# Patient Record
Sex: FEMALE | Race: WHITE
[De-identification: names, ages, dates, MRNs, and addresses within clinical notes are randomized per-mention and may not be internally consistent; named-entity substitution may affect disease eponyms.]

---

## 2017-09-11 ENCOUNTER — HOSPITAL ENCOUNTER (OUTPATIENT)
Dept: HOSPITAL 8 - STAR | Age: 67
Discharge: HOME | End: 2017-09-11
Attending: NEUROLOGICAL SURGERY
Payer: MEDICARE

## 2017-09-11 DIAGNOSIS — M48.06: ICD-10-CM

## 2017-09-11 DIAGNOSIS — R79.1: ICD-10-CM

## 2017-09-11 DIAGNOSIS — Z01.818: Primary | ICD-10-CM

## 2017-09-11 DIAGNOSIS — M51.36: ICD-10-CM

## 2017-09-11 LAB
AST SERPL-CCNC: 9 U/L (ref 15–37)
BUN SERPL-MCNC: 18 MG/DL (ref 7–18)
HCT VFR BLD CALC: 41.7 % (ref 34.6–47.8)
HGB BLD-MCNC: 14.1 G/DL (ref 11.7–16.4)
WBC # BLD AUTO: 10.3 X10^3/UL (ref 3.4–10)

## 2017-09-11 PROCEDURE — 36415 COLL VENOUS BLD VENIPUNCTURE: CPT

## 2017-09-11 PROCEDURE — 85025 COMPLETE CBC W/AUTO DIFF WBC: CPT

## 2017-09-11 PROCEDURE — 85610 PROTHROMBIN TIME: CPT

## 2017-09-11 PROCEDURE — 85730 THROMBOPLASTIN TIME PARTIAL: CPT

## 2017-09-11 PROCEDURE — 93005 ELECTROCARDIOGRAM TRACING: CPT

## 2017-09-11 PROCEDURE — 80053 COMPREHEN METABOLIC PANEL: CPT

## 2017-09-11 PROCEDURE — 81003 URINALYSIS AUTO W/O SCOPE: CPT

## 2017-09-20 ENCOUNTER — HOSPITAL ENCOUNTER (OUTPATIENT)
Dept: HOSPITAL 8 - OUT | Age: 67
Setting detail: OBSERVATION
LOS: 2 days | Discharge: HOME | End: 2017-09-22
Attending: NEUROLOGICAL SURGERY | Admitting: NEUROLOGICAL SURGERY
Payer: MEDICARE

## 2017-09-20 VITALS — SYSTOLIC BLOOD PRESSURE: 136 MMHG | DIASTOLIC BLOOD PRESSURE: 82 MMHG

## 2017-09-20 VITALS — SYSTOLIC BLOOD PRESSURE: 94 MMHG | DIASTOLIC BLOOD PRESSURE: 62 MMHG

## 2017-09-20 VITALS — BODY MASS INDEX: 35.04 KG/M2 | HEIGHT: 64 IN | WEIGHT: 205.25 LBS

## 2017-09-20 VITALS — DIASTOLIC BLOOD PRESSURE: 64 MMHG | SYSTOLIC BLOOD PRESSURE: 99 MMHG

## 2017-09-20 DIAGNOSIS — G89.29: ICD-10-CM
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
## 2017-09-20 DIAGNOSIS — G97.41: ICD-10-CM

## 2017-09-20 DIAGNOSIS — I10: ICD-10-CM

## 2017-09-20 DIAGNOSIS — M48.06: Primary | ICD-10-CM

## 2017-09-20 DIAGNOSIS — M51.16: ICD-10-CM

## 2017-09-20 PROCEDURE — 96376 TX/PRO/DX INJ SAME DRUG ADON: CPT

## 2017-09-20 PROCEDURE — 63035 LAMOT DCMPRN NRV RT EA ADDL: CPT

## 2017-09-20 PROCEDURE — 97166 OT EVAL MOD COMPLEX 45 MIN: CPT

## 2017-09-20 PROCEDURE — 96365 THER/PROPH/DIAG IV INF INIT: CPT

## 2017-09-20 PROCEDURE — 63057 DECOMPRESS SPINE CORD ADD-ON: CPT

## 2017-09-20 PROCEDURE — 85025 COMPLETE CBC W/AUTO DIFF WBC: CPT

## 2017-09-20 PROCEDURE — 97162 PT EVAL MOD COMPLEX 30 MIN: CPT

## 2017-09-20 PROCEDURE — C1781 MESH (IMPLANTABLE): HCPCS

## 2017-09-20 PROCEDURE — 96375 TX/PRO/DX INJ NEW DRUG ADDON: CPT

## 2017-09-20 PROCEDURE — 72100 X-RAY EXAM L-S SPINE 2/3 VWS: CPT

## 2017-09-20 PROCEDURE — 36415 COLL VENOUS BLD VENIPUNCTURE: CPT

## 2017-09-20 PROCEDURE — C1729 CATH, DRAINAGE: HCPCS

## 2017-09-20 PROCEDURE — G0378 HOSPITAL OBSERVATION PER HR: HCPCS

## 2017-09-20 PROCEDURE — 63030 LAMOT DCMPRN NRV RT 1 LMBR: CPT

## 2017-09-20 PROCEDURE — 82040 ASSAY OF SERUM ALBUMIN: CPT

## 2017-09-20 PROCEDURE — 63056 DECOMPRESS SPINAL CORD LMBR: CPT

## 2017-09-20 PROCEDURE — 96372 THER/PROPH/DIAG INJ SC/IM: CPT

## 2017-09-20 PROCEDURE — 80048 BASIC METABOLIC PNL TOTAL CA: CPT

## 2017-09-20 RX ADMIN — ONDANSETRON PRN MG: 2 INJECTION, SOLUTION INTRAMUSCULAR; INTRAVENOUS at 20:20

## 2017-09-20 RX ADMIN — NEBIVOLOL HYDROCHLORIDE SCH MG: 5 TABLET ORAL at 21:13

## 2017-09-20 RX ADMIN — HYDROMORPHONE HYDROCHLORIDE PRN MG: 1 INJECTION, SOLUTION INTRAMUSCULAR; INTRAVENOUS; SUBCUTANEOUS at 15:22

## 2017-09-20 RX ADMIN — HYDROMORPHONE HYDROCHLORIDE PRN MG: 1 INJECTION, SOLUTION INTRAMUSCULAR; INTRAVENOUS; SUBCUTANEOUS at 15:34

## 2017-09-20 RX ADMIN — HYDROMORPHONE HYDROCHLORIDE PRN MG: 1 INJECTION, SOLUTION INTRAMUSCULAR; INTRAVENOUS; SUBCUTANEOUS at 15:45

## 2017-09-20 RX ADMIN — ENOXAPARIN SODIUM SCH MG: 40 INJECTION SUBCUTANEOUS at 15:30

## 2017-09-20 RX ADMIN — HYDROCODONE BITARTRATE AND ACETAMINOPHEN PRN TAB: 10; 325 TABLET ORAL at 21:12

## 2017-09-20 RX ADMIN — KETOROLAC TROMETHAMINE PRN MG: 30 INJECTION, SOLUTION INTRAMUSCULAR at 20:20

## 2017-09-20 RX ADMIN — BUDESONIDE AND FORMOTEROL FUMARATE DIHYDRATE SCH PUFFS: 160; 4.5 AEROSOL RESPIRATORY (INHALATION) at 21:00

## 2017-09-20 RX ADMIN — CEFAZOLIN SODIUM SCH MLS/HR: 1 SOLUTION INTRAVENOUS at 21:13

## 2017-09-20 RX ADMIN — DEXTROSE, SODIUM CHLORIDE, AND POTASSIUM CHLORIDE SCH MLS/HR: 5; .9; .15 INJECTION INTRAVENOUS at 20:20

## 2017-09-20 RX ADMIN — ATORVASTATIN CALCIUM SCH MG: 40 TABLET, FILM COATED ORAL at 21:00

## 2017-09-21 VITALS — DIASTOLIC BLOOD PRESSURE: 64 MMHG | SYSTOLIC BLOOD PRESSURE: 102 MMHG

## 2017-09-21 VITALS — SYSTOLIC BLOOD PRESSURE: 110 MMHG | DIASTOLIC BLOOD PRESSURE: 62 MMHG

## 2017-09-21 VITALS — DIASTOLIC BLOOD PRESSURE: 61 MMHG | SYSTOLIC BLOOD PRESSURE: 90 MMHG

## 2017-09-21 VITALS — DIASTOLIC BLOOD PRESSURE: 52 MMHG | SYSTOLIC BLOOD PRESSURE: 91 MMHG

## 2017-09-21 VITALS — SYSTOLIC BLOOD PRESSURE: 101 MMHG | DIASTOLIC BLOOD PRESSURE: 64 MMHG

## 2017-09-21 LAB
BUN SERPL-MCNC: 11 MG/DL (ref 7–18)
HCT VFR BLD CALC: 33.1 % (ref 34.6–47.8)
HGB BLD-MCNC: 11 G/DL (ref 11.7–16.4)
WBC # BLD AUTO: 11.4 X10^3/UL (ref 3.4–10)

## 2017-09-21 RX ADMIN — ONDANSETRON PRN MG: 2 INJECTION, SOLUTION INTRAMUSCULAR; INTRAVENOUS at 16:18

## 2017-09-21 RX ADMIN — ATORVASTATIN CALCIUM SCH MG: 40 TABLET, FILM COATED ORAL at 20:43

## 2017-09-21 RX ADMIN — DEXTROSE, SODIUM CHLORIDE, AND POTASSIUM CHLORIDE SCH MLS/HR: 5; .9; .15 INJECTION INTRAVENOUS at 18:46

## 2017-09-21 RX ADMIN — CEFAZOLIN SODIUM SCH MLS/HR: 1 SOLUTION INTRAVENOUS at 04:43

## 2017-09-21 RX ADMIN — SODIUM CHLORIDE, PRESERVATIVE FREE SCH ML: 5 INJECTION INTRAVENOUS at 20:43

## 2017-09-21 RX ADMIN — HYDROCODONE BITARTRATE AND ACETAMINOPHEN PRN TAB: 10; 325 TABLET ORAL at 02:27

## 2017-09-21 RX ADMIN — ONDANSETRON PRN MG: 2 INJECTION, SOLUTION INTRAMUSCULAR; INTRAVENOUS at 02:27

## 2017-09-21 RX ADMIN — DEXTROSE, SODIUM CHLORIDE, AND POTASSIUM CHLORIDE SCH MLS/HR: 5; .9; .15 INJECTION INTRAVENOUS at 06:19

## 2017-09-21 RX ADMIN — HYDROCODONE BITARTRATE AND ACETAMINOPHEN PRN TAB: 10; 325 TABLET ORAL at 20:43

## 2017-09-21 RX ADMIN — SODIUM CHLORIDE, PRESERVATIVE FREE SCH ML: 5 INJECTION INTRAVENOUS at 09:00

## 2017-09-21 RX ADMIN — BUDESONIDE AND FORMOTEROL FUMARATE DIHYDRATE SCH PUFFS: 160; 4.5 AEROSOL RESPIRATORY (INHALATION) at 09:00

## 2017-09-21 RX ADMIN — ENOXAPARIN SODIUM SCH MG: 40 INJECTION SUBCUTANEOUS at 16:18

## 2017-09-21 RX ADMIN — NEBIVOLOL HYDROCHLORIDE SCH MG: 5 TABLET ORAL at 20:46

## 2017-09-21 RX ADMIN — HYDROCODONE BITARTRATE AND ACETAMINOPHEN PRN TAB: 10; 325 TABLET ORAL at 16:18

## 2017-09-21 RX ADMIN — KETOROLAC TROMETHAMINE PRN MG: 30 INJECTION, SOLUTION INTRAMUSCULAR at 12:10

## 2017-09-21 RX ADMIN — BUDESONIDE AND FORMOTEROL FUMARATE DIHYDRATE SCH MG: 160; 4.5 AEROSOL RESPIRATORY (INHALATION) at 09:00

## 2017-09-21 RX ADMIN — DEXTROSE, SODIUM CHLORIDE, AND POTASSIUM CHLORIDE SCH MLS/HR: 5; .9; .15 INJECTION INTRAVENOUS at 08:46

## 2017-09-21 RX ADMIN — DEXTROSE, SODIUM CHLORIDE, AND POTASSIUM CHLORIDE SCH MLS/HR: 5; .9; .15 INJECTION INTRAVENOUS at 20:43

## 2017-09-21 RX ADMIN — LOSARTAN POTASSIUM SCH MG: 25 TABLET, FILM COATED ORAL at 09:00

## 2017-09-21 RX ADMIN — BUDESONIDE AND FORMOTEROL FUMARATE DIHYDRATE SCH PUFFS: 160; 4.5 AEROSOL RESPIRATORY (INHALATION) at 20:44

## 2017-09-22 VITALS — DIASTOLIC BLOOD PRESSURE: 61 MMHG | SYSTOLIC BLOOD PRESSURE: 102 MMHG

## 2017-09-22 VITALS — DIASTOLIC BLOOD PRESSURE: 74 MMHG | SYSTOLIC BLOOD PRESSURE: 121 MMHG

## 2017-09-22 LAB
HCT VFR BLD CALC: 32.7 % (ref 34.6–47.8)
HGB BLD-MCNC: 10.9 G/DL (ref 11.7–16.4)
WBC # BLD AUTO: 13.7 X10^3/UL (ref 3.4–10)

## 2017-09-22 RX ADMIN — SODIUM CHLORIDE, PRESERVATIVE FREE SCH ML: 5 INJECTION INTRAVENOUS at 09:00

## 2017-09-22 RX ADMIN — HYDROCODONE BITARTRATE AND ACETAMINOPHEN PRN TAB: 10; 325 TABLET ORAL at 06:22

## 2017-09-22 RX ADMIN — BUDESONIDE AND FORMOTEROL FUMARATE DIHYDRATE SCH MG: 160; 4.5 AEROSOL RESPIRATORY (INHALATION) at 09:00

## 2017-09-22 RX ADMIN — LOSARTAN POTASSIUM SCH MG: 25 TABLET, FILM COATED ORAL at 09:02

## 2017-09-22 RX ADMIN — BUDESONIDE AND FORMOTEROL FUMARATE DIHYDRATE SCH PUFFS: 160; 4.5 AEROSOL RESPIRATORY (INHALATION) at 09:04

## 2017-09-22 RX ADMIN — KETOROLAC TROMETHAMINE PRN MG: 30 INJECTION, SOLUTION INTRAMUSCULAR at 09:03

## 2017-09-22 RX ADMIN — ONDANSETRON PRN MG: 2 INJECTION, SOLUTION INTRAMUSCULAR; INTRAVENOUS at 06:22

## 2017-12-17 ENCOUNTER — OFFICE VISIT (OUTPATIENT)
Dept: URGENT CARE | Facility: CLINIC | Age: 67
End: 2017-12-17
Payer: MEDICARE

## 2017-12-17 ENCOUNTER — APPOINTMENT (OUTPATIENT)
Dept: RADIOLOGY | Facility: IMAGING CENTER | Age: 67
End: 2017-12-17
Attending: NURSE PRACTITIONER
Payer: MEDICARE

## 2017-12-17 VITALS
RESPIRATION RATE: 16 BRPM | OXYGEN SATURATION: 94 % | DIASTOLIC BLOOD PRESSURE: 60 MMHG | TEMPERATURE: 96.4 F | HEIGHT: 64 IN | WEIGHT: 183.6 LBS | BODY MASS INDEX: 31.34 KG/M2 | SYSTOLIC BLOOD PRESSURE: 130 MMHG | HEART RATE: 91 BPM

## 2017-12-17 DIAGNOSIS — S52.602A CLOSED FRACTURE OF DISTAL END OF LEFT ULNA, UNSPECIFIED FRACTURE MORPHOLOGY, INITIAL ENCOUNTER: ICD-10-CM

## 2017-12-17 DIAGNOSIS — S69.92XA INJURY OF LEFT WRIST, INITIAL ENCOUNTER: ICD-10-CM

## 2017-12-17 PROCEDURE — 73110 X-RAY EXAM OF WRIST: CPT | Mod: TC,LT | Performed by: NURSE PRACTITIONER

## 2017-12-17 PROCEDURE — 99203 OFFICE O/P NEW LOW 30 MIN: CPT | Performed by: NURSE PRACTITIONER

## 2017-12-17 RX ORDER — HYDROCODONE BITARTRATE AND ACETAMINOPHEN 10; 325 MG/1; MG/1
TABLET ORAL EVERY 4 HOURS
Refills: 0 | COMMUNITY
Start: 2017-09-22 | End: 2017-12-17

## 2017-12-17 RX ORDER — ONDANSETRON 8 MG/1
TABLET, ORALLY DISINTEGRATING ORAL
Refills: 3 | COMMUNITY
Start: 2017-11-04 | End: 2022-02-06

## 2017-12-17 RX ORDER — ZOLPIDEM TARTRATE 10 MG/1
10 TABLET ORAL
Refills: 0 | COMMUNITY
Start: 2017-12-05 | End: 2022-02-06

## 2017-12-17 RX ORDER — ALPRAZOLAM 1 MG/1
TABLET ORAL
Refills: 0 | COMMUNITY
Start: 2017-12-05 | End: 2022-02-06

## 2017-12-17 RX ORDER — LOSARTAN POTASSIUM 25 MG/1
25 TABLET ORAL
Refills: 0 | COMMUNITY
Start: 2017-11-09 | End: 2022-02-06

## 2017-12-17 RX ORDER — CYCLOBENZAPRINE HCL 5 MG
TABLET ORAL
Refills: 0 | COMMUNITY
Start: 2017-11-09 | End: 2022-02-06

## 2017-12-17 RX ORDER — ATORVASTATIN CALCIUM 40 MG/1
40 TABLET, FILM COATED ORAL
Refills: 1 | COMMUNITY
Start: 2017-11-01 | End: 2022-02-06

## 2017-12-17 RX ORDER — SUMATRIPTAN 100 MG/1
TABLET, FILM COATED ORAL
Refills: 3 | COMMUNITY
Start: 2017-11-04 | End: 2022-02-06

## 2017-12-17 RX ORDER — FLUCONAZOLE 200 MG/1
TABLET ORAL
Refills: 0 | COMMUNITY
Start: 2017-11-21 | End: 2017-12-17

## 2017-12-17 RX ORDER — OXYCODONE AND ACETAMINOPHEN 10; 325 MG/1; MG/1
TABLET ORAL
Refills: 0 | COMMUNITY
Start: 2017-10-10 | End: 2022-02-06

## 2017-12-17 NOTE — PROGRESS NOTES
Chief Complaint   Patient presents with   • Wrist Injury     x1day, left wrist pain after fall       HISTORY OF PRESENT ILLNESS: Patient is a 67 y.o. female who presents to urgent care today with complaints of left wrist pain. States she had a mechanical ground level fall in her home yesterday. During the fall, she believes she hit her wrist on the edge of a chair. She has had pain to her wrist since. She has tried ice and OTC motrin for pain relief. She denies other areas of pain or trauma. She is right hand dominate. Denies previous injuries or pain to this wrist.     Patient Active Problem List    Diagnosis Date Noted   • HTN (hypertension)    • Hyperlipidemia    • Obesity    • Depression    • Other chest pain 01/30/2012   • Dyslipidemia 01/30/2012   • Essential hypertension, benign 01/30/2012   • CAD (coronary artery disease) 01/30/2012       Allergies:Fentanyl; Morphine; Pcn [penicillins]; and Rifampin    Current Outpatient Prescriptions Ordered in [facility]   Medication Sig Dispense Refill   • alprazolam (XANAX) 1 MG Tab TAKE 1/2-1 TABLET BY MOUTH EVERY DAY AS NEEDED  0   • cyclobenzaprine (FLEXERIL) 5 MG tablet TAKE 1/2-2 TABLET(S) BY MOUTH DAILY AT BEDTIME AS NEEDED  0   • losartan (COZAAR) 25 MG Tab Take 25 mg by mouth every day.  0   • oxycodone-acetaminophen (PERCOCET-10)  MG Tab TAKE 1/2-1 TABLETS BY MOUTH 3 TIMES DAILY AS NEEDED  0   • LYRICA 25 MG Cap Take 25 mg by mouth every day.  2   • NON SPECIFIED      • HYDROCHLOROTHIAZIDE PO Take  by mouth.     • Fluticasone-Salmeterol (ADVAIR HFA INH) Inhale  by mouth.     • ALBUTEROL SULFATE PO Take  by mouth.     • atorvastatin (LIPITOR) 40 MG Tab Take 40 mg by mouth.  1   • ondansetron (ZOFRAN ODT) 8 MG TABLET DISPERSIBLE DISSOLVE 1 TAB IN MOUTH 3 TIMES A DAY AS NEEDED  3   • sumatriptan (IMITREX) 100 MG tablet TAKE 1 TABLET BY MOUTH AT ONSET OF MIGRAINE  3   • zolpidem (AMBIEN) 10 MG Tab Take 10 mg by mouth.  0   • ESTROGENS CONJUGATED PO Take 2 mg by  mouth every day.     • levothyroxine (SYNTHROID) 50 MCG TABS Take 50 mcg by mouth every day.     • lisinopril (PRINIVIL) 2.5 MG TABS Take 2.5 mg by mouth every day.     • zolpidem (AMBIEN CR) 12.5 MG CR tablet Take 12.5 mg by mouth at bedtime as needed.     • duloxetine (CYMBALTA) 30 MG CPEP Take 30 mg by mouth every day.     • buPROPion SR (WELLBUTRIN SR) 150 MG TB12 Take 150 mg by mouth every day.     • Budesonide-Formoterol Fumarate (SYMBICORT INH) Inhale  by mouth 2 Times a Day.     • Levalbuterol HCl (XOPENEX INH) Inhale  by mouth as needed.       No current Epic-ordered facility-administered medications on file.        Past Medical History:   Diagnosis Date   • Asthma    • Depression    • HTN (hypertension)    • Hyperlipidemia    • Migraines    • Obesity    • Sleep apnea     diagnosed 2009 CPAP -PMA       Social History   Substance Use Topics   • Smoking status: Never Smoker   • Smokeless tobacco: Never Used   • Alcohol use No       Family Status   Relation Status   • Mother    • Father    • Sister Alive   • Brother Alive   • Maternal Aunt Alive   • Brother Alive   • Brother Alive   • Brother Alive   • Sister Alive   • Brother    • Brother      Family History   Problem Relation Age of Onset   • Cancer Mother      lung   • Heart Disease Mother    • Stroke Father    • Heart Disease Father    • Diabetes Father    • Heart Disease Brother      cath and bypass   • Heart Disease Brother      cath and byp[ass[   • Heart Disease Brother      cath and bypass   • Heart Disease Brother    • Other Brother      MVA       ROS:  Review of Systems   Constitutional: Negative for fever, chills, weight loss, malaise, and fatigue.   HENT: Negative for ear pain, nosebleeds, congestion, sore throat and neck pain.    Eyes: Negative for vision changes.   Neuro: Negative for headache, sensory changes, weakness, seizure, LOC.   Cardiovascular: Negative for chest pain, palpitations, orthopnea and leg  "swelling.   Respiratory: Negative for cough, sputum production, shortness of breath and wheezing.   Gastrointestinal: Negative for abdominal pain, nausea, vomiting or diarrhea.   Genitourinary: Negative for dysuria, urgency and frequency.  Musculoskeletal: Positive for fall, left wrist pain. Positive for chronic back pain, denies new or worsening pain. Negative for neck pain, myalgias.   Skin: Positive for abrasion to right arm. Negative for rash, diaphoresis.     Exam:  Blood pressure 130/60, pulse 91, temperature (!) 35.8 °C (96.4 °F), resp. rate 16, height 1.626 m (5' 4\"), weight 83.3 kg (183 lb 9.6 oz), SpO2 94 %.  General: well-nourished, well-developed female in NAD  Head: normocephalic, atraumatic  Eyes: PERRLA, no conjunctival injection, acuity grossly intact, lids normal.  Ears: normal shape and symmetry, gross auditory acuity is intact.  Nose: symmetrical without tenderness, no discharge.  Mouth/Throat: reasonable hygiene, no erythema, exudates or tonsillar enlargement.  Neck: no masses, range of motion within normal limits, no tracheal deviation. No obvious thyroid enlargement.   Lymph: no cervical adenopathy. No supraclavicular adenopathy.   Neuro: alert and oriented. Cranial nerves 1-12 grossly intact. No sensory deficit.   Cardiovascular: regular rate and rhythm. No edema.  Pulmonary: no distress. Chest is symmetrical with respiration, no wheezes, crackles, or rhonchi.   Musculoskeletal: no clubbing, appropriate muscle tone, gait is stable. There is tenderness to medial aspect of left wrist, guarding. There is no obvious deformity. Slightly limited ROM of wrists in all directions. N/V intact. Cap refill brisk.  There is a superficial abrasion to left forearm and elbow without correlating bony tenderness.   Skin: warm, dry, intact, no clubbing, no cyanosis, no rashes.   Psych: appropriate mood, affect, judgement.         Assessment/Plan:  1. Closed fracture of distal end of left ulna, unspecified " "fracture morphology, initial encounter  DX-WRIST-COMPLETE 3+ LEFT    REFERRAL TO ORTHOPEDICS         DX wrist reviewed by myself, radiology reading \"Oblique mildly displaced fracture of the distal ulnar diaphysis.\"        X-ray shows mildly displaced distal ulnar fracture. Patient is placed in sugar tong splint with a sling. N/V intact post placement. RICE. Home pain medications as directed. Follow up with ortho as discussed, referral placed.   Supportive care, differential diagnoses, and indications for immediate follow-up discussed with patient.   Pathogenesis of diagnosis discussed including typical length and natural progression.   Instructed to return to clinic or nearest emergency department for any change in condition, further concerns, or worsening of symptoms.  Patient states understanding of the plan of care and discharge instructions.  Instructed to make an appointment, for follow up, with their primary care provider.        Please note that this dictation was created using voice recognition software. I have made every reasonable attempt to correct obvious errors, but I expect that there are errors of grammar and possibly content that I did not discover before finalizing the note.      CLAUDIA Arriola.     "

## 2018-03-21 ENCOUNTER — APPOINTMENT (RX ONLY)
Dept: URBAN - METROPOLITAN AREA CLINIC 4 | Facility: CLINIC | Age: 68
Setting detail: DERMATOLOGY
End: 2018-03-21

## 2018-03-21 DIAGNOSIS — L72.0 EPIDERMAL CYST: ICD-10-CM

## 2018-03-21 DIAGNOSIS — D22 MELANOCYTIC NEVI: ICD-10-CM

## 2018-03-21 DIAGNOSIS — D18.0 HEMANGIOMA: ICD-10-CM

## 2018-03-21 DIAGNOSIS — L82.0 INFLAMED SEBORRHEIC KERATOSIS: ICD-10-CM

## 2018-03-21 DIAGNOSIS — L57.8 OTHER SKIN CHANGES DUE TO CHRONIC EXPOSURE TO NONIONIZING RADIATION: ICD-10-CM

## 2018-03-21 DIAGNOSIS — L82.1 OTHER SEBORRHEIC KERATOSIS: ICD-10-CM

## 2018-03-21 DIAGNOSIS — D485 NEOPLASM OF UNCERTAIN BEHAVIOR OF SKIN: ICD-10-CM

## 2018-03-21 DIAGNOSIS — L81.4 OTHER MELANIN HYPERPIGMENTATION: ICD-10-CM

## 2018-03-21 PROBLEM — D22.5 MELANOCYTIC NEVI OF TRUNK: Status: ACTIVE | Noted: 2018-03-21

## 2018-03-21 PROBLEM — D48.5 NEOPLASM OF UNCERTAIN BEHAVIOR OF SKIN: Status: ACTIVE | Noted: 2018-03-21

## 2018-03-21 PROBLEM — D18.01 HEMANGIOMA OF SKIN AND SUBCUTANEOUS TISSUE: Status: ACTIVE | Noted: 2018-03-21

## 2018-03-21 PROCEDURE — ? OBSERVATION

## 2018-03-21 PROCEDURE — 17110 DESTRUCTION B9 LES UP TO 14: CPT

## 2018-03-21 PROCEDURE — 11100: CPT | Mod: 59

## 2018-03-21 PROCEDURE — ? LIQUID NITROGEN

## 2018-03-21 PROCEDURE — ? BIOPSY BY SHAVE METHOD

## 2018-03-21 PROCEDURE — 99203 OFFICE O/P NEW LOW 30 MIN: CPT | Mod: 25

## 2018-03-21 PROCEDURE — ? COUNSELING

## 2018-03-21 ASSESSMENT — LOCATION DETAILED DESCRIPTION DERM
LOCATION DETAILED: RIGHT ANTERIOR DISTAL THIGH
LOCATION DETAILED: RIGHT INFERIOR UPPER BACK
LOCATION DETAILED: RIGHT LATERAL ABDOMEN
LOCATION DETAILED: LEFT INFERIOR CENTRAL MALAR CHEEK
LOCATION DETAILED: RIGHT CENTRAL MALAR CHEEK
LOCATION DETAILED: LEFT ANTERIOR DISTAL THIGH
LOCATION DETAILED: RIGHT LATERAL EYEBROW
LOCATION DETAILED: LEFT ANTERIOR PROXIMAL UPPER ARM
LOCATION DETAILED: RIGHT PROXIMAL DORSAL FOREARM
LOCATION DETAILED: RIGHT MID-UPPER BACK
LOCATION DETAILED: LEFT MEDIAL SUPERIOR CHEST
LOCATION DETAILED: RIGHT SUPERIOR ANTERIOR NECK
LOCATION DETAILED: RIGHT SUPERIOR MEDIAL UPPER BACK
LOCATION DETAILED: RIGHT ANTERIOR PROXIMAL UPPER ARM
LOCATION DETAILED: LEFT PROXIMAL DORSAL FOREARM

## 2018-03-21 ASSESSMENT — LOCATION SIMPLE DESCRIPTION DERM
LOCATION SIMPLE: RIGHT CHEEK
LOCATION SIMPLE: RIGHT THIGH
LOCATION SIMPLE: CHEST
LOCATION SIMPLE: RIGHT EYEBROW
LOCATION SIMPLE: RIGHT FOREARM
LOCATION SIMPLE: LEFT CHEEK
LOCATION SIMPLE: ABDOMEN
LOCATION SIMPLE: RIGHT ANTERIOR NECK
LOCATION SIMPLE: RIGHT UPPER BACK
LOCATION SIMPLE: LEFT UPPER ARM
LOCATION SIMPLE: LEFT FOREARM
LOCATION SIMPLE: LEFT THIGH
LOCATION SIMPLE: RIGHT UPPER ARM

## 2018-03-21 ASSESSMENT — LOCATION ZONE DERM
LOCATION ZONE: TRUNK
LOCATION ZONE: FACE
LOCATION ZONE: LEG
LOCATION ZONE: NECK
LOCATION ZONE: ARM

## 2018-03-21 NOTE — PROCEDURE: LIQUID NITROGEN
Aperture Size (Optional): C
Post-Care Instructions: I reviewed with the patient in detail post-care instructions. Patient is to wear sunprotection, and avoid picking at any of the treated lesions. Pt may apply Vaseline to crusted or scabbing areas.
Medical Necessity Clause: This procedure was medically necessary because the lesions that were treated were:
Detail Level: Detailed
Render Post-Care Instructions In Note?: no
Consent: The patient's consent was obtained including but not limited to risks of crusting, scabbing, blistering, scarring, darker or lighter pigmentary change, recurrence, incomplete removal and infection.
Number Of Freeze-Thaw Cycles: 1 freeze-thaw cycle
Medical Necessity Information: It is in your best interest to select a reason for this procedure from the list below. All of these items fulfill various CMS LCD requirements except the new and changing color options.
Duration Of Freeze Thaw-Cycle (Seconds): 3

## 2018-03-21 NOTE — PROCEDURE: BIOPSY BY SHAVE METHOD
Destruction After The Procedure: Yes
Detail Level: Detailed
Biopsy Type: H and E
Post-Care Instructions: I reviewed with the patient in detail post-care instructions. Patient is to keep the biopsy site dry overnight, and then apply bacitracin twice daily until healed. Patient may apply hydrogen peroxide soaks to remove any crusting.
Anesthesia Type: 1% lidocaine with epinephrine
Hemostasis: Aluminum Chloride and Electrocautery
Type Of Destruction Used: Electrodesiccation
Notification Instructions: Patient will be notified of biopsy results. However, patient instructed to call the office if not contacted within 2 weeks.
Anesthesia Volume In Cc: 0.5
Additional Anesthesia Volume In Cc (Will Not Render If 0): 0
Curettage Text: The wound bed was treated with curettage after the biopsy was performed.
Wound Care: Bacitracin
Cryotherapy Text: The wound bed was treated with cryotherapy after the biopsy was performed.
Consent: Written consent was obtained and risks were reviewed including but not limited to scarring, infection, bleeding, scabbing, incomplete removal, nerve damage and allergy to anesthesia.
Biopsy Method: Dermablade
Electrodesiccation Text: The wound bed was treated with electrodesiccation after the biopsy was performed.
Dressing: bandage
Lab: [id number]
Billing Type: Third-Party Bill
Bill For Surgical Tray: no
Silver Nitrate Text: The wound bed was treated with silver nitrate after the biopsy was performed.
Lab Facility: 
Electrodesiccation And Curettage Text: The wound bed was treated with electrodesiccation and curettage after the biopsy was performed.

## 2018-09-21 ENCOUNTER — HOSPITAL ENCOUNTER (OUTPATIENT)
Dept: RADIOLOGY | Facility: MEDICAL CENTER | Age: 68
End: 2018-09-21
Attending: PHYSICIAN ASSISTANT
Payer: MEDICARE

## 2018-09-21 DIAGNOSIS — M51.37 OTHER INTERVERTEBRAL DISC DEGENERATION, LUMBOSACRAL REGION: ICD-10-CM

## 2018-09-21 PROCEDURE — 72148 MRI LUMBAR SPINE W/O DYE: CPT

## 2019-02-05 ENCOUNTER — HOSPITAL ENCOUNTER (OUTPATIENT)
Dept: HOSPITAL 8 - CFH | Age: 69
Discharge: HOME | End: 2019-02-05
Attending: INTERNAL MEDICINE
Payer: MEDICARE

## 2019-02-05 DIAGNOSIS — M19.072: ICD-10-CM

## 2019-02-05 DIAGNOSIS — M19.071: Primary | ICD-10-CM

## 2020-03-23 ENCOUNTER — APPOINTMENT (RX ONLY)
Dept: URBAN - METROPOLITAN AREA CLINIC 4 | Facility: CLINIC | Age: 70
Setting detail: DERMATOLOGY
End: 2020-03-23

## 2020-03-23 DIAGNOSIS — D18.0 HEMANGIOMA: ICD-10-CM

## 2020-03-23 PROBLEM — D18.01 HEMANGIOMA OF SKIN AND SUBCUTANEOUS TISSUE: Status: ACTIVE | Noted: 2020-03-23

## 2020-03-23 PROCEDURE — ? OBSERVATION AND MEASURE

## 2020-03-23 PROCEDURE — ? ADDITIONAL NOTES

## 2020-03-23 PROCEDURE — ? COUNSELING

## 2020-03-23 PROCEDURE — 99212 OFFICE O/P EST SF 10 MIN: CPT

## 2020-03-23 ASSESSMENT — LOCATION SIMPLE DESCRIPTION DERM: LOCATION SIMPLE: LEFT LIP

## 2020-03-23 ASSESSMENT — LOCATION ZONE DERM: LOCATION ZONE: LIP

## 2020-03-23 ASSESSMENT — LOCATION DETAILED DESCRIPTION DERM: LOCATION DETAILED: LEFT INFERIOR VERMILION LIP

## 2020-03-23 NOTE — PROCEDURE: ADDITIONAL NOTES
Detail Level: Simple
Additional Notes: Advised patient to watch lesion to see if it grows to call us and we can do a biopsy on the lip. Advised patient to take a picture of it during the 3 months to see if it changes.

## 2020-06-11 ENCOUNTER — OFFICE VISIT (OUTPATIENT)
Dept: URGENT CARE | Facility: CLINIC | Age: 70
End: 2020-06-11
Payer: MEDICARE

## 2020-06-11 VITALS
TEMPERATURE: 97.6 F | DIASTOLIC BLOOD PRESSURE: 76 MMHG | WEIGHT: 193 LBS | OXYGEN SATURATION: 96 % | HEIGHT: 64 IN | HEART RATE: 88 BPM | SYSTOLIC BLOOD PRESSURE: 140 MMHG | BODY MASS INDEX: 32.95 KG/M2 | RESPIRATION RATE: 16 BRPM

## 2020-06-11 DIAGNOSIS — H69.93 DYSFUNCTION OF BOTH EUSTACHIAN TUBES: ICD-10-CM

## 2020-06-11 DIAGNOSIS — H66.90 ACUTE OTITIS MEDIA, UNSPECIFIED OTITIS MEDIA TYPE: ICD-10-CM

## 2020-06-11 PROCEDURE — 99214 OFFICE O/P EST MOD 30 MIN: CPT | Performed by: PHYSICIAN ASSISTANT

## 2020-06-11 RX ORDER — CEFDINIR 300 MG/1
300 CAPSULE ORAL 2 TIMES DAILY
Qty: 20 CAP | Refills: 0 | Status: SHIPPED | OUTPATIENT
Start: 2020-06-11 | End: 2020-06-21

## 2020-06-11 ASSESSMENT — ENCOUNTER SYMPTOMS
SENSORY CHANGE: 0
DIAPHORESIS: 0
DOUBLE VISION: 0
DIZZINESS: 0
ABDOMINAL PAIN: 0
WEAKNESS: 0
COUGH: 0
NAUSEA: 0
HEADACHES: 0
NECK PAIN: 1
FEVER: 0
RHINORRHEA: 0
CHILLS: 0
SORE THROAT: 0
SINUS PAIN: 0
BLURRED VISION: 0
VOMITING: 0

## 2020-06-11 NOTE — PROGRESS NOTES
Subjective:   Yamile Go is a 70 y.o. female who presents for Otalgia (bilateral x4 days )      Otalgia    There is pain in both (ache in both ears. Referred pain to the right sided neck.) ears. This is a new (5 days) problem. The problem occurs constantly. The problem has been unchanged. There has been no fever. The pain is mild. Associated symptoms include neck pain (right sided referred pain). Pertinent negatives include no abdominal pain, coughing, ear discharge, headaches, hearing loss, rash, rhinorrhea, sore throat or vomiting. She has tried nothing for the symptoms.       Review of Systems   Constitutional: Negative for chills, diaphoresis, fever and malaise/fatigue.   HENT: Positive for ear pain. Negative for congestion, ear discharge, hearing loss, rhinorrhea, sinus pain and sore throat.    Eyes: Negative for blurred vision and double vision.   Respiratory: Negative for cough.    Cardiovascular: Negative for chest pain.   Gastrointestinal: Negative for abdominal pain, nausea and vomiting.   Musculoskeletal: Positive for neck pain (right sided referred pain).   Skin: Negative for rash.   Neurological: Negative for dizziness, sensory change, weakness and headaches.   Endo/Heme/Allergies: Positive for environmental allergies (No treatments this year).       Medications:    • ADVAIR HFA INH  • ALBUTEROL SULFATE PO  • ALPRAZolam Tabs  • atorvastatin Tabs  • buPROPion SR Tb12  • cefdinir Caps  • cyclobenzaprine  • DULoxetine Cpep  • ESTROGENS CONJUGATED PO  • HYDROCHLOROTHIAZIDE PO  • levothyroxine Tabs  • lisinopril Tabs  • losartan Tabs  • Lyrica Caps  • metoprolol Tabs  • NON SPECIFIED  • ondansetron Tbdp  • oxyCODONE-acetaminophen Tabs  • sumatriptan  • SYMBICORT INH  • XOPENEX INH  • zolpidem  • zolpidem Tabs    Allergies: Fentanyl; Morphine; Pcn [penicillins]; and Rifampin    Problem List: Yamile Go has Other chest pain; Dyslipidemia; Essential hypertension, benign; CAD (coronary artery  "disease); HTN (hypertension); Hyperlipidemia; Obesity; and Depression on their problem list.    Surgical History:  Past Surgical History:   Procedure Laterality Date   • ABDOMINAL HYSTERECTOMY TOTAL     • APPENDECTOMY     • BREAST BIOPSY      no cancer   • COLECTOMY      partial for divverticulitis 2007   • LUMBAR DISC REPLACEMENT         Past Social Hx: Yamile Go  reports that she has never smoked. She has never used smokeless tobacco. She reports that she does not drink alcohol or use drugs.     Past Family Hx:  Yamile Go family history includes Cancer in her mother; Diabetes in her father; Heart Disease in her brother, brother, brother, brother, father, and mother; Other in her brother; Stroke in her father.     Problem list, medications, and allergies reviewed by myself today in Epic.     Objective:     /76   Pulse 88   Temp 36.4 °C (97.6 °F) (Temporal)   Resp 16   Ht 1.613 m (5' 3.5\")   Wt 87.5 kg (193 lb)   SpO2 96%   BMI 33.65 kg/m²     Physical Exam  Constitutional:       General: She is not in acute distress.     Appearance: Normal appearance. She is not ill-appearing, toxic-appearing or diaphoretic.   HENT:      Head: Normocephalic and atraumatic.      Ears:      Comments: Bilateral ear canals nonerythematous and nonedematous no signs of acute otitis externa.    Right TM shows fluid level with some purulent material.  No signs of TM perforation.  TM mildly erythematous.    Left TM shows fluid level with clear fluid.  No signs of TM perforation bulging or erythema present.     Nose: Nose normal. No congestion or rhinorrhea.      Mouth/Throat:      Mouth: Mucous membranes are moist.      Comments: Posterior oropharynx is erythematous.  No edema.  No tonsils present.  Postnasal drip appreciated with cobblestone appearance to the posterior oropharynx.  Uvula midline and nondeviated.  Eyes:      Extraocular Movements: Extraocular movements intact.      Conjunctiva/sclera: " Conjunctivae normal.      Pupils: Pupils are equal, round, and reactive to light.   Neck:      Musculoskeletal: Normal range of motion. No muscular tenderness.   Cardiovascular:      Rate and Rhythm: Normal rate and regular rhythm.      Pulses: Normal pulses.      Heart sounds: Normal heart sounds.   Pulmonary:      Effort: Pulmonary effort is normal.      Breath sounds: Normal breath sounds. No wheezing.   Abdominal:      Palpations: Abdomen is soft.      Tenderness: There is no abdominal tenderness.   Lymphadenopathy:      Cervical: No cervical adenopathy.   Skin:     General: Skin is warm and dry.      Capillary Refill: Capillary refill takes less than 2 seconds.   Neurological:      Mental Status: She is alert.   Psychiatric:         Mood and Affect: Mood normal.         Thought Content: Thought content normal.           Assessment/Plan:     Diagnosis and associated orders:     1. Acute otitis media, unspecified otitis media type  cefdinir (OMNICEF) 300 MG Cap   2. Dysfunction of both eustachian tubes        Comments/MDM:     • I recommended beginning OTC Flonase 2 sprays per nostril once a day.  • Recommended OTC Claritin/Allegra/Zyrtec  • Increase fluid intake.  • Complete antibiotics as directed.  • Red flag symptoms discussed with the patient and need for immediate follow-up.  The patient agreed to this plan of care.           Differential diagnosis, natural history, supportive care, and indications for immediate follow-up discussed.    Advised the patient to follow-up with the primary care physician for recheck, reevaluation, and consideration of further management.    Please note that this dictation was created using voice recognition software. I have made reasonable attempt to correct obvious errors, but I expect that there are errors of grammar and possibly content that I did not discover before finalizing the note.    This note was electronically signed by PAVITHRA Lucas PA-C

## 2020-09-04 ENCOUNTER — HOME HEALTH ADMISSION (OUTPATIENT)
Dept: HOME HEALTH SERVICES | Facility: HOME HEALTHCARE | Age: 70
End: 2020-09-04
Payer: MEDICARE

## 2021-01-16 DIAGNOSIS — Z23 NEED FOR VACCINATION: ICD-10-CM

## 2021-06-12 ENCOUNTER — HOSPITAL ENCOUNTER (EMERGENCY)
Facility: MEDICAL CENTER | Age: 71
End: 2021-06-12
Attending: EMERGENCY MEDICINE
Payer: MEDICARE

## 2021-06-12 VITALS
SYSTOLIC BLOOD PRESSURE: 169 MMHG | OXYGEN SATURATION: 97 % | HEIGHT: 63 IN | DIASTOLIC BLOOD PRESSURE: 94 MMHG | BODY MASS INDEX: 33.59 KG/M2 | WEIGHT: 189.6 LBS | TEMPERATURE: 96.9 F | HEART RATE: 100 BPM | RESPIRATION RATE: 20 BRPM

## 2021-06-12 DIAGNOSIS — S01.01XA LACERATION OF SCALP, INITIAL ENCOUNTER: ICD-10-CM

## 2021-06-12 DIAGNOSIS — S09.90XA CLOSED HEAD INJURY, INITIAL ENCOUNTER: ICD-10-CM

## 2021-06-12 PROCEDURE — 99284 EMERGENCY DEPT VISIT MOD MDM: CPT

## 2021-06-12 NOTE — ED TRIAGE NOTES
"Earlier today, while at home, pt fell backwards hitting her head.  C/O lef occipital laceration, denying LOC.    Chief Complaint   Patient presents with   • T-5000 FALL   • Head Injury     BP (!) 169/94   Pulse 100   Temp 36.1 °C (96.9 °F)   Resp 20   Ht 1.6 m (5' 3\")   Wt 86 kg (189 lb 9.5 oz)   SpO2 97%   BMI 33.59 kg/m²      "

## 2021-06-12 NOTE — DISCHARGE INSTRUCTIONS
You were seen in the Emergency Department for head injury.    Please use 1,000mg of tylenol or 600mg of ibuprofen every 6 hours as needed for pain.    Please follow up with your primary care physician.    Return to the Emergency Department with confusion, severe headaches, vomiting, or other concerns.

## 2021-06-12 NOTE — ED PROVIDER NOTES
ED Provider Note    CHIEF COMPLAINT  Chief Complaint   Patient presents with   • T-5000 FALL   • Head Injury       HPI  Yamile Go is a 71 y.o. female with history of hypertension, peripheral neuropathy, and frequent falls who presents with head injury after a fall.  The patient states she was introducing a new cat to her current cat when they got in a fight.  She attempted to separate them and tripped falling backwards hitting her head on a dresser.  This occurred just prior to arrival.  She did not lose consciousness she did not injure herself anywhere else.  She denies any associated headache, confusion, vomiting.  No neck or back pain.  Patient states she feels fine other than a laceration sustained on her scalp.  She is not on blood thinners or aspirin.    REVIEW OF SYSTEMS  See HPI for further details.   Positive for scalp laceration, head injury  Negative for headache, neck pain, back pain, vomiting, confusion    PAST MEDICAL HISTORY   has a past medical history of Asthma, Depression, HTN (hypertension), Hyperlipidemia, Migraines, Obesity, and Sleep apnea.    SOCIAL HISTORY  Social History     Tobacco Use   • Smoking status: Never Smoker   • Smokeless tobacco: Never Used   Vaping Use   • Vaping Use: Never used   Substance and Sexual Activity   • Alcohol use: No   • Drug use: No   • Sexual activity: Yes     Partners: Male     Birth control/protection: Post-Menopausal       SURGICAL HISTORY   has a past surgical history that includes appendectomy; abdominal hysterectomy total; breast biopsy; lumbar disc replacement; and colectomy.    CURRENT MEDICATIONS  Home Medications    **Home medications have not yet been reviewed for this encounter**         ALLERGIES  Allergies   Allergen Reactions   • Fentanyl Vomiting   • Morphine Vomiting   • Pcn [Penicillins] Rash   • Rifampin      Pt turns yellow       PHYSICAL EXAM  VITAL SIGNS: BP (!) 169/94   Pulse 100   Temp 36.1 °C (96.9 °F)   Resp 20   Ht 1.6 m  "(5' 3\")   Wt 86 kg (189 lb 9.5 oz)   SpO2 97%   BMI 33.59 kg/m²    Constitutional: Well-appearing older female.  Alert in no apparent distress.  HENT: Normocephalic. Bilateral external ears normal. Nose normal. Moist mucous membranes.  Very small less than 1 cm laceration noted to the posterior occiput.  Bleeding controlled.  No surrounding hematoma.  Neck: Supple, full range of motion.  No midline cervical spine tenderness.  Eyes: Pupils are equal and reactive. Conjunctiva normal.   Heart: Regular rate and rhythm. No murmurs.    Lungs: No respiratory distress.  Normal work of breathing.  Clear to auscultation bilaterally.  Abdomen:  Soft, no distention. No tenderness to palpation  Skin: Warm, Dry. No rash.   Musculoskeletal: Atraumatic, no deformities noted.   Neurologic: Alert and oriented. Moving all extremities spontaneously.  Normal gait.  Psychiatric: Affect normal, Mood normal. Appears appropriate and not intoxicated.       DIAGNOSTIC STUDIES        ED COURSE  Vitals:    06/12/21 1240   BP: (!) 169/94   Pulse: 100   Resp: 20   Temp: 36.1 °C (96.9 °F)   SpO2: 97%   Weight: 86 kg (189 lb 9.5 oz)   Height: 1.6 m (5' 3\")         Medications administered:  Medications - No data to display      MEDICAL DECISION MAKING  Older female presents after minor head injury which occurred just prior to arrival.  She is a very small scalp laceration that does not appear to need repair.  Her tetanus is up-to-date.  My suspicion for intracranial hemorrhage, skull fracture, cervical spine fracture is very low therefore I do not think imaging is indicated.  I feel that she can be discharged home with head injury precautions. Patient understands plan of care and strict return precautions for changing or worsening symptoms.        IMPRESSION  (S01.01XA) Laceration of scalp, initial encounter  (S09.90XA) Closed head injury, initial encounter    Disposition: Discharge home, stable condition  Results, diagnoses, and treatment " options were discussed with the patient and/or family. Patient verbalized understanding of plan of care and strict return precautions prior to discharge.    Patient referred to primary care provider for monitoring and treatment of blood pressure.      Discharge Medication List as of 6/12/2021  1:06 PM            Electronically signed by: Tammie Peres M.D., 6/12/2021 1:00 PM

## 2021-09-21 ENCOUNTER — HOSPITAL ENCOUNTER (EMERGENCY)
Facility: MEDICAL CENTER | Age: 71
End: 2021-09-21
Attending: EMERGENCY MEDICINE
Payer: MEDICARE

## 2021-09-21 ENCOUNTER — APPOINTMENT (OUTPATIENT)
Dept: RADIOLOGY | Facility: MEDICAL CENTER | Age: 71
End: 2021-09-21
Attending: EMERGENCY MEDICINE
Payer: MEDICARE

## 2021-09-21 VITALS
HEART RATE: 90 BPM | HEIGHT: 63 IN | OXYGEN SATURATION: 93 % | BODY MASS INDEX: 33.75 KG/M2 | RESPIRATION RATE: 18 BRPM | TEMPERATURE: 97.8 F | WEIGHT: 190.48 LBS | SYSTOLIC BLOOD PRESSURE: 125 MMHG | DIASTOLIC BLOOD PRESSURE: 70 MMHG

## 2021-09-21 DIAGNOSIS — S81.011A LACERATION OF RIGHT KNEE, INITIAL ENCOUNTER: ICD-10-CM

## 2021-09-21 PROCEDURE — 304999 HCHG REPAIR-SIMPLE/INTERMED LEVEL 1

## 2021-09-21 PROCEDURE — 73564 X-RAY EXAM KNEE 4 OR MORE: CPT | Mod: RT

## 2021-09-21 PROCEDURE — 99283 EMERGENCY DEPT VISIT LOW MDM: CPT

## 2021-09-21 PROCEDURE — 304217 HCHG IRRIGATION SYSTEM

## 2021-09-21 RX ORDER — SULFAMETHOXAZOLE AND TRIMETHOPRIM 800; 160 MG/1; MG/1
1 TABLET ORAL 2 TIMES DAILY
Qty: 14 TABLET | Refills: 0 | Status: SHIPPED | OUTPATIENT
Start: 2021-09-21 | End: 2021-09-28

## 2021-09-21 NOTE — ED TRIAGE NOTES
"Chief Complaint   Patient presents with   • GLF   • Laceration     right knee   • Low Back Pain      \" I just tripped\". Not on blood thinners, denies LOC. Pt states she has neuropathy and she has frequent falls. Laceration  to right knee and complains of lower back pain.     Has this patient been vaccinated for COVID yes        "
Detail Level: Detailed
Quality 110: Preventive Care And Screening: Influenza Immunization: Influenza Immunization previously received during influenza season

## 2021-09-22 NOTE — ED PROVIDER NOTES
ED Provider Note    CHIEF COMPLAINT  Chief Complaint   Patient presents with   • GLF   • Laceration     right knee   • Low Back Pain       HPI  Yamile Go is a 71 y.o. female who presents for evaluation of acute right knee injury.  The patient reports that she has a history of neuropathy.  She frequently stumbles and she fell today and struck the right lower aspect of her knee against the concrete.  She specifically denies any injury to the head or neck chest abdomen or pelvis.  She did report some mild back pain that resolved.  She denies any numbness weakness or tingling.  No bowel or bladder incontinence.  She is vaccinated against Covid and tetanus is up-to-date.  No pain or injury to the wrists or clavicles    REVIEW OF SYSTEMS  See HPI for further details.  No loss of consciousness head injury numbness weakness tingling all other systems are negative.     PAST MEDICAL HISTORY  Past Medical History:   Diagnosis Date   • Asthma    • Depression    • HTN (hypertension)    • Hyperlipidemia    • Migraines    • Obesity    • Sleep apnea     diagnosed 9/2009 CPAP -PMA       FAMILY HISTORY  Noncontributory    SOCIAL HISTORY  Social History     Socioeconomic History   • Marital status: Single     Spouse name: Not on file   • Number of children: Not on file   • Years of education: Not on file   • Highest education level: Not on file   Occupational History   • Not on file   Tobacco Use   • Smoking status: Never Smoker   • Smokeless tobacco: Never Used   Vaping Use   • Vaping Use: Never used   Substance and Sexual Activity   • Alcohol use: No   • Drug use: No   • Sexual activity: Yes     Partners: Male     Birth control/protection: Post-Menopausal   Other Topics Concern   • Not on file   Social History Narrative   • Not on file     Social Determinants of Health     Financial Resource Strain:    • Difficulty of Paying Living Expenses:    Food Insecurity:    • Worried About Running Out of Food in the Last Year:    •  Ran Out of Food in the Last Year:    Transportation Needs:    • Lack of Transportation (Medical):    • Lack of Transportation (Non-Medical):    Physical Activity:    • Days of Exercise per Week:    • Minutes of Exercise per Session:    Stress:    • Feeling of Stress :    Social Connections:    • Frequency of Communication with Friends and Family:    • Frequency of Social Gatherings with Friends and Family:    • Attends S[employer] Services:    • Active Member of Clubs or Organizations:    • Attends Club or Organization Meetings:    • Marital Status:    Intimate Partner Violence:    • Fear of Current or Ex-Partner:    • Emotionally Abused:    • Physically Abused:    • Sexually Abused:      No drug or alcohol abuse  SURGICAL HISTORY  Past Surgical History:   Procedure Laterality Date   • ABDOMINAL HYSTERECTOMY TOTAL     • APPENDECTOMY     • BREAST BIOPSY      no cancer   • COLECTOMY      partial for divverticulitis 2007   • LUMBAR DISC REPLACEMENT         CURRENT MEDICATIONS  Home Medications    **Home medications have not yet been reviewed for this encounter**     No current facility-administered medications for this encounter.    Current Outpatient Medications:   •  sulfamethoxazole-trimethoprim (BACTRIM DS) 800-160 MG tablet, Take 1 Tablet by mouth 2 times a day for 7 days., Disp: 14 Tablet, Rfl: 0  •  metoprolol (LOPRESSOR) 25 MG Tab, Take 25 mg by mouth 2 times a day., Disp: , Rfl:   •  alprazolam (XANAX) 1 MG Tab, TAKE 1/2-1 TABLET BY MOUTH EVERY DAY AS NEEDED, Disp: , Rfl: 0  •  atorvastatin (LIPITOR) 40 MG Tab, Take 40 mg by mouth., Disp: , Rfl: 1  •  cyclobenzaprine (FLEXERIL) 5 MG tablet, TAKE 1/2-2 TABLET(S) BY MOUTH DAILY AT BEDTIME AS NEEDED, Disp: , Rfl: 0  •  losartan (COZAAR) 25 MG Tab, Take 25 mg by mouth every day., Disp: , Rfl: 0  •  ondansetron (ZOFRAN ODT) 8 MG TABLET DISPERSIBLE, DISSOLVE 1 TAB IN MOUTH 3 TIMES A DAY AS NEEDED, Disp: , Rfl: 3  •  oxycodone-acetaminophen (PERCOCET-10)  MG Tab,  "TAKE 1/2-1 TABLETS BY MOUTH 3 TIMES DAILY AS NEEDED, Disp: , Rfl: 0  •  LYRICA 25 MG Cap, Take 25 mg by mouth every day., Disp: , Rfl: 2  •  sumatriptan (IMITREX) 100 MG tablet, TAKE 1 TABLET BY MOUTH AT ONSET OF MIGRAINE, Disp: , Rfl: 3  •  zolpidem (AMBIEN) 10 MG Tab, Take 10 mg by mouth., Disp: , Rfl: 0  •  NON SPECIFIED, , Disp: , Rfl:   •  HYDROCHLOROTHIAZIDE PO, Take  by mouth., Disp: , Rfl:   •  Fluticasone-Salmeterol (ADVAIR HFA INH), Inhale  by mouth., Disp: , Rfl:   •  ALBUTEROL SULFATE PO, Take  by mouth., Disp: , Rfl:   •  ESTROGENS CONJUGATED PO, Take 2 mg by mouth every day., Disp: , Rfl:   •  levothyroxine (SYNTHROID) 50 MCG TABS, Take 80 mcg by mouth every day., Disp: , Rfl:   •  lisinopril (PRINIVIL) 2.5 MG TABS, Take 2.5 mg by mouth every day., Disp: , Rfl:   •  zolpidem (AMBIEN CR) 12.5 MG CR tablet, Take 12.5 mg by mouth at bedtime as needed., Disp: , Rfl:   •  duloxetine (CYMBALTA) 30 MG CPEP, Take 60 mg by mouth every day., Disp: , Rfl:   •  buPROPion SR (WELLBUTRIN SR) 150 MG TB12, Take 150 mg by mouth every day., Disp: , Rfl:   •  Budesonide-Formoterol Fumarate (SYMBICORT INH), Inhale  by mouth 2 Times a Day., Disp: , Rfl:   •  Levalbuterol HCl (XOPENEX INH), Inhale  by mouth as needed., Disp: , Rfl:       ALLERGIES  Allergies   Allergen Reactions   • Fentanyl Vomiting   • Morphine Vomiting   • Pcn [Penicillins] Rash   • Rifampin      Pt turns yellow       PHYSICAL EXAM  VITAL SIGNS: /82   Pulse (!) 107   Temp 36.6 °C (97.8 °F) (Temporal)   Resp 17   Ht 1.6 m (5' 3\")   Wt 86.4 kg (190 lb 7.6 oz)   SpO2 92%   BMI 33.74 kg/m²       Constitutional: Well developed, Well nourished, No acute distress, Non-toxic appearance.   HENT: Normocephalic, Atraumatic, Bilateral external ears normal, Oropharynx moist, No oral exudates, Nose normal.   Eyes: PERRLA, EOMI, Conjunctiva normal, No discharge.   Neck: Normal range of motion, No tenderness, Supple, No stridor.   Cardiovascular: Mild " tachycardia, Normal rhythm, No murmurs, No rubs, No gallops.   Thorax & Lungs: Normal breath sounds, No respiratory distress, No wheezing, No chest tenderness.   Abdomen: Bowel sounds normal, Soft, No tenderness, No masses, No pulsatile masses.   Skin: Warm, Dry, No erythema, No rash.   Back: No line bony tenderness, No CVA tenderness.   Extremities: Right lower extremity exam is notable for 4 cm laceration just inferior to the patella midline.  Bony tenderness noted over the tibial plateau.  Compartments are soft distal neurovascular exam is normal no tenderness noted over the ankle or the foot  Neurologic: Alert & oriented x 3, Normal motor function, Normal sensory function, No focal deficits noted.   Psychiatric: Anxious l.     DX-KNEE COMPLETE 4+ RIGHT   Final Result      No evidence of fracture or dislocation. Soft tissue injury is present.          COURSE & MEDICAL DECISION MAKING  Pertinent Labs & Imaging studies reviewed. (See chart for details)  Physician procedure 4 cm right knee laceration.  Wound was anesthetized with a total of 7 cc of 0.5% bupivacaine with epinephrine.  Was copious irrigated with 500 cc of sterile saline with a pressure .  Sterile prep and drape.  The wound was gently explored.  No underlying foreign body no tendinous injury.  No arterial bleeding.  A total of 6 interrupted four-point 0 nylon sutures were placed.  Antibiotic limited sterile dressing applied no complication    Patient presented here with acute injury to the right knee.  She did strain her back but had no midline bony tenderness or neurological deficit.  Radiograph of the right knee is negative for any fracture dislocation or foreign body.  Patient will be discharged home on Bactrim for infection prophylaxis.  Suture removal in 10 to 14 days    FINAL IMPRESSION  1.   1. Laceration of right knee, initial encounter              Electronically signed by: Duong Medina M.D., 9/21/2021 5:19 PM

## 2021-10-02 ENCOUNTER — HOSPITAL ENCOUNTER (EMERGENCY)
Facility: MEDICAL CENTER | Age: 71
End: 2021-10-02
Payer: MEDICARE

## 2021-10-02 VITALS
HEART RATE: 92 BPM | HEIGHT: 63 IN | DIASTOLIC BLOOD PRESSURE: 74 MMHG | OXYGEN SATURATION: 96 % | BODY MASS INDEX: 34.18 KG/M2 | SYSTOLIC BLOOD PRESSURE: 137 MMHG | WEIGHT: 192.9 LBS | RESPIRATION RATE: 20 BRPM | TEMPERATURE: 97.1 F

## 2021-10-02 PROCEDURE — 99281 EMR DPT VST MAYX REQ PHY/QHP: CPT

## 2021-12-31 ENCOUNTER — APPOINTMENT (OUTPATIENT)
Dept: RADIOLOGY | Facility: MEDICAL CENTER | Age: 71
End: 2021-12-31
Attending: EMERGENCY MEDICINE
Payer: MEDICARE

## 2021-12-31 ENCOUNTER — HOSPITAL ENCOUNTER (EMERGENCY)
Facility: MEDICAL CENTER | Age: 71
End: 2021-12-31
Attending: EMERGENCY MEDICINE
Payer: MEDICARE

## 2021-12-31 VITALS
RESPIRATION RATE: 18 BRPM | HEART RATE: 87 BPM | WEIGHT: 190.26 LBS | OXYGEN SATURATION: 97 % | DIASTOLIC BLOOD PRESSURE: 69 MMHG | HEIGHT: 64 IN | BODY MASS INDEX: 32.48 KG/M2 | TEMPERATURE: 96.3 F | SYSTOLIC BLOOD PRESSURE: 142 MMHG

## 2021-12-31 DIAGNOSIS — S89.91XA INJURY OF RIGHT KNEE, INITIAL ENCOUNTER: ICD-10-CM

## 2021-12-31 DIAGNOSIS — S81.011A LACERATION OF RIGHT KNEE, INITIAL ENCOUNTER: ICD-10-CM

## 2021-12-31 PROCEDURE — 304999 HCHG REPAIR-SIMPLE/INTERMED LEVEL 1

## 2021-12-31 PROCEDURE — 303747 HCHG EXTRA SUTURE

## 2021-12-31 PROCEDURE — 99284 EMERGENCY DEPT VISIT MOD MDM: CPT

## 2021-12-31 PROCEDURE — 304217 HCHG IRRIGATION SYSTEM

## 2021-12-31 PROCEDURE — 73564 X-RAY EXAM KNEE 4 OR MORE: CPT | Mod: RT

## 2021-12-31 PROCEDURE — 700101 HCHG RX REV CODE 250: Performed by: EMERGENCY MEDICINE

## 2021-12-31 RX ADMIN — LIDOCAINE HYDROCHLORIDE 20 ML: 10 INJECTION, SOLUTION INFILTRATION; PERINEURAL at 09:45

## 2021-12-31 NOTE — DISCHARGE INSTRUCTIONS
Please follow-up with your primary care physician in 2-3 days for wound recheck.  You may take Tylenol or ibuprofen as needed for pain.  After 24 hours, you may wash the wound normally.  Do not soak the area until sutures are removed.  Please follow-up with your primary care physician, urgent care, or this emergency department in 7-10 days for suture removal and wound recheck.  Return to the emergency department immediately if you develop redness or swelling around the wound, pain in the area of the wound, streaking from the wound, drainage from the wound, fevers, or if you develop any other new or worsening symptoms.    If you continue to have knee pain, you may contact the orthopedic surgeon at the opening of business hours for complete recheck.  Please let them know you are seen in the emergency department today requiring emergency department follow-up visit.

## 2021-12-31 NOTE — ED PROVIDER NOTES
ED Provider Note    Chief Complaint:   Right knee laceration    HPI:  Yamile Go is a very pleasant 71-year-old woman who presents to the emergency department for evaluation of right knee laceration.  She slipped on the icy pavement today and fell striking her right knee.  She sustained a stellate laceration of the right knee, is able to bear full weight, does not have any significant associated knee pain.  She did sustain a mild abrasion to the left knee as well, however this is not as severe, she does not have any significant left knee pain.  She is not currently on any anticoagulation or antiplatelet agents.  She reports a history of similar injury when she had a similar fall previously, that also required laceration repair to the knee.  She has no other concerns at this time.  No other injuries.  She did not strike her head, no headache, no nausea, no vomiting.    Review of Systems:  See HPI for pertinent positives and negatives.    Past Medical History:   has a past medical history of Asthma, Depression, HTN (hypertension), Hyperlipidemia, Migraines, Obesity, and Sleep apnea.    Social History:  Social History     Tobacco Use   • Smoking status: Never Smoker   • Smokeless tobacco: Never Used   Vaping Use   • Vaping Use: Never used   Substance and Sexual Activity   • Alcohol use: No   • Drug use: No   • Sexual activity: Yes     Partners: Male     Birth control/protection: Post-Menopausal       Surgical History:   has a past surgical history that includes appendectomy; abdominal hysterectomy total; breast biopsy; lumbar disc replacement; and colectomy.    Current Medications:  Home Medications    **Home medications have not yet been reviewed for this encounter**         Allergies:  Allergies   Allergen Reactions   • Fentanyl Vomiting   • Morphine Vomiting   • Pcn [Penicillins] Rash   • Rifampin      Pt turns yellow       Physical Exam:  Vital Signs: /69   Pulse 87   Temp (!) 35.7 °C (96.3 °F)  "(Temporal)   Resp 18   Ht 1.626 m (5' 4\")   Wt 86.3 kg (190 lb 4.1 oz)   SpO2 97%   BMI 32.66 kg/m²   Constitutional: Alert, no acute distress  HENT: Atraumatic  Neck: Normal range of motion  Cardiovascular: Right lower extremity warm, well perfused  Pulmonary: Normal work of breathing  Skin: Stellate laceration overlying the patella involving epidermal and dermal layers, as well as subcutaneous tissue.  Patella is not exposed, no evidence of joint involvement.  Musculoskeletal: Right knee with full range of motion, no visible nor palpable joint effusion, overlying laceration as documented above, soft compartments, no point bony tenderness to palpation.  Neurologic: Right upper extremity with normal sensory and motor function    Medical records reviewed for continuity of care. Ms. Go was seen in this emergency department for evaluation of a right knee injury.  She reports a history of neuropathy, states she frequently stumbles, and struck her knee against the concrete immediately prior to this visit.  No bony injury noted on plain films.  Laceration was repaired with sutures.  She was discharged in stable condition.    Radiology:  DX-KNEE COMPLETE 4+ RIGHT   Final Result      No acute osseous abnormality.           ED Medications Administered:  Medications   lidocaine (XYLOCAINE) 1 % injection 20 mL (20 mL Other Given by Provider 12/31/21 9296)     MDM:  Ms. Go presents to the emergency department for evaluation of a knee injury as documented above.  She did sustain a stellate laceration was closed according to below procedure note.  No evidence of joint involvement, soft compartments of the lower extremity, wound is clean without any visible contamination.    Plain film of the right knee demonstrates no acute abnormality.    At this time, I do believe she requires any further diagnostics nor treatment in the emergency department.  She is counseled to follow-up with her primary care physician for suture " removal and wound recheck in 7 to 10 days.  Additionally I did provide her follow-up information [facility] in the event that she has any ongoing knee pain, though she does not have any significant pain at this time. Return precautions were discussed with the patient, and provided in written form with the patient's discharge instructions.       LACERATION REPAIR PROCEDURE NOTE  The patient's identification was confirmed and consent was obtained.  This procedure was performed by Dr. Rios  Site: Right knee  Sterile procedures observed  Anesthetic used (type and amt): 6 mL 1% lidocaine without epinephrine  Suture type/size: Skin sutures 5-0 Ethilon, subcuticular sutures 4-0 Vicryl  Length: 6 cm  # of Sutures: 1 subcuticular, 11 skin sutures  Technique: Simple interrupted  Complexity: Double layer closure with 1 subcuticular suture  Tetanus UTD  Site anesthetized, irrigated with NS, explored without evidence of foreign body, wound well approximated, site covered with dry, sterile dressing. Patient tolerated procedure well without complications. Instructions for care discussed verbally and patient provided with additional written instructions for homecare and f/u.    Personal protective equipment including N95 surgical respirator, goggles, and gloves were used during this encounter.       Disposition:  Discharge home in stable condition    Final Impression:  1. Injury of right knee, initial encounter    2. Laceration of right knee, initial encounter        Electronically signed by: Natacha Rios MD, 12/31/2021 2:45 PM

## 2021-12-31 NOTE — ED TRIAGE NOTES
Chief Complaint   Patient presents with   • Knee Injury     Fell onto ice today injured and lacerated her right knee, abrasions to left knee, denies LOC.   Ambulatory.

## 2022-01-29 ENCOUNTER — APPOINTMENT (OUTPATIENT)
Dept: RADIOLOGY | Facility: MEDICAL CENTER | Age: 72
End: 2022-01-29
Attending: EMERGENCY MEDICINE
Payer: MEDICARE

## 2022-01-29 ENCOUNTER — HOSPITAL ENCOUNTER (EMERGENCY)
Facility: MEDICAL CENTER | Age: 72
End: 2022-01-29
Attending: EMERGENCY MEDICINE
Payer: MEDICARE

## 2022-01-29 VITALS
OXYGEN SATURATION: 97 % | HEART RATE: 107 BPM | SYSTOLIC BLOOD PRESSURE: 111 MMHG | TEMPERATURE: 97.5 F | DIASTOLIC BLOOD PRESSURE: 86 MMHG | RESPIRATION RATE: 15 BRPM

## 2022-01-29 DIAGNOSIS — S93.401A SPRAIN OF RIGHT ANKLE, UNSPECIFIED LIGAMENT, INITIAL ENCOUNTER: ICD-10-CM

## 2022-01-29 DIAGNOSIS — U07.1 COVID-19: ICD-10-CM

## 2022-01-29 LAB
ALBUMIN SERPL BCP-MCNC: 4.2 G/DL (ref 3.2–4.9)
ALBUMIN/GLOB SERPL: 1.4 G/DL
ALP SERPL-CCNC: 97 U/L (ref 30–99)
ALT SERPL-CCNC: 18 U/L (ref 2–50)
AMORPH CRY #/AREA URNS HPF: PRESENT /HPF
ANION GAP SERPL CALC-SCNC: 16 MMOL/L (ref 7–16)
APPEARANCE UR: ABNORMAL
AST SERPL-CCNC: 28 U/L (ref 12–45)
BACTERIA #/AREA URNS HPF: ABNORMAL /HPF
BASOPHILS # BLD AUTO: 0.7 % (ref 0–1.8)
BASOPHILS # BLD: 0.05 K/UL (ref 0–0.12)
BILIRUB SERPL-MCNC: 0.4 MG/DL (ref 0.1–1.5)
BILIRUB UR QL STRIP.AUTO: NEGATIVE
BUN SERPL-MCNC: 12 MG/DL (ref 8–22)
CALCIUM SERPL-MCNC: 9.5 MG/DL (ref 8.4–10.2)
CHLORIDE SERPL-SCNC: 98 MMOL/L (ref 96–112)
CO2 SERPL-SCNC: 22 MMOL/L (ref 20–33)
COLOR UR: YELLOW
CREAT SERPL-MCNC: 0.99 MG/DL (ref 0.5–1.4)
EKG IMPRESSION: NORMAL
EOSINOPHIL # BLD AUTO: 0.32 K/UL (ref 0–0.51)
EOSINOPHIL NFR BLD: 4.5 % (ref 0–6.9)
EPI CELLS #/AREA URNS HPF: ABNORMAL /HPF
ERYTHROCYTE [DISTWIDTH] IN BLOOD BY AUTOMATED COUNT: 46.2 FL (ref 35.9–50)
FLUAV RNA SPEC QL NAA+PROBE: NEGATIVE
FLUBV RNA SPEC QL NAA+PROBE: NEGATIVE
GLOBULIN SER CALC-MCNC: 3.1 G/DL (ref 1.9–3.5)
GLUCOSE SERPL-MCNC: 131 MG/DL (ref 65–99)
GLUCOSE UR STRIP.AUTO-MCNC: NEGATIVE MG/DL
HCT VFR BLD AUTO: 42.1 % (ref 37–47)
HGB BLD-MCNC: 13.6 G/DL (ref 12–16)
IMM GRANULOCYTES # BLD AUTO: 0.04 K/UL (ref 0–0.11)
IMM GRANULOCYTES NFR BLD AUTO: 0.6 % (ref 0–0.9)
KETONES UR STRIP.AUTO-MCNC: NEGATIVE MG/DL
LACTATE BLD-SCNC: 1.7 MMOL/L (ref 0.5–2)
LEUKOCYTE ESTERASE UR QL STRIP.AUTO: ABNORMAL
LYMPHOCYTES # BLD AUTO: 1.36 K/UL (ref 1–4.8)
LYMPHOCYTES NFR BLD: 19 % (ref 22–41)
MCH RBC QN AUTO: 28.5 PG (ref 27–33)
MCHC RBC AUTO-ENTMCNC: 32.3 G/DL (ref 33.6–35)
MCV RBC AUTO: 88.1 FL (ref 81.4–97.8)
MICRO URNS: ABNORMAL
MONOCYTES # BLD AUTO: 0.68 K/UL (ref 0–0.85)
MONOCYTES NFR BLD AUTO: 9.5 % (ref 0–13.4)
NEUTROPHILS # BLD AUTO: 4.69 K/UL (ref 2–7.15)
NEUTROPHILS NFR BLD: 65.7 % (ref 44–72)
NITRITE UR QL STRIP.AUTO: NEGATIVE
NRBC # BLD AUTO: 0 K/UL
NRBC BLD-RTO: 0 /100 WBC
PH UR STRIP.AUTO: 6 [PH] (ref 5–8)
PLATELET # BLD AUTO: 276 K/UL (ref 164–446)
PMV BLD AUTO: 9.4 FL (ref 9–12.9)
POTASSIUM SERPL-SCNC: 4.1 MMOL/L (ref 3.6–5.5)
PROT SERPL-MCNC: 7.3 G/DL (ref 6–8.2)
PROT UR QL STRIP: NEGATIVE MG/DL
RBC # BLD AUTO: 4.78 M/UL (ref 4.2–5.4)
RBC # URNS HPF: ABNORMAL /HPF
RBC UR QL AUTO: NEGATIVE
RSV RNA SPEC QL NAA+PROBE: NEGATIVE
SARS-COV-2 RNA RESP QL NAA+PROBE: DETECTED
SODIUM SERPL-SCNC: 136 MMOL/L (ref 135–145)
SP GR UR STRIP.AUTO: 1.01
SPECIMEN SOURCE: ABNORMAL
WBC # BLD AUTO: 7.1 K/UL (ref 4.8–10.8)
WBC #/AREA URNS HPF: ABNORMAL /HPF

## 2022-01-29 PROCEDURE — C9803 HOPD COVID-19 SPEC COLLECT: HCPCS | Performed by: EMERGENCY MEDICINE

## 2022-01-29 PROCEDURE — 87040 BLOOD CULTURE FOR BACTERIA: CPT

## 2022-01-29 PROCEDURE — 36415 COLL VENOUS BLD VENIPUNCTURE: CPT

## 2022-01-29 PROCEDURE — 71045 X-RAY EXAM CHEST 1 VIEW: CPT

## 2022-01-29 PROCEDURE — 85025 COMPLETE CBC W/AUTO DIFF WBC: CPT

## 2022-01-29 PROCEDURE — 73610 X-RAY EXAM OF ANKLE: CPT | Mod: RT

## 2022-01-29 PROCEDURE — 83605 ASSAY OF LACTIC ACID: CPT

## 2022-01-29 PROCEDURE — 81001 URINALYSIS AUTO W/SCOPE: CPT

## 2022-01-29 PROCEDURE — 99285 EMERGENCY DEPT VISIT HI MDM: CPT

## 2022-01-29 PROCEDURE — 87086 URINE CULTURE/COLONY COUNT: CPT

## 2022-01-29 PROCEDURE — 0241U HCHG SARS-COV-2 COVID-19 NFCT DS RESP RNA 4 TRGT MIC: CPT

## 2022-01-29 PROCEDURE — 700105 HCHG RX REV CODE 258: Performed by: EMERGENCY MEDICINE

## 2022-01-29 PROCEDURE — 80053 COMPREHEN METABOLIC PANEL: CPT

## 2022-01-29 PROCEDURE — 93005 ELECTROCARDIOGRAM TRACING: CPT | Performed by: EMERGENCY MEDICINE

## 2022-01-29 RX ORDER — SODIUM CHLORIDE, SODIUM LACTATE, POTASSIUM CHLORIDE, CALCIUM CHLORIDE 600; 310; 30; 20 MG/100ML; MG/100ML; MG/100ML; MG/100ML
1000 INJECTION, SOLUTION INTRAVENOUS ONCE
Status: COMPLETED | OUTPATIENT
Start: 2022-01-29 | End: 2022-01-29

## 2022-01-29 RX ADMIN — SODIUM CHLORIDE, POTASSIUM CHLORIDE, SODIUM LACTATE AND CALCIUM CHLORIDE 1000 ML: 600; 310; 30; 20 INJECTION, SOLUTION INTRAVENOUS at 20:02

## 2022-01-29 ASSESSMENT — PAIN DESCRIPTION - PAIN TYPE: TYPE: ACUTE PAIN

## 2022-01-30 NOTE — ED PROVIDER NOTES
ED Provider Note    CHIEF COMPLAINT  Chief Complaint   Patient presents with   • Ankle Injury   • Nausea/Vomiting/Diarrhea   • Cough   • Coronavirus Screening        HPI  Yamile Go is a 72 y.o. female who presents the ED for nausea diarrhea one bout of vomiting weakness and right ankle pain.  For the past 2 to 3 days the patient has been feeling weak muscle aches pains some mild coughing.  She then started having 1 bout of vomiting today but diarrhea for the past 2 days.  She has been feeling weak and a little bit more dizzy when she stands up and is fallen 3 times today and then twisted her right ankle is having some pain to the right ankle.  The patient did have an ankle replacement about a year to year and a half ago of that ankle.  Patient is here for evaluation denies any other symptoms.    REVIEW OF SYSTEMS  See HPI for further details. All other systems are negative.     PAST MEDICAL HISTORY  Past Medical History:   Diagnosis Date   • Asthma    • Depression    • HTN (hypertension)    • Hyperlipidemia    • Migraines    • Obesity    • Sleep apnea     diagnosed 9/2009 CPAP -PMA       FAMILY HISTORY  Family History   Problem Relation Age of Onset   • Cancer Mother         lung   • Heart Disease Mother    • Stroke Father    • Heart Disease Father    • Diabetes Father    • Heart Disease Brother         cath and bypass   • Heart Disease Brother         cath and byp[ass[   • Heart Disease Brother         cath and bypass   • Heart Disease Brother    • Other Brother         MVA       SOCIAL HISTORY  Social History     Socioeconomic History   • Marital status: Single     Spouse name: Not on file   • Number of children: Not on file   • Years of education: Not on file   • Highest education level: Not on file   Occupational History   • Not on file   Tobacco Use   • Smoking status: Never Smoker   • Smokeless tobacco: Never Used   Vaping Use   • Vaping Use: Never used   Substance and Sexual Activity   • Alcohol  use: No   • Drug use: No   • Sexual activity: Yes     Partners: Male     Birth control/protection: Post-Menopausal   Other Topics Concern   • Not on file   Social History Narrative   • Not on file     Social Determinants of Health     Financial Resource Strain:    • Difficulty of Paying Living Expenses: Not on file   Food Insecurity:    • Worried About Running Out of Food in the Last Year: Not on file   • Ran Out of Food in the Last Year: Not on file   Transportation Needs:    • Lack of Transportation (Medical): Not on file   • Lack of Transportation (Non-Medical): Not on file   Physical Activity:    • Days of Exercise per Week: Not on file   • Minutes of Exercise per Session: Not on file   Stress:    • Feeling of Stress : Not on file   Social Connections:    • Frequency of Communication with Friends and Family: Not on file   • Frequency of Social Gatherings with Friends and Family: Not on file   • Attends [employer] Services: Not on file   • Active Member of Clubs or Organizations: Not on file   • Attends Club or Organization Meetings: Not on file   • Marital Status: Not on file   Intimate Partner Violence:    • Fear of Current or Ex-Partner: Not on file   • Emotionally Abused: Not on file   • Physically Abused: Not on file   • Sexually Abused: Not on file   Housing Stability:    • Unable to Pay for Housing in the Last Year: Not on file   • Number of Places Lived in the Last Year: Not on file   • Unstable Housing in the Last Year: Not on file      Cole Yuen M.D.      SURGICAL HISTORY  Past Surgical History:   Procedure Laterality Date   • ABDOMINAL HYSTERECTOMY TOTAL     • APPENDECTOMY     • BREAST BIOPSY      no cancer   • COLECTOMY      partial for divverticulitis 2007   • LUMBAR DISC REPLACEMENT         CURRENT MEDICATIONS  Home Medications    **Home medications have not yet been reviewed for this encounter**       No current facility-administered medications on file prior to encounter.     Current  Outpatient Medications on File Prior to Encounter   Medication Sig Dispense Refill   • metoprolol (LOPRESSOR) 25 MG Tab Take 25 mg by mouth 2 times a day.     • alprazolam (XANAX) 1 MG Tab TAKE 1/2-1 TABLET BY MOUTH EVERY DAY AS NEEDED  0   • atorvastatin (LIPITOR) 40 MG Tab Take 40 mg by mouth.  1   • cyclobenzaprine (FLEXERIL) 5 MG tablet TAKE 1/2-2 TABLET(S) BY MOUTH DAILY AT BEDTIME AS NEEDED  0   • losartan (COZAAR) 25 MG Tab Take 25 mg by mouth every day.  0   • ondansetron (ZOFRAN ODT) 8 MG TABLET DISPERSIBLE DISSOLVE 1 TAB IN MOUTH 3 TIMES A DAY AS NEEDED  3   • oxycodone-acetaminophen (PERCOCET-10)  MG Tab TAKE 1/2-1 TABLETS BY MOUTH 3 TIMES DAILY AS NEEDED  0   • LYRICA 25 MG Cap Take 25 mg by mouth every day.  2   • sumatriptan (IMITREX) 100 MG tablet TAKE 1 TABLET BY MOUTH AT ONSET OF MIGRAINE  3   • zolpidem (AMBIEN) 10 MG Tab Take 10 mg by mouth.  0   • NON SPECIFIED      • HYDROCHLOROTHIAZIDE PO Take  by mouth.     • Fluticasone-Salmeterol (ADVAIR HFA INH) Inhale  by mouth.     • ALBUTEROL SULFATE PO Take  by mouth.     • ESTROGENS CONJUGATED PO Take 2 mg by mouth every day.     • levothyroxine (SYNTHROID) 50 MCG TABS Take 80 mcg by mouth every day.     • lisinopril (PRINIVIL) 2.5 MG TABS Take 2.5 mg by mouth every day.     • zolpidem (AMBIEN CR) 12.5 MG CR tablet Take 12.5 mg by mouth at bedtime as needed.     • duloxetine (CYMBALTA) 30 MG CPEP Take 60 mg by mouth every day.     • buPROPion SR (WELLBUTRIN SR) 150 MG TB12 Take 150 mg by mouth every day.     • Budesonide-Formoterol Fumarate (SYMBICORT INH) Inhale  by mouth 2 Times a Day.     • Levalbuterol HCl (XOPENEX INH) Inhale  by mouth as needed.           ALLERGIES  Allergies   Allergen Reactions   • Fentanyl Vomiting   • Morphine Vomiting   • Pcn [Penicillins] Rash   • Rifampin      Pt turns yellow       PHYSICAL EXAM  VITAL SIGNS: /86   Pulse (!) 107   Temp 36.4 °C (97.5 °F) (Temporal)   Resp 15   SpO2 97%    Pulse Oximetry was  obtained. It showed a reading of Pulse Oximetry: 98 %.  I interpreted this as nonhypoxic.     Constitutional: Well developed, Well nourished, No acute distress, Non-toxic appearance.   HENT: Normocephalic, Atraumatic, Bilateral external ears normal, bilateral tympanic membranes normal, Oropharynx mucous membranes, No oral exudates, Nose normal.   Eyes:  conjunctiva is normal, there are no signs of exudate.   Neck: Supple, no cervical lymphadenopathy, no meningeal signs..   Lymphatic: No lymphadenopathy noted.   Cardiovascular: Regular rate and rhythm without murmurs gallops or rubs.   Thorax & Lungs: Lungs are clear to auscultation bilaterally, there are no wheezes no rales. Chest wall is nontender.  Abdomen: Soft, nontender nondistended. Bowel sounds are present.   Skin: Warm, Dry, No erythema,   Back: No tenderness, No CVA tenderness.  Musculoskeletal: Good range of motion in all major joints. No tenderness to palpation or major deformities noted. Intact distal pulses, no clubbing, no cyanosis, tender to the right ankle there is some edema to the lateral aspect.  Foot is otherwise nontender knee is nontender    Neurologic: Alert & oriented x 3, Normal motor function, Normal sensory function, No focal deficits noted.   Psychiatric: Affect normal, Judgment normal, Mood normal.     EKG  Interpreted below by myself    RADIOLOGY/PROCEDURES  DX-ANKLE 3+ VIEWS RIGHT   Final Result      1.  Ankle arthroplasty is well seated. No fracture or dislocation.   2.  Ankle swelling.      DX-CHEST-PORTABLE (1 VIEW)   Final Result      No acute cardiopulmonary abnormality.             Results for orders placed or performed during the hospital encounter of 01/29/22   CoV-2, FLU A/B, and RSV by PCR (2-4 Hours CEPHEID) : Collect NP swab in VTM    Specimen: Respirate   Result Value Ref Range    Influenza virus A RNA Negative Negative    Influenza virus B, PCR Negative Negative    RSV, PCR Negative Negative    SARS-CoV-2 by PCR DETECTED  (AA)     SARS-CoV-2 Source NP Swab    CBC WITH DIFFERENTIAL   Result Value Ref Range    WBC 7.1 4.8 - 10.8 K/uL    RBC 4.78 4.20 - 5.40 M/uL    Hemoglobin 13.6 12.0 - 16.0 g/dL    Hematocrit 42.1 37.0 - 47.0 %    MCV 88.1 81.4 - 97.8 fL    MCH 28.5 27.0 - 33.0 pg    MCHC 32.3 (L) 33.6 - 35.0 g/dL    RDW 46.2 35.9 - 50.0 fL    Platelet Count 276 164 - 446 K/uL    MPV 9.4 9.0 - 12.9 fL    Neutrophils-Polys 65.70 44.00 - 72.00 %    Lymphocytes 19.00 (L) 22.00 - 41.00 %    Monocytes 9.50 0.00 - 13.40 %    Eosinophils 4.50 0.00 - 6.90 %    Basophils 0.70 0.00 - 1.80 %    Immature Granulocytes 0.60 0.00 - 0.90 %    Nucleated RBC 0.00 /100 WBC    Neutrophils (Absolute) 4.69 2.00 - 7.15 K/uL    Lymphs (Absolute) 1.36 1.00 - 4.80 K/uL    Monos (Absolute) 0.68 0.00 - 0.85 K/uL    Eos (Absolute) 0.32 0.00 - 0.51 K/uL    Baso (Absolute) 0.05 0.00 - 0.12 K/uL    Immature Granulocytes (abs) 0.04 0.00 - 0.11 K/uL    NRBC (Absolute) 0.00 K/uL   COMP METABOLIC PANEL   Result Value Ref Range    Sodium 136 135 - 145 mmol/L    Potassium 4.1 3.6 - 5.5 mmol/L    Chloride 98 96 - 112 mmol/L    Co2 22 20 - 33 mmol/L    Anion Gap 16.0 7.0 - 16.0    Glucose 131 (H) 65 - 99 mg/dL    Bun 12 8 - 22 mg/dL    Creatinine 0.99 0.50 - 1.40 mg/dL    Calcium 9.5 8.4 - 10.2 mg/dL    AST(SGOT) 28 12 - 45 U/L    ALT(SGPT) 18 2 - 50 U/L    Alkaline Phosphatase 97 30 - 99 U/L    Total Bilirubin 0.4 0.1 - 1.5 mg/dL    Albumin 4.2 3.2 - 4.9 g/dL    Total Protein 7.3 6.0 - 8.2 g/dL    Globulin 3.1 1.9 - 3.5 g/dL    A-G Ratio 1.4 g/dL   URINALYSIS    Specimen: Urine   Result Value Ref Range    Color Yellow     Character Hazy (A)     Specific Gravity 1.010 <1.035    Ph 6.0 5.0 - 8.0    Glucose Negative Negative mg/dL    Ketones Negative Negative mg/dL    Protein Negative Negative mg/dL    Bilirubin Negative Negative    Nitrite Negative Negative    Leukocyte Esterase Trace (A) Negative    Occult Blood Negative Negative    Micro Urine Req Microscopic    LACTIC  ACID   Result Value Ref Range    Lactic Acid 1.7 0.5 - 2.0 mmol/L   ESTIMATED GFR   Result Value Ref Range    GFR If African American >60 >60 mL/min/1.73 m 2    GFR If Non African American 55 (A) >60 mL/min/1.73 m 2   URINE MICROSCOPIC (W/UA)   Result Value Ref Range    WBC 2-5 /hpf    RBC 0-2 /hpf    Bacteria Few (A) None /hpf    Epithelial Cells Many (A) Few /hpf    Amorphous Crystal Present /hpf   EKG   Result Value Ref Range    Report       [facility]    Test Date:  2022  Pt Name:    SIMI NELSON         Department: EDS  MRN:        [id number]                      [facility]  Gender:     Female                       Technician: EO  :        [id number]                   Requested By:ALCON KAISER  Order #:    [id number]                    Reading MD: ALCON KAISER MD    Measurements  Intervals                                Axis  Rate:       114                          P:          59  IA:         170                          QRS:        27  QRSD:       83                           T:          2  QT:         308  QTc:        425    Interpretive Statements  Sinus tachycardia  Low voltage, precordial leads  Borderline T wave abnormalities  Baseline wander in lead(s) V3  No previous ECG available for comparison  Electronically Signed On 2022 22:12:10 PST by ALCON KASIER MD           COURSE & MEDICAL DECISION MAKING  Pertinent Labs & Imaging studies reviewed. (See chart for details)  Patient presents for evaluation.  Clinically the patient otherwise appears well.  The ankle is swollen on the lateral aspect x-ray was obtained no signs of fractures.  This point I do feel is more of a sprain to her ankle.  The patient was complaining of dizziness when she stood up she is clinically dehydrated was given a liter bolus of lactated Ringer's.  After this she is feeling much improved but on a septic work-up she is positive for Covid.  At this  point she is not hypoxemic there is no signs of pneumonia on chest x-ray.  Patient does meet criteria for Paxlovid however she does have multiple medications that can interact with it and after discussion with her she is not interested in taking Paxlovid.  At this point the patient feels much improved after the fluid bolus she is to follow-up with Dr. Espinosa if she has continued symptoms of her ankle in 1 week return if any symptoms worsen.    FINAL IMPRESSION  1. COVID-19     2. Sprain of right ankle, unspecified ligament, initial encounter          The patient will return for new or worsening symptoms and is stable at the time of discharge.    The patient is referred to a primary physician for blood pressure management, diabetic screening, and for all other preventative health concerns.        DISPOSITION:  Patient will be discharged home in stable condition.    FOLLOW UP:  Cole Yuen M.D.  [address] [facility]  [address]  [place] [facility] [address]  [phone]    Schedule an appointment as soon as possible for a visit in 1 week  For re-check, As needed    Kole Espinosa M.D.  [address] [facility] [address]  [phone]    Schedule an appointment as soon as possible for a visit in 1 week  For re-check of your ankle      OUTPATIENT MEDICATIONS:  New Prescriptions    No medications on file               Electronically signed by: Eleazar Porras M.D., 1/29/2022 7:33 PM

## 2022-01-30 NOTE — ED TRIAGE NOTES
"Pt reports experiencing a GLF earlier today.  She C/O right ankle pain. She also was seen at \"[facility]\" yesterday and evaluated/treated for recurrence of productive cough, and episodic N/V/D for the previous few  days. Coronavirus test results are pending.   Chief Complaint   Patient presents with   • Ankle Injury   • Nausea/Vomiting/Diarrhea   • Cough   • Coronavirus Screening     /83   Pulse (!) 110   Temp 36.4 °C (97.5 °F) (Temporal)   Resp 20   SpO2 98%     Has this patient been vaccinated for COVID YES  If not, would they like to be vaccinated while in the ER if eligible?  N/A  Would the patient like to speak with the ERP about the possibility of receiving the COVID vaccine today before making a decision? N/A     "

## 2022-02-01 LAB
BACTERIA UR CULT: NORMAL
SIGNIFICANT IND 70042: NORMAL
SITE SITE: NORMAL
SOURCE SOURCE: NORMAL

## 2022-02-03 LAB
BACTERIA BLD CULT: NORMAL
BACTERIA BLD CULT: NORMAL
SIGNIFICANT IND 70042: NORMAL
SIGNIFICANT IND 70042: NORMAL
SITE SITE: NORMAL
SITE SITE: NORMAL
SOURCE SOURCE: NORMAL
SOURCE SOURCE: NORMAL

## 2022-02-06 ENCOUNTER — APPOINTMENT (OUTPATIENT)
Dept: RADIOLOGY | Facility: MEDICAL CENTER | Age: 72
DRG: 871 | End: 2022-02-06
Attending: EMERGENCY MEDICINE
Payer: MEDICARE

## 2022-02-06 ENCOUNTER — HOSPITAL ENCOUNTER (INPATIENT)
Facility: MEDICAL CENTER | Age: 72
LOS: 2 days | DRG: 871 | End: 2022-02-08
Attending: EMERGENCY MEDICINE | Admitting: INTERNAL MEDICINE
Payer: MEDICARE

## 2022-02-06 ENCOUNTER — APPOINTMENT (OUTPATIENT)
Dept: RADIOLOGY | Facility: MEDICAL CENTER | Age: 72
DRG: 871 | End: 2022-02-06
Attending: INTERNAL MEDICINE
Payer: MEDICARE

## 2022-02-06 DIAGNOSIS — R09.02 HYPOXIA: ICD-10-CM

## 2022-02-06 DIAGNOSIS — J96.01 SEPSIS WITH ACUTE HYPOXIC RESPIRATORY FAILURE WITHOUT SEPTIC SHOCK, DUE TO UNSPECIFIED ORGANISM (HCC): ICD-10-CM

## 2022-02-06 DIAGNOSIS — A41.9 SEPSIS WITH ACUTE HYPOXIC RESPIRATORY FAILURE WITHOUT SEPTIC SHOCK, DUE TO UNSPECIFIED ORGANISM (HCC): ICD-10-CM

## 2022-02-06 DIAGNOSIS — J96.01 ACUTE HYPOXEMIC RESPIRATORY FAILURE DUE TO COVID-19 (HCC): ICD-10-CM

## 2022-02-06 DIAGNOSIS — R79.89 ELEVATED LACTIC ACID LEVEL: ICD-10-CM

## 2022-02-06 DIAGNOSIS — R65.20 SEPSIS WITH ACUTE HYPOXIC RESPIRATORY FAILURE WITHOUT SEPTIC SHOCK, DUE TO UNSPECIFIED ORGANISM (HCC): ICD-10-CM

## 2022-02-06 DIAGNOSIS — U07.1 ACUTE HYPOXEMIC RESPIRATORY FAILURE DUE TO COVID-19 (HCC): ICD-10-CM

## 2022-02-06 DIAGNOSIS — R53.1 WEAKNESS: ICD-10-CM

## 2022-02-06 DIAGNOSIS — U07.1 COVID-19 VIRUS INFECTION: ICD-10-CM

## 2022-02-06 PROBLEM — E03.9 HYPOTHYROIDISM: Chronic | Status: ACTIVE | Noted: 2022-02-06

## 2022-02-06 PROBLEM — M00.9 ARTHRITIS, SEPTIC (HCC): Status: ACTIVE | Noted: 2022-02-06

## 2022-02-06 PROBLEM — G89.4 CHRONIC PAIN SYNDROME: Chronic | Status: ACTIVE | Noted: 2022-02-06

## 2022-02-06 PROBLEM — J18.9 COMMUNITY ACQUIRED PNEUMONIA: Status: ACTIVE | Noted: 2022-02-06

## 2022-02-06 LAB
ALBUMIN SERPL BCP-MCNC: 3.7 G/DL (ref 3.2–4.9)
ALBUMIN/GLOB SERPL: 1.3 G/DL
ALP SERPL-CCNC: 78 U/L (ref 30–99)
ALT SERPL-CCNC: 10 U/L (ref 2–50)
ANION GAP SERPL CALC-SCNC: 15 MMOL/L (ref 7–16)
AST SERPL-CCNC: 14 U/L (ref 12–45)
BASOPHILS # BLD AUTO: 0.3 % (ref 0–1.8)
BASOPHILS # BLD: 0.04 K/UL (ref 0–0.12)
BILIRUB SERPL-MCNC: 0.3 MG/DL (ref 0.1–1.5)
BUN SERPL-MCNC: 10 MG/DL (ref 8–22)
CALCIUM SERPL-MCNC: 8.9 MG/DL (ref 8.4–10.2)
CHLORIDE SERPL-SCNC: 103 MMOL/L (ref 96–112)
CO2 SERPL-SCNC: 22 MMOL/L (ref 20–33)
CREAT SERPL-MCNC: 0.77 MG/DL (ref 0.5–1.4)
CRP SERPL HS-MCNC: 0.51 MG/DL (ref 0–0.75)
D DIMER PPP IA.FEU-MCNC: 2.91 UG/ML (FEU) (ref 0–0.5)
EOSINOPHIL # BLD AUTO: 0.06 K/UL (ref 0–0.51)
EOSINOPHIL NFR BLD: 0.4 % (ref 0–6.9)
ERYTHROCYTE [DISTWIDTH] IN BLOOD BY AUTOMATED COUNT: 46.6 FL (ref 35.9–50)
GLOBULIN SER CALC-MCNC: 2.8 G/DL (ref 1.9–3.5)
GLUCOSE SERPL-MCNC: 134 MG/DL (ref 65–99)
HCT VFR BLD AUTO: 38.1 % (ref 37–47)
HGB BLD-MCNC: 12 G/DL (ref 12–16)
IMM GRANULOCYTES # BLD AUTO: 0.1 K/UL (ref 0–0.11)
IMM GRANULOCYTES NFR BLD AUTO: 0.7 % (ref 0–0.9)
INR PPP: 1.05 (ref 0.87–1.13)
LACTATE BLD-SCNC: 1.5 MMOL/L (ref 0.5–2)
LACTATE BLD-SCNC: 3.4 MMOL/L (ref 0.5–2)
LYMPHOCYTES # BLD AUTO: 0.75 K/UL (ref 1–4.8)
LYMPHOCYTES NFR BLD: 5.5 % (ref 22–41)
MCH RBC QN AUTO: 28.2 PG (ref 27–33)
MCHC RBC AUTO-ENTMCNC: 31.5 G/DL (ref 33.6–35)
MCV RBC AUTO: 89.6 FL (ref 81.4–97.8)
MONOCYTES # BLD AUTO: 0.72 K/UL (ref 0–0.85)
MONOCYTES NFR BLD AUTO: 5.2 % (ref 0–13.4)
NEUTROPHILS # BLD AUTO: 12.07 K/UL (ref 2–7.15)
NEUTROPHILS NFR BLD: 87.9 % (ref 44–72)
NRBC # BLD AUTO: 0 K/UL
NRBC BLD-RTO: 0 /100 WBC
NT-PROBNP SERPL IA-MCNC: 288 PG/ML (ref 0–125)
PLATELET # BLD AUTO: 270 K/UL (ref 164–446)
PMV BLD AUTO: 8.6 FL (ref 9–12.9)
POTASSIUM SERPL-SCNC: 3.6 MMOL/L (ref 3.6–5.5)
PROCALCITONIN SERPL-MCNC: 0.4 NG/ML
PROCALCITONIN SERPL-MCNC: 1.12 NG/ML
PROT SERPL-MCNC: 6.5 G/DL (ref 6–8.2)
PROTHROMBIN TIME: 12.9 SEC (ref 12–14.6)
RBC # BLD AUTO: 4.25 M/UL (ref 4.2–5.4)
SODIUM SERPL-SCNC: 140 MMOL/L (ref 135–145)
WBC # BLD AUTO: 13.7 K/UL (ref 4.8–10.8)

## 2022-02-06 PROCEDURE — 83880 ASSAY OF NATRIURETIC PEPTIDE: CPT

## 2022-02-06 PROCEDURE — 83605 ASSAY OF LACTIC ACID: CPT | Mod: 91

## 2022-02-06 PROCEDURE — A9270 NON-COVERED ITEM OR SERVICE: HCPCS | Performed by: INTERNAL MEDICINE

## 2022-02-06 PROCEDURE — 84145 PROCALCITONIN (PCT): CPT

## 2022-02-06 PROCEDURE — 71275 CT ANGIOGRAPHY CHEST: CPT | Mod: ME

## 2022-02-06 PROCEDURE — 85379 FIBRIN DEGRADATION QUANT: CPT

## 2022-02-06 PROCEDURE — 770020 HCHG ROOM/CARE - TELE (206)

## 2022-02-06 PROCEDURE — 700111 HCHG RX REV CODE 636 W/ 250 OVERRIDE (IP): Performed by: INTERNAL MEDICINE

## 2022-02-06 PROCEDURE — 700117 HCHG RX CONTRAST REV CODE 255: Performed by: INTERNAL MEDICINE

## 2022-02-06 PROCEDURE — 71045 X-RAY EXAM CHEST 1 VIEW: CPT

## 2022-02-06 PROCEDURE — 87040 BLOOD CULTURE FOR BACTERIA: CPT

## 2022-02-06 PROCEDURE — 700102 HCHG RX REV CODE 250 W/ 637 OVERRIDE(OP): Performed by: EMERGENCY MEDICINE

## 2022-02-06 PROCEDURE — 700105 HCHG RX REV CODE 258: Performed by: INTERNAL MEDICINE

## 2022-02-06 PROCEDURE — 700102 HCHG RX REV CODE 250 W/ 637 OVERRIDE(OP): Performed by: INTERNAL MEDICINE

## 2022-02-06 PROCEDURE — 80053 COMPREHEN METABOLIC PANEL: CPT

## 2022-02-06 PROCEDURE — 85610 PROTHROMBIN TIME: CPT

## 2022-02-06 PROCEDURE — 96365 THER/PROPH/DIAG IV INF INIT: CPT

## 2022-02-06 PROCEDURE — A9270 NON-COVERED ITEM OR SERVICE: HCPCS | Performed by: EMERGENCY MEDICINE

## 2022-02-06 PROCEDURE — 86140 C-REACTIVE PROTEIN: CPT

## 2022-02-06 PROCEDURE — 700105 HCHG RX REV CODE 258: Performed by: EMERGENCY MEDICINE

## 2022-02-06 PROCEDURE — 99223 1ST HOSP IP/OBS HIGH 75: CPT | Mod: AI | Performed by: INTERNAL MEDICINE

## 2022-02-06 PROCEDURE — 700111 HCHG RX REV CODE 636 W/ 250 OVERRIDE (IP): Performed by: EMERGENCY MEDICINE

## 2022-02-06 PROCEDURE — 99285 EMERGENCY DEPT VISIT HI MDM: CPT

## 2022-02-06 PROCEDURE — 36415 COLL VENOUS BLD VENIPUNCTURE: CPT

## 2022-02-06 PROCEDURE — 85025 COMPLETE CBC W/AUTO DIFF WBC: CPT

## 2022-02-06 PROCEDURE — 96375 TX/PRO/DX INJ NEW DRUG ADDON: CPT

## 2022-02-06 RX ORDER — AZITHROMYCIN 250 MG/1
500 TABLET, FILM COATED ORAL EVERY EVENING
Status: DISCONTINUED | OUTPATIENT
Start: 2022-02-06 | End: 2022-02-08 | Stop reason: HOSPADM

## 2022-02-06 RX ORDER — HYDROCODONE BITARTRATE AND ACETAMINOPHEN 10; 325 MG/1; MG/1
1 TABLET ORAL 4 TIMES DAILY
Status: DISCONTINUED | OUTPATIENT
Start: 2022-02-06 | End: 2022-02-08 | Stop reason: HOSPADM

## 2022-02-06 RX ORDER — ONDANSETRON 4 MG/1
4 TABLET, ORALLY DISINTEGRATING ORAL EVERY 4 HOURS PRN
Status: DISCONTINUED | OUTPATIENT
Start: 2022-02-06 | End: 2022-02-08 | Stop reason: HOSPADM

## 2022-02-06 RX ORDER — AMOXICILLIN 250 MG
2 CAPSULE ORAL 2 TIMES DAILY
Status: DISCONTINUED | OUTPATIENT
Start: 2022-02-07 | End: 2022-02-08 | Stop reason: HOSPADM

## 2022-02-06 RX ORDER — LEVOTHYROXINE SODIUM 88 UG/1
88 TABLET ORAL DAILY
Status: DISCONTINUED | OUTPATIENT
Start: 2022-02-07 | End: 2022-02-08 | Stop reason: HOSPADM

## 2022-02-06 RX ORDER — AMITRIPTYLINE HYDROCHLORIDE 25 MG/1
75 TABLET, FILM COATED ORAL DAILY
Status: ON HOLD | COMMUNITY
End: 2022-03-31 | Stop reason: SDUPTHER

## 2022-02-06 RX ORDER — ONDANSETRON 2 MG/ML
4 INJECTION INTRAMUSCULAR; INTRAVENOUS EVERY 4 HOURS PRN
Status: DISCONTINUED | OUTPATIENT
Start: 2022-02-06 | End: 2022-02-08 | Stop reason: HOSPADM

## 2022-02-06 RX ORDER — AZITHROMYCIN 250 MG/1
500 TABLET, FILM COATED ORAL DAILY
Status: CANCELLED | OUTPATIENT
Start: 2022-02-06 | End: 2022-02-09

## 2022-02-06 RX ORDER — ACETAMINOPHEN 325 MG/1
650 TABLET ORAL ONCE
Status: COMPLETED | OUTPATIENT
Start: 2022-02-06 | End: 2022-02-06

## 2022-02-06 RX ORDER — BISACODYL 10 MG
10 SUPPOSITORY, RECTAL RECTAL
Status: DISCONTINUED | OUTPATIENT
Start: 2022-02-06 | End: 2022-02-08 | Stop reason: HOSPADM

## 2022-02-06 RX ORDER — ALBUTEROL SULFATE 90 UG/1
2 AEROSOL, METERED RESPIRATORY (INHALATION) EVERY 6 HOURS PRN
COMMUNITY
End: 2022-03-21

## 2022-02-06 RX ORDER — ACETAMINOPHEN 325 MG/1
650 TABLET ORAL EVERY 6 HOURS PRN
Status: DISCONTINUED | OUTPATIENT
Start: 2022-02-06 | End: 2022-02-08 | Stop reason: HOSPADM

## 2022-02-06 RX ORDER — HYDROCODONE BITARTRATE AND ACETAMINOPHEN 10; 325 MG/1; MG/1
1 TABLET ORAL EVERY 4 HOURS PRN
Status: ON HOLD | COMMUNITY
End: 2022-03-31 | Stop reason: SDUPTHER

## 2022-02-06 RX ORDER — LABETALOL HYDROCHLORIDE 5 MG/ML
10 INJECTION, SOLUTION INTRAVENOUS EVERY 4 HOURS PRN
Status: DISCONTINUED | OUTPATIENT
Start: 2022-02-06 | End: 2022-02-08 | Stop reason: HOSPADM

## 2022-02-06 RX ORDER — SODIUM CHLORIDE 9 MG/ML
1000 INJECTION, SOLUTION INTRAVENOUS ONCE
Status: COMPLETED | OUTPATIENT
Start: 2022-02-06 | End: 2022-02-06

## 2022-02-06 RX ORDER — DEXAMETHASONE SODIUM PHOSPHATE 4 MG/ML
6 INJECTION, SOLUTION INTRA-ARTICULAR; INTRALESIONAL; INTRAMUSCULAR; INTRAVENOUS; SOFT TISSUE ONCE
Status: COMPLETED | OUTPATIENT
Start: 2022-02-06 | End: 2022-02-06

## 2022-02-06 RX ORDER — DEXAMETHASONE 4 MG/1
6 TABLET ORAL DAILY
Status: DISCONTINUED | OUTPATIENT
Start: 2022-02-07 | End: 2022-02-08 | Stop reason: HOSPADM

## 2022-02-06 RX ORDER — POLYETHYLENE GLYCOL 3350 17 G/17G
1 POWDER, FOR SOLUTION ORAL
Status: DISCONTINUED | OUTPATIENT
Start: 2022-02-06 | End: 2022-02-08 | Stop reason: HOSPADM

## 2022-02-06 RX ORDER — ALBUTEROL SULFATE 90 UG/1
2 AEROSOL, METERED RESPIRATORY (INHALATION)
Status: DISCONTINUED | OUTPATIENT
Start: 2022-02-06 | End: 2022-02-08 | Stop reason: HOSPADM

## 2022-02-06 RX ORDER — AMITRIPTYLINE HYDROCHLORIDE 10 MG/1
10 TABLET, FILM COATED ORAL NIGHTLY
Status: DISCONTINUED | OUTPATIENT
Start: 2022-02-06 | End: 2022-02-08 | Stop reason: HOSPADM

## 2022-02-06 RX ADMIN — HYDROCODONE BITARTRATE AND ACETAMINOPHEN 1 TABLET: 10; 325 TABLET ORAL at 17:56

## 2022-02-06 RX ADMIN — DEXAMETHASONE SODIUM PHOSPHATE 6 MG: 4 INJECTION, SOLUTION INTRA-ARTICULAR; INTRALESIONAL; INTRAMUSCULAR; INTRAVENOUS; SOFT TISSUE at 14:44

## 2022-02-06 RX ADMIN — SODIUM CHLORIDE 1000 ML: 9 INJECTION, SOLUTION INTRAVENOUS at 12:44

## 2022-02-06 RX ADMIN — AZITHROMYCIN MONOHYDRATE 500 MG: 250 TABLET ORAL at 17:54

## 2022-02-06 RX ADMIN — AMITRIPTYLINE HYDROCHLORIDE 10 MG: 10 TABLET, FILM COATED ORAL at 20:45

## 2022-02-06 RX ADMIN — SODIUM CHLORIDE 3 G: 900 INJECTION INTRAVENOUS at 17:55

## 2022-02-06 RX ADMIN — IOHEXOL 60 ML: 350 INJECTION, SOLUTION INTRAVENOUS at 15:22

## 2022-02-06 RX ADMIN — ACETAMINOPHEN 650 MG: 325 TABLET, FILM COATED ORAL at 14:18

## 2022-02-06 ASSESSMENT — ENCOUNTER SYMPTOMS
SHORTNESS OF BREATH: 1
LOSS OF CONSCIOUSNESS: 0
EYE PAIN: 0
BACK PAIN: 0
WEAKNESS: 1
COUGH: 1
SPUTUM PRODUCTION: 1
NAUSEA: 0
HEADACHES: 0
BLURRED VISION: 0
CHILLS: 0
VOMITING: 0
DIZZINESS: 0
ABDOMINAL PAIN: 0
FEVER: 1
FALLS: 1
NERVOUS/ANXIOUS: 0
PALPITATIONS: 0
INSOMNIA: 0

## 2022-02-06 ASSESSMENT — FIBROSIS 4 INDEX: FIB4 SCORE: 1.7

## 2022-02-06 NOTE — ASSESSMENT & PLAN NOTE
Patient states she takes Norco 10 mg 3 times daily to 4 times daily.  Patient takes chronic opiates due to bilateral knee pain osteoarthritis.  Continue home opiate to prevent withdrawal

## 2022-02-06 NOTE — H&P
Hospital Medicine History & Physical Note    Date of Service  2/6/2022    Primary Care Physician  Cole Yuen M.D.    Consultants  none    Specialist Names: N/A    Code Status  Full Code    Chief Complaint  Chief Complaint   Patient presents with   • Dizziness     BIB [facility] from home report she has been feeling dizzy for the past week and increase shortness of breath recently diagnosed with COVID.  Patient has been using her inhaler which is not helping.  Oxygen saturation at 95% RA    • Shortness of Breath       History of Presenting Illness  Yamile Go is a 72 y.o. female PMH hypertension, hypothyroidism, chronic pain on Norco 10 at home, depression who presented 2/6/2022 with SOB.  Patient states she was diagnosed with COVID-19 positive results on 1/29/2022 with PCR.  Patient had symptoms prior to that.  She states in the last 3 to 4 days she has not been feeling well, reports of subjective fevers and increasing cough with sputum.  Patient has felt additional weakness and has fallen onto her knees due to weakness.  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  Patient states she has a history of DVT in the past currently not on anticoagulation.    Spoke to EDP about patient's case, patient presented with significant tachycardia and tachypnea.  Walking evaluation patient dropped down to 75%, was requiring 2 L nasal cannula to maintain over 90%.  Patient on my evaluation was at 3 L nasal cannula.  WBC 13.7, procalcitonin 0.4, lactic acid 3.4.  Patient being given 1 L NS in the ED and IV dexamethasone 6 mg.    I discussed the plan of care with patient, bedside RN, charge RN,  and pharmacy.    Review of Systems  Review of Systems   Constitutional: Positive for fever and malaise/fatigue. Negative for chills.   HENT: Negative for congestion and hearing loss.    Eyes: Negative for blurred vision and pain.   Respiratory: Positive for cough, sputum production and shortness of breath.    Cardiovascular:  Negative for chest pain and palpitations.   Gastrointestinal: Negative for abdominal pain, nausea and vomiting.   Genitourinary: Negative for dysuria and hematuria.   Musculoskeletal: Positive for falls. Negative for back pain and joint pain.   Skin: Negative for itching and rash.   Neurological: Positive for weakness. Negative for dizziness, loss of consciousness and headaches.   Psychiatric/Behavioral: The patient is not nervous/anxious and does not have insomnia.    All other systems reviewed and are negative.      Past Medical History   has a past medical history of Asthma, Depression, HTN (hypertension), Hyperlipidemia, Migraines, Obesity, and Sleep apnea.    Surgical History   has a past surgical history that includes appendectomy; abdominal hysterectomy total; breast biopsy; lumbar disc replacement; and colectomy.     Family History  family history includes Cancer in her mother; Diabetes in her father; Heart Disease in her brother, brother, brother, brother, father, and mother; Other in her brother; Stroke in her father.   Family history reviewed with patient. There is no family history that is pertinent to the chief complaint.     Social History   reports that she has never smoked. She has never used smokeless tobacco. She reports that she does not drink alcohol and does not use drugs.    Allergies  Allergies   Allergen Reactions   • Fentanyl Vomiting   • Morphine Vomiting   • Rifampin      Pt turns yellow       Medications  Prior to Admission Medications   Prescriptions Last Dose Informant Patient Reported? Taking?   HYDROcodone/acetaminophen (NORCO)  MG Tab 2/5/2022 at 2100 Patient Yes Yes   Sig: Take 1 Tablet by mouth 4 times a day.   albuterol 108 (90 Base) MCG/ACT Aero Soln inhalation aerosol 2/6/2022 at 1030 Patient Yes Yes   Sig: Inhale 2 Puffs every 6 hours as needed for Shortness of Breath.   levothyroxine (SYNTHROID) 88 MCG Tab 2/5/2022 at 1000 Patient Yes No   Sig: Take 88 mcg by mouth every  day. BRAND NAME ONLY   metoprolol (LOPRESSOR) 25 MG Tab > 1 week at Unknown Patient Yes No   Sig: Take 12.5 mg by mouth 2 times a day.      Facility-Administered Medications: None       Physical Exam  Temp:  [37.4 °C (99.3 °F)] 37.4 °C (99.3 °F)  Pulse:  [114-127] 114  Resp:  [16-88] 16  BP: ()/(41-91) 100/41  SpO2:  [92 %-98 %] 98 %  Blood Pressure : 129/58   Temperature: 37.4 °C (99.3 °F)   Pulse: (!) 118   Respiration: (!) 25   Pulse Oximetry: 94 %       Physical Exam  Vitals and nursing note reviewed.   Constitutional:       General: She is in acute distress.      Appearance: She is obese. She is ill-appearing.   HENT:      Head: Normocephalic and atraumatic.      Mouth/Throat:      Mouth: Mucous membranes are dry.      Pharynx: No oropharyngeal exudate.   Eyes:      General: No scleral icterus.     Extraocular Movements: Extraocular movements intact.   Cardiovascular:      Rate and Rhythm: Regular rhythm. Tachycardia present.      Pulses: Normal pulses.      Heart sounds: Normal heart sounds. No murmur heard.      Pulmonary:      Effort: Respiratory distress present.      Breath sounds: No wheezing.      Comments: Tachypnea  Using oxygen supplementation  Left-sided congestion noted  Abdominal:      General: Bowel sounds are normal. There is no distension.      Palpations: Abdomen is soft.      Tenderness: There is no abdominal tenderness.   Musculoskeletal:         General: No tenderness. Normal range of motion.      Cervical back: Normal range of motion and neck supple.      Right lower leg: No edema.      Left lower leg: No edema.   Skin:     General: Skin is warm.      Coloration: Skin is not jaundiced.      Comments: Bilateral knee wounds, dry, no erythema   Neurological:      General: No focal deficit present.      Mental Status: She is alert and oriented to person, place, and time. Mental status is at baseline.      Motor: Weakness present.   Psychiatric:         Mood and Affect: Mood normal.          Behavior: Behavior normal.         Thought Content: Thought content normal.         Judgment: Judgment normal.         Laboratory:  Recent Labs     02/06/22  1144   WBC 13.7*   RBC 4.25   HEMOGLOBIN 12.0   HEMATOCRIT 38.1   MCV 89.6   MCH 28.2   MCHC 31.5*   RDW 46.6   PLATELETCT 270   MPV 8.6*     Recent Labs     02/06/22  1144   SODIUM 140   POTASSIUM 3.6   CHLORIDE 103   CO2 22   GLUCOSE 134*   BUN 10   CREATININE 0.77   CALCIUM 8.9     Recent Labs     02/06/22  1144   ALTSGPT 10   ASTSGOT 14   ALKPHOSPHAT 78   TBILIRUBIN 0.3   GLUCOSE 134*         No results for input(s): NTPROBNP in the last 72 hours.      No results for input(s): TROPONINT in the last 72 hours.    Imaging:  DX-CHEST-PORTABLE (1 VIEW)   Final Result      No acute cardiac or pulmonary abnormalities are identified.      CT-CTA CHEST PULMONARY ARTERY W/ RECONS    (Results Pending)       X-Ray:  I have personally reviewed the images and compared with prior images.  ECG pending    Assessment/Plan:  I anticipate this patient will require at least two midnights for appropriate medical management, necessitating inpatient admission.    * Acute hypoxemic respiratory failure due to COVID-19 (HCC)- (present on admission)  Assessment & Plan  Acute hypoxemic respiratory failure due to COVID-19 with complication of community-acquired pneumonia  Patient presented with acute hypoxemic respiratory failure due to COVID-19 pneumonia.    Procalcitonin 0.40  LFT normal  D-dimer 2.91.  Patient has remote history of DVT.  Spoke with patient and she agreed on the risk and benefits of having a CTA chest to rule out PE.  She accepted the risks and CTA chest has been ordered, pending.    -Continue supportive care with oxygen supplementation  -Starting 10-day course of dexamethasone 6 mg daily  -Self proning, limit use of IV fluids and maintain fluid restrictions to maintain euvolemic status  -ordered for prophylactic dose of Lovenox  -Ordered for enhanced, droplet,  contact precautions for COVID-19 isolation  -Patient is outside the 10-day window for remdesivir, Tocilizumab or persistent.    -Start Empiric Unasyn and azithromycin  -Trend procalcitonin  --Follow blood cultures, repeat in 48 hours if positive    --Code status addressed with patient. Patient wishes to be FULL CODE    Chronic pain syndrome on chronic opioids- (present on admission)  Assessment & Plan  Patient states she takes Norco 10 mg 3 times daily to 4 times daily.  Patient takes chronic opiates due to bilateral knee pain osteoarthritis.  Continue home opiate to prevent withdrawal    Hypothyroidism- (present on admission)  Assessment & Plan  Continue home levothyroxine 88 mcg    Sepsis (HCC)- (present on admission)  Assessment & Plan  This is Sepsis Present on admission  SIRS criteria identified on my evaluation include: Tachycardia, with heart rate greater than 90 BPM, Tachypnea, with respirations greater than 20 per minute and Leukocytosis, with WBC greater than 12,000  Source is COVID PNA and CAP.  Sepsis protocol initiated  Fluid resuscitation ordered per protocol  IV antibiotics as appropriate for source of sepsis -ceftriaxone and azithromycin  While organ dysfunction may be noted elsewhere in this problem list or in the chart, degree of organ dysfunction does not meet CMS criteria for severe sepsis    Sepsis is complicated by and due to acute hypoxemic respiratory failure from acute COVID-19 pneumonia and community-acquired bacterial pneumonia.    Community acquired pneumonia- (present on admission)  Assessment & Plan  Patient has worsening shortness of breath, desaturation on room air, leukocytosis with elevated procalcitonin.  -Ceftriaxone and azithromycin    Depression- (present on admission)  Assessment & Plan  Continue bedtime amitriptyline 10 mg    Class 1 obesity due to excess calories with serious comorbidity and body mass index (BMI) of 32.0 to 32.9 in adult- (present on admission)  Assessment &  Plan  Obesity complicating patient's respiratory status and COVID-19 pneumonia    HTN (hypertension)- (present on admission)  Assessment & Plan  Patient is only on metoprolol 12.5 mg p.o. twice daily.  Continue given patient's tachycardia    VTE prophylaxis: enoxaparin ppx

## 2022-02-06 NOTE — ED NOTES
hospitalist to bedside, iv restarted due to infiltrate at previous site. Med per order for h/a, body aches. First set of blood cultures drawn per admit order

## 2022-02-06 NOTE — ED TRIAGE NOTES
72 yr old female to bed 6 by ANIBAL  Chief Complaint   Patient presents with   • Dizziness     BIB [facility] from home report she has been feeling dizzy for the past week and increase shortness of breath recently diagnosed with COVID.  Patient has been using her inhaler which is not helping.  Oxygen saturation at 95% RA    • Shortness of Breath     /91   Pulse (!) 127   Temp 37.4 °C (99.3 °F) (Temporal)   Resp 20   Wt 86.3 kg (190 lb 4.1 oz)   SpO2 96%   BMI 32.66 kg/m²     Has this patient been vaccinated for COVID Yes   If not, would they like to be vaccinated while in the ER if eligible?  No   Would the patient like to speak with the ERP about the possibility of receiving the COVID vaccine today before making a decision? No

## 2022-02-06 NOTE — ASSESSMENT & PLAN NOTE
Patient in no acute distress today  Saturating well room air but requiring oxygen during ambulation  Continue Decadron regimen  Frequent pronation and incentive spirometer  Pending home monitoring and home oxygen set up  Medically clear

## 2022-02-06 NOTE — ED PROVIDER NOTES
ED Provider Note    CHIEF COMPLAINT  Chief Complaint   Patient presents with   • Dizziness     BIB [facility] from home report she has been feeling dizzy for the past week and increase shortness of breath recently diagnosed with COVID.  Patient has been using her inhaler which is not helping.  Oxygen saturation at 95% RA    • Shortness of Breath       HPI  Yamile Go is a 72 y.o. female who presents with symptoms of shortness of breath, cough, weakness and dizziness that started about 11 days ago.  She was in the emergency department on 29 January and was orthostatic at that time.  She was found to be positive for Covid.  She says that her symptoms have worsened considerably since that day and now she fears that she is getting weaker and weaker and more short of breath dyspnea on exertion    REVIEW OF SYSTEMS  See HPI for further details. All other systems are negative.     PAST MEDICAL HISTORY  Past Medical History:   Diagnosis Date   • Asthma    • Depression    • HTN (hypertension)    • Hyperlipidemia    • Migraines    • Obesity    • Sleep apnea     diagnosed 9/2009 CPAP -PMA       FAMILY HISTORY  Family History   Problem Relation Age of Onset   • Cancer Mother         lung   • Heart Disease Mother    • Stroke Father    • Heart Disease Father    • Diabetes Father    • Heart Disease Brother         cath and bypass   • Heart Disease Brother         cath and byp[ass[   • Heart Disease Brother         cath and bypass   • Heart Disease Brother    • Other Brother         MVA       SOCIAL HISTORY   reports that she has never smoked. She has never used smokeless tobacco. She reports that she does not drink alcohol and does not use drugs.    SURGICAL HISTORY  Past Surgical History:   Procedure Laterality Date   • ABDOMINAL HYSTERECTOMY TOTAL     • APPENDECTOMY     • BREAST BIOPSY      no cancer   • COLECTOMY      partial for divverticulitis 2007   • LUMBAR DISC REPLACEMENT         CURRENT MEDICATIONS  Home Medications      Reviewed by Sherrie Andrade PhT (Pharmacy Tech) on 02/06/22 at 1203  Med List Status: Complete   Medication Last Dose Status   albuterol 108 (90 Base) MCG/ACT Aero Soln inhalation aerosol 2/6/2022 Active   HYDROcodone/acetaminophen (NORCO)  MG Tab 2/5/2022 Active   levothyroxine (SYNTHROID) 88 MCG Tab 2/5/2022 Active   metoprolol (LOPRESSOR) 25 MG Tab > 1 week Active                ALLERGIES  Allergies   Allergen Reactions   • Fentanyl Vomiting   • Morphine Vomiting   • Pcn [Penicillins] Rash     Pt reports that she does not have an allergie to this medication    • Rifampin      Pt turns yellow       PHYSICAL EXAM  VITAL SIGNS: /58   Pulse (!) 118   Temp 37.4 °C (99.3 °F) (Temporal)   Resp (!) 25   Wt 86.3 kg (190 lb 4.1 oz)   SpO2 94%   BMI 32.66 kg/m²    Constitutional: Well developed, Well nourished, No acute distress, Non-toxic appearance.   HENT: Normocephalic, Atraumatic, Bilateral external ears normal, Oropharynx is clear mucous membranes are dry. No oral exudates or nasal discharge.   Eyes: Pupils are equal round and reactive, EOMI, Conjunctiva normal, No discharge.   Neck: Normal range of motion, No tenderness, Supple, No stridor. No meningismus.  Lymphatic: No lymphadenopathy noted.   Cardiovascular: Tachycardic rate and rhythm without murmur rub or gallop.  Thorax & Lungs: Rhonchorous breath sounds without wheezes or rales bilaterally. There is no chest wall tenderness.   Abdomen: Soft non-tender non-distended. There is no rebound or guarding. No organomegaly is appreciated. Bowel sounds are normal.  Skin: Normal without rash.   Back: No CVA or spinal tenderness.   Extremities: Intact distal pulses, No edema, No tenderness, No cyanosis, No clubbing. Capillary refill is less than 2 seconds.  Musculoskeletal: Good range of motion in all major joints. No tenderness to palpation or major deformities noted.   Neurologic: Alert & oriented x 3, Normal motor function, Normal sensory  function, No focal deficits noted. Reflexes are normal.  Psychiatric: Affect normal, Judgment normal, Mood normal. There is no suicidal ideation or patient reported hallucinations.     EKG  Results for orders placed or performed during the hospital encounter of 22   EKG   Result Value Ref Range    Report       [facility] Emergency Dept.    Test Date:  2022  Pt Name:    SIMI NELSON         Department: [facility]  MRN:        [id number]                      [facility]  Gender:     Female                       Technician: EO  :        1950                   Requested By:ALCON KAISER  Order #:    [id number]                    Reading MD: ALCON KAISER MD    Measurements  Intervals                                Axis  Rate:       114                          P:          59  DE:         170                          QRS:        27  QRSD:       83                           T:          2  QT:         308  QTc:        425    Interpretive Statements  Sinus tachycardia  Low voltage, precordial leads  Borderline T wave abnormalities  Baseline wander in lead(s) V3  No previous ECG available for comparison  Electronically Signed On 2022 22:12:10 PST by ALCON KAISER MD           RADIOLOGY/PROCEDURES  DX-CHEST-PORTABLE (1 VIEW)   Final Result      No acute cardiac or pulmonary abnormalities are identified.      CT-CTA CHEST PULMONARY ARTERY W/ RECONS    (Results Pending)         COURSE & MEDICAL DECISION MAKING  Pertinent Labs & Imaging studies reviewed. (See chart for details)  I did not repeat an EKG today but reviewed her previous EKG that shows sinus tachycardia with no ischemic changes.  She continues to be tachycardic today from her coronavirus infection and dehydration clinically    Laboratory evaluation reveals white blood cell count almost 14,000 with 88% PMN shift.  Lactic acid is 3.4 with a D-dimer of 2.91.  Does not have any leg findings to suggest  DVT and PE is possible but less likely given dimer under 3.  Urinalysis is negative for infection    Chest x-ray shows no acute infiltrate but given that she has slightly elevated procalcitonin she may be having a secondary bacterial infection develop.    Given her hypoxia that developed during her emergency department course with a O2 sat of 89 to 90% with good waveform on room air, I will admit her to the hospital and start Decadron therapy.  I placed her on 2 L nasal cannula and call the hospitalist who will admit her to the Covid unit.  Patient understands and appreciates her plan of care    FINAL IMPRESSION  1. COVID-19 virus infection    2. Hypoxia    3. Weakness    4. Elevated lactic acid level             Electronically signed by: Darius Araujo M.D., 2/6/2022 2:50 PM

## 2022-02-07 LAB
ALBUMIN SERPL BCP-MCNC: 3.5 G/DL (ref 3.2–4.9)
BASOPHILS # BLD AUTO: 0.2 % (ref 0–1.8)
BASOPHILS # BLD: 0.03 K/UL (ref 0–0.12)
BUN SERPL-MCNC: 10 MG/DL (ref 8–22)
CALCIUM SERPL-MCNC: 8.6 MG/DL (ref 8.4–10.2)
CHLORIDE SERPL-SCNC: 107 MMOL/L (ref 96–112)
CO2 SERPL-SCNC: 25 MMOL/L (ref 20–33)
CREAT SERPL-MCNC: 0.64 MG/DL (ref 0.5–1.4)
EOSINOPHIL # BLD AUTO: 0 K/UL (ref 0–0.51)
EOSINOPHIL NFR BLD: 0 % (ref 0–6.9)
ERYTHROCYTE [DISTWIDTH] IN BLOOD BY AUTOMATED COUNT: 46.6 FL (ref 35.9–50)
GLUCOSE SERPL-MCNC: 123 MG/DL (ref 65–99)
HCT VFR BLD AUTO: 38 % (ref 37–47)
HGB BLD-MCNC: 11.7 G/DL (ref 12–16)
IMM GRANULOCYTES # BLD AUTO: 0.14 K/UL (ref 0–0.11)
IMM GRANULOCYTES NFR BLD AUTO: 0.9 % (ref 0–0.9)
LYMPHOCYTES # BLD AUTO: 0.75 K/UL (ref 1–4.8)
LYMPHOCYTES NFR BLD: 4.6 % (ref 22–41)
MAGNESIUM SERPL-MCNC: 2.2 MG/DL (ref 1.5–2.5)
MCH RBC QN AUTO: 28.3 PG (ref 27–33)
MCHC RBC AUTO-ENTMCNC: 30.8 G/DL (ref 33.6–35)
MCV RBC AUTO: 91.8 FL (ref 81.4–97.8)
MONOCYTES # BLD AUTO: 0.55 K/UL (ref 0–0.85)
MONOCYTES NFR BLD AUTO: 3.3 % (ref 0–13.4)
NEUTROPHILS # BLD AUTO: 14.95 K/UL (ref 2–7.15)
NEUTROPHILS NFR BLD: 91 % (ref 44–72)
NRBC # BLD AUTO: 0 K/UL
NRBC BLD-RTO: 0 /100 WBC
PHOSPHATE SERPL-MCNC: 1.9 MG/DL (ref 2.5–4.5)
PLATELET # BLD AUTO: 269 K/UL (ref 164–446)
PMV BLD AUTO: 9.2 FL (ref 9–12.9)
POTASSIUM SERPL-SCNC: 4 MMOL/L (ref 3.6–5.5)
RBC # BLD AUTO: 4.14 M/UL (ref 4.2–5.4)
SODIUM SERPL-SCNC: 142 MMOL/L (ref 135–145)
WBC # BLD AUTO: 16.4 K/UL (ref 4.8–10.8)

## 2022-02-07 PROCEDURE — 700102 HCHG RX REV CODE 250 W/ 637 OVERRIDE(OP): Performed by: HOSPITALIST

## 2022-02-07 PROCEDURE — 99233 SBSQ HOSP IP/OBS HIGH 50: CPT | Performed by: INTERNAL MEDICINE

## 2022-02-07 PROCEDURE — 83735 ASSAY OF MAGNESIUM: CPT

## 2022-02-07 PROCEDURE — 700105 HCHG RX REV CODE 258: Performed by: INTERNAL MEDICINE

## 2022-02-07 PROCEDURE — 700111 HCHG RX REV CODE 636 W/ 250 OVERRIDE (IP): Performed by: INTERNAL MEDICINE

## 2022-02-07 PROCEDURE — 85025 COMPLETE CBC W/AUTO DIFF WBC: CPT

## 2022-02-07 PROCEDURE — 700102 HCHG RX REV CODE 250 W/ 637 OVERRIDE(OP): Performed by: INTERNAL MEDICINE

## 2022-02-07 PROCEDURE — A9270 NON-COVERED ITEM OR SERVICE: HCPCS | Performed by: HOSPITALIST

## 2022-02-07 PROCEDURE — 96372 THER/PROPH/DIAG INJ SC/IM: CPT

## 2022-02-07 PROCEDURE — 770006 HCHG ROOM/CARE - MED/SURG/GYN SEMI*

## 2022-02-07 PROCEDURE — A9270 NON-COVERED ITEM OR SERVICE: HCPCS | Performed by: INTERNAL MEDICINE

## 2022-02-07 PROCEDURE — 80069 RENAL FUNCTION PANEL: CPT

## 2022-02-07 RX ORDER — GUAIFENESIN/DEXTROMETHORPHAN 100-10MG/5
5 SYRUP ORAL EVERY 6 HOURS PRN
Status: DISCONTINUED | OUTPATIENT
Start: 2022-02-07 | End: 2022-02-08 | Stop reason: HOSPADM

## 2022-02-07 RX ADMIN — SODIUM CHLORIDE 3 G: 900 INJECTION INTRAVENOUS at 12:13

## 2022-02-07 RX ADMIN — HYDROCODONE BITARTRATE AND ACETAMINOPHEN 1 TABLET: 10; 325 TABLET ORAL at 18:59

## 2022-02-07 RX ADMIN — DEXAMETHASONE 6 MG: 4 TABLET ORAL at 08:19

## 2022-02-07 RX ADMIN — HYDROCODONE BITARTRATE AND ACETAMINOPHEN 1 TABLET: 10; 325 TABLET ORAL at 22:02

## 2022-02-07 RX ADMIN — METOPROLOL TARTRATE 12.5 MG: 25 TABLET, FILM COATED ORAL at 08:27

## 2022-02-07 RX ADMIN — SODIUM CHLORIDE 3 G: 900 INJECTION INTRAVENOUS at 05:46

## 2022-02-07 RX ADMIN — AMITRIPTYLINE HYDROCHLORIDE 10 MG: 10 TABLET, FILM COATED ORAL at 22:03

## 2022-02-07 RX ADMIN — ENOXAPARIN SODIUM 40 MG: 40 INJECTION SUBCUTANEOUS at 08:20

## 2022-02-07 RX ADMIN — SODIUM CHLORIDE 3 G: 900 INJECTION INTRAVENOUS at 00:07

## 2022-02-07 RX ADMIN — GUAIFENESIN SYRUP AND DEXTROMETHORPHAN 5 ML: 100; 10 SYRUP ORAL at 22:03

## 2022-02-07 RX ADMIN — METOPROLOL TARTRATE 12.5 MG: 25 TABLET, FILM COATED ORAL at 08:29

## 2022-02-07 RX ADMIN — HYDROCODONE BITARTRATE AND ACETAMINOPHEN 1 TABLET: 10; 325 TABLET ORAL at 00:07

## 2022-02-07 RX ADMIN — METOPROLOL TARTRATE 12.5 MG: 25 TABLET, FILM COATED ORAL at 18:59

## 2022-02-07 RX ADMIN — LEVOTHYROXINE SODIUM 88 MCG: 0.09 TABLET ORAL at 08:20

## 2022-02-07 RX ADMIN — AZITHROMYCIN MONOHYDRATE 500 MG: 250 TABLET ORAL at 18:59

## 2022-02-07 RX ADMIN — SODIUM CHLORIDE 3 G: 900 INJECTION INTRAVENOUS at 19:00

## 2022-02-07 ASSESSMENT — COGNITIVE AND FUNCTIONAL STATUS - GENERAL
DAILY ACTIVITIY SCORE: 24
SUGGESTED CMS G CODE MODIFIER DAILY ACTIVITY: CH
MOBILITY SCORE: 24
SUGGESTED CMS G CODE MODIFIER MOBILITY: CH

## 2022-02-07 ASSESSMENT — LIFESTYLE VARIABLES
HAVE YOU EVER FELT YOU SHOULD CUT DOWN ON YOUR DRINKING: NO
EVER FELT BAD OR GUILTY ABOUT YOUR DRINKING: NO
TOTAL SCORE: 0
HOW MANY TIMES IN THE PAST YEAR HAVE YOU HAD 5 OR MORE DRINKS IN A DAY: 0
EVER HAD A DRINK FIRST THING IN THE MORNING TO STEADY YOUR NERVES TO GET RID OF A HANGOVER: NO
HAVE PEOPLE ANNOYED YOU BY CRITICIZING YOUR DRINKING: NO
ALCOHOL_USE: NO
AVERAGE NUMBER OF DAYS PER WEEK YOU HAVE A DRINK CONTAINING ALCOHOL: 0
TOTAL SCORE: 0
ON A TYPICAL DAY WHEN YOU DRINK ALCOHOL HOW MANY DRINKS DO YOU HAVE: 0
CONSUMPTION TOTAL: NEGATIVE
TOTAL SCORE: 0

## 2022-02-07 ASSESSMENT — PATIENT HEALTH QUESTIONNAIRE - PHQ9
2. FEELING DOWN, DEPRESSED, IRRITABLE, OR HOPELESS: NOT AT ALL
1. LITTLE INTEREST OR PLEASURE IN DOING THINGS: NOT AT ALL
SUM OF ALL RESPONSES TO PHQ9 QUESTIONS 1 AND 2: 0

## 2022-02-07 ASSESSMENT — ENCOUNTER SYMPTOMS
COUGH: 1
SHORTNESS OF BREATH: 1

## 2022-02-07 ASSESSMENT — PAIN DESCRIPTION - PAIN TYPE: TYPE: CHRONIC PAIN

## 2022-02-07 NOTE — DISCHARGE PLANNING
ER CM spoke with pt. She lives in a first floor apartment. She has a dtr and friend for support. Ambulates with cane or walker. No o2 at home Dr Cole lópez is PCP [facility] on [address]  Care Transition Team Assessment    Information Source  Orientation Level: Oriented X4  Information Given By: Patient  Informant's Name: Gillian  Who is responsible for making decisions for patient? : Patient         Elopement Risk  Legal Hold: No  Ambulatory or Self Mobile in Wheelchair: No-Not an Elopement Risk    Interdisciplinary Discharge Planning  Primary Care Physician: Dr Galvan  Lives with - Patient's Self Care Capacity: Adult Children  Support Systems: Children,Family Member(s)  Housing / Facility: 1 Story Apartment / Condo  Durable Medical Equipment: Walker,Other - Specify    Discharge Preparedness  What is your plan after discharge?: Uncertain - pending medical team collaboration,Home with help  What are your discharge supports?: Child  Prior Functional Level: Ambulatory,Independent with Activities of Daily Living,Independent with Medication Management,Uses Cane,Uses Walker    Functional Assesment  Prior Functional Level: Ambulatory,Independent with Activities of Daily Living,Independent with Medication Management,Uses Cane,Uses Walker    Finances  Prescription Coverage: Yes ([facility])                   Domestic Abuse  Have you ever been the victim of abuse or violence?: No    Psychological Assessment  History of Substance Abuse: None         Anticipated Discharge Information  Discharge Disposition: Discharged to home/self care (01)

## 2022-02-07 NOTE — PROGRESS NOTES
"Hospital Medicine Daily Progress Note    Date of Service  2/7/2022    Chief Complaint  Yamile Go is a 72 y.o. female admitted 2/6/2022 with shortness of breath    Hospital Course  No notes on file  Per notes, \"72 y.o. female PMH hypertension, hypothyroidism, chronic pain on Norco 10 at home, depression who presented 2/6/2022 with SOB.  Patient states she was diagnosed with COVID-19 positive results on 1/29/2022 with PCR.  Patient had symptoms prior to that.  She states in the last 3 to 4 days she has not been feeling well, reports of subjective fevers and increasing cough with sputum.  Patient has felt additional weakness and has fallen onto her knees due to weakness.  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  Patient states she has a history of DVT in the past currently not on anticoagulation.     Spoke to EDP about patient's case, patient presented with significant tachycardia and tachypnea.  Walking evaluation patient dropped down to 75%, was requiring 2 L nasal cannula to maintain over 90%.  Patient on my evaluation was at 3 L nasal cannula.  WBC 13.7, procalcitonin 0.4, lactic acid 3.4.  Patient being given 1 L NS in the ED and IV dexamethasone 6 mg.\"    Patient was admitted to monitored overnight for acute hypoxemic respiratory failure COVID-19 pneumonia.  A walk test was completed on 2/7 and patient was requiring 6 to 8 L and still desaturating.     Interval Problem Update  Patient still requiring supplemental oxygen when ambulating this morning, will monitor overnight and repeat blood test in the morning.  If appropriate patient can likely discharge home with oxygen tomorrow.    I have personally seen and examined the patient at bedside. I discussed the plan of care with patient.    Consultants/Specialty  None    Code Status  Full Code    Disposition  Patient is medically cleared for discharge.   Anticipate discharge to to home with close outpatient follow-up.  I have placed the appropriate orders " for post-discharge needs.    Review of Systems  Review of Systems   Constitutional: Positive for malaise/fatigue.   Respiratory: Positive for cough and shortness of breath.    All other systems reviewed and are negative.       Physical Exam  Pulse:  [] 85  Resp:  [5-88] 12  BP: ()/(41-70) 115/57  SpO2:  [92 %-99 %] 95 %    Physical Exam  Vitals and nursing note reviewed.   Constitutional:       Appearance: Normal appearance. She is obese.   Cardiovascular:      Rate and Rhythm: Normal rate and regular rhythm.      Pulses: Normal pulses.      Heart sounds: Normal heart sounds.   Pulmonary:      Effort: Respiratory distress present.      Breath sounds: Normal breath sounds.   Abdominal:      General: Abdomen is flat. Bowel sounds are normal.      Palpations: Abdomen is soft.   Musculoskeletal:      Right lower leg: No edema.      Left lower leg: No edema.   Neurological:      General: No focal deficit present.      Mental Status: She is alert and oriented to person, place, and time. Mental status is at baseline.         Fluids    Intake/Output Summary (Last 24 hours) at 2/7/2022 1229  Last data filed at 2/7/2022 0624  Gross per 24 hour   Intake 1200 ml   Output --   Net 1200 ml       Laboratory  Recent Labs     02/06/22  1144   WBC 13.7*   RBC 4.25   HEMOGLOBIN 12.0   HEMATOCRIT 38.1   MCV 89.6   MCH 28.2   MCHC 31.5*   RDW 46.6   PLATELETCT 270   MPV 8.6*     Recent Labs     02/06/22  1144   SODIUM 140   POTASSIUM 3.6   CHLORIDE 103   CO2 22   GLUCOSE 134*   BUN 10   CREATININE 0.77   CALCIUM 8.9     Recent Labs     02/06/22  1144   INR 1.05               Imaging  CT-CTA CHEST PULMONARY ARTERY W/ RECONS   Final Result      1.  Exam limited by poor opacification of the pulmonary arteries as well as motion artifact. Only large central and lobar branch emboli cannot be excluded. Segmental branch and likely not be excluded.      2.  Bilateral lower lobe consolidation could be due to atelectasis or pneumonia.             DX-CHEST-PORTABLE (1 VIEW)   Final Result      No acute cardiac or pulmonary abnormalities are identified.           Assessment/Plan  * Acute hypoxemic respiratory failure due to COVID-19 (HCC)- (present on admission)  Assessment & Plan  Acute hypoxemic respiratory failure due to COVID-19 with complication of community-acquired pneumonia  Patient presented with acute hypoxemic respiratory failure due to COVID-19 pneumonia.    Procalcitonin 0.40  LFT normal  D-dimer 2.91.  Patient has remote history of DVT.  Spoke with patient and she agreed on the risk and benefits of having a CTA chest negative for PE.    -Continue supportive care with oxygen supplementation  -Continue 10-day course of dexamethasone 6 mg daily  -Encourage self proning  -Ordered for enhanced, droplet, contact precautions for COVID-19 isolation  -Patient is outside the 10-day window for remdesivir  -Repeat walk test in a.m.    -Continue empiric Unasyn and azithromycin for superimposed community-acquired bacterial pneumonia  --Follow blood cultures, repeat in 48 hours if positive    --Code status addressed with patient. Patient wishes to be FULL CODE    Chronic pain syndrome on chronic opioids- (present on admission)  Assessment & Plan  Patient states she takes Norco 10 mg 3 times daily to 4 times daily.  Patient takes chronic opiates due to bilateral knee pain osteoarthritis.  Continue home opiate to prevent withdrawal    Hypothyroidism- (present on admission)  Assessment & Plan  Continue home levothyroxine 88 mcg    Sepsis (HCC)- (present on admission)  Assessment & Plan  This is Sepsis Present on admission  SIRS criteria identified on my evaluation include: Tachycardia, with heart rate greater than 90 BPM, Tachypnea, with respirations greater than 20 per minute and Leukocytosis, with WBC greater than 12,000  Source is COVID PNA and CAP.  Sepsis protocol initiated  Fluid resuscitation ordered per protocol  IV antibiotics as appropriate  for source of sepsis -ceftriaxone and azithromycin  While organ dysfunction may be noted elsewhere in this problem list or in the chart, degree of organ dysfunction does not meet CMS criteria for severe sepsis    Sepsis is complicated by and due to acute hypoxemic respiratory failure from acute COVID-19 pneumonia and community-acquired bacterial pneumonia.  Reevaluation:  Resolved    Community acquired pneumonia- (present on admission)  Assessment & Plan  Patient has worsening shortness of breath, desaturation on room air, leukocytosis with elevated procalcitonin.  -Continue empiric Unasyn and azithromycin for superimposed community-acquired bacterial pneumonia    Depression- (present on admission)  Assessment & Plan  Continue bedtime amitriptyline 10 mg    Class 1 obesity due to excess calories with serious comorbidity and body mass index (BMI) of 32.0 to 32.9 in adult- (present on admission)  Assessment & Plan  Obesity complicating patient's respiratory status and COVID-19 pneumonia  - Discussed diet and lifestyle modifications with patient  - Patient will need to follow up with PCP for outpatient management       HTN (hypertension)- (present on admission)  Assessment & Plan  Patient is only on metoprolol 12.5 mg p.o. twice daily.  Continue given patient's tachycardia       VTE prophylaxis: SCDs/TEDs    I have performed a physical exam and reviewed and updated ROS and Plan today (2/7/2022). In review of yesterday's note (2/6/2022), there are no changes except as documented above.

## 2022-02-07 NOTE — ED NOTES
Remains voiding via pure wick. Loose non prod cough audible intermit . Meds per admit orders and home regime

## 2022-02-07 NOTE — ED NOTES
Pt placed on hospital bed with pure wick in place. Side rails up, low locked position call light within reach. NAD. VSS

## 2022-02-08 ENCOUNTER — PATIENT OUTREACH (OUTPATIENT)
Dept: HEALTH INFORMATION MANAGEMENT | Facility: OTHER | Age: 72
End: 2022-02-08
Payer: MEDICARE

## 2022-02-08 VITALS
BODY MASS INDEX: 32.58 KG/M2 | HEART RATE: 82 BPM | DIASTOLIC BLOOD PRESSURE: 65 MMHG | SYSTOLIC BLOOD PRESSURE: 137 MMHG | RESPIRATION RATE: 18 BRPM | WEIGHT: 189.82 LBS | TEMPERATURE: 97.7 F | OXYGEN SATURATION: 96 %

## 2022-02-08 LAB
ANION GAP SERPL CALC-SCNC: 10 MMOL/L (ref 7–16)
BUN SERPL-MCNC: 13 MG/DL (ref 8–22)
CALCIUM SERPL-MCNC: 8.4 MG/DL (ref 8.4–10.2)
CHLORIDE SERPL-SCNC: 107 MMOL/L (ref 96–112)
CO2 SERPL-SCNC: 24 MMOL/L (ref 20–33)
CREAT SERPL-MCNC: 0.69 MG/DL (ref 0.5–1.4)
ERYTHROCYTE [DISTWIDTH] IN BLOOD BY AUTOMATED COUNT: 45.7 FL (ref 35.9–50)
GLUCOSE SERPL-MCNC: 137 MG/DL (ref 65–99)
HCT VFR BLD AUTO: 35 % (ref 37–47)
HGB BLD-MCNC: 11 G/DL (ref 12–16)
MAGNESIUM SERPL-MCNC: 2.3 MG/DL (ref 1.5–2.5)
MCH RBC QN AUTO: 28.4 PG (ref 27–33)
MCHC RBC AUTO-ENTMCNC: 31.4 G/DL (ref 33.6–35)
MCV RBC AUTO: 90.4 FL (ref 81.4–97.8)
PLATELET # BLD AUTO: 296 K/UL (ref 164–446)
PMV BLD AUTO: 9.6 FL (ref 9–12.9)
POTASSIUM SERPL-SCNC: 4 MMOL/L (ref 3.6–5.5)
PROCALCITONIN SERPL-MCNC: 0.25 NG/ML
RBC # BLD AUTO: 3.87 M/UL (ref 4.2–5.4)
SODIUM SERPL-SCNC: 141 MMOL/L (ref 135–145)
WBC # BLD AUTO: 12.1 K/UL (ref 4.8–10.8)

## 2022-02-08 PROCEDURE — 700102 HCHG RX REV CODE 250 W/ 637 OVERRIDE(OP): Performed by: HOSPITALIST

## 2022-02-08 PROCEDURE — 84145 PROCALCITONIN (PCT): CPT

## 2022-02-08 PROCEDURE — A9270 NON-COVERED ITEM OR SERVICE: HCPCS | Performed by: HOSPITALIST

## 2022-02-08 PROCEDURE — 97535 SELF CARE MNGMENT TRAINING: CPT

## 2022-02-08 PROCEDURE — 700105 HCHG RX REV CODE 258: Performed by: INTERNAL MEDICINE

## 2022-02-08 PROCEDURE — 97162 PT EVAL MOD COMPLEX 30 MIN: CPT

## 2022-02-08 PROCEDURE — 99239 HOSP IP/OBS DSCHRG MGMT >30: CPT | Performed by: STUDENT IN AN ORGANIZED HEALTH CARE EDUCATION/TRAINING PROGRAM

## 2022-02-08 PROCEDURE — 700102 HCHG RX REV CODE 250 W/ 637 OVERRIDE(OP): Performed by: INTERNAL MEDICINE

## 2022-02-08 PROCEDURE — A9270 NON-COVERED ITEM OR SERVICE: HCPCS | Performed by: INTERNAL MEDICINE

## 2022-02-08 PROCEDURE — 85027 COMPLETE CBC AUTOMATED: CPT

## 2022-02-08 PROCEDURE — 700102 HCHG RX REV CODE 250 W/ 637 OVERRIDE(OP): Performed by: STUDENT IN AN ORGANIZED HEALTH CARE EDUCATION/TRAINING PROGRAM

## 2022-02-08 PROCEDURE — 80048 BASIC METABOLIC PNL TOTAL CA: CPT

## 2022-02-08 PROCEDURE — 700111 HCHG RX REV CODE 636 W/ 250 OVERRIDE (IP): Performed by: INTERNAL MEDICINE

## 2022-02-08 PROCEDURE — 83735 ASSAY OF MAGNESIUM: CPT

## 2022-02-08 PROCEDURE — A9270 NON-COVERED ITEM OR SERVICE: HCPCS | Performed by: STUDENT IN AN ORGANIZED HEALTH CARE EDUCATION/TRAINING PROGRAM

## 2022-02-08 PROCEDURE — 97166 OT EVAL MOD COMPLEX 45 MIN: CPT

## 2022-02-08 RX ORDER — GUAIFENESIN/DEXTROMETHORPHAN 100-10MG/5
5 SYRUP ORAL EVERY 6 HOURS PRN
Qty: 840 ML | Refills: 0 | Status: SHIPPED | OUTPATIENT
Start: 2022-02-08 | End: 2022-03-21

## 2022-02-08 RX ORDER — AMOXICILLIN AND CLAVULANATE POTASSIUM 875; 125 MG/1; MG/1
1 TABLET, FILM COATED ORAL EVERY 12 HOURS
Qty: 8 TABLET | Refills: 0 | Status: SHIPPED | OUTPATIENT
Start: 2022-02-08 | End: 2022-02-12

## 2022-02-08 RX ORDER — DEXAMETHASONE 6 MG/1
6 TABLET ORAL DAILY
Qty: 10 TABLET | Refills: 0 | Status: SHIPPED | OUTPATIENT
Start: 2022-02-09 | End: 2022-03-21

## 2022-02-08 RX ORDER — AMOXICILLIN AND CLAVULANATE POTASSIUM 875; 125 MG/1; MG/1
1 TABLET, FILM COATED ORAL EVERY 12 HOURS
Status: DISCONTINUED | OUTPATIENT
Start: 2022-02-08 | End: 2022-02-08 | Stop reason: HOSPADM

## 2022-02-08 RX ADMIN — AMOXICILLIN AND CLAVULANATE POTASSIUM 1 TABLET: 875; 125 TABLET, FILM COATED ORAL at 17:23

## 2022-02-08 RX ADMIN — LEVOTHYROXINE SODIUM 88 MCG: 0.09 TABLET ORAL at 05:11

## 2022-02-08 RX ADMIN — SODIUM CHLORIDE 3 G: 900 INJECTION INTRAVENOUS at 05:10

## 2022-02-08 RX ADMIN — METOPROLOL TARTRATE 12.5 MG: 25 TABLET, FILM COATED ORAL at 05:12

## 2022-02-08 RX ADMIN — METOPROLOL TARTRATE 12.5 MG: 25 TABLET, FILM COATED ORAL at 17:23

## 2022-02-08 RX ADMIN — ENOXAPARIN SODIUM 40 MG: 40 INJECTION SUBCUTANEOUS at 05:11

## 2022-02-08 RX ADMIN — HYDROCODONE BITARTRATE AND ACETAMINOPHEN 1 TABLET: 10; 325 TABLET ORAL at 17:25

## 2022-02-08 RX ADMIN — DEXAMETHASONE 6 MG: 4 TABLET ORAL at 05:11

## 2022-02-08 RX ADMIN — HYDROCODONE BITARTRATE AND ACETAMINOPHEN 1 TABLET: 10; 325 TABLET ORAL at 05:11

## 2022-02-08 RX ADMIN — GUAIFENESIN SYRUP AND DEXTROMETHORPHAN 5 ML: 100; 10 SYRUP ORAL at 17:29

## 2022-02-08 RX ADMIN — SODIUM CHLORIDE 3 G: 900 INJECTION INTRAVENOUS at 01:29

## 2022-02-08 RX ADMIN — HYDROCODONE BITARTRATE AND ACETAMINOPHEN 1 TABLET: 10; 325 TABLET ORAL at 11:48

## 2022-02-08 ASSESSMENT — COGNITIVE AND FUNCTIONAL STATUS - GENERAL
TOILETING: A LITTLE
CLIMB 3 TO 5 STEPS WITH RAILING: A LITTLE
SUGGESTED CMS G CODE MODIFIER MOBILITY: CJ
DAILY ACTIVITIY SCORE: 21
HELP NEEDED FOR BATHING: A LITTLE
MOBILITY SCORE: 22
WALKING IN HOSPITAL ROOM: A LITTLE
SUGGESTED CMS G CODE MODIFIER DAILY ACTIVITY: CJ
DRESSING REGULAR LOWER BODY CLOTHING: A LITTLE

## 2022-02-08 ASSESSMENT — GAIT ASSESSMENTS
DISTANCE (FEET): 50
DEVIATION: DECREASED HEEL STRIKE;DECREASED TOE OFF
ASSISTIVE DEVICE: HAND HELD ASSIST
DISTANCE (FEET): 6
GAIT LEVEL OF ASSIST: CONTACT GUARD ASSIST

## 2022-02-08 ASSESSMENT — FIBROSIS 4 INDEX: FIB4 SCORE: 1.06

## 2022-02-08 ASSESSMENT — ACTIVITIES OF DAILY LIVING (ADL): TOILETING: INDEPENDENT

## 2022-02-08 ASSESSMENT — ENCOUNTER SYMPTOMS
SHORTNESS OF BREATH: 1
COUGH: 1

## 2022-02-08 NOTE — DISCHARGE PLANNING
Received Choice form at 1259  Agency/Facility Name: [facility]  Referral sent per Choice form @ 1259 RNCM Sent referral

## 2022-02-08 NOTE — DISCHARGE SUMMARY
Discharge Summary    CHIEF COMPLAINT ON ADMISSION  Chief Complaint   Patient presents with   • Dizziness     BIB [facility] from home report she has been feeling dizzy for the past week and increase shortness of breath recently diagnosed with COVID.  Patient has been using her inhaler which is not helping.  Oxygen saturation at 95% RA    • Shortness of Breath       Reason for Admission  EMS     Admission Date  2/6/2022    CODE STATUS  Full Code    HPI & HOSPITAL COURSE  72-year-old female with past medical history of hypertension, chronic pain syndrome on Norco, depression, and hypothyroidism was admitted on 2/6/2022 for acute hypoxic respiratory failure secondary to Covid pneumonia noted on chest x-ray.  Patient requiring oxygen supplementation and thus was started on a Decadron regimen.  As per home oxygen evaluation, patient requiring 2 L nasal cannula during exertion.  Home oxygen and home monitoring were set up prior to discharge.  Patient is to be discharged home on stable regimen and is to follow-up with her primary care provider within 2 weeks.  All results and plan of action were discussed with the patient which she voiced understanding agree with the primary care team.  Patient was instructed to return to emergency department symptoms were to worsen.    Therefore, she is discharged in good and stable condition to home with close outpatient follow-up.    The patient met 2-midnight criteria for an inpatient stay at the time of discharge.    Discharge Date  2/8/2022    FOLLOW UP ITEMS POST DISCHARGE  Primary care provider to follow post hospital discharge care    DISCHARGE DIAGNOSES  Principal Problem:    Acute hypoxemic respiratory failure due to COVID-19 (HCC) POA: Yes  Active Problems:    HTN (hypertension) POA: Yes    Class 1 obesity due to excess calories with serious comorbidity and body mass index (BMI) of 32.0 to 32.9 in adult (Chronic) POA: Yes    Depression POA: Yes    Community acquired pneumonia POA:  Yes    Sepsis (HCC) POA: Yes    Hypothyroidism (Chronic) POA: Yes    Chronic pain syndrome on chronic opioids (Chronic) POA: Yes  Resolved Problems:    * No resolved hospital problems. *      FOLLOW UP  No future appointments.  No follow-up provider specified.    MEDICATIONS ON DISCHARGE     Medication List      START taking these medications      Instructions   amoxicillin-clavulanate 875-125 MG Tabs  Commonly known as: AUGMENTIN   Take 1 Tablet by mouth every 12 hours for 4 days.  Dose: 1 Tablet     dexamethasone 6 MG Tabs  Start taking on: February 9, 2022  Commonly known as: DECADRON   Take 1 Tablet by mouth every day.  Dose: 6 mg     guaiFENesin dextromethorphan 100-10 MG/5ML Syrp syrup  Commonly known as: ROBITUSSIN DM   Take 5 mL by mouth every 6 hours as needed for Cough.  Dose: 5 mL        CONTINUE taking these medications      Instructions   albuterol 108 (90 Base) MCG/ACT Aers inhalation aerosol   Inhale 2 Puffs every 6 hours as needed for Shortness of Breath.  Dose: 2 Puff     amitriptyline 10 MG Tabs  Commonly known as: ELAVIL   Take 10 mg by mouth every evening.  Dose: 10 mg     HYDROcodone/acetaminophen  MG Tabs  Commonly known as: NORCO   Take 1 Tablet by mouth 4 times a day.  Dose: 1 Tablet     levothyroxine 88 MCG Tabs  Commonly known as: SYNTHROID   Take 88 mcg by mouth every day. BRAND NAME ONLY  Dose: 88 mcg     metoprolol tartrate 25 MG Tabs  Commonly known as: LOPRESSOR   Take 12.5 mg by mouth 2 times a day.  Dose: 12.5 mg            Allergies  Allergies   Allergen Reactions   • Fentanyl Vomiting   • Morphine Vomiting   • Rifampin      Pt turns yellow       DIET  Orders Placed This Encounter   Procedures   • Diet Order Diet: Level 7 - Easy to Chew; Liquid level: Level 0 - Thin; Fluid modifications: (optional): 1800 ml Fluid Restriction     Standing Status:   Standing     Number of Occurrences:   1     Order Specific Question:   Diet:     Answer:   Level 7 - Easy to Chew [22]     Order  Specific Question:   Liquid level     Answer:   Level 0 - Thin     Order Specific Question:   Fluid modifications: (optional)     Answer:   1800 ml Fluid Restriction [10]       ACTIVITY  As tolerated.  Weight bearing as tolerated    CONSULTATIONS  None    PROCEDURES  None    LABORATORY  Lab Results   Component Value Date    SODIUM 141 02/08/2022    POTASSIUM 4.0 02/08/2022    CHLORIDE 107 02/08/2022    CO2 24 02/08/2022    GLUCOSE 137 (H) 02/08/2022    BUN 13 02/08/2022    CREATININE 0.69 02/08/2022    CREATININE 0.87 02/20/2012        Lab Results   Component Value Date    WBC 12.1 (H) 02/08/2022    WBC 6.9 02/20/2012    HEMOGLOBIN 11.0 (L) 02/08/2022    HEMATOCRIT 35.0 (L) 02/08/2022    PLATELETCT 296 02/08/2022        Total time of the discharge process exceeds 33 minutes.

## 2022-02-08 NOTE — PROGRESS NOTES
"Hospital Medicine Daily Progress Note    Date of Service  2/8/2022    Chief Complaint  Yamile Go is a 72 y.o. female admitted 2/6/2022 with shortness of breath    Hospital Course  Per notes, \"72 y.o. female PMH hypertension, hypothyroidism, chronic pain on Norco 10 at home, depression who presented 2/6/2022 with SOB.  Patient states she was diagnosed with COVID-19 positive results on 1/29/2022 with PCR.  Patient had symptoms prior to that.  She states in the last 3 to 4 days she has not been feeling well, reports of subjective fevers and increasing cough with sputum.  Patient has felt additional weakness and has fallen onto her knees due to weakness.  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  Patient states she has a history of DVT in the past currently not on anticoagulation.     Spoke to EDP about patient's case, patient presented with significant tachycardia and tachypnea.  Walking evaluation patient dropped down to 75%, was requiring 2 L nasal cannula to maintain over 90%.  Patient on my evaluation was at 3 L nasal cannula.  WBC 13.7, procalcitonin 0.4, lactic acid 3.4.  Patient being given 1 L NS in the ED and IV dexamethasone 6 mg.\"    Patient was admitted to monitored overnight for acute hypoxemic respiratory failure COVID-19 pneumonia.  A walk test was completed on 2/7 and patient was requiring 6 to 8 L and still desaturating.     Interval Problem Update  Patient in no acute distress today  Saturating well room air but requiring oxygen during ambulation  Procalcitonin trending down  On Augmentin and azithromycin for superimposed CAP  Continue Decadron regimen  Frequent pronation and incentive spirometer  Pending home monitoring and home oxygen set up  Medically clear    I have personally seen and examined the patient at bedside. I discussed the plan of care with patient.    Consultants/Specialty  None    Code Status  Full Code    Disposition  Patient is medically cleared for discharge.   Anticipate " discharge to to home with close outpatient follow-up.  I have placed the appropriate orders for post-discharge needs.    Review of Systems  Review of Systems   Constitutional: Positive for malaise/fatigue.   Respiratory: Positive for cough and shortness of breath.    All other systems reviewed and are negative.       Physical Exam  Temp:  [36.3 °C (97.3 °F)-36.6 °C (97.9 °F)] 36.5 °C (97.7 °F)  Pulse:  [72-97] 82  Resp:  [18-22] 18  BP: (110-137)/(44-83) 137/65  SpO2:  [95 %-98 %] 96 %    Physical Exam  Vitals and nursing note reviewed.   Constitutional:       Appearance: Normal appearance. She is obese.   Cardiovascular:      Rate and Rhythm: Normal rate and regular rhythm.      Pulses: Normal pulses.      Heart sounds: Normal heart sounds.   Pulmonary:      Effort: Pulmonary effort is normal. No respiratory distress.      Breath sounds: Normal breath sounds.   Abdominal:      General: Abdomen is flat. Bowel sounds are normal.      Palpations: Abdomen is soft.   Musculoskeletal:      Right lower leg: No edema.      Left lower leg: No edema.   Neurological:      General: No focal deficit present.      Mental Status: She is alert and oriented to person, place, and time. Mental status is at baseline.         Fluids    Intake/Output Summary (Last 24 hours) at 2/8/2022 1429  Last data filed at 2/8/2022 0429  Gross per 24 hour   Intake 300 ml   Output 200 ml   Net 100 ml       Laboratory  Recent Labs     02/06/22  1144 02/07/22  1240 02/08/22  0441   WBC 13.7* 16.4* 12.1*   RBC 4.25 4.14* 3.87*   HEMOGLOBIN 12.0 11.7* 11.0*   HEMATOCRIT 38.1 38.0 35.0*   MCV 89.6 91.8 90.4   MCH 28.2 28.3 28.4   MCHC 31.5* 30.8* 31.4*   RDW 46.6 46.6 45.7   PLATELETCT 270 269 296   MPV 8.6* 9.2 9.6     Recent Labs     02/06/22  1144 02/07/22  1240 02/08/22  0441   SODIUM 140 142 141   POTASSIUM 3.6 4.0 4.0   CHLORIDE 103 107 107   CO2 22 25 24   GLUCOSE 134* 123* 137*   BUN 10 10 13   CREATININE 0.77 0.64 0.69   CALCIUM 8.9 8.6 8.4      Recent Labs     02/06/22  1144   INR 1.05               Imaging  CT-CTA CHEST PULMONARY ARTERY W/ RECONS   Final Result      1.  Exam limited by poor opacification of the pulmonary arteries as well as motion artifact. Only large central and lobar branch emboli cannot be excluded. Segmental branch and likely not be excluded.      2.  Bilateral lower lobe consolidation could be due to atelectasis or pneumonia.            DX-CHEST-PORTABLE (1 VIEW)   Final Result      No acute cardiac or pulmonary abnormalities are identified.           Assessment/Plan  * Acute hypoxemic respiratory failure due to COVID-19 (HCC)- (present on admission)  Assessment & Plan  Patient in no acute distress today  Saturating well room air but requiring oxygen during ambulation  Continue Decadron regimen  Frequent pronation and incentive spirometer  Pending home monitoring and home oxygen set up  Medically clear    Community acquired pneumonia- (present on admission)  Assessment & Plan  Procalcitonin trending down  Switch to Augmentin for superimposed CAP    Chronic pain syndrome on chronic opioids- (present on admission)  Assessment & Plan  Patient states she takes Norco 10 mg 3 times daily to 4 times daily.  Patient takes chronic opiates due to bilateral knee pain osteoarthritis.  Continue home opiate to prevent withdrawal    Hypothyroidism- (present on admission)  Assessment & Plan  Continue home levothyroxine 88 mcg    Sepsis (HCC)- (present on admission)  Assessment & Plan  Resolved    Depression- (present on admission)  Assessment & Plan  Continue bedtime amitriptyline 10 mg    Class 1 obesity due to excess calories with serious comorbidity and body mass index (BMI) of 32.0 to 32.9 in adult- (present on admission)  Assessment & Plan  Obesity complicating patient's respiratory status and COVID-19 pneumonia  - Discussed diet and lifestyle modifications with patient  - Patient will need to follow up with PCP for outpatient management        HTN (hypertension)- (present on admission)  Assessment & Plan  Patient is only on metoprolol 12.5 mg p.o. twice daily.  Continue given patient's tachycardia       VTE prophylaxis: SCDs/TEDs    I have performed a physical exam and reviewed and updated ROS and Plan today (2/8/2022). In review of yesterday's note (2/7/2022), there are no changes except as documented above.

## 2022-02-08 NOTE — THERAPY
"Occupational Therapy   Initial Evaluation     Patient Name: Yamile Go  Age:  72 y.o., Sex:  female  Medical Record #: [id number]  Today's Date: 2/8/2022     Precautions  Precautions: (P) Fall Risk  Comments: (P) COVID +    Assessment  Patient is 72 y.o. female with a diagnosis of weakness, falls, resp failure from COVID.  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  She has had several falls recently, some requiring stitches for large skin tears on B knees.  Overall pt tolerating simple ADl's and mobilizing bedside with O2 at 3L.  Did not de-sat with activity with Oxygen on.  Balance is impaired, and she was instructed to use a FWW full time at home which she has.  She will need to minimize her IADL's and simplify her ADl's until she had more endurance and edema in BUE and BLE has improved. She needs a tub transfer bench for showering. She was educated at length on energy conservation and fall prevention strategies.  She appears safe for home but would benefit from HH therapy to continue working on balance and improved tolerance/safety with ADL's. No further OT needs in this setting.      Plan    Recommend Occupational Therapy for Evaluation only    DC Equipment Recommendations: (P) Tub Transfer Bench,Other (Comments) (Home O2 and monitoring system)  Discharge Recommendations: (P) Recommend home health for continued occupational therapy services     Subjective    \"I can't believe I got this virus.  I was so careful to do everything we were instructed to do.\"     Objective       02/08/22 1126   Prior Living Situation   Prior Services Home-Independent   Housing / Facility 1 Story Apartment / Condo   Steps Into Home 0   Steps In Home 0   Bathroom Set up Bathtub / Shower Combination;Shower Curtain   Equipment Owned Front-Wheel Walker;Single Point Cane  (had tub bench but it broke)   Lives with - Patient's " Self Care Capacity Alone and Able to Care For Self   Comments Pt states she has daughters that live locally that can come by and get her settled at home.  She said her daughter said she could shower at her house if she wanted to   Prior Level of ADL Function   Self Feeding Independent   Grooming / Hygiene Independent   Bathing Independent   Dressing Independent   Toileting Independent   Prior Level of IADL Function   Medication Management Independent   Laundry Independent   Kitchen Mobility Independent   Finances Independent   Home Management Independent   Shopping Independent   Prior Level Of Mobility Independent Without Device in Community   Driving / Transportation Driving Independent   Occupation (Pre-Hospital Vocational) Employed Full Time  (works as a nurse for [employer])   Leisure Interests Unable To Determine At This Time   Comments Pt has been limited in IADLS recently due to illness and falls   History of Falls   History of Falls Yes   Date of Last Fall   (several falls recently, knees are all scraped up)   Precautions   Precautions Fall Risk   Comments COVID +   Vitals   Pulse Oximetry 97 %   O2 (LPM) 3   O2 Delivery Device Nasal Cannula   Vitals Comments maintained O2 sats in seated and standing activities   Pain 0 - 10 Group   Location Generalized   Description Aching   Therapist Pain Assessment During Activity;Nurse Notified  (pt has pain in back, knees, ankles)   Cognition    Cognition / Consciousness WDL   Comments Alert and oriented, slightly emotionally labile.  Demo's some decreased safety awareness.  Knows she has been falling but admits to not using a walker or taking other precautions to help prevent falls   Active ROM Upper Body   Active ROM Upper Body  WDL   Dominant Hand Right   Strength Upper Body   Upper Body Strength  WDL   Balance Assessment   Sitting Balance (Static) Good   Sitting Balance (Dynamic) Good   Standing Balance (Static) Fair   Standing Balance (Dynamic) Fair -   Weight Shift  "Sitting Fair   Weight Shift Standing Fair   Comments Pt steady on feet static, with B handheld dynamic needs FWW    Bed Mobility    Supine to Sit Supervised   Sit to Supine Supervised   Scooting Modified Independent   ADL Assessment   Eating Modified Independent   Grooming Supervision;Seated   Upper Body Dressing Supervision   Lower Body Dressing Minimal Assist  (not able to do socks 2/2 edema in legs)   Toileting   (NT- could use commode SBA- limited O2 length)   Functional Mobility   Sit to Stand Standby Assist   Bed, Chair, Wheelchair Transfer Standby Assist   Mobility standing and marching, sidesteps bedside.  Limited O2 length- unable to mobilize to bathroom this visit   Distance (Feet) 6   # of Times Distance was Traveled 1   Visual Perception   Visual Perception  WDL   Edema / Skin Assessment   Comments BLE and BUE edema noted   Activity Tolerance   Sitting Edge of Bed 15 min   Standing 9-10 min    Patient / Family Goals   Patient / Family Goal #1 home with family intermittent assist   Education Group   Education Provided Home Safety;Role of Occupational Therapist;Activities of Daily Living;Adaptive Equipment;Energy Conservation   Additional Comments Educated  pt on pacing, seated ADL's instead of standing for energy conservation and decreasing fall risk.  Educated on where to obtain a tub bench (care Chest) if she cannot afford a new one.  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  Pt provided with psychosocial support as she is quite emotional about having COVID, frequent falls and feeling like \"I did all the things I was supposed to and I'm afraid of dying from this\"  Pt was given encouragement that she is improving medically and provided with suggestions for relaxation and leisure activities for when she is recouperating at home.  Educated her to research her A[employer] watch to see if it has fall detection and try to set that up                 "

## 2022-02-08 NOTE — PROGRESS NOTES
Report received from ED RN. Pt awake and alert. Pt transferred in hospital bed. Purwick in place. Pt states no further needs at this time.

## 2022-02-08 NOTE — DISCHARGE PLANNING
Anticipated Discharge Disposition: Home with oxygen      Action: Discussed pt during IDT rounds. Pt will need to be set up with home oxygen per charge RN. HERMANN RN called pt to discuss home oxygen choices. Pt stating has worked with [facility] in the past and would like to choose them as her choice. Awaiting walking o2 and oxygen order prior to sending to [facility]. Pt to DC with Home monitoring, per pt staff in room now to setup.     1300: Oxygen order placed by MD, choice sent to [facility]    1343: Called [facility] at [phone], stating referral was received and accepted. Told oxygen would be delivered within 2 hours.    Barriers to Discharge: Oxygen home monitoring, oxygen setup/order     Plan: HERMANN RN to follow up with sending home oxygen order.

## 2022-02-08 NOTE — FACE TO FACE
"Face to Face Note  -  Durable Medical Equipment    Og Palacio M.D. - NPI: [id number]  I certify that this patient is under my care and that they had a durable medical equipment(DME)face to face encounter by myself that meets the physician DME face-to-face encounter requirements with this patient on:    Date of encounter:   Patient:                    MRN:                       YOB: 2021  Yamile Go  [id number]  1950     The encounter with the patient was in whole, or in part, for the following medical condition, which is the primary reason for durable medical equipment:  Covid-19 Infection    I certify that, based on my findings, the following durable medical equipment is medically necessary:  Oxygen.    HOME O2 Saturation Measurements:(Values must be present for Home Oxygen orders)  Room air sat at rest: 94  Room air sat with amb: 87  With liters of O2: 2, O2 sat at rest with O2: 97  With Liters of O2: 2, O2 sat with amb with O2 : 92  Is the patient mobile?: Yes    My Clinical findings support the need for the above equipment due to:  Hypoxia    Supporting Symptoms: The patient requires supplemental oxygen, as the following interventions have been tried with limited or no improvement: \"Oral and/or IV steroids    If patient feels more short of breath, they can go up to 6 liters per minute and contact healthcare provider.  "

## 2022-02-08 NOTE — PROGRESS NOTES
Telemetry Shift Summary    Rhythm SR  HR Range 80s  Ectopy -  Measurements 0.20/0.10/0.36        Normal Values  Rhythm SR  HR Range    Measurements 0.12-0.20 / 0.06-0.10  / 0.30-0.52

## 2022-02-08 NOTE — THERAPY
Physical Therapy   Initial Evaluation     Patient Name: Yamile Go  Age:  72 y.o., Sex:  female  Medical Record #: [id number]  Today's Date: 2/8/2022     Precautions  Precautions: Fall Risk  Comments: COVID +    Assessment  Patient is 72 y.o. female admit with SOB with a diagnosis of acute hypoxemic resp failure due to COVID-19 . Hx of falls at home due to weakness. Dtr live near by. Discussed energy conservation , there ex and fall prevention with pt with good understanding. Pt 02 sats WNL % with activity. Pt fearful of falling. Recommend use of FWW at home  And Home with HHPT to continue with conditioning there ex. Pt to activate a fall alert system with her apple watch.  Pt safe to amb with nsg as able until D/C will be available for D/C planning if needed.     Plan    Recommend Physical Therapy for Evaluation only for the following treatments:  Gait Training, Neuro Re-Education / Balance, Self Care/Home Evaluation, Therapeutic Activities and Therapeutic Exercises    DC Equipment Recommendations: (P) None  Discharge Recommendations: (P) Recommend home health for continued physical therapy services              02/08/22 1002   Total Time Spent   Total Time Spent (Mins) 35   Charge Group   PT Evaluation PT Evaluation Mod  (20)   PT Self Care / Home Evaluation  1  (15 mins)   Initial Contact Note    Initial Contact Note Order Received and Verified, Evaluation Only - Patient Does Not Require Further Acute Physical Therapy at this Time.  However, May Benefit from Post Acute Therapy for Higher Level Functional Deficits.   Precautions   Precautions Fall Risk   Vitals   Pulse Oximetry 98 %   O2 (LPM) 3.5   O2 Delivery Device Nasal Cannula   Pain 0 - 10 Group   Location Back   Therapist Pain Assessment 3;Nurse Notified;Post Activity Pain Same as Prior to Activity   Prior Living Situation   Prior Services Home With Outpatient Therapy   Housing / Facility [facility]   Steps Into Home 0   Steps In Home 0  "  Equipment Owned Front-Wheel Walker;Single Point Cane   Lives with - Patient's Self Care Capacity Alone and Able to Care For Self   Comments Dtrs lives close by and are supportive. pt was seeing PT for back and shoulder pain.    Prior Level of Functional Mobility   Bed Mobility Independent   Transfer Status Independent   Ambulation Independent   Distance Ambulation (Feet)   (household )   History of Falls   History of Falls Yes   Date of Last Fall 02/04/22  (pt has had several falls at home due to weakness)   Cognition    Cognition / Consciousness WDL   Passive ROM Lower Body   Passive ROM Lower Body WDL   Comments L > R ankle stiffness. Hx L ankle fusion    Active ROM Lower Body    Active ROM Lower Body  WDL   Strength Lower Body   Lower Body Strength  X   Comments fatiques easily due to LE weakness, grossly 4-/5 throughout BLE.    Sensation Lower Body   Lower Extremity Sensation   X   Comments pt states \" I don't always know where my feet are\" causes falls.    Lower Body Muscle Tone   Lower Body Muscle Tone  WDL   Coordination Lower Body    Coordination Lower Body  WDL   Balance Assessment   Sitting Balance (Static) Good   Sitting Balance (Dynamic) Fair +   Standing Balance (Static) Fair   Standing Balance (Dynamic) Fair -   Weight Shift Sitting Good   Weight Shift Standing Fair   Comments HHA, pt unsteady with lateral weight shift.   Gait Analysis   Gait Level Of Assist Contact Guard Assist   Assistive Device Hand Held Assist   Distance (Feet) 50  (in room, marching steps)   # of Times Distance was Traveled 1   Deviation Decreased Heel Strike;Decreased Toe Off   # of Stairs Climbed 0   Weight Bearing Status no restictions   Vision Deficits Impacting Mobility none   Comments pt fearful of falling, decreased ankle stratigies. recommend using FWW at home and activiating a life alert system at home as she lives alone.    Bed Mobility    Supine to Sit Independent   Sit to Supine Independent   Scooting Independent "   Rolling Independent   Functional Mobility   Sit to Stand Supervised   Bed, Chair, Wheelchair Transfer Supervised   How much difficulty does the patient currently have...   Turning over in bed (including adjusting bedclothes, sheets and blankets)? 4   Sitting down on and standing up from a chair with arms (e.g., wheelchair, bedside commode, etc.) 4   Moving from lying on back to sitting on the side of the bed? 4   How much help from another person does the patient currently need...   Moving to and from a bed to a chair (including a wheelchair)? 4   Need to walk in a hospital room? 3   Climbing 3-5 steps with a railing? 3   6 clicks Mobility Score 22   Activity Tolerance   Sitting Edge of Bed 10 mins   Standing 10 mins    Edema / Skin Assessment   Comments old abrasions on B knees, risk for arm skin tears.    Education Group   Education Provided Role of Physical Therapist;Exercises - Seated   Exercises - Seated Patient Response Patient;Acceptance;Explanation;Verbal Demonstration   Additional Comments seated ankle pumps, hip flexion, knee ext. energy conservation at home with good understanding.     Problem List    Problems Decreased Activity Tolerance;Impaired Balance;Impaired Ambulation   Anticipated Discharge Equipment and Recommendations   DC Equipment Recommendations None   Discharge Recommendations Recommend home health for continued physical therapy services   Interdisciplinary Plan of Care Collaboration   IDT Collaboration with  Nursing   Patient Position at End of Therapy In Bed;Phone within Reach;Tray Table within Reach;Call Light within Reach   Collaboration Comments re: raúl    Session Information   Date / Session Number  2/8/22 one time only raúl and tx

## 2022-02-08 NOTE — PROGRESS NOTES
4 Eyes Skin Assessment Completed by DEEJAY Orozco and DEEJAY Mcclellan.    Head WDL  Ears WDL  Nose WDL  Mouth WDL  Neck WDL  Breast/Chest WDL  Shoulder Blades WDL  Spine WDL  (R) Arm/Elbow/Hand Abrasion  (L) Arm/Elbow/Hand Abrasion  Abdomen WDL  Groin WDL  Scrotum/Coccyx/Buttocks WDL  (R) Leg Scab  (L) Leg Scab  (R) Heel/Foot/Toe Bruising  (L) Heel/Foot/Toe WDL          Devices In Places Blood Pressure Cuff      Interventions In Place N/A    Possible Skin Injury No    Pictures Uploaded Into Epic N/A  Wound Consult Placed N/A  RN Wound Prevention Protocol Ordered No

## 2022-02-09 SDOH — ECONOMIC STABILITY: INCOME INSECURITY: HOW HARD IS IT FOR YOU TO PAY FOR THE VERY BASICS LIKE FOOD, HOUSING, MEDICAL CARE, AND HEATING?: NOT HARD AT ALL

## 2022-02-09 SDOH — ECONOMIC STABILITY: FOOD INSECURITY: WITHIN THE PAST 12 MONTHS, YOU WORRIED THAT YOUR FOOD WOULD RUN OUT BEFORE YOU GOT MONEY TO BUY MORE.: NEVER TRUE

## 2022-02-09 SDOH — ECONOMIC STABILITY: TRANSPORTATION INSECURITY
IN THE PAST 12 MONTHS, HAS THE LACK OF TRANSPORTATION KEPT YOU FROM MEDICAL APPOINTMENTS OR FROM GETTING MEDICATIONS?: NO

## 2022-02-09 SDOH — ECONOMIC STABILITY: FOOD INSECURITY: WITHIN THE PAST 12 MONTHS, THE FOOD YOU BOUGHT JUST DIDN'T LAST AND YOU DIDN'T HAVE MONEY TO GET MORE.: NEVER TRUE

## 2022-02-09 SDOH — ECONOMIC STABILITY: TRANSPORTATION INSECURITY
IN THE PAST 12 MONTHS, HAS LACK OF TRANSPORTATION KEPT YOU FROM MEETINGS, WORK, OR FROM GETTING THINGS NEEDED FOR DAILY LIVING?: NO

## 2022-02-09 NOTE — PROGRESS NOTES
2/9/2022-CHDANY Hilliard contacted pt via TC post d/c to introduce CCM services. CHW could not meet with pt bedside due to Covid restrictions. Completed SDOH screening and outpatient assessment. Pt has follow up visit with PCP on 2/14. Pt mentions good support from family and friends. She uses shower chair and oxygen at home. Pt is confident in ability to manage care post d/c. No issues keeping appointments or financial barriers to care. Completed AVS review/ medication/ questions. Pt will have daughter  medications ASAP. Pt denies need for resources such as food, transportation or housing. [facility] contact info left with pt. Pt has requested follow up call from [facility] after her visit with primary care provider.     Community Health Worker Intake  • Social determinates of health intake completed.   • Identified barriers to none.   • Contact information provided to Yamile Go. Yes  • Has PCP appointment scheduled for 2/14/22  • Scheduled Food Delivery/Home Visit/Outpatient Visit: No  • Accepted/Declined Meds-To-Beds. No  • Inpatient/Outpatient assessment completed. Outpatient due to Covid  • Did the patient receive medications post discharge: Yes    Plan: Additional follow up call per pt request after visit with her PCP.     2/17/22- Additional attempts made for follow up call to pt as requested. No answer and multiple messages left with pt. Will d/c from caseload as all needs met.

## 2022-02-09 NOTE — PROGRESS NOTES
Pt taken downstairs for discharge in wheelchair by staff at 1822 with belongings including O2 tank and concentrator.

## 2022-02-09 NOTE — DISCHARGE INSTRUCTIONS
Discharge Instructions    Discharged to home by car with relative. Discharged via wheelchair, hospital escort: Yes.  Special equipment needed: Oxygen    Home Oxygen Use, Adult  When a medical condition keeps you from getting enough oxygen, your health care provider may instruct you to take extra oxygen at home. Your health care provider will let you know:  · When to take oxygen.  · For how long to take oxygen.  · How quickly oxygen should be delivered (flow rate), in liters per minute (LPM or L/M).  Home oxygen can be given through:  · A mask.  · A nasal cannula. This is a device or tube that goes in the nostrils.  · A transtracheal catheter. This is a small, flexible tube placed in the trachea.  · A tracheostomy. This is a surgically made opening in the trachea.  These devices are connected with tubing to an oxygen source, such as:  · A tank. Tanks hold oxygen in gas form. They must be replaced when the oxygen is used up.  · A liquid oxygen device. This holds oxygen in liquid form. It must be replaced when the oxygen is used up.  · An oxygen concentrator machine. This filters oxygen in the room. It uses electricity, so you must have a backup cylinder of oxygen in case the power goes out.  Supplies needed:  To use oxygen, you will need:  · A mask, nasal cannula, transtracheal catheter, or tracheostomy.  · An oxygen tank, a liquid oxygen device, or an oxygen concentrator.  · The tape that your health care provider recommends (optional).  If you use a transtracheal catheter and your prescribed flow rate is 1 LPM or greater, you will also need a humidifier.  Risks and complications  · Fire. This can happen if the oxygen is exposed to a heat source, flame, or spark.  · Injury to skin. This can happen if liquid oxygen touches your skin.  · Organ damage. This can happen if you get too little oxygen.  How to use oxygen  Your health care provider or a representative from your medical device company will show you how to use  your oxygen device. Follow her or his instructions. The instructions may look something like this:  1. Wash your hands.  2. If you use an oxygen concentrator, make sure it is plugged in.  3. Place one end of the tube into the port on the tank, device, or machine.  4. Place the mask over your nose and mouth. Or, place the nasal cannula and secure it with tape if instructed. If you use a tracheostomy or transtracheal catheter, connect it to the oxygen source as directed.  5. Make sure the liter-flow setting on the machine is at the level prescribed by your health care provider.  6. Turn on the machine or adjust the knob on the tank or device to the correct liter-flow setting.  7. When you are done, turn off and unplug the machine, or turn the knob to OFF.  How to clean and care for the oxygen supplies  Nasal cannula  · Clean it with a warm, wet cloth daily or as needed.  · Wash it with a liquid soap once a week.  · Rinse it thoroughly once or twice a week.  · Replace it every 2-4 weeks.  · If you have an infection, such as a cold or pneumonia, change the cannula when you get better.  Mask  · Replace it every 2-4 weeks.  · If you have an infection, such as a cold or pneumonia, change the mask when you get better.  Humidifier bottle  · Wash the bottle between each refill:  ? Wash it with soap and warm water.  ? Rinse it thoroughly.  ? Disinfect it and its top.  ? Air-dry it.  · Make sure it is dry before you refill it.  Oxygen concentrator  · Clean the air filter at least twice a week according to directions from your [employer] medical equipment and service company.  · Wipe down the cabinet every day. To do this:  ? Unplug the unit.  ? Wipe down the cabinet with a damp cloth.  ? Dry the cabinet.  Other equipment  · Change any extra tubing every 1-3 months.  · Follow instructions from your health care provider about taking care of any other equipment.  Safety tips  Fire safety tips    · Keep your oxygen and oxygen supplies at  "least 5 ft away from sources of heat, flames, and haq at all times.  · Do not allow smoking near your oxygen. Put up \"no smoking\" signs in your home. Avoid smoking areas when in public.  · Do not use materials that can burn (are flammable) while you use oxygen.  · When you go to a restaurant with portable oxygen, ask to be seated in the nonsmoking section.  · Keep a fire extinguisher close by. Let your fire department know that you have oxygen in your home.  · Test your home smoke detectors regularly.  Traveling  · Secure your oxygen tank in the vehicle so that it does not move around. Follow instructions from your medical device company about how to safely secure your tank.  · Make sure you have enough oxygen for the amount of time you will be away from home.  · If you are planning air travel, contact the airline to find out if they allow the use of an approved portable oxygen concentrator. You may also need documents from your health care provider and medical device company before you travel.  General safety tips  · If you use an oxygen cylinder, make sure it is in a stand or secured to an object that will not move (fixed object).  · If you use liquid oxygen, make sure its container is kept upright.  · If you use an oxygen concentrator:  ? Tell your electric company. Make sure you are given priority service in the event that your power goes out.  ? Avoid using extension cords, if possible.  Follow these instructions at home:  · Use oxygen only as told by your health care provider.  · Do not use alcohol or other drugs that make you relax (sedating drugs) unless instructed. They can slow down your breathing rate and make it hard to get in enough oxygen.  · Know how and when to order a refill of oxygen.  · Always keep a spare tank of oxygen. Plan ahead for holidays when you may not be able to get a prescription filled.  · Use water-based lubricants on your lips or nostrils. Do not use oil-based products like " petroleum jelly.  · To prevent skin irritation on your cheeks or behind your ears, tuck some gauze under the tubing.  Contact a health care provider if:  · You get headaches often.  · You have shortness of breath.  · You have a lasting cough.  · You have anxiety.  · You are sleepy all the time.  · You develop an illness that affects your breathing.  · You cannot exercise at your regular level.  · You are restless.  · You have difficult or irregular breathing, and it is getting worse.  · You have a fever.  · You have persistent redness under your nose.  Get help right away if:  · You are confused.  · You have blue lips or fingernails.  · You are struggling to breathe.  Summary  · Your health care provider or a representative from your medical device company will show you how to use your oxygen device. Follow her or his instructions.  · If you use an oxygen concentrator, make sure it is plugged in.  · Make sure the liter-flow setting on the machine is at the level prescribed by your health care provider.  · Keep your oxygen and oxygen supplies at least 5 ft away from sources of heat, flames, and haq at all times.  This information is not intended to replace advice given to you by your health care provider. Make sure you discuss any questions you have with your health care provider.  Document Released: 03/09/2005 Document Revised: 06/06/2019 Document Reviewed: 07/11/2017  [employer] Patient Education © 2020 [employer]      Be sure to schedule a follow-up appointment with your primary care doctor or any specialists as instructed.     Discharge Plan:   Influenza Vaccine Indication: Not indicated: Previously immunized this influenza season and > 8 years of age    I understand that a diet low in cholesterol, fat, and sodium is recommended for good health. Unless I have been given specific instructions below for another diet, I accept this instruction as my diet prescription.   Other diet: na    Special Instructions:    INSTRUCTIONS FOR COVID-19 OR ANY OTHER INFECTIOUS RESPIRATORY ILLNESSES    The Centers for Disease Control and Prevention (CDC) states that early indications for COVID-19 include cough, shortness of breath, difficulty breathing, or at least two of the following symptoms: chills, shaking with chills, muscle pain, headache, sore throat, and loss of taste or smell. Symptoms can range from mild to severe and may appear up to two weeks after exposure to the virus.    The practice of self-isolation and quarantine helps protect the public and your family by  preventing exposure to people who have or may have a contagious disease. Please follow the prevention steps below as based on CDC guidelines:    WHEN TO STOP ISOLATION: Persons with COVID-19 or any other infectious respiratory illness who have symptoms and were advised to care for themselves at home may discontinue home isolation under the following conditions:  · At least 24 hours have passed since recovery defined as resolution of fever without the use of fever-reducing medications; AND,  · Improvement in respiratory symptoms (e.g., cough, shortness of breath); AND,  · At least 10 days have passed since symptoms first appeared and have had no subsequent illness.    MONITOR YOUR SYMPTOMS: If your illness is worsening, seek prompt medical attention. If you have a medical emergency and need to call 911, notify the dispatch personnel that you have, or are being evaluated for confirmed or suspected COVID-19 or another infectious respiratory illness. Wear a facemask if possible.    ACTIVITY RESTRICTION: restrict activities outside your home, except for getting medical care. Do not go to work, school, or public areas. Avoid using public transportation, ride-sharing, or taxis.    SCHEDULED MEDICAL APPOINTMENTS: Notify your provider that you have, or are being evaluated for, confirmed or suspected COVID-19 or another infectious respiratory. This will help the healthcare  provider’s office safely take care of you and keep other people from getting exposed or infected.    FACEMASKS, when to wear: Anytime you are away from your home or around other people or pets. If you are unable to wear one, maintain a minimum of 6 feet distancing from others.    LIVING ENVIRONMENT: Stay in a separate room from other people and pets. If possible, use a separate bathroom, have someone else care for your pets and avoid sharing household items. Any items used should be washed thoroughly with soap and water. Clean all “high-touch” surfaces every day. Use a household cleaning spray or wipe, according to the label instructions. High touch surfaces include (but are not limited to) counters, tabletops, doorknobs, bathroom fixtures, toilets, phones, keyboards, tablets, and bedside tables.     HAND WASHING: Frequently wash hands with soap and water for at least 20 seconds,  especially after blowing your nose, coughing, or sneezing; going to the bathroom; before and after interacting with pets; and before and after eating or preparing food. If hands are visibly dirty use soap and water. If soap and water are not available, use an alcohol-based hand  with at least 60% alcohol. Avoid touching your eyes, nose, and mouth with unwashed hands. Cover your coughs and sneezes with a tissue. Throw used tissues in a lined trash can. Immediately wash your hands.    ACTIVE/FACILITATED SELF-MONITORING: Follow instructions provided by your local health department or health professionals, as appropriate. When working with your local health department check their available hours.    [facility]   Phone Number   [facility] [phone]   [facility] [place] [phone]   [place] [address]   [place] [phone]     IF YOU HAVE CONFIRMED POSITIVE COVID-19:    Those who have completely recovered from COVID-19 may have immune-boosting antibodies in their plasma--called “convalescent plasma”--that could be  used to treat critically ill COVID19 patients.    [facility] is excited to begin working with [facility] on collecting convalescent plasma from  people who have recovered from COVID-19 as part of a program to treat patients infected with the virus. This FDA-approved “emergency investigational new drug” is a special blood product containing antibodies that may give patients an extra boost to fight the virus.    To be eligible to donate convalescent plasma, you must have a prior COVID-19 diagnosis documented by a laboratory test (or a positive test result for SARS-CoV-2 antibodies) and meet additional eligibility requirements.    If you are interested in donating convalescent plasma or have any additional questions, please contact the [facility] Convalescent Plasma  at [phone] or via e-mail at [email or web address].    · Is patient discharged on Warfarin / Coumadin?   No     Depression / Suicide Risk    As you are discharged from this [facility], it is important to learn how to keep safe from harming yourself.    Recognize the warning signs:  · Abrupt changes in personality, positive or negative- including increase in energy   · Giving away possessions  · Change in eating patterns- significant weight changes-  positive or negative  · Change in sleeping patterns- unable to sleep or sleeping all the time   · Unwillingness or inability to communicate  · Depression  · Unusual sadness, discouragement and loneliness  · Talk of wanting to die  · Neglect of personal appearance   · Rebelliousness- reckless behavior  · Withdrawal from people/activities they love  · Confusion- inability to concentrate     If you or a loved one observes any of these behaviors or has concerns about self-harm, here's what you can do:  · Talk about it- your feelings and reasons for harming yourself  · Remove any means that you might use to hurt yourself (examples: pills, rope, extension cords, firearm)  · Get  professional help from the community (Mental Health, Substance Abuse, psychological counseling)  · Do not be alone:Call your Safe Contact- someone whom you trust who will be there for you.  · Call your local CRISIS HOTLINE 219-4983 or 610-972-0479  · Call your local Children's Mobile Crisis Response Team [place] (746) 254-4278 or www.[employer]  · Call the toll free National Suicide Prevention Hotlines   · National Suicide Prevention Lifeline 691-230-VYJL (6202)  · National Hope Line Network 800-SUICIDE (383-9140)

## 2022-02-09 NOTE — RESPIRATORY CARE
REMOTE MONITORING PROGRAM by RESPIRATORY THERAPY  2/8/2022 at 4:30 PM by Zulma Archibald, RRT     Patient's name:  Yamile Go       MRN: [id number]       Was unable to successfully set up pt on remote monitor, kept getting messages as to no internet connection try again and before you login you much click on the link, we did all that and only got to verification code.  Turned pt phone on and off, deleted appt eddi.  Called Tech Support.....          .

## 2022-02-24 ENCOUNTER — APPOINTMENT (OUTPATIENT)
Dept: RADIOLOGY | Facility: MEDICAL CENTER | Age: 72
End: 2022-02-24
Attending: EMERGENCY MEDICINE
Payer: MEDICARE

## 2022-02-24 ENCOUNTER — HOSPITAL ENCOUNTER (EMERGENCY)
Facility: MEDICAL CENTER | Age: 72
End: 2022-02-24
Attending: EMERGENCY MEDICINE
Payer: MEDICARE

## 2022-02-24 VITALS
HEIGHT: 64 IN | OXYGEN SATURATION: 94 % | BODY MASS INDEX: 31.99 KG/M2 | DIASTOLIC BLOOD PRESSURE: 74 MMHG | RESPIRATION RATE: 16 BRPM | WEIGHT: 187.39 LBS | SYSTOLIC BLOOD PRESSURE: 113 MMHG | HEART RATE: 101 BPM | TEMPERATURE: 96.4 F

## 2022-02-24 DIAGNOSIS — R11.2 NON-INTRACTABLE VOMITING WITH NAUSEA, UNSPECIFIED VOMITING TYPE: ICD-10-CM

## 2022-02-24 DIAGNOSIS — R10.13 EPIGASTRIC PAIN: ICD-10-CM

## 2022-02-24 LAB
ALBUMIN SERPL BCP-MCNC: 3.7 G/DL (ref 3.2–4.9)
ALBUMIN/GLOB SERPL: 1.3 G/DL
ALP SERPL-CCNC: 76 U/L (ref 30–99)
ALT SERPL-CCNC: 23 U/L (ref 2–50)
ANION GAP SERPL CALC-SCNC: 12 MMOL/L (ref 7–16)
APPEARANCE UR: CLEAR
APTT PPP: 29.4 SEC (ref 24.7–36)
AST SERPL-CCNC: 15 U/L (ref 12–45)
BASOPHILS # BLD AUTO: 0.4 % (ref 0–1.8)
BASOPHILS # BLD: 0.04 K/UL (ref 0–0.12)
BILIRUB SERPL-MCNC: 0.3 MG/DL (ref 0.1–1.5)
BILIRUB UR QL STRIP.AUTO: NEGATIVE
BUN SERPL-MCNC: 12 MG/DL (ref 8–22)
CALCIUM SERPL-MCNC: 8.9 MG/DL (ref 8.4–10.2)
CHLORIDE SERPL-SCNC: 105 MMOL/L (ref 96–112)
CO2 SERPL-SCNC: 21 MMOL/L (ref 20–33)
COLOR UR: YELLOW
CREAT SERPL-MCNC: 0.63 MG/DL (ref 0.5–1.4)
EOSINOPHIL # BLD AUTO: 0.17 K/UL (ref 0–0.51)
EOSINOPHIL NFR BLD: 1.6 % (ref 0–6.9)
ERYTHROCYTE [DISTWIDTH] IN BLOOD BY AUTOMATED COUNT: 49.9 FL (ref 35.9–50)
GLOBULIN SER CALC-MCNC: 2.8 G/DL (ref 1.9–3.5)
GLUCOSE SERPL-MCNC: 124 MG/DL (ref 65–99)
GLUCOSE UR STRIP.AUTO-MCNC: NEGATIVE MG/DL
HCT VFR BLD AUTO: 38.5 % (ref 37–47)
HGB BLD-MCNC: 12 G/DL (ref 12–16)
IMM GRANULOCYTES # BLD AUTO: 0.06 K/UL (ref 0–0.11)
IMM GRANULOCYTES NFR BLD AUTO: 0.6 % (ref 0–0.9)
INR PPP: 1 (ref 0.87–1.13)
KETONES UR STRIP.AUTO-MCNC: NEGATIVE MG/DL
LEUKOCYTE ESTERASE UR QL STRIP.AUTO: NEGATIVE
LIPASE SERPL-CCNC: 9 U/L (ref 7–58)
LYMPHOCYTES # BLD AUTO: 1.13 K/UL (ref 1–4.8)
LYMPHOCYTES NFR BLD: 10.6 % (ref 22–41)
MCH RBC QN AUTO: 28.3 PG (ref 27–33)
MCHC RBC AUTO-ENTMCNC: 31.2 G/DL (ref 33.6–35)
MCV RBC AUTO: 90.8 FL (ref 81.4–97.8)
MICRO URNS: ABNORMAL
MONOCYTES # BLD AUTO: 0.72 K/UL (ref 0–0.85)
MONOCYTES NFR BLD AUTO: 6.8 % (ref 0–13.4)
NEUTROPHILS # BLD AUTO: 8.54 K/UL (ref 2–7.15)
NEUTROPHILS NFR BLD: 80 % (ref 44–72)
NITRITE UR QL STRIP.AUTO: NEGATIVE
NRBC # BLD AUTO: 0 K/UL
NRBC BLD-RTO: 0 /100 WBC
PH UR STRIP.AUTO: 8.5 [PH] (ref 5–8)
PLATELET # BLD AUTO: 198 K/UL (ref 164–446)
PMV BLD AUTO: 9.1 FL (ref 9–12.9)
POTASSIUM SERPL-SCNC: 3.8 MMOL/L (ref 3.6–5.5)
PROT SERPL-MCNC: 6.5 G/DL (ref 6–8.2)
PROT UR QL STRIP: NEGATIVE MG/DL
PROTHROMBIN TIME: 12.4 SEC (ref 12–14.6)
RBC # BLD AUTO: 4.24 M/UL (ref 4.2–5.4)
RBC UR QL AUTO: NEGATIVE
SODIUM SERPL-SCNC: 138 MMOL/L (ref 135–145)
SP GR UR STRIP.AUTO: 1.01
WBC # BLD AUTO: 10.7 K/UL (ref 4.8–10.8)

## 2022-02-24 PROCEDURE — 74177 CT ABD & PELVIS W/CONTRAST: CPT | Mod: MG

## 2022-02-24 PROCEDURE — 85730 THROMBOPLASTIN TIME PARTIAL: CPT

## 2022-02-24 PROCEDURE — 83690 ASSAY OF LIPASE: CPT

## 2022-02-24 PROCEDURE — 81003 URINALYSIS AUTO W/O SCOPE: CPT

## 2022-02-24 PROCEDURE — 85025 COMPLETE CBC W/AUTO DIFF WBC: CPT

## 2022-02-24 PROCEDURE — 36415 COLL VENOUS BLD VENIPUNCTURE: CPT

## 2022-02-24 PROCEDURE — 96375 TX/PRO/DX INJ NEW DRUG ADDON: CPT

## 2022-02-24 PROCEDURE — C9113 INJ PANTOPRAZOLE SODIUM, VIA: HCPCS | Performed by: EMERGENCY MEDICINE

## 2022-02-24 PROCEDURE — 85610 PROTHROMBIN TIME: CPT

## 2022-02-24 PROCEDURE — 80053 COMPREHEN METABOLIC PANEL: CPT

## 2022-02-24 PROCEDURE — 700105 HCHG RX REV CODE 258: Performed by: EMERGENCY MEDICINE

## 2022-02-24 PROCEDURE — 71045 X-RAY EXAM CHEST 1 VIEW: CPT

## 2022-02-24 PROCEDURE — 700111 HCHG RX REV CODE 636 W/ 250 OVERRIDE (IP): Performed by: EMERGENCY MEDICINE

## 2022-02-24 PROCEDURE — 96374 THER/PROPH/DIAG INJ IV PUSH: CPT | Mod: XU

## 2022-02-24 PROCEDURE — 700117 HCHG RX CONTRAST REV CODE 255: Performed by: EMERGENCY MEDICINE

## 2022-02-24 PROCEDURE — 87086 URINE CULTURE/COLONY COUNT: CPT

## 2022-02-24 PROCEDURE — 99285 EMERGENCY DEPT VISIT HI MDM: CPT

## 2022-02-24 PROCEDURE — 87040 BLOOD CULTURE FOR BACTERIA: CPT

## 2022-02-24 RX ORDER — ONDANSETRON 2 MG/ML
4 INJECTION INTRAMUSCULAR; INTRAVENOUS ONCE
Status: COMPLETED | OUTPATIENT
Start: 2022-02-24 | End: 2022-02-24

## 2022-02-24 RX ORDER — SUCRALFATE 1 G/1
1 TABLET ORAL
Qty: 56 TABLET | Refills: 0 | Status: SHIPPED | OUTPATIENT
Start: 2022-02-24 | End: 2022-03-10

## 2022-02-24 RX ORDER — ONDANSETRON 4 MG/1
4 TABLET, ORALLY DISINTEGRATING ORAL EVERY 8 HOURS PRN
Qty: 20 TABLET | Refills: 0 | Status: ON HOLD | OUTPATIENT
Start: 2022-02-24 | End: 2022-03-31

## 2022-02-24 RX ORDER — HYDROMORPHONE HYDROCHLORIDE 1 MG/ML
0.5 INJECTION, SOLUTION INTRAMUSCULAR; INTRAVENOUS; SUBCUTANEOUS
Status: DISCONTINUED | OUTPATIENT
Start: 2022-02-24 | End: 2022-02-24 | Stop reason: HOSPADM

## 2022-02-24 RX ORDER — SODIUM CHLORIDE 9 MG/ML
1000 INJECTION, SOLUTION INTRAVENOUS ONCE
Status: COMPLETED | OUTPATIENT
Start: 2022-02-24 | End: 2022-02-24

## 2022-02-24 RX ORDER — PANTOPRAZOLE SODIUM 40 MG/10ML
40 INJECTION, POWDER, LYOPHILIZED, FOR SOLUTION INTRAVENOUS ONCE
Status: COMPLETED | OUTPATIENT
Start: 2022-02-24 | End: 2022-02-24

## 2022-02-24 RX ORDER — OMEPRAZOLE 20 MG/1
20 CAPSULE, DELAYED RELEASE ORAL DAILY
Qty: 14 CAPSULE | Refills: 0 | Status: SHIPPED | OUTPATIENT
Start: 2022-02-24 | End: 2022-03-10

## 2022-02-24 RX ADMIN — HYDROMORPHONE HYDROCHLORIDE 0.5 MG: 1 INJECTION, SOLUTION INTRAMUSCULAR; INTRAVENOUS; SUBCUTANEOUS at 14:19

## 2022-02-24 RX ADMIN — IOHEXOL 80 ML: 350 INJECTION, SOLUTION INTRAVENOUS at 14:59

## 2022-02-24 RX ADMIN — FAMOTIDINE 20 MG: 10 INJECTION INTRAVENOUS at 14:23

## 2022-02-24 RX ADMIN — ONDANSETRON 4 MG: 2 INJECTION INTRAMUSCULAR; INTRAVENOUS at 14:20

## 2022-02-24 RX ADMIN — PANTOPRAZOLE SODIUM 40 MG: 40 INJECTION, POWDER, FOR SOLUTION INTRAVENOUS at 14:22

## 2022-02-24 RX ADMIN — SODIUM CHLORIDE 1000 ML: 9 INJECTION, SOLUTION INTRAVENOUS at 14:18

## 2022-02-24 ASSESSMENT — FIBROSIS 4 INDEX: FIB4 SCORE: 1.06

## 2022-02-24 NOTE — ED TRIAGE NOTES
"Chief Complaint   Patient presents with   • N/V     Since 9pm last night, \"I am vomiting stool.\"   • Abdominal Pain     Epigastric abd pain. Onset last night.     ED Triage Vitals [02/24/22 1219]   Enc Vitals Group      Blood Pressure  160/85      Pulse (!) 105      Respiration (!) 24      Temperature 36.2 °C (97.1 °F)      Temp src Temporal      Pulse Oximetry 97 %      Weight 85 kg (187 lb 6.3 oz)      Height 1.626 m (5' 4\")       "

## 2022-02-24 NOTE — ED PROVIDER NOTES
"ED Provider Note    Scribed for Ashish Amezquita M.D. by Juma Rosado. 2/24/2022  1:29 PM    Primary care provider: Cole Yuen M.D.  Means of arrival: Walk in  History obtained from: Patient  History limited by: None    CHIEF COMPLAINT  Chief Complaint   Patient presents with    N/V     Since 9pm last night, \"I am vomiting stool.\"    Abdominal Pain     Epigastric abd pain. Onset last night.       HPI  Yamile Go is a 72 y.o. female who presents to the Emergency Department for evaluation of vomiting onset last night. Patient states she has abdominal pain that radiates up to her chest and throat. She took a Tums yesterday once pain started and then began throwing up what appears to be dark coffee grounds. Patient had multiple episodes of vomiting and lost count. She admits to associated symptoms of abdominal pain and nausea, but denies black or bloody stool. Patient's last bowel movement was this morning and was normal. No alleviating factors were reported. She has no history of gastritis. She had indigestion two years ago where she got an endoscopy and inflammation was identified. She was seen by [facility]. Patient is not on blood thinners but she does bruise easily.       REVIEW OF SYSTEMS  Pertinent positives include abdominal pain, nausea, coffee-ground emesis. Pertinent negatives include no black or bloody stool.  All other systems reviewed and negative.    PAST MEDICAL HISTORY   has a past medical history of Asthma, Depression, HTN (hypertension), Hyperlipidemia, Migraines, Obesity, and Sleep apnea.    SURGICAL HISTORY   has a past surgical history that includes appendectomy; abdominal hysterectomy total; breast biopsy; lumbar disc replacement; and colectomy.    SOCIAL HISTORY  Social History     Tobacco Use    Smoking status: Never Smoker    Smokeless tobacco: Never Used   Vaping Use    Vaping Use: Never used   Substance Use Topics    Alcohol use: No    Drug use: No      Social " "History     Substance and Sexual Activity   Drug Use No       FAMILY HISTORY  Family History   Problem Relation Age of Onset    Cancer Mother         lung    Heart Disease Mother     Stroke Father     Heart Disease Father     Diabetes Father     Heart Disease Brother         cath and bypass    Heart Disease Brother         cath and byp[ass[    Heart Disease Brother         cath and bypass    Heart Disease Brother     Other Brother         MVA       CURRENT MEDICATIONS  Home Medications    **Home medications have not yet been reviewed for this encounter**         ALLERGIES  Allergies   Allergen Reactions    Fentanyl Vomiting    Morphine Vomiting    Rifampin      Pt turns yellow       PHYSICAL EXAM  VITAL SIGNS: /85   Pulse (!) 105   Temp 36.2 °C (97.1 °F) (Temporal)   Resp (!) 24   Ht 1.626 m (5' 4\")   Wt 85 kg (187 lb 6.3 oz)   SpO2 97%   BMI 32.17 kg/m²     Constitutional: Pale, mildly ill-appearing, Well developed, Well nourished, mild distress, Non-toxic appearance.   HENT: Normocephalic, Atraumatic, Bilateral external ears normal, Oropharynx moist, No oral exudates.   Eyes: PERRLA, EOMI, Conjunctiva normal, No discharge.   Neck: No tenderness, Supple, No stridor.   Lymphatic: No lymphadenopathy noted.   Cardiovascular: Normal heart rate, Normal rhythm.   Thorax & Lungs: Clear to auscultation bilaterally, No respiratory distress, No wheezing, No crackles.   Abdomen: Tenderness to palpation of epigastric area, Soft, No masses, No pulsatile masses.   Skin: Multiple old ecchymosis on arms and face, Warm, Dry, No erythema, No rash.   Extremities:, No edema No cyanosis.   Musculoskeletal: No tenderness to palpation or major deformities noted.  Intact distal pulses  Neurologic: Awake, alert. Moves all extremities spontaneously.  Psychiatric: Affect normal, Judgment normal, Mood normal.     LABS  Results for orders placed or performed during the hospital encounter of 02/24/22   CBC WITH DIFFERENTIAL "   Result Value Ref Range    WBC 10.7 4.8 - 10.8 K/uL    RBC 4.24 4.20 - 5.40 M/uL    Hemoglobin 12.0 12.0 - 16.0 g/dL    Hematocrit 38.5 37.0 - 47.0 %    MCV 90.8 81.4 - 97.8 fL    MCH 28.3 27.0 - 33.0 pg    MCHC 31.2 (L) 33.6 - 35.0 g/dL    RDW 49.9 35.9 - 50.0 fL    Platelet Count 198 164 - 446 K/uL    MPV 9.1 9.0 - 12.9 fL    Neutrophils-Polys 80.00 (H) 44.00 - 72.00 %    Lymphocytes 10.60 (L) 22.00 - 41.00 %    Monocytes 6.80 0.00 - 13.40 %    Eosinophils 1.60 0.00 - 6.90 %    Basophils 0.40 0.00 - 1.80 %    Immature Granulocytes 0.60 0.00 - 0.90 %    Nucleated RBC 0.00 /100 WBC    Neutrophils (Absolute) 8.54 (H) 2.00 - 7.15 K/uL    Lymphs (Absolute) 1.13 1.00 - 4.80 K/uL    Monos (Absolute) 0.72 0.00 - 0.85 K/uL    Eos (Absolute) 0.17 0.00 - 0.51 K/uL    Baso (Absolute) 0.04 0.00 - 0.12 K/uL    Immature Granulocytes (abs) 0.06 0.00 - 0.11 K/uL    NRBC (Absolute) 0.00 K/uL   COMP METABOLIC PANEL   Result Value Ref Range    Sodium 138 135 - 145 mmol/L    Potassium 3.8 3.6 - 5.5 mmol/L    Chloride 105 96 - 112 mmol/L    Co2 21 20 - 33 mmol/L    Anion Gap 12.0 7.0 - 16.0    Glucose 124 (H) 65 - 99 mg/dL    Bun 12 8 - 22 mg/dL    Creatinine 0.63 0.50 - 1.40 mg/dL    Calcium 8.9 8.4 - 10.2 mg/dL    AST(SGOT) 15 12 - 45 U/L    ALT(SGPT) 23 2 - 50 U/L    Alkaline Phosphatase 76 30 - 99 U/L    Total Bilirubin 0.3 0.1 - 1.5 mg/dL    Albumin 3.7 3.2 - 4.9 g/dL    Total Protein 6.5 6.0 - 8.2 g/dL    Globulin 2.8 1.9 - 3.5 g/dL    A-G Ratio 1.3 g/dL   LIPASE   Result Value Ref Range    Lipase 9 7 - 58 U/L   URINALYSIS    Specimen: Urine   Result Value Ref Range    Color Yellow     Character Clear     Specific Gravity 1.010 <1.035    Ph 8.5 (A) 5.0 - 8.0    Glucose Negative Negative mg/dL    Ketones Negative Negative mg/dL    Protein Negative Negative mg/dL    Bilirubin Negative Negative    Nitrite Negative Negative    Leukocyte Esterase Negative Negative    Occult Blood Negative Negative    Micro Urine Req see below    APTT    Result Value Ref Range    APTT 29.4 24.7 - 36.0 sec   PROTHROMBIN TIME (INR)   Result Value Ref Range    PT 12.4 12.0 - 14.6 sec    INR 1.00 0.87 - 1.13   ESTIMATED GFR   Result Value Ref Range    GFR If African American >60 >60 mL/min/1.73 m 2    GFR If Non African American >60 >60 mL/min/1.73 m 2        RADIOLOGY  CT-ABDOMEN-PELVIS WITH   Final Result      1.  Moderate amount of colonic stool.   2.  Trace nonspecific free fluid in the pelvis.   3.  The appendix is not identified, limiting evaluation. No pericecal inflammatory changes are identified, however.         DX-CHEST-PORTABLE (1 VIEW)   Final Result      No evidence of acute cardiopulmonary process.        The radiologist's interpretation of all radiological studies have been reviewed by me.      COURSE & MEDICAL DECISION MAKING  Pertinent Labs & Imaging studies reviewed. (See chart for details)    1:29 PM - Patient seen and examined at bedside. Patient will be treated with NS infusion 1,000 mL, Zofran 4 mg injection, Pepcid 20 mg injection, pantoprazole 40 mg injection, and dilaudid 0.5 mg injection. Ordered CT-abdomen-pelvis with, DX-Chest, APTT, prothrombin time, lactic acid, urine culture, blood culture, CBC w diff, CMP, lipase, UA to evaluate her symptoms. The differential diagnoses include but are not limited to: GI bleed, gastritis, intraabdominal infection      Decision Making:  Patient recently hospitalized secondary to Covid, is now coming in with epigastric abdominal pain, nausea vomiting that was dark in nature.  The patient is not having any diarrhea.  Original concern was for a GI bleed versus sepsis, patient does not have any leukocytosis or anemia.  CT scan of the abdomen pelvis was unremarkable, the patient is feeling improved, heart rate is decreased.  Repeat abdominal examination is benign.  I believe the patient can be managed as an outpatient, uncertain the etiology of the patient's vomiting but I do the believe the patient does have  a gastritis component to it, will put the patient on Carafate, omeprazole, get the patient a prescription for Zofran, have the patient return in the next 1 to 2 days for recheck or return sooner with worsening symptoms.    HYDRATION: Based on the patient's presentation of Acute Vomiting the patient was given IV fluids. IV Hydration was used because oral hydration was not adequate alone. Upon recheck following hydration, the patient was improved.     The patient will return for new or worsening symptoms and is stable at the time of discharge.    The patient is referred to a primary physician for blood pressure management, diabetic screening, and for all other preventative health concerns.        DISPOSITION:  Patient will be discharged home in stable condition.    FOLLOW UP:  [facility], Emergency Dept  [address]  [place] [id number] [phone]    In 12-24 hours for recheck if not improved.      OUTPATIENT MEDICATIONS:  New Prescriptions    OMEPRAZOLE (PRILOSEC) 20 MG DELAYED-RELEASE CAPSULE    Take 1 Capsule by mouth every day for 14 days.    ONDANSETRON (ZOFRAN ODT) 4 MG TABLET DISPERSIBLE    Take 1 Tablet by mouth every 8 hours as needed.    SUCRALFATE (CARAFATE) 1 GM TAB    Take 1 Tablet by mouth 4 Times a Day,Before Meals and at Bedtime for 14 days.         FINAL IMPRESSION  1. Non-intractable vomiting with nausea, unspecified vomiting type    2. Epigastric pain          Juma TIDWELL (Lula), am scribing for, and in the presence of, Ashish Amezquita M.D..    Electronically signed by: Juma Vega), 2/24/2022    Ashish TIDWELL M.D. personally performed the services described in this documentation, as scribed by Juma Rosado in my presence, and it is both accurate and complete.    The note accurately reflects work and decisions made by me.  Ashish Amezquita M.D.  2/24/2022  4:53 PM

## 2022-02-24 NOTE — ED NOTES
Saline lock with blood draw-erp to bedside, pt states dizziness when sitting up for x-ray. erp aware, placed on cardiac moniter for same

## 2022-02-24 NOTE — ED NOTES
Med per order for 5/10 abd pain. placed on n/c after same for sats 90-91% on rm air. Remains npo, continues w/o distress

## 2022-02-26 LAB
BACTERIA UR CULT: NORMAL
SIGNIFICANT IND 70042: NORMAL
SITE SITE: NORMAL
SOURCE SOURCE: NORMAL

## 2022-03-01 LAB
BACTERIA BLD CULT: NORMAL
SIGNIFICANT IND 70042: NORMAL
SITE SITE: NORMAL
SOURCE SOURCE: NORMAL

## 2022-03-21 ENCOUNTER — APPOINTMENT (OUTPATIENT)
Dept: RADIOLOGY | Facility: MEDICAL CENTER | Age: 72
DRG: 552 | End: 2022-03-21
Attending: EMERGENCY MEDICINE
Payer: MEDICARE

## 2022-03-21 ENCOUNTER — HOSPITAL ENCOUNTER (INPATIENT)
Facility: MEDICAL CENTER | Age: 72
LOS: 2 days | DRG: 552 | End: 2022-03-23
Attending: EMERGENCY MEDICINE | Admitting: INTERNAL MEDICINE
Payer: MEDICARE

## 2022-03-21 DIAGNOSIS — J96.01 ACUTE HYPOXEMIC RESPIRATORY FAILURE DUE TO COVID-19 (HCC): ICD-10-CM

## 2022-03-21 DIAGNOSIS — J96.01 SEPSIS WITH ACUTE HYPOXIC RESPIRATORY FAILURE WITHOUT SEPTIC SHOCK, DUE TO UNSPECIFIED ORGANISM (HCC): ICD-10-CM

## 2022-03-21 DIAGNOSIS — S32.009A CLOSED FRACTURE OF TRANSVERSE PROCESS OF LUMBAR VERTEBRA, INITIAL ENCOUNTER (HCC): ICD-10-CM

## 2022-03-21 DIAGNOSIS — E03.9 HYPOTHYROIDISM, UNSPECIFIED TYPE: Chronic | ICD-10-CM

## 2022-03-21 DIAGNOSIS — S32.009A CLOSED FRACTURE OF LUMBAR VERTEBRA, UNSPECIFIED FRACTURE MORPHOLOGY, INITIAL ENCOUNTER (HCC): ICD-10-CM

## 2022-03-21 DIAGNOSIS — R53.1 WEAKNESS: ICD-10-CM

## 2022-03-21 DIAGNOSIS — R53.81 PHYSICAL DEBILITY: ICD-10-CM

## 2022-03-21 DIAGNOSIS — D72.829 LEUKOCYTOSIS, UNSPECIFIED TYPE: ICD-10-CM

## 2022-03-21 DIAGNOSIS — A41.9 SEPSIS WITH ACUTE HYPOXIC RESPIRATORY FAILURE WITHOUT SEPTIC SHOCK, DUE TO UNSPECIFIED ORGANISM (HCC): ICD-10-CM

## 2022-03-21 DIAGNOSIS — M54.50 ACUTE MIDLINE LOW BACK PAIN WITHOUT SCIATICA: ICD-10-CM

## 2022-03-21 DIAGNOSIS — I25.10 CORONARY ARTERY DISEASE INVOLVING NATIVE HEART WITHOUT ANGINA PECTORIS, UNSPECIFIED VESSEL OR LESION TYPE: ICD-10-CM

## 2022-03-21 DIAGNOSIS — I10 ESSENTIAL HYPERTENSION, BENIGN: ICD-10-CM

## 2022-03-21 DIAGNOSIS — R07.89 OTHER CHEST PAIN: ICD-10-CM

## 2022-03-21 DIAGNOSIS — I10 PRIMARY HYPERTENSION: ICD-10-CM

## 2022-03-21 DIAGNOSIS — J18.9 COMMUNITY ACQUIRED PNEUMONIA, UNSPECIFIED LATERALITY: ICD-10-CM

## 2022-03-21 DIAGNOSIS — G89.4 CHRONIC PAIN SYNDROME: Chronic | ICD-10-CM

## 2022-03-21 DIAGNOSIS — E78.5 DYSLIPIDEMIA: ICD-10-CM

## 2022-03-21 DIAGNOSIS — F32.A DEPRESSION, UNSPECIFIED DEPRESSION TYPE: ICD-10-CM

## 2022-03-21 DIAGNOSIS — Z86.16 PERSONAL HISTORY OF COVID-19: ICD-10-CM

## 2022-03-21 DIAGNOSIS — R65.20 SEPSIS WITH ACUTE HYPOXIC RESPIRATORY FAILURE WITHOUT SEPTIC SHOCK, DUE TO UNSPECIFIED ORGANISM (HCC): ICD-10-CM

## 2022-03-21 DIAGNOSIS — E66.09 CLASS 1 OBESITY DUE TO EXCESS CALORIES WITH SERIOUS COMORBIDITY AND BODY MASS INDEX (BMI) OF 32.0 TO 32.9 IN ADULT: Chronic | ICD-10-CM

## 2022-03-21 DIAGNOSIS — U07.1 ACUTE HYPOXEMIC RESPIRATORY FAILURE DUE TO COVID-19 (HCC): ICD-10-CM

## 2022-03-21 PROBLEM — S32.029A CLOSED FRACTURE OF SECOND LUMBAR VERTEBRA (HCC): Status: ACTIVE | Noted: 2022-03-21

## 2022-03-21 LAB
ALBUMIN SERPL BCP-MCNC: 3.9 G/DL (ref 3.2–4.9)
ALBUMIN/GLOB SERPL: 1.6 G/DL
ALP SERPL-CCNC: 61 U/L (ref 30–99)
ALT SERPL-CCNC: 71 U/L (ref 2–50)
ANION GAP SERPL CALC-SCNC: 12 MMOL/L (ref 7–16)
APPEARANCE UR: CLEAR
AST SERPL-CCNC: 26 U/L (ref 12–45)
BACTERIA #/AREA URNS HPF: ABNORMAL /HPF
BASOPHILS # BLD AUTO: 0 % (ref 0–1.8)
BASOPHILS # BLD: 0 K/UL (ref 0–0.12)
BILIRUB SERPL-MCNC: 0.7 MG/DL (ref 0.1–1.5)
BILIRUB UR QL STRIP.AUTO: NEGATIVE
BUN SERPL-MCNC: 39 MG/DL (ref 8–22)
CALCIUM SERPL-MCNC: 8.8 MG/DL (ref 8.4–10.2)
CHLORIDE SERPL-SCNC: 100 MMOL/L (ref 96–112)
CK SERPL-CCNC: 91 U/L (ref 0–154)
CO2 SERPL-SCNC: 21 MMOL/L (ref 20–33)
COLOR UR: YELLOW
CREAT SERPL-MCNC: 0.81 MG/DL (ref 0.5–1.4)
EKG IMPRESSION: NORMAL
EOSINOPHIL # BLD AUTO: 0 K/UL (ref 0–0.51)
EOSINOPHIL NFR BLD: 0 % (ref 0–6.9)
EPI CELLS #/AREA URNS HPF: ABNORMAL /HPF
ERYTHROCYTE [DISTWIDTH] IN BLOOD BY AUTOMATED COUNT: 47 FL (ref 35.9–50)
GFR SERPLBLD CREATININE-BSD FMLA CKD-EPI: 77 ML/MIN/1.73 M 2
GLOBULIN SER CALC-MCNC: 2.5 G/DL (ref 1.9–3.5)
GLUCOSE SERPL-MCNC: 114 MG/DL (ref 65–99)
GLUCOSE UR STRIP.AUTO-MCNC: NEGATIVE MG/DL
HCT VFR BLD AUTO: 39.9 % (ref 37–47)
HGB BLD-MCNC: 13 G/DL (ref 12–16)
HYALINE CASTS #/AREA URNS LPF: ABNORMAL /LPF
KETONES UR STRIP.AUTO-MCNC: NEGATIVE MG/DL
LEUKOCYTE ESTERASE UR QL STRIP.AUTO: NEGATIVE
LYMPHOCYTES # BLD AUTO: 0.5 K/UL (ref 1–4.8)
LYMPHOCYTES NFR BLD: 2 % (ref 22–41)
MANUAL DIFF BLD: NORMAL
MCH RBC QN AUTO: 28.8 PG (ref 27–33)
MCHC RBC AUTO-ENTMCNC: 32.6 G/DL (ref 33.6–35)
MCV RBC AUTO: 88.3 FL (ref 81.4–97.8)
METAMYELOCYTES NFR BLD MANUAL: 1 %
MICRO URNS: ABNORMAL
MONOCYTES # BLD AUTO: 0.5 K/UL (ref 0–0.85)
MONOCYTES NFR BLD AUTO: 2 % (ref 0–13.4)
NEUTROPHILS # BLD AUTO: 23.75 K/UL (ref 2–7.15)
NEUTROPHILS NFR BLD: 95 % (ref 44–72)
NITRITE UR QL STRIP.AUTO: NEGATIVE
NRBC # BLD AUTO: 0 K/UL
NRBC BLD-RTO: 0 /100 WBC
PH UR STRIP.AUTO: 5.5 [PH] (ref 5–8)
PLATELET # BLD AUTO: 208 K/UL (ref 164–446)
PLATELET BLD QL SMEAR: NORMAL
PMV BLD AUTO: 9.8 FL (ref 9–12.9)
POTASSIUM SERPL-SCNC: 4.7 MMOL/L (ref 3.6–5.5)
PROCALCITONIN SERPL-MCNC: 0.11 NG/ML
PROT SERPL-MCNC: 6.4 G/DL (ref 6–8.2)
PROT UR QL STRIP: NEGATIVE MG/DL
RBC # BLD AUTO: 4.52 M/UL (ref 4.2–5.4)
RBC # URNS HPF: ABNORMAL /HPF
RBC BLD AUTO: NORMAL
RBC UR QL AUTO: ABNORMAL
SODIUM SERPL-SCNC: 133 MMOL/L (ref 135–145)
SP GR UR STRIP.AUTO: 1.02
TROPONIN T SERPL-MCNC: 16 NG/L (ref 6–19)
TROPONIN T SERPL-MCNC: 21 NG/L (ref 6–19)
TSH SERPL DL<=0.005 MIU/L-ACNC: 0.38 UIU/ML (ref 0.38–5.33)
WBC # BLD AUTO: 25 K/UL (ref 4.8–10.8)
WBC #/AREA URNS HPF: ABNORMAL /HPF

## 2022-03-21 PROCEDURE — 84484 ASSAY OF TROPONIN QUANT: CPT

## 2022-03-21 PROCEDURE — 99223 1ST HOSP IP/OBS HIGH 75: CPT | Mod: AI | Performed by: INTERNAL MEDICINE

## 2022-03-21 PROCEDURE — 85027 COMPLETE CBC AUTOMATED: CPT

## 2022-03-21 PROCEDURE — 81001 URINALYSIS AUTO W/SCOPE: CPT

## 2022-03-21 PROCEDURE — 99285 EMERGENCY DEPT VISIT HI MDM: CPT

## 2022-03-21 PROCEDURE — 700111 HCHG RX REV CODE 636 W/ 250 OVERRIDE (IP): Performed by: EMERGENCY MEDICINE

## 2022-03-21 PROCEDURE — 96375 TX/PRO/DX INJ NEW DRUG ADDON: CPT

## 2022-03-21 PROCEDURE — 96374 THER/PROPH/DIAG INJ IV PUSH: CPT

## 2022-03-21 PROCEDURE — 80053 COMPREHEN METABOLIC PANEL: CPT

## 2022-03-21 PROCEDURE — 71045 X-RAY EXAM CHEST 1 VIEW: CPT

## 2022-03-21 PROCEDURE — 36415 COLL VENOUS BLD VENIPUNCTURE: CPT

## 2022-03-21 PROCEDURE — 70450 CT HEAD/BRAIN W/O DYE: CPT | Mod: ME

## 2022-03-21 PROCEDURE — 72128 CT CHEST SPINE W/O DYE: CPT | Mod: ME

## 2022-03-21 PROCEDURE — 93005 ELECTROCARDIOGRAM TRACING: CPT | Performed by: EMERGENCY MEDICINE

## 2022-03-21 PROCEDURE — 84443 ASSAY THYROID STIM HORMONE: CPT

## 2022-03-21 PROCEDURE — 770020 HCHG ROOM/CARE - TELE (206)

## 2022-03-21 PROCEDURE — 85007 BL SMEAR W/DIFF WBC COUNT: CPT

## 2022-03-21 PROCEDURE — A9270 NON-COVERED ITEM OR SERVICE: HCPCS | Performed by: INTERNAL MEDICINE

## 2022-03-21 PROCEDURE — 700102 HCHG RX REV CODE 250 W/ 637 OVERRIDE(OP): Performed by: INTERNAL MEDICINE

## 2022-03-21 PROCEDURE — 72131 CT LUMBAR SPINE W/O DYE: CPT | Mod: ME

## 2022-03-21 PROCEDURE — 96376 TX/PRO/DX INJ SAME DRUG ADON: CPT

## 2022-03-21 PROCEDURE — 82550 ASSAY OF CK (CPK): CPT

## 2022-03-21 PROCEDURE — 84145 PROCALCITONIN (PCT): CPT

## 2022-03-21 RX ORDER — ROSUVASTATIN CALCIUM 10 MG/1
20 TABLET, COATED ORAL EVERY EVENING
Status: DISCONTINUED | OUTPATIENT
Start: 2022-03-21 | End: 2022-03-23 | Stop reason: HOSPADM

## 2022-03-21 RX ORDER — HYDROCODONE BITARTRATE AND ACETAMINOPHEN 10; 325 MG/1; MG/1
1 TABLET ORAL EVERY 4 HOURS PRN
Status: DISCONTINUED | OUTPATIENT
Start: 2022-03-21 | End: 2022-03-23 | Stop reason: HOSPADM

## 2022-03-21 RX ORDER — OXYBUTYNIN CHLORIDE 10 MG/1
10 TABLET, EXTENDED RELEASE ORAL DAILY
Status: ON HOLD | COMMUNITY
End: 2022-03-31

## 2022-03-21 RX ORDER — ONDANSETRON 4 MG/1
4 TABLET, ORALLY DISINTEGRATING ORAL EVERY 4 HOURS PRN
Status: DISCONTINUED | OUTPATIENT
Start: 2022-03-21 | End: 2022-03-23 | Stop reason: HOSPADM

## 2022-03-21 RX ORDER — POLYETHYLENE GLYCOL 3350 17 G/17G
1 POWDER, FOR SOLUTION ORAL
Status: DISCONTINUED | OUTPATIENT
Start: 2022-03-21 | End: 2022-03-23 | Stop reason: HOSPADM

## 2022-03-21 RX ORDER — LEVOTHYROXINE SODIUM 88 UG/1
88 TABLET ORAL DAILY
Status: DISCONTINUED | OUTPATIENT
Start: 2022-03-22 | End: 2022-03-23 | Stop reason: HOSPADM

## 2022-03-21 RX ORDER — HYDROMORPHONE HYDROCHLORIDE 1 MG/ML
0.25 INJECTION, SOLUTION INTRAMUSCULAR; INTRAVENOUS; SUBCUTANEOUS
Status: DISCONTINUED | OUTPATIENT
Start: 2022-03-21 | End: 2022-03-21

## 2022-03-21 RX ORDER — OXYCODONE HYDROCHLORIDE 5 MG/1
5 TABLET ORAL
Status: DISCONTINUED | OUTPATIENT
Start: 2022-03-21 | End: 2022-03-21

## 2022-03-21 RX ORDER — MONTELUKAST SODIUM 10 MG/1
10 TABLET ORAL
Status: DISCONTINUED | OUTPATIENT
Start: 2022-03-21 | End: 2022-03-23 | Stop reason: HOSPADM

## 2022-03-21 RX ORDER — AMOXICILLIN 250 MG
2 CAPSULE ORAL 2 TIMES DAILY
Status: DISCONTINUED | OUTPATIENT
Start: 2022-03-22 | End: 2022-03-23 | Stop reason: HOSPADM

## 2022-03-21 RX ORDER — LABETALOL HYDROCHLORIDE 5 MG/ML
10 INJECTION, SOLUTION INTRAVENOUS EVERY 4 HOURS PRN
Status: DISCONTINUED | OUTPATIENT
Start: 2022-03-21 | End: 2022-03-23 | Stop reason: HOSPADM

## 2022-03-21 RX ORDER — BISACODYL 10 MG
10 SUPPOSITORY, RECTAL RECTAL
Status: DISCONTINUED | OUTPATIENT
Start: 2022-03-21 | End: 2022-03-23 | Stop reason: HOSPADM

## 2022-03-21 RX ORDER — SUCRALFATE 1 G/1
1 TABLET ORAL
Status: DISCONTINUED | OUTPATIENT
Start: 2022-03-21 | End: 2022-03-23 | Stop reason: HOSPADM

## 2022-03-21 RX ORDER — SUMATRIPTAN 100 MG/1
100 TABLET, FILM COATED ORAL
COMMUNITY

## 2022-03-21 RX ORDER — HYDROCODONE BITARTRATE AND ACETAMINOPHEN 5; 325 MG/1; MG/1
1 TABLET ORAL EVERY 6 HOURS PRN
Status: DISCONTINUED | OUTPATIENT
Start: 2022-03-21 | End: 2022-03-23 | Stop reason: HOSPADM

## 2022-03-21 RX ORDER — HYDROMORPHONE HYDROCHLORIDE 1 MG/ML
0.25 INJECTION, SOLUTION INTRAMUSCULAR; INTRAVENOUS; SUBCUTANEOUS
Status: DISCONTINUED | OUTPATIENT
Start: 2022-03-21 | End: 2022-03-23 | Stop reason: HOSPADM

## 2022-03-21 RX ORDER — ONDANSETRON 2 MG/ML
4 INJECTION INTRAMUSCULAR; INTRAVENOUS ONCE
Status: COMPLETED | OUTPATIENT
Start: 2022-03-21 | End: 2022-03-21

## 2022-03-21 RX ORDER — MONTELUKAST SODIUM 10 MG/1
10 TABLET ORAL DAILY
Status: ON HOLD | COMMUNITY
End: 2022-03-31 | Stop reason: SDUPTHER

## 2022-03-21 RX ORDER — ESCITALOPRAM OXALATE 10 MG/1
20 TABLET ORAL DAILY
Status: DISCONTINUED | OUTPATIENT
Start: 2022-03-22 | End: 2022-03-23 | Stop reason: HOSPADM

## 2022-03-21 RX ORDER — ACETAMINOPHEN 325 MG/1
650 TABLET ORAL EVERY 6 HOURS PRN
Status: DISCONTINUED | OUTPATIENT
Start: 2022-03-21 | End: 2022-03-23 | Stop reason: HOSPADM

## 2022-03-21 RX ORDER — FUROSEMIDE 40 MG/1
40 TABLET ORAL 2 TIMES DAILY
Status: ON HOLD | COMMUNITY
End: 2022-03-31

## 2022-03-21 RX ORDER — MORPHINE SULFATE 4 MG/ML
4 INJECTION INTRAVENOUS ONCE
Status: COMPLETED | OUTPATIENT
Start: 2022-03-21 | End: 2022-03-21

## 2022-03-21 RX ORDER — ESCITALOPRAM OXALATE 20 MG/1
20 TABLET ORAL DAILY
Status: ON HOLD | COMMUNITY
End: 2022-03-31 | Stop reason: SDUPTHER

## 2022-03-21 RX ORDER — HYDROCODONE BITARTRATE AND ACETAMINOPHEN 10; 325 MG/1; MG/1
1 TABLET ORAL EVERY 4 HOURS PRN
Status: DISCONTINUED | OUTPATIENT
Start: 2022-03-21 | End: 2022-03-21

## 2022-03-21 RX ORDER — SUCRALFATE 1 G/1
1 TABLET ORAL
Status: ON HOLD | COMMUNITY
End: 2022-03-31 | Stop reason: SDUPTHER

## 2022-03-21 RX ORDER — OXYCODONE HYDROCHLORIDE 5 MG/1
2.5 TABLET ORAL
Status: DISCONTINUED | OUTPATIENT
Start: 2022-03-21 | End: 2022-03-21

## 2022-03-21 RX ORDER — ONDANSETRON 2 MG/ML
4 INJECTION INTRAMUSCULAR; INTRAVENOUS EVERY 4 HOURS PRN
Status: DISCONTINUED | OUTPATIENT
Start: 2022-03-21 | End: 2022-03-23 | Stop reason: HOSPADM

## 2022-03-21 RX ORDER — ROSUVASTATIN CALCIUM 20 MG/1
20 TABLET, COATED ORAL EVERY EVENING
Status: ON HOLD | COMMUNITY
End: 2022-03-31 | Stop reason: SDUPTHER

## 2022-03-21 RX ADMIN — SUCRALFATE 1 G: 1 TABLET ORAL at 20:26

## 2022-03-21 RX ADMIN — MONTELUKAST 10 MG: 10 TABLET, FILM COATED ORAL at 20:26

## 2022-03-21 RX ADMIN — AMITRIPTYLINE HYDROCHLORIDE 75 MG: 50 TABLET, FILM COATED ORAL at 20:26

## 2022-03-21 RX ADMIN — FENTANYL CITRATE 50 MCG: 50 INJECTION, SOLUTION INTRAMUSCULAR; INTRAVENOUS at 14:13

## 2022-03-21 RX ADMIN — MORPHINE SULFATE 4 MG: 4 INJECTION INTRAVENOUS at 17:25

## 2022-03-21 RX ADMIN — ROSUVASTATIN 20 MG: 10 TABLET, FILM COATED ORAL at 19:45

## 2022-03-21 RX ADMIN — METOPROLOL TARTRATE 12.5 MG: 25 TABLET, FILM COATED ORAL at 19:48

## 2022-03-21 RX ADMIN — HYDROCODONE BITARTRATE AND ACETAMINOPHEN 1 TABLET: 10; 325 TABLET ORAL at 19:46

## 2022-03-21 RX ADMIN — MORPHINE SULFATE 4 MG: 4 INJECTION INTRAVENOUS at 14:49

## 2022-03-21 RX ADMIN — ONDANSETRON 4 MG: 2 INJECTION INTRAMUSCULAR; INTRAVENOUS at 14:12

## 2022-03-21 ASSESSMENT — ENCOUNTER SYMPTOMS
WEAKNESS: 1
HEADACHES: 0
VOMITING: 0
ABDOMINAL PAIN: 0
INSOMNIA: 0
NAUSEA: 0
PALPITATIONS: 0
FEVER: 0
DIZZINESS: 0
BACK PAIN: 1
EYE PAIN: 0
DEPRESSION: 1
BLURRED VISION: 0
SHORTNESS OF BREATH: 0
CHILLS: 0
COUGH: 0
NERVOUS/ANXIOUS: 0

## 2022-03-21 ASSESSMENT — COGNITIVE AND FUNCTIONAL STATUS - GENERAL
WALKING IN HOSPITAL ROOM: A LITTLE
DRESSING REGULAR LOWER BODY CLOTHING: A LOT
SUGGESTED CMS G CODE MODIFIER MOBILITY: CK
MOBILITY SCORE: 18
SUGGESTED CMS G CODE MODIFIER DAILY ACTIVITY: CK
STANDING UP FROM CHAIR USING ARMS: A LITTLE
HELP NEEDED FOR BATHING: A LITTLE
TOILETING: A LITTLE
MOVING TO AND FROM BED TO CHAIR: A LITTLE
CLIMB 3 TO 5 STEPS WITH RAILING: A LOT
DRESSING REGULAR UPPER BODY CLOTHING: A LITTLE
DAILY ACTIVITIY SCORE: 19
MOVING FROM LYING ON BACK TO SITTING ON SIDE OF FLAT BED: A LITTLE

## 2022-03-21 ASSESSMENT — LIFESTYLE VARIABLES
TOTAL SCORE: 0
AVERAGE NUMBER OF DAYS PER WEEK YOU HAVE A DRINK CONTAINING ALCOHOL: 0
ALCOHOL_USE: NO
CONSUMPTION TOTAL: NEGATIVE
EVER FELT BAD OR GUILTY ABOUT YOUR DRINKING: NO
TOTAL SCORE: 0
HAVE PEOPLE ANNOYED YOU BY CRITICIZING YOUR DRINKING: NO
HOW MANY TIMES IN THE PAST YEAR HAVE YOU HAD 5 OR MORE DRINKS IN A DAY: 0
HAVE YOU EVER FELT YOU SHOULD CUT DOWN ON YOUR DRINKING: NO
TOTAL SCORE: 0
EVER HAD A DRINK FIRST THING IN THE MORNING TO STEADY YOUR NERVES TO GET RID OF A HANGOVER: NO
ON A TYPICAL DAY WHEN YOU DRINK ALCOHOL HOW MANY DRINKS DO YOU HAVE: 0

## 2022-03-21 ASSESSMENT — PATIENT HEALTH QUESTIONNAIRE - PHQ9
SUM OF ALL RESPONSES TO PHQ QUESTIONS 1-9: 13
7. TROUBLE CONCENTRATING ON THINGS, SUCH AS READING THE NEWSPAPER OR WATCHING TELEVISION: MORE THAN HALF THE DAYS
5. POOR APPETITE OR OVEREATING: NOT AT ALL
2. FEELING DOWN, DEPRESSED, IRRITABLE, OR HOPELESS: MORE THAN HALF THE DAYS
4. FEELING TIRED OR HAVING LITTLE ENERGY: NEARLY EVERY DAY
1. LITTLE INTEREST OR PLEASURE IN DOING THINGS: SEVERAL DAYS
8. MOVING OR SPEAKING SO SLOWLY THAT OTHER PEOPLE COULD HAVE NOTICED. OR THE OPPOSITE, BEING SO FIGETY OR RESTLESS THAT YOU HAVE BEEN MOVING AROUND A LOT MORE THAN USUAL: NOT AT ALL
9. THOUGHTS THAT YOU WOULD BE BETTER OFF DEAD, OR OF HURTING YOURSELF: SEVERAL DAYS
3. TROUBLE FALLING OR STAYING ASLEEP OR SLEEPING TOO MUCH: MORE THAN HALF THE DAYS
SUM OF ALL RESPONSES TO PHQ9 QUESTIONS 1 AND 2: 3
6. FEELING BAD ABOUT YOURSELF - OR THAT YOU ARE A FAILURE OR HAVE LET YOURSELF OR YOUR FAMILY DOWN: MORE THAN HALF THE DAYS

## 2022-03-21 ASSESSMENT — PAIN DESCRIPTION - PAIN TYPE
TYPE: ACUTE PAIN
TYPE: CHRONIC PAIN
TYPE: ACUTE PAIN

## 2022-03-21 ASSESSMENT — FIBROSIS 4 INDEX
FIB4 SCORE: 1.12
FIB4 SCORE: 1.05

## 2022-03-21 NOTE — ED NOTES
EPR requesting assistance for pt into restroom. Martha candido used to get pt to restroom. Underwear changed per pt request. Pt attempting to urinate at this time. Pt advised to pull cord once done.

## 2022-03-21 NOTE — ED TRIAGE NOTES
Chief Complaint   Patient presents with   • Back Pain     Mid back pain after a fall a few days ago, hx of a back surgery in 2017, she reports neuropathy to legs and no feeling in her thighs.    Yeloo socks applied on arrival.

## 2022-03-21 NOTE — ED NOTES
Assumed patient care and report received from Dianelys VILLALBA. Patient was assisted to the bathroom using the Martha Steady and back to the [place] in the hallway.  Urine sample collected and sent to lab.    Patient given warm blankets.  Family member at bedside with patient

## 2022-03-21 NOTE — ED PROVIDER NOTES
ED Provider Note    Primary care provider: Cole Yuen M.D.  Means of arrival: EMS  History obtained from: patient  History limited by: none    CHIEF COMPLAINT  Chief Complaint   Patient presents with   • Back Pain     Mid back pain after a fall a few days ago, hx of a back surgery in 2017, she reports neuropathy to legs and no feeling in her thighs.        HPI  Yamile Go is a 72 y.o. female who presents to the Emergency Department for evaluation of falling and back pain.  Patient reports her last couple of weeks she has had frequent falls and feeling weak in her legs.  She reports that she feels as if her legs just give out under her and she falls.  This has been occurring over the last couple of weeks, prior to this patient  lives independently and was supposed to start a new job today.  She has had no issues with living independently in the past and has been able to get around without difficulty until last couple weeks.  Today when getting out of the car to go to her new job her legs gave out and she fell landing on her back and hitting her head.  She does not believe she lost consciousness.  Patient reports that she is having severe throbbing pain to the mid/lower back from the fall.  She reports associated generalized weakness.  Denies fevers, chills, chest pain, shortness of breath, nausea, vomiting.  She reports chronic neuropathy in her bilateral lower extremities and feels that this may be worsening causing her falls.    REVIEW OF SYSTEMS  Review of Systems   Constitutional: Negative for chills and fever.   Respiratory: Negative for shortness of breath.    Cardiovascular: Negative for chest pain.   Neurological: Positive for weakness. Negative for dizziness and headaches.   All other systems reviewed and are negative.        PAST MEDICAL HISTORY   has a past medical history of Asthma, Depression, HTN (hypertension), Hyperlipidemia, Migraines, Obesity, and Sleep apnea.    SURGICAL HISTORY   has  "a past surgical history that includes appendectomy; abdominal hysterectomy total; breast biopsy; lumbar disc replacement; and colectomy.    SOCIAL HISTORY  Social History     Tobacco Use   • Smoking status: Never Smoker   • Smokeless tobacco: Never Used   Vaping Use   • Vaping Use: Never used   Substance Use Topics   • Alcohol use: No   • Drug use: No      Social History     Substance and Sexual Activity   Drug Use No       FAMILY HISTORY  Family History   Problem Relation Age of Onset   • Cancer Mother         lung   • Heart Disease Mother    • Stroke Father    • Heart Disease Father    • Diabetes Father    • Heart Disease Brother         cath and bypass   • Heart Disease Brother         cath and byp[ass[   • Heart Disease Brother         cath and bypass   • Heart Disease Brother    • Other Brother         MVA       CURRENT MEDICATIONS  Home Medications     Reviewed by Yanet Ortega (Pharmacy Tech) on 03/21/22 at 1524  Med List Status: Complete   Medication Last Dose Status   amitriptyline (ELAVIL) 25 MG Tab UNK Active   escitalopram (LEXAPRO) 20 MG tablet UNK Active   furosemide (LASIX) 40 MG Tab UNK Active   HYDROcodone/acetaminophen (NORCO)  MG Tab UNK Active   levothyroxine (SYNTHROID) 88 MCG Tab UNK Active   metoprolol (LOPRESSOR) 25 MG Tab UNK Active   montelukast (SINGULAIR) 10 MG Tab UNK Active   ondansetron (ZOFRAN ODT) 4 MG TABLET DISPERSIBLE UNK Active   oxybutynin SR (DITROPAN-XL) 10 MG CR tablet UNK Active   rosuvastatin (CRESTOR) 20 MG Tab UNK Active   sucralfate (CARAFATE) 1 GM Tab UNK Active   sumatriptan (IMITREX) 100 MG tablet UNK Active                ALLERGIES  Allergies   Allergen Reactions   • Fentanyl Vomiting   • Morphine Vomiting   • Rifampin      Pt turns yellow       PHYSICAL EXAM  VITAL SIGNS: /72   Pulse 88   Temp 36.7 °C (98 °F) (Temporal)   Resp 18   Ht 1.626 m (5' 4\")   Wt 83 kg (183 lb)   SpO2 95%   BMI 31.41 kg/m²   Vitals reviewed by myself.  Physical " Exam  Nursing note and vitals reviewed.  Constitutional: Well-developed and well-nourished.  Patient appears uncomfortable  HENT: Head is normocephalic and atraumatic.  Eyes: Pupils are equal, round, and reactive to light. No horizontal or vertical nystagmus. Conjunctiva are normal.   Cardiovascular: Normal rate and regular rhythm. No murmur heard. Normal radial pulses.  Pulmonary/Chest: Breath sounds normal. No wheezes or rales.   Abdominal: Soft and non-tender. No distention.    Musculoskeletal: Patient has tenderness to palpation along her lower thoracic and upper lumbar spine.  She also has bruising to this area.  No midline cervical spinal tenderness.  5 out of 5  strength in bilateral upper extremities.  5 out of 5 dorsiflexion and plantar flexion bilateral lower extremities.  5-5 hip flexion and extension in bilateral lower extremities.  Neurological: Awake, alert and oriented to person, place, and time. No focal deficits noted. Cranial nerves II - XII intact. No pronator drift.  No dysmetria on cerebellar testing. Normal speech and language. Normal strength and sensation in bilateral upper and lower extremities.  Skin: Skin is warm and dry. No rash.   Psychiatric: Normal mood and affect. Appropriate for clinical situation.      DIAGNOSTIC STUDIES /  LABS  Labs Reviewed   CBC WITH DIFFERENTIAL - Abnormal; Notable for the following components:       Result Value    WBC 25.0 (*)     MCHC 32.6 (*)     Neutrophils-Polys 95.00 (*)     Lymphocytes 2.00 (*)     Neutrophils (Absolute) 23.75 (*)     Lymphs (Absolute) 0.50 (*)     All other components within normal limits   COMP METABOLIC PANEL - Abnormal; Notable for the following components:    Sodium 133 (*)     Glucose 114 (*)     Bun 39 (*)     ALT(SGPT) 71 (*)     All other components within normal limits   TROPONIN - Abnormal; Notable for the following components:    Troponin T 21 (*)     All other components within normal limits   URINALYSIS,CULTURE IF  INDICATED - Abnormal; Notable for the following components:    Occult Blood Trace (*)     All other components within normal limits    Narrative:     Indication for culture:->Patient WITHOUT an indwelling Polanco  catheter in place with new onset of Dysuria, Frequency,  Urgency, and/or Suprapubic pain   URINE MICROSCOPIC (W/UA) - Abnormal; Notable for the following components:    Bacteria Few (*)     All other components within normal limits    Narrative:     Indication for culture:->Patient WITHOUT an indwelling Polanco  catheter in place with new onset of Dysuria, Frequency,  Urgency, and/or Suprapubic pain   ESTIMATED GFR   DIFFERENTIAL MANUAL   PLATELET ESTIMATE   MORPHOLOGY   CREATINE KINASE   PROCALCITONIN   TSH   TROPONIN       All labs reviewed by me.    EKG Interpretation:  Interpreted by myself    12 Lead EKG interpreted by me to show:  EKG at 1406: Normal sinus rhythm, heart rate 90, normal axis, normal intervals, , QRS 76, QTc 416, no acute ST-T segment changes, nonspecific T wave inversions in inferior leads, no evidence of acute arrhythmia or ischemia  My impression of this EKG: Does not indicate ischemia or arrhythmia at this time.    RADIOLOGY  CT-LSPINE W/O PLUS RECONS   Final Result      1.  Fracture of the transverse process of L2 on the left.   2.  No acute compression fracture is identified. Mild loss of height of L4 is unchanged.   3.  Multilevel degenerative changes as above described.   4.  Leftward curvature of the lumbar spine.   5.  Demineralization which limits evaluation.      CT-TSPINE W/O PLUS RECONS   Final Result      1.  No thoracic spine fracture or subluxation.   2.  Mildly accentuated kyphosis with S-shaped curvature of thoracic spine.   3.  Moderate multilevel degenerative change.      CT-HEAD W/O   Final Result      1.  No acute intracranial abnormality is identified.   2.  Atrophy         DX-CHEST-PORTABLE (1 VIEW)   Final Result         1. No acute cardiopulmonary  abnormalities are identified.        The radiologist's interpretation of all radiological studies have been reviewed by me.        COURSE & MEDICAL DECISION MAKING  Nursing notes, VS, PMSFHx reviewed in chart.    Patient is a 72-year-old female who comes in for evaluation of weakness and frequent falls.  Differential diagnosis includes arrhythmia, electrolyte disturbance, syncope, intracranial injury, closed head injury, spinal injury, dehydration, urinary tract infection.  Diagnostic work-up includes labs, EKG, CT head and CT of the spine.    Patient's initial vitals are within normal limits, she is neurologically intact on exam.  EKG returns and demonstrates no evidence of acute arrhythmia or ischemia.  Labs returned are notable for leukocytosis of 25, unclear etiology.  Patient is not having any infectious symptoms.  Chest x-ray demonstrates no evidence of pneumonia, urinalysis demonstrates no signs of infection.  Patient requires multiple doses of pain medication in order to manage her back pain in the emergency department, she is treated with fentanyl and multiple doses of morphine.  CT returns and is notable for an L2 transverse process fracture, no other acute findings.  Patient's troponin is also mildly elevated at 21, EKG is nonischemic.  At this time patient will be hospitalized for ongoing pain control of her back pain, further work-up of her weakness and falls, and ongoing management.  Discussed the case Dr. Samano who is excepted patient for hospitalization.  Patient is in guarded condition.      FINAL IMPRESSION  1. Weakness    2. Acute midline low back pain without sciatica    3. Closed fracture of transverse process of lumbar vertebra, initial encounter ([facility])    4. Leukocytosis, unspecified type

## 2022-03-22 ENCOUNTER — APPOINTMENT (OUTPATIENT)
Dept: CARDIOLOGY | Facility: MEDICAL CENTER | Age: 72
DRG: 552 | End: 2022-03-22
Attending: INTERNAL MEDICINE
Payer: MEDICARE

## 2022-03-22 PROBLEM — R53.81 PHYSICAL DEBILITY: Status: ACTIVE | Noted: 2022-03-22

## 2022-03-22 LAB
25(OH)D3 SERPL-MCNC: 22 NG/ML (ref 30–100)
ALBUMIN SERPL BCP-MCNC: 3.3 G/DL (ref 3.2–4.9)
BUN SERPL-MCNC: 29 MG/DL (ref 8–22)
CALCIUM SERPL-MCNC: 8.2 MG/DL (ref 8.4–10.2)
CHLORIDE SERPL-SCNC: 103 MMOL/L (ref 96–112)
CO2 SERPL-SCNC: 21 MMOL/L (ref 20–33)
CREAT SERPL-MCNC: 0.62 MG/DL (ref 0.5–1.4)
ERYTHROCYTE [DISTWIDTH] IN BLOOD BY AUTOMATED COUNT: 48 FL (ref 35.9–50)
GFR SERPLBLD CREATININE-BSD FMLA CKD-EPI: 95 ML/MIN/1.73 M 2
GLUCOSE SERPL-MCNC: 159 MG/DL (ref 65–99)
HCT VFR BLD AUTO: 35.4 % (ref 37–47)
HGB BLD-MCNC: 11 G/DL (ref 12–16)
LV EJECT FRACT  99904: 60
LV EJECT FRACT MOD 2C 99903: 65.19
LV EJECT FRACT MOD 4C 99902: 56.07
LV EJECT FRACT MOD BP 99901: 61.78
MAGNESIUM SERPL-MCNC: 2.4 MG/DL (ref 1.5–2.5)
MCH RBC QN AUTO: 27.8 PG (ref 27–33)
MCHC RBC AUTO-ENTMCNC: 31.1 G/DL (ref 33.6–35)
MCV RBC AUTO: 89.6 FL (ref 81.4–97.8)
PHOSPHATE SERPL-MCNC: 2.9 MG/DL (ref 2.5–4.5)
PLATELET # BLD AUTO: 156 K/UL (ref 164–446)
PMV BLD AUTO: 9.4 FL (ref 9–12.9)
POTASSIUM SERPL-SCNC: 4.2 MMOL/L (ref 3.6–5.5)
RBC # BLD AUTO: 3.95 M/UL (ref 4.2–5.4)
SODIUM SERPL-SCNC: 135 MMOL/L (ref 135–145)
WBC # BLD AUTO: 14.9 K/UL (ref 4.8–10.8)

## 2022-03-22 PROCEDURE — 80069 RENAL FUNCTION PANEL: CPT

## 2022-03-22 PROCEDURE — 82306 VITAMIN D 25 HYDROXY: CPT

## 2022-03-22 PROCEDURE — A9270 NON-COVERED ITEM OR SERVICE: HCPCS | Performed by: INTERNAL MEDICINE

## 2022-03-22 PROCEDURE — 99232 SBSQ HOSP IP/OBS MODERATE 35: CPT | Performed by: HOSPITALIST

## 2022-03-22 PROCEDURE — 83735 ASSAY OF MAGNESIUM: CPT

## 2022-03-22 PROCEDURE — 700111 HCHG RX REV CODE 636 W/ 250 OVERRIDE (IP): Performed by: INTERNAL MEDICINE

## 2022-03-22 PROCEDURE — 770020 HCHG ROOM/CARE - TELE (206)

## 2022-03-22 PROCEDURE — 85027 COMPLETE CBC AUTOMATED: CPT

## 2022-03-22 PROCEDURE — 700102 HCHG RX REV CODE 250 W/ 637 OVERRIDE(OP): Performed by: INTERNAL MEDICINE

## 2022-03-22 PROCEDURE — 93306 TTE W/DOPPLER COMPLETE: CPT | Mod: 26 | Performed by: INTERNAL MEDICINE

## 2022-03-22 PROCEDURE — 97162 PT EVAL MOD COMPLEX 30 MIN: CPT

## 2022-03-22 PROCEDURE — 93306 TTE W/DOPPLER COMPLETE: CPT

## 2022-03-22 PROCEDURE — 97166 OT EVAL MOD COMPLEX 45 MIN: CPT

## 2022-03-22 PROCEDURE — 99358 PROLONG SERVICE W/O CONTACT: CPT | Performed by: PHYSICAL MEDICINE & REHABILITATION

## 2022-03-22 RX ADMIN — AMITRIPTYLINE HYDROCHLORIDE 75 MG: 50 TABLET, FILM COATED ORAL at 20:25

## 2022-03-22 RX ADMIN — SUCRALFATE 1 G: 1 TABLET ORAL at 20:25

## 2022-03-22 RX ADMIN — HYDROCODONE BITARTRATE AND ACETAMINOPHEN 1 TABLET: 10; 325 TABLET ORAL at 09:53

## 2022-03-22 RX ADMIN — ENOXAPARIN SODIUM 40 MG: 40 INJECTION SUBCUTANEOUS at 06:04

## 2022-03-22 RX ADMIN — ROSUVASTATIN 20 MG: 10 TABLET, FILM COATED ORAL at 17:22

## 2022-03-22 RX ADMIN — SUCRALFATE 1 G: 1 TABLET ORAL at 06:05

## 2022-03-22 RX ADMIN — HYDROCODONE BITARTRATE AND ACETAMINOPHEN 1 TABLET: 10; 325 TABLET ORAL at 19:45

## 2022-03-22 RX ADMIN — LEVOTHYROXINE SODIUM 88 MCG: 0.09 TABLET ORAL at 06:05

## 2022-03-22 RX ADMIN — METOPROLOL TARTRATE 12.5 MG: 25 TABLET, FILM COATED ORAL at 17:22

## 2022-03-22 RX ADMIN — SUCRALFATE 1 G: 1 TABLET ORAL at 17:22

## 2022-03-22 RX ADMIN — HYDROCODONE BITARTRATE AND ACETAMINOPHEN 1 TABLET: 10; 325 TABLET ORAL at 15:16

## 2022-03-22 RX ADMIN — ESCITALOPRAM OXALATE 20 MG: 10 TABLET ORAL at 06:05

## 2022-03-22 RX ADMIN — MONTELUKAST 10 MG: 10 TABLET, FILM COATED ORAL at 20:26

## 2022-03-22 RX ADMIN — SUCRALFATE 1 G: 1 TABLET ORAL at 11:27

## 2022-03-22 ASSESSMENT — ENCOUNTER SYMPTOMS
NEUROLOGICAL NEGATIVE: 1
CARDIOVASCULAR NEGATIVE: 1
HEMOPTYSIS: 0
RESPIRATORY NEGATIVE: 1
BRUISES/BLEEDS EASILY: 0
CHILLS: 0
CONSTIPATION: 0
CONSTITUTIONAL NEGATIVE: 1
COUGH: 0
NERVOUS/ANXIOUS: 0
DIZZINESS: 0
PALPITATIONS: 0
EYES NEGATIVE: 1
MYALGIAS: 1
ABDOMINAL PAIN: 0
HEARTBURN: 0
NAUSEA: 0
FOCAL WEAKNESS: 0
FEVER: 0
LOSS OF CONSCIOUSNESS: 0
BACK PAIN: 1
SEIZURES: 0
BLOOD IN STOOL: 0
DIARRHEA: 0
GASTROINTESTINAL NEGATIVE: 1
DOUBLE VISION: 0
DEPRESSION: 1
DIAPHORESIS: 0
HEADACHES: 0
VOMITING: 0
WHEEZING: 0

## 2022-03-22 ASSESSMENT — COGNITIVE AND FUNCTIONAL STATUS - GENERAL
TOILETING: A LITTLE
STANDING UP FROM CHAIR USING ARMS: A LITTLE
DRESSING REGULAR UPPER BODY CLOTHING: A LOT
EATING MEALS: A LITTLE
SUGGESTED CMS G CODE MODIFIER MOBILITY: CK
SUGGESTED CMS G CODE MODIFIER DAILY ACTIVITY: CK
HELP NEEDED FOR BATHING: A LOT
DAILY ACTIVITIY SCORE: 15
MOVING TO AND FROM BED TO CHAIR: A LITTLE
MOBILITY SCORE: 17
CLIMB 3 TO 5 STEPS WITH RAILING: A LOT
DRESSING REGULAR LOWER BODY CLOTHING: A LOT
PERSONAL GROOMING: A LITTLE
TURNING FROM BACK TO SIDE WHILE IN FLAT BAD: A LITTLE
WALKING IN HOSPITAL ROOM: A LITTLE
MOVING FROM LYING ON BACK TO SITTING ON SIDE OF FLAT BED: A LITTLE

## 2022-03-22 ASSESSMENT — GAIT ASSESSMENTS
ASSISTIVE DEVICE: FRONT WHEEL WALKER
GAIT LEVEL OF ASSIST: MINIMAL ASSIST
DEVIATION: ANTALGIC;SHUFFLED GAIT
DISTANCE (FEET): 20

## 2022-03-22 ASSESSMENT — PAIN DESCRIPTION - PAIN TYPE
TYPE: ACUTE PAIN
TYPE: CHRONIC PAIN

## 2022-03-22 ASSESSMENT — VISUAL ACUITY: OU: 1

## 2022-03-22 ASSESSMENT — LIFESTYLE VARIABLES: SUBSTANCE_ABUSE: 0
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
## 2022-03-22 NOTE — DISCHARGE PLANNING
[facility] Transitional Care Coordination    Referral from:  Dr. Golden    Insurance Provider on Facesheet: JOSE/[employer]    Potential Rehab Diagnosis: TBD    Chart review indicates patient may have on going medical management and may have therapy needs to possibly meet inpatient rehab facility criteria with the goal of returning to community.    D/C support: TBD     Physiatry consultation pended per protocol.     Would appreciate an OT eval once appropriate.      Thank you for the referral.

## 2022-03-22 NOTE — PROGRESS NOTES
Tele Summary     Rhythm: NSR  Rate: 80's-90's  Ectopy: none per monitor tech  Measurements: /0.16/0.08/0.38/    C/o pain to mid/lower back. Medicated with NORCO with satisfactory effect. No c/o pain at this time. Resting comfortably.

## 2022-03-22 NOTE — FACE TO FACE
Face to Face Supporting Documentation - Home Health    The encounter with this patient was in whole or in part the primary reason for home health admission.    Date of encounter:   Patient:                    MRN:                       YOB: 2021  Yamile Go  [id number]  1950     Home health to see patient for:  Physical Therapy evaluation and treatment and Occupational therapy evaluation and treatment    Skilled need for:  New Onset Medical Diagnosis Lumbar Fracture    Skilled nursing interventions to include:  Comment: none    Homebound status evidenced by:  Need the aid of supportive devices such as crutches, canes, wheelchairs or walkers. Leaving home requires a considerable and taxing effort. There is a normal inability to leave the home.    Community Physician to provide follow up care: Cole Yuen M.D.     Optional Interventions? No      I certify the face to face encounter for this home health care referral meets the CMS requirements and the encounter/clinical assessment with the patient was, in whole, or in part, for the medical condition(s) listed above, which is the primary reason for home health care. Based on my clinical findings: the service(s) are medically necessary, support the need for home health care, and the homebound criteria are met.  I certify that this patient has had a face to face encounter by myself.  Ben Golden M.D. - NPI: [id number]

## 2022-03-22 NOTE — DISCHARGE PLANNING
Received Choice form at 9081  Agency/Facility Name: [facility]  Referral sent per Choice form @ 1886

## 2022-03-22 NOTE — THERAPY
"Physical Therapy   Initial Evaluation     Patient Name: Yamile Go  Age:  72 y.o., Sex:  female  Medical Record #: [id number]  Today's Date: 3/22/2022     Precautions  Precautions: Spinal / Back Precautions ;TLSO (Thoracolumbosacral orthosis)  Comments: L2 L transverse process fracture    Assessment  Patient is 72 y.o. female s/p fall resulting in L L2 transverse process fracture. Pt is presenting with generalized weakness, back pain as well as pain anterior thighs.  Pt lives alone and currently would be unable to care for herself. Recommend further therapy at SNF to improve mobility and strength prior to DC home.     Plan    Recommend Physical Therapy 5 times per week until therapy goals are met for the following treatments:  Bed Mobility, Gait Training, Orthotics Training, Therapeutic Activities, and Therapeutic Exercises    DC Equipment Recommendations: Unable to determine at this time  Discharge Recommendations: Recommend post-acute placement for additional physical therapy services prior to discharge home        03/22/22 1129   Prior Living Situation   Housing / Facility 1 Story Apartment / Condo   Steps Into Home 0   Steps In Home 0   Bathroom Set up Bathtub / Shower Combination   Equipment Owned Front-Wheel Walker;Single Point Cane   Lives with - Patient's Self Care Capacity Alone and Able to Care For Self   Comments Pt reports has been falling since having Covid 2/2022   Prior Level of Functional Mobility   Bed Mobility Independent   Transfer Status Independent   Ambulation Independent   Comments Pt reports doesn't use the walker too much \"unless I need it\".   History of Falls   History of Falls Yes   Date of Last Fall 03/21/22   Cognition    Level of Consciousness Alert   Comments Oriented x4, flat affect   Passive ROM Lower Body   Comments history of R ankle fusion   Active ROM Lower Body    Active ROM Lower Body  WDL   Strength Lower Body   Lower Body Strength  X   Comments B LE grossly 4-/5 "   Sensation Lower Body   Lower Extremity Sensation   X   Comments impaired sensation B LE   Balance Assessment   Sitting Balance (Static) Fair   Sitting Balance (Dynamic) Fair -   Standing Balance (Static) Fair -   Standing Balance (Dynamic) Fair -   Weight Shift Sitting Poor   Weight Shift Standing Poor   Comments stdg with FWW   Gait Analysis   Gait Level Of Assist Minimal Assist   Assistive Device Front Wheel Walker   Distance (Feet) 20   # of Times Distance was Traveled 1   Deviation Antalgic;Shuffled Gait   Bed Mobility    Supine to Sit Minimal Assist   Sit to Supine Minimal Assist   Rolling Minimum Assist to Lt.;Minimal Assist to Rt.   Functional Mobility   Sit to Stand Minimal Assist   Activity Tolerance   Standing 3-4 min   Comments limited by fatigue and back pain   Short Term Goals    Short Term Goal # 1 Pt will be able to perform bed mobility and sup <> sit via log roll Jani in 6 visits.   Short Term Goal # 2 Pt will be able to perform sit <> stand and transfer with FWW Jani in 6 visits.   Short Term Goal # 3 Pt will be able to ambulate 200 ft with FWW Jani in 6 visits.   Short Term Goal # 4 Pt will be able to don/doff TLSO Jani in 6 visits.

## 2022-03-22 NOTE — CARE PLAN
The patient is Stable - Low risk of patient condition declining or worsening    Shift Goals  Clinical Goals: decrease O2 needs  Patient Goals: feel better go home    Progress made toward(s) clinical / shift goals:    Problem: Pain - Standard  Goal: Alleviation of pain or a reduction in pain to the patient’s comfort goal  Outcome: Progressing  Note: Pt will have adequate pain control during hospitalization.      Problem: Knowledge Deficit - Standard  Goal: Patient and family/care givers will demonstrate understanding of plan of care, disease process/condition, diagnostic tests and medications  Outcome: Progressing  Note: Pt will know plan of care during hospitalization.       Patient is not progressing towards the following goals:

## 2022-03-22 NOTE — THERAPY
Occupational Therapy   Initial Evaluation     Patient Name: Yamile Go  Age:  72 y.o., Sex:  female  Medical Record #: [id number]  Today's Date: 3/22/2022     Precautions  Precautions: Spinal / Back Precautions ,TLSO (Thoracolumbosacral orthosis)  Comments: L2 transverse process fracture    Assessment  Patient seen for OT eval necessitating assist with ADLs and basic mobility. Patient would benefit from post acute placement to [facility] prior to DC home with family assist and services.     Plan    Recommend Occupational Therapy 3 times per week until therapy goals are met for the following treatments:  Adaptive Equipment, Self Care/Activities of Daily Living, Therapeutic Activities, and Therapeutic Exercises.    DC Equipment Recommendations: Unable to determine at this time  Discharge Recommendations: Recommend post-acute placement for additional occupational therapy services prior to discharge home      Objective       03/22/22 1450   Prior Living Situation   Prior Services Home-Independent   Housing / Facility 1 Story Apartment / Condo   Steps Into Home 0   Steps In Home 0   Bathroom Set up Bathtub / Shower Combination   Equipment Owned Front-Wheel Walker;Single Point Cane   Lives with - Patient's Self Care Capacity Alone and Able to Care For Self   Comments frequent falls s/p COVID 19 infection, was I ADLs, IADLs, and mobility. Chart confirms   Cognition    Comments wfl, likely benefit from safety education and review of fall prevention strategies   ADL Assessment   Eating Modified Independent   Grooming Minimal Assist;Seated  (quick fatigue)   Upper Body Dressing Moderate Assist   Lower Body Dressing Maximal Assist  (attempted figure four)   Functional Mobility   Mobility FWW, declined further beyond EOB standing and side stepping   Patient / Family Goals   Patient / Family Goal #1 go home   Short Term Goals   Short Term Goal # 1 S toileting   Short Term Goal # 2 S toilet transfer   Short Term Goal # 3 S  standing grooming

## 2022-03-22 NOTE — ED NOTES
Patient medicated for pain, however, patient report of increase pain to the IV site to the LAC.  Pending new IV placement via ultrasound guided.

## 2022-03-22 NOTE — PROGRESS NOTES
Tele strip at 1354 shows sinus rhythm     Measurements from am strip were as follows:  KS=.12  QRS=.08  QT=.30     Tele Shift Summary:     Rhythm : Sinus Rhythm  Rate : 91  Ectopy : n/a     Telemetry monitoring strips placed in pt's chart.

## 2022-03-22 NOTE — ED NOTES
New IV started via ultrasound guided.     Attempted to call report for Bed assigned but no nurse assigned

## 2022-03-22 NOTE — PROGRESS NOTES
Hospital Medicine Daily Progress Note    Date of Service  3/22/2022    Chief Complaint  Yamile Go is a 72 y.o. female admitted 3/21/2022 with ground-level fall and back pain    Hospital Course  Ms. Gillian Go is a 72-year-old female who comes in with a ground-level fall due to generalized weakness.  The generalized weakness seems to be secondary to prolonged COVID-19 infection.  Patient at this point will need placement to skilled facility as she is unable to take care of herself at home and her generalized weakness is extreme.  Her L2 transverse process fracture at this point will need a TLSO brace otherwise is nonsurgical.  Continue with pain management.    Discussion held with the daughter regarding her mom's condition.  I gave her updates on the treatment plan as well as the course of action needed.  I did obtain permission from the patient and she gave me permission to give information to the daughter.    Interval Problem Update  Optimize at this point pain management  PT OT evaluation  Placement to skilled facility  Medically cleared to go to the skilled facility.    I have personally seen and examined the patient at bedside. I discussed the plan of care with patient, bedside RN, charge RN,  and pharmacy.    Consultants/Specialty  None    Code Status  Full Code    Disposition  Patient is medically cleared for discharge.   Anticipate discharge to to skilled nursing facility.  I have placed the appropriate orders for post-discharge needs.    Review of Systems  Review of Systems   Constitutional: Negative.  Negative for chills, diaphoresis and fever.   HENT: Negative.    Eyes: Negative.  Negative for double vision.   Respiratory: Negative.  Negative for cough, hemoptysis and wheezing.    Cardiovascular: Negative.  Negative for chest pain, palpitations and leg swelling.   Gastrointestinal: Negative.  Negative for abdominal pain, blood in stool, constipation, diarrhea, heartburn, nausea  and vomiting.   Genitourinary: Negative.  Negative for frequency, hematuria and urgency.   Musculoskeletal: Positive for back pain and myalgias. Negative for joint pain.   Skin: Negative.  Negative for itching and rash.   Neurological: Negative.  Negative for dizziness, focal weakness, seizures, loss of consciousness and headaches.   Endo/Heme/Allergies: Negative.  Does not bruise/bleed easily.   Psychiatric/Behavioral: Positive for depression. Negative for substance abuse and suicidal ideas. The patient is not nervous/anxious.    All other systems reviewed and are negative.       Physical Exam  Temp:  [36.6 °C (97.9 °F)-37.1 °C (98.8 °F)] 36.8 °C (98.2 °F)  Pulse:  [] 87  Resp:  [16-19] 18  BP: (102-171)/(48-89) 135/64  SpO2:  [94 %-99 %] 95 %    Physical Exam  Vitals and nursing note reviewed. Exam conducted with a chaperone present.   Constitutional:       General: She is awake.      Appearance: Normal appearance. She is well-developed, well-groomed and normal weight.   HENT:      Head: Normocephalic and atraumatic.      Jaw: There is normal jaw occlusion. No trismus.      Salivary Glands: Right salivary gland is not tender. Left salivary gland is not tender.      Right Ear: External ear normal.      Left Ear: External ear normal.      Nose: Nose normal.      Mouth/Throat:      Mouth: Mucous membranes are moist.      Pharynx: Oropharynx is clear.   Eyes:      General: Lids are normal. Vision grossly intact.      Extraocular Movements: Extraocular movements intact.      Conjunctiva/sclera: Conjunctivae normal.      Right eye: Right conjunctiva is not injected. No exudate.     Left eye: Left conjunctiva is not injected. No exudate.     Pupils: Pupils are equal, round, and reactive to light.   Neck:      Thyroid: No thyroid mass.      Vascular: No hepatojugular reflux or JVD.      Trachea: No abnormal tracheal secretions or tracheal deviation.   Cardiovascular:      Rate and Rhythm: Normal rate and regular  rhythm. Occasional extrasystoles are present.     Pulses: Normal pulses.      Heart sounds: Normal heart sounds. No murmur heard.    No friction rub.   Pulmonary:      Effort: Pulmonary effort is normal.      Breath sounds: Normal breath sounds. No wheezing or rhonchi.   Chest:   Breasts:      Right: No supraclavicular adenopathy.      Left: No supraclavicular adenopathy.       Abdominal:      General: Abdomen is flat.      Palpations: Abdomen is soft.      Tenderness: There is no abdominal tenderness. There is no right CVA tenderness or left CVA tenderness.      Hernia: No hernia is present.   Musculoskeletal:         General: Normal range of motion.      Cervical back: Full passive range of motion without pain, normal range of motion and neck supple. No rigidity. No muscular tenderness.      Right lower leg: No edema.      Left lower leg: No edema.   Lymphadenopathy:      Head:      Right side of head: No submental adenopathy.      Left side of head: No submental adenopathy.      Cervical:      Right cervical: No superficial cervical adenopathy.     Left cervical: No superficial cervical adenopathy.      Upper Body:      Right upper body: No supraclavicular adenopathy.      Left upper body: No supraclavicular adenopathy.   Skin:     General: Skin is warm and dry.      Capillary Refill: Capillary refill takes less than 2 seconds.      Coloration: Skin is not cyanotic or pale.      Findings: No abrasion or bruising.   Neurological:      General: No focal deficit present.      Mental Status: She is alert and oriented to person, place, and time. Mental status is at baseline.      GCS: GCS eye subscore is 4. GCS verbal subscore is 5. GCS motor subscore is 6.      Cranial Nerves: No cranial nerve deficit.      Sensory: No sensory deficit.      Motor: Motor function is intact.      Deep Tendon Reflexes:      Reflex Scores:       Tricep reflexes are 2+ on the right side and 2+ on the left side.       Bicep reflexes are 2+  on the right side and 2+ on the left side.       Brachioradialis reflexes are 2+ on the right side and 2+ on the left side.       Patellar reflexes are 2+ on the right side and 2+ on the left side.       Achilles reflexes are 2+ on the right side and 2+ on the left side.  Psychiatric:         Attention and Perception: Attention and perception normal.         Mood and Affect: Mood normal.         Speech: Speech normal.         Behavior: Behavior is cooperative.         Thought Content: Thought content normal.         Cognition and Memory: Cognition and memory normal.         Judgment: Judgment normal.         Fluids    Intake/Output Summary (Last 24 hours) at 3/22/2022 1218  Last data filed at 3/22/2022 0900  Gross per 24 hour   Intake 240 ml   Output --   Net 240 ml       Laboratory  Recent Labs     03/21/22  1350 03/22/22  0237   WBC 25.0* 14.9*   RBC 4.52 3.95*   HEMOGLOBIN 13.0 11.0*   HEMATOCRIT 39.9 35.4*   MCV 88.3 89.6   MCH 28.8 27.8   MCHC 32.6* 31.1*   RDW 47.0 48.0   PLATELETCT 208 156*   MPV 9.8 9.4     Recent Labs     03/21/22  1350 03/22/22  0237   SODIUM 133* 135   POTASSIUM 4.7 4.2   CHLORIDE 100 103   CO2 21 21   GLUCOSE 114* 159*   BUN 39* 29*   CREATININE 0.81 0.62   CALCIUM 8.8 8.2*                   Imaging  CT-LSPINE W/O PLUS RECONS   Final Result      1.  Fracture of the transverse process of L2 on the left.   2.  No acute compression fracture is identified. Mild loss of height of L4 is unchanged.   3.  Multilevel degenerative changes as above described.   4.  Leftward curvature of the lumbar spine.   5.  Demineralization which limits evaluation.      CT-TSPINE W/O PLUS RECONS   Final Result      1.  No thoracic spine fracture or subluxation.   2.  Mildly accentuated kyphosis with S-shaped curvature of thoracic spine.   3.  Moderate multilevel degenerative change.      CT-HEAD W/O   Final Result      1.  No acute intracranial abnormality is identified.   2.  Atrophy         DX-CHEST-PORTABLE  (1 VIEW)   Final Result         1. No acute cardiopulmonary abnormalities are identified.      EC-ECHOCARDIOGRAM COMPLETE W/O CONT    (Results Pending)        Assessment/Plan  * Closed fracture of lumbar vertebra, unspecified fracture morphology, initial encounter ([facility])- (present on admission)  Assessment & Plan  Patient due to bilateral thigh weakness had lost her strength and fell to the ground.  In the process she hit her back.  Imaging studies show an L2 transverse fracture  At this point this is a nonsurgical pain management  TLSO brace ordered  Patient still unable to perform activities of daily living and thus will need to go to a skilled facility.  After discussion with her she would like to go to advanced care.  This was discussed with case management and they will be making referrals in her behalf.      Physical debility  Assessment & Plan  Severe physical debility since having COVID-19 infection back in January.  Patient is getting actually worse and now has lost strength in her lower extremities.  Patient will need extensive rehabilitation and thus will need to go to skilled facility for this.    Leukocytosis- (present on admission)  Assessment & Plan  No source of infection found  White blood cell count coming down considerably  Most likely stress response  Do not at this point initiate antibiotics    HTN (hypertension)- (present on admission)  Assessment & Plan  Optimize blood pressure management keep systolic blood pressure less than 140 diastolic under 90.  Continue with metoprolol 12.5 mg twice daily  As needed labetalol    Personal history of COVID-19- (present on admission)  Assessment & Plan  Resolved  Recent test negative  Most likely the reason that the patient is extremely debilitated is her prolonged COVID-19 course    Chronic pain syndrome on chronic opioids- (present on admission)  Assessment & Plan  Patient has an outpatient narcotic contract.  Continue with pain management while in the  hospital on discharge patient will not be able to have her narcotics refilled or adjusted    Hypothyroidism- (present on admission)  Assessment & Plan  -supportive measures with synthroid supplementation at 88 mcg per day, no change  -latest TSH is 0.384 and this is with in establish normal guidlines     Depression- (present on admission)  Assessment & Plan  Chronic depression, patient is very is emotional and quick to change her emotions.  Continue at this point with amitriptyline and Lexapro.    Class 1 obesity due to excess calories with serious comorbidity and body mass index (BMI) of 32.0 to 32.9 in adult- (present on admission)  Assessment & Plan  Body mass index is 31.77 kg/m².  Outpatient weight loss management program  Lifestyle modification    CAD (coronary artery disease)- (present on admission)  Assessment & Plan  Optimize medication management  Currently patient is not complaining of any chest pain.    Dyslipidemia- (present on admission)  Assessment & Plan  Low-fat low-cholesterol diet  Statin  Fasting lipid panel         VTE prophylaxis: SCDs/TEDs    I have performed a physical exam and reviewed and updated ROS and Plan today (3/22/2022). In review of yesterday's note (3/21/2022), there are no changes except as documented above.

## 2022-03-22 NOTE — H&P
Hospital Medicine History & Physical Note    Date of Service  3/21/2022    Primary Care Physician  Cole Yuen M.D.    Consultants  none    Code Status  Full Code    Chief Complaint  Chief Complaint   Patient presents with   • Back Pain     Mid back pain after a fall a few days ago, hx of a back surgery in 2017, she reports neuropathy to legs and no feeling in her thighs.        History of Presenting Illness  Yamile Go is a 72 y.o. female PMH hypertension, hypothyroidism, chronic pain on Norco 10 at home, depression, recent COVID-19 pneumonia 01/22 who presented 3/21/2022 with fall at home.  Patient stated she was feeling weak, has been more sedentary since having COVID-19, more bedbound and has not returned to work and patient also using a walker now.  She states she had fallen today, and during a fall hit her back on nightstand caused her significant pain, nonradiating, described as 7 out of 10, worsened with movement. She states she is off of oxygen.  Patient recently had COVID-19 on 01/21/2022, she was hospitalized and required treatment for pneumonia, she was discharged on home oxygen 2 L nasal cannula.     I discussed the plan of care with patient, bedside RN, charge RN,  and pharmacy.    Review of Systems  Review of Systems   Constitutional: Positive for malaise/fatigue. Negative for chills and fever.   HENT: Negative for congestion and hearing loss.    Eyes: Negative for blurred vision and pain.   Respiratory: Negative for cough and shortness of breath.    Cardiovascular: Negative for chest pain and palpitations.   Gastrointestinal: Negative for abdominal pain, nausea and vomiting.   Genitourinary: Negative for dysuria and hematuria.   Musculoskeletal: Positive for back pain. Negative for joint pain.   Skin: Negative for itching and rash.   Neurological: Positive for weakness. Negative for dizziness and headaches.   Psychiatric/Behavioral: Positive for depression. The patient is not  nervous/anxious and does not have insomnia.    All other systems reviewed and are negative.      Past Medical History   has a past medical history of Asthma, Depression, HTN (hypertension), Hyperlipidemia, Migraines, Obesity, and Sleep apnea.    Surgical History   has a past surgical history that includes appendectomy; abdominal hysterectomy total; breast biopsy; lumbar disc replacement; and colectomy.     Family History  family history includes Cancer in her mother; Diabetes in her father; Heart Disease in her brother, brother, brother, brother, father, and mother; Other in her brother; Stroke in her father.   Family history reviewed with patient. There is no family history that is pertinent to the chief complaint.     Social History   reports that she has never smoked. She has never used smokeless tobacco. She reports that she does not drink alcohol and does not use drugs.    Allergies  Allergies   Allergen Reactions   • Fentanyl Vomiting   • Morphine Vomiting   • Rifampin      Pt turns yellow       Medications  Prior to Admission Medications   Prescriptions Last Dose Informant Patient Reported? Taking?   HYDROcodone/acetaminophen (NORCO)  MG Tab UNK at [facility] Patient's Home Pharmacy Yes No   Sig: Take 1 Tablet by mouth every four hours as needed (PAIN).   amitriptyline (ELAVIL) 25 MG Tab UNK at [facility] Patient's Home Pharmacy Yes No   Sig: Take 75 mg by mouth every day. 3 tablets = 75 mg   escitalopram (LEXAPRO) 20 MG tablet UNK at [facility] Patient's Home Pharmacy Yes Yes   Sig: Take 20 mg by mouth every day.   furosemide (LASIX) 40 MG Tab UNK at [facility] Patient's Home Pharmacy Yes Yes   Sig: Take 40 mg by mouth 2 times a day.   levothyroxine (SYNTHROID) 88 MCG Tab UNK at [facility] Patient's Home Pharmacy Yes No   Sig: Take 88 mcg by mouth every day. BRAND NAME ONLY   metoprolol (LOPRESSOR) 25 MG Tab UNK at [facility] Patient's Home Pharmacy Yes No   Sig: Take 12.5 mg by mouth 2 times a day.   montelukast (SINGULAIR) 10 MG Tab UNK at  [facility] Patient's Home Pharmacy Yes Yes   Sig: Take 10 mg by mouth every day.   ondansetron (ZOFRAN ODT) 4 MG TABLET DISPERSIBLE UNK at [facility] Patient's Home Pharmacy No No   Sig: Take 1 Tablet by mouth every 8 hours as needed.   oxybutynin SR (DITROPAN-XL) 10 MG CR tablet UNK at [facility] Patient's Home Pharmacy Yes Yes   Sig: Take 10 mg by mouth every day.   rosuvastatin (CRESTOR) 20 MG Tab UNK at [facility] Patient's Home Pharmacy Yes Yes   Sig: Take 20 mg by mouth every evening.   sucralfate (CARAFATE) 1 GM Tab UNK at [facility] Patient's Home Pharmacy Yes Yes   Sig: Take 1 g by mouth 4 Times a Day,Before Meals and at Bedtime.   sumatriptan (IMITREX) 100 MG tablet UNK at [facility] Patient's Home Pharmacy Yes Yes   Sig: Take 100 mg by mouth one time as needed for Migraine.      Facility-Administered Medications: None       Physical Exam  Temp:  [36.7 °C (98 °F)] 36.7 °C (98 °F)  Pulse:  [] 88  Resp:  [16-18] 18  BP: (121-171)/(72-89) 121/72  SpO2:  [95 %-99 %] 95 %  Blood Pressure : 121/72   Temperature: 36.7 °C (98 °F)   Pulse: 88   Respiration: 18   Pulse Oximetry: 95 %       Physical Exam  Vitals and nursing note reviewed.   Constitutional:       General: She is not in acute distress.     Appearance: She is obese. She is not ill-appearing.      Comments: Frail appearing elderly female   HENT:      Head: Normocephalic and atraumatic.      Right Ear: External ear normal.      Left Ear: External ear normal.      Mouth/Throat:      Mouth: Mucous membranes are dry.      Pharynx: No oropharyngeal exudate.   Eyes:      General: No scleral icterus.     Extraocular Movements: Extraocular movements intact.   Cardiovascular:      Rate and Rhythm: Normal rate and regular rhythm.      Pulses: Normal pulses.      Heart sounds: Normal heart sounds. No murmur heard.  Pulmonary:      Effort: Pulmonary effort is normal. No respiratory distress.      Breath sounds: Normal breath sounds. No wheezing.   Abdominal:      General: Abdomen is flat. Bowel sounds  are normal. There is no distension.      Palpations: Abdomen is soft.      Tenderness: There is no abdominal tenderness.   Musculoskeletal:         General: No swelling or tenderness.      Cervical back: Normal range of motion. No rigidity.      Right lower leg: No edema.      Left lower leg: No edema.   Skin:     General: Skin is warm.      Capillary Refill: Capillary refill takes less than 2 seconds.      Coloration: Skin is not jaundiced.      Findings: No erythema.   Neurological:      Mental Status: She is alert and oriented to person, place, and time. Mental status is at baseline.      Motor: Weakness present.   Psychiatric:         Behavior: Behavior normal.         Thought Content: Thought content normal.         Judgment: Judgment normal.      Comments: Sad mood         Laboratory:  Recent Labs     03/21/22  1350   WBC 25.0*   RBC 4.52   HEMOGLOBIN 13.0   HEMATOCRIT 39.9   MCV 88.3   MCH 28.8   MCHC 32.6*   RDW 47.0   PLATELETCT 208   MPV 9.8     Recent Labs     03/21/22  1350   SODIUM 133*   POTASSIUM 4.7   CHLORIDE 100   CO2 21   GLUCOSE 114*   BUN 39*   CREATININE 0.81   CALCIUM 8.8     Recent Labs     03/21/22  1350   ALTSGPT 71*   ASTSGOT 26   ALKPHOSPHAT 61   TBILIRUBIN 0.7   GLUCOSE 114*         No results for input(s): NTPROBNP in the last 72 hours.      Recent Labs     03/21/22  1350   TROPONINT 21*       Imaging:  CT-LSPINE W/O PLUS RECONS   Final Result      1.  Fracture of the transverse process of L2 on the left.   2.  No acute compression fracture is identified. Mild loss of height of L4 is unchanged.   3.  Multilevel degenerative changes as above described.   4.  Leftward curvature of the lumbar spine.   5.  Demineralization which limits evaluation.      CT-TSPINE W/O PLUS RECONS   Final Result      1.  No thoracic spine fracture or subluxation.   2.  Mildly accentuated kyphosis with S-shaped curvature of thoracic spine.   3.  Moderate multilevel degenerative change.      CT-HEAD W/O   Final  Result      1.  No acute intracranial abnormality is identified.   2.  Atrophy         DX-CHEST-PORTABLE (1 VIEW)   Final Result         1. No acute cardiopulmonary abnormalities are identified.      EC-ECHOCARDIOGRAM COMPLETE W/O CONT    (Results Pending)       EKG:  I have personally reviewed the images and compared with prior images.    Assessment/Plan:  I anticipate this patient will require at least two midnights for appropriate medical management, necessitating inpatient admission.    * Closed fracture of lumbar vertebra, unspecified fracture morphology, initial encounter ([facility])- (present on admission)  Assessment & Plan  L2 transverse fracture seen on CT lumbar  Patient hit back onto nightstand during her fall at home  Non-surgical management as per EDP  - PT/OT  - Pain control  - fall precautions  - admitted to telemetry  - monitor for bradycardia while on metoprolol, concern for chronic opioid use contributing to fall  - checking TSH, CPK, Echo, ECG    Leukocytosis- (present on admission)  Assessment & Plan  WBC 25,000  No obvious source of infection, patient denied any changes in chronic coughing  UA negative  -Trend CBC, checking procalcitonin  -Hold antibiotics at this time    Personal history of COVID-19- (present on admission)  Assessment & Plan  Patient off oxygen now  On lasix and using walker, deconditioned from having covid  - PT/OT    Chronic pain syndrome on chronic opioids- (present on admission)  Assessment & Plan  Patient on Norco 10 chronically at home  May contribute to weakness    Hypothyroidism- (present on admission)  Assessment & Plan  Continue home levothyroxine 88 mcg  Checking TSH given new fall    Depression- (present on admission)  Assessment & Plan  Continue home amitriptyline and escitalopram  She does appear in sad mood    Class 1 obesity due to excess calories with serious comorbidity and body mass index (BMI) of 32.0 to 32.9 in adult- (present on admission)  Assessment &  Plan  Patient has been more sedentary after covid  Will need improvement in her debility to help with weight management at home  PT/OT    HTN (hypertension)- (present on admission)  Assessment & Plan  Patient is on metoprolol and lasix  Will continue metoprolol  Hold lasix, patient does not appear to be fluid overloaded    CAD (coronary artery disease)- (present on admission)  Assessment & Plan  Continue home crestor and metoprolol  Monitor for bradycardia    Dyslipidemia- (present on admission)  Assessment & Plan  Continue crestor      VTE prophylaxis: enoxaparin ppx

## 2022-03-22 NOTE — PROGRESS NOTES
4 Eyes Skin Assessment Completed by DEEJAY Croft and DEEJAY Spencer.    Head WDL  Ears WDL  Nose WDL  Mouth WDL  Neck WDL  Breast/Chest WDL  Shoulder Blades WDL  Spine Bruising  (R) Arm/Elbow/Hand Bruising and Scab  (L) Arm/Elbow/Hand Bruising and Scab  Abdomen Scar  Groin WDL  Scrotum/Coccyx/Buttocks Discoloration  (R) Leg Scab and Bruising  (L) Leg Scab and Bruising  (R) Heel/Foot/Toe WDL  (L) Heel/Foot/Toe WDL          Devices In Places Tele Box      Interventions In Place Pressure Redistribution Mattress    Possible Skin Injury No    Pictures Uploaded Into Epic No, needs to be completed  Wound Consult Placed N/A  RN Wound Prevention Protocol Ordered No

## 2022-03-23 ENCOUNTER — HOSPITAL ENCOUNTER (INPATIENT)
Facility: REHABILITATION | Age: 72
LOS: 10 days | DRG: 561 | End: 2022-04-02
Attending: PHYSICAL MEDICINE & REHABILITATION | Admitting: PHYSICAL MEDICINE & REHABILITATION
Payer: MEDICARE

## 2022-03-23 VITALS
RESPIRATION RATE: 16 BRPM | HEART RATE: 76 BPM | TEMPERATURE: 98.2 F | SYSTOLIC BLOOD PRESSURE: 140 MMHG | BODY MASS INDEX: 33.69 KG/M2 | WEIGHT: 197.31 LBS | OXYGEN SATURATION: 94 % | DIASTOLIC BLOOD PRESSURE: 69 MMHG | HEIGHT: 64 IN

## 2022-03-23 DIAGNOSIS — F32.A DEPRESSION, UNSPECIFIED DEPRESSION TYPE: ICD-10-CM

## 2022-03-23 DIAGNOSIS — R07.89 OTHER CHEST PAIN: ICD-10-CM

## 2022-03-23 DIAGNOSIS — E78.5 DYSLIPIDEMIA: ICD-10-CM

## 2022-03-23 DIAGNOSIS — I10 ESSENTIAL HYPERTENSION, BENIGN: ICD-10-CM

## 2022-03-23 DIAGNOSIS — J45.909 ASTHMA, UNSPECIFIED ASTHMA SEVERITY, UNSPECIFIED WHETHER COMPLICATED, UNSPECIFIED WHETHER PERSISTENT: ICD-10-CM

## 2022-03-23 DIAGNOSIS — I10 PRIMARY HYPERTENSION: ICD-10-CM

## 2022-03-23 DIAGNOSIS — E55.9 VITAMIN D DEFICIENCY: ICD-10-CM

## 2022-03-23 DIAGNOSIS — E03.9 HYPOTHYROIDISM, UNSPECIFIED TYPE: ICD-10-CM

## 2022-03-23 DIAGNOSIS — I25.10 CAD (CORONARY ARTERY DISEASE): ICD-10-CM

## 2022-03-23 DIAGNOSIS — G89.4 CHRONIC PAIN SYNDROME: Chronic | ICD-10-CM

## 2022-03-23 DIAGNOSIS — E78.5 HYPERLIPIDEMIA, UNSPECIFIED HYPERLIPIDEMIA TYPE: ICD-10-CM

## 2022-03-23 DIAGNOSIS — S32.009A CLOSED FRACTURE OF LUMBAR VERTEBRA, UNSPECIFIED FRACTURE MORPHOLOGY, INITIAL ENCOUNTER (HCC): ICD-10-CM

## 2022-03-23 PROBLEM — S32.020A CLOSED COMPRESSION FRACTURE OF L2 LUMBAR VERTEBRA, INITIAL ENCOUNTER (HCC): Status: ACTIVE | Noted: 2022-03-23

## 2022-03-23 PROCEDURE — 700111 HCHG RX REV CODE 636 W/ 250 OVERRIDE (IP): Performed by: INTERNAL MEDICINE

## 2022-03-23 PROCEDURE — 700102 HCHG RX REV CODE 250 W/ 637 OVERRIDE(OP): Performed by: INTERNAL MEDICINE

## 2022-03-23 PROCEDURE — A9270 NON-COVERED ITEM OR SERVICE: HCPCS | Performed by: INTERNAL MEDICINE

## 2022-03-23 PROCEDURE — 700102 HCHG RX REV CODE 250 W/ 637 OVERRIDE(OP): Performed by: PHYSICAL MEDICINE & REHABILITATION

## 2022-03-23 PROCEDURE — A9270 NON-COVERED ITEM OR SERVICE: HCPCS | Performed by: PHYSICAL MEDICINE & REHABILITATION

## 2022-03-23 PROCEDURE — 770010 HCHG ROOM/CARE - REHAB SEMI PRIVAT*

## 2022-03-23 PROCEDURE — 99239 HOSP IP/OBS DSCHRG MGMT >30: CPT | Performed by: HOSPITALIST

## 2022-03-23 PROCEDURE — 99223 1ST HOSP IP/OBS HIGH 75: CPT | Performed by: PHYSICAL MEDICINE & REHABILITATION

## 2022-03-23 PROCEDURE — 94760 N-INVAS EAR/PLS OXIMETRY 1: CPT

## 2022-03-23 RX ORDER — HYDROCODONE BITARTRATE AND ACETAMINOPHEN 10; 325 MG/1; MG/1
1 TABLET ORAL EVERY 4 HOURS PRN
Status: DISCONTINUED | OUTPATIENT
Start: 2022-03-23 | End: 2022-04-02 | Stop reason: HOSPADM

## 2022-03-23 RX ORDER — OMEPRAZOLE 20 MG/1
20 CAPSULE, DELAYED RELEASE ORAL DAILY
Status: DISCONTINUED | OUTPATIENT
Start: 2022-03-23 | End: 2022-04-02 | Stop reason: HOSPADM

## 2022-03-23 RX ORDER — ECHINACEA PURPUREA EXTRACT 125 MG
2 TABLET ORAL PRN
Status: DISCONTINUED | OUTPATIENT
Start: 2022-03-23 | End: 2022-04-02 | Stop reason: HOSPADM

## 2022-03-23 RX ORDER — VITAMIN B COMPLEX
2000 TABLET ORAL DAILY
Status: DISCONTINUED | OUTPATIENT
Start: 2022-03-23 | End: 2022-04-02 | Stop reason: HOSPADM

## 2022-03-23 RX ORDER — POLYVINYL ALCOHOL 14 MG/ML
1 SOLUTION/ DROPS OPHTHALMIC PRN
Status: DISCONTINUED | OUTPATIENT
Start: 2022-03-23 | End: 2022-04-02 | Stop reason: HOSPADM

## 2022-03-23 RX ORDER — ACETAMINOPHEN 325 MG/1
650 TABLET ORAL EVERY 6 HOURS PRN
Qty: 30 TABLET | Refills: 0 | Status: ON HOLD
Start: 2022-03-23 | End: 2022-03-31

## 2022-03-23 RX ORDER — ACETAMINOPHEN 325 MG/1
650 TABLET ORAL EVERY 6 HOURS PRN
Status: DISCONTINUED | OUTPATIENT
Start: 2022-03-23 | End: 2022-04-02 | Stop reason: HOSPADM

## 2022-03-23 RX ORDER — AMOXICILLIN 250 MG
2 CAPSULE ORAL 2 TIMES DAILY
Status: DISCONTINUED | OUTPATIENT
Start: 2022-03-23 | End: 2022-04-02 | Stop reason: HOSPADM

## 2022-03-23 RX ORDER — HYDROCODONE BITARTRATE AND ACETAMINOPHEN 5; 325 MG/1; MG/1
1 TABLET ORAL EVERY 6 HOURS PRN
Status: DISCONTINUED | OUTPATIENT
Start: 2022-03-23 | End: 2022-04-02 | Stop reason: HOSPADM

## 2022-03-23 RX ORDER — BISACODYL 10 MG
10 SUPPOSITORY, RECTAL RECTAL
Status: DISCONTINUED | OUTPATIENT
Start: 2022-03-23 | End: 2022-04-02 | Stop reason: HOSPADM

## 2022-03-23 RX ORDER — LEVOTHYROXINE SODIUM 88 UG/1
88 TABLET ORAL
Status: DISCONTINUED | OUTPATIENT
Start: 2022-03-24 | End: 2022-04-02 | Stop reason: HOSPADM

## 2022-03-23 RX ORDER — ROSUVASTATIN CALCIUM 10 MG/1
20 TABLET, COATED ORAL EVERY EVENING
Status: DISCONTINUED | OUTPATIENT
Start: 2022-03-23 | End: 2022-04-02 | Stop reason: HOSPADM

## 2022-03-23 RX ORDER — ROSUVASTATIN CALCIUM 10 MG/1
20 TABLET, COATED ORAL EVERY EVENING
Status: CANCELLED | OUTPATIENT
Start: 2022-03-23

## 2022-03-23 RX ORDER — HYDROCODONE BITARTRATE AND ACETAMINOPHEN 10; 325 MG/1; MG/1
1 TABLET ORAL EVERY 4 HOURS PRN
Status: CANCELLED | OUTPATIENT
Start: 2022-03-23

## 2022-03-23 RX ORDER — ESCITALOPRAM OXALATE 10 MG/1
20 TABLET ORAL DAILY
Status: DISCONTINUED | OUTPATIENT
Start: 2022-03-24 | End: 2022-04-02 | Stop reason: HOSPADM

## 2022-03-23 RX ORDER — POLYETHYLENE GLYCOL 3350 17 G/17G
1 POWDER, FOR SOLUTION ORAL
Status: DISCONTINUED | OUTPATIENT
Start: 2022-03-23 | End: 2022-04-02 | Stop reason: HOSPADM

## 2022-03-23 RX ORDER — ONDANSETRON 4 MG/1
4 TABLET, ORALLY DISINTEGRATING ORAL 4 TIMES DAILY PRN
Status: DISCONTINUED | OUTPATIENT
Start: 2022-03-23 | End: 2022-04-02 | Stop reason: HOSPADM

## 2022-03-23 RX ORDER — HYDROCODONE BITARTRATE AND ACETAMINOPHEN 5; 325 MG/1; MG/1
1 TABLET ORAL EVERY 6 HOURS PRN
Status: CANCELLED | OUTPATIENT
Start: 2022-03-23

## 2022-03-23 RX ORDER — ESCITALOPRAM OXALATE 10 MG/1
20 TABLET ORAL DAILY
Status: CANCELLED | OUTPATIENT
Start: 2022-03-24

## 2022-03-23 RX ORDER — MONTELUKAST SODIUM 10 MG/1
10 TABLET ORAL
Status: DISCONTINUED | OUTPATIENT
Start: 2022-03-23 | End: 2022-04-02 | Stop reason: HOSPADM

## 2022-03-23 RX ORDER — MONTELUKAST SODIUM 10 MG/1
10 TABLET ORAL
Status: CANCELLED | OUTPATIENT
Start: 2022-03-23

## 2022-03-23 RX ORDER — ALUMINA, MAGNESIA, AND SIMETHICONE 2400; 2400; 240 MG/30ML; MG/30ML; MG/30ML
20 SUSPENSION ORAL
Status: DISCONTINUED | OUTPATIENT
Start: 2022-03-23 | End: 2022-04-02 | Stop reason: HOSPADM

## 2022-03-23 RX ORDER — HYDRALAZINE HYDROCHLORIDE 25 MG/1
25 TABLET, FILM COATED ORAL EVERY 8 HOURS PRN
Status: DISCONTINUED | OUTPATIENT
Start: 2022-03-23 | End: 2022-04-02 | Stop reason: HOSPADM

## 2022-03-23 RX ORDER — AMITRIPTYLINE HYDROCHLORIDE 25 MG/1
75 TABLET, FILM COATED ORAL
Status: DISCONTINUED | OUTPATIENT
Start: 2022-03-23 | End: 2022-04-02 | Stop reason: HOSPADM

## 2022-03-23 RX ORDER — ACETAMINOPHEN 325 MG/1
650 TABLET ORAL EVERY 6 HOURS PRN
Status: CANCELLED | OUTPATIENT
Start: 2022-03-23

## 2022-03-23 RX ORDER — ONDANSETRON 2 MG/ML
4 INJECTION INTRAMUSCULAR; INTRAVENOUS 4 TIMES DAILY PRN
Status: DISCONTINUED | OUTPATIENT
Start: 2022-03-23 | End: 2022-04-02 | Stop reason: HOSPADM

## 2022-03-23 RX ORDER — ACETAMINOPHEN 325 MG/1
650 TABLET ORAL EVERY 4 HOURS PRN
Status: DISCONTINUED | OUTPATIENT
Start: 2022-03-23 | End: 2022-04-02 | Stop reason: HOSPADM

## 2022-03-23 RX ORDER — LEVOTHYROXINE SODIUM 88 UG/1
88 TABLET ORAL DAILY
Status: CANCELLED | OUTPATIENT
Start: 2022-03-24

## 2022-03-23 RX ORDER — TRAZODONE HYDROCHLORIDE 50 MG/1
50 TABLET ORAL
Status: DISCONTINUED | OUTPATIENT
Start: 2022-03-23 | End: 2022-04-02 | Stop reason: HOSPADM

## 2022-03-23 RX ORDER — SUCRALFATE 1 G/1
1 TABLET ORAL
Status: DISCONTINUED | OUTPATIENT
Start: 2022-03-23 | End: 2022-04-02 | Stop reason: HOSPADM

## 2022-03-23 RX ORDER — TRAMADOL HYDROCHLORIDE 50 MG/1
50 TABLET ORAL EVERY 4 HOURS PRN
Status: DISCONTINUED | OUTPATIENT
Start: 2022-03-23 | End: 2022-04-02 | Stop reason: HOSPADM

## 2022-03-23 RX ORDER — ALBUTEROL SULFATE 90 UG/1
2 AEROSOL, METERED RESPIRATORY (INHALATION)
Status: DISCONTINUED | OUTPATIENT
Start: 2022-03-23 | End: 2022-04-02 | Stop reason: HOSPADM

## 2022-03-23 RX ORDER — SUCRALFATE 1 G/1
1 TABLET ORAL
Status: CANCELLED | OUTPATIENT
Start: 2022-03-23

## 2022-03-23 RX ADMIN — SUCRALFATE 1 G: 1 TABLET ORAL at 20:27

## 2022-03-23 RX ADMIN — LEVOTHYROXINE SODIUM 88 MCG: 0.09 TABLET ORAL at 05:53

## 2022-03-23 RX ADMIN — Medication 2000 UNITS: at 15:36

## 2022-03-23 RX ADMIN — MONTELUKAST SODIUM 10 MG: 10 TABLET, FILM COATED ORAL at 20:27

## 2022-03-23 RX ADMIN — HYDROCODONE BITARTRATE AND ACETAMINOPHEN 1 TABLET: 10; 325 TABLET ORAL at 00:32

## 2022-03-23 RX ADMIN — HYDROCODONE BITARTRATE AND ACETAMINOPHEN 1 TABLET: 10; 325 TABLET ORAL at 05:54

## 2022-03-23 RX ADMIN — ESCITALOPRAM OXALATE 20 MG: 10 TABLET ORAL at 05:53

## 2022-03-23 RX ADMIN — HYDROCODONE BITARTRATE AND ACETAMINOPHEN 1 TABLET: 10; 325 TABLET ORAL at 18:29

## 2022-03-23 RX ADMIN — AMITRIPTYLINE HYDROCHLORIDE 75 MG: 25 TABLET, FILM COATED ORAL at 20:27

## 2022-03-23 RX ADMIN — ROSUVASTATIN CALCIUM 20 MG: 10 TABLET, FILM COATED ORAL at 20:27

## 2022-03-23 RX ADMIN — SUCRALFATE 1 G: 1 TABLET ORAL at 17:42

## 2022-03-23 RX ADMIN — METOPROLOL TARTRATE 12.5 MG: 25 TABLET, FILM COATED ORAL at 05:51

## 2022-03-23 RX ADMIN — METOPROLOL TARTRATE 12.5 MG: 25 TABLET, FILM COATED ORAL at 20:27

## 2022-03-23 RX ADMIN — HYDROCODONE BITARTRATE AND ACETAMINOPHEN 1 TABLET: 10; 325 TABLET ORAL at 14:33

## 2022-03-23 RX ADMIN — ENOXAPARIN SODIUM 40 MG: 40 INJECTION SUBCUTANEOUS at 05:55

## 2022-03-23 RX ADMIN — SUCRALFATE 1 G: 1 TABLET ORAL at 08:03

## 2022-03-23 RX ADMIN — OMEPRAZOLE 20 MG: 20 CAPSULE, DELAYED RELEASE ORAL at 14:33

## 2022-03-23 ASSESSMENT — LIFESTYLE VARIABLES
TOTAL SCORE: 0
HOW MANY TIMES IN THE PAST YEAR HAVE YOU HAD 5 OR MORE DRINKS IN A DAY: 0
HAVE PEOPLE ANNOYED YOU BY CRITICIZING YOUR DRINKING: NO
EVER FELT BAD OR GUILTY ABOUT YOUR DRINKING: NO
AVERAGE NUMBER OF DAYS PER WEEK YOU HAVE A DRINK CONTAINING ALCOHOL: 0
ALCOHOL_USE: NO
HAVE YOU EVER FELT YOU SHOULD CUT DOWN ON YOUR DRINKING: NO
TOTAL SCORE: 0
ON A TYPICAL DAY WHEN YOU DRINK ALCOHOL HOW MANY DRINKS DO YOU HAVE: 0
CONSUMPTION TOTAL: NEGATIVE
EVER HAD A DRINK FIRST THING IN THE MORNING TO STEADY YOUR NERVES TO GET RID OF A HANGOVER: NO
TOTAL SCORE: 0
EVER_SMOKED: NEVER

## 2022-03-23 ASSESSMENT — FIBROSIS 4 INDEX: FIB4 SCORE: 1.4

## 2022-03-23 ASSESSMENT — PAIN DESCRIPTION - PAIN TYPE
TYPE: ACUTE PAIN

## 2022-03-23 NOTE — PROGRESS NOTES
Discharge instructions given and discussed, signed copy in chart. Pt verbalized understanding and all questions answered. Pt discharged to [facility] in stable condition on room air escorted by [facility] transporter. Personal belongings verified with patient patient. IV removed and tolerated well. Tele box removed, monitor room notified.

## 2022-03-23 NOTE — CARE PLAN
The patient is Stable - Low risk of patient condition declining or worsening    Shift Goals  Clinical Goals: manage pain , prevent falls  Patient Goals: discharge    Progress made toward(s) clinical / shift goals:        Problem: Pain - Standard  Goal: Alleviation of pain or a reduction in pain to the patient’s comfort goal  Outcome: Progressing     Problem: Knowledge Deficit - Standard  Goal: Patient and family/care givers will demonstrate understanding of plan of care, disease process/condition, diagnostic tests and medications  Outcome: Progressing     Problem: Depression  Goal: Patient and family/caregiver will verbalize accurate information about at least two of the possible causes of depression, three-four of the signs and symptoms of depression  Outcome: Progressing

## 2022-03-23 NOTE — CARE PLAN
The patient is Stable - Low risk of patient condition declining or worsening    Shift Goals  Clinical Goals: manage pain  Patient Goals: get rest overnight    Progress made toward(s) clinical / shift goals:        Problem: Pain - Standard  Goal: Alleviation of pain or a reduction in pain to the patient’s comfort goal  Outcome: Progressing     Problem: Knowledge Deficit - Standard  Goal: Patient and family/care givers will demonstrate understanding of plan of care, disease process/condition, diagnostic tests and medications  Outcome: Progressing       Patient is not progressing towards the following goals: none

## 2022-03-23 NOTE — PROGRESS NOTES
-----------Non-Face-to-Face------------  Prolonged non-face-to-face time was spent on 3/22/22/ from 1630 to 1705 by this reviewer.  This time included medical chart review, medication review, and decision making for the appropriateness of transfer to inpatient rehabilitation.  Please see PM&R Chart Review Consult from below by this provider for detailed summation of the medical chart and the reasoning for current recommendations. In addition to the above, time was spent coordinating care with case management as well as transitional care coordination team in the acceptance and transfer of the patient to the inpatient rehabilitation facility setting. Patient does have OT needs as well as PT, plan for admission tomorrow.     PM&R Chart Review Consult:  Patient is a 72 y.o. with a PMH of HTN, chronic pain on opiates, hypothyroidism, HTN, HLD, and CAD who presented on 3/21 with ground level fall and severe pain. Patient reportedly fell backwards and landed on her back. She was found on imaging to have an L2 fracture. NSG was consulted and recommended TLSO and conservative management.  Hospital course complicated by anemia, hyperglycemia, and leukocytosis as well as difficult to control pain. TTE was performed due to concern she may have had syncope and it showed EF of 60%. Of note she had COVID-19 within last 90 days.     Patient was last evaluated by PT on 3/22/22 and was Leah for mobility. Patient was last evaluated by OT on 3/22/22 and was min-maxA for ADLs. Patient was previously living independently in a 1 story home with no steps to enter; living independently with FWW/SPC.

## 2022-03-23 NOTE — DISCHARGE SUMMARY
Discharge Summary    CHIEF COMPLAINT ON ADMISSION  Chief Complaint   Patient presents with   • Back Pain     Mid back pain after a fall a few days ago, hx of a back surgery in 2017, she reports neuropathy to legs and no feeling in her thighs.        Reason for Admission  EMS     Admission Date  3/21/2022    CODE STATUS  Full Code    HPI & HOSPITAL COURSE  Ms. Gillian Go is a 72-year-old female who comes into the hospital with a ground-level fall.  The patient fell during the night while going to the bathroom and twisting in a strange position.  She went down and hit her back.  Afterwards she noticed that she had multiple bruises as well.  She has had back pain since.  Came into the hospital for evaluation imaging studies revealed a closed fracture of the lumbar vertebrae at L2 specifically to transverse process on the left.  No other fractures were found.  Given the fact that the patient fell she also underwent an echocardiogram which shows a normal echocardiogram with a left ventricular ejection fraction of 60%.  The patient had COVID-19 back in the beginning of January.  Since then she has had progressive weakness in her lower extremities specifically her thighs.  The patient's thighs at this point are so weak that she cannot support weight on them.  This is most most likely precipitated the fall.  Her work-up otherwise is negative.  Kidneys and liver functions were normal.  Electrolytes were monitored and adjusted as necessary.  Overall patient's condition has been stabilized and at this point recommendation was for a TLSO brace as the condition is nonsurgical.  Weightbearing is at this point as tolerated.  Diet at this point as tolerated.  We had recommended the patient go to rehab and rehab at this point has accepted the patient.  Patient this will be transferred to rehab for ongoing physical therapy occupational therapy and any other therapies modalities that the patient may require.  Patient is agreeable  to the transfer.    Therefore, she is discharged in good and stable condition to an inpatient rehabilitation hospital.    The patient met 2-midnight criteria for an inpatient stay at the time of discharge.    Discharge Date  3/23/2022    FOLLOW UP ITEMS POST DISCHARGE  Follow-up with the primary care physician after discharge    DISCHARGE DIAGNOSES  Principal Problem:    Closed fracture of lumbar vertebra, unspecified fracture morphology, initial encounter ([facility]) POA: Yes  Active Problems:    Physical debility POA: Unknown    HTN (hypertension) POA: Yes    Leukocytosis POA: Yes    Dyslipidemia POA: Yes    CAD (coronary artery disease) POA: Yes    Class 1 obesity due to excess calories with serious comorbidity and body mass index (BMI) of 32.0 to 32.9 in adult (Chronic) POA: Yes    Depression POA: Yes    Hypothyroidism (Chronic) POA: Yes    Chronic pain syndrome on chronic opioids (Chronic) POA: Yes    Personal history of COVID-19 POA: Yes  Resolved Problems:    * No resolved hospital problems. *      FOLLOW UP  No future appointments.  No follow-up provider specified.    MEDICATIONS ON DISCHARGE     Medication List      START taking these medications      Instructions   acetaminophen 325 MG Tabs  Commonly known as: Tylenol   Take 2 Tablets by mouth every 6 hours as needed.  Dose: 650 mg     enoxaparin 40 MG/0.4ML Soln inj  Start taking on: March 24, 2022  Commonly known as: LOVENOX   Inject 40 mg under the skin every day.  Dose: 40 mg        CONTINUE taking these medications      Instructions   amitriptyline 25 MG Tabs  Commonly known as: ELAVIL   Take 75 mg by mouth every day. 3 tablets = 75 mg  Dose: 75 mg     escitalopram 20 MG tablet  Commonly known as: LEXAPRO   Take 20 mg by mouth every day.  Dose: 20 mg     furosemide 40 MG Tabs  Commonly known as: LASIX   Take 40 mg by mouth 2 times a day.  Dose: 40 mg     HYDROcodone/acetaminophen  MG Tabs  Commonly known as: NORCO   Take 1 Tablet by mouth every four  hours as needed (PAIN).  Dose: 1 Tablet     levothyroxine 88 MCG Tabs  Commonly known as: SYNTHROID   Take 88 mcg by mouth every day. BRAND NAME ONLY  Dose: 88 mcg     metoprolol tartrate 25 MG Tabs  Commonly known as: LOPRESSOR   Take 12.5 mg by mouth 2 times a day.  Dose: 12.5 mg     montelukast 10 MG Tabs  Commonly known as: SINGULAIR   Take 10 mg by mouth every day.  Dose: 10 mg     ondansetron 4 MG Tbdp  Commonly known as: Zofran ODT   Take 1 Tablet by mouth every 8 hours as needed.  Dose: 4 mg     oxybutynin SR 10 MG CR tablet  Commonly known as: DITROPAN-XL   Take 10 mg by mouth every day.  Dose: 10 mg     rosuvastatin 20 MG Tabs  Commonly known as: CRESTOR   Take 20 mg by mouth every evening.  Dose: 20 mg     sucralfate 1 GM Tabs  Commonly known as: CARAFATE   Take 1 g by mouth 4 Times a Day,Before Meals and at Bedtime.  Dose: 1 g     sumatriptan 100 MG tablet  Commonly known as: IMITREX   Take 100 mg by mouth one time as needed for Migraine.  Dose: 100 mg            Allergies  Allergies   Allergen Reactions   • Fentanyl Vomiting   • Morphine Vomiting   • Rifampin      Pt turns yellow       DIET  Orders Placed This Encounter   Procedures   • Diet Order Diet: Cardiac     Standing Status:   Standing     Number of Occurrences:   1     Order Specific Question:   Diet:     Answer:   Cardiac [6]       ACTIVITY  As tolerated.  Weight bearing as tolerated    CONSULTATIONS  Physiatry    PROCEDURES  None    LABORATORY  Lab Results   Component Value Date    SODIUM 135 03/22/2022    POTASSIUM 4.2 03/22/2022    CHLORIDE 103 03/22/2022    CO2 21 03/22/2022    GLUCOSE 159 (H) 03/22/2022    BUN 29 (H) 03/22/2022    CREATININE 0.62 03/22/2022    CREATININE 0.87 02/20/2012        Lab Results   Component Value Date    WBC 14.9 (H) 03/22/2022    WBC 6.9 02/20/2012    HEMOGLOBIN 11.0 (L) 03/22/2022    HEMATOCRIT 35.4 (L) 03/22/2022    PLATELETCT 156 (L) 03/22/2022        Total time of the discharge process exceeds 35 minutes.

## 2022-03-23 NOTE — FLOWSHEET NOTE
03/23/22 1436   Patient History   Pulmonary Diagnosis asthma, COPD   Procedures Relevant to Respiratory Status none   Home O2 No   Nocturnal CPAP No   Home Treatments/Frequency Yes   MDI 1/Frequency Dulera BID   MDI 2/Frequency Albuterol PRN   Protocol Pathways   Protocol Pathways Bronchodilator Protocol   Bronchodilator Protocol   Med Order With RCP No   Is this an exacerbation of COPD/Asthma? No   Bronchodilator Indications History / Diagnosis   Breath Sounds Criteria 1    Benefit Criteria 1    Pulse Criteria 0    Respiratory Rate Criteria 0    S.O.B. Criteria 1    Criteria Total 3   Point Values 0-3 - PRN   Bronchodilator Goals/Outcome Patient at Stable Baseline

## 2022-03-23 NOTE — PROGRESS NOTES
Patient admitted to facility at 1315 via [facility]/W/C.  Patient assisted to room and positioned edge of bed for comfort and safety; call light within reach.  Patient assisted with stowing belongings and oriented to room and facility.  Admission assessment performed and documented in computer.  Admission paperwork completed; signed copies placed in chart.  Will continue to monitor.

## 2022-03-23 NOTE — PROGRESS NOTES
Tele Shift Summary:     Rhythm: NSR   Rate: 70s-90s  Measurements: 0.16/0.08/0.36     Telemetry monitoring strips placed in patient's chart.

## 2022-03-23 NOTE — DISCHARGE PLANNING
Anticipated Discharge Disposition: Rehab      Action: Discussed pt during IDT rounds. Pt accepted to Rehab, Hayder notified this CM RN about transport time. Cobra completed and placed on chart. Pt stating has notified dtr already about transport.     Barriers to Discharge: Medical Clearance     Plan: Case management to follow up medical team with any other needs

## 2022-03-23 NOTE — DISCHARGE PLANNING
Dr. Golden has medically cleared.  Will discuss this case with the Admissions Team this morning.     0954-Case is under review by Dr. Lux.     1002-Spoke with Gillian regarding [facility] and she is agreeable.  Dr. Lux has accepted.  ZAHIRA Dowd is looking into transport.     1016-Transport has been arranged via [facility] between [id number].  Msg placed to Dr. Golden.  Cam HOWARD & Lisa MCCRARY are aware.

## 2022-03-23 NOTE — PREADMISSION SCREENING NOTE
Pre-Admission Screening Form    Patient Information:   Name: Yamile Go     MRN: [id number]       : 1950      Age: 72 y.o.   Gender: female      Race: White [7]       Marital Status: Single [1]  Family Contact: Brittany Azul Angelique        Relationship: Daughter [2]  Friend [5]  Home Phone: [phone]           Cell Phone: [phone]  Advanced Directives: None  Code Status:  FULL  Current Attending Provider: Ben Golden M.D.  Referring Physician: Dr. Golden  Physiatrist Consult: Dr. Lux   Referral Date: 22  Primary Payor Source:  MEDICARE  Secondary Payor Source:  [employer] - MISCELLANEOUS    Medical Information:   Date of Admission to Acute Care Setting:3/21/2022  Room Number: 3310/01  Rehabilitation Diagnosis: 0016 - Debility (Non-Cardiac, Non-Pulmonary)  Immunization History   Administered Date(s) Administered   • Moderna SARS-CoV-2 Vaccine 2021, 2021     Allergies   Allergen Reactions   • Fentanyl Vomiting   • Morphine Vomiting   • Rifampin      Pt turns yellow     Past Medical History:   Diagnosis Date   • Asthma    • Depression    • HTN (hypertension)    • Hyperlipidemia    • Migraines    • Obesity    • Sleep apnea     diagnosed 2009 CPAP -PMA     Past Surgical History:   Procedure Laterality Date   • ABDOMINAL HYSTERECTOMY TOTAL     • APPENDECTOMY     • BREAST BIOPSY      no cancer   • COLECTOMY      partial for divverticulitis    • LUMBAR DISC REPLACEMENT         History Leading to Admission, Conditions that Caused the Need for Rehab (CMS):     Dr. Samano H&P:  Chief Complaint   Patient presents with   • Back Pain       Mid back pain after a fall a few days ago, hx of a back surgery in , she reports neuropathy to legs and no feeling in her thighs.          History of Presenting Illness  Yamile Go is a 72 y.o. female PMH hypertension, hypothyroidism, chronic pain on Norco 10 at home, depression, recent  COVID-19 pneumonia 01/22 who presented 3/21/2022 with fall at home.  Patient stated she was feeling weak, has been more sedentary since having COVID-19, more bedbound and has not returned to work and patient also using a walker now.  She states she had fallen today, and during a fall hit her back on nightstand caused her significant pain, nonradiating, described as 7 out of 10, worsened with movement. She states she is off of oxygen.  Patient recently had COVID-19 on 01/21/2022, she was hospitalized and required treatment for pneumonia, she was discharged on home oxygen 2 L nasal cannula.   Assessment/Plan:  I anticipate this patient will require at least two midnights for appropriate medical management, necessitating inpatient admission.     * Closed fracture of lumbar vertebra, unspecified fracture morphology, initial encounter ([facility])- (present on admission)  Assessment & Plan  L2 transverse fracture seen on CT lumbar  Patient hit back onto nightstand during her fall at home  Non-surgical management as per EDP  - PT/OT  - Pain control  - fall precautions  - admitted to telemetry  - monitor for bradycardia while on metoprolol, concern for chronic opioid use contributing to fall  - checking TSH, CPK, Echo, ECG     Leukocytosis- (present on admission)  Assessment & Plan  WBC 25,000  No obvious source of infection, patient denied any changes in chronic coughing  UA negative  -Trend CBC, checking procalcitonin  -Hold antibiotics at this time     Personal history of COVID-19- (present on admission)  Assessment & Plan  Patient off oxygen now  On lasix and using walker, deconditioned from having covid  - PT/OT     Chronic pain syndrome on chronic opioids- (present on admission)  Assessment & Plan  Patient on Norco 10 chronically at home  May contribute to weakness     Hypothyroidism- (present on admission)  Assessment & Plan  Continue home levothyroxine 88 mcg  Checking TSH given new fall     Depression- (present on  admission)  Assessment & Plan  Continue home amitriptyline and escitalopram  She does appear in sad mood     Class 1 obesity due to excess calories with serious comorbidity and body mass index (BMI) of 32.0 to 32.9 in adult- (present on admission)  Assessment & Plan  Patient has been more sedentary after covid  Will need improvement in her debility to help with weight management at home  PT/OT     HTN (hypertension)- (present on admission)  Assessment & Plan  Patient is on metoprolol and lasix  Will continue metoprolol  Hold lasix, patient does not appear to be fluid overloaded     CAD (coronary artery disease)- (present on admission)  Assessment & Plan  Continue home crestor and metoprolol  Monitor for bradycardia     Dyslipidemia- (present on admission)  Assessment & Plan  Continue crestor        VTE prophylaxis: enoxaparin ppx    Ben Golden M.D.   Physician   Hospital Medicine   Progress Notes      Signed   Date of Service:  3/22/2022 12:18 PM                         []Hide copied text    []Angelica for details    Hospital Medicine Daily Progress Note     Date of Service  3/22/2022     Chief Complaint  Yamile Go is a 72 y.o. female admitted 3/21/2022 with ground-level fall and back pain     Hospital Course  Ms. Gillian Go is a 72-year-old female who comes in with a ground-level fall due to generalized weakness.  The generalized weakness seems to be secondary to prolonged COVID-19 infection.  Patient at this point will need placement to skilled facility as she is unable to take care of herself at home and her generalized weakness is extreme.  Her L2 transverse process fracture at this point will need a TLSO brace otherwise is nonsurgical.  Continue with pain management.     Discussion held with the daughter regarding her mom's condition.  I gave her updates on the treatment plan as well as the course of action needed.  I did obtain permission from the patient and she gave me permission to give  information to the daughter.     Interval Problem Update  Optimize at this point pain management  PT OT evaluation  Placement to skilled facility  Medically cleared to go to the skilled facility.     I have personally seen and examined the patient at bedside. I discussed the plan of care with patient, bedside RN, charge RN,  and pharmacy.     Consultants/Specialty  None     Code Status  Full Code     Disposition  Patient is medically cleared for discharge.   Anticipate discharge to to skilled nursing facility.  I have placed the appropriate orders for post-discharge needs.     Review of Systems  Review of Systems   Constitutional: Negative.  Negative for chills, diaphoresis and fever.   HENT: Negative.    Eyes: Negative.  Negative for double vision.   Respiratory: Negative.  Negative for cough, hemoptysis and wheezing.    Cardiovascular: Negative.  Negative for chest pain, palpitations and leg swelling.   Gastrointestinal: Negative.  Negative for abdominal pain, blood in stool, constipation, diarrhea, heartburn, nausea and vomiting.   Genitourinary: Negative.  Negative for frequency, hematuria and urgency.   Musculoskeletal: Positive for back pain and myalgias. Negative for joint pain.   Skin: Negative.  Negative for itching and rash.   Neurological: Negative.  Negative for dizziness, focal weakness, seizures, loss of consciousness and headaches.   Endo/Heme/Allergies: Negative.  Does not bruise/bleed easily.   Psychiatric/Behavioral: Positive for depression. Negative for substance abuse and suicidal ideas. The patient is not nervous/anxious.    All other systems reviewed and are negative.        Physical Exam  Temp:  [36.6 °C (97.9 °F)-37.1 °C (98.8 °F)] 36.8 °C (98.2 °F)  Pulse:  [] 87  Resp:  [16-19] 18  BP: (102-171)/(48-89) 135/64  SpO2:  [94 %-99 %] 95 %     Physical Exam  Vitals and nursing note reviewed. Exam conducted with a chaperone present.   Constitutional:       General: She is awake.       Appearance: Normal appearance. She is well-developed, well-groomed and normal weight.   HENT:      Head: Normocephalic and atraumatic.      Jaw: There is normal jaw occlusion. No trismus.      Salivary Glands: Right salivary gland is not tender. Left salivary gland is not tender.      Right Ear: External ear normal.      Left Ear: External ear normal.      Nose: Nose normal.      Mouth/Throat:      Mouth: Mucous membranes are moist.      Pharynx: Oropharynx is clear.   Eyes:      General: Lids are normal. Vision grossly intact.      Extraocular Movements: Extraocular movements intact.      Conjunctiva/sclera: Conjunctivae normal.      Right eye: Right conjunctiva is not injected. No exudate.     Left eye: Left conjunctiva is not injected. No exudate.     Pupils: Pupils are equal, round, and reactive to light.   Neck:      Thyroid: No thyroid mass.      Vascular: No hepatojugular reflux or JVD.      Trachea: No abnormal tracheal secretions or tracheal deviation.   Cardiovascular:      Rate and Rhythm: Normal rate and regular rhythm. Occasional extrasystoles are present.     Pulses: Normal pulses.      Heart sounds: Normal heart sounds. No murmur heard.    No friction rub.   Pulmonary:      Effort: Pulmonary effort is normal.      Breath sounds: Normal breath sounds. No wheezing or rhonchi.   Chest:   Breasts:      Right: No supraclavicular adenopathy.      Left: No supraclavicular adenopathy.         Abdominal:      General: Abdomen is flat.      Palpations: Abdomen is soft.      Tenderness: There is no abdominal tenderness. There is no right CVA tenderness or left CVA tenderness.      Hernia: No hernia is present.   Musculoskeletal:         General: Normal range of motion.      Cervical back: Full passive range of motion without pain, normal range of motion and neck supple. No rigidity. No muscular tenderness.      Right lower leg: No edema.      Left lower leg: No edema.   Lymphadenopathy:      Head:       Right side of head: No submental adenopathy.      Left side of head: No submental adenopathy.      Cervical:      Right cervical: No superficial cervical adenopathy.     Left cervical: No superficial cervical adenopathy.      Upper Body:      Right upper body: No supraclavicular adenopathy.      Left upper body: No supraclavicular adenopathy.   Skin:     General: Skin is warm and dry.      Capillary Refill: Capillary refill takes less than 2 seconds.      Coloration: Skin is not cyanotic or pale.      Findings: No abrasion or bruising.   Neurological:      General: No focal deficit present.      Mental Status: She is alert and oriented to person, place, and time. Mental status is at baseline.      GCS: GCS eye subscore is 4. GCS verbal subscore is 5. GCS motor subscore is 6.      Cranial Nerves: No cranial nerve deficit.      Sensory: No sensory deficit.      Motor: Motor function is intact.      Deep Tendon Reflexes:      Reflex Scores:       Tricep reflexes are 2+ on the right side and 2+ on the left side.       Bicep reflexes are 2+ on the right side and 2+ on the left side.       Brachioradialis reflexes are 2+ on the right side and 2+ on the left side.       Patellar reflexes are 2+ on the right side and 2+ on the left side.       Achilles reflexes are 2+ on the right side and 2+ on the left side.  Psychiatric:         Attention and Perception: Attention and perception normal.         Mood and Affect: Mood normal.         Speech: Speech normal.         Behavior: Behavior is cooperative.         Thought Content: Thought content normal.         Cognition and Memory: Cognition and memory normal.         Judgment: Judgment normal.            Fluids     Intake/Output Summary (Last 24 hours) at 3/22/2022 1218  Last data filed at 3/22/2022 0900      Gross per 24 hour   Intake 240 ml   Output --   Net 240 ml         Laboratory       Recent Labs     03/21/22  1350 03/22/22  0237   WBC 25.0* 14.9*   RBC 4.52 3.95*    HEMOGLOBIN 13.0 11.0*   HEMATOCRIT 39.9 35.4*   MCV 88.3 89.6   MCH 28.8 27.8   MCHC 32.6* 31.1*   RDW 47.0 48.0   PLATELETCT 208 156*   MPV 9.8 9.4           Recent Labs     03/21/22  1350 03/22/22  0237   SODIUM 133* 135   POTASSIUM 4.7 4.2   CHLORIDE 100 103   CO2 21 21   GLUCOSE 114* 159*   BUN 39* 29*   CREATININE 0.81 0.62   CALCIUM 8.8 8.2*                     Imaging  CT-LSPINE W/O PLUS RECONS   Final Result       1.  Fracture of the transverse process of L2 on the left.   2.  No acute compression fracture is identified. Mild loss of height of L4 is unchanged.   3.  Multilevel degenerative changes as above described.   4.  Leftward curvature of the lumbar spine.   5.  Demineralization which limits evaluation.       CT-TSPINE W/O PLUS RECONS   Final Result       1.  No thoracic spine fracture or subluxation.   2.  Mildly accentuated kyphosis with S-shaped curvature of thoracic spine.   3.  Moderate multilevel degenerative change.       CT-HEAD W/O   Final Result       1.  No acute intracranial abnormality is identified.   2.  Atrophy           DX-CHEST-PORTABLE (1 VIEW)   Final Result           1. No acute cardiopulmonary abnormalities are identified.       EC-ECHOCARDIOGRAM COMPLETE W/O CONT    (Results Pending)         Assessment/Plan  * Closed fracture of lumbar vertebra, unspecified fracture morphology, initial encounter ([facility])- (present on admission)  Assessment & Plan  Patient due to bilateral thigh weakness had lost her strength and fell to the ground.  In the process she hit her back.  Imaging studies show an L2 transverse fracture  At this point this is a nonsurgical pain management  TLSO brace ordered  Patient still unable to perform activities of daily living and thus will need to go to a skilled facility.  After discussion with her she would like to go to advanced care.  This was discussed with case management and they will be making referrals in her behalf.        Physical debility  Assessment &  Plan  Severe physical debility since having COVID-19 infection back in January.  Patient is getting actually worse and now has lost strength in her lower extremities.  Patient will need extensive rehabilitation and thus will need to go to skilled facility for this.     Leukocytosis- (present on admission)  Assessment & Plan  No source of infection found  White blood cell count coming down considerably  Most likely stress response  Do not at this point initiate antibiotics     HTN (hypertension)- (present on admission)  Assessment & Plan  Optimize blood pressure management keep systolic blood pressure less than 140 diastolic under 90.  Continue with metoprolol 12.5 mg twice daily  As needed labetalol     Personal history of COVID-19- (present on admission)  Assessment & Plan  Resolved  Recent test negative  Most likely the reason that the patient is extremely debilitated is her prolonged COVID-19 course     Chronic pain syndrome on chronic opioids- (present on admission)  Assessment & Plan  Patient has an outpatient narcotic contract.  Continue with pain management while in the hospital on discharge patient will not be able to have her narcotics refilled or adjusted     Hypothyroidism- (present on admission)  Assessment & Plan  -supportive measures with synthroid supplementation at 88 mcg per day, no change  -latest TSH is 0.384 and this is with in establish normal guidlines      Depression- (present on admission)  Assessment & Plan  Chronic depression, patient is very is emotional and quick to change her emotions.  Continue at this point with amitriptyline and Lexapro.     Class 1 obesity due to excess calories with serious comorbidity and body mass index (BMI) of 32.0 to 32.9 in adult- (present on admission)  Assessment & Plan  Body mass index is 31.77 kg/m².  Outpatient weight loss management program  Lifestyle modification     CAD (coronary artery disease)- (present on admission)  Assessment & Plan  Optimize  "medication management  Currently patient is not complaining of any chest pain.     Dyslipidemia- (present on admission)  Assessment & Plan  Low-fat low-cholesterol diet  Statin  Fasting lipid panel           VTE prophylaxis: SCDs/TEDs        Co-morbidities: See PMH  Potential Risk - Complications: Contractures, Deep Vein Thrombosis, Incontinence, Malnutrition, Pain, Pneumonia, Pressure Ulcer and Urinary Tract Infection  Level of Risk: High    Ongoing Medical Management Needed (Medical/Nursing Needs):   Patient Active Problem List    Diagnosis Date Noted   • Physical debility 03/22/2022   • Personal history of COVID-19 03/21/2022   • Closed fracture of lumbar vertebra, unspecified fracture morphology, initial encounter ([facility]) 03/21/2022   • Leukocytosis 03/21/2022   • Acute hypoxemic respiratory failure due to COVID-19 (formerly [facility]) 02/06/2022   • Community acquired pneumonia 02/06/2022   • Sepsis (formerly [facility]) 02/06/2022   • Hypothyroidism 02/06/2022   • Chronic pain syndrome on chronic opioids 02/06/2022   • HTN (hypertension)    • Hyperlipidemia    • Class 1 obesity due to excess calories with serious comorbidity and body mass index (BMI) of 32.0 to 32.9 in adult    • Depression    • Other chest pain 01/30/2012   • Dyslipidemia 01/30/2012   • Essential hypertension, benign 01/30/2012   • CAD (coronary artery disease) 01/30/2012     A & O  \  Current Vital Signs:   Temperature: 36.6 °C (97.9 °F) Pulse: 81 Respiration: 16 Blood Pressure : 149/71  Weight: 89.5 kg (197 lb 5 oz) Height: 162.6 cm (5' 4.02\")  Pulse Oximetry: 95 % O2 (LPM): 0      Completed Laboratory Reports:  Recent Labs     03/21/22  1350 03/22/22  0237   WBC 25.0* 14.9*   HEMOGLOBIN 13.0 11.0*   HEMATOCRIT 39.9 35.4*   PLATELETCT 208 156*   SODIUM 133* 135   POTASSIUM 4.7 4.2   BUN 39* 29*   CREATININE 0.81 0.62   ALBUMIN 3.9 3.3   GLUCOSE 114* 159*     Additional Labs: Not Applicable    Prior Living Situation:   Housing / Facility: 1 Story Apartment / Condo  Steps " Into Home: 0  Steps In Home: 0  Lives with - Patient's Self Care Capacity: Alone and Able to Care For Self  Equipment Owned: Front-Wheel Walker,Single Point Cane    Prior Level of Function / Living Situation:   Physical Therapy: Prior Services: Home-Independent  Housing / Facility: [address] [address] / Condo  Steps Into Home: 0  Steps In Home: 0  Bathroom Set up: Bathtub / Shower Combination  Equipment Owned: Front-Wheel Walker,Single Point Cane  Lives with - Patient's Self Care Capacity: Alone and Able to Care For Self  Bed Mobility: Independent  Transfer Status: Independent  Ambulation: Independent  Current Level of Function:   Gait Level Of Assist: Minimal Assist  Assistive Device: Front Wheel Walker  Distance (Feet): 20  Deviation: Antalgic,Shuffled Gait  Supine to Sit: Minimal Assist  Sit to Supine: Minimal Assist  Scooting: Contact Guard Assist  Rolling: Minimal Assist to Rt.  Sit to Stand: Minimal Assist  Bed, Chair, Wheelchair Transfer: Minimal Assist  Sitting in Chair: NT  Sitting Edge of Bed: < 10 minutes total  Standing: < 3 minutes total  Occupational Therapy:      Prior Services: Home-Independent  Housing / Facility: [address] [place] [address] Apartment / Condo  Current Level of Function:   Eating: Modified Independent  Upper Body Dressing: Moderate Assist  Lower Body Dressing: Maximal Assist (attempted figure four)  Speech Language Pathology:      Rehabilitation Prognosis/Potential: Good  Estimated Length of Stay: 7-10 days    Nursing:      Incontinent-external cath in place    Scope/Intensity of Services Recommended:  Physical Therapy: 1.5 hr / day  5 days / week. Therapeutic Interventions Required: Maximize Endurance, Mobility, Strength and Safety  Occupational Therapy: 1.5 hr / day 5 days / week. Therapeutic Interventions Required: Maximize Self Care, ADLs, IADLs and Energy Conservation  Rehabilitation Nursin/7. Therapeutic Interventions Required: Monitor Pain, Skin, Vital Signs, Intake and Output, Labs and  Safety  Rehabilitation Physician: 3 - 5 days / week. Therapeutic Interventions Required: Medical Management    She requires 24-hour rehabilitation nursing to manage bowel and bladder function, skin care, nutrition and fluid intake, pain control, safety, medication management and patient/family goals. In addition, rehabilitation nursing will reiterate and reinforce therapy skills and equipment use, including ADLs, as well as provide education to the patient and family. Yamile Go is willing to participate in and is able to tolerate the proposed plan of care.    Rehabilitation Goals and Plan (Expected frequency & duration of treatment in the IRF):   Return to the Community, Modified Independent Level of Care and Outpatient Support  Anticipated Date of Rehabilitation Admission: 03-23-22  Patient/Family oriented IRF level of care/facility/plan: Yes  Patient/Family willing to participate in IRF care/facility/plan: Yes  Patient able to tolerate IRF level of care proposed: Yes  Patient has potential to benefit IRF level of care proposed: Yes  Comments: Not Applicable    Special Needs or Precautions - Medical Necessity:  Safety Concerns/Precautions:  Fall Risk / High Risk for Falls, Balance and Bed / Chair Alarm  Pain Management  Precautions: Spinal / Back Precautions ,TLSO (Thoracolumbosacral orthosis)  Comments: L2 transverse process fracture  IV Site: Peripheral  Current Medications:    Current Facility-Administered Medications Ordered in Epic   Medication Dose Route Frequency Provider Last Rate Last Admin   • senna-docusate (PERICOLACE or SENOKOT S) 8.6-50 MG per tablet 2 Tablet  2 Tablet Oral BID Anderson Samano M.D.        And   • polyethylene glycol/lytes (MIRALAX) PACKET 1 Packet  1 Packet Oral QDAY PRN Anderson Samano M.D.        And   • magnesium hydroxide (MILK OF MAGNESIA) suspension 30 mL  30 mL Oral QDAY PRN Anderson Samano M.D.        And   • bisacodyl (DULCOLAX) suppository 10 mg  10 mg  Rectal QDAY PRN Anderson Samano M.D.       • enoxaparin (LOVENOX) inj 40 mg  40 mg Subcutaneous DAILY Anderson Samano M.D.   40 mg at 03/23/22 0555   • acetaminophen (Tylenol) tablet 650 mg  650 mg Oral Q6HRS PRN Anderson Samano M.D.       • labetalol (NORMODYNE/TRANDATE) injection 10 mg  10 mg Intravenous Q4HRS PRN Anderson Samano M.D.       • ondansetron (ZOFRAN) syringe/vial injection 4 mg  4 mg Intravenous Q4HRS PRN Anderson Samano M.D.       • ondansetron (ZOFRAN ODT) dispertab 4 mg  4 mg Oral Q4HRS PRN Anderson Samano M.D.       • Pharmacy Consult Request ...Pain Management Review 1 Each  1 Each Other PHARMACY TO DOSE Anderson Samano M.D.       • amitriptyline (ELAVIL) tablet 75 mg  75 mg Oral QHS Anderson Samano M.D.   75 mg at 03/22/22 2025   • escitalopram (Lexapro) tablet 20 mg  20 mg Oral DAILY Anderson Samano M.D.   20 mg at 03/23/22 0553   • levothyroxine (SYNTHROID) tablet 88 mcg  88 mcg Oral DAILY Anderson Samano M.D.   88 mcg at 03/23/22 0553   • metoprolol tartrate (LOPRESSOR) tablet 12.5 mg  12.5 mg Oral BID Anderson Samano M.D.   12.5 mg at 03/23/22 0551   • montelukast (SINGULAIR) tablet 10 mg  10 mg Oral QHS Anderson Samano M.D.   10 mg at 03/22/22 2026   • rosuvastatin (CRESTOR) tablet 20 mg  20 mg Oral Q EVENING Anderson Samano M.D.   20 mg at 03/22/22 1722   • sucralfate (CARAFATE) tablet 1 g  1 g Oral 4X/DAY ACHS Anderson Samano M.D.   1 g at 03/23/22 0803   • HYDROcodone/acetaminophen (NORCO)  MG per tablet 1 Tablet  1 Tablet Oral Q4HRS PRN Anderson Samano M.D.   1 Tablet at 03/23/22 0554   • HYDROmorphone (Dilaudid) injection 0.25 mg  0.25 mg Intravenous Q3HRS PRN Anderson Samano M.D.       • HYDROcodone-acetaminophen (NORCO) 5-325 MG per tablet 1 Tablet  1 Tablet Oral Q6HRS PRN Anderson Samano M.D.         No current Epic-ordered outpatient medications on file.     Diet:   DIET ORDERS (From admission to next 24h)      Start     Ordered    03/21/22 1810  Diet Order Diet: Cardiac  ALL MEALS        Question:  Diet:  Answer:  Cardiac    03/21/22 1809                Anticipated Discharge Destination / Patient/Family Goal:  Destination: Home Alone Support System: None  Anticipated home health services: OT and PT  Previously used HH service/ provider: Not Applicable  Anticipated DME Needs: [employer]  Outpatient Services: OT and PT  Alternative resources to address additional identified needs:   No future appointments.  Pre-Screen Completed: 3/23/2022 9:45 AM Hayder Argueta L.P.N.

## 2022-03-23 NOTE — FLOWSHEET NOTE
03/23/22 1442   Events/Summary/Plan   Events/Summary/Plan RT assessment   Vital Signs   Pulse 84   Respiration 18   Pulse Oximetry 95 %   $ Pulse Oximetry (Spot Check) Yes   Respiratory Assessment   Level of Consciousness Alert   Chest Exam   Work Of Breathing / Effort Within Normal Limits   Breath Sounds   RML Breath Sounds Diminished   RLL Breath Sounds Diminished   LLL Breath Sounds Diminished   Oxygen   O2 Delivery Device None - Room Air   Smoking History   Have you ever smoked Never

## 2022-03-23 NOTE — H&P
REHABILITATION HISTORY AND PHYSICAL/POST ADMISSION PHYSICAL EVALUATION    Date of Admission: 3/23/2022  Yamile Go  RH25/02    [facility] Code / Diagnosis to Support: 0016 - Debility (Non-Cardiac, Non-Pulmonary)  Etiologic Diagnosis: Closed fracture of lumbar vertebra, unspecified fracture morphology, initial encounter ([facility])    CC: Decreased mobility, back pain    HPI:  Patient is a 72 y.o. with a PMH of HTN, chronic pain on opiates, hypothyroidism, HTN, HLD, and CAD who presented on 3/21 with ground level fall and severe pain. Patient reportedly fell backwards and landed on her back. She was found on imaging to have an L2 fracture. NSG was consulted and recommended TLSO and conservative management.  Hospital course complicated by anemia, hyperglycemia, and leukocytosis as well as difficult to control pain. TTE was performed due to concern she may have had syncope and it showed EF of 60%. Of note she had COVID-19 within last 90 days.      Patient was last evaluated by PT on 3/22/22 and was Leah for mobility. Patient was last evaluated by OT on 3/22/22 and was min-maxA for ADLs. Patient was previously living independently in a 1 story home with no steps to enter; living independently with FWW/SPC.     Patient tolerated transfer to [facility]. She reports her pain at rest is controlled. She has concerns about pain medications and discharge rx as she follows with [facility]. She reports her pain contract will not be voided if medications given until the follow-up. Discussed would arrange for them at discharge and will continue on the medications she was using at [facility]. She reports her legs give out due to the pain. Denies NVD. Denies SOB.     REVIEW OF SYSTEMS:     Comprehensive 14 point ROS was reviewed and all were negative except as noted elsewhere in this document.     PMH:  Past Medical History:   Diagnosis Date   • Asthma    • Depression    • HTN (hypertension)    • Hyperlipidemia    • Migraines    • Obesity    •  Sleep apnea     diagnosed 9/2009 CPAP -PMA       PSH:  Past Surgical History:   Procedure Laterality Date   • ABDOMINAL HYSTERECTOMY TOTAL     • APPENDECTOMY     • BREAST BIOPSY      no cancer   • COLECTOMY      partial for divverticulitis 2007   • LUMBAR DISC REPLACEMENT         FAMILY HISTORY:  Family History   Problem Relation Age of Onset   • Cancer Mother         lung   • Heart Disease Mother    • Stroke Father    • Heart Disease Father    • Diabetes Father    • Heart Disease Brother         cath and bypass   • Heart Disease Brother         cath and byp[ass[   • Heart Disease Brother         cath and bypass   • Heart Disease Brother    • Other Brother         MVA         MEDICATIONS:  Current Facility-Administered Medications   Medication Dose   • Respiratory Therapy Consult     • Pharmacy Consult Request ...Pain Management Review 1 Each  1 Each   • hydrALAZINE (APRESOLINE) tablet 25 mg  25 mg   • acetaminophen (Tylenol) tablet 650 mg  650 mg   • senna-docusate (PERICOLACE or SENOKOT S) 8.6-50 MG per tablet 2 Tablet  2 Tablet    And   • polyethylene glycol/lytes (MIRALAX) PACKET 1 Packet  1 Packet    And   • magnesium hydroxide (MILK OF MAGNESIA) suspension 30 mL  30 mL    And   • bisacodyl (DULCOLAX) suppository 10 mg  10 mg   • omeprazole (PRILOSEC) capsule 20 mg  20 mg   • artificial tears ophthalmic solution 1 Drop  1 Drop   • benzocaine-menthol (Cepacol) lozenge 1 Lozenge  1 Lozenge   • mag hydrox-al hydrox-simeth (MAALOX PLUS ES or MYLANTA DS) suspension 20 mL  20 mL   • ondansetron (ZOFRAN ODT) dispertab 4 mg  4 mg    Or   • ondansetron (ZOFRAN) syringe/vial injection 4 mg  4 mg   • traZODone (DESYREL) tablet 50 mg  50 mg   • sodium chloride (OCEAN) 0.65 % nasal spray 2 Spray  2 Spray   • traMADol (ULTRAM) 50 MG tablet 50 mg  50 mg   • acetaminophen (Tylenol) tablet 650 mg  650 mg   • amitriptyline (ELAVIL) tablet 75 mg  75 mg   • [START ON 3/24/2022] enoxaparin (LOVENOX) inj 40 mg  40 mg   • [START ON  3/24/2022] escitalopram (Lexapro) tablet 20 mg  20 mg   • HYDROcodone-acetaminophen (NORCO) 5-325 MG per tablet 1 Tablet  1 Tablet   • HYDROcodone/acetaminophen (NORCO)  MG per tablet 1 Tablet  1 Tablet   • [START ON 3/24/2022] levothyroxine (SYNTHROID) tablet 88 mcg  88 mcg   • metoprolol tartrate (LOPRESSOR) tablet 12.5 mg  12.5 mg   • montelukast (SINGULAIR) tablet 10 mg  10 mg   • rosuvastatin (CRESTOR) tablet 20 mg  20 mg   • sucralfate (CARAFATE) tablet 1 g  1 g       ALLERGIES:  Fentanyl, Morphine, and Rifampin    PSYCHOSOCIAL HISTORY:  Housing / Facility: 1 Story Apartment / Condo  Steps Into Home: 0  Steps In Home: 0  Lives with - Patient's Self Care Capacity: Alone and Able to Care For Self  Equipment Owned: Front-Wheel Walker,Single Point Cane     Prior Level of Function / Living Situation:   Physical Therapy: Prior Services: Home-Independent  Housing / Facility: 1 Story Apartment / Condo  Steps Into Home: 0  Steps In Home: 0  Bathroom Set up: Bathtub / Shower Combination  Equipment Owned: Front-Wheel Walker,Single Point Cane  Lives with - Patient's Self Care Capacity: Alone and Able to Care For Self  Bed Mobility: Independent  Transfer Status: Independent  Ambulation: Independent  Current Level of Function:   Gait Level Of Assist: Minimal Assist  Assistive Device: Front Wheel Walker  Distance (Feet): 20  Deviation: Antalgic,Shuffled Gait  Supine to Sit: Minimal Assist  Sit to Supine: Minimal Assist  Scooting: Contact Guard Assist  Rolling: Minimal Assist to Rt.  Sit to Stand: Minimal Assist  Bed, Chair, Wheelchair Transfer: Minimal Assist  Sitting in Chair: NT  Sitting Edge of Bed: < 10 minutes total  Standing: < 3 minutes total  Occupational Therapy:      Prior Services: Home-Independent  Housing / Facility: 1 Story Apartment / Condo  Current Level of Function:   Eating: Modified Independent  Upper Body Dressing: Moderate Assist  Lower Body Dressing: Maximal Assist (attempted figure  four)    CURRENT LEVEL OF FUNCTION:   Same as level of function prior to admission to [facility]    PHYSICAL EXAM:     VITAL SIGNS:   temporal temperature is 36.6 °C (97.9 °F). Her blood pressure is 118/73 and her pulse is 86. Her respiration is 18 and oxygen saturation is 96%.     GENERAL: No apparent distress  HEENT: Normocephalic/atraumatic, EOMI and PERRL  CARDIAC: Regular rate and rhythm, normal S1, S2   LUNGS: Clear to auscultation   ABDOMINAL: bowel sounds present, soft and nontender    EXTREMITIES: no contractures, spasticity, or edema.  No calf tenderness bilaterally  NEURO:  Mental status:  A&Ox4 (person, place, date, situation) answers questions appropriately  Speech: fluent, no aphasia or dysarthria  Motor:  5/5 BUE  4/5 BLE pain limited  Sensory: intact to light touch through out  DTRs: 2+ in bilateral biceps and patellar tendons    RADIOLOGY:  CT L Spine 3/21/22  IMPRESSION:     1.  Fracture of the transverse process of L2 on the left.  2.  No acute compression fracture is identified. Mild loss of height of L4 is unchanged.  3.  Multilevel degenerative changes as above described.  4.  Leftward curvature of the lumbar spine.  5.  Demineralization which limits evaluation.    LABS:  Recent Labs     03/21/22  1350 03/22/22  0237   SODIUM 133* 135   POTASSIUM 4.7 4.2   CHLORIDE 100 103   CO2 21 21   GLUCOSE 114* 159*   BUN 39* 29*   CREATININE 0.81 0.62   CALCIUM 8.8 8.2*     Recent Labs     03/21/22  1350 03/22/22  0237   WBC 25.0* 14.9*   RBC 4.52 3.95*   HEMOGLOBIN 13.0 11.0*   HEMATOCRIT 39.9 35.4*   MCV 88.3 89.6   MCH 28.8 27.8   MCHC 32.6* 31.1*   RDW 47.0 48.0   PLATELETCT 208 156*   MPV 9.8 9.4             PRIMARY REHAB DIAGNOSIS:    This patient is a 72 y.o. female admitted for acute inpatient rehabilitation with Closed fracture of lumbar vertebra, unspecified fracture morphology, initial encounter ([facility]).    IMPAIRMENTS:   ADLs/IADLs  Mobility    SECONDARY DIAGNOSIS/MEDICAL  CO-MORBIDITIES AFFECTING FUNCTION:  HTN  HLD  Asthma  Anemia  Leukocytosis  Thrombocytopenia  Hyperglycemia  Hypothyroidism  Azotemia  Vitamin D deficiency  Obesity due to excess calories    RELEVANT CHANGES SINCE PREADMISSION EVALUATION:    Status unchanged    The patient's rehabilitation potential is Good  The patient's medical prognosis is good    PLAN:   Discussion and Recommendations:   1. The patient requires an acute inpatient rehabilitation program with a coordinated program of care at an intensity and frequency not available at a lower level of care. This recommendation is substantiated by the patient's medical physicians who recommend that the patient's intervention and assessment of medical issues needs to be done at an acute level of care for patient's safety and maximum outcome.   2. A coordinated program of care will be supplied by an interdisciplinary team of physical therapy, occupational therapy, rehab physician, rehab nursing, and, if needed, speech therapy and rehab psychology. Rehab team presents a patient-specific rehabilitation and education program concentrating on prevention of future problems related to accessibility, mobility, skin, bowel, bladder, sexuality, and psychosocial and medical/surgical problems.   3. Need for Rehabilitation Physician: The rehab physician will be evaluating the patient on a multi-weekly basis to help coordinate the program of care. The rehab physician communicates between medical physicians, therapists, and nurses to maximize the patient's potential outcome. Specific areas in which the rehab physician will be providing daily assessment include the following:   A. Assessing the patient's heart rate and blood pressure response (vitals monitoring) to activity and making adjustments in medications or conservative measures as needed.   B. The rehab physician will be assessing the frequency at which the program can be increased to allow the patient to reach optimal  functional outcome.   C. The rehab physician will also provide assessments in daily skin care, especially in light of patient's impairments in mobility.   D. The rehab physician will provide special expertise in understanding how to work with functional impairment and recommend appropriate interventions, compensatory techniques, and education that will facilitate the patient's outcome.   4. Rehab R.N.   The rehab RN will be working with patient to carry over in room mobility and activities of daily living when the patient is not in 3 hours of skilled therapy. Rehab nursing will be working in conjunction with rehab physician to address all the medical issues above and continue to assess laboratory work and discuss abnormalities with the treating physicians, assess vitals, and response to activity, and discuss and report abnormalities with the rehab physician. Rehab RN will also continue daily skin care, supervise bladder/bowel program, instruct in medication administration, and ensure patient safety.   5. Rehab Therapy: Therapies to treat at intensity and frequency of (may change after completion of evaluation by all therapeutic disciplines):       PT:  Physical therapy to address mobility, transfer, gait training and evaluation for adaptive equipment needs 1 and 1/2 hour/day at least 5 days/week for the duration of the ELOS (see below)       OT:  Occupational therapy to address ADLs, self-care, home management training, functional mobility/transfers and assistive device evaluation, and community re-integration 1 and 1/2 hour/day at least 5 days/week for the duration of the ELOS (see below).     Medical management / Rehabilitation Issues/ Adverse Potential as part of rehabilitation plan     REHABILITATION ISSUES/ADVERSE POTENTIAL:  1.  L2 TP fracture - Patient with fall with significant back pain found to have L2 fracture. Was managed conservatively with TLSO. Patient demonstrates functional deficits in strength,  balance, coordination, and ADL's. Patient is admitted to [facility] for comprehensive rehabilitation therapy as described below.   Rehabilitation nursing monitors bowel and bladder control, educates on medication administration, co-morbidities and monitors patient safety.    2.  Neurostimulants: None at this time but continue to assess daily for need to initiate should status change.    3.  DVT prophylaxis:  Patient is on Lovenox for anticoagulation upon transfer. Encourage OOB. Monitor daily for signs and symptoms of DVT including but not limited to swelling and pain to prevent the development of DVT that may interfere with therapies.    4.  GI prophylaxis:  On prilosec to prevent gastritis/dyspepsia which may interfere with therapies.    5.  Pain: No issues with pain currently / Controlled with APAP/Tramadol    6.  Nutrition/Dysphagia: Dietician monitors nutrient intake, recommend supplements prn and provide nutrition education to pt/family to promote optimal nutrition for wound healing/recovery.     7.  Bladder/bowel:  Start bowel and bladder program as described below, to prevent constipation, urinary retention (which may lead to UTI), and urinary incontinence (which will impact upon pt's functional independence).   - Post void bladder scans, I&O cath for PVRs >400  - up to commode after meal     8.  Skin/dermal ulcer prophylaxis: Monitor for new skin conditions with q.2 h. turns as required to prevent the development of skin breakdown.     9.  Cognition/Behavior: As needed psychologist provides adjustment counseling to illness and psychosocial barriers that may be potential barriers to rehabilitation.     10. Respiratory therapy: RT performs O2 management prn, breathing retraining, pulmonary hygiene and bronchospasm management prn to optimize participation in therapies.     MEDICAL CO-MORBIDITIES/ADVERSE POTENTIAL AFFECTING FUNCTION:  L2 TP fracture - Patient with fall with significant back  pain found to have L2 fracture. Was managed conservatively with TLSO.  -PT and OT for mobility and ADLs  -Continue TLSO. Follow-up [facility] [place]    HTN - Patient on Metoprolol 12.5 mg BID     HLD - Patient on Rosuvastatin 20 mg QHS    Asthma - Patient on Singulair QHS    Anemia - Check AM CBC    Leukocytosis  - Check AM CBC    Thrombocytopenia - Check AM CBC    Hyperglycemia - Into 150s. Check A1c    Hypothyroidism - Patient on Levothyroxine 88 mcg daily    Azotemia - Check AM CMP    Vitamin D deficiency - 22 at [facility]. Will start on 1000 U     Obesity due to excess calories - BMI of 33.8 on admission, meets medical criteria. Dietitian to consult     Depression/Pain - Patient on Elavil 75 mg QHS and Lexapro 20 mg daily. Patient on Norco    DVT ppx - Patient on Lovenox on transfer    I personally performed a complete drug regimen review and no potential clinically significant medication issues were identified.     Pt was seen today for 72 min, and entire time spent in face-to-face contact was >50% in counseling and coordination of care as detailed in A/P above.        GOALS/EXPECTED LEVEL OF FUNCTION BASED ON CURRENT MEDICAL AND FUNCTIONAL STATUS (may change based on patient's medical status and rate of impairment recovery):  Transfers:   Modified Independent  Mobility/Gait:   Modified Independent  ADL's:   Modified Independent    DISPOSITION: Discharge to pre-morbid independent living setting with the supportive care of patient's family.    ELOS: 10-14 days

## 2022-03-24 LAB
ALBUMIN SERPL BCP-MCNC: 3.1 G/DL (ref 3.2–4.9)
ALBUMIN/GLOB SERPL: 1.5 G/DL
ALP SERPL-CCNC: 47 U/L (ref 30–99)
ALT SERPL-CCNC: 37 U/L (ref 2–50)
ANION GAP SERPL CALC-SCNC: 10 MMOL/L (ref 7–16)
AST SERPL-CCNC: 16 U/L (ref 12–45)
BASOPHILS # BLD AUTO: 0.3 % (ref 0–1.8)
BASOPHILS # BLD: 0.04 K/UL (ref 0–0.12)
BILIRUB SERPL-MCNC: 0.3 MG/DL (ref 0.1–1.5)
BUN SERPL-MCNC: 23 MG/DL (ref 8–22)
CALCIUM SERPL-MCNC: 8.2 MG/DL (ref 8.5–10.5)
CHLORIDE SERPL-SCNC: 106 MMOL/L (ref 96–112)
CO2 SERPL-SCNC: 22 MMOL/L (ref 20–33)
CREAT SERPL-MCNC: 0.5 MG/DL (ref 0.5–1.4)
EOSINOPHIL # BLD AUTO: 0.03 K/UL (ref 0–0.51)
EOSINOPHIL NFR BLD: 0.3 % (ref 0–6.9)
ERYTHROCYTE [DISTWIDTH] IN BLOOD BY AUTOMATED COUNT: 50.2 FL (ref 35.9–50)
EST. AVERAGE GLUCOSE BLD GHB EST-MCNC: 154 MG/DL
GFR SERPLBLD CREATININE-BSD FMLA CKD-EPI: 100 ML/MIN/1.73 M 2
GLOBULIN SER CALC-MCNC: 2.1 G/DL (ref 1.9–3.5)
GLUCOSE SERPL-MCNC: 139 MG/DL (ref 65–99)
HBA1C MFR BLD: 7 % (ref 4–5.6)
HCT VFR BLD AUTO: 35.6 % (ref 37–47)
HGB BLD-MCNC: 11 G/DL (ref 12–16)
IMM GRANULOCYTES # BLD AUTO: 0.33 K/UL (ref 0–0.11)
IMM GRANULOCYTES NFR BLD AUTO: 2.8 % (ref 0–0.9)
LYMPHOCYTES # BLD AUTO: 1.14 K/UL (ref 1–4.8)
LYMPHOCYTES NFR BLD: 9.5 % (ref 22–41)
MCH RBC QN AUTO: 28.1 PG (ref 27–33)
MCHC RBC AUTO-ENTMCNC: 30.9 G/DL (ref 33.6–35)
MCV RBC AUTO: 91 FL (ref 81.4–97.8)
MONOCYTES # BLD AUTO: 0.74 K/UL (ref 0–0.85)
MONOCYTES NFR BLD AUTO: 6.2 % (ref 0–13.4)
NEUTROPHILS # BLD AUTO: 9.68 K/UL (ref 2–7.15)
NEUTROPHILS NFR BLD: 80.9 % (ref 44–72)
NRBC # BLD AUTO: 0 K/UL
NRBC BLD-RTO: 0 /100 WBC
PLATELET # BLD AUTO: 145 K/UL (ref 164–446)
PMV BLD AUTO: 9.5 FL (ref 9–12.9)
POTASSIUM SERPL-SCNC: 4.1 MMOL/L (ref 3.6–5.5)
PROT SERPL-MCNC: 5.2 G/DL (ref 6–8.2)
RBC # BLD AUTO: 3.91 M/UL (ref 4.2–5.4)
SODIUM SERPL-SCNC: 138 MMOL/L (ref 135–145)
T4 FREE SERPL-MCNC: 1.38 NG/DL (ref 0.93–1.7)
TSH SERPL DL<=0.005 MIU/L-ACNC: 0.13 UIU/ML (ref 0.38–5.33)
WBC # BLD AUTO: 12 K/UL (ref 4.8–10.8)

## 2022-03-24 PROCEDURE — 700111 HCHG RX REV CODE 636 W/ 250 OVERRIDE (IP): Performed by: PHYSICAL MEDICINE & REHABILITATION

## 2022-03-24 PROCEDURE — 97530 THERAPEUTIC ACTIVITIES: CPT

## 2022-03-24 PROCEDURE — 700102 HCHG RX REV CODE 250 W/ 637 OVERRIDE(OP): Performed by: PHYSICAL MEDICINE & REHABILITATION

## 2022-03-24 PROCEDURE — 97162 PT EVAL MOD COMPLEX 30 MIN: CPT

## 2022-03-24 PROCEDURE — 83036 HEMOGLOBIN GLYCOSYLATED A1C: CPT

## 2022-03-24 PROCEDURE — 97535 SELF CARE MNGMENT TRAINING: CPT

## 2022-03-24 PROCEDURE — 84439 ASSAY OF FREE THYROXINE: CPT

## 2022-03-24 PROCEDURE — 97166 OT EVAL MOD COMPLEX 45 MIN: CPT

## 2022-03-24 PROCEDURE — 770010 HCHG ROOM/CARE - REHAB SEMI PRIVAT*

## 2022-03-24 PROCEDURE — 36415 COLL VENOUS BLD VENIPUNCTURE: CPT

## 2022-03-24 PROCEDURE — 97110 THERAPEUTIC EXERCISES: CPT

## 2022-03-24 PROCEDURE — 97116 GAIT TRAINING THERAPY: CPT

## 2022-03-24 PROCEDURE — 85025 COMPLETE CBC W/AUTO DIFF WBC: CPT

## 2022-03-24 PROCEDURE — 99233 SBSQ HOSP IP/OBS HIGH 50: CPT | Performed by: PHYSICAL MEDICINE & REHABILITATION

## 2022-03-24 PROCEDURE — 84443 ASSAY THYROID STIM HORMONE: CPT

## 2022-03-24 PROCEDURE — 80053 COMPREHEN METABOLIC PANEL: CPT

## 2022-03-24 PROCEDURE — A9270 NON-COVERED ITEM OR SERVICE: HCPCS | Performed by: PHYSICAL MEDICINE & REHABILITATION

## 2022-03-24 RX ADMIN — HYDROCODONE BITARTRATE AND ACETAMINOPHEN 1 TABLET: 10; 325 TABLET ORAL at 17:12

## 2022-03-24 RX ADMIN — ESCITALOPRAM OXALATE 20 MG: 10 TABLET, FILM COATED ORAL at 08:51

## 2022-03-24 RX ADMIN — METOPROLOL TARTRATE 12.5 MG: 25 TABLET, FILM COATED ORAL at 08:51

## 2022-03-24 RX ADMIN — HYDROCODONE BITARTRATE AND ACETAMINOPHEN 1 TABLET: 10; 325 TABLET ORAL at 03:01

## 2022-03-24 RX ADMIN — SUCRALFATE 1 G: 1 TABLET ORAL at 17:12

## 2022-03-24 RX ADMIN — OMEPRAZOLE 20 MG: 20 CAPSULE, DELAYED RELEASE ORAL at 08:52

## 2022-03-24 RX ADMIN — AMITRIPTYLINE HYDROCHLORIDE 75 MG: 25 TABLET, FILM COATED ORAL at 21:11

## 2022-03-24 RX ADMIN — SUCRALFATE 1 G: 1 TABLET ORAL at 08:52

## 2022-03-24 RX ADMIN — METOPROLOL TARTRATE 12.5 MG: 25 TABLET, FILM COATED ORAL at 21:12

## 2022-03-24 RX ADMIN — Medication 2000 UNITS: at 08:52

## 2022-03-24 RX ADMIN — SUCRALFATE 1 G: 1 TABLET ORAL at 10:45

## 2022-03-24 RX ADMIN — LEVOTHYROXINE SODIUM 88 MCG: 0.09 TABLET ORAL at 05:31

## 2022-03-24 RX ADMIN — SUCRALFATE 1 G: 1 TABLET ORAL at 21:11

## 2022-03-24 RX ADMIN — MONTELUKAST SODIUM 10 MG: 10 TABLET, FILM COATED ORAL at 21:11

## 2022-03-24 RX ADMIN — ENOXAPARIN SODIUM 40 MG: 40 INJECTION SUBCUTANEOUS at 08:52

## 2022-03-24 RX ADMIN — HYDROCODONE BITARTRATE AND ACETAMINOPHEN 1 TABLET: 10; 325 TABLET ORAL at 10:44

## 2022-03-24 RX ADMIN — ROSUVASTATIN CALCIUM 20 MG: 10 TABLET, FILM COATED ORAL at 21:11

## 2022-03-24 ASSESSMENT — GAIT ASSESSMENTS
DEVIATION: BRADYKINETIC;SHUFFLED GAIT;DECREASED HEEL STRIKE;DECREASED TOE OFF
GAIT LEVEL OF ASSIST: MINIMAL ASSIST
ASSISTIVE DEVICE: FRONT WHEEL WALKER
DEVIATION: BRADYKINETIC;SHUFFLED GAIT;DECREASED HEEL STRIKE;DECREASED TOE OFF
DISTANCE (FEET): 200
GAIT LEVEL OF ASSIST: CONTACT GUARD ASSIST
ASSISTIVE DEVICE: FRONT WHEEL WALKER
DISTANCE (FEET): 100

## 2022-03-24 ASSESSMENT — BRIEF INTERVIEW FOR MENTAL STATUS (BIMS)
BIMS SUMMARY SCORE: 15
ASKED TO RECALL BED: YES, NO CUE REQUIRED
ASKED TO RECALL BLUE: YES, NO CUE REQUIRED
INITIAL REPETITION OF BED BLUE SOCK - FIRST ATTEMPT: 3
WHAT MONTH IS IT: ACCURATE WITHIN 5 DAYS
WHAT DAY OF THE WEEK IS IT: CORRECT
WHAT YEAR IS IT: CORRECT
ASKED TO RECALL SOCK: YES, NO CUE REQUIRED

## 2022-03-24 ASSESSMENT — PATIENT HEALTH QUESTIONNAIRE - PHQ9
1. LITTLE INTEREST OR PLEASURE IN DOING THINGS: NOT AT ALL
SUM OF ALL RESPONSES TO PHQ9 QUESTIONS 1 AND 2: 0
2. FEELING DOWN, DEPRESSED, IRRITABLE, OR HOPELESS: NOT AT ALL

## 2022-03-24 ASSESSMENT — ACTIVITIES OF DAILY LIVING (ADL)
TOILETING: INDEPENDENT
BED_CHAIR_WHEELCHAIR_TRANSFER_DESCRIPTION: ADAPTIVE EQUIPMENT;SET-UP OF EQUIPMENT
TOILET_TRANSFER_DESCRIPTION: ADAPTIVE EQUIPMENT;GRAB BAR;SET-UP OF EQUIPMENT
TUB_SHOWER_TRANSFER_DESCRIPTION: GRAB BAR;SHOWER BENCH;INCREASED TIME;INITIAL PREPARATION FOR TASK;SET-UP OF EQUIPMENT;SUPERVISION FOR SAFETY;VERBAL CUEING
TOILET_TRANSFER_DESCRIPTION: ADAPTIVE EQUIPMENT;INCREASED TIME;INITIAL PREPARATION FOR TASK;SET-UP OF EQUIPMENT;SUPERVISION FOR SAFETY;VERBAL CUEING
BED_CHAIR_WHEELCHAIR_TRANSFER_DESCRIPTION: ADAPTIVE EQUIPMENT;INCREASED TIME;SET-UP OF EQUIPMENT
BED_CHAIR_WHEELCHAIR_TRANSFER_DESCRIPTION: ADAPTIVE EQUIPMENT;INCREASED TIME;SET-UP OF EQUIPMENT;SUPERVISION FOR SAFETY;VERBAL CUEING

## 2022-03-24 NOTE — PROGRESS NOTES
Rehab Progress Note     Encounter Date: 3/24/2022    CC: Decreased mobility, back pain    Interval Events (Subjective)  Patient sitting up in room. She reports therapy was hard this morning. She reports she thinks her function very much was affected by COVID which she had back in January. She reports she is encouraged to get into the gym. Discussed admission labs including improving leukocytosis, stable anemia, and A1c of 7.0.  She reports she was told she is pre-diabetic and would like to avoid having blood sugar checked. Discussed follow-up with PCP    Objective:  VITAL SIGNS: /76   Pulse 74   Temp 36.4 °C (97.6 °F) (Oral)   Resp 18   SpO2 95%   Gen: NAD  Psych: Mood and affect appropriate  CV: RRR, no edema  Resp: CTAB, no upper airway sounds  Abd: NTND  Neuro: AOx4, following commands    Recent Results (from the past 72 hour(s))   URINALYSIS,CULTURE IF INDICATED    Collection Time: 03/21/22  3:03 PM    Specimen: Urine   Result Value Ref Range    Color Yellow     Character Clear     Specific Gravity 1.025 <1.035    Ph 5.5 5.0 - 8.0    Glucose Negative Negative mg/dL    Ketones Negative Negative mg/dL    Protein Negative Negative mg/dL    Bilirubin Negative Negative    Nitrite Negative Negative    Leukocyte Esterase Negative Negative    Occult Blood Trace (A) Negative    Micro Urine Req Microscopic    URINE MICROSCOPIC (W/UA)    Collection Time: 03/21/22  3:03 PM   Result Value Ref Range    WBC 2-5 /hpf    RBC 0-2 /hpf    Bacteria Few (A) None /hpf    Epithelial Cells Few Few /hpf    Hyaline Cast 0-2 /lpf   TROPONIN    Collection Time: 03/21/22  7:19 PM   Result Value Ref Range    Troponin T 16 6 - 19 ng/L   Renal Function Panel    Collection Time: 03/22/22  2:37 AM   Result Value Ref Range    Sodium 135 135 - 145 mmol/L    Potassium 4.2 3.6 - 5.5 mmol/L    Chloride 103 96 - 112 mmol/L    Co2 21 20 - 33 mmol/L    Glucose 159 (H) 65 - 99 mg/dL    Creatinine 0.62 0.50 - 1.40 mg/dL    Bun 29 (H) 8 - 22  mg/dL    Calcium 8.2 (L) 8.4 - 10.2 mg/dL    Phosphorus 2.9 2.5 - 4.5 mg/dL    Albumin 3.3 3.2 - 4.9 g/dL   MAGNESIUM    Collection Time: 03/22/22  2:37 AM   Result Value Ref Range    Magnesium 2.4 1.5 - 2.5 mg/dL   CBC WITHOUT DIFFERENTIAL    Collection Time: 03/22/22  2:37 AM   Result Value Ref Range    WBC 14.9 (H) 4.8 - 10.8 K/uL    RBC 3.95 (L) 4.20 - 5.40 M/uL    Hemoglobin 11.0 (L) 12.0 - 16.0 g/dL    Hematocrit 35.4 (L) 37.0 - 47.0 %    MCV 89.6 81.4 - 97.8 fL    MCH 27.8 27.0 - 33.0 pg    MCHC 31.1 (L) 33.6 - 35.0 g/dL    RDW 48.0 35.9 - 50.0 fL    Platelet Count 156 (L) 164 - 446 K/uL    MPV 9.4 9.0 - 12.9 fL   VITAMIN D,25 HYDROXY    Collection Time: 03/22/22  2:37 AM   Result Value Ref Range    25-Hydroxy   Vitamin D 25 22 (L) 30 - 100 ng/mL   ESTIMATED GFR    Collection Time: 03/22/22  2:37 AM   Result Value Ref Range    GFR (CKD-EPI) 95 >60 mL/min/1.73 m 2   EC-ECHOCARDIOGRAM COMPLETE W/O CONT    Collection Time: 03/22/22  4:47 PM   Result Value Ref Range    Eject.Frac. MOD BP 61.78     Eject.Frac. MOD 4C 56.07     Eject.Frac. MOD 2C 65.19     Left Ventrical Ejection Fraction 60    CBC with Differential    Collection Time: 03/24/22  5:23 AM   Result Value Ref Range    WBC 12.0 (H) 4.8 - 10.8 K/uL    RBC 3.91 (L) 4.20 - 5.40 M/uL    Hemoglobin 11.0 (L) 12.0 - 16.0 g/dL    Hematocrit 35.6 (L) 37.0 - 47.0 %    MCV 91.0 81.4 - 97.8 fL    MCH 28.1 27.0 - 33.0 pg    MCHC 30.9 (L) 33.6 - 35.0 g/dL    RDW 50.2 (H) 35.9 - 50.0 fL    Platelet Count 145 (L) 164 - 446 K/uL    MPV 9.5 9.0 - 12.9 fL    Neutrophils-Polys 80.90 (H) 44.00 - 72.00 %    Lymphocytes 9.50 (L) 22.00 - 41.00 %    Monocytes 6.20 0.00 - 13.40 %    Eosinophils 0.30 0.00 - 6.90 %    Basophils 0.30 0.00 - 1.80 %    Immature Granulocytes 2.80 (H) 0.00 - 0.90 %    Nucleated RBC 0.00 /100 WBC    Neutrophils (Absolute) 9.68 (H) 2.00 - 7.15 K/uL    Lymphs (Absolute) 1.14 1.00 - 4.80 K/uL    Monos (Absolute) 0.74 0.00 - 0.85 K/uL    Eos (Absolute) 0.03  0.00 - 0.51 K/uL    Baso (Absolute) 0.04 0.00 - 0.12 K/uL    Immature Granulocytes (abs) 0.33 (H) 0.00 - 0.11 K/uL    NRBC (Absolute) 0.00 K/uL   Comp Metabolic Panel (CMP)    Collection Time: 03/24/22  5:23 AM   Result Value Ref Range    Sodium 138 135 - 145 mmol/L    Potassium 4.1 3.6 - 5.5 mmol/L    Chloride 106 96 - 112 mmol/L    Co2 22 20 - 33 mmol/L    Anion Gap 10.0 7.0 - 16.0    Glucose 139 (H) 65 - 99 mg/dL    Bun 23 (H) 8 - 22 mg/dL    Creatinine 0.50 0.50 - 1.40 mg/dL    Calcium 8.2 (L) 8.5 - 10.5 mg/dL    AST(SGOT) 16 12 - 45 U/L    ALT(SGPT) 37 2 - 50 U/L    Alkaline Phosphatase 47 30 - 99 U/L    Total Bilirubin 0.3 0.1 - 1.5 mg/dL    Albumin 3.1 (L) 3.2 - 4.9 g/dL    Total Protein 5.2 (L) 6.0 - 8.2 g/dL    Globulin 2.1 1.9 - 3.5 g/dL    A-G Ratio 1.5 g/dL   HEMOGLOBIN A1C    Collection Time: 03/24/22  5:23 AM   Result Value Ref Range    Glycohemoglobin 7.0 (H) 4.0 - 5.6 %    Est Avg Glucose 154 mg/dL   TSH with Reflex to FT4    Collection Time: 03/24/22  5:23 AM   Result Value Ref Range    TSH 0.130 (L) 0.380 - 5.330 uIU/mL   ESTIMATED GFR    Collection Time: 03/24/22  5:23 AM   Result Value Ref Range    GFR (CKD-EPI) 100 >60 mL/min/1.73 m 2   FREE THYROXINE    Collection Time: 03/24/22  5:23 AM   Result Value Ref Range    Free T-4 1.38 0.93 - 1.70 ng/dL       Current Facility-Administered Medications   Medication Frequency   • Respiratory Therapy Consult Continuous RT   • Pharmacy Consult Request ...Pain Management Review 1 Each PHARMACY TO DOSE   • hydrALAZINE (APRESOLINE) tablet 25 mg Q8HRS PRN   • acetaminophen (Tylenol) tablet 650 mg Q4HRS PRN   • senna-docusate (PERICOLACE or SENOKOT S) 8.6-50 MG per tablet 2 Tablet BID    And   • polyethylene glycol/lytes (MIRALAX) PACKET 1 Packet QDAY PRN    And   • magnesium hydroxide (MILK OF MAGNESIA) suspension 30 mL QDAY PRN    And   • bisacodyl (DULCOLAX) suppository 10 mg QDAY PRN   • omeprazole (PRILOSEC) capsule 20 mg DAILY   • artificial tears  ophthalmic solution 1 Drop PRN   • benzocaine-menthol (Cepacol) lozenge 1 Lozenge Q2HRS PRN   • mag hydrox-al hydrox-simeth (MAALOX PLUS ES or MYLANTA DS) suspension 20 mL Q2HRS PRN   • ondansetron (ZOFRAN ODT) dispertab 4 mg 4X/DAY PRN    Or   • ondansetron (ZOFRAN) syringe/vial injection 4 mg 4X/DAY PRN   • traZODone (DESYREL) tablet 50 mg QHS PRN   • sodium chloride (OCEAN) 0.65 % nasal spray 2 Spray PRN   • traMADol (ULTRAM) 50 MG tablet 50 mg Q4HRS PRN   • acetaminophen (Tylenol) tablet 650 mg Q6HRS PRN   • amitriptyline (ELAVIL) tablet 75 mg QHS   • enoxaparin (LOVENOX) inj 40 mg DAILY   • escitalopram (Lexapro) tablet 20 mg DAILY   • HYDROcodone-acetaminophen (NORCO) 5-325 MG per tablet 1 Tablet Q6HRS PRN   • HYDROcodone/acetaminophen (NORCO)  MG per tablet 1 Tablet Q4HRS PRN   • levothyroxine (SYNTHROID) tablet 88 mcg QAM AC   • metoprolol tartrate (LOPRESSOR) tablet 12.5 mg BID   • montelukast (SINGULAIR) tablet 10 mg QHS   • rosuvastatin (CRESTOR) tablet 20 mg Q EVENING   • sucralfate (CARAFATE) tablet 1 g 4X/DAY ACHS   • vitamin D3 (cholecalciferol) tablet 2,000 Units DAILY   • albuterol inhaler 2 Puff Q4H PRN (RT)       Orders Placed This Encounter   Procedures   • Diet Order Diet: Cardiac     Standing Status:   Standing     Number of Occurrences:   1     Order Specific Question:   Diet:     Answer:   Cardiac [6]       Assessment:  Active Hospital Problems    Diagnosis    • *Closed fracture of lumbar vertebra, unspecified fracture morphology, initial encounter ([facility])    • Chronic pain syndrome on chronic opioids    • Hypothyroidism    • Class 1 obesity due to excess calories with serious comorbidity and body mass index (BMI) of 32.0 to 32.9 in adult    • HTN (hypertension)    • CAD (coronary artery disease)    • Dyslipidemia        Medical Decision Making and Plan:  L2 TP fracture - Patient with fall with significant back pain found to have L2 fracture. Was managed conservatively with TLSO.  -PT and  OT for mobility and ADLs  -Continue TLSO. Follow-up [place]     HTN - Patient on Metoprolol 12.5 mg BID     HLD - Patient on Rosuvastatin 20 mg QHS     Asthma - Patient on Singulair QHS     Anemia - Check AM CBC - 11.0, stable.      Leukocytosis  - Check AM CBC - 12.0, will check UA     Thrombocytopenia - Check AM CBC - 145, downtrending will monitor     Prediabetes - Into 150s. Check A1c - 7.0, will defer to outpatient PCP     Hypothyroidism - Patient on Levothyroxine 88 mcg daily     Azotemia - Check AM CMP 23, improved from 29, encourage fluids     Vitamin D deficiency - [facility] at [facility]. Will start on 1000 U      Obesity due to excess calories - BMI of 33.8 on admission, meets medical criteria. Dietitian to consult      Depression/Pain - Patient on Elavil 75 mg QHS and Lexapro 20 mg daily. Patient on Norco     DVT ppx - Patient on Lovenox on transfer    Total time:  36 minutes.  I spent greater than 50% of the time for patient care and coordination on this date, including unit/floor time, and face-to-face time with the patient as per assessment and plan above.  Discussion included admission labs, anemia, thrombocytoepnia, prediabetes diagnosis, and fluid intake for azotemia.     Jess Lux M.D.

## 2022-03-24 NOTE — THERAPY
Occupational Therapy  Daily Treatment     Patient Name: Yamile Go  Age:  72 y.o., Sex:  female  Medical Record #: [id number]  Today's Date: 3/24/2022     Precautions  Precautions: Fall Risk,TLSO (Thoracolumbosacral orthosis),Spinal / Back Precautions   Comments: Ok to remove TLSO for showers         Subjective    Pt sleeping upon arrival, agreeable to OT when woken.      Objective     03/24/22 1301   Non Verbal Descriptors   Non Verbal Scale  Calm   Functional Level of Assist   Upper Body Dressing Minimal Assist   Upper Body Dressing Description Application of orthotic or brace;Increased time;Initial preparation for task;Verbal cueing   Bed, Chair, Wheelchair Transfer Contact Guard Assist  (SPT with EOB <> w/c with FWW)   Bed Chair Wheelchair Transfer Description Increased time;Initial preparation for task;Supervision for safety;Verbal cueing;Set-up of equipment;Adaptive equipment   Sitting Upper Body Exercises   Upper Extremity Bike Level 2 Resistance  (Fluidobike 10 min to increase endurance and UE strength)   Bed Mobility    Supine to Sit Minimal Assist   Sit to Supine Contact Guard Assist   OT Total Time Spent   OT Individual Total Time Spent (Mins) 30   OT Charge Group   OT Therapy Activity 1   OT Therapeutic Exercise  1     Provided pt education for ADL AE including reacher, dressing stick and long handled sponge. No training initiated at this time, will plan to have pt trial during next ADL session.     Assessment    Pt tolerated OT session well focused on ADL AE education and increasing endurance/UE strength. Pt has generalized weakness in her arms and could only tolerate level 2 resistance on fluidobike but did not require a rest break. Needs verbal cues for log roll technique.   Strengths: Able to follow instructions,Alert and oriented,Good insight into deficits/needs,Independent prior level of function,Manages pain appropriately,Motivated for self care and independence,Pleasant and  cooperative,Supportive family,Willingly participates in therapeutic activities  Barriers: Decreased endurance,Fatigue,Generalized weakness,Impaired activity tolerance,Impaired balance,Limited mobility,Pain (lives alone)    Plan    ADLs with AE PRN, IADLs, functional mobility, transfers, balance/fall prevention, endurance, UE strengthening     Passport items to be completed:  Perform bathroom transfers, complete dressing, complete feeding, get ready for the day, prepare a simple meal, participate in household tasks, adapt home for safety needs, demonstrate home exercise program, complete caregiver training     Occupational Therapy Goals (Active)       Problem: Bathing       Dates: Start: 03/24/22         Goal: STG-Within one week, patient will bathe with CGA and use of AE as needed.       Dates: Start: 03/24/22               Problem: Dressing       Dates: Start: 03/24/22         Goal: STG-Within one week, patient will dress UB including donning TLSO with setup, verbal cues and Leah.        Dates: Start: 03/24/22            Goal: STG-Within one week, patient will dress LB with setup and supervision with use of AE as needed.       Dates: Start: 03/24/22               Problem: Functional Transfers       Dates: Start: 03/24/22         Goal: STG-Within one week, patient will perform bathroom transfers with SBA-supervision and use of AE as needed.       Dates: Start: 03/24/22               Problem: IADL's       Dates: Start: 03/24/22         Goal: STG-Within one week, patient will prepare a meal with setup and Leah and seated rest breaks as needed.       Dates: Start: 03/24/22               Problem: OT Long Term Goals       Dates: Start: 03/24/22         Goal: LTG-By discharge, patient will complete basic self care tasks with modified independence and use of AE as needed.       Dates: Start: 03/24/22            Goal: LTG-By discharge, patient will perform bathroom transfers with modified independence and use of AE as  needed.       Dates: Start: 03/24/22            Goal: LTG-By discharge, patient will complete basic home management with supervision to modified independence and use of AE as needed.        Dates: Start: 03/24/22               Problem: Toileting       Dates: Start: 03/24/22         Goal: STG-Within one week, patient will complete toileting tasks with supervision and use of AE as needed.       Dates: Start: 03/24/22

## 2022-03-24 NOTE — CARE PLAN
Problem: Fall Risk - Rehab  Goal: Patient will remain free from falls  Note: Patient remains free from falls this shift. Patient was educated on using the call light to prevent falls. Patients bed is in the lowest position. The patients belongings are placed in near proximity to the patient.       Problem: Pain - Standard  Goal: Alleviation of pain or a reduction in pain to the patient’s comfort goal  Flowsheets (Taken 3/23/2022 2000)  Pain Rating Scale (NPRS): 0  Note: Patient able to verbalize pain level and verbalize an acceptable level of pain.    The patient is Stable - Low risk of patient condition declining or worsening

## 2022-03-24 NOTE — PROGRESS NOTES
2 RN skin check done with admitting RN and DEEJAY Bhatt. Face photo uploaded to patient's chart. Skin photos not needed, but documented in chart. Pt with Hermelindo score of 18. Prevention measures in place including pressure redistribution mattress. Patient able to turn self in bed.

## 2022-03-24 NOTE — PROGRESS NOTES
Tele strip printed at 1150     Rhythm: SR   HR: 70  Measurements: 0.12/0.08/0.40        Telemetry Shift Summary     Rhythm: SR  HR Range: 70-80's  Ectopy: Rare PVC/PAC     Telemetry monitoring strips placed in pt's chart.

## 2022-03-24 NOTE — CARE PLAN
Problem: Fall Risk - Rehab  Goal: Patient will remain free from falls  Note: Pt uses call light consistently and appropriately. Waits for assistance does not attempt self transfer this shift. Able to verbalize needs. Bed in low position. Personal belongings and call light within reach. Bed alarm on. Will continue to monitor.      Problem: Skin Integrity  Goal: Skin integrity is maintained or improved  Note: Patient's skin was assessed on admission. Documentation noted. No new or accidental injury this shift.  Will continue to monitor.    The patient is Watcher - Medium risk of patient condition declining or worsening

## 2022-03-24 NOTE — THERAPY
Occupational Therapy   Initial Evaluation     Patient Name: Yamile Go  Age:  72 y.o., Sex:  female  Medical Record #: [id number]  Today's Date: 3/24/2022     Subjective    Pt reports she has frequent falls and that she fell 4 times the day she broke her back.      Objective     03/24/22 0901   Prior Living Situation   Prior Services Home-Independent   Housing / Facility 1 Story Apartment / Condo   Steps Into Home 0   Steps In Home 0   Rail None   Elevator No   Bathroom Set up Bathtub / Shower Combination;Tub Transfer Bench   Equipment Owned Front-Wheel Walker;Tub Transfer Bench;Hand Held Shower   Lives with - Patient's Self Care Capacity Alone and Able to Care For Self   Prior Level of ADL Function   Self Feeding Independent   Grooming / Hygiene Independent   Bathing Independent   Dressing Independent   Toileting Independent   Prior Level of IADL Function   Medication Management Independent   Laundry Independent   Kitchen Mobility Independent   Finances Independent   Home Management Independent   Shopping Independent   Prior Level Of Mobility Independent With Device in Community   Driving / Transportation Driving Independent   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)   Leisure Interests Hobbies  (puzzles)   Prior Functioning: Everyday Activities   Self Care Independent   Indoor Mobility (Ambulation) Independent   Stairs Not applicable   Functional Cognition Independent   Prior Device Use Walker   Pain   Intervention Emotional Support   Pain 0 - 10 Group   Location Back   Location Orientation Posterior   Pain Rating Scale (NPRS) 6   Description Aching;Cramping   Comfort Goal Comfort with Movement;Perform Activity;Sleep Comfortably   Therapist Pain Assessment During Activity   Cognition    Level of Consciousness Alert   ABS (Agitated Behavior Scale)   Agitated Behavior Scale Performed No   Vision Screen   Vision Not tested  (wears glasses, no reported changes)   Passive ROM  Upper Body   Passive ROM Upper Body WDL   Active ROM Upper Body   Active ROM Upper Body  WDL   Dominant Hand Right   Strength Upper Body   Upper Body Strength  X   Comments Generalized weakness grossly 3/5 bilaterally   Sensation Upper Body   Upper Extremity Sensation  WDL   Comments No reported numbness or tingling   Upper Body Muscle Tone   Upper Body Muscle Tone  WDL   Balance Assessment   Sitting Balance (Static) Good   Sitting Balance (Dynamic) Good   Standing Balance (Static) Fair -   Standing Balance (Dynamic) Fair -   Weight Shift Sitting Good   Weight Shift Standing Good   Bed Mobility    Supine to Sit Minimal Assist   Sit to Supine   (NT - pt up in w/c at end of session)   Sit to Stand Contact Guard Assist   Scooting Standby Assist   Rolling Standby Assist  (use of bed rail)   Coordination Upper Body   Coordination WDL   Eating   Assistance Needed Independent   Physical Assistance Level No physical assistance   CARE Score - Eating 6   Eating Discharge Goal   Discharge Goal 6   Oral Hygiene   Assistance Needed Set-up / clean-up   Physical Assistance Level No physical assistance   CARE Score - Oral Hygiene 5   Oral Hygiene Discharge Goal   Discharge Goal 6   Shower/Bathe Self   Assistance Needed Physical assistance   Physical Assistance Level 26%-50%   CARE Score - Shower/Bathe Self 3   Shower/Bathe Self Discharge Goal   Discharge Goal 6   Upper Body Dressing   Assistance Needed Physical assistance   Physical Assistance Level 26%-50%   CARE Score - Upper Body Dressing 3   Upper Body Dressing Discharge Goal   Discharge Goal 6   Lower Body Dressing   Assistance Needed Incidental touching   Physical Assistance Level No physical assistance   CARE Score - Lower Body Dressing 4   Lower Body Dressing Discharge Goal   Discharge Goal 6   Putting On/Taking Off Footwear   Assistance Needed Supervision   Physical Assistance Level No physical assistance   CARE Score - Putting On/Taking Off Footwear 4   Putting On/Taking  Off Footwear Discharge Goal   Discharge Goal 6   Toileting Hygiene   Assistance Needed Incidental touching   Physical Assistance Level No physical assistance   CARE Score - Toileting Hygiene 4   Toileting Hygiene Discharge Goal   Discharge Goal 6   Toilet Transfer   Assistance Needed Incidental touching   Physical Assistance Level No physical assistance   CARE Score - Toilet Transfer 4   Toilet Transfer Discharge Goal   Discharge Goal 6   Hearing, Speech, and Vision   Expression of Ideas and Wants Without difficulty   Understanding Verbal and Non-Verbal Content Understands   Functional Level of Assist   Eating Independent   Grooming Supervision;Seated   Grooming Description Initial preparation for task;Seated in wheelchair at sink   Bathing Moderate Assist   Bathing Description Grab bar;Hand held shower;Tub bench;Assit with back;Assit wtih lower extremities;Assit with perineal;Increased time;Initial preparation for task;Set-up of equipment;Supervision for safety;Verbal cueing   Upper Body Dressing Moderate Assist  (dons shirt with setup, maxA for TLSO)   Upper Body Dressing Description Application of orthotic or brace;Increased time;Initial preparation for task;Set-up of equipment;Supervision for safety;Verbal cueing   Lower Body Dressing Contact Guard Assist  (threads legs through in seated figure-four. Stands with FWW to pull pants up)   Lower Body Dressing Description Cues for spinal precautions;Increased time;Initial preparation for task;Set-up of equipment;Supervision for safety;Verbal cueing   Toileting Contact Guard Assist   Toileting Description Assist for standing balance;Grab bar;Set-up of equipment;Increased time;Initial preparation for task;Supervision for safety;Verbal cueing   Bed, Chair, Wheelchair Transfer Contact Guard Assist  (stood from EOB with FWW)   Bed Chair Wheelchair Transfer Description Adaptive equipment;Increased time;Set-up of equipment;Supervision for safety;Verbal cueing   Toilet  Transfers Contact Guard Assist  (ambulated into bathroom with CGA and FWW (uses FWW at baseline))   Toilet Transfer Description Adaptive equipment;Increased time;Initial preparation for task;Set-up of equipment;Supervision for safety;Verbal cueing   Tub / Shower Transfers Contact Guard Assist  (ambulated from toilet then SPT to w/c after shower)   Tub Shower Transfer Description Grab bar;Shower bench;Increased time;Initial preparation for task;Set-up of equipment;Supervision for safety;Verbal cueing   Problem List   Problem List Decreased Active Daily Living Skills;Decreased Homemaking Skills;Decreased Upper Extremity Strength Right;Decreased Upper Extremity Strength Left;Decreased Functional Mobility;Decreased Activity Tolerance;Impaired Postural Control / Balance   Precautions   Precautions Fall Risk;TLSO (Thoracolumbosacral orthosis);Spinal / Back Precautions    Comments Ok to remove TLSO for showers   Current Discharge Plan   Current Discharge Plan Return to Prior Living Situation   Benefit    Therapy Benefit Patient Would Benefit from Inpatient Rehab Occupational Therapy to Maximize Sac with ADLs, IADLs and Functional Mobility.   Interdisciplinary Plan of Care Collaboration   IDT Collaboration with  Physical Therapist   Patient Position at End of Therapy Seated;Call Light within Reach;Tray Table within Reach;Phone within Reach   Collaboration Comments CLOF   Equipment Needs   Assistive Device / DME Raised Toilet Seat   Adaptive Equipment Reacher;Sock Aide;Dressing Stick;Long Handled Shoe Horn;Long Handled Sponge   Strengths & Barriers   Strengths Able to follow instructions;Alert and oriented;Good insight into deficits/needs;Independent prior level of function;Manages pain appropriately;Motivated for self care and independence;Pleasant and cooperative;Supportive family;Willingly participates in therapeutic activities   Barriers Decreased endurance;Fatigue;Generalized weakness;Impaired activity  "tolerance;Impaired balance;Limited mobility;Pain  (lives alone)   OT Total Time Spent   OT Individual Total Time Spent (Mins) 60   OT Charge Group   OT Self Care / ADL 1   OT Evaluation OT Evaluation Mod     Oriented pt to facility and expectations for rehab including Passport to [place] and role of each discipline.     Assessment  Patient is a 72 y.o. female with a diagnosis of L2 fx after falling backwards and landing on her back. NSG recommended TLSO and conservative management. Per H&P, \"hospital course complicated by anemia, hyperglycemia, and leukocytosis as well as difficult to control pain. TTE was performed due to concern she may have had syncope and it showed EF of 60%. Of note she had COVID-19 within last 90 days.\" Additional factors influencing patient status / progress (ie: cognitive factors, co-morbidities, social support, etc): PMHx includes HTN, chronic pain on opiates, hypothyroidism, HTN, HLD, and CAD. Pt lives alone in a first floor apartment with a tub combo in the bathroom and she has a TTB. Pt reports use of a FWW at baseline and was working as a hospice nurse.     During OT eval, pt presented with decreased endurance, impaired balance, generalized weakness, pain, and limited mobility compared to baseline. Pt required CGA for STS from EOB and to ambulate into bathroom with FWW. Required modA for bathing and UBD for assist with TLSO mgmt. Was able to perform LBD tasks in seated figure four and CGA to stand to pull pants up. Pt reports frequent falls at home and a general decline in function over the past year, particularly since she had COVID. Pt will benefit from inpatient occupational therapy services to maximize safety and independence in ADLs/IADLs and transfers/functional mobility prior to d/c home with intermittent check-in support from her daughters.     Plan  Recommend Occupational Therapy  minutes per day 5-6 days per week for 2 weeks for the following treatments:  OT Group " Therapy, OT Self Care/ADL, OT Neuro Re-Ed/Balance, OT Therapeutic Activity and OT Therapeutic Exercise.    Passport items to be completed:  Perform bathroom transfers, complete dressing, complete feeding, get ready for the day, prepare a simple meal, participate in household tasks, adapt home for safety needs, demonstrate home exercise program, complete caregiver training     Goals:  Long term and short term goals have been discussed with patient and they are in agreement.    Occupational Therapy Goals (Active)       Problem: Bathing       Dates: Start: 03/24/22         Goal: STG-Within one week, patient will bathe with CGA and use of AE as needed.       Dates: Start: 03/24/22               Problem: Dressing       Dates: Start: 03/24/22         Goal: STG-Within one week, patient will dress UB including donning TLSO with setup, verbal cues and Leah.        Dates: Start: 03/24/22            Goal: STG-Within one week, patient will dress LB with setup and supervision with use of AE as needed.       Dates: Start: 03/24/22               Problem: Functional Transfers       Dates: Start: 03/24/22         Goal: STG-Within one week, patient will perform bathroom transfers with SBA-supervision and use of AE as needed.       Dates: Start: 03/24/22               Problem: IADL's       Dates: Start: 03/24/22         Goal: STG-Within one week, patient will prepare a meal with setup and Leah and seated rest breaks as needed.       Dates: Start: 03/24/22               Problem: OT Long Term Goals       Dates: Start: 03/24/22         Goal: LTG-By discharge, patient will complete basic self care tasks with modified independence and use of AE as needed.       Dates: Start: 03/24/22            Goal: LTG-By discharge, patient will perform bathroom transfers with modified independence and use of AE as needed.       Dates: Start: 03/24/22            Goal: LTG-By discharge, patient will complete basic home management with supervision to  modified independence and use of AE as needed.        Dates: Start: 03/24/22               Problem: Toileting       Dates: Start: 03/24/22         Goal: STG-Within one week, patient will complete toileting tasks with supervision and use of AE as needed.       Dates: Start: 03/24/22

## 2022-03-24 NOTE — PROGRESS NOTES
Tele strip printed at 1153     Rhythm: SR   HR: 75  Measurements: 0.18/0.08/0.36        Telemetry Shift Summary     Rhythm: SR  HR Range: 74-84  Ectopy: None     Telemetry monitoring strips placed in pt's chart.

## 2022-03-24 NOTE — CARE PLAN
Problem: Pain - Standard  Goal: Alleviation of pain or a reduction in pain to the patient’s comfort goal  Outcome: Progressing     Problem: Skin Integrity  Goal: Skin integrity is maintained or improved  Outcome: Progressing     Pain is controlled to the satisfaction of the pt. She also is very mobile and repositions herself in bed frequently

## 2022-03-24 NOTE — THERAPY
Physical Therapy   Daily Treatment     Patient Name: Yamile Go  Age:  72 y.o., Sex:  female  Medical Record #: [id number]  Today's Date: 3/24/2022     Precautions  Precautions: Fall Risk,TLSO (Thoracolumbosacral orthosis),Spinal / Back Precautions   Comments: Ok to remove TLSO for showers    Subjective    Pt resting in bed, willing to participate. Reports no pain at this moment.     Objective       03/24/22 1415   Gait Functional Level of Assist    Gait Level Of Assist Contact Guard Assist   Assistive Device Front Wheel Walker   Distance (Feet) 200  (in addition to 100 ft x 1)   # of Times Distance was Traveled 1   Deviation Bradykinetic;Shuffled Gait;Decreased Heel Strike;Decreased Toe Off   Transfer Functional Level of Assist   Bed, Chair, Wheelchair Transfer Minimal Assist   Bed Chair Wheelchair Transfer Description Adaptive equipment;Increased time;Set-up of equipment   Toilet Transfers Minimal Assist   Toilet Transfer Description Adaptive equipment;Grab bar;Set-up of equipment   Sitting Lower Body Exercises   Sitting Lower Body Exercises Yes   Ankle Pumps 2 sets of 10   Hip Flexion 2 sets of 10   Hip Abduction 3 sets of 10;Light Resistance Theraband   Hip Adduction 2 sets of 10  (MICHAEL ball squeeze)   Long Arc Quad 2 sets of 10   Hamstring Curl 2 sets of 10   Bed Mobility    Supine to Sit Minimal Assist   Sit to Stand Minimal Assist   PT Total Time Spent   PT Individual Total Time Spent (Mins) 45   PT Charge Group   PT Gait Training 1   PT Therapeutic Exercise 1   PT Therapeutic Activities 1     TENS to L low back/ L hip during treatment    Assessment    Pt completed gait/ transfers and strength training with mild difficulty due to weakness and fatigue, requires AAROM for LLE.    Strengths: Able to follow instructions,Independent prior level of function,Motivated for self care and independence,Pleasant and cooperative,Willingly participates in therapeutic activities  Barriers: Decreased  endurance,Fatigue,Generalized weakness,Impaired balance,Limited mobility,Pain (B feet neuropathies/ recent falls)    Plan    Gait endurance with FWW, general strength training, standing balance, TENS/ modalities for pain control.    Passport items to be completed:  Get in/out of bed safely, in/out of a vehicle, safely use mobility device, walk or wheel around home/community, navigate up and down stairs, show how to get up/down from the ground, ensure home is accessible, demonstrate HEP, complete caregiver training    Physical Therapy Problems (Active)       Problem: Mobility       Dates: Start: 03/24/22         Goal: STG-Within one week, patient will ambulate community distances with FWW and  ft x 2       Dates: Start: 03/24/22            Goal: STG-Within one week, patient will ascend and descend four to six stairs with B hand rails and CGA standard rise steps       Dates: Start: 03/24/22               Problem: Mobility Transfers       Dates: Start: 03/24/22         Goal: STG-Within one week, patient will perform bed mobility SPV with bed rail as needed       Dates: Start: 03/24/22            Goal: STG-Within one week, patient will transfer bed to chair SPV /set-up with FWW       Dates: Start: 03/24/22

## 2022-03-24 NOTE — THERAPY
Physical Therapy   Initial Evaluation     Patient Name: Yamile Go  Age:  72 y.o., Sex:  female  Medical Record #: [id number]  Today's Date: 3/24/2022     Subjective    Pt up in wc, reports L groin / leg pain, awaiting pain meds but willing to participate     Objective       03/24/22 1031   Prior Living Situation   Prior Services Home-Independent   Housing / Facility 1 Story Apartment / Condo   Steps Into Home 0   Steps In Home 0   Equipment Owned Front-Wheel Walker   Lives with - Patient's Self Care Capacity Alone and Able to Care For Self   Comments pt reports recurring falls ! 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   Prior Functioning: Everyday Activities   Self Care Independent   Indoor Mobility (Ambulation) Independent   Stairs Independent   Functional Cognition Independent   Prior Device Use Walker   Pain 0 - 10 Group   Location Hip;Leg   Location Orientation Left   Therapist Pain Assessment Prior to Activity;During Activity;Post Activity;Nurse Notified   Passive ROM Lower Body   Passive ROM Lower Body WDL   Active ROM Lower Body    Active ROM Lower Body  X   Comments LLE limited by pain and weakness   Strength Lower Body   Lower Body Strength  X   Rt Hip Flexion Strength 3+ (F+)   Lt Hip Flexion Strength 3- (F-)   Lt Hip Abduction Strength 3- (F-)   Lt Hip Adduction Strength 3- (F-)   Lt Knee Extension Strength 3- (F-)   Comments LLE limited by pain and weakness   Sensation Lower Body   Lower Extremity Sensation   X   Rt Lower Extremity Light Touch Impaired   Lt Lower Extremity Light Touch Impaired   Paresthesia Present    Comments B  feet/ neuropathy   Lower Body Muscle Tone   Lower Body Muscle Tone  WDL   Balance Assessment   Sitting Balance (Static) Good   Sitting Balance (Dynamic) Good   Standing Balance (Static) Fair -   Standing Balance (Dynamic) Fair -   Weight Shift Sitting Good   Weight Shift Standing Good   Bed Mobility    Supine to Sit Minimal Assist   Sit to Supine Minimal Assist   Sit to Stand Minimal Assist   Scooting Minimal Assist   Rolling Contact Guard Assist   Neurological Concerns   Neurological Concerns Yes   Paresthesia Present   Comments decreased DTR LLE / B feet neuropathy   Coordination Lower Body    Coordination Lower Body  Not Tested   Roll Left and Right   Assistance Needed Incidental touching   CARE Score - Roll Left and Right 4   Roll Left and Right Discharge Goal   Discharge Goal 6   Sit to Lying   Assistance Needed Physical assistance   Physical Assistance Level 26%-50%   CARE Score - Sit to Lying 3   Sit to Lying Discharge Goal   Discharge Goal 6   Lying to Sitting on Side of Bed   Assistance Needed Physical assistance   Physical Assistance Level 26%-50%   CARE Score - Lying to Sitting on Side of Bed 3   Lying to Sitting on Side of Bed Discharge Goal   Discharge Goal 6   Sit to Stand   Assistance Needed Adaptive equipment;Physical assistance   Physical Assistance Level 25% or less   CARE Score - Sit to Stand 3   Sit to Stand Discharge Goal   Discharge Goal 6   Chair/Bed-to-Chair Transfer   Assistance Needed Adaptive equipment;Physical assistance   Physical Assistance Level 25% or less   CARE Score - Chair/Bed-to-Chair Transfer 3   Chair/Bed-to-Chair Transfer Discharge Goal   Discharge Goal 6   Car Transfer   Comment TLSO/ back and L hip pain   Reason if not Attempted Medical concerns   CARE Score - Car Transfer 88   Car Transfer Discharge Goal   Discharge Goal 6   Walk 10 Feet   Assistance Needed Adaptive equipment;Physical assistance   Physical Assistance Level 25% or less   CARE Score - Walk 10 Feet 3   Walk 10  Feet Discharge Goal   Discharge Goal 6   Walk 50 Feet with Two Turns   Assistance Needed Adaptive equipment;Physical assistance   Physical Assistance Level 25% or less   CARE Score - Walk 50 Feet with Two Turns 3   Walk 50 Feet with Two Turns Discharge Goal   Discharge Goal 6   Walk 150 Feet   Comment TLSO/ back and L hip pain   Reason if not Attempted Safety concerns   CARE Score - Walk 150 Feet 88   Walk 150 Feet Discharge Goal   Discharge Goal 6   Walking 10 Feet on Uneven Surfaces   Comment TLSO/ back and L hip pain   Reason if not Attempted Safety concerns   CARE Score - Walking 10 Feet on Uneven Surfaces 88   Walking 10 Feet on Uneven Surfaces Discharge Goal   Discharge Goal 6   1 Step (Curb)   Assistance Needed Adaptive equipment;Physical assistance   Physical Assistance Level 25% or less   CARE Score - 1 Step (Curb) 3   1 Step (Curb) Discharge Goal   Discharge Goal 6   4 Steps   Assistance Needed Adaptive equipment;Physical assistance   Physical Assistance Level 25% or less   CARE Score - 4 Steps 3   4 Steps Discharge Goal   Discharge Goal 6   12 Steps   Comment TLSO/ back and L hip pain   Reason if not Attempted Safety concerns   CARE Score - 12 Steps 88   12 Steps Discharge Goal   Discharge Goal 9   Picking Up Object   Comment TLSO/ back and L hip pain   Reason if not Attempted Safety concerns   CARE Score - Picking Up Object 88   Picking Up Object Discharge Goal   Discharge Goal 6   Wheel 50 Feet with Two Turns   Comment TLSO/ back and L hip pain   Reason if not Attempted Activity not applicable   CARE Score - Wheel 50 Feet with Two Turns 9   Wheel 50 Feet with Two Turns Discharge Goal   Discharge Goal 9   Wheel 150 Feet   Comment TLSO/ back and L hip pain   Reason if not Attempted Activity not applicable   CARE Score - Wheel 150 Feet 9   Wheel 150 Feet Discharge Goal   Discharge Goal 9   Gait Functional Level of Assist    Gait Level Of Assist Minimal Assist   Assistive Device Front Wheel Walker    Distance (Feet) 100   # of Times Distance was Traveled 1   Deviation Bradykinetic;Shuffled Gait;Decreased Heel Strike;Decreased Toe Off   Stairs Functional Level of Assist   Level of Assist with Stairs Minimal Assist   # of Stairs Climbed 6  (short rise steps/ step to pattern)   Stairs Description Extra time;Hand rails;Limited by fatigue;Safety concerns   Transfer Functional Level of Assist   Bed, Chair, Wheelchair Transfer Minimal Assist   Bed Chair Wheelchair Transfer Description Adaptive equipment;Set-up of equipment   Problem List    Problems Pain;Impaired Bed Mobility;Impaired Transfers;Impaired Ambulation;Functional Strength Deficit;Impaired Balance;Decreased Activity Tolerance  (1 year hx of frequent falls)   Current Discharge Plan   Current Discharge Plan Return to Prior Living Situation   Interdisciplinary Plan of Care Collaboration   IDT Collaboration with  Occupational Therapist   Patient Position at End of Therapy In Bed;Bed Alarm On   Collaboration Comments CLOF   Benefit   Therapy Benefit Patient Would Benefit from Inpatient Rehabilitation Physical Therapy to Maximize Functional [place] with ADLs, IADLs and Mobility.   Strengths & Barriers   Strengths Able to follow instructions;Independent prior level of function;Motivated for self care and independence;Pleasant and cooperative;Willingly participates in therapeutic activities   Barriers Decreased endurance;Fatigue;Generalized weakness;Impaired balance;Limited mobility;Pain  (B feet neuropathies/ recent falls)   PT Total Time Spent   PT Individual Total Time Spent (Mins) 45   PT Charge Group   Charges Yes   PT Therapeutic Exercise 1   PT Evaluation PT Evaluation Mod     Pt oriented to rehab/ PT/ POC   Trial TENS to L low back and L hip region throughout PT evaluation, questionable benefit.  Pt completed supine exercises 2 x 10  heel slides, hip abd/add, SAQ's with assist for LLE pain control and full ROM    Assessment  Patient is 72 y.o. female  with a diagnosis of L2 transverse process fx s/p GLF 3/21.  Additional factors influencing patient status / progress : include need for TLSO, anemia, hyperglycemia and leukocytosis.  PMH significant for HTN, chronic pain, hypothyriod, HLD, CAD and back surgery 2017. Pt was independent, living alone, and working prior to admit, however reports frequent falls x 1 year. Pt currently presents with limited mobility, limited activity tolerance, low back and LLE pain, impaired standing balance, generalized weakness LLE worse then RLE, B feet neuropathy, impaired gait and increased risk for falls. Pt is pleasant/ cooperative and motivated for improved strength and safety, should benefit from rehab PT to maximize functional mobility and independence for return home with intermittent assist from daughter and home health.    Plan  Recommend Physical Therapy  minutes per day 5-7 days per week for 2 weeks for the following treatments:  PT Group Therapy, PT Orthotics Training, PT Gait Training, PT Self Care/Home Eval, PT Therapeutic Exercises, PT TENS Application, PT Neuro Re-Ed/Balance, PT Therapeutic Activity, PT Manual Therapy, and PT Evaluation.    Passport items to be completed:  Get in/out of bed safely, in/out of a vehicle, safely use mobility device, walk or wheel around home/community, navigate up and down stairs, show how to get up/down from the ground, ensure home is accessible, demonstrate HEP, complete caregiver training    Goals:  Long term and short term goals have been discussed with patient and they are in agreement.    Physical Therapy Problems (Active)       Problem: Mobility       Dates: Start: 03/24/22         Goal: STG-Within one week, patient will ambulate community distances with FWW and  ft x 2       Dates: Start: 03/24/22            Goal: STG-Within one week, patient will ascend and descend four to six stairs with B hand rails and CGA standard rise steps       Dates: Start: 03/24/22                Problem: Mobility Transfers       Dates: Start: 03/24/22         Goal: STG-Within one week, patient will perform bed mobility SPV with bed rail as needed       Dates: Start: 03/24/22            Goal: STG-Within one week, patient will transfer bed to chair SPV /set-up with FWW       Dates: Start: 03/24/22

## 2022-03-24 NOTE — DISCHARGE PLANNING
CASE MANAGEMENT INITIAL ASSESSMENT    Admit Date:  3/23/2022     Case Management has reviewed the medical chart and will meet with patient to discuss role of case management / discharge planning / team conference.     Patient is a  72 y.o. female transferred from [facility].    Attending physician: Dr. Lux  PCP: Dr. Cole Yuen     Diagnosis: Closed compression fracture of L2 lumbar vertebra, initial encounter ([facility]) [S32.020A]    Co-morbidities:   Patient Active Problem List    Diagnosis Date Noted   • Closed compression fracture of L2 lumbar vertebra, initial encounter ([facility]) 03/23/2022   • Physical debility 03/22/2022   • Personal history of COVID-19 03/21/2022   • Closed fracture of lumbar vertebra, unspecified fracture morphology, initial encounter ([facility]) 03/21/2022   • Leukocytosis 03/21/2022   • Acute hypoxemic respiratory failure due to COVID-19 ([facility]) 02/06/2022   • Community acquired pneumonia 02/06/2022   • Sepsis ([facility]) 02/06/2022   • Hypothyroidism 02/06/2022   • Chronic pain syndrome on chronic opioids 02/06/2022   • HTN (hypertension)    • Hyperlipidemia    • Class 1 obesity due to excess calories with serious comorbidity and body mass index (BMI) of 32.0 to 32.9 in adult    • Depression    • Other chest pain 01/30/2012   • Dyslipidemia 01/30/2012   • Essential hypertension, benign 01/30/2012   • CAD (coronary artery disease) 01/30/2012     Prior Living Situation:  Housing / Facility: 1 Story Apartment / Condo  Lives with - Patient's Self Care Capacity: Alone and Able to Care For Self    Prior Level of Function:   Independent     Support Systems:  Primary : Brittany Azul (daughter) [phone], ulices (friend) [phone]    Previous Services Utilized:   Equipment Owned: Front-Wheel Walker,Single Point Cane  Prior Services: Home-Independent    Other Information:  Primary Payor Source: Medicare A,Medicare B  Secondary Payor Source: Other (Comments) ([employer])  Primary Care Practitioner :  kitty lópez    Patient / Family Goal:  Patient / Family Goal: case management will meet with patient to discuss goals    Plan:  1. Continue to follow patient through hospitalization and provide discharge planning in collaboration with patient, family, physicians and ancillary services.     2. Utilize community resources to ensure a safe discharge.

## 2022-03-25 LAB
APPEARANCE UR: CLEAR
BILIRUB UR QL STRIP.AUTO: NEGATIVE
COLOR UR: YELLOW
GLUCOSE UR STRIP.AUTO-MCNC: 100 MG/DL
KETONES UR STRIP.AUTO-MCNC: NEGATIVE MG/DL
LEUKOCYTE ESTERASE UR QL STRIP.AUTO: NEGATIVE
MICRO URNS: ABNORMAL
NITRITE UR QL STRIP.AUTO: NEGATIVE
PH UR STRIP.AUTO: 6.5 [PH] (ref 5–8)
PROT UR QL STRIP: NEGATIVE MG/DL
RBC UR QL AUTO: NEGATIVE
SP GR UR STRIP.AUTO: 1.03
UROBILINOGEN UR STRIP.AUTO-MCNC: 1 MG/DL

## 2022-03-25 PROCEDURE — 97110 THERAPEUTIC EXERCISES: CPT

## 2022-03-25 PROCEDURE — 700111 HCHG RX REV CODE 636 W/ 250 OVERRIDE (IP): Performed by: PHYSICAL MEDICINE & REHABILITATION

## 2022-03-25 PROCEDURE — 81003 URINALYSIS AUTO W/O SCOPE: CPT

## 2022-03-25 PROCEDURE — A9270 NON-COVERED ITEM OR SERVICE: HCPCS | Performed by: PHYSICAL MEDICINE & REHABILITATION

## 2022-03-25 PROCEDURE — 770010 HCHG ROOM/CARE - REHAB SEMI PRIVAT*

## 2022-03-25 PROCEDURE — 97112 NEUROMUSCULAR REEDUCATION: CPT

## 2022-03-25 PROCEDURE — 99232 SBSQ HOSP IP/OBS MODERATE 35: CPT | Performed by: PHYSICAL MEDICINE & REHABILITATION

## 2022-03-25 PROCEDURE — 700102 HCHG RX REV CODE 250 W/ 637 OVERRIDE(OP): Performed by: PHYSICAL MEDICINE & REHABILITATION

## 2022-03-25 PROCEDURE — 97530 THERAPEUTIC ACTIVITIES: CPT

## 2022-03-25 PROCEDURE — 97116 GAIT TRAINING THERAPY: CPT

## 2022-03-25 RX ADMIN — OMEPRAZOLE 20 MG: 20 CAPSULE, DELAYED RELEASE ORAL at 09:12

## 2022-03-25 RX ADMIN — METOPROLOL TARTRATE 12.5 MG: 25 TABLET, FILM COATED ORAL at 20:34

## 2022-03-25 RX ADMIN — HYDROCODONE BITARTRATE AND ACETAMINOPHEN 1 TABLET: 10; 325 TABLET ORAL at 20:35

## 2022-03-25 RX ADMIN — ENOXAPARIN SODIUM 40 MG: 40 INJECTION SUBCUTANEOUS at 09:15

## 2022-03-25 RX ADMIN — Medication 2000 UNITS: at 09:12

## 2022-03-25 RX ADMIN — METOPROLOL TARTRATE 12.5 MG: 25 TABLET, FILM COATED ORAL at 09:13

## 2022-03-25 RX ADMIN — ESCITALOPRAM OXALATE 20 MG: 10 TABLET, FILM COATED ORAL at 09:15

## 2022-03-25 RX ADMIN — SUCRALFATE 1 G: 1 TABLET ORAL at 09:12

## 2022-03-25 RX ADMIN — HYDROCODONE BITARTRATE AND ACETAMINOPHEN 1 TABLET: 10; 325 TABLET ORAL at 13:19

## 2022-03-25 RX ADMIN — SUCRALFATE 1 G: 1 TABLET ORAL at 20:35

## 2022-03-25 RX ADMIN — AMITRIPTYLINE HYDROCHLORIDE 75 MG: 25 TABLET, FILM COATED ORAL at 20:34

## 2022-03-25 RX ADMIN — SENNOSIDES AND DOCUSATE SODIUM 2 TABLET: 50; 8.6 TABLET ORAL at 09:12

## 2022-03-25 RX ADMIN — HYDROCODONE BITARTRATE AND ACETAMINOPHEN 1 TABLET: 10; 325 TABLET ORAL at 03:53

## 2022-03-25 RX ADMIN — SUCRALFATE 1 G: 1 TABLET ORAL at 17:34

## 2022-03-25 RX ADMIN — SUCRALFATE 1 G: 1 TABLET ORAL at 12:06

## 2022-03-25 RX ADMIN — ROSUVASTATIN CALCIUM 20 MG: 10 TABLET, FILM COATED ORAL at 20:36

## 2022-03-25 RX ADMIN — LEVOTHYROXINE SODIUM 88 MCG: 0.09 TABLET ORAL at 05:46

## 2022-03-25 RX ADMIN — MONTELUKAST SODIUM 10 MG: 10 TABLET, FILM COATED ORAL at 20:35

## 2022-03-25 ASSESSMENT — ACTIVITIES OF DAILY LIVING (ADL)
BED_CHAIR_WHEELCHAIR_TRANSFER_DESCRIPTION: ADAPTIVE EQUIPMENT;INCREASED TIME;INITIAL PREPARATION FOR TASK;SET-UP OF EQUIPMENT;SUPERVISION FOR SAFETY
BED_CHAIR_WHEELCHAIR_TRANSFER_DESCRIPTION: ADAPTIVE EQUIPMENT;INCREASED TIME;SET-UP OF EQUIPMENT;SUPERVISION FOR SAFETY
TOILET_TRANSFER_DESCRIPTION: ADAPTIVE EQUIPMENT;GRAB BAR;INCREASED TIME;INITIAL PREPARATION FOR TASK;SET-UP OF EQUIPMENT;SUPERVISION FOR SAFETY
TUB_SHOWER_TRANSFER_DESCRIPTION: ADAPTIVE EQUIPMENT;GRAB BAR;SHOWER BENCH;INCREASED TIME;INITIAL PREPARATION FOR TASK;SET-UP OF EQUIPMENT;SUPERVISION FOR SAFETY;VERBAL CUEING
BED_CHAIR_WHEELCHAIR_TRANSFER_DESCRIPTION: ADAPTIVE EQUIPMENT;INCREASED TIME;SET-UP OF EQUIPMENT
BED_CHAIR_WHEELCHAIR_TRANSFER_DESCRIPTION: ADAPTIVE EQUIPMENT;INCREASED TIME;SET-UP OF EQUIPMENT
TOILETING_LEVEL_OF_ASSIST_DESCRIPTION: GRAB BAR;INCREASED TIME;SUPERVISION FOR SAFETY

## 2022-03-25 ASSESSMENT — GAIT ASSESSMENTS
ASSISTIVE DEVICE: PARALLEL BARS
ASSISTIVE DEVICE: FRONT WHEEL WALKER
DEVIATION: BRADYKINETIC
DISTANCE (FEET): 100
GAIT LEVEL OF ASSIST: MINIMAL ASSIST
DISTANCE (FEET): 50
GAIT LEVEL OF ASSIST: STANDBY ASSIST
DEVIATION: BRADYKINETIC;SHUFFLED GAIT

## 2022-03-25 NOTE — THERAPY
Physical Therapy   Daily Treatment     Patient Name: Yamile Go  Age:  72 y.o., Sex:  female  Medical Record #: [id number]  Today's Date: 3/25/2022     Precautions  Precautions: Fall Risk,TLSO (Thoracolumbosacral orthosis),Spinal / Back Precautions   Comments: Ok to remove TLSO for showers    Subjective    Pt resting in bed, willing to participate     Objective       03/25/22 1231   Gait Functional Level of Assist    Gait Level Of Assist Minimal Assist   Assistive Device Front Wheel Walker   Distance (Feet) 50  (outdoors)   # of Times Distance was Traveled 1   Deviation Bradykinetic;Shuffled Gait   Transfer Functional Level of Assist   Bed, Chair, Wheelchair Transfer Contact Guard Assist   Bed Chair Wheelchair Transfer Description Adaptive equipment;Increased time;Set-up of equipment   Supine Lower Body Exercise   Supine Lower Body Exercises Yes   Hip Abduction Hook Lying;3 sets of 10   Hip Adduction  3 sets of 10  (hooklying MICHAEL ball squeeze)   Short Arc Quad 3 sets of 10   Heel Slide 3 sets of 10   Neuro-Muscular Treatments   Neuro-Muscular Treatments Co-Contraction;Postural Facilitation;Sequencing;Tactile Cuing   Comments core strength training/ core stabilization edge of mat for seated PNF lift/chop with 2# mediball 3 x 10. Rhythmic stabilization all planes 1 minute x 3.   PT Total Time Spent   PT Individual Total Time Spent (Mins) 60   PT Charge Group   PT Gait Training 1   PT Therapeutic Exercise 2   PT Neuromuscular Re-Education / Balance 1       Assessment    Pt with significant fatigue this pm for gait or standing activity but completed core stabilization and strength training as noted. Remains with L groin parasthesias with active exercises.    Strengths: Able to follow instructions,Independent prior level of function,Motivated for self care and independence,Pleasant and cooperative,Willingly participates in therapeutic activities  Barriers: Decreased endurance,Fatigue,Generalized weakness,Impaired  balance,Limited mobility,Pain (B feet neuropathies/ recent falls)    Plan    Gait endurance with FWW, general strength training, standing balance, TENS/ modalities for pain control.     Passport items to be completed:  Get in/out of bed safely, in/out of a vehicle, safely use mobility device, walk or wheel around home/community, navigate up and down stairs, show how to get up/down from the ground, ensure home is accessible, demonstrate HEP, complete caregiver training      Physical Therapy Problems (Active)       Problem: Mobility       Dates: Start: 03/24/22         Goal: STG-Within one week, patient will ambulate community distances with FWW and  ft x 2       Dates: Start: 03/24/22            Goal: STG-Within one week, patient will ascend and descend four to six stairs with B hand rails and CGA standard rise steps       Dates: Start: 03/24/22               Problem: Mobility Transfers       Dates: Start: 03/24/22         Goal: STG-Within one week, patient will perform bed mobility SPV with bed rail as needed       Dates: Start: 03/24/22            Goal: STG-Within one week, patient will transfer bed to chair SPV /set-up with FWW       Dates: Start: 03/24/22

## 2022-03-25 NOTE — THERAPY
Occupational Therapy  Daily Treatment     Patient Name: Yamile Go  Age:  72 y.o., Sex:  female  Medical Record #: [id number]  Today's Date: 3/25/2022     Precautions  Precautions: Fall Risk,TLSO (Thoracolumbosacral orthosis),Spinal / Back Precautions   Comments: Ok to remove TLSO for showers         Subjective    Pt requested to initiate UE exercises this session.      Objective     03/25/22 1431   Functional Level of Assist   Bed, Chair, Wheelchair Transfer Standby Assist   Bed Chair Wheelchair Transfer Description Adaptive equipment;Increased time;Initial preparation for task;Set-up of equipment;Supervision for safety   Sitting Upper Body Exercises   Bilateral Row 3 sets of 10;Bilateral;Weight (See Comments for lbs)  (30# on Equalizer)   Tricep Press 3 sets of 10;Bilateral;Weight (See Comments for lbs)  (20# on [place])   Upper Extremity Bike Level 3 Resistance  (Fluidobike 10 min to increase UE strength/endurance)   Bed Mobility    Supine to Sit Standby Assist  (HOBE)   Sit to Supine Standby Assist  (HOBE)   Interdisciplinary Plan of Care Collaboration   Patient Position at End of Therapy In Bed;Call Light within Reach;Tray Table within Reach;Phone within Reach   OT Total Time Spent   OT Individual Total Time Spent (Mins) 30   OT Charge Group   OT Therapeutic Exercise  2       Assessment    Pt tolerated OT session well focused on UE exercises to increase strength for bed mobility, transfers, functional mobility with FWW and daily activities. Completed exercises with no c/o pain or discomfort.   Strengths: Able to follow instructions,Alert and oriented,Good insight into deficits/needs,Independent prior level of function,Manages pain appropriately,Motivated for self care and independence,Pleasant and cooperative,Supportive family,Willingly participates in therapeutic activities  Barriers: Decreased endurance,Fatigue,Generalized weakness,Impaired activity tolerance,Impaired balance,Limited mobility,Pain  (lives alone)    Plan    ADLs with AE PRN, IADLs, functional mobility, transfers, balance/fall prevention, endurance, UE strengthening     Passport items to be completed:  Perform bathroom transfers, complete dressing, complete feeding, get ready for the day, prepare a simple meal, participate in household tasks, adapt home for safety needs, demonstrate home exercise program, complete caregiver training     Occupational Therapy Goals (Active)       Problem: Bathing       Dates: Start: 03/24/22         Goal: STG-Within one week, patient will bathe with CGA and use of AE as needed.       Dates: Start: 03/24/22               Problem: Dressing       Dates: Start: 03/24/22         Goal: STG-Within one week, patient will dress UB including donning TLSO with setup, verbal cues and Leah.        Dates: Start: 03/24/22            Goal: STG-Within one week, patient will dress LB with setup and supervision with use of AE as needed.       Dates: Start: 03/24/22               Problem: Functional Transfers       Dates: Start: 03/24/22         Goal: STG-Within one week, patient will perform bathroom transfers with SBA-supervision and use of AE as needed.       Dates: Start: 03/24/22               Problem: IADL's       Dates: Start: 03/24/22         Goal: STG-Within one week, patient will prepare a meal with setup and Leah and seated rest breaks as needed.       Dates: Start: 03/24/22               Problem: OT Long Term Goals       Dates: Start: 03/24/22         Goal: LTG-By discharge, patient will complete basic self care tasks with modified independence and use of AE as needed.       Dates: Start: 03/24/22            Goal: LTG-By discharge, patient will perform bathroom transfers with modified independence and use of AE as needed.       Dates: Start: 03/24/22            Goal: LTG-By discharge, patient will complete basic home management with supervision to modified independence and use of AE as needed.        Dates: Start:  03/24/22               Problem: Toileting       Dates: Start: 03/24/22         Goal: STG-Within one week, patient will complete toileting tasks with supervision and use of AE as needed.       Dates: Start: 03/24/22

## 2022-03-25 NOTE — CARE PLAN
Problem: Knowledge Deficit - Standard  Goal: Patient and family/care givers will demonstrate understanding of plan of care, disease process/condition, diagnostic tests and medications  Outcome: Progressing     Problem: Fall Risk - Rehab  Goal: Patient will remain free from falls  Outcome: Progressing

## 2022-03-25 NOTE — THERAPY
Occupational Therapy  Daily Treatment     Patient Name: Yamile Go  Age:  72 y.o., Sex:  female  Medical Record #: [id number]  Today's Date: 3/25/2022     Precautions  Precautions: Fall Risk,TLSO (Thoracolumbosacral orthosis),Spinal / Back Precautions   Comments: Ok to remove TLSO for showers         Subjective    Pt in bed upon arrival, agreeable to OT session.      Objective     03/25/22 0931   Pain   Intervention Declines   Non Verbal Descriptors   Non Verbal Scale  Calm   Functional Level of Assist   Toileting Standby Assist   Toileting Description Grab bar;Increased time;Supervision for safety   Bed, Chair, Wheelchair Transfer Contact Guard Assist  (FWW level)   Bed Chair Wheelchair Transfer Description Adaptive equipment;Increased time;Set-up of equipment;Supervision for safety   Toilet Transfers Standby Assist   Toilet Transfer Description Adaptive equipment;Grab bar;Increased time;Initial preparation for task;Set-up of equipment;Supervision for safety   Tub / Shower Transfers Standby Assist  (ambulated with FWW into ADL bathroom for TTB transfer)   Tub Shower Transfer Description Adaptive equipment;Grab bar;Shower bench;Increased time;Initial preparation for task;Set-up of equipment;Supervision for safety;Verbal cueing   Sitting Lower Body Exercises   Nustep Resistance Level 5  (8 minutes for general endurance/strengthening)   Bed Mobility    Supine to Sit Minimal Assist   Interdisciplinary Plan of Care Collaboration   Patient Position at End of Therapy Seated;Call Light within Reach;Tray Table within Reach;Phone within Reach   OT Total Time Spent   OT Individual Total Time Spent (Mins) 60   OT Charge Group   OT Therapy Activity 3   OT Therapeutic Exercise  1     Functional mobility room -> ADL bathroom -> main gym with FWW and SBA with w/c follow.     Pt completed puzzle standing EOM on airex mat to focus on standing tolerance/balance and endurance.     Per Dr. Lux, ok to remove thoracic  attachments of TLSO.     Assessment    Pt tolerated OT session well. Required increased time for TTB to manage LE s over the tub threshold. Need two seated rest breaks during puzzle activity. Had no c/o pain or discomfort with Nustep exercise.   Strengths: Able to follow instructions,Alert and oriented,Good insight into deficits/needs,Independent prior level of function,Manages pain appropriately,Motivated for self care and independence,Pleasant and cooperative,Supportive family,Willingly participates in therapeutic activities  Barriers: Decreased endurance,Fatigue,Generalized weakness,Impaired activity tolerance,Impaired balance,Limited mobility,Pain (lives alone)    Plan    ADLs with AE PRN, IADLs, functional mobility, transfers, balance/fall prevention, endurance, UE strengthening     Passport items to be completed:  Perform bathroom transfers, complete dressing, complete feeding, get ready for the day, prepare a simple meal, participate in household tasks, adapt home for safety needs, demonstrate home exercise program, complete caregiver training     Occupational Therapy Goals (Active)       Problem: Bathing       Dates: Start: 03/24/22         Goal: STG-Within one week, patient will bathe with CGA and use of AE as needed.       Dates: Start: 03/24/22               Problem: Dressing       Dates: Start: 03/24/22         Goal: STG-Within one week, patient will dress UB including donning TLSO with setup, verbal cues and Leah.        Dates: Start: 03/24/22            Goal: STG-Within one week, patient will dress LB with setup and supervision with use of AE as needed.       Dates: Start: 03/24/22               Problem: Functional Transfers       Dates: Start: 03/24/22         Goal: STG-Within one week, patient will perform bathroom transfers with SBA-supervision and use of AE as needed.       Dates: Start: 03/24/22               Problem: IADL's       Dates: Start: 03/24/22         Goal: STG-Within one week, patient  will prepare a meal with setup and Leah and seated rest breaks as needed.       Dates: Start: 03/24/22               Problem: OT Long Term Goals       Dates: Start: 03/24/22         Goal: LTG-By discharge, patient will complete basic self care tasks with modified independence and use of AE as needed.       Dates: Start: 03/24/22            Goal: LTG-By discharge, patient will perform bathroom transfers with modified independence and use of AE as needed.       Dates: Start: 03/24/22            Goal: LTG-By discharge, patient will complete basic home management with supervision to modified independence and use of AE as needed.        Dates: Start: 03/24/22               Problem: Toileting       Dates: Start: 03/24/22         Goal: STG-Within one week, patient will complete toileting tasks with supervision and use of AE as needed.       Dates: Start: 03/24/22

## 2022-03-25 NOTE — THERAPY
Physical Therapy   Daily Treatment     Patient Name: Yamile Go  Age:  72 y.o., Sex:  female  Medical Record #: [id number]  Today's Date: 3/25/2022     Precautions  Precautions: Fall Risk,TLSO (Thoracolumbosacral orthosis),Spinal / Back Precautions   Comments: Ok to remove TLSO for showers    Subjective    Pt up in wc, willing to participate.     Objective       03/25/22 1101   Gait Functional Level of Assist    Gait Level Of Assist Standby Assist   Assistive Device Parallel Bars   Distance (Feet) 100   # of Times Distance was Traveled 3   Deviation Bradykinetic   Transfer Functional Level of Assist   Bed, Chair, Wheelchair Transfer Contact Guard Assist   Bed Chair Wheelchair Transfer Description Adaptive equipment;Increased time;Set-up of equipment   Sitting Lower Body Exercises   Ankle Pumps 2 sets of 10   Hip Flexion 2 sets of 10   Hip Abduction 2 sets of 10   Hip Adduction 2 sets of 10   Long Arc Quad 2 sets of 10   Hamstring Curl 2 sets of 10   Bed Mobility    Sit to Supine Minimal Assist   Sit to Stand Contact Guard Assist   PT Total Time Spent   PT Individual Total Time Spent (Mins) 30   PT Charge Group   PT Gait Training 1   PT Therapeutic Exercise 1       Assessment    Pt demonstrated improved gait tristen and heel -toe sequencing with UE support at // bars, as well as improved ease with sit<>stand transitions. Continues with c/o L groin burning pain.    Strengths: Able to follow instructions,Independent prior level of function,Motivated for self care and independence,Pleasant and cooperative,Willingly participates in therapeutic activities  Barriers: Decreased endurance,Fatigue,Generalized weakness,Impaired balance,Limited mobility,Pain (B feet neuropathies/ recent falls)    Plan    Gait endurance with FWW, general strength training, standing balance, TENS/ modalities for pain control.     Passport items to be completed:  Get in/out of bed safely, in/out of a vehicle, safely use mobility device,  walk or wheel around home/community, navigate up and down stairs, show how to get up/down from the ground, ensure home is accessible, demonstrate HEP, complete caregiver training      Physical Therapy Problems (Active)       Problem: Mobility       Dates: Start: 03/24/22         Goal: STG-Within one week, patient will ambulate community distances with FWW and  ft x 2       Dates: Start: 03/24/22            Goal: STG-Within one week, patient will ascend and descend four to six stairs with B hand rails and CGA standard rise steps       Dates: Start: 03/24/22               Problem: Mobility Transfers       Dates: Start: 03/24/22         Goal: STG-Within one week, patient will perform bed mobility SPV with bed rail as needed       Dates: Start: 03/24/22            Goal: STG-Within one week, patient will transfer bed to chair SPV /set-up with FWW       Dates: Start: 03/24/22

## 2022-03-25 NOTE — CARE PLAN
The patient is Stable - Low risk of patient condition declining or worsening    Shift Goals  Clinical Goals: safety  Patient Goals: strength, pain control    Problem: Pain - Standard  Goal: Alleviation of pain or a reduction in pain to the patient’s comfort goal  Outcome: Not Met. Patient taking PRN pain medication for back pain. Patient educated on side effects and verbalizes understanding. Will continue to assess pain.     Problem: Bladder / Voiding  Goal: Patient will establish and maintain regular urinary output  Outcome: Progressing. Patient continent of urine. Patient's UA collected this shift per order. Will continue to monitor.

## 2022-03-26 PROCEDURE — 700102 HCHG RX REV CODE 250 W/ 637 OVERRIDE(OP): Performed by: PHYSICAL MEDICINE & REHABILITATION

## 2022-03-26 PROCEDURE — 97530 THERAPEUTIC ACTIVITIES: CPT

## 2022-03-26 PROCEDURE — 97110 THERAPEUTIC EXERCISES: CPT

## 2022-03-26 PROCEDURE — 770010 HCHG ROOM/CARE - REHAB SEMI PRIVAT*

## 2022-03-26 PROCEDURE — A9270 NON-COVERED ITEM OR SERVICE: HCPCS | Performed by: PHYSICAL MEDICINE & REHABILITATION

## 2022-03-26 PROCEDURE — 97535 SELF CARE MNGMENT TRAINING: CPT

## 2022-03-26 PROCEDURE — 97116 GAIT TRAINING THERAPY: CPT

## 2022-03-26 RX ADMIN — MONTELUKAST SODIUM 10 MG: 10 TABLET, FILM COATED ORAL at 21:39

## 2022-03-26 RX ADMIN — AMITRIPTYLINE HYDROCHLORIDE 75 MG: 25 TABLET, FILM COATED ORAL at 21:39

## 2022-03-26 RX ADMIN — Medication 2000 UNITS: at 07:59

## 2022-03-26 RX ADMIN — ROSUVASTATIN CALCIUM 20 MG: 10 TABLET, FILM COATED ORAL at 21:39

## 2022-03-26 RX ADMIN — ESCITALOPRAM OXALATE 20 MG: 10 TABLET, FILM COATED ORAL at 07:59

## 2022-03-26 RX ADMIN — HYDROCODONE BITARTRATE AND ACETAMINOPHEN 1 TABLET: 10; 325 TABLET ORAL at 14:35

## 2022-03-26 RX ADMIN — SUCRALFATE 1 G: 1 TABLET ORAL at 21:39

## 2022-03-26 RX ADMIN — SUCRALFATE 1 G: 1 TABLET ORAL at 07:59

## 2022-03-26 RX ADMIN — HYDROCODONE BITARTRATE AND ACETAMINOPHEN 1 TABLET: 10; 325 TABLET ORAL at 21:43

## 2022-03-26 RX ADMIN — OMEPRAZOLE 20 MG: 20 CAPSULE, DELAYED RELEASE ORAL at 07:59

## 2022-03-26 RX ADMIN — LEVOTHYROXINE SODIUM 88 MCG: 0.09 TABLET ORAL at 06:05

## 2022-03-26 RX ADMIN — SUCRALFATE 1 G: 1 TABLET ORAL at 11:14

## 2022-03-26 RX ADMIN — SUCRALFATE 1 G: 1 TABLET ORAL at 17:13

## 2022-03-26 RX ADMIN — METOPROLOL TARTRATE 12.5 MG: 25 TABLET, FILM COATED ORAL at 21:39

## 2022-03-26 RX ADMIN — METOPROLOL TARTRATE 12.5 MG: 25 TABLET, FILM COATED ORAL at 07:59

## 2022-03-26 ASSESSMENT — GAIT ASSESSMENTS
ASSISTIVE DEVICE: PARALLEL BARS
GAIT LEVEL OF ASSIST: CONTACT GUARD ASSIST
DEVIATION: BRADYKINETIC;SHUFFLED GAIT
DISTANCE (FEET): 50

## 2022-03-26 ASSESSMENT — PAIN DESCRIPTION - PAIN TYPE: TYPE: ACUTE PAIN

## 2022-03-26 ASSESSMENT — ACTIVITIES OF DAILY LIVING (ADL)
TOILET_TRANSFER_DESCRIPTION: GRAB BAR;INCREASED TIME;SET-UP OF EQUIPMENT;SUPERVISION FOR SAFETY;VERBAL CUEING
BED_CHAIR_WHEELCHAIR_TRANSFER_DESCRIPTION: ADAPTIVE EQUIPMENT;INCREASED TIME;SET-UP OF EQUIPMENT
BED_CHAIR_WHEELCHAIR_TRANSFER_DESCRIPTION: INCREASED TIME;INITIAL PREPARATION FOR TASK;SET-UP OF EQUIPMENT;SUPERVISION FOR SAFETY
TOILETING_LEVEL_OF_ASSIST_DESCRIPTION: GRAB BAR;INCREASED TIME
BED_CHAIR_WHEELCHAIR_TRANSFER_DESCRIPTION: INCREASED TIME;SET-UP OF EQUIPMENT;SUPERVISION FOR SAFETY;ADAPTIVE EQUIPMENT

## 2022-03-26 NOTE — CARE PLAN
"The patient is   Problem: Fall Risk - Rehab  Goal: Patient will remain free from falls  Note: [facility]ard Fall risk Assessment Score: 11    Moderate fall risk Interventions  - Bed and strip alarm   - Yellow sign by the door   - Yellow wrist band \"Fall risk\"  - Room near to the nurse station  - Do not leave patient unattended in the bathroom  - Fall risk education provided      Problem: Pain - Standard  Goal: Alleviation of pain or a reduction in pain to the patient’s comfort goal  Flowsheets (Taken 3/26/2022 0412)  OB Pain Intervention: Medication - See MAR  Note: Patient able to verbalize pain level and verbalize an acceptable level of pain. Medicated with Norco 10 mg PO for C/O back pain with some relief.          Shift Goals  Clinical Goals: safety  Patient Goals: strength, pain control    "

## 2022-03-26 NOTE — THERAPY
Occupational Therapy  Daily Treatment     Patient Name: Yamile Go  Age:  72 y.o., Sex:  female  Medical Record #: [id number]  Today's Date: 3/26/2022     Precautions  Precautions: Fall Risk,TLSO (Thoracolumbosacral orthosis),Spinal / Back Precautions   Comments: Ok to remove TLSO for showers         Subjective    Pt was significantly fatigued this session but agreeable to OT session. Requested to take a shower.      Objective     03/26/22 1331   Non Verbal Descriptors   Non Verbal Scale  Calm   Functional Level of Assist   Grooming Modified Independent;Seated   Grooming Description Increased time;Seated in wheelchair at sink   Bathing Supervision   Bathing Description Adaptive equipment;Grab bar;Hand held shower;Long handled bath tool;Tub bench;Increased time;Initial preparation for task;Supervision for safety   Upper Body Dressing Supervision   Upper Body Dressing Description Increased time;Initial preparation for task;Verbal cueing   Lower Body Dressing Supervision   Lower Body Dressing Description Assistive devices;Grab bar;Increased time;Cues for spinal precautions;Initial preparation for task;Supervision for safety   Toileting Minimal Assist  (varies supervision to Leah depending on clothing mgmt)   Toileting Description Grab bar;Increased time   Bed, Chair, Wheelchair Transfer Standby Assist   Bed Chair Wheelchair Transfer Description Increased time;Initial preparation for task;Set-up of equipment;Supervision for safety   Toilet Transfers Supervised   Toilet Transfer Description Grab bar;Increased time;Set-up of equipment;Supervision for safety;Verbal cueing   Tub / Shower Transfers Supervised   Tub Shower Transfer Description Grab bar;Shower bench;Initial preparation for task;Increased time;Set-up of equipment;Supervision for safety;Verbal cueing   IADL Treatments   IADL Treatments Home management   Home Management Pt loaded washing machine standing at FWW with supervision   Bed Mobility    Supine to  Sit Standby Assist   Interdisciplinary Plan of Care Collaboration   IDT Collaboration with  Nursing   Patient Position at End of Therapy Seated on toilet at end of session, RN aware of pt's location    Collaboration Comments Pt had open wound on knee, RN notified for wound mgmt and in to apply dressing   OT Total Time Spent   OT Individual Total Time Spent (Mins) 60   OT Charge Group   OT Self Care / ADL 2   OT Therapy Activity 2     Education provided for body mechanics, energy conservation, and fall prevention with focus on environmental hazards; handouts provided.     Pt ambulated from bedside into bathroom with FWW and SBA.     Assessment    Pt tolerated OT session fair primarily limited by fatigue. Pt has very fragile skin and developed a small open wound on her knee; RN notified. Pt is progressing well with her ADLs but needs occasional cues to maintain spinal precautions.   Strengths: Able to follow instructions,Alert and oriented,Good insight into deficits/needs,Independent prior level of function,Manages pain appropriately,Motivated for self care and independence,Pleasant and cooperative,Supportive family,Willingly participates in therapeutic activities  Barriers: Decreased endurance,Fatigue,Generalized weakness,Impaired activity tolerance,Impaired balance,Limited mobility,Pain (lives alone)    Plan    ADLs with AE PRN, IADLs, functional mobility, transfers, balance/fall prevention, endurance, UE strengthening     Passport items to be completed:  Perform bathroom transfers, complete dressing, complete feeding, get ready for the day, prepare a simple meal, participate in household tasks, adapt home for safety needs, demonstrate home exercise program, complete caregiver training     Occupational Therapy Goals (Active)       Problem: Bathing       Dates: Start: 03/24/22         Goal: STG-Within one week, patient will bathe with CGA and use of AE as needed.       Dates: Start: 03/24/22               Problem:  Dressing       Dates: Start: 03/24/22         Goal: STG-Within one week, patient will dress UB including donning TLSO with setup, verbal cues and Leah.        Dates: Start: 03/24/22            Goal: STG-Within one week, patient will dress LB with setup and supervision with use of AE as needed.       Dates: Start: 03/24/22               Problem: Functional Transfers       Dates: Start: 03/24/22         Goal: STG-Within one week, patient will perform bathroom transfers with SBA-supervision and use of AE as needed.       Dates: Start: 03/24/22               Problem: IADL's       Dates: Start: 03/24/22         Goal: STG-Within one week, patient will prepare a meal with setup and Leah and seated rest breaks as needed.       Dates: Start: 03/24/22               Problem: OT Long Term Goals       Dates: Start: 03/24/22         Goal: LTG-By discharge, patient will complete basic self care tasks with modified independence and use of AE as needed.       Dates: Start: 03/24/22            Goal: LTG-By discharge, patient will perform bathroom transfers with modified independence and use of AE as needed.       Dates: Start: 03/24/22            Goal: LTG-By discharge, patient will complete basic home management with supervision to modified independence and use of AE as needed.        Dates: Start: 03/24/22               Problem: Toileting       Dates: Start: 03/24/22         Goal: STG-Within one week, patient will complete toileting tasks with supervision and use of AE as needed.       Dates: Start: 03/24/22

## 2022-03-26 NOTE — THERAPY
Physical Therapy   Daily Treatment     Patient Name: Yamile Go  Age:  72 y.o., Sex:  female  Medical Record #: [id number]  Today's Date: 3/26/2022     Precautions  Precautions: Fall Risk,TLSO (Thoracolumbosacral orthosis),Spinal / Back Precautions   Comments: Ok to remove TLSO for showers    Subjective    Pt present and willing to participate. Reports knees feeling weak and wobbly     Objective       03/26/22 1100   Sitting Lower Body Exercises   Ankle Pumps 2 sets of 15   Hip Flexion 2 sets of 15   Hip Abduction 2 sets of 15   Hip Adduction 2 sets of 15   Long Arc Quad 2 sets of 15   Hamstring Curl 2 sets of 15   Nustep Resistance Level 1  (10 minutes for general cardio/ endurance training)   Comments pt requires AAROM for LLE weakness/ pain control   Bed Mobility    Sit to Supine Minimal Assist   Sit to Stand Contact Guard Assist   PT Total Time Spent   PT Individual Total Time Spent (Mins) 60   PT Charge Group   PT Gait Training 1   PT Therapeutic Exercise 2   PT Therapeutic Activities 1     Gait training with // bars 50 ft x 3 with SBA/ CGA    Assessment    Pt remains generally weak, requires encouragement but progressing slow but steady.    Strengths: Able to follow instructions,Independent prior level of function,Motivated for self care and independence,Pleasant and cooperative,Willingly participates in therapeutic activities  Barriers: Decreased endurance,Fatigue,Generalized weakness,Impaired balance,Limited mobility,Pain (B feet neuropathies/ recent falls)    Plan    Gait endurance with FWW, general strength training, standing balance, TENS/ modalities for pain control.     Passport items to be completed:  Get in/out of bed safely, in/out of a vehicle, safely use mobility device, walk or wheel around home/community, navigate up and down stairs, show how to get up/down from the ground, ensure home is accessible, demonstrate HEP, complete caregiver training    Physical Therapy Problems (Active)        Problem: Mobility       Dates: Start: 03/24/22         Goal: STG-Within one week, patient will ambulate community distances with FWW and  ft x 2       Dates: Start: 03/24/22            Goal: STG-Within one week, patient will ascend and descend four to six stairs with B hand rails and CGA standard rise steps       Dates: Start: 03/24/22               Problem: Mobility Transfers       Dates: Start: 03/24/22         Goal: STG-Within one week, patient will perform bed mobility SPV with bed rail as needed       Dates: Start: 03/24/22            Goal: STG-Within one week, patient will transfer bed to chair SPV /set-up with FWW       Dates: Start: 03/24/22

## 2022-03-26 NOTE — CARE PLAN
The patient is Watcher - Medium risk of patient condition declining or worsening    Shift Goals  Clinical Goals: Safety  Patient Goals: strength, pain control      Problem: Pain - Standard  Goal: Alleviation of pain or a reduction in pain to the patient’s comfort goal  Note: Patient has 7/10 pain to coccyx, PRN norco has been administered, heat pack and cold pack offered, will continue to monitor.     Problem: Skin Integrity  Goal: Skin integrity is maintained or improved  Note: Patient has a skin tear to left knee, photo has been uploaded, silicone foam has been applied, will continue to monitor.

## 2022-03-26 NOTE — THERAPY
Occupational Therapy  Daily Treatment     Patient Name: Yamile Go  Age:  72 y.o., Sex:  female  Medical Record #: [id number]  Today's Date: 3/26/2022     Precautions  Precautions: Fall Risk,TLSO (Thoracolumbosacral orthosis),Spinal / Back Precautions   Comments: Ok to remove TLSO for showers         Subjective    Pt in bed upon arrival, agreeable to OT session.      Objective     03/26/22 0931   Functional Level of Assist   Bed, Chair, Wheelchair Transfer Standby Assist   Bed Chair Wheelchair Transfer Description Increased time;Set-up of equipment;Supervision for safety;Adaptive equipment   Sitting Upper Body Exercises   Chest Press 3 sets of 10;Bilateral;Weight (See Comments for lbs)  (3# dumbbell held with both hands)   Front Arm Raise 3 sets of 10;Bilateral;Weight (See Comments for lbs)  (3# dumbbell held with both hands)   Bicep Curls 3 sets of 10;Bilateral;Weight (See Comments for lbs)  (3# dumbbell)   Upper Extremity Bike Level 3 Resistance  (Fluidobike 8 min for UE warmup prior to resistive exercises)   Bed Mobility    Supine to Sit Standby Assist   Interdisciplinary Plan of Care Collaboration   IDT Collaboration with  Physical Therapist   Patient Position at End of Therapy Seated  (In therapy gym for PT)   OT Total Time Spent   OT Individual Total Time Spent (Mins) 30   OT Charge Group   OT Therapeutic Exercise  2       Assessment    Pt tolerated OT session well focused on UE exercises. Continues to be limited by fatigue but participates to the best of her abilities.   Strengths: Able to follow instructions,Alert and oriented,Good insight into deficits/needs,Independent prior level of function,Manages pain appropriately,Motivated for self care and independence,Pleasant and cooperative,Supportive family,Willingly participates in therapeutic activities  Barriers: Decreased endurance,Fatigue,Generalized weakness,Impaired activity tolerance,Impaired balance,Limited mobility,Pain (lives  alone)    Plan    ADLs with AE PRN, IADLs, functional mobility, transfers, balance/fall prevention, endurance, UE strengthening     Passport items to be completed:  Perform bathroom transfers, complete dressing, complete feeding, get ready for the day, prepare a simple meal, participate in household tasks, adapt home for safety needs, demonstrate home exercise program, complete caregiver training     Occupational Therapy Goals (Active)       Problem: Bathing       Dates: Start: 03/24/22         Goal: STG-Within one week, patient will bathe with CGA and use of AE as needed.       Dates: Start: 03/24/22               Problem: Dressing       Dates: Start: 03/24/22         Goal: STG-Within one week, patient will dress UB including donning TLSO with setup, verbal cues and Leah.        Dates: Start: 03/24/22            Goal: STG-Within one week, patient will dress LB with setup and supervision with use of AE as needed.       Dates: Start: 03/24/22               Problem: Functional Transfers       Dates: Start: 03/24/22         Goal: STG-Within one week, patient will perform bathroom transfers with SBA-supervision and use of AE as needed.       Dates: Start: 03/24/22               Problem: IADL's       Dates: Start: 03/24/22         Goal: STG-Within one week, patient will prepare a meal with setup and Leah and seated rest breaks as needed.       Dates: Start: 03/24/22               Problem: OT Long Term Goals       Dates: Start: 03/24/22         Goal: LTG-By discharge, patient will complete basic self care tasks with modified independence and use of AE as needed.       Dates: Start: 03/24/22            Goal: LTG-By discharge, patient will perform bathroom transfers with modified independence and use of AE as needed.       Dates: Start: 03/24/22            Goal: LTG-By discharge, patient will complete basic home management with supervision to modified independence and use of AE as needed.        Dates: Start: 03/24/22                Problem: Toileting       Dates: Start: 03/24/22         Goal: STG-Within one week, patient will complete toileting tasks with supervision and use of AE as needed.       Dates: Start: 03/24/22

## 2022-03-26 NOTE — THERAPY
Missed Therapy     Patient Name: Yamile Go  Age:  72 y.o., Sex:  female  Medical Record #: [id number]  Today's Date: 3/26/2022    Discussed missed therapy with therapy  for make-up session tomorrow.       03/26/22 1501   Interdisciplinary Plan of Care Collaboration   IDT Collaboration with  Therapy Tech   Collaboration Comments Notified of need for make-up 30 min tomorrow for PT.   Therapy Missed   Missed Therapy (Minutes) 30   Reason For Missed Therapy Non-Medical - Patient Refused  (Secondary to increased pain.)

## 2022-03-27 PROCEDURE — A9270 NON-COVERED ITEM OR SERVICE: HCPCS | Performed by: PHYSICAL MEDICINE & REHABILITATION

## 2022-03-27 PROCEDURE — 700102 HCHG RX REV CODE 250 W/ 637 OVERRIDE(OP): Performed by: PHYSICAL MEDICINE & REHABILITATION

## 2022-03-27 PROCEDURE — 770010 HCHG ROOM/CARE - REHAB SEMI PRIVAT*

## 2022-03-27 PROCEDURE — 97116 GAIT TRAINING THERAPY: CPT

## 2022-03-27 RX ADMIN — SUCRALFATE 1 G: 1 TABLET ORAL at 17:40

## 2022-03-27 RX ADMIN — METOPROLOL TARTRATE 12.5 MG: 25 TABLET, FILM COATED ORAL at 20:54

## 2022-03-27 RX ADMIN — ESCITALOPRAM OXALATE 20 MG: 10 TABLET, FILM COATED ORAL at 09:24

## 2022-03-27 RX ADMIN — SUCRALFATE 1 G: 1 TABLET ORAL at 06:00

## 2022-03-27 RX ADMIN — METOPROLOL TARTRATE 12.5 MG: 25 TABLET, FILM COATED ORAL at 09:24

## 2022-03-27 RX ADMIN — SUCRALFATE 1 G: 1 TABLET ORAL at 11:07

## 2022-03-27 RX ADMIN — ROSUVASTATIN CALCIUM 20 MG: 10 TABLET, FILM COATED ORAL at 20:54

## 2022-03-27 RX ADMIN — AMITRIPTYLINE HYDROCHLORIDE 75 MG: 25 TABLET, FILM COATED ORAL at 20:54

## 2022-03-27 RX ADMIN — HYDROCODONE BITARTRATE AND ACETAMINOPHEN 1 TABLET: 10; 325 TABLET ORAL at 20:54

## 2022-03-27 RX ADMIN — SUCRALFATE 1 G: 1 TABLET ORAL at 20:54

## 2022-03-27 RX ADMIN — MONTELUKAST SODIUM 10 MG: 10 TABLET, FILM COATED ORAL at 20:54

## 2022-03-27 RX ADMIN — Medication 2000 UNITS: at 09:24

## 2022-03-27 RX ADMIN — OMEPRAZOLE 20 MG: 20 CAPSULE, DELAYED RELEASE ORAL at 09:25

## 2022-03-27 RX ADMIN — HYDROCODONE BITARTRATE AND ACETAMINOPHEN 1 TABLET: 10; 325 TABLET ORAL at 06:00

## 2022-03-27 RX ADMIN — LEVOTHYROXINE SODIUM 88 MCG: 0.09 TABLET ORAL at 06:00

## 2022-03-27 ASSESSMENT — GAIT ASSESSMENTS
ASSISTIVE DEVICE: FRONT WHEEL WALKER
DEVIATION: BRADYKINETIC
GAIT LEVEL OF ASSIST: STANDBY ASSIST
DISTANCE (FEET): 400

## 2022-03-27 ASSESSMENT — ACTIVITIES OF DAILY LIVING (ADL): BED_CHAIR_WHEELCHAIR_TRANSFER_DESCRIPTION: INCREASED TIME;INITIAL PREPARATION FOR TASK;SUPERVISION FOR SAFETY

## 2022-03-27 ASSESSMENT — PAIN DESCRIPTION - PAIN TYPE
TYPE: ACUTE PAIN

## 2022-03-27 ASSESSMENT — FIBROSIS 4 INDEX: FIB4 SCORE: 1.29

## 2022-03-27 NOTE — CARE PLAN
Problem: Knowledge Deficit - Standard  Goal: Patient and family/care givers will demonstrate understanding of plan of care, disease process/condition, diagnostic tests and medications  Outcome: Progressing  Note: Discussed POC and medications with patient.  Patient verbalized understanding.       The patient is Stable - Low risk of patient condition declining or worsening    Shift Goals  Clinical Goals: Safety  Patient Goals: strength, pain control

## 2022-03-27 NOTE — THERAPY
Physical Therapy   Daily Treatment     Patient Name: Yamile Go  Age:  72 y.o., Sex:  female  Medical Record #: [id number]  Today's Date: 3/27/2022     Precautions  Precautions: Fall Risk,TLSO (Thoracolumbosacral orthosis),Spinal / Back Precautions   Comments: Ok to remove TLSO for showers    Subjective    My foot doesn't hurt. I just feel weak in my legs.     Objective       03/27/22 1001   Precautions   Precautions Fall Risk;TLSO (Thoracolumbosacral orthosis);Spinal / Back Precautions    Comments Ok to remove TLSO for showers   Pain   Intervention Medication (see MAR)   Non Verbal Scale  Calm   Pain 0 - 10 Group   Location Back;Leg   Location Orientation Left;Lower   Pain Rating Scale (NPRS) 5   Description Aching   Comfort Goal Comfort with Movement;Perform Activity   Therapist Pain Assessment Prior to Activity   Cognition    Level of Consciousness Alert   ABS (Agitated Behavior Scale)   Agitated Behavior Scale Performed Yes   Short Attention Span, Easy Distractibility, Inability to Concentrate 1   Impulsive, Impatient, Low Tolerance for Pain or Frustration 1   Uncooperative, Resistant to Care, Demanding 1   Violent and/or Threatening Violence Toward People or Property 1   Explosive and/or Unpredictable Anger 1   Rocking, Rubbing, Moaning, Other Self-Stimulating Behavior 1   Pulling at Tubes, Restraints, etc. 1   Wandering from Treatment Area 1   Restlessness, Pacing, Excessive Movement 1   Repetitive Behaviors, Motor and/or Verbal 1   Rapid, Loud or Excessive Talking 1   Sudden Changes of Mood 1   Easily Initiated - Excessive Crying and/or Laughter 1   Self-Abusiveness, Physical and/or Verbal 1   Agitated Behavior Scale Total Score 14   Level of Severity No Agitation   Sleep/Wake Cycle   Sleep & Rest Awake   Gait Functional Level of Assist    Gait Level Of Assist Standby Assist   Assistive Device Front Wheel Walker   Distance (Feet) 400   # of Times Distance was Traveled 1   Deviation Bradykinetic  "  Wheelchair Functional Level of Assist   Wheelchair Assist Stand by Assist   Distance Wheelchair (Feet or Distance) 75x2   Wheelchair Description Supervision for safety;Extra time   Stairs Functional Level of Assist   Level of Assist with Stairs Contact Guard Assist   # of Stairs Climbed 6  (4\" steps)   Stairs Description Extra time;Hand rails;Supervision for safety  (step-to pattern)   Transfer Functional Level of Assist   Bed, Chair, Wheelchair Transfer Standby Assist   Bed Chair Wheelchair Transfer Description Increased time;Initial preparation for task;Supervision for safety   Bed Mobility    Supine to Sit Standby Assist  (log roll technique)   Neuro-Muscular Treatments   Neuro-Muscular Treatments Postural Facilitation   Interdisciplinary Plan of Care Collaboration   Patient Position at End of Therapy Seated;Call Light within Reach;Tray Table within Reach;Phone within Reach  (TLSO donned)   Strengths & Barriers   Strengths Able to follow instructions;Effective communication skills;Alert and oriented;Independent prior level of function;Manages pain appropriately;Motivated for self care and independence;Pleasant and cooperative;Willingly participates in therapeutic activities   Barriers Limited mobility;Decreased endurance;Generalized weakness   PT Total Time Spent   PT Individual Total Time Spent (Mins) 30   PT Charge Group   PT Gait Training 2     Min A with donning TLSO sitting EOB    Assessment    Good gait endurance today with FWW. No LOB no knee buckling noted. Stairs challenging, especially descending. Patient very motivated to get better and pleasant to work with.    Strengths: (P) Able to follow instructions,Effective communication skills,Alert and oriented,Independent prior level of function,Manages pain appropriately,Motivated for self care and independence,Pleasant and cooperative,Willingly participates in therapeutic activities  Barriers: (P) Limited mobility,Decreased endurance,Generalized " weakness    Plan    Gait endurance with FWW, general strength training, standing balance, TENS/ modalities for pain control.    Passport items to be completed:  Get in/out of bed safely, in/out of a vehicle, safely use mobility device, walk or wheel around home/community, navigate up and down stairs, show how to get up/down from the ground, ensure home is accessible, demonstrate HEP, complete caregiver training    Physical Therapy Problems (Active)       Problem: Mobility       Dates: Start: 03/24/22         Goal: STG-Within one week, patient will ambulate community distances with FWW and  ft x 2       Dates: Start: 03/24/22            Goal: STG-Within one week, patient will ascend and descend four to six stairs with B hand rails and CGA standard rise steps       Dates: Start: 03/24/22               Problem: Mobility Transfers       Dates: Start: 03/24/22         Goal: STG-Within one week, patient will perform bed mobility SPV with bed rail as needed       Dates: Start: 03/24/22            Goal: STG-Within one week, patient will transfer bed to chair SPV /set-up with FWW       Dates: Start: 03/24/22

## 2022-03-27 NOTE — CARE PLAN
Problem: Fall Risk - Rehab  Goal: Patient will remain free from falls  Outcome: Progressing  Note: May Beard Fall risk Assessment Score: 10    Low fall risk interventions   - Call light within reach   - Yellow  socks   - Belongings within reach   - Bed in the lowest position      Problem: Bladder / Voiding  Goal: Patient will establish and maintain regular urinary output  Outcome: Progressing  Note: Patient is continent and is voiding adequate amounts of clear yellow urine.  Denies flank pain or dysuria; afebrile.  Will continue to monitor.

## 2022-03-28 ENCOUNTER — APPOINTMENT (OUTPATIENT)
Dept: RADIOLOGY | Facility: REHABILITATION | Age: 72
DRG: 561 | End: 2022-03-28
Attending: PHYSICAL MEDICINE & REHABILITATION
Payer: MEDICARE

## 2022-03-28 PROCEDURE — 700102 HCHG RX REV CODE 250 W/ 637 OVERRIDE(OP): Performed by: PHYSICAL MEDICINE & REHABILITATION

## 2022-03-28 PROCEDURE — 97112 NEUROMUSCULAR REEDUCATION: CPT

## 2022-03-28 PROCEDURE — 97110 THERAPEUTIC EXERCISES: CPT

## 2022-03-28 PROCEDURE — 73630 X-RAY EXAM OF FOOT: CPT | Mod: RT

## 2022-03-28 PROCEDURE — 97116 GAIT TRAINING THERAPY: CPT

## 2022-03-28 PROCEDURE — 97530 THERAPEUTIC ACTIVITIES: CPT

## 2022-03-28 PROCEDURE — 97535 SELF CARE MNGMENT TRAINING: CPT

## 2022-03-28 PROCEDURE — 99232 SBSQ HOSP IP/OBS MODERATE 35: CPT | Performed by: PHYSICAL MEDICINE & REHABILITATION

## 2022-03-28 PROCEDURE — A9270 NON-COVERED ITEM OR SERVICE: HCPCS | Performed by: PHYSICAL MEDICINE & REHABILITATION

## 2022-03-28 PROCEDURE — 770010 HCHG ROOM/CARE - REHAB SEMI PRIVAT*

## 2022-03-28 RX ADMIN — METOPROLOL TARTRATE 12.5 MG: 25 TABLET, FILM COATED ORAL at 07:45

## 2022-03-28 RX ADMIN — HYDROCODONE BITARTRATE AND ACETAMINOPHEN 1 TABLET: 10; 325 TABLET ORAL at 04:08

## 2022-03-28 RX ADMIN — OMEPRAZOLE 20 MG: 20 CAPSULE, DELAYED RELEASE ORAL at 07:45

## 2022-03-28 RX ADMIN — SUCRALFATE 1 G: 1 TABLET ORAL at 20:45

## 2022-03-28 RX ADMIN — SUCRALFATE 1 G: 1 TABLET ORAL at 07:45

## 2022-03-28 RX ADMIN — ROSUVASTATIN CALCIUM 20 MG: 10 TABLET, FILM COATED ORAL at 20:45

## 2022-03-28 RX ADMIN — ESCITALOPRAM OXALATE 20 MG: 10 TABLET, FILM COATED ORAL at 07:45

## 2022-03-28 RX ADMIN — SUCRALFATE 1 G: 1 TABLET ORAL at 11:33

## 2022-03-28 RX ADMIN — LEVOTHYROXINE SODIUM 88 MCG: 0.09 TABLET ORAL at 04:06

## 2022-03-28 RX ADMIN — SUCRALFATE 1 G: 1 TABLET ORAL at 17:11

## 2022-03-28 RX ADMIN — MONTELUKAST SODIUM 10 MG: 10 TABLET, FILM COATED ORAL at 20:45

## 2022-03-28 RX ADMIN — METOPROLOL TARTRATE 12.5 MG: 25 TABLET, FILM COATED ORAL at 20:45

## 2022-03-28 RX ADMIN — HYDROCODONE BITARTRATE AND ACETAMINOPHEN 1 TABLET: 10; 325 TABLET ORAL at 20:45

## 2022-03-28 RX ADMIN — HYDROCODONE BITARTRATE AND ACETAMINOPHEN 1 TABLET: 10; 325 TABLET ORAL at 08:52

## 2022-03-28 RX ADMIN — AMITRIPTYLINE HYDROCHLORIDE 75 MG: 25 TABLET, FILM COATED ORAL at 20:45

## 2022-03-28 RX ADMIN — Medication 2000 UNITS: at 07:45

## 2022-03-28 ASSESSMENT — ACTIVITIES OF DAILY LIVING (ADL)
TOILET_TRANSFER_DESCRIPTION: GRAB BAR;INCREASED TIME;SET-UP OF EQUIPMENT;SUPERVISION FOR SAFETY;VERBAL CUEING
BED_CHAIR_WHEELCHAIR_TRANSFER_DESCRIPTION: ADAPTIVE EQUIPMENT;INCREASED TIME;SET-UP OF EQUIPMENT
TOILETING_LEVEL_OF_ASSIST_DESCRIPTION: GRAB BAR;INCREASED TIME;INITIAL PREPARATION FOR TASK;SET-UP OF EQUIPMENT;SUPERVISION FOR SAFETY
BED_CHAIR_WHEELCHAIR_TRANSFER_DESCRIPTION: INCREASED TIME;SET-UP OF EQUIPMENT;SQUAT PIVOT TRANSFER TO WHEELCHAIR;SUPERVISION FOR SAFETY;VERBAL CUEING

## 2022-03-28 ASSESSMENT — PAIN DESCRIPTION - PAIN TYPE
TYPE: ACUTE PAIN

## 2022-03-28 ASSESSMENT — GAIT ASSESSMENTS
DISTANCE (FEET): 300
DEVIATION: BRADYKINETIC
GAIT LEVEL OF ASSIST: STANDBY ASSIST
ASSISTIVE DEVICE: FRONT WHEEL WALKER

## 2022-03-28 NOTE — PROGRESS NOTES
"Rehab Progress Note     Encounter Date: 3/28/2022    CC: Doing okay.    Interval Events (Subjective)  Vital signs stable: SBP ranged from 123-155 (increased more recently into 140s-150s)  Small bowel movement 3/28   voiding volitionally    Patient seen and examined  in her room.  States that she continues to have some leg pain since this most recent fall on the left.  Also states that she has a lot of bruising on her right foot and did not have an x-ray of it.  She did have an x-ray in January after complaint of weakness in the ankle, but this did not follow her most recent fall.  States that she has poor proprioception due to neuropathy and falls frequently.  Follows with pain management at [facility].  Her pain regimen is the same as it is on the outpatient side.  States that her heart rate normally runs high and that she has had high blood pressures at 179 systolic.  She states that she lives alone and plans to go home independently if possible.      14 point ROS reviewed and negative except as stated above.     Objective:  VITAL SIGNS: /76   Pulse 80   Temp 36.7 °C (98.1 °F) (Oral)   Resp 18   Ht 1.626 m (5' 4\")   Wt 81.6 kg (180 lb)   SpO2 94%   BMI 30.90 kg/m²     GEN: No apparent distress  HEENT: Head normocephalic, atraumatic.  Sclera nonicteric bilaterally, no ocular discharge appreciated bilaterally.  CV: Extremities warm and well-perfused, no peripheral edema appreciated bilaterally.  PULMONARY: Breathing nonlabored on room air, no respiratory accessory muscle use.  Not requiring supplemental oxygen.  ABD: Soft, nontender.  SKIN: No appreciable skin breakdown on exposed areas of skin.  Area of bruising on both the dorsum and plantar surface of right foot.  Nontender per patient report.  Able to ambulate per patient report.  NEURO: Awake alert.  Conversational.  Logical thought content.  PSYCH: Mood and affect within normal limits.    No results found for this or any previous visit (from the " past 72 hour(s)).    Current Facility-Administered Medications   Medication Frequency   • Respiratory Therapy Consult Continuous RT   • hydrALAZINE (APRESOLINE) tablet 25 mg Q8HRS PRN   • acetaminophen (Tylenol) tablet 650 mg Q4HRS PRN   • senna-docusate (PERICOLACE or SENOKOT S) 8.6-50 MG per tablet 2 Tablet BID    And   • polyethylene glycol/lytes (MIRALAX) PACKET 1 Packet QDAY PRN    And   • magnesium hydroxide (MILK OF MAGNESIA) suspension 30 mL QDAY PRN    And   • bisacodyl (DULCOLAX) suppository 10 mg QDAY PRN   • omeprazole (PRILOSEC) capsule 20 mg DAILY   • artificial tears ophthalmic solution 1 Drop PRN   • benzocaine-menthol (Cepacol) lozenge 1 Lozenge Q2HRS PRN   • mag hydrox-al hydrox-simeth (MAALOX PLUS ES or MYLANTA DS) suspension 20 mL Q2HRS PRN   • ondansetron (ZOFRAN ODT) dispertab 4 mg 4X/DAY PRN    Or   • ondansetron (ZOFRAN) syringe/vial injection 4 mg 4X/DAY PRN   • traZODone (DESYREL) tablet 50 mg QHS PRN   • sodium chloride (OCEAN) 0.65 % nasal spray 2 Spray PRN   • traMADol (ULTRAM) 50 MG tablet 50 mg Q4HRS PRN   • acetaminophen (Tylenol) tablet 650 mg Q6HRS PRN   • amitriptyline (ELAVIL) tablet 75 mg QHS   • escitalopram (Lexapro) tablet 20 mg DAILY   • HYDROcodone-acetaminophen (NORCO) 5-325 MG per tablet 1 Tablet Q6HRS PRN   • HYDROcodone/acetaminophen (NORCO)  MG per tablet 1 Tablet Q4HRS PRN   • levothyroxine (SYNTHROID) tablet 88 mcg QAM AC   • metoprolol tartrate (LOPRESSOR) tablet 12.5 mg BID   • montelukast (SINGULAIR) tablet 10 mg QHS   • rosuvastatin (CRESTOR) tablet 20 mg Q EVENING   • sucralfate (CARAFATE) tablet 1 g 4X/DAY ACHS   • vitamin D3 (cholecalciferol) tablet 2,000 Units DAILY   • albuterol inhaler 2 Puff Q4H PRN (RT)       Orders Placed This Encounter   Procedures   • Diet Order Diet: Cardiac     Standing Status:   Standing     Number of Occurrences:   1     Order Specific Question:   Diet:     Answer:   Cardiac [6]       Assessment:  Active Hospital Problems     Diagnosis    • *Closed fracture of lumbar vertebra, unspecified fracture morphology, initial encounter ([facility])    • Chronic pain syndrome on chronic opioids    • Hypothyroidism    • Class 1 obesity due to excess calories with serious comorbidity and body mass index (BMI) of 32.0 to 32.9 in adult    • HTN (hypertension)    • CAD (coronary artery disease)    • Dyslipidemia        Medical Decision Making and Plan: Adapted from Dr. Lux note dated 3/25  L2 TP fracture - Patient with fall with significant back pain found to have L2 fracture. Was managed conservatively with TLSO.  -PT and OT for mobility and ADLs  -Continue TLSO. Follow-up [facility] [place]     HTN - Patient on Metoprolol 12.5 mg BID     HLD - Patient on Rosuvastatin 20 mg QHS     Asthma - Patient on Singulair QHS     Anemia - Check AM CBC - 11.0, stable.   3/28: CBC 3/29     Leukocytosis  - Check AM CBC - 12.0, will check UA - neg 3/25  3/28: CBC 3/29     Thrombocytopenia - Check AM CBC - 145, downtrending will monitor  3/28: CBC 3/29     Prediabetes - Into 150s. Check A1c - 7.0, will defer to outpatient PCP     Hypothyroidism - Patient on Levothyroxine 88 mcg daily     Azotemia - Check AM CMP 23, improved from 29, encourage fluids     Vitamin D deficiency - 22 at [facility]. Will start on 1000 U      Obesity due to excess calories - BMI of 33.8 on admission, meets medical criteria. Dietitian to consult      Depression/Pain - Patient on Elavil 75 mg QHS and Lexapro 20 mg daily. Patient on Norco     DVT ppx - Patient on Lovenox on transfer. Increase Ambulation, will discontinue    Foot bruising, right: Reportedly does not interfere with her ambulation.  No x-ray since most recent fall.  3/28: Right foot x-ray to ensure no fracture, though unlikely as patient ambulating without issue.       Total time:  28 minutes.  I spent greater than 50% of the time for patient care and coordination on this date, including unit/floor time, and face-to-face time with the patient  as per assessment and plan above.    Amada Pan D.O.

## 2022-03-28 NOTE — THERAPY
Recreational Therapy   Initial Evaluation     Patient Name: Yamile Go  Age:  72 y.o., Sex:  female  Medical Record #: [id number]  Today's Date: 3/28/2022     Subjective    Pt sharing that she enjoys doing puzzles with her family.     Objective       03/28/22 0931   Leisure History   Leisure Interests Other (Comments)   Leisure Comments Puzzles   Pre-Morbid Leisure Lifestyle Individual;Active;Other (Comments)  (Prefers solo leisure or with family)   Prior Living Arrangements   Lives with - Patient's Self Care Capacity Alone and Able to Care For Self   Steps Into Home 0   Steps In Home 0   Ambulation Independent   Assistive Devices Used Front-Wheel Walker   Driving / Transportation Driving Independent   Functional Ability Status - Physical   Endurance Low   Right  Strong   Left  Strong   Right Arm Strong   Left Arm Strong   Right Leg Weak   Left Leg Weak   Upper Extremity Gross Motor Uses Both Arms / Hands   Lower Extremity Gross Motor Uses Both Legs   Fine Motor Manipulates Small Objects   Functional Ability Status - Cognitive   Attention Span Remains on Task   Comprehension Follows Three Step Commands   Judgment Able to Make Independent Decisions   Functional Ability Status - Emotional    Affect Appropriate;Bright   Mood Appropriate   Behavior Appropriate   Leisure Competence Measure   Leisure Awareness Independent   Leisure Attitude Independent   Leisure Skills Moderate Assist   Cultural / Social Behaviors Independent   Interpersonal Skills Independent   Community Integration Skills Minimal Assist   Social Contact Independent   Community Participation Minimal Assist   Clinical Impression   Clinical Impression Impaired Gross Motor Leisure Functioning;Impaired Endurance;Impaired Relaxation and Coping Skills;Impaired Leisure Skills;Impaired Community Skills   Current Discharge Plan   Current Discharge Plan Return to Prior Living Situation   Benefit    Benefit Patient would Benefit from Inpatient  Recreational Therapy to Maximize Independent Leisure Functioning    Interdisciplinary Plan of Care Collaboration   Patient Position at End of Therapy Seated;Tray Table within Reach;Call Light within Reach   Strengths & Barriers   Strengths Able to follow instructions;Alert and oriented;Good carryover of learning;Pleasant and cooperative;Supportive family;Willingly participates in therapeutic activities;Motivated for self care and independence   Barriers Decreased endurance;Fatigue;Impaired balance   Treatment Time   Total Time Spent (mins) 30   Procedural Tracking   Procedural Tracking Community Re-Integration;Community Skills Development;Leisure Skills Awareness;Leisure Skills Development;Gross Motor Functional Leisure Skills       Assessment  Patient is 72 y.o. female with a diagnosis of Closed fracture of lumbar vertebra, unspecified fracture morphology, initial encounter .  Additional factors influencing patient status / progress (ie: cognitive factors, co-morbidities, social support, etc): PMH of HTN, chronic pain on opiates, hypothyroidism, HTN, HLD, and CAD who presented on 3/21 with ground level fall and severe pain. Patient reportedly fell backwards and landed on her back. She was found on imaging to have an L2 fracture. NSG was consulted and recommended TLSO and conservative management.  Hospital course complicated by anemia, hyperglycemia, and leukocytosis as well as difficult to control pain. TTE was performed due to concern she may have had syncope and it showed EF of 60%. Of note she had COVID-19 within last 90 days.        Plan  Recommend Recreational Therapy 30 minutes per day 2-3 days per week for 2 weeks for the following treatments:  Community Re-Integration, Community Skills Development, Leisure Skills Awareness, Leisure Skills Development and Gross Motor Functional Leisure Skills    Passport items to be completed:  Verbalize two positive leisure activities, discuss returning to work, hobbies,  community groups or volunteer activities, explore community resources     Goals:  Long term and short term goals have been discussed with patient and they are in agreement.    Recreation Therapy Problems (Active)       Problem: Recreation Therapy       Dates: Start: 03/28/22         Goal: STG-Within one week, patient will demonstrate leisure problem solving by sharing on two positive lesiure activities they would like to participate in either in session or during their free time and any perceived barriers to their participation.       Dates: Start: 03/28/22            Goal: STG-Within one week, patient will demonstrate a standing tolerance during a leisure activity Min A for 1 minute x5 and no LOB.        Dates: Start: 03/28/22            Goal: LTG-By discharge, patient will demonstrate leisure problem solving by sharing on two positive lesiure activities they would like to participate in post dc and any perceived barriers to their participation.       Dates: Start: 03/28/22            Goal: LTG-By discharge, patient will demonstrate a standing tolerance during a leisure activity Min A for 3 minutes x3 and no LOB.        Dates: Start: 03/28/22

## 2022-03-28 NOTE — THERAPY
"Occupational Therapy  Daily Treatment     Patient Name: Yamile Go  Age:  72 y.o., Sex:  female  Medical Record #: [id number]  Today's Date: 3/28/2022     Precautions  Precautions: Fall Risk,TLSO (Thoracolumbosacral orthosis),Spinal / Back Precautions   Comments: Ok to remove TLSO for showers         Subjective    \"It just feels like a cramp, it's definitely gotten worse since I've been here, because I had no feeling when I first got here and now I feel this cramping feeling in my L leg.\"      Objective       03/28/22 0831   Pain   Intervention Distraction;Nurse Notified   Pain 0 - 10 Group   Location Leg   Location Orientation Left   Pain Rating Scale (NPRS) 8   Description Aching;Cramping   Comfort Goal Comfort with Movement;Perform Activity;Sleep Comfortably   Therapist Pain Assessment Post Activity Pain Same as Prior to Activity   Cognition    Level of Consciousness Alert   Functional Level of Assist   Grooming Modified Independent   Grooming Description Increased time;Seated in wheelchair at sink  (brush teeth/wash hands)   Upper Body Dressing Supervision   Upper Body Dressing Description Increased time;Set-up of equipment  (doff/don pull over shirt seated at EOB; don LSO seated at EOB with set-up assist)   Lower Body Dressing Minimal Assist   Lower Body Dressing Description Increased time;Initial preparation for task;Set-up of equipment;Supervision for safety  (doff/don underwear/pants seated at EOB, assist needed to don socks)   Toileting Standby Assist   Toileting Description Grab bar;Increased time;Initial preparation for task;Set-up of equipment;Supervision for safety   Bed, Chair, Wheelchair Transfer Standby Assist   Bed Chair Wheelchair Transfer Description Increased time;Set-up of equipment;Squat pivot transfer to wheelchair;Supervision for safety;Verbal cueing   Toilet Transfers Supervised   Toilet Transfer Description Grab bar;Increased time;Set-up of equipment;Supervision for safety;Verbal " cueing   IADL Treatments   IADL Treatments Kitchen mobility education;Meal preparation;Home management   Kitchen Mobility Education Educated pt on safety during kitchen mobility with walker- reaching into high/low cabinets while adhering to spinal precautions- able to complete from supervision level   Meal Preparation pt engaged in simple meal prep from w/c/walker level to make oatmeal on the stove. Pt was able to gather all needed materials and boil water/cook oatmeal and place into bowl, add sugar/cinnamin with supervision.   Home Management Pt engaged in cleaning up counter and washing dishes in standing with FWW, drying off dishes and placing back into cabinet with supervision.   Bed Mobility    Supine to Sit Minimal Assist   Scooting Standby Assist   Skilled Intervention Facilitation;Verbal Cuing   Interdisciplinary Plan of Care Collaboration   Patient Position at End of Therapy Seated;Other (Comments)  (handoff to rec therapy in gym)   OT Total Time Spent   OT Individual Total Time Spent (Mins) 60   OT Charge Group   OT Self Care / ADL 2   OT Therapy Activity 2       Assessment    Pt tolerated session well. Pt c/o pain in L leg throughout session- pain meds given during session. Pt completed dressing at EOB with min a- assist for socks (no sock aid in room). Pt did not use AE for LBD. Pt then completed simple meal prep and kitchen mobility/home mgmt from w/c/walker level with supervision. Pt reported that she makes oatmeal everyday at home on the stove, no safety concerns with cooking at this time.     Strengths: Able to follow instructions,Alert and oriented,Good insight into deficits/needs,Independent prior level of function,Manages pain appropriately,Motivated for self care and independence,Pleasant and cooperative,Supportive family,Willingly participates in therapeutic activities  Barriers: Decreased endurance,Fatigue,Generalized weakness,Impaired activity tolerance,Impaired balance,Limited mobility,Pain  (lives alone)    Plan    ADLs with AE PRN, IADLs, functional mobility, transfers, balance/fall prevention, endurance, UE strengthening      Passport items to be completed:  Perform bathroom transfers, complete dressing, complete feeding, get ready for the day, prepare a simple meal, participate in household tasks, adapt home for safety needs, demonstrate home exercise program, complete caregiver training     Occupational Therapy Goals (Active)       Problem: Bathing       Dates: Start: 03/24/22         Goal: STG-Within one week, patient will bathe with CGA and use of AE as needed.       Dates: Start: 03/24/22               Problem: Dressing       Dates: Start: 03/24/22         Goal: STG-Within one week, patient will dress UB including donning TLSO with setup, verbal cues and Leah.        Dates: Start: 03/24/22            Goal: STG-Within one week, patient will dress LB with setup and supervision with use of AE as needed.       Dates: Start: 03/24/22               Problem: Functional Transfers       Dates: Start: 03/24/22         Goal: STG-Within one week, patient will perform bathroom transfers with SBA-supervision and use of AE as needed.       Dates: Start: 03/24/22               Problem: IADL's       Dates: Start: 03/24/22         Goal: STG-Within one week, patient will prepare a meal with setup and Leah and seated rest breaks as needed.       Dates: Start: 03/24/22               Problem: OT Long Term Goals       Dates: Start: 03/24/22         Goal: LTG-By discharge, patient will complete basic self care tasks with modified independence and use of AE as needed.       Dates: Start: 03/24/22            Goal: LTG-By discharge, patient will perform bathroom transfers with modified independence and use of AE as needed.       Dates: Start: 03/24/22            Goal: LTG-By discharge, patient will complete basic home management with supervision to modified independence and use of AE as needed.        Dates: Start:  03/24/22               Problem: Toileting       Dates: Start: 03/24/22         Goal: STG-Within one week, patient will complete toileting tasks with supervision and use of AE as needed.       Dates: Start: 03/24/22

## 2022-03-28 NOTE — CARE PLAN
The patient is Watcher - Medium risk of patient condition declining or worsening    Shift Goals  Clinical Goals: Pain management, Safety      Problem: Pain - Standard  Goal: Alleviation of pain or a reduction in pain to the patient’s comfort goal  Note: Patient is having 7/10 bilateral leg pain, PRN norco has been administered, hot packs have been offered, will continue to monitor.      Problem: Bladder / Voiding  Goal: Patient will establish and maintain regular urinary output  Note: Patient is continent of bladder, output clear and yellow, no dysuria, will continue to monitor.

## 2022-03-28 NOTE — THERAPY
Physical Therapy   Daily Treatment     Patient Name: Yamile Go  Age:  72 y.o., Sex:  female  Medical Record #: [id number]  Today's Date: 3/28/2022     Precautions  Precautions: Fall Risk,TLSO (Thoracolumbosacral orthosis),Spinal / Back Precautions   Comments: Ok to remove TLSO for showers    Subjective    Pt sitting edge of bed, willing to participate     Objective       03/28/22 1501   Gait Functional Level of Assist    Gait Level Of Assist Standby Assist   Assistive Device Front Wheel Walker   Distance (Feet) 300   # of Times Distance was Traveled 2   Deviation Bradykinetic   Transfer Functional Level of Assist   Bed, Chair, Wheelchair Transfer Standby Assist   Bed Chair Wheelchair Transfer Description Adaptive equipment;Increased time;Set-up of equipment   Sitting Lower Body Exercises   Ankle Pumps 2 sets of 10   Hip Flexion 2 sets of 10   Hip Abduction 2 sets of 10   Hip Adduction 2 sets of 10   Long Arc Quad 2 sets of 10   Hamstring Curl 2 sets of 10   Comments 1.5# wts/ AAROM for L hip flexion   Bed Mobility    Sit to Supine Minimal Assist   Sit to Stand Standby Assist   Neuro-Muscular Treatments   Neuro-Muscular Treatments Postural Changes;Postural Facilitation;Sequencing;Tactile Cuing;Weight Shift Right;Weight Shift Left   Comments core strength/ posture re-ed and standing balance for balloon volley without UE support and SBA 2.5 minutes x 2 on stable ground. Balloon volley with CGA on Airex foam pad 2 x 5 minutes x 2 w/ UE support.   PT Total Time Spent   PT Individual Total Time Spent (Mins) 60   PT Charge Group   PT Gait Training 1   PT Therapeutic Exercise 1   PT Neuromuscular Re-Education / Balance 2     Side stepping with UE support at // bars 50 ft x 2 both L anf R/ cues for upright posture/ core stab and neutral pelvis.     Assessment    Pt with increaing activity tolerance/ standing tolerance and overall strength, remains limited by L groing/ upper thigh pain and spasms.    Strengths: Able  to follow instructions,Effective communication skills,Alert and oriented,Independent prior level of function,Manages pain appropriately,Motivated for self care and independence,Pleasant and cooperative,Willingly participates in therapeutic activities  Barriers: Limited mobility,Decreased endurance,Generalized weakness    Plan    Gait endurance with FWW, general strength training, standing balance, TENS/ modalities for pain control.     Passport items to be completed:  Get in/out of bed safely, in/out of a vehicle, safely use mobility device, walk or wheel around home/community, navigate up and down stairs, show how to get up/down from the ground, ensure home is accessible, demonstrate HEP, complete caregiver training      Physical Therapy Problems (Active)       Problem: Mobility       Dates: Start: 03/24/22         Goal: STG-Within one week, patient will ambulate community distances with FWW and  ft x 2       Dates: Start: 03/24/22            Goal: STG-Within one week, patient will ascend and descend four to six stairs with B hand rails and CGA standard rise steps       Dates: Start: 03/24/22               Problem: Mobility Transfers       Dates: Start: 03/24/22         Goal: STG-Within one week, patient will perform bed mobility SPV with bed rail as needed       Dates: Start: 03/24/22            Goal: STG-Within one week, patient will transfer bed to chair SPV /set-up with FWW       Dates: Start: 03/24/22

## 2022-03-28 NOTE — THERAPY
Occupational Therapy  Daily Treatment     Patient Name: Yamile Go  Age:  72 y.o., Sex:  female  Medical Record #: [id number]  Today's Date: 3/28/2022     Precautions  Precautions: (P) Fall Risk,TLSO (Thoracolumbosacral orthosis),Spinal / Back Precautions   Comments: (P) Ok to remove TLSO for showers         Subjective    Patient seated in w/c with head down on bedside table upon OT arrival to room.  Requested to work on standing balance and UB strength.     Objective       03/28/22 1031   Precautions   Precautions Fall Risk;TLSO (Thoracolumbosacral orthosis);Spinal / Back Precautions    Comments Ok to remove TLSO for showers   Sitting Upper Body Exercises   Sitting Upper Body Exercises Yes   Lat Pull 3 sets of 10;Bilateral;Weight (See Comments for lbs)  (25 lbs on equalizer)   Tricep Press 3 sets of 10;Bilateral;Weight (See Comments for lbs)  (20,25, 25 lbs on rickshaw forward)   Sitting Lower Body Exercises   Nustep Time (See Comments)  (nustep level 3 x 12 minutes with LEs only)   Interdisciplinary Plan of Care Collaboration   Patient Position at End of Therapy Seated;Call Light within Reach;Tray Table within Reach;Phone within Reach   OT Total Time Spent   OT Individual Total Time Spent (Mins) 60   OT Charge Group   OT Neuromuscular Re-education / Balance 2   OT Therapeutic Exercise  2     Dynamic standing balance with no UE support in parallel bars while reaching for beanbags to the left with L UE, handing to right hand and tossing at a bucket.  Completed with SBA/CGA.      Assessment    Patient with no losses of balance during standing balance activity without UE support.  Tolerated UB strengthening without complaints of pain.  Strengths: Able to follow instructions,Alert and oriented,Good insight into deficits/needs,Independent prior level of function,Manages pain appropriately,Motivated for self care and independence,Pleasant and cooperative,Supportive family,Willingly participates in therapeutic  activities  Barriers: Decreased endurance,Fatigue,Generalized weakness,Impaired activity tolerance,Impaired balance,Limited mobility,Pain (lives alone)    Plan    ADLs with AE PRN, IADLs, functional mobility, transfers, balance/fall prevention, endurance, UE strengthening     Occupational Therapy Goals (Active)       Problem: Bathing       Dates: Start: 03/24/22         Goal: STG-Within one week, patient will bathe with CGA and use of AE as needed.       Dates: Start: 03/24/22               Problem: Dressing       Dates: Start: 03/24/22         Goal: STG-Within one week, patient will dress UB including donning TLSO with setup, verbal cues and Leah.        Dates: Start: 03/24/22            Goal: STG-Within one week, patient will dress LB with setup and supervision with use of AE as needed.       Dates: Start: 03/24/22               Problem: Functional Transfers       Dates: Start: 03/24/22         Goal: STG-Within one week, patient will perform bathroom transfers with SBA-supervision and use of AE as needed.       Dates: Start: 03/24/22               Problem: IADL's       Dates: Start: 03/24/22         Goal: STG-Within one week, patient will prepare a meal with setup and Leah and seated rest breaks as needed.       Dates: Start: 03/24/22               Problem: OT Long Term Goals       Dates: Start: 03/24/22         Goal: LTG-By discharge, patient will complete basic self care tasks with modified independence and use of AE as needed.       Dates: Start: 03/24/22            Goal: LTG-By discharge, patient will perform bathroom transfers with modified independence and use of AE as needed.       Dates: Start: 03/24/22            Goal: LTG-By discharge, patient will complete basic home management with supervision to modified independence and use of AE as needed.        Dates: Start: 03/24/22               Problem: Toileting       Dates: Start: 03/24/22         Goal: STG-Within one week, patient will complete toileting  tasks with supervision and use of AE as needed.       Dates: Start: 03/24/22

## 2022-03-28 NOTE — PROGRESS NOTES
Received bedside shift report from Francia RUIZ RN regarding patient and assumed care. Patient awake, calm and stable, currently positioned in bed for comfort and safety; call light within reach. Denies pain or discomfort at this time. Will continue to monitor.

## 2022-03-29 PROBLEM — R79.89 AZOTEMIA: Status: ACTIVE | Noted: 2022-03-29

## 2022-03-29 PROBLEM — J45.909 ASTHMA: Status: ACTIVE | Noted: 2022-03-29

## 2022-03-29 PROBLEM — E55.9 VITAMIN D DEFICIENCY: Status: ACTIVE | Noted: 2022-03-29

## 2022-03-29 PROBLEM — D69.6 THROMBOCYTOPENIA (HCC): Status: ACTIVE | Noted: 2022-03-29

## 2022-03-29 LAB
BASOPHILS # BLD AUTO: 0.2 % (ref 0–1.8)
BASOPHILS # BLD: 0.02 K/UL (ref 0–0.12)
EOSINOPHIL # BLD AUTO: 0.03 K/UL (ref 0–0.51)
EOSINOPHIL NFR BLD: 0.3 % (ref 0–6.9)
ERYTHROCYTE [DISTWIDTH] IN BLOOD BY AUTOMATED COUNT: 48.8 FL (ref 35.9–50)
HCT VFR BLD AUTO: 35.5 % (ref 37–47)
HGB BLD-MCNC: 11.4 G/DL (ref 12–16)
IMM GRANULOCYTES # BLD AUTO: 0.34 K/UL (ref 0–0.11)
IMM GRANULOCYTES NFR BLD AUTO: 3.2 % (ref 0–0.9)
LYMPHOCYTES # BLD AUTO: 0.93 K/UL (ref 1–4.8)
LYMPHOCYTES NFR BLD: 8.7 % (ref 22–41)
MCH RBC QN AUTO: 28.6 PG (ref 27–33)
MCHC RBC AUTO-ENTMCNC: 32.1 G/DL (ref 33.6–35)
MCV RBC AUTO: 89.2 FL (ref 81.4–97.8)
MONOCYTES # BLD AUTO: 0.69 K/UL (ref 0–0.85)
MONOCYTES NFR BLD AUTO: 6.5 % (ref 0–13.4)
NEUTROPHILS # BLD AUTO: 8.67 K/UL (ref 2–7.15)
NEUTROPHILS NFR BLD: 81.1 % (ref 44–72)
NRBC # BLD AUTO: 0 K/UL
NRBC BLD-RTO: 0 /100 WBC
PLATELET # BLD AUTO: 120 K/UL (ref 164–446)
PMV BLD AUTO: 9.4 FL (ref 9–12.9)
RBC # BLD AUTO: 3.98 M/UL (ref 4.2–5.4)
WBC # BLD AUTO: 10.7 K/UL (ref 4.8–10.8)

## 2022-03-29 PROCEDURE — 99233 SBSQ HOSP IP/OBS HIGH 50: CPT | Performed by: PHYSICAL MEDICINE & REHABILITATION

## 2022-03-29 PROCEDURE — 97530 THERAPEUTIC ACTIVITIES: CPT

## 2022-03-29 PROCEDURE — 97116 GAIT TRAINING THERAPY: CPT

## 2022-03-29 PROCEDURE — 770010 HCHG ROOM/CARE - REHAB SEMI PRIVAT*

## 2022-03-29 PROCEDURE — 36415 COLL VENOUS BLD VENIPUNCTURE: CPT

## 2022-03-29 PROCEDURE — 99222 1ST HOSP IP/OBS MODERATE 55: CPT | Performed by: HOSPITALIST

## 2022-03-29 PROCEDURE — 97110 THERAPEUTIC EXERCISES: CPT

## 2022-03-29 PROCEDURE — 85025 COMPLETE CBC W/AUTO DIFF WBC: CPT

## 2022-03-29 PROCEDURE — 700102 HCHG RX REV CODE 250 W/ 637 OVERRIDE(OP): Performed by: PHYSICAL MEDICINE & REHABILITATION

## 2022-03-29 PROCEDURE — A9270 NON-COVERED ITEM OR SERVICE: HCPCS | Performed by: PHYSICAL MEDICINE & REHABILITATION

## 2022-03-29 RX ADMIN — HYDROCODONE BITARTRATE AND ACETAMINOPHEN 1 TABLET: 10; 325 TABLET ORAL at 15:36

## 2022-03-29 RX ADMIN — SUCRALFATE 1 G: 1 TABLET ORAL at 08:12

## 2022-03-29 RX ADMIN — MONTELUKAST SODIUM 10 MG: 10 TABLET, FILM COATED ORAL at 20:53

## 2022-03-29 RX ADMIN — Medication 2000 UNITS: at 08:13

## 2022-03-29 RX ADMIN — SUCRALFATE 1 G: 1 TABLET ORAL at 17:12

## 2022-03-29 RX ADMIN — HYDROCODONE BITARTRATE AND ACETAMINOPHEN 1 TABLET: 10; 325 TABLET ORAL at 20:59

## 2022-03-29 RX ADMIN — ROSUVASTATIN CALCIUM 20 MG: 10 TABLET, FILM COATED ORAL at 20:52

## 2022-03-29 RX ADMIN — HYDROCODONE BITARTRATE AND ACETAMINOPHEN 1 TABLET: 10; 325 TABLET ORAL at 05:06

## 2022-03-29 RX ADMIN — HYDROCODONE BITARTRATE AND ACETAMINOPHEN 1 TABLET: 5; 325 TABLET ORAL at 08:22

## 2022-03-29 RX ADMIN — OMEPRAZOLE 20 MG: 20 CAPSULE, DELAYED RELEASE ORAL at 08:13

## 2022-03-29 RX ADMIN — LEVOTHYROXINE SODIUM 88 MCG: 0.09 TABLET ORAL at 05:06

## 2022-03-29 RX ADMIN — SUCRALFATE 1 G: 1 TABLET ORAL at 20:53

## 2022-03-29 RX ADMIN — ESCITALOPRAM OXALATE 20 MG: 10 TABLET, FILM COATED ORAL at 08:13

## 2022-03-29 RX ADMIN — SENNOSIDES AND DOCUSATE SODIUM 2 TABLET: 50; 8.6 TABLET ORAL at 08:17

## 2022-03-29 RX ADMIN — METOPROLOL TARTRATE 12.5 MG: 25 TABLET, FILM COATED ORAL at 20:53

## 2022-03-29 RX ADMIN — SUCRALFATE 1 G: 1 TABLET ORAL at 11:00

## 2022-03-29 RX ADMIN — METOPROLOL TARTRATE 12.5 MG: 25 TABLET, FILM COATED ORAL at 08:13

## 2022-03-29 RX ADMIN — AMITRIPTYLINE HYDROCHLORIDE 75 MG: 25 TABLET, FILM COATED ORAL at 20:53

## 2022-03-29 ASSESSMENT — ENCOUNTER SYMPTOMS
DEPRESSION: 1
CHILLS: 0
EYES NEGATIVE: 1
ABDOMINAL PAIN: 0
PALPITATIONS: 0
FEVER: 0
POLYDIPSIA: 0
VOMITING: 0
BACK PAIN: 1
SHORTNESS OF BREATH: 0
COUGH: 0
BRUISES/BLEEDS EASILY: 0
NAUSEA: 0

## 2022-03-29 ASSESSMENT — GAIT ASSESSMENTS
DISTANCE (FEET): 300
GAIT LEVEL OF ASSIST: STANDBY ASSIST
ASSISTIVE DEVICE: FRONT WHEEL WALKER
DEVIATION: BRADYKINETIC
GAIT LEVEL OF ASSIST: STANDBY ASSIST
ASSISTIVE DEVICE: FRONT WHEEL WALKER

## 2022-03-29 ASSESSMENT — PAIN DESCRIPTION - PAIN TYPE
TYPE: ACUTE PAIN
TYPE: ACUTE PAIN

## 2022-03-29 ASSESSMENT — ACTIVITIES OF DAILY LIVING (ADL)
BED_CHAIR_WHEELCHAIR_TRANSFER_DESCRIPTION: INCREASED TIME;SET-UP OF EQUIPMENT;SUPERVISION FOR SAFETY
TUB_SHOWER_TRANSFER_DESCRIPTION: ADAPTIVE EQUIPMENT

## 2022-03-29 ASSESSMENT — PATIENT HEALTH QUESTIONNAIRE - PHQ9
SUM OF ALL RESPONSES TO PHQ9 QUESTIONS 1 AND 2: 0
1. LITTLE INTEREST OR PLEASURE IN DOING THINGS: NOT AT ALL
2. FEELING DOWN, DEPRESSED, IRRITABLE, OR HOPELESS: NOT AT ALL

## 2022-03-29 NOTE — CARE PLAN
Problem: Knowledge Deficit - Standard  Goal: Patient and family/care givers will demonstrate understanding of plan of care, disease process/condition, diagnostic tests and medications  Outcome: Progressing     Problem: Fall Risk - Rehab  Goal: Patient will remain free from falls  Outcome: Progressing     Problem: Pain - Standard  Goal: Alleviation of pain or a reduction in pain to the patient’s comfort goal  Outcome: Progressing   The patient is Stable - Low risk of patient condition declining or worsening    Shift Goals  Clinical Goals: Pain management, Safety  Patient Goals: Pain control

## 2022-03-29 NOTE — CARE PLAN
Problem: Dressing  Goal: STG-Within one week, patient will dress LB with setup and supervision with use of AE as needed.  Outcome: Progressing  Note: Variable between supervision to Leah     Problem: Bathing  Goal: STG-Within one week, patient will bathe with CGA and use of AE as needed.  Outcome: Met     Problem: Dressing  Goal: STG-Within one week, patient will dress UB including donning TLSO with setup, verbal cues and Leah.   Outcome: Met     Problem: Toileting  Goal: STG-Within one week, patient will complete toileting tasks with supervision and use of AE as needed.  Outcome: Met     Problem: Functional Transfers  Goal: STG-Within one week, patient will perform bathroom transfers with SBA-supervision and use of AE as needed.  Outcome: Met     Problem: IADL's  Goal: STG-Within one week, patient will prepare a meal with setup and Leah and seated rest breaks as needed.  Outcome: Met

## 2022-03-29 NOTE — THERAPY
Occupational Therapy  Daily Treatment     Patient Name: Yamile Go  Age:  72 y.o., Sex:  female  Medical Record #: [id number]  Today's Date: 3/29/2022     Precautions  Precautions: Fall Risk,Spinal / Back Precautions ,TLSO (Thoracolumbosacral orthosis)  Comments: Ok to remove TLSO for showers         Subjective    Pt up in chair upon arrival, agreeable to OT session.      Objective     03/29/22 0931   Pain   Intervention Declines   Non Verbal Descriptors   Non Verbal Scale  Calm   Functional Level of Assist   Bed, Chair, Wheelchair Transfer Supervised   Bed Chair Wheelchair Transfer Description Increased time;Set-up of equipment;Supervision for safety   Sitting Upper Body Exercises   Chest Press 2 sets of 10;Bilateral;Weight (See Comments for lbs)  (first set with 3# dumbbell held with both hands, second set with 3# dowel)   Front Arm Raise 2 sets of 10;Bilateral;Weight (See Comments for lbs)  (first set with 3# dumbbell held with both hands, second set with 3# dowel)   Shoulder Press 2 sets of 10;Bilateral;Weight (See Comments for lbs)  (1st set 3# dumbbell held with both hands, second set AROM with hands clasped together due to weakness and report of R shoulder discomfort)   Internal Shoulder Rotation 2 sets of 10;Bilateral;Weight (See Comments for lbs)  (3# dumbbell)   External Shoulder Rotation 2 sets of 10;Bilateral;Weight (See Comments for lbs)  (3# dumbbell)   Bilateral Row 3 sets of 10;Bilateral;Weight (See Comments for lbs)  (30# on Rikshaw)   Bicep Curls 2 sets of 10;Bilateral;Weight (See Comments for lbs)  (3# dumbbell)   Tricep Press 3 sets of 10;Bilateral;Weight (See Comments for lbs)  (20, 25, 30# fwd, 3x10 20# bwd)   Upper Extremity Bike Level 3 Resistance  (Fluidobike 10 min to increase UE strength/endurance)   Bed Mobility    Supine to Sit Standby Assist  (standard bed with no AD)   Sit to Supine Standby Assist  (standard bed with no AD)   Interdisciplinary Plan of Care Collaboration    Patient Position at End of Therapy In Bed;Call Light within Reach;Tray Table within Reach;Phone within Reach   OT Total Time Spent   OT Individual Total Time Spent (Mins) 60   OT Charge Group   OT Therapy Activity 1   OT Therapeutic Exercise  3       Assessment    Pt tolerated OT session fair primarily limited by fatigue, UE weakness and report of R shoulder discomfort requiring modification of some exercises as noted above. Pt reported she's primarily limited by weakness and feels muscle fatigue vs pain  and reported improvement with modification. Was able to perform bed mobility from standard bed without AD/bed rail with SBA and verbal cues for log roll.   Strengths: Able to follow instructions,Alert and oriented,Good insight into deficits/needs,Independent prior level of function,Manages pain appropriately,Motivated for self care and independence,Pleasant and cooperative,Supportive family,Willingly participates in therapeutic activities  Barriers: Decreased endurance,Fatigue,Generalized weakness,Impaired activity tolerance,Impaired balance,Limited mobility,Pain (lives alone)    Plan    ADLs with AE PRN, IADLs, functional mobility, transfers, balance/fall prevention, endurance, UE strengthening     Passport items to be completed:  Perform bathroom transfers, complete dressing, complete feeding, get ready for the day, prepare a simple meal, participate in household tasks, adapt home for safety needs, demonstrate home exercise program, complete caregiver training     Occupational Therapy Goals (Active)       Problem: Dressing       Dates: Start: 03/24/22         Goal: STG-Within one week, patient will dress LB with setup and supervision with use of AE as needed.       Dates: Start: 03/24/22         Goal Note filed on 03/29/22 1251 by Holli Mary OT       Variable between supervision to Leah                 Problem: OT Long Term Goals       Dates: Start: 03/24/22         Goal: LTG-By discharge, patient will  complete basic self care tasks with modified independence and use of AE as needed.       Dates: Start: 03/24/22            Goal: LTG-By discharge, patient will perform bathroom transfers with modified independence and use of AE as needed.       Dates: Start: 03/24/22            Goal: LTG-By discharge, patient will complete basic home management with supervision to modified independence and use of AE as needed.        Dates: Start: 03/24/22

## 2022-03-29 NOTE — THERAPY
"Physical Therapy   Daily Treatment     Patient Name: Yamile Go  Age:  72 y.o., Sex:  female  Medical Record #: [id number]  Today's Date: 3/29/2022     Precautions  Precautions: Fall Risk,Spinal / Back Precautions ,TLSO (Thoracolumbosacral orthosis)  Comments: Ok to remove TLSO for showers    Subjective    Pt resting in bed, reports increased L thigh pain, nurse notified and meds provided     Objective       03/29/22 1531   Gait Functional Level of Assist    Gait Level Of Assist Standby Assist   Assistive Device Front Wheel Walker   Distance (Feet)   (35 ft x 2 outdoors/ uneven terrain)   Transfer Functional Level of Assist   Bed, Chair, Wheelchair Transfer Supervised   Standing Lower Body Exercises   Standing Lower Body Exercises Yes   Step Up 1 set of 10   Comments closed chain strength training at 4\" steps with B hand rails for step-up/ step-through patterning x 5 with RLE and LLE   Bed Mobility    Supine to Sit Supervised   Sit to Supine Supervised   Sit to Stand Supervised   Scooting Modified Independent   Rolling Modified Independent   PT Total Time Spent   PT Individual Total Time Spent (Mins) 30   PT Charge Group   PT Therapeutic Exercise 1   PT Therapeutic Activities 1     Pt completed car transfer with FWW, requires min A for LLE management and safety/ back precautions    Assessment    Pt able to completed closed chain step up with LLE but requires min A for LLE step through due to on-going hip flexor weakness.    Strengths: Able to follow instructions,Effective communication skills,Alert and oriented,Independent prior level of function,Manages pain appropriately,Motivated for self care and independence,Pleasant and cooperative,Willingly participates in therapeutic activities  Barriers: Limited mobility,Decreased endurance,Generalized weakness    Plan    Gait endurance with FWW, gait without device as able, stairs, general strength training, standing balance, TENS/ modalities for pain " control.     Passport items to be completed:  Get in/out of bed safely, in/out of a vehicle, safely use mobility device, walk or wheel around home/community, navigate up and down stairs, show how to get up/down from the ground, ensure home is accessible, demonstrate HEP, complete caregiver training      Physical Therapy Problems (Active)       Problem: Mobility       Dates: Start: 03/24/22         Goal: STG-Within one week, patient will ascend and descend four to six stairs with B hand rails and CGA standard rise steps       Dates: Start: 03/24/22               Problem: Mobility Transfers       Dates: Start: 03/24/22         Goal: STG-Within one week, patient will perform bed mobility SPV with bed rail as needed       Dates: Start: 03/24/22

## 2022-03-29 NOTE — CARE PLAN
Problem: Recreation Therapy  Goal: STG-Within one week, patient will demonstrate leisure problem solving by sharing on two positive lesiure activities they would like to participate in either in session or during their free time and any perceived barriers to their participation.  Outcome: Met  Goal: STG-Within one week, patient will demonstrate a standing tolerance during a leisure activity Min A for 1 minute x5 and no LOB.   Outcome: Progressing  Goal: LTG-By discharge, patient will demonstrate leisure problem solving by sharing on two positive lesiure activities they would like to participate in post dc and any perceived barriers to their participation.  Outcome: Progressing  Goal: LTG-By discharge, patient will demonstrate a standing tolerance during a leisure activity Min A for 3 minutes x3 and no LOB.   Outcome: Progressing

## 2022-03-29 NOTE — CONSULTS
HOSPITAL MEDICINE CONSULTATION    Requesting Physician:  Dr. Pan    Reason for Consult:  Thrombocytopenia    History of Present Illness:  The patient is a 72-year-old  female with past medical history significant for asthma, hypertension, dyslipidemia, hypothyroidism, and depression.  She was admitted to [facility] on 3/21/22 after sustaining a ground level fall onto her back.  She was found to have an L2 fracture, for which conservative management was recommended.  Due to her ongoing functional debility, the patient was transferred to [facility] on 3/23/22.  Hospital Medicine consultation is requested on 3/29/22 to assist in the management of this patient's thrombocytopenia.  She is also noted to have azotemia.    Review of Systems:  Review of Systems   Constitutional: Negative for chills and fever.   HENT: Negative.    Eyes: Negative.    Respiratory: Negative for cough and shortness of breath.    Cardiovascular: Negative for chest pain and palpitations.   Gastrointestinal: Negative for abdominal pain, nausea and vomiting.   Musculoskeletal: Positive for back pain.   Skin: Negative for itching and rash.   Endo/Heme/Allergies: Negative for polydipsia. Does not bruise/bleed easily.   Psychiatric/Behavioral: Positive for depression.   All other systems reviewed and are negative.      Allergies:  Allergies   Allergen Reactions   • Fentanyl Vomiting   • Morphine Vomiting   • Rifampin      Pt turns yellow       Medications:    Current Facility-Administered Medications:   •  Respiratory Therapy Consult, , Nebulization, Continuous RT, Jess Lux M.D.  •  hydrALAZINE (APRESOLINE) tablet 25 mg, 25 mg, Oral, Q8HRS PRN, Jess Lux M.D.  •  acetaminophen (Tylenol) tablet 650 mg, 650 mg, Oral, Q4HRS PRN, Jess Lux M.D.  •  senna-docusate (PERICOLACE or SENOKOT S) 8.6-50 MG per tablet 2 Tablet, 2 Tablet, Oral, BID, 2 Tablet at  03/29/22 0817 **AND** polyethylene glycol/lytes (MIRALAX) PACKET 1 Packet, 1 Packet, Oral, QDAY PRN **AND** magnesium hydroxide (MILK OF MAGNESIA) suspension 30 mL, 30 mL, Oral, QDAY PRN **AND** bisacodyl (DULCOLAX) suppository 10 mg, 10 mg, Rectal, QDAY PRN, Jess Lux M.D.  •  omeprazole (PRILOSEC) capsule 20 mg, 20 mg, Oral, DAILY, Jess Lux M.D., 20 mg at 03/29/22 0813  •  artificial tears ophthalmic solution 1 Drop, 1 Drop, Both Eyes, PRN, Jess Lux M.D.  •  benzocaine-menthol (Cepacol) lozenge 1 Lozenge, 1 Lozenge, Mouth/Throat, Q2HRS PRN, Jess Lux M.D.  •  mag hydrox-al hydrox-simeth (MAALOX PLUS ES or MYLANTA DS) suspension 20 mL, 20 mL, Oral, Q2HRS PRN, Jess Lux M.D.  •  ondansetron (ZOFRAN ODT) dispertab 4 mg, 4 mg, Oral, 4X/DAY PRN **OR** ondansetron (ZOFRAN) syringe/vial injection 4 mg, 4 mg, Intramuscular, 4X/DAY PRN, Jess Lux M.D.  •  traZODone (DESYREL) tablet 50 mg, 50 mg, Oral, QHS PRN, Jess Lux M.D.  •  sodium chloride (OCEAN) 0.65 % nasal spray 2 Spray, 2 Spray, Nasal, PRN, Jess Lux M.D.  •  traMADol (ULTRAM) 50 MG tablet 50 mg, 50 mg, Oral, Q4HRS PRN, Jess Lux M.D.  •  acetaminophen (Tylenol) tablet 650 mg, 650 mg, Oral, Q6HRS PRN, Jess Lux M.D.  •  amitriptyline (ELAVIL) tablet 75 mg, 75 mg, Oral, QHS, Jess Lux M.D., 75 mg at 03/28/22 2045  •  escitalopram (Lexapro) tablet 20 mg, 20 mg, Oral, DAILY, Jess Lux M.D., 20 mg at 03/29/22 0813  •  HYDROcodone-acetaminophen (NORCO) 5-325 MG per tablet 1 Tablet, 1 Tablet, Oral, Q6HRS PRN, Jess Lux M.D., 1 Tablet at 03/29/22 0822  •  HYDROcodone/acetaminophen (NORCO)  MG per tablet 1 Tablet, 1 Tablet, Oral, Q4HRS PRN, Jess Lux M.D., 1 Tablet at 03/29/22 0506  •  levothyroxine (SYNTHROID) tablet 88 mcg, 88 mcg, Oral, [facility]  M[facility] M.D., 88 mcg at 03/29/22 0506  •  metoprolol tartrate (LOPRESSOR) tablet 12.5 mg, 12.5 mg, Oral, BID, Jess Lux M.D., 12.5 mg at 03/29/22 0813  •  montelukast (SINGULAIR) tablet 10 mg, 10 mg, Oral, QHS, Jess Lux M.D., 10 mg at 03/28/22 2045  •  rosuvastatin (CRESTOR) tablet 20 mg, 20 mg, Oral, Q EVENING, Jess Lux M.D., 20 mg at 03/28/22 2045  •  sucralfate (CARAFATE) tablet 1 g, 1 g, Oral, 4X/DAY ACHS, Jess Lux M.D., 1 g at 03/29/22 0812  •  vitamin D3 (cholecalciferol) tablet 2,000 Units, 2,000 Units, Oral, DAILY, Jess Lux M.D., 2,000 Units at 03/29/22 0813  •  albuterol inhaler 2 Puff, 2 Puff, Inhalation, Q4H PRN (RT), Jess Lux M.D.    Past Medical/Surgical History:  Past Medical History:   Diagnosis Date   • Asthma    • Depression    • HTN (hypertension)    • Hyperlipidemia    • Migraines    • Obesity    • Sleep apnea     diagnosed 9/2009 CPAP -PMA     Past Surgical History:   Procedure Laterality Date   • ABDOMINAL HYSTERECTOMY TOTAL     • APPENDECTOMY     • BREAST BIOPSY      no cancer   • COLECTOMY      partial for divverticulitis 2007   • LUMBAR DISC REPLACEMENT         Social History:  Social History     Socioeconomic History   • Marital status: Single     Spouse name: Not on file   • Number of children: Not on file   • Years of education: Not on file   • Highest education level: Not on file   Occupational History   • Not on file   Tobacco Use   • Smoking status: Never Smoker   • Smokeless tobacco: Never Used   Vaping Use   • Vaping Use: Never used   Substance and Sexual Activity   • Alcohol use: No   • Drug use: No   • Sexual activity: Yes     Partners: Male     Birth control/protection: Post-Menopausal   Other Topics Concern   • Not on file   Social History Narrative   • Not on file     Social Determinants of Health     Financial Resource Strain: Low Risk    • Difficulty of Paying Living Expenses:  Not hard at all   Food Insecurity: No Food Insecurity   • Worried About Running Out of Food in the Last Year: Never true   • Ran Out of Food in the Last Year: Never true   Transportation Needs: No Transportation Needs   • Lack of Transportation (Medical): No   • Lack of Transportation (Non-Medical): No   Physical Activity: Not on file   Stress: Not on file   Social Connections: Not on file   Intimate Partner Violence: Not on file   Housing Stability: Not on file       Family History:  Family History   Problem Relation Age of Onset   • Cancer Mother         lung   • Heart Disease Mother    • Stroke Father    • Heart Disease Father    • Diabetes Father    • Heart Disease Brother         cath and bypass   • Heart Disease Brother         cath and byp[ass[   • Heart Disease Brother         cath and bypass   • Heart Disease Brother    • Other Brother         MVA       Physical Examination:   Vitals:    03/28/22 1508 03/28/22 1800 03/29/22 0700 03/29/22 1400   BP: 124/77 126/74 140/82 125/79   Pulse: 81 76 79 76   Resp: 18 18 18 18   Temp: 36.6 °C (97.8 °F) 36.5 °C (97.7 °F) 36.8 °C (98.2 °F) 36.6 °C (97.8 °F)   TempSrc: Oral Oral Oral Oral   SpO2: 96% 94% 96% 96%   Weight:       Height:           Physical Exam  Vitals reviewed.   Constitutional:       Appearance: Normal appearance.   HENT:      Head: Normocephalic and atraumatic.      Right Ear: External ear normal.      Left Ear: External ear normal.      Nose: Nose normal.      Mouth/Throat:      Pharynx: Oropharynx is clear.   Eyes:      General:         Right eye: No discharge.         Left eye: No discharge.      Extraocular Movements: Extraocular movements intact.      Conjunctiva/sclera: Conjunctivae normal.   Cardiovascular:      Rate and Rhythm: Normal rate and regular rhythm.   Pulmonary:      Effort: Pulmonary effort is normal. No respiratory distress.      Breath sounds: Normal breath sounds. No wheezing.   Abdominal:      General: Bowel sounds are normal.  There is no distension.      Palpations: Abdomen is soft.      Tenderness: There is no abdominal tenderness.   Musculoskeletal:      Cervical back: Normal range of motion and neck supple.      Right lower leg: No edema.      Left lower leg: No edema.   Skin:     General: Skin is warm and dry.   Neurological:      Mental Status: She is alert and oriented to person, place, and time.         Laboratory Data:  Recent Labs     03/29/22  0551   WBC 10.7   RBC 3.98*   HEMOGLOBIN 11.4*   HEMATOCRIT 35.5*   MCV 89.2   MCH 28.6   MCHC 32.1*   RDW 48.8   PLATELETCT 120*   MPV 9.4             Impressions/Recommendations:  Hypothyroidism  On Synthroid  Outpt F/U    HTN (hypertension)  Observe blood pressure trends on Metoprolol    Dyslipidemia  On Crestor    Depression  On Elavil and Lexapro    Closed compression fracture of L2 lumbar vertebra, initial encounter (HCC)  2/2 GLF onto back  Non-surgical management  TLSO  Pain control per Physiatry    Thrombocytopenia (HCC)  May be 2/2 Elavil  Follow Platelet counts    Azotemia  Encourage PO fluids  Follow renal function    Vitamin D deficiency  Vit D level 22  On supplementation    Asthma  On Singulair  RT protocol    Full Code    Discussed with patient and Dr. Pan.  Thank you for the opportunity to assist in this patient's care.  We will continue to follow along with you.

## 2022-03-29 NOTE — CARE PLAN
Problem: Mobility  Goal: STG-Within one week, patient will ascend and descend four to six stairs with B hand rails and CGA standard rise steps  Outcome: Not Met     Problem: Mobility Transfers  Goal: STG-Within one week, patient will perform bed mobility SPV with bed rail as needed  Outcome: Not Met     Problem: Mobility  Goal: STG-Within one week, patient will ambulate community distances with FWW and  ft x 2  Outcome: Met     Problem: Mobility Transfers  Goal: STG-Within one week, patient will transfer bed to chair SPV /set-up with FWW  Outcome: Met

## 2022-03-29 NOTE — THERAPY
Physical Therapy   Daily Treatment     Patient Name: Yamile Go  Age:  72 y.o., Sex:  female  Medical Record #: [id number]  Today's Date: 3/29/2022     Precautions  Precautions: Fall Risk,Spinal / Back Precautions ,TLSO (Thoracolumbosacral orthosis)  Comments: Ok to remove TLSO for showers    Subjective    Pt resting in bed, reports sleeping well last night and minimal pain today     Objective       03/29/22 1401   Gait Functional Level of Assist    Gait Level Of Assist Standby Assist   Assistive Device Front Wheel Walker   Distance (Feet) 300   # of Times Distance was Traveled 2   Deviation Bradykinetic   Transfer Functional Level of Assist   Bed, Chair, Wheelchair Transfer Supervised   Sitting Lower Body Exercises   Ankle Pumps 2 sets of 15   Hip Flexion 2 sets of 15   Hip Abduction 2 sets of 15   Hip Adduction 2 sets of 15   Long Arc Quad 2 sets of 15   Hamstring Curl 2 sets of 15   Comments 1.5# wts/ pink TB   Bed Mobility    Supine to Sit Standby Assist   Sit to Supine Standby Assist   Sit to Stand Supervised   Scooting Modified Independent   Rolling Modified Independent   PT Total Time Spent   PT Individual Total Time Spent (Mins) 60   PT Charge Group   PT Gait Training 2   PT Therapeutic Exercise 2     Gait training without UE support (within confines of // bars for safety) and  ft x 3.   Discussed home adaptations re: couch and recliner as pt reports she intermittently has difficulty getting up from soft surfaces, discussed using a solid seat insert/ wood board under couch/ recliner cushions to provide a more stable/solid surface for sit<>stand transition.     Assessment    Pt continues to progress well, but remains generally weak with low activity tolerance.   Strengths: Able to follow instructions,Effective communication skills,Alert and oriented,Independent prior level of function,Manages pain appropriately,Motivated for self care and independence,Pleasant and cooperative,Willingly  participates in therapeutic activities  Barriers: Limited mobility,Decreased endurance,Generalized weakness    Plan    Gait endurance with FWW, gait without device as able, stairs, general strength training, standing balance, TENS/ modalities for pain control.     Passport items to be completed:  Get in/out of bed safely, in/out of a vehicle, safely use mobility device, walk or wheel around home/community, navigate up and down stairs, show how to get up/down from the ground, ensure home is accessible, demonstrate HEP, complete caregiver training      Physical Therapy Problems (Active)       Problem: Mobility       Dates: Start: 03/24/22         Goal: STG-Within one week, patient will ascend and descend four to six stairs with B hand rails and CGA standard rise steps       Dates: Start: 03/24/22               Problem: Mobility Transfers       Dates: Start: 03/24/22         Goal: STG-Within one week, patient will perform bed mobility SPV with bed rail as needed       Dates: Start: 03/24/22

## 2022-03-29 NOTE — PROGRESS NOTES
Received bedside shift report from Leonela PARK RN regarding patient and assumed care. Patient awake, calm and stable, currently positioned in bed for comfort and safety; call light within reach. Denies pain or discomfort at this time. Will continue to monitor.

## 2022-03-29 NOTE — CARE PLAN
Problem: Knowledge Deficit - Standard  Goal: Patient and family/care givers will demonstrate understanding of plan of care, disease process/condition, diagnostic tests and medications  Outcome: Progressing  Note: Pt agrees with plan of care tonight regarding medications and safety.  Will continue to monitor patient.      Problem: Pain - Standard  Goal: Alleviation of pain or a reduction in pain to the patient’s comfort goal  Outcome: Progressing  Note: C/o pain to back, medicated with prn Norco tonight.  Has + relief.  See MAR and doc flow sheet.  Will continue to monitor patient.   The patient is Stable - Low risk of patient condition declining or worsening    Shift Goals  Clinical Goals: Safety  Patient Goals: Pain control    Progress made toward(s) clinical / shift goals:  progressing

## 2022-03-29 NOTE — PROGRESS NOTES
"Rehab Progress Note     Encounter Date: 3/29/2022     CC: Doing a lot better today.    Interval Events (Subjective)  Patient states that she is doing a lot better today.  She slept well.  Her leg pain has been controlled and manageable.  Discussed with her reduced platelets and hospitalist consult.  Patient amenable and had already visited with Dr. Grossman.  Plan for monitoring platelets for now.  No other concerns per patient report.    14 point ROS reviewed and negative except as stated above.     Objective:  VITAL SIGNS: /82   Pulse 79   Temp 36.8 °C (98.2 °F) (Oral)   Resp 18   Ht 1.626 m (5' 4\")   Wt 81.6 kg (180 lb)   SpO2 96%   BMI 30.90 kg/m²     GEN: No apparent distress, laying comfortably in bed.  HEENT: Head normocephalic, atraumatic.  Sclera nonicteric bilaterally, no ocular discharge appreciated bilaterally.  CV: Extremities warm and well-perfused, no peripheral edema appreciated bilaterally.  PULMONARY: Breathing nonlabored on room air, no respiratory accessory muscle use.  Not requiring supplemental oxygen.  ABD: Soft, nontender.  Mildly distended.  SKIN: No appreciable skin breakdown on exposed areas of skin.  PSYCH: Mood and affect within normal limits.  NEURO: Awake alert.  Conversational.  Logical thought content.  Moving extremities volitionally.  Answers questions appropriately.      Recent Results (from the past 72 hour(s))   CBC WITH DIFFERENTIAL    Collection Time: 03/29/22  5:51 AM   Result Value Ref Range    WBC 10.7 4.8 - 10.8 K/uL    RBC 3.98 (L) 4.20 - 5.40 M/uL    Hemoglobin 11.4 (L) 12.0 - 16.0 g/dL    Hematocrit 35.5 (L) 37.0 - 47.0 %    MCV 89.2 81.4 - 97.8 fL    MCH 28.6 27.0 - 33.0 pg    MCHC 32.1 (L) 33.6 - 35.0 g/dL    RDW 48.8 35.9 - 50.0 fL    Platelet Count 120 (L) 164 - 446 K/uL    MPV 9.4 9.0 - 12.9 fL    Neutrophils-Polys 81.10 (H) 44.00 - 72.00 %    Lymphocytes 8.70 (L) 22.00 - 41.00 %    Monocytes 6.50 0.00 - 13.40 %    Eosinophils 0.30 0.00 - 6.90 %    Basophils " 0.20 0.00 - 1.80 %    Immature Granulocytes 3.20 (H) 0.00 - 0.90 %    Nucleated RBC 0.00 /100 WBC    Neutrophils (Absolute) 8.67 (H) 2.00 - 7.15 K/uL    Lymphs (Absolute) 0.93 (L) 1.00 - 4.80 K/uL    Monos (Absolute) 0.69 0.00 - 0.85 K/uL    Eos (Absolute) 0.03 0.00 - 0.51 K/uL    Baso (Absolute) 0.02 0.00 - 0.12 K/uL    Immature Granulocytes (abs) 0.34 (H) 0.00 - 0.11 K/uL    NRBC (Absolute) 0.00 K/uL       Current Facility-Administered Medications   Medication Frequency   • Respiratory Therapy Consult Continuous RT   • hydrALAZINE (APRESOLINE) tablet 25 mg Q8HRS PRN   • acetaminophen (Tylenol) tablet 650 mg Q4HRS PRN   • senna-docusate (PERICOLACE or SENOKOT S) 8.6-50 MG per tablet 2 Tablet BID    And   • polyethylene glycol/lytes (MIRALAX) PACKET 1 Packet QDAY PRN    And   • magnesium hydroxide (MILK OF MAGNESIA) suspension 30 mL QDAY PRN    And   • bisacodyl (DULCOLAX) suppository 10 mg QDAY PRN   • omeprazole (PRILOSEC) capsule 20 mg DAILY   • artificial tears ophthalmic solution 1 Drop PRN   • benzocaine-menthol (Cepacol) lozenge 1 Lozenge Q2HRS PRN   • mag hydrox-al hydrox-simeth (MAALOX PLUS ES or MYLANTA DS) suspension 20 mL Q2HRS PRN   • ondansetron (ZOFRAN ODT) dispertab 4 mg 4X/DAY PRN    Or   • ondansetron (ZOFRAN) syringe/vial injection 4 mg 4X/DAY PRN   • traZODone (DESYREL) tablet 50 mg QHS PRN   • sodium chloride (OCEAN) 0.65 % nasal spray 2 Spray PRN   • traMADol (ULTRAM) 50 MG tablet 50 mg Q4HRS PRN   • acetaminophen (Tylenol) tablet 650 mg Q6HRS PRN   • amitriptyline (ELAVIL) tablet 75 mg QHS   • escitalopram (Lexapro) tablet 20 mg DAILY   • HYDROcodone-acetaminophen (NORCO) 5-325 MG per tablet 1 Tablet Q6HRS PRN   • HYDROcodone/acetaminophen (NORCO)  MG per tablet 1 Tablet Q4HRS PRN   • levothyroxine (SYNTHROID) tablet 88 mcg QAM AC   • metoprolol tartrate (LOPRESSOR) tablet 12.5 mg BID   • montelukast (SINGULAIR) tablet 10 mg QHS   • rosuvastatin (CRESTOR) tablet 20 mg Q EVENING   •  sucralfate (CARAFATE) tablet 1 g 4X/DAY ACHS   • vitamin D3 (cholecalciferol) tablet 2,000 Units DAILY   • albuterol inhaler 2 Puff Q4H PRN (RT)       Orders Placed This Encounter   Procedures   • Diet Order Diet: Cardiac     Standing Status:   Standing     Number of Occurrences:   1     Order Specific Question:   Diet:     Answer:   Cardiac [6]       Assessment:  Active Hospital Problems    Diagnosis    • *Closed fracture of lumbar vertebra, unspecified fracture morphology, initial encounter ([facility])    • Chronic pain syndrome on chronic opioids    • Hypothyroidism    • Class 1 obesity due to excess calories with serious comorbidity and body mass index (BMI) of 32.0 to 32.9 in adult    • HTN (hypertension)    • CAD (coronary artery disease)    • Dyslipidemia        Imaging:  Right x-ray foot 3/20/2022  Right total ankle replacement. No evidence of periprosthetic fracture or loosening  There is normal bony mineralization.  There is no evidence of fracture, dislocation, or osseous lesion.  There is no evidence of soft tissue injury.    Medical Decision Making and Plan: Adapted from Dr. Lux note dated 3/25  L2 TP fracture - Patient with fall with significant back pain found to have L2 fracture. Was managed conservatively with TLSO.  -PT and OT for mobility and ADLs  -Continue TLSO. Follow-up [facility] [place]     HTN - Patient on Metoprolol 12.5 mg BID     HLD - Patient on Rosuvastatin 20 mg QHS     Asthma - Patient on Singulair QHS     Anemia - Check AM CBC - 11.0, stable.   CBC 3/29 H/H improved     Leukocytosis  - Check AM CBC - 12.0, will check UA - neg 3/25  CBC 3/29 WBC 10.7, improved from 12     Thrombocytopenia - Check AM CBC - 145, downtrending will monitor  CBC 3/29 120, down from 145, downtrending  Hospitalist consult     Prediabetes - Into 150s. Check A1c - 7.0, will defer to outpatient PCP     Hypothyroidism - Patient on Levothyroxine 88 mcg daily     Azotemia - Check AM CMP 23, improved from 29, encourage  fluids     Vitamin D deficiency - 22 at [facility]. Will start on 1000 U      Obesity due to excess calories - BMI of 33.8 on admission, meets medical criteria. Dietitian to consult      Depression/Pain - Patient on Elavil 75 mg QHS and Lexapro 20 mg daily. Patient on Norco     DVT ppx - Patient on Lovenox on transfer. Increase Ambulation, will discontinue    Foot bruising, right: Reportedly does not interfere with her ambulation.  No x-ray since most recent fall.  3/28: Right foot x-ray to ensure no fracture, though unlikely as patient ambulating without issue.  3/29: Right foot x-ray negative.       Total time:  37 minutes.  I spent greater than 50% of the time for patient care and coordination on this date, including unit/floor time, and face-to-face time with the patient as per assessment and plan above.    Amada Pan D.O.    IDT Team Meeting 3/29/2022    I, Amada Pan D.O., was present and led the interdisciplinary team conference on 3/29/2022.  I led the IDT conference and agree with the IDT conference documentation and plan of care as noted below.     RN: Continent B/B. Alternating between 5 and 10 oxy.   PT: Met 2 of 4 goals. Inconsistent with bed mobility secondary to pain. Min a stairs. Ambulating up to 3-400 ft with walker. Generalized weakness is barrier and spasms in hip flexors. Fall risk.   OT: Bathing sup. Varies on LB dressing, pain. Barrier is generalized weakness difficulty standing from chair. Has bathroom set up.   Rec: Spends time with her family. Working with sit to stands.   CM: Continues to work on disposition and DME needs.   DC/Disposition:  - [facility]: 4/2/22 d/c with [facility]

## 2022-03-29 NOTE — THERAPY
"Occupational Therapy  Daily Treatment     Patient Name: Yamile Go  Age:  72 y.o., Sex:  female  Medical Record #: [id number]  Today's Date: 3/29/2022     Precautions  Precautions: (P) Fall Risk,Spinal / Back Precautions ,TLSO (Thoracolumbosacral orthosis)  Comments: Ok to remove TLSO for showers         Subjective    \"I slept great last night.\"   Patient with no complaints of back pain this morning.     Objective       03/29/22 0831   Precautions   Precautions Fall Risk;Spinal / Back Precautions ;TLSO (Thoracolumbosacral orthosis)   Functional Level of Assist   Upper Body Dressing Supervision   Upper Body Dressing Description Set-up of equipment  (setup to don TLSO)   Tub / Shower Transfers Modified Independent   Tub Shower Transfer Description Adaptive equipment  (mod I to complete dry tub/shower transfer with tub bench)   IADL Treatments   Home Management Patient participated in simulated transporting of laundry items from one location to another by draping them over front of walker, then standing at tall table to fold.  Discussed idea of walker tray or putting folded clothes in a bag to then get them to closet or dresser.   Interdisciplinary Plan of Care Collaboration   Patient Position at End of Therapy Seated;Call Light within Reach;Tray Table within Reach;Phone within Reach   OT Total Time Spent   OT Individual Total Time Spent (Mins) 30   OT Charge Group   OT Therapy Activity 2     Functional mobility room <> adl suite SBA with FWW.    Assessment    Patient completed simulated laundry task with SBA/supervision and good problem solving.  Mod I with dry tub/shower transfer using bench.  Strengths: Able to follow instructions,Alert and oriented,Good insight into deficits/needs,Independent prior level of function,Manages pain appropriately,Motivated for self care and independence,Pleasant and cooperative,Supportive family,Willingly participates in therapeutic activities  Barriers: Decreased " endurance,Fatigue,Generalized weakness,Impaired activity tolerance,Impaired balance,Limited mobility,Pain (lives alone)    Plan    ADLs with AE PRN, IADLs, functional mobility, transfers, balance/fall prevention, endurance, UE strengthening     Occupational Therapy Goals (Active)       Problem: Bathing       Dates: Start: 03/24/22         Goal: STG-Within one week, patient will bathe with CGA and use of AE as needed.       Dates: Start: 03/24/22               Problem: Dressing       Dates: Start: 03/24/22         Goal: STG-Within one week, patient will dress UB including donning TLSO with setup, verbal cues and Leah.        Dates: Start: 03/24/22            Goal: STG-Within one week, patient will dress LB with setup and supervision with use of AE as needed.       Dates: Start: 03/24/22               Problem: Functional Transfers       Dates: Start: 03/24/22         Goal: STG-Within one week, patient will perform bathroom transfers with SBA-supervision and use of AE as needed.       Dates: Start: 03/24/22               Problem: IADL's       Dates: Start: 03/24/22         Goal: STG-Within one week, patient will prepare a meal with setup and Leah and seated rest breaks as needed.       Dates: Start: 03/24/22               Problem: OT Long Term Goals       Dates: Start: 03/24/22         Goal: LTG-By discharge, patient will complete basic self care tasks with modified independence and use of AE as needed.       Dates: Start: 03/24/22            Goal: LTG-By discharge, patient will perform bathroom transfers with modified independence and use of AE as needed.       Dates: Start: 03/24/22            Goal: LTG-By discharge, patient will complete basic home management with supervision to modified independence and use of AE as needed.        Dates: Start: 03/24/22               Problem: Toileting       Dates: Start: 03/24/22         Goal: STG-Within one week, patient will complete toileting tasks with supervision and use of  AE as needed.       Dates: Start: 03/24/22

## 2022-03-30 PROBLEM — R73.9 HYPERGLYCEMIA: Status: ACTIVE | Noted: 2022-03-30

## 2022-03-30 LAB
ANION GAP SERPL CALC-SCNC: 8 MMOL/L (ref 7–16)
BUN SERPL-MCNC: 22 MG/DL (ref 8–22)
CALCIUM SERPL-MCNC: 8.5 MG/DL (ref 8.5–10.5)
CHLORIDE SERPL-SCNC: 104 MMOL/L (ref 96–112)
CO2 SERPL-SCNC: 24 MMOL/L (ref 20–33)
CREAT SERPL-MCNC: 0.6 MG/DL (ref 0.5–1.4)
ERYTHROCYTE [DISTWIDTH] IN BLOOD BY AUTOMATED COUNT: 49.9 FL (ref 35.9–50)
GFR SERPLBLD CREATININE-BSD FMLA CKD-EPI: 96 ML/MIN/1.73 M 2
GLUCOSE BLD STRIP.AUTO-MCNC: 168 MG/DL (ref 65–99)
GLUCOSE BLD STRIP.AUTO-MCNC: 171 MG/DL (ref 65–99)
GLUCOSE BLD STRIP.AUTO-MCNC: 198 MG/DL (ref 65–99)
GLUCOSE SERPL-MCNC: 144 MG/DL (ref 65–99)
HCT VFR BLD AUTO: 34.9 % (ref 37–47)
HGB BLD-MCNC: 11.1 G/DL (ref 12–16)
MCH RBC QN AUTO: 28.5 PG (ref 27–33)
MCHC RBC AUTO-ENTMCNC: 31.8 G/DL (ref 33.6–35)
MCV RBC AUTO: 89.7 FL (ref 81.4–97.8)
PLATELET # BLD AUTO: 117 K/UL (ref 164–446)
PMV BLD AUTO: 9.6 FL (ref 9–12.9)
POTASSIUM SERPL-SCNC: 3.8 MMOL/L (ref 3.6–5.5)
RBC # BLD AUTO: 3.89 M/UL (ref 4.2–5.4)
SODIUM SERPL-SCNC: 136 MMOL/L (ref 135–145)
WBC # BLD AUTO: 9.4 K/UL (ref 4.8–10.8)

## 2022-03-30 PROCEDURE — 80048 BASIC METABOLIC PNL TOTAL CA: CPT

## 2022-03-30 PROCEDURE — 97535 SELF CARE MNGMENT TRAINING: CPT

## 2022-03-30 PROCEDURE — 700102 HCHG RX REV CODE 250 W/ 637 OVERRIDE(OP): Performed by: HOSPITALIST

## 2022-03-30 PROCEDURE — 82962 GLUCOSE BLOOD TEST: CPT

## 2022-03-30 PROCEDURE — 97530 THERAPEUTIC ACTIVITIES: CPT

## 2022-03-30 PROCEDURE — 97112 NEUROMUSCULAR REEDUCATION: CPT

## 2022-03-30 PROCEDURE — 700102 HCHG RX REV CODE 250 W/ 637 OVERRIDE(OP): Performed by: PHYSICAL MEDICINE & REHABILITATION

## 2022-03-30 PROCEDURE — 36415 COLL VENOUS BLD VENIPUNCTURE: CPT

## 2022-03-30 PROCEDURE — 99232 SBSQ HOSP IP/OBS MODERATE 35: CPT | Performed by: HOSPITALIST

## 2022-03-30 PROCEDURE — A9270 NON-COVERED ITEM OR SERVICE: HCPCS | Performed by: PHYSICAL MEDICINE & REHABILITATION

## 2022-03-30 PROCEDURE — 770010 HCHG ROOM/CARE - REHAB SEMI PRIVAT*

## 2022-03-30 PROCEDURE — 97110 THERAPEUTIC EXERCISES: CPT

## 2022-03-30 PROCEDURE — 85027 COMPLETE CBC AUTOMATED: CPT

## 2022-03-30 PROCEDURE — 99232 SBSQ HOSP IP/OBS MODERATE 35: CPT | Performed by: PHYSICAL MEDICINE & REHABILITATION

## 2022-03-30 PROCEDURE — 97116 GAIT TRAINING THERAPY: CPT

## 2022-03-30 RX ADMIN — HYDROCODONE BITARTRATE AND ACETAMINOPHEN 1 TABLET: 5; 325 TABLET ORAL at 09:38

## 2022-03-30 RX ADMIN — Medication 2000 UNITS: at 08:33

## 2022-03-30 RX ADMIN — SUCRALFATE 1 G: 1 TABLET ORAL at 08:33

## 2022-03-30 RX ADMIN — OMEPRAZOLE 20 MG: 20 CAPSULE, DELAYED RELEASE ORAL at 08:33

## 2022-03-30 RX ADMIN — AMITRIPTYLINE HYDROCHLORIDE 75 MG: 25 TABLET, FILM COATED ORAL at 21:18

## 2022-03-30 RX ADMIN — LEVOTHYROXINE SODIUM 88 MCG: 0.09 TABLET ORAL at 05:44

## 2022-03-30 RX ADMIN — ESCITALOPRAM OXALATE 20 MG: 10 TABLET, FILM COATED ORAL at 08:33

## 2022-03-30 RX ADMIN — INSULIN HUMAN 2 UNITS: 100 INJECTION, SOLUTION PARENTERAL at 11:20

## 2022-03-30 RX ADMIN — ROSUVASTATIN CALCIUM 20 MG: 10 TABLET, FILM COATED ORAL at 21:20

## 2022-03-30 RX ADMIN — INSULIN HUMAN 2 UNITS: 100 INJECTION, SOLUTION PARENTERAL at 21:24

## 2022-03-30 RX ADMIN — SUCRALFATE 1 G: 1 TABLET ORAL at 11:09

## 2022-03-30 RX ADMIN — METOPROLOL TARTRATE 12.5 MG: 25 TABLET, FILM COATED ORAL at 21:20

## 2022-03-30 RX ADMIN — INSULIN HUMAN 2 UNITS: 100 INJECTION, SOLUTION PARENTERAL at 17:28

## 2022-03-30 RX ADMIN — MONTELUKAST SODIUM 10 MG: 10 TABLET, FILM COATED ORAL at 21:19

## 2022-03-30 RX ADMIN — SUCRALFATE 1 G: 1 TABLET ORAL at 21:19

## 2022-03-30 RX ADMIN — SUCRALFATE 1 G: 1 TABLET ORAL at 16:43

## 2022-03-30 RX ADMIN — HYDROCODONE BITARTRATE AND ACETAMINOPHEN 1 TABLET: 5; 325 TABLET ORAL at 16:43

## 2022-03-30 RX ADMIN — METOPROLOL TARTRATE 12.5 MG: 25 TABLET, FILM COATED ORAL at 08:33

## 2022-03-30 ASSESSMENT — ENCOUNTER SYMPTOMS
BRUISES/BLEEDS EASILY: 0
CHILLS: 0
NAUSEA: 0
EYES NEGATIVE: 1
SHORTNESS OF BREATH: 0
BACK PAIN: 1
PALPITATIONS: 0
VOMITING: 0
FEVER: 0
COUGH: 0
ABDOMINAL PAIN: 0
POLYDIPSIA: 0

## 2022-03-30 ASSESSMENT — GAIT ASSESSMENTS
GAIT LEVEL OF ASSIST: CONTACT GUARD ASSIST
DISTANCE (FEET): 100
DEVIATION: TRENDELENBERG;BRADYKINETIC

## 2022-03-30 ASSESSMENT — ACTIVITIES OF DAILY LIVING (ADL)
BED_CHAIR_WHEELCHAIR_TRANSFER_DESCRIPTION: ADAPTIVE EQUIPMENT
TOILETING_LEVEL_OF_ASSIST_DESCRIPTION: ADAPTIVE EQUIPMENT;GRAB BAR;INCREASED TIME
BED_CHAIR_WHEELCHAIR_TRANSFER_DESCRIPTION: ADAPTIVE EQUIPMENT
TUB_SHOWER_TRANSFER_DESCRIPTION: ADAPTIVE EQUIPMENT;GRAB BAR;SHOWER BENCH
TOILET_TRANSFER_DESCRIPTION: ADAPTIVE EQUIPMENT;GRAB BAR;INCREASED TIME

## 2022-03-30 NOTE — THERAPY
Recreational Therapy  Daily Treatment     Patient Name: Yamile Go  AGE:  72 y.o., SEX:  female  Medical Record #: [id number]  Today's Date: 3/30/2022       Subjective    Pt reporting to be homesick today. Shared that she enjoys to be at her home during her leisure time.      Objective     03/30/22 1301   Functional Ability Status - Cognitive   Attention Span Remains on Task   Comprehension Follows Three Step Commands   Judgment Able to Make Independent Decisions   Functional Ability Status - Emotional    Affect Appropriate   Mood Appropriate   Behavior Appropriate   Emotional Comments Pt reporting to having a tought day in which she missed home   Skilled Intervention    Skilled Intervention Gross Motor Leisure;Relaxation / Coping Skills   Skilled Intervention Comments Standing dual tasking with a cognitive leisure activity   Interdisciplinary Plan of Care Collaboration   IDT Collaboration with  Physical Therapist   Patient Position at End of Therapy Seated;Call Light within Reach;Tray Table within Reach   Collaboration Comments Transition of care to PT   Strengths & Barriers   Strengths Able to follow instructions;Alert and oriented;Pleasant and cooperative;Motivated for self care and independence;Supportive family;Willingly participates in therapeutic activities   Barriers Impaired activity tolerance;Decreased endurance;Impaired balance   Treatment Time   Total Time Spent (mins) 30   Procedural Tracking   Procedural Tracking Community Re-Integration;Community Skills Development;Leisure Skills Awareness;Leisure Skills Development;Gross Motor Functional Leisure Skills       Assessment    Pt stood for 6 minutes x1 CGA. Pt reporting fatigue prior to sitting.     Strengths: (P) Able to follow instructions,Alert and oriented,Pleasant and cooperative,Motivated for self care and independence,Supportive family,Willingly participates in therapeutic activities  Barriers: (P) Impaired activity tolerance,Decreased  endurance,Impaired balance    Plan    Standing endurance with weight shift.

## 2022-03-30 NOTE — THERAPY
Physical Therapy   Daily Treatment     Patient Name: Yamile Go  Age:  72 y.o., Sex:  female  Medical Record #: [id number]  Today's Date: 3/30/2022     Precautions  Precautions: Fall Risk,Spinal / Back Precautions ,TLSO (Thoracolumbosacral orthosis)  Comments: Ok to remove TLSO for showers    Subjective    Pt resting in bed, willing to participate     Objective       03/30/22 1101   Supine Lower Body Exercise   Hip Abduction Hook Lying;2 sets of 15;Light Resistance Theraband  (PROM for hip abd/add with LLE extension for pain control. AROM to RLE)   Hip Adduction  2 sets of 15  (hooklying and with leg ext)   Short Arc Quad 2 sets of 15   Heel Slide 2 sets of 15   PT Total Time Spent   PT Individual Total Time Spent (Mins) 30   PT Charge Group   PT Therapeutic Exercise 2       Assessment    Pt completed supine exercises as noted, reports increased pain/ discomfort with AROM for hip abd/add LLE in extension but able to tolerate PROM.    Strengths: Able to follow instructions,Effective communication skills,Alert and oriented,Independent prior level of function,Manages pain appropriately,Motivated for self care and independence,Pleasant and cooperative,Willingly participates in therapeutic activities  Barriers: Limited mobility,Decreased endurance,Generalized weakness    Plan     Gait endurance with FWW, gait without device as able, stairs, general strength training, standing balance, TENS/ modalities for pain control.     Passport items to be completed:  Get in/out of bed safely, in/out of a vehicle, safely use mobility device, walk or wheel around home/community, navigate up and down stairs, show how to get up/down from the ground, ensure home is accessible, demonstrate HEP, complete caregiver training    Physical Therapy Problems (Active)       Problem: Mobility       Dates: Start: 03/24/22         Goal: STG-Within one week, patient will ascend and descend four to six stairs with B hand rails and CGA standard  rise steps       Dates: Start: 03/24/22               Problem: Mobility Transfers       Dates: Start: 03/24/22         Goal: STG-Within one week, patient will perform bed mobility SPV with bed rail as needed       Dates: Start: 03/24/22

## 2022-03-30 NOTE — THERAPY
"Physical Therapy   Daily Treatment     Patient Name: Yamile Go  Age:  72 y.o., Sex:  female  Medical Record #: [id number]  Today's Date: 3/30/2022     Precautions  Precautions: Fall Risk,Spinal / Back Precautions ,TLSO (Thoracolumbosacral orthosis)  Comments: Ok to remove TLSO for showers    Subjective    Pt present and ready for PT. Reports LE faituge     Objective       03/30/22 1331   Gait Functional Level of Assist    Gait Level Of Assist Contact Guard Assist   Assistive Device None  (within confines for // bars for safety)   Distance (Feet) 100   # of Times Distance was Traveled 3   Deviation Trendelenberg;Bradykinetic   Transfer Functional Level of Assist   Bed, Chair, Wheelchair Transfer Modified Independent   Bed Chair Wheelchair Transfer Description Adaptive equipment  (FWW)   Standing Lower Body Exercises   Step Up 2 sets of 10   Comments closed chain strength training at 4\" steps with B hand rails for step-up/ step-through patterning   Bed Mobility    Supine to Sit Modified Independent   Sit to Supine Modified Independent   Sit to Stand Modified Independent   Scooting Modified Independent   Rolling Modified Independent   Neuro-Muscular Treatments   Neuro-Muscular Treatments Anterior weight shift;Postural Changes;Postural Facilitation;Sequencing;Tactile Cuing;Weight Shift Right;Weight Shift Left   Comments standing balance without UE support on stable surface x 5 minutes for balloon volley, no loss of balance. Standing balance on Airex foam pad without UE support x 5 minutes for balloon volley and SBA for safety, pt with 2-3 lossess of balance but able to use protective extenstion at // bars for recovery. Core strength/ rhythmic stabiliaztion edge of mat all planes 1 minute x 5.   PT Total Time Spent   PT Individual Total Time Spent (Mins) 60   PT Charge Group   PT Gait Training 2   PT Therapeutic Exercise 1   PT Neuromuscular Re-Education / Balance 1       Assessment    Pt reports LE fatigue this " pm but completed all gait/ strength and balance training well. Progressing with overall mobility and ok for modified independence in room with FWW.    Strengths: Able to follow instructions,Effective communication skills,Alert and oriented,Independent prior level of function,Manages pain appropriately,Motivated for self care and independence,Pleasant and cooperative,Willingly participates in therapeutic activities  Barriers: Limited mobility,Decreased endurance,Generalized weakness    Plan    Gait endurance with FWW, gait without device as able, stairs, general strength training, standing balance, TENS/ modalities for pain control.     Passport items to be completed:  Get in/out of bed safely, in/out of a vehicle, safely use mobility device, walk or wheel around home/community, navigate up and down stairs, show how to get up/down from the ground, ensure home is accessible, demonstrate HEP, complete caregiver training    Physical Therapy Problems (Active)       Problem: Mobility       Dates: Start: 03/24/22         Goal: STG-Within one week, patient will ascend and descend four to six stairs with B hand rails and CGA standard rise steps       Dates: Start: 03/24/22               Problem: Mobility Transfers       Dates: Start: 03/24/22         Goal: STG-Within one week, patient will perform bed mobility SPV with bed rail as needed       Dates: Start: 03/24/22

## 2022-03-30 NOTE — PROGRESS NOTES
Hospital Medicine Daily Progress Note      Chief Complaint  Thrombocytopenia  Hyperglycemia    Interval Problem Update  Pt concerned about her high blood sugars.  Labs reviewed and discussed.    Review of Systems  Review of Systems   Constitutional: Negative for chills and fever.   HENT: Negative.    Eyes: Negative.    Respiratory: Negative for cough and shortness of breath.    Cardiovascular: Negative for chest pain and palpitations.   Gastrointestinal: Negative for abdominal pain, nausea and vomiting.   Musculoskeletal: Positive for back pain.   Skin: Negative for itching and rash.   Endo/Heme/Allergies: Negative for polydipsia. Does not bruise/bleed easily.        Physical Exam  Temp:  [36.3 °C (97.4 °F)-36.6 °C (97.8 °F)] 36.4 °C (97.6 °F)  Pulse:  [76-82] 76  Resp:  [18] 18  BP: (125-143)/(78-82) 143/82  SpO2:  [95 %-96 %] 95 %    Physical Exam  Vitals reviewed.   Constitutional:       General: She is not in acute distress.     Appearance: Normal appearance. She is not ill-appearing.   HENT:      Head: Normocephalic and atraumatic.      Right Ear: External ear normal.      Left Ear: External ear normal.      Nose: Nose normal.      Mouth/Throat:      Pharynx: Oropharynx is clear.   Eyes:      General:         Right eye: No discharge.         Left eye: No discharge.      Extraocular Movements: Extraocular movements intact.      Conjunctiva/sclera: Conjunctivae normal.   Cardiovascular:      Rate and Rhythm: Normal rate and regular rhythm.   Pulmonary:      Effort: Pulmonary effort is normal. No respiratory distress.      Breath sounds: Normal breath sounds. No wheezing.   Abdominal:      General: Bowel sounds are normal. There is no distension.      Palpations: Abdomen is soft.      Tenderness: There is no abdominal tenderness.   Musculoskeletal:      Cervical back: Normal range of motion and neck supple.      Right lower leg: No edema.      Left lower leg: No edema.      Comments: Back +large ecchymosis    Skin:     General: Skin is warm and dry.   Neurological:      Mental Status: She is alert and oriented to person, place, and time.         Fluids    Intake/Output Summary (Last 24 hours) at 3/30/2022 1033  Last data filed at 3/30/2022 0900  Gross per 24 hour   Intake 1200 ml   Output --   Net 1200 ml       Laboratory  Recent Labs     03/29/22  0551 03/30/22  0615   WBC 10.7 9.4   RBC 3.98* 3.89*   HEMOGLOBIN 11.4* 11.1*   HEMATOCRIT 35.5* 34.9*   MCV 89.2 89.7   MCH 28.6 28.5   MCHC 32.1* 31.8*   RDW 48.8 49.9   PLATELETCT 120* 117*   MPV 9.4 9.6     Recent Labs     03/30/22  0615   SODIUM 136   POTASSIUM 3.8   CHLORIDE 104   CO2 24   GLUCOSE 144*   BUN 22   CREATININE 0.60   CALCIUM 8.5                 Assessment/Plan  Hyperglycemia  Assessment & Plan  HbA1c 7.0  Start FSBS and SSI  Observe serum glucose trends    Asthma  Assessment & Plan  On Singulair  RT protocol    Vitamin D deficiency  Assessment & Plan  Vit D level 22  On supplementation    Azotemia  Assessment & Plan  Encourage PO fluids  Follow renal function    Thrombocytopenia (HCC)  Assessment & Plan  May be 2/2 Elavil  Follow Platelet counts    Closed compression fracture of L2 lumbar vertebra, initial encounter (HCC)- (present on admission)  Assessment & Plan  2/2 GLF onto back  Non-surgical management  TLSO  Pain control per Physiatry    Hypothyroidism- (present on admission)  Assessment & Plan  On Synthroid  Outpt F/U    Depression- (present on admission)  Assessment & Plan  On Elavil and Lexapro    HTN (hypertension)- (present on admission)  Assessment & Plan  Observe blood pressure trends on Metoprolol    Dyslipidemia- (present on admission)  Assessment & Plan  On Crestor     Full Code

## 2022-03-30 NOTE — CARE PLAN
"  Problem: Fall Risk - Rehab  Goal: Patient will remain free from falls  Outcome: Progressing  Note: May Chirag Fall risk Assessment Score:11    Moderate fall risk Interventions  - Bed and strip alarm   - Yellow sign by the door   - Yellow wrist band \"Fall risk\"  - Room near to the nurse station  - Do not leave patient unattended in the bathroom  - Fall risk education provided     Problem: Pain - Standard  Goal: Alleviation of pain or a reduction in pain to the patient’s comfort goal  Outcome: Progressing  Note: Assessed for pain and discomfort, medicated with norco 10 mg for left hip'leg pain with relief[ see mar ]Cont monitored.     Problem: Skin Integrity  Goal: Skin integrity is maintained or improved  Outcome: Progressing  Note: With bruise at the middle back . Pt uses TLSO when out of bed.   The patient is Stable - Low risk of patient condition declining or worsening    Shift Goals  Clinical Goals: Pain management, Safety  Patient Goals: Pain control    Progress made toward(s) clinical / shift goals:  Pt free from fall and injury. Pain under control     "

## 2022-03-30 NOTE — THERAPY
Occupational Therapy  Daily Treatment     Patient Name: Yamile Go  Age:  72 y.o., Sex:  female  Medical Record #: [id number]  Today's Date: 3/30/2022     Precautions  Precautions: Fall Risk,Spinal / Back Precautions ,TLSO (Thoracolumbosacral orthosis)  Comments: Ok to remove TLSO for showers    Safety   Bathroom Safety: (P) Modified Independent    Subjective    Pt seen from 8:30-9:30 and 10:30-11:00, see below for details.      Objective     03/30/22 0831   Safety    Bathroom Safety Modified Independent   Non Verbal Descriptors   Non Verbal Scale  Calm   Functional Level of Assist   Grooming Modified Independent;Standing   Grooming Description Standing at sink;Increased time   Bathing Supervision  (setup/distant supv)   Bathing Description Adaptive equipment;Hand held shower;Long handled bath tool;Tub bench   Upper Body Dressing Modified Independent   Upper Body Dressing Description Increased time   Lower Body Dressing Supervision  (stood with FWW to pull pants up, threads legs through in seated figure four)   Lower Body Dressing Description Assistive devices;Increased time;Initial preparation for task   Toileting Modified Independent  (FWW)   Toileting Description Adaptive equipment;Grab bar;Increased time   Bed, Chair, Wheelchair Transfer Modified Independent   Bed Chair Wheelchair Transfer Description Adaptive equipment  (FWW)   Toilet Transfers Modified Independent  (FWW)   Toilet Transfer Description Adaptive equipment;Grab bar;Increased time   Tub / Shower Transfers Modified Independent   Tub Shower Transfer Description Adaptive equipment;Grab bar;Shower bench   IADL Treatments   Home Management Laundry loading washer then later switching wet clothes to dryer with use of reacher, modified independent   Bed Mobility    Supine to Sit Modified Independent   Sit to Supine Modified Independent   Interdisciplinary Plan of Care Collaboration   IDT Collaboration with  Physical Therapist   Patient Position at  End of Therapy In Bed;Call Light within Reach;Phone within Reach;Tray Table within Reach   Collaboration Comments CLOF, readiness to be mod-I in room with FWW   OT Total Time Spent   OT Individual Total Time Spent (Mins) 90   OT Charge Group   OT Self Care / ADL 2   OT Neuromuscular Re-education / Balance 2   OT Therapy Activity 2     8:30-9:30: Pt completed above ADLs, loaded clothes into washing machine, and finished session with dynamic standing balance activities in // bars standing on airex foam mat - ladder ball and 3x10 alternating toe taps to cone. Functional mobility with FWW from room to laundry room then to back gym within session.     10:30-11:00: Assessed pt to be mod-I in room after discussing with PT. Pt is able to perform bed mob, functional mobility with FWW to bathroom, toilet transfer and manage clothing with mod-I and demo's good safety awareness. Instructed pt to wait for clearance from PT before officially being cleared and pt verbalized understanding. Pt ambulated from room to back gym then completed rebounder 4x25 with lightweight bouncy ball standing on airex mat for last 3 sets. Pt then ambulated to laundry room, transferred clothes to dryer with use of reacher and finished session with functional mobility from laundry room to lobby, 1 seated rest break then back to room and laid back down at end of session.     Assessment    Pt tolerated OT session well and continues to demo gains with ADLs, transfers, functional mobility and IADLs. Had no LOB during balance activities requiring CGA and one UE support on // bar during toe taps. Is also improving in endurance tolerating further distanced functional mobility within facility.     Strengths: Able to follow instructions,Alert and oriented,Good insight into deficits/needs,Independent prior level of function,Manages pain appropriately,Motivated for self care and independence,Pleasant and cooperative,Supportive family,Willingly participates in  therapeutic activities  Barriers: Decreased endurance,Fatigue,Generalized weakness,Impaired activity tolerance,Impaired balance,Limited mobility,Pain (lives alone)    Plan    ADLs with AE PRN, IADLs, functional mobility, transfers, balance/fall prevention, endurance, UE strengthening     Passport items to be completed:  Perform bathroom transfers, complete dressing, complete feeding, get ready for the day, prepare a simple meal, participate in household tasks, adapt home for safety needs, demonstrate home exercise program, complete caregiver training     Occupational Therapy Goals (Active)       Problem: Dressing       Dates: Start: 03/24/22         Goal: STG-Within one week, patient will dress LB with setup and supervision with use of AE as needed.       Dates: Start: 03/24/22         Goal Note filed on 03/29/22 1251 by Holli Mary OT       Variable between supervision to Leah                 Problem: OT Long Term Goals       Dates: Start: 03/24/22         Goal: LTG-By discharge, patient will complete basic self care tasks with modified independence and use of AE as needed.       Dates: Start: 03/24/22            Goal: LTG-By discharge, patient will perform bathroom transfers with modified independence and use of AE as needed.       Dates: Start: 03/24/22            Goal: LTG-By discharge, patient will complete basic home management with supervision to modified independence and use of AE as needed.        Dates: Start: 03/24/22

## 2022-03-30 NOTE — PROGRESS NOTES
"Rehab Progress Note     Encounter Date: 3/3/30/2022     CC: Monitoring blood sugars    Interval Events (Subjective)  Vital signs stable.  Medium BM 3/30  Voiding volitionally    Patient seen and examined in her room about to work with physical therapy.  She is getting her blood sugar checked and it was high.  She is aware of her reduction in platelets.  We will continue to monitor it.  She wishes to remain on her Elavil without change.  No other issues at this time which are acutely concerning to her.    14 point ROS reviewed and negative except as stated above.     Objective:  VITAL SIGNS: /82   Pulse 76   Temp 36.4 °C (97.6 °F) (Oral)   Resp 18   Ht 1.626 m (5' 4\")   Wt 81.6 kg (180 lb)   SpO2 95%   BMI 30.90 kg/m²     GEN: No apparent distress, laying in bed having blood sugar checked.  HEENT: Head normocephalic, atraumatic.  Sclera nonicteric bilaterally, no ocular discharge appreciated bilaterally.  CV: Extremities warm and well-perfused, no peripheral edema appreciated bilaterally.  PULMONARY: Breathing nonlabored on room air, no respiratory accessory muscle use.  Not requiring supplemental oxygen.  SKIN: No appreciable skin breakdown on exposed areas of skin.  PSYCH: Mood and affect within normal limits.  NEURO: Awake alert.  Conversational.  Logical thought content.  Answers questions appropriately.  Moving all extremities volitionally.        Recent Results (from the past 72 hour(s))   CBC WITH DIFFERENTIAL    Collection Time: 03/29/22  5:51 AM   Result Value Ref Range    WBC 10.7 4.8 - 10.8 K/uL    RBC 3.98 (L) 4.20 - 5.40 M/uL    Hemoglobin 11.4 (L) 12.0 - 16.0 g/dL    Hematocrit 35.5 (L) 37.0 - 47.0 %    MCV 89.2 81.4 - 97.8 fL    MCH 28.6 27.0 - 33.0 pg    MCHC 32.1 (L) 33.6 - 35.0 g/dL    RDW 48.8 35.9 - 50.0 fL    Platelet Count 120 (L) 164 - 446 K/uL    MPV 9.4 9.0 - 12.9 fL    Neutrophils-Polys 81.10 (H) 44.00 - 72.00 %    Lymphocytes 8.70 (L) 22.00 - 41.00 %    Monocytes 6.50 0.00 - " 13.40 %    Eosinophils 0.30 0.00 - 6.90 %    Basophils 0.20 0.00 - 1.80 %    Immature Granulocytes 3.20 (H) 0.00 - 0.90 %    Nucleated RBC 0.00 /100 WBC    Neutrophils (Absolute) 8.67 (H) 2.00 - 7.15 K/uL    Lymphs (Absolute) 0.93 (L) 1.00 - 4.80 K/uL    Monos (Absolute) 0.69 0.00 - 0.85 K/uL    Eos (Absolute) 0.03 0.00 - 0.51 K/uL    Baso (Absolute) 0.02 0.00 - 0.12 K/uL    Immature Granulocytes (abs) 0.34 (H) 0.00 - 0.11 K/uL    NRBC (Absolute) 0.00 K/uL   CBC WITHOUT DIFFERENTIAL    Collection Time: 03/30/22  6:15 AM   Result Value Ref Range    WBC 9.4 4.8 - 10.8 K/uL    RBC 3.89 (L) 4.20 - 5.40 M/uL    Hemoglobin 11.1 (L) 12.0 - 16.0 g/dL    Hematocrit 34.9 (L) 37.0 - 47.0 %    MCV 89.7 81.4 - 97.8 fL    MCH 28.5 27.0 - 33.0 pg    MCHC 31.8 (L) 33.6 - 35.0 g/dL    RDW 49.9 35.9 - 50.0 fL    Platelet Count 117 (L) 164 - 446 K/uL    MPV 9.6 9.0 - 12.9 fL   Basic Metabolic Panel    Collection Time: 03/30/22  6:15 AM   Result Value Ref Range    Sodium 136 135 - 145 mmol/L    Potassium 3.8 3.6 - 5.5 mmol/L    Chloride 104 96 - 112 mmol/L    Co2 24 20 - 33 mmol/L    Glucose 144 (H) 65 - 99 mg/dL    Bun 22 8 - 22 mg/dL    Creatinine 0.60 0.50 - 1.40 mg/dL    Calcium 8.5 8.5 - 10.5 mg/dL    Anion Gap 8.0 7.0 - 16.0   ESTIMATED GFR    Collection Time: 03/30/22  6:15 AM   Result Value Ref Range    GFR (CKD-EPI) 96 >60 mL/min/1.73 m 2       Current Facility-Administered Medications   Medication Frequency   • Respiratory Therapy Consult Continuous RT   • hydrALAZINE (APRESOLINE) tablet 25 mg Q8HRS PRN   • acetaminophen (Tylenol) tablet 650 mg Q4HRS PRN   • senna-docusate (PERICOLACE or SENOKOT S) 8.6-50 MG per tablet 2 Tablet BID    And   • polyethylene glycol/lytes (MIRALAX) PACKET 1 Packet QDAY PRN    And   • magnesium hydroxide (MILK OF MAGNESIA) suspension 30 mL QDAY PRN    And   • bisacodyl (DULCOLAX) suppository 10 mg QDAY PRN   • omeprazole (PRILOSEC) capsule 20 mg DAILY   • artificial tears ophthalmic solution 1  Drop PRN   • benzocaine-menthol (Cepacol) lozenge 1 Lozenge Q2HRS PRN   • mag hydrox-al hydrox-simeth (MAALOX PLUS ES or MYLANTA DS) suspension 20 mL Q2HRS PRN   • ondansetron (ZOFRAN ODT) dispertab 4 mg 4X/DAY PRN    Or   • ondansetron (ZOFRAN) syringe/vial injection 4 mg 4X/DAY PRN   • traZODone (DESYREL) tablet 50 mg QHS PRN   • sodium chloride (OCEAN) 0.65 % nasal spray 2 Spray PRN   • traMADol (ULTRAM) 50 MG tablet 50 mg Q4HRS PRN   • acetaminophen (Tylenol) tablet 650 mg Q6HRS PRN   • amitriptyline (ELAVIL) tablet 75 mg QHS   • escitalopram (Lexapro) tablet 20 mg DAILY   • HYDROcodone-acetaminophen (NORCO) 5-325 MG per tablet 1 Tablet Q6HRS PRN   • HYDROcodone/acetaminophen (NORCO)  MG per tablet 1 Tablet Q4HRS PRN   • levothyroxine (SYNTHROID) tablet 88 mcg QAM AC   • metoprolol tartrate (LOPRESSOR) tablet 12.5 mg BID   • montelukast (SINGULAIR) tablet 10 mg QHS   • rosuvastatin (CRESTOR) tablet 20 mg Q EVENING   • sucralfate (CARAFATE) tablet 1 g 4X/DAY ACHS   • vitamin D3 (cholecalciferol) tablet 2,000 Units DAILY   • albuterol inhaler 2 Puff Q4H PRN (RT)       Orders Placed This Encounter   Procedures   • Diet Order Diet: Cardiac     Standing Status:   Standing     Number of Occurrences:   1     Order Specific Question:   Diet:     Answer:   Cardiac [6]       Assessment:  Active Hospital Problems    Diagnosis    • *Closed fracture of lumbar vertebra, unspecified fracture morphology, initial encounter ([facility])    • Chronic pain syndrome on chronic opioids    • Hypothyroidism    • Class 1 obesity due to excess calories with serious comorbidity and body mass index (BMI) of 32.0 to 32.9 in adult    • HTN (hypertension)    • CAD (coronary artery disease)    • Dyslipidemia        Imaging:  Right x-ray foot 3/20/2022  Right total ankle replacement. No evidence of periprosthetic fracture or loosening  There is normal bony mineralization.  There is no evidence of fracture, dislocation, or osseous lesion.  There  is no evidence of soft tissue injury.    Medical Decision Making and Plan: Adapted from Dr. Lux note dated 3/25  L2 TP fracture - Patient with fall with significant back pain found to have L2 fracture. Was managed conservatively with TLSO.  -PT and OT for mobility and ADLs  -Continue TLSO. Follow-up [facility] [place]     HTN - Patient on Metoprolol 12.5 mg BID     HLD - Patient on Rosuvastatin 20 mg QHS     Asthma - Patient on Singulair QHS     Anemia - Check AM CBC - 11.0, stable.   CBC 3/29 H/H improved     Leukocytosis  - Check AM CBC - 12.0, will check UA - neg 3/25  CBC 3/29 WBC 10.7, improved from 12     Thrombocytopenia - Check AM CBC - 145, downtrending will monitor  CBC 3/29 120, down from 145, downtrending  Hospitalist consult ? Elavil  3/30: 117. Continue to monitor.  Patient does not wish to trial lower dose of Elavil or switch antidepressants to see if this is the cause of her thrombocytopenia.    Prediabetes - Into 150s. Check A1c - 7.0  Hospitalist following     Hypothyroidism - Patient on Levothyroxine 88 mcg daily     Azotemia - Check AM CMP 23, improved from 29, encourage fluids     Vitamin D deficiency - 22 at [facility]. Will start on 1000 U      Obesity due to excess calories - BMI of 33.8 on admission, meets medical criteria. Dietitian to consult      Depression/Pain - Patient on Elavil 75 mg QHS and Lexapro 20 mg daily. Patient on Norco     DVT ppx - Patient on Lovenox on transfer. Increase Ambulation, will discontinue    Foot bruising, right: Reportedly does not interfere with her ambulation.  No x-ray since most recent fall.  3/28: Right foot x-ray to ensure no fracture, though unlikely as patient ambulating without issue.  3/29: Right foot x-ray negative.     Discussed case with hospitalist    Total time: 25 minutes.  I spent greater than 50% of the time for patient care and coordination on this date, including unit/floor time, and face-to-face time with the patient as per assessment and plan  above.    Amada Pan D.O.    MAGUE: 4/2/22 d/c with HH

## 2022-03-31 LAB — GLUCOSE BLD STRIP.AUTO-MCNC: 116 MG/DL (ref 65–99)

## 2022-03-31 PROCEDURE — 99232 SBSQ HOSP IP/OBS MODERATE 35: CPT | Performed by: HOSPITALIST

## 2022-03-31 PROCEDURE — 97116 GAIT TRAINING THERAPY: CPT

## 2022-03-31 PROCEDURE — 97110 THERAPEUTIC EXERCISES: CPT

## 2022-03-31 PROCEDURE — A9270 NON-COVERED ITEM OR SERVICE: HCPCS | Performed by: PHYSICAL MEDICINE & REHABILITATION

## 2022-03-31 PROCEDURE — 97530 THERAPEUTIC ACTIVITIES: CPT

## 2022-03-31 PROCEDURE — RXMED WILLOW AMBULATORY MEDICATION CHARGE: Performed by: PHYSICAL MEDICINE & REHABILITATION

## 2022-03-31 PROCEDURE — 97112 NEUROMUSCULAR REEDUCATION: CPT

## 2022-03-31 PROCEDURE — 82962 GLUCOSE BLOOD TEST: CPT

## 2022-03-31 PROCEDURE — 99231 SBSQ HOSP IP/OBS SF/LOW 25: CPT | Performed by: PHYSICAL MEDICINE & REHABILITATION

## 2022-03-31 PROCEDURE — 770010 HCHG ROOM/CARE - REHAB SEMI PRIVAT*

## 2022-03-31 PROCEDURE — 700102 HCHG RX REV CODE 250 W/ 637 OVERRIDE(OP): Performed by: PHYSICAL MEDICINE & REHABILITATION

## 2022-03-31 RX ORDER — ROSUVASTATIN CALCIUM 20 MG/1
20 TABLET, COATED ORAL EVERY EVENING
Qty: 30 TABLET | Refills: 2 | Status: SHIPPED | OUTPATIENT
Start: 2022-03-31

## 2022-03-31 RX ORDER — ESCITALOPRAM OXALATE 20 MG/1
20 TABLET ORAL DAILY
Qty: 30 TABLET | Refills: 2 | Status: SHIPPED | OUTPATIENT
Start: 2022-03-31

## 2022-03-31 RX ORDER — SUCRALFATE 1 G/1
1 TABLET ORAL
Qty: 120 TABLET | Refills: 2 | Status: SHIPPED | OUTPATIENT
Start: 2022-03-31 | End: 2023-12-18

## 2022-03-31 RX ORDER — OMEPRAZOLE 20 MG/1
20 CAPSULE, DELAYED RELEASE ORAL DAILY
Qty: 30 CAPSULE | Refills: 2 | Status: SHIPPED | OUTPATIENT
Start: 2022-04-01

## 2022-03-31 RX ORDER — MONTELUKAST SODIUM 10 MG/1
10 TABLET ORAL DAILY
Qty: 30 TABLET | Refills: 2 | Status: SHIPPED | OUTPATIENT
Start: 2022-03-31 | End: 2022-12-20

## 2022-03-31 RX ORDER — AMOXICILLIN 250 MG
2 CAPSULE ORAL 2 TIMES DAILY
Qty: 120 TABLET | Refills: 2 | Status: SHIPPED | OUTPATIENT
Start: 2022-03-31 | End: 2022-12-20

## 2022-03-31 RX ORDER — LEVOTHYROXINE SODIUM 88 MCG
88 TABLET ORAL DAILY
Qty: 30 TABLET | Refills: 2 | Status: SHIPPED | OUTPATIENT
Start: 2022-03-31

## 2022-03-31 RX ORDER — HYDROCODONE BITARTRATE AND ACETAMINOPHEN 10; 325 MG/1; MG/1
1 TABLET ORAL
Qty: 35 TABLET | Refills: 0 | Status: SHIPPED | OUTPATIENT
Start: 2022-03-31 | End: 2022-04-08

## 2022-03-31 RX ORDER — AMITRIPTYLINE HYDROCHLORIDE 75 MG/1
75 TABLET ORAL NIGHTLY
Qty: 30 TABLET | Refills: 2 | Status: SHIPPED | OUTPATIENT
Start: 2022-03-31 | End: 2022-12-20

## 2022-03-31 RX ADMIN — HYDROCODONE BITARTRATE AND ACETAMINOPHEN 1 TABLET: 10; 325 TABLET ORAL at 01:31

## 2022-03-31 RX ADMIN — OMEPRAZOLE 20 MG: 20 CAPSULE, DELAYED RELEASE ORAL at 07:56

## 2022-03-31 RX ADMIN — SUCRALFATE 1 G: 1 TABLET ORAL at 07:56

## 2022-03-31 RX ADMIN — METOPROLOL TARTRATE 12.5 MG: 25 TABLET, FILM COATED ORAL at 21:44

## 2022-03-31 RX ADMIN — SUCRALFATE 1 G: 1 TABLET ORAL at 21:44

## 2022-03-31 RX ADMIN — AMITRIPTYLINE HYDROCHLORIDE 75 MG: 25 TABLET, FILM COATED ORAL at 21:43

## 2022-03-31 RX ADMIN — MONTELUKAST SODIUM 10 MG: 10 TABLET, FILM COATED ORAL at 21:45

## 2022-03-31 RX ADMIN — HYDROCODONE BITARTRATE AND ACETAMINOPHEN 1 TABLET: 10; 325 TABLET ORAL at 21:49

## 2022-03-31 RX ADMIN — HYDROCODONE BITARTRATE AND ACETAMINOPHEN 1 TABLET: 10; 325 TABLET ORAL at 07:59

## 2022-03-31 RX ADMIN — LEVOTHYROXINE SODIUM 88 MCG: 0.09 TABLET ORAL at 05:51

## 2022-03-31 RX ADMIN — ESCITALOPRAM OXALATE 20 MG: 10 TABLET, FILM COATED ORAL at 07:56

## 2022-03-31 RX ADMIN — HYDROCODONE BITARTRATE AND ACETAMINOPHEN 1 TABLET: 10; 325 TABLET ORAL at 13:18

## 2022-03-31 RX ADMIN — ROSUVASTATIN CALCIUM 20 MG: 10 TABLET, FILM COATED ORAL at 21:49

## 2022-03-31 RX ADMIN — SUCRALFATE 1 G: 1 TABLET ORAL at 16:52

## 2022-03-31 RX ADMIN — SUCRALFATE 1 G: 1 TABLET ORAL at 11:36

## 2022-03-31 RX ADMIN — METOPROLOL TARTRATE 12.5 MG: 25 TABLET, FILM COATED ORAL at 07:56

## 2022-03-31 RX ADMIN — HYDROCODONE BITARTRATE AND ACETAMINOPHEN 1 TABLET: 10; 325 TABLET ORAL at 17:42

## 2022-03-31 RX ADMIN — Medication 2000 UNITS: at 07:56

## 2022-03-31 ASSESSMENT — ENCOUNTER SYMPTOMS
FEVER: 0
VOMITING: 0
BRUISES/BLEEDS EASILY: 0
SHORTNESS OF BREATH: 0
ABDOMINAL PAIN: 0
CHILLS: 0
COUGH: 0
PALPITATIONS: 0
EYES NEGATIVE: 1
POLYDIPSIA: 0
BACK PAIN: 1
NAUSEA: 0

## 2022-03-31 ASSESSMENT — ACTIVITIES OF DAILY LIVING (ADL)
BED_CHAIR_WHEELCHAIR_TRANSFER_DESCRIPTION: ADAPTIVE EQUIPMENT
TOILET_TRANSFER_DESCRIPTION: ADAPTIVE EQUIPMENT;GRAB BAR

## 2022-03-31 ASSESSMENT — GAIT ASSESSMENTS
GAIT LEVEL OF ASSIST: SUPERVISED
DEVIATION: BRADYKINETIC
DISTANCE (FEET): 250
ASSISTIVE DEVICE: FRONT WHEEL WALKER

## 2022-03-31 ASSESSMENT — PAIN SCALES - PAIN ASSESSMENT IN ADVANCED DEMENTIA (PAINAD): BODYLANGUAGE: RELAXED

## 2022-03-31 ASSESSMENT — PAIN DESCRIPTION - PAIN TYPE
TYPE: ACUTE PAIN

## 2022-03-31 NOTE — THERAPY
"Recreational Therapy  Daily Treatment     Patient Name: Yamile Go  AGE:  72 y.o., SEX:  female  Medical Record #: [id number]  Today's Date: 3/31/2022       Subjective    \"I want to work on my fine motor.\"     Objective       03/31/22 0931   Functional Ability Status - Cognitive   Attention Span Remains on Task   Comprehension Follows Three Step Commands   Judgment Able to Make Independent Decisions   Functional Ability Status - Emotional    Affect Appropriate;Bright   Mood Appropriate   Behavior Appropriate;Cooperative   Skilled Intervention    Skilled Intervention Fine Motor Leisure   Skilled Intervention Comments Zeina   Interdisciplinary Plan of Care Collaboration   IDT Collaboration with  Physical Therapist   Patient Position at End of Therapy Seated;Other (Comments)  (Mod I in room)   Collaboration Comments Transition of care from PT   Strengths & Barriers   Strengths Able to follow instructions;Alert and oriented;Effective communication skills;Making steady progress towards goals;Motivated for self care and independence;Pleasant and cooperative;Willingly participates in therapeutic activities   Barriers Decreased endurance;Impaired activity tolerance   Treatment Time   Total Time Spent (mins) 30   Procedural Tracking   Procedural Tracking Community Re-Integration;Community Skills Development;Leisure Skills Awareness;Leisure Skills Development;Gross Motor Functional Leisure Skills   Stood for 5 minutes x1 CGA no LOB    Assessment    Pt stood while playing a game of Un[employer]xd. Pt sharing that her goal for the session was to improve her FM. Pt able to manipulate each piece without dropping them without issue.     Strengths: (P) Able to follow instructions,Alert and oriented,Effective communication skills,Making steady progress towards goals,Motivated for self care and independence,Pleasant and cooperative,Willingly participates in therapeutic activities  Barriers: (P) Decreased endurance,Impaired activity " tolerance    Plan    Standing balance and endurance during a dual task activity    Passport items to be completed:  Verbalize two positive leisure activities, discuss returning to work, hobbies, community groups or volunteer activities, explore community resources

## 2022-03-31 NOTE — CARE PLAN
"  Problem: Fall Risk - Rehab  Goal: Patient will remain free from falls  Outcome: Progressing  Note: May Beard Fall risk Assessment Score: 12    Moderate fall risk Interventions  - Bed and strip alarm   - Yellow sign by the door   - Yellow wrist band \"Fall risk\"  - Room near to the nurse station  - Do not leave patient unattended in the bathroom  - Fall risk education provided    Problem: Pain - Standard  Goal: Alleviation of pain or a reduction in pain to the patient’s comfort goal  Outcome: Progressing  Note: Assessed for pain and discomfort, c/o of pain of the left leg thigh and hips, medicated with norco 10 mg with relief[ see mar] . Cont monitored.     Problem: Skin Integrity  Goal: Skin integrity is maintained or improved  Outcome: Progressing   The patient is Stable - Low risk of patient condition declining or worsening    Shift Goals  Clinical Goals: Pain management, Safety  Patient Goals: Pain control    Progress made toward(s) clinical / shift goals:  Pt free from fall and injury.  "

## 2022-03-31 NOTE — PROGRESS NOTES
"Rehab Progress Note     Encounter Date: 3/31/2022     CC: Feeling well today    Interval Events (Subjective)  Vital signs stable.  Large bowel movement 3/31  Voiding volitionally    Patient seen and examined in the gym while working with physical therapy.  She has been cleared for mod I with in her room.  She will make a pain management appointment with [facility] [place].  Usually takes hydrocodone  up to 4 5 times per day.  Confirmed with Ortho TLSO is for comfort only.  Labs scheduled for tomorrow.    14 point ROS reviewed and negative except as stated above.     Objective:  VITAL SIGNS: /64   Pulse 72   Temp 36.3 °C (97.4 °F) (Temporal)   Resp 18   Ht 1.626 m (5' 4\")   Wt 81.6 kg (180 lb)   SpO2 95%   BMI 30.90 kg/m²     GEN: No apparent distress  HEENT: Head normocephalic, atraumatic.  Sclera nonicteric bilaterally, no ocular discharge appreciated bilaterally.  CV: Extremities warm and well-perfused, no peripheral edema appreciated bilaterally.  PULMONARY: Breathing nonlabored on room air, no respiratory accessory muscle use.  Not requiring supplemental oxygen.  ABD: TLSO donned  SKIN: No appreciable skin breakdown on exposed areas of skin.  PSYCH: Mood and affect within normal limits.  NEURO: Awake alert.  Conversational.  Logical thought content.  Ambulatory with walker.    Recent Results (from the past 72 hour(s))   CBC WITH DIFFERENTIAL    Collection Time: 03/29/22  5:51 AM   Result Value Ref Range    WBC 10.7 4.8 - 10.8 K/uL    RBC 3.98 (L) 4.20 - 5.40 M/uL    Hemoglobin 11.4 (L) 12.0 - 16.0 g/dL    Hematocrit 35.5 (L) 37.0 - 47.0 %    MCV 89.2 81.4 - 97.8 fL    MCH 28.6 27.0 - 33.0 pg    MCHC 32.1 (L) 33.6 - 35.0 g/dL    RDW 48.8 35.9 - 50.0 fL    Platelet Count 120 (L) 164 - 446 K/uL    MPV 9.4 9.0 - 12.9 fL    Neutrophils-Polys 81.10 (H) 44.00 - 72.00 %    Lymphocytes 8.70 (L) 22.00 - 41.00 %    Monocytes 6.50 0.00 - 13.40 %    Eosinophils 0.30 0.00 - 6.90 %    Basophils 0.20 0.00 - 1.80 % "    Immature Granulocytes 3.20 (H) 0.00 - 0.90 %    Nucleated RBC 0.00 /100 WBC    Neutrophils (Absolute) 8.67 (H) 2.00 - 7.15 K/uL    Lymphs (Absolute) 0.93 (L) 1.00 - 4.80 K/uL    Monos (Absolute) 0.69 0.00 - 0.85 K/uL    Eos (Absolute) 0.03 0.00 - 0.51 K/uL    Baso (Absolute) 0.02 0.00 - 0.12 K/uL    Immature Granulocytes (abs) 0.34 (H) 0.00 - 0.11 K/uL    NRBC (Absolute) 0.00 K/uL   CBC WITHOUT DIFFERENTIAL    Collection Time: 03/30/22  6:15 AM   Result Value Ref Range    WBC 9.4 4.8 - 10.8 K/uL    RBC 3.89 (L) 4.20 - 5.40 M/uL    Hemoglobin 11.1 (L) 12.0 - 16.0 g/dL    Hematocrit 34.9 (L) 37.0 - 47.0 %    MCV 89.7 81.4 - 97.8 fL    MCH 28.5 27.0 - 33.0 pg    MCHC 31.8 (L) 33.6 - 35.0 g/dL    RDW 49.9 35.9 - 50.0 fL    Platelet Count 117 (L) 164 - 446 K/uL    MPV 9.6 9.0 - 12.9 fL   Basic Metabolic Panel    Collection Time: 03/30/22  6:15 AM   Result Value Ref Range    Sodium 136 135 - 145 mmol/L    Potassium 3.8 3.6 - 5.5 mmol/L    Chloride 104 96 - 112 mmol/L    Co2 24 20 - 33 mmol/L    Glucose 144 (H) 65 - 99 mg/dL    Bun 22 8 - 22 mg/dL    Creatinine 0.60 0.50 - 1.40 mg/dL    Calcium 8.5 8.5 - 10.5 mg/dL    Anion Gap 8.0 7.0 - 16.0   ESTIMATED GFR    Collection Time: 03/30/22  6:15 AM   Result Value Ref Range    GFR (CKD-EPI) 96 >60 mL/min/1.73 m 2   POCT glucose device results    Collection Time: 03/30/22 11:11 AM   Result Value Ref Range    POC Glucose, Blood 198 (H) 65 - 99 mg/dL   POCT glucose device results    Collection Time: 03/30/22  5:25 PM   Result Value Ref Range    POC Glucose, Blood 168 (H) 65 - 99 mg/dL   POCT glucose device results    Collection Time: 03/30/22  9:23 PM   Result Value Ref Range    POC Glucose, Blood 171 (H) 65 - 99 mg/dL   POCT glucose device results    Collection Time: 03/31/22  7:55 AM   Result Value Ref Range    POC Glucose, Blood 116 (H) 65 - 99 mg/dL       Current Facility-Administered Medications   Medication Frequency   • Respiratory Therapy Consult Continuous RT   •  hydrALAZINE (APRESOLINE) tablet 25 mg Q8HRS PRN   • acetaminophen (Tylenol) tablet 650 mg Q4HRS PRN   • senna-docusate (PERICOLACE or SENOKOT S) 8.6-50 MG per tablet 2 Tablet BID    And   • polyethylene glycol/lytes (MIRALAX) PACKET 1 Packet QDAY PRN    And   • magnesium hydroxide (MILK OF MAGNESIA) suspension 30 mL QDAY PRN    And   • bisacodyl (DULCOLAX) suppository 10 mg QDAY PRN   • omeprazole (PRILOSEC) capsule 20 mg DAILY   • artificial tears ophthalmic solution 1 Drop PRN   • benzocaine-menthol (Cepacol) lozenge 1 Lozenge Q2HRS PRN   • mag hydrox-al hydrox-simeth (MAALOX PLUS ES or MYLANTA DS) suspension 20 mL Q2HRS PRN   • ondansetron (ZOFRAN ODT) dispertab 4 mg 4X/DAY PRN    Or   • ondansetron (ZOFRAN) syringe/vial injection 4 mg 4X/DAY PRN   • traZODone (DESYREL) tablet 50 mg QHS PRN   • sodium chloride (OCEAN) 0.65 % nasal spray 2 Spray PRN   • traMADol (ULTRAM) 50 MG tablet 50 mg Q4HRS PRN   • acetaminophen (Tylenol) tablet 650 mg Q6HRS PRN   • amitriptyline (ELAVIL) tablet 75 mg QHS   • escitalopram (Lexapro) tablet 20 mg DAILY   • HYDROcodone-acetaminophen (NORCO) 5-325 MG per tablet 1 Tablet Q6HRS PRN   • HYDROcodone/acetaminophen (NORCO)  MG per tablet 1 Tablet Q4HRS PRN   • levothyroxine (SYNTHROID) tablet 88 mcg QAM AC   • metoprolol tartrate (LOPRESSOR) tablet 12.5 mg BID   • montelukast (SINGULAIR) tablet 10 mg QHS   • rosuvastatin (CRESTOR) tablet 20 mg Q EVENING   • sucralfate (CARAFATE) tablet 1 g 4X/DAY ACHS   • vitamin D3 (cholecalciferol) tablet 2,000 Units DAILY   • albuterol inhaler 2 Puff Q4H PRN (RT)       Orders Placed This Encounter   Procedures   • Diet Order Diet: Consistent CHO (Diabetic); Second Modifier: (optional): Cardiac     Standing Status:   Standing     Number of Occurrences:   1     Order Specific Question:   Diet:     Answer:   Consistent CHO (Diabetic) [4]     Order Specific Question:   Second Modifier: (optional)     Answer:   Cardiac [6]       Assessment:  Active  Hospital Problems    Diagnosis    • *Closed fracture of lumbar vertebra, unspecified fracture morphology, initial encounter ([facility])    • Chronic pain syndrome on chronic opioids    • Hypothyroidism    • Class 1 obesity due to excess calories with serious comorbidity and body mass index (BMI) of 32.0 to 32.9 in adult    • HTN (hypertension)    • CAD (coronary artery disease)    • Dyslipidemia        Imaging:  Right x-ray foot 3/20/2022  Right total ankle replacement. No evidence of periprosthetic fracture or loosening  There is normal bony mineralization.  There is no evidence of fracture, dislocation, or osseous lesion.  There is no evidence of soft tissue injury.    Medical Decision Making and Plan: Adapted from Dr. Lux note dated 3/25  L2 TP fracture - Patient with fall with significant back pain found to have L2 fracture. Was managed conservatively with TLSO.  -PT and OT for mobility and ADLs  -Continue TLSO. Follow-up [facility] [place]  3/31: Confirmed with Ortho TLSO for comfort only.     HTN - Patient on Metoprolol 12.5 mg BID     HLD - Patient on Rosuvastatin 20 mg QHS     Asthma - Patient on Singulair QHS     Anemia - Check AM CBC - 11.0, stable.   CBC 3/29 H/H improved     Leukocytosis  - Check AM CBC - 12.0, will check UA - neg 3/25  CBC 3/29 WBC 10.7, improved from 12     Thrombocytopenia - Check AM CBC - 145, downtrending will monitor  CBC 3/29 120, down from 145, downtrending  Hospitalist consult ? Elavil  3/30: 117. Continue to monitor.  Patient does not wish to trial lower dose of Elavil or switch antidepressants to see if this is the cause of her thrombocytopenia.    Prediabetes - Into 150s. Check A1c - 7.0  Hospitalist following     Hypothyroidism - Patient on Levothyroxine 88 mcg daily     Azotemia - Check AM CMP 23, improved from 29, encourage fluids     Vitamin D deficiency - 22 at [facility]. Will start on 1000 U      Obesity due to excess calories - BMI of 33.8 on admission, meets medical criteria.  Dietitian to consult      Depression/Pain - Patient on Elavil 75 mg QHS and Lexapro 20 mg daily. Patient on Norco     DVT ppx - Patient on Lovenox on transfer. Increase Ambulation, will discontinue    Foot bruising, right: Reportedly does not interfere with her ambulation.  No x-ray since most recent fall.  3/28: Right foot x-ray to ensure no fracture, though unlikely as patient ambulating without issue.  3/29: Right foot x-ray negative.       Total time: 16 minutes.  I spent greater than 50% of the time for patient care and coordination on this date, including unit/floor time, and face-to-face time with the patient as per assessment and plan above.    Amada Pan D.O.    MAGUE: 4/2/22 d/c with [facility]

## 2022-03-31 NOTE — THERAPY
Physical Therapy   Daily Treatment     Patient Name: Yamile Go  Age:  72 y.o., Sex:  female  Medical Record #: [id number]  Today's Date: 3/31/2022     Precautions  Precautions: Fall Risk,Spinal / Back Precautions ,TLSO (Thoracolumbosacral orthosis)  Comments: Ok to remove TLSO for showers    Subjective    Pt up in wc. Willing to participate     Objective       03/31/22 1101   Sitting Lower Body Exercises   Ankle Pumps 2 sets of 15   Hip Flexion 2 sets of 15   Hip Abduction 2 sets of 15   Hip Adduction 2 sets of 15   Long Arc Quad 2 sets of 15   Hamstring Curl 2 sets of 15   Comments 1.5# wts/ pink TB   PT Total Time Spent   PT Individual Total Time Spent (Mins) 30   PT Charge Group   PT Therapeutic Exercise 2     Pt completed 6 short rise steps with SBA/ CGA for reciprocal pattern ascending, step to descending with RLE lead due to ankle replacement and limited flexibility to lower body weight safely.    Assessment    Continues to improve overall strength, modified independent with FWW in room  Strengths: Able to follow instructions,Effective communication skills,Alert and oriented,Independent prior level of function,Manages pain appropriately,Motivated for self care and independence,Pleasant and cooperative,Willingly participates in therapeutic activities  Barriers: Limited mobility,Decreased endurance,Generalized weakness    Plan    Gait endurance with FWW, gait without device as able, stairs, general strength training, standing balance, TENS/ modalities for pain control. D/c IRF-JUDY's for d/c Saturday 4/2. Family training with daughter if available. Review HEP     Passport items to be completed:  Get in/out of bed safely, in/out of a vehicle, safely use mobility device, walk or wheel around home/community, navigate up and down stairs, show how to get up/down from the ground, ensure home is accessible, demonstrate HEP, complete caregiver training       Physical Therapy Problems (Active)       Problem:  Mobility       Dates: Start: 03/24/22         Goal: STG-Within one week, patient will ascend and descend four to six stairs with B hand rails and CGA standard rise steps       Dates: Start: 03/24/22               Problem: Mobility Transfers       Dates: Start: 03/24/22         Goal: STG-Within one week, patient will perform bed mobility SPV with bed rail as needed       Dates: Start: 03/24/22

## 2022-03-31 NOTE — PROGRESS NOTES
Hospital Medicine Daily Progress Note      Chief Complaint  Thrombocytopenia  Hyperglycemia    Interval Problem Update  Pt w/o new complaints.  Last 24 hours FSBS have been less than 200.    Review of Systems  Review of Systems   Constitutional: Negative for chills and fever.   HENT: Negative.    Eyes: Negative.    Respiratory: Negative for cough and shortness of breath.    Cardiovascular: Negative for chest pain and palpitations.   Gastrointestinal: Negative for abdominal pain, nausea and vomiting.   Musculoskeletal: Positive for back pain.   Skin: Negative for itching and rash.   Endo/Heme/Allergies: Negative for polydipsia. Does not bruise/bleed easily.        Physical Exam  Temp:  [36.3 °C (97.4 °F)-36.8 °C (98.2 °F)] 36.3 °C (97.4 °F)  Pulse:  [72-82] 72  Resp:  [18] 18  BP: (134-149)/(64-89) 134/64  SpO2:  [95 %-96 %] 95 %    Physical Exam  Vitals reviewed.   Constitutional:       General: She is not in acute distress.     Appearance: Normal appearance. She is not ill-appearing.   HENT:      Head: Normocephalic and atraumatic.      Right Ear: External ear normal.      Left Ear: External ear normal.      Nose: Nose normal.      Mouth/Throat:      Pharynx: Oropharynx is clear.   Eyes:      General:         Right eye: No discharge.         Left eye: No discharge.      Extraocular Movements: Extraocular movements intact.      Conjunctiva/sclera: Conjunctivae normal.   Cardiovascular:      Rate and Rhythm: Normal rate and regular rhythm.   Pulmonary:      Effort: Pulmonary effort is normal. No respiratory distress.      Breath sounds: Normal breath sounds. No wheezing.   Abdominal:      General: Bowel sounds are normal. There is no distension.      Palpations: Abdomen is soft.      Tenderness: There is no abdominal tenderness.   Musculoskeletal:      Cervical back: Normal range of motion and neck supple.      Right lower leg: No edema.      Left lower leg: No edema.      Comments: Back +large ecchymosis   Skin:      General: Skin is warm and dry.   Neurological:      Mental Status: She is alert and oriented to person, place, and time.         Fluids    Intake/Output Summary (Last 24 hours) at 3/31/2022 1110  Last data filed at 3/31/2022 0830  Gross per 24 hour   Intake 480 ml   Output --   Net 480 ml       Laboratory  Recent Labs     03/29/22  0551 03/30/22  0615   WBC 10.7 9.4   RBC 3.98* 3.89*   HEMOGLOBIN 11.4* 11.1*   HEMATOCRIT 35.5* 34.9*   MCV 89.2 89.7   MCH 28.6 28.5   MCHC 32.1* 31.8*   RDW 48.8 49.9   PLATELETCT 120* 117*   MPV 9.4 9.6     Recent Labs     03/30/22  0615   SODIUM 136   POTASSIUM 3.8   CHLORIDE 104   CO2 24   GLUCOSE 144*   BUN 22   CREATININE 0.60   CALCIUM 8.5                 Assessment/Plan  Hyperglycemia  Assessment & Plan  HbA1c 7.0  FSBS range 116-198  Will discontinue SSI  Recommend diet control for now  PCP F/U    Asthma  Assessment & Plan  On Singulair  RT protocol    Vitamin D deficiency  Assessment & Plan  Vit D level 22  On supplementation    Azotemia  Assessment & Plan  Encourage PO fluids  Follow renal function  Check F/U labs in AM     Thrombocytopenia (HCC)  Assessment & Plan  May be 2/2 Elavil  Follow Platelet counts  Check F/U labs in AM     Closed compression fracture of L2 lumbar vertebra, initial encounter (HCC)- (present on admission)  Assessment & Plan  2/2 GLF onto back  Non-surgical management  TLSO  Pain control per Physiatry    Hypothyroidism- (present on admission)  Assessment & Plan  On Synthroid  Outpt F/U    Depression- (present on admission)  Assessment & Plan  On Elavil and Lexapro    HTN (hypertension)- (present on admission)  Assessment & Plan  Observe blood pressure trends on Metoprolol    Dyslipidemia- (present on admission)  Assessment & Plan  On Crestor     Full Code    Discussed w/ pt

## 2022-03-31 NOTE — CARE PLAN
Problem: Pain - Standard  Goal: Alleviation of pain or a reduction in pain to the patient’s comfort goal  Outcome: Progressing  Note: Patient able to verbalize pain level and verbalize an acceptable level of pain.    The patient is Stable - Low risk of patient condition declining or worsening    Shift Goals  Clinical Goals: Pain management, Safety  Patient Goals: Pain control    Progress made toward(s) clinical / shift goals:  patient pain controlled well with Norco    Patient is not progressing towards the following goals:

## 2022-03-31 NOTE — CARE PLAN
The patient is Stable - Low risk of patient condition declining or worsening    Shift Goals  Clinical Goals: Pain management, Safety  Patient Goals: Pain control    Problem: Pain - Standard  Goal: Alleviation of pain or a reduction in pain to the patient’s comfort goal  Outcome: Progressing: pt administered norco 5-325mg twice today for pain level 6/10 to right thigh w/ pt report of adequate decrease in pain upon reassessment. Participated in therapies.     Problem: Knowledge Deficit - Standard  Goal: Patient and family/care givers will demonstrate understanding of plan of care, disease process/condition, diagnostic tests and medications  Outcome: Progressing: pt verbalizes accurate understanding of POC, meds, rehab process.

## 2022-03-31 NOTE — THERAPY
Physical Therapy   Daily Treatment     Patient Name: Yamile Go  Age:  72 y.o., Sex:  female  Medical Record #: [id number]  Today's Date: 3/31/2022     Precautions  Precautions: Fall Risk,Spinal / Back Precautions ,TLSO (Thoracolumbosacral orthosis)  Comments: Ok to remove TLSO for showers    Subjective    Pt sitting edge of bed, ready for PT     Objective       03/31/22 0831   Gait Functional Level of Assist    Gait Level Of Assist Supervised   Assistive Device Front Wheel Walker   Distance (Feet) 250  (in addition to 125 ft x 2)   # of Times Distance was Traveled 1   Deviation Bradykinetic   Stairs Functional Level of Assist   Level of Assist with Stairs Contact Guard Assist   # of Stairs Climbed 4  (standard rise steps)   Stairs Description Extra time;Hand rails;Limited by fatigue   Transfer Functional Level of Assist   Bed, Chair, Wheelchair Transfer Modified Independent   Bed Chair Wheelchair Transfer Description Adaptive equipment   Toilet Transfers Modified Independent   Toilet Transfer Description Adaptive equipment;Grab bar   Sitting Lower Body Exercises   Nustep Resistance Level 1  (15 minutes for general ROM/ flexibility and cardio-endurance training)   Bed Mobility    Supine to Sit Modified Independent   Sit to Supine Modified Independent   Sit to Stand Modified Independent   Scooting Modified Independent   Rolling Modified Independent   Neuro-Muscular Treatments   Neuro-Muscular Treatments Anterior weight shift;Postural Facilitation;Postural Changes;Sequencing;Tactile Cuing;Verbal Cuing;Weight Shift Right;Weight Shift Left   Comments dynamic standing balance on Airex foam pad x 5 minutes without UE support/ SBA for safety but no loss of balance throughout activity. Gait training without UE support (within // bars for safety) 100 ft x 3, sidestepping 50 ft x 3 B with UE support at // bars.   PT Total Time Spent   PT Individual Total Time Spent (Mins) 60   PT Charge Group   PT Gait Training 2   PT  Therapeutic Exercise 1   PT Neuromuscular Re-Education / Balance 1       Assessment    Pt continues to improve overall strength and functional mobility, continue to recommend FWW for modified independence and safety/ fall prevention.    Strengths: Able to follow instructions,Effective communication skills,Alert and oriented,Independent prior level of function,Manages pain appropriately,Motivated for self care and independence,Pleasant and cooperative,Willingly participates in therapeutic activities  Barriers: Limited mobility,Decreased endurance,Generalized weakness    Plan    Gait endurance with FWW, gait without device as able, stairs, general strength training, standing balance, TENS/ modalities for pain control. D/c [facility] for d/c Saturday 4/2. Family training with daughter if available. Review HEP     Passport items to be completed:  Get in/out of bed safely, in/out of a vehicle, safely use mobility device, walk or wheel around home/community, navigate up and down stairs, show how to get up/down from the ground, ensure home is accessible, demonstrate HEP, complete caregiver training      Physical Therapy Problems (Active)       Problem: Mobility       Dates: Start: 03/24/22         Goal: STG-Within one week, patient will ascend and descend four to six stairs with B hand rails and CGA standard rise steps       Dates: Start: 03/24/22               Problem: Mobility Transfers       Dates: Start: 03/24/22         Goal: STG-Within one week, patient will perform bed mobility SPV with bed rail as needed       Dates: Start: 03/24/22

## 2022-03-31 NOTE — DISCHARGE SUMMARY
Rehab Discharge Summary    Admission Date: 3/23/2022    Discharge Date: 4/2/22    Attending Provider: Sarita Pineda MD for Amada Pan DO    Admission Diagnosis:   Active Hospital Problems    Diagnosis    • *Closed fracture of lumbar vertebra, unspecified fracture morphology, initial encounter ([facility])    • Hyperglycemia    • Thrombocytopenia (HCC)    • Azotemia    • Vitamin D deficiency    • Asthma    • Closed compression fracture of L2 lumbar vertebra, initial encounter ([facility])    • Chronic pain syndrome on chronic opioids    • Hypothyroidism    • Class 1 obesity due to excess calories with serious comorbidity and body mass index (BMI) of 32.0 to 32.9 in adult    • HTN (hypertension)    • Depression    • CAD (coronary artery disease)    • Dyslipidemia        Discharge Diagnosis:  Active Hospital Problems    Diagnosis    • *Closed fracture of lumbar vertebra, unspecified fracture morphology, initial encounter ([facility])    • Hyperglycemia    • Thrombocytopenia (HCC)    • Azotemia    • Vitamin D deficiency    • Asthma    • Closed compression fracture of L2 lumbar vertebra, initial encounter ([facility])    • Chronic pain syndrome on chronic opioids    • Hypothyroidism    • Class 1 obesity due to excess calories with serious comorbidity and body mass index (BMI) of 32.0 to 32.9 in adult    • HTN (hypertension)    • Depression    • CAD (coronary artery disease)    • Dyslipidemia        HPI per H&P:  Patient is a 72 y.o. with a PMH of HTN, chronic pain on opiates, hypothyroidism, HTN, HLD, and CAD who presented on 3/21 with ground level fall and severe pain. Patient reportedly fell backwards and landed on her back. She was found on imaging to have an L2 fracture. NSG was consulted and recommended TLSO and conservative management.  Hospital course complicated by anemia, hyperglycemia, and leukocytosis as well as difficult to control pain. TTE was performed due to concern she may have had syncope and it showed EF of 60%. Of  note she had COVID-19 within last 90 days.      Patient was last evaluated by PT on 3/22/22 and was Leah for mobility. Patient was last evaluated by OT on 3/22/22 and was min-maxA for ADLs. Patient was previously living independently in a 1 story home with no steps to enter; living independently with FWW/SPC.      Patient tolerated transfer to [facility]. She reports her pain at rest is controlled. She has concerns about pain medications and discharge rx as she follows with [facility]. She reports her pain contract will not be voided if medications given until the follow-up. Discussed would arrange for them at discharge and will continue on the medications she was using at [facility]. She reports her legs give out due to the pain. Denies NVD. Denies SOB.        Patient was admitted to [facility] on 3/23/2022.     Hospital Course by Problem List:    L2 TP fracture - Patient with fall with significant back pain found to have L2 fracture. Was managed conservatively with TLSO.  -PT and OT for mobility and ADLs  -Continue TLSO. Follow-up [facility]  3/31: Confirmed with Ortho TLSO for comfort only.    Chronic Pain - [facility]  Rx for 7 day Norco provided    I discussed the risks and benefits of using opiate medications for pain control.  I discussed the risk of addiction, potential for overdose, and respiratory depression (and the potential need for opiate antagonist therapy if this occurs).  I encouraged the patient to take this medication sparingly with the expressed goal of weaning off the medication as soon as is clinically appropriate.  I informed the patient that we are only able to provide a 7 day supply of these medications at discharge and that they will be responsible for requesting any refills needed from their primary care provider or their surgeon.  We discussed the need to safely secure these medications to prevent theft, inadvertent ingestion, or misuse.  Any unused medication should be  immediately disposed of through a sanctioned medication disposal program.  We discussed adjunctive pain medications and conservative therapies at length.      I answered the patient's questions regarding this treatment, and the patient indicated understanding and willingness to proceed.     HTN - Patient on Metoprolol 12.5 mg BID     HLD - Patient on Rosuvastatin 20 mg QHS     Asthma - Patient on Singulair QHS     Anemia - Check AM CBC - 11.0, stable.   CBC 3/29 H/H improved     Leukocytosis  - Check AM CBC - 12.0, will check UA - neg 3/25  CBC 3/29 WBC 10.7, improved from 12     Thrombocytopenia - Check AM CBC - 145, downtrending will monitor  CBC 3/29 120, down from 145, downtrending  Hospitalist consult ? Elavil  3/30: 117. Continue to monitor.  Patient does not wish to trial lower dose of Elavil or switch antidepressants to see if this is the cause of her thrombocytopenia.     Prediabetes - Into 150s. Check A1c - 7.0  Hospitalist following     Hypothyroidism - Patient on Levothyroxine 88 mcg daily     Azotemia - Check AM CMP 23, improved from 29, encourage fluids     Vitamin D deficiency - 22 at [facility]. Will start on 1000 U      Obesity due to excess calories - BMI of 33.8 on admission, meets medical criteria. Dietitian to consult      Depression/Pain - Patient on Elavil 75 mg QHS and Lexapro 20 mg daily. Patient on Norco     DVT ppx - Patient on Lovenox on transfer. Increase Ambulation, will discontinue     Foot bruising, right: Reportedly does not interfere with her ambulation.  No x-ray since most recent fall.  3/28: Right foot x-ray to ensure no fracture, though unlikely as patient ambulating without issue.  3/29: Right foot x-ray negative.       Discharge Medication:     Medication List      START taking these medications      Instructions   Cholecalciferol 2000 UNIT Tabs  Start taking on: April 1, 2022   Take 1 Tablet by mouth every day.  Dose: 2,000 Units     omeprazole 20 MG delayed-release capsule  Start  taking on: April 1, 2022  Commonly known as: PRILOSEC   Take 1 Capsule by mouth every day.  Dose: 20 mg     senna-docusate 8.6-50 MG Tabs  Commonly known as: PERICOLACE or SENOKOT S   Take 2 Tablets by mouth 2 times a day.  Dose: 2 Tablet        CHANGE how you take these medications      Instructions   amitriptyline 75 MG Tabs  What changed:   · medication strength  · when to take this  · additional instructions  Commonly known as: ELAVIL   Take 1 Tablet by mouth every evening.  Dose: 75 mg        CONTINUE taking these medications      Instructions   escitalopram 20 MG tablet  Commonly known as: LEXAPRO   Take 1 Tablet by mouth every day.  Dose: 20 mg     HYDROcodone/acetaminophen  MG Tabs  Commonly known as: NORCO   Take 1 Tablet by mouth every four hours as needed (PAIN).  Dose: 1 Tablet     levothyroxine 88 MCG Tabs  Commonly known as: SYNTHROID   Take 1 Tablet by mouth every day. BRAND NAME ONLY  Dose: 88 mcg     metoprolol tartrate 25 MG Tabs  Commonly known as: LOPRESSOR   Take 0.5 Tablets by mouth 2 times a day.  Dose: 12.5 mg     montelukast 10 MG Tabs  Commonly known as: SINGULAIR   Take 1 Tablet by mouth every day.  Dose: 10 mg     rosuvastatin 20 MG Tabs  Commonly known as: CRESTOR   Take 1 Tablet by mouth every evening.  Dose: 20 mg     sucralfate 1 GM Tabs  Commonly known as: CARAFATE   Take 1 Tablet by mouth 4 Times a Day,Before Meals and at Bedtime.  Dose: 1 g     sumatriptan 100 MG tablet  Commonly known as: IMITREX   Take 100 mg by mouth one time as needed for Migraine.  Dose: 100 mg        STOP taking these medications    acetaminophen 325 MG Tabs  Commonly known as: Tylenol     enoxaparin 40 MG/0.4ML Soln inj  Commonly known as: LOVENOX     furosemide 40 MG Tabs  Commonly known as: LASIX     ondansetron 4 MG Tbdp  Commonly known as: Zofran ODT     oxybutynin SR 10 MG CR tablet  Commonly known as: DITROPAN-XL            Discharge Diet:  Regular    Discharge Activity:  As  tolerated    Disposition:  Patient to discharge home with family support and community resources.     Equipment:  Per therapy.     Follow-up & Discharge Instructions:  Follow up with your primary care provider (PCP) within 7-10 days of discharge to review your medications and take over your care.     If you develop chest pain, fever, chills, change in neurologic function (weakness, sensation changes, vision changes), or other concerning sxs, seek immediate medical attention or call 911.      Condition on Discharge:  Good    More than 35 minutes was spent on discharging this patient, including face-to-face time, prescription management, and the dictation of this note.    Sarita Pineda M.D.    Date of Service: 4/2/22

## 2022-03-31 NOTE — THERAPY
Occupational Therapy  Daily Treatment     Patient Name: Yamile Go  Age:  72 y.o., Sex:  female  Medical Record #: [id number]  Today's Date: 3/31/2022     Precautions  Precautions: (P) Fall Risk,Spinal / Back Precautions ,TLSO (Thoracolumbosacral orthosis)  Comments: (P) TLSO for comfort, Ok to remove TLSO for showers    Safety   Bathroom Safety: Modified Independent    Subjective    Pt seen at 10:30-11:00 and 14:00-15:00, see details below     Objective    10:30-11:00: Pt was in bed upon arrival pleasant and agreeable to OT session focusing on UB strengthening. Pt ambulated from room to back gym w/ FWW with 0 LOB. Pt completed chest press, lat pulls, and curls in a seated position.  14:00-15:00: Pt was in bed upon arrival, stating that she was tired and just got a pian medication. Agreeable to OT session. Stated with Motomed to increase UB strength and endurance. Completed functional mobility task below.     Functional Mobility: pt completed PVC pipe tree activity at FWW level walking a loop around mat to grab 4 pieces that were needed at a time. PT was able to put them into her pocket on the front of her FWW. When starting to build the moderate complexity design, pt required simplification covering the top section of the picture to orient to location that was currently being built. Pt required increase time to complete activity but was able to finish the task and tear it down without a seated RB.      03/31/22 1401   Precautions   Precautions Fall Risk;Spinal / Back Precautions ;TLSO (Thoracolumbosacral orthosis)   Comments TLSO for comfort, Ok to remove TLSO for showers   Sitting Upper Body Exercises   Chest Press 3 sets of 10;Weight (See Comments for lbs);Bilateral  (equalizer 10#)   Lat Pull 3 sets of 10;Bilateral;Weight (See Comments for lbs)  (equalizer 10#)   Bicep Curls 2 sets of 10;Right ;Left;Weight (See Comments for lbs)  (3# dumbells)   Upper Extremity Bike Level 3 Resistance  (motomed, 12 min  total- 6 forward/6 backward)   OT Total Time Spent   OT Individual Total Time Spent (Mins) 90   OT Charge Group   OT Therapy Activity 3   OT Therapeutic Exercise  3       Assessment    Pt tolerated both sessions well though was tired during the second session. Pt was able to complete standing puzzle activity for 30 minutes without RB, though required visual simplification to help orient her to the desired outcome. Pt given oppurtunity to ask questions about d/c coming up and tasks that she may need to complete at home. Pt stated some concern about fall recovery.     Strengths: Able to follow instructions,Alert and oriented,Good insight into deficits/needs,Independent prior level of function,Manages pain appropriately,Motivated for self care and independence,Pleasant and cooperative,Supportive family,Willingly participates in therapeutic activities  Barriers: Decreased endurance,Fatigue,Generalized weakness,Impaired activity tolerance,Impaired balance,Limited mobility,Pain (lives alone)    Plan    ADLs with AE PRN, IADLs, functional mobility, transfers, balance/fall prevention, endurance, UE strengthening     Passport items to be completed:  Perform bathroom transfers, complete dressing, complete feeding, get ready for the day, prepare a simple meal, participate in household tasks, adapt home for safety needs, demonstrate home exercise program, complete caregiver training     Occupational Therapy Goals (Active)       Problem: Dressing       Dates: Start: 03/24/22         Goal: STG-Within one week, patient will dress LB with setup and supervision with use of AE as needed.       Dates: Start: 03/24/22         Goal Note filed on 03/29/22 1251 by Holli Mary, OT       Variable between supervision to Leah                 Problem: OT Long Term Goals       Dates: Start: 03/24/22         Goal: LTG-By discharge, patient will complete basic self care tasks with modified independence and use of AE as needed.       Dates:  Start: 03/24/22            Goal: LTG-By discharge, patient will perform bathroom transfers with modified independence and use of AE as needed.       Dates: Start: 03/24/22            Goal: LTG-By discharge, patient will complete basic home management with supervision to modified independence and use of AE as needed.        Dates: Start: 03/24/22

## 2022-04-01 ENCOUNTER — PHARMACY VISIT (OUTPATIENT)
Dept: PHARMACY | Facility: MEDICAL CENTER | Age: 72
End: 2022-04-01
Payer: MEDICARE

## 2022-04-01 LAB
ANION GAP SERPL CALC-SCNC: 10 MMOL/L (ref 7–16)
BUN SERPL-MCNC: 26 MG/DL (ref 8–22)
CALCIUM SERPL-MCNC: 8.7 MG/DL (ref 8.5–10.5)
CHLORIDE SERPL-SCNC: 103 MMOL/L (ref 96–112)
CO2 SERPL-SCNC: 23 MMOL/L (ref 20–33)
CREAT SERPL-MCNC: 0.59 MG/DL (ref 0.5–1.4)
ERYTHROCYTE [DISTWIDTH] IN BLOOD BY AUTOMATED COUNT: 49.8 FL (ref 35.9–50)
GFR SERPLBLD CREATININE-BSD FMLA CKD-EPI: 96 ML/MIN/1.73 M 2
GLUCOSE SERPL-MCNC: 131 MG/DL (ref 65–99)
HCT VFR BLD AUTO: 35.3 % (ref 37–47)
HGB BLD-MCNC: 11.2 G/DL (ref 12–16)
MCH RBC QN AUTO: 28.2 PG (ref 27–33)
MCHC RBC AUTO-ENTMCNC: 31.7 G/DL (ref 33.6–35)
MCV RBC AUTO: 88.9 FL (ref 81.4–97.8)
PLATELET # BLD AUTO: 133 K/UL (ref 164–446)
PMV BLD AUTO: 9.4 FL (ref 9–12.9)
POTASSIUM SERPL-SCNC: 4.2 MMOL/L (ref 3.6–5.5)
RBC # BLD AUTO: 3.97 M/UL (ref 4.2–5.4)
SODIUM SERPL-SCNC: 136 MMOL/L (ref 135–145)
WBC # BLD AUTO: 9.4 K/UL (ref 4.8–10.8)

## 2022-04-01 PROCEDURE — 99232 SBSQ HOSP IP/OBS MODERATE 35: CPT | Performed by: PHYSICAL MEDICINE & REHABILITATION

## 2022-04-01 PROCEDURE — 80048 BASIC METABOLIC PNL TOTAL CA: CPT

## 2022-04-01 PROCEDURE — 85027 COMPLETE CBC AUTOMATED: CPT

## 2022-04-01 PROCEDURE — 97530 THERAPEUTIC ACTIVITIES: CPT

## 2022-04-01 PROCEDURE — 36415 COLL VENOUS BLD VENIPUNCTURE: CPT

## 2022-04-01 PROCEDURE — 97535 SELF CARE MNGMENT TRAINING: CPT

## 2022-04-01 PROCEDURE — 97116 GAIT TRAINING THERAPY: CPT

## 2022-04-01 PROCEDURE — A9270 NON-COVERED ITEM OR SERVICE: HCPCS | Performed by: PHYSICAL MEDICINE & REHABILITATION

## 2022-04-01 PROCEDURE — 99232 SBSQ HOSP IP/OBS MODERATE 35: CPT | Performed by: HOSPITALIST

## 2022-04-01 PROCEDURE — 700102 HCHG RX REV CODE 250 W/ 637 OVERRIDE(OP): Performed by: PHYSICAL MEDICINE & REHABILITATION

## 2022-04-01 PROCEDURE — 97110 THERAPEUTIC EXERCISES: CPT

## 2022-04-01 PROCEDURE — 770010 HCHG ROOM/CARE - REHAB SEMI PRIVAT*

## 2022-04-01 RX ADMIN — AMITRIPTYLINE HYDROCHLORIDE 75 MG: 25 TABLET, FILM COATED ORAL at 21:28

## 2022-04-01 RX ADMIN — SUCRALFATE 1 G: 1 TABLET ORAL at 17:15

## 2022-04-01 RX ADMIN — HYDROCODONE BITARTRATE AND ACETAMINOPHEN 1 TABLET: 10; 325 TABLET ORAL at 21:27

## 2022-04-01 RX ADMIN — HYDROCODONE BITARTRATE AND ACETAMINOPHEN 1 TABLET: 10; 325 TABLET ORAL at 08:02

## 2022-04-01 RX ADMIN — SUCRALFATE 1 G: 1 TABLET ORAL at 21:28

## 2022-04-01 RX ADMIN — METOPROLOL TARTRATE 12.5 MG: 25 TABLET, FILM COATED ORAL at 21:28

## 2022-04-01 RX ADMIN — SUCRALFATE 1 G: 1 TABLET ORAL at 11:24

## 2022-04-01 RX ADMIN — LEVOTHYROXINE SODIUM 88 MCG: 0.09 TABLET ORAL at 05:45

## 2022-04-01 RX ADMIN — METOPROLOL TARTRATE 12.5 MG: 25 TABLET, FILM COATED ORAL at 08:00

## 2022-04-01 RX ADMIN — ROSUVASTATIN CALCIUM 20 MG: 10 TABLET, FILM COATED ORAL at 21:28

## 2022-04-01 RX ADMIN — MONTELUKAST SODIUM 10 MG: 10 TABLET, FILM COATED ORAL at 21:28

## 2022-04-01 RX ADMIN — HYDROCODONE BITARTRATE AND ACETAMINOPHEN 1 TABLET: 10; 325 TABLET ORAL at 13:01

## 2022-04-01 RX ADMIN — OMEPRAZOLE 20 MG: 20 CAPSULE, DELAYED RELEASE ORAL at 08:01

## 2022-04-01 RX ADMIN — SUCRALFATE 1 G: 1 TABLET ORAL at 08:01

## 2022-04-01 RX ADMIN — Medication 2000 UNITS: at 08:00

## 2022-04-01 RX ADMIN — ESCITALOPRAM OXALATE 20 MG: 10 TABLET, FILM COATED ORAL at 08:01

## 2022-04-01 ASSESSMENT — GAIT ASSESSMENTS
ASSISTIVE DEVICE: FRONT WHEEL WALKER
DISTANCE (FEET): 150
ASSISTIVE DEVICE: FRONT WHEEL WALKER
DISTANCE (FEET): 150
GAIT LEVEL OF ASSIST: MODIFIED INDEPENDENT
DEVIATION: BRADYKINETIC
GAIT LEVEL OF ASSIST: SUPERVISED
DEVIATION: BRADYKINETIC

## 2022-04-01 ASSESSMENT — ENCOUNTER SYMPTOMS
CHILLS: 0
BACK PAIN: 1
NAUSEA: 0
FEVER: 0
ABDOMINAL PAIN: 0
PALPITATIONS: 0
BRUISES/BLEEDS EASILY: 0
COUGH: 0
SHORTNESS OF BREATH: 0
VOMITING: 0
EYES NEGATIVE: 1
POLYDIPSIA: 0

## 2022-04-01 ASSESSMENT — ACTIVITIES OF DAILY LIVING (ADL)
BED_CHAIR_WHEELCHAIR_TRANSFER_DESCRIPTION: ADAPTIVE EQUIPMENT;INCREASED TIME
TOILET_TRANSFER_DESCRIPTION: ADAPTIVE EQUIPMENT;INCREASED TIME
TOILET_TRANSFER_LEVEL_OF_ASSIST: ABLE TO COMPLETE TOILET TRANSFER WITHOUT ASSIST
BED_CHAIR_WHEELCHAIR_TRANSFER_DESCRIPTION: ADAPTIVE EQUIPMENT
SHOWER_TRANSFER_LEVEL_OF_ASSIST: ABLE TO COMPLETE SHOWER TRANSFER WITHOUT ASSIST
BED_CHAIR_WHEELCHAIR_TRANSFER_DESCRIPTION: ADAPTIVE EQUIPMENT
TUB_SHOWER_TRANSFER_DESCRIPTION: ADAPTIVE EQUIPMENT;SHOWER BENCH;INCREASED TIME
TOILETING_LEVEL_OF_ASSIST: ABLE TO COMPLETE TOILETING WITHOUT ASSIST

## 2022-04-01 ASSESSMENT — PAIN DESCRIPTION - PAIN TYPE
TYPE: ACUTE PAIN;NEUROPATHIC PAIN

## 2022-04-01 NOTE — PROGRESS NOTES
Received patient during shift change, report rec'd from day shift RN. Resting in bed, VS stable on room air. Per report continent of B&B, Mod-I in room using FWW for transfers. A&O x 4, able to make needs known. Bed in low position, call light within reach.

## 2022-04-01 NOTE — PROGRESS NOTES
"Rehab Progress Note     Chief Complaint: L2 TP fx    Interval Events (Subjective)  Patient doing well today, she is slightly apprehensive about going home tomorrow states she just needs to find a competence.  Patient is doing well otherwise, complains of left thigh cramping.  Pain is likely neuropathic, however patient states she is allergic to gabapentin Lyrica and Cymbalta has not worked for her in the past.  Patient instructed to follow-up with her PCP.    Objective:  VITAL SIGNS: /79   Pulse 77   Temp 36.6 °C (97.9 °F) (Oral)   Resp 16   Ht 1.626 m (5' 4\")   Wt 81.6 kg (180 lb)   SpO2 97%   BMI 30.90 kg/m²     Physical Exam:  Vitals: /79   Pulse 77   Temp 36.6 °C (97.9 °F) (Oral)   Resp 16   Ht 1.626 m (5' 4\")   Wt 81.6 kg (180 lb)   SpO2 97%   Gen: NAD  Head: NC/AT  Eyes/ Nose/ Mouth: moist mucous membranes  Cardio: RRR, good distal perfusion, warm extremities  Pulm: normal respiratory effort, no cyanosis   Abd: Soft NTND, negative borborygmi   Ext: No peripheral edema. No calf tenderness. No clubbing.    Recent Results (from the past 72 hour(s))   CBC WITHOUT DIFFERENTIAL    Collection Time: 03/30/22  6:15 AM   Result Value Ref Range    WBC 9.4 4.8 - 10.8 K/uL    RBC 3.89 (L) 4.20 - 5.40 M/uL    Hemoglobin 11.1 (L) 12.0 - 16.0 g/dL    Hematocrit 34.9 (L) 37.0 - 47.0 %    MCV 89.7 81.4 - 97.8 fL    MCH 28.5 27.0 - 33.0 pg    MCHC 31.8 (L) 33.6 - 35.0 g/dL    RDW 49.9 35.9 - 50.0 fL    Platelet Count 117 (L) 164 - 446 K/uL    MPV 9.6 9.0 - 12.9 fL   Basic Metabolic Panel    Collection Time: 03/30/22  6:15 AM   Result Value Ref Range    Sodium 136 135 - 145 mmol/L    Potassium 3.8 3.6 - 5.5 mmol/L    Chloride 104 96 - 112 mmol/L    Co2 24 20 - 33 mmol/L    Glucose 144 (H) 65 - 99 mg/dL    Bun 22 8 - 22 mg/dL    Creatinine 0.60 0.50 - 1.40 mg/dL    Calcium 8.5 8.5 - 10.5 mg/dL    Anion Gap 8.0 7.0 - 16.0   ESTIMATED GFR    Collection Time: 03/30/22  6:15 AM   Result Value Ref Range    GFR " (CKD-EPI) 96 >60 mL/min/1.73 m 2   POCT glucose device results    Collection Time: 03/30/22 11:11 AM   Result Value Ref Range    POC Glucose, Blood 198 (H) 65 - 99 mg/dL   POCT glucose device results    Collection Time: 03/30/22  5:25 PM   Result Value Ref Range    POC Glucose, Blood 168 (H) 65 - 99 mg/dL   POCT glucose device results    Collection Time: 03/30/22  9:23 PM   Result Value Ref Range    POC Glucose, Blood 171 (H) 65 - 99 mg/dL   POCT glucose device results    Collection Time: 03/31/22  7:55 AM   Result Value Ref Range    POC Glucose, Blood 116 (H) 65 - 99 mg/dL   CBC WITHOUT DIFFERENTIAL    Collection Time: 04/01/22  7:04 AM   Result Value Ref Range    WBC 9.4 4.8 - 10.8 K/uL    RBC 3.97 (L) 4.20 - 5.40 M/uL    Hemoglobin 11.2 (L) 12.0 - 16.0 g/dL    Hematocrit 35.3 (L) 37.0 - 47.0 %    MCV 88.9 81.4 - 97.8 fL    MCH 28.2 27.0 - 33.0 pg    MCHC 31.7 (L) 33.6 - 35.0 g/dL    RDW 49.8 35.9 - 50.0 fL    Platelet Count 133 (L) 164 - 446 K/uL    MPV 9.4 9.0 - 12.9 fL   Basic Metabolic Panel    Collection Time: 04/01/22  7:04 AM   Result Value Ref Range    Sodium 136 135 - 145 mmol/L    Potassium 4.2 3.6 - 5.5 mmol/L    Chloride 103 96 - 112 mmol/L    Co2 23 20 - 33 mmol/L    Glucose 131 (H) 65 - 99 mg/dL    Bun 26 (H) 8 - 22 mg/dL    Creatinine 0.59 0.50 - 1.40 mg/dL    Calcium 8.7 8.5 - 10.5 mg/dL    Anion Gap 10.0 7.0 - 16.0   ESTIMATED GFR    Collection Time: 04/01/22  7:04 AM   Result Value Ref Range    GFR (CKD-EPI) 96 >60 mL/min/1.73 m 2       Current Facility-Administered Medications   Medication Frequency   • Respiratory Therapy Consult Continuous RT   • hydrALAZINE (APRESOLINE) tablet 25 mg Q8HRS PRN   • acetaminophen (Tylenol) tablet 650 mg Q4HRS PRN   • senna-docusate (PERICOLACE or SENOKOT S) 8.6-50 MG per tablet 2 Tablet BID    And   • polyethylene glycol/lytes (MIRALAX) PACKET 1 Packet QDAY PRN    And   • magnesium hydroxide (MILK OF MAGNESIA) suspension 30 mL QDAY PRN    And   • bisacodyl  (DULCOLAX) suppository 10 mg QDAY PRN   • omeprazole (PRILOSEC) capsule 20 mg DAILY   • artificial tears ophthalmic solution 1 Drop PRN   • benzocaine-menthol (Cepacol) lozenge 1 Lozenge Q2HRS PRN   • mag hydrox-al hydrox-simeth (MAALOX PLUS ES or MYLANTA DS) suspension 20 mL Q2HRS PRN   • ondansetron (ZOFRAN ODT) dispertab 4 mg 4X/DAY PRN    Or   • ondansetron (ZOFRAN) syringe/vial injection 4 mg 4X/DAY PRN   • traZODone (DESYREL) tablet 50 mg QHS PRN   • sodium chloride (OCEAN) 0.65 % nasal spray 2 Spray PRN   • traMADol (ULTRAM) 50 MG tablet 50 mg Q4HRS PRN   • acetaminophen (Tylenol) tablet 650 mg Q6HRS PRN   • amitriptyline (ELAVIL) tablet 75 mg QHS   • escitalopram (Lexapro) tablet 20 mg DAILY   • HYDROcodone-acetaminophen (NORCO) 5-325 MG per tablet 1 Tablet Q6HRS PRN   • HYDROcodone/acetaminophen (NORCO)  MG per tablet 1 Tablet Q4HRS PRN   • levothyroxine (SYNTHROID) tablet 88 mcg QAM AC   • metoprolol tartrate (LOPRESSOR) tablet 12.5 mg BID   • montelukast (SINGULAIR) tablet 10 mg QHS   • rosuvastatin (CRESTOR) tablet 20 mg Q EVENING   • sucralfate (CARAFATE) tablet 1 g 4X/DAY ACHS   • vitamin D3 (cholecalciferol) tablet 2,000 Units DAILY   • albuterol inhaler 2 Puff Q4H PRN (RT)       Orders Placed This Encounter   Procedures   • Diet Order Diet: Consistent CHO (Diabetic); Second Modifier: (optional): Cardiac     Standing Status:   Standing     Number of Occurrences:   1     Order Specific Question:   Diet:     Answer:   Consistent CHO (Diabetic) [4]     Order Specific Question:   Second Modifier: (optional)     Answer:   Cardiac [6]       Assessment:  Active Hospital Problems    Diagnosis    • *Closed fracture of lumbar vertebra, unspecified fracture morphology, initial encounter ([facility])    • Hyperglycemia    • Thrombocytopenia ([facility])    • Azotemia    • Vitamin D deficiency    • Asthma    • Closed compression fracture of L2 lumbar vertebra, initial encounter ([facility])    • Chronic pain syndrome on chronic  opioids    • Hypothyroidism    • Class 1 obesity due to excess calories with serious comorbidity and body mass index (BMI) of 32.0 to 32.9 in adult    • HTN (hypertension)    • Depression    • CAD (coronary artery disease)    • Dyslipidemia        Medical Decision Making and Plan:  Medical Decision Making and Plan: Adapted from Dr. Pan note dated 3/31  L2 TP fracture - Patient with fall with significant back pain found to have L2 fracture. Was managed conservatively with TLSO.  -PT and OT for mobility and ADLs  -Continue TLSO. Follow-up [place]  3/31: Confirmed with Ortho TLSO for comfort only.     HTN - Patient on Metoprolol 12.5 mg BID     HLD - Patient on Rosuvastatin 20 mg QHS     Asthma - Patient on Singulair QHS     Anemia - Check AM CBC - 11.0, stable.   CBC 3/29 H/H improved     Leukocytosis  - Check AM CBC - 12.0, will check UA - neg 3/25  CBC 3/29 WBC 10.7, improved from 12     Thrombocytopenia - Check AM CBC - 145, downtrending will monitor  CBC 3/29 120, down from 145, downtrending  Hospitalist consult ? Elavil  3/30: 117. Continue to monitor.  Patient does not wish to trial lower dose of Elavil or switch antidepressants to see if this is the cause of her thrombocytopenia.     Prediabetes - Into 150s. Check A1c - 7.0  Hospitalist following     Hypothyroidism - Patient on Levothyroxine 88 mcg daily     Azotemia - Check AM CMP 23, improved from 29, encourage fluids     Vitamin D deficiency - 22 at [facility]. Will start on 1000 U      Obesity due to excess calories - BMI of 33.8 on admission, meets medical criteria. Dietitian to consult      Depression/Pain - Patient on Elavil 75 mg QHS and Lexapro 20 mg daily. Patient on Norco     DVT ppx - Patient on Lovenox on transfer. Increase Ambulation, will discontinue     Foot bruising, right: Reportedly does not interfere with her ambulation.  No x-ray since most recent fall.  3/28: Right foot x-ray to ensure no fracture, though unlikely as patient ambulating  without issue.  3/29: Right foot x-ray negative.    Total time:  27 minutes.  I spent greater than 50% of the time for patient care and coordination on this date, including unit/floor time, and face-to-face time with the patient as per assessment and plan above.    Chriss Philip, DO   Physical Medicine and Rehabilitation

## 2022-04-01 NOTE — CARE PLAN
"The patient is Stable - Low risk of patient condition declining or worsening    Shift Goals  Clinical Goals: safety, manage pain  Patient Goals: sleep well    Progress made toward(s) clinical / shift goals:  Resting in bed after hs/PRN pain meds. Good outcome, resting in bed, eyes closed, resp even, unlabored. Using call light for assist as needed.     [facility] Fall risk Assessment Score: 12    Moderate fall risk Interventions  - Bed and strip alarm   - Yellow sign by the door   - Yellow wrist band \"Fall risk\"  - Do not leave patient unattended in the bathroom  - Fall risk education provided  - Call light within reach   - Yellow  socks   - Belongings within reach   - Bed in the lowest position   Mod-I in room using FWW      Problem: Pain - Standard  Goal: Alleviation of pain or a reduction in pain to the patient’s comfort goal  Outcome: Progressing         "

## 2022-04-01 NOTE — DISCHARGE PLANNING
CM late entry from 3/29.  IDT held.   Dc set for 4/2.  Recommendations made for home health.     4/1/22  Referral sent to [facility]  PT has a fww @ home.   Follow up with PCP.   Reviewed signed copy of IMM and answered all questions.  Pt in agreement w/ above.     Plan:  Dc 4/2.

## 2022-04-01 NOTE — PROGRESS NOTES
Hospital Medicine Daily Progress Note      Chief Complaint  Thrombocytopenia  Hyperglycemia    Interval Problem Update  No overnight problems.  Labs reviewed.    Review of Systems  Review of Systems   Constitutional: Negative for chills and fever.   HENT: Negative.    Eyes: Negative.    Respiratory: Negative for cough and shortness of breath.    Cardiovascular: Negative for chest pain and palpitations.   Gastrointestinal: Negative for abdominal pain, nausea and vomiting.   Musculoskeletal: Positive for back pain.   Skin: Negative for itching and rash.   Endo/Heme/Allergies: Negative for polydipsia. Does not bruise/bleed easily.        Physical Exam  Temp:  [36.5 °C (97.7 °F)-36.9 °C (98.4 °F)] 36.6 °C (97.9 °F)  Pulse:  [75-77] 77  Resp:  [16-18] 16  BP: (131-144)/(59-79) 144/79  SpO2:  [95 %-97 %] 97 %    Physical Exam  Vitals reviewed.   Constitutional:       General: She is not in acute distress.     Appearance: Normal appearance. She is not ill-appearing.   HENT:      Head: Normocephalic and atraumatic.      Right Ear: External ear normal.      Left Ear: External ear normal.      Nose: Nose normal.      Mouth/Throat:      Pharynx: Oropharynx is clear.   Eyes:      General:         Right eye: No discharge.         Left eye: No discharge.      Extraocular Movements: Extraocular movements intact.      Conjunctiva/sclera: Conjunctivae normal.   Cardiovascular:      Rate and Rhythm: Normal rate and regular rhythm.   Pulmonary:      Effort: Pulmonary effort is normal. No respiratory distress.      Breath sounds: Normal breath sounds. No wheezing.   Abdominal:      General: Bowel sounds are normal. There is no distension.      Palpations: Abdomen is soft.      Tenderness: There is no abdominal tenderness.   Musculoskeletal:      Cervical back: Normal range of motion and neck supple.      Right lower leg: No edema.      Left lower leg: No edema.      Comments: Back +large ecchymosis   Skin:     General: Skin is warm and  dry.   Neurological:      Mental Status: She is alert and oriented to person, place, and time.         Fluids    Intake/Output Summary (Last 24 hours) at 4/1/2022 1153  Last data filed at 4/1/2022 0800  Gross per 24 hour   Intake 480 ml   Output --   Net 480 ml       Laboratory  Recent Labs     03/30/22  0615 04/01/22  0704   WBC 9.4 9.4   RBC 3.89* 3.97*   HEMOGLOBIN 11.1* 11.2*   HEMATOCRIT 34.9* 35.3*   MCV 89.7 88.9   MCH 28.5 28.2   MCHC 31.8* 31.7*   RDW 49.9 49.8   PLATELETCT 117* 133*   MPV 9.6 9.4     Recent Labs     03/30/22  0615 04/01/22  0704   SODIUM 136 136   POTASSIUM 3.8 4.2   CHLORIDE 104 103   CO2 24 23   GLUCOSE 144* 131*   BUN 22 26*   CREATININE 0.60 0.59   CALCIUM 8.5 8.7                 Assessment/Plan  Hyperglycemia  Assessment & Plan  HbA1c 7.0  FSBS range 116-198  Recommend diet control for now  PCP F/U    Asthma  Assessment & Plan  On Singulair  RT protocol    Vitamin D deficiency  Assessment & Plan  Vit D level 22  On supplementation    Azotemia  Assessment & Plan  Encourage PO fluids, goal 2 L/day  Follow renal function    Thrombocytopenia (HCC)  Assessment & Plan  May be 2/2 Elavil  Follow Platelet counts    Closed compression fracture of L2 lumbar vertebra, initial encounter (HCC)- (present on admission)  Assessment & Plan  2/2 GLF onto back  Non-surgical management  TLSO  Pain control per Physiatry    Hypothyroidism- (present on admission)  Assessment & Plan  On Synthroid  Outpt F/U    Depression- (present on admission)  Assessment & Plan  On Elavil and Lexapro    HTN (hypertension)- (present on admission)  Assessment & Plan  May need to increase Metoprolol  PCP F/U    Dyslipidemia- (present on admission)  Assessment & Plan  On Crestor     Full Code

## 2022-04-01 NOTE — THERAPY
Occupational Therapy  Daily Treatment     Patient Name: Yamile Go  Age:  72 y.o., Sex:  female  Medical Record #: [id number]  Today's Date: 4/1/2022     Precautions  Precautions: Fall Risk,Spinal / Back Precautions ,TLSO (Thoracolumbosacral orthosis)  Comments: TLSO for comfort, Ok to remove TLSO for showers    Safety   ADL Safety : Modified Independent  Bathroom Safety: Modified Independent    Subjective    Pt sleeping upon arrival, agreeable to OT session when woken.      Objective     04/01/22 1401   Hand Strengthening   Hand Strengthening Right ;Right Pinch;Left ;Left Pinch   Comment Issued blue resistive sponge and instructed pt in gross , tip, three jaw and lateral/key pinch exercises for pt to perform 2-3 sets of 10 per day. Provided pt with written information. Pt then completed resistive clothespins exercise to increase pinch and hand strength.   Fine Motor / Dexterity    Fine Motor / Dexterity Yes   Fine Motor / Dexterity Interventions Rotating stress balls in both hands followed by picking coins up one at a time and manipulating in hand to bring to thumb and index finger then releasing one by one into a cup.   Bed Mobility    Supine to Sit Modified Independent   Interdisciplinary Plan of Care Collaboration   Patient Position at End of Therapy Edge of Bed;Call Light within Reach;Tray Table within Reach;Phone within Reach   OT Total Time Spent   OT Individual Total Time Spent (Mins) 30   OT Charge Group   OT Therapy Activity 2     Functional mobility from room <> gym with FWW.     Assessment    Pt reported earlier she has noticed increased difficulty with FMC and hand strength so issued above activities for pt to work on. Provided pt with [facility] [place] [facility] handout with list of activities she can do at home. Pt had increased difficulty with STS from a low chair without arm rests. Instructed pt to avoid sitting in low chairs especially ones that do not have arm rests to push off of. Pt had no questions  or concerns regarding d/c tomorrow.     Strengths: Able to follow instructions,Alert and oriented,Good insight into deficits/needs,Independent prior level of function,Manages pain appropriately,Motivated for self care and independence,Pleasant and cooperative,Supportive family,Willingly participates in therapeutic activities  Barriers: Decreased endurance,Fatigue,Generalized weakness,Impaired activity tolerance,Impaired balance,Limited mobility,Pain (lives alone)    Plan    D/c home tomorrow with intermittent check-in support from family.     Occupational Therapy Goals (Active)       There are no active problems.

## 2022-04-01 NOTE — THERAPY
Recreational Therapy  Daily Treatment     Patient Name: Yamile Go  AGE:  72 y.o., SEX:  female  Medical Record #: [id number]  Today's Date: 4/1/2022       Subjective    Pt sharing on frustration on not being informed she was moving rooms. Pt sharing that she found out when they were moving things on her bed when she returned to her room post therapy session.      Objective       04/01/22 1031   Functional Ability Status - Cognitive   Attention Span Remains on Task   Comprehension Follows Three Step Commands   Judgment Able to Make Independent Decisions   Functional Ability Status - Emotional    Affect Appropriate   Mood Agitated;Appropriate   Behavior Appropriate   Emotional Comments Pt sharing that she was frustered with not knowing about a room change prior to it happening   Skilled Intervention    Skilled Intervention Gross Motor Leisure;Relaxation / Coping Skills   Skilled Intervention Comments Taught a game of bananagrams   Interdisciplinary Plan of Care Collaboration   Patient Position at End of Therapy Seated;Call Light within Reach;Tray Table within Reach  (Mod I in room with FWW)   Strengths & Barriers   Strengths Able to follow instructions;Alert and oriented;Manages pain appropriately;Motivated for self care and independence;Pleasant and cooperative;Supportive family;Willingly participates in therapeutic activities   Barriers Decreased endurance   Treatment Time   Total Time Spent (mins) 30   Procedural Tracking   Procedural Tracking Community Re-Integration;Leisure Skills Awareness;Leisure Skills Development;Community Skills Development;Gross Motor Functional Leisure Skills   Stood for 10 minutes x1 SBA    Assessment    Pt was taught a game of bananagrams for a standing dual tasking activity.     Strengths: (P) Able to follow instructions,Alert and oriented,Manages pain appropriately,Motivated for self care and independence,Pleasant and cooperative,Supportive family,Willingly participates in  therapeutic activities  Barriers: (P) Decreased endurance    Plan    dc

## 2022-04-01 NOTE — CARE PLAN
Problem: Dressing  Goal: STG-Within one week, patient will dress LB with setup and supervision with use of AE as needed.  Outcome: Met     Problem: OT Long Term Goals  Goal: LTG-By discharge, patient will complete basic self care tasks with modified independence and use of AE as needed.  Outcome: Met  Goal: LTG-By discharge, patient will perform bathroom transfers with modified independence and use of AE as needed.  Outcome: Met  Goal: LTG-By discharge, patient will complete basic home management with supervision to modified independence and use of AE as needed.   Outcome: Met

## 2022-04-01 NOTE — THERAPY
Occupational Therapy  Daily Treatment     Patient Name: Yamile Go  Age:  72 y.o., Sex:  female  Medical Record #: [id number]  Today's Date: 4/1/2022     Precautions  Precautions: Fall Risk,Spinal / Back Precautions ,TLSO (Thoracolumbosacral orthosis)  Comments: TLSO for comfort, Ok to remove TLSO for showers    Safety   ADL Safety : (P) Modified Independent  Bathroom Safety: (P) Modified Independent    Subjective    Pt sitting EOB upon arrival, agreeable to OT session.      Objective     04/01/22 0831   Safety    ADL Safety  Modified Independent   Bathroom Safety Modified Independent   Non Verbal Descriptors   Non Verbal Scale  Calm   Functional Level of Assist   Grooming Modified Independent   Grooming Description Seated in wheelchair at sink   Bathing Modified Independent   Bathing Description Adaptive equipment;Grab bar;Hand held shower;Long handled bath tool;Tub bench   Upper Body Dressing Modified Independent   Lower Body Dressing Modified Independent  (stands to pull pants up with FWW)   Lower Body Dressing Description Assistive devices;Increased time   Bed, Chair, Wheelchair Transfer Modified Independent   Bed Chair Wheelchair Transfer Description Adaptive equipment   Tub / Shower Transfers Modified Independent  (wet shower txfer in room then TTB in ADL bathroom with use of gait belt to lift legs over tub threshold)   Tub Shower Transfer Description Adaptive equipment;Shower bench;Increased time   IADL Treatments   Kitchen Mobility Education Educated pt on recommendation to have family member move all commonly used items to the counter level to prevent falls and maintain spinal precautions. Pt practice loading/unloading a plate into the microwave standing with FWW.   Interdisciplinary Plan of Care Collaboration   IDT Collaboration with  Family / Caregiver   Patient Position at End of Therapy Seated;Call Light within Reach;Tray Table within Reach;Phone within Reach;Family / Friend in Room    Collaboration Comments Pt's daughter present for last part of session for FT   OT Total Time Spent   OT Individual Total Time Spent (Mins) 60   OT Charge Group   OT Self Care / ADL 2   OT Therapy Activity 2     Balloon volley standing in // bars to target balance and standing tolerance.     Functional mobility with FWW room <> main gym/ADL bathroom.     Recommended to pt and her daughter that she acquire a stable walker tray to transport plates and other heavier items to safely ambulate with her FWW.     FT with pt and her daughter included review of CLOF, functional mobility, kitchen mobility and safety detailed above, and TTB transfer. Pt has a suction grab bar at home. Educated pt that these are typically not recommended and to avoid bearing weight through it to avoid falling if it loses suction.     Assessment    Pt tolerated OT session well and has progressed well now performing ADLs at a modified independent level. Pt demo's good safety awareness. Her and her daughter report she is doing better than she was prior to fracturing her back. Educated pt and her daughter that she could continue with therapy on an outpatient basis after HH.   Strengths: Able to follow instructions,Alert and oriented,Good insight into deficits/needs,Independent prior level of function,Manages pain appropriately,Motivated for self care and independence,Pleasant and cooperative,Supportive family,Willingly participates in therapeutic activities  Barriers: Decreased endurance,Fatigue,Generalized weakness,Impaired activity tolerance,Impaired balance,Limited mobility,Pain (lives alone)    Plan    D/c home tomorrow with intermittent support of family     Occupational Therapy Goals (Active)       Problem: Dressing       Dates: Start: 03/24/22         Goal: STG-Within one week, patient will dress LB with setup and supervision with use of AE as needed.       Dates: Start: 03/24/22         Goal Note filed on 03/29/22 1251 by Holli Mary  OT       Variable between supervision to Leah                 Problem: OT Long Term Goals       Dates: Start: 03/24/22         Goal: LTG-By discharge, patient will complete basic self care tasks with modified independence and use of AE as needed.       Dates: Start: 03/24/22            Goal: LTG-By discharge, patient will perform bathroom transfers with modified independence and use of AE as needed.       Dates: Start: 03/24/22            Goal: LTG-By discharge, patient will complete basic home management with supervision to modified independence and use of AE as needed.        Dates: Start: 03/24/22

## 2022-04-01 NOTE — THERAPY
Physical Therapy   Daily Treatment     Patient Name: Yamile Go  Age:  72 y.o., Sex:  female  Medical Record #: [id number]  Today's Date: 4/1/2022     Precautions  Precautions: Fall Risk,Spinal / Back Precautions ,TLSO (Thoracolumbosacral orthosis)  Comments: TLSO for comfort, Ok to remove TLSO for showers    Subjective    Pt's daughter present for family training.     Objective       04/01/22 0931   Gait Functional Level of Assist    Gait Level Of Assist Supervised   Assistive Device Front Wheel Walker   Distance (Feet) 150  (outdoors)   # of Times Distance was Traveled 1   Deviation Bradykinetic   Stairs Functional Level of Assist   Level of Assist with Stairs Contact Guard Assist   # of Stairs Climbed 4  (standard rise steps/ reciprocal ascending/ step-to descending)   Stairs Description Extra time;Hand rails;Requires incidental assist   Transfer Functional Level of Assist   Bed, Chair, Wheelchair Transfer Modified Independent   Bed Chair Wheelchair Transfer Description Adaptive equipment;Increased time   Toilet Transfers Modified Independent   Toilet Transfer Description Adaptive equipment;Increased time   PT Total Time Spent   PT Individual Total Time Spent (Mins) 30   PT Charge Group   PT Therapeutic Activities 2     Pt's daughter present for family training. Pt demonstrates modified independence with transfers/ bed mobility and household distance gait. Pt's daughter able to provide SPV for community distance gait and outdoor ambulation with FWW. Pt demonstrated ability to complete stairs as noted with CGA, reviewed with daughter safe sequencing for step to descending. Pt completed car transfer with min A for LLE management x 3 trials, daughter able to assist as needed. Reviewed safety/ fall prevention and informed daughter pt should always use FWW for safety with mobility at this time.    Assessment    Daughter able to provide assist for community mobility and car transfers, pt remains modified  independent for household mobility.    Strengths: Able to follow instructions,Effective communication skills,Alert and oriented,Independent prior level of function,Manages pain appropriately,Motivated for self care and independence,Pleasant and cooperative,Willingly participates in therapeutic activities  Barriers: Limited mobility,Decreased endurance,Generalized weakness    Plan    D/c IRF-JUDY's, review HEP and fall prevention education    Physical Therapy Problems (Active)       Problem: Mobility       Dates: Start: 03/24/22         Goal: STG-Within one week, patient will ascend and descend four to six stairs with B hand rails and CGA standard rise steps       Dates: Start: 03/24/22               Problem: Mobility Transfers       Dates: Start: 03/24/22         Goal: STG-Within one week, patient will perform bed mobility SPV with bed rail as needed       Dates: Start: 03/24/22

## 2022-04-01 NOTE — THERAPY
Physical Therapy   Daily Treatment     Patient Name: Yamile Go  Age:  72 y.o., Sex:  female  Medical Record #: [id number]  Today's Date: 4/1/2022     Precautions  Precautions: Fall Risk,Spinal / Back Precautions ,TLSO (Thoracolumbosacral orthosis)  Comments: TLSO for comfort, Ok to remove TLSO for showers    Subjective    Pt sitting edge of bed, willing to participate     Objective       04/01/22 1501   Gait Functional Level of Assist    Gait Level Of Assist Modified Independent   Assistive Device Front Wheel Walker   Distance (Feet) 150   # of Times Distance was Traveled 2   Deviation Bradykinetic   Transfer Functional Level of Assist   Bed, Chair, Wheelchair Transfer Modified Independent   Bed Chair Wheelchair Transfer Description Adaptive equipment   Sitting Lower Body Exercises   Ankle Pumps 2 sets of 15   Hip Flexion 2 sets of 15   Hip Abduction 2 sets of 15   Hip Adduction 2 sets of 15   Long Arc Quad 2 sets of 15   Hamstring Curl 2 sets of 15   Bed Mobility    Supine to Sit Modified Independent   Sit to Supine Modified Independent   Sit to Stand Modified Independent   Scooting Modified Independent   Rolling Modified Independent   PT Total Time Spent   PT Individual Total Time Spent (Mins) 60   PT Charge Group   PT Gait Training 1   PT Therapeutic Exercise 2   PT Therapeutic Activities 1     Gait training without device 100 ft x 2 with CGA at gait belt, no loss of balance noted but pt with increased postural sway.  Reviewed seated exercises and hand-out provided.    Assessment    Pt remains modified independent with FWW for household mobility, ready for d/c tomorrow.    Strengths: Able to follow instructions,Effective communication skills,Alert and oriented,Independent prior level of function,Manages pain appropriately,Motivated for self care and independence,Pleasant and cooperative,Willingly participates in therapeutic activities  Barriers: Limited mobility,Decreased endurance,Generalized  weakness    Plan    D/c tomorrow with home health PT      Physical Therapy Problems (Active)       Problem: Mobility       Dates: Start: 03/24/22         Goal: STG-Within one week, patient will ascend and descend four to six stairs with B hand rails and CGA standard rise steps       Dates: Start: 03/24/22   Expected End: 04/02/22               Problem: Mobility Transfers       Dates: Start: 03/24/22         Goal: STG-Within one week, patient will perform bed mobility SPV with bed rail as needed       Dates: Start: 03/24/22   Expected End: 04/02/22

## 2022-04-01 NOTE — CARE PLAN
Problem: Fall Risk - Rehab  Goal: Patient will remain free from falls  Outcome: Progressing  Note: May Beard Fall risk Assessment Score: 10    Low fall risk interventions   - Call light within reach   - Yellow  socks   - Belongings within reach   - Bed in the lowest position      Problem: Infection  Goal: Patient will remain free from infection  Outcome: Progressing  Note: Patient remains free from s/s infection; afebrile.  Will continue to monitor.

## 2022-04-02 VITALS
HEART RATE: 81 BPM | RESPIRATION RATE: 18 BRPM | SYSTOLIC BLOOD PRESSURE: 133 MMHG | WEIGHT: 180 LBS | DIASTOLIC BLOOD PRESSURE: 64 MMHG | TEMPERATURE: 98 F | HEIGHT: 64 IN | OXYGEN SATURATION: 94 % | BODY MASS INDEX: 30.73 KG/M2

## 2022-04-02 PROCEDURE — 99239 HOSP IP/OBS DSCHRG MGMT >30: CPT | Performed by: PHYSICAL MEDICINE & REHABILITATION

## 2022-04-02 PROCEDURE — 700102 HCHG RX REV CODE 250 W/ 637 OVERRIDE(OP): Performed by: PHYSICAL MEDICINE & REHABILITATION

## 2022-04-02 PROCEDURE — 99231 SBSQ HOSP IP/OBS SF/LOW 25: CPT | Performed by: HOSPITALIST

## 2022-04-02 PROCEDURE — A9270 NON-COVERED ITEM OR SERVICE: HCPCS | Performed by: PHYSICAL MEDICINE & REHABILITATION

## 2022-04-02 RX ADMIN — OMEPRAZOLE 20 MG: 20 CAPSULE, DELAYED RELEASE ORAL at 08:17

## 2022-04-02 RX ADMIN — LEVOTHYROXINE SODIUM 88 MCG: 0.09 TABLET ORAL at 05:28

## 2022-04-02 RX ADMIN — SUCRALFATE 1 G: 1 TABLET ORAL at 08:17

## 2022-04-02 RX ADMIN — ESCITALOPRAM OXALATE 20 MG: 10 TABLET, FILM COATED ORAL at 08:17

## 2022-04-02 RX ADMIN — METOPROLOL TARTRATE 12.5 MG: 25 TABLET, FILM COATED ORAL at 08:17

## 2022-04-02 RX ADMIN — Medication 2000 UNITS: at 08:17

## 2022-04-02 ASSESSMENT — ENCOUNTER SYMPTOMS
CHILLS: 0
PALPITATIONS: 0
NAUSEA: 0
FEVER: 0
BRUISES/BLEEDS EASILY: 0
POLYDIPSIA: 0
VOMITING: 0
SHORTNESS OF BREATH: 0
COUGH: 0
ABDOMINAL PAIN: 0
EYES NEGATIVE: 1
BACK PAIN: 1

## 2022-04-02 ASSESSMENT — PAIN DESCRIPTION - PAIN TYPE: TYPE: ACUTE PAIN

## 2022-04-02 NOTE — CARE PLAN
Problem: Knowledge Deficit - Standard  Goal: Patient and family/care givers will demonstrate understanding of plan of care, disease process/condition, diagnostic tests and medications  Outcome: Met     Problem: Fall Risk - Rehab  Goal: Patient will remain free from falls  Outcome: Met     Problem: Pain - Standard  Goal: Alleviation of pain or a reduction in pain to the patient’s comfort goal  Outcome: Met     Problem: Skin Integrity  Goal: Skin integrity is maintained or improved  Outcome: Met     Problem: Bladder / Voiding  Goal: Patient will establish and maintain regular urinary output  Outcome: Met     Problem: Infection  Goal: Patient will remain free from infection  Outcome: Met   The patient is Stable - Low risk of patient condition declining or worsening    Shift Goals  Clinical Goals: safety  Patient Goals: pain control, sleep well    Progress made toward(s) clinical / shift goals:  pt goals met, discharged home    Patient is not progressing towards the following goals:

## 2022-04-02 NOTE — PROGRESS NOTES
Hospital Medicine Daily Progress Note      Chief Complaint  Thrombocytopenia  Hyperglycemia    Interval Problem Update  Blood pressures better today.    Review of Systems  Review of Systems   Constitutional: Negative for chills and fever.   HENT: Negative.    Eyes: Negative.    Respiratory: Negative for cough and shortness of breath.    Cardiovascular: Negative for chest pain and palpitations.   Gastrointestinal: Negative for abdominal pain, nausea and vomiting.   Musculoskeletal: Positive for back pain.   Skin: Negative for itching and rash.   Endo/Heme/Allergies: Negative for polydipsia. Does not bruise/bleed easily.        Physical Exam  Temp:  [36.3 °C (97.3 °F)-36.7 °C (98.1 °F)] 36.7 °C (98 °F)  Pulse:  [80-86] 81  Resp:  [16-18] 18  BP: (133-139)/(64-78) 133/64  SpO2:  [94 %-96 %] 94 %    Physical Exam  Vitals reviewed.   Constitutional:       General: She is not in acute distress.     Appearance: Normal appearance. She is not ill-appearing.   HENT:      Head: Normocephalic and atraumatic.      Right Ear: External ear normal.      Left Ear: External ear normal.      Nose: Nose normal.      Mouth/Throat:      Pharynx: Oropharynx is clear.   Eyes:      General:         Right eye: No discharge.         Left eye: No discharge.      Extraocular Movements: Extraocular movements intact.      Conjunctiva/sclera: Conjunctivae normal.   Cardiovascular:      Rate and Rhythm: Normal rate and regular rhythm.   Pulmonary:      Effort: Pulmonary effort is normal. No respiratory distress.      Breath sounds: Normal breath sounds. No wheezing.   Abdominal:      General: Bowel sounds are normal. There is no distension.      Palpations: Abdomen is soft.      Tenderness: There is no abdominal tenderness.   Musculoskeletal:      Cervical back: Normal range of motion and neck supple.      Right lower leg: No edema.      Left lower leg: No edema.      Comments: Back +large ecchymosis   Skin:     General: Skin is warm and dry.    Neurological:      Mental Status: She is alert and oriented to person, place, and time.         Fluids    Intake/Output Summary (Last 24 hours) at 4/2/2022 1049  Last data filed at 4/2/2022 0843  Gross per 24 hour   Intake 520 ml   Output --   Net 520 ml       Laboratory  Recent Labs     04/01/22  0704   WBC 9.4   RBC 3.97*   HEMOGLOBIN 11.2*   HEMATOCRIT 35.3*   MCV 88.9   MCH 28.2   MCHC 31.7*   RDW 49.8   PLATELETCT 133*   MPV 9.4     Recent Labs     04/01/22  0704   SODIUM 136   POTASSIUM 4.2   CHLORIDE 103   CO2 23   GLUCOSE 131*   BUN 26*   CREATININE 0.59   CALCIUM 8.7                 Assessment/Plan  Hyperglycemia- (present on admission)  Assessment & Plan  HbA1c 7.0  FSBS range 116-198  Recommend diet control for now  PCP F/U    Asthma- (present on admission)  Assessment & Plan  On Singulair  RT protocol    Vitamin D deficiency- (present on admission)  Assessment & Plan  Vit D level 22  On supplementation    Azotemia- (present on admission)  Assessment & Plan  Encourage PO fluids, goal 2 L/day  Follow renal function    Thrombocytopenia (HCC)- (present on admission)  Assessment & Plan  May be 2/2 Elavil  Follow Platelet counts  Consider Hematology evaluation    Closed compression fracture of L2 lumbar vertebra, initial encounter (HCC)- (present on admission)  Assessment & Plan  2/2 GLF onto back  Non-surgical management  TLSO  Pain control per Physiatry    Hypothyroidism- (present on admission)  Assessment & Plan  On Synthroid  Outpt F/U    Depression- (present on admission)  Assessment & Plan  On Elavil and Lexapro    HTN (hypertension)- (present on admission)  Assessment & Plan  Continue Metoprolol  PCP F/U    Dyslipidemia- (present on admission)  Assessment & Plan  On Crestor     Full Code

## 2022-04-02 NOTE — DISCHARGE INSTRUCTIONS
[facility] NURSING DISCHARGE INSTRUCTIONS    Blood Pressure : 139/71  Weight: 81.6 kg (180 lb)  Nursing recommendations for Gillian Go at time of discharge are as follows:  Client verbalized understanding of all discharge instructions and prescriptions.     Review all your home medications and newly ordered medications with your doctor and/or pharmacist. Follow medication instructions as directed by your doctor and/or pharmacist.    Pain Management:   Discharge Pain Medication Instructions:  Comfort Goal: Comfort with Movement,Perform Activity,Stay Alert  Notify your primary care provider if pain is unrelieved with these measures, if the pain is new, or increased in intensity.    Discharge Skin Characteristics: Warm,Dry  Discharge Skin Exam: Bruise (back)     Skin / Wound Care Instructions: Please contact your primary care physician for any change in skin integrity.     If You Have Surgical Incisions / Wounds:  Monitor surgical site(s) for signs of increased swelling, redness or symptoms of drainage from the site or fever as this could indicate signs and symptoms of infection. If these symptoms are noted, notifiy your primary care provider.      Discharge Safety Instructions: Should Not Be Left Alone In The House     Discharge Safety Concerns: History Of Falls  The interdisciplinary team has made recommendation that you should not be left alone  in the house due to history of falls  Anti-embolic stockings are not required to increase circulation to the lower extremities.    Discharge Diet: Cardiac, ADA     Discharge Liquids: Thins  Discharge Bowel Function: Continent  Please contact your primary care physician for any changes in bowel habits.  Discharge Bowel Program:    Discharge Bladder Function: Continent  Discharge Urinary Devices:        Nursing Discharge Plan:   Influenza Vaccine Indication: Not indicated: Previously immunized this influenza season and > 8 years of age (STATES  "HAS HAD FLU VACCINE)    Case Management Discharge Instructions:   Discharge Location:    Agency Name/Address/Phone:    Home Health:    Outpatient Services:    DME Provider/Phone:    Medical Equipment Ordered:    Prescription Faxed to:        Discharge Medication Instructions:  Below are the medications your physician expects you to take upon discharge:    Prevent Falls in Your Home    Falls in the home can lead to serious injury (fractures, brain injuries), hospitalizations, increased medical costs, and could even be fatal.  The good news is, there are many precautions you can take to avoid falls in your home and help keep you safe:     · If prescribed an assistive device (walker, crutches), use as instructed by the healthcare provider\"   · Remove any tripping hazards from your home, including loose cords, throw rugs and clutter  · Keep a nightlight on in dark (hallways, bathrooms, etc)   · Get up slowly, to make sure you feel okay before getting up  · Be aware of any side effects of your medications: some medications may make you dizzy  · Place a non-skid rubber mat in your shower or tub-consider a shower bench or chair if unsteady on your feet  · Wear supportive shoes or non-skid socks when moving around  · Start an exercise program once approved by your provider.  If you are feeling weak following a hospital stay, talk to your doctor about home health or outpatient therapy programs designed to help rebuild your strength and endurance      Pain Medicine Instructions  You may need pain medicine after an injury or illness. There are many types of pain medicine. Two common types of pain medicine are:  · Non-opioid pain medicines. These include:  ? Over-the-counter (OTC) medicines such as acetaminophen or non-steroidal anti-inflammatory medicines (NSAIDS).  ? Prescription medicines such as gabapentin or pregabalin.  · Opioid prescription pain medicines. These include:  ? Hydrocodone.  ? Oxycodone.  ? Tramadol.  It is " important to follow instructions from your health care provider when you are taking prescription pain medicines. It is also important to take steps to keep others safe while taking pain medicine, such as avoiding certain activities and storing your medicines safely.  How can pain medicine affect me?  Pain medicine may be prescribed to relieve most or all of your pain. It may not be possible to make all of your pain go away, but you should be comfortable enough to move, breathe, and do normal activities.  Opioid pain medicines can cause side effects, such as:  · Constipation.  · Nausea.  · Vomiting.  · Drowsiness.  · Confusion.  · Taking opioids for nonmedical reasons even though taking them hurts your health and well-being (opioid use disorder).  · Difficulty breathing (respiratory depression).  Using opioid pain medicines for longer than 3 days increases your risk of these side effects.  Taking opioid pain medicine for a long period of time can affect your ability to do daily tasks. It also puts you at risk for:  · Motor vehicle accidents.  · Depression.  · Suicide.  · Heart attack.  · Taking too much of the medicine (overdose), which can sometimes lead to death.  What actions can I take to lower my risk of problems?  Take your medicine as directed    · Take your pain medicine exactly as told by your health care provider. Take it only when you need it.  ? If your pain gets less severe, you may take less than your prescribed dose if your health care provider approves.  ? If you are not having pain, do not take pain medicine unless your health care provider tells you to take it.  · If your pain is severe, do not try to treat it yourself by taking more pills than instructed on your prescription. Contact your health care provider for help.  · Write down the times when you take your pain medicine. It is easy to become confused while on pain medicine. Writing the time down can help you avoid overdose.  · Take other  over-the-counter or prescription medicines only as told by your health care provider.  ? If your pain medicine contains acetaminophen, do not take any other acetaminophen while taking this medicine. Acetaminophen is found in many over-the-counter and prescription medicines. An overdose of acetaminophen can result in severe liver damage.  · To prevent or treat constipation while you are taking prescription pain medicine:  ? Drink enough water to keep your urine pale yellow.  ? Eat more fruits and vegetables.  ? Use a stool softener as recommended or prescribed by your health care provider.  Restrict your activity as directed  While you are taking prescription pain medicine, and for 8 hours after your last dose, follow these instructions:  · Do not drive.  · Do not use machinery or power tools.  · Do not sign legal documents.  · Do not drink alcohol.  · Do not take sleeping pills.  · Do not supervise children by yourself.  · Do not participate in activities that require climbing or being in high places.  · Do not enter a body of water--such as a lake, river, ocean, spa, or swimming pool--unless an adult is nearby who can monitor and help you.    Keep others safe while you are taking prescription pain medicine  · Keep prescription pain medicine in a locked cabinet, or in a secure area where children and pets cannot reach it.  · Never share your prescription pain medicine with anyone.  · Do not save any leftover pills. If you have leftover medicine, you can:  ? Bring the medicine to a prescription take-back program. This is usually offered by the county or law enforcement.  ? Bring it to a pharmacy that has a drug disposal container.  ? Throw it out in the trash. Check the label or package insert of your medicine to see whether this is safe to do. If it is safe to throw it out, remove the medicine from the container and mix it with material that makes it unusable (such as pet waste) before putting it in the trash.  Know  your pain treatment plan  To manage your pain successfully, you and your health care provider need to understand each other and work together. To do this:  · Discuss the goals of your treatment, including how much pain you might expect to have and how you will manage the pain.  · Ask your health care provider to refer you to one or more specialists who can help you manage pain in other ways. Some other ways of managing pain include physical therapy, exercise, massage, or biofeedback.  · Review the risks and benefits of taking pain medicines for your condition.  · Be honest about the amount of medicines you take, and about any drug or alcohol use.  · Get pain medicine prescriptions from only one health care provider.  · Keep all follow-up visits as told by your health care provider. This is important.  Contact a health care provider if:  · Your medicine is not relieving your pain.  · You have a rash.  · You feel depressed.  Get help right away if:  Seek medical care right away if you are taking pain medicines and you (or people close to you) notice any of the following:  · Difficulty breathing.  · Breathing that is slower or more shallow than normal.  · Confusion.  · Sleepiness or difficulty staying awake.  · Nausea and vomiting.  · Your skin or lips turning pale or bluish in color.  · Tongue swelling.  If you ever feel like you may hurt yourself or others, or have thoughts about taking your own life, get help right away. You can go to your nearest emergency department or call:  · Your local emergency services (911 in the [place]  · A suicide crisis helpline, such as the National Suicide Prevention Lifeline at 1-276.235.7783. This is open 24 hours a day.  Summary  · It is important to follow instructions from your health care provider when you are taking prescription pain medicines.  · Pain medicine can help reduce pain but may also cause side effects. Make sure you understand the risks and benefits of taking pain  medicine for your condition.  · Take steps to lower your risk of problems by taking medicine exactly as told, restricting activity, keeping others safe, preventing constipation, and having a pain treatment plan.  This information is not intended to replace advice given to you by your health care provider. Make sure you discuss any questions you have with your health care provider.  Document Released: 03/26/2002 Document Revised: 02/08/2019 Document Reviewed: 07/30/2018  [employer] Patient Education © 2020 [employer]      Depression, Adult  Depression is feeling sad, low, down in the dumps, blue, gloomy, or empty. In general, there are two kinds of depression:  · Normal sadness or grief. This can happen after something upsetting. It often goes away on its own within 2 weeks. After losing a loved one (bereavement), normal sadness and grief may last longer than two weeks. It usually gets better with time.  · Clinical depression. This kind lasts longer than normal sadness or grief. It keeps you from doing the things you normally do in life. It is often hard to function at home, work, or at school. It may affect your relationships with others. Treatment is often needed.  GET HELP RIGHT AWAY IF:  · You have thoughts about hurting yourself or others.  · You lose touch with reality (psychotic symptoms). You may:  ¨ See or hear things that are not real.  ¨ Have untrue beliefs about your life or people around you.  · Your medicine is giving you problems.  MAKE SURE YOU:  · Understand these instructions.  · Will watch your condition.  · Will get help right away if you are not doing well or get worse.     This information is not intended to replace advice given to you by your health care provider. Make sure you discuss any questions you have with your health care provider.     Document Released: 01/20/2012 Document Revised: 01/08/2016 Document Reviewed: 04/18/2013  [employer] Interactive Patient Education ©2016 [employer]      Occupational Therapy Discharge Instructions for Yamile Go    4/1/2022    Level of Assist Required for Eating: Able to Complete Eating without Assist  Level of Assist Required for Grooming: Able to Complete Grooming without Assist  Level of Assist Required for Dressing: Able to Complete Dressing without Assist  Level of Assist Required for Toileting: Able to Complete Toileting without Assist  Level of Assist Required for Toilet Transfer: Able to Complete Toilet Transfer without Assist  Equipment for Toilet Transfer: Raised Toilet Seat with Arms  Level of Assist Required for Bathing: Able to Complete Bathing without Assist  Equipment for Bathing: Tub Transfer Bench,Hand Held Shower Head,Long Handled Sponge  Level of Assist Required for Shower Transfer: Able to Complete Shower Transfer without Assist  Equipment for Shower Transfer: Tub Transfer Bench  Level of Assist Required for Home Mgmt: Able to Complete Home Management without Assist  Level of Assist Required for Meal Prep: Able to Complete Meal Preparation without Assist  Driving: Please Contact Physician Prior to Driving  Home Exercise Program: Refer to Home Exercise Program Handout for Details  Comments: It was a pleasure to work with you iGllian! You have made great progress. Good luck with your continued recovery! - KATHIE De aL Garza    Physical Therapy Discharge Instructions for Gillian Go    4/1/2022    Level of Assist Required for Ambulation: No Assist on Flat Surfaces,Assist for Balance on Curbs,Assist for Balance on Stairs  Distance Patient May Ambulate: as tolerated  Device Recommended for Ambulation: Front-Wheeled Walker  Level of Assist Required for Transfers: Requires No Assist  Device Recommended for Transfers: Front-Wheeled Walker  Home Exercise Program: Refer to Home Exercise Program Handout for Details    Best wishes with your transition home, it was a pleasure to help you with your rehabilitation and watch your amazing progress!!  Keep working hard and watch the on-going recovery take place :)  [email or web address]

## 2022-04-02 NOTE — PROGRESS NOTES
Patient discharged to home per order.  Discharge instructions reviewed with patient and daughter, they verbalize understanding and signed copies placed in chart.  Patient has all belongings; signed copy of form in chart.  Patient left facility at 1100 via fww accompanied by rehab staff and daughter.  Have enjoyed working with this pleasant patient.

## 2022-04-02 NOTE — CARE PLAN
The patient is Stable - Low risk of patient condition declining or worsening    Shift Goals  Clinical Goals: safety  Patient Goals: pain control, sleep well    Problem: Knowledge Deficit - Standard  Goal: Patient and family/care givers will demonstrate understanding of plan of care, disease process/condition, diagnostic tests and medications  Outcome: Progressing. Patient to discharge today. Education provided regarding discharge routines and questions answered. Patient is mod-I in the room and call appropriately for needs.      Problem: Pain - Standard  Goal: Alleviation of pain or a reduction in pain to the patient’s comfort goal  Outcome: Not Met. Patient taking PRN pain medication for back pain and left upper leg pain. Patient educated on medication and side effects with good understanding. Will continue to assess.

## 2022-04-04 NOTE — CARE PLAN
Problem: Recreation Therapy  Goal: STG-Within one week, patient will demonstrate a standing tolerance during a leisure activity Min A for 1 minute x5 and no LOB.   Outcome: Met  Goal: LTG-By discharge, patient will demonstrate leisure problem solving by sharing on two positive lesiure activities they would like to participate in post dc and any perceived barriers to their participation.  Outcome: Met  Goal: LTG-By discharge, patient will demonstrate a standing tolerance during a leisure activity Min A for 3 minutes x3 and no LOB.   Outcome: Met

## 2022-05-19 NOTE — ASSESSMENT & PLAN NOTE
2/2 GLF onto back  Non-surgical management  TLSO  Pain control per Physiatry   ***************************************************************  Whit Green, PGY1  Internal Medicine   pager: DARNELL: [phone], [facility]: [phone]  ***************************************************************    IRASEMA DUNN  72y  MRN: [id number]    Patient is a 72y old  Female who presents with a chief complaint of sepsis and pain (18 May 2022 13:04)      Subjective: no events ON. Denies fever, CP, SOB, abn pain, N/V, dysuria. Tolerating diet.      MEDICATIONS  (STANDING):  albuterol/ipratropium for Nebulization 3 milliLiter(s) Nebulizer every 6 hours  apixaban 5 milliGRAM(s) Oral every 12 hours  atorvastatin 10 milliGRAM(s) Oral at bedtime  buDESOnide    Inhalation Suspension 0.5 milliGRAM(s) Inhalation two times a day  budesonide 160 MICROgram(s)/formoterol 4.5 MICROgram(s) Inhaler 2 Puff(s) Inhalation two times a day  cefTRIAXone   IVPB 1000 milliGRAM(s) IV Intermittent every 24 hours  dextrose 5% + sodium chloride 0.9%. 1000 milliLiter(s) (50 mL/Hr) IV Continuous <Continuous>  dextrose 5%. 1000 milliLiter(s) (100 mL/Hr) IV Continuous <Continuous>  dextrose 5%. 1000 milliLiter(s) (50 mL/Hr) IV Continuous <Continuous>  dextrose 50% Injectable 25 Gram(s) IV Push once  dextrose 50% Injectable 12.5 Gram(s) IV Push once  dextrose 50% Injectable 25 Gram(s) IV Push once  dextrose 50% Injectable 25 Gram(s) IV Push once  disopyramide 100 milliGRAM(s) Oral two times a day  gabapentin 300 milliGRAM(s) Oral every 8 hours  glucagon  Injectable 1 milliGRAM(s) IntraMuscular once  levothyroxine 50 MICROGram(s) Oral daily  lidocaine   4% Patch 1 Patch Transdermal daily  lisinopril 20 milliGRAM(s) Oral daily  metoprolol succinate  milliGRAM(s) Oral daily  montelukast 10 milliGRAM(s) Oral daily  pantoprazole    Tablet 40 milliGRAM(s) Oral before breakfast  polyethylene glycol 3350 17 Gram(s) Oral daily  senna 1 Tablet(s) Oral daily  sertraline 25 milliGRAM(s) Oral daily    MEDICATIONS  (PRN):  acetaminophen     Tablet .. 650 milliGRAM(s) Oral every 4 hours PRN Moderate Pain (4 - 6), Severe Pain (7 - 10)  benzonatate 100 milliGRAM(s) Oral three times a day PRN Cough  dextrose Oral Gel 15 Gram(s) Oral once PRN Blood Glucose LESS THAN 70 milliGRAM(s)/deciliter  dextrose Oral Gel 15 Gram(s) Oral once PRN Blood Glucose LESS THAN 70 milliGRAM(s)/deciliter  HYDROmorphone  Injectable 0.2 milliGRAM(s) IV Push every 3 hours PRN Moderate Pain (4 - 6)  HYDROmorphone  Injectable 0.5 milliGRAM(s) IV Push every 3 hours PRN Severe Pain (7 - 10)  sodium chloride 0.65% Nasal 1 Spray(s) Both Nostrils daily PRN Nasal Congestion      Objective:    Vitals: Vital Signs Last 24 Hrs  T(C): 36.5 (05-19-22 @ 09:11), Max: 36.8 (05-18-22 @ 23:27)  T(F): 97.7 (05-19-22 @ 09:11), Max: 98.2 (05-18-22 @ 23:27)  HR: 99 (05-19-22 @ 09:11) (92 - 120)  BP: 131/95 (05-19-22 @ 09:11) (131/85 - 158/100)  BP(mean): --  RR: 18 (05-19-22 @ 09:11) (18 - 18)  SpO2: 100% (05-19-22 @ 09:11) (93% - 100%)            I&O's Summary    18 May 2022 07:01  -  19 May 2022 07:00  --------------------------------------------------------  IN: 880 mL / OUT: 2450 mL / NET: -1570 mL    19 May 2022 07:01  -  19 May 2022 10:19  --------------------------------------------------------  IN: 240 mL / OUT: 0 mL / NET: 240 mL        PHYSICAL EXAM:  GENERAL: NAD  HEAD:  Atraumatic, Normocephalic  EYES: EOMI, conjunctiva and sclera clear  CHEST/LUNG: Clear to percussion bilaterally; No rales, rhonchi, wheezing, or rubs  HEART: Regular rate and rhythm; No murmurs, rubs, or gallops  ABDOMEN: Soft, Nontender, Nondistended;   SKIN: No rashes or lesions  NERVOUS SYSTEM:  Alert & Oriented X3, no focal deficits    LABS:  05-19    138  |  101  |  20  ----------------------------<  100<H>  4.3   |  23  |  1.10  05-18    133<L>  |  100  |  24<H>  ----------------------------<  81  4.3   |  21<L>  |  1.44<H>  05-17    132<L>  |  98  |  21  ----------------------------<  198<H>  4.8   |  21<L>  |  1.40<H>    Ca    8.6      19 May 2022 07:07  Ca    8.6      18 May 2022 05:22  Ca    8.8      17 May 2022 15:35  Phos  2.9     05-19  Mg     1.7     05-19                                                8.3    8.56  )-----------( 327      ( 19 May 2022 07:00 )             28.2                         8.0    10.52 )-----------( 311      ( 18 May 2022 05:22 )             27.6                         10.1   12.02 )-----------( 436      ( 17 May 2022 07:07 )             34.4     CAPILLARY BLOOD GLUCOSE      POCT Blood Glucose.: 113 mg/dL (19 May 2022 07:54)  POCT Blood Glucose.: 119 mg/dL (19 May 2022 05:09)  POCT Blood Glucose.: 138 mg/dL (18 May 2022 21:11)  POCT Blood Glucose.: 159 mg/dL (18 May 2022 20:16)  POCT Blood Glucose.: 125 mg/dL (18 May 2022 17:00)  POCT Blood Glucose.: 114 mg/dL (18 May 2022 13:21)      RADIOLOGY & ADDITIONAL TESTS:    Imaging Personally Reviewed:  [x ] YES  [ ] NO    Consultants involved in case:   Consultant(s) Notes Reviewed:  [ x] YES  [ ] NO:   Care Discussed with Consultants/Other Providers [x ] YES  [ ] NO         ***************************************************************  Whit Green, PGY1  Internal Medicine   pager: LIDUNCAN: [phone], [facility]: [phone]  ***************************************************************    IRASEMA DUNN  72y  MRN: [id number]    Patient is a 72y old  Female who presents with a chief complaint of sepsis and pain (18 May 2022 13:04)      Subjective: no events ON. Eating breakfast. Still with urinary frequency and burning.    MEDICATIONS  (STANDING):  albuterol/ipratropium for Nebulization 3 milliLiter(s) Nebulizer every 6 hours  apixaban 5 milliGRAM(s) Oral every 12 hours  atorvastatin 10 milliGRAM(s) Oral at bedtime  buDESOnide    Inhalation Suspension 0.5 milliGRAM(s) Inhalation two times a day  budesonide 160 MICROgram(s)/formoterol 4.5 MICROgram(s) Inhaler 2 Puff(s) Inhalation two times a day  cefTRIAXone   IVPB 1000 milliGRAM(s) IV Intermittent every 24 hours  dextrose 5% + sodium chloride 0.9%. 1000 milliLiter(s) (50 mL/Hr) IV Continuous <Continuous>  dextrose 5%. 1000 milliLiter(s) (100 mL/Hr) IV Continuous <Continuous>  dextrose 5%. 1000 milliLiter(s) (50 mL/Hr) IV Continuous <Continuous>  dextrose 50% Injectable 25 Gram(s) IV Push once  dextrose 50% Injectable 12.5 Gram(s) IV Push once  dextrose 50% Injectable 25 Gram(s) IV Push once  dextrose 50% Injectable 25 Gram(s) IV Push once  disopyramide 100 milliGRAM(s) Oral two times a day  gabapentin 300 milliGRAM(s) Oral every 8 hours  glucagon  Injectable 1 milliGRAM(s) IntraMuscular once  levothyroxine 50 MICROGram(s) Oral daily  lidocaine   4% Patch 1 Patch Transdermal daily  lisinopril 20 milliGRAM(s) Oral daily  metoprolol succinate  milliGRAM(s) Oral daily  montelukast 10 milliGRAM(s) Oral daily  pantoprazole    Tablet 40 milliGRAM(s) Oral before breakfast  polyethylene glycol 3350 17 Gram(s) Oral daily  senna 1 Tablet(s) Oral daily  sertraline 25 milliGRAM(s) Oral daily    MEDICATIONS  (PRN):  acetaminophen     Tablet .. 650 milliGRAM(s) Oral every 4 hours PRN Moderate Pain (4 - 6), Severe Pain (7 - 10)  benzonatate 100 milliGRAM(s) Oral three times a day PRN Cough  dextrose Oral Gel 15 Gram(s) Oral once PRN Blood Glucose LESS THAN 70 milliGRAM(s)/deciliter  dextrose Oral Gel 15 Gram(s) Oral once PRN Blood Glucose LESS THAN 70 milliGRAM(s)/deciliter  HYDROmorphone  Injectable 0.2 milliGRAM(s) IV Push every 3 hours PRN Moderate Pain (4 - 6)  HYDROmorphone  Injectable 0.5 milliGRAM(s) IV Push every 3 hours PRN Severe Pain (7 - 10)  sodium chloride 0.65% Nasal 1 Spray(s) Both Nostrils daily PRN Nasal Congestion      Objective:    Vitals: Vital Signs Last 24 Hrs  T(C): 36.5 (05-19-22 @ 09:11), Max: 36.8 (05-18-22 @ 23:27)  T(F): 97.7 (05-19-22 @ 09:11), Max: 98.2 (05-18-22 @ 23:27)  HR: 99 (05-19-22 @ 09:11) (92 - 120)  BP: 131/95 (05-19-22 @ 09:11) (131/85 - 158/100)  BP(mean): --  RR: 18 (05-19-22 @ 09:11) (18 - 18)  SpO2: 100% (05-19-22 @ 09:11) (93% - 100%)            I&O's Summary    18 May 2022 07:01  -  19 May 2022 07:00  --------------------------------------------------------  IN: 880 mL / OUT: 2450 mL / NET: -1570 mL    19 May 2022 07:01  -  19 May 2022 10:19  --------------------------------------------------------  IN: 240 mL / OUT: 0 mL / NET: 240 mL        PHYSICAL EXAM:  GENERAL: NAD  HEAD:  Atraumatic, Normocephalic  EYES: EOMI, conjunctiva and sclera clear  CHEST/LUNG: Clear to percussion bilaterally; No rales, rhonchi, wheezing, or rubs  HEART: Regular rate and rhythm; No murmurs, rubs, or gallops  ABDOMEN: Soft, mildly tender to palpation epigastric, Nondistended;   SKIN: No rashes or lesions  NERVOUS SYSTEM:  Alert & Oriented X3, no focal deficits    LABS:  05-19    138  |  101  |  20  ----------------------------<  100<H>  4.3   |  23  |  1.10  05-18    133<L>  |  100  |  24<H>  ----------------------------<  81  4.3   |  21<L>  |  1.44<H>  05-17    132<L>  |  98  |  21  ----------------------------<  198<H>  4.8   |  21<L>  |  1.40<H>    Ca    8.6      19 May 2022 07:07  Ca    8.6      18 May 2022 05:22  Ca    8.8      17 May 2022 15:35  Phos  2.9     05-19  Mg     1.7     05-19                                                8.3    8.56  )-----------( 327      ( 19 May 2022 07:00 )             28.2                         8.0    10.52 )-----------( 311      ( 18 May 2022 05:22 )             27.6                         10.1   12.02 )-----------( 436      ( 17 May 2022 07:07 )             34.4     CAPILLARY BLOOD GLUCOSE      POCT Blood Glucose.: 113 mg/dL (19 May 2022 07:54)  POCT Blood Glucose.: 119 mg/dL (19 May 2022 05:09)  POCT Blood Glucose.: 138 mg/dL (18 May 2022 21:11)  POCT Blood Glucose.: 159 mg/dL (18 May 2022 20:16)  POCT Blood Glucose.: 125 mg/dL (18 May 2022 17:00)  POCT Blood Glucose.: 114 mg/dL (18 May 2022 13:21)      RADIOLOGY & ADDITIONAL TESTS:    Imaging Personally Reviewed:  [x ] YES  [ ] NO    Consultants involved in case:   Consultant(s) Notes Reviewed:  [ x] YES  [ ] NO:   Care Discussed with Consultants/Other Providers [x ] YES  [ ] NO

## 2022-10-28 NOTE — ASSESSMENT & PLAN NOTE
Obesity complicating patient's respiratory status and COVID-19 pneumonia  - Discussed diet and lifestyle modifications with patient  - Patient will need to follow up with PCP for outpatient management      Statement Selected

## 2022-11-11 ENCOUNTER — PATIENT MESSAGE (OUTPATIENT)
Dept: HEALTH INFORMATION MANAGEMENT | Facility: OTHER | Age: 72
End: 2022-11-11

## 2022-12-20 ENCOUNTER — HOSPITAL ENCOUNTER (OUTPATIENT)
Dept: RADIOLOGY | Facility: MEDICAL CENTER | Age: 72
End: 2022-12-20
Attending: PHYSICAL MEDICINE & REHABILITATION
Payer: MEDICARE

## 2022-12-20 ENCOUNTER — HOSPITAL ENCOUNTER (OUTPATIENT)
Dept: RADIOLOGY | Facility: MEDICAL CENTER | Age: 72
End: 2022-12-20
Attending: PHYSICAL MEDICINE & REHABILITATION | Admitting: PHYSICAL MEDICINE & REHABILITATION
Payer: MEDICARE

## 2022-12-20 ENCOUNTER — PRE-ADMISSION TESTING (OUTPATIENT)
Dept: ADMISSIONS | Facility: MEDICAL CENTER | Age: 72
End: 2022-12-20
Attending: PHYSICAL MEDICINE & REHABILITATION
Payer: MEDICARE

## 2022-12-20 DIAGNOSIS — Z01.811 PRE-OPERATIVE RESPIRATORY EXAMINATION: ICD-10-CM

## 2022-12-20 DIAGNOSIS — Z01.810 PRE-OPERATIVE CARDIOVASCULAR EXAMINATION: ICD-10-CM

## 2022-12-20 DIAGNOSIS — Z01.812 PRE-OPERATIVE LABORATORY EXAMINATION: ICD-10-CM

## 2022-12-20 DIAGNOSIS — Z01.818 PREOP EXAMINATION: ICD-10-CM

## 2022-12-20 LAB
ANION GAP SERPL CALC-SCNC: 10 MMOL/L (ref 7–16)
APPEARANCE UR: CLEAR
APTT PPP: 27.5 SEC (ref 24.7–36)
BILIRUB UR QL STRIP.AUTO: NEGATIVE
BUN SERPL-MCNC: 14 MG/DL (ref 8–22)
CALCIUM SERPL-MCNC: 9.6 MG/DL (ref 8.4–10.2)
CHLORIDE SERPL-SCNC: 106 MMOL/L (ref 96–112)
CO2 SERPL-SCNC: 25 MMOL/L (ref 20–33)
COLOR UR: YELLOW
CREAT SERPL-MCNC: 0.63 MG/DL (ref 0.5–1.4)
ERYTHROCYTE [DISTWIDTH] IN BLOOD BY AUTOMATED COUNT: 47.6 FL (ref 35.9–50)
EST. AVERAGE GLUCOSE BLD GHB EST-MCNC: 134 MG/DL
GFR SERPLBLD CREATININE-BSD FMLA CKD-EPI: 94 ML/MIN/1.73 M 2
GLUCOSE SERPL-MCNC: 87 MG/DL (ref 65–99)
GLUCOSE UR STRIP.AUTO-MCNC: NEGATIVE MG/DL
HBA1C MFR BLD: 6.3 % (ref 4–5.6)
HCT VFR BLD AUTO: 40.9 % (ref 37–47)
HGB BLD-MCNC: 12.8 G/DL (ref 12–16)
INR PPP: 0.92 (ref 0.87–1.13)
KETONES UR STRIP.AUTO-MCNC: NEGATIVE MG/DL
LEUKOCYTE ESTERASE UR QL STRIP.AUTO: NEGATIVE
MCH RBC QN AUTO: 27.1 PG (ref 27–33)
MCHC RBC AUTO-ENTMCNC: 31.3 G/DL (ref 33.6–35)
MCV RBC AUTO: 86.7 FL (ref 81.4–97.8)
MICRO URNS: NORMAL
NITRITE UR QL STRIP.AUTO: NEGATIVE
PH UR STRIP.AUTO: 6 [PH] (ref 5–8)
PLATELET # BLD AUTO: 248 K/UL (ref 164–446)
PMV BLD AUTO: 10.6 FL (ref 9–12.9)
POTASSIUM SERPL-SCNC: 4.1 MMOL/L (ref 3.6–5.5)
PROT UR QL STRIP: NEGATIVE MG/DL
PROTHROMBIN TIME: 12.3 SEC (ref 12–14.6)
RBC # BLD AUTO: 4.72 M/UL (ref 4.2–5.4)
RBC UR QL AUTO: NEGATIVE
SODIUM SERPL-SCNC: 141 MMOL/L (ref 135–145)
SP GR UR STRIP.AUTO: 1.01
WBC # BLD AUTO: 8.1 K/UL (ref 4.8–10.8)

## 2022-12-20 PROCEDURE — 71046 X-RAY EXAM CHEST 2 VIEWS: CPT

## 2022-12-20 PROCEDURE — 36415 COLL VENOUS BLD VENIPUNCTURE: CPT

## 2022-12-20 PROCEDURE — 72110 X-RAY EXAM L-2 SPINE 4/>VWS: CPT

## 2022-12-20 PROCEDURE — 81003 URINALYSIS AUTO W/O SCOPE: CPT

## 2022-12-20 PROCEDURE — 85730 THROMBOPLASTIN TIME PARTIAL: CPT

## 2022-12-20 PROCEDURE — 83036 HEMOGLOBIN GLYCOSYLATED A1C: CPT

## 2022-12-20 PROCEDURE — 85027 COMPLETE CBC AUTOMATED: CPT

## 2022-12-20 PROCEDURE — 80048 BASIC METABOLIC PNL TOTAL CA: CPT

## 2022-12-20 PROCEDURE — 93005 ELECTROCARDIOGRAM TRACING: CPT

## 2022-12-20 PROCEDURE — 85610 PROTHROMBIN TIME: CPT

## 2022-12-20 RX ORDER — LIOTHYRONINE SODIUM 5 UG/1
5 TABLET ORAL DAILY
COMMUNITY

## 2022-12-20 RX ORDER — HYDROCODONE BITARTRATE AND ACETAMINOPHEN 5; 325 MG/1; MG/1
1 TABLET ORAL EVERY 4 HOURS PRN
COMMUNITY
End: 2023-05-03

## 2022-12-20 RX ORDER — AMITRIPTYLINE HYDROCHLORIDE 100 MG/1
100 TABLET ORAL NIGHTLY
COMMUNITY

## 2022-12-20 RX ORDER — LISINOPRIL 2.5 MG/1
2.5 TABLET ORAL DAILY
Status: ON HOLD | COMMUNITY
End: 2023-12-20

## 2022-12-20 ASSESSMENT — FIBROSIS 4 INDEX: FIB4 SCORE: 1.4

## 2022-12-20 NOTE — PREPROCEDURE INSTRUCTIONS
Per anesthesia protocol instructed to take the following medications DOS: Albuterol prn, lexapro, norco. Cytomel, lopressor, imitrex, synthroid.

## 2022-12-21 ENCOUNTER — ANESTHESIA EVENT (OUTPATIENT)
Dept: SURGERY | Facility: MEDICAL CENTER | Age: 72
End: 2022-12-21
Payer: MEDICARE

## 2022-12-21 LAB — EKG IMPRESSION: NORMAL

## 2022-12-21 PROCEDURE — 93010 ELECTROCARDIOGRAM REPORT: CPT | Performed by: INTERNAL MEDICINE

## 2022-12-22 ENCOUNTER — HOSPITAL ENCOUNTER (OUTPATIENT)
Facility: MEDICAL CENTER | Age: 72
End: 2022-12-22
Attending: PHYSICAL MEDICINE & REHABILITATION | Admitting: PHYSICAL MEDICINE & REHABILITATION
Payer: MEDICARE

## 2022-12-22 ENCOUNTER — APPOINTMENT (OUTPATIENT)
Dept: RADIOLOGY | Facility: MEDICAL CENTER | Age: 72
End: 2022-12-22
Attending: PHYSICAL MEDICINE & REHABILITATION
Payer: MEDICARE

## 2022-12-22 ENCOUNTER — ANESTHESIA (OUTPATIENT)
Dept: SURGERY | Facility: MEDICAL CENTER | Age: 72
End: 2022-12-22
Payer: MEDICARE

## 2022-12-22 VITALS
OXYGEN SATURATION: 92 % | RESPIRATION RATE: 16 BRPM | HEIGHT: 62 IN | BODY MASS INDEX: 29.17 KG/M2 | WEIGHT: 158.51 LBS | HEART RATE: 73 BPM | DIASTOLIC BLOOD PRESSURE: 56 MMHG | TEMPERATURE: 97.9 F | SYSTOLIC BLOOD PRESSURE: 108 MMHG

## 2022-12-22 LAB
GLUCOSE BLD STRIP.AUTO-MCNC: 77 MG/DL (ref 65–99)
GLUCOSE BLD STRIP.AUTO-MCNC: 78 MG/DL (ref 65–99)

## 2022-12-22 PROCEDURE — 99100 ANES PT EXTEME AGE<1 YR&>70: CPT | Performed by: STUDENT IN AN ORGANIZED HEALTH CARE EDUCATION/TRAINING PROGRAM

## 2022-12-22 PROCEDURE — 700105 HCHG RX REV CODE 258: Performed by: PHYSICAL MEDICINE & REHABILITATION

## 2022-12-22 PROCEDURE — 502000 HCHG MISC OR IMPLANTS RC 0278: Performed by: PHYSICAL MEDICINE & REHABILITATION

## 2022-12-22 PROCEDURE — 160048 HCHG OR STATISTICAL LEVEL 1-5: Performed by: PHYSICAL MEDICINE & REHABILITATION

## 2022-12-22 PROCEDURE — C1778 LEAD, NEUROSTIMULATOR: HCPCS | Performed by: PHYSICAL MEDICINE & REHABILITATION

## 2022-12-22 PROCEDURE — 73551 X-RAY EXAM OF FEMUR 1: CPT | Mod: LT

## 2022-12-22 PROCEDURE — 82962 GLUCOSE BLOOD TEST: CPT

## 2022-12-22 PROCEDURE — 700101 HCHG RX REV CODE 250: Performed by: PHYSICAL MEDICINE & REHABILITATION

## 2022-12-22 PROCEDURE — 700111 HCHG RX REV CODE 636 W/ 250 OVERRIDE (IP): Performed by: STUDENT IN AN ORGANIZED HEALTH CARE EDUCATION/TRAINING PROGRAM

## 2022-12-22 PROCEDURE — C1767 GENERATOR, NEURO NON-RECHARG: HCPCS | Performed by: PHYSICAL MEDICINE & REHABILITATION

## 2022-12-22 PROCEDURE — C1883 ADAPT/EXT, PACING/NEURO LEAD: HCPCS | Performed by: PHYSICAL MEDICINE & REHABILITATION

## 2022-12-22 PROCEDURE — 160002 HCHG RECOVERY MINUTES (STAT): Performed by: PHYSICAL MEDICINE & REHABILITATION

## 2022-12-22 PROCEDURE — 160025 RECOVERY II MINUTES (STATS): Performed by: PHYSICAL MEDICINE & REHABILITATION

## 2022-12-22 PROCEDURE — 160009 HCHG ANES TIME/MIN: Performed by: PHYSICAL MEDICINE & REHABILITATION

## 2022-12-22 PROCEDURE — 160029 HCHG SURGERY MINUTES - 1ST 30 MINS LEVEL 4: Performed by: PHYSICAL MEDICINE & REHABILITATION

## 2022-12-22 PROCEDURE — 160035 HCHG PACU - 1ST 60 MINS PHASE I: Performed by: PHYSICAL MEDICINE & REHABILITATION

## 2022-12-22 PROCEDURE — 700102 HCHG RX REV CODE 250 W/ 637 OVERRIDE(OP): Performed by: STUDENT IN AN ORGANIZED HEALTH CARE EDUCATION/TRAINING PROGRAM

## 2022-12-22 PROCEDURE — 160046 HCHG PACU - 1ST 60 MINS PHASE II: Performed by: PHYSICAL MEDICINE & REHABILITATION

## 2022-12-22 PROCEDURE — 502240 HCHG MISC OR SUPPLY RC 0272: Performed by: PHYSICAL MEDICINE & REHABILITATION

## 2022-12-22 PROCEDURE — 700101 HCHG RX REV CODE 250: Performed by: STUDENT IN AN ORGANIZED HEALTH CARE EDUCATION/TRAINING PROGRAM

## 2022-12-22 PROCEDURE — 160041 HCHG SURGERY MINUTES - EA ADDL 1 MIN LEVEL 4: Performed by: PHYSICAL MEDICINE & REHABILITATION

## 2022-12-22 PROCEDURE — 160036 HCHG PACU - EA ADDL 30 MINS PHASE I: Performed by: PHYSICAL MEDICINE & REHABILITATION

## 2022-12-22 PROCEDURE — A9270 NON-COVERED ITEM OR SERVICE: HCPCS | Performed by: STUDENT IN AN ORGANIZED HEALTH CARE EDUCATION/TRAINING PROGRAM

## 2022-12-22 PROCEDURE — 00400 ANES INTEGUMENTARY SYS NOS: CPT | Performed by: STUDENT IN AN ORGANIZED HEALTH CARE EDUCATION/TRAINING PROGRAM

## 2022-12-22 RX ORDER — HALOPERIDOL 5 MG/ML
1 INJECTION INTRAMUSCULAR
Status: DISCONTINUED | OUTPATIENT
Start: 2022-12-22 | End: 2022-12-22 | Stop reason: HOSPADM

## 2022-12-22 RX ORDER — DIPHENHYDRAMINE HYDROCHLORIDE 50 MG/ML
12.5 INJECTION INTRAMUSCULAR; INTRAVENOUS
Status: DISCONTINUED | OUTPATIENT
Start: 2022-12-22 | End: 2022-12-22 | Stop reason: HOSPADM

## 2022-12-22 RX ORDER — SODIUM CHLORIDE, SODIUM LACTATE, POTASSIUM CHLORIDE, CALCIUM CHLORIDE 600; 310; 30; 20 MG/100ML; MG/100ML; MG/100ML; MG/100ML
INJECTION, SOLUTION INTRAVENOUS CONTINUOUS
Status: DISCONTINUED | OUTPATIENT
Start: 2022-12-22 | End: 2022-12-22 | Stop reason: HOSPADM

## 2022-12-22 RX ORDER — OXYCODONE HCL 5 MG/5 ML
10 SOLUTION, ORAL ORAL
Status: DISCONTINUED | OUTPATIENT
Start: 2022-12-22 | End: 2022-12-22 | Stop reason: HOSPADM

## 2022-12-22 RX ORDER — ONDANSETRON 2 MG/ML
4 INJECTION INTRAMUSCULAR; INTRAVENOUS
Status: DISCONTINUED | OUTPATIENT
Start: 2022-12-22 | End: 2022-12-22 | Stop reason: HOSPADM

## 2022-12-22 RX ORDER — ONDANSETRON 2 MG/ML
INJECTION INTRAMUSCULAR; INTRAVENOUS PRN
Status: DISCONTINUED | OUTPATIENT
Start: 2022-12-22 | End: 2022-12-22 | Stop reason: SURG

## 2022-12-22 RX ORDER — ACETAMINOPHEN 500 MG
1000 TABLET ORAL ONCE
Status: COMPLETED | OUTPATIENT
Start: 2022-12-22 | End: 2022-12-22

## 2022-12-22 RX ORDER — LIDOCAINE HYDROCHLORIDE 20 MG/ML
INJECTION, SOLUTION EPIDURAL; INFILTRATION; INTRACAUDAL; PERINEURAL PRN
Status: DISCONTINUED | OUTPATIENT
Start: 2022-12-22 | End: 2022-12-22 | Stop reason: SURG

## 2022-12-22 RX ORDER — PHENYLEPHRINE HCL IN 0.9% NACL 0.5 MG/5ML
SYRINGE (ML) INTRAVENOUS PRN
Status: DISCONTINUED | OUTPATIENT
Start: 2022-12-22 | End: 2022-12-22 | Stop reason: SURG

## 2022-12-22 RX ORDER — SODIUM CHLORIDE, SODIUM LACTATE, POTASSIUM CHLORIDE, CALCIUM CHLORIDE 600; 310; 30; 20 MG/100ML; MG/100ML; MG/100ML; MG/100ML
INJECTION, SOLUTION INTRAVENOUS CONTINUOUS
Status: ACTIVE | OUTPATIENT
Start: 2022-12-22 | End: 2022-12-22

## 2022-12-22 RX ORDER — HYDRALAZINE HYDROCHLORIDE 20 MG/ML
5 INJECTION INTRAMUSCULAR; INTRAVENOUS
Status: DISCONTINUED | OUTPATIENT
Start: 2022-12-22 | End: 2022-12-22 | Stop reason: HOSPADM

## 2022-12-22 RX ORDER — KETOROLAC TROMETHAMINE 30 MG/ML
15 INJECTION, SOLUTION INTRAMUSCULAR; INTRAVENOUS ONCE
Status: COMPLETED | OUTPATIENT
Start: 2022-12-22 | End: 2022-12-22

## 2022-12-22 RX ORDER — CEFAZOLIN SODIUM 1 G/3ML
INJECTION, POWDER, FOR SOLUTION INTRAMUSCULAR; INTRAVENOUS PRN
Status: DISCONTINUED | OUTPATIENT
Start: 2022-12-22 | End: 2022-12-22 | Stop reason: SURG

## 2022-12-22 RX ORDER — BUPIVACAINE HYDROCHLORIDE AND EPINEPHRINE 2.5; 5 MG/ML; UG/ML
INJECTION, SOLUTION EPIDURAL; INFILTRATION; INTRACAUDAL; PERINEURAL
Status: DISCONTINUED | OUTPATIENT
Start: 2022-12-22 | End: 2022-12-22 | Stop reason: HOSPADM

## 2022-12-22 RX ORDER — LABETALOL HYDROCHLORIDE 5 MG/ML
5 INJECTION, SOLUTION INTRAVENOUS
Status: DISCONTINUED | OUTPATIENT
Start: 2022-12-22 | End: 2022-12-22 | Stop reason: HOSPADM

## 2022-12-22 RX ORDER — OXYCODONE HCL 5 MG/5 ML
5 SOLUTION, ORAL ORAL
Status: DISCONTINUED | OUTPATIENT
Start: 2022-12-22 | End: 2022-12-22 | Stop reason: HOSPADM

## 2022-12-22 RX ORDER — LIDOCAINE HYDROCHLORIDE 10 MG/ML
INJECTION, SOLUTION INFILTRATION; PERINEURAL
Status: DISCONTINUED | OUTPATIENT
Start: 2022-12-22 | End: 2022-12-22 | Stop reason: HOSPADM

## 2022-12-22 RX ORDER — MIDAZOLAM HYDROCHLORIDE 1 MG/ML
INJECTION INTRAMUSCULAR; INTRAVENOUS PRN
Status: DISCONTINUED | OUTPATIENT
Start: 2022-12-22 | End: 2022-12-22 | Stop reason: SURG

## 2022-12-22 RX ADMIN — ONDANSETRON 4 MG: 2 INJECTION INTRAMUSCULAR; INTRAVENOUS at 07:37

## 2022-12-22 RX ADMIN — MIDAZOLAM HYDROCHLORIDE 0.5 MG: 1 INJECTION, SOLUTION INTRAMUSCULAR; INTRAVENOUS at 07:37

## 2022-12-22 RX ADMIN — KETOROLAC TROMETHAMINE 15 MG: 30 INJECTION, SOLUTION INTRAMUSCULAR; INTRAVENOUS at 10:07

## 2022-12-22 RX ADMIN — Medication 300 MCG: at 08:14

## 2022-12-22 RX ADMIN — Medication 200 MCG: at 09:09

## 2022-12-22 RX ADMIN — PROPOFOL 50 MCG/KG/MIN: 10 INJECTION, EMULSION INTRAVENOUS at 07:49

## 2022-12-22 RX ADMIN — Medication 200 MCG: at 09:41

## 2022-12-22 RX ADMIN — SODIUM CHLORIDE, POTASSIUM CHLORIDE, SODIUM LACTATE AND CALCIUM CHLORIDE: 600; 310; 30; 20 INJECTION, SOLUTION INTRAVENOUS at 07:07

## 2022-12-22 RX ADMIN — CEFAZOLIN 2 G: 330 INJECTION, POWDER, FOR SOLUTION INTRAMUSCULAR; INTRAVENOUS at 07:46

## 2022-12-22 RX ADMIN — Medication 200 MCG: at 07:54

## 2022-12-22 RX ADMIN — ACETAMINOPHEN 1000 MG: 500 TABLET ORAL at 07:06

## 2022-12-22 RX ADMIN — Medication 200 MCG: at 08:07

## 2022-12-22 RX ADMIN — SODIUM CHLORIDE, POTASSIUM CHLORIDE, SODIUM LACTATE AND CALCIUM CHLORIDE: 600; 310; 30; 20 INJECTION, SOLUTION INTRAVENOUS at 07:30

## 2022-12-22 RX ADMIN — PROPOFOL 40 MG: 10 INJECTION, EMULSION INTRAVENOUS at 09:02

## 2022-12-22 RX ADMIN — LIDOCAINE HYDROCHLORIDE 100 MG: 20 INJECTION, SOLUTION EPIDURAL; INFILTRATION; INTRACAUDAL at 07:49

## 2022-12-22 RX ADMIN — FENTANYL CITRATE 50 MCG: 50 INJECTION, SOLUTION INTRAMUSCULAR; INTRAVENOUS at 07:43

## 2022-12-22 RX ADMIN — Medication 200 MCG: at 09:13

## 2022-12-22 RX ADMIN — Medication 200 MCG: at 08:04

## 2022-12-22 RX ADMIN — Medication 200 MCG: at 09:23

## 2022-12-22 RX ADMIN — FENTANYL CITRATE 100 MCG: 50 INJECTION, SOLUTION INTRAMUSCULAR; INTRAVENOUS at 07:37

## 2022-12-22 RX ADMIN — FENTANYL CITRATE 50 MCG: 50 INJECTION, SOLUTION INTRAMUSCULAR; INTRAVENOUS at 09:02

## 2022-12-22 RX ADMIN — PROPOFOL 30 MG: 10 INJECTION, EMULSION INTRAVENOUS at 07:44

## 2022-12-22 RX ADMIN — Medication 200 MCG: at 09:32

## 2022-12-22 ASSESSMENT — PAIN SCALES - GENERAL: PAIN_LEVEL: 0

## 2022-12-22 ASSESSMENT — PAIN DESCRIPTION - PAIN TYPE
TYPE: SURGICAL PAIN
TYPE: SURGICAL PAIN
TYPE: CHRONIC PAIN
TYPE: SURGICAL PAIN

## 2022-12-22 ASSESSMENT — FIBROSIS 4 INDEX: FIB4 SCORE: 0.75

## 2022-12-22 NOTE — DISCHARGE INSTRUCTIONS
Discharge Education for patients on SAMI (Obstructive Sleep Apnea) Protocol    Prior to receiving sedation or anesthesia, we screen all patients for Obstructive Sleep Apnea.  During your screening, you were identified as having Obstructive Sleep Apnea(SAMI).    What is Obstructive Sleep Apnea?  Sleep apnea (AP-ne-ah) is a common disorder which involves breathing pauses that occur during sleep.  These can last from 10 seconds to a minute or longer.  Normal breathing resumes often with a loud snort or choking sound.    Sleep apnea occurs in all age groups and both genders but is more common in men and people over 40 years of age.  It has been estimated that as many as 18 million Americans have sleep apnea.  Most people who have sleep apnea don’t know they have it because it only occurs during sleep.  A family member and/or bed partner may first notice the signs of sleep apnea.  Sleep apnea is a chronic (ongoing) condition that disrupts the quality and quantity of your sleep repeatedly throughout the night.  This often results in excessive daytime sleepiness or fatigue during the day.  It may also contribute to high blood pressure, heart problems, and complications following medications used for surgery and procedures.     We recommend that you should be with an adult observer for at least 24 hours after your sedation/anesthesia.  If you have a CPAP machine, you should wear it during any sleep period (day or night) for the week following your procedure.  We encourage you to sleep on your side or in a sitting position, even with napping.  Lying flat on your back increases the risk of apnea and airway obstruction during your post procedure recovery period.    It is important to prevent over-sedation that could increase your risk for apnea.  Please take all pain medication as directed by your physician.  If you are not getting pain relief, please contact your physician to discuss possible approaches to relieving pain while  minimizing medications that can affect your breathing and oxygen levels.      ACTIVITY: Rest and take it easy for the first 24 hours.  A responsible adult is recommended to remain with you during that time.  It is normal to feel sleepy.  We encourage you to not do anything that requires balance, judgment or coordination.    MILD FLU-LIKE SYMPTOMS ARE NORMAL. YOU MAY EXPERIENCE GENERALIZED MUSCLE ACHES, THROAT IRRITATION, HEADACHE AND/OR SOME NAUSEA.    FOR 24 HOURS DO NOT:  Drive, operate machinery or run household appliances.  Drink beer or alcoholic beverages.   Make important decisions or sign legal documents.    DIET: To avoid nausea, slowly advance diet as tolerated, avoiding spicy or greasy foods for the first day.  Add more substantial food to your diet according to your physician's instructions.   INCREASE FLUIDS AND FIBER TO AVOID CONSTIPATION.    FOLLOW-UP APPOINTMENT:  A follow-up appointment should be arranged with your doctor; call to schedule.    You should CALL YOUR PHYSICIAN if you develop:  Fever greater than 101 degrees F.  Pain not relieved by medication, or persistent nausea or vomiting.  Excessive bleeding (blood soaking through dressing) or unexpected drainage from the wound.  Extreme redness or swelling around the incision site, drainage of pus or foul smelling drainage.  Inability to urinate or empty your bladder within 8 hours.  Problems with breathing or chest pain.    You should call 911 if you develop problems with breathing or chest pain.  If you are unable to contact your doctor or surgical center, you should go to the nearest emergency room or urgent care center.  Physician's telephone #: [phone]    If any questions arise, call your doctor.  If your doctor is not available, please feel free to call the Surgical Center at [phone].     A registered nurse may call you a few days after your surgery to see how you are doing after your procedure.    MEDICATIONS: Resume taking daily  medication.  Take prescribed pain medication with food.  If no medication is prescribed, you may take non-aspirin pain medication if needed.  PAIN MEDICATION CAN BE VERY CONSTIPATING.  Take a stool softener or laxative such as senokot, pericolace, or milk of magnesia if needed.    Last pain medication given at __________________.    If your physician has prescribed pain medication that includes Acetaminophen (Tylenol), do not take additional Acetaminophen (Tylenol) while taking the prescribed medication.

## 2022-12-22 NOTE — OR NURSING
"0959: To PACU from OR via gurney,  sleeping, respirations spontaneous and non-labored.  Icepack applied over c/d/i  L outer thigh surgical dressings. L DP+2. Bilat knee redness/bruising noted - per OR RN was present pre-procedure.   1005: Rouses spontaneously, denies pain, pt states she did not bring CPAP to hospital \"I haven't used it in years\", states she uses O2 at night prn. MURILLO, denies altered sensation to lower extremities.   1010: s/b rep who drops off bag of supplies and confirms  stimulator will not be turned on for 2 weeks.  1022: FSBS 78 - pt commenced po apple juice  1024: s/b Dr Kilgore  1035: SpO2 drops to 85% on RA, pt states she rarely uses her home O2 and does not have a travel tank. Given IS and instructed on use with goal of 1800cc, pt currently inhaling volumes of approx 1250cc.  1040: O2 resumed via n/c.   1049: Report to Lori VILLALBA  1103: Care resumed of awake/alert pt who verbalizes comfort  1110: O2 resumed as SpO2 drops to 85% on brief trial of RA. Plan to transfer to Stage 2 with O2. No change in surgical site assessment. Meets criteria to transfer to Stage 2.    1119: Transferred on O2 tank @ 561L              "

## 2022-12-22 NOTE — ANESTHESIA POSTPROCEDURE EVALUATION
Patient: Yamile Go    Procedure Summary     Date: 12/22/22 Room / Location: [place] [address] / SURGERY [facility]    Anesthesia Start: 0730 Anesthesia Stop: 1002    Procedure: Left SCIATIC PERIPHERAL NERVE STIMULATOR IMPLANT, LOWER EXTREMITY PERIPHERAL SCIATIC NERVE (Left: Leg Upper) Diagnosis:       Mononeuropathy of right upper extremity      (UNSPECIFIED Mononeuropathy of right upper limb)    Surgeons: Tobi Kilgore M.D. Responsible Provider: Betito Adams M.D.    Anesthesia Type: MAC ASA Status: 3          Final Anesthesia Type: MAC  Last vitals  BP   Blood Pressure : (!) 89/54    Temp   36 °C (96.8 °F)    Pulse   85   Resp   16    SpO2   96 %      Anesthesia Post Evaluation    Patient location during evaluation: PACU  Patient participation: complete - patient participated  Level of consciousness: awake and alert  Pain score: 0    Airway patency: patent  Anesthetic complications: no  Cardiovascular status: hemodynamically stable  Respiratory status: acceptable  Hydration status: euvolemic    PONV: none          No notable events documented.     Nurse Pain Score: 0 (NPRS)

## 2022-12-22 NOTE — DISCHARGE INSTR - OTHER INFO
Discharge home when discharge criteria met.   F/u 14-21 days in clinic  No Shower for 1st 3 days  Do not remove dressing for 1st 3 days  Continue home antibiotics for 14 days        WHEN TO CALL A DOCTOR  During your spinal cord stimulator recovery, if you feel any of the following symptoms call 911 or go to the emergency room right away:  Extreme pain in your legs  Motor weakness or paralysis in your legs  Sudden numbness in your legs  Loss of bowel or bladder control   These signs are indicative of bleeding into your spine, a severe emergency that needs immediate medical attention.   If you are experiencing any of the following, please call us to have a doctor review your incision site and implant:   Increased swelling or redness at the incision site  Fever at 100 or higher  Your chronic pain is not being well controlled

## 2022-12-22 NOTE — OR NURSING
1050: Report from Nahomy VILLALBA. Patient A/Ox3, sipping on apple juice with no nausea reported. Denies pain. LLE dressing CDI with cold pack in place, able to wiggle toes on request. VSS on 2L 02 via NC, Sats 93%.     110: Weaned down to 1L 02 via NC - Sats 99%. Denies SOB.     1103: Report to Nahomy VILLALBA.

## 2022-12-22 NOTE — OR NURSING
1119: Patient arrived from PACU via gurney.  LLE dressing CDI; able to wiggle toes on request. Cold pack in place.     Sedation/Resp Status: Alert.  Respirations spontaneous and non-labored.    1130: Report to Lety VILLALBA

## 2022-12-22 NOTE — ANESTHESIA PREPROCEDURE EVALUATION
Case: [id number] Date/Time: 12/22/22 0715    Procedure: RIGHT SCIATIC PERIPHERAL NERVE STIMULATOR IMPLANT, LOWER EXTREMITY PERIPHERAL SCIATIC NERVE (Right)    Diagnosis: Mononeuropathy of right upper extremity [G56.91]    Pre-op diagnosis: UNSPECIFIED Mononeuropathy of right upper limb    Location: [facility] OR 04 / SURGERY [facility]    Surgeons: Tobi Kilgore M.D.      CAD has been investigated, has coronary plaques without obstruction, pt also suffers from coronary vasospasm rarely. Does not affect activity level. Patient able to clean and do housework, grocery shopping, walking without cp or sob.    Relevant Problems   PULMONARY   (positive) Asthma   (positive) Community acquired pneumonia      CARDIAC   (positive) CAD (coronary artery disease)   (positive) Essential hypertension, benign   (positive) HTN (hypertension)      ENDO   (positive) Hypothyroidism       Physical Exam    Airway   Mallampati: II  TM distance: >3 FB  Neck ROM: full       Cardiovascular - normal exam  Rhythm: regular  Rate: normal  (-) murmur     Dental - normal exam           Pulmonary - normal exam  Breath sounds clear to auscultation     Abdominal    Neurological - normal exam                 Anesthesia Plan    ASA 3   ASA physical status 3 criteria: CVA or TIA - history (> 3 months)    Plan - MAC               Induction: intravenous    Postoperative Plan: Postoperative administration of opioids is intended.    Pertinent diagnostic labs and testing reviewed    Informed Consent:    Anesthetic plan and risks discussed with patient.    Use of blood products discussed with: patient whom consented to blood products.

## 2022-12-22 NOTE — OR NURSING
Pt brought back to PreOp, RN assumed care, All PreOp tasks complete-see Epic for further details, PreOp FSBS 77

## 2022-12-22 NOTE — OP REPORT
Performed at [facility]    Surgeon:   Tobi Kilgore MD    Assistants: None    Pre-operative Diagnosis: Lower extremity Peripheral Sciatic Nerve Neuropathy    Post-operative Diagnosis: Lower extremity Peripheral Sciatic Nerve Neuropathy    Antibiotic prophylaxis: Provided antibiotic prophylaxis (cefazolin) medication for procedure     Type of Anesthesia: Monitored Anesthesia Care     Procedure:  1) Insertion of peripheral nerve stimulator leads x 2  (over LEFT sciatic nerve with 2 Nalu Peripheral Nerve stimulator leads) - in plane parallel to nerve at 2 levels  2) Implant of Nalu peripheral nerve neurostimulator pulse generator  3) Initial programming of device   4) Ultrasound and Fluoroscopic needle and lead guidance     Assessment:   The patient presents for a lower extremity sciatic neuropathy and peripheral nerve stimulator trial.  The risks, including but not limited to lack of analgesia, infection, hematoma, steroid related side effects, transient paresthesias, nerve damage were explained to the patient. The alternatives and benefits were described. All questions have been answered. The patient verbalized full understanding and then informed consent was obtained. Patient identity confirmed, as well as correct procedure and correct site.     Method of Surgery: After discussing risks, benefits, and alternatives to the procedure, the patient expressed understanding and wished to proceed. An IV was started in the pre-op area and IV fluid administration was continued throughout the procedure. The patient was brought into the fluoroscopy suite and placed in the prone position. Procedural pause was conducted to verify: correct patient identity, procedure to be performed and as applicable, correct side and site, correct patient position, and availability of implants, special equipment or special instruments. Intravenous sedation appropriate to the procedure was administered by the physician/nurse  and is accurately reflected in the nurse's notes. Blood pressure, heart rate, pulse oximetry, and cardiac monitoring were monitored throughout the procedure. The patient's vital signs remained stable throughout the procedure. EKG monitoring revealed normal sinus rhythm. A dose of Ancef was administered intravenously prior to starting the procedure.      Procedure Note:  Pre-procedure oral antibiotics were given prior to start of procedure (taking night prior to procedure) and for duration of trial. .     Time-out was taken to identify the correct patient, procedure and side prior to starting the procedure. The patient was positioned prone on the fluoroscopy table. The patient was prepped and draped in the usual sterile fashion using chloraprep and sterile prep sheets and towels.       The  percutaneous peripheral nerve stimulator (sciatic nerve) lead placement was performed as follows:    Ultrasound was used to identify the LEFT sciatic nerve at the popliteal fossa and proximally      Local anesthetic 1% lidocaine was given by raising a skin wheal using a 25-gauge 1.5-inch needle and then into the soft tissues. A 22-gauge 3.5-inch needle was guided to anesthetize the soft tissues toward the sciatic nerve in the popliteal fossa and proximally deep to the hamstring tendons/muscles..    Subsequently, a perpendicular approach was attempted to improve sciatic nerve stimulation coverage.  Using ultrasound guidance coming from a lateral to medial approach of the posterior thigh, while identifying the sciatic nerve, the skin and soft tissues with 1% lidocaine using a 25g 2 inch needle and 22 g 3.5 inch needle.     Next, a 14 G kit needle was toward the sciatic nerve via a transverse / perpendicular approach with live ultrasound and fluoroscopy. Final lead location was confirmed to be next to the sciatic nerve using ultrasound and fluoroscopy.     For the 2nd lead placement:  Local anesthetic 1% lidocaine was given by  raising a skin wheal using a 25-gauge 1.5-inch needle and then into the soft tissues. A 22-gauge 3.5-inch needle was guided to anesthetize the soft tissues toward the sciatic nerve in the popliteal fossa and proximally deep to the hamstring tendons/muscles using a parallel approach.    Subsequently, the kit 14 G Needle was then advanced toward the Sciatic nerve using a parallel approach with live ultrasound and fluoroscopic confirmation using a proximal to distal approach. After reaching an area immediately next to the target location, 0.9% saline was injected to create a plane/hydrodissection to better visualize structures.     The Nalu peripheral nerve lead was then guided through the needle under ultrasound.   After placing the lead, the lead was too far medial to the sciatic nerve proximally.  After several attempts at repositioning and coming from distal to proximal with suboptimal placement, the decision was made to place a second lead at a distal / different location along the sciatic nerve using a parallel approach, distal to the 1st lead.     For the 2nd lead perpendicular approach placement:  Local anesthetic 1% lidocaine was given by raising a skin wheal using a 25-gauge 1.5-inch needle and then into the soft tissues. A 22-gauge 3.5-inch needle was guided to anesthetize the soft tissues toward the sciatic nerve in the popliteal fossa and proximally deep to the hamstring tendons/muscles using a perpendicular approach.  Next, a 14 G kit needle was toward the sciatic nerve via a transverse / perpendicular approach with live ultrasound and fluoroscopy. Final lead location was confirmed to be next to the sciatic nerve using ultrasound and fluoroscopy.     Final lead location was confirmed to be next to the sciatic nerve using ultrasound and fluoroscopy.     Impedence Testing was performed to ensure accurate placement. Stimulation testing was performed to confirm sciatic nerve distribution.         Lead  tunneling and IPG placement and Testing:    Incision made approximately 4-6 inches from the final IPG target location using previously marked template location. This location served as the IPG introduction site for tunneling.      After small incision at lead locations, Both leads were tunneled to the IPG introduction site.     The leads were then connected to the Nalu IPG device and the  was placed on top of the IPG in a sterile fashion and electrical impedance analysis of each contact of the leads were performed.  The leads were secured to the Nalu IPG device.    Subsequently, the Nalu tunneling device was used to create a tunnel from the IPG introduction site to the the final target IPG site at approximately 1cm deep from the skin surface and the IPG was tunneled to the final IPG location using a combination of the Nalu tunneling device and Maribel forceps.     After reaching target location, the  in a sterile glove was placed over where the Nalu IPG device was placed under the skin/soft tissue, and final electrical analysis was performed.     After confirmation of IPG working appropriately, remaining lead was curled and tunneled under the IPG introduction site.      Lead incision sites at levels of the ribs, and IPG introduction incision site was closed with 2-0 Vicryl for the IPG incision followed by 4-0 monocryl and 4-0 monocryl for the 2 lead incision. Dermabond was applied to each incision and needle entry points followed by gauze and tegaderm.     The patient was taken to the recovery room and instructed on post-procedure instructions.     The patient is to return to clinic in 2-3 weeks to perform a wound check and if appropriate to activate the device.     The patient was subsequently discharged in stable condition with post-procedure instructions.

## 2022-12-22 NOTE — OR NURSING
1130- Patient arrived to phase 2. Patient changed out of surgical gown into clothes. Patient is A&Ox4. Patient reports no pain and no nausea. Vital signs taken and patient assessed. L leg dressing CDI. Asked the patient if they had to use the bathroom. The patient was assisted to the bathroom to void.    1145- IV removed and discharge instructions read. All questions answered.     1200- I transported patient via wheelchair to ride. Discharged to care of responsible adult.

## 2023-05-03 ENCOUNTER — APPOINTMENT (OUTPATIENT)
Dept: CARDIOLOGY | Facility: MEDICAL CENTER | Age: 73
DRG: 202 | End: 2023-05-03
Attending: INTERNAL MEDICINE
Payer: MEDICARE

## 2023-05-03 ENCOUNTER — APPOINTMENT (OUTPATIENT)
Dept: RADIOLOGY | Facility: MEDICAL CENTER | Age: 73
DRG: 202 | End: 2023-05-03
Attending: INTERNAL MEDICINE
Payer: MEDICARE

## 2023-05-03 ENCOUNTER — HOSPITAL ENCOUNTER (INPATIENT)
Facility: MEDICAL CENTER | Age: 73
LOS: 2 days | DRG: 202 | End: 2023-05-05
Attending: EMERGENCY MEDICINE | Admitting: INTERNAL MEDICINE
Payer: MEDICARE

## 2023-05-03 ENCOUNTER — APPOINTMENT (OUTPATIENT)
Dept: RADIOLOGY | Facility: MEDICAL CENTER | Age: 73
DRG: 202 | End: 2023-05-03
Attending: EMERGENCY MEDICINE
Payer: MEDICARE

## 2023-05-03 DIAGNOSIS — I10 PRIMARY HYPERTENSION: ICD-10-CM

## 2023-05-03 DIAGNOSIS — J45.901 ASTHMA WITH ACUTE EXACERBATION, UNSPECIFIED ASTHMA SEVERITY, UNSPECIFIED WHETHER PERSISTENT: ICD-10-CM

## 2023-05-03 PROBLEM — E87.6 HYPOKALEMIA: Status: ACTIVE | Noted: 2023-05-03

## 2023-05-03 PROBLEM — M48.54XS: Status: ACTIVE | Noted: 2023-05-03

## 2023-05-03 PROBLEM — R00.0 TACHYCARDIA: Status: ACTIVE | Noted: 2023-05-03

## 2023-05-03 PROBLEM — E11.9 TYPE 2 DIABETES MELLITUS (HCC): Status: ACTIVE | Noted: 2023-05-03

## 2023-05-03 LAB
ALBUMIN SERPL BCP-MCNC: 3.7 G/DL (ref 3.2–4.9)
ALBUMIN/GLOB SERPL: 1.7 G/DL
ALP SERPL-CCNC: 72 U/L (ref 30–99)
ALT SERPL-CCNC: 14 U/L (ref 2–50)
ANION GAP SERPL CALC-SCNC: 11 MMOL/L (ref 7–16)
APTT PPP: 28.1 SEC (ref 24.7–36)
AST SERPL-CCNC: 14 U/L (ref 12–45)
BASOPHILS # BLD AUTO: 0.4 % (ref 0–1.8)
BASOPHILS # BLD: 0.03 K/UL (ref 0–0.12)
BILIRUB SERPL-MCNC: 0.4 MG/DL (ref 0.1–1.5)
BUN SERPL-MCNC: 9 MG/DL (ref 8–22)
CALCIUM ALBUM COR SERPL-MCNC: 8.9 MG/DL (ref 8.5–10.5)
CALCIUM SERPL-MCNC: 8.7 MG/DL (ref 8.4–10.2)
CHLORIDE SERPL-SCNC: 103 MMOL/L (ref 96–112)
CO2 SERPL-SCNC: 22 MMOL/L (ref 20–33)
CREAT SERPL-MCNC: 0.7 MG/DL (ref 0.5–1.4)
EKG IMPRESSION: NORMAL
EOSINOPHIL # BLD AUTO: 0.06 K/UL (ref 0–0.51)
EOSINOPHIL NFR BLD: 0.8 % (ref 0–6.9)
ERYTHROCYTE [DISTWIDTH] IN BLOOD BY AUTOMATED COUNT: 49.2 FL (ref 35.9–50)
FLUAV RNA SPEC QL NAA+PROBE: NEGATIVE
FLUBV RNA SPEC QL NAA+PROBE: NEGATIVE
GFR SERPLBLD CREATININE-BSD FMLA CKD-EPI: 92 ML/MIN/1.73 M 2
GLOBULIN SER CALC-MCNC: 2.2 G/DL (ref 1.9–3.5)
GLUCOSE BLD STRIP.AUTO-MCNC: 95 MG/DL (ref 65–99)
GLUCOSE SERPL-MCNC: 103 MG/DL (ref 65–99)
HCT VFR BLD AUTO: 33.3 % (ref 37–47)
HGB BLD-MCNC: 10.8 G/DL (ref 12–16)
IMM GRANULOCYTES # BLD AUTO: 0.04 K/UL (ref 0–0.11)
IMM GRANULOCYTES NFR BLD AUTO: 0.5 % (ref 0–0.9)
INR PPP: 1.1 (ref 0.87–1.13)
LACTATE SERPL-SCNC: 0.9 MMOL/L (ref 0.5–2)
LYMPHOCYTES # BLD AUTO: 0.83 K/UL (ref 1–4.8)
LYMPHOCYTES NFR BLD: 10.6 % (ref 22–41)
MAGNESIUM SERPL-MCNC: 1.5 MG/DL (ref 1.5–2.5)
MCH RBC QN AUTO: 27.8 PG (ref 27–33)
MCHC RBC AUTO-ENTMCNC: 32.4 G/DL (ref 33.6–35)
MCV RBC AUTO: 85.6 FL (ref 81.4–97.8)
MONOCYTES # BLD AUTO: 0.62 K/UL (ref 0–0.85)
MONOCYTES NFR BLD AUTO: 7.9 % (ref 0–13.4)
NEUTROPHILS # BLD AUTO: 6.24 K/UL (ref 2–7.15)
NEUTROPHILS NFR BLD: 79.8 % (ref 44–72)
NRBC # BLD AUTO: 0 K/UL
NRBC BLD-RTO: 0 /100 WBC
NT-PROBNP SERPL IA-MCNC: 113 PG/ML (ref 0–125)
PLATELET # BLD AUTO: 191 K/UL (ref 164–446)
PMV BLD AUTO: 8.4 FL (ref 9–12.9)
POTASSIUM SERPL-SCNC: 3.5 MMOL/L (ref 3.6–5.5)
PROCALCITONIN SERPL-MCNC: 0.11 NG/ML
PROT SERPL-MCNC: 5.9 G/DL (ref 6–8.2)
PROTHROMBIN TIME: 14.1 SEC (ref 12–14.6)
RBC # BLD AUTO: 3.89 M/UL (ref 4.2–5.4)
RSV RNA SPEC QL NAA+PROBE: NEGATIVE
SARS-COV-2 RNA RESP QL NAA+PROBE: NOTDETECTED
SODIUM SERPL-SCNC: 136 MMOL/L (ref 135–145)
SPECIMEN SOURCE: NORMAL
TROPONIN T SERPL-MCNC: 18 NG/L (ref 6–19)
TROPONIN T SERPL-MCNC: 20 NG/L (ref 6–19)
TSH SERPL DL<=0.005 MIU/L-ACNC: 0.4 UIU/ML (ref 0.38–5.33)
WBC # BLD AUTO: 7.8 K/UL (ref 4.8–10.8)

## 2023-05-03 PROCEDURE — 93005 ELECTROCARDIOGRAM TRACING: CPT | Performed by: EMERGENCY MEDICINE

## 2023-05-03 PROCEDURE — 99223 1ST HOSP IP/OBS HIGH 75: CPT | Mod: AI | Performed by: INTERNAL MEDICINE

## 2023-05-03 PROCEDURE — 94669 MECHANICAL CHEST WALL OSCILL: CPT

## 2023-05-03 PROCEDURE — 700111 HCHG RX REV CODE 636 W/ 250 OVERRIDE (IP): Performed by: INTERNAL MEDICINE

## 2023-05-03 PROCEDURE — 700101 HCHG RX REV CODE 250: Performed by: EMERGENCY MEDICINE

## 2023-05-03 PROCEDURE — 36415 COLL VENOUS BLD VENIPUNCTURE: CPT

## 2023-05-03 PROCEDURE — 700102 HCHG RX REV CODE 250 W/ 637 OVERRIDE(OP): Performed by: INTERNAL MEDICINE

## 2023-05-03 PROCEDURE — A9270 NON-COVERED ITEM OR SERVICE: HCPCS | Performed by: INTERNAL MEDICINE

## 2023-05-03 PROCEDURE — 770020 HCHG ROOM/CARE - TELE (206)

## 2023-05-03 PROCEDURE — 700117 HCHG RX CONTRAST REV CODE 255: Performed by: EMERGENCY MEDICINE

## 2023-05-03 PROCEDURE — 84145 PROCALCITONIN (PCT): CPT

## 2023-05-03 PROCEDURE — 83605 ASSAY OF LACTIC ACID: CPT

## 2023-05-03 PROCEDURE — 80053 COMPREHEN METABOLIC PANEL: CPT

## 2023-05-03 PROCEDURE — 700111 HCHG RX REV CODE 636 W/ 250 OVERRIDE (IP): Performed by: EMERGENCY MEDICINE

## 2023-05-03 PROCEDURE — 83735 ASSAY OF MAGNESIUM: CPT

## 2023-05-03 PROCEDURE — 0241U HCHG SARS-COV-2 COVID-19 NFCT DS RESP RNA 4 TRGT MIC: CPT

## 2023-05-03 PROCEDURE — 96374 THER/PROPH/DIAG INJ IV PUSH: CPT

## 2023-05-03 PROCEDURE — 71045 X-RAY EXAM CHEST 1 VIEW: CPT

## 2023-05-03 PROCEDURE — 700105 HCHG RX REV CODE 258: Performed by: EMERGENCY MEDICINE

## 2023-05-03 PROCEDURE — 99285 EMERGENCY DEPT VISIT HI MDM: CPT

## 2023-05-03 PROCEDURE — 83880 ASSAY OF NATRIURETIC PEPTIDE: CPT

## 2023-05-03 PROCEDURE — 84484 ASSAY OF TROPONIN QUANT: CPT

## 2023-05-03 PROCEDURE — 85730 THROMBOPLASTIN TIME PARTIAL: CPT

## 2023-05-03 PROCEDURE — 72128 CT CHEST SPINE W/O DYE: CPT

## 2023-05-03 PROCEDURE — 84443 ASSAY THYROID STIM HORMONE: CPT

## 2023-05-03 PROCEDURE — 96376 TX/PRO/DX INJ SAME DRUG ADON: CPT

## 2023-05-03 PROCEDURE — 82962 GLUCOSE BLOOD TEST: CPT

## 2023-05-03 PROCEDURE — 71275 CT ANGIOGRAPHY CHEST: CPT

## 2023-05-03 PROCEDURE — 94640 AIRWAY INHALATION TREATMENT: CPT

## 2023-05-03 PROCEDURE — 94760 N-INVAS EAR/PLS OXIMETRY 1: CPT

## 2023-05-03 PROCEDURE — C9803 HOPD COVID-19 SPEC COLLECT: HCPCS | Performed by: EMERGENCY MEDICINE

## 2023-05-03 PROCEDURE — 85025 COMPLETE CBC W/AUTO DIFF WBC: CPT

## 2023-05-03 PROCEDURE — 85610 PROTHROMBIN TIME: CPT

## 2023-05-03 PROCEDURE — 87040 BLOOD CULTURE FOR BACTERIA: CPT | Mod: 91

## 2023-05-03 RX ORDER — LIOTHYRONINE SODIUM 5 UG/1
5 TABLET ORAL DAILY
Status: DISCONTINUED | OUTPATIENT
Start: 2023-05-03 | End: 2023-05-05 | Stop reason: HOSPADM

## 2023-05-03 RX ORDER — SUCRALFATE 1 G/1
1 TABLET ORAL
Status: DISCONTINUED | OUTPATIENT
Start: 2023-05-03 | End: 2023-05-05 | Stop reason: HOSPADM

## 2023-05-03 RX ORDER — HYDROCODONE BITARTRATE AND ACETAMINOPHEN 10; 325 MG/1; MG/1
1-2 TABLET ORAL EVERY 6 HOURS PRN
Status: DISCONTINUED | OUTPATIENT
Start: 2023-05-03 | End: 2023-05-05 | Stop reason: HOSPADM

## 2023-05-03 RX ORDER — METOPROLOL SUCCINATE 25 MG/1
25 TABLET, EXTENDED RELEASE ORAL DAILY
COMMUNITY

## 2023-05-03 RX ORDER — ACETAMINOPHEN 325 MG/1
325 TABLET ORAL EVERY 4 HOURS PRN
COMMUNITY
End: 2023-12-18

## 2023-05-03 RX ORDER — AMOXICILLIN 250 MG
2 CAPSULE ORAL 2 TIMES DAILY
Status: DISCONTINUED | OUTPATIENT
Start: 2023-05-03 | End: 2023-05-05 | Stop reason: HOSPADM

## 2023-05-03 RX ORDER — OMEPRAZOLE 20 MG/1
40 CAPSULE, DELAYED RELEASE ORAL DAILY
Status: DISCONTINUED | OUTPATIENT
Start: 2023-05-03 | End: 2023-05-05 | Stop reason: HOSPADM

## 2023-05-03 RX ORDER — ALBUTEROL SULFATE 90 UG/1
2 AEROSOL, METERED RESPIRATORY (INHALATION) EVERY 4 HOURS
Status: DISCONTINUED | OUTPATIENT
Start: 2023-05-03 | End: 2023-05-05 | Stop reason: HOSPADM

## 2023-05-03 RX ORDER — PREDNISONE 20 MG/1
40 TABLET ORAL DAILY
Status: DISCONTINUED | OUTPATIENT
Start: 2023-05-03 | End: 2023-05-05 | Stop reason: HOSPADM

## 2023-05-03 RX ORDER — ONDANSETRON 4 MG/1
4 TABLET, ORALLY DISINTEGRATING ORAL EVERY 4 HOURS PRN
Status: DISCONTINUED | OUTPATIENT
Start: 2023-05-03 | End: 2023-05-05 | Stop reason: HOSPADM

## 2023-05-03 RX ORDER — ROSUVASTATIN CALCIUM 10 MG/1
20 TABLET, COATED ORAL EVERY EVENING
Status: DISCONTINUED | OUTPATIENT
Start: 2023-05-03 | End: 2023-05-05 | Stop reason: HOSPADM

## 2023-05-03 RX ORDER — IPRATROPIUM BROMIDE AND ALBUTEROL SULFATE 2.5; .5 MG/3ML; MG/3ML
3 SOLUTION RESPIRATORY (INHALATION) ONCE
Status: COMPLETED | OUTPATIENT
Start: 2023-05-03 | End: 2023-05-03

## 2023-05-03 RX ORDER — ONDANSETRON 2 MG/ML
4 INJECTION INTRAMUSCULAR; INTRAVENOUS EVERY 4 HOURS PRN
Status: DISCONTINUED | OUTPATIENT
Start: 2023-05-03 | End: 2023-05-05 | Stop reason: HOSPADM

## 2023-05-03 RX ORDER — SODIUM CHLORIDE 9 MG/ML
1000 INJECTION, SOLUTION INTRAVENOUS ONCE
Status: COMPLETED | OUTPATIENT
Start: 2023-05-03 | End: 2023-05-03

## 2023-05-03 RX ORDER — ENOXAPARIN SODIUM 100 MG/ML
40 INJECTION SUBCUTANEOUS DAILY
Status: DISCONTINUED | OUTPATIENT
Start: 2023-05-03 | End: 2023-05-05 | Stop reason: HOSPADM

## 2023-05-03 RX ORDER — LEVOTHYROXINE SODIUM 88 UG/1
88 TABLET ORAL DAILY
Status: DISCONTINUED | OUTPATIENT
Start: 2023-05-03 | End: 2023-05-05 | Stop reason: HOSPADM

## 2023-05-03 RX ORDER — METOPROLOL SUCCINATE 25 MG/1
25 TABLET, EXTENDED RELEASE ORAL DAILY
Status: DISCONTINUED | OUTPATIENT
Start: 2023-05-03 | End: 2023-05-05 | Stop reason: HOSPADM

## 2023-05-03 RX ORDER — ONDANSETRON 2 MG/ML
4 INJECTION INTRAMUSCULAR; INTRAVENOUS ONCE
Status: COMPLETED | OUTPATIENT
Start: 2023-05-03 | End: 2023-05-03

## 2023-05-03 RX ORDER — ACETAMINOPHEN 325 MG/1
650 TABLET ORAL EVERY 6 HOURS PRN
Status: DISCONTINUED | OUTPATIENT
Start: 2023-05-03 | End: 2023-05-05 | Stop reason: HOSPADM

## 2023-05-03 RX ORDER — BISACODYL 10 MG
10 SUPPOSITORY, RECTAL RECTAL
Status: DISCONTINUED | OUTPATIENT
Start: 2023-05-03 | End: 2023-05-05 | Stop reason: HOSPADM

## 2023-05-03 RX ORDER — BENZONATATE 100 MG/1
100 CAPSULE ORAL 3 TIMES DAILY PRN
Status: DISCONTINUED | OUTPATIENT
Start: 2023-05-03 | End: 2023-05-05 | Stop reason: HOSPADM

## 2023-05-03 RX ORDER — AMITRIPTYLINE HYDROCHLORIDE 50 MG/1
100 TABLET, FILM COATED ORAL NIGHTLY
Status: DISCONTINUED | OUTPATIENT
Start: 2023-05-03 | End: 2023-05-05 | Stop reason: HOSPADM

## 2023-05-03 RX ORDER — SUMATRIPTAN 25 MG/1
100 TABLET, FILM COATED ORAL
Status: DISCONTINUED | OUTPATIENT
Start: 2023-05-03 | End: 2023-05-05 | Stop reason: HOSPADM

## 2023-05-03 RX ORDER — POTASSIUM CHLORIDE 20 MEQ/1
40 TABLET, EXTENDED RELEASE ORAL ONCE
Status: COMPLETED | OUTPATIENT
Start: 2023-05-03 | End: 2023-05-03

## 2023-05-03 RX ORDER — ALBUTEROL SULFATE 90 UG/1
2 AEROSOL, METERED RESPIRATORY (INHALATION) EVERY 6 HOURS PRN
COMMUNITY

## 2023-05-03 RX ORDER — IPRATROPIUM BROMIDE AND ALBUTEROL SULFATE 2.5; .5 MG/3ML; MG/3ML
3 SOLUTION RESPIRATORY (INHALATION)
Status: DISCONTINUED | OUTPATIENT
Start: 2023-05-03 | End: 2023-05-05 | Stop reason: HOSPADM

## 2023-05-03 RX ORDER — ESCITALOPRAM OXALATE 10 MG/1
20 TABLET ORAL DAILY
Status: DISCONTINUED | OUTPATIENT
Start: 2023-05-03 | End: 2023-05-05 | Stop reason: HOSPADM

## 2023-05-03 RX ORDER — IPRATROPIUM BROMIDE AND ALBUTEROL SULFATE 2.5; .5 MG/3ML; MG/3ML
3 SOLUTION RESPIRATORY (INHALATION)
Status: DISCONTINUED | OUTPATIENT
Start: 2023-05-03 | End: 2023-05-03

## 2023-05-03 RX ORDER — POLYETHYLENE GLYCOL 3350 17 G/17G
1 POWDER, FOR SOLUTION ORAL
Status: DISCONTINUED | OUTPATIENT
Start: 2023-05-03 | End: 2023-05-05 | Stop reason: HOSPADM

## 2023-05-03 RX ORDER — HYDROCODONE BITARTRATE AND ACETAMINOPHEN 10; 325 MG/1; MG/1
1-2 TABLET ORAL EVERY 6 HOURS PRN
COMMUNITY
End: 2023-12-18

## 2023-05-03 RX ORDER — GUAIFENESIN 600 MG/1
1200 TABLET, EXTENDED RELEASE ORAL EVERY 12 HOURS
Status: DISCONTINUED | OUTPATIENT
Start: 2023-05-03 | End: 2023-05-05 | Stop reason: HOSPADM

## 2023-05-03 RX ORDER — DOXYCYCLINE 100 MG/1
100 TABLET ORAL EVERY 12 HOURS
Status: DISCONTINUED | OUTPATIENT
Start: 2023-05-03 | End: 2023-05-05 | Stop reason: HOSPADM

## 2023-05-03 RX ADMIN — SUCRALFATE 1 G: 1 TABLET ORAL at 17:50

## 2023-05-03 RX ADMIN — ENOXAPARIN SODIUM 40 MG: 40 INJECTION SUBCUTANEOUS at 17:49

## 2023-05-03 RX ADMIN — SODIUM CHLORIDE 1000 ML: 9 INJECTION, SOLUTION INTRAVENOUS at 10:28

## 2023-05-03 RX ADMIN — IOHEXOL 75 ML: 350 INJECTION, SOLUTION INTRAVENOUS at 11:50

## 2023-05-03 RX ADMIN — PREDNISONE 40 MG: 20 TABLET ORAL at 17:49

## 2023-05-03 RX ADMIN — IPRATROPIUM BROMIDE AND ALBUTEROL SULFATE 3 ML: .5; 2.5 SOLUTION RESPIRATORY (INHALATION) at 11:29

## 2023-05-03 RX ADMIN — SUCRALFATE 1 G: 1 TABLET ORAL at 20:54

## 2023-05-03 RX ADMIN — GUAIFENESIN 1200 MG: 600 TABLET, EXTENDED RELEASE ORAL at 17:50

## 2023-05-03 RX ADMIN — OMEPRAZOLE 40 MG: 20 CAPSULE, DELAYED RELEASE ORAL at 17:49

## 2023-05-03 RX ADMIN — POTASSIUM CHLORIDE 40 MEQ: 1500 TABLET, EXTENDED RELEASE ORAL at 17:51

## 2023-05-03 RX ADMIN — ONDANSETRON HYDROCHLORIDE 4 MG: 2 SOLUTION INTRAMUSCULAR; INTRAVENOUS at 15:29

## 2023-05-03 RX ADMIN — ONDANSETRON HYDROCHLORIDE 4 MG: 2 INJECTION, SOLUTION INTRAMUSCULAR; INTRAVENOUS at 11:47

## 2023-05-03 RX ADMIN — ROSUVASTATIN 20 MG: 10 TABLET, FILM COATED ORAL at 17:51

## 2023-05-03 RX ADMIN — DOXYCYCLINE 100 MG: 100 TABLET, FILM COATED ORAL at 17:51

## 2023-05-03 RX ADMIN — AMITRIPTYLINE HYDROCHLORIDE 100 MG: 50 TABLET, FILM COATED ORAL at 20:54

## 2023-05-03 RX ADMIN — METOPROLOL SUCCINATE 25 MG: 25 TABLET, EXTENDED RELEASE ORAL at 17:49

## 2023-05-03 RX ADMIN — ESCITALOPRAM OXALATE 20 MG: 10 TABLET ORAL at 17:49

## 2023-05-03 RX ADMIN — ALBUTEROL SULFATE 2 PUFF: 90 AEROSOL, METERED RESPIRATORY (INHALATION) at 18:21

## 2023-05-03 RX ADMIN — SODIUM CHLORIDE 1000 ML: 9 INJECTION, SOLUTION INTRAVENOUS at 13:43

## 2023-05-03 RX ADMIN — SUMATRIPTAN SUCCINATE 100 MG: 25 TABLET ORAL at 14:42

## 2023-05-03 ASSESSMENT — COPD QUESTIONNAIRES
DO YOU EVER COUGH UP ANY MUCUS OR PHLEGM?: NO/ONLY WITH OCCASIONAL COLDS OR INFECTIONS
HAVE YOU SMOKED AT LEAST 100 CIGARETTES IN YOUR ENTIRE LIFE: NO/DON'T KNOW
COPD SCREENING SCORE: 2
DURING THE PAST 4 WEEKS HOW MUCH DID YOU FEEL SHORT OF BREATH: NONE/LITTLE OF THE TIME

## 2023-05-03 ASSESSMENT — COGNITIVE AND FUNCTIONAL STATUS - GENERAL
MOBILITY SCORE: 23
SUGGESTED CMS G CODE MODIFIER MOBILITY: CI
SUGGESTED CMS G CODE MODIFIER DAILY ACTIVITY: CH
DAILY ACTIVITIY SCORE: 24
CLIMB 3 TO 5 STEPS WITH RAILING: A LITTLE

## 2023-05-03 ASSESSMENT — LIFESTYLE VARIABLES
TOTAL SCORE: 0
HAVE PEOPLE ANNOYED YOU BY CRITICIZING YOUR DRINKING: NO
EVER HAD A DRINK FIRST THING IN THE MORNING TO STEADY YOUR NERVES TO GET RID OF A HANGOVER: NO
ON A TYPICAL DAY WHEN YOU DRINK ALCOHOL HOW MANY DRINKS DO YOU HAVE: 0
AVERAGE NUMBER OF DAYS PER WEEK YOU HAVE A DRINK CONTAINING ALCOHOL: 0
EVER FELT BAD OR GUILTY ABOUT YOUR DRINKING: NO
TOTAL SCORE: 0
HAVE YOU EVER FELT YOU SHOULD CUT DOWN ON YOUR DRINKING: NO
CONSUMPTION TOTAL: NEGATIVE
TOTAL SCORE: 0
HOW MANY TIMES IN THE PAST YEAR HAVE YOU HAD 5 OR MORE DRINKS IN A DAY: 0
ALCOHOL_USE: NO

## 2023-05-03 ASSESSMENT — PAIN DESCRIPTION - PAIN TYPE: TYPE: ACUTE PAIN

## 2023-05-03 ASSESSMENT — FIBROSIS 4 INDEX: FIB4 SCORE: 0.76

## 2023-05-03 NOTE — ASSESSMENT & PLAN NOTE
- Body mass index is 29.96 kg/m²..  -  on weight loss.  - outpatient referral for outpatient weight management.

## 2023-05-03 NOTE — H&P
Hospital Medicine History & Physical Note    Date of Service  5/3/2023    Primary Care Physician  Cole Yuen M.D.    Consultants  None    Code Status  Full Code    Chief Complaint  Chief Complaint   Patient presents with    Cough     Onset yesterday of a cough and SOB.       History of Presenting Illness  Yamile Go is a 73 y.o. female with type 2 diabetes mellitus, hyperlipidemia, hypertension, obesity, asthma (although she was also told that she had concomitant COPD, was never a smoker) who presented 5/3/2023 with acute onset nonproductive intractable cough since 2 nights ago, with associated shortness of breath.  She also felt tight in her chest, but has not been wheezing (although she shared that she never wheezes despite her asthma).  She denies any chest pain.  She tried to use her as needed albuterol inhaler, which did not improve her cough and shortness of breath.  This morning, she noted a fever of 100.9 °F.  She denied any sick contacts, and infection lives alone at home with her cats.  She had no recent travel.  She had no other complaint such as nausea, vomiting, abdominal pain, bowel movement changes, or urinary symptoms.  Her symptoms persisted and has worsened, prompting her to go to the ED today.    ED course:  On evaluation, vital signs were stable.  She was not hypoxic.  She was tachycardic.  EKG showed sinus tachycardia with no dynamic schema changes (personally reviewed).  Initial blood work-up showed no leukocytosis, with potassium of 3.5, and normal renal function.  Troponin was negative.  BNP was low.  TSH was normal.  COVID/RSV/influenza PCR's were negative.  CTA of the chest (my read) did not show any PE, and did not show any infiltrates or consolidation on both lungs.  CT of the thoracic spine did show late subacute to chronic T5 compression deformities, with moderate loss of height with no acute thoracic fractures, but she did not complain of any back pain.  Chest x-ray  (personally reviewed (showed stable large volume with subpleural biapical opacity likely pleural-parenchymal scarring.  Patient was given normal saline.  She will subsequently admitted to the hospitalist service.    I discussed the plan of care with patient, bedside RN, and ER physician . I personally reviewed all lab results mentioned above. I also personally reviewed all ER physician and consultant recommendations and plans as noted above. History was independently obtained by myself.       Review of Systems  ROS    Pertinent positives/negatives as mentioned above.     A complete review of systems was personally done by me. All other systems were negative.       Past Medical History   has a past medical history of Arthritis, Asthma, CAD (coronary artery disease), Dental disorder, Depression, Diabetes (HCC), Emphysema of lung (HCC), Heart burn, HTN (hypertension), Hyperlipidemia, Migraines, Obesity, PONV (postoperative nausea and vomiting), and Sleep apnea.    Surgical History   has a past surgical history that includes appendectomy (N/A, 1976); abdominal hysterectomy total (N/A, 1978); breast biopsy (1984); colectomy (N/A, 2007); lumbar laminectomy diskectomy (N/A, 1989); arthroplasty (Right, 2020); lumbar exploration (N/A, 2017); and insertion, peripheral nerve stimulator, lower extremity (Left, 12/22/2022).     Family History  family history includes Cancer in her mother; Diabetes in her father; Heart Disease in her brother, brother, brother, brother, father, and mother; Other in her brother; Stroke in her father.       Social History   reports that she has never smoked. She has never used smokeless tobacco. She reports that she does not drink alcohol and does not use drugs.    Allergies  Allergies   Allergen Reactions    Rifampin Unspecified     Pt turns yellow    Fentanyl Vomiting    Morphine Vomiting    Benzoin Rash     blisters       Medications  Prior to Admission Medications   Prescriptions Last Dose  Informant Patient Reported? Taking?   HYDROcodone/acetaminophen (NORCO)  MG Tab 5/1/2023 at Unknown Patient Yes Yes   Sig: Take 1-2 Tablets by mouth every 6 hours as needed for Severe Pain.   SYNTHROID 88 MCG Tab 5/2/2023 at 0800 Patient No No   Sig: Take 1 Tablet by mouth every day.   acetaminophen (TYLENOL) 325 MG Tab 5/3/2023 at 1000 Patient Yes Yes   Sig: Take 325 mg by mouth every four hours as needed. Indications: Pain   albuterol 108 (90 Base) MCG/ACT Aero Soln inhalation aerosol 5/3/2023 at 0800 Patient Yes Yes   Sig: Inhale 2 Puffs every 6 hours as needed for Shortness of Breath.   amitriptyline (ELAVIL) 100 MG Tab 5/1/2023 at PM Patient Yes No   Sig: Take 100 mg by mouth every evening.   escitalopram (LEXAPRO) 20 MG tablet 5/2/2023 at 0800 Patient No No   Sig: Take 1 Tablet by mouth every day.   liothyronine (CYTOMEL) 5 MCG Tab 5/2/2023 at 0800 Patient Yes No   Sig: Take 5 mcg by mouth every day.   lisinopril (PRINIVIL) 5 MG Tab 5/2/2023 at 0800 Patient Yes No   Sig: Take 2.5 mg by mouth every day.   metFORMIN (GLUCOPHAGE) 500 MG Tab 5/2/2023 at 0800 Patient Yes No   Sig: Take 250 mg by mouth 2 times a day with meals.   metoprolol SR (TOPROL XL) 25 MG TABLET SR 24 HR 5/2/2023 at 0800 Patient Yes No   Sig: Take 25 mg by mouth every day.   omeprazole (PRILOSEC) 20 MG delayed-release capsule 5/2/2023 at 0800 Patient No No   Sig: Take 1 Capsule by mouth every day.   Patient taking differently: Take 40 mg by mouth every day.   rosuvastatin (CRESTOR) 20 MG Tab 5/1/2023 at PM Patient No No   Sig: Take 1 Tablet by mouth every evening.   sucralfate (CARAFATE) 1 GM Tab 5/2/2023 at 0800 Patient No No   Sig: Take 1 Tablet by mouth 4 Times a Day,Before Meals and at Bedtime.   sumatriptan (IMITREX) 100 MG tablet 5/3/2023 at 1000 Patient Yes No   Sig: Take 100 mg by mouth one time as needed for Migraine.      Facility-Administered Medications: None       Physical Exam  Temp:  [37.3 °C (99.1 °F)] 37.3 °C (99.1  °F)  Pulse:  [114-121] 120  Resp:  [16-37] 37  BP: (134-171)/(72-78) 171/78  SpO2:  [96 %-100 %] 98 %  Blood Pressure : (!) 171/78   Temperature: 37.3 °C (99.1 °F)   Pulse: (!) 120   Respiration: (!) 37   Pulse Oximetry: 98 %       Physical Exam  Vitals reviewed.   Constitutional:       General: She is not in acute distress.     Appearance: Normal appearance. She is normal weight. She is not ill-appearing or diaphoretic.   HENT:      Head: Normocephalic and atraumatic.      Right Ear: External ear normal.      Left Ear: External ear normal.      Mouth/Throat:      Mouth: Mucous membranes are moist.      Pharynx: No oropharyngeal exudate or posterior oropharyngeal erythema.   Eyes:      General: No scleral icterus.     Extraocular Movements: Extraocular movements intact.      Conjunctiva/sclera: Conjunctivae normal.      Pupils: Pupils are equal, round, and reactive to light.   Cardiovascular:      Rate and Rhythm: Regular rhythm. Tachycardia present.      Heart sounds: Normal heart sounds. No murmur heard.  Pulmonary:      Effort: Pulmonary effort is normal. No respiratory distress.      Breath sounds: No stridor. No wheezing, rhonchi or rales.      Comments: Diminished air entry B/L bases  Chest:      Chest wall: No tenderness.   Abdominal:      General: Bowel sounds are normal. There is no distension.      Palpations: Abdomen is soft. There is no mass.      Tenderness: There is no abdominal tenderness. There is no guarding or rebound.   Musculoskeletal:         General: No swelling. Normal range of motion.      Cervical back: Normal range of motion and neck supple. No rigidity. No muscular tenderness.      Right lower leg: No edema.      Left lower leg: No edema.   Lymphadenopathy:      Cervical: No cervical adenopathy.   Skin:     General: Skin is warm and dry.      Coloration: Skin is not jaundiced.      Findings: No rash.   Neurological:      General: No focal deficit present.      Mental Status: She is alert  and oriented to person, place, and time. Mental status is at baseline.      Cranial Nerves: No cranial nerve deficit.   Psychiatric:         Mood and Affect: Mood normal.         Behavior: Behavior normal.         Thought Content: Thought content normal.         Judgment: Judgment normal.       Laboratory:  Recent Labs     05/03/23  1017   WBC 7.8   RBC 3.89*   HEMOGLOBIN 10.8*   HEMATOCRIT 33.3*   MCV 85.6   MCH 27.8   MCHC 32.4*   RDW 49.2   PLATELETCT 191   MPV 8.4*     Recent Labs     05/03/23  1017   SODIUM 136   POTASSIUM 3.5*   CHLORIDE 103   CO2 22   GLUCOSE 103*   BUN 9   CREATININE 0.70   CALCIUM 8.7     Recent Labs     05/03/23  1017   ALTSGPT 14   ASTSGOT 14   ALKPHOSPHAT 72   TBILIRUBIN 0.4   GLUCOSE 103*     Recent Labs     05/03/23  1140   APTT 28.1   INR 1.10     Recent Labs     05/03/23  1017   NTPROBNP 113         Recent Labs     05/03/23  1017   TROPONINT 18       Imaging:  CT-TSPINE W/O PLUS RECONS   Final Result      1.  Late subacute to chronic compression deformity at T5 with moderate loss of height, new from prior exam.   2.  Moderate multilevel degenerative change of thoracic spine with accentuated kyphosis.  Alignment overall unchanged from prior.   3.  No acute thoracic spine fracture demonstrated.      CT-CTA CHEST PULMONARY ARTERY W/ RECONS   Final Result      1.  Negative CTA chest. No pulmonary embolism      2.  Left paramedian neck cystic structure containing debris measuring 3.1 x 1.9 x 2.5 cm. This finding is contiguous with the cervical esophagus and is highly suspicious for a Zenker's diverticulum            DX-CHEST-PORTABLE (1 VIEW)   Final Result      Stable large volumes with subpleural biapical opacity likely indicating pleural parenchymal scarring. This could obscure a nodule      EC-ECHOCARDIOGRAM COMPLETE W/O CONT    (Results Pending)          Imaging studies and EKG results independently reviewed as above.    Assessment/Plan:  Justification for Admission Status  I  anticipate this patient will require at least two midnights for appropriate medical management, necessitating inpatient admission because of intractable cough and shortness of breath, likely from asthma exacerbation.  Although she is not hypoxic, she has respiratory distress requiring close monitoring in the hospital with interventions including steroids, antibiotics, and scheduled bronchodilators.    Patient will need a Telemetry bed on MEDICAL service .  The need is secondary to tachycardia.    * Acute asthma exacerbation- (present on admission)  Assessment & Plan  - Presenting with intractable nonproductive cough, along with shortness of breath, and tight air entry on auscultation.  She is not wheezy, but patient further states that she does not wheeze often if at all.  She did have an episode of fever at home, but none here.  Chest imaging showed no acute pneumonia. Viral tests are negative. Aside from her asthma, she also states that she was told she also has concomitant COPD, but she is never a smoker.    -Admit to the medical floors for further management of asthma exacerbation, and close monitoring of therapeutic response to interventions.  -Start scheduled bronchodilators with DuoNeb every 4 hours, along with as needed DuoNeb every 2 hours.  -I will start her on oral prednisone for 40 mg daily for 5 days.  -Continue respiratory support with RT protocol, as needed supplemental oxygen.  Currently she is not hypoxic.  -Given her reported fevers at home, I will start her on empiric doxycycline PO x 5 days.  Check procalcitonin.  -No indication that she is having cardiac cough, but will get an updated echocardiogram.  -In case ACE inhibitor is contributing to her intractable cough, will hold her lisinopril for now.  May need to transition this to ARB instead.  -Start scheduled Mucinex, and as needed Tessalon for cough.  -Monitor closely for clinical improvement.    Non-traumatic compression fracture of T5  thoracic vertebra, sequela- (present on admission)  Assessment & Plan  - Subacute to chronic, no acute thoracic fractures on CT.  Patient is not complaining of significant back pain.  -Continue pain control, resume home oral Norco along with as needed Tylenol.    Sinus tachycardia- (present on admission)  Assessment & Plan  - This is likely due to her intractable cough, and asthma exacerbation.  Inhaled beta agonists likely contributed.  -Monitor on telemetry.  -Resume home metoprolol.  -I will get an updated echocardiogram, as last echocardiogram was in March 2022.    Hypokalemia- (present on admission)  Assessment & Plan  - Replace with 40 mill equivalents of oral K-Dur.  Check magnesium level and replace if low.  Repeat BMP in the morning.    Type 2 diabetes mellitus (HCC)- (present on admission)  Assessment & Plan  - Hold home metformin for now.  While she is in the hospital, I will put her on sliding scale insulin coverage. Accu-Cheks before meals and at bedtime. Goal to keep BG between 140-180 per 2019 ADA guidelines.      Hypothyroidism- (present on admission)  Assessment & Plan  - Resume home Synthroid and Cytomel.    Class 1 obesity due to excess calories with serious comorbidity and body mass index (BMI) of 32.0 to 32.9 in adult- (present on admission)  Assessment & Plan  - Body mass index is 29.96 kg/m²..  -  on weight loss.  - outpatient referral for outpatient weight management.    Essential hypertension, benign- (present on admission)  Assessment & Plan  - Resume home Toprol-XL.  Given her intractable cough and possibility that ACE inhibitor might be contributing, I will hold her lisinopril for now.  Monitor blood pressure trend closely as will need further optimization of blood pressure control, and if needed may need to be transitioned to ARB instead of ACE inhibitor.    Dyslipidemia- (present on admission)  Assessment & Plan  - Resume home statin.        VTE prophylaxis: enoxaparin ppx

## 2023-05-03 NOTE — ASSESSMENT & PLAN NOTE
- Subacute to chronic, no acute thoracic fractures on CT.  Patient is not complaining of significant back pain.  -Continue pain control, resume home oral Norco along with as needed Tylenol.

## 2023-05-03 NOTE — ED TRIAGE NOTES
Chief Complaint   Patient presents with    Cough     Onset yesterday of a cough and SOB.    T spine pain from coughing last year, she reports she still has pain there.

## 2023-05-03 NOTE — ED NOTES
IV established, blood and culture #1 sent to lab.   Pt is due for mammogram. When called pt, person who answered phone stated pt no longer lives there.

## 2023-05-03 NOTE — ED NOTES
Med rec updated and complete, per pt   Allergies reviewed, per pt  Pt reports that she has not taken her ALENDRONATE 70MG in the last 6 months

## 2023-05-03 NOTE — ASSESSMENT & PLAN NOTE
Pt presented with intractable nonproductive cough, along with shortness of breath, and tight air entry on auscultation.  She is not wheezy, but patient further states that she does not wheeze often if at all.  She did have an episode of fever at home, but none here.  Chest imaging showed no acute pneumonia. Viral tests are negative. Aside from her asthma, she also states that she was told she also has concomitant COPD, but she is never a smoker.  .  -continue bronchodilators with DuoNebs q4hrs, along with as needed DuoNeb every 2 hours.  -Continue oral prednisone for 40 mg daily for 5 days.  -Continue respiratory support with RT protocol, as needed supplemental oxygen.   -Continue empiric doxycycline PO x 5 days.  Check procalcitonin.  -Continue Mucinex, and as needed Tessalon for cough.  - likely home tomorrow

## 2023-05-03 NOTE — ED PROVIDER NOTES
ED Provider Note    CHIEF COMPLAINT  Chief Complaint   Patient presents with    Cough     Onset yesterday of a cough and SOB.       EXTERNAL RECORDS REVIEWED  Outpatient Notes -patient seen in her cardiologist office last month for a history of nonobstructive coronary disease by angiography/cath in 2012.  She presented for routine follow-up.  She reported some occasional chest pain but had some typical features relieved with sublingual nitroglycerin.  A stress test was offered for further evaluation but she declined.  She was started on long-acting nitroglycerin once a day and instructed to start aspirin daily.    HPI/ROS  LIMITATION TO HISTORY   Select: : None  OUTSIDE HISTORIAN(S):  None    Yamile Go is a 73 y.o. female who presents with a chief complaint of nonproductive cough and shortness of breath that started last night.  Patient reports she is coughed so hard she burst a blood vessel in her right eye and is now complaining of mid back pain.  She notes that she had similar symptoms last year after which time she coughed so hard she sustained a compression fracture in her thoracic spine.  This morning when she awoke at 4 AM she had a fever of 100.4 °F.  She also had diffuse body aches.  She took Tylenol with some minimal improvement.  She has no chest pain.  She denies any leg swelling or exogenous hormone use but does note that she has a remote history of DVT when she was pregnant.  She is not anticoagulated and is not supposed to be anticoagulated.  She denies any recent long distance travel or surgeries.  She has tried an albuterol inhaler once without significant improvement of her shortness of breath.  She denies any nausea or vomiting, diaphoresis.    PAST MEDICAL HISTORY   has a past medical history of Arthritis, Asthma, CAD (coronary artery disease), Dental disorder, Depression, Diabetes (HCC), Emphysema of lung (HCC), Heart burn, HTN (hypertension), Hyperlipidemia, Migraines, Obesity, PONV  "(postoperative nausea and vomiting), and Sleep apnea.    SURGICAL HISTORY   has a past surgical history that includes appendectomy (N/A, 1976); abdominal hysterectomy total (N/A, 1978); breast biopsy (1984); colectomy (N/A, 2007); lumbar laminectomy diskectomy (N/A, 1989); arthroplasty (Right, 2020); lumbar exploration (N/A, 2017); and insertion, peripheral nerve stimulator, lower extremity (Left, 12/22/2022).    FAMILY HISTORY  Family History   Problem Relation Age of Onset    Cancer Mother         lung    Heart Disease Mother     Stroke Father     Heart Disease Father     Diabetes Father     Heart Disease Brother         cath and bypass    Heart Disease Brother         cath and byp[ass[    Heart Disease Brother         cath and bypass    Heart Disease Brother     Other Brother         MVA       SOCIAL HISTORY  Social History     Tobacco Use    Smoking status: Never    Smokeless tobacco: Never   Vaping Use    Vaping Use: Never used   Substance and Sexual Activity    Alcohol use: No    Drug use: No    Sexual activity: Yes     Partners: Male     Birth control/protection: Post-Menopausal       CURRENT MEDICATIONS  Home Medications    **Home medications have not yet been reviewed for this encounter**         ALLERGIES  Allergies   Allergen Reactions    Rifampin Unspecified     Pt turns yellow    Fentanyl Vomiting    Morphine Vomiting    Benzoin Rash     blisters       PHYSICAL EXAM  VITAL SIGNS: /75   Pulse (!) 120   Temp 37.3 °C (99.1 °F) (Temporal)   Resp 20   Ht 1.575 m (5' 2\")   Wt 74.3 kg (163 lb 12.8 oz)   SpO2 96%   BMI 29.96 kg/m²    Physical Exam  Vitals and nursing note reviewed.   Constitutional:       Appearance: She is ill-appearing.   HENT:      Head: Normocephalic and atraumatic.      Right Ear: External ear normal.      Left Ear: External ear normal.      Nose: Nose normal.      Mouth/Throat:      Mouth: Mucous membranes are dry.   Eyes:      Extraocular Movements: Extraocular movements " intact.      Pupils: Pupils are equal, round, and reactive to light.      Comments: Right Subconjunctival injection   Cardiovascular:      Rate and Rhythm: Regular rhythm. Tachycardia present.      Heart sounds: No murmur heard.  Pulmonary:      Effort: Pulmonary effort is normal.      Breath sounds: Normal breath sounds.   Abdominal:      Palpations: Abdomen is soft.      Tenderness: There is no abdominal tenderness.   Musculoskeletal:         General: Tenderness present. No swelling. Normal range of motion.      Cervical back: Normal range of motion and neck supple.   Skin:     General: Skin is warm and dry.   Neurological:      General: No focal deficit present.      Mental Status: She is alert and oriented to person, place, and time.   Psychiatric:         Mood and Affect: Mood normal.         Behavior: Behavior normal.     DIAGNOSTIC STUDIES / PROCEDURES  LABS  Results for orders placed or performed during the hospital encounter of 05/03/23   CBC With Differential   Result Value Ref Range    WBC 7.8 4.8 - 10.8 K/uL    RBC 3.89 (L) 4.20 - 5.40 M/uL    Hemoglobin 10.8 (L) 12.0 - 16.0 g/dL    Hematocrit 33.3 (L) 37.0 - 47.0 %    MCV 85.6 81.4 - 97.8 fL    MCH 27.8 27.0 - 33.0 pg    MCHC 32.4 (L) 33.6 - 35.0 g/dL    RDW 49.2 35.9 - 50.0 fL    Platelet Count 191 164 - 446 K/uL    MPV 8.4 (L) 9.0 - 12.9 fL    Neutrophils-Polys 79.80 (H) 44.00 - 72.00 %    Lymphocytes 10.60 (L) 22.00 - 41.00 %    Monocytes 7.90 0.00 - 13.40 %    Eosinophils 0.80 0.00 - 6.90 %    Basophils 0.40 0.00 - 1.80 %    Immature Granulocytes 0.50 0.00 - 0.90 %    Nucleated RBC 0.00 /100 WBC    Neutrophils (Absolute) 6.24 2.00 - 7.15 K/uL    Lymphs (Absolute) 0.83 (L) 1.00 - 4.80 K/uL    Monos (Absolute) 0.62 0.00 - 0.85 K/uL    Eos (Absolute) 0.06 0.00 - 0.51 K/uL    Baso (Absolute) 0.03 0.00 - 0.12 K/uL    Immature Granulocytes (abs) 0.04 0.00 - 0.11 K/uL    NRBC (Absolute) 0.00 K/uL   Comp Metabolic Panel   Result Value Ref Range    Sodium  136 135 - 145 mmol/L    Potassium 3.5 (L) 3.6 - 5.5 mmol/L    Chloride 103 96 - 112 mmol/L    Co2 22 20 - 33 mmol/L    Anion Gap 11.0 7.0 - 16.0    Glucose 103 (H) 65 - 99 mg/dL    Bun 9 8 - 22 mg/dL    Creatinine 0.70 0.50 - 1.40 mg/dL    Calcium 8.7 8.4 - 10.2 mg/dL    AST(SGOT) 14 12 - 45 U/L    ALT(SGPT) 14 2 - 50 U/L    Alkaline Phosphatase 72 30 - 99 U/L    Total Bilirubin 0.4 0.1 - 1.5 mg/dL    Albumin 3.7 3.2 - 4.9 g/dL    Total Protein 5.9 (L) 6.0 - 8.2 g/dL    Globulin 2.2 1.9 - 3.5 g/dL    A-G Ratio 1.7 g/dL   Lactic Acid   Result Value Ref Range    Lactic Acid 0.9 0.5 - 2.0 mmol/L   Troponin   Result Value Ref Range    Troponin T 18 6 - 19 ng/L   proBrain Natriuretic Peptide, NT   Result Value Ref Range    NT-proBNP 113 0 - 125 pg/mL   CoV-2, FLU A/B, and RSV by PCR (2-4 Hours CENXHEID) : Collect NP swab in VTM    Specimen: Respirate   Result Value Ref Range    Influenza virus A RNA Negative Negative    Influenza virus B, PCR Negative Negative    RSV, PCR Negative Negative    SARS-CoV-2 by PCR NotDetected     SARS-CoV-2 Source NP Swab    CORRECTED CALCIUM   Result Value Ref Range    Correct Calcium 8.9 8.5 - 10.5 mg/dL   ESTIMATED GFR   Result Value Ref Range    GFR (CKD-EPI) 92 >60 mL/min/1.73 m 2   Prothrombin Time   Result Value Ref Range    PT 14.1 12.0 - 14.6 sec    INR 1.10 0.87 - 1.13   APTT   Result Value Ref Range    APTT 28.1 24.7 - 36.0 sec   TSH WITH REFLEX TO FT4   Result Value Ref Range    TSH 0.398 0.380 - 5.330 uIU/mL   TROPONIN   Result Value Ref Range    Troponin T 20 (H) 6 - 19 ng/L   PROCALCITONIN   Result Value Ref Range    Procalcitonin 0.11 <0.25 ng/mL   MAGNESIUM   Result Value Ref Range    Magnesium 1.5 1.5 - 2.5 mg/dL   EKG   Result Value Ref Range    Report       [facility] Emergency Dept.    Test Date:  2023-05-03  Pt Name:    SIMI NELSON               Department: EDSM  MRN:        [id number]                      Room:       -ROOM 4  Gender:      Female                       Technician: [id number]  :        1950                   Requested By:RUDDY GÓMEZ  Order #:    [id number]                    Reading MD: Ruddy Gómez MD    Measurements  Intervals                                Axis  Rate:       121                          P:          61  RI:         158                          QRS:        19  QRSD:       66                           T:          -42  QT:         253  QTc:        359    Interpretive Statements  Sinus tachycardia  Probable left atrial enlargement  Borderline repolarization abnormality  Compared to ECG 2022 09:05:19  Sinus rhythm no longer present  Electronically Signed On 5-3-2023 15:16:35 PDT by Ruddy Gómez MD       RADIOLOGY  I have independently interpreted the diagnostic imaging associated with this visit and am waiting the final reading from the radiologist.   My preliminary interpretation is as follows: No lobar pneumonia.    Radiologist interpretation:   CT-TSPINE W/O PLUS RECONS   Final Result      1.  Late subacute to chronic compression deformity at T5 with moderate loss of height, new from prior exam.   2.  Moderate multilevel degenerative change of thoracic spine with accentuated kyphosis.  Alignment overall unchanged from prior.   3.  No acute thoracic spine fracture demonstrated.      CT-CTA CHEST PULMONARY ARTERY W/ RECONS   Final Result      1.  Negative CTA chest. No pulmonary embolism      2.  Left paramedian neck cystic structure containing debris measuring 3.1 x 1.9 x 2.5 cm. This finding is contiguous with the cervical esophagus and is highly suspicious for a Zenker's diverticulum            DX-CHEST-PORTABLE (1 VIEW)   Final Result      Stable large volumes with subpleural biapical opacity likely indicating pleural parenchymal scarring. This could obscure a nodule      EC-ECHOCARDIOGRAM COMPLETE W/O CONT    (Results Pending)     COURSE & MEDICAL DECISION MAKING    ED Observation Status? No; Patient does  not meet criteria for ED Observation.     INITIAL ASSESSMENT, COURSE AND PLAN  Care Narrative: This is a 73-year-old female with a known history of nonobstructive coronary artery disease seen on cardiac catheterization 2012 who is here with nonproductive cough and shortness of breath that began last night.  Differential diagnosis includes, but is not limited to, viral syndrome, PE, pneumonia, pneumothorax, deshaun-/myocarditis, pericardial tamponade, COPD.    Arrives tachycardic and tachypneic but afebrile with otherwise normal vital signs.  Appears dehydrated with dry mucous membranes.  She does have a right subconjunctival hemorrhage that she reportedly sustained due to heavy and persistent coughing.  Lungs are clear without wheezes.  Abdomen is soft and nontender.  She has no lower extremity swelling.    Started on IV fluids for tachycardia and given a DuoNeb after which time she had an episode of emesis.  She notes that when she uses nebulized albuterol she vomits and forgot to tell us prior to the treatment.  The treatment did not improve her shortness of breath or cough.    EKG does not suggest acute ischemia.    Labs were obtained and CBC is reassuring without leukocytosis although there is a neutrophilic predominance.  She is mildly anemic.  Metabolic panel demonstrates hypokalemia to 3.5 with otherwise normal renal and liver function.  Glucose is 103 and she has otherwise normal electrolytes.  Lactic acid is normal.  Troponin is 18, high end of normal.  BNP is normal.  Thyroid function is normal.  Viral swabs are negative.    Chest x-ray without acute abnormality and a CTA chest was obtained due to persistent tachycardia, tachypnea, and persistent shortness of breath.  This thankfully did not reveal evidence for PE but was incidentally consistent with a Zenker's diverticulum.  A CT of the thoracic spine was also obtained due to reports of back pain that she notes is consistent with prior back pain especially  with her coughing.  She has a known compression fracture in the thoracic spine that she sustained last year with heavy coughing.  Despite the back pain I have a low suspicion for aortic dissection.  She has no chest pain, is warm and well-perfused, and has normal blood pressure.    Repeat troponin was increased to 20.  Upon reevaluation she remains persistently tachycardic.  She will require hospitalization for additional work-up and management.  She was discussed with the hospitalist and admitted in guarded condition.    HYDRATION: Based on the patient's presentation of Tachycardia the patient was given IV fluids. IV Hydration was used because oral hydration was not adequate alone. Upon recheck following hydration, the patient was unchanged.    ADDITIONAL PROBLEM LIST  Elevated troponin  Persistent tachycardia  DISPOSITION AND DISCUSSIONS  I have discussed management of the patient with the following physicians and ALEC's: Dr. Rodgers, hospitalist.    Discussion of management with other Hospitals in [place] or appropriate source(s): RT -patient was given a DuoNeb after which time I was informed by the RT she had an episode of emesis      Escalation of care considered, and ultimately not performed: N/A.    Barriers to care at this time, including but not limited to:  None .     Decision tools and prescription drugs considered including, but not limited to:  N/A .    FINAL DIAGNOSIS  1.  Persistent tachycardia  2.  Nausea and vomiting  3.  Elevated troponin  4.  Shortness of breath     Electronically signed by: Jem Silva M.D., 5/3/2023 9:57 AM

## 2023-05-03 NOTE — PROGRESS NOTES
Pt brought to floor from ER. Pt ambulated from gurney to bed without assistance.    Gtts none    Vitals VSS     4 Eyes Skin Assessment Completed by DEEJAY Walls and DEEJAY Blackwood.    Head WDL  Ears WDL  Nose WDL  Mouth WDL  Neck WDL  Breast/Chest WDL  Shoulder Blades WDL  Spine WDL  (R) Arm/Elbow/Hand WDL  (L) Arm/Elbow/Hand WDL  Abdomen WDL  Groin WDL  Scrotum/Coccyx/Buttocks WDL  (R) Leg WDL  (L) Leg WDL  (R) Heel/Foot/Toe WDL  (L) Heel/Foot/Toe WDL          Devices In Places Tele Box and Pulse Ox      Interventions In Place NC W/Ear Foams and Pillows    Possible Skin Injury No    Pictures Uploaded Into Epic N/A  Wound Consult Placed N/A  RN Wound Prevention Protocol Ordered No

## 2023-05-04 ENCOUNTER — APPOINTMENT (OUTPATIENT)
Dept: CARDIOLOGY | Facility: MEDICAL CENTER | Age: 73
DRG: 202 | End: 2023-05-04
Attending: INTERNAL MEDICINE
Payer: MEDICARE

## 2023-05-04 LAB
ANION GAP SERPL CALC-SCNC: 11 MMOL/L (ref 7–16)
BASOPHILS # BLD AUTO: 0.6 % (ref 0–1.8)
BASOPHILS # BLD: 0.02 K/UL (ref 0–0.12)
BUN SERPL-MCNC: 5 MG/DL (ref 8–22)
CALCIUM SERPL-MCNC: 8.4 MG/DL (ref 8.4–10.2)
CHLORIDE SERPL-SCNC: 110 MMOL/L (ref 96–112)
CO2 SERPL-SCNC: 19 MMOL/L (ref 20–33)
CREAT SERPL-MCNC: 0.54 MG/DL (ref 0.5–1.4)
EOSINOPHIL # BLD AUTO: 0 K/UL (ref 0–0.51)
EOSINOPHIL NFR BLD: 0 % (ref 0–6.9)
ERYTHROCYTE [DISTWIDTH] IN BLOOD BY AUTOMATED COUNT: 52 FL (ref 35.9–50)
GFR SERPLBLD CREATININE-BSD FMLA CKD-EPI: 98 ML/MIN/1.73 M 2
GLUCOSE BLD STRIP.AUTO-MCNC: 117 MG/DL (ref 65–99)
GLUCOSE BLD STRIP.AUTO-MCNC: 154 MG/DL (ref 65–99)
GLUCOSE BLD STRIP.AUTO-MCNC: 169 MG/DL (ref 65–99)
GLUCOSE BLD STRIP.AUTO-MCNC: 180 MG/DL (ref 65–99)
GLUCOSE BLD STRIP.AUTO-MCNC: 88 MG/DL (ref 65–99)
GLUCOSE SERPL-MCNC: 131 MG/DL (ref 65–99)
HCT VFR BLD AUTO: 38.2 % (ref 37–47)
HGB BLD-MCNC: 11.7 G/DL (ref 12–16)
IMM GRANULOCYTES # BLD AUTO: 0.04 K/UL (ref 0–0.11)
IMM GRANULOCYTES NFR BLD AUTO: 1.3 % (ref 0–0.9)
LV EJECT FRACT  99904: 65
LV EJECT FRACT MOD 2C 99903: 64.29
LV EJECT FRACT MOD 4C 99902: 70.32
LV EJECT FRACT MOD BP 99901: 67.82
LYMPHOCYTES # BLD AUTO: 0.52 K/UL (ref 1–4.8)
LYMPHOCYTES NFR BLD: 16.9 % (ref 22–41)
MCH RBC QN AUTO: 27.6 PG (ref 27–33)
MCHC RBC AUTO-ENTMCNC: 30.6 G/DL (ref 33.6–35)
MCV RBC AUTO: 90.1 FL (ref 81.4–97.8)
MONOCYTES # BLD AUTO: 0.1 K/UL (ref 0–0.85)
MONOCYTES NFR BLD AUTO: 3.2 % (ref 0–13.4)
NEUTROPHILS # BLD AUTO: 2.4 K/UL (ref 2–7.15)
NEUTROPHILS NFR BLD: 78 % (ref 44–72)
NRBC # BLD AUTO: 0 K/UL
NRBC BLD-RTO: 0 /100 WBC
PLATELET # BLD AUTO: 190 K/UL (ref 164–446)
PMV BLD AUTO: 9 FL (ref 9–12.9)
POTASSIUM SERPL-SCNC: 4.4 MMOL/L (ref 3.6–5.5)
RBC # BLD AUTO: 4.24 M/UL (ref 4.2–5.4)
SODIUM SERPL-SCNC: 140 MMOL/L (ref 135–145)
WBC # BLD AUTO: 3.1 K/UL (ref 4.8–10.8)

## 2023-05-04 PROCEDURE — 93306 TTE W/DOPPLER COMPLETE: CPT

## 2023-05-04 PROCEDURE — 99232 SBSQ HOSP IP/OBS MODERATE 35: CPT | Performed by: INTERNAL MEDICINE

## 2023-05-04 PROCEDURE — 36415 COLL VENOUS BLD VENIPUNCTURE: CPT

## 2023-05-04 PROCEDURE — 770020 HCHG ROOM/CARE - TELE (206)

## 2023-05-04 PROCEDURE — 94640 AIRWAY INHALATION TREATMENT: CPT

## 2023-05-04 PROCEDURE — 700111 HCHG RX REV CODE 636 W/ 250 OVERRIDE (IP): Performed by: INTERNAL MEDICINE

## 2023-05-04 PROCEDURE — 93306 TTE W/DOPPLER COMPLETE: CPT | Mod: 26 | Performed by: INTERNAL MEDICINE

## 2023-05-04 PROCEDURE — 85025 COMPLETE CBC W/AUTO DIFF WBC: CPT

## 2023-05-04 PROCEDURE — 80048 BASIC METABOLIC PNL TOTAL CA: CPT

## 2023-05-04 PROCEDURE — 94669 MECHANICAL CHEST WALL OSCILL: CPT

## 2023-05-04 PROCEDURE — 700102 HCHG RX REV CODE 250 W/ 637 OVERRIDE(OP): Performed by: INTERNAL MEDICINE

## 2023-05-04 PROCEDURE — 94760 N-INVAS EAR/PLS OXIMETRY 1: CPT

## 2023-05-04 PROCEDURE — A9270 NON-COVERED ITEM OR SERVICE: HCPCS | Performed by: INTERNAL MEDICINE

## 2023-05-04 PROCEDURE — 82962 GLUCOSE BLOOD TEST: CPT | Mod: 91

## 2023-05-04 RX ADMIN — ENOXAPARIN SODIUM 40 MG: 40 INJECTION SUBCUTANEOUS at 17:16

## 2023-05-04 RX ADMIN — ALBUTEROL SULFATE 2 PUFF: 90 AEROSOL, METERED RESPIRATORY (INHALATION) at 22:14

## 2023-05-04 RX ADMIN — ALBUTEROL SULFATE 2 PUFF: 90 AEROSOL, METERED RESPIRATORY (INHALATION) at 15:02

## 2023-05-04 RX ADMIN — GUAIFENESIN 1200 MG: 600 TABLET, EXTENDED RELEASE ORAL at 17:16

## 2023-05-04 RX ADMIN — LIOTHYRONINE SODIUM 5 MCG: 5 TABLET ORAL at 05:26

## 2023-05-04 RX ADMIN — PREDNISONE 40 MG: 20 TABLET ORAL at 05:25

## 2023-05-04 RX ADMIN — SUCRALFATE 1 G: 1 TABLET ORAL at 20:24

## 2023-05-04 RX ADMIN — DOXYCYCLINE 100 MG: 100 TABLET, FILM COATED ORAL at 05:26

## 2023-05-04 RX ADMIN — ESCITALOPRAM OXALATE 20 MG: 10 TABLET ORAL at 05:26

## 2023-05-04 RX ADMIN — GUAIFENESIN 1200 MG: 600 TABLET, EXTENDED RELEASE ORAL at 05:26

## 2023-05-04 RX ADMIN — SUCRALFATE 1 G: 1 TABLET ORAL at 17:17

## 2023-05-04 RX ADMIN — ROSUVASTATIN 20 MG: 10 TABLET, FILM COATED ORAL at 17:17

## 2023-05-04 RX ADMIN — SUCRALFATE 1 G: 1 TABLET ORAL at 12:15

## 2023-05-04 RX ADMIN — ALBUTEROL SULFATE 2 PUFF: 90 AEROSOL, METERED RESPIRATORY (INHALATION) at 19:29

## 2023-05-04 RX ADMIN — SUCRALFATE 1 G: 1 TABLET ORAL at 07:00

## 2023-05-04 RX ADMIN — INSULIN HUMAN 1 UNITS: 100 INJECTION, SOLUTION PARENTERAL at 20:27

## 2023-05-04 RX ADMIN — DOXYCYCLINE 100 MG: 100 TABLET, FILM COATED ORAL at 17:16

## 2023-05-04 RX ADMIN — AMITRIPTYLINE HYDROCHLORIDE 100 MG: 50 TABLET, FILM COATED ORAL at 20:24

## 2023-05-04 RX ADMIN — OMEPRAZOLE 40 MG: 20 CAPSULE, DELAYED RELEASE ORAL at 05:25

## 2023-05-04 RX ADMIN — LEVOTHYROXINE SODIUM 88 MCG: 88 TABLET ORAL at 05:26

## 2023-05-04 RX ADMIN — ALBUTEROL SULFATE 2 PUFF: 90 AEROSOL, METERED RESPIRATORY (INHALATION) at 10:27

## 2023-05-04 RX ADMIN — INSULIN HUMAN 1 UNITS: 100 INJECTION, SOLUTION PARENTERAL at 17:45

## 2023-05-04 RX ADMIN — ALBUTEROL SULFATE 2 PUFF: 90 AEROSOL, METERED RESPIRATORY (INHALATION) at 06:00

## 2023-05-04 ASSESSMENT — FIBROSIS 4 INDEX: FIB4 SCORE: 1.42

## 2023-05-04 NOTE — CARE PLAN
The patient is Stable - Low risk of patient condition declining or worsening    Shift Goals  Clinical Goals: resp treatments and safety  Patient Goals: sleep  Family Goals: JONY    Progress made toward(s) clinical / shift goals:  Pt reports no pain at this time, continue resp treatments, wean o2 when appropriate, call light within reach    Patient is not progressing towards the following goals:      Problem: Pain - Standard  Goal: Alleviation of pain or a reduction in pain to the patient’s comfort goal  Outcome: Progressing     Problem: Knowledge Deficit - Standard  Goal: Patient and family/care givers will demonstrate understanding of plan of care, disease process/condition, diagnostic tests and medications  Outcome: Progressing     Problem: Ineffective Airway Clearance  Goal: Patient will maintain patent airway with clear/clearing breath sounds  Outcome: Progressing

## 2023-05-04 NOTE — CARE PLAN
The patient is Stable - Low risk of patient condition declining or worsening    Shift Goals  Clinical Goals: resp treatments and safety  Patient Goals: sleep  Family Goals: JONY    Progress made toward(s) clinical / shift goals:    Problem: Pain - Standard  Goal: Alleviation of pain or a reduction in pain to the patient’s comfort goal  Outcome: Progressing     Problem: Knowledge Deficit - Standard  Goal: Patient and family/care givers will demonstrate understanding of plan of care, disease process/condition, diagnostic tests and medications  Outcome: Progressing     Problem: Knowledge Deficit - COPD  Goal: Patient/significant other demonstrates understanding of disease process, utilization of the Action Plan, medications and discharge instruction  Outcome: Progressing     Problem: Risk for Infection - COPD  Goal: Patient will remain free from signs and symptoms of infection  Outcome: Progressing     Problem: Nutrition - Advanced  Goal: Patient will display progressive weight gain toward goal have adequate food and fluid intake  Outcome: Progressing     Problem: Ineffective Airway Clearance  Goal: Patient will maintain patent airway with clear/clearing breath sounds  Outcome: Progressing     Problem: Impaired Gas Exchange  Goal: Patient will demonstrate improved ventilation and adequate oxygenation and participate in treatment regimen within the level of ability/situation.  Outcome: Progressing     Problem: Risk for Aspiration  Goal: Patient's risk for aspiration will be absent or decrease  Outcome: Progressing     Problem: Self Care  Goal: Patient will have the ability to perform ADLs independently or with assistance (bathe, groom, dress, toilet and feed)  Outcome: Progressing       Patient is not progressing towards the following goals:

## 2023-05-04 NOTE — DISCHARGE PLANNING
Met with pt who reports that she is independent with ADLs and IADLs. 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 She has no dc concerns at this time.     PCP id Dr. Cole Yuen.  Ph[facility].     Care Transition Team Assessment    Information Source  Orientation Level: Oriented X4  Information Given By: Patient  Who is responsible for making decisions for patient? : Patient    Readmission Evaluation  Is this a readmission?: No    Elopement Risk  Legal Hold: No  Ambulatory or Self Mobile in Wheelchair: Yes  Disoriented: No  Psychiatric Symptoms: None  History of Wandering: No  Elopement this Admit: No  Vocalizing Wanting to Leave: No  Displays Behaviors, Body Language Wanting to Leave: No-Not at Risk for Elopement    Interdisciplinary Discharge Planning  Does Admitting Nurse Feel This Could be a Complex Discharge?: No  Primary Care Physician: Dr Wilfrid Galvan  Lives with - Patient's Self Care Capacity: Alone and Able to Care For Self  Patient or legal guardian wants to designate a caregiver: No  Support Systems: Family Member(s)  Housing / Facility: [address]  Do You Take your Prescribed Medications Regularly: Yes  Able to Return to Previous ADL's: Yes  Mobility Issues: No  Prior Services: None, Home-Independent  Patient Prefers to be Discharged to:: Home  Assistance Needed: No  Durable Medical Equipment: Not Applicable    Discharge Preparedness  What is your plan after discharge?: Home with help  What are your discharge supports?: Child  Prior Functional Level: Ambulatory, Drives Self, Independent with Activities of Daily Living, Independent with Medication Management  Difficulity with ADLs: None  Difficulity with IADLs: None    Functional Assesment  Prior Functional Level: Ambulatory, Drives Self, Independent with Activities of Daily Living, Independent with Medication Management    Finances  Financial Barriers to Discharge: No  Prescription Coverage: Yes    Vision / Hearing  Impairment  Vision Impairment : No  Hearing Impairment : No    Values / Beliefs / Concerns  Values / Beliefs Concerns : No    Advance Directive  Advance Directive?: None    Domestic Abuse  Have you ever been the victim of abuse or violence?: No  Is this happening now?: No  Has the violence increased in frequency and severity?: No  Are you afraid to go home today?: No  Did you have pets at the time of Abuse?: No  Do you know Where to get Help?: No  Physical Abuse or Sexual Abuse: No  Verbal Abuse or Emotional Abuse: No  Possible Abuse/Neglect Reported to:: Not Applicable         Discharge Risks or Barriers  Discharge risks or barriers?: No  Patient risk factors: Other (comment)    Anticipated Discharge Information  Discharge Disposition: Discharged to home/self care (01)

## 2023-05-04 NOTE — PROGRESS NOTES
[facility] Medicine Daily Progress Note    Date of Service  5/4/2023    Chief Complaint  Yamile Go is a 73 y.o. female admitted 5/3/2023 with sob    Hospital Course  73 y.o. female with type 2 diabetes mellitus, hyperlipidemia, hypertension, obesity, asthma (although she was also told that she had concomitant COPD, was never a smoker) who presented 5/3/2023 with acute onset nonproductive intractable cough since 2 nights ago, with associated shortness of breath.  She also felt tight in her chest, but has not been wheezing (although she shared that she never wheezes despite her asthma).  She denies any chest pain.  She tried to use her as needed albuterol inhaler, which did not improve her cough and shortness of breath.  This morning, she noted a fever of 100.9 °F.  She denied any sick contacts, and infection lives alone at home with her cats.  She had no recent travel.  She had no other complaint such as nausea, vomiting, abdominal pain, bowel movement changes, or urinary symptoms.  Her symptoms persisted and has worsened, prompting her to go to the ED     Interval Problem Update  - reports sig improvement in cough and sob    I have discussed this patient's plan of care and discharge plan at IDT rounds today with Case Management, Nursing, Nursing leadership, and other members of the IDT team.    Code Status  Full Code    Disposition  Patient is not medically cleared for discharge.   Anticipate discharge to to home with close outpatient follow-up.  I have placed the appropriate orders for post-discharge needs.    Review of Systems  Review of Systems   All other systems reviewed and are negative.     Physical Exam  Temp:  [36.1 °C (97 °F)-37.3 °C (99.1 °F)] 36.7 °C (98.1 °F)  Pulse:  [] 94  Resp:  [16-18] 16  BP: ()/(46-86) 105/53  SpO2:  [92 %-100 %] 93 %    Physical Exam  General: NAD, resting comfortably  HEENT: PERRLA, EOMI  Cards: RRR, no murmur or gallops, no tenderness of chest wall, JVP  nl  Pulm: normal respiratory effort, dec BS throughout, no wheezes  Abdomen: soft, NTND, + bowel sounds, no rebound tenderness or guarding  MSK: normal ROM of upper and lower extremities  Neuro: CN II-XII grossly intact, sensation/strength intact, AAOx3  Psych: Appropriate mood    Fluids    Intake/Output Summary (Last 24 hours) at 5/4/2023 1449  Last data filed at 5/4/2023 1040  Gross per 24 hour   Intake 150 ml   Output --   Net 150 ml       Laboratory  Recent Labs     05/03/23  1017 05/04/23  0222   WBC 7.8 3.1*   RBC 3.89* 4.24   HEMOGLOBIN 10.8* 11.7*   HEMATOCRIT 33.3* 38.2   MCV 85.6 90.1   MCH 27.8 27.6   MCHC 32.4* 30.6*   RDW 49.2 52.0*   PLATELETCT 191 190   MPV 8.4* 9.0     Recent Labs     05/03/23  1017 05/04/23  0222   SODIUM 136 140   POTASSIUM 3.5* 4.4   CHLORIDE 103 110   CO2 22 19*   GLUCOSE 103* 131*   BUN 9 5*   CREATININE 0.70 0.54   CALCIUM 8.7 8.4     Recent Labs     05/03/23  1140   APTT 28.1   INR 1.10               Imaging  EC-ECHOCARDIOGRAM COMPLETE W/O CONT   Final Result      IR-US GUIDED PIV   Final Result    Ultrasound-guided PERIPHERAL IV INSERTION performed by    qualified nursing staff as above.      CT-TSPINE W/O PLUS RECONS   Final Result      1.  Late subacute to chronic compression deformity at T5 with moderate loss of height, new from prior exam.   2.  Moderate multilevel degenerative change of thoracic spine with accentuated kyphosis.  Alignment overall unchanged from prior.   3.  No acute thoracic spine fracture demonstrated.      CT-CTA CHEST PULMONARY ARTERY W/ RECONS   Final Result      1.  Negative CTA chest. No pulmonary embolism      2.  Left paramedian neck cystic structure containing debris measuring 3.1 x 1.9 x 2.5 cm. This finding is contiguous with the cervical esophagus and is highly suspicious for a Zenker's diverticulum            DX-CHEST-PORTABLE (1 VIEW)   Final Result      Stable large volumes with subpleural biapical opacity likely indicating pleural  parenchymal scarring. This could obscure a nodule           Assessment/Plan  * Acute asthma exacerbation- (present on admission)  Assessment & Plan  Pt presented with intractable nonproductive cough, along with shortness of breath, and tight air entry on auscultation.  She is not wheezy, but patient further states that she does not wheeze often if at all.  She did have an episode of fever at home, but none here.  Chest imaging showed no acute pneumonia. Viral tests are negative. Aside from her asthma, she also states that she was told she also has concomitant COPD, but she is never a smoker.  .  -continue bronchodilators with DuoNebs q4hrs, along with as needed DuoNeb every 2 hours.  -Continue oral prednisone for 40 mg daily for 5 days.  -Continue respiratory support with RT protocol, as needed supplemental oxygen.   -Continue empiric doxycycline PO x 5 days.  Check procalcitonin.  -Continue Mucinex, and as needed Tessalon for cough.  - likely home tomorrow    Non-traumatic compression fracture of T5 thoracic vertebra, sequela- (present on admission)  Assessment & Plan  - Subacute to chronic, no acute thoracic fractures on CT.  Patient is not complaining of significant back pain.  -Continue pain control, resume home oral Norco along with as needed Tylenol.    Sinus tachycardia- (present on admission)  Assessment & Plan  Likely related to asthma exacerbation and bronchodilator use  Resolved  TTE unchanged from prior    Hypokalemia- (present on admission)  Assessment & Plan  Repleted  Order BMP ivonne    Type 2 diabetes mellitus (HCC)- (present on admission)  Assessment & Plan  - Hold home metformin for now  -SSI, ADA diet    Hypothyroidism- (present on admission)  Assessment & Plan  home Synthroid and Cytomel.    Class 1 obesity due to excess calories with serious comorbidity and body mass index (BMI) of 32.0 to 32.9 in adult- (present on admission)  Assessment & Plan  - Body mass index is 29.96 kg/m²..  -  on  weight loss.  - outpatient referral for outpatient weight management.    Essential hypertension, benign- (present on admission)  Assessment & Plan  Continue toprol XL  Continue home acei, should cough worsen, consider ARB    Dyslipidemia- (present on admission)  Assessment & Plan   home statin.         VTE prophylaxis: enoxaparin ppx    I have performed a physical exam and reviewed and updated ROS and Plan today (5/4/2023). In review of yesterday's note (5/3/2023), there are no changes except as documented above.

## 2023-05-04 NOTE — PROGRESS NOTES
Monitor Summary:    Rhythm:  ST/SR   Rate Range:    Ectopy: rPVC/PAC    Measurements:  0.16/0.08/0.38  (Measured by monitor tech)

## 2023-05-05 VITALS
TEMPERATURE: 97.9 F | OXYGEN SATURATION: 91 % | WEIGHT: 170.86 LBS | BODY MASS INDEX: 31.44 KG/M2 | RESPIRATION RATE: 16 BRPM | HEART RATE: 84 BPM | HEIGHT: 62 IN | DIASTOLIC BLOOD PRESSURE: 53 MMHG | SYSTOLIC BLOOD PRESSURE: 114 MMHG

## 2023-05-05 LAB
ANION GAP SERPL CALC-SCNC: 7 MMOL/L (ref 7–16)
BASOPHILS # BLD AUTO: 0.1 % (ref 0–1.8)
BASOPHILS # BLD: 0.01 K/UL (ref 0–0.12)
BUN SERPL-MCNC: 14 MG/DL (ref 8–22)
CALCIUM SERPL-MCNC: 8.4 MG/DL (ref 8.4–10.2)
CHLORIDE SERPL-SCNC: 108 MMOL/L (ref 96–112)
CO2 SERPL-SCNC: 24 MMOL/L (ref 20–33)
CREAT SERPL-MCNC: 0.69 MG/DL (ref 0.5–1.4)
EOSINOPHIL # BLD AUTO: 0 K/UL (ref 0–0.51)
EOSINOPHIL NFR BLD: 0 % (ref 0–6.9)
ERYTHROCYTE [DISTWIDTH] IN BLOOD BY AUTOMATED COUNT: 51 FL (ref 35.9–50)
GFR SERPLBLD CREATININE-BSD FMLA CKD-EPI: 92 ML/MIN/1.73 M 2
GLUCOSE BLD STRIP.AUTO-MCNC: 109 MG/DL (ref 65–99)
GLUCOSE SERPL-MCNC: 125 MG/DL (ref 65–99)
HCT VFR BLD AUTO: 31.1 % (ref 37–47)
HGB BLD-MCNC: 9.8 G/DL (ref 12–16)
IMM GRANULOCYTES # BLD AUTO: 0.04 K/UL (ref 0–0.11)
IMM GRANULOCYTES NFR BLD AUTO: 0.5 % (ref 0–0.9)
LYMPHOCYTES # BLD AUTO: 1.42 K/UL (ref 1–4.8)
LYMPHOCYTES NFR BLD: 18.1 % (ref 22–41)
MAGNESIUM SERPL-MCNC: 1.7 MG/DL (ref 1.5–2.5)
MCH RBC QN AUTO: 27.7 PG (ref 27–33)
MCHC RBC AUTO-ENTMCNC: 31.5 G/DL (ref 33.6–35)
MCV RBC AUTO: 87.9 FL (ref 81.4–97.8)
MONOCYTES # BLD AUTO: 0.93 K/UL (ref 0–0.85)
MONOCYTES NFR BLD AUTO: 11.8 % (ref 0–13.4)
NEUTROPHILS # BLD AUTO: 5.46 K/UL (ref 2–7.15)
NEUTROPHILS NFR BLD: 69.5 % (ref 44–72)
NRBC # BLD AUTO: 0 K/UL
NRBC BLD-RTO: 0 /100 WBC
PLATELET # BLD AUTO: 222 K/UL (ref 164–446)
PMV BLD AUTO: 8.8 FL (ref 9–12.9)
POTASSIUM SERPL-SCNC: 4.1 MMOL/L (ref 3.6–5.5)
RBC # BLD AUTO: 3.54 M/UL (ref 4.2–5.4)
SODIUM SERPL-SCNC: 139 MMOL/L (ref 135–145)
WBC # BLD AUTO: 7.9 K/UL (ref 4.8–10.8)

## 2023-05-05 PROCEDURE — A9270 NON-COVERED ITEM OR SERVICE: HCPCS | Performed by: INTERNAL MEDICINE

## 2023-05-05 PROCEDURE — 80048 BASIC METABOLIC PNL TOTAL CA: CPT

## 2023-05-05 PROCEDURE — 82962 GLUCOSE BLOOD TEST: CPT

## 2023-05-05 PROCEDURE — 99239 HOSP IP/OBS DSCHRG MGMT >30: CPT | Performed by: INTERNAL MEDICINE

## 2023-05-05 PROCEDURE — 36415 COLL VENOUS BLD VENIPUNCTURE: CPT

## 2023-05-05 PROCEDURE — 83735 ASSAY OF MAGNESIUM: CPT

## 2023-05-05 PROCEDURE — 85025 COMPLETE CBC W/AUTO DIFF WBC: CPT

## 2023-05-05 PROCEDURE — 700102 HCHG RX REV CODE 250 W/ 637 OVERRIDE(OP): Performed by: INTERNAL MEDICINE

## 2023-05-05 PROCEDURE — 94760 N-INVAS EAR/PLS OXIMETRY 1: CPT

## 2023-05-05 PROCEDURE — 94669 MECHANICAL CHEST WALL OSCILL: CPT

## 2023-05-05 PROCEDURE — 700111 HCHG RX REV CODE 636 W/ 250 OVERRIDE (IP): Performed by: INTERNAL MEDICINE

## 2023-05-05 PROCEDURE — 94640 AIRWAY INHALATION TREATMENT: CPT

## 2023-05-05 RX ORDER — METOPROLOL SUCCINATE 25 MG/1
12.5 TABLET, EXTENDED RELEASE ORAL DAILY
Qty: 30 TABLET | Refills: 0 | Status: SHIPPED | OUTPATIENT
Start: 2023-05-06 | End: 2023-06-05

## 2023-05-05 RX ORDER — DOXYCYCLINE 100 MG/1
100 TABLET ORAL EVERY 12 HOURS
Qty: 6 TABLET | Refills: 0 | Status: SHIPPED | OUTPATIENT
Start: 2023-05-05 | End: 2023-05-08

## 2023-05-05 RX ORDER — PREDNISONE 20 MG/1
40 TABLET ORAL DAILY
Qty: 6 TABLET | Refills: 0 | Status: SHIPPED | OUTPATIENT
Start: 2023-05-05 | End: 2023-05-08

## 2023-05-05 RX ORDER — BENZONATATE 100 MG/1
100 CAPSULE ORAL 3 TIMES DAILY PRN
Qty: 10 CAPSULE | Refills: 0 | Status: SHIPPED | OUTPATIENT
Start: 2023-05-05 | End: 2023-12-18

## 2023-05-05 RX ADMIN — DOXYCYCLINE 100 MG: 100 TABLET, FILM COATED ORAL at 06:01

## 2023-05-05 RX ADMIN — OMEPRAZOLE 40 MG: 20 CAPSULE, DELAYED RELEASE ORAL at 06:01

## 2023-05-05 RX ADMIN — LEVOTHYROXINE SODIUM 88 MCG: 88 TABLET ORAL at 06:00

## 2023-05-05 RX ADMIN — ESCITALOPRAM OXALATE 20 MG: 10 TABLET ORAL at 06:01

## 2023-05-05 RX ADMIN — LIOTHYRONINE SODIUM 5 MCG: 5 TABLET ORAL at 06:01

## 2023-05-05 RX ADMIN — ALBUTEROL SULFATE 2 PUFF: 90 AEROSOL, METERED RESPIRATORY (INHALATION) at 06:45

## 2023-05-05 RX ADMIN — PREDNISONE 40 MG: 20 TABLET ORAL at 06:01

## 2023-05-05 RX ADMIN — SUCRALFATE 1 G: 1 TABLET ORAL at 06:01

## 2023-05-05 RX ADMIN — GUAIFENESIN 1200 MG: 600 TABLET, EXTENDED RELEASE ORAL at 06:01

## 2023-05-05 RX ADMIN — ALBUTEROL SULFATE 2 PUFF: 90 AEROSOL, METERED RESPIRATORY (INHALATION) at 02:40

## 2023-05-05 ASSESSMENT — FIBROSIS 4 INDEX: FIB4 SCORE: 1.21

## 2023-05-05 NOTE — DISCHARGE SUMMARY
"Hospital Medicine Discharge Note     Admit Date:  5/3/2023       Discharge Date:   5/5/2023  LOS: 2 days     Primary Care Provider:    Cole Yuen M.D.    Attending Physician:  Kina Salinas M.D.     Discharge Diagnoses:   Principal Problem:    Acute asthma exacerbation  Active Problems:    Dyslipidemia    Essential hypertension, benign    Class 1 obesity due to excess calories with serious comorbidity and body mass index (BMI) of 32.0 to 32.9 in adult    Hypothyroidism    Type 2 diabetes mellitus (HCC)    Hypokalemia    Sinus tachycardia    Non-traumatic compression fracture of T5 thoracic vertebra, sequela      Hospital Summary (Brief Narrative):         Per HPI \"73 y.o. female with type 2 diabetes mellitus, hyperlipidemia, hypertension, obesity, asthma (although she was also told that she had concomitant COPD, was never a smoker) who presented 5/3/2023 with acute onset nonproductive intractable cough since 2 nights ago, with associated shortness of breath.  She also felt tight in her chest, but has not been wheezing (although she shared that she never wheezes despite her asthma).  She denies any chest pain.  She tried to use her as needed albuterol inhaler, which did not improve her cough and shortness of breath.  This morning, she noted a fever of 100.9 °F.  She denied any sick contacts, and infection lives alone at home with her cats.  She had no recent travel.  She had no other complaint such as nausea, vomiting, abdominal pain, bowel movement changes, or urinary symptoms.  Her symptoms persisted and has worsened, prompting her to go to the ED\"    Patient was hospitalized for acute asthma exacerbation. She had presented with worsening cough, SOB and fevers. With initiation of duonebs q4hr, prednisone and doxycycline, she had marked improvement in her sxs. Discharged for 5 day course of antibiotics and prednisone. Advised to avoid Beta Blockers. I reduced her home dose and she stated she would discuss " this with her pcp. BBs are not first line treatment for HTN and this should be changed in an asthmatic patient.      Disposition:   Discharge home    Condition:  Stable    Activity:   As tolerated     Diet:   Heart Healthy Diet / Diabetic Diet    Discharge Medications:           Medication List        START taking these medications        Instructions   benzonatate 100 MG Caps  Commonly known as: TESSALON   Take 1 Capsule by mouth 3 times a day as needed for Cough.  Dose: 100 mg     doxycycline monohydrate 100 MG tablet  Commonly known as: ADOXA   Take 1 Tablet by mouth every 12 hours for 3 days.  Dose: 100 mg     predniSONE 20 MG Tabs  Commonly known as: DELTASONE   Take 2 Tablets by mouth every day for 3 days.  Dose: 40 mg            CHANGE how you take these medications        Instructions   * metoprolol SR 25 MG Tb24  What changed: Another medication with the same name was added. Make sure you understand how and when to take each.  Commonly known as: TOPROL XL   Take 25 mg by mouth every day.  Dose: 25 mg     * metoprolol SR 25 MG Tb24  Start taking on: May 6, 2023  What changed: You were already taking a medication with the same name, and this prescription was added. Make sure you understand how and when to take each.  Commonly known as: TOPROL XL   Take 0.5 Tablets by mouth every day for 30 days.  Dose: 12.5 mg     omeprazole 20 MG delayed-release capsule  What changed: how much to take  Commonly known as: PRILOSEC   Take 1 Capsule by mouth every day.  Dose: 20 mg           * This list has 2 medication(s) that are the same as other medications prescribed for you. Read the directions carefully, and ask your doctor or other care provider to review them with you.                CONTINUE taking these medications        Instructions   acetaminophen 325 MG Tabs  Commonly known as: Tylenol   Take 325 mg by mouth every four hours as needed. Indications: Pain  Dose: 325 mg     albuterol 108 (90 Base) MCG/ACT Aers  inhalation aerosol   Inhale 2 Puffs every 6 hours as needed for Shortness of Breath.  Dose: 2 Puff     amitriptyline 100 MG Tabs  Commonly known as: ELAVIL   Take 100 mg by mouth every evening.  Dose: 100 mg     escitalopram 20 MG tablet  Commonly known as: LEXAPRO   Take 1 Tablet by mouth every day.  Dose: 20 mg     HYDROcodone/acetaminophen  MG Tabs  Commonly known as: NORCO   Take 1-2 Tablets by mouth every 6 hours as needed for Severe Pain.  Dose: 1-2 Tablet     liothyronine 5 MCG Tabs  Commonly known as: CYTOMEL   Take 5 mcg by mouth every day.  Dose: 5 mcg     lisinopril 5 MG Tabs  Commonly known as: PRINIVIL   Take 2.5 mg by mouth every day.  Dose: 2.5 mg     metFORMIN 500 MG Tabs  Commonly known as: GLUCOPHAGE   Take 250 mg by mouth 2 times a day with meals.  Dose: 250 mg     rosuvastatin 20 MG Tabs  Commonly known as: CRESTOR   Take 1 Tablet by mouth every evening.  Dose: 20 mg     sucralfate 1 GM Tabs  Commonly known as: CARAFATE   Take 1 Tablet by mouth 4 Times a Day,Before Meals and at Bedtime.  Dose: 1 g     sumatriptan 100 MG tablet  Commonly known as: IMITREX   Take 100 mg by mouth one time as needed for Migraine.  Dose: 100 mg     Synthroid 88 MCG Tabs  Generic drug: levothyroxine   Take 1 Tablet by mouth every day.  Dose: 88 mcg                Follow up appointment details :      I encouraged her to call his PCP to confirm follow up after discharge.    No future appointments.      Consultants:      None    Studies:    Imaging/ Testing:      EC-ECHOCARDIOGRAM COMPLETE W/O CONT   Final Result      IR-US GUIDED PIV   Final Result    Ultrasound-guided PERIPHERAL IV INSERTION performed by    qualified nursing staff as above.      CT-TSPINE W/O PLUS RECONS   Final Result      1.  Late subacute to chronic compression deformity at T5 with moderate loss of height, new from prior exam.   2.  Moderate multilevel degenerative change of thoracic spine with accentuated kyphosis.  Alignment overall unchanged  from prior.   3.  No acute thoracic spine fracture demonstrated.      CT-CTA CHEST PULMONARY ARTERY W/ RECONS   Final Result      1.  Negative CTA chest. No pulmonary embolism      2.  Left paramedian neck cystic structure containing debris measuring 3.1 x 1.9 x 2.5 cm. This finding is contiguous with the cervical esophagus and is highly suspicious for a Zenker's diverticulum            DX-CHEST-PORTABLE (1 VIEW)   Final Result      Stable large volumes with subpleural biapical opacity likely indicating pleural parenchymal scarring. This could obscure a nodule          Procedures:        None      Instructions:      The were given instructions to return to the ER if patient's condition worsens      Time Spent on Discharge:     Discharge instructions were discussed with the patient at bedside. Patient  expressed understanding and agreed to comply with all discharge instructions.    37 minutes were spent in the discharge planning and management of this  patient, including more than 50% of the time spent face to face in   Counseling.

## 2023-05-05 NOTE — PROGRESS NOTES
Monitor Summary:    Rhythm:  SR   Rate Range:  85-96  Ectopy: rPAC    Measurements:  0.16/0.10/0.36  (Measured by monitor tecH)

## 2023-05-05 NOTE — RESPIRATORY CARE
"   COPD EDUCATION by COPD CLINICAL EDUCATOR  5/5/2023 at 12:11 PM by Zulma Archibald RRT     Patient reviewed by COPD education team. Patient does not have a history or diagnosis of COPD and is a non-smoker.  Therefore, patient does not qualify for the COPD program.     COPD Screen  COPD Risk Screening  Do you have a history of COPD?: No  COPD Population Screener  During the past 4 weeks, how much did you feel short of breath?: None/Little of the time  Do you ever cough up any mucus or phlegm?: No/only with occasional colds or infections  In the past 12 months, you do less than you used to because of your breathing problems: Disagree/unsure  Have you smoked at least 100 cigarettes in your entire life?: No/don't know  How old are you?: 60+  COPD Screening Score: 2  COPD Coordinator Not Recommended:  (no copd hx)    COPD Assessment  COPD Clinical Specialists ONLY  COPD Education Initiated: No--Quick Screen (NO hx dx COPD, Never Smoked, NO PFT, NO Respiratory medications/inhalers used, History Asthma, per notes aside from her asthma, she also states that she was told she also has concomitant COPD, but she is never a smoker. Needs PFT)  Is this a COPD exacerbation patient?: No    PFT Results    No results found for: PFT    Meds to Beds  Would the patient like to opt in for Bedside Medication Delivery at Discharge?: No     MY COPD ACTION PLAN     It is recommended that patients and physicians /healthcare providers complete this action plan together. This plan should be discussed at each physician visit and updated as needed.    The green, yellow and red zones show groups of symptoms of COPD. This list of symptoms is not comprehensive, and you may experience other symptoms. In the \"Actions\" column, your healthcare provider has recommended actions for you to take based on your symptoms.    Patient Name: Yamile Go   YOB: 1950   Last Updated on:     Green Zone:  I am doing well today Actions     " "Usual activitiy and exercise level   Take daily medications     Usual amounts of cough and phlegm/mucus   Use oxygen as prescribed     Sleep well at night   Continue regular exercise/diet plan     Appetite is good   At all times avoid cigarette smoke, inhaled irritants     Daily Medications (these medications are taken every day):                Yellow Zone:  I am having a bad day or a COPD flare Actions     More breathless than usual   Continue daily medications     I have less energy for my daily activities   Use quick relief inhaler as ordered     Increased or thicker phlegm/mucus   Use oxygen as prescribed     Using quick relief inhaler/nebulizer more often   Get plenty of rest     Swelling of ankles more than usual   Use pursed lip breathing     More coughing than usual   At all times avoid cigarette smoke, inhaled irritants     I feel like I have a \"chest cold\"     Poor sleep and my symptoms woke me up     My appetite is not good     My medicine is not helping      Call provider immediately if symptoms don’t improve     Continue daily medications, add rescue medications:               Medications to be used during a flare up, (as Discussed with Provider):              Red Zone:  I need urgent medical care Actions     Severe shortness of breath even at rest   Call 911 or seek medical care immediately     Not able to do any activity because of breathing      Fever or shaking chills      Feeling confused or very drowsy       Chest pains      Coughing up blood                  "

## 2023-05-05 NOTE — DISCHARGE INSTRUCTIONS
You were hospitalized for asthma exacerbation.  Please take prednisone and the antibiotic doxycycline for 3 more days.  Please note, given your asthma history, we recommend you change your blood pressure medication from beta blocker to another anti hypertenive agents. Please discuss with your PCP for your best options. We have halved the dose in the meantime.    Special Equipment    You are being discharged with the following special equipment:  Not Applicable    Diet    Resume your normal diet as tolerated.  A diet low in cholesterol, fat, and sodium is recommended for good health.     Activity    Resume Your Normal Activity    You may resume your normal activity as tolerated.  Rest as needed.

## 2023-05-05 NOTE — CARE PLAN
Problem: Bronchoconstriction  Goal: Improve in air movement and diminished wheezing  Description: Target End Date:  2 to 3 days    1.  Implement inhaled treatments  2.  Evaluate and manage medication effects  Outcome: Progressing     Problem: Hyperinflation  Goal: Prevent or improve atelectasis  Description: Target End Date:  3 to 4 days    1. Instruct incentive spirometry usage  2.  Perform hyperinflation therapy as indicated  Outcome: Progressing

## 2023-05-05 NOTE — CARE PLAN
The patient is Stable - Low risk of patient condition declining or worsening    Shift Goals  Clinical Goals: discharge  Patient Goals: discharge  Family Goals: JONY    Progress made toward(s) clinical / shift goals:  MD made aware of patient's wish to go home asap.       Problem: Pain - Standard  Goal: Alleviation of pain or a reduction in pain to the patient’s comfort goal  Outcome: Met     Problem: Knowledge Deficit - Standard  Goal: Patient and family/care givers will demonstrate understanding of plan of care, disease process/condition, diagnostic tests and medications  Outcome: Met     Problem: Knowledge Deficit - COPD  Goal: Patient/significant other demonstrates understanding of disease process, utilization of the Action Plan, medications and discharge instruction  Outcome: Met     Problem: Risk for Infection - COPD  Goal: Patient will remain free from signs and symptoms of infection  Outcome: Met     Problem: Nutrition - Advanced  Goal: Patient will display progressive weight gain toward goal have adequate food and fluid intake  Outcome: Met     Problem: Ineffective Airway Clearance  Goal: Patient will maintain patent airway with clear/clearing breath sounds  Outcome: Met     Problem: Impaired Gas Exchange  Goal: Patient will demonstrate improved ventilation and adequate oxygenation and participate in treatment regimen within the level of ability/situation.  Outcome: Met     Problem: Risk for Aspiration  Goal: Patient's risk for aspiration will be absent or decrease  Outcome: Met     Problem: Self Care  Goal: Patient will have the ability to perform ADLs independently or with assistance (bathe, groom, dress, toilet and feed)  Outcome: Met

## 2023-05-05 NOTE — CARE PLAN
The patient is Stable - Low risk of patient condition declining or worsening    Shift Goals  Clinical Goals: monitor Spo2, resp treatments  Patient Goals: go home  Family Goals: JONY    Progress made toward(s) clinical / shift goals:  Pt reports no pain at this time, monitor Spo2 and o2 needs, resp treatments when needed, call light within reach     Patient is not progressing towards the following goals:      Problem: Pain - Standard  Goal: Alleviation of pain or a reduction in pain to the patient’s comfort goal  Outcome: Progressing     Problem: Knowledge Deficit - Standard  Goal: Patient and family/care givers will demonstrate understanding of plan of care, disease process/condition, diagnostic tests and medications  Outcome: Progressing     Problem: Ineffective Airway Clearance  Goal: Patient will maintain patent airway with clear/clearing breath sounds  Outcome: Progressing     Problem: Impaired Gas Exchange  Goal: Patient will demonstrate improved ventilation and adequate oxygenation and participate in treatment regimen within the level of ability/situation.  Outcome: Progressing

## 2023-05-25 ENCOUNTER — OFFICE VISIT (OUTPATIENT)
Dept: URGENT CARE | Facility: CLINIC | Age: 73
End: 2023-05-25
Payer: MEDICARE

## 2023-05-25 ENCOUNTER — HOSPITAL ENCOUNTER (OUTPATIENT)
Facility: MEDICAL CENTER | Age: 73
End: 2023-05-25
Attending: NURSE PRACTITIONER
Payer: MEDICARE

## 2023-05-25 VITALS
BODY MASS INDEX: 31.28 KG/M2 | DIASTOLIC BLOOD PRESSURE: 66 MMHG | HEART RATE: 76 BPM | TEMPERATURE: 97 F | SYSTOLIC BLOOD PRESSURE: 112 MMHG | HEIGHT: 62 IN | RESPIRATION RATE: 16 BRPM | OXYGEN SATURATION: 97 % | WEIGHT: 170 LBS

## 2023-05-25 DIAGNOSIS — R30.0 DYSURIA: ICD-10-CM

## 2023-05-25 DIAGNOSIS — N30.01 ACUTE CYSTITIS WITH HEMATURIA: ICD-10-CM

## 2023-05-25 DIAGNOSIS — R35.0 URINARY FREQUENCY: ICD-10-CM

## 2023-05-25 LAB
APPEARANCE UR: NORMAL
BILIRUB UR STRIP-MCNC: NORMAL MG/DL
COLOR UR AUTO: YELLOW
GLUCOSE UR STRIP.AUTO-MCNC: NORMAL MG/DL
KETONES UR STRIP.AUTO-MCNC: NORMAL MG/DL
LEUKOCYTE ESTERASE UR QL STRIP.AUTO: NORMAL
NITRITE UR QL STRIP.AUTO: NORMAL
PH UR STRIP.AUTO: 6 [PH] (ref 5–8)
PROT UR QL STRIP: 30 MG/DL
RBC UR QL AUTO: NORMAL
SP GR UR STRIP.AUTO: 1.02
UROBILINOGEN UR STRIP-MCNC: 0.2 MG/DL

## 2023-05-25 PROCEDURE — 87077 CULTURE AEROBIC IDENTIFY: CPT

## 2023-05-25 PROCEDURE — 3074F SYST BP LT 130 MM HG: CPT | Performed by: NURSE PRACTITIONER

## 2023-05-25 PROCEDURE — 99214 OFFICE O/P EST MOD 30 MIN: CPT | Performed by: NURSE PRACTITIONER

## 2023-05-25 PROCEDURE — 81002 URINALYSIS NONAUTO W/O SCOPE: CPT | Performed by: NURSE PRACTITIONER

## 2023-05-25 PROCEDURE — 87086 URINE CULTURE/COLONY COUNT: CPT

## 2023-05-25 PROCEDURE — 87186 SC STD MICRODIL/AGAR DIL: CPT

## 2023-05-25 PROCEDURE — 3078F DIAST BP <80 MM HG: CPT | Performed by: NURSE PRACTITIONER

## 2023-05-25 RX ORDER — CEFDINIR 300 MG/1
300 CAPSULE ORAL EVERY 12 HOURS
Qty: 10 CAPSULE | Refills: 0 | Status: SHIPPED | OUTPATIENT
Start: 2023-05-25 | End: 2023-05-30

## 2023-05-25 RX ORDER — PHENAZOPYRIDINE HYDROCHLORIDE 200 MG/1
200 TABLET, FILM COATED ORAL 3 TIMES DAILY PRN
Qty: 6 TABLET | Refills: 0 | Status: SHIPPED | OUTPATIENT
Start: 2023-05-25 | End: 2023-12-18

## 2023-05-25 ASSESSMENT — FIBROSIS 4 INDEX: FIB4 SCORE: 1.21

## 2023-05-25 NOTE — PATIENT INSTRUCTIONS
Urinary Tract Infection, Adult  A urinary tract infection (UTI) is an infection of any part of the urinary tract. The urinary tract includes:  The kidneys.  The ureters.  The bladder.  The urethra.  These organs make, store, and get rid of pee (urine) in the body.  What are the causes?  This is caused by germs (bacteria) in your genital area. These germs grow and cause swelling (inflammation) of your urinary tract.  What increases the risk?  You are more likely to develop this condition if:  You have a small, thin tube (catheter) to drain pee.  You cannot control when you pee or poop (incontinence).  You are female, and:  You use these methods to prevent pregnancy:  A medicine that kills sperm (spermicide).  A device that blocks sperm (diaphragm).  You have low levels of a female hormone (estrogen).  You are pregnant.  You have genes that add to your risk.  You are sexually active.  You take antibiotic medicines.  You have trouble peeing because of:  A prostate that is bigger than normal, if you are male.  A blockage in the part of your body that drains pee from the bladder (urethra).  A kidney stone.  A nerve condition that affects your bladder (neurogenic bladder).  Not getting enough to drink.  Not peeing often enough.  You have other conditions, such as:  Diabetes.  A weak disease-fighting system (immune system).  Sickle cell disease.  Gout.  Injury of the spine.  What are the signs or symptoms?  Symptoms of this condition include:  Needing to pee right away (urgently).  Peeing often.  Peeing small amounts often.  Pain or burning when peeing.  Blood in the pee.  Pee that smells bad or not like normal.  Trouble peeing.  Pee that is cloudy.  Fluid coming from the vagina, if you are female.  Pain in the belly or lower back.  Other symptoms include:  Throwing up (vomiting).  No urge to eat.  Feeling mixed up (confused).  Being tired and grouchy (irritable).  A fever.  Watery poop (diarrhea).  How is this  treated?  This condition may be treated with:  Antibiotic medicine.  Other medicines.  Drinking enough water.  Follow these instructions at home:    Medicines  Take over-the-counter and prescription medicines only as told by your doctor.  If you were prescribed an antibiotic medicine, take it as told by your doctor. Do not stop taking it even if you start to feel better.  General instructions  Make sure you:  Pee until your bladder is empty.  Do not hold pee for a long time.  Empty your bladder after sex.  Wipe from front to back after pooping if you are a female. Use each tissue one time when you wipe.  Drink enough fluid to keep your pee pale yellow.  Keep all follow-up visits as told by your doctor. This is important.  Contact a doctor if:  You do not get better after 1-2 days.  Your symptoms go away and then come back.  Get help right away if:  You have very bad back pain.  You have very bad pain in your lower belly.  You have a fever.  You are sick to your stomach (nauseous).  You are throwing up.  Summary  A urinary tract infection (UTI) is an infection of any part of the urinary tract.  This condition is caused by germs in your genital area.  There are many risk factors for a UTI. These include having a small, thin tube to drain pee and not being able to control when you pee or poop.  Treatment includes antibiotic medicines for germs.  Drink enough fluid to keep your pee pale yellow.  This information is not intended to replace advice given to you by your health care provider. Make sure you discuss any questions you have with your health care provider.  Document Released: 06/05/2009 Document Revised: 12/05/2019 Document Reviewed: 06/27/2019  [employer] Patient Education © 2020 "[employer]

## 2023-05-25 NOTE — PROGRESS NOTES
Yamile Go is a 73 y.o. female who presents for Dysuria (X1 week, burning with urination )      HPI  This is a new problem. Yamile Go is a 73 y.o. patient who presents to urgent care with c/o: Dysuria and urinary frequency for 1 week.  She feels like she has a bladder infection.  She has a history of frequent bladder infections.  She says she gets greater than 4 bladder infections here.  She has not seen urologist and is still interested in seeing a urologist because she already has to many doctors.  Treatments tried: Increase fluids  Denies fever, chills, nausea, vomiting, diarrhea  No other aggravating or alleviating factors.       ROS See HPI    Allergies:       Allergies   Allergen Reactions    Rifampin Unspecified     Pt turns yellow    Fentanyl Vomiting    Morphine Vomiting    Gabapentin Unspecified    Pregabalin Unspecified    Sulfa Drugs     Benzoin Rash     blisters       PMSFS Hx:  Past Medical History:   Diagnosis Date    Arthritis     Asthma     CAD (coronary artery disease)     Dental disorder     Missing teeth right side.  Veneers top front    Depression     Diabetes (HCC)     Emphysema of lung (HCC)     Heart burn     GERD    HTN (hypertension)     Hyperlipidemia     Migraines     Obesity     PONV (postoperative nausea and vomiting)     Sleep apnea     diagnosed 9/2009 CPAP -PMA     Past Surgical History:   Procedure Laterality Date    INSERTION, PERIPHERAL NERVE STIMULATOR, LOWER EXTREMITY Left 12/22/2022    Procedure: Left SCIATIC PERIPHERAL NERVE STIMULATOR IMPLANT, LOWER EXTREMITY PERIPHERAL SCIATIC NERVE;  Surgeon: Tobi Kilgore M.D.;  Location: SURGERY [facility];  Service: Pain Management    ARTHROPLASTY Right 2020    ankle    LUMBAR EXPLORATION N/A 2017    COLECTOMY N/A 2007    partial for divverticulitis 2007    LUMBAR LAMINECTOMY DISKECTOMY N/A 1989    BREAST BIOPSY  1984    no cancer    ABDOMINAL HYSTERECTOMY TOTAL N/A 1978    APPENDECTOMY N/A 1976     Family History    Problem Relation Age of Onset    Cancer Mother         lung    Heart Disease Mother     Stroke Father     Heart Disease Father     Diabetes Father     Heart Disease Brother         cath and bypass    Heart Disease Brother         cath and byp[ass[    Heart Disease Brother         cath and bypass    Heart Disease Brother     Other Brother         MVA     Social History     Tobacco Use    Smoking status: Never    Smokeless tobacco: Never   Vaping Use    Vaping Use: Never used   Substance Use Topics    Alcohol use: No       Problems:   Patient Active Problem List   Diagnosis    Other chest pain    Dyslipidemia    Essential hypertension, benign    CAD (coronary artery disease)    HTN (hypertension)    Hyperlipidemia    Class 1 obesity due to excess calories with serious comorbidity and body mass index (BMI) of 32.0 to 32.9 in adult    Depression    Acute hypoxemic respiratory failure due to COVID-19 ([facility])    Community acquired pneumonia    Sepsis ([facility])    Hypothyroidism    Chronic pain syndrome on chronic opioids    Personal history of COVID-19    Closed fracture of lumbar vertebra, unspecified fracture morphology, initial encounter ([facility])    Leukocytosis    Physical debility    Closed compression fracture of L2 lumbar vertebra, initial encounter ([facility])    Thrombocytopenia ([facility])    Azotemia    Vitamin D deficiency    Asthma    Hyperglycemia    Acute asthma exacerbation    Type 2 diabetes mellitus ([facility])    Hypokalemia    Sinus tachycardia    Non-traumatic compression fracture of T5 thoracic vertebra, sequela       Medications:   Current Outpatient Medications on File Prior to Visit   Medication Sig Dispense Refill    benzonatate (TESSALON) 100 MG Cap Take 1 Capsule by mouth 3 times a day as needed for Cough. 10 Capsule 0    metoprolol SR (TOPROL XL) 25 MG TABLET SR 24 HR Take 0.5 Tablets by mouth every day for 30 days. 30 Tablet 0    albuterol 108 (90 Base) MCG/ACT Aero Soln inhalation aerosol Inhale 2 Puffs every 6  "hours as needed for Shortness of Breath.      acetaminophen (TYLENOL) 325 MG Tab Take 325 mg by mouth every four hours as needed. Indications: Pain      HYDROcodone/acetaminophen (NORCO)  MG Tab Take 1-2 Tablets by mouth every 6 hours as needed for Severe Pain.      metoprolol SR (TOPROL XL) 25 MG TABLET SR 24 HR Take 25 mg by mouth every day.      liothyronine (CYTOMEL) 5 MCG Tab Take 5 mcg by mouth every day.      metFORMIN (GLUCOPHAGE) 500 MG Tab Take 250 mg by mouth 2 times a day with meals.      amitriptyline (ELAVIL) 100 MG Tab Take 100 mg by mouth every evening.      lisinopril (PRINIVIL) 5 MG Tab Take 2.5 mg by mouth every day.      escitalopram (LEXAPRO) 20 MG tablet Take 1 Tablet by mouth every day. 30 Tablet 2    rosuvastatin (CRESTOR) 20 MG Tab Take 1 Tablet by mouth every evening. 30 Tablet 2    sucralfate (CARAFATE) 1 GM Tab Take 1 Tablet by mouth 4 Times a Day,Before Meals and at Bedtime. 120 Tablet 2    SYNTHROID 88 MCG Tab Take 1 Tablet by mouth every day. 30 Tablet 2    omeprazole (PRILOSEC) 20 MG delayed-release capsule Take 1 Capsule by mouth every day. 30 Capsule 2    sumatriptan (IMITREX) 100 MG tablet Take 100 mg by mouth one time as needed for Migraine.       No current facility-administered medications on file prior to visit.          Objective:     /66   Pulse 76   Temp 36.1 °C (97 °F) (Temporal)   Resp 16   Ht 1.575 m (5' 2\")   Wt 77.1 kg (170 lb)   SpO2 97%   BMI 31.09 kg/m²     Physical Exam  Vitals and nursing note reviewed.   Constitutional:       General: She is not in acute distress.     Appearance: Normal appearance. She is well-developed. She is not ill-appearing or toxic-appearing.   Cardiovascular:      Rate and Rhythm: Normal rate.      Pulses: Normal pulses.   Pulmonary:      Effort: Pulmonary effort is normal.   Abdominal:      Palpations: Abdomen is soft. Abdomen is not rigid.      Tenderness: There is no right CVA tenderness or left CVA tenderness. "   Musculoskeletal:      Lumbar back: Normal.   Skin:     General: Skin is warm and dry.      Capillary Refill: Capillary refill takes less than 2 seconds.   Neurological:      Mental Status: She is alert and oriented to person, place, and time.   Psychiatric:         Mood and Affect: Mood normal.         Behavior: Behavior normal. Behavior is cooperative.         Thought Content: Thought content normal.           Assessment /Associated Orders:      1. Dysuria  phenazopyridine (PYRIDIUM) 200 MG Tab    POCT Urinalysis      2. Acute cystitis with hematuria  Urine Culture    cefdinir (OMNICEF) 300 MG Cap      3. Urinary frequency  POCT Urinalysis          Medical Decision Making:    Pt is clinically stable at today's acute urgent care visit.  No acute distress noted. Appropriate for outpatient care at this time.   Acute problem today with uncertain prognosis.   Educated in proper administration of  prescription medication(s) ordered today including safety, possible SE, risks, benefits, rationale and alternatives to therapy.   Urine Culture: pending   Keep well hydrated    Recommend follow-up with primary care provider secondary to frequent bladder infections.  Consideration of possible vaginal estrogen but defer to PCP    Discussed Dx, management options (risks,benefits, and alternatives to planned treatment), natural progression and supportive care.  Expressed understanding and the treatment plan was agreed upon.   Questions were encouraged and answered   Return to urgent care prn if new or worsening sx or if there is no improvement in condition prn.    Educated in Red flags and indications to immediately call 911 or present to the Emergency Department.       Time I spent evaluating Yamile Go in urgent care today was 30  minutes. This time includes preparing for visit, reviewing any pertinent notes or test results, counseling/education, exam, obtaining HPI, interpretation of lab tests, medication management and  documentation as indicated above.Time does not include separately billable procedures noted .       Please note that this dictation was created using voice recognition software. I have worked with consultants from the vendor as well as technical experts from [facility] to optimize the interface. I have made every reasonable attempt to correct obvious errors, but I expect that there are errors of grammar and possibly content that I did not discover before finalizing the note.  This note was electronically signed by provider

## 2023-05-28 LAB
BACTERIA UR CULT: ABNORMAL
BACTERIA UR CULT: ABNORMAL
SIGNIFICANT IND 70042: ABNORMAL
SITE SITE: ABNORMAL
SOURCE SOURCE: ABNORMAL

## 2023-06-20 ENCOUNTER — OFFICE VISIT (OUTPATIENT)
Dept: URGENT CARE | Facility: CLINIC | Age: 73
End: 2023-06-20
Payer: MEDICARE

## 2023-06-20 ENCOUNTER — HOSPITAL ENCOUNTER (OUTPATIENT)
Facility: MEDICAL CENTER | Age: 73
End: 2023-06-20
Attending: NURSE PRACTITIONER
Payer: MEDICARE

## 2023-06-20 VITALS
SYSTOLIC BLOOD PRESSURE: 140 MMHG | TEMPERATURE: 97.3 F | RESPIRATION RATE: 16 BRPM | HEIGHT: 62 IN | OXYGEN SATURATION: 99 % | DIASTOLIC BLOOD PRESSURE: 76 MMHG | BODY MASS INDEX: 29.44 KG/M2 | WEIGHT: 160 LBS | HEART RATE: 88 BPM

## 2023-06-20 DIAGNOSIS — N39.0 RECURRENT UTI: ICD-10-CM

## 2023-06-20 DIAGNOSIS — R30.0 DYSURIA: ICD-10-CM

## 2023-06-20 DIAGNOSIS — I10 ELEVATED BLOOD PRESSURE READING IN OFFICE WITH DIAGNOSIS OF HYPERTENSION: ICD-10-CM

## 2023-06-20 DIAGNOSIS — N30.01 ACUTE CYSTITIS WITH HEMATURIA: ICD-10-CM

## 2023-06-20 LAB
APPEARANCE UR: NORMAL
BILIRUB UR STRIP-MCNC: NEGATIVE MG/DL
COLOR UR AUTO: YELLOW
GLUCOSE UR STRIP.AUTO-MCNC: NEGATIVE MG/DL
KETONES UR STRIP.AUTO-MCNC: NEGATIVE MG/DL
LEUKOCYTE ESTERASE UR QL STRIP.AUTO: NORMAL
NITRITE UR QL STRIP.AUTO: NEGATIVE
PH UR STRIP.AUTO: 6 [PH] (ref 5–8)
PROT UR QL STRIP: NEGATIVE MG/DL
RBC UR QL AUTO: NORMAL
SP GR UR STRIP.AUTO: 1.01
UROBILINOGEN UR STRIP-MCNC: 0.2 MG/DL

## 2023-06-20 PROCEDURE — 3077F SYST BP >= 140 MM HG: CPT | Performed by: NURSE PRACTITIONER

## 2023-06-20 PROCEDURE — 3078F DIAST BP <80 MM HG: CPT | Performed by: NURSE PRACTITIONER

## 2023-06-20 PROCEDURE — 87086 URINE CULTURE/COLONY COUNT: CPT

## 2023-06-20 PROCEDURE — 99214 OFFICE O/P EST MOD 30 MIN: CPT | Performed by: NURSE PRACTITIONER

## 2023-06-20 PROCEDURE — 81002 URINALYSIS NONAUTO W/O SCOPE: CPT | Performed by: NURSE PRACTITIONER

## 2023-06-20 RX ORDER — CIPROFLOXACIN 500 MG/1
500 TABLET, FILM COATED ORAL 2 TIMES DAILY
Qty: 14 TABLET | Refills: 0 | Status: SHIPPED | OUTPATIENT
Start: 2023-06-20 | End: 2023-06-27

## 2023-06-20 ASSESSMENT — FIBROSIS 4 INDEX: FIB4 SCORE: 1.21

## 2023-06-20 NOTE — PROGRESS NOTES
Patient has consented to treatment and for use of patient information for treatment and billing purposes.    Date: 06/20/23     Arrival Mode: Private Vehicle    Chief Complaint:    Chief Complaint   Patient presents with    UTI     X1week, Urinary frequency, painful sometimes,         History of Present Illness: 73 y.o.  female presents to clinic with 1 week history of urinary frequency urgency and burning with urination.  She denies any flank pain or pelvic pain.  She denies any fevers or body aches.  Upon questioning patient does admit she has had a urinary tract infection once monthly for the past 6 months.  She does not have a urologist.  She denies any vaginal symptoms.      ROS:    As stated in HPI     Pertinent Medical History:  Past Medical History:   Diagnosis Date    Arthritis     Asthma     CAD (coronary artery disease)     Dental disorder     Missing teeth right side.  Veneers top front    Depression     Diabetes (HCC)     Emphysema of lung (HCC)     Heart burn     GERD    HTN (hypertension)     Hyperlipidemia     Migraines     Obesity     PONV (postoperative nausea and vomiting)     Sleep apnea     diagnosed 9/2009 CPAP -PMA        Pertinent Surgical History:  Past Surgical History:   Procedure Laterality Date    INSERTION, PERIPHERAL NERVE STIMULATOR, LOWER EXTREMITY Left 12/22/2022    Procedure: Left SCIATIC PERIPHERAL NERVE STIMULATOR IMPLANT, LOWER EXTREMITY PERIPHERAL SCIATIC NERVE;  Surgeon: Tobi Kilgore M.D.;  Location: SURGERY [facility];  Service: Pain Management    ARTHROPLASTY Right 2020    ankle    LUMBAR EXPLORATION N/A 2017    COLECTOMY N/A 2007    partial for divverticulitis 2007    LUMBAR LAMINECTOMY DISKECTOMY N/A 1989    BREAST BIOPSY  1984    no cancer    ABDOMINAL HYSTERECTOMY TOTAL N/A 1978    APPENDECTOMY N/A 1976        Pertinent Medications:    Current Outpatient Medications on File Prior to Visit   Medication Sig Dispense Refill    albuterol 108 (90 Base) MCG/ACT Aero Soln  inhalation aerosol Inhale 2 Puffs every 6 hours as needed for Shortness of Breath.      HYDROcodone/acetaminophen (NORCO)  MG Tab Take 1-2 Tablets by mouth every 6 hours as needed for Severe Pain.      metoprolol SR (TOPROL XL) 25 MG TABLET SR 24 HR Take 25 mg by mouth every day.      liothyronine (CYTOMEL) 5 MCG Tab Take 5 mcg by mouth every day.      metFORMIN (GLUCOPHAGE) 500 MG Tab Take 250 mg by mouth 2 times a day with meals.      amitriptyline (ELAVIL) 100 MG Tab Take 100 mg by mouth every evening.      lisinopril (PRINIVIL) 5 MG Tab Take 2.5 mg by mouth every day.      escitalopram (LEXAPRO) 20 MG tablet Take 1 Tablet by mouth every day. 30 Tablet 2    rosuvastatin (CRESTOR) 20 MG Tab Take 1 Tablet by mouth every evening. 30 Tablet 2    SYNTHROID 88 MCG Tab Take 1 Tablet by mouth every day. 30 Tablet 2    omeprazole (PRILOSEC) 20 MG delayed-release capsule Take 1 Capsule by mouth every day. 30 Capsule 2    sumatriptan (IMITREX) 100 MG tablet Take 100 mg by mouth one time as needed for Migraine.      phenazopyridine (PYRIDIUM) 200 MG Tab Take 1 Tablet by mouth 3 times a day as needed for Moderate Pain. (Patient not taking: Reported on 6/20/2023) 6 Tablet 0    benzonatate (TESSALON) 100 MG Cap Take 1 Capsule by mouth 3 times a day as needed for Cough. (Patient not taking: Reported on 6/20/2023) 10 Capsule 0    acetaminophen (TYLENOL) 325 MG Tab Take 325 mg by mouth every four hours as needed. Indications: Pain (Patient not taking: Reported on 6/20/2023)      sucralfate (CARAFATE) 1 GM Tab Take 1 Tablet by mouth 4 Times a Day,Before Meals and at Bedtime. (Patient not taking: Reported on 6/20/2023) 120 Tablet 2     No current facility-administered medications on file prior to visit.        Allergies:    Rifampin, Fentanyl, Morphine, Gabapentin, Pregabalin, Sulfa drugs, and Benzoin     Social History:  Social History     Tobacco Use    Smoking status: Never    Smokeless tobacco: Never   Vaping Use    Vaping  Use: Never used   Substance Use Topics    Alcohol use: No    Drug use: No        No LMP recorded. Patient has had a hysterectomy.           Physical Exam:    Vitals:    06/20/23 1236   BP: (!) 140/76   Pulse: 88   Resp: 16   Temp: 36.3 °C (97.3 °F)   SpO2: 99%             Physical Exam  Constitutional:       General: She is not in acute distress.     Appearance: Normal appearance. She is well-developed. She is not ill-appearing or toxic-appearing.   HENT:      Head: Normocephalic and atraumatic.   Cardiovascular:      Rate and Rhythm: Normal rate and regular rhythm.      Heart sounds: Normal heart sounds.   Pulmonary:      Effort: Pulmonary effort is normal. No respiratory distress.      Breath sounds: Normal breath sounds. No wheezing.   Abdominal:      General: Abdomen is flat. Bowel sounds are normal.      Palpations: Abdomen is soft.      Tenderness: There is no abdominal tenderness. There is no right CVA tenderness, left CVA tenderness, guarding or rebound.   Skin:     General: Skin is warm.      Capillary Refill: Capillary refill takes less than 2 seconds.      Coloration: Skin is not cyanotic or pale.   Neurological:      Mental Status: She is alert and oriented to person, place, and time.      Gait: Gait is intact.   Psychiatric:         Behavior: Behavior normal. Behavior is cooperative.                  Diagnostics:    Recent Results (from the past 24 hour(s))   POCT Urinalysis    Collection Time: 06/20/23 12:53 PM   Result Value Ref Range    POC Color yellow Negative    POC Appearance slightly cloudy Negative    POC Glucose negative Negative mg/dL    POC Bilirubin negative Negative mg/dL    POC Ketones negative Negative mg/dL    POC Specific Gravity 1.010 <1.005 - >1.030    POC Blood trace-intact Negative    POC Urine PH 6.0 5.0 - 8.0    POC Protein negative Negative mg/dL    POC Urobiligen 0.2 Negative (0.2) mg/dL    POC Nitrites negative Negative    POC Leukocyte Esterase small Negative        Urine  culture pending      Diagnostics interpreted by myself.      Medical Decision making and clinic course :  I personally reviewed prior external notes and test results pertinent to today's visit. Pt is clinically stable at today's acute urgent care visit.  No acute distress noted. Appropriate for outpatient care at this time. Shared decision-making was utilized with patient for treatment plan.    Pleasant nontoxic-appearing 73-year-old female presenting clinic with recurrent UTIs.  Most recent UTI less than 1 month ago.  Did review urine culture.  Did show sensitive to cefdinir of which patient was treated with.  Patient states that cefdinir did cause her diarrhea.  Using shared decision making we will treat urinary tract infection with ciprofloxacin due to positive leukocytes as well as hematuria.  Discussed black box warning.  Did send for 7-day treatment as patient did verbalize at 3 and 5 days do not clear her infections.  Patient is agreeable to urology consult at this point.  Discussed conservative measures to help manage symptoms.      The patient remained stable during the urgent care visit.    Plan:    Medication discussed included indication for use and the potential  benefits and side effects.       1. Acute cystitis with hematuria    - ciprofloxacin (CIPRO) 500 MG Tab; Take 1 Tablet by mouth 2 times a day for 7 days.  Dispense: 14 Tablet; Refill: 0    2. Dysuria    - POCT Urinalysis  - URINE CULTURE(NEW); Future         Printed education was provided regarding the aforementioned assessments.  All of the patient's questions were answered to their satisfaction at the time of discharge.    Follow up:    Recommended f/u in  3 if there is no improvement.    Patient was encouraged to monitor symptoms closely. Those signs and symptoms which would warrant concern and mandate seeking a higher level of service through the emergency department discussed at length and included in discharge papers.  Patient stated  agreement and understanding of this plan of care.    Disposition:  Home in stable condition       Voice Recognition Disclaimer:  Portions of this document were created using voice recognition software. The software does have a chance of producing errors of grammar and possibly content. I have made every reasonable attempt to correct obvious errors, but there may be errors of grammar and possibly content that I did not discover before finalizing the documentation.    CLAUDIA Nash.

## 2023-06-20 NOTE — LETTER
June 20, 2023    To Whom It May Concern:         This is confirmation that Yamile Go attended her scheduled appointment with CADEN Nash on 6/20/23. Please excuse from work.         Sincerely,          CLAUDIA Nash.  [phone]

## 2023-06-21 DIAGNOSIS — R30.0 DYSURIA: ICD-10-CM

## 2023-06-23 ENCOUNTER — TELEPHONE (OUTPATIENT)
Dept: HEALTH INFORMATION MANAGEMENT | Facility: OTHER | Age: 73
End: 2023-06-23
Payer: MEDICARE

## 2023-06-23 ENCOUNTER — APPOINTMENT (RX ONLY)
Dept: URBAN - METROPOLITAN AREA CLINIC 4 | Facility: CLINIC | Age: 73
Setting detail: DERMATOLOGY
End: 2023-06-23

## 2023-06-23 DIAGNOSIS — L82.1 OTHER SEBORRHEIC KERATOSIS: ICD-10-CM

## 2023-06-23 DIAGNOSIS — D18.0 HEMANGIOMA: ICD-10-CM

## 2023-06-23 DIAGNOSIS — Z71.89 OTHER SPECIFIED COUNSELING: ICD-10-CM

## 2023-06-23 DIAGNOSIS — R23.3 SPONTANEOUS ECCHYMOSES: ICD-10-CM

## 2023-06-23 DIAGNOSIS — L81.4 OTHER MELANIN HYPERPIGMENTATION: ICD-10-CM

## 2023-06-23 DIAGNOSIS — D22 MELANOCYTIC NEVI: ICD-10-CM

## 2023-06-23 PROBLEM — D48.5 NEOPLASM OF UNCERTAIN BEHAVIOR OF SKIN: Status: ACTIVE | Noted: 2023-06-23

## 2023-06-23 PROBLEM — D18.01 HEMANGIOMA OF SKIN AND SUBCUTANEOUS TISSUE: Status: ACTIVE | Noted: 2023-06-23

## 2023-06-23 PROBLEM — D22.5 MELANOCYTIC NEVI OF TRUNK: Status: ACTIVE | Noted: 2023-06-23

## 2023-06-23 PROCEDURE — ? COUNSELING

## 2023-06-23 PROCEDURE — ? BIOPSY BY SHAVE METHOD

## 2023-06-23 PROCEDURE — 99203 OFFICE O/P NEW LOW 30 MIN: CPT | Mod: 25

## 2023-06-23 PROCEDURE — ? SUNSCREEN RECOMMENDATIONS

## 2023-06-23 PROCEDURE — ? ADDITIONAL NOTES

## 2023-06-23 PROCEDURE — 11102 TANGNTL BX SKIN SINGLE LES: CPT

## 2023-06-23 ASSESSMENT — LOCATION SIMPLE DESCRIPTION DERM
LOCATION SIMPLE: LEFT THIGH
LOCATION SIMPLE: LEFT SHOULDER
LOCATION SIMPLE: ABDOMEN
LOCATION SIMPLE: LEFT FOREARM
LOCATION SIMPLE: RIGHT PRETIBIAL REGION
LOCATION SIMPLE: RIGHT SHOULDER
LOCATION SIMPLE: RIGHT FOREARM
LOCATION SIMPLE: LEFT PRETIBIAL REGION
LOCATION SIMPLE: LOWER BACK
LOCATION SIMPLE: UPPER BACK

## 2023-06-23 ASSESSMENT — LOCATION DETAILED DESCRIPTION DERM
LOCATION DETAILED: EPIGASTRIC SKIN
LOCATION DETAILED: LEFT DISTAL PRETIBIAL REGION
LOCATION DETAILED: RIGHT DISTAL PRETIBIAL REGION
LOCATION DETAILED: SUPERIOR LUMBAR SPINE
LOCATION DETAILED: LEFT POSTERIOR SHOULDER
LOCATION DETAILED: LEFT ANTERIOR PROXIMAL THIGH
LOCATION DETAILED: LEFT PROXIMAL DORSAL FOREARM
LOCATION DETAILED: RIGHT PROXIMAL DORSAL FOREARM
LOCATION DETAILED: INFERIOR THORACIC SPINE
LOCATION DETAILED: RIGHT POSTERIOR SHOULDER

## 2023-06-23 ASSESSMENT — LOCATION ZONE DERM
LOCATION ZONE: TRUNK
LOCATION ZONE: LEG
LOCATION ZONE: ARM

## 2023-06-23 NOTE — PROCEDURE: MIPS QUALITY
Quality 110: Preventive Care And Screening: Influenza Immunization: Influenza Immunization Administered during Influenza season
Quality 226: Preventive Care And Screening: Tobacco Use: Screening And Cessation Intervention: Patient screened for tobacco use and is an ex/non-smoker
Detail Level: Detailed
Quality 130: Documentation Of Current Medications In The Medical Record: Current Medications Documented
Quality 111:Pneumonia Vaccination Status For Older Adults: Patient received any pneumococcal conjugate or polysaccharide vaccine on or after their 60th birthday and before the end of the measurement period

## 2023-06-23 NOTE — PROCEDURE: ADDITIONAL NOTES
Render Risk Assessment In Note?: no
Additional Notes: Recommended patient go to her PCP to get blood work done to see what could be causing the bruising
Detail Level: Simple

## 2023-06-23 NOTE — HPI: FULL BODY SKIN EXAMINATION
How Severe Are Your Spot(S)?: mild
What Type Of Note Output Would You Prefer (Optional)?: Standard Output
What Is The Reason For Today's Visit?: Full Body Skin Examination with No Concerns
What Is The Reason For Today's Visit? (Being Monitored For X): concerning skin lesions on an annual basis
Additional History: Pt has not noticed any new or changing moles. No tender or bleeding spots.

## 2023-06-24 LAB
BACTERIA UR CULT: NORMAL
SIGNIFICANT IND 70042: NORMAL
SITE SITE: NORMAL
SOURCE SOURCE: NORMAL

## 2023-09-13 ENCOUNTER — HOSPITAL ENCOUNTER (OUTPATIENT)
Dept: RADIOLOGY | Facility: MEDICAL CENTER | Age: 73
End: 2023-09-13
Payer: MEDICARE

## 2023-10-05 ENCOUNTER — APPOINTMENT (RX ONLY)
Dept: URBAN - METROPOLITAN AREA CLINIC 4 | Facility: CLINIC | Age: 73
Setting detail: DERMATOLOGY
End: 2023-10-05

## 2023-10-05 DIAGNOSIS — L82.1 OTHER SEBORRHEIC KERATOSIS: ICD-10-CM

## 2023-10-05 DIAGNOSIS — L72.8 OTHER FOLLICULAR CYSTS OF THE SKIN AND SUBCUTANEOUS TISSUE: ICD-10-CM

## 2023-10-05 PROCEDURE — ? DEFER

## 2023-10-05 PROCEDURE — ? ADDITIONAL NOTES

## 2023-10-05 PROCEDURE — ? OBSERVATION AND MEASURE

## 2023-10-05 PROCEDURE — 99212 OFFICE O/P EST SF 10 MIN: CPT

## 2023-10-05 PROCEDURE — ? COUNSELING

## 2023-10-05 ASSESSMENT — LOCATION SIMPLE DESCRIPTION DERM
LOCATION SIMPLE: RIGHT SUPERIOR EYELID
LOCATION SIMPLE: NECK

## 2023-10-05 ASSESSMENT — LOCATION ZONE DERM
LOCATION ZONE: NECK
LOCATION ZONE: EYELID

## 2023-10-05 ASSESSMENT — LOCATION DETAILED DESCRIPTION DERM
LOCATION DETAILED: RIGHT LATERAL SUPERIOR EYELID
LOCATION DETAILED: RIGHT CENTRAL LATERAL NECK

## 2023-10-05 NOTE — PROCEDURE: MIPS QUALITY
Quality 226: Preventive Care And Screening: Tobacco Use: Screening And Cessation Intervention: Patient screened for tobacco use and is an ex/non-smoker
Detail Level: Detailed
Quality 130: Documentation Of Current Medications In The Medical Record: Current Medications Documented
3/week(s)

## 2023-10-05 NOTE — PROCEDURE: ADDITIONAL NOTES
Render Risk Assessment In Note?: no
Additional Notes: Used Ln2 with cotton q-tips.
Detail Level: Simple
Additional Notes: Will send for prior auth and get pt scheduled for excision.

## 2023-10-19 ENCOUNTER — HOSPITAL ENCOUNTER (OUTPATIENT)
Dept: RADIOLOGY | Facility: MEDICAL CENTER | Age: 73
End: 2023-10-19
Payer: MEDICARE

## 2023-10-19 DIAGNOSIS — R13.10 DYSPHAGIA, IDIOPATHIC: ICD-10-CM

## 2023-10-19 PROCEDURE — 74220 X-RAY XM ESOPHAGUS 1CNTRST: CPT

## 2023-10-25 ENCOUNTER — HOSPITAL ENCOUNTER (OUTPATIENT)
Dept: RADIOLOGY | Facility: MEDICAL CENTER | Age: 73
End: 2023-10-25
Payer: MEDICARE

## 2023-10-25 DIAGNOSIS — K21.00 GASTROESOPHAGEAL REFLUX DISEASE WITH ESOPHAGITIS, UNSPECIFIED WHETHER HEMORRHAGE: ICD-10-CM

## 2023-10-25 DIAGNOSIS — R13.10 DYSPHAGIA, UNSPECIFIED TYPE: ICD-10-CM

## 2023-10-25 PROCEDURE — 700117 HCHG RX CONTRAST REV CODE 255

## 2023-10-25 PROCEDURE — 71260 CT THORAX DX C+: CPT

## 2023-10-25 PROCEDURE — 70491 CT SOFT TISSUE NECK W/DYE: CPT

## 2023-10-25 RX ADMIN — IOHEXOL 80 ML: 350 INJECTION, SOLUTION INTRAVENOUS at 17:55

## 2023-12-04 ENCOUNTER — APPOINTMENT (RX ONLY)
Dept: URBAN - METROPOLITAN AREA CLINIC 4 | Facility: CLINIC | Age: 73
Setting detail: DERMATOLOGY
End: 2023-12-04

## 2023-12-18 ENCOUNTER — OFFICE VISIT (OUTPATIENT)
Dept: URGENT CARE | Facility: CLINIC | Age: 73
End: 2023-12-18
Payer: MEDICARE

## 2023-12-18 ENCOUNTER — APPOINTMENT (OUTPATIENT)
Dept: RADIOLOGY | Facility: MEDICAL CENTER | Age: 73
DRG: 392 | End: 2023-12-18
Attending: EMERGENCY MEDICINE
Payer: MEDICARE

## 2023-12-18 ENCOUNTER — HOSPITAL ENCOUNTER (INPATIENT)
Facility: MEDICAL CENTER | Age: 73
LOS: 1 days | DRG: 392 | End: 2023-12-20
Attending: EMERGENCY MEDICINE | Admitting: HOSPITALIST
Payer: MEDICARE

## 2023-12-18 VITALS
HEART RATE: 125 BPM | SYSTOLIC BLOOD PRESSURE: 126 MMHG | WEIGHT: 168 LBS | BODY MASS INDEX: 30.91 KG/M2 | RESPIRATION RATE: 16 BRPM | DIASTOLIC BLOOD PRESSURE: 60 MMHG | HEIGHT: 62 IN | OXYGEN SATURATION: 93 % | TEMPERATURE: 98.4 F

## 2023-12-18 DIAGNOSIS — R07.89 CHEST DISCOMFORT: ICD-10-CM

## 2023-12-18 DIAGNOSIS — R11.2 NAUSEA AND VOMITING, UNSPECIFIED VOMITING TYPE: ICD-10-CM

## 2023-12-18 DIAGNOSIS — R79.89 ELEVATED TROPONIN: ICD-10-CM

## 2023-12-18 DIAGNOSIS — R00.0 TACHYCARDIA: ICD-10-CM

## 2023-12-18 DIAGNOSIS — E86.0 DEHYDRATION: ICD-10-CM

## 2023-12-18 PROBLEM — K52.9 GASTROENTERITIS PRESUMED INFECTIOUS: Status: ACTIVE | Noted: 2023-12-18

## 2023-12-18 LAB
ALBUMIN SERPL BCP-MCNC: 3.9 G/DL (ref 3.2–4.9)
ALBUMIN/GLOB SERPL: 1.5 G/DL
ALP SERPL-CCNC: 62 U/L (ref 30–99)
ALT SERPL-CCNC: 20 U/L (ref 2–50)
ANION GAP SERPL CALC-SCNC: 16 MMOL/L (ref 7–16)
APPEARANCE UR: CLEAR
AST SERPL-CCNC: 23 U/L (ref 12–45)
BACTERIA #/AREA URNS HPF: ABNORMAL /HPF
BASOPHILS # BLD AUTO: 0.5 % (ref 0–1.8)
BASOPHILS # BLD: 0.05 K/UL (ref 0–0.12)
BILIRUB SERPL-MCNC: 0.5 MG/DL (ref 0.1–1.5)
BILIRUB UR QL STRIP.AUTO: NEGATIVE
BUN SERPL-MCNC: 8 MG/DL (ref 8–22)
CALCIUM ALBUM COR SERPL-MCNC: 10.8 MG/DL (ref 8.5–10.5)
CALCIUM SERPL-MCNC: 10.7 MG/DL (ref 8.4–10.2)
CHLORIDE SERPL-SCNC: 95 MMOL/L (ref 96–112)
CO2 SERPL-SCNC: 25 MMOL/L (ref 20–33)
COLOR UR: YELLOW
CREAT SERPL-MCNC: 0.78 MG/DL (ref 0.5–1.4)
EKG IMPRESSION: NORMAL
EOSINOPHIL # BLD AUTO: 0.11 K/UL (ref 0–0.51)
EOSINOPHIL NFR BLD: 1.1 % (ref 0–6.9)
EPI CELLS #/AREA URNS HPF: ABNORMAL /HPF
ERYTHROCYTE [DISTWIDTH] IN BLOOD BY AUTOMATED COUNT: 49.6 FL (ref 35.9–50)
GFR SERPLBLD CREATININE-BSD FMLA CKD-EPI: 80 ML/MIN/1.73 M 2
GLOBULIN SER CALC-MCNC: 2.6 G/DL (ref 1.9–3.5)
GLUCOSE BLD STRIP.AUTO-MCNC: 87 MG/DL (ref 65–99)
GLUCOSE SERPL-MCNC: 112 MG/DL (ref 65–99)
GLUCOSE UR STRIP.AUTO-MCNC: NEGATIVE MG/DL
HCT VFR BLD AUTO: 40.2 % (ref 37–47)
HGB BLD-MCNC: 13.2 G/DL (ref 12–16)
IMM GRANULOCYTES # BLD AUTO: 0.1 K/UL (ref 0–0.11)
IMM GRANULOCYTES NFR BLD AUTO: 1 % (ref 0–0.9)
KETONES UR STRIP.AUTO-MCNC: ABNORMAL MG/DL
LEUKOCYTE ESTERASE UR QL STRIP.AUTO: ABNORMAL
LIPASE SERPL-CCNC: 19 U/L (ref 11–82)
LYMPHOCYTES # BLD AUTO: 1.86 K/UL (ref 1–4.8)
LYMPHOCYTES NFR BLD: 18.6 % (ref 22–41)
MCH RBC QN AUTO: 29.8 PG (ref 27–33)
MCHC RBC AUTO-ENTMCNC: 32.8 G/DL (ref 32.2–35.5)
MCV RBC AUTO: 90.7 FL (ref 81.4–97.8)
MICRO URNS: ABNORMAL
MONOCYTES # BLD AUTO: 0.69 K/UL (ref 0–0.85)
MONOCYTES NFR BLD AUTO: 6.9 % (ref 0–13.4)
NEUTROPHILS # BLD AUTO: 7.19 K/UL (ref 1.82–7.42)
NEUTROPHILS NFR BLD: 71.9 % (ref 44–72)
NITRITE UR QL STRIP.AUTO: NEGATIVE
NRBC # BLD AUTO: 0 K/UL
NRBC BLD-RTO: 0 /100 WBC (ref 0–0.2)
PH UR STRIP.AUTO: 6 [PH] (ref 5–8)
PLATELET # BLD AUTO: 279 K/UL (ref 164–446)
PMV BLD AUTO: 8.8 FL (ref 9–12.9)
POTASSIUM SERPL-SCNC: 3 MMOL/L (ref 3.6–5.5)
PROT SERPL-MCNC: 6.5 G/DL (ref 6–8.2)
PROT UR QL STRIP: NEGATIVE MG/DL
RBC # BLD AUTO: 4.43 M/UL (ref 4.2–5.4)
RBC # URNS HPF: ABNORMAL /HPF
RBC UR QL AUTO: NEGATIVE
SODIUM SERPL-SCNC: 136 MMOL/L (ref 135–145)
SP GR UR STRIP.AUTO: 1.01
TROPONIN T SERPL-MCNC: 35 NG/L (ref 6–19)
TROPONIN T SERPL-MCNC: 39 NG/L (ref 6–19)
WBC # BLD AUTO: 10 K/UL (ref 4.8–10.8)

## 2023-12-18 PROCEDURE — 36415 COLL VENOUS BLD VENIPUNCTURE: CPT

## 2023-12-18 PROCEDURE — 87040 BLOOD CULTURE FOR BACTERIA: CPT

## 2023-12-18 PROCEDURE — 93005 ELECTROCARDIOGRAM TRACING: CPT

## 2023-12-18 PROCEDURE — 80053 COMPREHEN METABOLIC PANEL: CPT

## 2023-12-18 PROCEDURE — 81001 URINALYSIS AUTO W/SCOPE: CPT

## 2023-12-18 PROCEDURE — 96375 TX/PRO/DX INJ NEW DRUG ADDON: CPT

## 2023-12-18 PROCEDURE — 96366 THER/PROPH/DIAG IV INF ADDON: CPT

## 2023-12-18 PROCEDURE — 700105 HCHG RX REV CODE 258: Performed by: HOSPITALIST

## 2023-12-18 PROCEDURE — 700105 HCHG RX REV CODE 258: Performed by: EMERGENCY MEDICINE

## 2023-12-18 PROCEDURE — 96365 THER/PROPH/DIAG IV INF INIT: CPT

## 2023-12-18 PROCEDURE — 76705 ECHO EXAM OF ABDOMEN: CPT

## 2023-12-18 PROCEDURE — 87150 DNA/RNA AMPLIFIED PROBE: CPT

## 2023-12-18 PROCEDURE — A9270 NON-COVERED ITEM OR SERVICE: HCPCS | Performed by: HOSPITALIST

## 2023-12-18 PROCEDURE — 700111 HCHG RX REV CODE 636 W/ 250 OVERRIDE (IP): Mod: JZ | Performed by: EMERGENCY MEDICINE

## 2023-12-18 PROCEDURE — 3074F SYST BP LT 130 MM HG: CPT | Performed by: NURSE PRACTITIONER

## 2023-12-18 PROCEDURE — 3078F DIAST BP <80 MM HG: CPT | Performed by: NURSE PRACTITIONER

## 2023-12-18 PROCEDURE — 700111 HCHG RX REV CODE 636 W/ 250 OVERRIDE (IP): Mod: JZ | Performed by: HOSPITALIST

## 2023-12-18 PROCEDURE — G0378 HOSPITAL OBSERVATION PER HR: HCPCS

## 2023-12-18 PROCEDURE — 99214 OFFICE O/P EST MOD 30 MIN: CPT | Performed by: NURSE PRACTITIONER

## 2023-12-18 PROCEDURE — 94760 N-INVAS EAR/PLS OXIMETRY 1: CPT

## 2023-12-18 PROCEDURE — 82962 GLUCOSE BLOOD TEST: CPT

## 2023-12-18 PROCEDURE — 99285 EMERGENCY DEPT VISIT HI MDM: CPT

## 2023-12-18 PROCEDURE — 700102 HCHG RX REV CODE 250 W/ 637 OVERRIDE(OP): Performed by: HOSPITALIST

## 2023-12-18 PROCEDURE — 84484 ASSAY OF TROPONIN QUANT: CPT

## 2023-12-18 PROCEDURE — 93005 ELECTROCARDIOGRAM TRACING: CPT | Performed by: EMERGENCY MEDICINE

## 2023-12-18 PROCEDURE — 83690 ASSAY OF LIPASE: CPT

## 2023-12-18 PROCEDURE — 96372 THER/PROPH/DIAG INJ SC/IM: CPT

## 2023-12-18 PROCEDURE — 96367 TX/PROPH/DG ADDL SEQ IV INF: CPT

## 2023-12-18 PROCEDURE — 87077 CULTURE AEROBIC IDENTIFY: CPT

## 2023-12-18 PROCEDURE — 99223 1ST HOSP IP/OBS HIGH 75: CPT | Mod: AI | Performed by: HOSPITALIST

## 2023-12-18 PROCEDURE — 85025 COMPLETE CBC W/AUTO DIFF WBC: CPT

## 2023-12-18 RX ORDER — AMOXICILLIN 250 MG
2 CAPSULE ORAL 2 TIMES DAILY
Status: DISCONTINUED | OUTPATIENT
Start: 2023-12-18 | End: 2023-12-20 | Stop reason: HOSPADM

## 2023-12-18 RX ORDER — LEVOTHYROXINE SODIUM 88 UG/1
88 TABLET ORAL DAILY
Status: DISCONTINUED | OUTPATIENT
Start: 2023-12-18 | End: 2023-12-20 | Stop reason: HOSPADM

## 2023-12-18 RX ORDER — OXYCODONE HYDROCHLORIDE 5 MG/1
5 TABLET ORAL
Status: DISCONTINUED | OUTPATIENT
Start: 2023-12-18 | End: 2023-12-20 | Stop reason: HOSPADM

## 2023-12-18 RX ORDER — ASPIRIN 325 MG
325 TABLET ORAL DAILY
Status: DISCONTINUED | OUTPATIENT
Start: 2023-12-18 | End: 2023-12-20 | Stop reason: HOSPADM

## 2023-12-18 RX ORDER — ASPIRIN 300 MG/1
300 SUPPOSITORY RECTAL DAILY
Status: DISCONTINUED | OUTPATIENT
Start: 2023-12-18 | End: 2023-12-20 | Stop reason: HOSPADM

## 2023-12-18 RX ORDER — OMEPRAZOLE 20 MG/1
20 CAPSULE, DELAYED RELEASE ORAL EVERY EVENING
Status: DISCONTINUED | OUTPATIENT
Start: 2023-12-18 | End: 2023-12-20 | Stop reason: HOSPADM

## 2023-12-18 RX ORDER — SODIUM CHLORIDE 9 MG/ML
INJECTION, SOLUTION INTRAVENOUS CONTINUOUS
Status: ACTIVE | OUTPATIENT
Start: 2023-12-18 | End: 2023-12-20

## 2023-12-18 RX ORDER — METOCLOPRAMIDE HYDROCHLORIDE 5 MG/ML
10 INJECTION INTRAMUSCULAR; INTRAVENOUS ONCE
Status: DISCONTINUED | OUTPATIENT
Start: 2023-12-18 | End: 2023-12-18

## 2023-12-18 RX ORDER — POLYETHYLENE GLYCOL 3350 17 G/17G
1 POWDER, FOR SOLUTION ORAL
Status: DISCONTINUED | OUTPATIENT
Start: 2023-12-18 | End: 2023-12-20 | Stop reason: HOSPADM

## 2023-12-18 RX ORDER — POTASSIUM CHLORIDE 7.45 MG/ML
10 INJECTION INTRAVENOUS
Status: COMPLETED | OUTPATIENT
Start: 2023-12-18 | End: 2023-12-18

## 2023-12-18 RX ORDER — AMITRIPTYLINE HYDROCHLORIDE 50 MG/1
100 TABLET, FILM COATED ORAL NIGHTLY
Status: DISCONTINUED | OUTPATIENT
Start: 2023-12-18 | End: 2023-12-20 | Stop reason: HOSPADM

## 2023-12-18 RX ORDER — EZETIMIBE 10 MG/1
10 TABLET ORAL EVERY EVENING
Status: DISCONTINUED | OUTPATIENT
Start: 2023-12-18 | End: 2023-12-20 | Stop reason: HOSPADM

## 2023-12-18 RX ORDER — ONDANSETRON 2 MG/ML
4 INJECTION INTRAMUSCULAR; INTRAVENOUS ONCE
Status: COMPLETED | OUTPATIENT
Start: 2023-12-18 | End: 2023-12-18

## 2023-12-18 RX ORDER — HYDROCODONE BITARTRATE AND ACETAMINOPHEN 10; 325 MG/1; MG/1
1-2 TABLET ORAL EVERY 6 HOURS PRN
Status: DISCONTINUED | OUTPATIENT
Start: 2023-12-18 | End: 2023-12-18

## 2023-12-18 RX ORDER — ACETAMINOPHEN 325 MG/1
650 TABLET ORAL EVERY 6 HOURS PRN
Status: DISCONTINUED | OUTPATIENT
Start: 2023-12-18 | End: 2023-12-20 | Stop reason: HOSPADM

## 2023-12-18 RX ORDER — NITROGLYCERIN 0.4 MG/1
0.4 TABLET SUBLINGUAL
Status: DISCONTINUED | OUTPATIENT
Start: 2023-12-18 | End: 2023-12-20 | Stop reason: HOSPADM

## 2023-12-18 RX ORDER — SODIUM CHLORIDE, SODIUM LACTATE, POTASSIUM CHLORIDE, CALCIUM CHLORIDE 600; 310; 30; 20 MG/100ML; MG/100ML; MG/100ML; MG/100ML
1000 INJECTION, SOLUTION INTRAVENOUS ONCE
Status: COMPLETED | OUTPATIENT
Start: 2023-12-18 | End: 2023-12-18

## 2023-12-18 RX ORDER — ONDANSETRON 4 MG/1
4 TABLET, ORALLY DISINTEGRATING ORAL EVERY 6 HOURS PRN
Status: DISCONTINUED | OUTPATIENT
Start: 2023-12-18 | End: 2023-12-20 | Stop reason: HOSPADM

## 2023-12-18 RX ORDER — ESCITALOPRAM OXALATE 10 MG/1
20 TABLET ORAL DAILY
Status: DISCONTINUED | OUTPATIENT
Start: 2023-12-18 | End: 2023-12-20 | Stop reason: HOSPADM

## 2023-12-18 RX ORDER — ASPIRIN 81 MG/1
324 TABLET, CHEWABLE ORAL DAILY
Status: DISCONTINUED | OUTPATIENT
Start: 2023-12-18 | End: 2023-12-20 | Stop reason: HOSPADM

## 2023-12-18 RX ORDER — LISINOPRIL 2.5 MG/1
2.5 TABLET ORAL DAILY
Status: DISCONTINUED | OUTPATIENT
Start: 2023-12-18 | End: 2023-12-20 | Stop reason: HOSPADM

## 2023-12-18 RX ORDER — ONDANSETRON 2 MG/ML
4 INJECTION INTRAMUSCULAR; INTRAVENOUS EVERY 4 HOURS PRN
Status: DISCONTINUED | OUTPATIENT
Start: 2023-12-18 | End: 2023-12-20 | Stop reason: HOSPADM

## 2023-12-18 RX ORDER — DEXTROSE MONOHYDRATE 25 G/50ML
25 INJECTION, SOLUTION INTRAVENOUS
Status: DISCONTINUED | OUTPATIENT
Start: 2023-12-18 | End: 2023-12-20 | Stop reason: HOSPADM

## 2023-12-18 RX ORDER — REGADENOSON 0.08 MG/ML
0.4 INJECTION, SOLUTION INTRAVENOUS
Status: DISCONTINUED | OUTPATIENT
Start: 2023-12-18 | End: 2023-12-20 | Stop reason: HOSPADM

## 2023-12-18 RX ORDER — ONDANSETRON 4 MG/1
4 TABLET, ORALLY DISINTEGRATING ORAL EVERY 4 HOURS PRN
Status: DISCONTINUED | OUTPATIENT
Start: 2023-12-18 | End: 2023-12-20 | Stop reason: HOSPADM

## 2023-12-18 RX ORDER — LIOTHYRONINE SODIUM 5 UG/1
5 TABLET ORAL DAILY
Status: DISCONTINUED | OUTPATIENT
Start: 2023-12-18 | End: 2023-12-20 | Stop reason: HOSPADM

## 2023-12-18 RX ORDER — BISACODYL 10 MG
10 SUPPOSITORY, RECTAL RECTAL
Status: DISCONTINUED | OUTPATIENT
Start: 2023-12-18 | End: 2023-12-20 | Stop reason: HOSPADM

## 2023-12-18 RX ORDER — OXYCODONE HYDROCHLORIDE 5 MG/1
2.5 TABLET ORAL
Status: DISCONTINUED | OUTPATIENT
Start: 2023-12-18 | End: 2023-12-20 | Stop reason: HOSPADM

## 2023-12-18 RX ORDER — METOPROLOL SUCCINATE 25 MG/1
25 TABLET, EXTENDED RELEASE ORAL DAILY
Status: DISCONTINUED | OUTPATIENT
Start: 2023-12-18 | End: 2023-12-20 | Stop reason: HOSPADM

## 2023-12-18 RX ORDER — HYDROCODONE BITARTRATE AND ACETAMINOPHEN 10; 325 MG/1; MG/1
1-2 TABLET ORAL EVERY 6 HOURS PRN
COMMUNITY

## 2023-12-18 RX ORDER — METRONIDAZOLE 500 MG/100ML
500 INJECTION, SOLUTION INTRAVENOUS EVERY 8 HOURS
Status: DISCONTINUED | OUTPATIENT
Start: 2023-12-18 | End: 2023-12-19

## 2023-12-18 RX ORDER — EZETIMIBE 10 MG/1
10 TABLET ORAL EVERY EVENING
COMMUNITY

## 2023-12-18 RX ORDER — ENOXAPARIN SODIUM 100 MG/ML
40 INJECTION SUBCUTANEOUS DAILY
Status: DISCONTINUED | OUTPATIENT
Start: 2023-12-18 | End: 2023-12-20 | Stop reason: HOSPADM

## 2023-12-18 RX ORDER — HYDROMORPHONE HYDROCHLORIDE 1 MG/ML
0.25 INJECTION, SOLUTION INTRAMUSCULAR; INTRAVENOUS; SUBCUTANEOUS
Status: DISCONTINUED | OUTPATIENT
Start: 2023-12-18 | End: 2023-12-20 | Stop reason: HOSPADM

## 2023-12-18 RX ORDER — ROSUVASTATIN CALCIUM 10 MG/1
20 TABLET, COATED ORAL EVERY EVENING
Status: DISCONTINUED | OUTPATIENT
Start: 2023-12-18 | End: 2023-12-20 | Stop reason: HOSPADM

## 2023-12-18 RX ORDER — ALBUTEROL SULFATE 90 UG/1
2 AEROSOL, METERED RESPIRATORY (INHALATION) EVERY 6 HOURS PRN
Status: DISCONTINUED | OUTPATIENT
Start: 2023-12-18 | End: 2023-12-20 | Stop reason: HOSPADM

## 2023-12-18 RX ORDER — AMINOPHYLLINE 25 MG/ML
100 INJECTION, SOLUTION INTRAVENOUS
Status: DISCONTINUED | OUTPATIENT
Start: 2023-12-18 | End: 2023-12-19

## 2023-12-18 RX ORDER — ONDANSETRON 4 MG/1
4 TABLET, ORALLY DISINTEGRATING ORAL EVERY 6 HOURS PRN
COMMUNITY

## 2023-12-18 RX ORDER — LABETALOL HYDROCHLORIDE 5 MG/ML
10 INJECTION, SOLUTION INTRAVENOUS EVERY 4 HOURS PRN
Status: DISCONTINUED | OUTPATIENT
Start: 2023-12-18 | End: 2023-12-20 | Stop reason: HOSPADM

## 2023-12-18 RX ADMIN — ENOXAPARIN SODIUM 40 MG: 100 INJECTION SUBCUTANEOUS at 21:14

## 2023-12-18 RX ADMIN — OMEPRAZOLE 20 MG: 20 CAPSULE, DELAYED RELEASE ORAL at 20:57

## 2023-12-18 RX ADMIN — SODIUM CHLORIDE: 9 INJECTION, SOLUTION INTRAVENOUS at 21:06

## 2023-12-18 RX ADMIN — METRONIDAZOLE 500 MG: 5 INJECTION, SOLUTION INTRAVENOUS at 23:19

## 2023-12-18 RX ADMIN — ESCITALOPRAM OXALATE 20 MG: 10 TABLET ORAL at 20:55

## 2023-12-18 RX ADMIN — LEVOTHYROXINE SODIUM 88 MCG: 88 TABLET ORAL at 20:54

## 2023-12-18 RX ADMIN — LIOTHYRONINE SODIUM 5 MCG: 5 TABLET ORAL at 20:56

## 2023-12-18 RX ADMIN — ROSUVASTATIN CALCIUM 20 MG: 10 TABLET, FILM COATED ORAL at 21:36

## 2023-12-18 RX ADMIN — LISINOPRIL 2.5 MG: 2.5 TABLET ORAL at 21:17

## 2023-12-18 RX ADMIN — POTASSIUM CHLORIDE 10 MEQ: 10 INJECTION, SOLUTION INTRAVENOUS at 21:07

## 2023-12-18 RX ADMIN — ASPIRIN 325 MG: 325 TABLET ORAL at 20:54

## 2023-12-18 RX ADMIN — AMITRIPTYLINE HYDROCHLORIDE 100 MG: 50 TABLET, FILM COATED ORAL at 20:56

## 2023-12-18 RX ADMIN — SODIUM CHLORIDE, POTASSIUM CHLORIDE, SODIUM LACTATE AND CALCIUM CHLORIDE 1000 ML: 600; 310; 30; 20 INJECTION, SOLUTION INTRAVENOUS at 14:52

## 2023-12-18 RX ADMIN — METOPROLOL SUCCINATE 25 MG: 25 TABLET, EXTENDED RELEASE ORAL at 21:17

## 2023-12-18 RX ADMIN — CEFTRIAXONE SODIUM 1000 MG: 1 INJECTION, POWDER, FOR SOLUTION INTRAMUSCULAR; INTRAVENOUS at 21:43

## 2023-12-18 RX ADMIN — POTASSIUM CHLORIDE 10 MEQ: 10 INJECTION, SOLUTION INTRAVENOUS at 20:00

## 2023-12-18 RX ADMIN — POTASSIUM CHLORIDE 10 MEQ: 10 INJECTION, SOLUTION INTRAVENOUS at 19:00

## 2023-12-18 RX ADMIN — ONDANSETRON 4 MG: 2 INJECTION INTRAMUSCULAR; INTRAVENOUS at 14:59

## 2023-12-18 RX ADMIN — EZETIMIBE 10 MG: 10 TABLET ORAL at 20:56

## 2023-12-18 RX ADMIN — POTASSIUM CHLORIDE 10 MEQ: 10 INJECTION, SOLUTION INTRAVENOUS at 18:00

## 2023-12-18 ASSESSMENT — ENCOUNTER SYMPTOMS
ABDOMINAL PAIN: 1
CHILLS: 0
BLOOD IN STOOL: 0
HEMOPTYSIS: 0
WHEEZING: 0
HEADACHES: 0
DIAPHORESIS: 0
PALPITATIONS: 0
DIZZINESS: 0
SEIZURES: 0
VOMITING: 1
LOSS OF CONSCIOUSNESS: 0
RESPIRATORY NEGATIVE: 1
BRUISES/BLEEDS EASILY: 0
EYES NEGATIVE: 1
MYALGIAS: 1
NAUSEA: 1
DOUBLE VISION: 0
COUGH: 0
CARDIOVASCULAR NEGATIVE: 1
CONSTIPATION: 0
PSYCHIATRIC NEGATIVE: 1
FEVER: 0
FEVER: 0
ABDOMINAL PAIN: 0
FOCAL WEAKNESS: 0
BACK PAIN: 1
DIARRHEA: 1
NUMBER OF EPISODES OF EMESIS TODAY: 1
NERVOUS/ANXIOUS: 0
HEARTBURN: 0
VOMITING: 1
NEUROLOGICAL NEGATIVE: 1

## 2023-12-18 ASSESSMENT — LIFESTYLE VARIABLES
ON A TYPICAL DAY WHEN YOU DRINK ALCOHOL HOW MANY DRINKS DO YOU HAVE: 0
ALCOHOL_USE: NO
TOTAL SCORE: 0
HOW MANY TIMES IN THE PAST YEAR HAVE YOU HAD 5 OR MORE DRINKS IN A DAY: 0
EVER FELT BAD OR GUILTY ABOUT YOUR DRINKING: NO
TOTAL SCORE: 0
EVER HAD A DRINK FIRST THING IN THE MORNING TO STEADY YOUR NERVES TO GET RID OF A HANGOVER: NO
HAVE YOU EVER FELT YOU SHOULD CUT DOWN ON YOUR DRINKING: NO
CONSUMPTION TOTAL: NEGATIVE
AVERAGE NUMBER OF DAYS PER WEEK YOU HAVE A DRINK CONTAINING ALCOHOL: 0
TOTAL SCORE: 0
HAVE PEOPLE ANNOYED YOU BY CRITICIZING YOUR DRINKING: NO

## 2023-12-18 ASSESSMENT — COGNITIVE AND FUNCTIONAL STATUS - GENERAL
SUGGESTED CMS G CODE MODIFIER MOBILITY: CH
DAILY ACTIVITIY SCORE: 24
SUGGESTED CMS G CODE MODIFIER DAILY ACTIVITY: CH
MOBILITY SCORE: 24

## 2023-12-18 ASSESSMENT — HEART SCORE
AGE: 65+
HEART SCORE: 7
HISTORY: MODERATELY SUSPICIOUS
ECG: NON-SPECIFIC REPOLARIZATION DISTURBANCE
RISK FACTORS: >2 RISK FACTORS OR HX OF ATHEROSCLEROTIC DISEASE
TROPONIN: 1-3 TIMES NORMAL LIMIT

## 2023-12-18 ASSESSMENT — FIBROSIS 4 INDEX
FIB4 SCORE: 0.9
FIB4 SCORE: 1.33
FIB4 SCORE: 0.9

## 2023-12-18 ASSESSMENT — VISUAL ACUITY: OU: 1

## 2023-12-18 ASSESSMENT — PATIENT HEALTH QUESTIONNAIRE - PHQ9
1. LITTLE INTEREST OR PLEASURE IN DOING THINGS: NOT AT ALL
2. FEELING DOWN, DEPRESSED, IRRITABLE, OR HOPELESS: NOT AT ALL
SUM OF ALL RESPONSES TO PHQ9 QUESTIONS 1 AND 2: 0

## 2023-12-18 ASSESSMENT — PAIN DESCRIPTION - PAIN TYPE: TYPE: ACUTE PAIN

## 2023-12-18 NOTE — ED NOTES
Medication history reviewed with pt. Med rec is complete.  Allergies reviewed, per pt    Pt reports that she is not taking ALENDRONATE 70MG    Patient has not had any outpatient antibiotics in the last 30 days.    Pt is not on any anticoagulants

## 2023-12-18 NOTE — ED TRIAGE NOTES
Pt was sent from  today with c/o vomiting since Thursday and is now unable to keep fluids down. Pt complains of dizziness as well as some nausea currently. Pt reports vomit is bile colored, no blood noted and had one bout of diarrhea last night

## 2023-12-18 NOTE — ED PROVIDER NOTES
ED Provider Note    CHIEF COMPLAINT  Chief Complaint   Patient presents with    Vomiting     Reports she has been vomiting since 12/14, reports emesis daily TMTC. Denies diarrhea, abd pain, fever. Sent here by  for HR 120s.       EXTERNAL RECORDS REVIEWED  Outpatient Notes the patient was seen at urgent care today and was tachycardic and dehydrated with vomiting    The patient had an outpatient visit at [facility] and they listed the patient had a cath in 2012 that showed nonobstructive coronary disease.    HPI/ROS  LIMITATION TO HISTORY   Select: : None  OUTSIDE HISTORIAN(S):  None    Yamile Go is a 73 y.o. female who presents with past medical history seen for coronary artery disease, diabetes, hypertension, high cholesterol, she has had she has had an appendectomy, total hysterectomy, colectomy, she presents with vomiting for 4 days.  Diarrhea 2 days ago.  She has epigastric pain.    PAST MEDICAL HISTORY   has a past medical history of Arthritis, Asthma, CAD (coronary artery disease), Dental disorder, Depression, Diabetes (HCC), Emphysema of lung (HCC), Heart burn, HTN (hypertension), Hyperlipidemia, Migraines, Obesity, PONV (postoperative nausea and vomiting), and Sleep apnea.    SURGICAL HISTORY   has a past surgical history that includes appendectomy (N/A, 1976); abdominal hysterectomy total (N/A, 1978); breast biopsy (1984); colectomy (N/A, 2007); lumbar laminectomy diskectomy (N/A, 1989); arthroplasty (Right, 2020); lumbar exploration (N/A, 2017); and insertion, peripheral nerve stimulator, lower extremity (Left, 12/22/2022).    FAMILY HISTORY  Family History   Problem Relation Age of Onset    Cancer Mother         lung    Heart Disease Mother     Stroke Father     Heart Disease Father     Diabetes Father     Heart Disease Brother         cath and bypass    Heart Disease Brother         cath and byp[ass[    Heart Disease Brother         cath and bypass    Heart Disease Brother     Other Brother   "       MVA       SOCIAL HISTORY  Social History     Tobacco Use    Smoking status: Never    Smokeless tobacco: Never   Vaping Use    Vaping Use: Never used   Substance and Sexual Activity    Alcohol use: No    Drug use: No    Sexual activity: Yes     Partners: Male     Birth control/protection: Post-Menopausal       CURRENT MEDICATIONS  Home Medications       Reviewed by Yanet Porter (Pharmacy Tech) on 12/18/23 at 1533  Med List Status: Complete     Medication Last Dose Status   albuterol 108 (90 Base) MCG/ACT Aero Soln inhalation aerosol > 1 month Active   amitriptyline (ELAVIL) 100 MG Tab 12/13/2023 Active   escitalopram (LEXAPRO) 20 MG tablet 12/14/2023 Active   ezetimibe (ZETIA) 10 MG Tab 12/13/2023 Active   HYDROcodone/acetaminophen (NORCO)  MG Tab 12/13/2023 Active   liothyronine (CYTOMEL) 5 MCG Tab 12/14/2023 Active   lisinopril (PRINIVIL) 2.5 MG Tab 12/14/2023 Active   metFORMIN (GLUCOPHAGE) 500 MG Tab 12/14/2023 Active   metoprolol SR (TOPROL XL) 25 MG TABLET SR 24 HR 12/14/2023 Active   omeprazole (PRILOSEC) 20 MG delayed-release capsule 12/13/2023 Active   ondansetron (ZOFRAN ODT) 4 MG TABLET DISPERSIBLE 12/16/2023 Active   rosuvastatin (CRESTOR) 20 MG Tab 12/13/2023 Active   sumatriptan (IMITREX) 100 MG tablet 12/13/2023 Active   SYNTHROID 88 MCG Tab 12/14/2023 Active                    ALLERGIES  Allergies   Allergen Reactions    Amlodipine Swelling    Rifampin Unspecified     Pt turns yellow    Fentanyl Vomiting    Morphine Vomiting    Gabapentin Hives    Pregabalin Hives    Sulfa Drugs Hives and Itching    Benzoin Rash     blisters       PHYSICAL EXAM  VITAL SIGNS: BP (!) 151/72   Pulse (!) 115   Temp 36.1 °C (97 °F) (Temporal)   Resp 16   Ht 1.575 m (5' 2\")   Wt 72.5 kg (159 lb 13.3 oz)   SpO2 98%   BMI 29.23 kg/m²    Constitutional: Alert.  HENT: No signs of trauma, Bilateral external ears normal, Nose normal.   Eyes: Pupils are equal and reactive, Conjunctiva normal, " Non-icteric.   Neck: Normal range of motion, No tenderness, Supple, No stridor.   Lymphatic: No lymphadenopathy noted.   Cardiovascular: Tachycardic with no murmurs.  Thorax & Lungs: Normal breath sounds, No respiratory distress, No wheezing, No chest tenderness.   Abdomen: Epigastric tenderness with no peritoneal signs.  Skin: Warm, Dry, No erythema, No rash.   Back: No bony tenderness, No CVA tenderness.   Extremities: Intact distal pulses, No edema, No tenderness, No cyanosis.  Musculoskeletal: Good range of motion in all major joints. No tenderness to palpation or major deformities noted.   Neurologic: Alert , Normal motor function, Normal sensory function, No focal deficits noted.   Psychiatric: Affect normal, Judgment normal, Mood normal.       DIAGNOSTIC STUDIES / PROCEDURES  EKG  I have independently interpreted this EKG  Sinus tachycardia rate 130  axis normal  intervals normal  Probable left atrial enlargement  Compared to ECG 05/03/2023 09:01:11  My impression of this EKG, nonspecific changes, does not meet STEMI criteria at this time tachycardia      LABS  Labs Reviewed   CBC WITH DIFFERENTIAL - Abnormal; Notable for the following components:       Result Value    MPV 8.8 (*)     Lymphocytes 18.60 (*)     Immature Granulocytes 1.00 (*)     All other components within normal limits   COMP METABOLIC PANEL - Abnormal; Notable for the following components:    Potassium 3.0 (*)     Chloride 95 (*)     Glucose 112 (*)     Calcium 10.7 (*)     Correct Calcium 10.8 (*)     All other components within normal limits   TROPONIN - Abnormal; Notable for the following components:    Troponin T 39 (*)     All other components within normal limits    Narrative:     Biotin intake of greater than 5 mg per day may interfere with  troponin levels, causing false low values.   LIPASE   URINALYSIS   ESTIMATED GFR   URINE MICROSCOPIC (W/UA)         RADIOLOGY  I have independently interpreted the diagnostic imaging associated  with this visit and am waiting the final reading from the radiologist.   My preliminary interpretation is as follows: No gallstones  Radiologist interpretation:     US-RUQ   Final Result      1.  Echogenic liver consistent with hepatic steatosis an/or fibrosis      2.  Negative for gallstones or biliary dilation            COURSE & MEDICAL DECISION MAKING    ED Observation Status? Yes; I am placing the patient in to an observation status due to a diagnostic uncertainty as well as therapeutic intensity. Patient placed in observation status at 2:32 PM, 12/18/2023.     Observation plan is as follows: The patient presents with vomiting for 4 days, she clinically appears dehydrated.  I ordered her IV fluids.  Labs were ordered, ultrasound was ordered to evaluate her gallbladder.    Upon Reevaluation, the patient's condition has: not improved; and will be escalated to hospitalization.    Patient discharged from ED Observation status at 4:19 PM (Time) December 18, 2023 (Date).     INITIAL ASSESSMENT, COURSE AND PLAN  Care Narrative:     The patient's troponin is elevated, her ultrasound is negative for gallbladder disease.  After IV fluids the patient is still tachycardic.        ADDITIONAL PROBLEM LIST  Asthma, hypertension, high cholesterol, sleep apnea, diabetes, coronary artery disease  DISPOSITION AND DISCUSSIONS  I have discussed management of the patient with the following physicians and ALEC's: I spoke with the [facility] hospitalist to assess the patient for hospitalization.    Discussion of management with other [facility] or appropriate source(s): None         Barriers to care at this time, including but not limited to:  None .     Decision tools and prescription drugs considered including, but not limited to: HEART Score 7 .    CRITICAL CARE  The very real possibilty of a deterioration of this patient's condition required the highest level of my preparedness for sudden, emergent intervention.  I provided critical care  services, which included medication orders, frequent reevaluations of the patient's condition and response to treatment, ordering and reviewing test results, and discussing the case with various consultants.  The critical care time associated with the care of the patient was 40 minutes. Review chart for interventions. This time is exclusive of any other billable procedures.       FINAL DIAGNOSIS  1. Chest discomfort    2. Nausea and vomiting, unspecified vomiting type    3. Tachycardia    4. Elevated troponin      Total critical care time 40 minutes as outlined above       Electronically signed by: Evans Wilcox M.D., 12/18/2023 2:32 PM

## 2023-12-18 NOTE — PROGRESS NOTES
Subjective     Yamile Go is a 73 y.o. female who presents with Emesis (Vomiting, dizziness x 4 days )            Emesis  This is a new problem. Episode onset: pt reports new onset of emesis that started 4 days ago. she has not had any emesis today, she was able to eat today. has only had a few glasses of water over the last few days. she did urinate a small amount today. Associated symptoms include vomiting. Pertinent negatives include no abdominal pain or fever.       Review of Systems   Constitutional:  Negative for fever.   Gastrointestinal:  Positive for vomiting. Negative for abdominal pain.   All other systems reviewed and are negative.         Past Medical History:   Diagnosis Date    Arthritis     Asthma     CAD (coronary artery disease)     Dental disorder     Missing teeth right side.  Veneers top front    Depression     Diabetes (HCC)     Emphysema of lung (HCC)     Heart burn     GERD    HTN (hypertension)     Hyperlipidemia     Migraines     Obesity     PONV (postoperative nausea and vomiting)     Sleep apnea     diagnosed 9/2009 CPAP -PMA      Past Surgical History:   Procedure Laterality Date    INSERTION, PERIPHERAL NERVE STIMULATOR, LOWER EXTREMITY Left 12/22/2022    Procedure: Left SCIATIC PERIPHERAL NERVE STIMULATOR IMPLANT, LOWER EXTREMITY PERIPHERAL SCIATIC NERVE;  Surgeon: Tobi Kilgore M.D.;  Location: SURGERY [facility];  Service: Pain Management    ARTHROPLASTY Right 2020    ankle    LUMBAR EXPLORATION N/A 2017    COLECTOMY N/A 2007    partial for divverticulitis 2007    LUMBAR LAMINECTOMY DISKECTOMY N/A 1989    BREAST BIOPSY  1984    no cancer    ABDOMINAL HYSTERECTOMY TOTAL N/A 1978    APPENDECTOMY N/A 1976      Social History     Socioeconomic History    Marital status: Single     Spouse name: Not on file    Number of children: Not on file    Years of education: Not on file    Highest education level: Not on file   Occupational History    Not on file   Tobacco Use    Smoking  "status: Never    Smokeless tobacco: Never   Vaping Use    Vaping Use: Never used   Substance and Sexual Activity    Alcohol use: No    Drug use: No    Sexual activity: Yes     Partners: Male     Birth control/protection: Post-Menopausal   Other Topics Concern    Not on file   Social History Narrative    Not on file     Social Determinants of Health     Financial Resource Strain: Low Risk  (2/9/2022)    Overall Financial Resource Strain (CARDIA)     Difficulty of Paying Living Expenses: Not hard at all   Food Insecurity: No Food Insecurity (2/9/2022)    Hunger Vital Sign     Worried About Running Out of Food in the Last Year: Never true     Ran Out of Food in the Last Year: Never true   Transportation Needs: No Transportation Needs (2/9/2022)    PRAPARE - Transportation     Lack of Transportation (Medical): No     Lack of Transportation (Non-Medical): No   Physical Activity: Not on file   Stress: Not on file   Social Connections: Not on file   Intimate Partner Violence: Not on file   Housing Stability: Not on file         Objective     /60   Pulse (!) 125   Temp 36.9 °C (98.4 °F) (Oral)   Resp 16   Ht 1.575 m (5' 2\")   Wt 76.2 kg (168 lb)   SpO2 93%   BMI 30.73 kg/m²      Physical Exam  Vitals and nursing note reviewed.   Constitutional:       Appearance: Normal appearance. She is normal weight. She is ill-appearing.   HENT:      Head: Normocephalic and atraumatic.      Nose: Nose normal.      Mouth/Throat:      Mouth: Mucous membranes are moist.      Pharynx: Oropharynx is clear.   Eyes:      Extraocular Movements: Extraocular movements intact.      Pupils: Pupils are equal, round, and reactive to light.   Cardiovascular:      Rate and Rhythm: Regular rhythm. Tachycardia present.      Heart sounds: Normal heart sounds.   Pulmonary:      Effort: Pulmonary effort is normal.   Musculoskeletal:         General: Normal range of motion.      Cervical back: Normal range of motion.   Skin:     General: Skin is " warm and dry.      Capillary Refill: Capillary refill takes less than 2 seconds.   Neurological:      General: No focal deficit present.      Mental Status: She is alert and oriented to person, place, and time. Mental status is at baseline.   Psychiatric:         Mood and Affect: Mood normal.         Speech: Speech normal.         Thought Content: Thought content normal.         Judgment: Judgment normal.                             Assessment & Plan        1. Nausea and vomiting, unspecified vomiting type    2. Dehydration    3. Tachycardia    Discussed with patient she is very dehydrated, she needs IV fluids  Will transfer to [facility] ER  She can travel by POV  Report called to transfer center  Pt ambulated OOC with steady gait

## 2023-12-18 NOTE — LETTER
December 18, 2023    To Whom It May Concern:         This is confirmation that Yamile Go attended her scheduled appointment with CADEN Sanchez on 12/18/23.        Please excuse her from work 12/14/23-12/19/23.    Sincerely,      CLAUDIA Sanchez.  [phone]

## 2023-12-19 ENCOUNTER — APPOINTMENT (OUTPATIENT)
Dept: RADIOLOGY | Facility: MEDICAL CENTER | Age: 73
DRG: 392 | End: 2023-12-19
Attending: HOSPITALIST
Payer: MEDICARE

## 2023-12-19 PROBLEM — R78.81 STAPHYLOCOCCUS AUREUS BACTEREMIA: Status: ACTIVE | Noted: 2023-12-19

## 2023-12-19 PROBLEM — B95.61 STAPHYLOCOCCUS AUREUS BACTEREMIA: Status: ACTIVE | Noted: 2023-12-19

## 2023-12-19 PROBLEM — I95.9 HYPOTENSION: Status: ACTIVE | Noted: 2023-12-19

## 2023-12-19 LAB
ANION GAP SERPL CALC-SCNC: 11 MMOL/L (ref 7–16)
BUN SERPL-MCNC: 6 MG/DL (ref 8–22)
CALCIUM SERPL-MCNC: 9.2 MG/DL (ref 8.4–10.2)
CHLORIDE SERPL-SCNC: 101 MMOL/L (ref 96–112)
CHOLEST SERPL-MCNC: 140 MG/DL (ref 100–199)
CO2 SERPL-SCNC: 25 MMOL/L (ref 20–33)
CREAT SERPL-MCNC: 0.62 MG/DL (ref 0.5–1.4)
ERYTHROCYTE [DISTWIDTH] IN BLOOD BY AUTOMATED COUNT: 49.4 FL (ref 35.9–50)
GFR SERPLBLD CREATININE-BSD FMLA CKD-EPI: 94 ML/MIN/1.73 M 2
GLUCOSE BLD STRIP.AUTO-MCNC: 122 MG/DL (ref 65–99)
GLUCOSE BLD STRIP.AUTO-MCNC: 127 MG/DL (ref 65–99)
GLUCOSE BLD STRIP.AUTO-MCNC: 82 MG/DL (ref 65–99)
GLUCOSE BLD STRIP.AUTO-MCNC: 82 MG/DL (ref 65–99)
GLUCOSE SERPL-MCNC: 104 MG/DL (ref 65–99)
HCT VFR BLD AUTO: 32.8 % (ref 37–47)
HDLC SERPL-MCNC: 49 MG/DL
HGB BLD-MCNC: 10.6 G/DL (ref 12–16)
LDLC SERPL CALC-MCNC: 64 MG/DL
MCH RBC QN AUTO: 29.5 PG (ref 27–33)
MCHC RBC AUTO-ENTMCNC: 32.3 G/DL (ref 32.2–35.5)
MCV RBC AUTO: 91.4 FL (ref 81.4–97.8)
PLATELET # BLD AUTO: 230 K/UL (ref 164–446)
PMV BLD AUTO: 9 FL (ref 9–12.9)
POTASSIUM SERPL-SCNC: 3.8 MMOL/L (ref 3.6–5.5)
RBC # BLD AUTO: 3.59 M/UL (ref 4.2–5.4)
SODIUM SERPL-SCNC: 137 MMOL/L (ref 135–145)
TRIGL SERPL-MCNC: 136 MG/DL (ref 0–149)
TROPONIN T SERPL-MCNC: 40 NG/L (ref 6–19)
WBC # BLD AUTO: 6.5 K/UL (ref 4.8–10.8)

## 2023-12-19 PROCEDURE — 80048 BASIC METABOLIC PNL TOTAL CA: CPT

## 2023-12-19 PROCEDURE — 770020 HCHG ROOM/CARE - TELE (206)

## 2023-12-19 PROCEDURE — 99233 SBSQ HOSP IP/OBS HIGH 50: CPT | Performed by: HOSPITALIST

## 2023-12-19 PROCEDURE — 96366 THER/PROPH/DIAG IV INF ADDON: CPT

## 2023-12-19 PROCEDURE — 94760 N-INVAS EAR/PLS OXIMETRY 1: CPT

## 2023-12-19 PROCEDURE — 700105 HCHG RX REV CODE 258: Performed by: HOSPITALIST

## 2023-12-19 PROCEDURE — 85027 COMPLETE CBC AUTOMATED: CPT

## 2023-12-19 PROCEDURE — 84484 ASSAY OF TROPONIN QUANT: CPT

## 2023-12-19 PROCEDURE — 700111 HCHG RX REV CODE 636 W/ 250 OVERRIDE (IP): Performed by: HOSPITALIST

## 2023-12-19 PROCEDURE — A9270 NON-COVERED ITEM OR SERVICE: HCPCS | Performed by: HOSPITALIST

## 2023-12-19 PROCEDURE — 82962 GLUCOSE BLOOD TEST: CPT | Mod: 91

## 2023-12-19 PROCEDURE — 700102 HCHG RX REV CODE 250 W/ 637 OVERRIDE(OP): Performed by: HOSPITALIST

## 2023-12-19 PROCEDURE — 80061 LIPID PANEL: CPT

## 2023-12-19 PROCEDURE — 36415 COLL VENOUS BLD VENIPUNCTURE: CPT

## 2023-12-19 RX ORDER — SODIUM CHLORIDE 9 MG/ML
1000 INJECTION, SOLUTION INTRAVENOUS ONCE
Status: COMPLETED | OUTPATIENT
Start: 2023-12-20 | End: 2023-12-19

## 2023-12-19 RX ORDER — SODIUM CHLORIDE 9 MG/ML
500 INJECTION, SOLUTION INTRAVENOUS ONCE
Status: COMPLETED | OUTPATIENT
Start: 2023-12-19 | End: 2023-12-19

## 2023-12-19 RX ORDER — METRONIDAZOLE 500 MG/1
500 TABLET ORAL EVERY 12 HOURS
Status: DISCONTINUED | OUTPATIENT
Start: 2023-12-19 | End: 2023-12-19

## 2023-12-19 RX ADMIN — EZETIMIBE 10 MG: 10 TABLET ORAL at 17:41

## 2023-12-19 RX ADMIN — OMEPRAZOLE 20 MG: 20 CAPSULE, DELAYED RELEASE ORAL at 17:40

## 2023-12-19 RX ADMIN — AMITRIPTYLINE HYDROCHLORIDE 100 MG: 50 TABLET, FILM COATED ORAL at 21:19

## 2023-12-19 RX ADMIN — ENOXAPARIN SODIUM 40 MG: 100 INJECTION SUBCUTANEOUS at 17:41

## 2023-12-19 RX ADMIN — SODIUM CHLORIDE: 9 INJECTION, SOLUTION INTRAVENOUS at 06:15

## 2023-12-19 RX ADMIN — LEVOTHYROXINE SODIUM 88 MCG: 88 TABLET ORAL at 06:17

## 2023-12-19 RX ADMIN — METRONIDAZOLE 500 MG: 5 INJECTION, SOLUTION INTRAVENOUS at 06:19

## 2023-12-19 RX ADMIN — ESCITALOPRAM OXALATE 20 MG: 10 TABLET ORAL at 06:17

## 2023-12-19 RX ADMIN — CEFAZOLIN 2 G: 2 INJECTION, POWDER, FOR SOLUTION INTRAMUSCULAR; INTRAVENOUS at 15:11

## 2023-12-19 RX ADMIN — ROSUVASTATIN CALCIUM 20 MG: 10 TABLET, FILM COATED ORAL at 17:40

## 2023-12-19 RX ADMIN — SODIUM CHLORIDE: 9 INJECTION, SOLUTION INTRAVENOUS at 19:35

## 2023-12-19 RX ADMIN — SODIUM CHLORIDE 500 ML: 9 INJECTION, SOLUTION INTRAVENOUS at 07:56

## 2023-12-19 RX ADMIN — LIOTHYRONINE SODIUM 5 MCG: 5 TABLET ORAL at 06:17

## 2023-12-19 RX ADMIN — SODIUM CHLORIDE 1000 ML: 9 INJECTION, SOLUTION INTRAVENOUS at 23:51

## 2023-12-19 RX ADMIN — ASPIRIN 325 MG: 325 TABLET ORAL at 06:17

## 2023-12-19 RX ADMIN — CEFAZOLIN 2 G: 2 INJECTION, POWDER, FOR SOLUTION INTRAMUSCULAR; INTRAVENOUS at 21:14

## 2023-12-19 ASSESSMENT — ENCOUNTER SYMPTOMS
HEMOPTYSIS: 0
CHILLS: 0
COUGH: 0
PALPITATIONS: 0
DIZZINESS: 0
SPUTUM PRODUCTION: 0
VOMITING: 0
ORTHOPNEA: 0
NAUSEA: 0

## 2023-12-19 ASSESSMENT — PAIN DESCRIPTION - PAIN TYPE
TYPE: ACUTE PAIN
TYPE: ACUTE PAIN

## 2023-12-19 NOTE — ASSESSMENT & PLAN NOTE
12/19:  Reviewed history with the patient, presentation is consistent with viral gastroenteritis  Symptomatically the patient is improving  She did have an episode of hypotension this morning that was persistent despite IV fluids  She will require ongoing inpatient care with IV fluids and treatment for infection, she will be expected to be in the hospital for greater than 2 midnights  I have ordered ongoing normal saline

## 2023-12-19 NOTE — PROGRESS NOTES
Telemetry Shift Summary     Rhythm SR-ST  HR Range   Ectopy rPVC  Measurements  0.18/0.08/0.38     Normal Values  Rhythm SR  HR Range    Measurements 0.12-0.20 / 0.06-0.10  / 0.30-0.52

## 2023-12-19 NOTE — CARE PLAN
"The patient is Stable - Low risk of patient condition declining or worsening    Shift Goals  Clinical Goals: ABX, IVF, NPO sips w/meds at midnight, stool sample  Patient Goals: Get better, rest, d/c tomorrow  Family Goals: Get a stool sample    Progress made toward(s) clinical / shift goals:    Problem: Knowledge Deficit - Standard  Goal: Patient and family/care givers will demonstrate understanding of plan of care, disease process/condition, diagnostic tests and medications  Outcome: Progressing  Note: Patient educated on plan of care, general medication information, and interventions implemented. Patient reported verbal understanding of education provided. No reinforcement required.       Problem: Hemodynamics  Goal: Patient's hemodynamics, fluid balance and neurologic status will be stable or improve  Outcome: Progressing  Note: Patient's BP monitored during time of care. Patient became hypotensive, patient assessed, patient reported no changes in feeling and reported feeling \"fine\". Continuous fluids resumed.      Problem: Mobility  Goal: Patient's capacity to carry out activities will improve  Outcome: Progressing     Problem: Self Care  Goal: Patient will have the ability to perform ADLs independently or with assistance (bathe, groom, dress, toilet and feed)  Outcome: Progressing       Patient is not progressing towards the following goals:      "

## 2023-12-19 NOTE — ASSESSMENT & PLAN NOTE
12/19:  Patient has history of coronary artery disease  Epigastric pain is likely related to gastroenteritis  Obtain echocardiogram  Defer stress test for now

## 2023-12-19 NOTE — RESPIRATORY CARE
"   COPD EDUCATION by COPD CLINICAL EDUCATOR  12/19/2023 at 7:36 AM by Zulma Archibald, RRT     Patient reviewed by COPD education team. Patient does not have a history or diagnosis of COPD and is a non-smoker.  Therefore, patient does not qualify for the COPD program. It would be beneficial for patient to have an OP PFT.    COPD Screen  COPD Risk Screening  Do you have a history of COPD?: No (Hx Asthma)    COPD Assessment  COPD Clinical Specialists ONLY  COPD Education Initiated: No--Quick Screen (Pt has no hx dx COPD, has never smoked, is admit for dehydration,Gastroenteritis infectious abdominal pain, nausea vomiting and diarrhea, not using any respiratory inhalers or maintenance medications.)    PFT Results    No results found for: \"PFT\"    Meds to Beds        MY COPD ACTION PLAN     It is recommended that patients and physicians /healthcare providers complete this action plan together. This plan should be discussed at each physician visit and updated as needed.    The green, yellow and red zones show groups of symptoms of COPD. This list of symptoms is not comprehensive, and you may experience other symptoms. In the \"Actions\" column, your healthcare provider has recommended actions for you to take based on your symptoms.    Patient Name: Yamile Go   YOB: 1950   Last Updated on:     Green Zone:  I am doing well today Actions     Usual activitiy and exercise level   Take daily medications     Usual amounts of cough and phlegm/mucus   Use oxygen as prescribed     Sleep well at night   Continue regular exercise/diet plan     Appetite is good   At all times avoid cigarette smoke, inhaled irritants     Daily Medications (these medications are taken every day):                Yellow Zone:  I am having a bad day or a COPD flare Actions     More breathless than usual   Continue daily medications     I have less energy for my daily activities   Use quick relief inhaler as ordered     Increased or " "thicker phlegm/mucus   Use oxygen as prescribed     Using quick relief inhaler/nebulizer more often   Get plenty of rest     Swelling of ankles more than usual   Use pursed lip breathing     More coughing than usual   At all times avoid cigarette smoke, inhaled irritants     I feel like I have a \"chest cold\"     Poor sleep and my symptoms woke me up     My appetite is not good     My medicine is not helping      Call provider immediately if symptoms don’t improve     Continue daily medications, add rescue medications:               Medications to be used during a flare up, (as Discussed with Provider):              Red Zone:  I need urgent medical care Actions     Severe shortness of breath even at rest   Call 911 or seek medical care immediately     Not able to do any activity because of breathing      Fever or shaking chills      Feeling confused or very drowsy       Chest pains      Coughing up blood                  "

## 2023-12-19 NOTE — PROGRESS NOTES
4 Eyes Skin Assessment Completed by DEEJAY Johnson and DEEJAY Vega.    Head WDL  Ears WDL  Nose WDL  Mouth WDL  Neck WDL  Breast/Chest WDL  Shoulder Blades WDL  Spine Scar  (R) Arm/Elbow/Hand Redness, Bruising, Scab, and Scar  (L) Arm/Elbow/Hand Redness, Bruising, and Scab  Abdomen Scar  Groin Scar  Scrotum/Coccyx/Buttocks WDL  (R) Leg Redness and Bruising  (L) Leg Redness and Bruising  (R) Heel/Foot/Toe Redness and Blanching  (L) Heel/Foot/Toe Redness and Blanching          Devices In Places Tele Box and Pulse Ox      Interventions In Place Pillows    Possible Skin Injury No    Pictures Uploaded Into Epic Yes  Wound Consult Placed N/A  RN Wound Prevention Protocol Ordered No

## 2023-12-19 NOTE — ASSESSMENT & PLAN NOTE
12/19:  I the patient was evaluated and reevaluated by myself this morning for hypotension  Despite high rate of IV fluids, the patient remained hypotensive with systolic blood pressures in the 70s  I ordered a fluid bolus and reassessed, her blood pressure has improved  Suspect this is related to hypovolemia

## 2023-12-19 NOTE — DIETARY
"Nutrition services: Day 0 of admit.  Yamile Go is a 73 y.o. female with admitting DX of Gastroenteritis presumed infectious [K52.9]     Consult received for MST 2: 2-13 lb wt loss x <1 week, poor PO intake PTA. RD visited pt at bedside. Pt reports sudden onset of N/V /Dlast week, unable to tolerate food/drink since Thursdays (5-6 days through today). Reports -165 lb. When diet advanced, she would like to hold off on any Boost/Ensure supplements initially; open to considering these if PO intake does not recover w/ diet progression. Pt consented to Nutrition-focused physical exam (NFPE).    Assessment:  Height: 157.5 cm (5' 2\")  Weight: 71.9 kg (158 lb 8.2 oz)  Body mass index is 28.99 kg/m²., BMI classification: Overweight   Diet/Intake: NPO, sips w/ meds    Evaluation:   PMHx includes CAD, depression, emphysema, dental disorder, HTN, HLD, migraines, hypothyroidism, T2DM, vitamin D deficiency (03/2022). Current dx list includes gastroenteritis presumed infectious, hypokalemia.  Labs: BUN 6, POC glu , A1c 6.4%  MAR: SSI (dose not req), synthroid, NS @ 83 mL/hr, prilosec, pericolace (refused)  No documented wounds or edema.  Nutrition-focused physical exam: slight temporal depressions w/ reduced muscle tone, muscle loss noted to interosseous   Wt hx per chart review:  12/18/23 72.5 kg (159 lb 13.3 oz)  06/20/23 72.6 kg (160 lb)  05/05/23 77.5 kg (170 lb 13.7 oz)  12/22/22 71.9 kg (158 lb 8.2 oz)    Malnutrition Risk: Pt meets 2 ASPEN criteria for moderate acute malnutrition r/t acute N/V/D without tolerance of PO intake AEB pt reports very poor PO intake <50% of normal x >/=5 days and pt presents w/ mild muscle wasting on physical exam.    Recommendations/Plan:  PO diet as medically feasible w/ diet advancement as tolerated.   Patient will tolerate diet >Clear liquids and consume >50% of meals (and supplements if needed).    Document intake of all PO as % taken in ADL's to provide interdisciplinary " communication across all shifts.   Monitor weight.  Nutrition rep will continue to see patient for ongoing meal and snack preferences.     RD following.

## 2023-12-19 NOTE — ASSESSMENT & PLAN NOTE
12/19:  New problem today, blood cultures positive for gram-positive cocci  Overall picture is concerning with hypotension and positive blood cultures  I have ordered intravenous Ancef  Follow-up culture and sensitivity results

## 2023-12-19 NOTE — PROGRESS NOTES
Received report from Malaika, over the phone. Pt reported no pain or discomfort at this time, POC discussed with daughter at [facility]. Admission profile completed. KCL started. Call light and belongings within reach. Bed locked and in low position. Fall precautions in place.

## 2023-12-19 NOTE — H&P
Hospital Medicine History & Physical Note    Date of Service  12/18/2023    Primary Care Physician  Cole Yuen M.D.    Consultants  None    Specialist Names: None    Code Status  Full Code    Chief Complaint  Chief Complaint   Patient presents with    Vomiting     Reports she has been vomiting since 12/14, reports emesis daily [facility]. Denies diarrhea, abd pain, fever. Sent here by  for HR 120s.       History of Presenting Illness  Yamile Go is a 73 y.o. female who presented 12/18/2023 with abdominal pain, nausea vomiting and diarrhea.  The patient says the symptoms have been ongoing since Thursday of last week.  She has developed severe dehydration and hypokalemia.  Patient will need fluid resuscitation to correct her hypotension and electrolytes.  Patient's heart rate initially was in the 120s because of the fast heart rate patient has elevated troponin level.  Patient was also having nausea vomiting and epigastric pain and thus concern for cardiac event is there.  She will be trended on her troponins and we will do a stress test in the morning.  Patient will have full stool studies done including cultures and fecal lactoferrin.  We will initiate her Rocephin Flagyl.    I discussed the plan of care with patient, bedside RN, and emergency room physician Dr. Eduardo Fuentes .    Review of Systems  Review of Systems   Constitutional:  Positive for malaise/fatigue. Negative for chills, diaphoresis and fever.   HENT: Negative.     Eyes: Negative.  Negative for double vision.   Respiratory: Negative.  Negative for cough, hemoptysis and wheezing.    Cardiovascular: Negative.  Negative for chest pain, palpitations and leg swelling.   Gastrointestinal:  Positive for abdominal pain, diarrhea, nausea and vomiting. Negative for blood in stool, constipation and heartburn.   Genitourinary: Negative.  Negative for frequency, hematuria and urgency.   Musculoskeletal:  Positive for back pain and myalgias. Negative for joint  pain.   Skin: Negative.  Negative for itching and rash.   Neurological: Negative.  Negative for dizziness, focal weakness, seizures, loss of consciousness and headaches.   Endo/Heme/Allergies: Negative.  Does not bruise/bleed easily.   Psychiatric/Behavioral: Negative.  Negative for suicidal ideas. The patient is not nervous/anxious.    All other systems reviewed and are negative.      Past Medical History   has a past medical history of Arthritis, Asthma, CAD (coronary artery disease), Dental disorder, Depression, Diabetes (HCC), Emphysema of lung (HCC), Heart burn, HTN (hypertension), Hyperlipidemia, Migraines, Obesity, PONV (postoperative nausea and vomiting), and Sleep apnea.    Surgical History   has a past surgical history that includes appendectomy (N/A, 1976); abdominal hysterectomy total (N/A, 1978); breast biopsy (1984); colectomy (N/A, 2007); lumbar laminectomy diskectomy (N/A, 1989); arthroplasty (Right, 2020); lumbar exploration (N/A, 2017); and insertion, peripheral nerve stimulator, lower extremity (Left, 12/22/2022).     Family History  family history includes Cancer in her mother; Diabetes in her father; Heart Disease in her brother, brother, brother, brother, father, and mother; Other in her brother; Stroke in her father.   Family history reviewed with patient. There is no family history that is pertinent to the chief complaint.     Social History   reports that she has never smoked. She has never used smokeless tobacco. She reports that she does not drink alcohol and does not use drugs.    Allergies  Allergies   Allergen Reactions    Amlodipine Swelling    Rifampin Unspecified     Pt turns yellow    Fentanyl Vomiting    Morphine Vomiting    Gabapentin Hives    Pregabalin Hives    Sulfa Drugs Hives and Itching    Benzoin Rash     blisters       Medications  Prior to Admission Medications   Prescriptions Last Dose Informant Patient Reported? Taking?   HYDROcodone/acetaminophen (NORCO)  MG Tab  12/13/2023 at AM Patient Yes Yes   Sig: Take 1-2 Tablets by mouth every 6 hours as needed for Severe Pain.   SYNTHROID 88 MCG Tab 12/14/2023 at AM Patient No No   Sig: Take 1 Tablet by mouth every day.   albuterol 108 (90 Base) MCG/ACT Aero Soln inhalation aerosol > 1 month at Unknown Patient Yes No   Sig: Inhale 2 Puffs every 6 hours as needed for Shortness of Breath.   amitriptyline (ELAVIL) 100 MG Tab 12/13/2023 at PM Patient Yes No   Sig: Take 100 mg by mouth every evening.   escitalopram (LEXAPRO) 20 MG tablet 12/14/2023 at AM Patient No No   Sig: Take 1 Tablet by mouth every day.   ezetimibe (ZETIA) 10 MG Tab 12/13/2023 at PM Patient Yes Yes   Sig: Take 10 mg by mouth every evening.   liothyronine (CYTOMEL) 5 MCG Tab 12/14/2023 at AM Patient Yes No   Sig: Take 5 mcg by mouth every day.   lisinopril (PRINIVIL) 2.5 MG Tab 12/14/2023 at AM Patient Yes No   Sig: Take 2.5 mg by mouth every day.   metFORMIN (GLUCOPHAGE) 500 MG Tab 12/14/2023 at AM Patient Yes No   Sig: Take 500 mg by mouth 2 times a day with meals.   metoprolol SR (TOPROL XL) 25 MG TABLET SR 24 HR 12/14/2023 at AM Patient Yes No   Sig: Take 25 mg by mouth every day.   omeprazole (PRILOSEC) 20 MG delayed-release capsule 12/13/2023 at PM Patient No No   Sig: Take 1 Capsule by mouth every day.   Patient taking differently: Take 20 mg by mouth every evening.   ondansetron (ZOFRAN ODT) 4 MG TABLET DISPERSIBLE 12/16/2023 at Unknown Patient Yes Yes   Sig: Take 4 mg by mouth every 6 hours as needed for Nausea/Vomiting.   rosuvastatin (CRESTOR) 20 MG Tab 12/13/2023 at PM Patient No No   Sig: Take 1 Tablet by mouth every evening.   sumatriptan (IMITREX) 100 MG tablet 12/13/2023 at PM Patient Yes No   Sig: Take 100 mg by mouth one time as needed for Migraine.      Facility-Administered Medications: None       Physical Exam  Temp:  [36.1 °C (97 °F)-36.9 °C (98.4 °F)] 36.1 °C (97 °F)  Pulse:  [115-138] 121  Resp:  [16-19] 16  BP: (126189)/(60-85) 150/76  SpO2:   [93 %-98 %] 98 %  Blood Pressure : (!) 150/76   Temperature: 36.1 °C (97 °F)   Pulse: (!) 121   Respiration: 16   Pulse Oximetry: 98 %       Physical Exam  Vitals and nursing note reviewed. Exam conducted with a chaperone present.   Constitutional:       General: She is awake.      Appearance: Normal appearance. She is well-developed, well-groomed and normal weight. She is ill-appearing.   HENT:      Head: Normocephalic and atraumatic.      Jaw: There is normal jaw occlusion. No trismus.      Salivary Glands: Right salivary gland is not tender. Left salivary gland is not tender.      Right Ear: External ear normal.      Left Ear: External ear normal.      Mouth/Throat:      Mouth: Mucous membranes are moist.      Pharynx: Oropharynx is clear.   Eyes:      General: Lids are normal. Vision grossly intact.      Extraocular Movements: Extraocular movements intact.      Conjunctiva/sclera: Conjunctivae normal.      Right eye: Right conjunctiva is not injected. No exudate.     Left eye: Left conjunctiva is not injected. No exudate.     Pupils: Pupils are equal, round, and reactive to light.   Neck:      Thyroid: No thyroid mass.      Vascular: No hepatojugular reflux or JVD.      Trachea: No abnormal tracheal secretions or tracheal deviation.   Cardiovascular:      Rate and Rhythm: Regular rhythm. Tachycardia present. Occasional Extrasystoles are present.     Pulses: Normal pulses.      Heart sounds: Normal heart sounds. No murmur heard.     No friction rub.   Pulmonary:      Effort: Pulmonary effort is normal.      Breath sounds: Normal breath sounds. No wheezing or rhonchi.   Abdominal:      General: Abdomen is flat. There is distension.      Palpations: Abdomen is soft.      Tenderness: There is abdominal tenderness. There is no right CVA tenderness or left CVA tenderness.      Hernia: No hernia is present.   Musculoskeletal:      Cervical back: Full passive range of motion without pain, normal range of motion and neck  supple. No rigidity. No muscular tenderness.      Right lower leg: No edema.      Left lower leg: No edema.   Lymphadenopathy:      Head:      Right side of head: No submental adenopathy.      Left side of head: No submental adenopathy.      Cervical:      Right cervical: No superficial cervical adenopathy.     Left cervical: No superficial cervical adenopathy.      Upper Body:      Right upper body: No supraclavicular adenopathy.      Left upper body: No supraclavicular adenopathy.   Skin:     General: Skin is warm and dry.      Capillary Refill: Capillary refill takes less than 2 seconds.      Coloration: Skin is not cyanotic or pale.      Findings: No abrasion or bruising.   Neurological:      General: No focal deficit present.      Mental Status: She is alert and oriented to person, place, and time. Mental status is at baseline.      GCS: GCS eye subscore is 4. GCS verbal subscore is 5. GCS motor subscore is 6.      Cranial Nerves: No cranial nerve deficit.      Sensory: No sensory deficit.      Motor: Motor function is intact.      Deep Tendon Reflexes:      Reflex Scores:       Tricep reflexes are 2+ on the right side and 2+ on the left side.       Bicep reflexes are 2+ on the right side and 2+ on the left side.       Brachioradialis reflexes are 2+ on the right side and 2+ on the left side.       Patellar reflexes are 2+ on the right side and 2+ on the left side.       Achilles reflexes are 2+ on the right side and 2+ on the left side.  Psychiatric:         Attention and Perception: Attention and perception normal.         Mood and Affect: Mood normal.         Speech: Speech normal.         Behavior: Behavior is cooperative.         Thought Content: Thought content normal.         Cognition and Memory: Cognition and memory normal.         Judgment: Judgment normal.         Laboratory:  Recent Labs     12/18/23  1433   WBC 10.0   RBC 4.43   HEMOGLOBIN 13.2   HEMATOCRIT 40.2   MCV 90.7   MCH 29.8   MCHC 32.8  "  RDW 49.6   PLATELETCT 279   MPV 8.8*     Recent Labs     12/18/23  1433   SODIUM 136   POTASSIUM 3.0*   CHLORIDE 95*   CO2 25   GLUCOSE 112*   BUN 8   CREATININE 0.78   CALCIUM 10.7*     Recent Labs     12/18/23  1433   ALTSGPT 20   ASTSGOT 23   ALKPHOSPHAT 62   TBILIRUBIN 0.5   LIPASE 19   GLUCOSE 112*         No results for input(s): \"NTPROBNP\" in the last 72 hours.      Recent Labs     12/18/23  1433   TROPONINT 39*       Imaging:  [id number]   Final Result      1.  Echogenic liver consistent with hepatic steatosis an/or fibrosis      2.  Negative for gallstones or biliary dilation      NM-CARDIAC STRESS TEST    (Results Pending)       X-Ray:  I have personally reviewed the images and compared with prior images.  EKG:  I have personally reviewed the images and compared with prior images.    Assessment/Plan:  Justification for Admission Status  I anticipate this patient will require at least two midnights for appropriate medical management, necessitating inpatient admission because patient with acute gastroenteritis dehydration and nausea vomiting will require at least 48 hours of inpatient management    Patient will need a Telemetry bed on MEDICAL service .  The need is secondary to acute gastroenteritis presumed infectious.    * Gastroenteritis presumed infectious- (present on admission)  Assessment & Plan  Patient reports diarrhea since last Thursday.  She is not able to recall if she ate anything out of the ordinary and she did not eat any leftover foods or at the restaurant.  She has been having nausea vomiting and diarrhea ever since.  Patient also has abdominal pain in the epigastric region  Obtain stool cultures, fecal lactoferrin, no recent antibiotics and thus very low risk of C. difficile colitis  Initiate Rocephin and Flagyl after cultures have been obtained  Also obtain blood cultures x 2    Hypokalemia- (present on admission)  Assessment & Plan  Potassium low at 3.0  Give 4K riders, check a magnesium " level, monitor potassium levels    Type 2 diabetes mellitus (HCC)- (present on admission)  Assessment & Plan  -accus with sliding scale coverage  -diabetic diet  -diabetic education  -follow glycohemoglobin levels long term, most recent hemoglobin A1c 6.4  -monitor for hypoglycemic episodes and adjust control if he should get low    Asthma- (present on admission)  Assessment & Plan  History of asthma currently no exacerbation and lungs are clear    Essential hypertension, benign- (present on admission)  Assessment & Plan  Optimize blood pressure management keep systolic blood pressure less than 140 diastolic under 90    Vitamin D deficiency- (present on admission)  Assessment & Plan  Continue vitamin D supplementation    Hypothyroidism- (present on admission)  Assessment & Plan  Synthroid supplementation with Synthroid 88 mcg p.o. daily  Most recent TSH is 0.398    Depression- (present on admission)  Assessment & Plan  Chronic depression currently not suicidal homicidal  Continue with Lexapro 20 mg daily and Elavil 100 mg nightly    CAD (coronary artery disease)- (present on admission)  Assessment & Plan  Patient has history of coronary artery disease  Patient has been experiencing nausea vomiting with epigastric pain  Trend troponins  Stress test in the morning  Echocardiogram    Dyslipidemia- (present on admission)  Assessment & Plan  Low-fat low-cholesterol diet  Statin  Fasting lipid panel        VTE prophylaxis: SCDs/TEDs

## 2023-12-19 NOTE — PROGRESS NOTES
[facility] Medicine Daily Progress Note    Date of Service  12/19/2023    Chief Complaint  Yamile Go is a 73 y.o. female admitted 12/18/2023 with epigastric discomfort and vomiting    Hospital Course  Yamile Go has past medical history that includes asthma, coronary artery disease, diabetes, and depression.  She presented to the emergency room on 12/18/2023 with vomiting and abdominal discomfort for several days.  Evaluation was concerning for tachycardia and indeterminant troponin elevation.  The patient was admitted to the hospital for intravenous fluid resuscitation.  On 12/19/2023, the patient was noted to be hypotensive and required additional fluids    Interval Problem Update  Patient seen and examined this morning, systolic blood pressures in the 70s, she denied dizziness and lightheadedness, responded to intervention with IV fluids  She is no longer having any nausea vomiting, she states that prior epigastric discomfort has resolved without recurrence  Continues to have some general malaise    I have discussed this patient's plan of care and discharge plan at IDT rounds today with Case Management, Nursing, Nursing leadership, and other members of the IDT team.    Consultants/Specialty  None    Code Status  Full Code    Disposition  The patient is not medically cleared for discharge to home or a post-acute facility.  Anticipate discharge to: home with close outpatient follow-up    I have placed the appropriate orders for post-discharge needs.    Review of Systems  Review of Systems   Constitutional:  Positive for malaise/fatigue. Negative for chills.   Respiratory:  Negative for cough, hemoptysis and sputum production.    Cardiovascular:  Negative for chest pain, palpitations and orthopnea.   Gastrointestinal:  Negative for nausea and vomiting.   Skin:  Negative for itching and rash.   Neurological:  Negative for dizziness.   All other systems reviewed and are negative.       Physical Exam  Temp:   [36.1 °C (97 °F)-36.7 °C (98 °F)] 36.7 °C (98 °F)  Pulse:  [] 94  Resp:  [16-17] 16  BP: ()/(37-76) 110/54  SpO2:  [90 %-98 %] 93 %    Physical Exam  Constitutional:       General: She is not in acute distress.     Appearance: Normal appearance. She is normal weight.   HENT:      Head: Normocephalic and atraumatic.      Right Ear: External ear normal.      Left Ear: External ear normal.      Nose: Nose normal.      Mouth/Throat:      Mouth: Mucous membranes are moist.      Pharynx: Oropharynx is clear.   Eyes:      Extraocular Movements: Extraocular movements intact.      Conjunctiva/sclera: Conjunctivae normal.      Pupils: Pupils are equal, round, and reactive to light.   Cardiovascular:      Rate and Rhythm: Normal rate and regular rhythm.      Pulses: Normal pulses.   Pulmonary:      Effort: Pulmonary effort is normal.      Breath sounds: Normal breath sounds.   Abdominal:      General: Abdomen is flat. Bowel sounds are normal. There is no distension.      Palpations: Abdomen is soft.      Tenderness: There is no abdominal tenderness.   Musculoskeletal:         General: Normal range of motion.      Cervical back: Normal range of motion and neck supple.   Skin:     General: Skin is warm and dry.      Capillary Refill: Capillary refill takes less than 2 seconds.      Coloration: Skin is not jaundiced.   Neurological:      General: No focal deficit present.      Mental Status: She is alert and oriented to person, place, and time.      Cranial Nerves: No cranial nerve deficit.      Gait: Gait normal.   Psychiatric:         Mood and Affect: Mood normal.         Behavior: Behavior normal.         Fluids  No intake or output data in the 24 hours ending 12/19/23 1559    Laboratory  Recent Labs     12/18/23  1433 12/19/23  0138   WBC 10.0 6.5   RBC 4.43 3.59*   HEMOGLOBIN 13.2 10.6*   HEMATOCRIT 40.2 32.8*   MCV 90.7 91.4   MCH 29.8 29.5   MCHC 32.8 32.3   RDW 49.6 49.4   PLATELETCT 279 230   MPV 8.8* 9.0      Recent Labs     12/18/23  1433 12/19/23  0138   SODIUM 136 137   POTASSIUM 3.0* 3.8   CHLORIDE 95* 101   CO2 25 25   GLUCOSE 112* 104*   BUN 8 6*   CREATININE 0.78 0.62   CALCIUM 10.7* 9.2             Recent Labs     12/19/23  0138   TRIGLYCERIDE 136   HDL 49   LDL 64       Imaging  US-RUQ   Final Result      1.  Echogenic liver consistent with hepatic steatosis an/or fibrosis      2.  Negative for gallstones or biliary dilation           Assessment/Plan  * Gastroenteritis presumed infectious- (present on admission)  Assessment & Plan  12/19:  Reviewed history with the patient, presentation is consistent with viral gastroenteritis  Symptomatically the patient is improving  She did have an episode of hypotension this morning that was persistent despite IV fluids  She will require ongoing inpatient care with IV fluids and treatment for infection, she will be expected to be in the hospital for greater than 2 midnights  I have ordered ongoing normal saline    Hypotension- (present on admission)  Assessment & Plan  12/19:  I the patient was evaluated and reevaluated by myself this morning for hypotension  Despite high rate of IV fluids, the patient remained hypotensive with systolic blood pressures in the 70s  I ordered a fluid bolus and reassessed, her blood pressure has improved  Suspect this is related to hypovolemia    Staphylococcus aureus bacteremia- (present on admission)  Assessment & Plan  12/19:  New problem today, blood cultures positive for gram-positive cocci  Overall picture is concerning with hypotension and positive blood cultures  I have ordered intravenous Ancef  Follow-up culture and sensitivity results    CAD (coronary artery disease)- (present on admission)  Assessment & Plan  12/19:  Patient has history of coronary artery disease  Epigastric pain is likely related to gastroenteritis  Obtain echocardiogram  Defer stress test for now      Type 2 diabetes mellitus (HCC)- (present on  admission)  Assessment & Plan  She has not required any sliding scale coverage  Discontinue    Hypokalemia- (present on admission)  Assessment & Plan  Potassium has been replaced    Asthma- (present on admission)  Assessment & Plan  History of asthma currently no exacerbation and lungs are clear    Vitamin D deficiency- (present on admission)  Assessment & Plan  Continue vitamin D supplementation    Hypothyroidism- (present on admission)  Assessment & Plan  Synthroid    Depression- (present on admission)  Assessment & Plan  Lexapro and Elavil    Essential hypertension, benign- (present on admission)  Assessment & Plan  Optimize blood pressure management keep systolic blood pressure less than 140 diastolic under 90    Dyslipidemia- (present on admission)  Assessment & Plan  Low-fat low-cholesterol diet  Statin  Fasting lipid panel         VTE prophylaxis:    enoxaparin ppx      I have performed a physical exam and reviewed and updated ROS and Plan today (12/19/2023). In review of yesterday's note (12/18/2023), there are no changes except as documented above.

## 2023-12-20 VITALS
SYSTOLIC BLOOD PRESSURE: 128 MMHG | WEIGHT: 158.51 LBS | HEIGHT: 62 IN | OXYGEN SATURATION: 95 % | TEMPERATURE: 97.9 F | BODY MASS INDEX: 29.17 KG/M2 | RESPIRATION RATE: 16 BRPM | HEART RATE: 94 BPM | DIASTOLIC BLOOD PRESSURE: 61 MMHG

## 2023-12-20 PROBLEM — K52.9 GASTROENTERITIS PRESUMED INFECTIOUS: Status: RESOLVED | Noted: 2023-12-18 | Resolved: 2023-12-20

## 2023-12-20 PROBLEM — E87.6 HYPOKALEMIA: Status: RESOLVED | Noted: 2023-05-03 | Resolved: 2023-12-20

## 2023-12-20 PROBLEM — R78.81 POSITIVE BLOOD CULTURE: Status: RESOLVED | Noted: 2023-12-19 | Resolved: 2023-12-20

## 2023-12-20 PROBLEM — I95.9 HYPOTENSION: Status: RESOLVED | Noted: 2023-12-19 | Resolved: 2023-12-20

## 2023-12-20 LAB
BACTERIA BLD CULT: ABNORMAL
BACTERIA BLD CULT: ABNORMAL
GLUCOSE BLD STRIP.AUTO-MCNC: 104 MG/DL (ref 65–99)
GLUCOSE BLD STRIP.AUTO-MCNC: 88 MG/DL (ref 65–99)
LACTATE SERPL-SCNC: 0.8 MMOL/L (ref 0.5–2)
LACTATE SERPL-SCNC: 1 MMOL/L (ref 0.5–2)
LACTATE SERPL-SCNC: 1 MMOL/L (ref 0.5–2)
PROCALCITONIN SERPL-MCNC: 0.08 NG/ML
SIGNIFICANT IND 70042: ABNORMAL
SITE SITE: ABNORMAL
SOURCE SOURCE: ABNORMAL

## 2023-12-20 PROCEDURE — 700105 HCHG RX REV CODE 258: Performed by: HOSPITALIST

## 2023-12-20 PROCEDURE — 83605 ASSAY OF LACTIC ACID: CPT

## 2023-12-20 PROCEDURE — 94760 N-INVAS EAR/PLS OXIMETRY 1: CPT

## 2023-12-20 PROCEDURE — 84145 PROCALCITONIN (PCT): CPT

## 2023-12-20 PROCEDURE — 36415 COLL VENOUS BLD VENIPUNCTURE: CPT

## 2023-12-20 PROCEDURE — 99239 HOSP IP/OBS DSCHRG MGMT >30: CPT | Performed by: HOSPITALIST

## 2023-12-20 PROCEDURE — 82962 GLUCOSE BLOOD TEST: CPT

## 2023-12-20 PROCEDURE — A9270 NON-COVERED ITEM OR SERVICE: HCPCS | Performed by: HOSPITALIST

## 2023-12-20 PROCEDURE — 700111 HCHG RX REV CODE 636 W/ 250 OVERRIDE (IP): Performed by: HOSPITALIST

## 2023-12-20 PROCEDURE — 700102 HCHG RX REV CODE 250 W/ 637 OVERRIDE(OP): Performed by: HOSPITALIST

## 2023-12-20 RX ADMIN — CEFAZOLIN 2 G: 2 INJECTION, POWDER, FOR SOLUTION INTRAMUSCULAR; INTRAVENOUS at 06:33

## 2023-12-20 RX ADMIN — SODIUM CHLORIDE: 9 INJECTION, SOLUTION INTRAVENOUS at 06:32

## 2023-12-20 RX ADMIN — ESCITALOPRAM OXALATE 20 MG: 10 TABLET ORAL at 06:25

## 2023-12-20 RX ADMIN — ASPIRIN 325 MG: 325 TABLET ORAL at 06:25

## 2023-12-20 RX ADMIN — LIOTHYRONINE SODIUM 5 MCG: 5 TABLET ORAL at 06:25

## 2023-12-20 RX ADMIN — LEVOTHYROXINE SODIUM 88 MCG: 88 TABLET ORAL at 06:25

## 2023-12-20 RX ADMIN — LISINOPRIL 2.5 MG: 2.5 TABLET ORAL at 06:25

## 2023-12-20 ASSESSMENT — PAIN DESCRIPTION - PAIN TYPE: TYPE: ACUTE PAIN

## 2023-12-20 NOTE — CARE PLAN
The patient is Watcher - Medium risk of patient condition declining or worsening    Shift Goals  Clinical Goals: abx, monitor Bp, collect stool sample  Patient Goals: rest, go home  Family Goals: Get a stool sample    Progress made toward(s) clinical / shift goals:  BP improved, abx continued, patient has not provided stool sample.       Problem: Knowledge Deficit - Standard  Goal: Patient and family/care givers will demonstrate understanding of plan of care, disease process/condition, diagnostic tests and medications  Outcome: Progressing     Problem: Mobility  Goal: Patient's capacity to carry out activities will improve  Outcome: Progressing     Problem: Infection - Standard  Goal: Patient will remain free from infection  Outcome: Progressing       Patient is not progressing towards the following goals:

## 2023-12-20 NOTE — CARE PLAN
The patient is Watcher - Medium risk of patient condition declining or worsening    Shift Goals  Clinical Goals: ABX, monitor BP, Collect stool sample  Patient Goals: Rest  Family Goals: Get a stool sample    Progress made toward(s) clinical / shift goals:    Problem: Hemodynamics  Goal: Patient's hemodynamics, fluid balance and neurologic status will be stable or improve  Outcome: Progressing  Note: Patient initially hypotensive and asymptomatic, reached out to MD, bolus order received. BP rechecked, BP improving. Patient encouraged to monitor signs and symptoms of hypotension.     Problem: Infection - Standard  Goal: Patient will remain free from infection  Outcome: Progressing  Flowsheets (Taken 12/20/2023 0426)  Standard Infection Interventions:   Assessed for signs and symptoms of infection   Assessed for removal IV, central lines, intra-arterial or urinary catheters  Note: Lactic acid and pro calcitonin drawn, result monitored, result within defined limits. Patient remained without fever during time of care.      Problem: Mobility  Goal: Patient's capacity to carry out activities will improve  Outcome: Progressing     Problem: Self Care  Goal: Patient will have the ability to perform ADLs independently or with assistance (bathe, groom, dress, toilet and feed)  Outcome: Progressing       Patient is not progressing towards the following goals:

## 2023-12-20 NOTE — HOSPITAL COURSE
Yamile Go has past medical history that includes asthma, coronary artery disease, diabetes, and depression.  She presented to the emergency room on 12/18/2023 with vomiting and abdominal discomfort for several days.  Evaluation was concerning for tachycardia and indeterminant troponin elevation.  The patient was admitted to the hospital for intravenous fluid resuscitation.  On 12/19/2023, the patient was noted to be hypotensive and required additional fluids

## 2023-12-20 NOTE — PROGRESS NOTES
Received report from Lali VILLALBA, at pt bedside. Pt reported no pain or discomfort at this time, POC discussed. Call light and belongings within reach. Bed locked and in low position. Fall precautions in place.

## 2023-12-20 NOTE — PROGRESS NOTES
Telemetry Shift Summary     Rhythm SR-ST  HR Range   Ectopy rPVC  Measurements  0.16/0.08/0.34     Normal Values  Rhythm SR  HR Range    Measurements 0.12-0.20 / 0.06-0.10  / 0.30-0.52

## 2023-12-20 NOTE — PROGRESS NOTES
0739 Updated MD on BP 79/37, patient asymptomatic. 500mL bolus of NS ordered. Recheck BP after bolus.     0834 Updated BP 90/47

## 2023-12-20 NOTE — PROGRESS NOTES
OVERNIGHT HOSPITALIST:    Paged by nursing Staff. BP now 97/43. Tachycardic with HR 93 bpm.   Adding 1 L Bolus NS now, Lactic acid and Procalcitonin. Patient on Acef for Bacteremia (1/2 Bottles 12/18 as below).    Closely monitor.

## 2023-12-20 NOTE — PROGRESS NOTES
Telemetry Shift Summary    Rhythm SR  HR Range 84-94  Ectopy none  Measurements 0.16/0.08/0.40      Normal Values  Rhythm SR  HR Range:   Measurements: 0.12-0.20/0.06-0.10/0.30-0.52

## 2023-12-20 NOTE — DISCHARGE SUMMARY
Discharge Summary    CHIEF COMPLAINT ON ADMISSION  Chief Complaint   Patient presents with    Vomiting     Reports she has been vomiting since 12/14, reports emesis daily [facility]. Denies diarrhea, abd pain, fever. Sent here by  for HR 120s.       Reason for Admission  Sent from Urgent Care     Admission Date  12/18/2023    CODE STATUS  Full Code    HPI & HOSPITAL COURSE  This is a 73 y.o. female here with vomiting.     Yaimle Go has past medical history that includes asthma, coronary artery disease, diabetes, and depression.  She presented to the emergency room on 12/18/2023 with vomiting and abdominal discomfort for several days.  Evaluation was concerning for tachycardia and indeterminant troponin elevation.  The patient was admitted to the hospital for intravenous fluid resuscitation and further evaluation.      I suspect this patient's underlying issue was a viral gastroenteritis.  The patient reported 4 days of nausea vomiting.  This resolved during hospital stay and she is no longer having any nausea vomiting, diarrhea, or abdominal pain.    Patient did complain of some epigastric pain at admission.  This has also resolved completely.  The patient has normal exercise tolerance.  A stress test was considered however at this time it seems that her epigastric pain was related to frequent vomiting rather than a primary cardiac cause.    The patient did have a positive blood culture, final results show coagulase-negative staph in 1 out of 2 bottles.  Will be treated as contaminant.  Antibiotics have been stopped    Patient did have transient low blood pressure on 12/19/2023, she was treated with IV fluids and responded.  This morning her blood pressure is normal.  Orthostatics have been checked and are also normal.    Therefore, she is discharged in fair and stable condition to home with close outpatient follow-up.    The patient met 2-midnight criteria for an inpatient stay at the time of  discharge.    Discharge Date  12/20/2023    FOLLOW UP ITEMS POST DISCHARGE  Follow-up with primary care provider    DISCHARGE DIAGNOSES  Principal Problem (Resolved):    Gastroenteritis presumed infectious (POA: Yes)  Active Problems:    CAD (coronary artery disease) (POA: Yes)    Type 2 diabetes mellitus (HCC) (POA: Yes)    Dyslipidemia (POA: Yes)    Essential hypertension, benign (POA: Yes)    Depression (POA: Yes)    Hypothyroidism (Chronic) (POA: Yes)    Vitamin D deficiency (POA: Yes)    Asthma (POA: Yes)  Resolved Problems:    Positive blood culture (POA: Yes)    Hypotension (POA: Yes)    HTN (hypertension) (POA: Yes)    Hyperlipidemia (POA: Yes)    Hypokalemia (POA: Yes)      FOLLOW UP  No future appointments.  Cole Yuen M.D.  [address] [facility] [address] [facility] [address]  [facility] [address]  [phone]    Follow up in 1 week(s)        MEDICATIONS ON DISCHARGE     Medication List        CHANGE how you take these medications        Instructions   omeprazole 20 MG delayed-release capsule  What changed: when to take this  Commonly known as: PriLOSEC   Take 1 Capsule by mouth every day.  Dose: 20 mg            CONTINUE taking these medications        Instructions   albuterol 108 (90 Base) MCG/ACT Aers inhalation aerosol   Inhale 2 Puffs every 6 hours as needed for Shortness of Breath.  Dose: 2 Puff     amitriptyline 100 MG Tabs  Commonly known as: Elavil   Take 100 mg by mouth every evening.  Dose: 100 mg     escitalopram 20 MG tablet  Commonly known as: Lexapro   Take 1 Tablet by mouth every day.  Dose: 20 mg     ezetimibe 10 MG Tabs  Commonly known as: Zetia   Take 10 mg by mouth every evening.  Dose: 10 mg     HYDROcodone/acetaminophen  MG Tabs  Commonly known as: Norco   Take 1-2 Tablets by mouth every 6 hours as needed for Severe Pain.  Dose: 1-2 Tablet     liothyronine 5 MCG Tabs  Commonly known as: Cytomel   Take 5 mcg by mouth every day.  Dose: 5 mcg     metFORMIN 500 MG Tabs  Commonly known as: Glucophage    Take 500 mg by mouth 2 times a day with meals.  Dose: 500 mg     metoprolol SR 25 MG Tb24  Commonly known as: Toprol XL   Take 25 mg by mouth every day.  Dose: 25 mg     ondansetron 4 MG Tbdp  Commonly known as: Zofran ODT   Take 4 mg by mouth every 6 hours as needed for Nausea/Vomiting.  Dose: 4 mg     rosuvastatin 20 MG Tabs  Commonly known as: Crestor   Take 1 Tablet by mouth every evening.  Dose: 20 mg     sumatriptan 100 MG tablet  Commonly known as: Imitrex   Take 100 mg by mouth one time as needed for Migraine.  Dose: 100 mg     Synthroid 88 MCG Tabs  Generic drug: levothyroxine   Take 1 Tablet by mouth every day.  Dose: 88 mcg            STOP taking these medications      lisinopril 2.5 MG Tabs  Commonly known as: Prinivil              Allergies  Allergies   Allergen Reactions    Amlodipine Swelling    Rifampin Unspecified     Pt turns yellow    Fentanyl Vomiting    Morphine Vomiting    Gabapentin Hives    Pregabalin Hives    Sulfa Drugs Hives and Itching    Benzoin Rash     blisters       DIET  Orders Placed This Encounter   Procedures    Diet Order Diet: Regular     Standing Status:   Standing     Number of Occurrences:   1     Order Specific Question:   Diet:     Answer:   Regular [1]       ACTIVITY  As tolerated.  Weight bearing as tolerated    CONSULTATIONS  None    PROCEDURES  Right upper quadrant ultrasound 12/18/2023:  1.  Echogenic liver consistent with hepatic steatosis an/or fibrosis  2.  Negative for gallstones or biliary dilation    LABORATORY  Lab Results   Component Value Date    SODIUM 137 12/19/2023    POTASSIUM 3.8 12/19/2023    CHLORIDE 101 12/19/2023    CO2 25 12/19/2023    GLUCOSE 104 (H) 12/19/2023    BUN 6 (L) 12/19/2023    CREATININE 0.62 12/19/2023    CREATININE 0.87 02/20/2012        Lab Results   Component Value Date    WBC 6.5 12/19/2023    WBC 6.9 02/20/2012    HEMOGLOBIN 10.6 (L) 12/19/2023    HEMATOCRIT 32.8 (L) 12/19/2023    PLATELETCT 230 12/19/2023        Total time of the  discharge process exceeds 34 minutes.

## 2023-12-20 NOTE — DISCHARGE PLANNING
**1410  IMM presented and explained to patient at bedside. Patient does not plan to appeal discharge at this time. IMM signed by patient and faxed to [facility].

## 2023-12-20 NOTE — CARE PLAN
The patient is Stable - Low risk of patient condition declining or worsening    Shift Goals  Clinical Goals: continue IVABX, monitor BP  Patient Goals: sleep  Family Goals: Get a stool sample    Progress made toward(s) clinical / shift goals:  patient discharging home, BP improved and orthostatic Bps negative.       Problem: Knowledge Deficit - Standard  Goal: Patient and family/care givers will demonstrate understanding of plan of care, disease process/condition, diagnostic tests and medications  Outcome: Met     Problem: Hemodynamics  Goal: Patient's hemodynamics, fluid balance and neurologic status will be stable or improve  Outcome: Met     Problem: Mobility  Goal: Patient's capacity to carry out activities will improve  Outcome: Met       Patient is not progressing towards the following goals:

## 2023-12-22 ENCOUNTER — TELEPHONE (OUTPATIENT)
Dept: HEALTH INFORMATION MANAGEMENT | Facility: OTHER | Age: 73
End: 2023-12-22

## 2023-12-23 LAB
BACTERIA BLD CULT: NORMAL
SIGNIFICANT IND 70042: NORMAL
SITE SITE: NORMAL
SOURCE SOURCE: NORMAL

## 2024-03-09 ENCOUNTER — HOSPITAL ENCOUNTER (OUTPATIENT)
Facility: MEDICAL CENTER | Age: 74
End: 2024-03-09
Attending: NURSE PRACTITIONER
Payer: MEDICARE

## 2024-03-09 ENCOUNTER — OFFICE VISIT (OUTPATIENT)
Dept: URGENT CARE | Facility: CLINIC | Age: 74
End: 2024-03-09
Payer: MEDICARE

## 2024-03-09 VITALS
DIASTOLIC BLOOD PRESSURE: 58 MMHG | BODY MASS INDEX: 27.6 KG/M2 | OXYGEN SATURATION: 94 % | WEIGHT: 150 LBS | TEMPERATURE: 99.1 F | RESPIRATION RATE: 18 BRPM | HEART RATE: 84 BPM | SYSTOLIC BLOOD PRESSURE: 110 MMHG | HEIGHT: 62 IN

## 2024-03-09 DIAGNOSIS — N30.01 ACUTE CYSTITIS WITH HEMATURIA: ICD-10-CM

## 2024-03-09 LAB
APPEARANCE UR: NORMAL
BILIRUB UR STRIP-MCNC: NEGATIVE MG/DL
COLOR UR AUTO: NORMAL
GLUCOSE UR STRIP.AUTO-MCNC: NEGATIVE MG/DL
KETONES UR STRIP.AUTO-MCNC: NEGATIVE MG/DL
LEUKOCYTE ESTERASE UR QL STRIP.AUTO: NEGATIVE
NITRITE UR QL STRIP.AUTO: POSITIVE
PH UR STRIP.AUTO: 5.5 [PH] (ref 5–8)
PROT UR QL STRIP: NEGATIVE MG/DL
RBC UR QL AUTO: NORMAL
SP GR UR STRIP.AUTO: 1.01
UROBILINOGEN UR STRIP-MCNC: 0.2 MG/DL

## 2024-03-09 PROCEDURE — 3074F SYST BP LT 130 MM HG: CPT | Performed by: NURSE PRACTITIONER

## 2024-03-09 PROCEDURE — 99213 OFFICE O/P EST LOW 20 MIN: CPT | Performed by: NURSE PRACTITIONER

## 2024-03-09 PROCEDURE — 87186 SC STD MICRODIL/AGAR DIL: CPT

## 2024-03-09 PROCEDURE — 81002 URINALYSIS NONAUTO W/O SCOPE: CPT | Performed by: NURSE PRACTITIONER

## 2024-03-09 PROCEDURE — 3078F DIAST BP <80 MM HG: CPT | Performed by: NURSE PRACTITIONER

## 2024-03-09 PROCEDURE — 87086 URINE CULTURE/COLONY COUNT: CPT

## 2024-03-09 PROCEDURE — 87077 CULTURE AEROBIC IDENTIFY: CPT

## 2024-03-09 RX ORDER — CIPROFLOXACIN 500 MG/1
500 TABLET, FILM COATED ORAL EVERY 12 HOURS
Qty: 14 TABLET | Refills: 0 | Status: SHIPPED | OUTPATIENT
Start: 2024-03-09 | End: 2024-03-16

## 2024-03-09 RX ORDER — EPINEPHRINE 0.3 MG/.3ML
INJECTION SUBCUTANEOUS
COMMUNITY

## 2024-03-09 ASSESSMENT — ENCOUNTER SYMPTOMS
VOMITING: 0
BACK PAIN: 1
FEVER: 0
ABDOMINAL PAIN: 0

## 2024-03-09 ASSESSMENT — FIBROSIS 4 INDEX: FIB4 SCORE: 1.63

## 2024-03-10 NOTE — PROGRESS NOTES
Subjective:     Yamile Go is a 74 y.o. female who presents for Urinary Frequency (Burning sensation x 2-3 days, Rt lower back pain )      Presents with concerns for a UTI, dysuria and frequency. Had previously gone to urology for recurrent UTIs. Reports right lumbar/flank pain. 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 Does have a history of pyelonephrosis.    Urinary Frequency  This is a recurrent problem. Associated symptoms include urinary symptoms. Pertinent negatives include no abdominal pain, fever or vomiting.       Past Medical History:   Diagnosis Date    Arthritis     Asthma     CAD (coronary artery disease)     Dental disorder     Missing teeth right side.  Veneers top front    Depression     Diabetes (HCC)     Emphysema of lung (HCC)     Heart burn     GERD    HTN (hypertension)     Hyperlipidemia     Migraines     Obesity     PONV (postoperative nausea and vomiting)     Sleep apnea     diagnosed 9/2009 CPAP -PMA       Past Surgical History:   Procedure Laterality Date    INSERTION, PERIPHERAL NERVE STIMULATOR, LOWER EXTREMITY Left 12/22/2022    Procedure: Left SCIATIC PERIPHERAL NERVE STIMULATOR IMPLANT, LOWER EXTREMITY PERIPHERAL SCIATIC NERVE;  Surgeon: Tobi Kilgore M.D.;  Location: SURGERY [facility];  Service: Pain Management    ARTHROPLASTY Right 2020    ankle    LUMBAR EXPLORATION N/A 2017    COLECTOMY N/A 2007    partial for divverticulitis 2007    LUMBAR LAMINECTOMY DISKECTOMY N/A 1989    BREAST BIOPSY  1984    no cancer    ABDOMINAL HYSTERECTOMY TOTAL N/A 1978    APPENDECTOMY N/A 1976       Social History     Socioeconomic History    Marital status: Single     Spouse name: Not on file    Number of children: Not on file    Years of education: Not on file    Highest education level: Not on file   Occupational History    Not on file   Tobacco Use    Smoking status: Never    Smokeless tobacco: Never   Vaping Use    Vaping Use: Never used   Substance and Sexual  Activity    Alcohol use: No    Drug use: No    Sexual activity: Yes     Partners: Male     Birth control/protection: Post-Menopausal   Other Topics Concern    Not on file   Social History Narrative    Not on file     Social Determinants of Health     Financial Resource Strain: Low Risk  (2/9/2022)    Overall Financial Resource Strain (CARDIA)     Difficulty of Paying Living Expenses: Not hard at all   Food Insecurity: No Food Insecurity (2/9/2022)    Hunger Vital Sign     Worried About Running Out of Food in the Last Year: Never true     Ran Out of Food in the Last Year: Never true   Transportation Needs: No Transportation Needs (2/9/2022)    PRAPARE - Transportation     Lack of Transportation (Medical): No     Lack of Transportation (Non-Medical): No   Physical Activity: Not on file   Stress: Not on file   Social Connections: Not on file   Intimate Partner Violence: Not on file   Housing Stability: Not on file        Family History   Problem Relation Age of Onset    Cancer Mother         lung    Heart Disease Mother     Stroke Father     Heart Disease Father     Diabetes Father     Heart Disease Brother         cath and bypass    Heart Disease Brother         cath and byp[ass[    Heart Disease Brother         cath and bypass    Heart Disease Brother     Other Brother         MVA        Allergies   Allergen Reactions    Amlodipine Swelling    Rifampin Unspecified     Pt turns yellow    Fentanyl Vomiting    Morphine Vomiting    Gabapentin Hives    Pregabalin Hives    Sulfa Drugs Hives and Itching    Benzoin Rash     blisters       Review of Systems   Constitutional:  Negative for fever.   Gastrointestinal:  Negative for abdominal pain and vomiting.   Genitourinary:  Positive for dysuria and urgency. Negative for hematuria.   Musculoskeletal:  Positive for back pain.   All other systems reviewed and are negative.       Objective:   /58 (BP Location: Left arm, Patient Position: Sitting, BP Cuff Size: Adult)    "Pulse 84   Temp 37.3 °C (99.1 °F) (Temporal)   Resp 18   Ht 1.575 m (5' 2\")   Wt 68 kg (150 lb)   SpO2 94%   BMI 27.44 kg/m²     Physical Exam  Vitals reviewed.   Constitutional:       General: She is not in acute distress.  Cardiovascular:      Rate and Rhythm: Normal rate.   Pulmonary:      Effort: Pulmonary effort is normal.   Abdominal:      Palpations: Abdomen is soft.      Tenderness: There is no abdominal tenderness. There is no left CVA tenderness.      Comments: Mild right flank, paraspinal lumbar TTP.    Skin:     General: Skin is warm and dry.   Neurological:      Mental Status: She is alert and oriented to person, place, and time.         Assessment/Plan:   1. Acute cystitis with hematuria  - POCT Urinalysis  - URINE CULTURE(NEW); Future  Results for orders placed or performed in visit on 03/09/24   POCT Urinalysis   Result Value Ref Range    POC Color Light Yellow Negative    POC Appearance Cloudy Negative    POC Glucose Negative Negative mg/dL    POC Bilirubin Negative Negative mg/dL    POC Ketones Negative Negative mg/dL    POC Specific Gravity 1.010 <1.005 - >1.030    POC Blood Trace-intact Negative    POC Urine PH 5.5 5.0 - 8.0    POC Protein Negative Negative mg/dL    POC Urobiligen 0.2 Negative (0.2) mg/dL    POC Nitrites Positive Negative    POC Leukocyte Esterase Negative Negative     -Oral Hydration: Drink plenty of water.  -Take antibiotic as prescribed.  -Follow up with PCP.    Follow up urgently for new or persistent abdominal pain, flank pain, difficulty with urination, fevers, vomiting, weakness, tachycardia, or any other concerns.    -Stable vitals. No abdominal tenderness to palpation. Has been afebrile. Right flank and lumbar pain is likely musculoskeletal related to lifting. Advised OTC pain management, with f/u for persistent or worsening symptoms.     Differential diagnosis, natural history, supportive care, and indications for immediate follow-up discussed.  "

## 2024-03-12 NOTE — CARE PLAN
Problem: Knowledge Deficit - Standard  Goal: Patient and family/care givers will demonstrate understanding of plan of care, disease process/condition, diagnostic tests and medications  Outcome: Progressing  Note: Pt agrees with plan of care tonight regarding medications and safety.  Will continue to monitor patient.      Problem: Pain - Standard  Goal: Alleviation of pain or a reduction in pain to the patient’s comfort goal  Outcome: Progressing  Note: C/o pain to back, medicated with prn Norco tonight.  Has + relief.  See MAR and doc flow sheet.  Will continue to monitor patient.   The patient is Stable - Low risk of patient condition declining or worsening    Shift Goals  Clinical Goals: Safety  Patient Goals: Pain control    Progress made toward(s) clinical / shift goals:  progressing           repeat   96

## 2024-03-19 ENCOUNTER — HOSPITAL ENCOUNTER (OUTPATIENT)
Dept: RADIOLOGY | Facility: MEDICAL CENTER | Age: 74
End: 2024-03-19
Attending: PHYSICIAN ASSISTANT
Payer: MEDICARE

## 2024-03-19 DIAGNOSIS — M19.011 PRIMARY OSTEOARTHRITIS OF RIGHT SHOULDER: ICD-10-CM

## 2024-03-19 PROCEDURE — 73200 CT UPPER EXTREMITY W/O DYE: CPT | Mod: RT

## 2024-04-22 ENCOUNTER — APPOINTMENT (OUTPATIENT)
Dept: ADMISSIONS | Facility: MEDICAL CENTER | Age: 74
End: 2024-04-22
Attending: ORTHOPAEDIC SURGERY
Payer: MEDICARE

## 2024-04-25 ENCOUNTER — APPOINTMENT (OUTPATIENT)
Dept: ADMISSIONS | Facility: MEDICAL CENTER | Age: 74
End: 2024-04-25
Attending: ORTHOPAEDIC SURGERY
Payer: MEDICARE

## 2024-04-25 DIAGNOSIS — Z01.812 PRE-OPERATIVE LABORATORY EXAMINATION: ICD-10-CM

## 2024-04-25 LAB
ANION GAP SERPL CALC-SCNC: 13 MMOL/L (ref 7–16)
BUN SERPL-MCNC: 12 MG/DL (ref 8–22)
CALCIUM SERPL-MCNC: 9.3 MG/DL (ref 8.4–10.2)
CHLORIDE SERPL-SCNC: 104 MMOL/L (ref 96–112)
CO2 SERPL-SCNC: 22 MMOL/L (ref 20–33)
CREAT SERPL-MCNC: 0.68 MG/DL (ref 0.5–1.4)
ERYTHROCYTE [DISTWIDTH] IN BLOOD BY AUTOMATED COUNT: 45 FL (ref 35.9–50)
EST. AVERAGE GLUCOSE BLD GHB EST-MCNC: 134 MG/DL
GFR SERPLBLD CREATININE-BSD FMLA CKD-EPI: 92 ML/MIN/1.73 M 2
GLUCOSE SERPL-MCNC: 97 MG/DL (ref 65–99)
HBA1C MFR BLD: 6.3 % (ref 4–5.6)
HCT VFR BLD AUTO: 38.7 % (ref 37–47)
HGB BLD-MCNC: 12.1 G/DL (ref 12–16)
MCH RBC QN AUTO: 26.5 PG (ref 27–33)
MCHC RBC AUTO-ENTMCNC: 31.3 G/DL (ref 32.2–35.5)
MCV RBC AUTO: 84.9 FL (ref 81.4–97.8)
PLATELET # BLD AUTO: 182 K/UL (ref 164–446)
PMV BLD AUTO: 11.4 FL (ref 9–12.9)
POTASSIUM SERPL-SCNC: 4.5 MMOL/L (ref 3.6–5.5)
RBC # BLD AUTO: 4.56 M/UL (ref 4.2–5.4)
SCCMEC + MECA PNL NOSE NAA+PROBE: NEGATIVE
SCCMEC + MECA PNL NOSE NAA+PROBE: NEGATIVE
SODIUM SERPL-SCNC: 139 MMOL/L (ref 135–145)
WBC # BLD AUTO: 7.3 K/UL (ref 4.8–10.8)

## 2024-04-25 PROCEDURE — 36415 COLL VENOUS BLD VENIPUNCTURE: CPT

## 2024-04-25 PROCEDURE — 80048 BASIC METABOLIC PNL TOTAL CA: CPT

## 2024-04-25 PROCEDURE — 83036 HEMOGLOBIN GLYCOSYLATED A1C: CPT

## 2024-04-25 PROCEDURE — 87640 STAPH A DNA AMP PROBE: CPT

## 2024-04-25 PROCEDURE — 85027 COMPLETE CBC AUTOMATED: CPT

## 2024-04-25 PROCEDURE — 87641 MR-STAPH DNA AMP PROBE: CPT

## 2024-04-25 RX ORDER — OXYCODONE AND ACETAMINOPHEN 7.5; 325 MG/1; MG/1
1 TABLET ORAL EVERY 6 HOURS PRN
COMMUNITY

## 2024-04-25 NOTE — DISCHARGE PLANNING
DISCHARGE PLANNING NOTE - TOTAL JOINT    Procedure: Procedure(s):  RIGHT REVERSE TOTAL SHOULDER ARTHROPLASTY  Procedure Date: 4/30/2024  Insurance: Payor: MEDICARE / Plan: MEDICARE PART A & B / Product Type: *No Product type* /    Equipment currently available at home?   ice , shower chair, long handled brush for shower  Type of shower? walk-in shower  Who will be with you during your recovery? other: Maribel  Planning same day discharge?Yes     Plan: Met with patient for preadmit appointment.  Shower instructions and MRSA swab information reviewed and given to patient along with shower kit. MRSA swab completed.  Discussed sleeping in reclined position. Anticipate discharge home with no barriers.

## 2024-04-25 NOTE — PREPROCEDURE INSTRUCTIONS
PreAdmit Telephone Appointment: Reviewed the Preparing for your procedure handout with patient over the phone. Patient instructed per pharmacy guidelines regarding taking, holding or contacting provider for instructions on regularly prescribed medications before surgery. Instructed to take the following medications the day of surgery with a sip of water per pharmacy medication guidelines: Albuterol inhaler if needed, Lexapro, Liothyronine, Omeprazole, Ondansetron if needed, Percocet if needed, Synthroid  Pt reports history of PONV, advised to discuss with anesthesia day of surgery.  Pt insistent she does not have a neurostimulator implanted only the leads but states she has it external and she can turn if on and off, when asked to bring the device that turns on/off she said it is off, she does not use it and it is put away  Pt states she has EKG through Dr. Winkler done this month, will call for copy      Confirmed with patient where to check in morning of surgery. Handouts/Brochure/Video emailed to patient.

## 2024-04-30 ENCOUNTER — APPOINTMENT (OUTPATIENT)
Dept: RADIOLOGY | Facility: MEDICAL CENTER | Age: 74
End: 2024-04-30
Attending: STUDENT IN AN ORGANIZED HEALTH CARE EDUCATION/TRAINING PROGRAM
Payer: MEDICARE

## 2024-04-30 ENCOUNTER — HOSPITAL ENCOUNTER (OUTPATIENT)
Facility: MEDICAL CENTER | Age: 74
End: 2024-04-30
Attending: ORTHOPAEDIC SURGERY | Admitting: ORTHOPAEDIC SURGERY
Payer: MEDICARE

## 2024-04-30 ENCOUNTER — APPOINTMENT (OUTPATIENT)
Dept: RADIOLOGY | Facility: MEDICAL CENTER | Age: 74
End: 2024-04-30
Attending: ORTHOPAEDIC SURGERY
Payer: MEDICARE

## 2024-04-30 ENCOUNTER — ANESTHESIA (OUTPATIENT)
Dept: SURGERY | Facility: MEDICAL CENTER | Age: 74
End: 2024-04-30
Payer: MEDICARE

## 2024-04-30 ENCOUNTER — ANESTHESIA EVENT (OUTPATIENT)
Dept: SURGERY | Facility: MEDICAL CENTER | Age: 74
End: 2024-04-30
Payer: MEDICARE

## 2024-04-30 VITALS
HEART RATE: 120 BPM | DIASTOLIC BLOOD PRESSURE: 67 MMHG | OXYGEN SATURATION: 92 % | WEIGHT: 158.51 LBS | TEMPERATURE: 97.9 F | HEIGHT: 62 IN | RESPIRATION RATE: 18 BRPM | SYSTOLIC BLOOD PRESSURE: 113 MMHG | BODY MASS INDEX: 29.17 KG/M2

## 2024-04-30 LAB — GLUCOSE BLD STRIP.AUTO-MCNC: 84 MG/DL (ref 65–99)

## 2024-04-30 PROCEDURE — C1713 ANCHOR/SCREW BN/BN,TIS/BN: HCPCS | Performed by: ORTHOPAEDIC SURGERY

## 2024-04-30 PROCEDURE — C1776 JOINT DEVICE (IMPLANTABLE): HCPCS | Performed by: ORTHOPAEDIC SURGERY

## 2024-04-30 PROCEDURE — 502000 HCHG MISC OR IMPLANTS RC 0278: Performed by: ORTHOPAEDIC SURGERY

## 2024-04-30 PROCEDURE — 700101 HCHG RX REV CODE 250: Performed by: ORTHOPAEDIC SURGERY

## 2024-04-30 PROCEDURE — 700111 HCHG RX REV CODE 636 W/ 250 OVERRIDE (IP): Mod: JZ | Performed by: STUDENT IN AN ORGANIZED HEALTH CARE EDUCATION/TRAINING PROGRAM

## 2024-04-30 PROCEDURE — 160009 HCHG ANES TIME/MIN: Performed by: ORTHOPAEDIC SURGERY

## 2024-04-30 PROCEDURE — 160048 HCHG OR STATISTICAL LEVEL 1-5: Performed by: ORTHOPAEDIC SURGERY

## 2024-04-30 PROCEDURE — 160002 HCHG RECOVERY MINUTES (STAT): Performed by: ORTHOPAEDIC SURGERY

## 2024-04-30 PROCEDURE — A9270 NON-COVERED ITEM OR SERVICE: HCPCS | Performed by: STUDENT IN AN ORGANIZED HEALTH CARE EDUCATION/TRAINING PROGRAM

## 2024-04-30 PROCEDURE — 160036 HCHG PACU - EA ADDL 30 MINS PHASE I: Performed by: ORTHOPAEDIC SURGERY

## 2024-04-30 PROCEDURE — 160029 HCHG SURGERY MINUTES - 1ST 30 MINS LEVEL 4: Performed by: ORTHOPAEDIC SURGERY

## 2024-04-30 PROCEDURE — 97165 OT EVAL LOW COMPLEX 30 MIN: CPT

## 2024-04-30 PROCEDURE — 700101 HCHG RX REV CODE 250: Performed by: STUDENT IN AN ORGANIZED HEALTH CARE EDUCATION/TRAINING PROGRAM

## 2024-04-30 PROCEDURE — 64415 NJX AA&/STRD BRCH PLXS IMG: CPT | Performed by: ORTHOPAEDIC SURGERY

## 2024-04-30 PROCEDURE — 160041 HCHG SURGERY MINUTES - EA ADDL 1 MIN LEVEL 4: Performed by: ORTHOPAEDIC SURGERY

## 2024-04-30 PROCEDURE — 160035 HCHG PACU - 1ST 60 MINS PHASE I: Performed by: ORTHOPAEDIC SURGERY

## 2024-04-30 PROCEDURE — C9290 INJ, BUPIVACAINE LIPOSOME: HCPCS | Performed by: STUDENT IN AN ORGANIZED HEALTH CARE EDUCATION/TRAINING PROGRAM

## 2024-04-30 PROCEDURE — 700105 HCHG RX REV CODE 258: Performed by: ORTHOPAEDIC SURGERY

## 2024-04-30 PROCEDURE — 700111 HCHG RX REV CODE 636 W/ 250 OVERRIDE (IP): Mod: JZ | Performed by: ORTHOPAEDIC SURGERY

## 2024-04-30 PROCEDURE — 160025 RECOVERY II MINUTES (STATS): Performed by: ORTHOPAEDIC SURGERY

## 2024-04-30 PROCEDURE — 160046 HCHG PACU - 1ST 60 MINS PHASE II: Performed by: ORTHOPAEDIC SURGERY

## 2024-04-30 PROCEDURE — 700102 HCHG RX REV CODE 250 W/ 637 OVERRIDE(OP): Performed by: STUDENT IN AN ORGANIZED HEALTH CARE EDUCATION/TRAINING PROGRAM

## 2024-04-30 PROCEDURE — 502240 HCHG MISC OR SUPPLY RC 0272: Performed by: ORTHOPAEDIC SURGERY

## 2024-04-30 PROCEDURE — 160047 HCHG PACU  - EA ADDL 30 MINS PHASE II: Performed by: ORTHOPAEDIC SURGERY

## 2024-04-30 PROCEDURE — 97535 SELF CARE MNGMENT TRAINING: CPT

## 2024-04-30 DEVICE — IMPLANTABLE DEVICE: Type: IMPLANTABLE DEVICE | Site: SHOULDER | Status: FUNCTIONAL

## 2024-04-30 RX ORDER — ONDANSETRON 2 MG/ML
INJECTION INTRAMUSCULAR; INTRAVENOUS PRN
Status: DISCONTINUED | OUTPATIENT
Start: 2024-04-30 | End: 2024-04-30 | Stop reason: SURG

## 2024-04-30 RX ORDER — EPHEDRINE SULFATE 50 MG/ML
5 INJECTION, SOLUTION INTRAVENOUS
Status: COMPLETED | OUTPATIENT
Start: 2024-04-30 | End: 2024-04-30

## 2024-04-30 RX ORDER — ACETAMINOPHEN 500 MG
1000 TABLET ORAL ONCE
Status: COMPLETED | OUTPATIENT
Start: 2024-04-30 | End: 2024-04-30

## 2024-04-30 RX ORDER — DEXAMETHASONE SODIUM PHOSPHATE 4 MG/ML
INJECTION, SOLUTION INTRA-ARTICULAR; INTRALESIONAL; INTRAMUSCULAR; INTRAVENOUS; SOFT TISSUE PRN
Status: DISCONTINUED | OUTPATIENT
Start: 2024-04-30 | End: 2024-04-30 | Stop reason: SURG

## 2024-04-30 RX ORDER — SCOLOPAMINE TRANSDERMAL SYSTEM 1 MG/1
PATCH, EXTENDED RELEASE TRANSDERMAL
Status: DISCONTINUED
Start: 2024-04-30 | End: 2024-04-30 | Stop reason: HOSPADM

## 2024-04-30 RX ORDER — OXYCODONE HCL 5 MG/5 ML
5 SOLUTION, ORAL ORAL
Status: DISCONTINUED | OUTPATIENT
Start: 2024-04-30 | End: 2024-04-30 | Stop reason: HOSPADM

## 2024-04-30 RX ORDER — VANCOMYCIN HYDROCHLORIDE 1 G/20ML
INJECTION, POWDER, LYOPHILIZED, FOR SOLUTION INTRAVENOUS
Status: COMPLETED | OUTPATIENT
Start: 2024-04-30 | End: 2024-04-30

## 2024-04-30 RX ORDER — DIPHENHYDRAMINE HYDROCHLORIDE 50 MG/ML
12.5 INJECTION INTRAMUSCULAR; INTRAVENOUS
Status: DISCONTINUED | OUTPATIENT
Start: 2024-04-30 | End: 2024-04-30 | Stop reason: HOSPADM

## 2024-04-30 RX ORDER — BUPIVACAINE HYDROCHLORIDE AND EPINEPHRINE 5; 5 MG/ML; UG/ML
INJECTION, SOLUTION EPIDURAL; INTRACAUDAL; PERINEURAL
Status: COMPLETED | OUTPATIENT
Start: 2024-04-30 | End: 2024-04-30

## 2024-04-30 RX ORDER — SODIUM CHLORIDE, SODIUM LACTATE, POTASSIUM CHLORIDE, CALCIUM CHLORIDE 600; 310; 30; 20 MG/100ML; MG/100ML; MG/100ML; MG/100ML
INJECTION, SOLUTION INTRAVENOUS CONTINUOUS
Status: DISCONTINUED | OUTPATIENT
Start: 2024-04-30 | End: 2024-04-30

## 2024-04-30 RX ORDER — HYDROMORPHONE HYDROCHLORIDE 1 MG/ML
0.1 INJECTION, SOLUTION INTRAMUSCULAR; INTRAVENOUS; SUBCUTANEOUS
Status: DISCONTINUED | OUTPATIENT
Start: 2024-04-30 | End: 2024-04-30 | Stop reason: HOSPADM

## 2024-04-30 RX ORDER — HYDRALAZINE HYDROCHLORIDE 20 MG/ML
5 INJECTION INTRAMUSCULAR; INTRAVENOUS
Status: DISCONTINUED | OUTPATIENT
Start: 2024-04-30 | End: 2024-04-30 | Stop reason: HOSPADM

## 2024-04-30 RX ORDER — OXYCODONE HCL 5 MG/5 ML
10 SOLUTION, ORAL ORAL
Status: DISCONTINUED | OUTPATIENT
Start: 2024-04-30 | End: 2024-04-30 | Stop reason: HOSPADM

## 2024-04-30 RX ORDER — MEPERIDINE HYDROCHLORIDE 25 MG/ML
12.5 INJECTION INTRAMUSCULAR; INTRAVENOUS; SUBCUTANEOUS
Status: DISCONTINUED | OUTPATIENT
Start: 2024-04-30 | End: 2024-04-30 | Stop reason: HOSPADM

## 2024-04-30 RX ORDER — ONDANSETRON 2 MG/ML
4 INJECTION INTRAMUSCULAR; INTRAVENOUS
Status: COMPLETED | OUTPATIENT
Start: 2024-04-30 | End: 2024-04-30

## 2024-04-30 RX ORDER — CEFAZOLIN SODIUM 1 G/3ML
INJECTION, POWDER, FOR SOLUTION INTRAMUSCULAR; INTRAVENOUS PRN
Status: DISCONTINUED | OUTPATIENT
Start: 2024-04-30 | End: 2024-04-30 | Stop reason: HOSPADM

## 2024-04-30 RX ORDER — HALOPERIDOL 5 MG/ML
1 INJECTION INTRAMUSCULAR
Status: DISCONTINUED | OUTPATIENT
Start: 2024-04-30 | End: 2024-04-30 | Stop reason: HOSPADM

## 2024-04-30 RX ORDER — TRANEXAMIC ACID 100 MG/ML
INJECTION, SOLUTION INTRAVENOUS PRN
Status: DISCONTINUED | OUTPATIENT
Start: 2024-04-30 | End: 2024-04-30 | Stop reason: HOSPADM

## 2024-04-30 RX ORDER — LIDOCAINE HYDROCHLORIDE 20 MG/ML
INJECTION, SOLUTION EPIDURAL; INFILTRATION; INTRACAUDAL; PERINEURAL PRN
Status: DISCONTINUED | OUTPATIENT
Start: 2024-04-30 | End: 2024-04-30 | Stop reason: SURG

## 2024-04-30 RX ORDER — LABETALOL HYDROCHLORIDE 5 MG/ML
5 INJECTION, SOLUTION INTRAVENOUS
Status: DISCONTINUED | OUTPATIENT
Start: 2024-04-30 | End: 2024-04-30 | Stop reason: HOSPADM

## 2024-04-30 RX ORDER — SODIUM CHLORIDE, SODIUM LACTATE, POTASSIUM CHLORIDE, CALCIUM CHLORIDE 600; 310; 30; 20 MG/100ML; MG/100ML; MG/100ML; MG/100ML
INJECTION, SOLUTION INTRAVENOUS CONTINUOUS
Status: DISCONTINUED | OUTPATIENT
Start: 2024-04-30 | End: 2024-04-30 | Stop reason: HOSPADM

## 2024-04-30 RX ORDER — PHENYLEPHRINE HYDROCHLORIDE 10 MG/ML
INJECTION, SOLUTION INTRAMUSCULAR; INTRAVENOUS; SUBCUTANEOUS PRN
Status: DISCONTINUED | OUTPATIENT
Start: 2024-04-30 | End: 2024-04-30 | Stop reason: SURG

## 2024-04-30 RX ORDER — HYDROMORPHONE HYDROCHLORIDE 1 MG/ML
0.4 INJECTION, SOLUTION INTRAMUSCULAR; INTRAVENOUS; SUBCUTANEOUS
Status: DISCONTINUED | OUTPATIENT
Start: 2024-04-30 | End: 2024-04-30 | Stop reason: HOSPADM

## 2024-04-30 RX ORDER — OXYCODONE HCL 10 MG/1
10 TABLET, FILM COATED, EXTENDED RELEASE ORAL ONCE
Status: COMPLETED | OUTPATIENT
Start: 2024-04-30 | End: 2024-04-30

## 2024-04-30 RX ORDER — ROCURONIUM BROMIDE 10 MG/ML
INJECTION, SOLUTION INTRAVENOUS PRN
Status: DISCONTINUED | OUTPATIENT
Start: 2024-04-30 | End: 2024-04-30 | Stop reason: SURG

## 2024-04-30 RX ORDER — CELECOXIB 200 MG/1
200 CAPSULE ORAL ONCE
Status: COMPLETED | OUTPATIENT
Start: 2024-04-30 | End: 2024-04-30

## 2024-04-30 RX ORDER — HYDROMORPHONE HYDROCHLORIDE 1 MG/ML
0.2 INJECTION, SOLUTION INTRAMUSCULAR; INTRAVENOUS; SUBCUTANEOUS
Status: DISCONTINUED | OUTPATIENT
Start: 2024-04-30 | End: 2024-04-30 | Stop reason: HOSPADM

## 2024-04-30 RX ADMIN — PHENYLEPHRINE HYDROCHLORIDE 200 MCG: 10 INJECTION INTRAVENOUS at 12:47

## 2024-04-30 RX ADMIN — FENTANYL CITRATE 50 MCG: 50 INJECTION, SOLUTION INTRAMUSCULAR; INTRAVENOUS at 11:42

## 2024-04-30 RX ADMIN — BUPIVACAINE HYDROCHLORIDE AND EPINEPHRINE 5 ML: 5; 5 INJECTION, SOLUTION EPIDURAL; INTRACAUDAL; PERINEURAL at 11:35

## 2024-04-30 RX ADMIN — PHENYLEPHRINE HYDROCHLORIDE 100 MCG: 10 INJECTION INTRAVENOUS at 11:57

## 2024-04-30 RX ADMIN — ALBUTEROL SULFATE 2.5 MG: 2.5 SOLUTION RESPIRATORY (INHALATION) at 16:14

## 2024-04-30 RX ADMIN — TRANEXAMIC ACID 1000 MG: 100 INJECTION, SOLUTION INTRAVENOUS at 11:48

## 2024-04-30 RX ADMIN — ONDANSETRON 4 MG: 2 INJECTION INTRAMUSCULAR; INTRAVENOUS at 12:42

## 2024-04-30 RX ADMIN — CELECOXIB 200 MG: 200 CAPSULE ORAL at 10:50

## 2024-04-30 RX ADMIN — FENTANYL CITRATE 50 MCG: 50 INJECTION, SOLUTION INTRAMUSCULAR; INTRAVENOUS at 12:08

## 2024-04-30 RX ADMIN — EPHEDRINE SULFATE 5 MG: 50 INJECTION, SOLUTION INTRAVENOUS at 14:39

## 2024-04-30 RX ADMIN — ROCURONIUM BROMIDE 70 MG: 50 INJECTION, SOLUTION INTRAVENOUS at 11:47

## 2024-04-30 RX ADMIN — PROPOFOL 200 MG: 10 INJECTION, EMULSION INTRAVENOUS at 11:46

## 2024-04-30 RX ADMIN — PHENYLEPHRINE HYDROCHLORIDE 100 MCG: 10 INJECTION INTRAVENOUS at 12:29

## 2024-04-30 RX ADMIN — PHENYLEPHRINE HYDROCHLORIDE 100 MCG: 10 INJECTION INTRAVENOUS at 11:46

## 2024-04-30 RX ADMIN — HALOPERIDOL LACTATE 1 MG: 5 INJECTION, SOLUTION INTRAMUSCULAR at 13:36

## 2024-04-30 RX ADMIN — BUPIVACAINE 10 ML: 13.3 INJECTION, SUSPENSION, LIPOSOMAL INFILTRATION at 11:35

## 2024-04-30 RX ADMIN — CEFAZOLIN 2 G: 1 INJECTION, POWDER, FOR SOLUTION INTRAMUSCULAR; INTRAVENOUS at 11:48

## 2024-04-30 RX ADMIN — OXYCODONE HYDROCHLORIDE 10 MG: 10 TABLET, FILM COATED, EXTENDED RELEASE ORAL at 10:50

## 2024-04-30 RX ADMIN — TRANEXAMIC ACID 1000 MG: 100 INJECTION, SOLUTION INTRAVENOUS at 12:42

## 2024-04-30 RX ADMIN — PHENYLEPHRINE HYDROCHLORIDE 100 MCG: 10 INJECTION INTRAVENOUS at 12:20

## 2024-04-30 RX ADMIN — SUGAMMADEX 200 MG: 100 INJECTION, SOLUTION INTRAVENOUS at 12:42

## 2024-04-30 RX ADMIN — DEXAMETHASONE SODIUM PHOSPHATE 4 MG: 4 INJECTION INTRA-ARTICULAR; INTRALESIONAL; INTRAMUSCULAR; INTRAVENOUS; SOFT TISSUE at 11:52

## 2024-04-30 RX ADMIN — SODIUM CHLORIDE, POTASSIUM CHLORIDE, SODIUM LACTATE AND CALCIUM CHLORIDE: 600; 310; 30; 20 INJECTION, SOLUTION INTRAVENOUS at 10:15

## 2024-04-30 RX ADMIN — LIDOCAINE HYDROCHLORIDE 0.5 ML: 10 INJECTION, SOLUTION EPIDURAL; INFILTRATION; INTRACAUDAL at 10:10

## 2024-04-30 RX ADMIN — ONDANSETRON 4 MG: 2 INJECTION INTRAMUSCULAR; INTRAVENOUS at 13:21

## 2024-04-30 RX ADMIN — ACETAMINOPHEN 1000 MG: 500 TABLET, FILM COATED ORAL at 10:50

## 2024-04-30 RX ADMIN — LIDOCAINE HYDROCHLORIDE 100 MG: 20 INJECTION, SOLUTION EPIDURAL; INFILTRATION; INTRACAUDAL at 11:46

## 2024-04-30 RX ADMIN — PHENYLEPHRINE HYDROCHLORIDE 100 MCG: 10 INJECTION INTRAVENOUS at 12:03

## 2024-04-30 RX ADMIN — EPHEDRINE SULFATE 5 MG: 50 INJECTION, SOLUTION INTRAVENOUS at 14:18

## 2024-04-30 ASSESSMENT — COGNITIVE AND FUNCTIONAL STATUS - GENERAL
DRESSING REGULAR LOWER BODY CLOTHING: A LITTLE
DAILY ACTIVITIY SCORE: 16
TOILETING: A LITTLE
HELP NEEDED FOR BATHING: A LOT
SUGGESTED CMS G CODE MODIFIER DAILY ACTIVITY: CK
DRESSING REGULAR UPPER BODY CLOTHING: A LOT
EATING MEALS: A LITTLE
PERSONAL GROOMING: A LITTLE

## 2024-04-30 ASSESSMENT — PAIN DESCRIPTION - PAIN TYPE
TYPE: SURGICAL PAIN
TYPE: CHRONIC PAIN
TYPE: SURGICAL PAIN
TYPE: SURGICAL PAIN

## 2024-04-30 ASSESSMENT — GAIT ASSESSMENTS: DISTANCE (FEET): 40

## 2024-04-30 ASSESSMENT — FIBROSIS 4 INDEX: FIB4 SCORE: 2.06

## 2024-04-30 ASSESSMENT — ACTIVITIES OF DAILY LIVING (ADL): TOILETING: INDEPENDENT

## 2024-04-30 NOTE — OR NURSING
1543 Pt arrival to stage II. Pt denies pain and nausea at this time, surgical CDI. Pt awaiting PT/OT eval.   1555 Offered pt cracks but declines at this time.   1600 Pt de-sats to 81% spo2, pt encoured to deep breath and cough. Performs IS able to to pull 500ml-1000ml w/ a goal of 2000mL. Pt states baseline is in the high 80s due to COPD and asthma. Pt back up to 88-90 post interventions.    1610 Pt still de-sats to low 80's Dr. Diaz updated and orders received for breathing treatment.   1625 Breathing completed administered.     1641 Provided a copy and educated pt and family on discharge instructions. Pt still de-sating to 80% on room air. Plan to update MD. Placed pt back on O2 2L.   1642 OT at chairside for eval and edu. Order received for chest x-ray.     1700 Pt sats up to 98% titrated O2 to 1L  1710 Pt remains mid 90s O2 on room air now.  1715 X-ray complete, no pneumothorax present per report.  1735 Pt able to maintain o2 sats at 90 or above.     1738 Pt ambulating w/ OT   1744 OT complete pt ok to D/C home.  Pt still maintaining sats 90% or above. Pt meets criteria for D/C home.    1755 Pt D/C to care of family via wheelchair.

## 2024-04-30 NOTE — DISCHARGE INSTRUCTIONS
If any questions arise, call your provider.  If your provider is not available, please feel free to call the Surgical Center at [phone].    MEDICATIONS: Resume taking daily medication.  Take prescribed pain medication with food.  If no medication is prescribed, you may take non-aspirin pain medication if needed.  PAIN MEDICATION CAN BE VERY CONSTIPATING.  Take a stool softener or laxative such as senokot, pericolace, or milk of magnesia if needed.    Last pain medication given at: N/A    What to Expect Post Anesthesia    Rest and take it easy for the first 24 hours.  A responsible adult is recommended to remain with you during that time.  It is normal to feel sleepy.  We encourage you to not do anything that requires balance, judgment or coordination.    FOR 24 HOURS DO NOT:  Drive, operate machinery or run household appliances.  Drink beer or alcoholic beverages.  Make important decisions or sign legal documents.    To avoid nausea, slowly advance diet as tolerated, avoiding spicy or greasy foods for the first day.  Add more substantial food to your diet according to your provider's instructions. INCREASE FLUIDS AND FIBER TO AVOID CONSTIPATION.    MILD FLU-LIKE SYMPTOMS ARE NORMAL.  YOU MAY EXPERIENCE GENERALIZED MUSCLE ACHES, THROAT IRRITATION, HEADACHE AND/OR SOME NAUSEA.    Manuela Sparks MD Office telephone:  [phone] Shoulder Total Joint Replacement Post-Operative Instructions:   Nerve Block:  The effects of the nerve block may last from 12-60 hours depending on the medication used.   It is recommended to sleep in a semi upright position for the first few days as the nerve block may cause shortness of breath when lying flat.       Dressings: Keep your dressing clean after surgery.  A waterproof adherent dressing will be placed over your incision at the conclusion of your surgery.  Change your dressing one week following surgery with the extra dressing provided.  Please inspect your incision  for any surrounding redness or drainage when you change the dressing.  If necessary, you may clean the edges of the wound with soap & water.  Dry the area with a clean cloth. Then, place a new dressing over your incision.  If the dressing becomes loose or saturated with blood or fluid, it may be replaced at any time. Please keep your incision covered until your first post-operative appointment. At your first post-op appointment, your dressing will be removed. In most cases, the surgical incision will not need to be covered after two weeks. Baths/Pools/Hot Tubs.  Please do not submerge your incision in a hot tub, pool, or bath until at least six weeks after surgery.  Make sure that your incision is healed with no remaining scab or drainage before submerging in water.   Compression Hose/VLADIMIR hose: Compression hose/VLADIMIR hose will be placed on your legs prior to surgery at the hospital.  Please keep these on for at least two days, as they help control leg swelling as well as reduce the risk of a blood clot.  You may remove the compression hose temporarily to shower.   Ice: Use ice or cold therapy to your shoulder as you feel necessary to help with the pain and swelling.  Typically, this is 20 minutes on and 20 minutes off for the first several days following surgery.  Cold therapy units can be used as directed.       Sling:  Please wear your sling when walking about and when sleeping.  You may carefully remove the sling when awake and seated.  Please support your forearm on a pillow when the sling is removed. You may remove the pillow portion of the sling after two weeks. If you have questions regarding how to wear sling or need a replacement, please contact [facility] @ [phone].   Activity:  You may remove your sling when awake and seated.  Please support your forearm on a pillow.  Once your sling is removed, you may move your elbow, wrist and hand as tolerated. Please do not lift anything heavier than a cup of  coffee on your operative side.  Codman/pendulum exercises are encouraged 3-5 times a day. You will need to remove the sling to perform these exercises.   Pain Medication:  Take your pain medication as prescribed, only as needed.  Do not drive or operate machinery while taking narcotic pain medication.  You may resume anti-inflammatory medications any time after surgery. Please take medication with food to avoid stomach upset. Aspirin:  Please take Aspirin 81 mg twice daily starting the morning after surgery and continue for 2 weeks, to help prevent a blood clot. Driving:  Please arrange a ride to and from the hospital/surgery center on the day of surgery.   You may drive as soon as you are off pain medication and feel comfortable behind the wheel. Physical Therapy:  Contact a physical therapy facility approved by your insurance plan to schedule your initial visits.  Typically, physical therapy is scheduled twice weekly to begin at approximately two weeks after surgery for primary total shoulder patients and six weeks after surgery for reverse total shoulder patients. Follow-up:  Your first post-op appointment should be scheduled 10-14 days after surgery.  The details of your procedure and specifics of rehabilitation will be discussed.  Problems/concerns: If you have a problem or significant concern (unexpected pain, excessive bleeding or discharge from your incisions, fever or chills) or do not hesitate to call the office @ [phone] or go to your local emergency room. Manuela Sparks MD [facility] Shoulder [place] [address] [place], [address] [place], [place] [address] Office telephone:  [phone] Office fax: [phone]        Peripheral Nerve Block Discharge Instructions from Same Day Surgery and Inpatient :    What to Expect - Upper Extremity  You may experience numbness and weakness in shoulder  on the same side as your surgery  This is normal. For some people, this may be an unpleasant sensation. Be  "very careful with your numb limb  Ask for help when you need it  Shoulder Surgery Side Effects  In addition to numbness and weakness you may experience other symptoms  Other nerves that are close to those nerves injected can also be affected by local anesthesia  You may experience a hoarseness in your voice  Your breathing may feel different  You may also notice drooping of your eyelid, pupil constriction, and decreased sweating, on the side of your surgery  All of these side effects are normal and will resolve when the local anesthetic wears off   Prevent Injury  Protect the limb like a baby  Beware of exposing your limb to extreme heat or cold or trauma  The limb may be injured without you noticing because it is numb  Keep the limb elevated whenever possible  Do not sleep on the limb  Change the position of the limb regularly  Avoid putting pressure on your surgical limb  Pain Control  The initial block on the day of surgery will make your extremity feel \"numb\"  Any consecutive injection including prior to discharge from the hospital will make your extremity feel \"numb\"  You may feel an aching or burning when the local anesthesia starts to wear off  Take pain pills as prescribed by your surgeon  Call your surgeon or anesthesiologist if you do not have adequate pain control    Bupivacaine Liposomal Injection Suspension  What is this medication?  BUPIVACAINE LIPOSOMAL (bue PIV a rizzo LIP oh itzel al) prevents or treats pain, including during a procedure. It works by numbing a specific area of the body, which blocks pain signals going to the brain. It belongs to a group of medications called local anesthetics.  This medicine may be used for other purposes; ask your health care provider or pharmacist if you have questions.  COMMON BRAND NAME(S): EXPAREL  What should I tell my care team before I take this medication?  They need to know if you have any of these conditions:  G6PD deficiency  Heart disease  Kidney " disease  Liver disease  Low blood pressure  Lung disease  An unusual or allergic reaction to bupivacaine, other medications, foods, dyes, or preservatives  Pregnant or trying to get pregnant  Breast-feeding  How should I use this medication?  This medication is injected into the affected area. It is given by your care team in a hospital or clinic setting.  Talk to your care team about the use of this medication in children. While it may be given to children as young as 6 years for selected conditions, precautions do apply.  Overdosage: If you think you have taken too much of this medicine contact a poison control center or emergency room at once.  NOTE: This medicine is only for you. Do not share this medicine with others.  What if I miss a dose?  This does not apply.  What may interact with this medication?  Acetaminophen  Certain antibiotics, such as dapsone, nitrofurantoin, aminosalicylic acid, sulfonamides  Certain medications for seizures, such as phenobarbital, phenytoin, valproic acid  Chloroquine  Cyclophosphamide  Flutamide  Hydroxyurea  Ifosfamide  Metoclopramide  Nitric oxide  Nitroglycerin  Nitroprusside  Nitrous oxide  Other local anesthetics, such as lidocaine, pramoxine, tetracaine  Primaquine  Quinine  Rasburicase  Sulfasalazine  This list may not describe all possible interactions. Give your health care provider a list of all the medicines, herbs, non-prescription drugs, or dietary supplements you use. Also tell them if you smoke, drink alcohol, or use illegal drugs. Some items may interact with your medicine.  What should I watch for while using this medication?  Your condition will be monitored carefully while you are receiving this medication.  Be careful to avoid injury while the area is numb, and you are not aware of pain. Numbness and loss of movement in the area where the medication was given can last up to 5 days.  If you have a procedure in the next 4 days, tell your care team you had this  medication. You should not receive similar medications for 96 hours (4 days) from the time this medication was given. Leave the Exparel bracelet on for 96 hours.   What side effects may I notice from receiving this medication?  Side effects that you should report to your care team as soon as possible:  Allergic reactions--skin rash, itching, hives, swelling of the face, lips, tongue, or throat  CNS depression--slow or shallow breathing, shortness of breath, feeling faint, dizziness, confusion, trouble staying awake  Headache, unusual weakness or fatigue, shortness of breath, nausea, vomiting, rapid heartbeat, blue skin or lips, which may be signs of methemoglobinemia  Heart rhythm changes--fast or irregular heartbeat, dizziness, feeling faint or lightheaded, chest pain, trouble breathing  Increase in blood pressure  Low blood pressure--dizziness, feeling faint or lightheaded, blurry vision  Seizures  Side effects that usually do not require medical attention (report these to your care team if they continue or are bothersome):  Anxiety, nervousness  Drowsiness  Nausea  Vomiting  This list may not describe all possible side effects. Call your doctor for medical advice about side effects. You may report side effects to FDA at 3-576-FDA-6496.  Where should I keep my medication?  This medication is given in a hospital or clinic. It will not be stored at home.  NOTE: This sheet is a summary. It may not cover all possible information. If you have questions about this medicine, talk to your doctor, pharmacist, or health care provider.  © 2023 [employer]vier/Gold Standard (2013-10-15 00:00:00)

## 2024-04-30 NOTE — OR SURGEON
Immediate Post OP Note    PreOp Diagnosis: RIGHT shoulder OA/RC arthropathy      PostOp Diagnosis: SAME      Procedure(s):  RIGHT REVERSE TOTAL SHOULDER ARTHROPLASTY - Wound Class: Clean with proximal biceps tenodesis    Surgeon(s):  Manuela Sparks M.D.    Anesthesiologist/Type of Anesthesia:  Anesthesiologist: Harry Diaz D.O./General    Surgical Staff:  Circulator: Opal Ely R.N.  Limb Gray: Paige Batres  Relief Circulator: Aletha Loyd R.N.  Relief Scrub: Aruna Horton R.N.  Scrub Person: Thai Heaton Assist: Ravinder Bee    Specimens removed if any:  * No specimens in log *    Estimated Blood Loss: 100 cc    Findings: as above    Complications: none    Iris        4/30/2024 1:23 PM Manuela Sparks M.D.

## 2024-04-30 NOTE — OR NURSING
1305 Arrived to PACU. Report received from anesthesia/OR circulator. Patient care assumed.     1305 Sleeping, respirations spontaneous and non-labored via OAW.      1306 Dressings: Aquacel dressing to RIGHT shoulder CDI. Immobilizer in place. Pink fingers, <3 sec cap refill, 2+ radial pulses. Ice pack to surgical site.     1316 Awake. OAW removed. Good air exchange. Lungs clear.     1318 Dr Diaz at bedside to speak with pt.     1321 Medicated for relief of nausea, see MAR. Cool was cloths applied to forehead for increase comfort.     1332 Dr Sparks at bedside to speak with pt.     1336 Pt continues to have nausea, no emesis. Haldol given for relief of nausea, see MAR.     1342 Pt resting with eyes closed. Patient states nausea is resolving.     1418 Ephedrine 5 mg IV given for b/p 89/48.     1423 XRAY done at bedside.     1439 Ephedrine 5 mg IV given for b/p 84/46.     1509 Dr Sparks at bedside.     1543  Patient met criteria for transfer to stage II via [facility] with CNA assist. Patient states good pain control. Denies n/v, tolerating po well. Aquacel dressing to RIGHT shoulder CDI. Immobilizer in place. Pink fingers, <3 sec cap refill, 2+ radial pulses. Ice pack sent with pt. Report called to: Demarco Marvin RN.

## 2024-04-30 NOTE — ANESTHESIA PROCEDURE NOTES
Airway    Date/Time: 4/30/2024 11:48 AM    Performed by: Harry Diaz D.O.  Authorized by: Harry Diaz D.O.    Location:  OR  Urgency:  Elective  Difficult Airway: No    Indications for Airway Management:  Anesthesia      Spontaneous Ventilation: absent    Sedation Level:  Deep  Preoxygenated: Yes    Patient Position:  Sniffing  Final Airway Type:  Endotracheal airway  Final Endotracheal Airway:  ETT  Cuffed: Yes    Technique Used for Successful ETT Placement:  Direct laryngoscopy    Insertion Site:  Oral  Blade Type:  Moise  Laryngoscope Blade/Videolaryngoscope Blade Size:  3  ETT Size (mm):  6.5  Measured from:  Teeth  ETT to Teeth (cm):  20  Placement Verified by: auscultation and capnometry    Cormack-Lehane Classification:  Grade I - full view of glottis  Number of Attempts at Approach:  1

## 2024-04-30 NOTE — ANESTHESIA POSTPROCEDURE EVALUATION
Patient: Yamile Go    Procedure Summary       Date: 04/30/24 Room / Location:  OR 02 / SURGERY [facility]    Anesthesia Start: 1142 Anesthesia Stop: 1320    Procedure: RIGHT REVERSE TOTAL SHOULDER ARTHROPLASTY (Right: Shoulder) Diagnosis: (M19.011)    Surgeons: Manuela Sparks M.D. Responsible Provider: Harry Diaz D.O.    Anesthesia Type: general, peripheral nerve block ASA Status: 3            Final Anesthesia Type: general, peripheral nerve block  Last vitals  BP   Blood Pressure : 98/49    Temp   36.3 °C (97.3 °F)    Pulse   95   Resp   18    SpO2   95 %      Anesthesia Post Evaluation    Patient location during evaluation: PACU  Patient participation: complete - patient participated  Level of consciousness: awake and alert    Airway patency: patent  Anesthetic complications: no  Cardiovascular status: hemodynamically stable  Respiratory status: acceptable  Hydration status: euvolemic    PONV: none          No notable events documented.     Nurse Pain Score: 0 (NPRS)

## 2024-04-30 NOTE — OP REPORT
DATE OF SERVICE:  04/30/2024     PREOPERATIVE DIAGNOSIS:  Severe glenohumeral joint arthritis with rotator cuff   arthropathy, right shoulder.     POSTOPERATIVE DIAGNOSIS:  Severe glenohumeral joint arthritis with rotator   cuff arthropathy, right shoulder.     PROCEDURE:  Right reverse total shoulder arthroplasty with proximal biceps   tenodesis.     SURGEON:  Manuela Sparks MD     ASSISTANT:  VERO Newby     ANESTHESIOLOGIST:  Harry Diaz DO     TYPE OF ANESTHESIA:  General, with preoperative interscalene nerve block using   Exparel.     INTRAVENOUS FLUID:  1 liter crystalloid.     ESTIMATED BLOOD LOSS:  100 mL.     DRAINS:  None.     SPECIMENS:  None.     COMPLICATIONS:  None.     IMPLANTS:  [place] 25 mm full wedge baseplate with central screw and 3   peripheral screws, standard 36 mm glenosphere, size 1+ Perform humeral stem,   36x6 mm retentive polyethylene insert.     REASON FOR PROCEDURE:  The patient is a 74-year-old female with a longstanding   history of right shoulder pain.  We reviewed plain x-ray as well as CT   blueprint scan findings and decided to proceed with arthroplasty.     OPERATION:  The patient was given a right interscalene nerve block by the   anesthesiologist before surgery.  Exparel was used.  Once back in the   operating room, a breathing tube was placed.  She was given 2 grams of IV   Ancef as well as 1 gram of tranexamic acid.  She was placed in the beach-chair   position.  The right upper extremity was prepped with ChloraPrep and draped   in standard sterile fashion.  It was then placed in the spider articulating   arm cervantes.  A deltopectoral incision was made using the PlasmaBlade.  The   cephalic vein was identified and retracted laterally.  A Bankart retractor was   placed.  There was a large superior rotator cuff tear as expected.  There was   significant proximal humeral deformity, consistent with possible avascular   necrosis and severe glenohumeral joint arthritis.   The intramedullary cutting   guide was used after a peel-off technique had been performed to improve access   and removed the subscapularis tendon.  It was tagged for later closure.  The   intramedullary cutting guide was used and the humeral neck cut was made.    There was very-very little bone and remaining articular surface to remove.  It   was provisionally sized to a 1 Perform humeral stem.  A cup protector was   placed and retractors were then placed circumferentially around the glenoid.    The patient's preoperative CT blueprint scan was referenced.  There was   significant flattening and deformity with a small size.  The 25 mm full wedge   guide was brought up and the central pin was drilled through the far cortex in   the predetermined location.  The angled reamer was used.  The Boss was   drilled followed by the central hole for the compression screw.  It measured   35 mm.  It was tapped.  After further irrigation, a size 35 mm central screw   was coupled with a full wedge 25 mm baseplate.  This was compressed to the   glenoid surface.  Three peripheral screws were placed.  A glenosphere trial   was locked in place.  Attention was then turned to the proximal humerus.  It   was prepared for a size 1 Perform humeral stem.  The trial was left in place   and a trial reduction was performed with the decision then made to go back to   the glenoid side.  The trial glenosphere was removed.  The wound was irrigated   further and a 36 mm glenosphere was impacted with the central set screw   tightened.  Attention was then turned back to the proximal humerus.  The size   1 Perform humeral stem was removed.  Two drill holes were placed through the   lesser tuberosity.  A size 1 Perform humeral stem was impacted with trial   polyethylenes used once again.  A 36+6 retentive polyethylene was chosen.    This was impacted on the size 1+ Perform humeral stem.  The shoulder was   reduced with excellent stability and tension  noted.  Two drill holes had been   placed through the lesser tuberosity for subscapularis tendon repair.  The   upper portion of the subscapularis tendon was released to gain excursion.    This allowed for a tension free subscapularis tendon repair.  The biceps   tendon was localized, which had been previously released and tenodesed in situ   with an additional XBraid suture.  One gram of vancomycin powder was placed   in the deep and superficial soft tissues.  The deltopectoral interval was then   closed with 0 Vicryl.  A running 2-0 Monocryl was used followed by running   3-0 Monocryl in subcutaneous and subcuticular layers.  Benzoin and   Steri-Strips were placed followed by sterile Aquacel dressing.  All drapes   were removed and the arm was carefully taken out of the spider articulating   arm cervantes and placed into a shoulder abduction sling.  The patient was placed   supine on a stretcher and taken to recovery room, in stable condition.        ______________________________  MD VICKY RUGGIERO/MIGUEL ANGEL    DD:  04/30/2024 13:29  DT:  04/30/2024 14:12    Job#:  [id number]

## 2024-04-30 NOTE — ANESTHESIA PROCEDURE NOTES
Peripheral Block    Date/Time: 4/30/2024 11:35 AM    Performed by: Harry Diaz D.O.  Authorized by: Harry Diaz D.O.    Patient Location:  Pre-op  Start Time:  4/30/2024 11:35 AM  End Time:  4/30/2024 11:40 AM  Reason for Block: at surgeon's request and post-op pain management ONLY    patient identified, IV checked, site marked, risks and benefits discussed, surgical consent, monitors and equipment checked, pre-op evaluation and timeout performed    Patient Position:  Supine  Prep: ChloraPrep    Monitoring:  Heart rate, continuous pulse ox and cardiac monitor  Block Region:  Upper Extremity  Upper Extremity - Block Type:  BRACHIAL PLEXUS block, Supraclavicular approach    Laterality:  Right  Procedures: ultrasound guided  Image captured, interpreted and electronically stored.  Local Infiltration:  Lidocaine  Strength:  1 %  Dose:  3 ml  Block Type:  Single-shot  Needle Length:  100mm  Needle Gauge:  21 G  Needle Localization:  Ultrasound guidance  Ultrasound picture in chart  Injection Assessment:  Negative aspiration for heme, no paresthesia on injection, incremental injection and local visualized surrounding nerve on ultrasound  Evidence of intravascular injection: No     US Guided Supraclavicular Brachial Plexus Block    US transducer placed cephalad and parallel to clavicle in angle to view the subclavian artery with the brachial plexus lateral and superficial to the artery. Needle inserted lateral to the transducer in-plane and advanced with the needle tip visualized continually into the perineural position. After negative aspiration, LA injected without resistance.

## 2024-04-30 NOTE — OR NURSING
0902 PT TO PRE OP TO ASSUME CARE.    1020 Patient allergies and NPO status verified, home medication reconciliation completed and belongings secured. Patient verbalizes understanding of pain scale, expected course of stay and plan of care. Surgical site verified with patient. IV access established. Sequentials placed on B legs

## 2024-04-30 NOTE — ANESTHESIA PREPROCEDURE EVALUATION
Case: [id number] Date/Time: 04/30/24 1115    Procedure: RIGHT REVERSE TOTAL SHOULDER ARTHROPLASTY (Right)    Pre-op diagnosis: M19.011    Location:  OR 02 / SURGERY [facility]    Surgeons: Manuela Sparks M.D.            Relevant Problems   PULMONARY   (positive) Acute asthma exacerbation   (positive) Asthma   (positive) Community acquired pneumonia      CARDIAC   (positive) CAD (coronary artery disease)   (positive) Essential hypertension, benign      ENDO   (positive) Hypothyroidism   (positive) Type 2 diabetes mellitus (HCC)       Physical Exam    Airway   Mallampati: II  TM distance: >3 FB  Neck ROM: full       Cardiovascular - normal exam  Rhythm: regular  Rate: normal  (-) murmur     Dental - normal exam           Pulmonary - normal exam  Breath sounds clear to auscultation     Abdominal    Neurological - normal exam                   Anesthesia Plan    ASA 3   ASA physical status 3 criteria: CAD/stents (> 3 months)    Plan - general and peripheral nerve block     Peripheral nerve block will be post-op pain control  Airway plan will be ETT          Induction: intravenous    Postoperative Plan: Postoperative administration of opioids is intended.    Pertinent diagnostic labs and testing reviewed    Informed Consent:    Anesthetic plan and risks discussed with patient.    Use of blood products discussed with: patient whom consented to blood products.            generally intact

## 2024-05-01 NOTE — THERAPY
Occupational Therapy   Initial Evaluation     Patient Name: Yamile Go  Age:  74 y.o., Sex:  female  Medical Record #: [id number]  Today's Date: 4/30/2024     Precautions  Precautions: Non Weight Bearing Right Upper Extremity, Immobilizer Right Upper Extremity    Assessment  Patient is 74 y.o. female admit for R Reverse TSA.  Pt tolerated UB dressing and brace mgmt training, ADL transfers and functional mobility.  Pt demos good safety awareness with self care tasks, exercises, transfers and ADL's.  OT adjusted brace for best fit.  Pt will have assist avail from various friends, starting with nurse friend Maribel churchill at home.  Pt and Friend attentive to education as stated below. Education took longer than expected amount of time due to frequent breaks in dressing/ teaching to check oxygen levels, work around cords, pause for chest xray.  Pt slightly distracted by the odd feeling of nerve block, extra reassurance provided.   Pt appears appropriate for discharge home.      Treatment completed this session after initial evaluation completed:  Pt and friend educated in detail on ADL's after Reverse Total Shoulder surgery.   Patient specifically educated on surgeon's post-operative precautions including NWB, no pushing pulling or lifting with surgery arm.    Summary of education completed:  How to adjust sling/immobilizer for best fit.  To keep sling positioned so hand is level or slightly above elbow to reduce pull of gravity on arm and maintain shoulder in neutral positioning.  How to don & doff sling/immobilizer safely and appropriately for dressing and bathing.  How to dress upper body while maintaining post surgical precautions.  How to shower safely.  How to appropriately cover incision for showering if needed prior to removal of post op dressing.    Proper positioning of arm during showering.   Encouraged use of hand held shower (using the non-operative extremity) to keep water away from the incision site,  "and to not spray water directly under the axilla of the operated side if the bandage is compromised at all.   Safe shower and toilet transfers.  Proper positioning while sleeping either in bed or recliner  Encouraged patient to support arm with pillows under forearm when sitting to reduce strain on the neck from the weight of the arm and immobilizer.   Proper bed mobility and transfer techniques while maintaining NWB on surgical arm.  Proper positioning of arm for gentle wrist/hand ROM and passive elbow extension and Pendulum Ex's for shoulder.   Pain management education including the use of ice and positioning for optimal comfort.     Pt and friend verbalized and demonstrated understanding of education provided.     Plan    Occupational Therapy Initial Treatment Plan   Duration: Evaluation only    DC Equipment Recommendations: None  Discharge Recommendations: Anticipate that the patient will have no further occupational therapy needs after discharge from the hospital     Subjective    \"Wow! I can't believe how weird my hand feels.  It's so tingly it almost hurts.\"     Objective       04/30/24 1745   Prior Living Situation   Prior Services Home-Independent   Housing / Facility 1 Story House   Steps Into Home 0   Steps In Home 0   Bathroom Set up Walk In Shower;Shower Chair   Equipment Owned Tub / Shower Seat   Lives with - Patient's Self Care Capacity Alone and Able to Care For Self   Comments Pt has friends lined up to stay with or help her out until Saturday.   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[employer])   Precautions   Precautions Non Weight Bearing Right Upper Extremity;Immobilizer Right Upper Extremity   Vitals   Pulse (!) 120  (post ambulation)   Blood Pressure  113/67   Respiration 18   Pulse Oximetry 92 %   O2 (LPM) 0   O2 Delivery Device None - Room Air   Pain 0 - 10 Group   Pain Rating Scale (NPRS) 0   Therapist Pain Assessment   (nerve block in effect)   Cognition    Cognition / Consciousness WDL   Comments oriented, attentive   Active ROM Upper Body   Active ROM Upper Body  X   Dominant Hand Right   Comments LUE WFL, RUE limited by nerve block in effect, immobilizer and surgical precautions   Strength Upper Body   Comments LUE WFL, RUE limited by nerve block in effect, immobilizer and surgical precautions   Sensation Upper Body   Comments LUE WFL, RUE limited by nerve block in effect, immobilizer and surgical precautions   Upper Body Muscle Tone   Comments LUE WFL, RUE limited by nerve block in effect, immobilizer and surgical precautions   Coordination Upper Body   Comments LUE WFL, RUE limited by nerve block in effect, immobilizer and surgical precautions   Balance Assessment   Sitting Balance (Static) Fair +   Sitting Balance (Dynamic) Fair   Standing Balance (Static) Poor +   Standing Balance (Dynamic) Poor +   Weight Shift Sitting Fair   Weight Shift Standing Poor   Comments Legs wobbly, heavy Leah for safety   Bed Mobility    Comments UIC t/o  in phase 2   ADL Assessment   Upper Body Dressing Maximal Assist   Lower Body Dressing Minimal Assist   Toileting Contact Guard Assist   How much help from another person does the patient currently need...   Putting on and taking off regular lower body clothing? 3   Bathing (including washing, rinsing, and drying)? 2   Toileting, which includes using a toilet, bedpan, or urinal? 3   Putting on and taking off regular upper body clothing? 2   Taking care of personal  grooming such as brushing teeth? 3   Eating meals? 3   6 Clicks Daily Activity Score 16   Functional Mobility   Sit to Stand Minimal Assist   Bed, Chair, Wheelchair Transfer Minimal Assist   Toilet Transfers Minimal Assist   Transfer Method Stand Step   Mobility Miya (HHA) to BR and back   Distance (Feet) 40   # of Times Distance was Traveled 2   Comments unsteady, will have friend Maribel who is a nurse staying with her overnight and Maribel will provide support for mobility   Edema / Skin Assessment   Comments waterproof dressing intact   Activity Tolerance   Sitting in Chair > 75 min   Sitting Edge of Bed edge of chair 15   Standing 3min x2   Patient / Family Goals   Patient / Family Goal #1 home   Education Group   Education Provided Upper Extremity Range of Motion;Brace Wear and Care;Role of Occupational Therapist;Activities of Daily Living   Role of Occupational Therapist Patient Response Patient;Other;Acceptance;Explanation;Verbal Demonstration   Upper Ext ROM Patient Response Patient;Acceptance;Explanation;Demonstration;Handout;Verbal Demonstration;Other   Brace Wear & Care Patient Response Patient;Other;Acceptance;Explanation;Demonstration;Handout;Verbal Demonstration   ADL Patient Response Patient;Other;Acceptance;Explanation;Demonstration;Handout;Verbal Demonstration   Additional Comments see notes

## 2024-05-02 ENCOUNTER — APPOINTMENT (OUTPATIENT)
Dept: RADIOLOGY | Facility: MEDICAL CENTER | Age: 74
DRG: 871 | End: 2024-05-02
Attending: EMERGENCY MEDICINE
Payer: MEDICARE

## 2024-05-02 ENCOUNTER — HOSPITAL ENCOUNTER (INPATIENT)
Facility: MEDICAL CENTER | Age: 74
LOS: 6 days | DRG: 871 | End: 2024-05-08
Attending: EMERGENCY MEDICINE | Admitting: HOSPITALIST
Payer: MEDICARE

## 2024-05-02 DIAGNOSIS — R55 SYNCOPE, UNSPECIFIED SYNCOPE TYPE: ICD-10-CM

## 2024-05-02 DIAGNOSIS — R19.7 DIARRHEA OF PRESUMED INFECTIOUS ORIGIN: ICD-10-CM

## 2024-05-02 DIAGNOSIS — K62.5 RECTAL BLEEDING: ICD-10-CM

## 2024-05-02 DIAGNOSIS — E86.1 HYPOTENSION DUE TO HYPOVOLEMIA: ICD-10-CM

## 2024-05-02 PROBLEM — R65.10 SIRS (SYSTEMIC INFLAMMATORY RESPONSE SYNDROME) (HCC): Status: ACTIVE | Noted: 2024-05-02

## 2024-05-02 LAB
ALBUMIN SERPL BCP-MCNC: 3.4 G/DL (ref 3.2–4.9)
ALBUMIN/GLOB SERPL: 1.3 G/DL
ALP SERPL-CCNC: 125 U/L (ref 30–99)
ALT SERPL-CCNC: 11 U/L (ref 2–50)
ANION GAP SERPL CALC-SCNC: 16 MMOL/L (ref 7–16)
AST SERPL-CCNC: 36 U/L (ref 12–45)
BASOPHILS # BLD AUTO: 0.2 % (ref 0–1.8)
BASOPHILS # BLD: 0.04 K/UL (ref 0–0.12)
BILIRUB SERPL-MCNC: 0.5 MG/DL (ref 0.1–1.5)
BUN SERPL-MCNC: 16 MG/DL (ref 8–22)
CALCIUM ALBUM COR SERPL-MCNC: 8.9 MG/DL (ref 8.5–10.5)
CALCIUM SERPL-MCNC: 8.4 MG/DL (ref 8.4–10.2)
CHLORIDE SERPL-SCNC: 104 MMOL/L (ref 96–112)
CO2 SERPL-SCNC: 18 MMOL/L (ref 20–33)
CREAT SERPL-MCNC: 1.03 MG/DL (ref 0.5–1.4)
EKG IMPRESSION: NORMAL
EOSINOPHIL # BLD AUTO: 0.11 K/UL (ref 0–0.51)
EOSINOPHIL NFR BLD: 0.6 % (ref 0–6.9)
ERYTHROCYTE [DISTWIDTH] IN BLOOD BY AUTOMATED COUNT: 48.3 FL (ref 35.9–50)
GFR SERPLBLD CREATININE-BSD FMLA CKD-EPI: 57 ML/MIN/1.73 M 2
GLOBULIN SER CALC-MCNC: 2.6 G/DL (ref 1.9–3.5)
GLUCOSE SERPL-MCNC: 141 MG/DL (ref 65–99)
HCT VFR BLD AUTO: 39.3 % (ref 37–47)
HGB BLD-MCNC: 12.1 G/DL (ref 12–16)
IMM GRANULOCYTES # BLD AUTO: 0.14 K/UL (ref 0–0.11)
IMM GRANULOCYTES NFR BLD AUTO: 0.8 % (ref 0–0.9)
LACTATE SERPL-SCNC: 2.9 MMOL/L (ref 0.5–2)
LIPASE SERPL-CCNC: 46 U/L (ref 11–82)
LYMPHOCYTES # BLD AUTO: 2.49 K/UL (ref 1–4.8)
LYMPHOCYTES NFR BLD: 14.6 % (ref 22–41)
MCH RBC QN AUTO: 26.5 PG (ref 27–33)
MCHC RBC AUTO-ENTMCNC: 30.8 G/DL (ref 32.2–35.5)
MCV RBC AUTO: 86.2 FL (ref 81.4–97.8)
MONOCYTES # BLD AUTO: 0.7 K/UL (ref 0–0.85)
MONOCYTES NFR BLD AUTO: 4.1 % (ref 0–13.4)
NEUTROPHILS # BLD AUTO: 13.63 K/UL (ref 1.82–7.42)
NEUTROPHILS NFR BLD: 79.7 % (ref 44–72)
NRBC # BLD AUTO: 0 K/UL
NRBC BLD-RTO: 0 /100 WBC (ref 0–0.2)
NT-PROBNP SERPL IA-MCNC: 567 PG/ML (ref 0–125)
PLATELET # BLD AUTO: 294 K/UL (ref 164–446)
PMV BLD AUTO: 9.4 FL (ref 9–12.9)
POTASSIUM SERPL-SCNC: 3.4 MMOL/L (ref 3.6–5.5)
PROT SERPL-MCNC: 6 G/DL (ref 6–8.2)
RBC # BLD AUTO: 4.56 M/UL (ref 4.2–5.4)
SODIUM SERPL-SCNC: 138 MMOL/L (ref 135–145)
TROPONIN T SERPL-MCNC: 38 NG/L (ref 6–19)
WBC # BLD AUTO: 17.1 K/UL (ref 4.8–10.8)

## 2024-05-02 RX ORDER — AMITRIPTYLINE HYDROCHLORIDE 50 MG/1
100 TABLET, FILM COATED ORAL NIGHTLY
Status: DISCONTINUED | OUTPATIENT
Start: 2024-05-02 | End: 2024-05-08 | Stop reason: HOSPADM

## 2024-05-02 RX ORDER — HYDROMORPHONE HYDROCHLORIDE 1 MG/ML
0.5 INJECTION, SOLUTION INTRAMUSCULAR; INTRAVENOUS; SUBCUTANEOUS ONCE
Status: COMPLETED | OUTPATIENT
Start: 2024-05-02 | End: 2024-05-02

## 2024-05-02 RX ORDER — OMEPRAZOLE 20 MG/1
20 CAPSULE, DELAYED RELEASE ORAL DAILY
Status: DISCONTINUED | OUTPATIENT
Start: 2024-05-03 | End: 2024-05-03

## 2024-05-02 RX ORDER — EZETIMIBE 10 MG/1
10 TABLET ORAL EVERY EVENING
Status: DISCONTINUED | OUTPATIENT
Start: 2024-05-03 | End: 2024-05-08 | Stop reason: HOSPADM

## 2024-05-02 RX ORDER — POLYETHYLENE GLYCOL 3350 17 G/17G
1 POWDER, FOR SOLUTION ORAL
Status: DISCONTINUED | OUTPATIENT
Start: 2024-05-02 | End: 2024-05-03

## 2024-05-02 RX ORDER — LIOTHYRONINE SODIUM 5 UG/1
5 TABLET ORAL DAILY
Status: DISCONTINUED | OUTPATIENT
Start: 2024-05-03 | End: 2024-05-08 | Stop reason: HOSPADM

## 2024-05-02 RX ORDER — SODIUM CHLORIDE, SODIUM LACTATE, POTASSIUM CHLORIDE, AND CALCIUM CHLORIDE .6; .31; .03; .02 G/100ML; G/100ML; G/100ML; G/100ML
30 INJECTION, SOLUTION INTRAVENOUS ONCE
Status: COMPLETED | OUTPATIENT
Start: 2024-05-02 | End: 2024-05-03

## 2024-05-02 RX ORDER — SODIUM CHLORIDE 9 MG/ML
1000 INJECTION, SOLUTION INTRAVENOUS ONCE
Status: COMPLETED | OUTPATIENT
Start: 2024-05-02 | End: 2024-05-02

## 2024-05-02 RX ORDER — ACETAMINOPHEN 325 MG/1
650 TABLET ORAL EVERY 6 HOURS PRN
Status: DISCONTINUED | OUTPATIENT
Start: 2024-05-02 | End: 2024-05-08 | Stop reason: HOSPADM

## 2024-05-02 RX ORDER — ONDANSETRON 2 MG/ML
4 INJECTION INTRAMUSCULAR; INTRAVENOUS EVERY 4 HOURS PRN
Status: DISCONTINUED | OUTPATIENT
Start: 2024-05-02 | End: 2024-05-08 | Stop reason: HOSPADM

## 2024-05-02 RX ORDER — ESCITALOPRAM OXALATE 10 MG/1
20 TABLET ORAL DAILY
Status: DISCONTINUED | OUTPATIENT
Start: 2024-05-03 | End: 2024-05-08 | Stop reason: HOSPADM

## 2024-05-02 RX ORDER — ONDANSETRON 2 MG/ML
INJECTION INTRAMUSCULAR; INTRAVENOUS
Status: COMPLETED
Start: 2024-05-02 | End: 2024-05-02

## 2024-05-02 RX ORDER — ONDANSETRON 4 MG/1
4 TABLET, ORALLY DISINTEGRATING ORAL EVERY 4 HOURS PRN
Status: DISCONTINUED | OUTPATIENT
Start: 2024-05-02 | End: 2024-05-08 | Stop reason: HOSPADM

## 2024-05-02 RX ORDER — HYDROCODONE BITARTRATE AND ACETAMINOPHEN 10; 325 MG/1; MG/1
1-2 TABLET ORAL EVERY 6 HOURS PRN
Status: DISCONTINUED | OUTPATIENT
Start: 2024-05-02 | End: 2024-05-08 | Stop reason: HOSPADM

## 2024-05-02 RX ORDER — SODIUM CHLORIDE, SODIUM LACTATE, POTASSIUM CHLORIDE, AND CALCIUM CHLORIDE .6; .31; .03; .02 G/100ML; G/100ML; G/100ML; G/100ML
500 INJECTION, SOLUTION INTRAVENOUS
Status: COMPLETED | OUTPATIENT
Start: 2024-05-02 | End: 2024-05-03

## 2024-05-02 RX ORDER — LEVOTHYROXINE SODIUM 88 UG/1
88 TABLET ORAL DAILY
Status: DISCONTINUED | OUTPATIENT
Start: 2024-05-03 | End: 2024-05-08 | Stop reason: HOSPADM

## 2024-05-02 RX ORDER — AMOXICILLIN 250 MG
2 CAPSULE ORAL 2 TIMES DAILY
Status: DISCONTINUED | OUTPATIENT
Start: 2024-05-03 | End: 2024-05-03

## 2024-05-02 RX ORDER — ROSUVASTATIN CALCIUM 10 MG/1
20 TABLET, COATED ORAL EVERY EVENING
Status: DISCONTINUED | OUTPATIENT
Start: 2024-05-03 | End: 2024-05-08 | Stop reason: HOSPADM

## 2024-05-02 RX ADMIN — IOHEXOL 100 ML: 350 INJECTION, SOLUTION INTRAVENOUS at 22:10

## 2024-05-02 RX ADMIN — HYDROMORPHONE HYDROCHLORIDE 0.5 MG: 1 INJECTION, SOLUTION INTRAMUSCULAR; INTRAVENOUS; SUBCUTANEOUS at 21:56

## 2024-05-02 RX ADMIN — SODIUM CHLORIDE, POTASSIUM CHLORIDE, SODIUM LACTATE AND CALCIUM CHLORIDE 2130 ML: 600; 310; 30; 20 INJECTION, SOLUTION INTRAVENOUS at 21:30

## 2024-05-02 RX ADMIN — SODIUM CHLORIDE 1000 ML: 9 INJECTION, SOLUTION INTRAVENOUS at 19:04

## 2024-05-02 RX ADMIN — ONDANSETRON 4 MG: 2 INJECTION INTRAMUSCULAR; INTRAVENOUS at 23:30

## 2024-05-02 ASSESSMENT — FIBROSIS 4 INDEX: FIB4 SCORE: 1.28

## 2024-05-03 ENCOUNTER — APPOINTMENT (OUTPATIENT)
Dept: RADIOLOGY | Facility: MEDICAL CENTER | Age: 74
DRG: 871 | End: 2024-05-03
Attending: INTERNAL MEDICINE
Payer: MEDICARE

## 2024-05-03 ENCOUNTER — APPOINTMENT (OUTPATIENT)
Dept: RADIOLOGY | Facility: MEDICAL CENTER | Age: 74
DRG: 871 | End: 2024-05-03
Attending: HOSPITALIST
Payer: MEDICARE

## 2024-05-03 PROBLEM — K51.00 PANCOLITIS (HCC): Status: ACTIVE | Noted: 2024-05-03

## 2024-05-03 PROBLEM — J96.00 ACUTE RESPIRATORY FAILURE (HCC): Status: ACTIVE | Noted: 2024-05-03

## 2024-05-03 PROBLEM — K92.2 GIB (GASTROINTESTINAL BLEEDING): Status: ACTIVE | Noted: 2024-05-03

## 2024-05-03 PROBLEM — R55 NEAR SYNCOPE: Status: ACTIVE | Noted: 2024-05-03

## 2024-05-03 PROBLEM — E87.20 LACTIC ACIDOSIS: Status: ACTIVE | Noted: 2024-05-03

## 2024-05-03 PROBLEM — K62.5 RECTAL BLEEDING: Status: ACTIVE | Noted: 2024-05-03

## 2024-05-03 PROBLEM — Z98.890 S/P SHOULDER SURGERY: Status: ACTIVE | Noted: 2024-05-03

## 2024-05-03 LAB
ALBUMIN SERPL BCP-MCNC: 3 G/DL (ref 3.2–4.9)
ALBUMIN/GLOB SERPL: 1.3 G/DL
ALP SERPL-CCNC: 132 U/L (ref 30–99)
ALT SERPL-CCNC: 19 U/L (ref 2–50)
ANION GAP SERPL CALC-SCNC: 14 MMOL/L (ref 7–16)
APPEARANCE UR: CLEAR
AST SERPL-CCNC: 41 U/L (ref 12–45)
BACTERIA #/AREA URNS HPF: ABNORMAL /HPF
BILIRUB SERPL-MCNC: 0.6 MG/DL (ref 0.1–1.5)
BILIRUB UR QL STRIP.AUTO: NEGATIVE
BUN SERPL-MCNC: 14 MG/DL (ref 8–22)
C DIFF DNA SPEC QL NAA+PROBE: NEGATIVE
C DIFF TOX GENS STL QL NAA+PROBE: NEGATIVE
CALCIUM ALBUM COR SERPL-MCNC: 8.5 MG/DL (ref 8.5–10.5)
CALCIUM SERPL-MCNC: 7.7 MG/DL (ref 8.4–10.2)
CHLORIDE SERPL-SCNC: 103 MMOL/L (ref 96–112)
CO2 SERPL-SCNC: 19 MMOL/L (ref 20–33)
COLOR UR: ABNORMAL
CREAT SERPL-MCNC: 0.96 MG/DL (ref 0.5–1.4)
EPI CELLS #/AREA URNS HPF: ABNORMAL /HPF
ERYTHROCYTE [DISTWIDTH] IN BLOOD BY AUTOMATED COUNT: 49.3 FL (ref 35.9–50)
FLUAV RNA SPEC QL NAA+PROBE: NEGATIVE
FLUBV RNA SPEC QL NAA+PROBE: NEGATIVE
GFR SERPLBLD CREATININE-BSD FMLA CKD-EPI: 62 ML/MIN/1.73 M 2
GLOBULIN SER CALC-MCNC: 2.4 G/DL (ref 1.9–3.5)
GLUCOSE BLD STRIP.AUTO-MCNC: 131 MG/DL (ref 65–99)
GLUCOSE BLD STRIP.AUTO-MCNC: 133 MG/DL (ref 65–99)
GLUCOSE BLD STRIP.AUTO-MCNC: 135 MG/DL (ref 65–99)
GLUCOSE SERPL-MCNC: 136 MG/DL (ref 65–99)
GLUCOSE UR STRIP.AUTO-MCNC: NEGATIVE MG/DL
HCT VFR BLD AUTO: 36.1 % (ref 37–47)
HCT VFR BLD AUTO: 36.2 % (ref 37–47)
HCT VFR BLD AUTO: 39 % (ref 37–47)
HCT VFR BLD AUTO: 40.2 % (ref 37–47)
HGB BLD-MCNC: 11.4 G/DL (ref 12–16)
HGB BLD-MCNC: 11.5 G/DL (ref 12–16)
HGB BLD-MCNC: 11.9 G/DL (ref 12–16)
HGB BLD-MCNC: 12.3 G/DL (ref 12–16)
INR PPP: 1 (ref 0.87–1.13)
KETONES UR STRIP.AUTO-MCNC: NEGATIVE MG/DL
LACTATE SERPL-SCNC: 2.6 MMOL/L (ref 0.5–2)
LACTATE SERPL-SCNC: 3 MMOL/L (ref 0.5–2)
LACTATE SERPL-SCNC: 3.1 MMOL/L (ref 0.5–2)
LACTATE SERPL-SCNC: 4.7 MMOL/L (ref 0.5–2)
LEUKOCYTE ESTERASE UR QL STRIP.AUTO: NEGATIVE
MCH RBC QN AUTO: 27.6 PG (ref 27–33)
MCHC RBC AUTO-ENTMCNC: 31.8 G/DL (ref 32.2–35.5)
MCV RBC AUTO: 86.8 FL (ref 81.4–97.8)
MICRO URNS: ABNORMAL
NITRITE UR QL STRIP.AUTO: POSITIVE
PH UR STRIP.AUTO: 7 [PH] (ref 5–8)
PLATELET # BLD AUTO: 232 K/UL (ref 164–446)
PMV BLD AUTO: 9.7 FL (ref 9–12.9)
POTASSIUM SERPL-SCNC: 3.7 MMOL/L (ref 3.6–5.5)
PROT SERPL-MCNC: 5.4 G/DL (ref 6–8.2)
PROT UR QL STRIP: NEGATIVE MG/DL
PROTHROMBIN TIME: 13.7 SEC (ref 12–14.6)
RBC # BLD AUTO: 4.17 M/UL (ref 4.2–5.4)
RBC # URNS HPF: ABNORMAL /HPF
RBC UR QL AUTO: NEGATIVE
RSV RNA SPEC QL NAA+PROBE: NEGATIVE
SARS-COV-2 RNA RESP QL NAA+PROBE: NOTDETECTED
SODIUM SERPL-SCNC: 136 MMOL/L (ref 135–145)
SP GR UR STRIP.AUTO: 1.01
SPECIMEN SOURCE: NORMAL
TROPONIN T SERPL-MCNC: 25 NG/L (ref 6–19)
WBC # BLD AUTO: 14.9 K/UL (ref 4.8–10.8)
WBC #/AREA URNS HPF: ABNORMAL /HPF

## 2024-05-03 PROCEDURE — 99291 CRITICAL CARE FIRST HOUR: CPT | Performed by: INTERNAL MEDICINE

## 2024-05-03 RX ORDER — LISINOPRIL 2.5 MG/1
2.5 TABLET ORAL DAILY
COMMUNITY

## 2024-05-03 RX ORDER — HYDROMORPHONE HYDROCHLORIDE 1 MG/ML
0.5 INJECTION, SOLUTION INTRAMUSCULAR; INTRAVENOUS; SUBCUTANEOUS ONCE
Status: COMPLETED | OUTPATIENT
Start: 2024-05-03 | End: 2024-05-03

## 2024-05-03 RX ORDER — ALENDRONATE SODIUM 70 MG/1
70 TABLET ORAL
COMMUNITY

## 2024-05-03 RX ORDER — FUROSEMIDE 10 MG/ML
40 INJECTION INTRAMUSCULAR; INTRAVENOUS ONCE
Status: COMPLETED | OUTPATIENT
Start: 2024-05-03 | End: 2024-05-03

## 2024-05-03 RX ORDER — SODIUM CHLORIDE 9 MG/ML
1000 INJECTION, SOLUTION INTRAVENOUS ONCE
Status: ACTIVE | OUTPATIENT
Start: 2024-05-03 | End: 2024-05-04

## 2024-05-03 RX ORDER — PROCHLORPERAZINE EDISYLATE 5 MG/ML
10 INJECTION INTRAMUSCULAR; INTRAVENOUS EVERY 6 HOURS PRN
Status: DISCONTINUED | OUTPATIENT
Start: 2024-05-03 | End: 2024-05-08 | Stop reason: HOSPADM

## 2024-05-03 RX ORDER — LABETALOL HYDROCHLORIDE 5 MG/ML
10 INJECTION, SOLUTION INTRAVENOUS EVERY 4 HOURS PRN
Status: DISCONTINUED | OUTPATIENT
Start: 2024-05-03 | End: 2024-05-08 | Stop reason: HOSPADM

## 2024-05-03 RX ORDER — SODIUM CHLORIDE, SODIUM LACTATE, POTASSIUM CHLORIDE, AND CALCIUM CHLORIDE .6; .31; .03; .02 G/100ML; G/100ML; G/100ML; G/100ML
1000 INJECTION, SOLUTION INTRAVENOUS ONCE
Status: COMPLETED | OUTPATIENT
Start: 2024-05-03 | End: 2024-05-03

## 2024-05-03 RX ORDER — SULFAMETHOXAZOLE AND TRIMETHOPRIM 800; 160 MG/1; MG/1
1 TABLET ORAL 2 TIMES DAILY
Status: ON HOLD | COMMUNITY
End: 2024-05-08

## 2024-05-03 RX ORDER — HYDROMORPHONE HYDROCHLORIDE 1 MG/ML
0.5 INJECTION, SOLUTION INTRAMUSCULAR; INTRAVENOUS; SUBCUTANEOUS
Status: DISCONTINUED | OUTPATIENT
Start: 2024-05-03 | End: 2024-05-08 | Stop reason: HOSPADM

## 2024-05-03 RX ORDER — OMEPRAZOLE 20 MG/1
20 CAPSULE, DELAYED RELEASE ORAL 2 TIMES DAILY
COMMUNITY

## 2024-05-03 RX ORDER — PANTOPRAZOLE SODIUM 40 MG/10ML
40 INJECTION, POWDER, LYOPHILIZED, FOR SOLUTION INTRAVENOUS 2 TIMES DAILY
Status: DISCONTINUED | OUTPATIENT
Start: 2024-05-03 | End: 2024-05-07

## 2024-05-03 RX ORDER — SODIUM CHLORIDE, SODIUM LACTATE, POTASSIUM CHLORIDE, AND CALCIUM CHLORIDE .6; .31; .03; .02 G/100ML; G/100ML; G/100ML; G/100ML
500 INJECTION, SOLUTION INTRAVENOUS ONCE
Status: COMPLETED | OUTPATIENT
Start: 2024-05-03 | End: 2024-05-03

## 2024-05-03 RX ORDER — NOREPINEPHRINE BITARTRATE 0.03 MG/ML
0-1 INJECTION, SOLUTION INTRAVENOUS CONTINUOUS
Status: DISCONTINUED | OUTPATIENT
Start: 2024-05-03 | End: 2024-05-04

## 2024-05-03 RX ORDER — SODIUM CHLORIDE, SODIUM LACTATE, POTASSIUM CHLORIDE, CALCIUM CHLORIDE 600; 310; 30; 20 MG/100ML; MG/100ML; MG/100ML; MG/100ML
INJECTION, SOLUTION INTRAVENOUS CONTINUOUS
Status: DISCONTINUED | OUTPATIENT
Start: 2024-05-03 | End: 2024-05-03

## 2024-05-03 RX ADMIN — PROCHLORPERAZINE EDISYLATE 10 MG: 5 INJECTION INTRAMUSCULAR; INTRAVENOUS at 14:23

## 2024-05-03 RX ADMIN — NOREPINEPHRINE BITARTRATE 0.02 MCG/KG/MIN: 1 INJECTION INTRAVENOUS at 23:15

## 2024-05-03 RX ADMIN — SODIUM CHLORIDE, POTASSIUM CHLORIDE, SODIUM LACTATE AND CALCIUM CHLORIDE: 600; 310; 30; 20 INJECTION, SOLUTION INTRAVENOUS at 08:19

## 2024-05-03 RX ADMIN — FUROSEMIDE 40 MG: 10 INJECTION INTRAMUSCULAR; INTRAVENOUS at 15:35

## 2024-05-03 RX ADMIN — HYDROMORPHONE HYDROCHLORIDE 0.5 MG: 1 INJECTION, SOLUTION INTRAMUSCULAR; INTRAVENOUS; SUBCUTANEOUS at 01:24

## 2024-05-03 RX ADMIN — AMITRIPTYLINE HYDROCHLORIDE 100 MG: 50 TABLET, FILM COATED ORAL at 20:54

## 2024-05-03 RX ADMIN — SODIUM CHLORIDE, POTASSIUM CHLORIDE, SODIUM LACTATE AND CALCIUM CHLORIDE 500 ML: 600; 310; 30; 20 INJECTION, SOLUTION INTRAVENOUS at 19:49

## 2024-05-03 RX ADMIN — HYDROCODONE BITARTRATE AND ACETAMINOPHEN 1 TABLET: 10; 325 TABLET ORAL at 21:25

## 2024-05-03 RX ADMIN — PIPERACILLIN SODIUM AND TAZOBACTAM SODIUM 3.38 G: 3; .375 INJECTION, POWDER, LYOPHILIZED, FOR SOLUTION INTRAVENOUS at 04:57

## 2024-05-03 RX ADMIN — SODIUM CHLORIDE, POTASSIUM CHLORIDE, SODIUM LACTATE AND CALCIUM CHLORIDE 1000 ML: 600; 310; 30; 20 INJECTION, SOLUTION INTRAVENOUS at 11:15

## 2024-05-03 RX ADMIN — POLYETHYLENE GLYCOL-3350 AND ELECTROLYTES 2 L: 236; 6.74; 5.86; 2.97; 22.74 POWDER, FOR SOLUTION ORAL at 15:35

## 2024-05-03 RX ADMIN — PIPERACILLIN SODIUM AND TAZOBACTAM SODIUM 3.38 G: 3; .375 INJECTION, POWDER, LYOPHILIZED, FOR SOLUTION INTRAVENOUS at 20:58

## 2024-05-03 RX ADMIN — HYDROMORPHONE HYDROCHLORIDE 0.5 MG: 1 INJECTION, SOLUTION INTRAMUSCULAR; INTRAVENOUS; SUBCUTANEOUS at 10:16

## 2024-05-03 RX ADMIN — ONDANSETRON 4 MG: 2 INJECTION INTRAMUSCULAR; INTRAVENOUS at 13:00

## 2024-05-03 RX ADMIN — HYDROMORPHONE HYDROCHLORIDE 0.5 MG: 1 INJECTION, SOLUTION INTRAMUSCULAR; INTRAVENOUS; SUBCUTANEOUS at 05:47

## 2024-05-03 RX ADMIN — PANTOPRAZOLE SODIUM 40 MG: 40 INJECTION, POWDER, LYOPHILIZED, FOR SOLUTION INTRAVENOUS at 00:51

## 2024-05-03 RX ADMIN — PIPERACILLIN SODIUM AND TAZOBACTAM SODIUM 3.38 G: 3; .375 INJECTION, POWDER, LYOPHILIZED, FOR SOLUTION INTRAVENOUS at 13:52

## 2024-05-03 RX ADMIN — HYDROMORPHONE HYDROCHLORIDE 0.5 MG: 1 INJECTION, SOLUTION INTRAMUSCULAR; INTRAVENOUS; SUBCUTANEOUS at 19:10

## 2024-05-03 RX ADMIN — PROCHLORPERAZINE EDISYLATE 10 MG: 5 INJECTION INTRAMUSCULAR; INTRAVENOUS at 01:23

## 2024-05-03 RX ADMIN — EZETIMIBE 10 MG: 10 TABLET ORAL at 17:19

## 2024-05-03 RX ADMIN — ACETAMINOPHEN 650 MG: 325 TABLET ORAL at 15:35

## 2024-05-03 RX ADMIN — PANTOPRAZOLE SODIUM 40 MG: 40 INJECTION, POWDER, LYOPHILIZED, FOR SOLUTION INTRAVENOUS at 17:20

## 2024-05-03 RX ADMIN — ROSUVASTATIN CALCIUM 20 MG: 10 TABLET, FILM COATED ORAL at 17:20

## 2024-05-03 RX ADMIN — SODIUM CHLORIDE, POTASSIUM CHLORIDE, SODIUM LACTATE AND CALCIUM CHLORIDE: 600; 310; 30; 20 INJECTION, SOLUTION INTRAVENOUS at 13:05

## 2024-05-03 RX ADMIN — PIPERACILLIN SODIUM AND TAZOBACTAM SODIUM 3.38 G: 3; .375 INJECTION, POWDER, LYOPHILIZED, FOR SOLUTION INTRAVENOUS at 00:53

## 2024-05-03 RX ADMIN — ONDANSETRON 4 MG: 2 INJECTION INTRAMUSCULAR; INTRAVENOUS at 10:16

## 2024-05-03 RX ADMIN — VANCOMYCIN HYDROCHLORIDE 125 MG: 5 INJECTION, POWDER, LYOPHILIZED, FOR SOLUTION INTRAVENOUS at 15:35

## 2024-05-03 RX ADMIN — HYDROMORPHONE HYDROCHLORIDE 0.5 MG: 1 INJECTION, SOLUTION INTRAMUSCULAR; INTRAVENOUS; SUBCUTANEOUS at 13:16

## 2024-05-03 RX ADMIN — SODIUM CHLORIDE, POTASSIUM CHLORIDE, SODIUM LACTATE AND CALCIUM CHLORIDE 500 ML: 600; 310; 30; 20 INJECTION, SOLUTION INTRAVENOUS at 00:50

## 2024-05-03 ASSESSMENT — COGNITIVE AND FUNCTIONAL STATUS - GENERAL
MOBILITY SCORE: 18
MOVING FROM LYING ON BACK TO SITTING ON SIDE OF FLAT BED: A LITTLE
STANDING UP FROM CHAIR USING ARMS: A LITTLE
SUGGESTED CMS G CODE MODIFIER MOBILITY: CK
TOILETING: A LITTLE
TURNING FROM BACK TO SIDE WHILE IN FLAT BAD: A LITTLE
SUGGESTED CMS G CODE MODIFIER DAILY ACTIVITY: CK
CLIMB 3 TO 5 STEPS WITH RAILING: A LITTLE
EATING MEALS: A LITTLE
MOVING TO AND FROM BED TO CHAIR: A LITTLE
WALKING IN HOSPITAL ROOM: A LITTLE
PERSONAL GROOMING: A LITTLE
DRESSING REGULAR UPPER BODY CLOTHING: A LITTLE
DRESSING REGULAR LOWER BODY CLOTHING: A LITTLE
HELP NEEDED FOR BATHING: A LITTLE
DAILY ACTIVITIY SCORE: 18

## 2024-05-03 ASSESSMENT — LIFESTYLE VARIABLES
HAVE YOU EVER FELT YOU SHOULD CUT DOWN ON YOUR DRINKING: NO
TOTAL SCORE: 0
ON A TYPICAL DAY WHEN YOU DRINK ALCOHOL HOW MANY DRINKS DO YOU HAVE: 0
ALCOHOL_USE: NO
HAVE PEOPLE ANNOYED YOU BY CRITICIZING YOUR DRINKING: NO
EVER FELT BAD OR GUILTY ABOUT YOUR DRINKING: NO
AVERAGE NUMBER OF DAYS PER WEEK YOU HAVE A DRINK CONTAINING ALCOHOL: 0
TOTAL SCORE: 0
EVER HAD A DRINK FIRST THING IN THE MORNING TO STEADY YOUR NERVES TO GET RID OF A HANGOVER: NO
CONSUMPTION TOTAL: NEGATIVE
TOTAL SCORE: 0
HOW MANY TIMES IN THE PAST YEAR HAVE YOU HAD 5 OR MORE DRINKS IN A DAY: 0

## 2024-05-03 ASSESSMENT — ENCOUNTER SYMPTOMS
BRUISES/BLEEDS EASILY: 0
COUGH: 0
PALPITATIONS: 1
WEAKNESS: 0
DOUBLE VISION: 0
FEVER: 0
SHORTNESS OF BREATH: 1
DEPRESSION: 0
NAUSEA: 1
BLOOD IN STOOL: 1
SPUTUM PRODUCTION: 0
PALPITATIONS: 0
CHILLS: 1
VOMITING: 1
NECK PAIN: 0
CHILLS: 0
MYALGIAS: 0
COUGH: 1
INSOMNIA: 0
BLURRED VISION: 0
DIARRHEA: 1
SHORTNESS OF BREATH: 0
DIZZINESS: 0
ABDOMINAL PAIN: 1
SORE THROAT: 0
HEADACHES: 0

## 2024-05-03 ASSESSMENT — PATIENT HEALTH QUESTIONNAIRE - PHQ9
1. LITTLE INTEREST OR PLEASURE IN DOING THINGS: NOT AT ALL
2. FEELING DOWN, DEPRESSED, IRRITABLE, OR HOPELESS: NOT AT ALL
SUM OF ALL RESPONSES TO PHQ9 QUESTIONS 1 AND 2: 0
2. FEELING DOWN, DEPRESSED, IRRITABLE, OR HOPELESS: NOT AT ALL
1. LITTLE INTEREST OR PLEASURE IN DOING THINGS: NOT AT ALL
SUM OF ALL RESPONSES TO PHQ9 QUESTIONS 1 AND 2: 0

## 2024-05-03 ASSESSMENT — PAIN DESCRIPTION - PAIN TYPE
TYPE: ACUTE PAIN
TYPE: ACUTE PAIN;SURGICAL PAIN
TYPE: ACUTE PAIN

## 2024-05-03 ASSESSMENT — FIBROSIS 4 INDEX: FIB4 SCORE: 2.96

## 2024-05-03 NOTE — ED PROVIDER NOTES
ED Provider Note    Primary care provider: Cole Yuen M.D.    CHIEF COMPLAINT  Chief Complaint   Patient presents with    Near Syncopal    Hypotension       Additional history obtained from: EMS was called to the scene for near syncope.  They found the patient hypotensive, hypoxic.  She received 4 mg of Zofran, 500 cc of IV fluids and placed on oxygen.  They report a recent surgery.  Limitation to History:  Select: : None    HPI  Yamile Go is a 74 y.o. female who presents to the Emergency Department for abdominal pain.  The patient was recovering from a right shoulder surgery.  She normally is on oxycodone 7.5 mg regularly, after the surgery she has been increased to 10 mg.  Aside from this no recent changes.  She notes some persistent shoulder pain as expected recovering from the surgery.  Was in her usual state of health with some mild generalized malaise secondary to the surgery when she developed some abdominal pain, nausea at around 5 PM today.  She denies any diarrhea but does feel the need to have a bowel movement.  Denies any cough, chest pain or shortness of breath.  She reports multiple abdominal surgeries previously but still does have her gallbladder.    External Record Review: Patient had a right reverse total shoulder arthroplasty by Dr. Crowe on 4/30/2024 at our facility.  Per the op note this was uncomplicated, discharged same day.  Last in the urgent care in March 2024, vitals at that time were unremarkable with oxygenation 94% on room air.    REVIEW OF SYSTEMS  See HPI.     PAST MEDICAL HISTORY   has a past medical history of Anemia, Anesthesia, Arthritis, Asthma, CAD (coronary artery disease), Cataract, COPD (chronic obstructive pulmonary disease) ([facility]), Dental disorder, Depression, Diabetes ([facility]), Disorder of thyroid, Heart burn, HTN (hypertension), Hyperlipidemia, Indigestion, Migraines, Obesity, Pneumonia (2023), PONV (postoperative nausea and vomiting), and Sleep  apnea.    SURGICAL HISTORY   has a past surgical history that includes appendectomy (N/A, 1976); abdominal hysterectomy total (N/A, 1978); breast biopsy (1984); colectomy (N/A, 2007); lumbar laminectomy diskectomy (N/A, 1989); arthroplasty (Right, 2020); lumbar exploration (N/A, 2017); insertion, peripheral nerve stimulator, lower extremity (Left, 12/22/2022); and reconstr total shoulder implant (Right, 4/30/2024).    SOCIAL HISTORY  Social History     Tobacco Use    Smoking status: Never    Smokeless tobacco: Never   Vaping Use    Vaping Use: Never used   Substance Use Topics    Alcohol use: No    Drug use: No      Social History     Substance and Sexual Activity   Drug Use No       FAMILY HISTORY  Family History   Problem Relation Age of Onset    Cancer Mother         lung    Heart Disease Mother     Hyperlipidemia Mother     Stroke Father     Heart Disease Father     Diabetes Father     Hypertension Father     Hyperlipidemia Father     Heart Disease Brother         cath and bypass    Diabetes Brother     Hypertension Brother     Hyperlipidemia Brother     Diabetes Maternal Aunt     Hypertension Maternal Aunt     Heart Disease Brother         cath and byp[ass    Drug abuse Brother     Heart Disease Brother         cath and bypass    Diabetes Brother     Heart Disease Brother     Other Brother         MVA    Hypertension Maternal Aunt     Drug abuse Daughter     Alcohol abuse Daughter        CURRENT MEDICATIONS  Reviewed.  See Encounter Summary.     ALLERGIES  Allergies   Allergen Reactions    Amlodipine Swelling    Bee Swelling    Gabapentin Hives and Swelling    Pregabalin Hives and Swelling    Rifampin Unspecified     Pt turns yellow    Fentanyl Vomiting    Morphine Vomiting    Benzoin Rash     Tincture of benzoin =blisters       PHYSICAL EXAM  VITAL SIGNS: BP 94/50   Pulse (!) 126   Temp 36 °C (96.8 °F) (Temporal)   Resp (!) 23   Wt 71 kg (156 lb 8.4 oz)   SpO2 95%   BMI 28.63 kg/m²   Constitutional:  Awake, alert in no apparent distress.  She is borderline somnolent, possibly sedated.  HENT: Normocephalic, Bilateral external ears normal. Nose normal.   Eyes: Conjunctiva normal, non-icteric, EOMI.    Thorax & Lungs: Easy unlabored respirations, Clear to ascultation bilaterally.  Cardiovascular: Tachycardic, No murmurs, rubs or gallops. Bilateral pulses symmetrical.   Abdomen:  Soft, epigastric and right upper quadrant abdominal tenderness, nondistended, normal active bowel sounds.   :    Skin: Visualized skin is  Dry, No erythema, No rash.   Musculoskeletal:   No cyanosis, clubbing or edema. No leg asymmetry.   Neurologic: Alert, Grossly non-focal.   Psychiatric: Normal affect, Normal mood  Lymphatic:      EKG   12 lead Interpreted by me  Rhythm: Sinus tach  Rate: 134  Axis: normal  Ectopy: none  Conduction: normal  ST Segments: no acute change  T Waves: no acute change  Clinical Impression: Sinus tachycardia, otherwise unremarkable EKG.      RADIOLOGY    Radiologist interpretation:   CT-CTA CHEST PULMONARY ARTERY W/ RECONS    (Results Pending)   CT-ABDOMEN-PELVIS WITH    (Results Pending)       COURSE & MEDICAL DECISION MAKING  Pertinent Labs & Imaging studies reviewed. (See chart for details)    COURSE & MEDICAL DECISION MAKING  Pertinent Labs & Imaging studies reviewed. (See chart for details)    Differential diagnoses include but are not limited to: Sepsis, dehydration, medication side effect, pulmonary embolus, cholecystitis, pancreatitis    6:42 PM - Nursing notes reviewed, patient seen and examined at bedside.    8:12 PM: Patient started having voluminous diarrhea shortly after arrival.  She has had at least 4 large-volume watery bowel movements, sent for stool studies.  Unable to get CT now as she has frequently soiled herself.    9:10 PM: Patient requiring repeated fluid boluses due to hypotension.  She is still having copious diarrhea, has essentially had nonstop bowel movements for the past 2 hours  which precludes the CT scan.  Will receive additional fluids at this time.  No indication for pressors as this appears to be hypovolemic issue.    Discussed the case with the hospitalist will evaluate the patient for admission pending CT results.  Discussion of management with other medical personnel: Hospitalist, Dr. Acuna    Escalation of care considered, and ultimately not performed: Holding off on antibiotics for now.    Decision tools and prescription drugs considered including, but not limited to: Heart score 2    Decision Making:  This is a pleasant 74 y.o. year old female who presents with syncopal event, low oxygen and some abdominal pain.  Shortly after arrival the patient had nonstop watery diarrhea for the next several hours.  She developed hypotension as well that did improve with IV fluids.  Likely received intraoperative antibiotics, therefore she is at some risk for C. difficile, other possibility is that this is a viral illness.    Because of the oxygen requirement CTA of the chest was ordered which is still pending at this time.  She was having abdominal pain and I ordered a CT abdomen pelvis but in light of the copious diarrhea I suspect the abdominal pain was due to gaseous distention.    Given her recurrent hypotension, massive GI output, low oxygen, she will require hospitalization for further optimization.  Will defer antibiotics at this time as although she has a tachycardia, white count/SIRS criteria I think this may be a C. difficile issue and could be exacerbated by antibiotics.  \    Patient will be hospitalized in stable condition.    FINAL IMPRESSION  1. Diarrhea of presumed infectious origin    2. Syncope, unspecified syncope type    3. Hypotension due to hypovolemia

## 2024-05-03 NOTE — ASSESSMENT & PLAN NOTE
Negative C. difficile, completed with Zosyn  Start GI soft diet, improving slowly, possibly home on 5/8

## 2024-05-03 NOTE — PROGRESS NOTES
4 Eyes Skin Assessment Completed by DEEJAY Duenas and DEEJAY Kirk.    Head WDL  Ears WDL  Nose WDL  Mouth WDL  Neck WDL  Breast/Chest WDL  Shoulder Blades WDL  Spine WDL  (R) Arm/Elbow/Hand - Dressing in place from Surgery 4/30, immobilizer in place  (L) Arm/Elbow/Hand Bruising  Abdomen WDL  Groin WDL  Scrotum/Coccyx/Buttocks Redness and Blanching  (R) Leg WDL  (L) Leg WDL  (R) Heel/Foot/Toe WDL  (L) Heel/Foot/Toe WDL          Devices In Places ECG, Blood Pressure Cuff, Pulse Ox, Polanco, and Nasal Cannula shoulder immobilizer      Interventions In Place Low Air Loss Mattress    Possible Skin Injury Yes    Pictures Uploaded Into Epic N/A  Wound Consult Placed N/A  RN Wound Prevention Protocol Ordered No

## 2024-05-03 NOTE — ED NOTES
Assumed care of pt. Pt was initially somnolent, but woke up shortly after and is now conscious, alert and oriented.

## 2024-05-03 NOTE — ASSESSMENT & PLAN NOTE
Secondary to colitis, patient with history of irritable bowel syndrome but no prior history of bleeding.  Appreciate GI recommendations, colonoscopy deferred at this time.  H&H remains stable and bleeding has resolved.  Personally reviewed the cbc on 5/7

## 2024-05-03 NOTE — PROGRESS NOTES
Patient admitted overnight for weakness, abdominal pain and feeling as though she was going to pass out. In the ER, she was noted to have low normal blood pressure with HR in 120s-130s. She was also hypoxic requiring 6L NC. CT A/P with pancolitis, CTPA neg for PE. She rapidly developed profuse bloody diarrhea with rising lactic acid c/f ischemic colitis. I ordered additional 1L of fluid and initiated mIVFs. She was becoming more somnolent, c/f aspiration as she had been vomiting earlier. I contacted Dr. Power from GI to formally consult and ICU for transfer for higher level of care. IVF initiated along with additional 1LR bolus. Kept npo. Patient has transferred to the ICU for further care.

## 2024-05-03 NOTE — CARE PLAN
The patient is Watcher - Medium risk of patient condition declining or worsening    Shift Goals  Clinical Goals: Maintain safety, rehydrate, recheck labs  Patient Goals: Feel better  Family Goals: Pt fe    Progress made toward(s) clinical / shift goals:  Settled pt to new room. Updated pt and family on plan of care. Spoke with ICU and GI MD regarding plan of care. Medicated per MAR. Inserted catheter for urinary retention.   Problem: Knowledge Deficit - Standard  Goal: Patient and family/care givers will demonstrate understanding of plan of care, disease process/condition, diagnostic tests and medications  Outcome: Progressing     Problem: Hemodynamics  Goal: Patient's hemodynamics, fluid balance and neurologic status will be stable or improve  Outcome: Progressing     Problem: Fluid Volume  Goal: Fluid volume balance will be maintained  Outcome: Progressing     Problem: Urinary - Renal Perfusion  Goal: Ability to achieve and maintain adequate renal perfusion and functioning will improve  Outcome: Progressing       Patient is not progressing towards the following goals:

## 2024-05-03 NOTE — PROGRESS NOTES
1400 Crackles per auscultation and pt had wet cough and hypertensive, notified MD. CXR at bedside and given lasix.    1600 2 watery Bms, stool sent down, urine sample sample sent down. Pt unable to drink bowel prep at this time. Education provided, pt aware and verbalize understanding.

## 2024-05-03 NOTE — ED NOTES
Med Rec completed per patient   Allergies reviewed    Patient completed a 7 day course of Bactrim DS about 3 weeks ago     Patient is not taking anticoagulants     Patient was prescribed Fosamax recently that she has not yet started taking

## 2024-05-03 NOTE — ASSESSMENT & PLAN NOTE
-s/p  Right reverse total shoulder arthroplasty with proximal biceps tenodesis surgery by Dr. Sparks on 4/30 (POD #2).  -Given her new GI bleed, I will hold baby aspirin twice daily for DVT prophylaxis for now.  -Day team to notify orthopedic surgeon that patient is admitted with pancolitis.  She might need PT/OT while here.

## 2024-05-03 NOTE — ED NOTES
Attempted multiple times to take pt to CT, but pt continuously has bowel movements that are very liquid diarrhea. Some small formed stools. Multiple changes of linens and cleaning up the pt. Dr. Jain informed.

## 2024-05-03 NOTE — ED NOTES
Patient brief changed. Brief was entirely saturated. Thin and clear jelly like stool with bright red blood.

## 2024-05-03 NOTE — ED NOTES
Jackie from Lab called with critical result of lactic 4.7 at 0859. Critical lab result read back to Jackie.     Dr. Lieberman notified of critical lab result at 0925.  Critical lab result read back by Dr. Lieberman.

## 2024-05-03 NOTE — CONSULTS
Gastroenterology Consult Note     Date of Consult: 5/3/24  Referring Physician: Dr. Lieberman     Reason for consult: Colitis        HPI: Mrs. Go is a 75 y/o who on 4/30 underwent right shoulder surgery.  She presented last PM to the ER with generalized abdominal pain, and light-headedness.  In ER, patient was found to be hypotensive and tachycardic.  She experienced profuse watery diarrhea (initially) in the ER, which eventually transitioned to passage of mucousy bloody diarrhea.  Lactic acid on admit was elevated at 3.0, and she was provided vigorous hydration.  Her lactic acid level today is 4.7.  Cr and LFTs are normal.  Her Troponin is mildly elevated.  WBC elevated at 17 on admit, now 14.9.  Patient reports previously having a history of C. Diff.  She was not treated with antibiotics, at least not for any extended period associated with her shoulder surgery.  Patient reports last colonoscopy was about 15 years ago.  She has a history of diverticulitis requiring surgical resection.  At home, for years, she reports a pattern of intermittent diarrhea.    From an UGI standpoint, she has been having some nausea, of unclear duration.  She has been on narcotic analgesics for several months associated with her shoulder pain.  She has a history of a Zenker's diverticulum, and has seen Dr. Mckeon at [facility] for treatment of this.     PMHX:  Past Medical History:   Diagnosis Date    Anemia     Anesthesia     PONV    Arthritis     degenerative    Asthma     CAD (coronary artery disease)     cardiologist, Dr. Winkler  last visit 4/2024    Cataract     COPD (chronic obstructive pulmonary disease) (HCC)     Dental disorder     Missing teeth right side.  Veneers top front    Depression     anxiety    Diabetes (HCC)     Disorder of thyroid     hypothyroid    Heart burn     GERD, controlled with medications    HTN (hypertension)     Hyperlipidemia     Indigestion     Migraines      Obesity     Pneumonia 2023    COVID    PONV (postoperative nausea and vomiting)     Sleep apnea     diagnosed 9/2009 CPAP -PMA; pt states she does not use CPAP and no longer has one, unable to tolerate          PSurgHx:   Past Surgical History:   Procedure Laterality Date    PB RECONSTR TOTAL SHOULDER IMPLANT Right 4/30/2024    Procedure: RIGHT REVERSE TOTAL SHOULDER ARTHROPLASTY;  Surgeon: Manuela Sparks M.D.;  Location: SURGERY [facility];  Service: Orthopedics    INSERTION, PERIPHERAL NERVE STIMULATOR, LOWER EXTREMITY Left 12/22/2022    Procedure: Left SCIATIC PERIPHERAL NERVE STIMULATOR IMPLANT, LOWER EXTREMITY PERIPHERAL SCIATIC NERVE;  Surgeon: Tobi Kilgore M.D.;  Location: SURGERY [facility];  Service: Pain Management    ARTHROPLASTY Right 2020    ankle    LUMBAR EXPLORATION N/A 2017    COLECTOMY N/A 2007    partial for divverticulitis 2007    LUMBAR LAMINECTOMY DISKECTOMY N/A 1989    BREAST BIOPSY  1984    no cancer    ABDOMINAL HYSTERECTOMY TOTAL N/A 1978    APPENDECTOMY N/A 1976        ALLERGIES:Amlodipine, Bee, Gabapentin, Pregabalin, Rifampin, Fentanyl, Morphine, and Benzoin     SocHx:   Social History     Socioeconomic History    Marital status: Single     Spouse name: Not on file    Number of children: Not on file    Years of education: Not on file    Highest education level: Not on file   Occupational History    Not on file   Tobacco Use    Smoking status: Never    Smokeless tobacco: Never   Vaping Use    Vaping Use: Never used   Substance and Sexual Activity    Alcohol use: No    Drug use: No    Sexual activity: Yes     Partners: Male     Birth control/protection: Post-Menopausal   Other Topics Concern    Not on file   Social History Narrative    Not on file     Social Determinants of Health     Financial Resource Strain: Low Risk  (2/9/2022)    Overall Financial Resource Strain (CARDIA)     Difficulty of Paying Living Expenses: Not hard at all   Food Insecurity: No Food Insecurity  (2/9/2022)    Hunger Vital Sign     Worried About Running Out of Food in the Last Year: Never true     Ran Out of Food in the Last Year: Never true   Transportation Needs: No Transportation Needs (2/9/2022)    PRAPARE - Transportation     Lack of Transportation (Medical): No     Lack of Transportation (Non-Medical): No   Physical Activity: Not on file   Stress: Not on file   Social Connections: Not on file   Intimate Partner Violence: Not on file   Housing Stability: Not on file        FAMHx:   Family History   Problem Relation Age of Onset    Cancer Mother         lung    Heart Disease Mother     Hyperlipidemia Mother     Stroke Father     Heart Disease Father     Diabetes Father     Hypertension Father     Hyperlipidemia Father     Heart Disease Brother         cath and bypass    Diabetes Brother     Hypertension Brother     Hyperlipidemia Brother     Diabetes Maternal Aunt     Hypertension Maternal Aunt     Heart Disease Brother         cath and byp[ass    Drug abuse Brother     Heart Disease Brother         cath and bypass    Diabetes Brother     Heart Disease Brother     Other Brother         MVA    Hypertension Maternal Aunt     Drug abuse Daughter     Alcohol abuse Daughter         ROS:  Constitutional: No fevers, chills, no night sweats, no weight changes  HEENT: no vision or hearing changes, no dry mouth, no change in smell  CARDIO: no palpitations, no orthopnea, no chest pain  PULM: no cough, no shortness of breath  NEURO: no Seizures, no memory impairment, no change in sensation  GI: as above  : no dysuria, no hematuria  HEME: no anemia, no easy brusing  MUSCULOSKELETAL: no muscle aches, no back pain, no arthritis  PSYCH: no anxiety or depression  SKIN: no rashes     PE:  Vitals:    05/03/24 0830 05/03/24 1200 05/03/24 1300 05/03/24 1329   BP: (!) 144/58 (!) 165/68 (!) 198/73 (!) 156/66   Pulse: (!) 119 (!) 121 (!) 125 (!) 125   Resp: (!) 27 (!) 33 17 14   Temp:  37.6 °C (99.7 °F)     TempSrc:  Oral      SpO2: 95% 97% 99% 100%   Weight:         Gen: AAOx3, NAD, lying in bed  HEENT: PERRL, EOMI, nares patent, Mucous membranes moist  Neck: supple, no cervical or supraclavicular adenopathy  CVS: regular rhythm, normal rate, no MRG  Pulm: CTAB, no crackles  Abd: soft, Nd, NT, no guarding or rebound  Ext: no edema, normal sensation  NEURO: grossly normal, no weakness  Skin: warm, no rash  Psych: normal Affect, no anxiety     LABS:  Lab Results   Component Value Date/Time    SODIUM 136 05/03/2024 04:53 AM    POTASSIUM 3.7 05/03/2024 04:53 AM    CHLORIDE 103 05/03/2024 04:53 AM    CO2 19 (L) 05/03/2024 04:53 AM    GLUCOSE 136 (H) 05/03/2024 04:53 AM    BUN 14 05/03/2024 04:53 AM    CREATININE 0.96 05/03/2024 04:53 AM    CREATININE 0.87 02/20/2012 09:30 AM    BUNCREATRAT 27.5 (H) 11/01/2023 03:46 AM    BUNCREATRAT 23 02/20/2012 09:30 AM      Lab Results   Component Value Date/Time    WBC 14.9 (H) 05/03/2024 12:42 AM    WBC 6.9 02/20/2012 09:30 AM    RBC 4.17 (L) 05/03/2024 12:42 AM    RBC 4.89 02/20/2012 09:30 AM    HEMOGLOBIN 11.4 (L) 05/03/2024 04:53 AM    HEMATOCRIT 36.1 (L) 05/03/2024 04:53 AM    MCV 86.8 05/03/2024 12:42 AM    MCV 87 02/20/2012 09:30 AM    MCH 27.6 05/03/2024 12:42 AM    MCH 29.0 02/20/2012 09:30 AM    MCHC 31.8 (L) 05/03/2024 12:42 AM    MPV 9.7 05/03/2024 12:42 AM    NEUTSPOLYS 79.70 (H) 05/02/2024 06:50 PM    LYMPHOCYTES 14.60 (L) 05/02/2024 06:50 PM    MONOCYTES 4.10 05/02/2024 06:50 PM    EOSINOPHILS 0.60 05/02/2024 06:50 PM    BASOPHILS 0.20 05/02/2024 06:50 PM        Lab Results   Component Value Date/Time    PROTHROMBTM 13.7 05/03/2024 04:53 AM    INR 1.00 05/03/2024 04:53 AM      Recent Labs     05/02/24  1850 05/03/24  0453   ASTSGOT 36 41   ALTSGPT 11 19   TBILIRUBIN 0.5 0.6   GLOBULIN 2.6 2.4   INR  --  1.00          Problem List Items Addressed This Visit    None  Visit Diagnoses       Diarrhea of presumed infectious origin        Syncope, unspecified syncope type        Hypotension due  to hypovolemia        Relevant Medications    ezetimibe (Zetia) tablet 10 mg (Start on 5/3/2024  6:00 PM)    rosuvastatin (Crestor) tablet 20 mg (Start on 5/3/2024  6:00 PM)    lisinopril (PRINIVIL) 2.5 MG Tab             ASSESSMENT:   Severe colitis, favor ischemia over infection, though C. Diff still pending.  Elevated lactic acid level.  Normal Cr and LFTs.  Mildly elevated troponin.  Recent shoulder surgery  3. History of intermittent diarrhea  4. History of Zenker's diverticulum.     PLAN:   Discussed options with patient and family, including conservative management with ongoing IVF and antibiotics vs proceeding with colonoscopy.  Given the severity of her presentation, CT scan, and elevated lactic acid, it was decided to proceed with limited colonoscopy, to hopefully at least examine to the splenic flexure to exclude the possibility of ischemic necrosis.  Awaiting C. Diff, and if this is positive, then continued treatment for this (currently PO Vanco) and watchful waiting, rather than colonoscopy would be recommended.    Thank you for this consult.      Tyson Power MD

## 2024-05-03 NOTE — PROGRESS NOTES
Orthopedic Progress Note  Subjective:  Patient sleepy but responsive.  Events of last night/this morning noted.  Admitted to ICU with colitis/GI bleed.    Mild shoulder pain, with some abdominal discomfort noted.    Daughter at bedside.    Date of Surgery: 5/4/2024  Post-op Day: * No surgery date entered *    Exam:  BP (!) 156/66   Pulse (!) 125   Temp 37.6 °C (99.7 °F) (Oral)   Resp 14   Wt 71 kg (156 lb 8.4 oz)   SpO2 100%   BMI 28.63 kg/m²     RIGHT shoulder dsg with 2 cm round dry blood, intact.  Sling on.  Mild upper arm/shoulder swelling.    Ins/Outs:  Intake/Output                         05/02/24 0700 - 05/03/24 0659 05/03/24 0700 - 05/04/24 0659     [id number] 1900-0659 Total [id number] 1900-0659 Total                 Intake    I.V.  --  -- --  8878.6  -- 8878.6    Volume (mL) (NS infusion 1,000 mL) -- -- -- 0 -- 0    Volume (mL) (LR (Bolus) infusion 1,000 mL) -- -- -- 1000 -- 1000    Volume (mL) (NS (Bolus) 0.9 % infusion 1,000 mL) -- -- -- 3183.3 -- 3183.3    Volume (mL) (LR (Bolus) infusion 2,130 mL) -- -- -- 4012.7 -- 4012.7    Volume (mL) (LR (Bolus) infusion 500 mL) -- -- -- 682.7 -- 682.7    IV Piggyback  --  -- --  100  -- 100    Volume (mL) (piperacillin-tazobactam (Zosyn) 3.375 g in  mL IVPB) -- -- -- 100 -- 100    Total Intake -- -- -- 8978.6 -- 8978.6       Output    Urine  --  -- --  625  -- 625    Number of Times Voided -- -- -- 2 x -- 2 x    Output (mL) (Urethral Catheter 16 Fr.) -- -- -- 625 -- 625    Total Output -- -- -- 625 -- 625       Net I/O     -- -- -- 8353.6 -- 8353.6            Intake/Output Summary (Last 24 hours) at 5/3/2024 1503  Last data filed at 5/3/2024 1400  Gross per 24 hour   Intake 8978.63 ml   Output 625 ml   Net 8353.63 ml     Labs:  Recent Labs     05/02/24  1850 05/03/24  0042 05/03/24  0453   WBC 17.1* 14.9*  --    RBC 4.56 4.17*  --    HEMOGLOBIN 12.1 11.5* 11.4*   HEMATOCRIT 39.3 36.2* 36.1*   MCV 86.2 86.8  --    MCH 26.5* 27.6  --    MCHC 30.8* 31.8*   --    RDW 48.3 49.3  --    PLATELETCT 294 232  --    MPV 9.4 9.7  --      Recent Labs     05/02/24  1850 05/03/24  0453   SODIUM 138 136   POTASSIUM 3.4* 3.7   CHLORIDE 104 103   CO2 18* 19*   GLUCOSE 141* 136*   BUN 16 14   CREATININE 1.03 0.96   CALCIUM 8.4 7.7*       Medications:   pantoprazole  40 mg BID      NS  1,000 mL Once Stopped (05/03/24 0100)    prochlorperazine  10 mg Q6HRS PRN      insulin regular  2-10 Units Q6HRS      HYDROmorphone  0.5 mg Q3HRS PRN      dextrose bolus  25 g Q15 MIN PRN      polyethylene glycol-electrolytes  2 L Once      vancomycin 50 mg/mL  125 mg Q6HRS      labetalol  10 mg Q4HRS PRN      furosemide  40 mg Once      Pharmacy  1 Each PHARMACY TO DOSE      amitriptyline  100 mg Nightly      escitalopram  20 mg DAILY      ezetimibe  10 mg Q EVENING      HYDROcodone/acetaminophen  1-2 Tablet Q6HRS PRN      liothyronine  5 mcg DAILY      rosuvastatin  20 mg Q EVENING      levothyroxine  88 mcg DAILY      acetaminophen  650 mg Q6HRS PRN      ondansetron  4 mg Q4HRS PRN      ondansetron  4 mg Q4HRS PRN      piperacillin-tazobactam  3.375 g Q8HRS 3.375 g (05/03/24 1352)     Imaging:      Quality:  Date of Admission: 5/2/2024  Hospital day #1  Assessment and Plan:  3 days s/p reverse TSA, sling on  Apparent colitis, ? GI bleed  Admitted to ICU, on IV ABX with GI consulted  Will follow    Recommend keeping right shoulder Aquacel dressing dci, sling on except for any necessary medical procedure.  Prefer patient to be rolled onto nonop left side if necessary for any anticipated colonoscopy with right arm supported.    Available via Voalte if need be.        Manuela Sparks M.D.

## 2024-05-03 NOTE — ED NOTES
Patient cleaned of fecal incontinence x2. Stool is a mixture of bright red blood and brown/green jelly like stool

## 2024-05-03 NOTE — ED NOTES
Discussed patients case with Dr. Lieberman and reccommended pt may need to go to ICU. Dr. Lieberman will come to bedside. See orders.

## 2024-05-03 NOTE — CONSULTS
Pulmonary & Critical Care Consultation    Date of consult: 5/3/2024    Referring Physician  Guerda French M.D.    Reason for Consultation  GIB    History of Presenting Illness  74 y.o. female who presented 5/2/2024 with a history of arthritis s/p right total shoulder replacement 4/30, DM, Asthma, CAD, HTN, HLD and hypothyroidism.  Gillian underwent a total right shoulder replacement recently and states she has been home with a friend recuperating.  She has had some post-op pain for which she has been taking percocet.  She states her friend told her she woke up in the night and wasn't acting herself, her friend took her BP and it was low, she was hypoxic and tachycardic so [facility] was called and she was brought to [facility].  Here she was fluid resuscitated and started having diarrhea which gradually turned into bright red blood.  Her BP has been stable with fluid resuscitation and hgb is relatively stable thus far.  She has not required blood yet.  GI was consulted and plan to scope tomorrow.  She does report a history of diverticulitis s/p large bowel resection as well as frequent bouts of IBS with nausea and diarrhea.  She also has a history of c diff.  CT in ER showed pancolitis.        Code Status  Full Code    Review of Systems  Review of Systems   Constitutional:  Positive for malaise/fatigue. Negative for chills and fever.   HENT:  Negative for congestion.    Respiratory:  Positive for cough and shortness of breath. Negative for sputum production.    Cardiovascular:  Negative for chest pain, palpitations and leg swelling.   Gastrointestinal:  Positive for abdominal pain, blood in stool, diarrhea and nausea.   Musculoskeletal:  Positive for joint pain.   Neurological:  Negative for dizziness and headaches.       Past Medical History   has a past medical history of Anemia, Anesthesia, Arthritis, Asthma, CAD (coronary artery disease), Cataract, COPD (chronic obstructive pulmonary disease) (HCC), Dental disorder,  Depression, Diabetes (HCC), Disorder of thyroid, Heart burn, HTN (hypertension), Hyperlipidemia, Indigestion, Migraines, Obesity, Pneumonia (2023), PONV (postoperative nausea and vomiting), and Sleep apnea.    She has no past medical history of Acute nasopharyngitis, Arrhythmia, Blood clotting disorder (HCC), Bowel habit changes, Breath shortness, Bronchitis, Cancer (HCC), Carcinoma in situ of respiratory system, Congestive heart failure (HCC), Continuous ambulatory peritoneal dialysis status (HCC), Coughing blood, Dialysis patient (HCC), Glaucoma, Gynecological disorder, Heart murmur, Heart valve disease, Hemorrhagic disorder (HCC), Hepatitis A, Hepatitis B, Hepatitis C, Hiatus hernia syndrome, Infectious disease, Jaundice, Myocardial infarct (HCC), Pacemaker, Pregnant, Renal disorder, Rheumatic fever, Seizure (HCC), Snoring, Stroke (HCC), Tuberculosis, Urinary bladder disorder, or Urinary incontinence.    Surgical History   has a past surgical history that includes appendectomy (N/A, 1976); abdominal hysterectomy total (N/A, 1978); breast biopsy (1984); colectomy (N/A, 2007); lumbar laminectomy diskectomy (N/A, 1989); arthroplasty (Right, 2020); lumbar exploration (N/A, 2017); insertion, peripheral nerve stimulator, lower extremity (Left, 12/22/2022); and pr reconstr total shoulder implant (Right, 4/30/2024).    Family History  family history includes Alcohol abuse in her daughter; Cancer in her mother; Diabetes in her brother, brother, father, and maternal aunt; Drug abuse in her brother and daughter; Heart Disease in her brother, brother, brother, brother, father, and mother; Hyperlipidemia in her brother, father, and mother; Hypertension in her brother, father, maternal aunt, and maternal aunt; Other in her brother; Stroke in her father.    Social History   reports that she has never smoked. She has never used smokeless tobacco. She reports that she does not drink alcohol and does not use  drugs.    Medications  Home Medications       Reviewed by Yulissa Garcia R.N. (Registered Nurse) on 05/03/24 at 1156  Med List Status: Complete     Medication Last Dose Status   albuterol 108 (90 Base) MCG/ACT Aero Soln inhalation aerosol 5/2/2024 Active   alendronate (FOSAMAX) 70 MG Tab NOT YET STARTED Active   amitriptyline (ELAVIL) 100 MG Tab 4/29/2024 Active   escitalopram (LEXAPRO) 20 MG tablet 5/2/2024 Active   ezetimibe (ZETIA) 10 MG Tab 5/1/2024 Active   liothyronine (CYTOMEL) 5 MCG Tab 5/2/2024 Active   lisinopril (PRINIVIL) 2.5 MG Tab 5/2/2024 Active   metFORMIN (GLUCOPHAGE) 500 MG Tab 5/2/2024 Active   metoprolol SR (TOPROL XL) 25 MG TABLET SR 24 HR ABOUT 1 WEEK Active   omeprazole (PRILOSEC) 20 MG delayed-release capsule 5/2/2024 Active   ondansetron (ZOFRAN ODT) 8 MG TABLET DISPERSIBLE PRN Active   oxyCODONE-acetaminophen (PERCOCET) 7.5-325 MG per tablet FEW DAYS AGO Active   rosuvastatin (CRESTOR) 20 MG Tab 5/1/2024 Active   sulfamethoxazole-trimethoprim (BACTRIM DS) 800-160 MG tablet COMPLETED Active   sumatriptan (IMITREX) 100 MG tablet 5/2/2024 Active   SYNTHROID 88 MCG Tab 5/2/2024 Active                  Current Facility-Administered Medications   Medication Dose Route Frequency Provider Last Rate Last Admin    pantoprazole (Protonix) injection 40 mg  40 mg Intravenous BID Elsa Paul M.D.   40 mg at 05/03/24 0051    NS infusion 1,000 mL  1,000 mL Intravenous Once Elsa Paul M.D.   Held at 05/03/24 0100    prochlorperazine (Compazine) injection 10 mg  10 mg Intravenous Q6HRS PRN Elsa Paul M.D.   10 mg at 05/03/24 1423    insulin regular (HumuLIN R,NovoLIN R) injection  2-10 Units Subcutaneous Q6HRS Elsa Paul M.D.        HYDROmorphone (Dilaudid) injection 0.5 mg  0.5 mg Intravenous Q3HRS PRN Elsa Paul M.D.   0.5 mg at 05/03/24 1316    lactated ringers infusion   Intravenous Continuous Kina Salinas M.D. 125 mL/hr at 05/03/24  1305 New Bag at 05/03/24 1305    dextrose 10 % BOLUS 25 g  25 g Intravenous Q15 MIN PRN Kina Salinas M.D.        polyethylene glycol-electrolytes (Golytely) solution 2 L  2 L Oral Once Tyson Power M.D.        vancomycin 50 mg/mL oral soln 125 mg  125 mg Oral Q6HRS Guerda French M.D.        labetalol (Normodyne/Trandate) injection 10 mg  10 mg Intravenous Q4HRS PRN Guerda French M.D.        Pharmacy Consult Request - C. difficile consult  1 Each Other PHARMACY TO DOSE Jay Jain M.D.        amitriptyline (Elavil) tablet 100 mg  100 mg Oral Nightly Elsa Paul M.D.        escitalopram (Lexapro) tablet 20 mg  20 mg Oral DAILY Elsa Paul M.D.        ezetimibe (Zetia) tablet 10 mg  10 mg Oral Q EVENING Elsa Paul M.D.        HYDROcodone/acetaminophen (Norco)  MG per tablet 1-2 Tablet  1-2 Tablet Oral Q6HRS PRN Elsa Paul M.D.        liothyronine (Cytomel) tablet 5 mcg  5 mcg Oral DAILY Elsa Paul M.D.        rosuvastatin (Crestor) tablet 20 mg  20 mg Oral Q EVENING Elsa Paul M.D.        levothyroxine (Synthroid) tablet 88 mcg  88 mcg Oral DAILY Elsa Paul M.D.        acetaminophen (Tylenol) tablet 650 mg  650 mg Oral Q6HRS PRN Elsa Paul M.D.        ondansetron (Zofran) syringe/vial injection 4 mg  4 mg Intravenous Q4HRS PRN Elsa Paul M.D.   4 mg at 05/03/24 1300    ondansetron (Zofran ODT) dispertab 4 mg  4 mg Oral Q4HRS PRN Elsa Paul M.D.        piperacillin-tazobactam (Zosyn) 3.375 g in  mL IVPB  3.375 g Intravenous Q8HRS Elsa Paul M.D. 25 mL/hr at 05/03/24 1352 3.375 g at 05/03/24 1352       Allergies  Allergies   Allergen Reactions    Amlodipine Swelling    Bee Swelling    Gabapentin Hives and Swelling    Pregabalin Hives and Swelling    Rifampin Unspecified     Pt turns yellow    Fentanyl Vomiting    Morphine Vomiting    Benzoin Rash      Tincture of benzoin =blisters       Vital Signs last 24 hours  Temp:  [36 °C (96.8 °F)-37.6 °C (99.7 °F)] 37.6 °C (99.7 °F)  Pulse:  [119-137] 125  Resp:  [14-33] 14  BP: ()/(35-73) 156/66  SpO2:  [91 %-100 %] 100 %    Physical Exam  Physical Exam  Constitutional:       Appearance: She is ill-appearing.   HENT:      Head: Atraumatic.      Mouth/Throat:      Mouth: Mucous membranes are dry.   Eyes:      Extraocular Movements: Extraocular movements intact.   Cardiovascular:      Rate and Rhythm: Regular rhythm. Tachycardia present.   Pulmonary:      Effort: Pulmonary effort is normal. No respiratory distress.      Breath sounds: Normal breath sounds. No wheezing.   Abdominal:      General: Abdomen is flat.      Palpations: Abdomen is soft.      Tenderness: There is no abdominal tenderness. There is no guarding.   Musculoskeletal:         General: No swelling or deformity.   Skin:     General: Skin is warm and dry.   Neurological:      General: No focal deficit present.      Mental Status: She is alert and oriented to person, place, and time.         Fluids    Intake/Output Summary (Last 24 hours) at 5/3/2024 1451  Last data filed at 5/3/2024 1400  Gross per 24 hour   Intake 8978.63 ml   Output 625 ml   Net 8353.63 ml       Laboratory  Recent Results (from the past 48 hour(s))   Lactic Acid    Collection Time: 05/02/24  6:50 PM   Result Value Ref Range    Lactic Acid 2.9 (H) 0.5 - 2.0 mmol/L   CBC with Differential    Collection Time: 05/02/24  6:50 PM   Result Value Ref Range    WBC 17.1 (H) 4.8 - 10.8 K/uL    RBC 4.56 4.20 - 5.40 M/uL    Hemoglobin 12.1 12.0 - 16.0 g/dL    Hematocrit 39.3 37.0 - 47.0 %    MCV 86.2 81.4 - 97.8 fL    MCH 26.5 (L) 27.0 - 33.0 pg    MCHC 30.8 (L) 32.2 - 35.5 g/dL    RDW 48.3 35.9 - 50.0 fL    Platelet Count 294 164 - 446 K/uL    MPV 9.4 9.0 - 12.9 fL    Neutrophils-Polys 79.70 (H) 44.00 - 72.00 %    Lymphocytes 14.60 (L) 22.00 - 41.00 %    Monocytes 4.10 0.00 - 13.40 %     Eosinophils 0.60 0.00 - 6.90 %    Basophils 0.20 0.00 - 1.80 %    Immature Granulocytes 0.80 0.00 - 0.90 %    Nucleated RBC 0.00 0.00 - 0.20 /100 WBC    Neutrophils (Absolute) 13.63 (H) 1.82 - 7.42 K/uL    Lymphs (Absolute) 2.49 1.00 - 4.80 K/uL    Monos (Absolute) 0.70 0.00 - 0.85 K/uL    Eos (Absolute) 0.11 0.00 - 0.51 K/uL    Baso (Absolute) 0.04 0.00 - 0.12 K/uL    Immature Granulocytes (abs) 0.14 (H) 0.00 - 0.11 K/uL    NRBC (Absolute) 0.00 K/uL   Complete Metabolic Panel    Collection Time: 05/02/24  6:50 PM   Result Value Ref Range    Sodium 138 135 - 145 mmol/L    Potassium 3.4 (L) 3.6 - 5.5 mmol/L    Chloride 104 96 - 112 mmol/L    Co2 18 (L) 20 - 33 mmol/L    Anion Gap 16.0 7.0 - 16.0    Glucose 141 (H) 65 - 99 mg/dL    Bun 16 8 - 22 mg/dL    Creatinine 1.03 0.50 - 1.40 mg/dL    Calcium 8.4 8.4 - 10.2 mg/dL    Correct Calcium 8.9 8.5 - 10.5 mg/dL    AST(SGOT) 36 12 - 45 U/L    ALT(SGPT) 11 2 - 50 U/L    Alkaline Phosphatase 125 (H) 30 - 99 U/L    Total Bilirubin 0.5 0.1 - 1.5 mg/dL    Albumin 3.4 3.2 - 4.9 g/dL    Total Protein 6.0 6.0 - 8.2 g/dL    Globulin 2.6 1.9 - 3.5 g/dL    A-G Ratio 1.3 g/dL   Blood Culture - Draw one from central line and one from peripheral site    Collection Time: 05/02/24  6:50 PM    Specimen: Peripheral; Blood   Result Value Ref Range    Significant Indicator NEG     Source BLD     Site PERIPHERAL     Culture Result       No Growth  Note: Blood cultures are incubated for 5 days and  are monitored continuously.Positive blood cultures  are called to the RN and reported as soon as  they are identified.  Blood culture testing and Gram stain, if indicated, are  performed at [facility], [address] [place] [address].  Positive blood cultures are  sent to [facility], [address] [place] [address], for organism identification and  susceptibility testing.     Lipase    Collection Time: 05/02/24  6:50 PM   Result Value Ref Range     Lipase 46 11 - 82 U/L   Troponin    Collection Time: 24  6:50 PM   Result Value Ref Range    Troponin T 38 (H) 6 - 19 ng/L   proBrain Natriuretic Peptide, NT    Collection Time: 24  6:50 PM   Result Value Ref Range    NT-proBNP 567 (H) 0 - 125 pg/mL   ESTIMATED GFR    Collection Time: 24  6:50 PM   Result Value Ref Range    GFR (CKD-EPI) 57 (A) >60 mL/min/1.73 m 2   EKG    Collection Time: 24  6:57 PM   Result Value Ref Range    Report       [facility] Emergency Dept.    Test Date:  2024  Pt Name:    SIMI NELSON               Department: [facility]  MRN:        [id number]                      [facility]  Gender:     Female                       Technician: [id number]  :        1950                   Requested By:JO-ANN ROSALES  Order #:    [id number]                    Reading MD: JO-ANN ROSALES MD    Measurements  Intervals                                Axis  Rate:       134                          P:          92  SC:         144                          QRS:        47  QRSD:       81                           T:          -13  QT:         284  QTc:        424    Interpretive Statements  Sinus tachycardia  Borderline T abnormalities, inferior leads  Compared to ECG 2023 14:14:34  T-wave abnormality now present  Electronically Signed On 2024 20:18:00 PDT by JO-ANN ROSALES MD     CBC without Differential    Collection Time: 24 12:42 AM   Result Value Ref Range    WBC 14.9 (H) 4.8 - 10.8 K/uL    RBC 4.17 (L) 4.20 - 5.40 M/uL    Hemoglobin 11.5 (L) 12.0 - 16.0 g/dL    Hematocrit 36.2 (L) 37.0 - 47.0 %    MCV 86.8 81.4 - 97.8 fL    MCH 27.6 27.0 - 33.0 pg    MCHC 31.8 (L) 32.2 - 35.5 g/dL    RDW 49.3 35.9 - 50.0 fL    Platelet Count 232 164 - 446 K/uL    MPV 9.7 9.0 - 12.9 fL   LACTIC ACID    Collection Time: 24 12:42 AM   Result Value Ref Range    Lactic Acid 3.1 (H) 0.5 - 2.0 mmol/L   CoV-2, Flu A/B, And RSV by PCR ([employer])     Collection Time: 05/03/24  4:33 AM    Specimen: Nasal; Respirate   Result Value Ref Range    Influenza virus A RNA Negative Negative    Influenza virus B, PCR Negative Negative    RSV, PCR Negative Negative    SARS-CoV-2 by PCR NotDetected     SARS-CoV-2 Source NP Swab    TROPONIN    Collection Time: 05/03/24  4:53 AM   Result Value Ref Range    Troponin T 25 (H) 6 - 19 ng/L   Prothrombin Time    Collection Time: 05/03/24  4:53 AM   Result Value Ref Range    PT 13.7 12.0 - 14.6 sec    INR 1.00 0.87 - 1.13   LACTIC ACID    Collection Time: 05/03/24  4:53 AM   Result Value Ref Range    Lactic Acid 3.0 (H) 0.5 - 2.0 mmol/L   Comp Metabolic Panel    Collection Time: 05/03/24  4:53 AM   Result Value Ref Range    Sodium 136 135 - 145 mmol/L    Potassium 3.7 3.6 - 5.5 mmol/L    Chloride 103 96 - 112 mmol/L    Co2 19 (L) 20 - 33 mmol/L    Anion Gap 14.0 7.0 - 16.0    Glucose 136 (H) 65 - 99 mg/dL    Bun 14 8 - 22 mg/dL    Creatinine 0.96 0.50 - 1.40 mg/dL    Calcium 7.7 (L) 8.4 - 10.2 mg/dL    Correct Calcium 8.5 8.5 - 10.5 mg/dL    AST(SGOT) 41 12 - 45 U/L    ALT(SGPT) 19 2 - 50 U/L    Alkaline Phosphatase 132 (H) 30 - 99 U/L    Total Bilirubin 0.6 0.1 - 1.5 mg/dL    Albumin 3.0 (L) 3.2 - 4.9 g/dL    Total Protein 5.4 (L) 6.0 - 8.2 g/dL    Globulin 2.4 1.9 - 3.5 g/dL    A-G Ratio 1.3 g/dL   HEMOGLOBIN AND HEMATOCRIT    Collection Time: 05/03/24  4:53 AM   Result Value Ref Range    Hemoglobin 11.4 (L) 12.0 - 16.0 g/dL    Hematocrit 36.1 (L) 37.0 - 47.0 %   ESTIMATED GFR    Collection Time: 05/03/24  4:53 AM   Result Value Ref Range    GFR (CKD-EPI) 62 >60 mL/min/1.73 m 2   LACTIC ACID    Collection Time: 05/03/24  8:22 AM   Result Value Ref Range    Lactic Acid 4.7 (HH) 0.5 - 2.0 mmol/L   POCT glucose device results    Collection Time: 05/03/24  9:19 AM   Result Value Ref Range    POC Glucose, Blood 133 (H) 65 - 99 mg/dL   POCT glucose device results    Collection Time: 05/03/24  1:56 PM   Result Value Ref Range    POC  Glucose, Blood 131 (H) 65 - 99 mg/dL       Imaging  [id number]   Final Result         1.  Common bile duct dilatation, could represent age-related changes, consider causes of biliary obstruction with additional workup as clinically appropriate.   2.  Hepatomegaly   3.  Echogenic liver, compatible with fatty change versus fibrosis.   4.  Atherosclerosis      CT-CTA CHEST PULMONARY ARTERY W/ RECONS   Final Result         1.  No pulmonary embolus appreciated.   2.  Fluid-filled distention of esophagus, consider gastroesophageal reflux versus dysmotility or component of gastroesophageal junction obstruction. Could be further evaluated with swallow study as clinically appropriate.   3.  Irregular hepatic contour favoring changes of cirrhosis   4.  Air and fluid tracking along the right axilla and shoulder soft tissues, within expected limits for recent postop changes.   5.  Pulmonary nodules, see nodule follow-up recommendations below.      Fleischner Society pulmonary nodule recommendations:   Low Risk: No routine follow-up      High Risk: Optional CT at 12 months      Comments: Use most suspicious nodule as guide to management. Follow-up intervals may vary according to size and risk.      Low Risk - Minimal or absent history of smoking and of other known risk factors.      High Risk - History of smoking or of other known risk factors.      Note: These recommendations do not apply to lung cancer screening, patients with immunosuppression, or patients with known primary cancer.      Fleischner Society 2017 Guidelines for Management of Incidentally Detected Pulmonary Nodules in Adults         CT-ABDOMEN-PELVIS WITH   Final Result         1.  Diffuse colonic wall thickening with hazy pericolonic fat stranding, appearance favoring changes of pancolitis.   2.  Intrahepatic and extrahepatic biliary ductal dilatation, consider causes of biliary obstruction with additional workup as clinically appropriate      DX-CHEST-PORTABLE (1  VIEW)    (Results Pending)       Assessment/Plan  #GIB 2/2 colitis   #History of c diff  Plan:  - Admit to ICU for monitoring  - Q6H H/H  - Dr. Power on board  - Fluid resuscitated   - Transfuse for hemoglobin < 7  - Consented for blood  - Protonix 40 BID  - c diff pending  - PO vanc for now  - Prep for colo  - Plan for colonoscopy tomorrow     #Lactic Acidosis - improving  Plan:  - Trend until < 2.0    #Pancolitis with history of diverticulitis  #History of C diff  Plan:  - GI Following  - bowel rest  - D/C PO vanc    #s/p right total shoulder replacement  Plan  - Pain control as needed  - Patient is NOT TO BE PLACED ON HER RIGHT SIDE    #Acute Hypoxic Respiratory Failure  Plan:  - Continue abx coverage  - Aggressive diuresis  - HOB elevation  - Aspiration precautions  - Titrate oxygen to an SpO2 of 88-94%  - Airway clearance as needed    #T2DM  Plan:  - Accuchecks AC/HS  - SSI insulin per scale  - Hold PO meds while in ICU  - Hypoglycemia protocol ordered    #Hypothyroidism  Plan:  - Continue home levothyroxine and cytomel    #Depression  Plan:  - Continue amitriptyline and lexapro    #HLD  Plan:  - Continue zetia    Discussed patient condition and risk of morbidity and/or mortality with RN, RT, and Pharmacy.    The patient remains critically ill.  Critical care time = 60 minutes in directly providing and coordinating critical care and extensive data review.  No time overlap and excludes procedures.    Guerda French MD RD  Pulmonary and Critical Care    Available on Voalte

## 2024-05-03 NOTE — H&P
[facility] Medicine History & Physical Note    Date of Service  5/2/2024    Primary Care Physician  Cole Yuen M.D.    Consultants  None    Code Status  Full Code    Chief Complaint  Chief Complaint   Patient presents with    Near Syncopal    Hypotension       History of Presenting Illness  Yamile Go is a 74 y.o. female, with h/o HTN, HLD, DM2, Hypothyroidism, Depression.  She underwent right reverse total shoulder arthroplasty with proximal biceps tenodesis surgery by Dr. Sparks on 4/30 (POD #2). Patient presented to the emergency department on 5/2/2024 with complaints of abdominal pain, general malaise and near syncopal episode at home.  Denies fever at home.      ER COURSE:  -Hypotensive on arrival with blood pressure 91/46.  Tachycardic with heart rate ranging from 120 to 130 bpm.  Hypoxic requiring 6 L of oxygen initially via nasal cannula, now down to 4 L.  -Developed profuse watery diarrhea in the ED, causing even delay on her CT scan because of it.  After so many episodes, now turning into blood-tinged bowel movements.  She also started having vomiting.    -Patient received 2.1 L LR bolus in addition to 1 more liter of NS.  -CT scan of the abdomen and pelvis showing pancolitis.  CTA of the chest is negative for PE.    I discussed the plan of care with patient and ERP Dr. Jain .    Review of Systems  Review of Systems   Constitutional:  Positive for chills and malaise/fatigue. Negative for fever.   HENT:  Negative for congestion and sore throat.    Eyes:  Negative for blurred vision and double vision.   Respiratory:  Negative for cough and shortness of breath.    Cardiovascular:  Positive for palpitations. Negative for chest pain.   Gastrointestinal:  Positive for abdominal pain, blood in stool, diarrhea, nausea and vomiting.   Genitourinary:  Negative for dysuria and urgency.   Musculoskeletal:  Negative for myalgias and neck pain.   Skin:  Negative for itching and rash.   Neurological:   Negative for dizziness, weakness and headaches.   Endo/Heme/Allergies:  Does not bruise/bleed easily.   Psychiatric/Behavioral:  Negative for depression. The patient does not have insomnia.        Past Medical History   has a past medical history of Anemia, Anesthesia, Arthritis, Asthma, CAD (coronary artery disease), Cataract, COPD (chronic obstructive pulmonary disease) ([facility]), Dental disorder, Depression, Diabetes (HCC), Disorder of thyroid, Heart burn, HTN (hypertension), Hyperlipidemia, Indigestion, Migraines, Obesity, Pneumonia (2023), PONV (postoperative nausea and vomiting), and Sleep apnea.    Surgical History   has a past surgical history that includes appendectomy (N/A, 1976); abdominal hysterectomy total (N/A, 1978); breast biopsy (1984); colectomy (N/A, 2007); lumbar laminectomy diskectomy (N/A, 1989); arthroplasty (Right, 2020); lumbar exploration (N/A, 2017); insertion, peripheral nerve stimulator, lower extremity (Left, 12/22/2022); and pr reconstr total shoulder implant (Right, 4/30/2024).     Family History  family history includes Alcohol abuse in her daughter; Cancer in her mother; Diabetes in her brother, brother, father, and maternal aunt; Drug abuse in her brother and daughter; Heart Disease in her brother, brother, brother, brother, father, and mother; Hyperlipidemia in her brother, father, and mother; Hypertension in her brother, father, maternal aunt, and maternal aunt; Other in her brother; Stroke in her father.   Family history reviewed with patient. There is no family history that is pertinent to the chief complaint.     Social History   reports that she has never smoked. She has never used smokeless tobacco. She reports that she does not drink alcohol and does not use drugs.    Allergies  Allergies   Allergen Reactions    Amlodipine Swelling    Bee Swelling    Gabapentin Hives and Swelling    Pregabalin Hives and Swelling    Rifampin Unspecified     Pt turns yellow    Fentanyl Vomiting     Morphine Vomiting    Benzoin Rash     Tincture of benzoin =blisters       Medications  Prior to Admission Medications   Prescriptions Last Dose Informant Patient Reported? Taking?   EPINEPHrine (EPIPEN) 0.3 MG/0.3ML Solution Auto-injector solution for injection   Yes No   HYDROcodone/acetaminophen (NORCO)  MG Tab  Patient Yes No   Sig: Take 1-2 Tablets by mouth every 6 hours as needed for Severe Pain.   SYNTHROID 88 MCG Tab  Patient No No   Sig: Take 1 Tablet by mouth every day.   albuterol 108 (90 Base) MCG/ACT Aero Soln inhalation aerosol  Patient Yes No   Sig: Inhale 2 Puffs every 6 hours as needed for Shortness of Breath.   amitriptyline (ELAVIL) 100 MG Tab  Patient Yes No   Sig: Take 100 mg by mouth every evening.   escitalopram (LEXAPRO) 20 MG tablet  Patient No No   Sig: Take 1 Tablet by mouth every day.   Patient taking differently: Take 20 mg by mouth every morning.   ezetimibe (ZETIA) 10 MG Tab  Patient Yes No   Sig: Take 10 mg by mouth every evening.   liothyronine (CYTOMEL) 5 MCG Tab  Patient Yes No   Sig: Take 5 mcg by mouth every day.   metFORMIN (GLUCOPHAGE) 500 MG Tab  Patient Yes No   Sig: Take 500 mg by mouth 2 times a day with meals.   metoprolol SR (TOPROL XL) 25 MG TABLET SR 24 HR  Patient Yes No   Sig: Take 25 mg by mouth every day.   omeprazole (PRILOSEC) 20 MG delayed-release capsule  Patient No No   Sig: Take 1 Capsule by mouth every day.   Patient taking differently: Take 20 mg by mouth 2 times a day.   ondansetron (ZOFRAN ODT) 4 MG TABLET DISPERSIBLE  Patient Yes No   Sig: Take 4 mg by mouth every 6 hours as needed for Nausea/Vomiting.   oxyCODONE-acetaminophen (PERCOCET) 7.5-325 MG per tablet   Yes No   Sig: as needed.   rosuvastatin (CRESTOR) 20 MG Tab  Patient No No   Sig: Take 1 Tablet by mouth every evening.   sumatriptan (IMITREX) 100 MG tablet  Patient Yes No   Sig: Take 100 mg by mouth one time as needed for Migraine.      Facility-Administered Medications: None        Physical Exam  Temp:  [36 °C (96.8 °F)] 36 °C (96.8 °F)  Pulse:  [123-137] 129  Resp:  [16-23] 23  BP: ()/(35-71) 111/53  SpO2:  [91 %-98 %] 95 %  Blood Pressure : 111/53   Temperature: 36 °C (96.8 °F)   Pulse: (!) 129   Respiration: (!) 23   Pulse Oximetry: 95 %       Physical Exam  Constitutional:       Appearance: Normal appearance. She is ill-appearing.   HENT:      Head: Normocephalic and atraumatic.      Mouth/Throat:      Mouth: Mucous membranes are moist.      Pharynx: Oropharynx is clear.   Eyes:      Extraocular Movements: Extraocular movements intact.      Pupils: Pupils are equal, round, and reactive to light.   Cardiovascular:      Rate and Rhythm: Regular rhythm. Tachycardia present.      Heart sounds: Normal heart sounds.   Pulmonary:      Effort: Pulmonary effort is normal.      Breath sounds: Normal breath sounds.   Abdominal:      General: Abdomen is flat. Bowel sounds are normal. There is no distension.      Palpations: Abdomen is soft.      Tenderness: There is abdominal tenderness (Mild diffuse). There is no guarding or rebound.   Musculoskeletal:      Cervical back: Normal range of motion and neck supple.   Skin:     General: Skin is warm and dry.   Neurological:      General: No focal deficit present.      Mental Status: She is alert and oriented to person, place, and time.   Psychiatric:         Mood and Affect: Mood normal.         Behavior: Behavior normal.         Laboratory:  Recent Labs     05/02/24  1850   WBC 17.1*   RBC 4.56   HEMOGLOBIN 12.1   HEMATOCRIT 39.3   MCV 86.2   MCH 26.5*   MCHC 30.8*   RDW 48.3   PLATELETCT 294   MPV 9.4     Recent Labs     05/02/24  1850   SODIUM 138   POTASSIUM 3.4*   CHLORIDE 104   CO2 18*   GLUCOSE 141*   BUN 16   CREATININE 1.03   CALCIUM 8.4     Recent Labs     05/02/24  1850   ALTSGPT 11   ASTSGOT 36   ALKPHOSPHAT 125*   TBILIRUBIN 0.5   LIPASE 46   GLUCOSE 141*         Recent Labs     05/02/24  1850   NTPROBNP 567*         Recent Labs      05/02/24  1850   TROPONINT 38*       Imaging:  CT-CTA CHEST PULMONARY ARTERY W/ RECONS   Final Result         1.  No pulmonary embolus appreciated.   2.  Fluid-filled distention of esophagus, consider gastroesophageal reflux versus dysmotility or component of gastroesophageal junction obstruction. Could be further evaluated with swallow study as clinically appropriate.   3.  Irregular hepatic contour favoring changes of cirrhosis   4.  Air and fluid tracking along the right axilla and shoulder soft tissues, within expected limits for recent postop changes.   5.  Pulmonary nodules, see nodule follow-up recommendations below.      Fleischner Society pulmonary nodule recommendations:   Low Risk: No routine follow-up      High Risk: Optional CT at 12 months      Comments: Use most suspicious nodule as guide to management. Follow-up intervals may vary according to size and risk.      Low Risk - Minimal or absent history of smoking and of other known risk factors.      High Risk - History of smoking or of other known risk factors.      Note: These recommendations do not apply to lung cancer screening, patients with immunosuppression, or patients with known primary cancer.      Fleischner Society 2017 Guidelines for Management of Incidentally Detected Pulmonary Nodules in Adults         CT-ABDOMEN-PELVIS WITH   Final Result         1.  Diffuse colonic wall thickening with hazy pericolonic fat stranding, appearance favoring changes of pancolitis.   2.  Intrahepatic and extrahepatic biliary ductal dilatation, consider causes of biliary obstruction with additional workup as clinically appropriate          X-Ray:  I have personally reviewed the images and compared with prior images. and My impression is: CTA of the chest was negative for PE.  She has findings of GERD or esophageal dysmotility on CT.  Postoperative from shoulder surgery is noted.  On her CT abdomen and pelvis she has diffuse colonic wall thickening and hazy  pericolonic fat stranding favoring pancolitis.  She also has intrahepatic and extrahepatic biliary duct dilation  EKG:  I have personally reviewed the images and compared with prior images. and My impression is: Sinus tachycardia 134 bpm, no acute ST elevation..    Assessment/Plan:  Justification for Admission Status  I anticipate this patient will require at least two midnights for appropriate medical management, necessitating inpatient admission because 74-year-old female, coming with sepsis secondary to pancolitis.  She also has acute respiratory failure currently on 4 L of oxygen, near syncopal episode and ongoing profuse diarrhea, nausea and vomiting      * Sepsis (HCC)- (present on admission)  Assessment & Plan  Inpatient status on medical floor  This is Sepsis Present on admission  SIRS criteria identified on my evaluation include: Tachycardia, with heart rate greater than 90 BPM and Leukocytosis, with WBC greater than 12,000  Clinical indicators of end organ dysfunction include Lactic Acid greater than 2 and Acute Respiratory Failure - (mechanical ventilation or BiPap or PaO2/FiO2 ratio < 300)  Source is pancolitis  Sepsis protocol initiated  Crystalloid Fluid Administration: Fluid resuscitation ordered per standard protocol - 30 mL/kg per current or ideal body weight  IV antibiotics as appropriate for source of sepsis: I added blood cultures and sepsis protocol was initiated.  Patient had C. difficile test sent out, she received IV antibiotics for her shoulder surgery 2 days ago.  I ordered p.o. vancomycin and Zosyn wanting to cover empirically for C. difficile colitis but patient is not vomiting and unable to keep her p.o. vancomycin down.  There is a rapid progression of her diarrhea and vomiting that we will need to be carefully monitor.  Reassessment: I have reassessed the patient's hemodynamic status    CT of the abdomen pelvis is also showing Intrahepatic and extrahepatic biliary ductal dilation.  She  does not have elevated LFTs but given her severe progression, I will add RUQ US may need maybe MRCP   Closely monitor chemistry panel.    Rectal bleeding  Assessment & Plan  -Under the context of sepsis and pancolitis.  -Patient was not having diarrhea prior to arrival to the ED but had multiple episodes of profuse watery diarrhea that is now transforming into a light tinge of blood.  -She was taking baby aspirin twice a day for DVT prophylaxis after her shoulder surgery 2 days ago.  I am holding aspirin for now.  -Serial H&H added and patient will be started on Protonix empirically.  -Closely monitor this.  Even though ischemic bowel is unlikely, still a possible differential given rapid progression.  -Patient is not in a lot of abdominal pain, her physical exam is not showing any acute abdomen.  Her CT scan did demonstrated pancolitis.  -Closely follow-up lactic acid, low threshold for ICU upgrade if worsening.    Lactic acidosis  Assessment & Plan  -Lactic acid: 2.9> 3.1 after fluid.  -Her symptoms overall are worsening.  I added an additional 500 mL NS bolus.  Low threshold for ICU upgrade if worsening.  -Serial lactic ordered.    Pancolitis (HCC)  Assessment & Plan  -CT of the abdomen and pelvis showing pancolitis.  She is having profuse diarrhea that is now also having a tinge of blood.  -She was a started on IV antibiotics with Zosyn.  I also think she needs empiric coverage for C. difficile colitis until C. difficile test is back and ordered p.o. vancomycin however patient is also vomiting now.  -Serial lactic acid ordered, she will need electrolyte replacements and IV fluids.    S/P shoulder surgery  Assessment & Plan  -s/p  Right reverse total shoulder arthroplasty with proximal biceps tenodesis surgery by Dr. Sparks on 4/30 (POD #2).  -Given her new GI bleed, I will hold baby aspirin twice daily for DVT prophylaxis for now.  -Day team to notify orthopedic surgeon that patient is admitted with  pancolitis.  She might need PT/OT while here.    Near syncope  Assessment & Plan  -Likely due to underlying sepsis.  She was hypotensive on arrival, blood pressure is responsive to IV fluids.  Does have a history of CAD and her initial troponin was 38.  Will repeat another Trope at 4 in the morning.  She is not complaining of chest pain.  Her EKG showing sinus tachycardia but no acute ST elevation.  -Closely monitor on cardiac monitor.    Acute respiratory failure (HCC)  Assessment & Plan  -She is currently requiring 4 L of supplemental oxygen via nasal cannula to maintain O2 saturations above 90%.  -Recent shoulder surgery 2 days ago, tachycardic and hypoxic therefore CTA of the chest was performed and negative for PE.  -Her symptoms seems to be related to sepsis with component of respiratory failure.  -I am adding COVID and viral panel as well.    Essential hypertension, benign- (present on admission)  Assessment & Plan  -She takes metoprolol which I will hold given hypotension under the context of holding sepsis    Type 2 diabetes mellitus (HCC)- (present on admission)  Assessment & Plan  -Ordering regular insulin sliding scale.  Takes metformin at home.    Hypothyroidism- (present on admission)  Assessment & Plan  -She takes levothyroxine and Cytomel. Currently NPO.    Depression- (present on admission)  Assessment & Plan  -She is on amitriptyline and Lexapro.  Currently NPO    Dyslipidemia- (present on admission)  Assessment & Plan  -Patient takes Zetia.  I just made her n.p.o. for now.        VTE prophylaxis: SCDs/TEDs      The patient remains critically ill.  Critical care time =53  minutes in directly providing and coordinating critical care and extensive data review.  This includes time spent before the visit reviewing the chart, time spent evaluating the patient face-to-face  in the room, time discussing and updating Nurnsing Staff about plan and time spent after the visit reviwing results and on  documentation. No time overlap and excludes procedures.

## 2024-05-03 NOTE — ED NOTES
Pt is resting. Pt has been struggling with multiple bowel movements, N/V and pain. Pt began having gross blood in her stool at approx 0100. Dr. Acuna was informed and case discussed at length. Dr. Acuna ordered protonix, compezine, dilaudid and a fluid bolus. Orders were also placed for a repeat H&H and lactic. Morning labs also discussed and continued care. Will continue to monitor

## 2024-05-04 LAB
ABO + RH BLD: NORMAL
ABO GROUP BLD: NORMAL
ALBUMIN SERPL BCP-MCNC: 2.4 G/DL (ref 3.2–4.9)
ALBUMIN/GLOB SERPL: 0.9 G/DL
ALP SERPL-CCNC: 106 U/L (ref 30–99)
ALT SERPL-CCNC: 15 U/L (ref 2–50)
ANION GAP SERPL CALC-SCNC: 12 MMOL/L (ref 7–16)
AST SERPL-CCNC: 25 U/L (ref 12–45)
BILIRUB SERPL-MCNC: 0.9 MG/DL (ref 0.1–1.5)
BLD GP AB SCN SERPL QL: NORMAL
BUN SERPL-MCNC: 11 MG/DL (ref 8–22)
CALCIUM ALBUM COR SERPL-MCNC: 8.8 MG/DL (ref 8.5–10.5)
CALCIUM SERPL-MCNC: 7.5 MG/DL (ref 8.4–10.2)
CHLORIDE SERPL-SCNC: 99 MMOL/L (ref 96–112)
CO2 SERPL-SCNC: 21 MMOL/L (ref 20–33)
CREAT SERPL-MCNC: 0.74 MG/DL (ref 0.5–1.4)
ERYTHROCYTE [DISTWIDTH] IN BLOOD BY AUTOMATED COUNT: 47.8 FL (ref 35.9–50)
GFR SERPLBLD CREATININE-BSD FMLA CKD-EPI: 85 ML/MIN/1.73 M 2
GLOBULIN SER CALC-MCNC: 2.8 G/DL (ref 1.9–3.5)
GLUCOSE BLD STRIP.AUTO-MCNC: 109 MG/DL (ref 65–99)
GLUCOSE BLD STRIP.AUTO-MCNC: 114 MG/DL (ref 65–99)
GLUCOSE BLD STRIP.AUTO-MCNC: 77 MG/DL (ref 65–99)
GLUCOSE BLD STRIP.AUTO-MCNC: 91 MG/DL (ref 65–99)
GLUCOSE SERPL-MCNC: 110 MG/DL (ref 65–99)
HCT VFR BLD AUTO: 32.4 % (ref 37–47)
HCT VFR BLD AUTO: 33.9 % (ref 37–47)
HGB BLD-MCNC: 10.2 G/DL (ref 12–16)
HGB BLD-MCNC: 10.8 G/DL (ref 12–16)
LACTATE SERPL-SCNC: 2.2 MMOL/L (ref 0.5–2)
MAGNESIUM SERPL-MCNC: 1.5 MG/DL (ref 1.5–2.5)
MCH RBC QN AUTO: 26.8 PG (ref 27–33)
MCHC RBC AUTO-ENTMCNC: 31.9 G/DL (ref 32.2–35.5)
MCV RBC AUTO: 84.1 FL (ref 81.4–97.8)
PHOSPHATE SERPL-MCNC: 3.2 MG/DL (ref 2.5–4.5)
PLATELET # BLD AUTO: 224 K/UL (ref 164–446)
PMV BLD AUTO: 9.2 FL (ref 9–12.9)
POTASSIUM SERPL-SCNC: 3.4 MMOL/L (ref 3.6–5.5)
PROT SERPL-MCNC: 5.2 G/DL (ref 6–8.2)
RBC # BLD AUTO: 4.03 M/UL (ref 4.2–5.4)
RH BLD: NORMAL
SODIUM SERPL-SCNC: 132 MMOL/L (ref 135–145)
WBC # BLD AUTO: 14.4 K/UL (ref 4.8–10.8)

## 2024-05-04 PROCEDURE — 99291 CRITICAL CARE FIRST HOUR: CPT | Performed by: INTERNAL MEDICINE

## 2024-05-04 RX ORDER — POTASSIUM CHLORIDE 7.45 MG/ML
10 INJECTION INTRAVENOUS
Status: DISPENSED | OUTPATIENT
Start: 2024-05-04 | End: 2024-05-04

## 2024-05-04 RX ORDER — MAGNESIUM SULFATE HEPTAHYDRATE 40 MG/ML
2 INJECTION, SOLUTION INTRAVENOUS ONCE
Status: COMPLETED | OUTPATIENT
Start: 2024-05-04 | End: 2024-05-04

## 2024-05-04 RX ADMIN — PANTOPRAZOLE SODIUM 40 MG: 40 INJECTION, POWDER, LYOPHILIZED, FOR SOLUTION INTRAVENOUS at 17:15

## 2024-05-04 RX ADMIN — ROSUVASTATIN CALCIUM 20 MG: 10 TABLET, FILM COATED ORAL at 17:15

## 2024-05-04 RX ADMIN — ONDANSETRON 4 MG: 2 INJECTION INTRAMUSCULAR; INTRAVENOUS at 20:27

## 2024-05-04 RX ADMIN — PIPERACILLIN SODIUM AND TAZOBACTAM SODIUM 3.38 G: 3; .375 INJECTION, POWDER, LYOPHILIZED, FOR SOLUTION INTRAVENOUS at 12:57

## 2024-05-04 RX ADMIN — PANTOPRAZOLE SODIUM 40 MG: 40 INJECTION, POWDER, LYOPHILIZED, FOR SOLUTION INTRAVENOUS at 05:04

## 2024-05-04 RX ADMIN — HYDROMORPHONE HYDROCHLORIDE 0.5 MG: 1 INJECTION, SOLUTION INTRAMUSCULAR; INTRAVENOUS; SUBCUTANEOUS at 17:15

## 2024-05-04 RX ADMIN — LIOTHYRONINE SODIUM 5 MCG: 5 TABLET ORAL at 05:06

## 2024-05-04 RX ADMIN — PIPERACILLIN SODIUM AND TAZOBACTAM SODIUM 3.38 G: 3; .375 INJECTION, POWDER, LYOPHILIZED, FOR SOLUTION INTRAVENOUS at 20:19

## 2024-05-04 RX ADMIN — LEVOTHYROXINE SODIUM 88 MCG: 0.09 TABLET ORAL at 05:06

## 2024-05-04 RX ADMIN — AMITRIPTYLINE HYDROCHLORIDE 100 MG: 50 TABLET, FILM COATED ORAL at 20:12

## 2024-05-04 RX ADMIN — HYDROCODONE BITARTRATE AND ACETAMINOPHEN 1 TABLET: 10; 325 TABLET ORAL at 12:10

## 2024-05-04 RX ADMIN — PIPERACILLIN SODIUM AND TAZOBACTAM SODIUM 3.38 G: 3; .375 INJECTION, POWDER, LYOPHILIZED, FOR SOLUTION INTRAVENOUS at 04:26

## 2024-05-04 RX ADMIN — POTASSIUM CHLORIDE 10 MEQ: 10 INJECTION, SOLUTION INTRAVENOUS at 11:48

## 2024-05-04 RX ADMIN — ESCITALOPRAM OXALATE 20 MG: 10 TABLET ORAL at 05:06

## 2024-05-04 RX ADMIN — POTASSIUM CHLORIDE 10 MEQ: 10 INJECTION, SOLUTION INTRAVENOUS at 10:29

## 2024-05-04 RX ADMIN — HYDROCODONE BITARTRATE AND ACETAMINOPHEN 1 TABLET: 10; 325 TABLET ORAL at 05:06

## 2024-05-04 RX ADMIN — ACETAMINOPHEN 650 MG: 325 TABLET ORAL at 03:42

## 2024-05-04 RX ADMIN — HYDROCODONE BITARTRATE AND ACETAMINOPHEN 1 TABLET: 10; 325 TABLET ORAL at 20:12

## 2024-05-04 RX ADMIN — EZETIMIBE 10 MG: 10 TABLET ORAL at 17:15

## 2024-05-04 RX ADMIN — MAGNESIUM SULFATE HEPTAHYDRATE 2 G: 2 INJECTION, SOLUTION INTRAVENOUS at 10:33

## 2024-05-04 ASSESSMENT — ENCOUNTER SYMPTOMS
DIZZINESS: 0
PALPITATIONS: 0
SPUTUM PRODUCTION: 0
RESPIRATORY NEGATIVE: 1
BLOOD IN STOOL: 1
ABDOMINAL PAIN: 0
HEADACHES: 0
COUGH: 0
CARDIOVASCULAR NEGATIVE: 1
SHORTNESS OF BREATH: 1
CHILLS: 0
FEVER: 1

## 2024-05-04 ASSESSMENT — PAIN DESCRIPTION - PAIN TYPE
TYPE: ACUTE PAIN

## 2024-05-04 NOTE — PROGRESS NOTES
Pulmonary & Critical Care Progress Note    Date of admission  5/2/2024    Chief Complaint  74 y.o. female admitted 5/2/2024 with Colitis    Hospital Course  74 y.o. female who presented 5/2/2024 with a history of arthritis s/p right total shoulder replacement 4/30, DM, Asthma, CAD, HTN, HLD and hypothyroidism.  Gillian underwent a total right shoulder replacement recently and states she has been home with a friend recuperating.  She has had some post-op pain for which she has been taking percocet.  She states her friend told her she woke up in the night and wasn't acting herself, her friend took her BP and it was low, she was hypoxic and tachycardic so [facility] was called and she was brought to [facility].  Here she was fluid resuscitated and started having diarrhea which gradually turned into bright red blood.  Her BP has been stable with fluid resuscitation and hgb is relatively stable thus far.  She has not required blood yet.  GI was consulted and plan to scope tomorrow.  She does report a history of diverticulitis s/p large bowel resection as well as frequent bouts of IBS with nausea and diarrhea.  She also has a history of c diff.  CT in ER showed pancolitis.       Interval Problem Update  Reviewed last 24 hour events:  Hgb dropped slightly to 10's  Colonoscopy cancelled due patient stabilizing and hematochezia slowing  Hemodynamically stable.     Review of Systems  Review of Systems   Constitutional:  Positive for fever and malaise/fatigue. Negative for chills.   HENT:  Negative for congestion.    Respiratory:  Positive for shortness of breath. Negative for cough and sputum production.    Cardiovascular:  Negative for chest pain, palpitations and leg swelling.   Gastrointestinal:  Positive for blood in stool. Negative for abdominal pain.   Musculoskeletal:  Positive for joint pain.   Neurological:  Negative for dizziness and headaches.        Vital Signs for last 24 hours   Temp:  [36.8 °C (98.3 °F)-37.7 °C (99.9 °F)]  37 °C (98.6 °F)  Pulse:  [] 64  Resp:  [16-54] 18  BP: ()/(46-97) 121/62  SpO2:  [92 %-99 %] 94 %    Hemodynamic parameters for last 24 hours       Respiratory Information for the last 24 hours       Physical Exam   Physical Exam  Constitutional:       Appearance: She is ill-appearing.   HENT:      Head: Atraumatic.      Mouth/Throat:      Mouth: Mucous membranes are dry.   Cardiovascular:      Rate and Rhythm: Normal rate and regular rhythm.   Pulmonary:      Effort: Pulmonary effort is normal.      Breath sounds: Normal breath sounds.   Abdominal:      Palpations: Abdomen is soft.      Tenderness: There is no abdominal tenderness. There is no guarding.   Musculoskeletal:         General: No swelling.   Skin:     General: Skin is warm and dry.   Neurological:      General: No focal deficit present.      Mental Status: She is alert and oriented to person, place, and time.         Medications  Current Facility-Administered Medications   Medication Dose Route Frequency Provider Last Rate Last Admin    potassium chloride (KCL) ivpb 10 mEq  10 mEq Intravenous Q HOUR Guerda French M.D.   Stopped at 05/04/24 1248    pantoprazole (Protonix) injection 40 mg  40 mg Intravenous BID Elsa Paul M.D.   40 mg at 05/04/24 0504    prochlorperazine (Compazine) injection 10 mg  10 mg Intravenous Q6HRS PRN Elsa Paul M.D.   10 mg at 05/03/24 1423    insulin regular (HumuLIN R,NovoLIN R) injection  2-10 Units Subcutaneous Q6HRS Elsa Paul M.D.        HYDROmorphone (Dilaudid) injection 0.5 mg  0.5 mg Intravenous Q3HRS PRN Elsa Paul M.D.   0.5 mg at 05/03/24 1910    dextrose 10 % BOLUS 25 g  25 g Intravenous Q15 MIN PRN Kina Salinas M.D.        labetalol (Normodyne/Trandate) injection 10 mg  10 mg Intravenous Q4HRS PRN Guerda French M.D.        amitriptyline (Elavil) tablet 100 mg  100 mg Oral Nightly Elsa Paul M.D.   100 mg at 05/03/24 2005     escitalopram (Lexapro) tablet 20 mg  20 mg Oral DAILY CHATO PinedaDKat   20 mg at 05/04/24 0506    ezetimibe (Zetia) tablet 10 mg  10 mg Oral Q EVENING CHATO PinedaDKat   10 mg at 05/03/24 1719    HYDROcodone/acetaminophen (Norco)  MG per tablet 1-2 Tablet  1-2 Tablet Oral Q6HRS PRN CHATO PinedaDKat   1 Tablet at 05/04/24 1210    liothyronine (Cytomel) tablet 5 mcg  5 mcg Oral DAILY CHATO PinedaDKat   5 mcg at 05/04/24 0506    rosuvastatin (Crestor) tablet 20 mg  20 mg Oral Q EVENING CHATO PinedaDKat   20 mg at 05/03/24 1720    levothyroxine (Synthroid) tablet 88 mcg  88 mcg Oral DAILY CHATO PinedaDKat   88 mcg at 05/04/24 0506    acetaminophen (Tylenol) tablet 650 mg  650 mg Oral Q6HRS PRN Elsa Paul M.D.   650 mg at 05/04/24 0342    ondansetron (Zofran) syringe/vial injection 4 mg  4 mg Intravenous Q4HRS PRN Elsa Paul M.D.   4 mg at 05/03/24 1300    ondansetron (Zofran ODT) dispertab 4 mg  4 mg Oral Q4HRS PRN Elsa Paul M.D.        piperacillin-tazobactam (Zosyn) 3.375 g in  mL IVPB  3.375 g Intravenous Q8HRS Elsa Paul M.D. 25 mL/hr at 05/04/24 1257 3.375 g at 05/04/24 1257       Fluids    Intake/Output Summary (Last 24 hours) at 5/4/2024 1416  Last data filed at 5/4/2024 1404  Gross per 24 hour   Intake 4416.65 ml   Output 2400 ml   Net 2016.65 ml       Laboratory          Recent Labs     05/02/24 1850 05/03/24 0453 05/04/24 0442   SODIUM 138 136 132*   POTASSIUM 3.4* 3.7 3.4*   CHLORIDE 104 103 99   CO2 18* 19* 21   BUN 16 14 11   CREATININE 1.03 0.96 0.74   MAGNESIUM  --   --  1.5   PHOSPHORUS  --   --  3.2   CALCIUM 8.4 7.7* 7.5*     Recent Labs     05/02/24 1850 05/03/24 0453 05/04/24 0442   ALTSGPT 11 19 15   ASTSGOT 36 41 25   ALKPHOSPHAT 125* 132* 106*   TBILIRUBIN 0.5 0.6 0.9   LIPASE 46  --   --    GLUCOSE 141* 136* 110*     Recent Labs     05/02/24 1850  05/03/24  0042 05/03/24 0453 05/04/24 0442   WBC 17.1* 14.9*  --  14.4*   NEUTSPOLYS 79.70*  --   --   --    LYMPHOCYTES 14.60*  --   --   --    MONOCYTES 4.10  --   --   --    EOSINOPHILS 0.60  --   --   --    BASOPHILS 0.20  --   --   --    ASTSGOT 36  --  41 25   ALTSGPT 11  --  19 15   ALKPHOSPHAT 125*  --  132* 106*   TBILIRUBIN 0.5  --  0.6 0.9     Recent Labs     05/02/24  1850 05/03/24 0042 05/03/24 0453 05/03/24  1439 05/03/24  1807 05/04/24 0442   RBC 4.56 4.17*  --   --   --  4.03*   HEMOGLOBIN 12.1 11.5* 11.4* 12.3 11.9* 10.8*   HEMATOCRIT 39.3 36.2* 36.1* 40.2 39.0 33.9*   PLATELETCT 294 232  --   --   --  224   PROTHROMBTM  --   --  13.7  --   --   --    INR  --   --  1.00  --   --   --        Imaging  CT:    Reviewed    Assessment/Plan  #GIB 2/2 colitis   #History of c diff  Plan:  - Q12H H/H  - Dr. Power on board  - Fluid resuscitated   - Transfuse for hemoglobin < 7  - Consented for blood  - Protonix 40 BID  - c diff negative     #Lactic Acidosis - improving  Plan:  - Trend until < 2.0     #Pancolitis with history of diverticulitis  #History of C diff  Plan:  - GI Following  - bowel rest  - D/C PO vanc     #s/p right total shoulder replacement  Plan  - Pain control as needed  - Patient is NOT TO BE PLACED ON HER RIGHT SIDE     #Acute Hypoxic Respiratory Failure  Plan:  - HOB elevation  - Aspiration precautions  - Titrate oxygen to an SpO2 of 88-94%  - Airway clearance as needed     #T2DM  Plan:  - Accuchecks AC/HS  - SSI insulin per scale  - Hold PO meds while in ICU  - Hypoglycemia protocol ordered     #Hypothyroidism  Plan:  - Continue home levothyroxine and cytomel     #Depression  Plan:  - Continue amitriptyline and lexapro     #HLD  Plan:  - Continue zetia     Discussed patient condition and risk of morbidity and/or mortality with RN, RT, and Pharmacy.    The patient remains critically ill.  Critical care time = 50 minutes in directly providing and coordinating critical care and extensive  data review.  No time overlap and excludes procedures.     Guerda French MD RD  Pulmonary and Critical Care     Available on [employer]al[employer]

## 2024-05-04 NOTE — CARE PLAN
The patient is Watcher - Medium risk of patient condition declining or worsening    Shift Goals  Clinical Goals: Pain management. SBP >90% Monitor for bleeding. Monitot Hemoglobin  Patient Goals: Pain control  Family Goals: Pain medications    Progress made toward(s) clinical / shift goals:  Pt had SBP in the 80's, levophed drip started as per MD orders, by charge RN. Infusion stopped by charge RN as per parameters. SBP > 90 since them.   Encouraged Pt to report any pain or discomfort. Applied ice pack to R shoulder for comfort. Gave pain medication to her when she asked for it. Instructed her to notify RN if pain relief was not achieved.      Patient is not progressing towards the following goals:      Problem: Physical Regulation  Goal: Signs and symptoms of infection will decrease  Outcome: Not Progressing  Note: Pt still having temperature 100.2      Problem: Knowledge Deficit - Standard  Goal: Patient and family/care givers will demonstrate understanding of plan of care, disease process/condition, diagnostic tests and medications  Outcome: Progressing     Problem: Respiratory  Goal: Patient will achieve/maintain optimum respiratory ventilation and gas exchange  Outcome: Progressing  Note: Pt able to keep O2 sat > 92% on 2 lpm via NC. Breath sound, diminished on both bases, but no more crackles heard.      Problem: Gastrointestinal Irritability  Goal: Nausea and vomiting will be absent or improve  Outcome: Progressing     Problem: Gastrointestinal Irritability  Goal: Diarrhea will be absent or improved  Outcome: Progressing     Problem: Pain - Standard  Goal: Alleviation of pain or a reduction in pain to the patient’s comfort goal  Outcome: Progressing

## 2024-05-04 NOTE — CARE PLAN
The patient is Watcher - Medium risk of patient condition declining or worsening    Shift Goals  Clinical Goals: Pain management, monitor bm, colonoscopy today  Patient Goals: Pain control  Family Goals: Pain medications    Progress made toward(s) clinical / shift goals: Pt AxO 4, pt and daughter at bedside educated on POC. Pt scheduled for colonoscopy today. VSS. Pt denies pain at this time.     Problem: Knowledge Deficit - Standard  Goal: Patient and family/care givers will demonstrate understanding of plan of care, disease process/condition, diagnostic tests and medications  Outcome: Progressing     Problem: Hemodynamics  Goal: Patient's hemodynamics, fluid balance and neurologic status will be stable or improve  Outcome: Progressing     Problem: Respiratory  Goal: Patient will achieve/maintain optimum respiratory ventilation and gas exchange  Outcome: Progressing     Problem: Gastrointestinal Irritability  Goal: Diarrhea will be absent or improved  Outcome: Progressing       Patient is not progressing towards the following goals:

## 2024-05-04 NOTE — PROGRESS NOTES
Telemetry shift summary:  Rhythm: ST     HR Range: 110's      Measurements from strip printed at 01:07:00   CT: 0.16   QRS 0.08   QT 0.28     Ectopies (As per Telemetry Tech report) rare PAC.

## 2024-05-04 NOTE — PROGRESS NOTES
Gastroenterology Progress Note     Author: Tyson Power M.D.   Date & Time Created: 5/4/2024 9:57 AM    Chief Complaint:  colitis    Interval History:  Patient with still some smaller oozing of blood per rectum.  Hgb 10.8 from 12.3 yesterday  Lactate reduced to 2  LFTs and Cr normal  Not on any pressors.  C. Diff negative.    Review of Systems:  Review of Systems   Respiratory: Negative.     Cardiovascular: Negative.    Gastrointestinal:  Positive for blood in stool.       Physical Exam:  Physical Exam  Cardiovascular:      Rate and Rhythm: Normal rate.   Pulmonary:      Effort: Pulmonary effort is normal.   Abdominal:      Palpations: Abdomen is soft.         Labs:          Recent Labs     05/02/24 1850 05/03/24 0453 05/04/24 0442   SODIUM 138 136 132*   POTASSIUM 3.4* 3.7 3.4*   CHLORIDE 104 103 99   CO2 18* 19* 21   BUN 16 14 11   CREATININE 1.03 0.96 0.74   MAGNESIUM  --   --  1.5   PHOSPHORUS  --   --  3.2   CALCIUM 8.4 7.7* 7.5*     Recent Labs     05/02/24 1850 05/03/24 0453 05/04/24 0442   ALTSGPT 11 19 15   ASTSGOT 36 41 25   ALKPHOSPHAT 125* 132* 106*   TBILIRUBIN 0.5 0.6 0.9   LIPASE 46  --   --    GLUCOSE 141* 136* 110*     Recent Labs     05/02/24 1850 05/03/24 0042 05/03/24 0453 05/03/24  1439 05/03/24  1807 05/04/24 0442   RBC 4.56 4.17*  --   --   --  4.03*   HEMOGLOBIN 12.1 11.5* 11.4* 12.3 11.9* 10.8*   HEMATOCRIT 39.3 36.2* 36.1* 40.2 39.0 33.9*   PLATELETCT 294 232  --   --   --  224   PROTHROMBTM  --   --  13.7  --   --   --    INR  --   --  1.00  --   --   --      Recent Labs     05/02/24 1850 05/03/24 0042 05/03/24 0453 05/04/24 0442   WBC 17.1* 14.9*  --  14.4*   NEUTSPOLYS 79.70*  --   --   --    LYMPHOCYTES 14.60*  --   --   --    MONOCYTES 4.10  --   --   --    EOSINOPHILS 0.60  --   --   --    BASOPHILS 0.20  --   --   --    ASTSGOT 36  --  41 25   ALTSGPT 11  --  19 15   ALKPHOSPHAT 125*  --  132* 106*   TBILIRUBIN 0.5  --  0.6 0.9     Hemodynamics:  Temp (24hrs),  Av.4 °C (99.3 °F), Min:36.8 °C (98.3 °F), Max:37.7 °C (99.9 °F)  Temperature: 37.5 °C (99.5 °F), Monitored Temp: 37.7 °C (99.9 °F)  Pulse  Av.9  Min: 69  Max: 137   Blood Pressure : 95/53, NIBP: 109/55     Respiratory:    Respiration: 19, Pulse Oximetry: 99 %     Work Of Breathing / Effort: Within Normal Limits  RUL Breath Sounds: Clear, RML Breath Sounds: Diminished, RLL Breath Sounds: Diminished, JOAQUÍN Breath Sounds: Clear, LLL Breath Sounds: Diminished  Fluids:    Intake/Output Summary (Last 24 hours) at 2024 0957  Last data filed at 2024 0800  Gross per 24 hour   Intake 80069.77 ml   Output 3020 ml   Net 66001.77 ml     Weight: 77.1 kg (169 lb 15.6 oz)  GI/Nutrition:  Orders Placed This Encounter   Procedures    Diet Order Diet: Clear Liquid     Standing Status:   Standing     Number of Occurrences:   1     Order Specific Question:   Diet:     Answer:   Clear Liquid [10]     Medical Decision Making, by Problem:  Active Hospital Problems    Diagnosis     *Sepsis (HCC) [A41.9]     Acute respiratory failure (HCC) [J96.00]     Near syncope [R55]     Pancolitis (HCC) [K51.00]     S/P shoulder surgery [Z98.890]     Lactic acidosis [E87.20]     Rectal bleeding [K62.5]     GIB (gastrointestinal bleeding) [K92.2]     Type 2 diabetes mellitus (HCC) [E11.9]     Hypothyroidism [E03.9]     Depression [F32.A]     CAD (coronary artery disease) [I25.10]     Dyslipidemia [E78.5]     Essential hypertension, benign [I10]        Plan:  Overall improvement clinically.  Patient did decline the prep yesterday for the planned colonoscopy, and given her clinical improvement I think we can defer on colonoscopy.  Would recommend clear liquid diet, continue supportive management.  If symptoms worsen, lactate level increases, or there are other appropriate concerning issues, then would reconsider pursuing colonoscopy.  Will follow.    Quality-Core Measures   Reviewed items::  Labs reviewed

## 2024-05-04 NOTE — PROGRESS NOTES
"Bedside report received from Jessica RN and assumed Pt's cares. Call light within reach. Safety measures in place.  Reviewed POC with Pt and family.     20:00 Pt has Prep for colonoscopy at bedside table, endoscopy scheduled for tomorrow morning. Pt states she is not drinking it and she won't since she already had lots of watery BM. Pt and daughter stated, Dr is aware, and \"it's ok with him not to drink the Prep\" Pt still refusing it despite education provided. Charge RN notified.    19:10 Medicated Pt for Abd pain, rated at 8/10, as requested. Hydromorphone 0.5 mg IV given as per MAR    19:40 Pt's BP 84/46. Pt A&O x 4, states feeling a little SOB, no CP or discomfort. Dr French notified, new orders received.     20:30 Levophed held as Pt's CAROLA 111/56 with a MAP of 69.    20:45 Pt's most recent /60. Encouraged Pt to drink the preparation solution for endoscopy. Pt continued refusing it.     23:30 Pt's BP 87/47 Dr Acuna notified, new orders received for stat lactic acid, COD, and start levophed as ordered before.     01:00 Unable to get blood for labs, phlebotomist, and charge RN tried. Pt refused to be pocked again. Dr Acuna notified.     03:45 Medicated Pt for Temperature 100.6 with Tylenol as per MAR.     "

## 2024-05-05 LAB
ALBUMIN SERPL BCP-MCNC: 2.7 G/DL (ref 3.2–4.9)
ALBUMIN/GLOB SERPL: 1 G/DL
ALP SERPL-CCNC: 101 U/L (ref 30–99)
ALT SERPL-CCNC: 11 U/L (ref 2–50)
ANION GAP SERPL CALC-SCNC: 14 MMOL/L (ref 7–16)
AST SERPL-CCNC: 19 U/L (ref 12–45)
BILIRUB SERPL-MCNC: 0.6 MG/DL (ref 0.1–1.5)
BUN SERPL-MCNC: 8 MG/DL (ref 8–22)
CALCIUM ALBUM COR SERPL-MCNC: 8.5 MG/DL (ref 8.5–10.5)
CALCIUM SERPL-MCNC: 7.5 MG/DL (ref 8.4–10.2)
CHLORIDE SERPL-SCNC: 99 MMOL/L (ref 96–112)
CO2 SERPL-SCNC: 21 MMOL/L (ref 20–33)
CREAT SERPL-MCNC: 0.59 MG/DL (ref 0.5–1.4)
ERYTHROCYTE [DISTWIDTH] IN BLOOD BY AUTOMATED COUNT: 47.1 FL (ref 35.9–50)
GFR SERPLBLD CREATININE-BSD FMLA CKD-EPI: 95 ML/MIN/1.73 M 2
GLOBULIN SER CALC-MCNC: 2.7 G/DL (ref 1.9–3.5)
GLUCOSE BLD STRIP.AUTO-MCNC: 61 MG/DL (ref 65–99)
GLUCOSE BLD STRIP.AUTO-MCNC: 85 MG/DL (ref 65–99)
GLUCOSE BLD STRIP.AUTO-MCNC: 90 MG/DL (ref 65–99)
GLUCOSE BLD STRIP.AUTO-MCNC: 99 MG/DL (ref 65–99)
GLUCOSE SERPL-MCNC: 75 MG/DL (ref 65–99)
HCT VFR BLD AUTO: 32.2 % (ref 37–47)
HGB BLD-MCNC: 10.1 G/DL (ref 12–16)
MAGNESIUM SERPL-MCNC: 1.9 MG/DL (ref 1.5–2.5)
MCH RBC QN AUTO: 26.6 PG (ref 27–33)
MCHC RBC AUTO-ENTMCNC: 31.4 G/DL (ref 32.2–35.5)
MCV RBC AUTO: 85 FL (ref 81.4–97.8)
PHOSPHATE SERPL-MCNC: 2.1 MG/DL (ref 2.5–4.5)
PLATELET # BLD AUTO: 223 K/UL (ref 164–446)
PMV BLD AUTO: 9.2 FL (ref 9–12.9)
POTASSIUM SERPL-SCNC: 3.4 MMOL/L (ref 3.6–5.5)
PROT SERPL-MCNC: 5.4 G/DL (ref 6–8.2)
RBC # BLD AUTO: 3.79 M/UL (ref 4.2–5.4)
SODIUM SERPL-SCNC: 134 MMOL/L (ref 135–145)
WBC # BLD AUTO: 13.2 K/UL (ref 4.8–10.8)

## 2024-05-05 PROCEDURE — 99232 SBSQ HOSP IP/OBS MODERATE 35: CPT | Performed by: INTERNAL MEDICINE

## 2024-05-05 RX ORDER — SODIUM CHLORIDE 9 MG/ML
INJECTION, SOLUTION INTRAVENOUS CONTINUOUS
Status: DISCONTINUED | OUTPATIENT
Start: 2024-05-05 | End: 2024-05-06

## 2024-05-05 RX ADMIN — PIPERACILLIN SODIUM AND TAZOBACTAM SODIUM 3.38 G: 3; .375 INJECTION, POWDER, LYOPHILIZED, FOR SOLUTION INTRAVENOUS at 12:23

## 2024-05-05 RX ADMIN — LIOTHYRONINE SODIUM 5 MCG: 5 TABLET ORAL at 05:31

## 2024-05-05 RX ADMIN — HYDROMORPHONE HYDROCHLORIDE 0.5 MG: 1 INJECTION, SOLUTION INTRAMUSCULAR; INTRAVENOUS; SUBCUTANEOUS at 23:09

## 2024-05-05 RX ADMIN — HYDROCODONE BITARTRATE AND ACETAMINOPHEN 1 TABLET: 10; 325 TABLET ORAL at 05:30

## 2024-05-05 RX ADMIN — ROSUVASTATIN CALCIUM 20 MG: 10 TABLET, FILM COATED ORAL at 17:44

## 2024-05-05 RX ADMIN — LEVOTHYROXINE SODIUM 88 MCG: 0.09 TABLET ORAL at 05:31

## 2024-05-05 RX ADMIN — HYDROCODONE BITARTRATE AND ACETAMINOPHEN 1 TABLET: 10; 325 TABLET ORAL at 19:28

## 2024-05-05 RX ADMIN — EZETIMIBE 10 MG: 10 TABLET ORAL at 17:44

## 2024-05-05 RX ADMIN — ONDANSETRON 4 MG: 2 INJECTION INTRAMUSCULAR; INTRAVENOUS at 05:30

## 2024-05-05 RX ADMIN — HYDROMORPHONE HYDROCHLORIDE 0.5 MG: 1 INJECTION, SOLUTION INTRAMUSCULAR; INTRAVENOUS; SUBCUTANEOUS at 09:46

## 2024-05-05 RX ADMIN — PANTOPRAZOLE SODIUM 40 MG: 40 INJECTION, POWDER, LYOPHILIZED, FOR SOLUTION INTRAVENOUS at 17:44

## 2024-05-05 RX ADMIN — HYDROCODONE BITARTRATE AND ACETAMINOPHEN 1 TABLET: 10; 325 TABLET ORAL at 12:20

## 2024-05-05 RX ADMIN — SODIUM CHLORIDE: 9 INJECTION, SOLUTION INTRAVENOUS at 12:14

## 2024-05-05 RX ADMIN — PIPERACILLIN SODIUM AND TAZOBACTAM SODIUM 3.38 G: 3; .375 INJECTION, POWDER, LYOPHILIZED, FOR SOLUTION INTRAVENOUS at 21:00

## 2024-05-05 RX ADMIN — ONDANSETRON 4 MG: 2 INJECTION INTRAMUSCULAR; INTRAVENOUS at 09:46

## 2024-05-05 RX ADMIN — PIPERACILLIN SODIUM AND TAZOBACTAM SODIUM 3.38 G: 3; .375 INJECTION, POWDER, LYOPHILIZED, FOR SOLUTION INTRAVENOUS at 05:37

## 2024-05-05 RX ADMIN — AMITRIPTYLINE HYDROCHLORIDE 100 MG: 50 TABLET, FILM COATED ORAL at 19:28

## 2024-05-05 RX ADMIN — ESCITALOPRAM OXALATE 20 MG: 10 TABLET ORAL at 05:31

## 2024-05-05 RX ADMIN — PANTOPRAZOLE SODIUM 40 MG: 40 INJECTION, POWDER, LYOPHILIZED, FOR SOLUTION INTRAVENOUS at 05:30

## 2024-05-05 ASSESSMENT — ENCOUNTER SYMPTOMS
CARDIOVASCULAR NEGATIVE: 1
BLURRED VISION: 0
PHOTOPHOBIA: 0
WEAKNESS: 0
COUGH: 0
HEADACHES: 0
NERVOUS/ANXIOUS: 0
DIZZINESS: 0
DIARRHEA: 0
FEVER: 0
DEPRESSION: 0
SENSORY CHANGE: 0
SHORTNESS OF BREATH: 0
SPEECH CHANGE: 0
ABDOMINAL PAIN: 1
SORE THROAT: 0
VOMITING: 0
RESPIRATORY NEGATIVE: 1
GASTROINTESTINAL NEGATIVE: 1
MYALGIAS: 0
NAUSEA: 0
CHILLS: 0
CONSTIPATION: 0
HEARTBURN: 0
CLAUDICATION: 0
INSOMNIA: 0

## 2024-05-05 ASSESSMENT — COGNITIVE AND FUNCTIONAL STATUS - GENERAL
DRESSING REGULAR UPPER BODY CLOTHING: A LOT
CLIMB 3 TO 5 STEPS WITH RAILING: A LITTLE
CLIMB 3 TO 5 STEPS WITH RAILING: A LITTLE
TURNING FROM BACK TO SIDE WHILE IN FLAT BAD: A LITTLE
STANDING UP FROM CHAIR USING ARMS: A LITTLE
TURNING FROM BACK TO SIDE WHILE IN FLAT BAD: A LITTLE
MOVING FROM LYING ON BACK TO SITTING ON SIDE OF FLAT BED: A LITTLE
MOVING TO AND FROM BED TO CHAIR: A LITTLE
TOILETING: A LITTLE
WALKING IN HOSPITAL ROOM: A LITTLE
HELP NEEDED FOR BATHING: A LOT
MOVING FROM LYING ON BACK TO SITTING ON SIDE OF FLAT BED: A LITTLE
STANDING UP FROM CHAIR USING ARMS: A LITTLE
SUGGESTED CMS G CODE MODIFIER DAILY ACTIVITY: CK
SUGGESTED CMS G CODE MODIFIER MOBILITY: CK
DAILY ACTIVITIY SCORE: 17
MOBILITY SCORE: 18
WALKING IN HOSPITAL ROOM: A LITTLE
SUGGESTED CMS G CODE MODIFIER MOBILITY: CK
MOBILITY SCORE: 18
DRESSING REGULAR LOWER BODY CLOTHING: A LOT
MOVING TO AND FROM BED TO CHAIR: A LITTLE

## 2024-05-05 ASSESSMENT — PAIN DESCRIPTION - PAIN TYPE
TYPE: ACUTE PAIN

## 2024-05-05 ASSESSMENT — GAIT ASSESSMENTS
DISTANCE (FEET): 25
DEVIATION: BRADYKINETIC;SHUFFLED GAIT;DECREASED HEEL STRIKE;DECREASED TOE OFF
ASSISTIVE DEVICE: HAND HELD ASSIST
GAIT LEVEL OF ASSIST: CONTACT GUARD ASSIST

## 2024-05-05 ASSESSMENT — FIBROSIS 4 INDEX: FIB4 SCORE: 1.88

## 2024-05-05 NOTE — CARE PLAN
Problem: Knowledge Deficit - Standard  Goal: Patient and family/care givers will demonstrate understanding of plan of care, disease process/condition, diagnostic tests and medications  Outcome: Progressing     Problem: Bowel Elimination  Goal: Establish and maintain regular bowel function  Outcome: Progressing     Problem: Gastrointestinal Irritability  Goal: Nausea and vomiting will be absent or improve  Outcome: Progressing  Goal: Diarrhea will be absent or improved  Outcome: Progressing   The patient is Stable - Low risk of patient condition declining or worsening    Shift Goals  Clinical Goals: pain control, nausea control, iv fluids and abx  Patient Goals: rest  Family Goals: Pain medications    Progress made toward(s) clinical / shift goals:  updated pt on plan of care, continue with iv abx, pt reporting abdominal pain. Medicated per MAR. Encourage up to chair and mobility. Pt with nausea medicated per MAR. Start IV fluids for poor oral intake.     Patient is not progressing towards the following goals:

## 2024-05-05 NOTE — PROGRESS NOTES
Telemetry Strip     Strip printed: 1303  Measurements from am strip were as follows:  Rhythm: ST  HR: 105-114  Measurements: 0.14/ 0.06/ 0.32  Ectopy: r PVC, r PAC             Normal Values  Rhythm SR  HR Range    Measurements 0.12-0.20 / 0.06-0.10  / 0.30-0.52

## 2024-05-05 NOTE — PROGRESS NOTES
Hypoglycemia Intervention    Hypoglycemia protocol intervention:  Blood glucose 61 at 0542.  Intervention: 8 oz of fruit juice   Repeat blood glucose 90 at 0603.    Dr. Acuna notified of the above at 0625.

## 2024-05-05 NOTE — THERAPY
Physical Therapy   Initial Evaluation     Patient Name: Yamile Go  Age:  74 y.o., Sex:  female  Medical Record #: [id number]  Today's Date: 5/5/2024     Precautions  Precautions: Non Weight Bearing Right Upper Extremity;Immobilizer Right Upper Extremity  Comments: (P) loose stools.    Assessment  Patient is a 74 y.o. female, with h/o HTN, HLD, DM2, Hypothyroidism, Depression.  S/p R TSA on 4/30/24 by Dr. Sparks on 4/30.   Pt presented to ED on 5/2/2024 with c/o abdominal pain, general malaise and near syncopal episode at home. Dx Sepsis Cdiff r/o .   Pt presenting with generalized weakness and decreased activity tolerance which are effecting her ability to perform mobility and ADL's at this time.   Pt will benefit from continued PT while in house. See below for status, goals and POC.       Plan    Physical Therapy Initial Treatment Plan   Treatment Plan : (P) Bed Mobility, Family / Caregiver Training, Gait Training, Neuro Re-Education / Balance, Therapeutic Activities, Self Care / Home Evaluation, Therapeutic Exercise  Treatment Frequency: (P) 4 Times per Week  Duration: (P) Until Therapy Goals Met    DC Equipment Recommendations: (P) None  Discharge Recommendations: (P) Recommend home health for continued physical therapy services         Initial Contact Note    Initial Contact Note Order Received and Verified, Physical Therapy Evaluation in Progress with Full Report to Follow.   Precautions   Precautions Non Weight Bearing Right Upper Extremity;Immobilizer Right Upper Extremity   Comments loose stools.   Pain 0 - 10 Group   Location Shoulder   Location Orientation Right   Pain Rating Scale (NPRS) 3   Description Aching   Therapist Pain Assessment Post Activity Pain Same as Prior to Activity;Nurse Notified;3   Non Verbal Descriptors   Non Verbal Scale  Calm   Prior Living Situation   Prior Services None   Housing / Facility 1 Story Apartment / Condo   Steps Into Home 0   Steps In Home 0   Equipment Owned  None   Lives with - Patient's Self Care Capacity Alone and Able to Care For Self   Comments Pt has a friend who was staying with her post R TSA   Prior Level of Functional Mobility   Bed Mobility Independent   Transfer Status Independent   Ambulation Independent   Ambulation Distance household   Assistive Devices Used None   Cognition    Level of Consciousness Alert   Active ROM Upper Body   Dominant Hand Right   Comments educated on right UE postioning hand ROM   Strength Upper Body   Comments LUE WFL for mobility   Sensation Upper Body   Upper Extremity Sensation  WDL   Active ROM Lower Body    Active ROM Lower Body  WDL   Strength Lower Body   Lower Body Strength  X   Gross Strength Generalized Weakness, Equal Bilaterally   Comments subjective c/o weakness and fatigue.   Lower Body Muscle Tone   Lower Body Muscle Tone  WDL   Other Treatments   Other Treatments Provided Pt assisted to toilet. Pt loose BM, assisted with brief management, staff updated,pt is HHA to mobilize in room.   Balance Assessment   Sitting Balance (Static) Good   Sitting Balance (Dynamic) Fair +   Standing Balance (Static) Fair +   Standing Balance (Dynamic) Fair   Weight Shift Sitting Fair   Weight Shift Standing Fair   Bed Mobility    Supine to Sit Contact Guard Assist   Sit to Supine   (up to chair post session.)   Scooting Standby Assist   Gait Analysis   Gait Level Of Assist Contact Guard Assist   Assistive Device Hand Held Assist  (LUE)   Distance (Feet) 25   # of Times Distance was Traveled 1   Deviation Bradykinetic;Shuffled Gait;Decreased Heel Strike;Decreased Toe Off   Weight Bearing Status No LE restrictions, NWB RUE   Comments subjective c/o weakness in legs due to general fatigue. No isolated weakness with MMT.   Functional Mobility   Sit to Stand Contact Guard Assist   Bed, Chair, Wheelchair Transfer Contact Guard Assist   Toilet Transfers Contact Guard Assist   Transfer Method Stand Step   6 Clicks Assessment - How much HELP  from another person do you currently need... (If the patient hasn't done an activity recently, how much help from another person do you think he/she would need if he/she tried?)   Turning from your back to your side while in a flat bed without using bedrails? 3   Moving from lying on your back to sitting on the side of a flat bed without using bedrails? 3   Moving to and from a bed to a chair (including a wheelchair)? 3   Standing up from a chair using your arms (e.g., wheelchair, or bedside chair)? 3   Walking in hospital room? 3   Climbing 3-5 steps with a railing? 3   6 clicks Mobility Score 18   Activity Tolerance   Sitting in Chair > 30 min   Sitting Edge of Bed 10 min   Standing 1 min   Patient / Family Goals    Patient / Family Goal #1 home with assist of friend.   Short Term Goals    Short Term Goal # 1 Pt will perform bed mobility at IND level for DC home by tx 6   Short Term Goal # 2 Pt will transfer w/o AD at IND level by tx 6   Short Term Goal # 3 Pt will ambulate w/o AD for 100 ft to access her home by tx 6   Education Group   Education Provided Role of Physical Therapist;Gait Training;Weight Bearing Status   Role of Physical Therapist Patient Response Patient;Acceptance;Explanation;Verbal Demonstration   Gait Training Patient Response Patient;Acceptance;Explanation;Reinforcement Needed   Weight Bearing Status Patient Response Patient;Acceptance;Explanation;Verbal Demonstration;Action Demonstration   Physical Therapy Initial Treatment Plan    Treatment Plan  Bed Mobility;Family / Caregiver Training;Gait Training;Neuro Re-Education / Balance;Therapeutic Activities;Self Care / Home Evaluation;Therapeutic Exercise   Treatment Frequency 4 Times per Week   Duration Until Therapy Goals Met   Problem List    Problems Pain;Impaired Transfers;Impaired Ambulation;Impaired Balance;Decreased Activity Tolerance   Anticipated Discharge Equipment and Recommendations   DC Equipment Recommendations None   Discharge  Recommendations Recommend home health for continued physical therapy services   Interdisciplinary Plan of Care Collaboration   IDT Collaboration with  Nursing   Patient Position at End of Therapy Seated;Call Light within Reach;Tray Table within Reach;Phone within Reach   Collaboration Comments staff updated.   Session Information   Date / Session Number  5/5-1( 1/4,5/11)

## 2024-05-05 NOTE — PROGRESS NOTES
Telemetry Shift Summary     Rhythm: SR to ST  HR:   Ectopy: rPVC/rPAC    Measurements: .20/.08/.36    Normal Values  Rhythm: SR  HR:   Measurements: 0.12-0.20/0.08-0.10/0.30-0.52

## 2024-05-05 NOTE — CARE PLAN
The patient is Stable - Low risk of patient condition declining or worsening    Shift Goals  Clinical Goals: pain management, monitor hgb  Patient Goals: sleep & pain control  Family Goals: Pain medications    Progress made toward(s) clinical / shift goals:    Problem: Knowledge Deficit - Standard  Goal: Patient and family/care givers will demonstrate understanding of plan of care, disease process/condition, diagnostic tests and medications  Outcome: Progressing     Problem: Hemodynamics  Goal: Patient's hemodynamics, fluid balance and neurologic status will be stable or improve  Outcome: Progressing       Patient is not progressing towards the following goals:      Problem: Mobility  Goal: Patient's capacity to carry out activities will improve  Outcome: Not Progressing

## 2024-05-05 NOTE — PROGRESS NOTES
Telemetry Shift Summary    Rhythm ST  HR Range 107-110  Ectopy rPVC  Measurements 0.16/0.06/0.32        Normal Values  Rhythm SR  HR Range    Measurements 0.12-0.20 / 0.06-0.10  / 0.30-0.52

## 2024-05-05 NOTE — PROGRESS NOTES
Hospital Medicine Daily Progress Note    Date of Service  5/5/2024    Chief Complaint  Yamile Go is a 74 y.o. female admitted 5/2/2024 with abdominal pain, hypotension, hypoxia, tachycardia    Hospital Course  Patient is a pleasant 74-year-old female who came in with slight altered mental status and hypotension hypoxia and tachycardia.  Ramser was called patient was brought to [facility].  Started on fluid resuscitation started having bloody diarrhea that turned into spencer blood.  She is started on fluid resuscitation and did not require transfusion.  GI was consulted and recommended rule out C. difficile as well as colonoscopy.  C. difficile was negative, colonoscopy prep was refused by the patient and patient's H&H has been stable.  She has been downgraded from the ICU and is medically stable at this time.  The patient had a right total shoulder on 4/30 had been taking Percocet for pain management.    Interval Problem Update  5/5 patient downgraded from the ICU to the telemetry floor and continues to show improvement.  She remains on Zosyn for pancolitis.  She does appear clinically dehydrated and will be started back on normal saline and will continue on a clear liquid diet.  Hemoglobin stable at 10.1.  Polanco catheter to be removed today.    I have discussed this patient's plan of care and discharge plan at IDT rounds today with Case Management, Nursing, Nursing leadership, and other members of the IDT team.    Consultants/Specialty  none    Code Status  Full Code    Disposition  The patient is not medically cleared for discharge to home or a post-acute facility.  Anticipate discharge to: home with close outpatient follow-up    I have placed the appropriate orders for post-discharge needs.    Review of Systems  Review of Systems   Constitutional:  Negative for chills and fever.   HENT:  Negative for congestion and sore throat.    Eyes:  Negative for blurred vision and photophobia.    Respiratory:  Negative for cough and shortness of breath.    Cardiovascular:  Negative for chest pain, claudication and leg swelling.   Gastrointestinal:  Positive for abdominal pain. Negative for constipation, diarrhea, heartburn, nausea and vomiting.   Genitourinary:  Negative for dysuria and hematuria.   Musculoskeletal:  Negative for joint pain and myalgias.   Skin:  Negative for itching and rash.   Neurological:  Negative for dizziness, sensory change, speech change, weakness and headaches.   Psychiatric/Behavioral:  Negative for depression. The patient is not nervous/anxious and does not have insomnia.         Physical Exam  Temp:  [36.3 °C (97.3 °F)-37.1 °C (98.7 °F)] 37.1 °C (98.7 °F)  Pulse:  [103-106] 103  Resp:  [18] 18  BP: (119-155)/(74-85) 137/78  SpO2:  [88 %-100 %] 88 %    Physical Exam  Vitals and nursing note reviewed.   Constitutional:       General: She is not in acute distress.     Appearance: Normal appearance. She is not ill-appearing.   HENT:      Head: Normocephalic and atraumatic.      Nose: Nose normal.   Cardiovascular:      Rate and Rhythm: Normal rate and regular rhythm.      Heart sounds: Normal heart sounds. No murmur heard.  Pulmonary:      Effort: Pulmonary effort is normal.      Breath sounds: Normal breath sounds.   Abdominal:      General: Bowel sounds are normal. There is no distension.      Palpations: Abdomen is soft.      Tenderness: There is abdominal tenderness. There is no guarding or rebound.   Musculoskeletal:         General: No swelling or tenderness.      Cervical back: Neck supple.      Comments: Right shoulder immobilizer   Skin:     General: Skin is warm and dry.   Neurological:      General: No focal deficit present.      Mental Status: She is alert and oriented to person, place, and time.   Psychiatric:         Mood and Affect: Mood normal.         Fluids    Intake/Output Summary (Last 24 hours) at 5/5/2024 1322  Last data filed at 5/5/2024 0600  Gross per 24  hour   Intake 480 ml   Output 850 ml   Net -370 ml       Laboratory  Recent Labs     05/03/24  0042 05/03/24  0453 05/04/24  0442 05/04/24  1934 05/05/24 0417   WBC 14.9*  --  14.4*  --  13.2*   RBC 4.17*  --  4.03*  --  3.79*   HEMOGLOBIN 11.5*   < > 10.8* 10.2* 10.1*   HEMATOCRIT 36.2*   < > 33.9* 32.4* 32.2*   MCV 86.8  --  84.1  --  85.0   MCH 27.6  --  26.8*  --  26.6*   MCHC 31.8*  --  31.9*  --  31.4*   RDW 49.3  --  47.8  --  47.1   PLATELETCT 232  --  224  --  223   MPV 9.7  --  9.2  --  9.2    < > = values in this interval not displayed.     Recent Labs     05/03/24 0453 05/04/24 0442 05/05/24 0417   SODIUM 136 132* 134*   POTASSIUM 3.7 3.4* 3.4*   CHLORIDE 103 99 99   CO2 19* 21 21   GLUCOSE 136* 110* 75   BUN 14 11 8   CREATININE 0.96 0.74 0.59   CALCIUM 7.7* 7.5* 7.5*     Recent Labs     05/03/24 0453   INR 1.00               Imaging  DX-CHEST-PORTABLE (1 VIEW)   Final Result      1.  New bilateral interstitial and airspace infiltrates.      2.  Cardiomegaly.      US-RUQ   Final Result         1.  Common bile duct dilatation, could represent age-related changes, consider causes of biliary obstruction with additional workup as clinically appropriate.   2.  Hepatomegaly   3.  Echogenic liver, compatible with fatty change versus fibrosis.   4.  Atherosclerosis      CT-CTA CHEST PULMONARY ARTERY W/ RECONS   Final Result         1.  No pulmonary embolus appreciated.   2.  Fluid-filled distention of esophagus, consider gastroesophageal reflux versus dysmotility or component of gastroesophageal junction obstruction. Could be further evaluated with swallow study as clinically appropriate.   3.  Irregular hepatic contour favoring changes of cirrhosis   4.  Air and fluid tracking along the right axilla and shoulder soft tissues, within expected limits for recent postop changes.   5.  Pulmonary nodules, see nodule follow-up recommendations below.      Fleischner Society pulmonary nodule recommendations:   Low  Risk: No routine follow-up      High Risk: Optional CT at 12 months      Comments: Use most suspicious nodule as guide to management. Follow-up intervals may vary according to size and risk.      Low Risk - Minimal or absent history of smoking and of other known risk factors.      High Risk - History of smoking or of other known risk factors.      Note: These recommendations do not apply to lung cancer screening, patients with immunosuppression, or patients with known primary cancer.      Fleischner Society 2017 Guidelines for Management of Incidentally Detected Pulmonary Nodules in Adults         CT-ABDOMEN-PELVIS WITH   Final Result         1.  Diffuse colonic wall thickening with hazy pericolonic fat stranding, appearance favoring changes of pancolitis.   2.  Intrahepatic and extrahepatic biliary ductal dilatation, consider causes of biliary obstruction with additional workup as clinically appropriate           Assessment/Plan  * Sepsis (HCC)- (present on admission)  Assessment & Plan  Inpatient status on medical floor  This is Sepsis Present on admission  SIRS criteria identified on my evaluation include: Tachycardia, with heart rate greater than 90 BPM and Leukocytosis, with WBC greater than 12,000  Clinical indicators of end organ dysfunction include Lactic Acid greater than 2 and Acute Respiratory Failure - (mechanical ventilation or BiPap or PaO2/FiO2 ratio < 300)  Source is pancolitis  Continue with Zosyn, anticipate transition to Augmentin likely tomorrow    Rectal bleeding  Assessment & Plan  Secondary to colitis, patient with history of irritable bowel syndrome but no prior history of bleeding.  Appreciate GI recommendations, colonoscopy deferred at this time.  H&H remained stable and bleeding has resolved.    Lactic acidosis  Assessment & Plan  Resolved      S/P shoulder surgery  Assessment & Plan  -s/p  Right reverse total shoulder arthroplasty with proximal biceps tenodesis surgery by Dr. Sparks on  4/30 (POD #2).  -Given her new GI bleed, I will hold baby aspirin twice daily for DVT prophylaxis for now.  -Day team to notify orthopedic surgeon that patient is admitted with pancolitis.  She might need PT/OT while here.    Pancolitis (HCC)  Assessment & Plan  Negative C. difficile, continue with Zosyn  Slow advancement of diet as tolerated, continue with clears at this time    Near syncope  Assessment & Plan  Continue with IV fluids, suspected vasovagal related to rectal bleeding     Acute respiratory failure (HCC)  Assessment & Plan  Resolved, patient on room air    Type 2 diabetes mellitus (HCC)- (present on admission)  Assessment & Plan  -Ordering regular insulin sliding scale.  Takes metformin at home.    Hypothyroidism- (present on admission)  Assessment & Plan  -She takes levothyroxine and Cytomel. Currently NPO.    Depression- (present on admission)  Assessment & Plan  -She is on amitriptyline and Lexapro.  Currently NPO    Essential hypertension, benign- (present on admission)  Assessment & Plan  -She takes metoprolol which I will hold given hypotension under the context of holding sepsis    Dyslipidemia- (present on admission)  Assessment & Plan  -Patient takes Zetia.  I just made her n.p.o. for now.         VTE prophylaxis:   SCDs/TEDs      I have performed a physical exam and reviewed and updated ROS and Plan today (5/5/2024). In review of yesterday's note (5/4/2024), there are no changes except as documented above.

## 2024-05-05 NOTE — HOSPITAL COURSE
Patient is a pleasant 74-year-old female who came in with slight altered mental status and hypotension hypoxia and tachycardia.  Ramser was called patient was brought to [facility].  Started on fluid resuscitation started having bloody diarrhea that turned into spencer blood.  She is started on fluid resuscitation and did not require transfusion.  GI was consulted and recommended rule out C. difficile as well as colonoscopy.  C. difficile was negative, colonoscopy prep was refused by the patient and patient's H&H has been stable.  She has been downgraded from the ICU and is medically stable at this time.  The patient had a right total shoulder on 4/30 had been taking Percocet for pain management.

## 2024-05-05 NOTE — PROGRESS NOTES
Gastroenterology Progress Note     Author: Tyson Power M.D.   Date & Time Created: 5/5/2024 12:10 PM    Chief Complaint:  colitis    Interval History:  Patient reports brown, loose stool.  No bleeding.  Hgb 10, lactate 2.2    Review of Systems:  Review of Systems   Respiratory: Negative.     Cardiovascular: Negative.    Gastrointestinal: Negative.        Physical Exam:  Physical Exam  Cardiovascular:      Rate and Rhythm: Normal rate.   Pulmonary:      Effort: Pulmonary effort is normal.   Abdominal:      Palpations: Abdomen is soft.         Labs:          Recent Labs     05/03/24 0453 05/04/24 0442 05/05/24 0417   SODIUM 136 132* 134*   POTASSIUM 3.7 3.4* 3.4*   CHLORIDE 103 99 99   CO2 19* 21 21   BUN 14 11 8   CREATININE 0.96 0.74 0.59   MAGNESIUM  --  1.5 1.9   PHOSPHORUS  --  3.2 2.1*   CALCIUM 7.7* 7.5* 7.5*     Recent Labs     05/02/24 1850 05/03/24 0453 05/04/24 0442 05/05/24 0417   ALTSGPT 11 19 15 11   ASTSGOT 36 41 25 19   ALKPHOSPHAT 125* 132* 106* 101*   TBILIRUBIN 0.5 0.6 0.9 0.6   LIPASE 46  --   --   --    GLUCOSE 141* 136* 110* 75     Recent Labs     05/03/24 0042 05/03/24 0453 05/03/24  1439 05/04/24 0442 05/04/24  1934 05/05/24 0417   RBC 4.17*  --   --  4.03*  --  3.79*   HEMOGLOBIN 11.5* 11.4*   < > 10.8* 10.2* 10.1*   HEMATOCRIT 36.2* 36.1*   < > 33.9* 32.4* 32.2*   PLATELETCT 232  --   --  224  --  223   PROTHROMBTM  --  13.7  --   --   --   --    INR  --  1.00  --   --   --   --     < > = values in this interval not displayed.     Recent Labs     05/02/24 1850 05/03/24 0042 05/03/24 0453 05/04/24 0442 05/05/24 0417   WBC 17.1* 14.9*  --  14.4* 13.2*   NEUTSPOLYS 79.70*  --   --   --   --    LYMPHOCYTES 14.60*  --   --   --   --    MONOCYTES 4.10  --   --   --   --    EOSINOPHILS 0.60  --   --   --   --    BASOPHILS 0.20  --   --   --   --    ASTSGOT 36  --  41 25 19   ALTSGPT 11  --  19 15 11   ALKPHOSPHAT 125*  --  132* 106* 101*   TBILIRUBIN 0.5  --  0.6 0.9 0.6      Hemodynamics:  Temp (24hrs), Av.8 °C (98.2 °F), Min:36.3 °C (97.3 °F), Max:37.1 °C (98.7 °F)  Temperature: 37.1 °C (98.7 °F)  Pulse  Av.3  Min: 64  Max: 137   Blood Pressure : 137/78     Respiratory:    Respiration: 18, Pulse Oximetry: 88 %     Work Of Breathing / Effort: Within Normal Limits  RUL Breath Sounds: Clear, RML Breath Sounds: Diminished, RLL Breath Sounds: Diminished, JOAQUÍN Breath Sounds: Clear, LLL Breath Sounds: Diminished  Fluids:    Intake/Output Summary (Last 24 hours) at 2024 1210  Last data filed at 2024 0600  Gross per 24 hour   Intake 480 ml   Output 850 ml   Net -370 ml     Weight: 73.8 kg (162 lb 11.2 oz)  GI/Nutrition:  Orders Placed This Encounter   Procedures    Diet Order Diet: Clear Liquid     Standing Status:   Standing     Number of Occurrences:   1     Order Specific Question:   Diet:     Answer:   Clear Liquid [10]     Medical Decision Making, by Problem:  Active Hospital Problems    Diagnosis     *Sepsis (HCC) [A41.9]     Acute respiratory failure (HCC) [J96.00]     Near syncope [R55]     Pancolitis (HCC) [K51.00]     S/P shoulder surgery [Z98.890]     Lactic acidosis [E87.20]     Rectal bleeding [K62.5]     GIB (gastrointestinal bleeding) [K92.2]     Type 2 diabetes mellitus (HCC) [E11.9]     Hypothyroidism [E03.9]     Depression [F32.A]     CAD (coronary artery disease) [I25.10]     Dyslipidemia [E78.5]     Essential hypertension, benign [I10]        Plan:  Patient with ischemic colitis without significant concern at this time for necrosis, or infectious colitis.  She is slowly improving as expected with general supportive management.    No strong indication at this time for endoscopic/colonoscopic intervention.  Discussed with patient, answered all questions.   Will s/o, please call if needed.    Quality-Core Measures   Reviewed items::  Labs reviewed, Medications reviewed and Radiology images reviewed

## 2024-05-06 LAB
ANION GAP SERPL CALC-SCNC: 13 MMOL/L (ref 7–16)
BUN SERPL-MCNC: 5 MG/DL (ref 8–22)
CALCIUM SERPL-MCNC: 7.9 MG/DL (ref 8.4–10.2)
CHLORIDE SERPL-SCNC: 104 MMOL/L (ref 96–112)
CO2 SERPL-SCNC: 20 MMOL/L (ref 20–33)
CREAT SERPL-MCNC: 0.55 MG/DL (ref 0.5–1.4)
ERYTHROCYTE [DISTWIDTH] IN BLOOD BY AUTOMATED COUNT: 45.4 FL (ref 35.9–50)
GFR SERPLBLD CREATININE-BSD FMLA CKD-EPI: 97 ML/MIN/1.73 M 2
GLUCOSE SERPL-MCNC: 97 MG/DL (ref 65–99)
HCT VFR BLD AUTO: 32 % (ref 37–47)
HGB BLD-MCNC: 10.3 G/DL (ref 12–16)
MCH RBC QN AUTO: 27 PG (ref 27–33)
MCHC RBC AUTO-ENTMCNC: 32.2 G/DL (ref 32.2–35.5)
MCV RBC AUTO: 83.8 FL (ref 81.4–97.8)
PLATELET # BLD AUTO: 270 K/UL (ref 164–446)
PMV BLD AUTO: 9.2 FL (ref 9–12.9)
POTASSIUM SERPL-SCNC: 3.3 MMOL/L (ref 3.6–5.5)
RBC # BLD AUTO: 3.82 M/UL (ref 4.2–5.4)
SODIUM SERPL-SCNC: 137 MMOL/L (ref 135–145)
WBC # BLD AUTO: 11.9 K/UL (ref 4.8–10.8)

## 2024-05-06 PROCEDURE — 99232 SBSQ HOSP IP/OBS MODERATE 35: CPT | Performed by: INTERNAL MEDICINE

## 2024-05-06 RX ORDER — METOPROLOL SUCCINATE 25 MG/1
25 TABLET, EXTENDED RELEASE ORAL DAILY
Status: DISCONTINUED | OUTPATIENT
Start: 2024-05-06 | End: 2024-05-06

## 2024-05-06 RX ORDER — LISINOPRIL 2.5 MG/1
2.5 TABLET ORAL DAILY
Status: DISCONTINUED | OUTPATIENT
Start: 2024-05-06 | End: 2024-05-08 | Stop reason: HOSPADM

## 2024-05-06 RX ADMIN — PANTOPRAZOLE SODIUM 40 MG: 40 INJECTION, POWDER, LYOPHILIZED, FOR SOLUTION INTRAVENOUS at 17:07

## 2024-05-06 RX ADMIN — LISINOPRIL 2.5 MG: 2.5 TABLET ORAL at 14:21

## 2024-05-06 RX ADMIN — PIPERACILLIN SODIUM AND TAZOBACTAM SODIUM 3.38 G: 3; .375 INJECTION, POWDER, LYOPHILIZED, FOR SOLUTION INTRAVENOUS at 05:54

## 2024-05-06 RX ADMIN — PANTOPRAZOLE SODIUM 40 MG: 40 INJECTION, POWDER, LYOPHILIZED, FOR SOLUTION INTRAVENOUS at 05:54

## 2024-05-06 RX ADMIN — HYDROMORPHONE HYDROCHLORIDE 0.5 MG: 1 INJECTION, SOLUTION INTRAMUSCULAR; INTRAVENOUS; SUBCUTANEOUS at 17:09

## 2024-05-06 RX ADMIN — LIOTHYRONINE SODIUM 5 MCG: 5 TABLET ORAL at 05:52

## 2024-05-06 RX ADMIN — ACETAMINOPHEN 650 MG: 325 TABLET ORAL at 14:21

## 2024-05-06 RX ADMIN — HYDROCODONE BITARTRATE AND ACETAMINOPHEN 1 TABLET: 10; 325 TABLET ORAL at 07:51

## 2024-05-06 RX ADMIN — EZETIMIBE 10 MG: 10 TABLET ORAL at 17:07

## 2024-05-06 RX ADMIN — LEVOTHYROXINE SODIUM 88 MCG: 0.09 TABLET ORAL at 05:52

## 2024-05-06 RX ADMIN — ROSUVASTATIN CALCIUM 20 MG: 10 TABLET, FILM COATED ORAL at 17:07

## 2024-05-06 RX ADMIN — HYDROCODONE BITARTRATE AND ACETAMINOPHEN 2 TABLET: 10; 325 TABLET ORAL at 20:06

## 2024-05-06 RX ADMIN — ESCITALOPRAM OXALATE 20 MG: 10 TABLET ORAL at 05:52

## 2024-05-06 RX ADMIN — AMITRIPTYLINE HYDROCHLORIDE 100 MG: 50 TABLET, FILM COATED ORAL at 20:07

## 2024-05-06 ASSESSMENT — ENCOUNTER SYMPTOMS
PHOTOPHOBIA: 0
MYALGIAS: 0
NERVOUS/ANXIOUS: 0
SENSORY CHANGE: 0
ABDOMINAL PAIN: 1
CHILLS: 0
CONSTIPATION: 0
INSOMNIA: 0
HEARTBURN: 0
COUGH: 0
HEADACHES: 0
SHORTNESS OF BREATH: 0
DIARRHEA: 1
WEAKNESS: 0
NAUSEA: 0
BLURRED VISION: 0
CLAUDICATION: 0
SPEECH CHANGE: 0
FEVER: 0
DEPRESSION: 0
DIZZINESS: 0
VOMITING: 0
SORE THROAT: 0

## 2024-05-06 ASSESSMENT — FIBROSIS 4 INDEX: FIB4 SCORE: 1.55

## 2024-05-06 ASSESSMENT — COGNITIVE AND FUNCTIONAL STATUS - GENERAL
DRESSING REGULAR LOWER BODY CLOTHING: A LOT
DAILY ACTIVITIY SCORE: 15
HELP NEEDED FOR BATHING: A LOT
EATING MEALS: A LITTLE
DRESSING REGULAR UPPER BODY CLOTHING: A LOT
TOILETING: A LITTLE
SUGGESTED CMS G CODE MODIFIER DAILY ACTIVITY: CK
PERSONAL GROOMING: A LITTLE

## 2024-05-06 ASSESSMENT — PAIN DESCRIPTION - PAIN TYPE: TYPE: ACUTE PAIN

## 2024-05-06 ASSESSMENT — ACTIVITIES OF DAILY LIVING (ADL): TOILETING: REQUIRES ASSIST

## 2024-05-06 ASSESSMENT — GAIT ASSESSMENTS: DISTANCE (FEET): 150

## 2024-05-06 NOTE — CARE PLAN
Problem: Knowledge Deficit - Standard  Goal: Patient and family/care givers will demonstrate understanding of plan of care, disease process/condition, diagnostic tests and medications    Pt educaated on plan of care and medications. No questions at this time     Outcome: Progressing   The patient is Stable - Low risk of patient condition declining or worsening    Shift Goals  Clinical Goals: pain control, nausea control, iv fluids and abx  Patient Goals: rest  Family Goals: Pain medications    Progress made toward(s) clinical / shift goals:      Patient is not progressing towards the following goals:

## 2024-05-06 NOTE — PROGRESS NOTES
Telemetry shift summary    Rhythm: st  HR: 101-120  Ectopy: R pvc    Measurements: .14 / .08 / .32    Normal values  Rhythm SR  HR   Measurements: 0.12-0.20/0.08-0.10/0.30-0.52
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
## 2024-05-06 NOTE — PROGRESS NOTES
Telemetry Strip     Strip printed: 1452  Measurements from am strip were as follows:  Rhythm: SR-ST  HR:   Measurements: 0.16/ 0.08/ 0.36  Ectopy: r PVC             Normal Values  Rhythm SR  HR Range    Measurements 0.12-0.20 / 0.06-0.10  / 0.30-0.52

## 2024-05-06 NOTE — PROGRESS NOTES
Hospital Medicine Daily Progress Note    Date of Service  5/6/2024    Chief Complaint  Yamile Go is a 74 y.o. female admitted 5/2/2024 with abdominal pain, hypotension, hypoxia, tachycardia    Hospital Course  Patient is a pleasant 74-year-old female who came in with slight altered mental status and hypotension hypoxia and tachycardia.  Ramser was called patient was brought to [facility].  Started on fluid resuscitation started having bloody diarrhea that turned into spencer blood.  She is started on fluid resuscitation and did not require transfusion.  GI was consulted and recommended rule out C. difficile as well as colonoscopy.  C. difficile was negative, colonoscopy prep was refused by the patient and patient's H&H has been stable.  She has been downgraded from the ICU and is medically stable at this time.  The patient had a right total shoulder on 4/30 had been taking Percocet for pain management.    Interval Problem Update  5/5 patient downgraded from the ICU to the telemetry floor and continues to show improvement.  She remains on Zosyn for pancolitis.  She does appear clinically dehydrated and will be started back on normal saline and will continue on a clear liquid diet.  Hemoglobin stable at 10.1.  Polanco catheter to be removed today.  5/6 patient feeling about the same today working with occupational therapy at time of evaluation.  She is frequently needed to use the restroom both with urination and diarrhea.  She feels overly hydrated now since restarted the fluids yesterday and I will discontinue these.  She is also completed antibiotics and we will discontinue the Zosyn as she is showing improvement in her colitis was likely ischemic not infectious.  As she continues to show improvement she will transition home.    I have discussed this patient's plan of care and discharge plan at IDT rounds today with Case Management, Nursing, Nursing leadership, and other members of the IDT  team.    Consultants/Specialty  none    Code Status  Full Code    Disposition  The patient is not medically cleared for discharge to home or a post-acute facility.  Anticipate discharge to: home with close outpatient follow-up    I have placed the appropriate orders for post-discharge needs.    Review of Systems  Review of Systems   Constitutional:  Negative for chills and fever.   HENT:  Negative for congestion and sore throat.    Eyes:  Negative for blurred vision and photophobia.   Respiratory:  Negative for cough and shortness of breath.    Cardiovascular:  Negative for chest pain, claudication and leg swelling.   Gastrointestinal:  Positive for abdominal pain and diarrhea. Negative for constipation, heartburn, nausea and vomiting.   Genitourinary:  Positive for frequency. Negative for dysuria and hematuria.   Musculoskeletal:  Negative for joint pain and myalgias.   Skin:  Negative for itching and rash.   Neurological:  Negative for dizziness, sensory change, speech change, weakness and headaches.   Psychiatric/Behavioral:  Negative for depression. The patient is not nervous/anxious and does not have insomnia.         Physical Exam  Temp:  [36.6 °C (97.8 °F)-37.5 °C (99.5 °F)] 36.6 °C (97.8 °F)  Pulse:  [] 104  Resp:  [17-18] 18  BP: (120-172)/(68-90) 172/85  SpO2:  [89 %-91 %] 91 %    Physical Exam  Vitals and nursing note reviewed.   Constitutional:       General: She is not in acute distress.     Appearance: Normal appearance. She is not ill-appearing.   HENT:      Head: Normocephalic and atraumatic.      Nose: Nose normal.   Cardiovascular:      Rate and Rhythm: Normal rate and regular rhythm.      Heart sounds: Normal heart sounds. No murmur heard.  Pulmonary:      Effort: Pulmonary effort is normal.      Breath sounds: Normal breath sounds.   Abdominal:      General: Bowel sounds are normal. There is no distension.      Palpations: Abdomen is soft.      Tenderness: There is abdominal tenderness.  There is no guarding or rebound.   Musculoskeletal:         General: No swelling or tenderness.      Cervical back: Neck supple.      Comments: Right shoulder immobilizer   Skin:     General: Skin is warm and dry.   Neurological:      General: No focal deficit present.      Mental Status: She is alert and oriented to person, place, and time.   Psychiatric:         Mood and Affect: Mood normal.         Fluids  No intake or output data in the 24 hours ending 05/06/24 1406      Laboratory  Recent Labs     05/04/24 0442 05/04/24  1934 05/05/24 0417 05/06/24  0145   WBC 14.4*  --  13.2* 11.9*   RBC 4.03*  --  3.79* 3.82*   HEMOGLOBIN 10.8* 10.2* 10.1* 10.3*   HEMATOCRIT 33.9* 32.4* 32.2* 32.0*   MCV 84.1  --  85.0 83.8   MCH 26.8*  --  26.6* 27.0   MCHC 31.9*  --  31.4* 32.2   RDW 47.8  --  47.1 45.4   PLATELETCT 224  --  223 270   MPV 9.2  --  9.2 9.2     Recent Labs     05/04/24 0442 05/05/24 0417 05/06/24  0145   SODIUM 132* 134* 137   POTASSIUM 3.4* 3.4* 3.3*   CHLORIDE 99 99 104   CO2 21 21 20   GLUCOSE 110* 75 97   BUN 11 8 5*   CREATININE 0.74 0.59 0.55   CALCIUM 7.5* 7.5* 7.9*                     Imaging  DX-CHEST-PORTABLE (1 VIEW)   Final Result      1.  New bilateral interstitial and airspace infiltrates.      2.  Cardiomegaly.      US-RUQ   Final Result         1.  Common bile duct dilatation, could represent age-related changes, consider causes of biliary obstruction with additional workup as clinically appropriate.   2.  Hepatomegaly   3.  Echogenic liver, compatible with fatty change versus fibrosis.   4.  Atherosclerosis      CT-CTA CHEST PULMONARY ARTERY W/ RECONS   Final Result         1.  No pulmonary embolus appreciated.   2.  Fluid-filled distention of esophagus, consider gastroesophageal reflux versus dysmotility or component of gastroesophageal junction obstruction. Could be further evaluated with swallow study as clinically appropriate.   3.  Irregular hepatic contour favoring changes of  cirrhosis   4.  Air and fluid tracking along the right axilla and shoulder soft tissues, within expected limits for recent postop changes.   5.  Pulmonary nodules, see nodule follow-up recommendations below.      Fleischner Society pulmonary nodule recommendations:   Low Risk: No routine follow-up      High Risk: Optional CT at 12 months      Comments: Use most suspicious nodule as guide to management. Follow-up intervals may vary according to size and risk.      Low Risk - Minimal or absent history of smoking and of other known risk factors.      High Risk - History of smoking or of other known risk factors.      Note: These recommendations do not apply to lung cancer screening, patients with immunosuppression, or patients with known primary cancer.      Fleischner Society 2017 Guidelines for Management of Incidentally Detected Pulmonary Nodules in Adults         CT-ABDOMEN-PELVIS WITH   Final Result         1.  Diffuse colonic wall thickening with hazy pericolonic fat stranding, appearance favoring changes of pancolitis.   2.  Intrahepatic and extrahepatic biliary ductal dilatation, consider causes of biliary obstruction with additional workup as clinically appropriate           Assessment/Plan  * Sepsis (HCC)- (present on admission)  Assessment & Plan  Inpatient status on medical floor  This is Sepsis Present on admission  SIRS criteria identified on my evaluation include: Tachycardia, with heart rate greater than 90 BPM and Leukocytosis, with WBC greater than 12,000  Clinical indicators of end organ dysfunction include Lactic Acid greater than 2 and Acute Respiratory Failure - (mechanical ventilation or BiPap or PaO2/FiO2 ratio < 300)  Source is pancolitis  Completed antibiotics  Source likely not infectious but ischemic    GIB (gastrointestinal bleeding)- (present on admission)  Assessment & Plan  Secondary to ischemic colitis, has resolved    Rectal bleeding  Assessment & Plan  Secondary to colitis, patient  with history of irritable bowel syndrome but no prior history of bleeding.  Appreciate GI recommendations, colonoscopy deferred at this time.  H&H remains stable and bleeding has resolved.    Lactic acidosis  Assessment & Plan  Resolved      S/P shoulder surgery  Assessment & Plan  -s/p  Right reverse total shoulder arthroplasty with proximal biceps tenodesis surgery by Dr. Sparks on 4/30 (POD #2).  -Given her new GI bleed, I will hold baby aspirin twice daily for DVT prophylaxis for now.  -Day team to notify orthopedic surgeon that patient is admitted with pancolitis.  She might need PT/OT while here.    Pancolitis (HCC)  Assessment & Plan  Negative C. difficile, completed with Zosyn  Start GI soft diet    Near syncope  Assessment & Plan  Resolved, DC IV fluids, patient well-hydrated      Acute respiratory failure (HCC)  Assessment & Plan  Resolved, patient on room air    Type 2 diabetes mellitus (HCC)- (present on admission)  Assessment & Plan  -Ordering regular insulin sliding scale.  Takes metformin at home.    Hypothyroidism- (present on admission)  Assessment & Plan  levothyroxine and Cytomel.    Depression- (present on admission)  Assessment & Plan  -She is on amitriptyline and Lexapro      Essential hypertension, benign- (present on admission)  Assessment & Plan  Resume metoprolol and lisinopril      Dyslipidemia- (present on admission)  Assessment & Plan  -Patient takes Zetia.         VTE prophylaxis:   SCDs/TEDs      I have performed a physical exam and reviewed and updated ROS and Plan today (5/6/2024). In review of yesterday's note (5/5/2024), there are no changes except as documented above.

## 2024-05-06 NOTE — CARE PLAN
Problem: Knowledge Deficit - Standard  Goal: Patient and family/care givers will demonstrate understanding of plan of care, disease process/condition, diagnostic tests and medications  Outcome: Progressing     Problem: Nutrition  Goal: Patient's nutritional and fluid intake will be adequate or improve  Outcome: Progressing     Problem: Gastrointestinal Irritability  Goal: Nausea and vomiting will be absent or improve  Outcome: Progressing     Problem: Pain - Standard  Goal: Alleviation of pain or a reduction in pain to the patient’s comfort goal  Outcome: Progressing   The patient is Stable - Low risk of patient condition declining or worsening    Shift Goals  Clinical Goals: pain control, IV fluids, encourage oral intake  Patient Goals: rest, pain control  Family Goals: Pain medications    Progress made toward(s) clinical / shift goals:  updated pt on plan of care, continue with IV fluids, encouraged oral intake. Pt denies nausea at this time. Pt reports 7/10 pain in right shoulder medicated per MAR. Will continue to monitor.     Patient is not progressing towards the following goals:

## 2024-05-07 PROBLEM — E87.1 HYPONATREMIA: Status: ACTIVE | Noted: 2024-05-07

## 2024-05-07 LAB
ANION GAP SERPL CALC-SCNC: 13 MMOL/L (ref 7–16)
BACTERIA BLD CULT: NORMAL
BUN SERPL-MCNC: 3 MG/DL (ref 8–22)
CALCIUM SERPL-MCNC: 7.8 MG/DL (ref 8.4–10.2)
CHLORIDE SERPL-SCNC: 102 MMOL/L (ref 96–112)
CO2 SERPL-SCNC: 19 MMOL/L (ref 20–33)
CREAT SERPL-MCNC: 0.5 MG/DL (ref 0.5–1.4)
E COLI SXT1+2 STL IA: NORMAL
GFR SERPLBLD CREATININE-BSD FMLA CKD-EPI: 99 ML/MIN/1.73 M 2
GLUCOSE SERPL-MCNC: 91 MG/DL (ref 65–99)
POTASSIUM SERPL-SCNC: 3.1 MMOL/L (ref 3.6–5.5)
SIGNIFICANT IND 70042: NORMAL
SIGNIFICANT IND 70042: NORMAL
SITE SITE: NORMAL
SITE SITE: NORMAL
SODIUM SERPL-SCNC: 134 MMOL/L (ref 135–145)
SOURCE SOURCE: NORMAL
SOURCE SOURCE: NORMAL

## 2024-05-07 PROCEDURE — 99232 SBSQ HOSP IP/OBS MODERATE 35: CPT | Performed by: INTERNAL MEDICINE

## 2024-05-07 RX ORDER — OMEPRAZOLE 20 MG/1
20 CAPSULE, DELAYED RELEASE ORAL 2 TIMES DAILY
Status: DISCONTINUED | OUTPATIENT
Start: 2024-05-07 | End: 2024-05-08 | Stop reason: HOSPADM

## 2024-05-07 RX ORDER — POTASSIUM CHLORIDE 20 MEQ/1
20 TABLET, EXTENDED RELEASE ORAL 3 TIMES DAILY
Status: COMPLETED | OUTPATIENT
Start: 2024-05-07 | End: 2024-05-08

## 2024-05-07 RX ADMIN — LIOTHYRONINE SODIUM 5 MCG: 5 TABLET ORAL at 05:25

## 2024-05-07 RX ADMIN — ESCITALOPRAM OXALATE 20 MG: 10 TABLET ORAL at 05:25

## 2024-05-07 RX ADMIN — POTASSIUM CHLORIDE 20 MEQ: 1500 TABLET, EXTENDED RELEASE ORAL at 12:02

## 2024-05-07 RX ADMIN — HYDROCODONE BITARTRATE AND ACETAMINOPHEN 2 TABLET: 10; 325 TABLET ORAL at 17:34

## 2024-05-07 RX ADMIN — ROSUVASTATIN CALCIUM 20 MG: 10 TABLET, FILM COATED ORAL at 17:34

## 2024-05-07 RX ADMIN — OMEPRAZOLE 20 MG: 20 CAPSULE, DELAYED RELEASE ORAL at 17:34

## 2024-05-07 RX ADMIN — METOPROLOL TARTRATE 25 MG: 25 TABLET, FILM COATED ORAL at 05:25

## 2024-05-07 RX ADMIN — METOPROLOL TARTRATE 25 MG: 25 TABLET, FILM COATED ORAL at 17:32

## 2024-05-07 RX ADMIN — PANTOPRAZOLE SODIUM 40 MG: 40 INJECTION, POWDER, LYOPHILIZED, FOR SOLUTION INTRAVENOUS at 05:25

## 2024-05-07 RX ADMIN — EZETIMIBE 10 MG: 10 TABLET ORAL at 17:33

## 2024-05-07 RX ADMIN — HYDROCODONE BITARTRATE AND ACETAMINOPHEN 2 TABLET: 10; 325 TABLET ORAL at 07:59

## 2024-05-07 RX ADMIN — LISINOPRIL 2.5 MG: 2.5 TABLET ORAL at 05:25

## 2024-05-07 RX ADMIN — POTASSIUM CHLORIDE 20 MEQ: 1500 TABLET, EXTENDED RELEASE ORAL at 17:32

## 2024-05-07 RX ADMIN — LEVOTHYROXINE SODIUM 88 MCG: 0.09 TABLET ORAL at 05:25

## 2024-05-07 RX ADMIN — AMITRIPTYLINE HYDROCHLORIDE 100 MG: 50 TABLET, FILM COATED ORAL at 21:05

## 2024-05-07 RX ADMIN — ONDANSETRON 4 MG: 2 INJECTION INTRAMUSCULAR; INTRAVENOUS at 15:06

## 2024-05-07 ASSESSMENT — COGNITIVE AND FUNCTIONAL STATUS - GENERAL
DAILY ACTIVITIY SCORE: 20
SUGGESTED CMS G CODE MODIFIER DAILY ACTIVITY: CJ
DRESSING REGULAR UPPER BODY CLOTHING: A LITTLE
DRESSING REGULAR LOWER BODY CLOTHING: A LITTLE
TOILETING: A LITTLE
HELP NEEDED FOR BATHING: A LITTLE

## 2024-05-07 ASSESSMENT — ENCOUNTER SYMPTOMS
SHORTNESS OF BREATH: 0
INSOMNIA: 0
CHILLS: 0
ABDOMINAL PAIN: 1
VOMITING: 0
ROS GI COMMENTS: CONTINUES TO IMPROVE
SORE THROAT: 0
DIARRHEA: 1
CLAUDICATION: 0
NERVOUS/ANXIOUS: 0
DIZZINESS: 0
HEARTBURN: 0
MYALGIAS: 0
PHOTOPHOBIA: 0
WEAKNESS: 0
COUGH: 0
HEADACHES: 0
SENSORY CHANGE: 0
BLURRED VISION: 0
FEVER: 0
NAUSEA: 0
DEPRESSION: 0
SPEECH CHANGE: 0
CONSTIPATION: 0

## 2024-05-07 ASSESSMENT — PAIN DESCRIPTION - PAIN TYPE
TYPE: ACUTE PAIN
TYPE: SURGICAL PAIN
TYPE: ACUTE PAIN

## 2024-05-07 ASSESSMENT — FIBROSIS 4 INDEX: FIB4 SCORE: 1.55

## 2024-05-07 NOTE — PROGRESS NOTES
"Orthopedic Progress Note  Subjective:  Doing well, much improved.  Minimal shoulder pain.  Date of Surgery: 5/4/2024  Post-op Day: * No surgery date entered *    Exam:  BP (!) 165/89 Comment: rn notified  Pulse 93   Temp 36.8 °C (98.2 °F) (Oral)   Resp 18   Ht 1.575 m (5' 2\")   Wt 75.3 kg (166 lb 0.1 oz)   SpO2 90%   BMI 30.36 kg/m²     RIGHT shoulder dsg dci, sling on    Ins/Outs:  Intake/Output                         05/06/24 0700 - 05/07/24 0659 05/07/24 0700 - 05/08/24 0659     0700-1859 1900-0659 Total [id number] 1900-0659 Total                 Intake    P.O.  --  -- --  120  -- 120    P.O. -- -- -- 120 -- 120    Total Intake -- -- -- 120 -- 120       Output    Urine  --  -- --  --  -- --    Number of Times Voided -- 3 x 3 x -- -- --    Stool  --  -- --  --  -- --    Number of Times Stooled -- 1 x 1 x -- -- --    Total Output -- -- -- -- -- --       Net I/O     -- -- -- 120 -- 120            Intake/Output Summary (Last 24 hours) at 5/7/2024 1601  Last data filed at 5/7/2024 1501  Gross per 24 hour   Intake 120 ml   Output --   Net 120 ml     Labs:  Recent Labs     05/04/24 1934 05/05/24 0417 05/06/24 0145   WBC  --  13.2* 11.9*   RBC  --  3.79* 3.82*   HEMOGLOBIN 10.2* 10.1* 10.3*   HEMATOCRIT 32.4* 32.2* 32.0*   MCV  --  85.0 83.8   MCH  --  26.6* 27.0   MCHC  --  31.4* 32.2   RDW  --  47.1 45.4   PLATELETCT  --  223 270   MPV  --  9.2 9.2     Recent Labs     05/05/24  0417 05/06/24  0145 05/07/24  1049   SODIUM 134* 137 134*   POTASSIUM 3.4* 3.3* 3.1*   CHLORIDE 99 104 102   CO2 21 20 19*   GLUCOSE 75 97 91   BUN 8 5* 3*   CREATININE 0.59 0.55 0.50   CALCIUM 7.5* 7.9* 7.8*       Medications:   potassium chloride SA  20 mEq TID      omeprazole  20 mg BID      lisinopril  2.5 mg DAILY      metoprolol tartrate  25 mg BID      prochlorperazine  10 mg Q6HRS PRN      HYDROmorphone  0.5 mg Q3HRS PRN      dextrose bolus  25 g Q15 MIN PRN      labetalol  10 mg Q4HRS PRN      amitriptyline  100 mg Nightly   "    escitalopram  20 mg DAILY      ezetimibe  10 mg Q EVENING      HYDROcodone/acetaminophen  1-2 Tablet Q6HRS PRN      liothyronine  5 mcg DAILY      rosuvastatin  20 mg Q EVENING      levothyroxine  88 mcg DAILY      acetaminophen  650 mg Q6HRS PRN      ondansetron  4 mg Q4HRS PRN      ondansetron  4 mg Q4HRS PRN       Imaging:      Quality:  Date of Admission: 5/2/2024  Hospital day #5  Prophylactic anticoagulation:  Assessment and Plan:  Events of current hospital admission noted.  Improved with anticipated discharge home tomorrow per patient.    Shoulder dressing changed to new Aquacel and abduction pillow removed.    Sling, f/u one week for 1st postop appointment following shoulder surgery.      Manuela Sparks M.D.

## 2024-05-07 NOTE — PROGRESS NOTES
Telemetry Shift Summary     Rhythm: SR - ST  HR:   Ectopy: rPVC    Measurements: .14/.08/.32    Normal Values  Rhythm: SR  HR:   Measurements: 0.12-0.20/0.08-0.10/0.30-0.52

## 2024-05-07 NOTE — RESPIRATORY CARE
" COPD EDUCATION by COPD CLINICAL EDUCATOR  5/7/2024 at 7:18 AM by Zulma Archibald, RRT     Patient reviewed by COPD education team. Patient does not have a history or diagnosis of COPD and is a non-smoker.  Therefore, patient does not qualify for the COPD program.      COPD Screen  COPD Risk Screening  Do you have a history of COPD?: No (NO PFT hx dx COPD, NEVER Smoked)    COPD Assessment  COPD Clinical Specialists ONLY  COPD Education Initiated: No--Quick Screen (NO HX DX COPD, NEVER SMOKED, NO INHALERS used Patient admit with slight altered mental status and hypotension hypoxia and tachycardia.  Had a right total shoulder on 4/30 had been taking Percocet for pain management.)  Interdisciplinary Rounds: Attendance at Rounds (30 Min)    PFT Results    No results found for: \"PFT\"    Meds to Beds  [facility] provides bedside medication delivery for all eligible patients at discharge.  Would you like to opt out of this program for any reason?: Yes - Opt Out  Reason the patient would like to opt out of Bedside Medication Delivery at Discharge?: Patient prefers another pharmacy     MY COPD ACTION PLAN     It is recommended that patients and physicians /healthcare providers complete this action plan together. This plan should be discussed at each physician visit and updated as needed.    The green, yellow and red zones show groups of symptoms of COPD. This list of symptoms is not comprehensive, and you may experience other symptoms. In the \"Actions\" column, your healthcare provider has recommended actions for you to take based on your symptoms.    Patient Name: Yamile Go   YOB: 1950   Last Updated on:     Green Zone:  I am doing well today Actions     Usual activitiy and exercise level   Take daily medications     Usual amounts of cough and phlegm/mucus   Use oxygen as prescribed     Sleep well at night   Continue regular exercise/diet plan     Appetite is good   At all times avoid cigarette smoke, " "inhaled irritants     Daily Medications (these medications are taken every day):                Yellow Zone:  I am having a bad day or a COPD flare Actions     More breathless than usual   Continue daily medications     I have less energy for my daily activities   Use quick relief inhaler as ordered     Increased or thicker phlegm/mucus   Use oxygen as prescribed     Using quick relief inhaler/nebulizer more often   Get plenty of rest     Swelling of ankles more than usual   Use pursed lip breathing     More coughing than usual   At all times avoid cigarette smoke, inhaled irritants     I feel like I have a \"chest cold\"     Poor sleep and my symptoms woke me up     My appetite is not good     My medicine is not helping      Call provider immediately if symptoms don’t improve     Continue daily medications, add rescue medications:               Medications to be used during a flare up, (as Discussed with Provider):              Red Zone:  I need urgent medical care Actions     Severe shortness of breath even at rest   Call 911 or seek medical care immediately     Not able to do any activity because of breathing      Fever or shaking chills      Feeling confused or very drowsy       Chest pains      Coughing up blood                  "

## 2024-05-07 NOTE — CARE PLAN
The patient is Stable - Low risk of patient condition declining or worsening    Shift Goals  Clinical Goals: pain control, tolerate food  Patient Goals: comfort and rest  Family Goals: Pain medications    Progress made toward(s) clinical / shift goals:    Problem: Knowledge Deficit - Standard  Goal: Patient and family/care givers will demonstrate understanding of plan of care, disease process/condition, diagnostic tests and medications  Outcome: Progressing     Problem: Hemodynamics  Goal: Patient's hemodynamics, fluid balance and neurologic status will be stable or improve  Outcome: Progressing       Patient is not progressing towards the following goals:

## 2024-05-07 NOTE — THERAPY
Occupational Therapy  Daily Treatment     Patient Name: Yamile Go  Age:  74 y.o., Sex:  female  Medical Record #: [id number]  Today's Date: 5/7/2024     Precautions  Precautions: Fall Risk, Non Weight Bearing Right Upper Extremity, Immobilizer Right Upper Extremity  Comments: frequent urination, loose stools    Assessment  Pt shows progress towards goals. Limited by lightheadedness, impaired balance, decreased functional use of RUE. Attempted pendulum exercises; pt unable to complete fully due to dizziness. Tolerates UIC post session. OT will continue to follow.    Plan  Treatment Plan Status: (P) Continue Current Treatment Plan  Type of Treatment: (P) Self Care / Activities of Daily Living, Neuro Re-Education / Balance, Therapeutic Activity  Treatment Frequency: (P) 4 Times per Week  Treatment Duration: (P) Until Therapy Goals Met    DC Equipment Recommendations: (P) Unable to determine at this time  Discharge Recommendations: (P) Recommend home health for continued occupational therapy services    Subjective  Agreeable     Objective     05/07/24 1102   Pain 0 - 10 Group   Location Shoulder   Location Orientation Right   Therapist Pain Assessment Post Activity Pain Same as Prior to Activity;Nurse Notified   Cognition    Cognition / Consciousness WDL   Level of Consciousness Alert   Standing Upper Body Exercises   Standing Upper Body Exercises Yes   Comments initiated pendulums; pt tolerates only a few of each due to dizziness   Balance   Sitting Balance (Static) Good   Sitting Balance (Dynamic) Fair +   Standing Balance (Static) Fair +   Standing Balance (Dynamic) Fair   Weight Shift Sitting Good   Weight Shift Standing Fair   Skilled Intervention Verbal Cuing   Bed Mobility    Supine to Sit Standby Assist   Activities of Daily Living   Eating Supervision   Grooming Standby Assist;Standing   Lower Body Dressing Minimal Assist   Toileting Standby Assist   Skilled Intervention Verbal Cuing;Compensatory Strategies    How much help from another person does the patient currently need...   Putting on and taking off regular lower body clothing? 3   Bathing (including washing, rinsing, and drying)? 3   Toileting, which includes using a toilet, bedpan, or urinal? 3   Putting on and taking off regular upper body clothing? 3   Taking care of personal grooming such as brushing teeth? 4   Eating meals? 4   6 Clicks Daily Activity Score 20   Functional Mobility   Sit to Stand Standby Assist   Bed, Chair, Wheelchair Transfer Contact Guard Assist   Toilet Transfers Contact Guard Assist   Transfer Method Stand Step   Activity Tolerance   Sitting in Chair 1 hr   Sitting Edge of Bed 10   Standing 7   Short Term Goals   Goal Outcome # 1 Progressing as expected   Goal Outcome # 2 Progressing as expected   Goal Outcome # 3 Progressing as expected   Education Group   Role of Occupational Therapist Patient Response Patient;Acceptance;Explanation;Verbal Demonstration   Brace Wear & Care Patient Response Patient;Acceptance;Explanation;Verbal Demonstration   ADL Patient Response Patient;Verbal Demonstration;Action Demonstration;Explanation;Acceptance   Occupational Therapy Treatment Plan    O.T. Treatment Plan Continue Current Treatment Plan   Treatment Interventions Self Care / Activities of Daily Living;Neuro Re-Education / Balance;Therapeutic Activity   Treatment Frequency 4 Times per Week   Duration Until Therapy Goals Met   Anticipated Discharge Equipment and Recommendations   DC Equipment Recommendations Unable to determine at this time   Discharge Recommendations Recommend home health for continued occupational therapy services   Interdisciplinary Plan of Care Collaboration   IDT Collaboration with  Nursing   Patient Position at End of Therapy Seated;Call Light within Reach;Tray Table within Reach;Phone within Reach   Collaboration Comments aware of visit.

## 2024-05-07 NOTE — PROGRESS NOTES
Hospital Medicine Daily Progress Note    Date of Service  5/7/2024    Chief Complaint  Yamile Go is a 74 y.o. female admitted 5/2/2024 with abdominal pain, hypotension, hypoxia, tachycardia    Hospital Course  Patient is a pleasant 74-year-old female who came in with slight altered mental status and hypotension hypoxia and tachycardia.  Ramser was called patient was brought to [facility].  Started on fluid resuscitation started having bloody diarrhea that turned into spencer blood.  She is started on fluid resuscitation and did not require transfusion.  GI was consulted and recommended rule out C. difficile as well as colonoscopy.  C. difficile was negative, colonoscopy prep was refused by the patient and patient's H&H has been stable.  She has been downgraded from the ICU and is medically stable at this time.  The patient had a right total shoulder on 4/30 had been taking Percocet for pain management.    Interval Problem Update  5/7  Doing better today, remains afebrile, tolerating some oral intake, but still feels like her stomac is gurgling. No other new issues.     I have discussed this patient's plan of care and discharge plan at IDT rounds today with Case Management, Nursing, Nursing leadership, and other members of the IDT team.    Consultants/Specialty  none    Code Status  Full Code    Disposition  The patient is not medically cleared for discharge to home or a post-acute facility.  Anticipate discharge to: home with close outpatient follow-up    I have placed the appropriate orders for post-discharge needs.    Review of Systems  Review of Systems   Constitutional:  Negative for chills and fever.   HENT:  Negative for congestion and sore throat.    Eyes:  Negative for blurred vision and photophobia.   Respiratory:  Negative for cough and shortness of breath.    Cardiovascular:  Negative for chest pain, claudication and leg swelling.   Gastrointestinal:  Positive for abdominal pain and  diarrhea. Negative for constipation, heartburn, nausea and vomiting.        Continues to improve   Genitourinary:  Positive for frequency. Negative for dysuria and hematuria.   Musculoskeletal:  Negative for joint pain and myalgias.   Skin:  Negative for itching and rash.   Neurological:  Negative for dizziness, sensory change, speech change, weakness and headaches.   Psychiatric/Behavioral:  Negative for depression. The patient is not nervous/anxious and does not have insomnia.         Physical Exam  Temp:  [36.2 °C (97.2 °F)-37.1 °C (98.8 °F)] 36.8 °C (98.2 °F)  Pulse:  [90-99] 99  Resp:  [16-17] 17  BP: (134-179)/(68-85) 154/84  SpO2:  [90 %-91 %] 91 %    Physical Exam  Vitals and nursing note reviewed.   Constitutional:       General: She is not in acute distress.     Appearance: Normal appearance. She is not ill-appearing.      Comments: Laying comfortably in bed   HENT:      Head: Normocephalic and atraumatic.      Nose: Nose normal.   Cardiovascular:      Rate and Rhythm: Normal rate and regular rhythm.      Heart sounds: Normal heart sounds. No murmur heard.  Pulmonary:      Effort: Pulmonary effort is normal.      Breath sounds: Normal breath sounds.   Abdominal:      General: Bowel sounds are normal. There is no distension.      Palpations: Abdomen is soft.      Tenderness: There is abdominal tenderness (mild in epigastric region). There is no guarding or rebound.   Musculoskeletal:         General: No swelling or tenderness.      Cervical back: Neck supple.      Comments: Right shoulder immobilizer   Skin:     General: Skin is warm and dry.   Neurological:      General: No focal deficit present.      Mental Status: She is alert and oriented to person, place, and time.   Psychiatric:         Mood and Affect: Mood normal.         Fluids  No intake or output data in the 24 hours ending 05/07/24 1152      Laboratory  Recent Labs     05/04/24  1934 05/05/24  0417 05/06/24  0145   WBC  --  13.2* 11.9*   RBC  --   3.79* 3.82*   HEMOGLOBIN 10.2* 10.1* 10.3*   HEMATOCRIT 32.4* 32.2* 32.0*   MCV  --  85.0 83.8   MCH  --  26.6* 27.0   MCHC  --  31.4* 32.2   RDW  --  47.1 45.4   PLATELETCT  --  223 270   MPV  --  9.2 9.2     Recent Labs     05/05/24  0417 05/06/24  0145 05/07/24  1049   SODIUM 134* 137 134*   POTASSIUM 3.4* 3.3* 3.1*   CHLORIDE 99 104 102   CO2 21 20 19*   GLUCOSE 75 97 91   BUN 8 5* 3*   CREATININE 0.59 0.55 0.50   CALCIUM 7.5* 7.9* 7.8*                     Imaging  DX-CHEST-PORTABLE (1 VIEW)   Final Result      1.  New bilateral interstitial and airspace infiltrates.      2.  Cardiomegaly.      US-RUQ   Final Result         1.  Common bile duct dilatation, could represent age-related changes, consider causes of biliary obstruction with additional workup as clinically appropriate.   2.  Hepatomegaly   3.  Echogenic liver, compatible with fatty change versus fibrosis.   4.  Atherosclerosis      CT-CTA CHEST PULMONARY ARTERY W/ RECONS   Final Result         1.  No pulmonary embolus appreciated.   2.  Fluid-filled distention of esophagus, consider gastroesophageal reflux versus dysmotility or component of gastroesophageal junction obstruction. Could be further evaluated with swallow study as clinically appropriate.   3.  Irregular hepatic contour favoring changes of cirrhosis   4.  Air and fluid tracking along the right axilla and shoulder soft tissues, within expected limits for recent postop changes.   5.  Pulmonary nodules, see nodule follow-up recommendations below.      Fleischner Society pulmonary nodule recommendations:   Low Risk: No routine follow-up      High Risk: Optional CT at 12 months      Comments: Use most suspicious nodule as guide to management. Follow-up intervals may vary according to size and risk.      Low Risk - Minimal or absent history of smoking and of other known risk factors.      High Risk - History of smoking or of other known risk factors.      Note: These recommendations do not  apply to lung cancer screening, patients with immunosuppression, or patients with known primary cancer.      Fleischner Society 2017 Guidelines for Management of Incidentally Detected Pulmonary Nodules in Adults         CT-ABDOMEN-PELVIS WITH   Final Result         1.  Diffuse colonic wall thickening with hazy pericolonic fat stranding, appearance favoring changes of pancolitis.   2.  Intrahepatic and extrahepatic biliary ductal dilatation, consider causes of biliary obstruction with additional workup as clinically appropriate           Assessment/Plan  * Sepsis (HCC)- (present on admission)  Assessment & Plan  Abx's finished, remains clinically stable  Source likely not infectious but ischemic    Hyponatremia- (present on admission)  Assessment & Plan  mild    GIB (gastrointestinal bleeding)- (present on admission)  Assessment & Plan  Secondary to ischemic colitis, has resolved    Rectal bleeding- (present on admission)  Assessment & Plan  Secondary to colitis, patient with history of irritable bowel syndrome but no prior history of bleeding.  Appreciate GI recommendations, colonoscopy deferred at this time.  H&H remains stable and bleeding has resolved.  Personally reviewed the cbc on 5/7    Lactic acidosis- (present on admission)  Assessment & Plan  Resolved      S/P shoulder surgery- (present on admission)  Assessment & Plan  -s/p  Right reverse total shoulder arthroplasty with proximal biceps tenodesis surgery by Dr. Sparks on 4/30 (POD #2).  -Given her new GI bleed, I will hold baby aspirin twice daily for DVT prophylaxis for now.  -Day team to notify orthopedic surgeon that patient is admitted with pancolitis.  She might need PT/OT while here.    Pancolitis (HCC)- (present on admission)  Assessment & Plan  Negative C. difficile, completed with Zosyn  Start GI soft diet, improving slowly, possibly home on 5/8    Near syncope- (present on admission)  Assessment & Plan  Resolved, DC IV fluids, patient  well-hydrated      Acute respiratory failure (HCC)- (present on admission)  Assessment & Plan  Resolved, patient on room air    Hypokalemia- (present on admission)  Assessment & Plan  Replacement  Personally reviewed the bmp on 5/7    Type 2 diabetes mellitus (HCC)- (present on admission)  Assessment & Plan  Well controlled without insulin    Hypothyroidism- (present on admission)  Assessment & Plan  levothyroxine and Cytomel.    Depression- (present on admission)  Assessment & Plan  -She is on amitriptyline and Lexapro      Essential hypertension, benign- (present on admission)  Assessment & Plan  Resume metoprolol and lisinopril      Dyslipidemia- (present on admission)  Assessment & Plan  -Patient takes Zetia.         VTE prophylaxis:   SCDs/TEDs      I have performed a physical exam and reviewed and updated ROS and Plan today (5/7/2024). In review of yesterday's note (5/6/2024), there are no changes except as documented above.

## 2024-05-08 ENCOUNTER — PHARMACY VISIT (OUTPATIENT)
Dept: PHARMACY | Facility: MEDICAL CENTER | Age: 74
End: 2024-05-08
Payer: MEDICARE

## 2024-05-08 VITALS
BODY MASS INDEX: 30.35 KG/M2 | HEART RATE: 80 BPM | RESPIRATION RATE: 18 BRPM | HEIGHT: 62 IN | SYSTOLIC BLOOD PRESSURE: 124 MMHG | DIASTOLIC BLOOD PRESSURE: 71 MMHG | OXYGEN SATURATION: 92 % | TEMPERATURE: 97.4 F | WEIGHT: 164.9 LBS

## 2024-05-08 LAB
ANION GAP SERPL CALC-SCNC: 13 MMOL/L (ref 7–16)
BUN SERPL-MCNC: 4 MG/DL (ref 8–22)
CALCIUM SERPL-MCNC: 8.4 MG/DL (ref 8.4–10.2)
CHLORIDE SERPL-SCNC: 101 MMOL/L (ref 96–112)
CO2 SERPL-SCNC: 21 MMOL/L (ref 20–33)
CREAT SERPL-MCNC: 0.62 MG/DL (ref 0.5–1.4)
GFR SERPLBLD CREATININE-BSD FMLA CKD-EPI: 94 ML/MIN/1.73 M 2
GLUCOSE SERPL-MCNC: 104 MG/DL (ref 65–99)
MAGNESIUM SERPL-MCNC: 1.5 MG/DL (ref 1.5–2.5)
POTASSIUM SERPL-SCNC: 3.2 MMOL/L (ref 3.6–5.5)
SODIUM SERPL-SCNC: 135 MMOL/L (ref 135–145)

## 2024-05-08 PROCEDURE — RXMED WILLOW AMBULATORY MEDICATION CHARGE: Performed by: INTERNAL MEDICINE

## 2024-05-08 PROCEDURE — 99239 HOSP IP/OBS DSCHRG MGMT >30: CPT | Performed by: INTERNAL MEDICINE

## 2024-05-08 RX ORDER — POTASSIUM CHLORIDE 7.45 MG/ML
10 INJECTION INTRAVENOUS
Status: DISPENSED | OUTPATIENT
Start: 2024-05-08 | End: 2024-05-08

## 2024-05-08 RX ORDER — LOPERAMIDE HYDROCHLORIDE 2 MG/1
2 CAPSULE ORAL 4 TIMES DAILY PRN
Qty: 30 CAPSULE | Refills: 0 | Status: SHIPPED | OUTPATIENT
Start: 2024-05-08

## 2024-05-08 RX ORDER — POTASSIUM CHLORIDE 20 MEQ/1
20 TABLET, EXTENDED RELEASE ORAL 2 TIMES DAILY
Qty: 6 TABLET | Refills: 0 | Status: SHIPPED | OUTPATIENT
Start: 2024-05-08 | End: 2024-05-11

## 2024-05-08 RX ADMIN — HYDROCODONE BITARTRATE AND ACETAMINOPHEN 2 TABLET: 10; 325 TABLET ORAL at 07:17

## 2024-05-08 RX ADMIN — LISINOPRIL 2.5 MG: 2.5 TABLET ORAL at 04:54

## 2024-05-08 RX ADMIN — LIOTHYRONINE SODIUM 5 MCG: 5 TABLET ORAL at 04:54

## 2024-05-08 RX ADMIN — POTASSIUM CHLORIDE 20 MEQ: 1500 TABLET, EXTENDED RELEASE ORAL at 12:32

## 2024-05-08 RX ADMIN — METOPROLOL TARTRATE 25 MG: 25 TABLET, FILM COATED ORAL at 04:54

## 2024-05-08 RX ADMIN — OMEPRAZOLE 20 MG: 20 CAPSULE, DELAYED RELEASE ORAL at 04:54

## 2024-05-08 RX ADMIN — LEVOTHYROXINE SODIUM 88 MCG: 0.09 TABLET ORAL at 04:54

## 2024-05-08 RX ADMIN — HYDROCODONE BITARTRATE AND ACETAMINOPHEN 2 TABLET: 10; 325 TABLET ORAL at 00:39

## 2024-05-08 RX ADMIN — ESCITALOPRAM OXALATE 20 MG: 10 TABLET ORAL at 04:54

## 2024-05-08 RX ADMIN — POTASSIUM CHLORIDE 20 MEQ: 1500 TABLET, EXTENDED RELEASE ORAL at 04:54

## 2024-05-08 ASSESSMENT — PAIN DESCRIPTION - PAIN TYPE
TYPE: SURGICAL PAIN

## 2024-05-08 ASSESSMENT — COGNITIVE AND FUNCTIONAL STATUS - GENERAL
DRESSING REGULAR UPPER BODY CLOTHING: A LITTLE
HELP NEEDED FOR BATHING: A LITTLE
SUGGESTED CMS G CODE MODIFIER DAILY ACTIVITY: CJ
DAILY ACTIVITIY SCORE: 20
TOILETING: A LITTLE
DRESSING REGULAR LOWER BODY CLOTHING: A LITTLE

## 2024-05-08 ASSESSMENT — FIBROSIS 4 INDEX: FIB4 SCORE: 1.55

## 2024-05-08 ASSESSMENT — GAIT ASSESSMENTS: DISTANCE (FEET): 80

## 2024-05-08 NOTE — PROGRESS NOTES
Patient reports painful administration with previous IV potassium. Dr. Rutledge notified. Per MD, hold IV potassium. Verbal order received and verified with read back. Continue PO potassium as ordered.

## 2024-05-08 NOTE — DISCHARGE SUMMARY
Discharge Summary    CHIEF COMPLAINT ON ADMISSION  Chief Complaint   Patient presents with    Near Syncopal    Hypotension       Reason for Admission  EMS     Admission Date  5/2/2024    CODE STATUS  Full Code    HPI & HOSPITAL COURSE  74-year-old female with hypertension hyperlipidemia diabetes mellitus type 2 underwent a recent right reverse total shoulder arthroplasty and presented to our hospital postop day #2 of severe abdominal pain and send general malaise and near syncope.  Upon admission she was thought to have sepsis with CT scan consistent with pancolitis.  She was admitted to the hospital on telemetry unit with sepsis protocol and empirically placed on antibiotics.  Her vital signs stabilized and her cultures remain negative and eventually was determined that she had ischemic colitis and there was no clear evidence of sepsis or infection.  Her C. difficile was negative and GI deferred colonoscopy given improvement in symptoms and stable hemoglobin.  Her bloody diarrhea eventually resolved and she had just some mild loose bowel movements upon discharge.  She is able to tolerate an oral diet and her vital signs of stabilized.    I discussed in detail with the patient that she should check her blood pressure at home daily at a different time and then keep a log of it and take to her primary care office.  She was deemed stable for discharge.  Lastly she remained mildly hypokalemic and I gave her potassium supplementation before discharge.    Physical therapy and Occupational Therapy evaluated the patient multiple times and recommended home health.  I had a long discussion with the patient's daughter and we came to agreement that this was the only option available to the patient right now.    Additionally, her CTA chest was negative for PE but did show possible hepatic changes consistent with cirrhosis.  This will need to be followed up outpatient as etiology is elusive at this time.  There is no clear  evidence of hepatic decompensation.    Therefore, she is discharged in good and stable condition to home with organized home healthcare and close outpatient follow-up.    The patient met 2-midnight criteria for an inpatient stay at the time of discharge.    Discharge Date  May 8, 2024    FOLLOW UP ITEMS POST DISCHARGE  Follow-up with renal orthopedic clinic in 1 week for shoulder surgery postop  Follow-up with your primary care physician 1 to 2 weeks for postadmission follow-up and blood pressure check and repeat BMP in 2 weeks    Repeat CT thorax with IV contrast in 6 to 12 months per PCP for incidentally discovered pulmonary nodules    DISCHARGE DIAGNOSES  Principal Problem:    Sepsis (HCC) (POA: Yes)  Active Problems:    Dyslipidemia (POA: Yes)    Essential hypertension, benign (POA: Yes)    CAD (coronary artery disease) (POA: Yes)    Depression (POA: Yes)    Hypothyroidism (Chronic) (POA: Yes)    Type 2 diabetes mellitus (HCC) (POA: Yes)    Hypokalemia (POA: Yes)    Acute respiratory failure (HCC) (POA: Yes)    Near syncope (POA: Yes)    Pancolitis (HCC) (POA: Yes)    S/P shoulder surgery (POA: Yes)    Lactic acidosis (POA: Yes)    Rectal bleeding (POA: Yes)    GIB (gastrointestinal bleeding) (POA: Yes)    Hyponatremia (POA: Yes)  Resolved Problems:    * No resolved hospital problems. *      FOLLOW UP  No future appointments.  No follow-up provider specified.    MEDICATIONS ON DISCHARGE     Medication List        START taking these medications        Instructions   potassium chloride SA 20 MEQ Tbcr  Commonly known as: Kdur   Take 1 Tablet by mouth 2 times a day for 3 days.  Dose: 20 mEq            CONTINUE taking these medications        Instructions   albuterol 108 (90 Base) MCG/ACT Aers inhalation aerosol   Inhale 1-2 Puffs every four hours as needed for Shortness of Breath.  Dose: 1-2 Puff     alendronate 70 MG Tabs  Commonly known as: Fosamax   Take 70 mg by mouth every 7 days.  Dose: 70 mg     amitriptyline  100 MG Tabs  Commonly known as: Elavil   Take 100 mg by mouth every evening.  Dose: 100 mg     escitalopram 20 MG tablet  Commonly known as: Lexapro   Take 1 Tablet by mouth every day.  Dose: 20 mg     ezetimibe 10 MG Tabs  Commonly known as: Zetia   Take 10 mg by mouth every evening.  Dose: 10 mg     liothyronine 5 MCG Tabs  Commonly known as: Cytomel   Take 5 mcg by mouth every day.  Dose: 5 mcg     lisinopril 2.5 MG Tabs  Commonly known as: Prinivil   Take 2.5 mg by mouth every day.  Dose: 2.5 mg     metFORMIN 500 MG Tabs  Commonly known as: Glucophage   Take 500 mg by mouth 2 times a day with meals.  Dose: 500 mg     metoprolol SR 25 MG Tb24  Commonly known as: Toprol XL   Take 25 mg by mouth every day.  Dose: 25 mg     omeprazole 20 MG delayed-release capsule  Commonly known as: PriLOSEC   Take 20 mg by mouth 2 times a day.  Dose: 20 mg     ondansetron 8 MG Tbdp  Commonly known as: Zofran ODT   Take 8 mg by mouth every 8 hours as needed for Nausea/Vomiting.  Dose: 8 mg     oxyCODONE-acetaminophen 7.5-325 MG per tablet  Commonly known as: Percocet   Take 1 Tablet by mouth every 6 hours as needed. Indications: Pain  Dose: 1 Tablet     rosuvastatin 20 MG Tabs  Commonly known as: Crestor   Take 1 Tablet by mouth every evening.  Dose: 20 mg     sumatriptan 100 MG tablet  Commonly known as: Imitrex   Take 100 mg by mouth one time as needed for Migraine.  Dose: 100 mg     Synthroid 88 MCG Tabs  Generic drug: levothyroxine   Take 1 Tablet by mouth every day.  Dose: 88 mcg            STOP taking these medications      sulfamethoxazole-trimethoprim 800-160 MG tablet  Commonly known as: Bactrim DS              Allergies  Allergies   Allergen Reactions    Amlodipine Swelling    Bee Swelling    Gabapentin Hives and Swelling    Pregabalin Hives and Swelling    Rifampin Unspecified     Pt turns yellow    Fentanyl Vomiting    Morphine Vomiting    Benzoin Rash     Tincture of benzoin =blisters       DIET  Orders Placed This  Encounter   Procedures    Diet Order Diet: Low Fiber(GI Soft)     Standing Status:   Standing     Number of Occurrences:   1     Order Specific Question:   Diet:     Answer:   Low Fiber(GI Soft) [2]       ACTIVITY  As tolerated.  Weight bearing as tolerated    CONSULTATIONS  GI    PROCEDURES  DX-CHEST-PORTABLE (1 VIEW)   Final Result      1.  New bilateral interstitial and airspace infiltrates.      2.  Cardiomegaly.      US-RUQ   Final Result         1.  Common bile duct dilatation, could represent age-related changes, consider causes of biliary obstruction with additional workup as clinically appropriate.   2.  Hepatomegaly   3.  Echogenic liver, compatible with fatty change versus fibrosis.   4.  Atherosclerosis      CT-CTA CHEST PULMONARY ARTERY W/ RECONS   Final Result         1.  No pulmonary embolus appreciated.   2.  Fluid-filled distention of esophagus, consider gastroesophageal reflux versus dysmotility or component of gastroesophageal junction obstruction. Could be further evaluated with swallow study as clinically appropriate.   3.  Irregular hepatic contour favoring changes of cirrhosis   4.  Air and fluid tracking along the right axilla and shoulder soft tissues, within expected limits for recent postop changes.   5.  Pulmonary nodules, see nodule follow-up recommendations below.      Fleischner Society pulmonary nodule recommendations:   Low Risk: No routine follow-up      High Risk: Optional CT at 12 months      Comments: Use most suspicious nodule as guide to management. Follow-up intervals may vary according to size and risk.      Low Risk - Minimal or absent history of smoking and of other known risk factors.      High Risk - History of smoking or of other known risk factors.      Note: These recommendations do not apply to lung cancer screening, patients with immunosuppression, or patients with known primary cancer.      Fleischner Society 2017 Guidelines for Management of Incidentally Detected  Pulmonary Nodules in Adults         CT-ABDOMEN-PELVIS WITH   Final Result         1.  Diffuse colonic wall thickening with hazy pericolonic fat stranding, appearance favoring changes of pancolitis.   2.  Intrahepatic and extrahepatic biliary ductal dilatation, consider causes of biliary obstruction with additional workup as clinically appropriate            LABORATORY  Lab Results   Component Value Date    SODIUM 135 05/08/2024    POTASSIUM 3.2 (L) 05/08/2024    CHLORIDE 101 05/08/2024    CO2 21 05/08/2024    GLUCOSE 104 (H) 05/08/2024    BUN 4 (L) 05/08/2024    CREATININE 0.62 05/08/2024    CREATININE 0.87 02/20/2012        Lab Results   Component Value Date    WBC 11.9 (H) 05/06/2024    WBC 6.9 02/20/2012    HEMOGLOBIN 10.3 (L) 05/06/2024    HEMATOCRIT 32.0 (L) 05/06/2024    PLATELETCT 270 05/06/2024        Total time of the discharge process exceeds 45 minutes.

## 2024-05-08 NOTE — THERAPY
Occupational Therapy  Daily Treatment     Patient Name: Yamile Go  Age:  74 y.o., Sex:  female  Medical Record #: [id number]  Today's Date: 5/8/2024     Precautions  Precautions: No Resistive Exercise or Activity with Left Upper Extremity, Non Weight Bearing Right Upper Extremity, Immobilizer Right Upper Extremity    Assessment  Pt tolerates AM ADL's, walk in halls. See below for CLOF. Brace in place to RUE; bolster removed by MD yesterday. Review of pendulums (pt declines at this time due to R shoulder pain) and home safety during ADL's. No further questions/concerns. DC OT; pt discharging for home soon.      Plan  DC OT  DC Equipment Recommendations: (P) None  Discharge Recommendations: (P) Recommend home health for continued occupational therapy services    Subjective  Agreeable  Objective   05/08/24 1101   Pain 0 - 10 Group   Location Shoulder   Location Orientation Right   Therapist Pain Assessment Post Activity Pain Same as Prior to Activity;Nurse Notified;6   Cognition    Cognition / Consciousness WDL   Level of Consciousness Alert   Passive ROM Upper Body   Comments pendulums deferred due to pain. reviewedd, pt states she will try to perform later today at home.   Balance   Sitting Balance (Static) Good   Sitting Balance (Dynamic) Good   Standing Balance (Static) Fair +   Standing Balance (Dynamic) Fair +   Weight Shift Sitting Good   Weight Shift Standing Good   Skilled Intervention Verbal Cuing   Bed Mobility    Supine to Sit Modified Independent   Activities of Daily Living   Upper Body Dressing Moderate Assist   Toileting Standby Assist   Skilled Intervention Verbal Cuing;Compensatory Strategies   How much help from another person does the patient currently need...   Putting on and taking off regular lower body clothing? 3   Bathing (including washing, rinsing, and drying)? 3   Toileting, which includes using a toilet, bedpan, or urinal? 3   Putting on and taking off regular upper body clothing? 3    Taking care of personal grooming such as brushing teeth? 4   Eating meals? 4   6 Clicks Daily Activity Score 20   Functional Mobility   Sit to Stand Modified Independent   Bed, Chair, Wheelchair Transfer Standby Assist   Toilet Transfers Standby Assist   Transfer Method Stand Step   Distance (Feet) 80   # of Times Distance was Traveled 2   Activity Tolerance   Sitting Edge of Bed functional   Standing 10   Patient / Family Goals   Goal #1 Outcome Goal met   Short Term Goals   Goal Outcome # 1 Goal met   Goal Outcome # 2 Goal met   Goal Outcome # 3 Progressing as expected   Education Group   ADL Patient Response Patient;Eager;Explanation;Action Demonstration   Occupational Therapy Treatment Plan    O.T. Treatment Plan Modify Current Treatment Plan   Reason For Discharge Patient Discharged from Facility   Anticipated Discharge Equipment and Recommendations   DC Equipment Recommendations None   Discharge Recommendations Recommend home health for continued occupational therapy services   Interdisciplinary Plan of Care Collaboration   IDT Collaboration with  Nursing   Patient Position at End of Therapy Edge of Bed;Phone within Reach;Tray Table within Reach;Call Light within Reach   Collaboration Comments aware of visit.   Session Information   Date / Session Number  5/8, #3 (3/4, 5/12)   Priority 0   OTHER   Modified Plan Patient Discharged from Facility

## 2024-05-08 NOTE — FACE TO FACE
Face to Face Supporting Documentation - Home Health    The encounter with this patient was in whole or in part the primary reason for home health admission.    Date of encounter:   Patient:                    MRN:                       YOB: 2023  Yamile Go  [id number]  1950     Home health to see patient for:  Skilled Nursing care for assessment, interventions & education, Home health aide, Physical Therapy evaluation and treatment, and Occupational therapy evaluation and treatment    Skilled need for:  New Onset Medical Diagnosis ischemic colitis    Skilled nursing interventions to include:  Comment: help with therapies    Homebound status evidenced by:  Need the aid of supportive devices such as crutches, canes, wheelchairs or walkers. Leaving home requires a considerable and taxing effort. There is a normal inability to leave the home.    Community Physician to provide follow up care: Cole Yuen M.D.     Optional Interventions? No      I certify the face to face encounter for this home health care referral meets the CMS requirements and the encounter/clinical assessment with the patient was, in whole, or in part, for the medical condition(s) listed above, which is the primary reason for home health care. Based on my clinical findings: the service(s) are medically necessary, support the need for home health care, and the homebound criteria are met.  I certify that this patient has had a face to face encounter by myself.  Stevo Rutledge M.D. - NPI: [id number]

## 2024-05-08 NOTE — DISCHARGE PLANNING
Received Choice Form at: 08:57  Agency/Facility Name: [facility]  Sent Referral per Choice Form at: 09:09    CM RN notified

## 2024-05-08 NOTE — DISCHARGE PLANNING
Case Management Discharge Planning    Admission Date: 5/2/2024  GMLOS: 5.1  ALOS: 6    6-Clicks ADL Score: 20  6-Clicks Mobility Score: 18      Anticipated Discharge Dispo: Discharge Disposition: D/T to home under HHA care in anticipation of covered skilled care (06)    Action(s) Taken: DC Assessment Complete (See below), Choice obtained, and Referral(s) sent    @[phone]  Requested home health orders from MD, per PT/OT recommendations.     @0904  Informed my bedside RN patient's daughter has concerns regarding discharge plan. Placed call twice to Francisca's cell phone. No answer. Placed call to patient's work phone. Reached a main line for an elementary school. Voicemail left on Francisca's cell phone.     @3143  Spoke to patient. Discussed home health. Choice obtained. Referral sent.     @1009  Message from Heath with [facility] reporting they are accepting referral.    @2727  Call placed to patient's daughter, Francisca, on work phone [phone]. No answer. Voicemail left with call back number.     @1155  Call back from daughter, Francisca. Francisca reports she's going to escalate some concerns she has regarding the care patient received at [facility]. Also expressed concerns regarding discharge plan. LSW explained PT/OT recommendations. Francisca understands patient is not a candidate for rehab or SNF. Francisca reports she's going to reach out to home health and get quotes for more visits throughout the week.

## 2024-05-08 NOTE — PROGRESS NOTES
Telemetry Shift Summary    Rhythm Sinus Rhythm/Sinus Tachycardia  HR Range   Ectopy rare PVC  Measurements .16/.07/.36        Normal Values  Rhythm SR  HR Range    Measurements 0.12-0.20 / 0.06-0.10  / 0.30-0.52

## 2024-05-08 NOTE — CARE PLAN
The patient is Stable - Low risk of patient condition declining or worsening    Shift Goals  Clinical Goals: Free from falls or injuries, rest and sleep at least 4 hours, pain controlled at 3/10 or better with current regimen   Patient Goals: Free from falls or injuries, rest and sleep at least 4 hours, pain controlled at 3/10 or better with current regimen   Family Goals:    Progress made toward(s) clinical / shift goals: No falls or injuries overnight, patient was able to rest and sleep at least 4 hours overnight, patient's pain controlled at 3/10 or better with current regimen    Patient is not progressing towards the following goals:

## 2024-05-08 NOTE — PROGRESS NOTES
Received bedside patient report from DEEJAY Blackwood. Patient resting comfortably in bed, no complaints at this time. Safety precautions in place. Will continue to monitor.

## 2024-05-09 LAB
BACTERIA STL CULT: NORMAL
E COLI SXT1+2 STL IA: NORMAL
SIGNIFICANT IND 70042: NORMAL
SITE SITE: NORMAL
SOURCE SOURCE: NORMAL

## 2024-05-26 ENCOUNTER — OFFICE VISIT (OUTPATIENT)
Dept: URGENT CARE | Facility: CLINIC | Age: 74
End: 2024-05-26
Payer: MEDICARE

## 2024-05-26 ENCOUNTER — APPOINTMENT (OUTPATIENT)
Dept: RADIOLOGY | Facility: MEDICAL CENTER | Age: 74
End: 2024-05-26
Attending: EMERGENCY MEDICINE
Payer: MEDICARE

## 2024-05-26 ENCOUNTER — HOSPITAL ENCOUNTER (EMERGENCY)
Facility: MEDICAL CENTER | Age: 74
End: 2024-05-26
Attending: EMERGENCY MEDICINE
Payer: MEDICARE

## 2024-05-26 VITALS
HEIGHT: 62 IN | BODY MASS INDEX: 28.01 KG/M2 | SYSTOLIC BLOOD PRESSURE: 96 MMHG | DIASTOLIC BLOOD PRESSURE: 62 MMHG | RESPIRATION RATE: 16 BRPM | TEMPERATURE: 97.6 F | HEART RATE: 104 BPM | OXYGEN SATURATION: 98 % | WEIGHT: 152.2 LBS

## 2024-05-26 VITALS
OXYGEN SATURATION: 95 % | WEIGHT: 152.34 LBS | DIASTOLIC BLOOD PRESSURE: 74 MMHG | BODY MASS INDEX: 28.03 KG/M2 | HEART RATE: 98 BPM | TEMPERATURE: 98 F | RESPIRATION RATE: 13 BRPM | HEIGHT: 62 IN | SYSTOLIC BLOOD PRESSURE: 144 MMHG

## 2024-05-26 DIAGNOSIS — R10.84 GENERALIZED ABDOMINAL PAIN: ICD-10-CM

## 2024-05-26 DIAGNOSIS — K59.00 CONSTIPATION, UNSPECIFIED CONSTIPATION TYPE: ICD-10-CM

## 2024-05-26 DIAGNOSIS — R00.0 TACHYCARDIA: ICD-10-CM

## 2024-05-26 DIAGNOSIS — Z87.19 HISTORY OF ISCHEMIC COLITIS: ICD-10-CM

## 2024-05-26 LAB
ALBUMIN SERPL BCP-MCNC: 3.6 G/DL (ref 3.2–4.9)
ALBUMIN/GLOB SERPL: 1.1 G/DL
ALP SERPL-CCNC: 92 U/L (ref 30–99)
ALT SERPL-CCNC: <5 U/L (ref 2–50)
ANION GAP SERPL CALC-SCNC: 13 MMOL/L (ref 7–16)
AST SERPL-CCNC: 15 U/L (ref 12–45)
BASOPHILS # BLD AUTO: 0.7 % (ref 0–1.8)
BASOPHILS # BLD: 0.05 K/UL (ref 0–0.12)
BILIRUB SERPL-MCNC: 0.3 MG/DL (ref 0.1–1.5)
BUN SERPL-MCNC: 7 MG/DL (ref 8–22)
CALCIUM ALBUM COR SERPL-MCNC: 9.8 MG/DL (ref 8.5–10.5)
CALCIUM SERPL-MCNC: 9.5 MG/DL (ref 8.4–10.2)
CHLORIDE SERPL-SCNC: 101 MMOL/L (ref 96–112)
CO2 SERPL-SCNC: 23 MMOL/L (ref 20–33)
CREAT SERPL-MCNC: 0.6 MG/DL (ref 0.5–1.4)
EOSINOPHIL # BLD AUTO: 0.38 K/UL (ref 0–0.51)
EOSINOPHIL NFR BLD: 5.5 % (ref 0–6.9)
ERYTHROCYTE [DISTWIDTH] IN BLOOD BY AUTOMATED COUNT: 50.2 FL (ref 35.9–50)
GFR SERPLBLD CREATININE-BSD FMLA CKD-EPI: 95 ML/MIN/1.73 M 2
GLOBULIN SER CALC-MCNC: 3.2 G/DL (ref 1.9–3.5)
GLUCOSE SERPL-MCNC: 106 MG/DL (ref 65–99)
HCT VFR BLD AUTO: 35.2 % (ref 37–47)
HGB BLD-MCNC: 11 G/DL (ref 12–16)
IMM GRANULOCYTES # BLD AUTO: 0.03 K/UL (ref 0–0.11)
IMM GRANULOCYTES NFR BLD AUTO: 0.4 % (ref 0–0.9)
LIPASE SERPL-CCNC: 11 U/L (ref 11–82)
LYMPHOCYTES # BLD AUTO: 1.76 K/UL (ref 1–4.8)
LYMPHOCYTES NFR BLD: 25.3 % (ref 22–41)
MCH RBC QN AUTO: 26.5 PG (ref 27–33)
MCHC RBC AUTO-ENTMCNC: 31.3 G/DL (ref 32.2–35.5)
MCV RBC AUTO: 84.8 FL (ref 81.4–97.8)
MONOCYTES # BLD AUTO: 0.7 K/UL (ref 0–0.85)
MONOCYTES NFR BLD AUTO: 10 % (ref 0–13.4)
NEUTROPHILS # BLD AUTO: 4.05 K/UL (ref 1.82–7.42)
NEUTROPHILS NFR BLD: 58.1 % (ref 44–72)
NRBC # BLD AUTO: 0 K/UL
NRBC BLD-RTO: 0 /100 WBC (ref 0–0.2)
PLATELET # BLD AUTO: 292 K/UL (ref 164–446)
PMV BLD AUTO: 9.3 FL (ref 9–12.9)
POTASSIUM SERPL-SCNC: 4.2 MMOL/L (ref 3.6–5.5)
PROT SERPL-MCNC: 6.8 G/DL (ref 6–8.2)
RBC # BLD AUTO: 4.15 M/UL (ref 4.2–5.4)
SODIUM SERPL-SCNC: 137 MMOL/L (ref 135–145)
WBC # BLD AUTO: 7 K/UL (ref 4.8–10.8)

## 2024-05-26 PROCEDURE — 3078F DIAST BP <80 MM HG: CPT | Performed by: FAMILY MEDICINE

## 2024-05-26 PROCEDURE — 99214 OFFICE O/P EST MOD 30 MIN: CPT | Performed by: FAMILY MEDICINE

## 2024-05-26 PROCEDURE — 3074F SYST BP LT 130 MM HG: CPT | Performed by: FAMILY MEDICINE

## 2024-05-26 RX ORDER — ONDANSETRON 2 MG/ML
4 INJECTION INTRAMUSCULAR; INTRAVENOUS ONCE
Status: COMPLETED | OUTPATIENT
Start: 2024-05-26 | End: 2024-05-26

## 2024-05-26 RX ORDER — HYDROMORPHONE HYDROCHLORIDE 1 MG/ML
1 INJECTION, SOLUTION INTRAMUSCULAR; INTRAVENOUS; SUBCUTANEOUS ONCE
Status: COMPLETED | OUTPATIENT
Start: 2024-05-26 | End: 2024-05-26

## 2024-05-26 RX ORDER — KETOROLAC TROMETHAMINE 15 MG/ML
15 INJECTION, SOLUTION INTRAMUSCULAR; INTRAVENOUS ONCE
Status: COMPLETED | OUTPATIENT
Start: 2024-05-26 | End: 2024-05-26

## 2024-05-26 RX ADMIN — KETOROLAC TROMETHAMINE 15 MG: 15 INJECTION, SOLUTION INTRAMUSCULAR; INTRAVENOUS at 13:21

## 2024-05-26 RX ADMIN — MAGNESIUM HYDROXIDE 45 ML: 400 SUSPENSION ORAL at 15:18

## 2024-05-26 RX ADMIN — HYDROMORPHONE HYDROCHLORIDE 1 MG: 1 INJECTION, SOLUTION INTRAMUSCULAR; INTRAVENOUS; SUBCUTANEOUS at 13:21

## 2024-05-26 RX ADMIN — IOHEXOL 100 ML: 350 INJECTION, SOLUTION INTRAVENOUS at 14:00

## 2024-05-26 RX ADMIN — ONDANSETRON 4 MG: 2 INJECTION INTRAMUSCULAR; INTRAVENOUS at 13:21

## 2024-05-26 ASSESSMENT — ENCOUNTER SYMPTOMS
COUGH: 0
CHILLS: 0
DIZZINESS: 0
ABDOMINAL PAIN: 1
SHORTNESS OF BREATH: 0
FEVER: 0
NAUSEA: 0
VOMITING: 0
SORE THROAT: 0
MYALGIAS: 0

## 2024-05-26 ASSESSMENT — FIBROSIS 4 INDEX
FIB4 SCORE: 1.55
FIB4 SCORE: 1.55

## 2024-05-26 NOTE — ED PROVIDER NOTES
ED Provider Note    ED Provider      CHIEF COMPLAINT  Chief Complaint   Patient presents with    Abdominal Pain     Low abd pain for about 4 days. Was seen at  and sent over for evaluation of possible UC flare up. Was recently admitted for same. States nausea but denies vomiting or diarrhea.       HPI  Yamile Go is a 74 y.o. female who presents with complaints of abdominal pain.  Is ongoing for approximately 3 weeks.  She has a history of recent diagnosis of colitis, that was her first episode she was admitted to the hospital was discharged home and her symptoms have not improved in fact now they are worse.  The pain is diffuse in the abdomen, she has had nausea, no vomiting and she is complaining of constipation.  No fevers chills or sweats.    REVIEW OF SYSTEMS  See HPI for further details. All other systems are negative.     PAST MEDICAL HISTORY   has a past medical history of Anemia, Anesthesia, Arthritis, Asthma, CAD (coronary artery disease), Cataract, Dental disorder, Depression, Diabetes (HCC), Disorder of thyroid, Heart burn, HTN (hypertension), Hyperlipidemia, Indigestion, Migraines, Obesity, Pneumonia (2023), PONV (postoperative nausea and vomiting), and Sleep apnea.    SOCIAL HISTORY  Social History     Tobacco Use    Smoking status: Never    Smokeless tobacco: Never   Vaping Use    Vaping status: Never Used   Substance and Sexual Activity    Alcohol use: No    Drug use: No    Sexual activity: Yes     Partners: Male     Birth control/protection: Post-Menopausal       SURGICAL HISTORY   has a past surgical history that includes appendectomy (N/A, 1976); abdominal hysterectomy total (N/A, 1978); breast biopsy (1984); colectomy (N/A, 2007); lumbar laminectomy diskectomy (N/A, 1989); arthroplasty (Right, 2020); lumbar exploration (N/A, 2017); insertion, peripheral nerve stimulator, lower extremity (Left, 12/22/2022); and reconstr total shoulder implant (Right, 4/30/2024).    CURRENT  "MEDICATIONS  Home Medications    **Home medications have not yet been reviewed for this encounter**       Audit from Redirected Encounters    **Home medications have not yet been reviewed for this encounter**         ALLERGIES  Allergies   Allergen Reactions    Amlodipine Swelling    Bee Swelling    Gabapentin Hives and Swelling    Pregabalin Hives and Swelling    Rifampin Unspecified     Pt turns yellow    Fentanyl Vomiting    Morphine Vomiting    Benzoin Rash     Tincture of benzoin =blisters       PHYSICAL EXAM  VITAL SIGNS: BP (!) 146/79   Pulse 98   Temp 36.7 °C (98.1 °F) (Oral)   Resp 13   Ht 1.575 m (5' 2\")   Wt 69.1 kg (152 lb 5.4 oz)   SpO2 95%   BMI 27.86 kg/m²   Constitutional: Alert in no apparent distress.  HENT: No signs of trauma, Mucous membranes are moist   Eyes:  Conjunctiva normal, Non-icteric.   Neck: Normal range of motion, No tenderness, Supple,  Lymphatic: No lymphadenopathy noted.   Cardiovascular: Regular rate and rhythm, no murmurs.   Thorax & Lungs: Normal breath sounds, No respiratory distress, No wheezing, No chest tenderness.   Abdomen: Bowel sounds normal, Soft, generalized tenderness, No masses, No pulsatile masses. No peritoneal signs.  Skin: Warm, Dry,Normal color  Extremities:No edema, No tenderness, No cyanosis,    Musculoskeletal: Good range of motion in all major joints. No tenderness to palpation or major deformities noted.   Neurologic: Alert ,Oriented x4, Normal motor function, Normal sensory function, No focal deficits noted.   Psychiatric: Affect normal, Judgment normal, Mood normal.       MEDICAL DECISION MAKING  This is a 74 y.o. female who presents with complaints of diffuse generalized abdominal pain.  She has been diagnosed with colitis, she was treated and symptoms have not resolved in fact they are worsening.  She is complaining of some constipation.  Her abdomen is tender, there is concerns for worsening colitis, if this was infectious versus concerns for " abscess.  Repeat CT will be done with contrast.  Patient be started on pain medication for her discomfort, and lab test to be done to evaluate for concerns of infection, electrolyte imbalance      Records reviewed showed recent CT that showed bowel wall thickening    DIAGNOSTIC STUDIES / PROCEDURES    EKG      LABS  Results for orders placed or performed during the hospital encounter of 05/26/24   CBC WITH DIFFERENTIAL   Result Value Ref Range    WBC 7.0 4.8 - 10.8 K/uL    RBC 4.15 (L) 4.20 - 5.40 M/uL    Hemoglobin 11.0 (L) 12.0 - 16.0 g/dL    Hematocrit 35.2 (L) 37.0 - 47.0 %    MCV 84.8 81.4 - 97.8 fL    MCH 26.5 (L) 27.0 - 33.0 pg    MCHC 31.3 (L) 32.2 - 35.5 g/dL    RDW 50.2 (H) 35.9 - 50.0 fL    Platelet Count 292 164 - 446 K/uL    MPV 9.3 9.0 - 12.9 fL    Neutrophils-Polys 58.10 44.00 - 72.00 %    Lymphocytes 25.30 22.00 - 41.00 %    Monocytes 10.00 0.00 - 13.40 %    Eosinophils 5.50 0.00 - 6.90 %    Basophils 0.70 0.00 - 1.80 %    Immature Granulocytes 0.40 0.00 - 0.90 %    Nucleated RBC 0.00 0.00 - 0.20 /100 WBC    Neutrophils (Absolute) 4.05 1.82 - 7.42 K/uL    Lymphs (Absolute) 1.76 1.00 - 4.80 K/uL    Monos (Absolute) 0.70 0.00 - 0.85 K/uL    Eos (Absolute) 0.38 0.00 - 0.51 K/uL    Baso (Absolute) 0.05 0.00 - 0.12 K/uL    Immature Granulocytes (abs) 0.03 0.00 - 0.11 K/uL    NRBC (Absolute) 0.00 K/uL   COMP METABOLIC PANEL   Result Value Ref Range    Sodium 137 135 - 145 mmol/L    Potassium 4.2 3.6 - 5.5 mmol/L    Chloride 101 96 - 112 mmol/L    Co2 23 20 - 33 mmol/L    Anion Gap 13.0 7.0 - 16.0    Glucose 106 (H) 65 - 99 mg/dL    Bun 7 (L) 8 - 22 mg/dL    Creatinine 0.60 0.50 - 1.40 mg/dL    Calcium 9.5 8.4 - 10.2 mg/dL    Correct Calcium 9.8 8.5 - 10.5 mg/dL    AST(SGOT) 15 12 - 45 U/L    ALT(SGPT) <5 2 - 50 U/L    Alkaline Phosphatase 92 30 - 99 U/L    Total Bilirubin 0.3 0.1 - 1.5 mg/dL    Albumin 3.6 3.2 - 4.9 g/dL    Total Protein 6.8 6.0 - 8.2 g/dL    Globulin 3.2 1.9 - 3.5 g/dL    A-G Ratio 1.1  g/dL   LIPASE   Result Value Ref Range    Lipase 11 11 - 82 U/L   ESTIMATED GFR   Result Value Ref Range    GFR (CKD-EPI) 95 >60 mL/min/1.73 m 2         RADIOLOGY  CT-ABDOMEN-PELVIS WITH   Final Result      1.  No CT evidence for colitis or diverticulitis.   2.  Increased colonic stool.   3.  No bowel obstruction or perforation.   4.  Mild fatty infiltration of liver.      My evaluation of CT showed no obstruction    COURSE  Pertinent Labs & Imaging studies reviewed. (See chart for details)    Patient presented with abdominal pain.  She had recent diagnosis of colitis was admitted and treated.  Her pain never resolved.  She is complaining of constipation also.  She was medicated for pain which did improve her pain.  Her lab test shows no concerns for sepsis or infection or electrolyte imbalance.  CT shows no further colitis, no abscess formation but she does have significant constipation.  She has been given a dose of milk of magnesia.  And hopefully this will be enough to get the bowels moving.  She does take Percocet on a regular basis which is a contributing factor to constipation.    Discharged home in stable condition    FINAL IMPRESSION  1. Generalized abdominal pain    2. Constipation, unspecified constipation type        The note accurately reflects work and decisions made by me.  Sharath Ngo D.O.  5/26/2024  3:19 PM

## 2024-05-26 NOTE — ED TRIAGE NOTES
"Chief Complaint   Patient presents with    Abdominal Pain     Low abd pain for about 4 days. Was seen at  and sent over for evaluation of possible UC flare up. Was recently admitted for same. States nausea but denies vomiting or diarrhea.     BP (!) 147/83   Pulse (!) 104   Temp 36.7 °C (98.1 °F) (Oral)   Resp 20   Ht 1.575 m (5' 2\")   Wt 69.1 kg (152 lb 5.4 oz)   SpO2 94%   BMI 27.86 kg/m²     "

## 2024-05-26 NOTE — DISCHARGE INSTRUCTIONS
CT scan shows no further inflammation there is no signs of an infection.  You do have constipation.  Received a dose of a laxative here.  This will take about 4 to 6 hours to work please be close to a bathroom because this may cause some mild loose stools.    Return the emergency room for any change or worsening symptoms.

## 2024-05-26 NOTE — PROGRESS NOTES
Subjective:   Yamile Go is a 74 y.o. female who presents for Abdominal Pain (Also need rx for steroids threw away meds/ lower gi bleed per patient )        74-year-old presents urgent care with a chief complaint of abdominal pain.  Reports recent hospital admission from 5/2/2024 to 5/8/2024 for initial sepsis and pancolitis with ischemic colitis.  Patient reports onset of similar diffuse abdominal pain now in the periumbilical and right lower quadrant over the past 2 days.  Denies nausea or vomiting.  Denies fever.    Abdominal Pain  This is a new problem. Episode onset: 2 days. The onset quality is gradual. The problem occurs intermittently. The problem has been unchanged. The pain is located in the RLQ, epigastric region and generalized abdominal region. The pain is moderate. The quality of the pain is aching. Pertinent negatives include no dysuria, fever, myalgias, nausea or vomiting. Treatments tried: Recent hospital admission for ischemic colitis. Prior diagnostic workup includes CT scan.     PMH:  has a past medical history of Anemia, Anesthesia, Arthritis, Asthma, CAD (coronary artery disease), Cataract, Dental disorder, Depression, Diabetes (HCC), Disorder of thyroid, Heart burn, HTN (hypertension), Hyperlipidemia, Indigestion, Migraines, Obesity, Pneumonia (2023), PONV (postoperative nausea and vomiting), and Sleep apnea.    She has no past medical history of Acute nasopharyngitis, Arrhythmia, Blood clotting disorder (HCC), Bowel habit changes, Breath shortness, Bronchitis, Cancer (HCC), Carcinoma in situ of respiratory system, Congestive heart failure (HCC), Continuous ambulatory peritoneal dialysis status (HCC), Coughing blood, Dialysis patient (HCC), Glaucoma, Gynecological disorder, Heart murmur, Heart valve disease, Hemorrhagic disorder (HCC), Hepatitis A, Hepatitis B, Hepatitis C, Hiatus hernia syndrome, Infectious disease, Jaundice, Myocardial infarct (HCC), Pacemaker, Pregnant, Renal  disorder, Rheumatic fever, Seizure (HCC), Snoring, Stroke (HCC), Tuberculosis, Urinary bladder disorder, or Urinary incontinence.  MEDS:   Current Outpatient Medications:     loperamide (IMODIUM) 2 MG Cap, Take 1 Capsule by mouth 4 times a day as needed for Diarrhea., Disp: 30 Capsule, Rfl: 0    omeprazole (PRILOSEC) 20 MG delayed-release capsule, Take 20 mg by mouth 2 times a day., Disp: , Rfl:     alendronate (FOSAMAX) 70 MG Tab, Take 70 mg by mouth every 7 days., Disp: , Rfl:     lisinopril (PRINIVIL) 2.5 MG Tab, Take 2.5 mg by mouth every day., Disp: , Rfl:     oxyCODONE-acetaminophen (PERCOCET) 7.5-325 MG per tablet, Take 1 Tablet by mouth every 6 hours as needed. Indications: Pain, Disp: , Rfl:     ezetimibe (ZETIA) 10 MG Tab, Take 10 mg by mouth every evening., Disp: , Rfl:     ondansetron (ZOFRAN ODT) 8 MG TABLET DISPERSIBLE, Take 8 mg by mouth every 8 hours as needed for Nausea/Vomiting., Disp: , Rfl:     albuterol 108 (90 Base) MCG/ACT Aero Soln inhalation aerosol, Inhale 1-2 Puffs every four hours as needed for Shortness of Breath., Disp: , Rfl:     metoprolol SR (TOPROL XL) 25 MG TABLET SR 24 HR, Take 25 mg by mouth every day., Disp: , Rfl:     liothyronine (CYTOMEL) 5 MCG Tab, Take 5 mcg by mouth every day., Disp: , Rfl:     metFORMIN (GLUCOPHAGE) 500 MG Tab, Take 500 mg by mouth 2 times a day with meals., Disp: , Rfl:     amitriptyline (ELAVIL) 100 MG Tab, Take 100 mg by mouth every evening., Disp: , Rfl:     escitalopram (LEXAPRO) 20 MG tablet, Take 1 Tablet by mouth every day., Disp: 30 Tablet, Rfl: 2    rosuvastatin (CRESTOR) 20 MG Tab, Take 1 Tablet by mouth every evening., Disp: 30 Tablet, Rfl: 2    SYNTHROID 88 MCG Tab, Take 1 Tablet by mouth every day., Disp: 30 Tablet, Rfl: 2    sumatriptan (IMITREX) 100 MG tablet, Take 100 mg by mouth one time as needed for Migraine., Disp: , Rfl:   ALLERGIES:   Allergies   Allergen Reactions    Amlodipine Swelling    Bee Swelling    Gabapentin Hives and  Swelling    Pregabalin Hives and Swelling    Rifampin Unspecified     Pt turns yellow    Fentanyl Vomiting    Morphine Vomiting    Benzoin Rash     Tincture of benzoin =blisters     SURGHX:   Past Surgical History:   Procedure Laterality Date    PB RECONSTR TOTAL SHOULDER IMPLANT Right 4/30/2024    Procedure: RIGHT REVERSE TOTAL SHOULDER ARTHROPLASTY;  Surgeon: Manuela Sparks M.D.;  Location: SURGERY [facility];  Service: Orthopedics    INSERTION, PERIPHERAL NERVE STIMULATOR, LOWER EXTREMITY Left 12/22/2022    Procedure: Left SCIATIC PERIPHERAL NERVE STIMULATOR IMPLANT, LOWER EXTREMITY PERIPHERAL SCIATIC NERVE;  Surgeon: Tobi Kilgore M.D.;  Location: SURGERY [facility];  Service: Pain Management    ARTHROPLASTY Right 2020    ankle    LUMBAR EXPLORATION N/A 2017    COLECTOMY N/A 2007    partial for divverticulitis 2007    LUMBAR LAMINECTOMY DISKECTOMY N/A 1989    BREAST BIOPSY  1984    no cancer    ABDOMINAL HYSTERECTOMY TOTAL N/A 1978    APPENDECTOMY N/A 1976     SOCHX:  reports that she has never smoked. She has never used smokeless tobacco. She reports that she does not drink alcohol and does not use drugs.  FH:   Family History   Problem Relation Age of Onset    Cancer Mother         lung    Heart Disease Mother     Hyperlipidemia Mother     Stroke Father     Heart Disease Father     Diabetes Father     Hypertension Father     Hyperlipidemia Father     Heart Disease Brother         cath and bypass    Diabetes Brother     Hypertension Brother     Hyperlipidemia Brother     Diabetes Maternal Aunt     Hypertension Maternal Aunt     Heart Disease Brother         cath and byp[ass    Drug abuse Brother     Heart Disease Brother         cath and bypass    Diabetes Brother     Heart Disease Brother     Other Brother         MVA    Hypertension Maternal Aunt     Drug abuse Daughter     Alcohol abuse Daughter      Review of Systems   Constitutional:  Positive for malaise/fatigue. Negative for chills and fever.  "  HENT:  Negative for sore throat.    Respiratory:  Negative for cough and shortness of breath.    Gastrointestinal:  Positive for abdominal pain. Negative for nausea and vomiting.   Genitourinary:  Negative for dysuria.   Musculoskeletal:  Negative for myalgias.   Skin:  Negative for rash.   Neurological:  Negative for dizziness.        Objective:   BP 96/62 (BP Location: Left arm, Patient Position: Sitting, BP Cuff Size: Adult)   Pulse (!) 104   Temp 36.4 °C (97.6 °F) (Temporal)   Resp 16   Ht 1.575 m (5' 2\")   Wt 69 kg (152 lb 3.2 oz)   SpO2 98%   BMI 27.84 kg/m²   Physical Exam  Vitals and nursing note reviewed.   Constitutional:       General: She is not in acute distress.     Appearance: She is well-developed.   HENT:      Head: Normocephalic and atraumatic.      Right Ear: External ear normal.      Left Ear: External ear normal.      Nose: Nose normal.      Mouth/Throat:      Mouth: Mucous membranes are moist.   Eyes:      Conjunctiva/sclera: Conjunctivae normal.   Cardiovascular:      Rate and Rhythm: Regular rhythm. Tachycardia present.   Pulmonary:      Effort: Pulmonary effort is normal. No respiratory distress.      Breath sounds: Normal breath sounds.   Abdominal:      General: There is no distension.      Palpations: There is no mass.      Tenderness: There is generalized abdominal tenderness and tenderness in the periumbilical area. There is guarding (voluntary). There is no rebound.      Hernia: No hernia is present.   Musculoskeletal:         General: Normal range of motion.   Skin:     General: Skin is warm and dry.   Neurological:      General: No focal deficit present.      Mental Status: She is alert and oriented to person, place, and time. Mental status is at baseline.      Gait: Gait (gait at baseline) normal.   Psychiatric:         Judgment: Judgment normal.           Assessment/Plan:   1. Generalized abdominal pain    2. Tachycardia    3. History of ischemic colitis        Medical " Decision Making/Course:  In the context of the clinical presentation of abdominal pain, tachycardia and similar symptoms with recent episode of ischemic colitis and recent hospital admission for presumed sepsis, there is a clinical concern for recurrence of ischemic colitis and/or sepsis and or other intra-abdominal pelvic pathology and accordingly emergency department evaluation management is warranted.  The patient deferred EMS transfer requested transport by private vehicle and the [facility] emergency department was advised and transfer center notified.  In the course of preparing for this visit with review of the pertinent past medical history, recent and past clinic visits, current medications, and performing chart, immunization, medical history and medication reconciliation, and in the further course of obtaining the current history pertinent to the clinic visit today, performing an exam and evaluation, ordering and independently evaluating labs, tests  , and/or procedures, prescribing any recommended new medications as noted above, providing any pertinent counseling and education and recommending further coordination of care including contact and coordination with transfer center, at least  33 minutes of total time were spent during this encounter.        Please note that this dictation was created using voice recognition software. I have worked with consultants from the vendor as well as technical experts from [facility] to optimize the interface. I have made every reasonable attempt to correct obvious errors, but I expect that there are errors of grammar and possibly content that I did not discover before finalizing the note.

## 2024-07-19 ENCOUNTER — HOSPITAL ENCOUNTER (OUTPATIENT)
Dept: CARDIOLOGY | Facility: MEDICAL CENTER | Age: 74
End: 2024-07-19
Attending: INTERNAL MEDICINE
Payer: MEDICARE

## 2024-07-19 DIAGNOSIS — E11.00 TYPE 2 DIABETES MELLITUS WITH HYPEROSMOLARITY WITHOUT COMA, UNSPECIFIED WHETHER LONG TERM INSULIN USE (HCC): ICD-10-CM

## 2024-07-19 DIAGNOSIS — I34.0 MITRAL VALVE INSUFFICIENCY, UNSPECIFIED ETIOLOGY: ICD-10-CM

## 2024-07-19 DIAGNOSIS — I25.119 ATHEROSCLEROSIS OF NATIVE CORONARY ARTERY WITH ANGINA PECTORIS, UNSPECIFIED WHETHER NATIVE OR TRANSPLANTED HEART (HCC): ICD-10-CM

## 2024-07-19 DIAGNOSIS — E78.00 PURE HYPERCHOLESTEROLEMIA: ICD-10-CM

## 2024-07-19 DIAGNOSIS — I35.1 AORTIC VALVE INSUFFICIENCY, ETIOLOGY OF CARDIAC VALVE DISEASE UNSPECIFIED: ICD-10-CM

## 2024-07-19 DIAGNOSIS — E03.0 GOITER CONGENITAL: ICD-10-CM

## 2024-07-19 LAB
LV EJECT FRACT MOD 2C 99903: 66.82
LV EJECT FRACT MOD 4C 99902: 72.26
LV EJECT FRACT MOD BP 99901: 69.84

## 2024-07-19 PROCEDURE — 93306 TTE W/DOPPLER COMPLETE: CPT | Mod: 26 | Performed by: INTERNAL MEDICINE

## 2024-07-19 PROCEDURE — 93306 TTE W/DOPPLER COMPLETE: CPT

## 2024-08-23 ENCOUNTER — APPOINTMENT (RX ONLY)
Dept: URBAN - METROPOLITAN AREA CLINIC 4 | Facility: CLINIC | Age: 74
Setting detail: DERMATOLOGY
End: 2024-08-23

## 2024-08-23 DIAGNOSIS — L63.8 OTHER ALOPECIA AREATA: ICD-10-CM

## 2024-08-23 PROCEDURE — 99212 OFFICE O/P EST SF 10 MIN: CPT

## 2024-08-23 PROCEDURE — ? COUNSELING

## 2024-08-23 PROCEDURE — ? ADDITIONAL NOTES

## 2024-08-23 ASSESSMENT — LOCATION SIMPLE DESCRIPTION DERM: LOCATION SIMPLE: POSTERIOR SCALP

## 2024-08-23 ASSESSMENT — LOCATION ZONE DERM: LOCATION ZONE: SCALP

## 2024-08-23 ASSESSMENT — LOCATION DETAILED DESCRIPTION DERM: LOCATION DETAILED: POSTERIOR MID-PARIETAL SCALP

## 2024-08-23 NOTE — PROCEDURE: ADDITIONAL NOTES
Detail Level: Simple
Additional Notes: Will be waiting for [facility] to send over blood work. Spoke with patient about using Rogaine shampoo or taking biotin supplements. She has had 2 surgeries recently.
Render Risk Assessment In Note?: no

## 2024-08-25 ENCOUNTER — HOSPITAL ENCOUNTER (OUTPATIENT)
Dept: RADIOLOGY | Facility: MEDICAL CENTER | Age: 74
End: 2024-08-25
Payer: MEDICARE

## 2024-08-25 DIAGNOSIS — K83.8 DILATED INTRAHEPATIC BILE DUCT: ICD-10-CM

## 2024-08-25 PROCEDURE — 74181 MRI ABDOMEN W/O CONTRAST: CPT

## 2024-09-17 NOTE — PROGRESS NOTES
Internal Medicine Patient care assumed. Report received from day RN Francia. Patient is alert and calm, resting in bed. Call light and bedside table within reach. Will continue to monitor.

## 2024-09-23 ENCOUNTER — HOSPITAL ENCOUNTER (INPATIENT)
Facility: REHABILITATION | Age: 74
End: 2024-09-23
Payer: MEDICARE

## 2024-10-19 ENCOUNTER — APPOINTMENT (OUTPATIENT)
Dept: RADIOLOGY | Facility: MEDICAL CENTER | Age: 74
End: 2024-10-19
Attending: EMERGENCY MEDICINE
Payer: MEDICARE

## 2024-10-19 ENCOUNTER — HOSPITAL ENCOUNTER (EMERGENCY)
Facility: MEDICAL CENTER | Age: 74
End: 2024-10-19
Attending: EMERGENCY MEDICINE
Payer: MEDICARE

## 2024-10-19 VITALS
SYSTOLIC BLOOD PRESSURE: 158 MMHG | RESPIRATION RATE: 18 BRPM | BODY MASS INDEX: 25.76 KG/M2 | HEART RATE: 91 BPM | DIASTOLIC BLOOD PRESSURE: 76 MMHG | TEMPERATURE: 97.6 F | OXYGEN SATURATION: 99 % | HEIGHT: 62 IN | WEIGHT: 140 LBS

## 2024-10-19 DIAGNOSIS — S22.080A T12 COMPRESSION FRACTURE, INITIAL ENCOUNTER (HCC): ICD-10-CM

## 2024-10-19 DIAGNOSIS — G57.32 DEEP PERONEAL NEUROPATHY OF LEFT LOWER EXTREMITY: ICD-10-CM

## 2024-10-19 LAB
ALBUMIN SERPL BCP-MCNC: 3.9 G/DL (ref 3.2–4.9)
ALBUMIN/GLOB SERPL: 1.3 G/DL
ALP SERPL-CCNC: 120 U/L (ref 30–99)
ALT SERPL-CCNC: 10 U/L (ref 2–50)
ANION GAP SERPL CALC-SCNC: 16 MMOL/L (ref 7–16)
APTT PPP: 29.2 SEC (ref 24.7–36)
AST SERPL-CCNC: 14 U/L (ref 12–45)
BASOPHILS # BLD AUTO: 0.5 % (ref 0–1.8)
BASOPHILS # BLD: 0.03 K/UL (ref 0–0.12)
BILIRUB SERPL-MCNC: 0.3 MG/DL (ref 0.1–1.5)
BUN SERPL-MCNC: 12 MG/DL (ref 8–22)
CALCIUM ALBUM COR SERPL-MCNC: 9.6 MG/DL (ref 8.5–10.5)
CALCIUM SERPL-MCNC: 9.5 MG/DL (ref 8.4–10.2)
CHLORIDE SERPL-SCNC: 101 MMOL/L (ref 96–112)
CO2 SERPL-SCNC: 21 MMOL/L (ref 20–33)
CREAT SERPL-MCNC: 0.6 MG/DL (ref 0.5–1.4)
EKG IMPRESSION: NORMAL
EOSINOPHIL # BLD AUTO: 0.54 K/UL (ref 0–0.51)
EOSINOPHIL NFR BLD: 8.1 % (ref 0–6.9)
ERYTHROCYTE [DISTWIDTH] IN BLOOD BY AUTOMATED COUNT: 48.5 FL (ref 35.9–50)
GFR SERPLBLD CREATININE-BSD FMLA CKD-EPI: 94 ML/MIN/1.73 M 2
GLOBULIN SER CALC-MCNC: 3.1 G/DL (ref 1.9–3.5)
GLUCOSE SERPL-MCNC: 90 MG/DL (ref 65–99)
HCT VFR BLD AUTO: 35 % (ref 37–47)
HGB BLD-MCNC: 11.2 G/DL (ref 12–16)
IMM GRANULOCYTES # BLD AUTO: 0.04 K/UL (ref 0–0.11)
IMM GRANULOCYTES NFR BLD AUTO: 0.6 % (ref 0–0.9)
INR PPP: 1.02 (ref 0.87–1.13)
LYMPHOCYTES # BLD AUTO: 1.75 K/UL (ref 1–4.8)
LYMPHOCYTES NFR BLD: 26.3 % (ref 22–41)
MCH RBC QN AUTO: 26.1 PG (ref 27–33)
MCHC RBC AUTO-ENTMCNC: 32 G/DL (ref 32.2–35.5)
MCV RBC AUTO: 81.6 FL (ref 81.4–97.8)
MONOCYTES # BLD AUTO: 0.6 K/UL (ref 0–0.85)
MONOCYTES NFR BLD AUTO: 9 % (ref 0–13.4)
NEUTROPHILS # BLD AUTO: 3.69 K/UL (ref 1.82–7.42)
NEUTROPHILS NFR BLD: 55.5 % (ref 44–72)
NRBC # BLD AUTO: 0 K/UL
NRBC BLD-RTO: 0 /100 WBC (ref 0–0.2)
PLATELET # BLD AUTO: 264 K/UL (ref 164–446)
PMV BLD AUTO: 9.3 FL (ref 9–12.9)
POTASSIUM SERPL-SCNC: 3.8 MMOL/L (ref 3.6–5.5)
PROT SERPL-MCNC: 7 G/DL (ref 6–8.2)
PROTHROMBIN TIME: 13.8 SEC (ref 12–14.6)
RBC # BLD AUTO: 4.29 M/UL (ref 4.2–5.4)
SODIUM SERPL-SCNC: 138 MMOL/L (ref 135–145)
WBC # BLD AUTO: 6.7 K/UL (ref 4.8–10.8)

## 2024-10-19 PROCEDURE — 700102 HCHG RX REV CODE 250 W/ 637 OVERRIDE(OP): Performed by: EMERGENCY MEDICINE

## 2024-10-19 PROCEDURE — 93926 LOWER EXTREMITY STUDY: CPT | Mod: LT

## 2024-10-19 PROCEDURE — 99284 EMERGENCY DEPT VISIT MOD MDM: CPT

## 2024-10-19 PROCEDURE — 85025 COMPLETE CBC W/AUTO DIFF WBC: CPT

## 2024-10-19 PROCEDURE — 85730 THROMBOPLASTIN TIME PARTIAL: CPT

## 2024-10-19 PROCEDURE — 93005 ELECTROCARDIOGRAM TRACING: CPT | Performed by: EMERGENCY MEDICINE

## 2024-10-19 PROCEDURE — 72148 MRI LUMBAR SPINE W/O DYE: CPT

## 2024-10-19 PROCEDURE — 85610 PROTHROMBIN TIME: CPT

## 2024-10-19 PROCEDURE — 80053 COMPREHEN METABOLIC PANEL: CPT

## 2024-10-19 PROCEDURE — A9270 NON-COVERED ITEM OR SERVICE: HCPCS | Performed by: EMERGENCY MEDICINE

## 2024-10-19 PROCEDURE — 93971 EXTREMITY STUDY: CPT | Mod: LT

## 2024-10-19 PROCEDURE — 93922 UPR/L XTREMITY ART 2 LEVELS: CPT | Mod: 52,LT

## 2024-10-19 PROCEDURE — 36415 COLL VENOUS BLD VENIPUNCTURE: CPT

## 2024-10-19 RX ORDER — IBUPROFEN 600 MG/1
600 TABLET, FILM COATED ORAL ONCE
Status: COMPLETED | OUTPATIENT
Start: 2024-10-19 | End: 2024-10-19

## 2024-10-19 RX ADMIN — IBUPROFEN 600 MG: 600 TABLET, FILM COATED ORAL at 21:46

## 2024-10-19 ASSESSMENT — FIBROSIS 4 INDEX: FIB4 SCORE: 0.81

## 2024-11-04 ENCOUNTER — TELEPHONE (OUTPATIENT)
Dept: HEALTH INFORMATION MANAGEMENT | Facility: OTHER | Age: 74
End: 2024-11-04
Payer: MEDICARE

## 2024-11-22 ENCOUNTER — OFFICE VISIT (OUTPATIENT)
Dept: URGENT CARE | Facility: CLINIC | Age: 74
End: 2024-11-22
Payer: MEDICARE

## 2024-11-22 ENCOUNTER — HOSPITAL ENCOUNTER (OUTPATIENT)
Facility: MEDICAL CENTER | Age: 74
End: 2024-11-22
Attending: REGISTERED NURSE
Payer: MEDICARE

## 2024-11-22 VITALS
SYSTOLIC BLOOD PRESSURE: 128 MMHG | WEIGHT: 149.4 LBS | BODY MASS INDEX: 27.49 KG/M2 | HEART RATE: 98 BPM | OXYGEN SATURATION: 98 % | DIASTOLIC BLOOD PRESSURE: 88 MMHG | HEIGHT: 62 IN | TEMPERATURE: 97.5 F

## 2024-11-22 DIAGNOSIS — N76.0 BACTERIAL VAGINOSIS: ICD-10-CM

## 2024-11-22 DIAGNOSIS — B96.89 BACTERIAL VAGINOSIS: ICD-10-CM

## 2024-11-22 DIAGNOSIS — N89.8 VAGINAL ODOR: ICD-10-CM

## 2024-11-22 PROCEDURE — 87480 CANDIDA DNA DIR PROBE: CPT

## 2024-11-22 PROCEDURE — 87660 TRICHOMONAS VAGIN DIR PROBE: CPT

## 2024-11-22 PROCEDURE — 87510 GARDNER VAG DNA DIR PROBE: CPT

## 2024-11-22 PROCEDURE — 99214 OFFICE O/P EST MOD 30 MIN: CPT | Performed by: REGISTERED NURSE

## 2024-11-22 PROCEDURE — 3074F SYST BP LT 130 MM HG: CPT | Performed by: REGISTERED NURSE

## 2024-11-22 PROCEDURE — 3079F DIAST BP 80-89 MM HG: CPT | Performed by: REGISTERED NURSE

## 2024-11-22 RX ORDER — METRONIDAZOLE 7.5 MG/G
1 GEL VAGINAL
Qty: 5 EACH | Refills: 0 | Status: SHIPPED | OUTPATIENT
Start: 2024-11-22 | End: 2024-11-27

## 2024-11-22 ASSESSMENT — ENCOUNTER SYMPTOMS
SHORTNESS OF BREATH: 0
CHILLS: 0
FEVER: 0
ABDOMINAL PAIN: 0

## 2024-11-22 ASSESSMENT — FIBROSIS 4 INDEX: FIB4 SCORE: 1.22

## 2024-11-22 NOTE — PROGRESS NOTES
Subjective:   Yamile Go is a 74 y.o. female who presents for Vaginitis (C/o of fishy odor x2 weeks.)      HPI  2 weeks of vaginal fishy odor, no discharge. No pain with urination. Hx of BV.  States symptoms are consistent with prior BV infections.  No concern for STIs.  No fevers chills vomiting flank pain.    Review of Systems   Constitutional:  Negative for chills and fever.   Respiratory:  Negative for shortness of breath.    Cardiovascular:  Negative for chest pain.   Gastrointestinal:  Negative for abdominal pain.   Skin:  Negative for rash.       Allergies   Allergen Reactions    Amlodipine Swelling and Unspecified    Bee Swelling    Gabapentin Hives and Swelling    Pregabalin Hives, Swelling and Unspecified    Rifampin Unspecified     Pt turns yellow    Fentanyl Vomiting and Unspecified    Morphine Vomiting, Nausea and Unspecified    Benzoin Rash     Tincture of benzoin =blisters    blisters       Patient Active Problem List    Diagnosis Date Noted    Hyponatremia 05/07/2024    Acute respiratory failure (HCC) 05/03/2024    Near syncope 05/03/2024    Pancolitis ([facility]) 05/03/2024    S/P shoulder surgery 05/03/2024    Lactic acidosis 05/03/2024    Rectal bleeding 05/03/2024    GIB (gastrointestinal bleeding) 05/03/2024    SIRS (systemic inflammatory response syndrome) ([facility]) 05/02/2024    Acute asthma exacerbation 05/03/2023    Type 2 diabetes mellitus ([facility]) 05/03/2023    Hypokalemia 05/03/2023    Sinus tachycardia 05/03/2023    Non-traumatic compression fracture of T5 thoracic vertebra, sequela 05/03/2023    Hyperglycemia 03/30/2022    Thrombocytopenia ([facility]) 03/29/2022    Azotemia 03/29/2022    Vitamin D deficiency 03/29/2022    Asthma 03/29/2022    Closed compression fracture of L2 lumbar vertebra, initial encounter ([facility]) 03/23/2022    Physical debility 03/22/2022    Personal history of COVID-19 03/21/2022    Closed fracture of lumbar vertebra, unspecified fracture morphology, initial encounter ([facility])  03/21/2022    Leukocytosis 03/21/2022    Acute hypoxemic respiratory failure due to COVID-19 ([facility]) 02/06/2022    Community acquired pneumonia 02/06/2022    Sepsis ([facility]) 02/06/2022    Hypothyroidism 02/06/2022    Chronic pain syndrome on chronic opioids 02/06/2022    Class 1 obesity due to excess calories with serious comorbidity and body mass index (BMI) of 32.0 to 32.9 in adult     Depression     Other chest pain 01/30/2012    Dyslipidemia 01/30/2012    Essential hypertension, benign 01/30/2012    CAD (coronary artery disease) 01/30/2012       Current Outpatient Medications Ordered in Epic   Medication Sig Dispense Refill    metroNIDAZOLE (METROGEL-VAGINAL) 0.75 % Gel Insert 1 Applicator into the vagina at bedtime for 5 days. 5 Each 0    loperamide (IMODIUM) 2 MG Cap Take 1 Capsule by mouth 4 times a day as needed for Diarrhea. 30 Capsule 0    omeprazole (PRILOSEC) 20 MG delayed-release capsule Take 20 mg by mouth 2 times a day.      alendronate (FOSAMAX) 70 MG Tab Take 70 mg by mouth every 7 days.      lisinopril (PRINIVIL) 2.5 MG Tab Take 2.5 mg by mouth every day.      oxyCODONE-acetaminophen (PERCOCET) 7.5-325 MG per tablet Take 1 Tablet by mouth every 6 hours as needed. Indications: Pain      ezetimibe (ZETIA) 10 MG Tab Take 10 mg by mouth every evening.      ondansetron (ZOFRAN ODT) 8 MG TABLET DISPERSIBLE Take 8 mg by mouth every 8 hours as needed for Nausea/Vomiting.      albuterol 108 (90 Base) MCG/ACT Aero Soln inhalation aerosol Inhale 1-2 Puffs every four hours as needed for Shortness of Breath.      metoprolol SR (TOPROL XL) 25 MG TABLET SR 24 HR Take 25 mg by mouth every day.      liothyronine (CYTOMEL) 5 MCG Tab Take 5 mcg by mouth every day.      metFORMIN (GLUCOPHAGE) 500 MG Tab Take 500 mg by mouth 2 times a day with meals.      amitriptyline (ELAVIL) 100 MG Tab Take 100 mg by mouth every evening.      escitalopram (LEXAPRO) 20 MG tablet Take 1 Tablet by mouth every day. 30 Tablet 2     rosuvastatin (CRESTOR) 20 MG Tab Take 1 Tablet by mouth every evening. 30 Tablet 2    SYNTHROID 88 MCG Tab Take 1 Tablet by mouth every day. 30 Tablet 2    sumatriptan (IMITREX) 100 MG tablet Take 100 mg by mouth one time as needed for Migraine.       No current [facility]-ordered facility-administered medications on file.       Past Surgical History:   Procedure Laterality Date    PB RECONSTR TOTAL SHOULDER IMPLANT Right 4/30/2024    Procedure: RIGHT REVERSE TOTAL SHOULDER ARTHROPLASTY;  Surgeon: Manuela Sparks M.D.;  Location: SURGERY [facility];  Service: Orthopedics    INSERTION, PERIPHERAL NERVE STIMULATOR, LOWER EXTREMITY Left 12/22/2022    Procedure: Left SCIATIC PERIPHERAL NERVE STIMULATOR IMPLANT, LOWER EXTREMITY PERIPHERAL SCIATIC NERVE;  Surgeon: Tobi Kilgore M.D.;  Location: SURGERY [facility];  Service: Pain Management    ARTHROPLASTY Right 2020    ankle    LUMBAR EXPLORATION N/A 2017    COLECTOMY N/A 2007    partial for divverticulitis 2007    LUMBAR LAMINECTOMY DISKECTOMY N/A 1989    BREAST BIOPSY  1984    no cancer    ABDOMINAL HYSTERECTOMY TOTAL N/A 1978    APPENDECTOMY N/A 1976       Social History     Tobacco Use    Smoking status: Never    Smokeless tobacco: Never   Vaping Use    Vaping status: Never Used   Substance Use Topics    Alcohol use: No    Drug use: No       family history includes Alcohol abuse in her daughter; Cancer in her mother; Diabetes in her brother, brother, father, and maternal aunt; Drug abuse in her brother and daughter; Heart Disease in her brother, brother, brother, brother, father, and mother; Hyperlipidemia in her brother, father, and mother; Hypertension in her brother, father, maternal aunt, and maternal aunt; Other in her brother; Stroke in her father.     Problem list, medications, and allergies reviewed by myself today in Epic.     Objective:   /88 (BP Location: Left arm, Patient Position: Sitting, BP Cuff Size: Adult)   Pulse 98   Temp 36.4 °C (97.5 °F)  "(Temporal)   Ht 1.575 m (5' 2\")   Wt 67.8 kg (149 lb 6.4 oz)   SpO2 98%   BMI 27.33 kg/m²     Physical Exam  Vitals and nursing note reviewed.   Constitutional:       Appearance: Normal appearance. She is not ill-appearing or toxic-appearing.   HENT:      Mouth/Throat:      Mouth: Mucous membranes are moist.   Eyes:      Pupils: Pupils are equal, round, and reactive to light.   Cardiovascular:      Rate and Rhythm: Normal rate and regular rhythm.   Pulmonary:      Effort: Pulmonary effort is normal. No respiratory distress.      Breath sounds: Normal breath sounds.   Abdominal:      General: Abdomen is flat.      Palpations: Abdomen is soft.      Tenderness: There is no abdominal tenderness. There is no right CVA tenderness, left CVA tenderness, guarding or rebound.   Musculoskeletal:         General: Normal range of motion.      Cervical back: Normal range of motion.   Skin:     General: Skin is warm and dry.      Capillary Refill: Capillary refill takes less than 2 seconds.      Findings: No rash.   Neurological:      General: No focal deficit present.      Mental Status: She is alert and oriented to person, place, and time.   Psychiatric:         Mood and Affect: Mood normal.         Assessment/Plan:     I personally reviewed prior external notes and test results pertinent to today's visit as well as additional imaging and testing completed in clinic today.    I introduced myself as David Segal a Nurse Practitioner.    1. Bacterial vaginosis  metroNIDAZOLE (METROGEL-VAGINAL) 0.75 % Gel    VAGINAL PATHOGENS DNA PANEL      2. Vaginal odor  VAGINAL PATHOGENS DNA PANEL        Fishy odor x 2 weeks.  No urinary symptoms.  History of BV and states this is consistent with prior infections.  Vitals reassuring.  Benign exam.  [facility] path ordered we will start empirically on metronidazole intravaginal medication.  Supportive measures discussed medication discussed included indication for use and the potential benefits " and side effects. The Patient was encouraged to monitor symptoms closely, and we reviewed the signs and symptoms that require a higher level of care through the emergency department. Patient verbalized understanding.    Please note that this dictation was created using voice recognition software. I have made every reasonable attempt to correct obvious errors, but I expect that there are errors of grammar and possibly content that I did not discover before finalizing the note.    This note was electronically signed by CADEN Peterson

## 2024-11-23 LAB
CANDIDA DNA VAG QL PROBE+SIG AMP: NEGATIVE
G VAGINALIS DNA VAG QL PROBE+SIG AMP: POSITIVE
T VAGINALIS DNA VAG QL PROBE+SIG AMP: NEGATIVE

## 2024-11-29 ENCOUNTER — APPOINTMENT (OUTPATIENT)
Dept: RADIOLOGY | Facility: MEDICAL CENTER | Age: 74
End: 2024-11-29
Attending: EMERGENCY MEDICINE
Payer: MEDICARE

## 2024-11-29 ENCOUNTER — HOSPITAL ENCOUNTER (EMERGENCY)
Facility: MEDICAL CENTER | Age: 74
End: 2024-11-29
Attending: EMERGENCY MEDICINE
Payer: MEDICARE

## 2024-11-29 VITALS
OXYGEN SATURATION: 96 % | RESPIRATION RATE: 11 BRPM | HEART RATE: 86 BPM | TEMPERATURE: 98.2 F | SYSTOLIC BLOOD PRESSURE: 105 MMHG | DIASTOLIC BLOOD PRESSURE: 55 MMHG
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
## 2024-11-29 DIAGNOSIS — S32.020A CLOSED COMPRESSION FRACTURE OF L2 LUMBAR VERTEBRA, INITIAL ENCOUNTER (HCC): ICD-10-CM

## 2024-11-29 DIAGNOSIS — S32.010A CLOSED COMPRESSION FRACTURE OF BODY OF L1 VERTEBRA (HCC): ICD-10-CM

## 2024-11-29 LAB
ALBUMIN SERPL BCP-MCNC: 3.5 G/DL (ref 3.2–4.9)
ALBUMIN/GLOB SERPL: 1.3 G/DL
ALP SERPL-CCNC: 83 U/L (ref 30–99)
ALT SERPL-CCNC: 17 U/L (ref 2–50)
ANION GAP SERPL CALC-SCNC: 10 MMOL/L (ref 7–16)
AST SERPL-CCNC: 16 U/L (ref 12–45)
BASOPHILS # BLD AUTO: 0.4 % (ref 0–1.8)
BASOPHILS # BLD: 0.04 K/UL (ref 0–0.12)
BILIRUB SERPL-MCNC: 0.3 MG/DL (ref 0.1–1.5)
BUN SERPL-MCNC: 24 MG/DL (ref 8–22)
CALCIUM ALBUM COR SERPL-MCNC: 9.3 MG/DL (ref 8.5–10.5)
CALCIUM SERPL-MCNC: 8.9 MG/DL (ref 8.4–10.2)
CHLORIDE SERPL-SCNC: 105 MMOL/L (ref 96–112)
CO2 SERPL-SCNC: 22 MMOL/L (ref 20–33)
CREAT SERPL-MCNC: 0.57 MG/DL (ref 0.5–1.4)
EOSINOPHIL # BLD AUTO: 0.14 K/UL (ref 0–0.51)
EOSINOPHIL NFR BLD: 1.3 % (ref 0–6.9)
ERYTHROCYTE [DISTWIDTH] IN BLOOD BY AUTOMATED COUNT: 50.8 FL (ref 35.9–50)
GFR SERPLBLD CREATININE-BSD FMLA CKD-EPI: 95 ML/MIN/1.73 M 2
GLOBULIN SER CALC-MCNC: 2.6 G/DL (ref 1.9–3.5)
GLUCOSE SERPL-MCNC: 104 MG/DL (ref 65–99)
HCT VFR BLD AUTO: 32.8 % (ref 37–47)
HGB BLD-MCNC: 10.3 G/DL (ref 12–16)
IMM GRANULOCYTES # BLD AUTO: 0.11 K/UL (ref 0–0.11)
IMM GRANULOCYTES NFR BLD AUTO: 1 % (ref 0–0.9)
LYMPHOCYTES # BLD AUTO: 2 K/UL (ref 1–4.8)
LYMPHOCYTES NFR BLD: 18.9 % (ref 22–41)
MCH RBC QN AUTO: 26.2 PG (ref 27–33)
MCHC RBC AUTO-ENTMCNC: 31.4 G/DL (ref 32.2–35.5)
MCV RBC AUTO: 83.5 FL (ref 81.4–97.8)
MONOCYTES # BLD AUTO: 1.01 K/UL (ref 0–0.85)
MONOCYTES NFR BLD AUTO: 9.5 % (ref 0–13.4)
NEUTROPHILS # BLD AUTO: 7.31 K/UL (ref 1.82–7.42)
NEUTROPHILS NFR BLD: 68.9 % (ref 44–72)
NRBC # BLD AUTO: 0 K/UL
NRBC BLD-RTO: 0 /100 WBC (ref 0–0.2)
PLATELET # BLD AUTO: 250 K/UL (ref 164–446)
PMV BLD AUTO: 8.8 FL (ref 9–12.9)
POTASSIUM SERPL-SCNC: 4 MMOL/L (ref 3.6–5.5)
PROT SERPL-MCNC: 6.1 G/DL (ref 6–8.2)
RBC # BLD AUTO: 3.93 M/UL (ref 4.2–5.4)
SODIUM SERPL-SCNC: 137 MMOL/L (ref 135–145)
TROPONIN T SERPL-MCNC: 22 NG/L (ref 6–19)
WBC # BLD AUTO: 10.6 K/UL (ref 4.8–10.8)

## 2024-11-29 PROCEDURE — 94760 N-INVAS EAR/PLS OXIMETRY 1: CPT

## 2024-11-29 PROCEDURE — 700105 HCHG RX REV CODE 258: Performed by: EMERGENCY MEDICINE

## 2024-11-29 PROCEDURE — 36415 COLL VENOUS BLD VENIPUNCTURE: CPT

## 2024-11-29 PROCEDURE — 99285 EMERGENCY DEPT VISIT HI MDM: CPT

## 2024-11-29 PROCEDURE — 80053 COMPREHEN METABOLIC PANEL: CPT

## 2024-11-29 PROCEDURE — 700111 HCHG RX REV CODE 636 W/ 250 OVERRIDE (IP): Performed by: EMERGENCY MEDICINE

## 2024-11-29 PROCEDURE — 700101 HCHG RX REV CODE 250: Performed by: EMERGENCY MEDICINE

## 2024-11-29 PROCEDURE — 72131 CT LUMBAR SPINE W/O DYE: CPT

## 2024-11-29 PROCEDURE — 85025 COMPLETE CBC W/AUTO DIFF WBC: CPT

## 2024-11-29 PROCEDURE — 84484 ASSAY OF TROPONIN QUANT: CPT

## 2024-11-29 PROCEDURE — 96374 THER/PROPH/DIAG INJ IV PUSH: CPT

## 2024-11-29 PROCEDURE — 96375 TX/PRO/DX INJ NEW DRUG ADDON: CPT

## 2024-11-29 RX ORDER — KETOROLAC TROMETHAMINE 15 MG/ML
15 INJECTION, SOLUTION INTRAMUSCULAR; INTRAVENOUS ONCE
Status: COMPLETED | OUTPATIENT
Start: 2024-11-29 | End: 2024-11-29

## 2024-11-29 RX ORDER — ONDANSETRON 2 MG/ML
4 INJECTION INTRAMUSCULAR; INTRAVENOUS ONCE
Status: COMPLETED | OUTPATIENT
Start: 2024-11-29 | End: 2024-11-29

## 2024-11-29 RX ORDER — HYDROMORPHONE HYDROCHLORIDE 1 MG/ML
0.5 INJECTION, SOLUTION INTRAMUSCULAR; INTRAVENOUS; SUBCUTANEOUS ONCE
Status: COMPLETED | OUTPATIENT
Start: 2024-11-29 | End: 2024-11-29

## 2024-11-29 RX ADMIN — HYDROMORPHONE HYDROCHLORIDE 0.5 MG: 1 INJECTION, SOLUTION INTRAMUSCULAR; INTRAVENOUS; SUBCUTANEOUS at 06:49

## 2024-11-29 RX ADMIN — KETAMINE HYDROCHLORIDE 25 MG: 10 INJECTION INTRAMUSCULAR; INTRAVENOUS at 07:05

## 2024-11-29 RX ADMIN — KETOROLAC TROMETHAMINE 15 MG: 15 INJECTION, SOLUTION INTRAMUSCULAR; INTRAVENOUS at 06:50

## 2024-11-29 RX ADMIN — ONDANSETRON 4 MG: 2 INJECTION INTRAMUSCULAR; INTRAVENOUS at 06:50

## 2024-11-29 NOTE — DISCHARGE INSTRUCTIONS
I have placed a consultation to interventional radiology for possible kyphoplasty in the coming days if you are a candidate.  They will review your images and reach out to you    Please follow-up with your primary care provider.  Pain medication as per your usual at home and please follow-up with your pain management specialist

## 2024-11-29 NOTE — ED TRIAGE NOTES
BIB EMS following GLF  Chief Complaint   Patient presents with    GLF     Pt fell while walking to the restroom. States + headstrike, - LOC, - Thinners. No signs of trauma apparent.     BP (!) 140/69   Pulse 94   Temp 36.8 °C (98.2 °F) (Temporal)   Resp 16   SpO2 97%

## 2024-11-29 NOTE — ED PROVIDER NOTES
ED Provider Note    CHIEF COMPLAINT  Chief Complaint   Patient presents with    GLF     Pt fell while walking to the restroom. States + headstrike, - LOC, - Thinners. No signs of trauma apparent.       EXTERNAL RECORDS REVIEWED  Outpatient Notes had a recent visit for bacterial vaginosis at [facility] urgent care November 22, 2024    HPI/ROS  LIMITATION TO HISTORY   Select: : None    Yamile Go is a 74 y.o. female who presents stating that she fell while walking to the restroom to urinate at about 1 AM.  She has quite a bit of right lower back pain and a history of past compression fractures at T5 and T12.  Does have a history of chronic neuropathy and has under pain management with Dr. Kilgore, physiatry.  She last had injections to her back about 3 weeks ago.  Denies any fever, chills, sweats, new weakness or numbness, bowel or bladder incontinence.  She is on 7.5 mg Percocet every 6 hours as needed.    PAST MEDICAL HISTORY   has a past medical history of Anemia, Anesthesia, Arthritis, Asthma, CAD (coronary artery disease), Cataract, Dental disorder, Depression, Diabetes (HCC), Disorder of thyroid, Heart burn, HTN (hypertension), Hyperlipidemia, Indigestion, Migraines, Obesity, Pneumonia (2023), PONV (postoperative nausea and vomiting), and Sleep apnea.    SURGICAL HISTORY   has a past surgical history that includes appendectomy (N/A, 1976); abdominal hysterectomy total (N/A, 1978); breast biopsy (1984); colectomy (N/A, 2007); lumbar laminectomy diskectomy (N/A, 1989); arthroplasty (Right, 2020); lumbar exploration (N/A, 2017); insertion, peripheral nerve stimulator, lower extremity (Left, 12/22/2022); and reconstr total shoulder implant (Right, 4/30/2024).    FAMILY HISTORY  Family History   Problem Relation Age of Onset    Cancer Mother         lung    Heart Disease Mother     Hyperlipidemia Mother     Stroke Father     Heart Disease Father     Diabetes Father     Hypertension Father     Hyperlipidemia Father      Heart Disease Brother         cath and bypass    Diabetes Brother     Hypertension Brother     Hyperlipidemia Brother     Diabetes Maternal Aunt     Hypertension Maternal Aunt     Heart Disease Brother         cath and byp[ass    Drug abuse Brother     Heart Disease Brother         cath and bypass    Diabetes Brother     Heart Disease Brother     Other Brother         MVA    Hypertension Maternal Aunt     Drug abuse Daughter     Alcohol abuse Daughter        SOCIAL HISTORY  Social History     Tobacco Use    Smoking status: Never    Smokeless tobacco: Never   Vaping Use    Vaping status: Never Used   Substance and Sexual Activity    Alcohol use: No    Drug use: No    Sexual activity: Yes     Partners: Male     Birth control/protection: Post-Menopausal       CURRENT MEDICATIONS  Home Medications       Reviewed by Randolph Danielson R.N. (Registered Nurse) on 11/29/24 at 0545  Med List Status: Not Addressed     Medication Last Dose Status   albuterol 108 (90 Base) MCG/ACT Aero Soln inhalation aerosol  Active   alendronate (FOSAMAX) 70 MG Tab  Active   amitriptyline (ELAVIL) 100 MG Tab  Active   escitalopram (LEXAPRO) 20 MG tablet  Active   ezetimibe (ZETIA) 10 MG Tab  Active   liothyronine (CYTOMEL) 5 MCG Tab  Active   lisinopril (PRINIVIL) 2.5 MG Tab  Active   loperamide (IMODIUM) 2 MG Cap  Active   metFORMIN (GLUCOPHAGE) 500 MG Tab  Active   metoprolol SR (TOPROL XL) 25 MG TABLET SR 24 HR  Active   omeprazole (PRILOSEC) 20 MG delayed-release capsule  Active   ondansetron (ZOFRAN ODT) 8 MG TABLET DISPERSIBLE  Active   oxyCODONE-acetaminophen (PERCOCET) 7.5-325 MG per tablet  Active   rosuvastatin (CRESTOR) 20 MG Tab  Active   sumatriptan (IMITREX) 100 MG tablet  Active   SYNTHROID 88 MCG Tab  Active                    ALLERGIES  Allergies   Allergen Reactions    Amlodipine Swelling and Unspecified    Bee Swelling    Gabapentin Hives and Swelling    Pregabalin Hives, Swelling and Unspecified    Rifampin Unspecified     Pt  turns yellow    Fentanyl Vomiting and Unspecified    Morphine Vomiting, Nausea and Unspecified    Benzoin Rash     Tincture of benzoin =blisters    blisters       PHYSICAL EXAM  VITAL SIGNS: BP (!) 140/69   Pulse 94   Temp 36.8 °C (98.2 °F) (Temporal)   Resp 16   SpO2 97%    Constitutional: Elderly female, No acute distress, uncomfortable appearance.   HENT: Normocephalic, Atraumatic, Bilateral external ears normal, Oropharynx is clear mucous membranes are moist. No oral exudates or nasal discharge.   Eyes: Pupils are equal round and reactive, EOMI, Conjunctiva normal, No discharge.   Neck: Normal range of motion, No tenderness, Supple, No stridor. No meningismus.  Lymphatic: No lymphadenopathy noted.   Cardiovascular: Regular rate and rhythm without murmur rub or gallop.  Thorax & Lungs: Clear breath sounds bilaterally without wheezes, rhonchi or rales. There is no chest wall tenderness.   Abdomen: Soft non-tender non-distended. There is no rebound or guarding. No organomegaly is appreciated. Bowel sounds are normal.  Skin: Normal without rash.   Back: Significant midline lower lumbar central tenderness.   Extremities: Intact distal pulses, No edema, No tenderness, No cyanosis, No clubbing. Capillary refill is less than 2 seconds.  Musculoskeletal: Good range of motion in all major joints. No tenderness to palpation or major deformities noted.   Neurologic: Alert & oriented x 3, Normal motor function, Normal sensory function, No focal deficits noted. Reflexes are normal.  Psychiatric: Affect normal, Judgment normal, Mood normal. There is no suicidal ideation or patient reported hallucinations.       RADIOLOGY/PROCEDURES   I have independently interpreted the diagnostic imaging associated with this visit and am waiting the final reading from the radiologist.   My preliminary interpretation is as follows: Compression fractures at L1 and L2    Radiologist interpretation:  CT-LSPINE W/O PLUS RECONS   Final Result    Impression:      1. Acute 20% osteoporotic compression fractures of L1 and L2 vertebral bodies. No significant bony retropulsion.      2. No change severe degenerative changes in the lumbar spine with scoliosis. Foraminal stenosis remains most prominent on the left and L4-5, similar to 3/21/2022.                      COURSE & MEDICAL DECISION MAKING    ASSESSMENT, COURSE AND PLAN  Care Narrative: After this mechanical fall I was very concerned about new compression fractures and she does have a history of old compression fractures of T5 and T12 but given her exam I was concerned mostly about her lumbar spine and ordered CT LS spine for evaluation and gave her initial pain management.    Even though this was mechanical fall I did perform laboratory evaluation that shows no leukocytosis, shift and only mild anemia with a hemoglobin of 10.3.  No electrolyte derangements or acidosis.  BUN slightly elevated at 24 concerning for some mild dehydration.  Troponin unremarkable at 22.  Urinalysis not performed    CT scan of the lumbosacral spine shows 20% loss of height with acute fractures of both L1 and L2 without significant retropulsion and clinically she does not have any neurologic compromise only pain    Patient is discharged with the understanding that interventional radiology will coordinate her care and bring her back into the system for kyphoplasty.  She will follow-up with her primary care provider Dr. Cole LEBRON and is discharged in stable condition improved status    DISPOSITION AND DISCUSSIONS  I have discussed management of the patient with the following physicians and ALEC's: Spoke with interventional radiology and they will bring the patient back into the system as an outpatient next week for kyphoplasty    Discussion of management with other \[facility]\"" or appropriate source(s): Radiologist spoke with interventional list      Escalation of care considered, and ultimately not performed:acute inpatient care  management, however at this time, the patient is most appropriate for outpatient management  Considered bringing the patient in hospital for pain control but following initial pain management and TLSO brace she seems to be much improved and ambulatory under her own power and we can avoid admission at this time      Decision tools and prescription drugs considered including, but not limited to: Pain Medications patient is already on Percocet therapy at home and she is asked to continue this. .    FINAL DIAGNOSIS  1. Fall, initial encounter    2. Closed compression fracture of body of L1 vertebra (HCC)    3. Closed compression fracture of L2 lumbar vertebra, initial encounter ([facility])         Electronically signed by: Darius Araujo M.D., 11/29/2024 6:11 AM

## 2024-12-02 ENCOUNTER — HOSPITAL ENCOUNTER (EMERGENCY)
Facility: MEDICAL CENTER | Age: 74
End: 2024-12-02
Attending: EMERGENCY MEDICINE
Payer: MEDICARE

## 2024-12-02 VITALS
DIASTOLIC BLOOD PRESSURE: 65 MMHG | HEART RATE: 85 BPM | TEMPERATURE: 98.1 F | BODY MASS INDEX: 27.42 KG/M2 | WEIGHT: 149 LBS | SYSTOLIC BLOOD PRESSURE: 114 MMHG | OXYGEN SATURATION: 96 % | RESPIRATION RATE: 16 BRPM | HEIGHT: 62 IN

## 2024-12-02 DIAGNOSIS — S32.020A CLOSED WEDGE COMPRESSION FRACTURE OF L2 VERTEBRA, INITIAL ENCOUNTER (HCC): ICD-10-CM

## 2024-12-02 DIAGNOSIS — M54.50 ACUTE MIDLINE LOW BACK PAIN WITHOUT SCIATICA: ICD-10-CM

## 2024-12-02 DIAGNOSIS — S32.010A CLOSED WEDGE COMPRESSION FRACTURE OF L1 VERTEBRA, INITIAL ENCOUNTER (HCC): ICD-10-CM

## 2024-12-02 PROCEDURE — 700111 HCHG RX REV CODE 636 W/ 250 OVERRIDE (IP): Mod: JZ | Performed by: EMERGENCY MEDICINE

## 2024-12-02 PROCEDURE — 99284 EMERGENCY DEPT VISIT MOD MDM: CPT

## 2024-12-02 PROCEDURE — 96372 THER/PROPH/DIAG INJ SC/IM: CPT

## 2024-12-02 RX ORDER — PREDNISONE 20 MG/1
60 TABLET ORAL DAILY
Qty: 12 TABLET | Refills: 0 | Status: ON HOLD | OUTPATIENT
Start: 2024-12-02 | End: 2024-12-09

## 2024-12-02 RX ORDER — KETOROLAC TROMETHAMINE 15 MG/ML
15 INJECTION, SOLUTION INTRAMUSCULAR; INTRAVENOUS ONCE
Status: COMPLETED | OUTPATIENT
Start: 2024-12-02 | End: 2024-12-02

## 2024-12-02 RX ORDER — ONDANSETRON 4 MG/1
4 TABLET, ORALLY DISINTEGRATING ORAL ONCE
Status: COMPLETED | OUTPATIENT
Start: 2024-12-02 | End: 2024-12-02

## 2024-12-02 RX ADMIN — PREDNISONE 60 MG: 50 TABLET ORAL at 13:00

## 2024-12-02 RX ADMIN — ONDANSETRON 4 MG: 4 TABLET, ORALLY DISINTEGRATING ORAL at 13:00

## 2024-12-02 RX ADMIN — KETOROLAC TROMETHAMINE 15 MG: 15 INJECTION, SOLUTION INTRAMUSCULAR; INTRAVENOUS at 13:01

## 2024-12-02 RX ADMIN — MORPHINE SULFATE 10 MG: 10 INJECTION INTRAVENOUS at 13:01

## 2024-12-02 ASSESSMENT — FIBROSIS 4 INDEX: FIB4 SCORE: 1.13

## 2024-12-02 NOTE — ED NOTES
Pt medicated per MAR, tolerated oral medication well, no coughing or choking noted;    Pt given warm blanket, denied having any other needs at this time, no distress noted;

## 2024-12-02 NOTE — DISCHARGE PLANNING
ER CM met with pt and son at bedside. She is working with her pain management MD. She has brace on.  Son notes she has a full turtle shell. She is normally mobile. Pain meds given will see if effective. Offered Home health and no needs per patient.  Home 1 story .Rollator at bedside.  HX Home health in past.  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 : Patient         Elopement Risk  Legal Hold: No  Ambulatory or Self Mobile in Wheelchair: No-Not an Elopement Risk    Interdisciplinary Discharge Planning  Primary Care Physician: Cole Yuen              Finances  Prescription Coverage: Yes                   Domestic Abuse  Have you ever been the victim of abuse or violence?: No

## 2024-12-02 NOTE — ED PROVIDER NOTES
ED Provider Note    CHIEF COMPLAINT  Chief Complaint   Patient presents with    Pain     Fell this past week braking her lumbar 1 and 2   can not control the pain         EXTERNAL RECORDS REVIEWED  PDMP the patient is prescribed 9010 mg oxycodones per month  On November 29, 2024 the patient had a CT scan as an outpatient that showed 20% compression fracture of L1 and L2    HPI/ROS  LIMITATION TO HISTORY   Select: : None  OUTSIDE HISTORIAN(S):  Son in law    Yamile Go is a 74 y.o. female who presents status post falling last week causing an L1-L2 compression fracture.  The patient is currently in a brace and has been having severe pain.  She is currently taking pain pills that are prescribed by pain specialist.  She denies any incontinence of urine, she has chronic left foot numbness, she has no new motor deficits.    PAST MEDICAL HISTORY   has a past medical history of Anemia, Anesthesia, Arthritis, Asthma, CAD (coronary artery disease), Cataract, Dental disorder, Depression, Diabetes (HCC), Disorder of thyroid, Heart burn, HTN (hypertension), Hyperlipidemia, Indigestion, Migraines, Obesity, Pneumonia (2023), PONV (postoperative nausea and vomiting), and Sleep apnea.    SURGICAL HISTORY   has a past surgical history that includes appendectomy (N/A, 1976); abdominal hysterectomy total (N/A, 1978); breast biopsy (1984); colectomy (N/A, 2007); lumbar laminectomy diskectomy (N/A, 1989); arthroplasty (Right, 2020); lumbar exploration (N/A, 2017); insertion, peripheral nerve stimulator, lower extremity (Left, 12/22/2022); and reconstr total shoulder implant (Right, 4/30/2024).    FAMILY HISTORY  Family History   Problem Relation Age of Onset    Cancer Mother         lung    Heart Disease Mother     Hyperlipidemia Mother     Stroke Father     Heart Disease Father     Diabetes Father     Hypertension Father     Hyperlipidemia Father     Heart Disease Brother         cath and bypass    Diabetes Brother      Hypertension Brother     Hyperlipidemia Brother     Diabetes Maternal Aunt     Hypertension Maternal Aunt     Heart Disease Brother         cath and byp[ass    Drug abuse Brother     Heart Disease Brother         cath and bypass    Diabetes Brother     Heart Disease Brother     Other Brother         MVA    Hypertension Maternal Aunt     Drug abuse Daughter     Alcohol abuse Daughter        SOCIAL HISTORY  Social History     Tobacco Use    Smoking status: Never    Smokeless tobacco: Never   Vaping Use    Vaping status: Never Used   Substance and Sexual Activity    Alcohol use: No    Drug use: No    Sexual activity: Yes     Partners: Male     Birth control/protection: Post-Menopausal       CURRENT MEDICATIONS  Home Medications       Reviewed by Steffi Castro R.N. (Registered Nurse) on 12/02/24 at 1106  Med List Status: Partial     Medication Last Dose Status   albuterol 108 (90 Base) MCG/ACT Aero Soln inhalation aerosol  Active   alendronate (FOSAMAX) 70 MG Tab  Active   amitriptyline (ELAVIL) 100 MG Tab  Active   escitalopram (LEXAPRO) 20 MG tablet  Active   ezetimibe (ZETIA) 10 MG Tab  Active   liothyronine (CYTOMEL) 5 MCG Tab  Active   lisinopril (PRINIVIL) 2.5 MG Tab  Active   loperamide (IMODIUM) 2 MG Cap  Active   metFORMIN (GLUCOPHAGE) 500 MG Tab  Active   metoprolol SR (TOPROL XL) 25 MG TABLET SR 24 HR  Active   omeprazole (PRILOSEC) 20 MG delayed-release capsule  Active   ondansetron (ZOFRAN ODT) 8 MG TABLET DISPERSIBLE  Active   oxyCODONE-acetaminophen (PERCOCET) 7.5-325 MG per tablet  Active   rosuvastatin (CRESTOR) 20 MG Tab  Active   sumatriptan (IMITREX) 100 MG tablet  Active   SYNTHROID 88 MCG Tab  Active                    ALLERGIES  Allergies   Allergen Reactions    Amlodipine Swelling and Unspecified    Bee Swelling    Gabapentin Hives and Swelling    Pregabalin Hives, Swelling and Unspecified    Rifampin Unspecified     Pt turns yellow    Fentanyl Vomiting and Unspecified    Morphine Vomiting,  "Nausea and Unspecified    Benzoin Rash     Tincture of benzoin =blisters    blisters       PHYSICAL EXAM  VITAL SIGNS: /65   Pulse 85   Temp 36.7 °C (98.1 °F) (Temporal)   Resp 16   Ht 1.575 m (5' 2\")   Wt 67.6 kg (149 lb)   SpO2 96%   BMI 27.25 kg/m²      Back: Tenderness in the L1-L2 area.  Neurological: Subjective numbness in the left lower extremity.  No motor deficits of the lower extremities.            COURSE & MEDICAL DECISION MAKING    ASSESSMENT, COURSE AND PLAN  Care Narrative:     The patient presents with low back pain after fracturing her L1-L2 vertebrae, compression fractures.  Here she was given Toradol 15 mg IM, morphine 10 mg IM, and prednisone 60 mg p.o.        2:22 PM the patient is feeling improved she is up walking with a walker.  I will prescribe her prednisone, she will follow-up with her pain specialist.  She will return for incontinence, weak lower extremities.  Currently she has no signs or symptoms of spinal cord compression.                ADDITIONAL PROBLEMS MANAGED  Low back pain, L1 compression fracture, L2 compression fracture    DISPOSITION AND DISCUSSIONS  I have discussed management of the patient with the following physicians and ALEC's: None    Discussion of management with other QHP or appropriate source(s): None     Escalation of care considered, and ultimately not performed:diagnostic imaging    Barriers to care at this time, including but not limited to:  None .     Decision tools and prescription drugs considered including, but not limited to:    .  Prescribed the patient prednisone      The patient will return for new or worsening symptoms and is stable at the time of discharge.    The patient is referred to a primary physician for blood pressure management, diabetic screening, and for all other preventative health concerns.    DISPOSITION:  Patient will be discharged home in stable condition.    FOLLOW UP:  [facility], Emergency " Dept  [address]  [facility] [address]  [phone]    If symptoms worsen    Cole Yuen M.D.  [address]  [address]  [facility] [address]  [phone]      As needed      OUTPATIENT MEDICATIONS:  New Prescriptions    PREDNISONE (DELTASONE) 20 MG TAB    Take 3 Tablets by mouth every day for 4 days.         FINAL DIAGNOSIS  1. Acute midline low back pain without sciatica    2. Closed wedge compression fracture of L1 vertebra, initial encounter ([facility])    3. Closed wedge compression fracture of L2 vertebra, initial encounter ([facility])         Electronically signed by: Evans Wilcox M.D., 12/2/2024 12:30 PM

## 2024-12-02 NOTE — ED TRIAGE NOTES
"/69   Pulse (!) 101   Temp 36.9 °C (98.5 °F) (Temporal)   Resp 18   Ht 1.575 m (5' 2\")   Wt 67.6 kg (149 lb)   SpO2 92%   BMI 27.25 kg/m²   Chief Complaint   Patient presents with    Pain     Fell this past week braking her lumbar 1 and 2   can not control the pain       Comes in w/ son in law  was seen here this past Friday for fall and Fx back   sent home w/ pain med  not working   pt has chronic pain from a Fx hip a while ago and was on a maintenance dose of percocet for that pain  however due to this new injury her pain management MD has taken her off percocet due to now taking Hydrocodone    "

## 2024-12-02 NOTE — ED NOTES
Patient is stable for discharge at this time, anticipatory guidance provided, close follow-up is encouraged, and ED return instructions have been detailed. Patient and family member are both agreeable to the disposition and plan and discharged home in ambulatory state using personal walker and in good condition.     Rx education provided, Pt verbalized understanding;

## 2024-12-06 ENCOUNTER — APPOINTMENT (OUTPATIENT)
Dept: RADIOLOGY | Facility: MEDICAL CENTER | Age: 74
DRG: 543 | End: 2024-12-06
Attending: STUDENT IN AN ORGANIZED HEALTH CARE EDUCATION/TRAINING PROGRAM
Payer: MEDICARE

## 2024-12-06 ENCOUNTER — HOSPITAL ENCOUNTER (INPATIENT)
Facility: MEDICAL CENTER | Age: 74
LOS: 1 days | DRG: 543 | End: 2024-12-09
Attending: STUDENT IN AN ORGANIZED HEALTH CARE EDUCATION/TRAINING PROGRAM | Admitting: HOSPITALIST
Payer: MEDICARE

## 2024-12-06 DIAGNOSIS — M43.10 RETROLISTHESIS OF VERTEBRAE: ICD-10-CM

## 2024-12-06 DIAGNOSIS — R52 INTRACTABLE PAIN: ICD-10-CM

## 2024-12-06 DIAGNOSIS — M43.10 SPONDYLOLISTHESIS, GRADE 1: ICD-10-CM

## 2024-12-06 DIAGNOSIS — E78.5 DYSLIPIDEMIA: ICD-10-CM

## 2024-12-06 DIAGNOSIS — Z79.4 TYPE 2 DIABETES MELLITUS WITHOUT COMPLICATION, WITH LONG-TERM CURRENT USE OF INSULIN (HCC): ICD-10-CM

## 2024-12-06 DIAGNOSIS — R53.81 PHYSICAL DEBILITY: ICD-10-CM

## 2024-12-06 DIAGNOSIS — S32.020S CLOSED COMPRESSION FRACTURE OF L2 LUMBAR VERTEBRA, SEQUELA: ICD-10-CM

## 2024-12-06 DIAGNOSIS — M54.59 INTRACTABLE LOW BACK PAIN: ICD-10-CM

## 2024-12-06 DIAGNOSIS — E66.09 CLASS 1 OBESITY DUE TO EXCESS CALORIES WITH SERIOUS COMORBIDITY AND BODY MASS INDEX (BMI) OF 32.0 TO 32.9 IN ADULT: Chronic | ICD-10-CM

## 2024-12-06 DIAGNOSIS — S32.010S CLOSED COMPRESSION FRACTURE OF L1 LUMBAR VERTEBRA, SEQUELA: ICD-10-CM

## 2024-12-06 DIAGNOSIS — M43.16 ANTEROLISTHESIS OF LUMBAR SPINE: ICD-10-CM

## 2024-12-06 DIAGNOSIS — M53.2X6 LUMBAR SPINE INSTABILITY: ICD-10-CM

## 2024-12-06 DIAGNOSIS — I10 PRIMARY HYPERTENSION: ICD-10-CM

## 2024-12-06 DIAGNOSIS — I25.10 CORONARY ARTERY DISEASE INVOLVING NATIVE HEART WITHOUT ANGINA PECTORIS, UNSPECIFIED VESSEL OR LESION TYPE: ICD-10-CM

## 2024-12-06 DIAGNOSIS — M47.27 LUMBOSACRAL RADICULOPATHY DUE TO DEGENERATIVE JOINT DISEASE OF SPINE: ICD-10-CM

## 2024-12-06 DIAGNOSIS — M48.061 SPINAL STENOSIS OF LUMBAR REGION, UNSPECIFIED WHETHER NEUROGENIC CLAUDICATION PRESENT: ICD-10-CM

## 2024-12-06 DIAGNOSIS — E11.9 TYPE 2 DIABETES MELLITUS WITHOUT COMPLICATION, WITH LONG-TERM CURRENT USE OF INSULIN (HCC): ICD-10-CM

## 2024-12-06 DIAGNOSIS — M48.062 SPINAL STENOSIS OF LUMBAR REGION WITH NEUROGENIC CLAUDICATION: ICD-10-CM

## 2024-12-06 DIAGNOSIS — I10 ESSENTIAL HYPERTENSION, BENIGN: ICD-10-CM

## 2024-12-06 DIAGNOSIS — S32.010S COMPRESSION FRACTURE OF L1 LUMBAR VERTEBRA, SEQUELA: ICD-10-CM

## 2024-12-06 DIAGNOSIS — M41.9 SCOLIOSIS OF LUMBAR SPINE, UNSPECIFIED SCOLIOSIS TYPE: ICD-10-CM

## 2024-12-06 DIAGNOSIS — E66.811 CLASS 1 OBESITY DUE TO EXCESS CALORIES WITH SERIOUS COMORBIDITY AND BODY MASS INDEX (BMI) OF 32.0 TO 32.9 IN ADULT: Chronic | ICD-10-CM

## 2024-12-06 PROBLEM — E78.2 MIXED HYPERLIPIDEMIA: Status: ACTIVE | Noted: 2024-12-06

## 2024-12-06 PROBLEM — E03.9 ACQUIRED HYPOTHYROIDISM: Status: ACTIVE | Noted: 2022-02-06

## 2024-12-06 PROBLEM — Z71.89 ACP (ADVANCE CARE PLANNING): Status: ACTIVE | Noted: 2024-12-06

## 2024-12-06 LAB
ALBUMIN SERPL BCP-MCNC: 3.5 G/DL (ref 3.2–4.9)
ALBUMIN/GLOB SERPL: 1.3 G/DL
ALP SERPL-CCNC: 90 U/L (ref 30–99)
ALT SERPL-CCNC: 12 U/L (ref 2–50)
ANION GAP SERPL CALC-SCNC: 10 MMOL/L (ref 7–16)
AST SERPL-CCNC: 13 U/L (ref 12–45)
BASOPHILS # BLD AUTO: 0.5 % (ref 0–1.8)
BASOPHILS # BLD: 0.04 K/UL (ref 0–0.12)
BILIRUB SERPL-MCNC: 0.3 MG/DL (ref 0.1–1.5)
BUN SERPL-MCNC: 14 MG/DL (ref 8–22)
CALCIUM ALBUM COR SERPL-MCNC: 8.8 MG/DL (ref 8.5–10.5)
CALCIUM SERPL-MCNC: 8.4 MG/DL (ref 8.4–10.2)
CHLORIDE SERPL-SCNC: 104 MMOL/L (ref 96–112)
CO2 SERPL-SCNC: 24 MMOL/L (ref 20–33)
CREAT SERPL-MCNC: 0.6 MG/DL (ref 0.5–1.4)
EOSINOPHIL # BLD AUTO: 0.26 K/UL (ref 0–0.51)
EOSINOPHIL NFR BLD: 3 % (ref 0–6.9)
ERYTHROCYTE [DISTWIDTH] IN BLOOD BY AUTOMATED COUNT: 50 FL (ref 35.9–50)
GFR SERPLBLD CREATININE-BSD FMLA CKD-EPI: 94 ML/MIN/1.73 M 2
GLOBULIN SER CALC-MCNC: 2.8 G/DL (ref 1.9–3.5)
GLUCOSE BLD STRIP.AUTO-MCNC: 99 MG/DL (ref 65–99)
GLUCOSE SERPL-MCNC: 95 MG/DL (ref 65–99)
HCT VFR BLD AUTO: 35 % (ref 37–47)
HGB BLD-MCNC: 10.9 G/DL (ref 12–16)
IMM GRANULOCYTES # BLD AUTO: 0.03 K/UL (ref 0–0.11)
IMM GRANULOCYTES NFR BLD AUTO: 0.3 % (ref 0–0.9)
LYMPHOCYTES # BLD AUTO: 1.33 K/UL (ref 1–4.8)
LYMPHOCYTES NFR BLD: 15.4 % (ref 22–41)
MCH RBC QN AUTO: 26.3 PG (ref 27–33)
MCHC RBC AUTO-ENTMCNC: 31.1 G/DL (ref 32.2–35.5)
MCV RBC AUTO: 84.5 FL (ref 81.4–97.8)
MONOCYTES # BLD AUTO: 0.91 K/UL (ref 0–0.85)
MONOCYTES NFR BLD AUTO: 10.5 % (ref 0–13.4)
NEUTROPHILS # BLD AUTO: 6.07 K/UL (ref 1.82–7.42)
NEUTROPHILS NFR BLD: 70.3 % (ref 44–72)
NRBC # BLD AUTO: 0 K/UL
NRBC BLD-RTO: 0 /100 WBC (ref 0–0.2)
PLATELET # BLD AUTO: 235 K/UL (ref 164–446)
PMV BLD AUTO: 8.2 FL (ref 9–12.9)
POTASSIUM SERPL-SCNC: 3.7 MMOL/L (ref 3.6–5.5)
PROT SERPL-MCNC: 6.3 G/DL (ref 6–8.2)
RBC # BLD AUTO: 4.14 M/UL (ref 4.2–5.4)
SODIUM SERPL-SCNC: 138 MMOL/L (ref 135–145)
WBC # BLD AUTO: 8.6 K/UL (ref 4.8–10.8)

## 2024-12-06 PROCEDURE — 700102 HCHG RX REV CODE 250 W/ 637 OVERRIDE(OP): Performed by: HOSPITALIST

## 2024-12-06 PROCEDURE — 700111 HCHG RX REV CODE 636 W/ 250 OVERRIDE (IP): Performed by: STUDENT IN AN ORGANIZED HEALTH CARE EDUCATION/TRAINING PROGRAM

## 2024-12-06 PROCEDURE — 85025 COMPLETE CBC W/AUTO DIFF WBC: CPT

## 2024-12-06 PROCEDURE — 82962 GLUCOSE BLOOD TEST: CPT

## 2024-12-06 PROCEDURE — 96375 TX/PRO/DX INJ NEW DRUG ADDON: CPT

## 2024-12-06 PROCEDURE — 36415 COLL VENOUS BLD VENIPUNCTURE: CPT

## 2024-12-06 PROCEDURE — 96376 TX/PRO/DX INJ SAME DRUG ADON: CPT

## 2024-12-06 PROCEDURE — 80053 COMPREHEN METABOLIC PANEL: CPT

## 2024-12-06 PROCEDURE — 96374 THER/PROPH/DIAG INJ IV PUSH: CPT

## 2024-12-06 PROCEDURE — 700111 HCHG RX REV CODE 636 W/ 250 OVERRIDE (IP): Performed by: HOSPITALIST

## 2024-12-06 PROCEDURE — 99222 1ST HOSP IP/OBS MODERATE 55: CPT | Mod: 25 | Performed by: HOSPITALIST

## 2024-12-06 PROCEDURE — 99285 EMERGENCY DEPT VISIT HI MDM: CPT

## 2024-12-06 PROCEDURE — 73501 X-RAY EXAM HIP UNI 1 VIEW: CPT | Mod: RT

## 2024-12-06 PROCEDURE — 99497 ADVNCD CARE PLAN 30 MIN: CPT | Performed by: HOSPITALIST

## 2024-12-06 PROCEDURE — A9270 NON-COVERED ITEM OR SERVICE: HCPCS | Performed by: HOSPITALIST

## 2024-12-06 PROCEDURE — G0378 HOSPITAL OBSERVATION PER HR: HCPCS

## 2024-12-06 PROCEDURE — 700101 HCHG RX REV CODE 250: Performed by: HOSPITALIST

## 2024-12-06 RX ORDER — HYDROMORPHONE HYDROCHLORIDE 1 MG/ML
0.5 INJECTION, SOLUTION INTRAMUSCULAR; INTRAVENOUS; SUBCUTANEOUS ONCE
Status: COMPLETED | OUTPATIENT
Start: 2024-12-06 | End: 2024-12-06

## 2024-12-06 RX ORDER — DEXTROSE MONOHYDRATE 25 G/50ML
25 INJECTION, SOLUTION INTRAVENOUS
Status: DISCONTINUED | OUTPATIENT
Start: 2024-12-06 | End: 2024-12-09 | Stop reason: HOSPADM

## 2024-12-06 RX ORDER — METHOCARBAMOL 500 MG/1
500 TABLET, FILM COATED ORAL 3 TIMES DAILY PRN
Status: DISCONTINUED | OUTPATIENT
Start: 2024-12-06 | End: 2024-12-09 | Stop reason: HOSPADM

## 2024-12-06 RX ORDER — LEVOTHYROXINE SODIUM 88 UG/1
88 TABLET ORAL DAILY
Status: DISCONTINUED | OUTPATIENT
Start: 2024-12-07 | End: 2024-12-09 | Stop reason: HOSPADM

## 2024-12-06 RX ORDER — LISINOPRIL 2.5 MG/1
2.5 TABLET ORAL DAILY
Status: DISCONTINUED | OUTPATIENT
Start: 2024-12-07 | End: 2024-12-09 | Stop reason: HOSPADM

## 2024-12-06 RX ORDER — METOPROLOL SUCCINATE 25 MG/1
25 TABLET, EXTENDED RELEASE ORAL DAILY
Status: DISCONTINUED | OUTPATIENT
Start: 2024-12-07 | End: 2024-12-09 | Stop reason: HOSPADM

## 2024-12-06 RX ORDER — OXYCODONE HYDROCHLORIDE 10 MG/1
10 TABLET ORAL
Status: DISCONTINUED | OUTPATIENT
Start: 2024-12-06 | End: 2024-12-08

## 2024-12-06 RX ORDER — ENOXAPARIN SODIUM 100 MG/ML
40 INJECTION SUBCUTANEOUS DAILY
Status: DISCONTINUED | OUTPATIENT
Start: 2024-12-07 | End: 2024-12-09 | Stop reason: HOSPADM

## 2024-12-06 RX ORDER — POLYETHYLENE GLYCOL 3350 17 G/17G
1 POWDER, FOR SOLUTION ORAL
Status: DISCONTINUED | OUTPATIENT
Start: 2024-12-06 | End: 2024-12-09 | Stop reason: HOSPADM

## 2024-12-06 RX ORDER — ROSUVASTATIN CALCIUM 10 MG/1
20 TABLET, COATED ORAL EVERY EVENING
Status: DISCONTINUED | OUTPATIENT
Start: 2024-12-06 | End: 2024-12-09 | Stop reason: HOSPADM

## 2024-12-06 RX ORDER — INSULIN LISPRO 100 [IU]/ML
1-6 INJECTION, SOLUTION INTRAVENOUS; SUBCUTANEOUS
Status: DISCONTINUED | OUTPATIENT
Start: 2024-12-06 | End: 2024-12-09 | Stop reason: HOSPADM

## 2024-12-06 RX ORDER — ALBUTEROL SULFATE 90 UG/1
1-2 INHALANT RESPIRATORY (INHALATION) EVERY 4 HOURS PRN
Status: DISCONTINUED | OUTPATIENT
Start: 2024-12-06 | End: 2024-12-09 | Stop reason: HOSPADM

## 2024-12-06 RX ORDER — LIOTHYRONINE SODIUM 5 UG/1
5 TABLET ORAL DAILY
Status: DISCONTINUED | OUTPATIENT
Start: 2024-12-07 | End: 2024-12-09 | Stop reason: HOSPADM

## 2024-12-06 RX ORDER — ONDANSETRON 2 MG/ML
4 INJECTION INTRAMUSCULAR; INTRAVENOUS EVERY 4 HOURS PRN
Status: DISCONTINUED | OUTPATIENT
Start: 2024-12-06 | End: 2024-12-09 | Stop reason: HOSPADM

## 2024-12-06 RX ORDER — HYDROMORPHONE HYDROCHLORIDE 1 MG/ML
0.5 INJECTION, SOLUTION INTRAMUSCULAR; INTRAVENOUS; SUBCUTANEOUS
Status: DISCONTINUED | OUTPATIENT
Start: 2024-12-06 | End: 2024-12-08

## 2024-12-06 RX ORDER — ACETAMINOPHEN 325 MG/1
650 TABLET ORAL EVERY 6 HOURS PRN
Status: DISCONTINUED | OUTPATIENT
Start: 2024-12-06 | End: 2024-12-08

## 2024-12-06 RX ORDER — AMOXICILLIN 250 MG
2 CAPSULE ORAL EVERY EVENING
Status: DISCONTINUED | OUTPATIENT
Start: 2024-12-06 | End: 2024-12-09 | Stop reason: HOSPADM

## 2024-12-06 RX ORDER — OXYCODONE HYDROCHLORIDE 5 MG/1
5 TABLET ORAL
Status: DISCONTINUED | OUTPATIENT
Start: 2024-12-06 | End: 2024-12-08

## 2024-12-06 RX ORDER — EZETIMIBE 10 MG/1
10 TABLET ORAL EVERY EVENING
Status: DISCONTINUED | OUTPATIENT
Start: 2024-12-06 | End: 2024-12-09 | Stop reason: HOSPADM

## 2024-12-06 RX ORDER — AMITRIPTYLINE HYDROCHLORIDE 50 MG/1
100 TABLET ORAL NIGHTLY
Status: DISCONTINUED | OUTPATIENT
Start: 2024-12-06 | End: 2024-12-09 | Stop reason: HOSPADM

## 2024-12-06 RX ORDER — ESCITALOPRAM OXALATE 10 MG/1
20 TABLET ORAL DAILY
Status: DISCONTINUED | OUTPATIENT
Start: 2024-12-07 | End: 2024-12-09 | Stop reason: HOSPADM

## 2024-12-06 RX ORDER — ONDANSETRON 4 MG/1
4 TABLET, ORALLY DISINTEGRATING ORAL EVERY 4 HOURS PRN
Status: DISCONTINUED | OUTPATIENT
Start: 2024-12-06 | End: 2024-12-09 | Stop reason: HOSPADM

## 2024-12-06 RX ADMIN — METHOCARBAMOL 500 MG: 500 TABLET ORAL at 23:03

## 2024-12-06 RX ADMIN — OXYCODONE HYDROCHLORIDE 10 MG: 10 TABLET ORAL at 22:21

## 2024-12-06 RX ADMIN — DICLOFENAC SODIUM 4 G: 10 GEL TOPICAL at 22:54

## 2024-12-06 RX ADMIN — ROSUVASTATIN CALCIUM 20 MG: 10 TABLET, FILM COATED ORAL at 22:52

## 2024-12-06 RX ADMIN — AMITRIPTYLINE HYDROCHLORIDE 100 MG: 50 TABLET, FILM COATED ORAL at 22:53

## 2024-12-06 RX ADMIN — HYDROMORPHONE HYDROCHLORIDE 0.5 MG: 1 INJECTION, SOLUTION INTRAMUSCULAR; INTRAVENOUS; SUBCUTANEOUS at 23:33

## 2024-12-06 RX ADMIN — EZETIMIBE 10 MG: 10 TABLET ORAL at 22:52

## 2024-12-06 RX ADMIN — HYDROMORPHONE HYDROCHLORIDE 0.5 MG: 1 INJECTION, SOLUTION INTRAMUSCULAR; INTRAVENOUS; SUBCUTANEOUS at 20:00

## 2024-12-06 SDOH — ECONOMIC STABILITY: TRANSPORTATION INSECURITY
IN THE PAST 12 MONTHS, HAS LACK OF RELIABLE TRANSPORTATION KEPT YOU FROM MEDICAL APPOINTMENTS, MEETINGS, WORK OR FROM GETTING THINGS NEEDED FOR DAILY LIVING?: NO

## 2024-12-06 ASSESSMENT — SOCIAL DETERMINANTS OF HEALTH (SDOH)
WITHIN THE LAST YEAR, HAVE TO BEEN RAPED OR FORCED TO HAVE ANY KIND OF SEXUAL ACTIVITY BY YOUR PARTNER OR EX-PARTNER?: NO
IN THE PAST 12 MONTHS, HAS THE ELECTRIC, GAS, OIL, OR WATER COMPANY THREATENED TO SHUT OFF SERVICE IN YOUR HOME?: NO
WITHIN THE PAST 12 MONTHS, THE FOOD YOU BOUGHT JUST DIDN'T LAST AND YOU DIDN'T HAVE MONEY TO GET MORE: NEVER TRUE
WITHIN THE LAST YEAR, HAVE YOU BEEN AFRAID OF YOUR PARTNER OR EX-PARTNER?: NO
WITHIN THE LAST YEAR, HAVE YOU BEEN HUMILIATED OR EMOTIONALLY ABUSED IN OTHER WAYS BY YOUR PARTNER OR EX-PARTNER?: NO
WITHIN THE LAST YEAR, HAVE YOU BEEN KICKED, HIT, SLAPPED, OR OTHERWISE PHYSICALLY HURT BY YOUR PARTNER OR EX-PARTNER?: NO
WITHIN THE PAST 12 MONTHS, YOU WORRIED THAT YOUR FOOD WOULD RUN OUT BEFORE YOU GOT THE MONEY TO BUY MORE: NEVER TRUE

## 2024-12-06 ASSESSMENT — LIFESTYLE VARIABLES
TOTAL SCORE: 0
ON A TYPICAL DAY WHEN YOU DRINK ALCOHOL HOW MANY DRINKS DO YOU HAVE: 0
ALCOHOL_USE: NO
HOW MANY TIMES IN THE PAST YEAR HAVE YOU HAD 5 OR MORE DRINKS IN A DAY: 0
TOTAL SCORE: 0
HAVE YOU EVER FELT YOU SHOULD CUT DOWN ON YOUR DRINKING: NO
AVERAGE NUMBER OF DAYS PER WEEK YOU HAVE A DRINK CONTAINING ALCOHOL: 0
HAVE PEOPLE ANNOYED YOU BY CRITICIZING YOUR DRINKING: NO
CONSUMPTION TOTAL: NEGATIVE
EVER FELT BAD OR GUILTY ABOUT YOUR DRINKING: NO
TOTAL SCORE: 0
EVER HAD A DRINK FIRST THING IN THE MORNING TO STEADY YOUR NERVES TO GET RID OF A HANGOVER: NO

## 2024-12-06 ASSESSMENT — ENCOUNTER SYMPTOMS
MYALGIAS: 0
SHORTNESS OF BREATH: 0
EYE DISCHARGE: 0
ABDOMINAL PAIN: 0
COUGH: 0
FLANK PAIN: 0
FALLS: 1
STRIDOR: 0
CHILLS: 0
BACK PAIN: 1
NERVOUS/ANXIOUS: 0
EYE REDNESS: 0
VOMITING: 0
FEVER: 0
BRUISES/BLEEDS EASILY: 0

## 2024-12-06 ASSESSMENT — PAIN DESCRIPTION - PAIN TYPE: TYPE: ACUTE PAIN;CHRONIC PAIN

## 2024-12-06 ASSESSMENT — FIBROSIS 4 INDEX: FIB4 SCORE: 1.13

## 2024-12-07 ENCOUNTER — APPOINTMENT (OUTPATIENT)
Dept: RADIOLOGY | Facility: MEDICAL CENTER | Age: 74
DRG: 543 | End: 2024-12-07
Attending: INTERNAL MEDICINE
Payer: MEDICARE

## 2024-12-07 ENCOUNTER — APPOINTMENT (OUTPATIENT)
Dept: RADIOLOGY | Facility: MEDICAL CENTER | Age: 74
DRG: 543 | End: 2024-12-07
Attending: HOSPITALIST
Payer: MEDICARE

## 2024-12-07 PROBLEM — S32.010S COMPRESSION FRACTURE OF L1 LUMBAR VERTEBRA, SEQUELA: Status: ACTIVE | Noted: 2024-12-07

## 2024-12-07 LAB
ALBUMIN SERPL BCP-MCNC: 3.3 G/DL (ref 3.2–4.9)
ALBUMIN/GLOB SERPL: 1.4 G/DL
ALP SERPL-CCNC: 88 U/L (ref 30–99)
ALT SERPL-CCNC: 10 U/L (ref 2–50)
ANION GAP SERPL CALC-SCNC: 9 MMOL/L (ref 7–16)
AST SERPL-CCNC: 11 U/L (ref 12–45)
BILIRUB SERPL-MCNC: 0.3 MG/DL (ref 0.1–1.5)
BUN SERPL-MCNC: 13 MG/DL (ref 8–22)
CALCIUM ALBUM COR SERPL-MCNC: 9 MG/DL (ref 8.5–10.5)
CALCIUM SERPL-MCNC: 8.4 MG/DL (ref 8.4–10.2)
CHLORIDE SERPL-SCNC: 105 MMOL/L (ref 96–112)
CK SERPL-CCNC: 21 U/L (ref 0–154)
CO2 SERPL-SCNC: 26 MMOL/L (ref 20–33)
CREAT SERPL-MCNC: 0.68 MG/DL (ref 0.5–1.4)
ERYTHROCYTE [DISTWIDTH] IN BLOOD BY AUTOMATED COUNT: 50.4 FL (ref 35.9–50)
GFR SERPLBLD CREATININE-BSD FMLA CKD-EPI: 91 ML/MIN/1.73 M 2
GLOBULIN SER CALC-MCNC: 2.4 G/DL (ref 1.9–3.5)
GLUCOSE BLD STRIP.AUTO-MCNC: 118 MG/DL (ref 65–99)
GLUCOSE BLD STRIP.AUTO-MCNC: 73 MG/DL (ref 65–99)
GLUCOSE BLD STRIP.AUTO-MCNC: 82 MG/DL (ref 65–99)
GLUCOSE BLD STRIP.AUTO-MCNC: 87 MG/DL (ref 65–99)
GLUCOSE SERPL-MCNC: 95 MG/DL (ref 65–99)
HCT VFR BLD AUTO: 34 % (ref 37–47)
HGB BLD-MCNC: 10.4 G/DL (ref 12–16)
MAGNESIUM SERPL-MCNC: 1.9 MG/DL (ref 1.5–2.5)
MCH RBC QN AUTO: 25.9 PG (ref 27–33)
MCHC RBC AUTO-ENTMCNC: 30.6 G/DL (ref 32.2–35.5)
MCV RBC AUTO: 84.8 FL (ref 81.4–97.8)
PLATELET # BLD AUTO: 220 K/UL (ref 164–446)
PMV BLD AUTO: 8.7 FL (ref 9–12.9)
POTASSIUM SERPL-SCNC: 4.3 MMOL/L (ref 3.6–5.5)
PROT SERPL-MCNC: 5.7 G/DL (ref 6–8.2)
RBC # BLD AUTO: 4.01 M/UL (ref 4.2–5.4)
SODIUM SERPL-SCNC: 140 MMOL/L (ref 135–145)
WBC # BLD AUTO: 8.7 K/UL (ref 4.8–10.8)

## 2024-12-07 PROCEDURE — 700102 HCHG RX REV CODE 250 W/ 637 OVERRIDE(OP): Performed by: INTERNAL MEDICINE

## 2024-12-07 PROCEDURE — 700111 HCHG RX REV CODE 636 W/ 250 OVERRIDE (IP): Performed by: INTERNAL MEDICINE

## 2024-12-07 PROCEDURE — 700102 HCHG RX REV CODE 250 W/ 637 OVERRIDE(OP): Performed by: HOSPITALIST

## 2024-12-07 PROCEDURE — 82550 ASSAY OF CK (CPK): CPT

## 2024-12-07 PROCEDURE — 72148 MRI LUMBAR SPINE W/O DYE: CPT

## 2024-12-07 PROCEDURE — 72146 MRI CHEST SPINE W/O DYE: CPT

## 2024-12-07 PROCEDURE — 36415 COLL VENOUS BLD VENIPUNCTURE: CPT

## 2024-12-07 PROCEDURE — G0378 HOSPITAL OBSERVATION PER HR: HCPCS

## 2024-12-07 PROCEDURE — 700105 HCHG RX REV CODE 258: Performed by: HOSPITALIST

## 2024-12-07 PROCEDURE — 80053 COMPREHEN METABOLIC PANEL: CPT

## 2024-12-07 PROCEDURE — 700111 HCHG RX REV CODE 636 W/ 250 OVERRIDE (IP): Mod: JZ | Performed by: HOSPITALIST

## 2024-12-07 PROCEDURE — 82962 GLUCOSE BLOOD TEST: CPT

## 2024-12-07 PROCEDURE — 96376 TX/PRO/DX INJ SAME DRUG ADON: CPT

## 2024-12-07 PROCEDURE — 99233 SBSQ HOSP IP/OBS HIGH 50: CPT | Performed by: INTERNAL MEDICINE

## 2024-12-07 PROCEDURE — A9270 NON-COVERED ITEM OR SERVICE: HCPCS | Performed by: INTERNAL MEDICINE

## 2024-12-07 PROCEDURE — 94760 N-INVAS EAR/PLS OXIMETRY 1: CPT

## 2024-12-07 PROCEDURE — 83735 ASSAY OF MAGNESIUM: CPT

## 2024-12-07 PROCEDURE — A9270 NON-COVERED ITEM OR SERVICE: HCPCS | Performed by: HOSPITALIST

## 2024-12-07 PROCEDURE — 700105 HCHG RX REV CODE 258: Performed by: INTERNAL MEDICINE

## 2024-12-07 PROCEDURE — 85027 COMPLETE CBC AUTOMATED: CPT

## 2024-12-07 PROCEDURE — 96372 THER/PROPH/DIAG INJ SC/IM: CPT

## 2024-12-07 RX ORDER — KETOROLAC TROMETHAMINE 10 MG/1
10 TABLET, FILM COATED ORAL EVERY 6 HOURS
Status: DISCONTINUED | OUTPATIENT
Start: 2024-12-07 | End: 2024-12-09 | Stop reason: HOSPADM

## 2024-12-07 RX ORDER — PREDNISONE 1 MG/1
4 TABLET ORAL EVERY 6 HOURS
Status: DISCONTINUED | OUTPATIENT
Start: 2024-12-07 | End: 2024-12-09 | Stop reason: HOSPADM

## 2024-12-07 RX ORDER — HYDROMORPHONE HYDROCHLORIDE 1 MG/ML
1 INJECTION, SOLUTION INTRAMUSCULAR; INTRAVENOUS; SUBCUTANEOUS ONCE
Status: DISCONTINUED | OUTPATIENT
Start: 2024-12-07 | End: 2024-12-08

## 2024-12-07 RX ORDER — SODIUM CHLORIDE 9 MG/ML
1000 INJECTION, SOLUTION INTRAVENOUS ONCE
Status: COMPLETED | OUTPATIENT
Start: 2024-12-07 | End: 2024-12-07

## 2024-12-07 RX ADMIN — ROSUVASTATIN CALCIUM 20 MG: 10 TABLET, FILM COATED ORAL at 17:41

## 2024-12-07 RX ADMIN — ENOXAPARIN SODIUM 40 MG: 100 INJECTION SUBCUTANEOUS at 17:41

## 2024-12-07 RX ADMIN — PREDNISONE 4 MG: 1 TABLET ORAL at 23:12

## 2024-12-07 RX ADMIN — OMEPRAZOLE 20 MG: 20 CAPSULE, DELAYED RELEASE ORAL at 17:41

## 2024-12-07 RX ADMIN — DICLOFENAC SODIUM 4 G: 10 GEL TOPICAL at 05:49

## 2024-12-07 RX ADMIN — LISINOPRIL 2.5 MG: 5 TABLET ORAL at 05:47

## 2024-12-07 RX ADMIN — METHOCARBAMOL 500 MG: 500 TABLET ORAL at 07:57

## 2024-12-07 RX ADMIN — OXYCODONE HYDROCHLORIDE 10 MG: 10 TABLET ORAL at 07:57

## 2024-12-07 RX ADMIN — OXYCODONE HYDROCHLORIDE 10 MG: 10 TABLET ORAL at 15:41

## 2024-12-07 RX ADMIN — KETOROLAC TROMETHAMINE 10 MG: 10 TABLET, FILM COATED ORAL at 17:41

## 2024-12-07 RX ADMIN — OXYCODONE HYDROCHLORIDE 10 MG: 10 TABLET ORAL at 12:21

## 2024-12-07 RX ADMIN — SODIUM CHLORIDE 1000 ML: 9 INJECTION, SOLUTION INTRAVENOUS at 23:12

## 2024-12-07 RX ADMIN — LIOTHYRONINE SODIUM 5 MCG: 5 TABLET ORAL at 05:45

## 2024-12-07 RX ADMIN — LEVOTHYROXINE SODIUM 88 MCG: 0.09 TABLET ORAL at 05:48

## 2024-12-07 RX ADMIN — ACETAMINOPHEN 650 MG: 325 TABLET ORAL at 07:57

## 2024-12-07 RX ADMIN — KETOROLAC TROMETHAMINE 10 MG: 10 TABLET, FILM COATED ORAL at 23:12

## 2024-12-07 RX ADMIN — OMEPRAZOLE 20 MG: 20 CAPSULE, DELAYED RELEASE ORAL at 05:46

## 2024-12-07 RX ADMIN — METHOCARBAMOL 500 MG: 500 TABLET ORAL at 16:20

## 2024-12-07 RX ADMIN — PREDNISONE 4 MG: 1 TABLET ORAL at 17:41

## 2024-12-07 RX ADMIN — METOPROLOL SUCCINATE 25 MG: 25 TABLET, EXTENDED RELEASE ORAL at 05:46

## 2024-12-07 RX ADMIN — ESCITALOPRAM OXALATE 20 MG: 10 TABLET ORAL at 05:46

## 2024-12-07 RX ADMIN — HYDROMORPHONE HYDROCHLORIDE 0.5 MG: 1 INJECTION, SOLUTION INTRAMUSCULAR; INTRAVENOUS; SUBCUTANEOUS at 17:41

## 2024-12-07 RX ADMIN — AMITRIPTYLINE HYDROCHLORIDE 100 MG: 50 TABLET, FILM COATED ORAL at 20:17

## 2024-12-07 RX ADMIN — EZETIMIBE 10 MG: 10 TABLET ORAL at 17:41

## 2024-12-07 RX ADMIN — SODIUM CHLORIDE 1000 ML: 9 INJECTION, SOLUTION INTRAVENOUS at 20:15

## 2024-12-07 ASSESSMENT — LIFESTYLE VARIABLES
EVER HAD A DRINK FIRST THING IN THE MORNING TO STEADY YOUR NERVES TO GET RID OF A HANGOVER: NO
HAVE YOU EVER FELT YOU SHOULD CUT DOWN ON YOUR DRINKING: NO
CONSUMPTION TOTAL: NEGATIVE
EVER FELT BAD OR GUILTY ABOUT YOUR DRINKING: NO
HAVE PEOPLE ANNOYED YOU BY CRITICIZING YOUR DRINKING: NO
HOW MANY TIMES IN THE PAST YEAR HAVE YOU HAD 5 OR MORE DRINKS IN A DAY: 0
ON A TYPICAL DAY WHEN YOU DRINK ALCOHOL HOW MANY DRINKS DO YOU HAVE: 0
AVERAGE NUMBER OF DAYS PER WEEK YOU HAVE A DRINK CONTAINING ALCOHOL: 0
TOTAL SCORE: 0
ALCOHOL_USE: NO

## 2024-12-07 ASSESSMENT — COGNITIVE AND FUNCTIONAL STATUS - GENERAL
CLIMB 3 TO 5 STEPS WITH RAILING: TOTAL
MOVING TO AND FROM BED TO CHAIR: A LITTLE
MOVING FROM LYING ON BACK TO SITTING ON SIDE OF FLAT BED: A LOT
SUGGESTED CMS G CODE MODIFIER DAILY ACTIVITY: CI
STANDING UP FROM CHAIR USING ARMS: A LITTLE
MOBILITY SCORE: 14
SUGGESTED CMS G CODE MODIFIER MOBILITY: CL
DAILY ACTIVITIY SCORE: 23
WALKING IN HOSPITAL ROOM: A LITTLE
TURNING FROM BACK TO SIDE WHILE IN FLAT BAD: A LOT
TOILETING: A LITTLE

## 2024-12-07 ASSESSMENT — ENCOUNTER SYMPTOMS
BACK PAIN: 1
SENSORY CHANGE: 1
FALLS: 1
WEAKNESS: 1

## 2024-12-07 ASSESSMENT — PAIN DESCRIPTION - PAIN TYPE
TYPE: ACUTE PAIN;CHRONIC PAIN
TYPE: CHRONIC PAIN;ACUTE PAIN
TYPE: ACUTE PAIN;CHRONIC PAIN

## 2024-12-07 ASSESSMENT — FIBROSIS 4 INDEX
FIB4 SCORE: 1.15
FIB4 SCORE: 1.15

## 2024-12-07 ASSESSMENT — SOCIAL DETERMINANTS OF HEALTH (SDOH)
IN THE PAST 12 MONTHS, HAS THE ELECTRIC, GAS, OIL, OR WATER COMPANY THREATENED TO SHUT OFF SERVICE IN YOUR HOME?: NO
WITHIN THE PAST 12 MONTHS, YOU WORRIED THAT YOUR FOOD WOULD RUN OUT BEFORE YOU GOT THE MONEY TO BUY MORE: NEVER TRUE
WITHIN THE LAST YEAR, HAVE TO BEEN RAPED OR FORCED TO HAVE ANY KIND OF SEXUAL ACTIVITY BY YOUR PARTNER OR EX-PARTNER?: NO
WITHIN THE LAST YEAR, HAVE YOU BEEN AFRAID OF YOUR PARTNER OR EX-PARTNER?: NO
WITHIN THE PAST 12 MONTHS, THE FOOD YOU BOUGHT JUST DIDN'T LAST AND YOU DIDN'T HAVE MONEY TO GET MORE: NEVER TRUE
WITHIN THE LAST YEAR, HAVE YOU BEEN HUMILIATED OR EMOTIONALLY ABUSED IN OTHER WAYS BY YOUR PARTNER OR EX-PARTNER?: NO
WITHIN THE LAST YEAR, HAVE YOU BEEN KICKED, HIT, SLAPPED, OR OTHERWISE PHYSICALLY HURT BY YOUR PARTNER OR EX-PARTNER?: NO

## 2024-12-07 NOTE — ED TRIAGE NOTES
"Chief Complaint   Patient presents with    Back Pain     Reports known fractures in back T12, L1, L2. States pain is out of control despite taking norco 10/325. Denies new injury. Reports numbness/tingling to LLE. Denies loss of b/b. Arrives in wheel chair.      Physical Exam  Pulmonary:      Effort: Pulmonary effort is normal.   Skin:     General: Skin is warm and dry.   Neurological:      Mental Status: She is alert.       /67   Pulse 96   Temp 36.3 °C (97.4 °F) (Temporal)   Resp 20   Ht 1.575 m (5' 2\")   Wt 67.6 kg (149 lb)   SpO2 96%   BMI 27.25 kg/m²     "

## 2024-12-07 NOTE — HOSPITAL COURSE
74-year-old female with a past medical history of hypothyroidism, hypertension, GERD, mild L3-L4 central canal stenosis admitted on 12/6/2024 for worsening spinal back pains.  She unfortunately had a ground-level fall the week prior and suffered a T12, L1, L2 vertebral fractures. 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    Of note at [facility] 9/20/2024, patient was diagnosed with a T5 compression fracture, diagnosed with chronic opiate dependence, COPD, CAD.  CT pelvis showed impacted left subcapital femoral neck fracture.  There is CT lumbar showing L3-4 severe spinal stenosis, L3 foraminal stenosis, moderate central canal stenosis to L4-5, L4 severe foraminal stenosis, ankylosis at L5-S6.  Thoracic showed hemangioma at T7, 25% T5 superior endplate compression fracture.  Patient had a left hemiarthroplasty by Dr. Liu (9/20/24).    MRI on 10/19/2024 showing L4-5 grade 1 spondylolisthesis.  T12 superior endplate compression deformity.  Postoperative right-sided lumbar laminectomy to L4-L5.  Mentioned L3-L4 mild central canal stenosis and consistent L4-L5 left foraminal stenosis.    CT lumbar on 11/29/2024 showed acute 20% osteoporotic compression fracture of L1 and L2 vertebral bodies.  As per ER Dr. Araujo note, patient was to be contacted by IR for a kyphoplasty.

## 2024-12-07 NOTE — ASSESSMENT & PLAN NOTE
With no significant hyperglycemia  Last glycated hemoglobin was 6.3%  Continue insulin sliding scale, Accu-Cheks and hypoglycemia protocol

## 2024-12-07 NOTE — ASSESSMENT & PLAN NOTE
Continue multimodal pain regimen  Scheduled acetaminophen, increased doses to as needed oxycodone to 7.5 and 15 mg.  Continue as needed Robaxin  Continue amitriptyline  Continue prednisone 4 mg every 6 hours  I added lidocaine patch  PO Toradol    I had extensive discussion with Dr. Jb Park, no surgery in hospital, needs to plan outpatient surgery.  Patient stated today this is her prior neurosurgeon in 2017, she is familiar with them.

## 2024-12-07 NOTE — ED NOTES
Accucheck with gluc of 87, a abraham breakfast tray was then provided. She is complaining of quite a fair bit of back discomfort, quite limiting her mobility even in bed. Medicated with multimodal approach, even including topical heat application.

## 2024-12-07 NOTE — ED NOTES
Pt rounded on  Pt ate 75% of breakfast.     Pt provided with tooth brush, wash clothes, gown and fresh water this morning for morning care. Pts call light in reach.

## 2024-12-07 NOTE — ED NOTES
MRI to come get pt in about thirty minutes. Pt educated to remove all personal items and change into gown. Pt states that she does not have underwear, mess underwear provided to pt along with bag and cup to store jewelery.

## 2024-12-07 NOTE — H&P
Hospital Medicine History & Physical Note    Date of Service  12/6/2024    Primary Care Physician  Cole Yuen M.D.     Consultants  None     Code Status  Full Code    Chief Complaint  Chief Complaint   Patient presents with    Back Pain     Reports known fractures in back T12, L1, L2. States pain is out of control despite taking norco 10/325. Denies new injury. Reports numbness/tingling to LLE. Denies loss of b/b. Arrives in wheel chair.      History of Presenting Illness  Yamile Go is a 74 y.o. female with a past medical history of primary hypertension, hypothyroidism, gastroesophageal reflux disease and mild central canal stenosis who presented 12/6/2024 with worsening low back pain.  Patient recently had a fall last week and was found to have L1, L2 fractures.  Since then the patient has been having progressively worsening lower back pain.  Patient also reports having chronic weakness of the left leg and believes it is slightly worse.  She denies having fecal or urinary incontinence.  She denies any fevers and chills.    I discussed the plan of care with emergency physician, the patient and patient family present at bedside in the emergency room    Review of Systems  Review of Systems   Constitutional:  Negative for chills and fever.   Eyes:  Negative for discharge and redness.   Respiratory:  Negative for cough, shortness of breath and stridor.    Cardiovascular:  Negative for chest pain and leg swelling.   Gastrointestinal:  Negative for abdominal pain and vomiting.   Genitourinary:  Negative for flank pain.   Musculoskeletal:  Positive for back pain and falls. Negative for myalgias.   Skin: Negative.    Neurological:         Worsening of chronic left lower extremity weakness    No fecal or urinary incontinence.  No saddle anesthesia   Endo/Heme/Allergies:  Does not bruise/bleed easily.   Psychiatric/Behavioral:  The patient is not nervous/anxious.      Past Medical History   has a past medical  history of Anemia, Anesthesia, Arthritis, Asthma, CAD (coronary artery disease), Cataract, Dental disorder, Depression, Diabetes (HCC), Disorder of thyroid, Heart burn, History of vertebral fracture, HTN (hypertension), Hyperlipidemia, Indigestion, Migraines, Obesity, Pneumonia (2023), PONV (postoperative nausea and vomiting), and Sleep apnea.    Surgical History   has a past surgical history that includes appendectomy (N/A, 1976); abdominal hysterectomy total (N/A, 1978); breast biopsy (1984); colectomy (N/A, 2007); lumbar laminectomy diskectomy (N/A, 1989); arthroplasty (Right, 2020); lumbar exploration (N/A, 2017); insertion, peripheral nerve stimulator, lower extremity (Left, 12/22/2022); and pr reconstr total shoulder implant (Right, 4/30/2024).     Family History  family history includes Alcohol abuse in her daughter; Cancer in her mother; Diabetes in her brother, brother, father, and maternal aunt; Drug abuse in her brother and daughter; Heart Disease in her brother, brother, brother, brother, father, and mother; Hyperlipidemia in her brother, father, and mother; Hypertension in her brother, father, maternal aunt, and maternal aunt; Other in her brother; Stroke in her father.      Social History   reports that she has never smoked. She has never used smokeless tobacco. She reports that she does not drink alcohol and does not use drugs.    Allergies  Allergies   Allergen Reactions    Amlodipine Swelling and Unspecified    Bee Swelling    Gabapentin Hives and Swelling    Pregabalin Hives, Swelling and Unspecified    Rifampin Unspecified     Pt turns yellow    Fentanyl Vomiting and Unspecified    Morphine Vomiting, Nausea and Unspecified    Benzoin Rash     Tincture of benzoin =blisters    blisters     Medications  Prior to Admission Medications   Prescriptions Last Dose Informant Patient Reported? Taking?   SYNTHROID 88 MCG Tab  Patient No No   Sig: Take 1 Tablet by mouth every day.   albuterol 108 (90 Base)  MCG/ACT Aero Soln inhalation aerosol  Patient Yes No   Sig: Inhale 1-2 Puffs every four hours as needed for Shortness of Breath.   alendronate (FOSAMAX) 70 MG Tab  Patient Yes No   Sig: Take 70 mg by mouth every 7 days.   amitriptyline (ELAVIL) 100 MG Tab  Patient Yes No   Sig: Take 100 mg by mouth every evening.   escitalopram (LEXAPRO) 20 MG tablet  Patient No No   Sig: Take 1 Tablet by mouth every day.   ezetimibe (ZETIA) 10 MG Tab  Patient Yes No   Sig: Take 10 mg by mouth every evening.   liothyronine (CYTOMEL) 5 MCG Tab  Patient Yes No   Sig: Take 5 mcg by mouth every day.   lisinopril (PRINIVIL) 2.5 MG Tab  Patient Yes No   Sig: Take 2.5 mg by mouth every day.   loperamide (IMODIUM) 2 MG Cap   No No   Sig: Take 1 Capsule by mouth 4 times a day as needed for Diarrhea.   metFORMIN (GLUCOPHAGE) 500 MG Tab  Patient Yes No   Sig: Take 500 mg by mouth 2 times a day with meals.   metoprolol SR (TOPROL XL) 25 MG TABLET SR 24 HR  Patient Yes No   Sig: Take 25 mg by mouth every day.   omeprazole (PRILOSEC) 20 MG delayed-release capsule  Patient Yes No   Sig: Take 20 mg by mouth 2 times a day.   ondansetron (ZOFRAN ODT) 8 MG TABLET DISPERSIBLE  Patient Yes No   Sig: Take 8 mg by mouth every 8 hours as needed for Nausea/Vomiting.   oxyCODONE-acetaminophen (PERCOCET) 7.5-325 MG per tablet  Patient Yes No   Sig: Take 1 Tablet by mouth every 6 hours as needed. Indications: Pain   predniSONE (DELTASONE) 20 MG Tab   No No   Sig: Take 3 Tablets by mouth every day for 4 days.   rosuvastatin (CRESTOR) 20 MG Tab  Patient No No   Sig: Take 1 Tablet by mouth every evening.   sumatriptan (IMITREX) 100 MG tablet  Patient Yes No   Sig: Take 100 mg by mouth one time as needed for Migraine.      Facility-Administered Medications: None     Physical Exam  Temp:  [36.3 °C (97.4 °F)] 36.3 °C (97.4 °F)  Pulse:  [96] 96  Resp:  [20] 20  BP: (110)/(67) 110/67  SpO2:  [96 %] 96 %  Blood Pressure : 110/67   Temperature: 36.3 °C (97.4 °F)    Pulse: 96   Respiration: 20   Pulse Oximetry: 96 %     Physical Exam  Constitutional:       General: She is not in acute distress.  HENT:      Head: Normocephalic and atraumatic.      Right Ear: External ear normal.      Left Ear: External ear normal.      Nose: No congestion or rhinorrhea.      Mouth/Throat:      Mouth: Mucous membranes are dry.      Pharynx: No oropharyngeal exudate or posterior oropharyngeal erythema.   Eyes:      General: No scleral icterus.        Right eye: No discharge.         Left eye: No discharge.      Conjunctiva/sclera: Conjunctivae normal.      Pupils: Pupils are equal, round, and reactive to light.   Cardiovascular:      Rate and Rhythm: Normal rate.      Heart sounds:      No friction rub. No gallop.   Pulmonary:      Effort: Pulmonary effort is normal.   Abdominal:      General: Abdomen is flat. There is no distension.      Tenderness: There is no guarding.   Musculoskeletal:         General: No swelling.      Cervical back: Neck supple. No rigidity. No muscular tenderness.      Right lower leg: No edema.      Left lower leg: No edema.   Skin:     Capillary Refill: Capillary refill takes 2 to 3 seconds.      Coloration: Skin is not jaundiced or pale.      Findings: No bruising or erythema.   Neurological:      Mental Status: She is alert and oriented to person, place, and time.      Comments: Speech is fluent.  There is 5 in both upper extremities, symmetrical.  Power is 5 in both lower extremities, mildly weaker on the left side.  Positive straight leg raising on both lower extremities, more on the left side   Psychiatric:         Mood and Affect: Mood normal.         Judgment: Judgment normal.       Laboratory:   Recent Labs     12/06/24 2030   WBC 8.6   RBC 4.14*   HEMOGLOBIN 10.9*   HEMATOCRIT 35.0*   MCV 84.5   MCH 26.3*   MCHC 31.1*   RDW 50.0   PLATELETCT 235   MPV 8.2*     Recent Labs     12/06/24 2030   SODIUM 138   POTASSIUM 3.7   CHLORIDE 104   CO2 24   GLUCOSE 95  "  BUN 14   CREATININE 0.60   CALCIUM 8.4     Recent Labs     12/06/24 2030   ALTSGPT 12   ASTSGOT 13   ALKPHOSPHAT 90   TBILIRUBIN 0.3   GLUCOSE 95         No results for input(s): \"NTPROBNP\" in the last 72 hours.      No results for input(s): \"TROPONINT\" in the last 72 hours.    Imaging:  DX-HIP-UNILATERAL-WITH PELVIS-1 VIEW RIGHT   Final Result         1. No definite acute osseous abnormality but evaluation is limited due to osseous demineralization.      MR-LUMBAR SPINE-W/O    (Results Pending)     Assessment/Plan:  Justification for Admission Status  I anticipate this patient is appropriate for observation status at this time because the patient has intractable low back pain and worsening weakness of the left lower extremity.  Will require multimodal pain control and further workup with MRI of the L-spine.    Patient will need a Med/Surg bed on MEDICAL service.      * Intractable low back pain- (present on admission)  Assessment & Plan  I will start multimodal pain control approach using:  Acetaminophen  Robaxin for muscle relaxation  Escitalopram and amitriptyline  Local heat application   Local Voltaren gel  Given patient worsening left lower extremity weakness and known history of mild spinal canal stenosis on prior MRI October 2024, I will check an MRI of the L-spine for further evaluation.  The patient is open for kyphoplasty if there is potential benefit    Lumbar canal stenosis- (present on admission)  Assessment & Plan  Prior MRI October 2024 shows mild spinal canal stenosis   Patient is presenting with worsening low back pain and worsening left lower extremity weakness  I will check an MRI of the L-spine for further evaluation     Primary hypertension- (present on admission)  Assessment & Plan  I will start lisinopril and metoprolol with hold parameters    Type 2 diabetes mellitus (HCC)- (present on admission)  Assessment & Plan  With no significant hyperglycemia  Last glycated hemoglobin was 6.3%  I " will start short acting insulin for now  I will order Accu-Checks, hypoglycemia protocol  Adjust according to blood sugars trend     CAD (coronary artery disease)- (present on admission)  Assessment & Plan  I resume home metoprolol, rosuvastatin    ACP (advance care planning)- (present on admission)  Assessment & Plan  I had a discussion with the patient and family [daughter present at bedside in the emergency room] regarding goals of care, diagnoses, prognosis, and CODE STATUS. We discussed her prognosis and comorbidities.  The patient has advanced age of 74 years.  She has a number of chronic medical problems including primary hypertension, hyperlipidemia, diabetes and chronic low back pain.  She is presenting with acute on chronic low back pain.  At this point the patient wants to continue with a full code.  She is open to all forms of invasive or noninvasive diagnostic and therapeutic interventions, including further workup with MRI and consideration of kyphoplasty for pain control if appropriate.       Mixed hyperlipidemia- (present on admission)  Assessment & Plan  Cardiac diet.  I will start rosuvastatin and ezetimibe     Acquired hypothyroidism- (present on admission)  Assessment & Plan  I resume home levothyroxine    Depression- (present on admission)  Assessment & Plan  I will resume home escitalopram and amitriptyline      VTE prophylaxis: SCDs/TEDs and enoxaparin ppx    I had a discussion with the patient and family [daughter present at bedside in the emergency room] regarding goals of care, diagnoses, prognosis, and CODE STATUS. We discussed her prognosis and comorbidities.  The patient has advanced age of 74 years.  She has a number of chronic medical problems including primary hypertension, hyperlipidemia, diabetes and chronic low back pain.  She is presenting with acute on chronic low back pain.  At this point the patient wants to continue with a full code.  She is open to all forms of invasive or  noninvasive diagnostic and therapeutic interventions, including further workup with MRI and consideration of kyphoplasty for pain control if appropriate.  I spent 16 minutes on advanced care planning.

## 2024-12-07 NOTE — PROGRESS NOTES
Bedside report received from DEEJAY Valentino. Assumed care of patient. Daily plan of care discussed. Pt arrived on wheelchair, pivot and two feet's to get to the bed. Pt resting comfortably in bed with no signs of distress noted. Breathing even and unlabored. Patient reports pain level 10/10, Will review pain medication interventions. Patient reports no further needs at this time. Call light within reach. Bed locked in lowest position with fall precautions in place and pt educated to call before she gets up. Plan of care on going.

## 2024-12-07 NOTE — ASSESSMENT & PLAN NOTE
CT scan from 11/29/2024 showing acute osteoporotic 20% L1 and L2 vertebral fracture.  Patient has spondylolisthesis and scoliosis to the left.    new Lumbar MRI  T12 and L1 compression fractures, amenable to kyphoplasty  Lumbar scoliosis with convexity to the left, there is retrolisthesis, degenerative anterolisthesis of L4-L5.  Grade 1 spondylolisthesis  Prior laminectomy to L3-4        09/2018 compared to today's MRI lumbar.  Worsening scoliosis to the left.    I reached out to spinal surgery for recommendations if patient will need surgical intervention now. Will transfer patient to [facility] if needed.  T12 and L1, will need to reach out to IR if spinal surgery does not recommend surgery for patient's severe scoliosis.    ADDENDUM:  I discussed with Dr. Jb Park ([facility])  Recommended no surgery at this time, degenerative changes needs to be seen in outpatient clinic. Process for a spinal surgery would need careful consideration then.  PT/OT  Pain control  Avoid kyphoplasty, may interfere with future spinal surgery.  Prednisone 4mg Q6H  Ketorlac    12/8:  Same plan as primary problem

## 2024-12-07 NOTE — ASSESSMENT & PLAN NOTE
Prior MRI October 2024 shows mild spinal canal stenosis   Patient is presenting with worsening low back pain and worsening left lower extremity weakness  Pt has significant deformity of lumbar spine

## 2024-12-07 NOTE — ED NOTES
Pharmacy Medication Reconciliation      ~Medication reconciliation updated and complete per patient at bedside   ~Allergies have been verified and updated   ~No oral ABX within the last 30 days  ~Patient home pharmacy :  [facility]      ~Anticoagulants (rivaroxaban, apixaban, edoxaban, dabigatran, warfarin, enoxaparin) taken in the last 14 days? No

## 2024-12-08 PROBLEM — M47.27 LUMBOSACRAL RADICULOPATHY DUE TO DEGENERATIVE JOINT DISEASE OF SPINE: Status: ACTIVE | Noted: 2024-12-08

## 2024-12-08 PROBLEM — M53.2X6 LUMBAR SPINE INSTABILITY: Status: ACTIVE | Noted: 2024-12-08

## 2024-12-08 LAB
ALBUMIN SERPL BCP-MCNC: 2.6 G/DL (ref 3.2–4.9)
BUN SERPL-MCNC: 17 MG/DL (ref 8–22)
CALCIUM ALBUM COR SERPL-MCNC: 8.8 MG/DL (ref 8.5–10.5)
CALCIUM SERPL-MCNC: 7.7 MG/DL (ref 8.4–10.2)
CHLORIDE SERPL-SCNC: 108 MMOL/L (ref 96–112)
CO2 SERPL-SCNC: 20 MMOL/L (ref 20–33)
CREAT SERPL-MCNC: 0.58 MG/DL (ref 0.5–1.4)
ERYTHROCYTE [DISTWIDTH] IN BLOOD BY AUTOMATED COUNT: 50.9 FL (ref 35.9–50)
GFR SERPLBLD CREATININE-BSD FMLA CKD-EPI: 95 ML/MIN/1.73 M 2
GLUCOSE BLD STRIP.AUTO-MCNC: 118 MG/DL (ref 65–99)
GLUCOSE BLD STRIP.AUTO-MCNC: 129 MG/DL (ref 65–99)
GLUCOSE BLD STRIP.AUTO-MCNC: 138 MG/DL (ref 65–99)
GLUCOSE BLD STRIP.AUTO-MCNC: 146 MG/DL (ref 65–99)
GLUCOSE SERPL-MCNC: 130 MG/DL (ref 65–99)
HCT VFR BLD AUTO: 30.1 % (ref 37–47)
HGB BLD-MCNC: 9.3 G/DL (ref 12–16)
MAGNESIUM SERPL-MCNC: 2 MG/DL (ref 1.5–2.5)
MCH RBC QN AUTO: 26.2 PG (ref 27–33)
MCHC RBC AUTO-ENTMCNC: 30.9 G/DL (ref 32.2–35.5)
MCV RBC AUTO: 84.8 FL (ref 81.4–97.8)
PHOSPHATE SERPL-MCNC: 4.3 MG/DL (ref 2.5–4.5)
PLATELET # BLD AUTO: 181 K/UL (ref 164–446)
PMV BLD AUTO: 10 FL (ref 9–12.9)
POTASSIUM SERPL-SCNC: 4.4 MMOL/L (ref 3.6–5.5)
RBC # BLD AUTO: 3.55 M/UL (ref 4.2–5.4)
SODIUM SERPL-SCNC: 137 MMOL/L (ref 135–145)
WBC # BLD AUTO: 5.9 K/UL (ref 4.8–10.8)

## 2024-12-08 PROCEDURE — 85027 COMPLETE CBC AUTOMATED: CPT

## 2024-12-08 PROCEDURE — 94760 N-INVAS EAR/PLS OXIMETRY 1: CPT

## 2024-12-08 PROCEDURE — 96376 TX/PRO/DX INJ SAME DRUG ADON: CPT

## 2024-12-08 PROCEDURE — 97167 OT EVAL HIGH COMPLEX 60 MIN: CPT

## 2024-12-08 PROCEDURE — 700111 HCHG RX REV CODE 636 W/ 250 OVERRIDE (IP): Performed by: HOSPITALIST

## 2024-12-08 PROCEDURE — 80069 RENAL FUNCTION PANEL: CPT

## 2024-12-08 PROCEDURE — 83735 ASSAY OF MAGNESIUM: CPT

## 2024-12-08 PROCEDURE — A9270 NON-COVERED ITEM OR SERVICE: HCPCS | Performed by: INTERNAL MEDICINE

## 2024-12-08 PROCEDURE — 97535 SELF CARE MNGMENT TRAINING: CPT

## 2024-12-08 PROCEDURE — 700102 HCHG RX REV CODE 250 W/ 637 OVERRIDE(OP): Performed by: HOSPITALIST

## 2024-12-08 PROCEDURE — 700101 HCHG RX REV CODE 250: Performed by: INTERNAL MEDICINE

## 2024-12-08 PROCEDURE — 700102 HCHG RX REV CODE 250 W/ 637 OVERRIDE(OP): Performed by: INTERNAL MEDICINE

## 2024-12-08 PROCEDURE — A9270 NON-COVERED ITEM OR SERVICE: HCPCS | Performed by: HOSPITALIST

## 2024-12-08 PROCEDURE — 700111 HCHG RX REV CODE 636 W/ 250 OVERRIDE (IP): Performed by: INTERNAL MEDICINE

## 2024-12-08 PROCEDURE — 82962 GLUCOSE BLOOD TEST: CPT

## 2024-12-08 PROCEDURE — 97163 PT EVAL HIGH COMPLEX 45 MIN: CPT

## 2024-12-08 PROCEDURE — 36415 COLL VENOUS BLD VENIPUNCTURE: CPT

## 2024-12-08 PROCEDURE — 99233 SBSQ HOSP IP/OBS HIGH 50: CPT | Performed by: INTERNAL MEDICINE

## 2024-12-08 PROCEDURE — G0378 HOSPITAL OBSERVATION PER HR: HCPCS

## 2024-12-08 RX ORDER — OXYCODONE HYDROCHLORIDE 5 MG/1
7.5 TABLET ORAL
Status: DISCONTINUED | OUTPATIENT
Start: 2024-12-08 | End: 2024-12-09 | Stop reason: HOSPADM

## 2024-12-08 RX ORDER — ACETAMINOPHEN 500 MG
1000 TABLET ORAL EVERY 6 HOURS
Status: DISCONTINUED | OUTPATIENT
Start: 2024-12-08 | End: 2024-12-09 | Stop reason: HOSPADM

## 2024-12-08 RX ORDER — SUMATRIPTAN SUCCINATE 25 MG/1
100 TABLET ORAL
Status: DISCONTINUED | OUTPATIENT
Start: 2024-12-08 | End: 2024-12-09 | Stop reason: HOSPADM

## 2024-12-08 RX ORDER — HYDROMORPHONE HYDROCHLORIDE 1 MG/ML
0.5 INJECTION, SOLUTION INTRAMUSCULAR; INTRAVENOUS; SUBCUTANEOUS
Status: DISCONTINUED | OUTPATIENT
Start: 2024-12-08 | End: 2024-12-09 | Stop reason: HOSPADM

## 2024-12-08 RX ORDER — LIDOCAINE 4 G/G
2 PATCH TOPICAL EVERY 24 HOURS
Status: DISCONTINUED | OUTPATIENT
Start: 2024-12-08 | End: 2024-12-09 | Stop reason: HOSPADM

## 2024-12-08 RX ADMIN — ESCITALOPRAM OXALATE 20 MG: 10 TABLET ORAL at 05:42

## 2024-12-08 RX ADMIN — OXYCODONE HYDROCHLORIDE 15 MG: 10 TABLET ORAL at 08:51

## 2024-12-08 RX ADMIN — LEVOTHYROXINE SODIUM 88 MCG: 0.09 TABLET ORAL at 05:42

## 2024-12-08 RX ADMIN — ACETAMINOPHEN 1000 MG: 500 TABLET ORAL at 16:36

## 2024-12-08 RX ADMIN — OXYCODONE HYDROCHLORIDE 10 MG: 10 TABLET ORAL at 00:56

## 2024-12-08 RX ADMIN — OXYCODONE HYDROCHLORIDE 7.5 MG: 5 TABLET ORAL at 21:41

## 2024-12-08 RX ADMIN — PREDNISONE 4 MG: 1 TABLET ORAL at 16:35

## 2024-12-08 RX ADMIN — AMITRIPTYLINE HYDROCHLORIDE 100 MG: 50 TABLET, FILM COATED ORAL at 21:15

## 2024-12-08 RX ADMIN — OMEPRAZOLE 20 MG: 20 CAPSULE, DELAYED RELEASE ORAL at 16:36

## 2024-12-08 RX ADMIN — OXYCODONE HYDROCHLORIDE 10 MG: 10 TABLET ORAL at 05:42

## 2024-12-08 RX ADMIN — ROSUVASTATIN CALCIUM 20 MG: 10 TABLET, FILM COATED ORAL at 16:35

## 2024-12-08 RX ADMIN — HYDROMORPHONE HYDROCHLORIDE 0.5 MG: 1 INJECTION, SOLUTION INTRAMUSCULAR; INTRAVENOUS; SUBCUTANEOUS at 09:52

## 2024-12-08 RX ADMIN — LIOTHYRONINE SODIUM 5 MCG: 5 TABLET ORAL at 05:43

## 2024-12-08 RX ADMIN — PREDNISONE 4 MG: 1 TABLET ORAL at 07:34

## 2024-12-08 RX ADMIN — KETOROLAC TROMETHAMINE 10 MG: 10 TABLET, FILM COATED ORAL at 11:46

## 2024-12-08 RX ADMIN — ACETAMINOPHEN 1000 MG: 500 TABLET ORAL at 08:52

## 2024-12-08 RX ADMIN — HYDROMORPHONE HYDROCHLORIDE 0.5 MG: 1 INJECTION, SOLUTION INTRAMUSCULAR; INTRAVENOUS; SUBCUTANEOUS at 02:10

## 2024-12-08 RX ADMIN — DICLOFENAC SODIUM 4 G: 10 GEL TOPICAL at 21:18

## 2024-12-08 RX ADMIN — OXYCODONE HYDROCHLORIDE 15 MG: 10 TABLET ORAL at 16:36

## 2024-12-08 RX ADMIN — OMEPRAZOLE 20 MG: 20 CAPSULE, DELAYED RELEASE ORAL at 05:43

## 2024-12-08 RX ADMIN — LIDOCAINE 2 PATCH: 4 PATCH TOPICAL at 08:52

## 2024-12-08 RX ADMIN — PREDNISONE 4 MG: 1 TABLET ORAL at 11:48

## 2024-12-08 RX ADMIN — KETOROLAC TROMETHAMINE 10 MG: 10 TABLET, FILM COATED ORAL at 16:36

## 2024-12-08 RX ADMIN — EZETIMIBE 10 MG: 10 TABLET ORAL at 16:35

## 2024-12-08 RX ADMIN — KETOROLAC TROMETHAMINE 10 MG: 10 TABLET, FILM COATED ORAL at 05:42

## 2024-12-08 ASSESSMENT — PAIN DESCRIPTION - PAIN TYPE
TYPE: ACUTE PAIN;CHRONIC PAIN

## 2024-12-08 ASSESSMENT — GAIT ASSESSMENTS
ASSISTIVE DEVICE: FRONT WHEEL WALKER
DISTANCE (FEET): 50
DEVIATION: STEP TO;BRADYKINETIC
GAIT LEVEL OF ASSIST: STANDBY ASSIST

## 2024-12-08 ASSESSMENT — COGNITIVE AND FUNCTIONAL STATUS - GENERAL
DRESSING REGULAR LOWER BODY CLOTHING: A LITTLE
MOVING TO AND FROM BED TO CHAIR: A LITTLE
MOVING FROM LYING ON BACK TO SITTING ON SIDE OF FLAT BED: A LITTLE
SUGGESTED CMS G CODE MODIFIER MOBILITY: CJ
SUGGESTED CMS G CODE MODIFIER DAILY ACTIVITY: CJ
HELP NEEDED FOR BATHING: A LITTLE
DAILY ACTIVITIY SCORE: 22
CLIMB 3 TO 5 STEPS WITH RAILING: A LITTLE
TURNING FROM BACK TO SIDE WHILE IN FLAT BAD: A LITTLE
MOBILITY SCORE: 20

## 2024-12-08 ASSESSMENT — ENCOUNTER SYMPTOMS
WEAKNESS: 1
FALLS: 1
SENSORY CHANGE: 1
BACK PAIN: 1

## 2024-12-08 ASSESSMENT — ACTIVITIES OF DAILY LIVING (ADL): TOILETING: INDEPENDENT

## 2024-12-08 ASSESSMENT — FIBROSIS 4 INDEX: FIB4 SCORE: 1.4

## 2024-12-08 NOTE — DISCHARGE PLANNING
Care Transition Team Assessment    LMSW met with pt at bedside to complete DC assessment. LMSW Introduced self and department roles. Pt A&Ox4 and able to verify the information on the face sheet. Pt lives alone in a one story apt. Prior to this hospitalization pt was independent at home with ADLs and most IADLs. Pt uses a FWW as needed she prescribed for previous surgeries. Pt also has a high rise toilet seat and shower chair at home.  PCP is Dallas Yuen MD and preferred pharmacy is [facility] on [address]  Pt reports her daughter and son in-law are her support system. Pt is retired and receives SSI monthly deposits in which the amount was not disclosed. Pt denies any SA concerns but would like MH resources preferably a provider that takes her insurance. Resources will be in the discharge packet. Pt does not have an advance directive. Pts daughter might be able to transport pt home otherwise pt will need assistance.    Addendum: Pt HH choice form was signed faxed to [facility]    Information Source  Orientation Level: Oriented X4  Information Given By: Patient  Who is responsible for making decisions for patient? : Patient    Readmission Evaluation  Is this a readmission?: No    Elopement Risk  Legal Hold: No  Ambulatory or Self Mobile in Wheelchair: Yes  Disoriented: No  Psychiatric Symptoms: None  History of Wandering: No  Elopement this Admit: No  Vocalizing Wanting to Leave: No  Displays Behaviors, Body Language Wanting to Leave: No-Not at Risk for Elopement  Elopement Risk: Not at Risk for Elopement    Interdisciplinary Discharge Planning  Lives with - Patient's Self Care Capacity: Alone and Able to Care For Self  Patient or legal guardian wants to designate a caregiver: No  Support Systems: Children, Family Member(s)  Housing / Facility: 1 Story House    Discharge Preparedness  What is your plan after discharge?: Home with help  What are your discharge supports?: Child  Prior Functional Level:  Ambulatory  Difficulity with ADLs: Walking  Difficulity with IADLs: None    Functional Assesment  Prior Functional Level: Ambulatory    Finances  Financial Barriers to Discharge: No  Prescription Coverage: Yes    Vision / Hearing Impairment  Vision Impairment : Yes  Right Eye Vision: Impaired, Wears Glasses  Left Eye Vision: Impaired, Wears Glasses  Hearing Impairment : No  Hearing Impairment: Both Ears, Hearing Device Not Available  Does Pt Need Special Equipment for the Hearing Impaired?: No    Advance Directive  Advance Directive?: None    Domestic Abuse  Possible Abuse/Neglect Reported to:: Not Applicable    Psychological Assessment  History of Substance Abuse: None  History of Psychiatric Problems: No    Anticipated Discharge Information  Discharge Disposition: D/T to home under HHA care in anticipation of covered skilled care (06)  Discharge Address: [address] [place] [address] [facility]   [address]   [place] [id number]

## 2024-12-08 NOTE — THERAPY
"Physical Therapy   Initial Evaluation     Patient Name: Yamile Go  Age:  74 y.o., Sex:  female  Medical Record #: [id number]  Today's Date: 12/8/2024     Precautions  Precautions: Fall Risk;Lumbosacral Orthosis    Assessment  Pt is a 73 yo F with a PMH of primary hypertension, hypothyroidism, gastroesophageal reflux disease and mild central canal stenosis who presented 12/6/2024 with worsening low back pain. Noted vertebral fractures of T12, L1, and L2. Pt was premedicated prior to PT eval. Pt demonstrated bed mobility with HOB flat and SPV, she required SBA initially but then only required SPV. Demonstrated ambulation at decreased speed and with step to pattern and FWW for 100 ft. She reports she typically does not use an AD, but was able to perform functional mobility without assistance. Per PT initial eval, recommend home health for continued physical therapy services, however, if functional mobility changes while not medicated, recommend post acute placement for continued PT needs. PT will continue to follow during acute care stay to further assess tolerance to functional mobility. Per H&P, pt may be transferred to [facility] for spinal surgery.     Plan    Physical Therapy Initial Treatment Plan   Duration: 4 times per week        Discharge Recommendations: Recommend post acute placement for continued physical therapy services       Subjective    \"The only pain I have is in my right thigh\"     Objective       12/08/24 1025   Initial Contact Note    Initial Contact Note Order Received and Verified, Evaluation Only - Patient Does Not Require Further Acute Physical Therapy at this Time.  However, May Benefit from Post Acute Therapy for Higher Level Functional Deficits.   Precautions   Precautions Fall Risk;Lumbosacral Orthosis   Pain 0 - 10 Group   Location Leg   Location Orientation Right   Prior Living Situation   Housing / Facility 1 Story House   Bathroom Set up Shower Chair   Equipment Owned 4-Wheel " Walker;Front-Wheel Walker;Single Point Cane   Lives with - Patient's Self Care Capacity Alone and Able to Care For Self   Comments Pt reports she lives in [place], [place] in a single story home alone. Notes she has a daughter that lives in [place] that could be able to assist her in the evenings, although, per conversation with RN, she only has an elderly aunt that would be available to assist with cleaning and cooking. Inconsistencies noted with assistance that could be provided to her.   Prior Level of Functional Mobility   Bed Mobility Independent   Transfer Status Independent   Ambulation Independent   Ambulation Distance community   Assistive Devices Used None   Stairs Independent   History of Falls   History of Falls Yes   Cognition    Cognition / Consciousness WDL   Level of Consciousness Alert   Strength Lower Body   Comments demonstrates functional LE strength for ambulating and transfers   Sensation Lower Body   Lower Extremity Sensation   X   Comments decreased sensation to light touch in left L3 and L5 compared to RLE   Balance Assessment   Sitting Balance (Static) Fair +   Sitting Balance (Dynamic) Fair +   Standing Balance (Static) Fair   Standing Balance (Dynamic) Fair   Weight Shift Sitting Good   Weight Shift Standing Good   Bed Mobility    Supine to Sit Supervised  (utilized bed rails)   Scooting Supervised   Rolling Supervised  (to left)   Comments pt demonstrated logrolling to left with use of bed rails   Gait Analysis   Gait Level Of Assist Standby Assist   Assistive Device Front Wheel Walker   Distance (Feet) 50   # of Times Distance was Traveled 2   Deviation Step To;Bradykinetic   # of Stairs Climbed 0   Functional Mobility   Sit to Stand Standby Assist   Bed, Chair, Wheelchair Transfer Standby Assist   Transfer Method Stand Step   Mobility bed mobility, ambulate in hallway, return to bedside chair   6 Clicks Assessment - How much HELP from from another person do you currently need... (If the patient  hasn't done an activity recently, how much help from another person do you think he/she would need if he/she tried?)   Turning from your back to your side while in a flat bed without using bedrails? 3   Moving from lying on your back to sitting on the side of a flat bed without using bedrails? 3   Moving to and from a bed to a chair (including a wheelchair)? 3   Standing up from a chair using your arms (e.g., wheelchair, or bedside chair)? 4   Walking in hospital room? 4   Climbing 3-5 steps with a railing? 3   6 clicks Mobility Score 20   Activity Tolerance   Sitting Edge of Bed 5 min   Standing 10 min   Education Group   Education Provided Role of Physical Therapist;Gait Training;Use of Assistive Device   Role of Physical Therapist Patient Response Patient;Acceptance;Explanation;Verbal Demonstration   Gait Training Patient Response Patient;Acceptance;Explanation;Action Demonstration   Use of Assistive Device Patient Response Patient;Acceptance;Explanation;Action Demonstration   Physical Therapy Initial Treatment Plan    Duration Evaluation only   Problem List    Problems Impaired Ambulation;Impaired Balance;Decreased Activity Tolerance   Anticipated Discharge Equipment and Recommendations   Discharge Recommendations Recommend post acute placement for continued physical therapy services   Interdisciplinary Plan of Care Collaboration   IDT Collaboration with  Nursing   Patient Position at End of Therapy Seated;Call Light within Reach;Tray Table within Reach;Phone within Reach   Collaboration Comments Discussed possible HH with RN post PT raúl, noted they were considering post acute rehab due to possibly requiring spinal surgery   Session Information   Date / Session Number  12/8, 1(1/4, 12/14)

## 2024-12-08 NOTE — PROGRESS NOTES
0700 - Bedside report received from DEEJAY Sandoval. Alert and oriented X4, on 2L in no acute respiratory distress. Daily plan of care discussed. Patient complains of some pain. No other needs at this time. Call light and personal belongings within reach. Hourly rounding in place. Chart reviewed for recent labs, notes, and orders.

## 2024-12-08 NOTE — PROGRESS NOTES
[facility] Medicine Daily Progress Note    Date of Service  12/7/2024    Chief Complaint  Yamile Go is a 74 y.o. female admitted 12/6/2024 with lower back pain and decreased mobility    Hospital Course  74-year-old female with a past medical history of hypothyroidism, hypertension, GERD, mild L3-L4 central canal stenosis admitted on 12/6/2024 for worsening spinal back pains.  She unfortunately had a ground-level fall the week prior and suffered a T12, L1, L2 vertebral fractures. 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    Of note at [facility] 9/20/2024, patient was diagnosed with a T5 compression fracture, diagnosed with chronic opiate dependence, COPD, CAD.  CT pelvis showed impacted left subcapital femoral neck fracture.  There is CT lumbar showing L3-4 severe spinal stenosis, L3 foraminal stenosis, moderate central canal stenosis to L4-5, L4 severe foraminal stenosis, ankylosis at L5-S6.  Thoracic showed hemangioma at T7, 25% T5 superior endplate compression fracture.  Patient had a left hemiarthroplasty by Dr. Liu (9/20/24).    MRI on 10/19/2024 showing L4-5 grade 1 spondylolisthesis.  T12 superior endplate compression deformity.  Postoperative right-sided lumbar laminectomy to L4-L5.  Mentioned L3-L4 mild central canal stenosis and consistent L4-L5 left foraminal stenosis.    CT lumbar on 11/29/2024 showed acute 20% osteoporotic compression fracture of L1 and L2 vertebral bodies.  As per ER Dr. rAaujo note, patient was to be contacted by IR for a kyphoplasty.    Interval Problem Update  Patient stated she still has lower back pain.  She has decreased sensation to the left leg.  Denied any urinary or stool incontinence.  Patient is unable to work, she is a nurse at [employer].    I have discussed this patient's plan of care and discharge plan at IDT rounds today with Case Management, Nursing, Nursing leadership, and other members of the IDT team.    Consultants/Specialty  Spinal  surgery/neurosurgery    Code Status  Full Code    Disposition  The patient is not medically cleared for discharge to home or a post-acute facility.      I have placed the appropriate orders for post-discharge needs.    Review of Systems  Review of Systems   Constitutional:  Positive for malaise/fatigue.   Musculoskeletal:  Positive for back pain and falls.   Neurological:  Positive for sensory change (Left leg) and weakness.   All other systems reviewed and are negative.       Physical Exam  Temp:  [36.2 °C (97.1 °F)-37 °C (98.6 °F)] (P) 36.2 °C (97.1 °F)  Pulse:  [75-96] (P) 75  Resp:  [15-21] (P) 16  BP: (104-186)/(57-86) (P) 106/60  SpO2:  [93 %-100 %] (P) 93 %    Physical Exam  Vitals and nursing note reviewed.   Constitutional:       General: She is not in acute distress.     Appearance: Normal appearance. She is not ill-appearing.   HENT:      Head: Normocephalic and atraumatic.   Eyes:      General: No scleral icterus.     Extraocular Movements: Extraocular movements intact.   Cardiovascular:      Rate and Rhythm: Normal rate.      Pulses: Normal pulses.      Heart sounds: Normal heart sounds. No murmur heard.  Pulmonary:      Effort: Pulmonary effort is normal. No respiratory distress.      Breath sounds: Normal breath sounds.   Abdominal:      General: Abdomen is flat. Bowel sounds are normal. There is no distension.      Palpations: Abdomen is soft.   Musculoskeletal:         General: No swelling or tenderness. Normal range of motion.      Cervical back: Normal range of motion and neck supple.   Skin:     General: Skin is warm.      Capillary Refill: Capillary refill takes less than 2 seconds.      Coloration: Skin is not jaundiced.      Findings: No erythema.   Neurological:      Mental Status: She is alert and oriented to person, place, and time. Mental status is at baseline.      Sensory: Sensory deficit (decreased LLE sensation) present.      Motor: Weakness (BLE) present.      Deep Tendon Reflexes:  "Reflexes normal.   Psychiatric:         Mood and Affect: Mood normal.         Behavior: Behavior normal.         Thought Content: Thought content normal.         Judgment: Judgment normal.         Fluids    Intake/Output Summary (Last 24 hours) at 12/7/2024 1619  Last data filed at 12/7/2024 1400  Gross per 24 hour   Intake 120 ml   Output --   Net 120 ml        Laboratory  Recent Labs     12/06/24 2030 12/07/24  1007   WBC 8.6 8.7   RBC 4.14* 4.01*   HEMOGLOBIN 10.9* 10.4*   HEMATOCRIT 35.0* 34.0*   MCV 84.5 84.8   MCH 26.3* 25.9*   MCHC 31.1* 30.6*   RDW 50.0 50.4*   PLATELETCT 235 220   MPV 8.2* 8.7*     Recent Labs     12/06/24 2030 12/07/24  1007   SODIUM 138 140   POTASSIUM 3.7 4.3   CHLORIDE 104 105   CO2 24 26   GLUCOSE 95 95   BUN 14 13   CREATININE 0.60 0.68   CALCIUM 8.4 8.4                   Imaging  MR-THORACIC SPINE-W/O   Final Result      1.  Thoracic scoliosis convex right.   2.  Mild old T5 compression fracture with mild anterior wedging associated with mild upper thoracic dorsal kyphosis.   3.  T12 late subacute insufficiency compression fracture. May be amenable to vertebral augmentation.   4.  L1 more recent acute or subacute superior endplate compression fracture. Appears amenable to vertebral augmentation.      MR-LUMBAR SPINE-W/O   Final Result      1.  Lumbar scoliosis convex left along with lateral subluxations degenerative retrolisthesis, and degenerative anterolisthesis as described above most notable for L4-5 grade 1 spondylolisthesis.   2.  T12 late subacute superior endplate insufficiency compression fracture. This may be amenable to vertebral augmentation.   3.  L1 recent/subacute superior endplate insufficiency compression fracture which is \"new\" since most recent MRI study of 10/19/2024. This appears amenable to vertebral augmentation.   4.  Postoperative right-sided lumbar laminectomy L3-L4.   5.  Multilevel degenerative changes as detailed for each level above in the body of " report.      DX-HIP-UNILATERAL-WITH PELVIS-1 VIEW RIGHT   Final Result         1. No definite acute osseous abnormality but evaluation is limited due to osseous demineralization.           Assessment/Plan  * Intractable low back pain- (present on admission)  Assessment & Plan  Multimodal pain control approach using:  Acetaminophen  Robaxin for muscle relaxation  Escitalopram and amitriptyline  Local heat application   Local Voltaren gel  Given patient worsening left lower extremity weakness and known history of mild spinal canal stenosis on prior MRI October 2024, I will check an MRI of the L-spine for further evaluation.    MRI findings show T12 and L1 compression fracture, amenable for kyphoplasty    Compression fracture of L1 lumbar vertebra, sequela- (present on admission)  Assessment & Plan    CT scan from 11/29/2024 showing 20% L1 vertebral fracture.  Patient has spondylolisthesis and scoliosis to the left.        I reviewed new Lumbar MRI, pending radiology reading.  T12 and L1 compression fractures, amenable to kyphoplasty  Lumbar scoliosis with convexity to the left, there is retrolisthesis, degenerative anterolisthesis of L4-L5.  Grade 1 spondylolisthesis  Prior laminectomy to L3-4        09/2018 compared to today's MRI lumbar.  Worsening scoliosis to the left.    I reached out to spinal surgery for recommendations if patient will need surgical intervention now. Will transfer patient to [facility] if needed.  T12 and L1, will need to reach out to IR if spinal surgery does not recommend surgery for patient's severe scoliosis.    ADDENDUM:  I discussed with Dr. Jb Park ([facility])  Recommended no surgery at this time, degenerative changes needs to be seen in outpatient clinic. Process for a spinal surgery would need careful consideration then.  PT/OT  Pain control  Avoid kyphoplasty, may interfere with future spinal surgery.  Prednisone 4mg Q6H  ketorlac    ACP (advance care planning)-  (present on admission)  Assessment & Plan  FULL CODE  Pt agreed for surgery if she requires    Lumbar canal stenosis- (present on admission)  Assessment & Plan  Prior MRI October 2024 shows mild spinal canal stenosis   Patient is presenting with worsening low back pain and worsening left lower extremity weakness  Pt has significant deformity of lumbar spine    Mixed hyperlipidemia- (present on admission)  Assessment & Plan  Cardiac diet.  Continue home rosuvastatin and ezetimibe    Primary hypertension- (present on admission)  Assessment & Plan  Continue lisinopril    Type 2 diabetes mellitus (HCC)- (present on admission)  Assessment & Plan  With no significant hyperglycemia  Last glycated hemoglobin was 6.3%  Continue insulin sliding scale, Accu-Cheks and hypoglycemia protocol    Acquired hypothyroidism- (present on admission)  Assessment & Plan  Continue home Synthroid    Depression- (present on admission)  Assessment & Plan  Continue home Lexapro and amitriptyline    CAD (coronary artery disease)- (present on admission)  Assessment & Plan  Continue home metoprolol XL 25 mg  Continue rosuvastatin         VTE prophylaxis:    enoxaparin ppx      I have performed a physical exam and reviewed and updated ROS and Plan today (12/7/2024). In review of yesterday's note (12/6/2024), there are no changes except as documented above.      Total time spent 51 minutes. I spent greater than 50% of the time for patient care, including unit/floor time, multiple face-to-face encounters with the patient, counseling on treatment plan and discussion with bedside RN.  Further, I spent time on my own review of patient's overnight events, RN notes, imaging and lab analysis, and developing my assessment and plan above.  In addition, working with , social workers, and Charge RN on case coordination on this date.    This note was generated using voice recognition software which has a chance of producing errors of grammar and  content.  I have made every reasonable attempt to find and correct any errors, but it should be expected that some may not be found prior to finalization of this note.

## 2024-12-08 NOTE — PROGRESS NOTES
1221: pt was educated on pain management where dilaudid 0.5 will be given one hour post oxy dose if pain is still intolerable, pt verbalized understanding.    1321: pt was sleeping, calm unlabored breathing, no medication was administered at this time.     1541: pt woke up in 10/10 pain upset that I was unable to give 0.5 dilaudid, I advised that it had been 3 hours since last dose of oxy and that th dilaudid dose was only 1 hour post pain. I asked her if she had spoken with M.D. about a prn dose because we had the conversation that I would not treat pain if she was asleep. M.D. notified PRN dose ordered. Pt was already given oxy, advised that we can keep the PRN if she's in pain later, pt agreed to POC.

## 2024-12-08 NOTE — CARE PLAN
The patient is Stable - Low risk of patient condition declining or worsening    Shift Goals  Clinical Goals: Pain management, monitor vitals, patient will remain free from falls this shift  Patient Goals: pain management, sleep  Family Goals: N/A    Progress made toward(s) clinical / shift goals:    Patient remains free from falls this shift. Patient received 1 bolus of NS 1000mL for low BP. Patient now with stable BP. Pain managed by medication per MAR, ice, rest and repositioning.    Patient is not progressing towards the following goals:

## 2024-12-08 NOTE — PROGRESS NOTES
4 Eyes Skin Assessment Completed by Edel RN and Sandra RN.    Head WDL  Ears Redness and Blanching  Nose WDL  Mouth dry  Neck WDL  Breast/Chest WDL  Shoulder Blades WDL  Spine WDL  (R) Arm/Elbow/Hand Redness, Blanching, Bruising, and Discoloration; brown  (L) Arm/Elbow/Hand Redness, Blanching, Bruising, and Discoloration; brown  Abdomen Scar  Groin WDL  Scrotum/Coccyx/Buttocks Redness and Blanching  (R) Leg Scar and Bruising  (L) Leg Scar, Scab, and Bruising  (R) Heel/Foot/Toe Redness, Blanching, and Scar  (L) Heel/Foot/Toe Redness and Blanching          Devices In Places Blood Pressure Cuff, Pulse Ox, and Nasal Cannula      Interventions In Place Gray Ear Foams and Pillows    Possible Skin Injury No    Pictures Uploaded Into Epic N/A  Wound Consult Placed N/A  RN Wound Prevention Protocol Ordered No

## 2024-12-08 NOTE — PROGRESS NOTES
Bedside report received from day RNFlori and assumed care of patient at change of shift. Chart, labs, and orders reviewed. Pt resting in bed, breathing even and unlabored, endorses 5/10 pain and in no acute distress. A&O x4 on 2L. Fall precautions including bed alarm in place and education provided. Call light within reach, bed locked and in lowest position, denies other needs at this time. Hourly rounding in progress.

## 2024-12-08 NOTE — CARE PLAN
The patient is Stable - Low risk of patient condition declining or worsening    Shift Goals  Clinical Goals: (P) manage pain at or above 3/10 with medication per MAR, pt will remain free from falls throughout the shift,  Patient Goals: (P) pain management and rest  Family Goals: (P) n/a    Progress made toward(s) clinical / shift goals:  pain was managed with medication per MAR, pt remained free from falls    Problem: Pain - Standard  Goal: Alleviation of pain or a reduction in pain to the patient’s comfort goal  Outcome: Progressing     Problem: Knowledge Deficit - Standard  Goal: Patient and family/care givers will demonstrate understanding of plan of care, disease process/condition, diagnostic tests and medications  Outcome: Progressing     Problem: Communication  Goal: The ability to communicate needs accurately and effectively will improve  Outcome: Progressing     Problem: Respiratory  Goal: Patient will achieve/maintain optimum respiratory ventilation and gas exchange  Outcome: Progressing     Problem: Nutrition  Goal: Patient's nutritional and fluid intake will be adequate or improve  Outcome: Progressing     Problem: Mobility  Goal: Patient's capacity to carry out activities will improve  Outcome: Progressing     Problem: Self Care  Goal: Patient will have the ability to perform ADLs independently or with assistance (bathe, groom, dress, toilet and feed)  Outcome: Progressing     Problem: Infection - Standard  Goal: Patient will remain free from infection  Outcome: Progressing     Problem: Fall Risk  Goal: Patient will remain free from falls  Outcome: Progressing       Patient is not progressing towards the following goals:

## 2024-12-08 NOTE — CARE PLAN
The patient is Stable - Low risk of patient condition declining or worsening    Shift Goals  Clinical Goals: manage pain, free from falls this shift  Patient Goals: rest and update on POC  Family Goals: N/A    Progress made toward(s) clinical / shift goals:  pain managed with higher dose of oxy with 1x break through. Calls appropriately for assistance out of bed.    Patient is not progressing towards the following goals: n/a

## 2024-12-08 NOTE — PROGRESS NOTES
1957:  BP 82/55, P 79, MAP 57. Patient endorses fatigue.   Provider notified. Received orders for 1L NS bolus.     2239: BP 88/44, P 73, R 15, MAP 59. Notified provider. Received orders for 1L NS bolus.    2309: BP improved at 117/68, MAP 84  0055: /57, MAP 75

## 2024-12-09 ENCOUNTER — APPOINTMENT (OUTPATIENT)
Dept: RADIOLOGY | Facility: MEDICAL CENTER | Age: 74
DRG: 543 | End: 2024-12-09
Attending: INTERNAL MEDICINE
Payer: MEDICARE

## 2024-12-09 ENCOUNTER — HOSPITAL ENCOUNTER (INPATIENT)
Facility: REHABILITATION | Age: 74
LOS: 7 days | DRG: 561 | End: 2024-12-16
Attending: PHYSICAL MEDICINE & REHABILITATION | Admitting: PHYSICAL MEDICINE & REHABILITATION
Payer: MEDICARE

## 2024-12-09 VITALS
RESPIRATION RATE: 17 BRPM | BODY MASS INDEX: 31.24 KG/M2 | HEIGHT: 62 IN | WEIGHT: 169.75 LBS | DIASTOLIC BLOOD PRESSURE: 65 MMHG | SYSTOLIC BLOOD PRESSURE: 134 MMHG | TEMPERATURE: 97.9 F | HEART RATE: 74 BPM | OXYGEN SATURATION: 94 %

## 2024-12-09 DIAGNOSIS — M54.59 INTRACTABLE LOW BACK PAIN: ICD-10-CM

## 2024-12-09 DIAGNOSIS — I10 ESSENTIAL HYPERTENSION, BENIGN: ICD-10-CM

## 2024-12-09 DIAGNOSIS — I10 PRIMARY HYPERTENSION: ICD-10-CM

## 2024-12-09 DIAGNOSIS — S32.020S CLOSED COMPRESSION FRACTURE OF L2 LUMBAR VERTEBRA, SEQUELA: ICD-10-CM

## 2024-12-09 DIAGNOSIS — E78.5 DYSLIPIDEMIA: ICD-10-CM

## 2024-12-09 DIAGNOSIS — E03.9 HYPOTHYROIDISM, UNSPECIFIED TYPE: ICD-10-CM

## 2024-12-09 DIAGNOSIS — S32.010S CLOSED COMPRESSION FRACTURE OF L1 LUMBAR VERTEBRA, SEQUELA: ICD-10-CM

## 2024-12-09 DIAGNOSIS — E78.5 HYPERLIPIDEMIA, UNSPECIFIED HYPERLIPIDEMIA TYPE: ICD-10-CM

## 2024-12-09 DIAGNOSIS — M47.27 LUMBOSACRAL RADICULOPATHY DUE TO DEGENERATIVE JOINT DISEASE OF SPINE: ICD-10-CM

## 2024-12-09 DIAGNOSIS — I25.10 CAD (CORONARY ARTERY DISEASE): ICD-10-CM

## 2024-12-09 DIAGNOSIS — R07.89 OTHER CHEST PAIN: ICD-10-CM

## 2024-12-09 DIAGNOSIS — F32.A DEPRESSION, UNSPECIFIED DEPRESSION TYPE: ICD-10-CM

## 2024-12-09 DIAGNOSIS — S32.010A CLOSED COMPRESSION FRACTURE OF BODY OF L1 VERTEBRA (HCC): ICD-10-CM

## 2024-12-09 DIAGNOSIS — M53.2X6 LUMBAR SPINE INSTABILITY: ICD-10-CM

## 2024-12-09 LAB
GLUCOSE BLD STRIP.AUTO-MCNC: 125 MG/DL (ref 65–99)
GLUCOSE BLD STRIP.AUTO-MCNC: 127 MG/DL (ref 65–99)
GLUCOSE BLD STRIP.AUTO-MCNC: 135 MG/DL (ref 65–99)

## 2024-12-09 PROCEDURE — 770010 HCHG ROOM/CARE - REHAB SEMI PRIVAT*

## 2024-12-09 PROCEDURE — 99223 1ST HOSP IP/OBS HIGH 75: CPT | Performed by: PHYSICAL MEDICINE & REHABILITATION

## 2024-12-09 PROCEDURE — 700111 HCHG RX REV CODE 636 W/ 250 OVERRIDE (IP): Performed by: INTERNAL MEDICINE

## 2024-12-09 PROCEDURE — 82962 GLUCOSE BLOOD TEST: CPT

## 2024-12-09 PROCEDURE — 82962 GLUCOSE BLOOD TEST: CPT | Mod: 91

## 2024-12-09 PROCEDURE — 99239 HOSP IP/OBS DSCHRG MGMT >30: CPT | Performed by: INTERNAL MEDICINE

## 2024-12-09 PROCEDURE — 700101 HCHG RX REV CODE 250: Performed by: INTERNAL MEDICINE

## 2024-12-09 PROCEDURE — A9270 NON-COVERED ITEM OR SERVICE: HCPCS | Performed by: HOSPITALIST

## 2024-12-09 PROCEDURE — 700102 HCHG RX REV CODE 250 W/ 637 OVERRIDE(OP): Performed by: HOSPITALIST

## 2024-12-09 PROCEDURE — 94760 N-INVAS EAR/PLS OXIMETRY 1: CPT

## 2024-12-09 PROCEDURE — A9270 NON-COVERED ITEM OR SERVICE: HCPCS | Performed by: PHYSICAL MEDICINE & REHABILITATION

## 2024-12-09 PROCEDURE — 700102 HCHG RX REV CODE 250 W/ 637 OVERRIDE(OP): Performed by: PHYSICAL MEDICINE & REHABILITATION

## 2024-12-09 PROCEDURE — 770001 HCHG ROOM/CARE - MED/SURG/GYN PRIV*

## 2024-12-09 PROCEDURE — A9270 NON-COVERED ITEM OR SERVICE: HCPCS | Performed by: INTERNAL MEDICINE

## 2024-12-09 PROCEDURE — 71045 X-RAY EXAM CHEST 1 VIEW: CPT

## 2024-12-09 PROCEDURE — 700111 HCHG RX REV CODE 636 W/ 250 OVERRIDE (IP): Mod: JZ | Performed by: PHYSICAL MEDICINE & REHABILITATION

## 2024-12-09 PROCEDURE — 700102 HCHG RX REV CODE 250 W/ 637 OVERRIDE(OP): Performed by: INTERNAL MEDICINE

## 2024-12-09 RX ORDER — METOPROLOL SUCCINATE 25 MG/1
25 TABLET, EXTENDED RELEASE ORAL DAILY
Status: ON HOLD
Start: 2024-12-10 | End: 2024-12-16

## 2024-12-09 RX ORDER — LIOTHYRONINE SODIUM 5 UG/1
5 TABLET ORAL DAILY
Status: CANCELLED | OUTPATIENT
Start: 2024-12-10

## 2024-12-09 RX ORDER — METHOCARBAMOL 500 MG/1
500 TABLET, FILM COATED ORAL 3 TIMES DAILY PRN
Status: DISCONTINUED | OUTPATIENT
Start: 2024-12-09 | End: 2024-12-16 | Stop reason: HOSPADM

## 2024-12-09 RX ORDER — PREDNISONE 1 MG/1
4 TABLET ORAL EVERY 6 HOURS
Status: DISCONTINUED | OUTPATIENT
Start: 2024-12-09 | End: 2024-12-11

## 2024-12-09 RX ORDER — ACETAMINOPHEN 500 MG
1000 TABLET ORAL EVERY 6 HOURS
Status: CANCELLED | OUTPATIENT
Start: 2024-12-09

## 2024-12-09 RX ORDER — PREDNISONE 1 MG/1
4 TABLET ORAL EVERY 6 HOURS
Status: CANCELLED | OUTPATIENT
Start: 2024-12-09

## 2024-12-09 RX ORDER — METHOCARBAMOL 500 MG/1
500 TABLET, FILM COATED ORAL 3 TIMES DAILY PRN
Status: ON HOLD
Start: 2024-12-09 | End: 2024-12-16

## 2024-12-09 RX ORDER — ENOXAPARIN SODIUM 100 MG/ML
40 INJECTION SUBCUTANEOUS DAILY
Status: DISCONTINUED | OUTPATIENT
Start: 2024-12-09 | End: 2024-12-13

## 2024-12-09 RX ORDER — HYDROMORPHONE HYDROCHLORIDE 1 MG/ML
0.5 INJECTION, SOLUTION INTRAMUSCULAR; INTRAVENOUS; SUBCUTANEOUS EVERY 4 HOURS PRN
Status: DISCONTINUED | OUTPATIENT
Start: 2024-12-09 | End: 2024-12-09 | Stop reason: HOSPADM

## 2024-12-09 RX ORDER — ALBUTEROL SULFATE 90 UG/1
1-2 INHALANT RESPIRATORY (INHALATION) EVERY 4 HOURS PRN
Status: DISCONTINUED | OUTPATIENT
Start: 2024-12-09 | End: 2024-12-16 | Stop reason: HOSPADM

## 2024-12-09 RX ORDER — AMITRIPTYLINE HYDROCHLORIDE 50 MG/1
100 TABLET ORAL NIGHTLY
Status: CANCELLED | OUTPATIENT
Start: 2024-12-09

## 2024-12-09 RX ORDER — ALUMINA, MAGNESIA, AND SIMETHICONE 2400; 2400; 240 MG/30ML; MG/30ML; MG/30ML
20 SUSPENSION ORAL
Status: DISCONTINUED | OUTPATIENT
Start: 2024-12-09 | End: 2024-12-16 | Stop reason: HOSPADM

## 2024-12-09 RX ORDER — METOPROLOL SUCCINATE 25 MG/1
25 TABLET, EXTENDED RELEASE ORAL DAILY
Status: DISCONTINUED | OUTPATIENT
Start: 2024-12-10 | End: 2024-12-16 | Stop reason: HOSPADM

## 2024-12-09 RX ORDER — DEXTROSE MONOHYDRATE 25 G/50ML
25 INJECTION, SOLUTION INTRAVENOUS
Status: DISCONTINUED | OUTPATIENT
Start: 2024-12-09 | End: 2024-12-10

## 2024-12-09 RX ORDER — PREDNISONE 1 MG/1
4 TABLET ORAL DAILY
Status: ON HOLD
Start: 2024-12-09 | End: 2024-12-16

## 2024-12-09 RX ORDER — LEVOTHYROXINE SODIUM 88 UG/1
88 TABLET ORAL DAILY
Status: CANCELLED | OUTPATIENT
Start: 2024-12-10

## 2024-12-09 RX ORDER — LIDOCAINE 4 G/G
2 PATCH TOPICAL EVERY 24 HOURS
Status: DISCONTINUED | OUTPATIENT
Start: 2024-12-10 | End: 2024-12-16 | Stop reason: HOSPADM

## 2024-12-09 RX ORDER — AMOXICILLIN 250 MG
2 CAPSULE ORAL EVERY EVENING
Status: DISCONTINUED | OUTPATIENT
Start: 2024-12-09 | End: 2024-12-16 | Stop reason: HOSPADM

## 2024-12-09 RX ORDER — DEXTROSE MONOHYDRATE 25 G/50ML
25 INJECTION, SOLUTION INTRAVENOUS
Status: CANCELLED | OUTPATIENT
Start: 2024-12-09

## 2024-12-09 RX ORDER — KETOROLAC TROMETHAMINE 10 MG/1
10 TABLET, FILM COATED ORAL EVERY 6 HOURS
Status: ON HOLD
Start: 2024-12-09 | End: 2024-12-16

## 2024-12-09 RX ORDER — EZETIMIBE 10 MG/1
10 TABLET ORAL EVERY EVENING
Status: DISCONTINUED | OUTPATIENT
Start: 2024-12-09 | End: 2024-12-16 | Stop reason: HOSPADM

## 2024-12-09 RX ORDER — OXYCODONE HYDROCHLORIDE 10 MG/1
10 TABLET ORAL
Status: DISCONTINUED | OUTPATIENT
Start: 2024-12-09 | End: 2024-12-09

## 2024-12-09 RX ORDER — KETOROLAC TROMETHAMINE 10 MG/1
10 TABLET, FILM COATED ORAL EVERY 6 HOURS
Status: COMPLETED | OUTPATIENT
Start: 2024-12-09 | End: 2024-12-12

## 2024-12-09 RX ORDER — HYDRALAZINE HYDROCHLORIDE 25 MG/1
25 TABLET, FILM COATED ORAL EVERY 8 HOURS PRN
Status: DISCONTINUED | OUTPATIENT
Start: 2024-12-09 | End: 2024-12-16 | Stop reason: HOSPADM

## 2024-12-09 RX ORDER — LIDOCAINE 4 G/G
2 PATCH TOPICAL EVERY 24 HOURS
Status: CANCELLED | OUTPATIENT
Start: 2024-12-10

## 2024-12-09 RX ORDER — ESCITALOPRAM OXALATE 10 MG/1
20 TABLET ORAL DAILY
Status: CANCELLED | OUTPATIENT
Start: 2024-12-10

## 2024-12-09 RX ORDER — ESCITALOPRAM OXALATE 10 MG/1
20 TABLET ORAL DAILY
Status: DISCONTINUED | OUTPATIENT
Start: 2024-12-10 | End: 2024-12-16 | Stop reason: HOSPADM

## 2024-12-09 RX ORDER — POLYETHYLENE GLYCOL 3350 17 G/17G
1 POWDER, FOR SOLUTION ORAL
Status: DISCONTINUED | OUTPATIENT
Start: 2024-12-09 | End: 2024-12-16 | Stop reason: HOSPADM

## 2024-12-09 RX ORDER — INSULIN LISPRO 100 [IU]/ML
1-6 INJECTION, SOLUTION INTRAVENOUS; SUBCUTANEOUS
Status: DISCONTINUED | OUTPATIENT
Start: 2024-12-09 | End: 2024-12-10

## 2024-12-09 RX ORDER — KETOROLAC TROMETHAMINE 10 MG/1
10 TABLET, FILM COATED ORAL EVERY 6 HOURS
Status: CANCELLED | OUTPATIENT
Start: 2024-12-09 | End: 2024-12-12

## 2024-12-09 RX ORDER — ECHINACEA PURPUREA EXTRACT 125 MG
2 TABLET ORAL PRN
Status: DISCONTINUED | OUTPATIENT
Start: 2024-12-09 | End: 2024-12-16 | Stop reason: HOSPADM

## 2024-12-09 RX ORDER — LEVOTHYROXINE SODIUM 88 UG/1
88 TABLET ORAL DAILY
Status: DISCONTINUED | OUTPATIENT
Start: 2024-12-10 | End: 2024-12-16 | Stop reason: HOSPADM

## 2024-12-09 RX ORDER — ACETAMINOPHEN 500 MG
1000 TABLET ORAL EVERY 6 HOURS
Status: DISCONTINUED | OUTPATIENT
Start: 2024-12-09 | End: 2024-12-16 | Stop reason: HOSPADM

## 2024-12-09 RX ORDER — EZETIMIBE 10 MG/1
10 TABLET ORAL EVERY EVENING
Status: CANCELLED | OUTPATIENT
Start: 2024-12-09

## 2024-12-09 RX ORDER — OXYCODONE HYDROCHLORIDE 5 MG/1
5 TABLET ORAL
Status: DISCONTINUED | OUTPATIENT
Start: 2024-12-09 | End: 2024-12-16 | Stop reason: HOSPADM

## 2024-12-09 RX ORDER — ONDANSETRON 4 MG/1
4 TABLET, ORALLY DISINTEGRATING ORAL 4 TIMES DAILY PRN
Status: DISCONTINUED | OUTPATIENT
Start: 2024-12-09 | End: 2024-12-16 | Stop reason: HOSPADM

## 2024-12-09 RX ORDER — ROSUVASTATIN CALCIUM 10 MG/1
20 TABLET, COATED ORAL EVERY EVENING
Status: DISCONTINUED | OUTPATIENT
Start: 2024-12-09 | End: 2024-12-16 | Stop reason: HOSPADM

## 2024-12-09 RX ORDER — LIOTHYRONINE SODIUM 5 UG/1
5 TABLET ORAL DAILY
Status: DISCONTINUED | OUTPATIENT
Start: 2024-12-10 | End: 2024-12-16 | Stop reason: HOSPADM

## 2024-12-09 RX ORDER — AMITRIPTYLINE HYDROCHLORIDE 50 MG/1
100 TABLET ORAL NIGHTLY
Status: DISCONTINUED | OUTPATIENT
Start: 2024-12-09 | End: 2024-12-10

## 2024-12-09 RX ORDER — INSULIN LISPRO 100 [IU]/ML
1-6 INJECTION, SOLUTION INTRAVENOUS; SUBCUTANEOUS
Status: CANCELLED | OUTPATIENT
Start: 2024-12-09

## 2024-12-09 RX ORDER — ALBUTEROL SULFATE 90 UG/1
1-2 INHALANT RESPIRATORY (INHALATION) EVERY 4 HOURS PRN
Status: CANCELLED | OUTPATIENT
Start: 2024-12-09

## 2024-12-09 RX ORDER — LIDOCAINE 4 G/G
2 PATCH TOPICAL EVERY 24 HOURS
Status: ON HOLD
Start: 2024-12-10 | End: 2024-12-16

## 2024-12-09 RX ORDER — OXYCODONE HYDROCHLORIDE 5 MG/1
5 TABLET ORAL
Status: DISCONTINUED | OUTPATIENT
Start: 2024-12-09 | End: 2024-12-09

## 2024-12-09 RX ORDER — METHOCARBAMOL 500 MG/1
500 TABLET, FILM COATED ORAL 3 TIMES DAILY PRN
Status: CANCELLED | OUTPATIENT
Start: 2024-12-09

## 2024-12-09 RX ORDER — ACETAMINOPHEN 325 MG/1
650 TABLET ORAL EVERY 4 HOURS PRN
Status: DISCONTINUED | OUTPATIENT
Start: 2024-12-09 | End: 2024-12-16 | Stop reason: HOSPADM

## 2024-12-09 RX ORDER — TRAZODONE HYDROCHLORIDE 50 MG/1
50 TABLET, FILM COATED ORAL
Status: DISCONTINUED | OUTPATIENT
Start: 2024-12-09 | End: 2024-12-16 | Stop reason: HOSPADM

## 2024-12-09 RX ORDER — METOPROLOL SUCCINATE 25 MG/1
25 TABLET, EXTENDED RELEASE ORAL DAILY
Status: CANCELLED | OUTPATIENT
Start: 2024-12-10

## 2024-12-09 RX ORDER — ROSUVASTATIN CALCIUM 10 MG/1
20 TABLET, COATED ORAL EVERY EVENING
Status: CANCELLED | OUTPATIENT
Start: 2024-12-09

## 2024-12-09 RX ORDER — CARBOXYMETHYLCELLULOSE SODIUM 5 MG/ML
1 SOLUTION/ DROPS OPHTHALMIC PRN
Status: DISCONTINUED | OUTPATIENT
Start: 2024-12-09 | End: 2024-12-16 | Stop reason: HOSPADM

## 2024-12-09 RX ORDER — ENOXAPARIN SODIUM 100 MG/ML
40 INJECTION SUBCUTANEOUS DAILY
Status: CANCELLED | OUTPATIENT
Start: 2024-12-09

## 2024-12-09 RX ORDER — ONDANSETRON 2 MG/ML
4 INJECTION INTRAMUSCULAR; INTRAVENOUS 4 TIMES DAILY PRN
Status: DISCONTINUED | OUTPATIENT
Start: 2024-12-09 | End: 2024-12-16 | Stop reason: HOSPADM

## 2024-12-09 RX ADMIN — EZETIMIBE 10 MG: 10 TABLET ORAL at 21:25

## 2024-12-09 RX ADMIN — KETOROLAC TROMETHAMINE 10 MG: 10 TABLET, FILM COATED ORAL at 13:30

## 2024-12-09 RX ADMIN — OMEPRAZOLE 20 MG: 20 CAPSULE, DELAYED RELEASE ORAL at 21:25

## 2024-12-09 RX ADMIN — OXYCODONE HYDROCHLORIDE 15 MG: 10 TABLET ORAL at 06:27

## 2024-12-09 RX ADMIN — LEVOTHYROXINE SODIUM 88 MCG: 0.09 TABLET ORAL at 06:20

## 2024-12-09 RX ADMIN — ESCITALOPRAM OXALATE 20 MG: 10 TABLET ORAL at 06:20

## 2024-12-09 RX ADMIN — ACETAMINOPHEN 1000 MG: 500 TABLET ORAL at 06:20

## 2024-12-09 RX ADMIN — LIOTHYRONINE SODIUM 5 MCG: 5 TABLET ORAL at 06:20

## 2024-12-09 RX ADMIN — KETOROLAC TROMETHAMINE 10 MG: 10 TABLET, FILM COATED ORAL at 06:20

## 2024-12-09 RX ADMIN — AMITRIPTYLINE HYDROCHLORIDE 100 MG: 50 TABLET, FILM COATED ORAL at 21:25

## 2024-12-09 RX ADMIN — ACETAMINOPHEN 1000 MG: 500 TABLET ORAL at 00:42

## 2024-12-09 RX ADMIN — METOPROLOL SUCCINATE 25 MG: 25 TABLET, EXTENDED RELEASE ORAL at 06:19

## 2024-12-09 RX ADMIN — OXYCODONE HYDROCHLORIDE 10 MG: 10 TABLET ORAL at 12:56

## 2024-12-09 RX ADMIN — ROSUVASTATIN CALCIUM 20 MG: 10 TABLET, FILM COATED ORAL at 21:25

## 2024-12-09 RX ADMIN — ACETAMINOPHEN 1000 MG: 500 TABLET ORAL at 13:30

## 2024-12-09 RX ADMIN — PREDNISONE 4 MG: 1 TABLET ORAL at 00:42

## 2024-12-09 RX ADMIN — ENOXAPARIN SODIUM 40 MG: 100 INJECTION SUBCUTANEOUS at 18:02

## 2024-12-09 RX ADMIN — KETOROLAC TROMETHAMINE 10 MG: 10 TABLET, FILM COATED ORAL at 00:42

## 2024-12-09 RX ADMIN — OXYCODONE HYDROCHLORIDE 15 MG: 10 TABLET ORAL at 21:24

## 2024-12-09 RX ADMIN — LIDOCAINE 2 PATCH: 4 PATCH TOPICAL at 08:30

## 2024-12-09 RX ADMIN — OXYCODONE HYDROCHLORIDE 15 MG: 10 TABLET ORAL at 09:55

## 2024-12-09 RX ADMIN — ACETAMINOPHEN 1000 MG: 500 TABLET ORAL at 18:02

## 2024-12-09 RX ADMIN — OMEPRAZOLE 20 MG: 20 CAPSULE, DELAYED RELEASE ORAL at 06:20

## 2024-12-09 RX ADMIN — PREDNISONE 4 MG: 1 TABLET ORAL at 13:30

## 2024-12-09 RX ADMIN — PREDNISONE 4 MG: 1 TABLET ORAL at 18:02

## 2024-12-09 RX ADMIN — OXYCODONE HYDROCHLORIDE 5 MG: 5 TABLET ORAL at 14:51

## 2024-12-09 RX ADMIN — KETOROLAC TROMETHAMINE 10 MG: 10 TABLET, FILM COATED ORAL at 18:02

## 2024-12-09 RX ADMIN — PREDNISONE 4 MG: 1 TABLET ORAL at 06:20

## 2024-12-09 ASSESSMENT — PATIENT HEALTH QUESTIONNAIRE - PHQ9
SUM OF ALL RESPONSES TO PHQ QUESTIONS 1-9: 12
SUM OF ALL RESPONSES TO PHQ9 QUESTIONS 1 AND 2: 4
1. LITTLE INTEREST OR PLEASURE IN DOING THINGS: MORE THAN HALF THE DAYS
5. POOR APPETITE OR OVEREATING: NOT AT ALL
9. THOUGHTS THAT YOU WOULD BE BETTER OFF DEAD, OR OF HURTING YOURSELF: NOT AT ALL
6. FEELING BAD ABOUT YOURSELF - OR THAT YOU ARE A FAILURE OR HAVE LET YOURSELF OR YOUR FAMILY DOWN: NEARLY EVERY DAY
7. TROUBLE CONCENTRATING ON THINGS, SUCH AS READING THE NEWSPAPER OR WATCHING TELEVISION: NOT AT ALL
3. TROUBLE FALLING OR STAYING ASLEEP OR SLEEPING TOO MUCH: NEARLY EVERY DAY
8. MOVING OR SPEAKING SO SLOWLY THAT OTHER PEOPLE COULD HAVE NOTICED. OR THE OPPOSITE, BEING SO FIGETY OR RESTLESS THAT YOU HAVE BEEN MOVING AROUND A LOT MORE THAN USUAL: MORE THAN HALF THE DAYS
4. FEELING TIRED OR HAVING LITTLE ENERGY: NOT AT ALL
2. FEELING DOWN, DEPRESSED, IRRITABLE, OR HOPELESS: MORE THAN HALF THE DAYS

## 2024-12-09 ASSESSMENT — PAIN DESCRIPTION - PAIN TYPE
TYPE: ACUTE PAIN
TYPE: ACUTE PAIN;CHRONIC PAIN

## 2024-12-09 ASSESSMENT — COGNITIVE AND FUNCTIONAL STATUS - GENERAL
TURNING FROM BACK TO SIDE WHILE IN FLAT BAD: A LITTLE
SUGGESTED CMS G CODE MODIFIER DAILY ACTIVITY: CJ
MOBILITY SCORE: 18
HELP NEEDED FOR BATHING: A LITTLE
DAILY ACTIVITIY SCORE: 22
DRESSING REGULAR LOWER BODY CLOTHING: A LITTLE
MOVING FROM LYING ON BACK TO SITTING ON SIDE OF FLAT BED: A LITTLE
STANDING UP FROM CHAIR USING ARMS: A LITTLE
CLIMB 3 TO 5 STEPS WITH RAILING: A LITTLE
WALKING IN HOSPITAL ROOM: A LITTLE
MOVING TO AND FROM BED TO CHAIR: A LITTLE
SUGGESTED CMS G CODE MODIFIER MOBILITY: CK

## 2024-12-09 ASSESSMENT — COPD QUESTIONNAIRES
DURING THE PAST 4 WEEKS HOW MUCH DID YOU FEEL SHORT OF BREATH: NONE/LITTLE OF THE TIME
DO YOU EVER COUGH UP ANY MUCUS OR PHLEGM?: NO/ONLY WITH OCCASIONAL COLDS OR INFECTIONS
COPD SCREENING SCORE: 2
HAVE YOU SMOKED AT LEAST 100 CIGARETTES IN YOUR ENTIRE LIFE: NO/DON'T KNOW

## 2024-12-09 ASSESSMENT — LIFESTYLE VARIABLES
CONSUMPTION TOTAL: NEGATIVE
ALCOHOL_USE: NO
AVERAGE NUMBER OF DAYS PER WEEK YOU HAVE A DRINK CONTAINING ALCOHOL: 0
HAVE YOU EVER FELT YOU SHOULD CUT DOWN ON YOUR DRINKING: NO
ON A TYPICAL DAY WHEN YOU DRINK ALCOHOL HOW MANY DRINKS DO YOU HAVE: 0
EVER_SMOKED: NEVER
EVER HAD A DRINK FIRST THING IN THE MORNING TO STEADY YOUR NERVES TO GET RID OF A HANGOVER: NO
EVER FELT BAD OR GUILTY ABOUT YOUR DRINKING: NO
HOW MANY TIMES IN THE PAST YEAR HAVE YOU HAD 5 OR MORE DRINKS IN A DAY: 0
HAVE PEOPLE ANNOYED YOU BY CRITICIZING YOUR DRINKING: NO
DOES PATIENT WANT TO STOP DRINKING: NO
TOTAL SCORE: 0

## 2024-12-09 ASSESSMENT — FIBROSIS 4 INDEX: FIB4 SCORE: 1.4

## 2024-12-09 NOTE — DISCHARGE PLANNING
Approved transfer to [facility]. [facility] is accepting T  transport arranged for  . Gillian is aware. Attending team notified.

## 2024-12-09 NOTE — FLOWSHEET NOTE
12/09/24 1314   Events/Summary/Plan   Events/Summary/Plan RT assessment   Vital Signs   Pulse 75   Respiration 16   Pulse Oximetry 94 %   $ Pulse Oximetry (Spot Check) Yes   Respiratory Assessment   Respiratory Pattern Within Normal Limits   Level of Consciousness Alert   Chest Exam   Work Of Breathing / Effort Within Normal Limits   Breath Sounds   RUL Breath Sounds Clear   RML Breath Sounds Clear   RLL Breath Sounds Clear   JOAQUÍN Breath Sounds Clear   LLL Breath Sounds Clear   Oxygen   O2 (LPM) 0   O2 Delivery Device None - Room Air

## 2024-12-09 NOTE — PROGRESS NOTES
1910 - Bedside report received from DEEJAY Self. Patient resting in bed. Call bell within reach. All belongings within reach for patient. No further needs at this time.     2100 - Patient educated on fall risk screening and bed alarm use. Patient refused bed alarm at this time.

## 2024-12-09 NOTE — PREADMISSION SCREENING NOTE
Pre-Admission Screening Form    Patient Information:   Name: Yamile Go     MRN: [id number]       : 1950      Age: 74 y.o.   Gender: female      Race: White [7]       Marital Status: Single [1]  Family Contact: Francisca Azul,Renetta Avendano        Relationship: Daughter [2]  Son-in-law [27]  Friend [5]  Home Phone:                Cell Phone: [phone]  Advanced Directives: Copy in Chart  Code Status:  FULL  Current Attending Provider: Anderson Samano M.D.  Referring Physician: Dr. Samano      Physiatrist Consult: Dr. Lux       Referral Date: 2024   Primary Payor Source:  MEDICARE  Secondary Payor Source:  [employer]ET[employer] - MISCELLANEOUS    Medical Information:   Date of Admission to Acute Care [facility]  Room Number: 2212/01  Rehabilitation Diagnosis: 0008.9 - Orthopaedic Disorders: Other Orthopaedic  Immunization History   Administered Date(s) Administered    COVID-19, mRNA, LNP-S, PF, teddy-sucrose, 30 mcg/0.3 mL 10/16/2024    MODERNA SARS-COV-2 VACCINE (12+) 2021, 2021    PFIZER SHERIDAN CAP SARS-COV-2 VACCINATION (12+) 2022    PFIZER PURPLE CAP SARS-COV-2 VACCINATION (12+) 2021     Allergies   Allergen Reactions    Gabapentin Hives and Swelling    Pregabalin Hives and Swelling    Amlodipine Swelling    Bee Swelling    Fentanyl Vomiting and Unspecified    Morphine Vomiting, Nausea and Unspecified    Benzoin Rash     Tincture of benzoin =blisters    blisters    Rifampin Unspecified     Pt turns yellow     Past Medical History:   Diagnosis Date    Anemia     Anesthesia     PONV    Arthritis     degenerative    Asthma     CAD (coronary artery disease)     cardiologist, Dr. Winkler  last visit 2024    Cataract     Dental disorder     Missing teeth right side.  Veneers top front    Depression     anxiety    Diabetes (HCC)     Disorder of thyroid     hypothyroid    Heart burn     GERD, controlled with medications    History  of vertebral fracture     T12, L1, L2    HTN (hypertension)     Hyperlipidemia     Indigestion     Migraines     Obesity     Pneumonia 2023    COVID    PONV (postoperative nausea and vomiting)     Sleep apnea     diagnosed 9/2009 CPAP -PMA; pt states she does not use CPAP and no longer has one, unable to tolerate     Past Surgical History:   Procedure Laterality Date    PB RECONSTR TOTAL SHOULDER IMPLANT Right 4/30/2024    Procedure: RIGHT REVERSE TOTAL SHOULDER ARTHROPLASTY;  Surgeon: Manuela Sparks M.D.;  Location[facility];  Service: Orthopedics    INSERTION, PERIPHERAL NERVE STIMULATOR, LOWER EXTREMITY Left 12/22/2022    Procedure: Left SCIATIC PERIPHERAL NERVE STIMULATOR IMPLANT, LOWER EXTREMITY PERIPHERAL SCIATIC NERVE;  Surgeon: Tobi Kilgore M.D.;  Location: [facility];  Service: Pain Management    ARTHROPLASTY Right 2020    ankle    LUMBAR EXPLORATION N/A 2017    COLECTOMY N/A 2007    partial for divverticulitis 2007    LUMBAR LAMINECTOMY DISKECTOMY N/A 1989    BREAST BIOPSY  1984    no cancer    ABDOMINAL HYSTERECTOMY TOTAL N/A 1978    APPENDECTOMY N/A 1976       History Leading to Admission, Conditions that Caused the Need for Rehab (CMS):     Tanner Zapata M.D.  Physician  [facility]     H&P     Signed     Date of Service: 12/6/2024  7:31 PM    Expand All Collapse All    Hospital Medicine History & Physical Note     Date of Service  12/6/2024     Primary Care Physician  Cole Yuen M.D.      Consultants  None      Code Status  Full Code     Chief Complaint    Chief Complaint  Patient presents with   Back Pain      Reports known fractures in back T12, L1, L2. States pain is out of control despite taking norco 10/325. Denies new injury. Reports numbness/tingling to LLE. Denies loss of b/b. Arrives in wheel chair.      History of Presenting Illness  Yamile Go is a 74 y.o. female with a past medical history of primary hypertension, hypothyroidism,  gastroesophageal reflux disease and mild central canal stenosis who presented 12/6/2024 with worsening low back pain.  Patient recently had a fall last week and was found to have L1, L2 fractures.  Since then the patient has been having progressively worsening lower back pain.  Patient also reports having chronic weakness of the left leg and believes it is slightly worse.  She denies having fecal or urinary incontinence.  She denies any fevers and chills.     I discussed the plan of care with emergency physician, the patient and patient family present at bedside in the emergency room   Assessment/Plan:  Justification for Admission Status  I anticipate this patient is appropriate for observation status at this time because the patient has intractable low back pain and worsening weakness of the left lower extremity.  Will require multimodal pain control and further workup with MRI of the L-spine.     Patient will need a Med/Surg bed on MEDICAL service.       * Intractable low back pain- (present on admission)  Assessment & Plan  I will start multimodal pain control approach using:  Acetaminophen  Robaxin for muscle relaxation  Escitalopram and amitriptyline  Local heat application   Local Voltaren gel  Given patient worsening left lower extremity weakness and known history of mild spinal canal stenosis on prior MRI October 2024, I will check an MRI of the L-spine for further evaluation.  The patient is open for kyphoplasty if there is potential benefit     Lumbar canal stenosis- (present on admission)  Assessment & Plan  Prior MRI October 2024 shows mild spinal canal stenosis   Patient is presenting with worsening low back pain and worsening left lower extremity weakness  I will check an MRI of the L-spine for further evaluation      Primary hypertension- (present on admission)  Assessment & Plan  I will start lisinopril and metoprolol with hold parameters     Type 2 diabetes mellitus (HCC)- (present on  admission)  Assessment & Plan  With no significant hyperglycemia  Last glycated hemoglobin was 6.3%  I will start short acting insulin for now  I will order Accu-Checks, hypoglycemia protocol  Adjust according to blood sugars trend      CAD (coronary artery disease)- (present on admission)  Assessment & Plan  I resume home metoprolol, rosuvastatin     ACP (advance care planning)- (present on admission)  Assessment & Plan  I had a discussion with the patient and family [daughter present at bedside in the emergency room] regarding goals of care, diagnoses, prognosis, and CODE STATUS. We discussed her prognosis and comorbidities.  The patient has advanced age of 74 years.  She has a number of chronic medical problems including primary hypertension, hyperlipidemia, diabetes and chronic low back pain.  She is presenting with acute on chronic low back pain.  At this point the patient wants to continue with a full code.  She is open to all forms of invasive or noninvasive diagnostic and therapeutic interventions, including further workup with MRI and consideration of kyphoplasty for pain control if appropriate.        Mixed hyperlipidemia- (present on admission)  Assessment & Plan  Cardiac diet.  I will start rosuvastatin and ezetimibe      Acquired hypothyroidism- (present on admission)  Assessment & Plan  I resume home levothyroxine     Depression- (present on admission)  Assessment & Plan  I will resume home escitalopram and amitriptyline        VTE prophylaxis: SCDs/TEDs and enoxaparin ppx     I had a discussion with the patient and family [daughter present at bedside in the emergency room] regarding goals of care, diagnoses, prognosis, and CODE STATUS. We discussed her prognosis and comorbidities.  The patient has advanced age of 74 years.  She has a number of chronic medical problems including primary hypertension, hyperlipidemia, diabetes and chronic low back pain.  She is presenting with acute on chronic low back  pain.  At this point the patient wants to continue with a full code.  She is open to all forms of invasive or noninvasive diagnostic and therapeutic interventions, including further workup with MRI and consideration of kyphoplasty for pain control if appropriate.  I spent 16 minutes on advanced care planning.      jim Samano M.D.  Physician  [facility] Medicine     Progress Notes     Signed     Date of Service: 12/8/2024  4:53 PM      Hospital Medicine Daily Progress Note     Date of Service  12/8/2024     Chief Complaint  Yamile Go is a 74 y.o. female admitted 12/6/2024 with lower back pain and decreased mobility     Hospital Course  74-year-old female with a past medical history of hypothyroidism, hypertension, GERD, mild L3-L4 central canal stenosis admitted on 12/6/2024 for worsening spinal back pains.  She unfortunately had a ground-level fall the week prior and suffered a T12, L1, L2 vertebral fractures. 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     Of note at [facility] 9/20/2024, patient was diagnosed with a T5 compression fracture, diagnosed with chronic opiate dependence, COPD, CAD.  CT pelvis showed impacted left subcapital femoral neck fracture.  There is CT lumbar showing L3-4 severe spinal stenosis, L3 foraminal stenosis, moderate central canal stenosis to L4-5, L4 severe foraminal stenosis, ankylosis at L5-S6.  Thoracic showed hemangioma at T7, 25% T5 superior endplate compression fracture.  Patient had a left hemiarthroplasty by Dr. Liu (9/20/24).     MRI on 10/19/2024 showing L4-5 grade 1 spondylolisthesis.  T12 superior endplate compression deformity.  Postoperative right-sided lumbar laminectomy to L4-L5.  Mentioned L3-L4 mild central canal stenosis and consistent L4-L5 left foraminal stenosis.     CT lumbar on 11/29/2024 showed acute 20% osteoporotic compression fracture of L1 and L2 vertebral bodies.  As per ER Dr. Araujo note, patient was to be contacted by  IR for a kyphoplasty.     Interval Problem Update  12/8:  ? Pt stated she still had severe pain.  I increased her pain regimen.  ? Working with PT and OT  ? No surgery at this time during this hospitalization.  Potential plan for outpatient corrective surgery at [facility].  ? Continue LSO brace     12/7:  ? Patient stated she still has lower back pain.  She has decreased sensation to the left leg.  Denied any urinary or stool incontinence.  ? Patient is unable to work, she is a nurse at hospitals.     I have discussed this patient's plan of care and discharge plan at IDT rounds today with Case Management, Nursing, Nursing leadership, and other members of the IDT team.       [facility] Medicine Daily Progress Note     Date of Service  12/8/2024     Chief Complaint  Yamile Go is a 74 y.o. female admitted 12/6/2024 with lower back pain and decreased mobility     Hospital Course  74-year-old female with a past medical history of hypothyroidism, hypertension, GERD, mild L3-L4 central canal stenosis admitted on 12/6/2024 for worsening spinal back pains.  She unfortunately had a ground-level fall the week prior and suffered a T12, L1, L2 vertebral fractures. 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     Of note at [facility] 9/20/2024, patient was diagnosed with a T5 compression fracture, diagnosed with chronic opiate dependence, COPD, CAD.  CT pelvis showed impacted left subcapital femoral neck fracture.  There is CT lumbar showing L3-4 severe spinal stenosis, L3 foraminal stenosis, moderate central canal stenosis to L4-5, L4 severe foraminal stenosis, ankylosis at L5-S6.  Thoracic showed hemangioma at T7, 25% T5 superior endplate compression fracture.  Patient had a left hemiarthroplasty by Dr. Liu (9/20/24).     MRI on 10/19/2024 showing L4-5 grade 1 spondylolisthesis.  T12 superior endplate compression deformity.  Postoperative right-sided lumbar laminectomy to L4-L5.  Mentioned L3-L4  mild central canal stenosis and consistent L4-L5 left foraminal stenosis.     CT lumbar on 11/29/2024 showed acute 20% osteoporotic compression fracture of L1 and L2 vertebral bodies.  As per ER Dr. Araujo note, patient was to be contacted by IR for a kyphoplasty.     Interval Problem Update  12/8:  ? Pt stated she still had severe pain.  I increased her pain regimen.  ? Working with PT and OT  ? No surgery at this time during this hospitalization.  Potential plan for outpatient corrective surgery at [facility].  ? Continue LSO brace     12/7:  ? Patient stated she still has lower back pain.  She has decreased sensation to the left leg.  Denied any urinary or stool incontinence.  ? Patient is unable to work, she is a nurse at [employer].     I have discussed this patient's plan of care and discharge plan at IDT rounds today with Case Management, Nursing, Nursing leadership, and other members of the IDT team.     Consultants/Specialty  Spinal surgery/neurosurgery     Code Status  Full Code     Disposition  The patient is not medically cleared for discharge to home or a post-acute facility.  Anticipate discharge to: an inpatient rehabilitation hospital     I have placed the appropriate orders for post-discharge needs.     Review of Systems  Review of Systems   Constitutional:  Positive for malaise/fatigue.   Musculoskeletal:  Positive for back pain and falls.   Neurological:  Positive for sensory change (Left leg) and weakness.   All other systems reviewed and are negative.        Physical Exam  Temp:  [36.2 °C (97.2 °F)-36.7 °C (98 °F)] 36.7 °C (98 °F)  Pulse:  [73-85] 83  Resp:  [15-18] 16  BP: ()/(42-68) 128/57  SpO2:  [92 %-98 %] 94 %     Physical Exam  Vitals and nursing note reviewed.   Constitutional:       General: She is not in acute distress.     Appearance: Normal appearance. She is not ill-appearing.   HENT:      Head: Normocephalic and atraumatic.   Eyes:      General: No scleral icterus.      Extraocular Movements: Extraocular movements intact.   Cardiovascular:      Rate and Rhythm: Normal rate.      Pulses: Normal pulses.      Heart sounds: Normal heart sounds. No murmur heard.  Pulmonary:      Effort: Pulmonary effort is normal. No respiratory distress.      Breath sounds: Normal breath sounds.   Abdominal:      General: Abdomen is flat. Bowel sounds are normal. There is no distension.      Palpations: Abdomen is soft.   Musculoskeletal:         General: No swelling or tenderness. Normal range of motion.      Cervical back: Normal range of motion and neck supple.   Skin:     General: Skin is warm.      Capillary Refill: Capillary refill takes less than 2 seconds.      Coloration: Skin is not jaundiced.      Findings: No erythema.   Neurological:      Mental Status: She is alert and oriented to person, place, and time. Mental status is at baseline.      Sensory: Sensory deficit (decreased LLE sensation) present.      Motor: Weakness (BLE) present.      Deep Tendon Reflexes: Reflexes normal.   Psychiatric:         Mood and Affect: Mood normal.         Behavior: Behavior normal.         Thought Content: Thought content normal.         Judgment: Judgment normal.       ADDENDUM:  ? I discussed with Dr. Jb Park ([facility])  ? Recommended no surgery at this time, degenerative changes needs to be seen in outpatient clinic. Process for a spinal surgery would need careful consideration then.  ? PT/OT  ? Pain control  ? Avoid kyphoplasty, may interfere with future spinal surgery.  ? Prednisone 4mg Q6H  ? Ketorlac     12/8:  Same plan as primary problem     Lumbosacral radiculopathy due to degenerative joint disease of spine- (present on admission)  Assessment & Plan  Continue pain regimen  PT recommending post acute placement  Pending OT     Lumbar spine instability- (present on admission)  Assessment & Plan  Pt has Lumbar support orthotic.  continue     ACP (advance care planning)-  (present on admission)  Assessment & Plan  FULL CODE  Pt agreed for surgery if she requires     Lumbar canal stenosis- (present on admission)  Assessment & Plan  Prior MRI October 2024 shows mild spinal canal stenosis   ? Patient is presenting with worsening low back pain and worsening left lower extremity weakness  ? Pt has significant deformity of lumbar spine     Mixed hyperlipidemia- (present on admission)  Assessment & Plan  Cardiac diet.  Continue home rosuvastatin and ezetimibe     Primary hypertension- (present on admission)  Assessment & Plan  Continue lisinopril     Type 2 diabetes mellitus (HCC)- (present on admission)  Assessment & Plan  With no significant hyperglycemia  Last glycated hemoglobin was 6.3%  Continue insulin sliding scale, Accu-Cheks and hypoglycemia protocol     Acquired hypothyroidism- (present on admission)  Assessment & Plan  Continue levothyroxine and liothyronine     Depression- (present on admission)  Assessment & Plan  Continue home lexapro and Elavil     CAD (coronary artery disease)- (present on admission)  Assessment & Plan  Continue metoprolol and atorvastatin           VTE prophylaxis:    enoxaparin ppx        I have performed a physical exam and reviewed and updated ROS and Plan today (12/8/2024). In review of yesterday's note (12/7/2024), there are no changes except as documented above.        Total time spent 51 minutes. I spent greater than 50% of the time for patient care, including unit/floor time, multiple face-to-face encounters with the patient, counseling on treatment plan and discussion with bedside RN.  Further, I spent time on my own review of patient's overnight events, RN notes, imaging and lab analysis, and developing my assessment and plan above.  In addition, working with , social workers, and Charge RN on case coordination on this date.     This note was generated using voice recognition software which has a chance of producing errors of grammar  and content.  I have made every reasonable attempt to find and correct any errors, but it should be expected that some may not be found prior to finalization of this note.      Co-morbidities:  as listed above and below   Potential Risk - Complications: Contractures, Deep Vein Thrombosis, Incontinence, Pain, Perceptual Impairment, Pneumonia, and fall risk   Level of Risk: High    Ongoing Medical Management Needed (Medical/Nursing Needs):   Patient Active Problem List    Diagnosis Date Noted    Lumbar spine instability 12/08/2024    Lumbosacral radiculopathy due to degenerative joint disease of spine 12/08/2024    Compression fracture of L1 lumbar vertebra, sequela 12/07/2024    Intractable low back pain 12/06/2024    Primary hypertension 12/06/2024    Mixed hyperlipidemia 12/06/2024    Lumbar canal stenosis 12/06/2024    ACP (advance care planning) 12/06/2024    Hyponatremia 05/07/2024    Acute respiratory failure (HCC) 05/03/2024    Near syncope 05/03/2024    Pancolitis ([facility]) 05/03/2024    S/P shoulder surgery 05/03/2024    Lactic acidosis 05/03/2024    Rectal bleeding 05/03/2024    GIB (gastrointestinal bleeding) 05/03/2024    SIRS (systemic inflammatory response syndrome) ([facility]) 05/02/2024    Acute asthma exacerbation 05/03/2023    Type 2 diabetes mellitus ([facility]) 05/03/2023    Hypokalemia 05/03/2023    Sinus tachycardia 05/03/2023    Non-traumatic compression fracture of T5 thoracic vertebra, sequela 05/03/2023    Hyperglycemia 03/30/2022    Thrombocytopenia ([facility]) 03/29/2022    Azotemia 03/29/2022    Vitamin D deficiency 03/29/2022    Asthma 03/29/2022    Closed compression fracture of L2 lumbar vertebra, initial encounter ([facility]) 03/23/2022    Physical debility 03/22/2022    Personal history of COVID-19 03/21/2022    Closed fracture of lumbar vertebra, unspecified fracture morphology, initial encounter ([facility]) 03/21/2022    Leukocytosis 03/21/2022    Acute hypoxemic respiratory failure due to COVID-19 ([facility]) 02/06/2022  "   Community acquired pneumonia 02/06/2022    Sepsis (HCC) 02/06/2022    Acquired hypothyroidism 02/06/2022    Chronic pain syndrome on chronic opioids 02/06/2022    Class 1 obesity due to excess calories with serious comorbidity and body mass index (BMI) of 32.0 to 32.9 in adult     Depression     Other chest pain 01/30/2012    Dyslipidemia 01/30/2012    Essential hypertension, benign 01/30/2012    CAD (coronary artery disease) 01/30/2012       Current Vital Signs:   Temperature: 36.1 °C (97 °F) Pulse: 83 Respiration: 18 Blood Pressure : 133/70  Weight: 77 kg (169 lb 12.1 oz) Height: 157.5 cm (5' 2.01\")  Pulse Oximetry: 92 % O2 (LPM): 0      Completed Laboratory Reports:  Recent Labs     12/06/24 2030 12/07/24  1007 12/08/24  0201   WBC 8.6 8.7 5.9   HEMOGLOBIN 10.9* 10.4* 9.3*   HEMATOCRIT 35.0* 34.0* 30.1*   PLATELETCT 235 220 181   SODIUM 138 140 137   POTASSIUM 3.7 4.3 4.4   BUN 14 13 17   CREATININE 0.60 0.68 0.58   ALBUMIN 3.5 3.3 2.6*   GLUCOSE 95 95 130*     Additional Labs: Not Applicable    Prior Living Situation:   Housing / Facility: [address] [facility]  Lives with - Patient's Self Care Capacity: Alone and Able to Care For Self  Equipment Owned: Tub / Shower Seat, Front-Wheel Walker, 4-Wheel Walker, Single Point Cane, Sock Aid, Reacher    Prior Level of Function / Living Situation:   Physical Therapy: Prior Services: Intermittent Physical Support for ADL Per Family, Housekeeping / Homemaker Services  Housing / Facility: [address] [place] [address]  Bathroom Set up: Bathtub / Shower Combination, Shower Chair  Equipment Owned: Tub / Shower Seat, Front-Wheel Walker, 4-Wheel Walker, Single Point Cane, Sock Aid, Reacher  Lives with - Patient's Self Care Capacity: Alone and Able to Care For Self  Bed Mobility: Independent  Transfer Status: Independent  Ambulation: Independent  Assistive Devices Used: None  Stairs: Independent  Current Level of Function:   Gait Level Of Assist: Standby Assist  Assistive Device: Front Wheel " Walker  Distance (Feet): 50  Deviation: Step To, Bradykinetic  # of Stairs Climbed: 0  Supine to Sit: Standby Assist  Sit to Supine: Standby Assist  Scooting: Supervised  Rolling: Supervised (to left)  Comments: logroll  Sit to Stand: Standby Assist  Bed, Chair, Wheelchair Transfer: Contact Guard Assist  Toilet Transfers: Contact Guard Assist  Transfer Method: Stand Step  Sitting in Chair: 6 (toilet)  Sitting Edge of Bed: 13  Standin  Occupational Therapy:   Self Feeding: Independent  Grooming / Hygiene: Independent  Bathing: Independent  Dressing: Independent  Toileting: Independent  Medication Management: Independent  Laundry: Requires Assist  Kitchen Mobility: Independent  Finances: Independent  Home Management: Requires Assist  Shopping: Requires Assist  Prior Level Of Mobility: Independent Without Device in Home, Independent With Device in Home  Driving / Transportation: Driving Independent  Prior Services: Intermittent Physical Support for ADL Per Family, Housekeeping / Homemaker Services  Housing / Facility: [address] [place] [address]  Occupation (Pre-Hospital Vocational): Other (Comments) (pt is a nurse at [employer];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 Therapeutic Interventions Required: Maximize Endurance, Mobility, Strength, and Safety  Occupational Therapy: 1.5 hr / day 5 days / week. Therapeutic Interventions Required: Maximize Self Care, ADLs, IADLs, and Energy Conservation  Rehabilitation Nursin/7. Therapeutic Interventions Required: Monitor Pain, Skin, Vital Signs, Intake and  Output, Labs, Safety, and Family Training  Rehabilitation Physician: 3 - 5 days / week. Therapeutic Interventions Required: Medical Management  Respiratory Care: evaluate . Therapeutic Interventions Required: Pulmonary Toileting  Dietician: consult. Therapeutic Interventions Required: nutritional need     She requires 24-hour rehabilitation nursing to manage bowel and bladder function, skin care, nutrition and fluid intake, pulmonary hygiene, pain control, safety, medication management, and patient/family goals. In addition, rehabilitation nursing will reiterate and reinforce therapy skills and equipment use, including ADLs, as well as provide education to the patient and family. Yamile Go is willing to participate in and is able to tolerate the proposed plan of care.    Rehabilitation Goals and Plan (Expected frequency & duration of treatment in the IRF):   Return to the Community, Modified Independent Level of Care, and Outpatient Support  Anticipated Date of Rehabilitation Admission: 12/09/2024  Patient/Family oriented IRF level of care/facility/plan: Yes  Patient/Family willing to participate in IRF care/facility/plan: Yes  Patient able to tolerate IRF level of care proposed: Yes  Patient has potential to benefit IRF level of care proposed: Yes  Comments: Not Applicable    Special Needs or Precautions - Medical Necessity:  Safety Concerns/Precautions:  Fall Risk / High Risk for Falls and Balance  Pain Management  Splints/Braces/Orthotics: LSO  Current Medications:    Current Facility-Administered Medications Ordered in Epic   Medication Dose Route Frequency Provider Last Rate Last Admin    HYDROmorphone (Dilaudid) injection 0.5 mg  0.5 mg Intravenous Q4HRS PRN Anderson Samano M.D.        SUMAtriptan (Imitrex) tablet 100 mg  100 mg Oral Once PRN Anderson Samano M.D.        diclofenac sodium (Voltaren) 1 % gel 4 g  4 g Topical 4X/DAY PRN Anderson Samano M.D.   4 g at 12/08/24 2118    lidocaine  (Asperflex) 4 % patch 2 Patch  2 Patch Transdermal Q24HR Anderson Samano M.D.   2 Patch at 12/08/24 0852    acetaminophen (Tylenol) tablet 1,000 mg  1,000 mg Oral Q6HRS Anderson Samano M.D.   1,000 mg at 12/09/24 0620    oxyCODONE immediate-release (Roxicodone) tablet 7.5 mg  7.5 mg Oral Q3HRS PRKRISTI Samano M.D.   7.5 mg at 12/08/24 2141    Or    oxyCODONE immediate-release (Roxicodone) tablet 15 mg  15 mg Oral Q3HRS PRN Anderson Samano M.D.   15 mg at 12/09/24 0955    Or    HYDROmorphone (Dilaudid) injection 0.5 mg  0.5 mg Intravenous Q3HRS PRN Anderson Samano M.D.   0.5 mg at 12/08/24 0952    predniSONE (Deltasone) tablet 4 mg  4 mg Oral Q6HRS Anderson Samano M.D.   4 mg at 12/09/24 0620    ketorolac (Toradol) tablet 10 mg  10 mg Oral Q6HRS Anderson Samano M.D.   10 mg at 12/09/24 0620    senna-docusate (Pericolace Or Senokot S) 8.6-50 MG per tablet 2 Tablet  2 Tablet Oral Q EVENING Tanner Zapata M.D.        And    polyethylene glycol/lytes (Miralax) Packet 1 Packet  1 Packet Oral QDAY PRN Tanner Zapata M.D.        enoxaparin (Lovenox) inj 40 mg  40 mg Subcutaneous DAILY AT 1800 Asemartin Zapata M.D.   40 mg at 12/07/24 1741    Pharmacy Consult Request ...Pain Management Review 1 Each  1 Each Other PHARMACY TO DOSE Tanner Zapata M.D.        ondansetron (Zofran) syringe/vial injection 4 mg  4 mg Intravenous Q4HRS PRN Tanner Zapata M.D.        ondansetron (Zofran ODT) dispertab 4 mg  4 mg Oral Q4HRS PRN Tanner Zapata M.D.        insulin lispro (HumaLOG,AdmeLOG) subcutaneous injection  1-6 Units Subcutaneous 4X/DAY ACHS Tanner Zapata M.D.        And    dextrose 50% (D50W) injection 25 g  25 g Intravenous Q15 MIN PRN Tanner Zapata M.D.        albuterol inhaler 1-2 Puff  1-2 Puff Inhalation Q4HRS PRN Tanner Zapata M.D.        amitriptyline (Elavil) tablet 100 mg  100 mg Oral Nightly Tanner Zapata M.D.   100 mg at 12/08/24 2115    escitalopram (Lexapro) tablet  20 mg  20 mg Oral DAILY Asem A Jodi M.D.   20 mg at 12/09/24 0620    ezetimibe (Zetia) tablet 10 mg  10 mg Oral Q EVENING Asem A Jodi M.D.   10 mg at 12/08/24 1635    liothyronine (Cytomel) tablet 5 mcg  5 mcg Oral DAILY Asem A Jodi M.D.   5 mcg at 12/09/24 0620    [Held by provider] lisinopril (Prinivil) tablet 2.5 mg  2.5 mg Oral DAILY Asem A Jodi M.D.   2.5 mg at 12/07/24 0547    metoprolol SR (Toprol XL) tablet 25 mg  25 mg Oral DAILY Asem A CASEY Zapata.D.   25 mg at 12/09/24 0619    omeprazole (PriLOSEC) capsule 20 mg  20 mg Oral BID Asem A CASEY Zapata.D.   20 mg at 12/09/24 0620    rosuvastatin (Crestor) tablet 20 mg  20 mg Oral Q EVENING Asem A Jodi M.D.   20 mg at 12/08/24 1635    levothyroxine (Synthroid) tablet 88 mcg  88 mcg Oral DAILY Asem CASEY Hood.D.   88 mcg at 12/09/24 0620    methocarbamol (Robaxin) tablet 500 mg  500 mg Oral TID PRN Asem LEONID Zapata M.D.   500 mg at 12/07/24 1620     No current [facility]-ordered outpatient medications on file.     Diet:   DIET ORDERS (From admission to next 24h)       Start     Ordered    12/06/24 1933  Diet Order Diet: Cardiac; Second Modifier: (optional): Consistent CHO (Diabetic)  ALL MEALS        Question Answer Comment   Diet: Cardiac    Second Modifier: (optional) Consistent CHO (Diabetic)        12/06/24 1932                    Anticipated Discharge Destination / Patient/Family Goal:  Destination: Home with Assistance Support System: Family   Anticipated home health services: Not Applicable  Previously used  service/ provider: Not Applicable  Anticipated DME Needs: Walker  Outpatient Services: PT  Alternative resources to address additional identified needs:   No future appointments.    Kareem Desai  Clinical Admissions Coordinator     Discharge Planning     Signed     Date of Service: 12/9/2024  9:40 AM       Spoke with Gillian about goals and expectation for IRF level of care. Discussed therapy plan for 3 hours per day 5 days  a week. Discussed therapy schedules 30/45 or 60 minute sessions typically 1.5 hours between breakfast and lunch and another 1.5 hours between lunch and dinner. Discussed PT/OT and SLP roles. Discussed potential length of stay. Discussed comprehensive medical surveillance by physiatry as well as hospitalist team. Discussed insurance Medicare coverage for the program. Gillian verified that Medicare is primary. Physiatry to consult per protocol.      Will need OP follow up with Randle .         Pre-Screen Completed: 12/9/2024 10:19 AM Kareem Desai

## 2024-12-09 NOTE — THERAPY
"Occupational Therapy   Initial Evaluation     Patient Name: Yamile Go  Age:  74 y.o., Sex:  female  Medical Record #: [id number]  Today's Date: 12/8/2024     Precautions  Precautions: (P) Spinal / Back Precautions , Lumbosacral Orthosis, Fall Risk    Assessment  Patient is 74 y.o. female admitted with intractable LBP. Extensive medical hx including multiple falls (most recent 10 days ago resulting in T12,L1,L2 vertebral fractures), T5 compression fracture, L subcapital femoral neck fracture, mild L3-4 central canal stenosis, COPD, CAD, opiate dependence. Review of spinal prec's, DME/AE use during ADL's. Pt is limited by RLE pain LBP, decreased functional mobility, impaired balance, decreased activity tolerance. Tolerates toileting in br, grooming at sink, LB dressing using AE, ambulation using FWW; see below for CLOF. Lives alone; 92 y/o aunt has been staying with pt since fall to help with IADL's. Pt is a nurse at [employer]; unable to work. Awaiting recommendations/plan for spinal fractures. OT will follow while in house.        Plan  Occupational Therapy Initial Treatment Plan   Treatment Interventions: (P) Self Care / Activities of Daily Living, Neuro Re-Education / Balance, Therapeutic Activity  Treatment Frequency: (P) 3 Times per Week  Duration: (P) Until Therapy Goals Met    DC Equipment Recommendations: (P) Unable to determine at this time  Discharge Recommendations: (P) Other - (Post acute inpt rehab vs )     Subjective  Agreeable. \"I wish I knew the plan\", re possible spinal surgery.     Objective   12/08/24 1607   Prior Living Situation   Prior Services Intermittent Physical Support for ADL Per Family;Housekeeping / Homemaker Services   Housing / Facility [address]   Bathroom Set up Bathtub / Shower Combination;Shower Chair   Equipment Owned Tub / Shower Seat;Front-Wheel Walker;4-Wheel Walker;Single Point Cane;Sock Aid;Reacher   Lives with - Patient's Self Care Capacity Alone and Able to Care For " "Self   Comments 90 y/o aunt who is \"very spry and indep\" per pt has been staying with pt helping with IADL's. Pt with fall hx, extensive medical hx. Dtr i town but unavailable to help much.   Prior Level of ADL Function   Self Feeding Independent   Grooming / Hygiene Independent   Bathing Independent   Dressing Independent   Toileting Independent   Prior Level of IADL Function   Medication Management Independent   Laundry Requires Assist   Kitchen Mobility Independent   Finances Independent   Home Management Requires Assist   Shopping Requires Assist   Prior Level Of Mobility Independent Without Device in Home;Independent With Device in Home   Driving / Transportation Driving Independent   Occupation (Pre-Hospital Vocational) Other (Comments)  (pt is a nurse at [employer];states she needs to get back to work)   Leisure Interests Unable To Determine At This Time   History of Falls   History of Falls Yes   Precautions   Precautions Spinal / Back Precautions ;Lumbosacral Orthosis;Fall Risk   Vitals   O2 Delivery Device None - Room Air   Pain 0 - 10 Group   Location Leg;Back   Location Orientation Right;Lateral;Lower   Therapist Pain Assessment Prior to Activity;3;During Activity;7;Post Activity;4;Nurse Notified   Cognition    Cognition / Consciousness WDL   Level of Consciousness Alert   Comments tearful due to undetermined plan re spinal surgery. support offered.   Passive ROM Upper Body   Passive ROM Upper Body WDL   Active ROM Upper Body   Active ROM Upper Body  X   Dominant Hand Right   Comments R TSA in 4/24; limited shldr flex   Strength Upper Body   Upper Body Strength  WDL   Sensation Upper Body   Upper Extremity Sensation  WDL   Upper Body Muscle Tone   Upper Body Muscle Tone  WDL   Coordination Upper Body   Coordination WDL   Balance Assessment   Sitting Balance (Static) Fair +   Sitting Balance (Dynamic) Fair   Standing Balance (Static) Fair   Standing Balance (Dynamic) Fair -   Weight Shift Sitting Fair   Weight " Shift Standing Fair   Bed Mobility    Supine to Sit Standby Assist   Sit to Supine Standby Assist   Comments logroll   ADL Assessment   Grooming Contact Guard Assist;Standing   Upper Body Dressing   (pt able to manage LSO donning/doffing)   Lower Body Dressing Minimal Assist   Toileting Standby Assist   Comments uses reacher   How much help from another person does the patient currently need...   Putting on and taking off regular lower body clothing? 3   Bathing (including washing, rinsing, and drying)? 3   Toileting, which includes using a toilet, bedpan, or urinal? 4   Putting on and taking off regular upper body clothing? 4   Taking care of personal grooming such as brushing teeth? 4   Eating meals? 4   6 Clicks Daily Activity Score 22   Functional Mobility   Sit to Stand Standby Assist   Bed, Chair, Wheelchair Transfer Contact Guard Assist   Toilet Transfers Contact Guard Assist   Transfer Method Stand Step   Comments FWW, Extra time.   Visual Perception   Visual Perception  Not Tested   Activity Tolerance   Sitting in Chair 6  (toilet)   Sitting Edge of Bed 13   Standing 8   Short Term Goals   Short Term Goal # 1 LB dressing with SBA, AE within 5 days   Short Term Goal # 2 Toileting in br with Mod Indep within 5 days   Short Term Goal # 3 Grooming at sink with Spv/Mod Indep within 5 days   Short Term Goal # 4 ADL transfers with Mod Indep within 5 days   Education Group   Education Provided Spinal Precautions;Home Safety;Transfers;Activities of Daily Living;Adaptive Equipment   Role of Occupational Therapist Patient Response Patient;Acceptance;Explanation;Verbal Demonstration   Spinal Precautions Patient Response Patient;Acceptance;Explanation;Verbal Demonstration   Home Safety Patient Response Patient;Acceptance;Explanation;Verbal Demonstration   Transfers Patient Response Patient;Acceptance;Explanation;Verbal Demonstration;Action Demonstration   ADL Patient Response Patient;Acceptance;Explanation;Verbal  Demonstration;Action Demonstration   Adaptive Equipment Patient Response Patient;Acceptance;Explanation;Action Demonstration   Occupational Therapy Initial Treatment Plan    Treatment Interventions Self Care / Activities of Daily Living;Neuro Re-Education / Balance;Therapeutic Activity   Treatment Frequency 3 Times per Week   Duration Until Therapy Goals Met   Problem List   Problem List Decreased Active Daily Living Skills;Decreased Homemaking Skills;Decreased Functional Mobility;Decreased Activity Tolerance;Impaired Postural Control / Balance   Anticipated Discharge Equipment and Recommendations   DC Equipment Recommendations Unable to determine at this time   Discharge Recommendations Other -  (Post acute inpt rehab vs )   Interdisciplinary Plan of Care Collaboration   IDT Collaboration with  Nursing;Certified Nursing Assistant   Patient Position at End of Therapy In Bed;Bed Alarm On;Call Light within Reach;Tray Table within Reach;Phone within Reach   Collaboration Comments OT Candido. DC planning; possible spinal surgery yet TBD.

## 2024-12-09 NOTE — DISCHARGE PLANNING
Case Management Discharge Planning    Admission Date: 12/6/2024  GMLOS: 3.5  ALOS: 1    6-Clicks ADL Score: 22  6-Clicks Mobility Score: 18      Anticipated Discharge Dispo: Discharge Disposition: Disch to  rehab facility or distinct part unit (62)  Discharge Address: [address]   [address]   [place] [address]    DME Needed: No    Action(s) Taken: Updated Provider/Nurse on Discharge Plan    Pt discussed in 0815 rounds. Per MD, pt is unable to walk and will be needing placement. Pt is observation and does not qualify for SNF.     Chart reviewed, PT/OT reported pt is standby assist. Spoke with Charge RN, discussed rehab vs HH.     1045-  Received message from rehab coordinator. Pt has been accepted to rehab and will be picked up today between .   MD not present in rounds.     Met with pt at bedside to notify. Obtained signature on Cobra.     1107-  Received message from rehab coordinator inquiring if transport can be sent now.     1120-  Spoke with RN, who stated she would be able to get pt ready. Notified rehab coordinator.     Notified MD, requested discharge order.     MAR and facesheets printed, cobra completed. Packet provided to RN.       Escalations Completed: None    Medically Clear: Yes    Next Steps: LSW to follow    Barriers to Discharge: None    Is the patient up for discharge tomorrow: No

## 2024-12-09 NOTE — DISCHARGE SUMMARY
Discharge Summary    CHIEF COMPLAINT ON ADMISSION  Chief Complaint   Patient presents with    Back Pain     Reports known fractures in back T12, L1, L2. States pain is out of control despite taking norco 10/325. Denies new injury. Reports numbness/tingling to LLE. Denies loss of b/b. Arrives in wheel chair.        Reason for Admission  Back Pain    Admission Date  12/6/2024     CODE STATUS  Full Code    HPI & HOSPITAL COURSE  This is a 74-year-old female with a past medical history of hypothyroidism, hypertension, GERD, mild L3-L4 central canal stenosis admitted on 12/6/2024 for worsening spinal back pains.  She unfortunately had a ground-level fall the week prior and suffered a T12, L1, L2 vertebral fractures. 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    Of note at [facility] 9/20/2024, patient was diagnosed with a T5 compression fracture, diagnosed with chronic opiate dependence, COPD, CAD.  CT pelvis showed impacted left subcapital femoral neck fracture.  There is CT lumbar showing L3-4 severe spinal stenosis, L3 foraminal stenosis, moderate central canal stenosis to L4-5, L4 severe foraminal stenosis, ankylosis at L5-S6.  Thoracic showed hemangioma at T7, 25% T5 superior endplate compression fracture.  Patient had a left hemiarthroplasty by Dr. Liu (9/20/24).    MRI on 10/19/2024 showing L4-5 grade 1 spondylolisthesis.  T12 superior endplate compression deformity.  Postoperative right-sided lumbar laminectomy to L4-L5.  Mentioned L3-L4 mild central canal stenosis and consistent L4-L5 left foraminal stenosis.    CT lumbar on 11/29/2024 showed acute 20% osteoporotic compression fracture of L1 and L2 vertebral bodies.  As per ER Dr. Araujo note, patient was to be contacted by IR for a kyphoplasty.        CT scan from 11/29/2024 showing acute osteoporotic 20% L1 and L2 vertebral fracture.  Patient has spondylolisthesis and scoliosis to the left.          On new Lumbar MRI compared to 09/2018  MRI lumbar.    T12 and L1 compression fractures, amenable to kyphoplasty  Lumbar scoliosis with convexity to the left, there is retrolisthesis, degenerative anterolisthesis of L4-L5.  Grade 1 spondylolisthesis  Prior laminectomy to L3-4    I discussed with Dr. Jb Park ([facility])  Dr. Park recommended no surgery at this time, degenerative changes needs to be seen in outpatient clinic. Process for a spinal surgery would need careful consideration then.  Continue extensive pain control.  Avoid kyphoplasty, may interfere with future spinal surgery.  Continue Prednisone 4mg Q6H and Ketorlac.    Patient is meeting needs for inpatient hospitalization.  Ms. Go has ongoing severe lumbar pain, worse when attempting to stand up. She is not at her baseline for her functional status.  She is unable to stand to get to the bathroom. She will not be able to function at home alone (no family but has close friends). She has needed higher oxycodone doses to help mitigate her lumbago.  She has required IV dilaudid to control pain. She had severe hypotension the last two days and needed IVF.  Despite multiple medications including opioids, steroids, ketorlac, topical pain medication, IV opioids, physical therapy Ms. Go is not regaining her baseline function.  She requires further extensive PT/OT that home health cannot provide.  She has a lumbosacral support orthotic brace, she should continue LSO.     She lives alone and is unable to function independently at this time. She would be at highest risk for returning to the ER or worse, she falls at home and is unable to get up. She would be at risk for limb injury, loss of complete lower extremity function or worse, death if she falls and cannot get up.    Therefore, she is discharged in fair and stable condition to an inpatient rehabilitation hospital.    The patient met 2-midnight criteria for an inpatient stay at the time of discharge.      FOLLOW  UP ITEMS POST DISCHARGE  With [facility] spinal surgery    DISCHARGE DIAGNOSES  Principal Problem:    Intractable low back pain (POA: Yes)  Active Problems:    Compression fracture of L1 lumbar vertebra, sequela (POA: Yes)    CAD (coronary artery disease) (POA: Yes)    Depression (POA: Yes)    Acquired hypothyroidism (POA: Yes)    Type 2 diabetes mellitus (HCC) (POA: Yes)    Primary hypertension (POA: Yes)    Mixed hyperlipidemia (POA: Yes)    Lumbar canal stenosis (POA: Yes)    ACP (advance care planning) (POA: Yes)    Lumbar spine instability (POA: Yes)    Lumbosacral radiculopathy due to degenerative joint disease of spine (POA: Yes)  Resolved Problems:    * No resolved hospital problems. *      FOLLOW UP  No future appointments.  [facility]  [address] [facility] and [address]  [place] [facility] [place] [address]  [phone]      Dr. Jb Park will need to evaluate in outpatient clinic and place any reconstructive lumbar surgery.      MEDICATIONS ON DISCHARGE     Medication List        START taking these medications        Instructions   diclofenac sodium 1 % Gel  Commonly known as: Voltaren   Apply 4 g topically 4 times a day as needed (back pain).  Dose: 4 g     ketorolac 10 MG Tabs  Commonly known as: Toradol   Take 1 Tablet by mouth every 6 hours for 3 days.  Dose: 10 mg     lidocaine 4 % Ptch  Start taking on: December 10, 2024  Commonly known as: Asperflex   Place 2 Patches on the skin every 24 hours.  Dose: 2 Patch     methocarbamol 500 MG Tabs  Commonly known as: Robaxin   Take 1 Tablet by mouth 3 times a day as needed (Hold for respiratory depression, respiratory rate <12, heart rate less than 65, lethargy,somnolence, or systolic blood pressure < 105.).  Dose: 500 mg            CHANGE how you take these medications        Instructions   predniSONE 1 MG Tabs  What changed:   medication strength  how much to take  Commonly known as: Deltasone   Take 4 Tablets by mouth every day for 3  days.  Dose: 4 mg            CONTINUE taking these medications        Instructions   albuterol 108 (90 Base) MCG/ACT Aers inhalation aerosol   Inhale 1-2 Puffs every four hours as needed for Shortness of Breath.  Dose: 1-2 Puff     amitriptyline 100 MG Tabs  Commonly known as: Elavil   Take 100 mg by mouth every evening.  Dose: 100 mg     escitalopram 20 MG tablet  Commonly known as: Lexapro   Take 1 Tablet by mouth every day.  Dose: 20 mg     ezetimibe 10 MG Tabs  Commonly known as: Zetia   Take 10 mg by mouth every evening.  Dose: 10 mg     liothyronine 5 MCG Tabs  Commonly known as: Cytomel   Take 5 mcg by mouth every day.  Dose: 5 mcg     lisinopril 2.5 MG Tabs  Commonly known as: Prinivil   Take 2.5 mg by mouth every day.  Dose: 2.5 mg     metFORMIN 500 MG Tabs  Commonly known as: Glucophage   Take 500 mg by mouth 2 times a day with meals.  Dose: 500 mg     metoprolol SR 25 MG Tb24  Start taking on: December 10, 2024  Commonly known as: Toprol XL   Take 1 Tablet by mouth every day.  Dose: 25 mg     omeprazole 20 MG delayed-release capsule  Commonly known as: PriLOSEC   Take 20 mg by mouth 2 times a day.  Dose: 20 mg     ondansetron 8 MG Tbdp  Commonly known as: Zofran ODT   Take 8 mg by mouth every 8 hours as needed for Nausea/Vomiting.  Dose: 8 mg     rosuvastatin 20 MG Tabs  Commonly known as: Crestor   Take 1 Tablet by mouth every evening.  Dose: 20 mg     sumatriptan 100 MG tablet  Commonly known as: Imitrex   Take 100 mg by mouth one time as needed for Migraine.  Dose: 100 mg     Synthroid 88 MCG Tabs  Generic drug: levothyroxine   Take 1 Tablet by mouth every day.  Dose: 88 mcg            STOP taking these medications      loperamide 2 MG Caps  Commonly known as: Imodium              Allergies  Allergies   Allergen Reactions    Gabapentin Hives and Swelling    Pregabalin Hives and Swelling    Amlodipine Swelling    Bee Swelling    Fentanyl Vomiting and Unspecified    Morphine Vomiting, Nausea and  Unspecified    Benzoin Rash     Tincture of benzoin =blisters    blisters    Rifampin Unspecified     Pt turns yellow       DIET  Orders Placed This Encounter   Procedures    Diet Order Diet: Cardiac; Second Modifier: (optional): Consistent CHO (Diabetic)     Standing Status:   Standing     Number of Occurrences:   1     Order Specific Question:   Diet:     Answer:   Cardiac [6]     Order Specific Question:   Second Modifier: (optional)     Answer:   Consistent CHO (Diabetic) [4]       ACTIVITY  As tolerated and directed by rehab.  Weight bearing as tolerated    LINES, DRAINS, AND WOUNDS  This is an automated list. Peripheral IVs will be removed prior to discharge.  Peripheral IV 12/07/24 22 G Posterior;Right Forearm (Active)   Site Assessment Clean;Dry;Intact 12/09/24 0815   Dressing Type Transparent 12/09/24 0815   Line Status Saline locked 12/09/24 0815   Dressing Status Clean;Dry;Intact 12/09/24 0815   Dressing Intervention N/A 12/09/24 0815   Dressing Change Due 12/14/24 12/09/24 0815   Infiltration Grading ([facility]) 0 12/09/24 0815   Phlebitis Scale ([facility] Only) 0 12/09/24 0815          Peripheral IV 12/07/24 22 G Posterior;Right Forearm (Active)   Site Assessment Clean;Dry;Intact 12/09/24 0815   Dressing Type Transparent 12/09/24 0815   Line Status Saline locked 12/09/24 0815   Dressing Status Clean;Dry;Intact 12/09/24 0815   Dressing Intervention N/A 12/09/24 0815   Dressing Change Due 12/14/24 12/09/24 0815   Infiltration Grading ([facility]) 0 12/09/24 0815   Phlebitis Scale ([facility] Only) 0 12/09/24 0815               MENTAL STATUS ON TRANSFER   AOX4    CONSULTATIONS  Discussed with Dr. Park, spinal surgeon    PROCEDURES  none    LABORATORY  Lab Results   Component Value Date    SODIUM 137 12/08/2024    POTASSIUM 4.4 12/08/2024    CHLORIDE 108 12/08/2024    CO2 20 12/08/2024    GLUCOSE 130 (H) 12/08/2024    BUN 17 12/08/2024    CREATININE 0.58 12/08/2024    CREATININE 0.87 02/20/2012        Lab  "Results   Component Value Date    WBC 5.9 12/08/2024    WBC 6.9 02/20/2012    HEMOGLOBIN 9.3 (L) 12/08/2024    HEMATOCRIT 30.1 (L) 12/08/2024    PLATELETCT 181 12/08/2024      DX-CHEST-PORTABLE (1 VIEW)   Final Result      Linear changes at the left lung base may be due to atelectasis or scarring.      MR-THORACIC SPINE-W/O   Final Result      1.  Thoracic scoliosis convex right.   2.  Mild old T5 compression fracture with mild anterior wedging associated with mild upper thoracic dorsal kyphosis.   3.  T12 late subacute insufficiency compression fracture. May be amenable to vertebral augmentation.   4.  L1 more recent acute or subacute superior endplate compression fracture. Appears amenable to vertebral augmentation.      MR-LUMBAR SPINE-W/O   Final Result      1.  Lumbar scoliosis convex left along with lateral subluxations degenerative retrolisthesis, and degenerative anterolisthesis as described above most notable for L4-5 grade 1 spondylolisthesis.   2.  T12 late subacute superior endplate insufficiency compression fracture. This may be amenable to vertebral augmentation.   3.  L1 recent/subacute superior endplate insufficiency compression fracture which is \"new\" since most recent MRI study of 10/19/2024. This appears amenable to vertebral augmentation.   4.  Postoperative right-sided lumbar laminectomy L3-L4.   5.  Multilevel degenerative changes as detailed for each level above in the body of report.      DX-HIP-UNILATERAL-WITH PELVIS-1 VIEW RIGHT   Final Result         1. No definite acute osseous abnormality but evaluation is limited due to osseous demineralization.          Total time of the discharge process exceeds 36 minutes.  "

## 2024-12-09 NOTE — DISCHARGE PLANNING
Spoke with Gillian about goals and expectation for IRF level of care. Discussed therapy plan for 3 hours per day 5 days a week. Discussed therapy schedules 30/45 or 60 minute sessions typically 1.5 hours between breakfast and lunch and another 1.5 hours between lunch and dinner. Discussed PT/OT and SLP roles. Discussed potential length of stay. Discussed comprehensive medical surveillance by physiatry as well as hospitalist team. Discussed insurance Medicare coverage for the program. Gillian verified that Medicare is primary. Physiatry to consult per protocol.

## 2024-12-09 NOTE — PROGRESS NOTES
[facility] Medicine Daily Progress Note    Date of Service  12/8/2024    Chief Complaint  Yamile Go is a 74 y.o. female admitted 12/6/2024 with lower back pain and decreased mobility    Hospital Course  74-year-old female with a past medical history of hypothyroidism, hypertension, GERD, mild L3-L4 central canal stenosis admitted on 12/6/2024 for worsening spinal back pains.  She unfortunately had a ground-level fall the week prior and suffered a T12, L1, L2 vertebral fractures. 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    Of note at [facility] 9/20/2024, patient was diagnosed with a T5 compression fracture, diagnosed with chronic opiate dependence, COPD, CAD.  CT pelvis showed impacted left subcapital femoral neck fracture.  There is CT lumbar showing L3-4 severe spinal stenosis, L3 foraminal stenosis, moderate central canal stenosis to L4-5, L4 severe foraminal stenosis, ankylosis at L5-S6.  Thoracic showed hemangioma at T7, 25% T5 superior endplate compression fracture.  Patient had a left hemiarthroplasty by Dr. Liu (9/20/24).    MRI on 10/19/2024 showing L4-5 grade 1 spondylolisthesis.  T12 superior endplate compression deformity.  Postoperative right-sided lumbar laminectomy to L4-L5.  Mentioned L3-L4 mild central canal stenosis and consistent L4-L5 left foraminal stenosis.    CT lumbar on 11/29/2024 showed acute 20% osteoporotic compression fracture of L1 and L2 vertebral bodies.  As per ER Dr. Araujo note, patient was to be contacted by IR for a kyphoplasty.    Interval Problem Update  12/8:  Pt stated she still had severe pain.  I increased her pain regimen.  Working with PT and OT  No surgery at this time during this hospitalization.  Potential plan for outpatient corrective surgery at [facility].  Continue LSO brace    12/7:  Patient stated she still has lower back pain.  She has decreased sensation to the left leg.  Denied any urinary or stool incontinence.  Patient is  unable to work, she is a nurse at [employer].    I have discussed this patient's plan of care and discharge plan at IDT rounds today with Case Management, Nursing, Nursing leadership, and other members of the IDT team.    Consultants/Specialty  Spinal surgery/neurosurgery    Code Status  Full Code    Disposition  The patient is not medically cleared for discharge to home or a post-acute facility.  Anticipate discharge to: an inpatient rehabilitation hospital    I have placed the appropriate orders for post-discharge needs.    Review of Systems  Review of Systems   Constitutional:  Positive for malaise/fatigue.   Musculoskeletal:  Positive for back pain and falls.   Neurological:  Positive for sensory change (Left leg) and weakness.   All other systems reviewed and are negative.       Physical Exam  Temp:  [36.2 °C (97.2 °F)-36.7 °C (98 °F)] 36.7 °C (98 °F)  Pulse:  [73-85] 83  Resp:  [15-18] 16  BP: ()/(42-68) 128/57  SpO2:  [92 %-98 %] 94 %    Physical Exam  Vitals and nursing note reviewed.   Constitutional:       General: She is not in acute distress.     Appearance: Normal appearance. She is not ill-appearing.   HENT:      Head: Normocephalic and atraumatic.   Eyes:      General: No scleral icterus.     Extraocular Movements: Extraocular movements intact.   Cardiovascular:      Rate and Rhythm: Normal rate.      Pulses: Normal pulses.      Heart sounds: Normal heart sounds. No murmur heard.  Pulmonary:      Effort: Pulmonary effort is normal. No respiratory distress.      Breath sounds: Normal breath sounds.   Abdominal:      General: Abdomen is flat. Bowel sounds are normal. There is no distension.      Palpations: Abdomen is soft.   Musculoskeletal:         General: No swelling or tenderness. Normal range of motion.      Cervical back: Normal range of motion and neck supple.   Skin:     General: Skin is warm.      Capillary Refill: Capillary refill takes less than 2 seconds.      Coloration: Skin is not  jaundiced.      Findings: No erythema.   Neurological:      Mental Status: She is alert and oriented to person, place, and time. Mental status is at baseline.      Sensory: Sensory deficit (decreased LLE sensation) present.      Motor: Weakness (BLE) present.      Deep Tendon Reflexes: Reflexes normal.   Psychiatric:         Mood and Affect: Mood normal.         Behavior: Behavior normal.         Thought Content: Thought content normal.         Judgment: Judgment normal.         Fluids    Intake/Output Summary (Last 24 hours) at 12/8/2024 1703  Last data filed at 12/8/2024 0300  Gross per 24 hour   Intake 2123.95 ml   Output --   Net 2123.95 ml        Laboratory  Recent Labs     12/06/24 2030 12/07/24  1007 12/08/24  0201   WBC 8.6 8.7 5.9   RBC 4.14* 4.01* 3.55*   HEMOGLOBIN 10.9* 10.4* 9.3*   HEMATOCRIT 35.0* 34.0* 30.1*   MCV 84.5 84.8 84.8   MCH 26.3* 25.9* 26.2*   MCHC 31.1* 30.6* 30.9*   RDW 50.0 50.4* 50.9*   PLATELETCT 235 220 181   MPV 8.2* 8.7* 10.0     Recent Labs     12/06/24 2030 12/07/24  1007 12/08/24  0201   SODIUM 138 140 137   POTASSIUM 3.7 4.3 4.4   CHLORIDE 104 105 108   CO2 24 26 20   GLUCOSE 95 95 130*   BUN 14 13 17   CREATININE 0.60 0.68 0.58   CALCIUM 8.4 8.4 7.7*                   Imaging  MR-THORACIC SPINE-W/O   Final Result      1.  Thoracic scoliosis convex right.   2.  Mild old T5 compression fracture with mild anterior wedging associated with mild upper thoracic dorsal kyphosis.   3.  T12 late subacute insufficiency compression fracture. May be amenable to vertebral augmentation.   4.  L1 more recent acute or subacute superior endplate compression fracture. Appears amenable to vertebral augmentation.      MR-LUMBAR SPINE-W/O   Final Result      1.  Lumbar scoliosis convex left along with lateral subluxations degenerative retrolisthesis, and degenerative anterolisthesis as described above most notable for L4-5 grade 1 spondylolisthesis.   2.  T12 late subacute superior endplate  "insufficiency compression fracture. This may be amenable to vertebral augmentation.   3.  L1 recent/subacute superior endplate insufficiency compression fracture which is \"new\" since most recent MRI study of 10/19/2024. This appears amenable to vertebral augmentation.   4.  Postoperative right-sided lumbar laminectomy L3-L4.   5.  Multilevel degenerative changes as detailed for each level above in the body of report.      DX-HIP-UNILATERAL-WITH PELVIS-1 VIEW RIGHT   Final Result         1. No definite acute osseous abnormality but evaluation is limited due to osseous demineralization.           Assessment/Plan  * Intractable low back pain- (present on admission)  Assessment & Plan  Continue multimodal pain regimen  Scheduled acetaminophen, increased doses to as needed oxycodone to 7.5 and 15 mg.  Continue as needed Robaxin  Continue amitriptyline  Continue prednisone 4 mg every 6 hours  I added lidocaine patch  PO Toradol    I had extensive discussion with Dr. Jb Park, no surgery in hospital, needs to plan outpatient surgery.  Patient stated today this is her prior neurosurgeon in 2017, she is familiar with them.    Compression fracture of L1 lumbar vertebra, sequela- (present on admission)  Assessment & Plan    CT scan from 11/29/2024 showing 20% L1 vertebral fracture.  Patient has spondylolisthesis and scoliosis to the left.        I reviewed new Lumbar MRI, pending radiology reading.  T12 and L1 compression fractures, amenable to kyphoplasty  Lumbar scoliosis with convexity to the left, there is retrolisthesis, degenerative anterolisthesis of L4-L5.  Grade 1 spondylolisthesis  Prior laminectomy to L3-4        09/2018 compared to today's MRI lumbar.  Worsening scoliosis to the left.    I reached out to spinal surgery for recommendations if patient will need surgical intervention now. Will transfer patient to [facility] if needed.  T12 and L1, will need to reach out to IR if spinal surgery does not recommend " surgery for patient's severe scoliosis.    ADDENDUM:  I discussed with Dr. Jb Park ([facility])  Recommended no surgery at this time, degenerative changes needs to be seen in outpatient clinic. Process for a spinal surgery would need careful consideration then.  PT/OT  Pain control  Avoid kyphoplasty, may interfere with future spinal surgery.  Prednisone 4mg Q6H  Ketorlac    12/8:  Same plan as primary problem    Lumbosacral radiculopathy due to degenerative joint disease of spine- (present on admission)  Assessment & Plan  Continue pain regimen  PT recommending post acute placement  Pending OT    Lumbar spine instability- (present on admission)  Assessment & Plan  Pt has Lumbar support orthotic.  continue    ACP (advance care planning)- (present on admission)  Assessment & Plan  FULL CODE  Pt agreed for surgery if she requires    Lumbar canal stenosis- (present on admission)  Assessment & Plan  Prior MRI October 2024 shows mild spinal canal stenosis   Patient is presenting with worsening low back pain and worsening left lower extremity weakness  Pt has significant deformity of lumbar spine    Mixed hyperlipidemia- (present on admission)  Assessment & Plan  Cardiac diet.  Continue home rosuvastatin and ezetimibe    Primary hypertension- (present on admission)  Assessment & Plan  Continue lisinopril    Type 2 diabetes mellitus (HCC)- (present on admission)  Assessment & Plan  With no significant hyperglycemia  Last glycated hemoglobin was 6.3%  Continue insulin sliding scale, Accu-Cheks and hypoglycemia protocol    Acquired hypothyroidism- (present on admission)  Assessment & Plan  Continue levothyroxine and liothyronine    Depression- (present on admission)  Assessment & Plan  Continue home lexapro and Elavil    CAD (coronary artery disease)- (present on admission)  Assessment & Plan  Continue metoprolol and atorvastatin         VTE prophylaxis:    enoxaparin ppx      I have performed a  physical exam and reviewed and updated ROS and Plan today (12/8/2024). In review of yesterday's note (12/7/2024), there are no changes except as documented above.      Total time spent 51 minutes. I spent greater than 50% of the time for patient care, including unit/floor time, multiple face-to-face encounters with the patient, counseling on treatment plan and discussion with bedside RN.  Further, I spent time on my own review of patient's overnight events, RN notes, imaging and lab analysis, and developing my assessment and plan above.  In addition, working with , social workers, and Charge RN on case coordination on this date.    This note was generated using voice recognition software which has a chance of producing errors of grammar and content.  I have made every reasonable attempt to find and correct any errors, but it should be expected that some may not be found prior to finalization of this note.

## 2024-12-09 NOTE — PROGRESS NOTES
0700 - Bedside report received from DEEJAY Manzano. Alert and oriented X4, on RA in no acute respiratory distress. Daily plan of care discussed. Patient complains of tolerable pain. No other needs at this time. Call light and personal belongings within reach. Hourly rounding in place. Chart reviewed for recent labs, notes, and orders.

## 2024-12-09 NOTE — FLOWSHEET NOTE
12/09/24 1315   Protocol Assessment   Initial Assessment Yes   Patient History   Pulmonary Diagnosis none   Procedures Relevant to Respiratory Status none   Home O2 No   Nocturnal CPAP No   Home Treatments/Frequency No   Sleep Apnea Screening   Have you had a sleep study? Yes   Have you been diagnosed with sleep apnea? Yes   Do you use a CPAP/BIPAP/AUTOPAP? No   COPD Risk Screening   Do you have a history of COPD? No   COPD Population Screener   During the past 4 weeks, how much did you feel short of breath? 0   Do you ever cough up any mucus or phlegm? 0   In the past 12 months, you do less than you used to because of your breathing problems 0   Have you smoked at least 100 cigarettes in your entire life? 0   How old are you? 2   COPD Screening Score 2   COPD Coordinator Not Recommended Yes

## 2024-12-09 NOTE — H&P
Physical Medicine & Rehabilitation  History and Physical (H&P)  &     Post Admission Physician Evaluation (LATRELL)       Date of Admission: 12/9/2024  Date of Service: 12/9/2024   Yamile Go    [facility] Code to Support Admission: 0003.9 - Neurologic Conditions: Other Neurologic  Etiologic Diagnosis: Lumbosacral radiculopathy due to degenerative joint disease of spine    _______________________________________________    Chief Complaint: Decreased mobility    History of Present Illness:  Patient is a 74 y.o. female with a PMH of hypothyroidism, GERD, and lumbar stenosis who had GLF and presented to [facility] on 12/6/24. She has a history of compression fractures. She had repeat imaging and MRI showed new L1 compression fracture. NSG was consulted and recommended conservative management with prednisone and Ketorolac. She was also recommended to have LSO when OOB.     Patient tolerated transfer to [facility]. She reports back pain is severe. She reports chronic left leg weakness and right toe weakness after surgical interventions. Denies bowel or bladder changes. Denies SOB.     Review of Systems:     Comprehensive 14 point ROS was reviewed and all were negative except as noted elsewhere in this document.     Past Medical History:  Past Medical History:   Diagnosis Date    Anemia     Anesthesia     PONV    Arthritis     degenerative    Asthma     CAD (coronary artery disease)     cardiologist, Dr. Winkler  last visit 4/2024    Cataract     Dental disorder     Missing teeth right side.  Veneers top front    Depression     anxiety    Diabetes (HCC)     Disorder of thyroid     hypothyroid    Heart burn     GERD, controlled with medications    History of vertebral fracture     T12, L1, L2    HTN (hypertension)     Hyperlipidemia     Indigestion     Migraines     Obesity     Pneumonia 2023    COVID    PONV (postoperative nausea and vomiting)     Sleep apnea     diagnosed 9/2009 CPAP -PMA; pt states she does not use CPAP and no  longer has one, unable to tolerate       Past Surgical History:  Past Surgical History:   Procedure Laterality Date    PB RECONSTR TOTAL SHOULDER IMPLANT Right 4/30/2024    Procedure: RIGHT REVERSE TOTAL SHOULDER ARTHROPLASTY;  Surgeon: Manuela Sparks M.D.;  Location: SURGERY [facility];  Service: Orthopedics    INSERTION, PERIPHERAL NERVE STIMULATOR, LOWER EXTREMITY Left 12/22/2022    Procedure: Left SCIATIC PERIPHERAL NERVE STIMULATOR IMPLANT, LOWER EXTREMITY PERIPHERAL SCIATIC NERVE;  Surgeon: Tobi Kilgore M.D.;  Location: SURGERY [facility];  Service: Pain Management    ARTHROPLASTY Right 2020    ankle    LUMBAR EXPLORATION N/A 2017    COLECTOMY N/A 2007    partial for divverticulitis 2007    LUMBAR LAMINECTOMY DISKECTOMY N/A 1989    BREAST BIOPSY  1984    no cancer    ABDOMINAL HYSTERECTOMY TOTAL N/A 1978    APPENDECTOMY N/A 1976       Family History:  Family History   Problem Relation Age of Onset    Cancer Mother         lung    Heart Disease Mother     Hyperlipidemia Mother     Stroke Father     Heart Disease Father     Diabetes Father     Hypertension Father     Hyperlipidemia Father     Heart Disease Brother         cath and bypass    Diabetes Brother     Hypertension Brother     Hyperlipidemia Brother     Diabetes Maternal Aunt     Hypertension Maternal Aunt     Heart Disease Brother         cath and byp[ass    Drug abuse Brother     Heart Disease Brother         cath and bypass    Diabetes Brother     Heart Disease Brother     Other Brother         MVA    Hypertension Maternal Aunt     Drug abuse Daughter     Alcohol abuse Daughter        Medications:  Current Facility-Administered Medications   Medication Dose    Pharmacy Consult Request ...Pain Management Review 1 Each  1 Each    hydrALAZINE (Apresoline) tablet 25 mg  25 mg    acetaminophen (Tylenol) tablet 650 mg  650 mg    senna-docusate (Pericolace Or Senokot S) 8.6-50 MG per tablet 2 Tablet  2 Tablet    And    polyethylene glycol/lytes  (Miralax) Packet 1 Packet  1 Packet    docusate sodium (Enemeez) enema 283 mg  283 mg    magnesium hydroxide (Milk Of Magnesia) suspension 30 mL  30 mL    carboxymethylcellulose (Refresh Tears) 0.5 % ophthalmic drops 1 Drop  1 Drop    benzocaine-menthol (Cepacol) lozenge 1 Lozenge  1 Lozenge    mag hydrox-al hydrox-simeth (Maalox Plus Es Or Mylanta Ds) suspension 20 mL  20 mL    ondansetron (Zofran ODT) dispertab 4 mg  4 mg    Or    ondansetron (Zofran) syringe/vial injection 4 mg  4 mg    traZODone (Desyrel) tablet 50 mg  50 mg    sodium chloride (Ocean) 0.65 % nasal spray 2 Spray  2 Spray    oxyCODONE immediate-release (Roxicodone) tablet 5 mg  5 mg    Or    oxyCODONE immediate release (Roxicodone) tablet 10 mg  10 mg    acetaminophen (Tylenol) tablet 1,000 mg  1,000 mg    amitriptyline (Elavil) tablet 100 mg  100 mg    enoxaparin (Lovenox) inj 40 mg  40 mg    [START ON 12/10/2024] escitalopram (Lexapro) tablet 20 mg  20 mg    ezetimibe (Zetia) tablet 10 mg  10 mg    insulin lispro (HumaLOG,AdmeLOG) subcutaneous injection  1-6 Units    And    dextrose 50% (D50W) injection 25 g  25 g    ketorolac (Toradol) tablet 10 mg  10 mg    [START ON 12/10/2024] levothyroxine (Synthroid) tablet 88 mcg  88 mcg    [START ON 12/10/2024] liothyronine (Cytomel) tablet 5 mcg  5 mcg    [START ON 12/10/2024] lidocaine (Asperflex) 4 % patch 2 Patch  2 Patch    [START ON 12/10/2024] metoprolol SR (Toprol XL) tablet 25 mg  25 mg    omeprazole (PriLOSEC) capsule 20 mg  20 mg    predniSONE (Deltasone) tablet 4 mg  4 mg    rosuvastatin (Crestor) tablet 20 mg  20 mg    albuterol inhaler 1-2 Puff  1-2 Puff    diclofenac sodium (Voltaren) 1 % gel 4 g  4 g    methocarbamol (Robaxin) tablet 500 mg  500 mg       Allergies:  Gabapentin, Pregabalin, Amlodipine, Bee, Fentanyl, Morphine, Benzoin, and Rifampin    Psychosocial History:  Housing / Facility: 1 Story House  Lives with - Patient's Self Care Capacity: Alone and Able to Care For  Self  Equipment Owned: Tub / Shower Seat, Front-Wheel Walker, 4-Wheel Walker, Single Point Cane, Sock Aid, Reacher     Prior Level of Function / Living Situation:   Physical Therapy: Prior Services: Intermittent Physical Support for ADL Per Family, Housekeeping / Homemaker Services  Housing / Facility: [address] [place] [address]  Bathroom Set up: Bathtub / Shower Combination, Shower Chair  Equipment Owned: Tub / Shower Seat, Front-Wheel Walker, 4-Wheel Walker, Single Point Cane, Sock Aid, Reacher  Lives with - Patient's Self Care Capacity: Alone and Able to Care For Self  Bed Mobility: Independent  Transfer Status: Independent  Ambulation: Independent  Assistive Devices Used: None  Stairs: Independent  Current Level of Function:   Gait Level Of Assist: Standby Assist  Assistive Device: Front Wheel Walker  Distance (Feet): 50  Deviation: Step To, Bradykinetic  # of Stairs Climbed: 0  Supine to Sit: Standby Assist  Sit to Supine: Standby Assist  Scooting: Supervised  Rolling: Supervised (to left)  Comments: logroll  Sit to Stand: Standby Assist  Bed, Chair, Wheelchair Transfer: Contact Guard Assist  Toilet Transfers: Contact Guard Assist  Transfer Method: Stand Step  Sitting in Chair: 6 (toilet)  Sitting Edge of Bed: 13  Standin  Occupational Therapy:   Self Feeding: Independent  Grooming / Hygiene: Independent  Bathing: Independent  Dressing: Independent  Toileting: Independent  Medication Management: Independent  Laundry: Requires Assist  Kitchen Mobility: Independent  Finances: Independent  Home Management: Requires Assist  Shopping: Requires Assist  Prior Level Of Mobility: Independent Without Device in Home, Independent With Device in Home  Driving / Transportation: Driving Independent  Prior Services: Intermittent Physical Support for ADL Per Family, Housekeeping / Homemaker Services  Housing / Facility: [address] [facility]  Occupation (Pre-Hospital Vocational): Other (Comments) (pt is a nurse at hospitals;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 Her oral temperature is 37.2 °C (99 °F). Her blood pressure is 157/71 (abnormal) and her pulse is 77. Her respiration is 18 and oxygen saturation is 96%.  ]  GENERAL: No apparent distress  HEENT: Normocephalic/atraumatic, EOMI, and PERRL  CARDIAC: Regular rate and rhythm, normal S1, S2   LUNGS: Clear to auscultation   ABDOMINAL: bowel sounds present, soft, and nontender    EXTREMITIES: no contractures, spasticity, or edema.    NEURO:  Mental status:  A&Ox4 (person, place, date, situation) answers questions appropriately  Speech: fluent, no aphasia or dysarthria  Motor:      Hip flexors:  Right -  4/5, Left -  4/5  Knee ext:  Right -  4/5, Left -  4/5  Dorsiflexors:  Right -  3/5, Left -  2/5  EHL:  Right -  0/5, Left -  2/5  Plantar flexors:  Right -  3/5, Left -  3/5  Sensory: intact to light touch through out  DTRs: 2+ in bilateral biceps and patellar tendons    Radiology:     Results for orders placed during the hospital encounter of 08/25/24    [id number]-D/O    Impression  1.  Mild extrahepatic biliary dilation concerning for distal stricture or less likely mass.  2.  No common bile duct stone demonstrated.  3.  Unremarkable gallbladder.                  Results for orders placed during the hospital encounter of 12/06/24    MR-LUMBAR SPINE-W/O    Impression  1.  Lumbar scoliosis convex left along with lateral subluxations degenerative retrolisthesis, and degenerative anterolisthesis as described above most notable for L4-5 grade 1 spondylolisthesis.  2.  T12 late subacute " "superior endplate insufficiency compression fracture. This may be amenable to vertebral augmentation.  3.  L1 recent/subacute superior endplate insufficiency compression fracture which is \"new\" since most recent MRI study of 10/19/2024. This appears amenable to vertebral augmentation.  4.  Postoperative right-sided lumbar laminectomy L3-L4.  5.  Multilevel degenerative changes as detailed for each level above in the body of report.      Results for orders placed during the hospital encounter of 12/06/24    MR-THORACIC SPINE-W/O    Impression  1.  Thoracic scoliosis convex right.  2.  Mild old T5 compression fracture with mild anterior wedging associated with mild upper thoracic dorsal kyphosis.  3.  T12 late subacute insufficiency compression fracture. May be amenable to vertebral augmentation.  4.  L1 more recent acute or subacute superior endplate compression fracture. Appears amenable to vertebral augmentation.    Results for orders placed during the hospital encounter of 12/06/24    MR-LUMBAR SPINE-W/O    Impression  1.  Lumbar scoliosis convex left along with lateral subluxations degenerative retrolisthesis, and degenerative anterolisthesis as described above most notable for L4-5 grade 1 spondylolisthesis.  2.  T12 late subacute superior endplate insufficiency compression fracture. This may be amenable to vertebral augmentation.  3.  L1 recent/subacute superior endplate insufficiency compression fracture which is \"new\" since most recent MRI study of 10/19/2024. This appears amenable to vertebral augmentation.  4.  Postoperative right-sided lumbar laminectomy L3-L4.  5.  Multilevel degenerative changes as detailed for each level above in the body of report.                                       Laboratory Values:  Recent Labs     12/06/24  2030 12/07/24  1007 12/08/24  0201   SODIUM 138 140 137   POTASSIUM 3.7 4.3 4.4   CHLORIDE 104 105 108   CO2 24 26 20   GLUCOSE 95 95 130*   BUN 14 13 17   CREATININE 0.60 " 0.68 0.58   CALCIUM 8.4 8.4 7.7*     Recent Labs     12/06/24  2030 12/07/24  1007 12/08/24  0201   WBC 8.6 8.7 5.9   RBC 4.14* 4.01* 3.55*   HEMOGLOBIN 10.9* 10.4* 9.3*   HEMATOCRIT 35.0* 34.0* 30.1*   MCV 84.5 84.8 84.8   MCH 26.3* 25.9* 26.2*   MCHC 31.1* 30.6* 30.9*   RDW 50.0 50.4* 50.9*   PLATELETCT 235 220 181   MPV 8.2* 8.7* 10.0           Primary Rehabilitation Diagnosis:    This patient is a 74 y.o. female admitted for acute inpatient rehabilitation with Lumbosacral radiculopathy due to degenerative joint disease of spine.    Impairments:   ADLs/IADLs  Mobility    Secondary Diagnosis/Medical Co-morbidities Affecting Function  HTN  HLD  Anemia  Obesity due to excess calories  GERD  Depression  Hypothyroidism    Relevant Changes Since Preadmission Evaluation:    Status unchanged    The patient's rehabilitation potential is Good  The patient's medical prognosis is good    Rehabilitation Plan:   Discussion and Recommendations:   1. The patient requires an acute inpatient rehabilitation program with a coordinated program of care at an intensity and frequency not available at a lower level of care. This recommendation is substantiated by the patient's medical physicians who recommend that the patient's intervention and assessment of medical issues needs to be done at an acute level of care for patient's safety and maximum outcome.   2. A coordinated program of care will be supplied by an interdisciplinary team of physical therapy, occupational therapy, rehab physician, rehab nursing, and, if needed, speech therapy and rehab psychology. Rehab team presents a patient-specific rehabilitation and education program concentrating on prevention of future problems related to accessibility, mobility, skin, bowel, bladder, sexuality, and psychosocial and medical/surgical problems.   3. Need for Rehabilitation Physician: The rehab physician will be evaluating the patient on a multi-weekly basis to help coordinate the  program of care. The rehab physician communicates between medical physicians, therapists, and nurses to maximize the patient's potential outcome. Specific areas in which the rehab physician will be providing daily assessment include the following:   A. Assessing the patient's heart rate and blood pressure response (vitals monitoring) to activity and making adjustments in medications or conservative measures as needed.   B. The rehab physician will be assessing the frequency at which the program can be increased to allow the patient to reach optimal functional outcome.   C. The rehab physician will also provide assessments in daily skin care, especially in light of patient's impairments in mobility.   D. The rehab physician will provide special expertise in understanding how to work with functional impairment and recommend appropriate interventions, compensatory techniques, and education that will facilitate the patient's outcome.   4. Rehab R.N.   The rehab RN will be working with patient to carry over in room mobility and activities of daily living when the patient is not in 3 hours of skilled therapy. Rehab nursing will be working in conjunction with rehab physician to address all the medical issues above and continue to assess laboratory work and discuss abnormalities with the treating physicians, assess vitals, and response to activity, and discuss and report abnormalities with the rehab physician. Rehab RN will also continue daily skin care, supervise bladder/bowel program, instruct in medication administration, and ensure patient safety.   5. Rehab Therapy: Therapies to treat at intensity and frequency of (may change after completion of evaluation by all therapeutic disciplines):       PT:  Physical therapy to address mobility, transfer, gait training and evaluation for adaptive equipment needs 1 and 1/2 hour/day at least 5 days/week for the duration of the ELOS (see below)       OT:  Occupational therapy to  address ADLs, self-care, home management training, functional mobility/transfers and assistive device evaluation, and community re-integration 1 and 1/2 hour/day at least 5 days/week for the duration of the ELOS (see below).     Medical management / Rehabilitation Issues/ Adverse Potential as part of rehabilitation plan     Rehabilitation Issues/Adverse Potential  1.  Lumbar radiculopathy - Patient with new GLF found to have L1 compression fracture on MRI. Neurosurgery was consulted and recommended conservative management. Patient demonstrates functional deficits in strength, balance, coordination, and ADL's. Patient is admitted to [facility] for comprehensive rehabilitation therapy as described below.   Rehabilitation nursing monitors bowel and bladder control, educates on medication administration, co-morbidities and monitors patient safety.    2.  Neurostimulants: None at this time but continue to assess daily for need to initiate should status change.    3.  DVT prophylaxis:  Patient is on Lovenox for anticoagulation upon transfer. Encourage OOB. Monitor daily for signs and symptoms of DVT including but not limited to swelling and pain to prevent the development of DVT that may interfere with therapies.    4.  GI prophylaxis:  On prilosec to prevent gastritis/dyspepsia which may interfere with therapies.    5.  Pain: No issues with pain currently / Controlled with APAP/Oxycodone    6.  Nutrition/Dysphagia: Dietician monitors nutrient intake, recommend supplements prn and provide nutrition education to pt/family to promote optimal nutrition for wound healing/recovery.     7.  Bladder/bowel:  Start bowel and bladder program as described below, to prevent constipation, urinary retention (which may lead to UTI), and urinary incontinence (which will impact upon pt's functional independence).   - Post void bladder scans, I&O cath for PVRs >400  - up to commode after meal     8.  Skin/dermal ulcer  prophylaxis: Monitor for new skin conditions with q.2 h. turns as required to prevent the development of skin breakdown.     9.  Cognition/Behavior: As needed psychologist provides adjustment counseling to illness and psychosocial barriers that may be potential barriers to rehabilitation.     10. Respiratory therapy: RT performs O2 management prn, breathing retraining, pulmonary hygiene and bronchospasm management prn to optimize participation in therapies.     Medical Co-Morbidities/Adverse Potential Affecting Function:    Lumbar radiculopathy - Patient with new GLF found to have L1 compression fracture on MRI. Neurosurgery was consulted and recommended conservative management  -PT and OT for mobility and ADLs. Per guidelines, 15 hours per week between PT, OT and/or SLP.  -Follow-up NSG. Continue Ketorolac 10 mg q6 and prednisone     HTN - Patient on Metoprolol 25 mg XL    HLD - Patient on Rosuvastatin 20 mg daily    Anemia - Check AM CBC    Obesity due to excess calories - BMI of 31.0 on admission, meets medical criteria. Dietitian to consult    GERD - patient on prilosec BID    DM2 with hyperglycemia - Previously on metformin. On SSI on transfer. Will check A1c    Depression - Patient on Elavil 100 mg QHS and Lexapro 20 mg daily    Hypothyroidism - Patient on Levothyroxine 88 mcg and Liothyronine 5 mg daily    Pain - Patient on PRN Tylenol, Oxycodone, and Ketorolac. In addition on Lidocaine patches    Skin - Patient at risk for skin breakdown due to debility in areas including sacrum, achilles, elbows and head in addition to other sites. Nursing to assess skin daily.     GI Ppx - Patient on Prilosec for GERD prophylaxis. Patient on Senna-docusate for constipation prophylaxis.        DVT Ppx - Patient Lovenox on transfer.    I personally performed a complete drug regimen review and no potential clinically significant medication issues were identified.     Goals/Expected Level of Function Based on Current Medical  and Functional Status:  (may change based on patient's medical status and rate of impairment recovery)  Transfers:   Modified Independent  Mobility/Gait:   Modified Independent  ADL's:   Modified Independent    DISPOSITION: Discharge to pre-morbid independent living setting with the supportive care of patient's family.    ELOS: 10-14 days  ____________________________________    T. Mason Lux MD/PhD  [facility] - Physical Medicine & Rehabilitation   [facility] Medicine   ____________________________________    Pt was seen today for 76 min. Time spent included pre-admission screening, pre-admission review of vitals and laboratory values/tests, unit/floor time, face-to-face time with the patient including physical examination, care coordination, counseling of patient and/or family, ordering medications/procedures/tests, discussion with other healthcare providers, and/or documentation in the electronic medical record.

## 2024-12-09 NOTE — DISCHARGE PLANNING
PM&R referral from Dr. Samano. Spinal fracture. Per chart review outpatient follow up with Jer. Potential family support. 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 Looking into primary provider for post acute services. Physiatry consult pended at this time,

## 2024-12-09 NOTE — PROGRESS NOTES
4 Eyes Skin Assessment Completed by DEEJAY Hendricks and DEEJAY Ojeda.    Head WDL  Ears WDL  Nose WDL  Mouth WDL  Neck WDL  Breast/Chest WDL  Shoulder Blades WDL  Spine WDL  (R) Arm/Elbow/Hand Bruising  (L) Arm/Elbow/Hand Bruising  Abdomen WDL  Groin WDL  Scrotum/Coccyx/Buttocks WDL  (R) Leg Bruising  (L) Leg Bruising  (R) Heel/Foot/Toe WDL  (L) Heel/Foot/Toe WDL          Devices In Places Blood Pressure Cuff      Interventions In Place Pillows    Possible Skin Injury No    Pictures Uploaded Into Epic Yes  Wound Consult Placed N/A  RN Wound Prevention Protocol Ordered Yes

## 2024-12-09 NOTE — PROGRESS NOTES
Pt arrived at [facility] from [facility] via [facility]. Dr Lux to follow. Initial assessment initiated. Pt oriented to room and facility routine and safety measures; pt education binder provided and discussed. Pt A/O x 4, continent of bowel and bladder. Min assist for transfers. All wounds photographed and documented; photos uploaded to . Pt complains of pain or discomfort at this time. Pt positioned for comfort in bed. Call light within reach, safety measures in place. Pt viewed fall prevention video.

## 2024-12-09 NOTE — CARE PLAN
The patient is Stable - Low risk of patient condition declining or worsening    Shift Goals  Clinical Goals: Patient will remain free from falls and manage pain to tolerable level during shift  Patient Goals: Rest  Family Goals: JONY    Progress made toward(s) clinical / shift goals:  Patient remained free from falls and managed pain to tolerable level during shift. Patient given PRN oxycodone. Patient pain decreased from moderate to mild pain upon reassessment.    Patient is not progressing towards the following goals:N/A

## 2024-12-10 ENCOUNTER — APPOINTMENT (OUTPATIENT)
Dept: PHYSICAL THERAPY | Facility: REHABILITATION | Age: 74
DRG: 561 | End: 2024-12-10
Attending: PHYSICAL MEDICINE & REHABILITATION
Payer: MEDICARE

## 2024-12-10 ENCOUNTER — APPOINTMENT (OUTPATIENT)
Dept: OCCUPATIONAL THERAPY | Facility: REHABILITATION | Age: 74
DRG: 561 | End: 2024-12-10
Attending: PHYSICAL MEDICINE & REHABILITATION
Payer: MEDICARE

## 2024-12-10 LAB
25(OH)D3 SERPL-MCNC: 33 NG/ML (ref 30–100)
ALBUMIN SERPL BCP-MCNC: 3.6 G/DL (ref 3.2–4.9)
ALBUMIN/GLOB SERPL: 1.5 G/DL
ALP SERPL-CCNC: 85 U/L (ref 30–99)
ALT SERPL-CCNC: 11 U/L (ref 2–50)
ANION GAP SERPL CALC-SCNC: 9 MMOL/L (ref 7–16)
AST SERPL-CCNC: 13 U/L (ref 12–45)
BASOPHILS # BLD AUTO: 0.3 % (ref 0–1.8)
BASOPHILS # BLD: 0.02 K/UL (ref 0–0.12)
BILIRUB SERPL-MCNC: <0.2 MG/DL (ref 0.1–1.5)
BUN SERPL-MCNC: 20 MG/DL (ref 8–22)
CALCIUM ALBUM COR SERPL-MCNC: 8.9 MG/DL (ref 8.5–10.5)
CALCIUM SERPL-MCNC: 8.6 MG/DL (ref 8.5–10.5)
CHLORIDE SERPL-SCNC: 105 MMOL/L (ref 96–112)
CO2 SERPL-SCNC: 24 MMOL/L (ref 20–33)
CREAT SERPL-MCNC: 0.57 MG/DL (ref 0.5–1.4)
EOSINOPHIL # BLD AUTO: 0.09 K/UL (ref 0–0.51)
EOSINOPHIL NFR BLD: 1.2 % (ref 0–6.9)
ERYTHROCYTE [DISTWIDTH] IN BLOOD BY AUTOMATED COUNT: 47.7 FL (ref 35.9–50)
EST. AVERAGE GLUCOSE BLD GHB EST-MCNC: 151 MG/DL
GFR SERPLBLD CREATININE-BSD FMLA CKD-EPI: 95 ML/MIN/1.73 M 2
GLOBULIN SER CALC-MCNC: 2.4 G/DL (ref 1.9–3.5)
GLUCOSE BLD STRIP.AUTO-MCNC: 100 MG/DL (ref 65–99)
GLUCOSE BLD STRIP.AUTO-MCNC: 107 MG/DL (ref 65–99)
GLUCOSE BLD STRIP.AUTO-MCNC: 143 MG/DL (ref 65–99)
GLUCOSE SERPL-MCNC: 117 MG/DL (ref 65–99)
HBA1C MFR BLD: 6.9 % (ref 4–5.6)
HCT VFR BLD AUTO: 32.4 % (ref 37–47)
HGB BLD-MCNC: 10.1 G/DL (ref 12–16)
IMM GRANULOCYTES # BLD AUTO: 0.07 K/UL (ref 0–0.11)
IMM GRANULOCYTES NFR BLD AUTO: 1 % (ref 0–0.9)
LYMPHOCYTES # BLD AUTO: 1.36 K/UL (ref 1–4.8)
LYMPHOCYTES NFR BLD: 18.6 % (ref 22–41)
MCH RBC QN AUTO: 25.8 PG (ref 27–33)
MCHC RBC AUTO-ENTMCNC: 31.2 G/DL (ref 32.2–35.5)
MCV RBC AUTO: 82.7 FL (ref 81.4–97.8)
MONOCYTES # BLD AUTO: 0.45 K/UL (ref 0–0.85)
MONOCYTES NFR BLD AUTO: 6.1 % (ref 0–13.4)
NEUTROPHILS # BLD AUTO: 5.33 K/UL (ref 1.82–7.42)
NEUTROPHILS NFR BLD: 72.8 % (ref 44–72)
NRBC # BLD AUTO: 0 K/UL
NRBC BLD-RTO: 0 /100 WBC (ref 0–0.2)
PLATELET # BLD AUTO: 232 K/UL (ref 164–446)
PMV BLD AUTO: 10.4 FL (ref 9–12.9)
POTASSIUM SERPL-SCNC: 4.6 MMOL/L (ref 3.6–5.5)
PROT SERPL-MCNC: 6 G/DL (ref 6–8.2)
RBC # BLD AUTO: 3.92 M/UL (ref 4.2–5.4)
SODIUM SERPL-SCNC: 138 MMOL/L (ref 135–145)
T4 FREE SERPL-MCNC: 1.44 NG/DL (ref 0.93–1.7)
TSH SERPL DL<=0.005 MIU/L-ACNC: 0.28 UIU/ML (ref 0.38–5.33)
WBC # BLD AUTO: 7.3 K/UL (ref 4.8–10.8)

## 2024-12-10 PROCEDURE — 99233 SBSQ HOSP IP/OBS HIGH 50: CPT | Performed by: PHYSICAL MEDICINE & REHABILITATION

## 2024-12-10 PROCEDURE — 700102 HCHG RX REV CODE 250 W/ 637 OVERRIDE(OP): Performed by: PHYSICAL MEDICINE & REHABILITATION

## 2024-12-10 PROCEDURE — 97530 THERAPEUTIC ACTIVITIES: CPT

## 2024-12-10 PROCEDURE — 97165 OT EVAL LOW COMPLEX 30 MIN: CPT

## 2024-12-10 PROCEDURE — 85025 COMPLETE CBC W/AUTO DIFF WBC: CPT

## 2024-12-10 PROCEDURE — 770010 HCHG ROOM/CARE - REHAB SEMI PRIVAT*

## 2024-12-10 PROCEDURE — A9270 NON-COVERED ITEM OR SERVICE: HCPCS | Performed by: PHYSICAL MEDICINE & REHABILITATION

## 2024-12-10 PROCEDURE — 97161 PT EVAL LOW COMPLEX 20 MIN: CPT

## 2024-12-10 PROCEDURE — 700111 HCHG RX REV CODE 636 W/ 250 OVERRIDE (IP): Mod: JZ | Performed by: PHYSICAL MEDICINE & REHABILITATION

## 2024-12-10 PROCEDURE — 80053 COMPREHEN METABOLIC PANEL: CPT

## 2024-12-10 PROCEDURE — 82306 VITAMIN D 25 HYDROXY: CPT

## 2024-12-10 PROCEDURE — 97116 GAIT TRAINING THERAPY: CPT | Mod: CQ

## 2024-12-10 PROCEDURE — 83036 HEMOGLOBIN GLYCOSYLATED A1C: CPT

## 2024-12-10 PROCEDURE — 84443 ASSAY THYROID STIM HORMONE: CPT

## 2024-12-10 PROCEDURE — 82962 GLUCOSE BLOOD TEST: CPT | Mod: 91

## 2024-12-10 PROCEDURE — 36415 COLL VENOUS BLD VENIPUNCTURE: CPT

## 2024-12-10 PROCEDURE — 97112 NEUROMUSCULAR REEDUCATION: CPT

## 2024-12-10 PROCEDURE — 84439 ASSAY OF FREE THYROXINE: CPT

## 2024-12-10 PROCEDURE — 700101 HCHG RX REV CODE 250: Performed by: PHYSICAL MEDICINE & REHABILITATION

## 2024-12-10 PROCEDURE — 97535 SELF CARE MNGMENT TRAINING: CPT

## 2024-12-10 PROCEDURE — 97110 THERAPEUTIC EXERCISES: CPT

## 2024-12-10 RX ORDER — AMITRIPTYLINE HYDROCHLORIDE 50 MG/1
150 TABLET ORAL NIGHTLY
Status: DISCONTINUED | OUTPATIENT
Start: 2024-12-10 | End: 2024-12-16 | Stop reason: HOSPADM

## 2024-12-10 RX ADMIN — PREDNISONE 4 MG: 1 TABLET ORAL at 17:15

## 2024-12-10 RX ADMIN — AMITRIPTYLINE HYDROCHLORIDE 150 MG: 50 TABLET, FILM COATED ORAL at 20:56

## 2024-12-10 RX ADMIN — ROSUVASTATIN CALCIUM 20 MG: 10 TABLET, FILM COATED ORAL at 20:56

## 2024-12-10 RX ADMIN — KETOROLAC TROMETHAMINE 10 MG: 10 TABLET, FILM COATED ORAL at 11:23

## 2024-12-10 RX ADMIN — METOPROLOL SUCCINATE 25 MG: 25 TABLET, EXTENDED RELEASE ORAL at 06:46

## 2024-12-10 RX ADMIN — PREDNISONE 4 MG: 1 TABLET ORAL at 00:35

## 2024-12-10 RX ADMIN — OXYCODONE HYDROCHLORIDE 15 MG: 10 TABLET ORAL at 08:09

## 2024-12-10 RX ADMIN — HYDRALAZINE HYDROCHLORIDE 25 MG: 25 TABLET ORAL at 02:20

## 2024-12-10 RX ADMIN — METHOCARBAMOL 500 MG: 500 TABLET ORAL at 02:21

## 2024-12-10 RX ADMIN — OXYCODONE HYDROCHLORIDE 15 MG: 10 TABLET ORAL at 04:38

## 2024-12-10 RX ADMIN — OXYCODONE HYDROCHLORIDE 15 MG: 10 TABLET ORAL at 00:35

## 2024-12-10 RX ADMIN — ENOXAPARIN SODIUM 40 MG: 100 INJECTION SUBCUTANEOUS at 17:15

## 2024-12-10 RX ADMIN — ACETAMINOPHEN 1000 MG: 500 TABLET ORAL at 17:15

## 2024-12-10 RX ADMIN — ACETAMINOPHEN 1000 MG: 500 TABLET ORAL at 00:36

## 2024-12-10 RX ADMIN — LIOTHYRONINE SODIUM 5 MCG: 5 TABLET ORAL at 08:04

## 2024-12-10 RX ADMIN — LIDOCAINE 2 PATCH: 4 PATCH TOPICAL at 08:05

## 2024-12-10 RX ADMIN — OXYCODONE HYDROCHLORIDE 15 MG: 10 TABLET ORAL at 20:56

## 2024-12-10 RX ADMIN — EZETIMIBE 10 MG: 10 TABLET ORAL at 20:56

## 2024-12-10 RX ADMIN — KETOROLAC TROMETHAMINE 10 MG: 10 TABLET, FILM COATED ORAL at 00:36

## 2024-12-10 RX ADMIN — OMEPRAZOLE 20 MG: 20 CAPSULE, DELAYED RELEASE ORAL at 08:04

## 2024-12-10 RX ADMIN — OMEPRAZOLE 20 MG: 20 CAPSULE, DELAYED RELEASE ORAL at 20:55

## 2024-12-10 RX ADMIN — PREDNISONE 4 MG: 1 TABLET ORAL at 11:23

## 2024-12-10 RX ADMIN — PREDNISONE 4 MG: 1 TABLET ORAL at 06:46

## 2024-12-10 RX ADMIN — KETOROLAC TROMETHAMINE 10 MG: 10 TABLET, FILM COATED ORAL at 17:15

## 2024-12-10 RX ADMIN — ACETAMINOPHEN 1000 MG: 500 TABLET ORAL at 06:45

## 2024-12-10 RX ADMIN — LEVOTHYROXINE SODIUM 88 MCG: 0.09 TABLET ORAL at 06:45

## 2024-12-10 RX ADMIN — ESCITALOPRAM OXALATE 20 MG: 10 TABLET ORAL at 08:04

## 2024-12-10 RX ADMIN — KETOROLAC TROMETHAMINE 10 MG: 10 TABLET, FILM COATED ORAL at 06:45

## 2024-12-10 RX ADMIN — METFORMIN HYDROCHLORIDE 500 MG: 500 TABLET ORAL at 17:15

## 2024-12-10 ASSESSMENT — ACTIVITIES OF DAILY LIVING (ADL)
TOILETING_LEVEL_OF_ASSIST_DESCRIPTION: GRAB BAR;SET-UP OF EQUIPMENT;SUPERVISION FOR SAFETY
TUB_SHOWER_TRANSFER_DESCRIPTION: GRAB BAR;SHOWER BENCH;SET-UP OF EQUIPMENT;SUPERVISION FOR SAFETY
TOILETING: INDEPENDENT
BED_CHAIR_WHEELCHAIR_TRANSFER_DESCRIPTION: SET-UP OF EQUIPMENT;SUPERVISION FOR SAFETY;VERBAL CUEING
BED_CHAIR_WHEELCHAIR_TRANSFER_DESCRIPTION: ADAPTIVE EQUIPMENT;SUPERVISION FOR SAFETY;VERBAL CUEING
TOILET_TRANSFER_DESCRIPTION: GRAB BAR;SET-UP OF EQUIPMENT;SUPERVISION FOR SAFETY
BED_CHAIR_WHEELCHAIR_TRANSFER_DESCRIPTION: ADAPTIVE EQUIPMENT;INCREASED TIME;INITIAL PREPARATION FOR TASK;SUPERVISION FOR SAFETY

## 2024-12-10 ASSESSMENT — GAIT ASSESSMENTS
DEVIATION: ANTALGIC;BRADYKINETIC
ASSISTIVE DEVICE: 4 WHEEL WALKER
ASSISTIVE DEVICE: NONE;4 WHEEL WALKER
GAIT LEVEL OF ASSIST: CONTACT GUARD ASSIST
DEVIATION: ANTALGIC
GAIT LEVEL OF ASSIST: STANDBY ASSIST

## 2024-12-10 ASSESSMENT — BRIEF INTERVIEW FOR MENTAL STATUS (BIMS)
ASKED TO RECALL SOCK: YES, NO CUE REQUIRED
WHAT MONTH IS IT: ACCURATE WITHIN 5 DAYS
INITIAL REPETITION OF BED BLUE SOCK - FIRST ATTEMPT: 3
WHAT YEAR IS IT: CORRECT
ASKED TO RECALL BED: YES, NO CUE REQUIRED
BIMS SUMMARY SCORE: 15
ASKED TO RECALL BLUE: YES, NO CUE REQUIRED
WHAT DAY OF THE WEEK IS IT: CORRECT

## 2024-12-10 ASSESSMENT — 10 METER WALK TEST (10METWT)
TRIAL 1: TIME TO WALK 10 METERS: 10.99
AVERAGE TIME - SECONDS: 10.63
AVERAGE VELOCITY - METERS PER SECOND: 0.56
TRIAL 2: TIME TO WALK 10 METERS: 10.43
TRIAL 3: TIME TO WALK 10 METERS: 10.48

## 2024-12-10 ASSESSMENT — PAIN DESCRIPTION - PAIN TYPE
TYPE: ACUTE PAIN

## 2024-12-10 NOTE — PROGRESS NOTES
"  Physical Medicine & Rehabilitation Progress Note    Encounter Date: 12/10/2024    Chief Complaint: Decreased mobility    Interval Events (Subjective):  Patient sitting up in room. She reports poor sleep. She reports she usually has 150 mg of amitriptyline. Reviewed AM labs and A1c 6.9, discussed about monitoring diet. Discussed would discontinue Insulin.     Objective:  VITAL SIGNS: BP 93/51   Pulse 68   Temp 36.6 °C (97.9 °F) (Oral)   Resp 18   Ht 1.575 m (5' 2\")   Wt 70 kg (154 lb 5.2 oz)   SpO2 93%   BMI 28.23 kg/m²   Gen: NAD  Psych: Mood and affect appropriate  CV: RRR, 0 edema  Resp: CTAB, no upper airway sounds  Abd: NTND  Neuro: AOx4, following commands    Laboratory Values:  Recent Results (from the past 72 hours)   POCT glucose device results    Collection Time: 12/07/24 12:22 PM   Result Value Ref Range    POC Glucose, Blood 73 65 - 99 mg/dL   POCT glucose device results    Collection Time: 12/07/24  4:22 PM   Result Value Ref Range    POC Glucose, Blood 82 65 - 99 mg/dL   POCT glucose device results    Collection Time: 12/07/24  8:22 PM   Result Value Ref Range    POC Glucose, Blood 118 (H) 65 - 99 mg/dL   Renal Function Panel    Collection Time: 12/08/24  2:01 AM   Result Value Ref Range    Sodium 137 135 - 145 mmol/L    Potassium 4.4 3.6 - 5.5 mmol/L    Chloride 108 96 - 112 mmol/L    Co2 20 20 - 33 mmol/L    Glucose 130 (H) 65 - 99 mg/dL    Creatinine 0.58 0.50 - 1.40 mg/dL    Bun 17 8 - 22 mg/dL    Calcium 7.7 (L) 8.4 - 10.2 mg/dL    Correct Calcium 8.8 8.5 - 10.5 mg/dL    Phosphorus 4.3 2.5 - 4.5 mg/dL    Albumin 2.6 (L) 3.2 - 4.9 g/dL   MAGNESIUM    Collection Time: 12/08/24  2:01 AM   Result Value Ref Range    Magnesium 2.0 1.5 - 2.5 mg/dL   CBC WITHOUT DIFFERENTIAL    Collection Time: 12/08/24  2:01 AM   Result Value Ref Range    WBC 5.9 4.8 - 10.8 K/uL    RBC 3.55 (L) 4.20 - 5.40 M/uL    Hemoglobin 9.3 (L) 12.0 - 16.0 g/dL    Hematocrit 30.1 (L) 37.0 - 47.0 %    MCV 84.8 81.4 - 97.8 fL "    MCH 26.2 (L) 27.0 - 33.0 pg    MCHC 30.9 (L) 32.2 - 35.5 g/dL    RDW 50.9 (H) 35.9 - 50.0 fL    Platelet Count 181 164 - 446 K/uL    MPV 10.0 9.0 - 12.9 fL   ESTIMATED GFR    Collection Time: 12/08/24  2:01 AM   Result Value Ref Range    GFR (CKD-EPI) 95 >60 mL/min/1.73 m 2   POCT glucose device results    Collection Time: 12/08/24  5:46 AM   Result Value Ref Range    POC Glucose, Blood 118 (H) 65 - 99 mg/dL   POCT glucose device results    Collection Time: 12/08/24 11:48 AM   Result Value Ref Range    POC Glucose, Blood 129 (H) 65 - 99 mg/dL   POCT glucose device results    Collection Time: 12/08/24  4:34 PM   Result Value Ref Range    POC Glucose, Blood 146 (H) 65 - 99 mg/dL   POCT glucose device results    Collection Time: 12/08/24  9:17 PM   Result Value Ref Range    POC Glucose, Blood 138 (H) 65 - 99 mg/dL   POCT glucose device results    Collection Time: 12/09/24  6:18 AM   Result Value Ref Range    POC Glucose, Blood 127 (H) 65 - 99 mg/dL   POCT glucose device results    Collection Time: 12/09/24  4:58 PM   Result Value Ref Range    POC Glucose, Blood 125 (H) 65 - 99 mg/dL   POCT glucose device results    Collection Time: 12/09/24  7:56 PM   Result Value Ref Range    POC Glucose, Blood 135 (H) 65 - 99 mg/dL   CBC with Differential    Collection Time: 12/10/24  5:29 AM   Result Value Ref Range    WBC 7.3 4.8 - 10.8 K/uL    RBC 3.92 (L) 4.20 - 5.40 M/uL    Hemoglobin 10.1 (L) 12.0 - 16.0 g/dL    Hematocrit 32.4 (L) 37.0 - 47.0 %    MCV 82.7 81.4 - 97.8 fL    MCH 25.8 (L) 27.0 - 33.0 pg    MCHC 31.2 (L) 32.2 - 35.5 g/dL    RDW 47.7 35.9 - 50.0 fL    Platelet Count 232 164 - 446 K/uL    MPV 10.4 9.0 - 12.9 fL    Neutrophils-Polys 72.80 (H) 44.00 - 72.00 %    Lymphocytes 18.60 (L) 22.00 - 41.00 %    Monocytes 6.10 0.00 - 13.40 %    Eosinophils 1.20 0.00 - 6.90 %    Basophils 0.30 0.00 - 1.80 %    Immature Granulocytes 1.00 (H) 0.00 - 0.90 %    Nucleated RBC 0.00 0.00 - 0.20 /100 WBC    Neutrophils (Absolute)  5.33 1.82 - 7.42 K/uL    Lymphs (Absolute) 1.36 1.00 - 4.80 K/uL    Monos (Absolute) 0.45 0.00 - 0.85 K/uL    Eos (Absolute) 0.09 0.00 - 0.51 K/uL    Baso (Absolute) 0.02 0.00 - 0.12 K/uL    Immature Granulocytes (abs) 0.07 0.00 - 0.11 K/uL    NRBC (Absolute) 0.00 K/uL   Comp Metabolic Panel (CMP)    Collection Time: 12/10/24  5:29 AM   Result Value Ref Range    Sodium 138 135 - 145 mmol/L    Potassium 4.6 3.6 - 5.5 mmol/L    Chloride 105 96 - 112 mmol/L    Co2 24 20 - 33 mmol/L    Anion Gap 9.0 7.0 - 16.0    Glucose 117 (H) 65 - 99 mg/dL    Bun 20 8 - 22 mg/dL    Creatinine 0.57 0.50 - 1.40 mg/dL    Calcium 8.6 8.5 - 10.5 mg/dL    Correct Calcium 8.9 8.5 - 10.5 mg/dL    AST(SGOT) 13 12 - 45 U/L    ALT(SGPT) 11 2 - 50 U/L    Alkaline Phosphatase 85 30 - 99 U/L    Total Bilirubin <0.2 0.1 - 1.5 mg/dL    Albumin 3.6 3.2 - 4.9 g/dL    Total Protein 6.0 6.0 - 8.2 g/dL    Globulin 2.4 1.9 - 3.5 g/dL    A-G Ratio 1.5 g/dL   HEMOGLOBIN A1C    Collection Time: 12/10/24  5:29 AM   Result Value Ref Range    Glycohemoglobin 6.9 (H) 4.0 - 5.6 %    Est Avg Glucose 151 mg/dL   TSH with Reflex to FT4    Collection Time: 12/10/24  5:29 AM   Result Value Ref Range    TSH 0.280 (L) 0.380 - 5.330 uIU/mL   Vitamin D, 25-hydroxy (blood)    Collection Time: 12/10/24  5:29 AM   Result Value Ref Range    25-Hydroxy   Vitamin D 25 33 30 - 100 ng/mL   ESTIMATED GFR    Collection Time: 12/10/24  5:29 AM   Result Value Ref Range    GFR (CKD-EPI) 95 >60 mL/min/1.73 m 2   FREE THYROXINE    Collection Time: 12/10/24  5:29 AM   Result Value Ref Range    Free T-4 1.44 0.93 - 1.70 ng/dL   POCT glucose device results    Collection Time: 12/10/24  7:19 AM   Result Value Ref Range    POC Glucose, Blood 100 (H) 65 - 99 mg/dL   POCT glucose device results    Collection Time: 12/10/24 10:44 AM   Result Value Ref Range    POC Glucose, Blood 143 (H) 65 - 99 mg/dL       Medications:  Scheduled Medications   Medication Dose Frequency    Pharmacy Consult  Request  1 Each PHARMACY TO DOSE    senna-docusate  2 Tablet Q EVENING    acetaminophen  1,000 mg Q6HRS    amitriptyline  100 mg Nightly    enoxaparin (LOVENOX) injection  40 mg DAILY AT 1800    escitalopram  20 mg DAILY    ezetimibe  10 mg Q EVENING    insulin lispro  1-6 Units 4X/DAY ACHS    ketorolac  10 mg Q6HRS    levothyroxine  88 mcg DAILY    liothyronine  5 mcg DAILY    lidocaine  2 Patch Q24HR    metoprolol SR  25 mg DAILY    omeprazole  20 mg BID    predniSONE  4 mg Q6HRS    rosuvastatin  20 mg Q EVENING     PRN medications: hydrALAZINE, acetaminophen, senna-docusate **AND** polyethylene glycol/lytes, docusate sodium, magnesium hydroxide, carboxymethylcellulose, benzocaine-menthol, mag hydrox-al hydrox-simeth, ondansetron **OR** ondansetron, traZODone, sodium chloride, insulin lispro **AND** POC blood glucose manual result **AND** Administer 20 grams of glucose (approximately 8 ounces of fruit juice) every 15 minutes PRN FSBG less than 70 mg/dL **AND** dextrose bolus, albuterol, diclofenac sodium, methocarbamol, oxyCODONE immediate-release **OR** oxyCODONE immediate-release    Diet:  Current Diet Order   Procedures    Diet Order Diet: Cardiac; Second Modifier: (optional): Consistent CHO (Diabetic)       Medical Decision Making and Plan:  Lumbar radiculopathy - Patient with new GLF found to have L1 compression fracture on MRI. Neurosurgery was consulted and recommended conservative management  -PT and OT for mobility and ADLs. Per guidelines, 15 hours per week between PT, OT and/or SLP.  -Follow-up NSG. Continue Ketorolac 10 mg q6 and prednisone      HTN - Patient on Metoprolol 25 mg XL     HLD - Patient on Rosuvastatin 20 mg daily     Anemia - Check AM CBC - 10.1, will monitor     Obesity due to excess calories - BMI of 31.0 on admission, meets medical criteria. Dietitian to consult     GERD - patient on prilosec BID     DM2 with hyperglycemia - Previously on metformin. On SSI on transfer. Will check A1c -  6.9, discontinue SSI. Restart Metformin 500 mg BID     Depression/Insomnia - Patient on Elavil 100 mg QHS and Lexapro 20 mg daily. Increase Elavil to 150 mg     Hypothyroidism - Patient on Levothyroxine 88 mcg and Liothyronine 5 mg daily. TSH low on admission but T4 normal     Pain - Patient on PRN Tylenol, Oxycodone, and Ketorolac. In addition on Lidocaine patches     Skin - Patient at risk for skin breakdown due to debility in areas including sacrum, achilles, elbows and head in addition to other sites. Nursing to assess skin daily.      GI Ppx - Patient on Prilosec for GERD prophylaxis. Patient on Senna-docusate for constipation prophylaxis.      DVT Ppx - Patient Lovenox on transfer.    ____________________________________    T. Mason Lux MD/PhD  [facility] - Physical Medicine & Rehabilitation   [facility] - Brain Injury Medicine   ____________________________________    Total time:  50 minutes. Time spent included pre-rounding review of vitals and tests, unit/floor time, face-to-face time with the patient including physical examination, care coordination, counseling of patient and/or family, ordering medications/procedures/tests, discussion with CM, PT, OT, SLP and/or other healthcare providers, and documentation in the electronic medical record. Topics discussed included admission labs, discontinue insulin, start metformin, poor sleep and increase Amitriptyline

## 2024-12-10 NOTE — PROGRESS NOTES
NURSING DAILY NOTE    Name: Yamile Go   Date of Admission: 12/9/2024   Admitting Diagnosis: Lumbosacral radiculopathy due to degenerative joint disease of spine  Attending Physician: LILLY BOYD M.D.  Allergies: Gabapentin, Pregabalin, Amlodipine, Bee, Fentanyl, Morphine, Benzoin, and Rifampin    Safety  Patient Assist  Mod assist  Patient Precautions     Precaution Comments     Bed Transfer Status     Toilet Transfer Status      Assistive Devices  Wheelchair  Oxygen  None - Room Air  Diet/Therapeutic Dining  Current Diet Order   Procedures    Diet Order Diet: Cardiac; Second Modifier: (optional): Consistent CHO (Diabetic)     Pill Administration  whole  Agitated Behavioral Scale     ABS Level of Severity       Fall Risk  Has the patient had a fall this admission?   No  May Beard Fall Risk Scoring  11, MODERATE RISK  Fall Risk Safety Measures  bed alarm and chair alarm    Vitals  Temperature: 37.1 °C (98.8 °F)  Temp src: Oral  Pulse: 76  Respiration: 17  Blood Pressure : (!) 149/81  Blood Pressure MAP (Calculated): 104 MM HG  BP Location: Right, Upper Arm  Patient BP Position: Supine     Oxygen  Pulse Oximetry: 95 %  O2 (LPM): 0  O2 Delivery Device: None - Room Air    Bowel and Bladder  Last Bowel Movement  12/06/24  Stool Type     Bowel Device     Continent  Bladder: Continent void   Bowel: Continent movement  Bladder Function  Number of Times Voided: 1  Urine Color: Yellow  Genitourinary Assessment   Bladder Assessment (WDL):  Within Defined Limits  Urine Color: Yellow  $ Bladder Scan Results (mL): 105    Skin  Hermelindo Score   19  Sensory Interventions   Bed Types: Standard/Trauma Mattress  Skin Preventative Measures: Pillows in Use for Support / Positioning  Moisture Interventions         Pain  Pain Rating Scale  6 - Hard to ignore, avoid usual activities  Pain Location  Back  Pain Location Orientation  Lower  Pain Interventions    Medication (see MAR)    ADLs    Bathing      Linen Change      Personal Hygiene     Chlorhexidine Bath      Oral Care     Teeth/Dentures     Shave     Nutrition Percentage Eaten     Environmental Precautions     Patient Turns/Positioning  Patient turns self independently side to side without assistance, to offload sacral area  Patient Turns Assistance/Tolerance     Bed Positions  Bed Controls On, Bed Locked  Head of Bed Elevated  Self regulated      Psychosocial/Neurologic Assessment  Psychosocial Assessment  Psychosocial (WDL):  WDL Except  Patient Behaviors: Depressed  Neurologic Assessment  Neuro (WDL): Within Defined Limits  Level of Consciousness: Alert  Orientation Level: Oriented X4  Cognition: Follows commands  Speech: Clear  EENT (WDL):  WDL Except    Cardio/Pulmonary Assessment  Edema      Respiratory Breath Sounds  RUL Breath Sounds: Clear  RML Breath Sounds: Clear  RLL Breath Sounds: Clear  JOAQUÍN Breath Sounds: Clear  LLL Breath Sounds: Clear  Cardiac Assessment   Cardiac (WDL):  WDL Except (HTN)

## 2024-12-10 NOTE — THERAPY
Occupational Therapy   Initial Evaluation     Patient Name: Yamile Go  Age:  74 y.o., Sex:  female  Medical Record #: [id number]  Today's Date: 12/10/2024     Subjective    Patient was asleep in bed, but easily awoken.  She reported a poor night of sleep, but was agreeable to OT evaluation and shower.       Objective       12/10/24 0931   OT Charge Group   Charges Yes   OT Self Care / ADL (Units) 1   OT Evaluation OT Evaluation Low   OT Total Time Spent   OT Individual Total Time Spent (Mins) 60   Prior Living Situation   Prior Services Home-Independent   Housing / Facility 1 Story Apartment / Condo   Steps Into Home 0   Steps In Home 0   Bathroom Set up Bathtub / Shower Combination;Tub Transfer Bench   Equipment Owned Front-Wheel Walker;4-Wheel Walker;Single Point Cane;Tub Transfer Bench;Sock Aid;Reacher;Raised Toilet Seat With Arms   Lives with - Patient's Self Care Capacity Alone and Able to Care For Self   Comments 91 year old aunt is currently staying with her and can stay until no longer needed   Prior Level of ADL Function   Self Feeding Independent   Grooming / Hygiene Independent   Bathing Independent   Dressing Independent   Toileting Independent   Prior Level of IADL Function   Medication Management Independent   Laundry Requires Assist   Kitchen Mobility Independent   Finances Independent   Home Management Requires Assist   Shopping Requires Assist   Prior Level Of Mobility Independent Without Device in Community   Driving / Transportation Driving Independent   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  I just took a pain pill.\")   Cognition    Cognition / Consciousness WDL   Orientation Level Oriented x 4   Level of Consciousness Alert   ABS (Agitated Behavior Scale)   Agitated Behavior Scale Performed No   Cognitive Pattern Assessment   Cognitive Pattern Assessment Used BIMS   Brief Interview for Mental Status (BIMS)   Repetition of Three Words (First Attempt) 3   Temporal Orientation: Year Correct   Temporal Orientation: Month Accurate within 5 days   Temporal Orientation: Day Correct   Recall: \"Sock\" Yes, no cue required   Recall: \"Blue\" Yes, no cue required   Recall: \"Bed\" Yes, no cue required   BIMS Summary Score 15   Confusion Assessment Method (CAM)   Is there evidence of an acute change in mental status from the patient's baseline? No   Inattention Behavior not present   Disorganized thinking Behavior not present   Altered level of consciousness Behavior not present   Vision Screen   Vision Not tested   Passive ROM Upper Body   Passive ROM Upper Body WDL   Active ROM Upper Body   Active ROM Upper Body  WDL   Strength Upper Body   Upper Body Strength  WDL   Sensation Upper Body   Upper Extremity Sensation  WDL   Upper Body Muscle Tone   Upper Body Muscle Tone  Not Tested   Balance Assessment   Sitting Balance (Static) Normal   Sitting Balance (Dynamic) Good   Standing Balance (Static) Fair -   Standing Balance (Dynamic) Poor +   Weight Shift Sitting Good   Weight Shift Standing Fair   Bed Mobility    Supine to Sit Standby Assist   Sit to Stand Contact Guard Assist   Scooting Standby Assist   Rolling Standby Assist   Coordination Upper Body   Coordination WDL   Hearing, Speech, and Vision   Ability to Hear Adequate   Ability to See in Adequate Light Adequate   Expression of Ideas and Wants Without difficulty   Understanding Verbal and Non-Verbal Content Understands   Functional Level of Assist   Eating " Independent   Grooming Supervision;Seated   Grooming Description Set-up of equipment   Bathing Standby Assist   Bathing Description Grab bar;Hand held shower;Tub bench;Set-up of equipment;Supervision for safety   Upper Body Dressing Stand by Assist   Upper Body Dressing Description Set-up of equipment;Supervision for safety;Application of orthotic or brace   Lower Body Dressing Contact Guard Assist   Lower Body Dressing Description Set-up of equipment;Supervision for safety   Toileting Contact Guard Assist   Toileting Description Grab bar;Set-up of equipment;Supervision for safety   Bed, Chair, Wheelchair Transfer Minimal Assist   Bed Chair Wheelchair Transfer Description Set-up of equipment;Supervision for safety;Verbal cueing  (min A HHA SPT bed <> w/c)   Toilet Transfers Contact Guard Assist   Toilet Transfer Description Grab bar;Set-up of equipment;Supervision for safety   Tub / Shower Transfers Contact Guard Assist   Tub Shower Transfer Description Grab bar;Shower bench;Set-up of equipment;Supervision for safety   Eating   Assistance Needed Independent   CARE Score - Eating 6   Oral Hygiene   Assistance Needed Set-up / clean-up   CARE Score - Oral Hygiene 5   Shower/Bathe Self   Assistance Needed Set-up / clean-up;Adaptive equipment;Verbal cues   CARE Score - Shower/Bathe Self 4   Upper Body Dressing   Assistance Needed Set-up / clean-up;Verbal cues   CARE Score - Upper Body Dressing 4   Lower Body Dressing   Assistance Needed Set-up / clean-up;Incidental touching   CARE Score - Lower Body Dressing 4   Putting On/Taking Off Footwear   Assistance Needed Set-up / clean-up   CARE Score - Putting On/Taking Off Footwear 5   Toileting Hygiene   Assistance Needed Set-up / clean-up;Incidental touching;Adaptive equipment   CARE Score - Toileting Hygiene 4   Toilet Transfer   Assistance Needed Set-up / clean-up;Incidental touching;Adaptive equipment   CARE Score - Toilet Transfer 4   Problem List   Problem List Decreased  Active Daily Living Skills;Decreased Homemaking Skills;Decreased Functional Mobility;Decreased Activity Tolerance;Impaired Postural Control / Balance;Limited Knowledge of Post Op Precautions   Precautions   Precautions Fall Risk;Spinal / Back Precautions ;Lumbosacral Orthosis   Comments LSO when OOB; hx R ankle/shoulder and L hip replacements, hx of multiple falls   Current Discharge Plan   Current Discharge Plan Return to Prior Living Situation   Benefit    Therapy Benefit Patient Would Benefit from Inpatient Rehab Occupational Therapy to Maximize [place] with ADLs, IADLs and Functional Mobility.   Interdisciplinary Plan of Care Collaboration   IDT Collaboration with  Physical Therapist   Patient Position at End of Therapy In Bed;Bed Alarm On;Call Light within Reach;Tray Table within Reach;Phone within Reach   Collaboration Comments CLOF   OT DME Recommendations   Bathroom Equipment   (recommend grab bars in tub/shower and by toilets, but patient states apartment management will not put them in for her)   Strengths & Barriers   Strengths Able to follow instructions;Adequate strength;Alert and oriented;Effective communication skills;Independent prior level of function;Manages pain appropriately;Motivated for self care and independence;Pleasant and cooperative;Supportive family;Willingly participates in therapeutic activities   Barriers Decreased endurance;Fatigue;Generalized weakness;Impaired balance;Limited mobility;Pain  (spinal precautions)   Occupational Therapist Assigned   Assigned OT / Treatment Time / Comments Titi primary OT; 60 and 30 minute sessions       Assessment  Patient is 74 y.o. female with a diagnosis of GLF resulting in L1 compression fx (conservative management).  Additional factors influencing patient status / progress (ie: cognitive factors, co-morbidities, social support, etc): lives alone, hx of multiple falls with spinal compression fx's, multiple joint replacements.       Plan  Recommend Occupational Therapy  minutes per day 5-7 days per week for 7-10 days for the following treatments:  OT Group Therapy, OT Self Care/ADL, OT Community Reintegration, OT Manual Ther Technique, OT Neuro Re-Ed/Balance, OT Therapeutic Activity, OT Evaluation, and OT Therapeutic Exercise.    Passport items to be completed:  Perform bathroom transfers, complete dressing, complete feeding, get ready for the day, prepare a simple meal, participate in household tasks, adapt home for safety needs, demonstrate home exercise program, complete caregiver training     Goals:  Long term and short term goals have been discussed with patient and they are in agreement.    Occupational Therapy Goals (Active)       Problem: Dressing       Dates: Start:  12/10/24         Goal: STG-Within one week, patient will dress LB with setup and supervision       Dates: Start:  12/10/24               Problem: Functional Transfers       Dates: Start:  12/10/24         Goal: STG-Within one week, patient will transfer to toilet with SBA       Dates: Start:  12/10/24               Problem: OT Long Term Goals       Dates: Start:  12/10/24         Goal: LTG-By discharge, patient will complete basic self care tasks with supervision/mod I       Dates: Start:  12/10/24            Goal: LTG-By discharge, patient will perform bathroom transfers with supervision/mod I       Dates: Start:  12/10/24            Goal: LTG-By discharge, patient will complete basic home management with supervision/mod I       Dates: Start:  12/10/24               Problem: Toileting       Dates: Start:  12/10/24         Goal: STG-Within one week, patient will complete toileting tasks with SBA       Dates: Start:  12/10/24

## 2024-12-10 NOTE — CARE PLAN
"  Problem: Knowledge Deficit - Standard  Goal: Patient and family/care givers will demonstrate understanding of plan of care, disease process/condition, diagnostic tests and medications  Outcome: Progressing  Note: Pt agrees with plan of care tonight regarding medications and safety.  Will continue to monitor patient.     Problem: Pain - Standard  Goal: Alleviation of pain or a reduction in pain to the patient’s comfort goal  Outcome: Progressing  Note: C/o back pain, medicated with scheduled Tylenol and Toradol, as well as prn Oxycodone.     Problem: Fall Risk - Rehab  Goal: Patient will remain free from falls  Outcome: Progressing  Note: May Beard Fall risk Assessment Score: 15    High fall risk Interventions   - Alarming seatbelt  - Wander guard  - Bed and strip alarm   - Yellow sign by the door   - Yellow wrist band \"Fall risk\"  - Room near to the nurse station  - Do not leave patient unattended in the bathroom  - Fall risk education provided        The patient is Stable - Low risk of patient condition declining or worsening    Shift Goals  Clinical Goals: Safety  Patient Goals: Sleep well    Progress made toward(s) clinical / shift goals:  progressing          "

## 2024-12-10 NOTE — CARE PLAN
"  Problem: Fall Risk - Rehab  Goal: Patient will remain free from falls  Note: May Beard Fall risk Assessment Score: 15    High fall risk Interventions   - Bed and strip alarm   - Yellow sign by the door   - Yellow wrist band \"Fall risk\"  - Room near to the nurse station  - Do not leave patient unattended in the bathroom  - Fall risk education provided     The patient is Stable - Low risk of patient condition declining or worsening    Shift Goals  Clinical Goals: Safety  Patient Goals: Sleep well  "

## 2024-12-10 NOTE — THERAPY
Occupational Therapy  Daily Treatment     Patient Name: Yamile Go  Age:  74 y.o., Sex:  female  Medical Record #: [id number]  Today's Date: 12/10/2024     Precautions  Precautions: (P) Fall Risk, Spinal / Back Precautions , Lumbosacral Orthosis  Comments: LSO when OOB; hx R ankle/shoulder and L hip replacements, hx of multiple falls         Subjective    Patient was seated in w/c after just using the restroom.  She reported getting a 10-15 minute nap after OT evaluation, which is not as much as she was hoping for.       Objective       12/10/24 1301   OT Charge Group   Charges Yes   OT Neuromuscular Re-education / Balance (Units) 1   OT Therapeutic Exercise (Units) 1   OT Total Time Spent   OT Individual Total Time Spent (Mins) 30   Precautions   Precautions Fall Risk;Spinal / Back Precautions ;Lumbosacral Orthosis   Pain 0 - 10 Group   Location Back   Location Orientation Lower   Sitting Upper Body Exercises   Sitting Upper Body Exercises Yes   Upper Extremity Bike Minutes / Rest Breaks (See Comments)  (motomed cycle gear 4 x 10 minutes without rest breaks)   Interdisciplinary Plan of Care Collaboration   IDT Collaboration with  Physical Therapist   Patient Position at End of Therapy Seated  (in gym for PT)   Collaboration Comments hand off to PT     Worked on standing balance while tossing a large ball at rebounder followed by catching the ball.  She completed 20 reps of throws/catches with CGA with normal stance, narrow stance and tandem stance (left foot slightly ahead of right).    Assessment    Patient tolerated session well.  She did report some LE fatigue during standing balance activity, but took rest breaks when needed.  Strengths: Able to follow instructions, Adequate strength, Alert and oriented, Effective communication skills, Independent prior level of function, Manages pain appropriately, Motivated for self care and independence, Pleasant and cooperative, Supportive family, Willingly participates  in therapeutic activities  Barriers: Decreased endurance, Fatigue, Generalized weakness, Impaired balance, Limited mobility, Pain (spinal precautions)    Plan    ADLs, IADLs, transfers and functional mobility with FWW, standing balance, core/UB strength/endurance    Occupational Therapy Goals (Active)       Problem: Dressing       Dates: Start:  12/10/24         Goal: STG-Within one week, patient will dress LB with setup and supervision       Dates: Start:  12/10/24               Problem: Functional Transfers       Dates: Start:  12/10/24         Goal: STG-Within one week, patient will transfer to toilet with SBA       Dates: Start:  12/10/24               Problem: OT Long Term Goals       Dates: Start:  12/10/24         Goal: LTG-By discharge, patient will complete basic self care tasks with supervision/mod I       Dates: Start:  12/10/24            Goal: LTG-By discharge, patient will perform bathroom transfers with supervision/mod I       Dates: Start:  12/10/24            Goal: LTG-By discharge, patient will complete basic home management with supervision/mod I       Dates: Start:  12/10/24               Problem: Toileting       Dates: Start:  12/10/24         Goal: STG-Within one week, patient will complete toileting tasks with SBA       Dates: Start:  12/10/24

## 2024-12-10 NOTE — PROGRESS NOTES
NURSING DAILY NOTE    Name: Yamile Go   Date of Admission: 12/9/2024   Admitting Diagnosis: Lumbosacral radiculopathy due to degenerative joint disease of spine  Attending Physician: LILLY BOYD M.D.  Allergies: Gabapentin, Pregabalin, Amlodipine, Bee, Fentanyl, Morphine, Benzoin, and Rifampin    Safety  Patient Assist  Min Assist  Patient Precautions     Precaution Comments     Bed Transfer Status     Toilet Transfer Status      Assistive Devices  Wheelchair  Oxygen  None - Room Air  Diet/Therapeutic Dining  Current Diet Order   Procedures    Diet Order Diet: Cardiac; Second Modifier: (optional): Consistent CHO (Diabetic)     Pill Administration  whole  Agitated Behavioral Scale     ABS Level of Severity       Fall Risk  Has the patient had a fall this admission?   No  May Beard Fall Risk Scoring  15, HIGH RISK  Fall Risk Safety Measures  Bed strip alarm    Vitals  Temperature: 36.2 °C (97.1 °F)  Temp src: Temporal  Pulse: 73  Respiration: 18  Blood Pressure : 138/88 (Manual.)  Blood Pressure MAP (Calculated): 105 MM HG  BP Location: Right, Upper Arm  Patient BP Position: Supine     Oxygen  Pulse Oximetry: 93 %  O2 (LPM): 0  O2 Delivery Device: None - Room Air    Bowel and Bladder  Last Bowel Movement  12/10/24  Stool Type  Type 1: Separate hard lumps (hard to pass)  Bowel Device  Bathroom  Continent  Bladder: Continent void   Bowel: Continent movement  Bladder Function  Number of Times Voided: 1  Urinary Options: Yes  Urine Color: Yellow  Genitourinary Assessment   Bladder Assessment (WDL):  Within Defined Limits  Polanco Catheter: Not Applicable  Urine Color: Yellow  Bladder Device: Bathroom  Bladder Scan: Post Void  $ Bladder Scan Results (mL): 123    Skin  Hermelindo Score   19  Sensory Interventions   Bed Types: Standard/Trauma Mattress  Skin Preventative Measures: Pillows in Use for Support / Positioning  Moisture Interventions          Pain  Pain Rating Scale  8 - Awful, hard to do anything  Pain Location  Back  Pain Location Orientation  Mid  Pain Interventions   Medication (see MAR)    ADLs    Bathing      Linen Change      Personal Hygiene     Chlorhexidine Bath      Oral Care     Teeth/Dentures     Shave     Nutrition Percentage Eaten     Environmental Precautions  Treaded Slipper Socks on Patient, Bed in Low Position  Patient Turns/Positioning  Patient turns self independently side to side without assistance, to offload sacral area  Patient Turns Assistance/Tolerance     Bed Positions  Bed Controls On, Bed Locked  Head of Bed Elevated  Self regulated      Psychosocial/Neurologic Assessment  Psychosocial Assessment  Psychosocial (WDL):  WDL Except  Patient Behaviors: Fatigue  Neurologic Assessment  Neuro (WDL): Within Defined Limits  Level of Consciousness: Alert  Orientation Level: Oriented X4  Cognition: Follows commands  Speech: Clear  EENT (WDL):  WDL Except    Cardio/Pulmonary Assessment  Edema      Respiratory Breath Sounds  RUL Breath Sounds: Clear  RML Breath Sounds: Clear  RLL Breath Sounds: Clear  JOAQUÍN Breath Sounds: Clear  LLL Breath Sounds: Clear  Cardiac Assessment   Cardiac (WDL):  WDL Except (H/O HTN.)

## 2024-12-10 NOTE — IDT DISCHARGE PLANNING
CASE MANAGEMENT INITIAL ASSESSMENT    Admit Date:  12/9/2024     CM reviewed the medical chart and will meet with the patient to discuss role of case management / discharge planning / team conference.       Diagnosis: Closed compression fracture of body of L1 vertebra (Former[facility]) [S32.010A]    Co-morbidities:   Patient Active Problem List    Diagnosis Date Noted    Closed compression fracture of body of L1 vertebra ([facility]) 12/09/2024    Lumbar spine instability 12/08/2024    Lumbosacral radiculopathy due to degenerative joint disease of spine 12/08/2024    Compression fracture of L1 lumbar vertebra, sequela 12/07/2024    Intractable low back pain 12/06/2024    Primary hypertension 12/06/2024    Mixed hyperlipidemia 12/06/2024    Lumbar canal stenosis 12/06/2024    ACP (advance care planning) 12/06/2024    Hyponatremia 05/07/2024    Acute respiratory failure ([facility]) 05/03/2024    Near syncope 05/03/2024    Pancolitis ([facility]) 05/03/2024    S/P shoulder surgery 05/03/2024    Lactic acidosis 05/03/2024    Rectal bleeding 05/03/2024    GIB (gastrointestinal bleeding) 05/03/2024    SIRS (systemic inflammatory response syndrome) ([facility]) 05/02/2024    Acute asthma exacerbation 05/03/2023    Type 2 diabetes mellitus ([facility]) 05/03/2023    Hypokalemia 05/03/2023    Sinus tachycardia 05/03/2023    Non-traumatic compression fracture of T5 thoracic vertebra, sequela 05/03/2023    Hyperglycemia 03/30/2022    Thrombocytopenia ([facility]) 03/29/2022    Azotemia 03/29/2022    Vitamin D deficiency 03/29/2022    Asthma 03/29/2022    Closed compression fracture of L2 lumbar vertebra, initial encounter ([facility]) 03/23/2022    Physical debility 03/22/2022    Personal history of COVID-19 03/21/2022    Closed fracture of lumbar vertebra, unspecified fracture morphology, initial encounter ([facility]) 03/21/2022    Leukocytosis 03/21/2022    Acute hypoxemic respiratory failure due to COVID-19 ([facility]) 02/06/2022    Community acquired pneumonia 02/06/2022    Sepsis ([facility])  02/06/2022    Acquired hypothyroidism 02/06/2022    Chronic pain syndrome on chronic opioids 02/06/2022    Class 1 obesity due to excess calories with serious comorbidity and body mass index (BMI) of 32.0 to 32.9 in adult     Depression     Other chest pain 01/30/2012    Dyslipidemia 01/30/2012    Essential hypertension, benign 01/30/2012    CAD (coronary artery disease) 01/30/2012     Prior Living Situation:   Alone and able to care for self. Works full time.    Prior Level of Function:   Independent.    Support Systems:  Primary : Francisca Azul         Previous Services Utilized:        Other Information:        Primary Payor Source: Medicare A, Medicare B  Primary Care Practitioner : Cole Yuen    Patient / Family Goal:  Patient / Family Goal: Return home.    Plan:  1. Continue to follow patient through hospitalization and provide discharge planning in collaboration with patient, family, physicians and ancillary services.     2. Utilize community resources to ensure a safe discharge.

## 2024-12-10 NOTE — PROGRESS NOTES
Received bedside shift report from Madeline KELLY RN regarding patient and assumed care. Patient awake, calm and stable, currently positioned in bed for comfort and safety; call light within reach. Denies pain or discomfort at this time. Will continue to monitor.

## 2024-12-10 NOTE — THERAPY
Physical Therapy   Initial Evaluation     Patient Name: Yamile Go  Age:  74 y.o., Sex:  female  Medical Record #: [id number]  Today's Date: 12/10/2024     Subjective    Patient in w/c and agreeable to PT evaluation, reports feeling tired from poor night's sleep.     Objective       12/10/24 1331   PT Charge Group   PT Therapeutic Activities (Units) 1   PT Evaluation PT Evaluation Low   PT Total Time Spent   PT Individual Total Time Spent (Mins) 60   Prior Living Situation   Prior Services Home-Independent   Housing / Facility 1 Story Apartment / Condo   Steps Into Home 0   Steps In Home 0   Rail None   Elevator No   Bathroom Set up Bathtub / Shower Combination;Shower Curtain;Tub Transfer Bench   Equipment Owned Front-Wheel Walker;4-Wheel Walker;Single Point Cane;Tub Transfer Bench;Hand Held Shower;Raised Toilet Seat With Arms   Lives with - Patient's Self Care Capacity Alone and Able to Care For Self   Comments Lives in [facility]; 91-year old aunt currently staying with her. Daughter lives in [place] and can assist intermittently.   Prior Level of Functional Mobility   Bed Mobility Independent   Transfer Status Independent   Ambulation Independent   Distance Ambulation (Feet) 1000   Assistive Devices Used None   Wheelchair Other (Comments)  (n/a)   Stairs Independent   Comments independent with all mobility, ADLs, and IADLs without a device. Was driving, working full time as a night shift nurse   Prior Functioning: Everyday Activities   Self Care Independent   Indoor Mobility (Ambulation) Independent   Stairs Independent   Functional Cognition Independent   Prior Device Use None of the given options   Pain 0 - 10 Group   Location Back   Location Orientation Lower   Passive ROM Lower Body   Passive ROM Lower Body WDL   Active ROM Lower Body    Active ROM Lower Body  WDL   Strength Lower Body   Lower Body Strength  X   Comments BLEs 4- to 4/5; LLE weaker than RLE especially distally   Sensation Lower Body   Lower  Extremity Sensation   X   Comments Reports baseline neuropathy (tingling in LLE, stabbing electricity pain in L foot) and numbness since R hip replacement September 2024. BLE intact to light touch, tactile localization, impaired bilateral simultaneous stimulation with patient extinguishing R foot.   Bed Mobility    Supine to Sit Standby Assist   Sit to Supine Standby Assist   Sit to Stand Contact Guard Assist   Scooting Standby Assist   Rolling Standby Assist   Roll Left and Right   Assistance Needed Verbal cues   Physical Assistance Level No physical assistance   CARE Score - Roll Left and Right 4   Sit to Lying   Assistance Needed Verbal cues   Physical Assistance Level No physical assistance   CARE Score - Sit to Lying 4   Lying to Sitting on Side of Bed   Assistance Needed Verbal cues   Physical Assistance Level No physical assistance   CARE Score - Lying to Sitting on Side of Bed 4   Sit to Stand   Assistance Needed Incidental touching   Physical Assistance Level No physical assistance   CARE Score - Sit to Stand 4   Chair/Bed-to-Chair Transfer   Assistance Needed Incidental touching   Physical Assistance Level No physical assistance   CARE Score - Chair/Bed-to-Chair Transfer 4   Car Transfer   Assistance Needed Incidental touching   Physical Assistance Level No physical assistance   CARE Score - Car Transfer 4   Walk 10 Feet   Assistance Needed Incidental touching   Physical Assistance Level No physical assistance   CARE Score - Walk 10 Feet 4   Walk 50 Feet with Two Turns   Assistance Needed Incidental touching   Physical Assistance Level No physical assistance   CARE Score - Walk 50 Feet with Two Turns 4   Walk 150 Feet   Assistance Needed Incidental touching   Physical Assistance Level No physical assistance   CARE Score - Walk 150 Feet 4   Walking 10 Feet on Uneven Surfaces   Assistance Needed Incidental touching   Physical Assistance Level No physical assistance   CARE Score - Walking 10 Feet on Uneven  "Surfaces 4   1 Step (Curb)   Assistance Needed Incidental touching   Physical Assistance Level No physical assistance   CARE Score - 1 Step (Curb) 4   4 Steps   Assistance Needed Incidental touching   Physical Assistance Level No physical assistance   CARE Score - 4 Steps 4   12 Steps   Assistance Needed Incidental touching   Physical Assistance Level No physical assistance   CARE Score - 12 Steps 4   Picking Up Object   Assistance Needed Adaptive equipment   Physical Assistance Level No physical assistance   CARE Score - Picking Up Object 6   Gait Functional Level of Assist    Gait Level Of Assist Contact Guard Assist   Assistive Device None;4 Wheel Walker   Distance (Feet)   (110 ftx1 with no AD; 110 ftx1, 200 ftx1 with 4WW)   Deviation Antalgic  (slow tristen)   Stairs Functional Level of Assist   Level of Assist with Stairs Contact Guard Assist   # of Stairs Climbed 10  (x6 4\" steps, x4 6\" steps with B rails)   Stairs Description Extra time;Hand rails;Supervision for safety;Verbal cueing  (reciprocal pattern ascending, step to pattern wtih RLE leading on descent)   Transfer Functional Level of Assist   Bed, Chair, Wheelchair Transfer Contact Guard Assist   Bed Chair Wheelchair Transfer Description Adaptive equipment;Increased time;Initial preparation for task;Supervision for safety  (stand step transfer with 4WW vs no AD)   Problem List    Problems Pain;Impaired Bed Mobility;Impaired Transfers;Impaired Ambulation;Functional Strength Deficit;Impaired Balance   Precautions   Precautions Fall Risk;Spinal / Back Precautions ;Lumbosacral Orthosis   Comments LSO when OOB; hx R ankle/shoulder and L hip replacements, hx of multiple falls, neuropathy   Current Discharge Plan   Current Discharge Plan Return to Prior Living Situation   Interdisciplinary Plan of Care Collaboration   IDT Collaboration with  Occupational Therapist;Physical Therapist Assistant (PTA);Nursing   Patient Position at End of Therapy In Bed;Bed Alarm " On;Call Light within Reach;Tray Table within Reach;Phone within Reach   Collaboration Comments recieved patient from OT; CLOF, POC; nursing re: patient's mattress discomfort   Physical Therapist Assigned   Assigned PT / Treatment Time / Comments Lora   Benefit   Therapy Benefit Patient Would Benefit from Inpatient Rehabilitation Physical Therapy to Maximize Functional [place] with ADLs, IADLs and Mobility.   Strengths & Barriers   Strengths Able to follow instructions;Alert and oriented;Effective communication skills;Good insight into deficits/needs;Independent prior level of function;Making steady progress towards goals;Motivated for self care and independence;Pleasant and cooperative;Willingly participates in therapeutic activities   Barriers Decreased endurance;Impaired activity tolerance;Impaired balance;Pain       Assessment  Patient is 74 y.o. female with a diagnosis of L1 compression fracture. Patient presented to [facility] on 12/6/24 after GLF. She has a history of compression fractures. Repeat imaging and MRI showed new L1 compression fracture; NSG consulted and recommended conservative management with steroids and LSO when OOB. PMH includes hypothyroidism, GERD, lumbar stenosis, R total shoulder arthroplasty 4/30/2024, R ankle arthroplasty with damage to R great toe extensor tendon 2020, L peripheral nerve stimulator 12/22/2022, L hip fracture resulting in THR September 2024.  Additional factors influencing patient status / progress (ie: cognitive factors, co-morbidities, social support, etc): Patient is motivated to return to prior level of independence without a device; however does endorse significant fall history (reports about 1 every few months, which is an improvement from falling monthly). Patient believes etiology of falls is from LLE sensory issues/buckling. Patient is currently working full time as a night shift nurse; her 91 year old aunt is living with her and her daughter lives  in [place] and can assist intermittently. Primary impairments include pain, decreased activity tolerance/endurance, and LLE distal weakness. Potential barriers include medical comorbidities, frequent falls, chronic LLE weakness. Patient will benefit from skilled physical therapy to address current impairments and maximize functional mobility and safety prior to discharge.    Plan  Recommend Physical Therapy  minutes per day 5-7 days per week for 7-10 days for the following treatments:  PT Group Therapy, PT Gait Training, PT Therapeutic Exercises, PT Neuro Re-Ed/Balance, PT Therapeutic Activity, and PT Evaluation.    Passport items to be completed:  Get in/out of bed safely, in/out of a vehicle, safely use mobility device, walk or wheel around home/community, navigate up and down stairs, show how to get up/down from the ground, ensure home is accessible, demonstrate HEP, complete caregiver training    Goals:  Long term and short term goals have been discussed with patient and they are in agreement.    Physical Therapy Problems (Active)       Problem: Balance       Dates: Start:  12/10/24         Goal: STG-Within one week, patient will tolerate outcome measure testing.       Dates: Start:  12/10/24               Problem: Mobility       Dates: Start:  12/10/24         Goal: STG-Within one week, patient will ambulate community distances with LRAD and supervision.       Dates: Start:  12/10/24               Problem: Mobility Transfers       Dates: Start:  12/10/24         Goal: STG-Within one week, patient will perform bed mobility independently.       Dates: Start:  12/10/24            Goal: STG-Within one week, patient will transfer bed to chair with LRAD and supervision.       Dates: Start:  12/10/24               Problem: PT-Long Term Goals       Dates: Start:  12/10/24         Goal: LTG-By discharge, patient will ambulate household and community distances with LRAD mod I.       Dates: Start:  12/10/24             Goal: LTG-By discharge, patient will transfer one surface to another with LRAD mod I.       Dates: Start:  12/10/24            Goal: LTG-By discharge, patient will transfer in/out of a car with LRAD mod I.       Dates: Start:  12/10/24

## 2024-12-11 ENCOUNTER — APPOINTMENT (OUTPATIENT)
Dept: PHYSICAL THERAPY | Facility: REHABILITATION | Age: 74
DRG: 561 | End: 2024-12-11
Attending: PHYSICAL MEDICINE & REHABILITATION
Payer: MEDICARE

## 2024-12-11 ENCOUNTER — APPOINTMENT (OUTPATIENT)
Dept: OCCUPATIONAL THERAPY | Facility: REHABILITATION | Age: 74
DRG: 561 | End: 2024-12-11
Attending: PHYSICAL MEDICINE & REHABILITATION
Payer: MEDICARE

## 2024-12-11 PROCEDURE — 94669 MECHANICAL CHEST WALL OSCILL: CPT

## 2024-12-11 PROCEDURE — 97112 NEUROMUSCULAR REEDUCATION: CPT

## 2024-12-11 PROCEDURE — 700101 HCHG RX REV CODE 250: Performed by: PHYSICAL MEDICINE & REHABILITATION

## 2024-12-11 PROCEDURE — 700111 HCHG RX REV CODE 636 W/ 250 OVERRIDE (IP): Performed by: PHYSICAL MEDICINE & REHABILITATION

## 2024-12-11 PROCEDURE — A9270 NON-COVERED ITEM OR SERVICE: HCPCS | Performed by: PHYSICAL MEDICINE & REHABILITATION

## 2024-12-11 PROCEDURE — 700102 HCHG RX REV CODE 250 W/ 637 OVERRIDE(OP): Performed by: PHYSICAL MEDICINE & REHABILITATION

## 2024-12-11 PROCEDURE — 97116 GAIT TRAINING THERAPY: CPT

## 2024-12-11 PROCEDURE — 97110 THERAPEUTIC EXERCISES: CPT

## 2024-12-11 PROCEDURE — 99232 SBSQ HOSP IP/OBS MODERATE 35: CPT | Performed by: PHYSICAL MEDICINE & REHABILITATION

## 2024-12-11 PROCEDURE — 770010 HCHG ROOM/CARE - REHAB SEMI PRIVAT*

## 2024-12-11 PROCEDURE — 94640 AIRWAY INHALATION TREATMENT: CPT

## 2024-12-11 PROCEDURE — 94760 N-INVAS EAR/PLS OXIMETRY 1: CPT

## 2024-12-11 PROCEDURE — 97530 THERAPEUTIC ACTIVITIES: CPT

## 2024-12-11 RX ORDER — PREDNISONE 1 MG/1
4 TABLET ORAL 3 TIMES DAILY
Status: DISCONTINUED | OUTPATIENT
Start: 2024-12-11 | End: 2024-12-16 | Stop reason: HOSPADM

## 2024-12-11 RX ADMIN — METFORMIN HYDROCHLORIDE 500 MG: 500 TABLET ORAL at 07:59

## 2024-12-11 RX ADMIN — EZETIMIBE 10 MG: 10 TABLET ORAL at 21:44

## 2024-12-11 RX ADMIN — PREDNISONE 4 MG: 1 TABLET ORAL at 05:13

## 2024-12-11 RX ADMIN — OMEPRAZOLE 20 MG: 20 CAPSULE, DELAYED RELEASE ORAL at 07:59

## 2024-12-11 RX ADMIN — KETOROLAC TROMETHAMINE 10 MG: 10 TABLET, FILM COATED ORAL at 05:13

## 2024-12-11 RX ADMIN — LIOTHYRONINE SODIUM 5 MCG: 5 TABLET ORAL at 07:59

## 2024-12-11 RX ADMIN — PREDNISONE 4 MG: 1 TABLET ORAL at 12:02

## 2024-12-11 RX ADMIN — OXYCODONE HYDROCHLORIDE 15 MG: 10 TABLET ORAL at 12:05

## 2024-12-11 RX ADMIN — ENOXAPARIN SODIUM 40 MG: 100 INJECTION SUBCUTANEOUS at 17:55

## 2024-12-11 RX ADMIN — OXYCODONE HYDROCHLORIDE 5 MG: 5 TABLET ORAL at 21:51

## 2024-12-11 RX ADMIN — AMITRIPTYLINE HYDROCHLORIDE 150 MG: 50 TABLET, FILM COATED ORAL at 21:40

## 2024-12-11 RX ADMIN — ESCITALOPRAM OXALATE 20 MG: 10 TABLET ORAL at 07:59

## 2024-12-11 RX ADMIN — ACETAMINOPHEN 1000 MG: 500 TABLET ORAL at 00:09

## 2024-12-11 RX ADMIN — KETOROLAC TROMETHAMINE 10 MG: 10 TABLET, FILM COATED ORAL at 17:55

## 2024-12-11 RX ADMIN — ACETAMINOPHEN 1000 MG: 500 TABLET ORAL at 12:02

## 2024-12-11 RX ADMIN — LIDOCAINE 2 PATCH: 4 PATCH TOPICAL at 08:00

## 2024-12-11 RX ADMIN — ALBUTEROL SULFATE 2 PUFF: 90 AEROSOL, METERED RESPIRATORY (INHALATION) at 12:44

## 2024-12-11 RX ADMIN — OMEPRAZOLE 20 MG: 20 CAPSULE, DELAYED RELEASE ORAL at 21:41

## 2024-12-11 RX ADMIN — ALBUTEROL SULFATE 2 PUFF: 90 AEROSOL, METERED RESPIRATORY (INHALATION) at 12:55

## 2024-12-11 RX ADMIN — OXYCODONE HYDROCHLORIDE 15 MG: 10 TABLET ORAL at 05:14

## 2024-12-11 RX ADMIN — KETOROLAC TROMETHAMINE 10 MG: 10 TABLET, FILM COATED ORAL at 00:09

## 2024-12-11 RX ADMIN — ACETAMINOPHEN 1000 MG: 500 TABLET ORAL at 17:55

## 2024-12-11 RX ADMIN — ACETAMINOPHEN 1000 MG: 500 TABLET ORAL at 05:13

## 2024-12-11 RX ADMIN — OXYCODONE HYDROCHLORIDE 15 MG: 10 TABLET ORAL at 00:09

## 2024-12-11 RX ADMIN — PREDNISONE 4 MG: 1 TABLET ORAL at 21:41

## 2024-12-11 RX ADMIN — KETOROLAC TROMETHAMINE 10 MG: 10 TABLET, FILM COATED ORAL at 12:02

## 2024-12-11 RX ADMIN — METOPROLOL SUCCINATE 25 MG: 25 TABLET, EXTENDED RELEASE ORAL at 05:13

## 2024-12-11 RX ADMIN — ROSUVASTATIN CALCIUM 20 MG: 10 TABLET, FILM COATED ORAL at 21:41

## 2024-12-11 RX ADMIN — LEVOTHYROXINE SODIUM 88 MCG: 0.09 TABLET ORAL at 05:21

## 2024-12-11 RX ADMIN — METFORMIN HYDROCHLORIDE 500 MG: 500 TABLET ORAL at 17:55

## 2024-12-11 RX ADMIN — PREDNISONE 4 MG: 1 TABLET ORAL at 00:09

## 2024-12-11 ASSESSMENT — PAIN DESCRIPTION - PAIN TYPE
TYPE: ACUTE PAIN

## 2024-12-11 ASSESSMENT — GAIT ASSESSMENTS
ASSISTIVE DEVICE: 4 WHEEL WALKER
GAIT LEVEL OF ASSIST: STANDBY ASSIST
GAIT LEVEL OF ASSIST: STANDBY ASSIST
DISTANCE (FEET): 250
ASSISTIVE DEVICE: 4 WHEEL WALKER
DISTANCE (FEET): 575

## 2024-12-11 ASSESSMENT — 6 MINUTE WALK TEST (6MWT)
NUMBER OF RESTS: 1
GAIT SPEED - METERS PER SECOND: 0.34
LEVEL OF ASSISTANCE: SUPERVISION
TOTAL DISTANCE WALKED (FT): 405

## 2024-12-11 ASSESSMENT — ACTIVITIES OF DAILY LIVING (ADL)
BED_CHAIR_WHEELCHAIR_TRANSFER_DESCRIPTION: INCREASED TIME
TOILET_TRANSFER_DESCRIPTION: INCREASED TIME;GRAB BAR;SUPERVISION FOR SAFETY
BED_CHAIR_WHEELCHAIR_TRANSFER_DESCRIPTION: ADAPTIVE EQUIPMENT
BED_CHAIR_WHEELCHAIR_TRANSFER_DESCRIPTION: SET-UP OF EQUIPMENT;ADAPTIVE EQUIPMENT

## 2024-12-11 NOTE — DIETARY
"Nutrition Services: Initial Assessment     Day of admit. Yamile Go is 74 y.o., female with admitting DX of Closed compression fracture of body of L1 vertebra (HCC) [S32.010A].    Consult Received for...: MSTof 2 due to report of wt loss of 2-13 lb x < 1 week and poor PO PTA.  Nutrition screen reviewed at [facility], MST score was 0. No report of wt loss or poor PO prior to hospitalization    Current Hospital Problems List:    Lumbar radiculopathy  HTN  HLD  Anemia  Obesity due to excess calories  GERD  DM2  Depression/insomnia  Hypothyroidism  Pain       Nutrition Assessment:      Height: 157.5 cm (5' 2\")  Weight: 70 kg (154 lb 5.2 oz)  Weight taken via: Bed Scale  BMI Calculated: 28.23  BMI Classification: Overweight       Weight Readings from Last 5 encounters:   Wt Readings from Last 5 Encounters:   12/09/24 70 kg (154 lb 5.2 oz)   12/07/24 72.1 kg (158 lb 15.2 oz)   12/02/24 67.6 kg (149 lb)   11/22/24 67.8 kg (149 lb 6.4 oz)   05/02/24 71 kg (156 lb 8.4 oz)   12/18/23 72.5 kg (159 lb 13.3 oz)       Objective:   Pertinent Medical Hx: hypothyroidism, GERD, and lumbar stenosis   Pertinent Labs: 12/10/24: glucose=117, A1c=6.9. POC glucose: 100-143  Pertinent Meds: metformin, metoprolol, omeprazole, prednisone, Crestor, senna-docusate  Last BM: Large Type 1: Separate hard lumps (hard to pass)  12/10/24     Current Diet Order/Intake:   Consistent CHO diet, cardiac diet. No documented PO intake at Rehab yet. PO at [facility] was adequate at 50-75% of most meals.       Subjective:   Dietary Recall/Energy Intake: Based on review of PO intake at [facility], PO intake was adequate at 50-75% of most meals. This indicates Sufficient energy intake.       Nutrition Focused Physical Exam (NFPE)  Weight Loss: Pt w/ wt loss of 2.1 kg (2.9%) x 2 days. Wt loss is significant. RD Suspects this change related possibly to intake less than normal during hospitalization.  Muscle Mass: Unable to identify at this " time  Subcutaneous Fat: Unable to identify at this time  Fluid Accumulation: none  Reduced  Strength: N/A in acute care setting.    Nutrition Diagnosis:      Based on RD assessment at this time, Unable to fully assess for malnutrition at this time    Nutrition Interventions:      1. Patient aware of active plan of care as appropriate.     Nutrition Monitoring and Evaluation:      Monitor nutrition POC, goal for >50% intake from meals and supplements.  Additional fluids per MD/DO  Monitor vital signs pertinent to nutrition.    RD following and will provide updated recommendations as indicated.      Maribeth Benitez R.D.                                         [facility]TRITION

## 2024-12-11 NOTE — FLOWSHEET NOTE
12/11/24 1243   Events/Summary/Plan   Events/Summary/Plan mdi   Vital Signs   Pulse 80   Respiration 18   Pulse Oximetry 98 %   $ Pulse Oximetry (Spot Check) Yes   Respiratory Assessment   Level of Consciousness Alert   Chest Exam   Work Of Breathing / Effort Within Normal Limits   Breath Sounds   RUL Breath Sounds Diminished   RML Breath Sounds Diminished   RLL Breath Sounds Diminished   JOAQUÍN Breath Sounds Diminished   LLL Breath Sounds Diminished   Secretions   Cough Productive   Sputum Amount Unable to Evaluate  (swallowed)   Sputum Color Unable to Evaluate  (swallow)   Oxygen   O2 Delivery Device Room air w/o2 available

## 2024-12-11 NOTE — THERAPY
Occupational Therapy  Daily Treatment     Patient Name: Yamile Go  Age:  74 y.o., Sex:  female  Medical Record #: [id number]  Today's Date: 12/11/2024     Precautions  Precautions: (P) Fall Risk, Spinal / Back Precautions , Lumbosacral Orthosis  Comments: LSO when OOB; hx R ankle/shoulder and L hip replacements, hx of multiple falls, neuropathy         Subjective    Patient was seen for two sessions from 09:00-10:00 and 10:30-11:00.  She was agreeable to walk to the gym both times and reported minimal pain this morning.      Objective       12/11/24 0901 12/11/24 1031   OT Charge Group   OT Neuromuscular Re-education / Balance (Units) 2  --    OT Therapy Activity (Units) 2 1   OT Therapeutic Exercise (Units)  --  1   OT Total Time Spent   OT Individual Total Time Spent (Mins) 60 30   Precautions   Precautions Fall Risk;Spinal / Back Precautions ;Lumbosacral Orthosis Fall Risk;Spinal / Back Precautions ;Lumbosacral Orthosis   Functional Level of Assist   Lower Body Dressing Standby Assist  --    Lower Body Dressing Description   (to don shoes with laces)  --    Bed, Chair, Wheelchair Transfer Standby Assist Standby Assist   Bed Chair Wheelchair Transfer Description Adaptive equipment  (4ww) Set-up of equipment;Adaptive equipment  (4ww)   IADL Treatments   IADL Treatments Kitchen mobility education  --    Kitchen Mobility Education Patient mobilized around kitchen with counter for support while gathering items from high/low cupboards, counter and various appliances with CGA while maintaining spinal precautions.  --    Sitting Lower Body Exercises   Nustep  --  Resistance Level 3  (x 17 minutes with LE only)   Interdisciplinary Plan of Care Collaboration   Patient Position at End of Therapy In Bed;Call Light within Reach;Tray Table within Reach;Phone within Reach Seated  (left in gym for PT)     09:00--Functional mobility with 4ww in room, hallways, gyms with CGA.  Dry tub/shower transfer using tub bench with  supervision. Dynamic standing balance in parallel bars with intermittent single UE support to no UE support with CGA/min A while reaching for objects to her right with R UE and tossing them at a target for 1 x of 20.  She completed a second set standing on airex foam pad.  Third and fourth sets completed while taking a step forward with R LE and then L LE while tossing with opposite UE.    10:30--Functional mobility with 4ww in room, hallways, gyms with CGA.    Assessment    Patient still requiring CGA for mobility with 4ww, but CGA/min A for standing balance activities where she takes a step forward followed by a step back.  Minimal c/o back pain today.  Strengths: Able to follow instructions, Adequate strength, Alert and oriented, Effective communication skills, Independent prior level of function, Manages pain appropriately, Motivated for self care and independence, Pleasant and cooperative, Supportive family, Willingly participates in therapeutic activities  Barriers: Decreased endurance, Fatigue, Generalized weakness, Impaired balance, Limited mobility, Pain (spinal precautions)    Plan    ADLs, IADLs, transfers and functional mobility with FWW, standing balance, core/UB strength/endurance     Occupational Therapy Goals (Active)       Problem: Dressing       Dates: Start:  12/10/24         Goal: STG-Within one week, patient will dress LB with setup and supervision       Dates: Start:  12/10/24               Problem: Functional Transfers       Dates: Start:  12/10/24         Goal: STG-Within one week, patient will transfer to toilet with SBA       Dates: Start:  12/10/24               Problem: OT Long Term Goals       Dates: Start:  12/10/24         Goal: LTG-By discharge, patient will complete basic self care tasks with supervision/mod I       Dates: Start:  12/10/24            Goal: LTG-By discharge, patient will perform bathroom transfers with supervision/mod I       Dates: Start:  12/10/24            Goal:  LTG-By discharge, patient will complete basic home management with supervision/mod I       Dates: Start:  12/10/24               Problem: Toileting       Dates: Start:  12/10/24         Goal: STG-Within one week, patient will complete toileting tasks with SBA       Dates: Start:  12/10/24

## 2024-12-11 NOTE — PROGRESS NOTES
Received bedside shift report from Cassidy FAUSTIN RN regarding patient and assumed care. Patient awake, calm and stable, currently positioned in bed for comfort and safety; call light within reach. Denies pain or discomfort at this time. Will continue to monitor.

## 2024-12-11 NOTE — THERAPY
Physical Therapy   Daily Treatment     Patient Name: Yamile Go  Age:  74 y.o., Sex:  female  Medical Record #: [id number]  Today's Date: 12/10/2024     Precautions  Precautions: Fall Risk, Spinal / Back Precautions , Lumbosacral Orthosis  Comments: LSO when OOB; hx R ankle/shoulder and L hip replacements, hx of multiple falls, neuropathy    Subjective    Pt was in bed upon arrival, agreeable to session.     Objective       12/10/24 1531   PT Charge Group   PT Gait Training (Units) 1   PT Therapeutic Activities (Units) 1   Supervising Physical Therapist Hayley Gold   PT Total Time Spent   PT Individual Total Time Spent (Mins) 30   Pain 0 - 10 Group   Location Groin   Location Orientation Right   Description Sharp   Therapist Pain Assessment During Activity   Cognition    Level of Consciousness Alert   Gait Functional Level of Assist    Gait Level Of Assist Standby Assist   Assistive Device 4 Wheel Walker   Distance (Feet)   (within session)   Deviation Antalgic;Bradykinetic   Transfer Functional Level of Assist   Bed, Chair, Wheelchair Transfer Standby Assist   Bed Chair Wheelchair Transfer Description Adaptive equipment;Supervision for safety;Verbal cueing   Bed Mobility    Supine to Sit Standby Assist   Sit to Stand Standby Assist   Scooting Standby Assist   10 Meter Walk Test   Normal - Trial 1 10.99 seconds   Normal - Trial 2 10.43 seconds   Normal - Trial 3 10.48 seconds   Normal Average Time 10.63 seconds   Normal Average Velocity (m/s) 0.56   PT DME Recommendations   Assistive Device   (owns 4WW and hurry cane)     Pt donned LSO at EOB w/ Leah finding the earlobe, otherwise SPV.  Discussed POC    Assessment    Pt tolerated session well, understands her barriers and CLOF. C/o R groin sharp pain after fx. but is SBA w/ 4WW. Discussed having dtr bring in hurrycane and she's agreeable.      Strengths: Able to follow instructions, Alert and oriented, Effective communication skills, Good insight into  deficits/needs, Independent prior level of function, Making steady progress towards goals, Motivated for self care and independence, Pleasant and cooperative, Willingly participates in therapeutic activities  Barriers: Decreased endurance, Impaired activity tolerance, Impaired balance, Pain    Plan    Ambulation w/ 4WW(gait speed) v.s hurry cane  Standing balance  BLE strengthening (LLE weaker than RLE)      DME  PT DME Recommendations  Assistive Device: (P)  (owns 4WW and hurry cane)    Passport items to be completed:  Get in/out of bed safely, in/out of a vehicle, safely use mobility device, walk or wheel around home/community, navigate up and down stairs, show how to get up/down from the ground, ensure home is accessible, demonstrate HEP, complete caregiver training    Physical Therapy Problems (Active)       Problem: Balance       Dates: Start:  12/10/24         Goal: STG-Within one week, patient will tolerate outcome measure testing.       Dates: Start:  12/10/24               Problem: Mobility       Dates: Start:  12/10/24         Goal: STG-Within one week, patient will ambulate community distances with LRAD and supervision.       Dates: Start:  12/10/24               Problem: Mobility Transfers       Dates: Start:  12/10/24         Goal: STG-Within one week, patient will perform bed mobility independently.       Dates: Start:  12/10/24            Goal: STG-Within one week, patient will transfer bed to chair with LRAD and supervision.       Dates: Start:  12/10/24               Problem: PT-Long Term Goals       Dates: Start:  12/10/24         Goal: LTG-By discharge, patient will ambulate household and community distances with LRAD mod I.       Dates: Start:  12/10/24            Goal: LTG-By discharge, patient will transfer one surface to another with LRAD mod I.       Dates: Start:  12/10/24            Goal: LTG-By discharge, patient will transfer in/out of a car with LRAD mod I.       Dates: Start:  12/10/24

## 2024-12-11 NOTE — THERAPY
Physical Therapy   Daily Treatment     Patient Name: Yamile Go  Age:  74 y.o., Sex:  female  Medical Record #: [id number]  Today's Date: 12/11/2024     Precautions  Precautions: Fall Risk, Spinal / Back Precautions , Lumbosacral Orthosis  Comments: LSO when OOB; hx R ankle/shoulder and L hip replacements, hx of multiple falls, neuropathy    Subjective    Patient is agreeable to participate, found seated up in chair in gym received from OT at 1100 session, found in bed this afternoon.      Objective       12/11/24 1101 12/11/24 1301   PT Charge Group   PT Therapeutic Exercise (Units)  --  3   PT Neuromuscular Re-Education / Balance (Units)  --  1   PT Total Time Spent   PT Individual Total Time Spent (Mins)  30 60   Gait Functional Level of Assist    Gait Level Of Assist Standby Assist Standby Assist   Assistive Device 4 Wheel Walker 4 Wheel Walker   Distance (Feet) 250 575   # of Times Distance was Traveled   (plus another 175 x 2)  --    Stairs Functional Level of Assist   Level of Assist with Stairs Contact Guard Assist  --    # of Stairs Climbed 18  --    Stairs Description Extra time;Hand rails;Supervision for safety  (3 sets of 6 four inch stairs)  --    Transfer Functional Level of Assist   Bed, Chair, Wheelchair Transfer  --  Supervised   Bed Chair Wheelchair Transfer Description  --  Increased time   Toilet Transfers  --  Supervised   Toilet Transfer Description  --  Increased time;Grab bar;Supervision for safety   Sitting Lower Body Exercises   Sit to Stand 1 set of 10  --    Nustep  --  Resistance Level 5  (10 min 510 steps)   Standing Lower Body Exercises   Hamstring Curl  --  2 sets of 10;Bilateral    Hip Extension  --  2 sets of 10;Bilateral    Hip Abduction  --  2 sets of 10;Bilateral   Heel Rise  --  2 sets of 10;Bilateral   Neuro-Muscular Treatments   Neuro-Muscular Treatments  --  Anterior weight shift;Compensatory Strategies;Verbal Cuing;Weight Shift Right;Weight Shift Left;Postural  Changes;Postural Facilitation   Comments  --  static standing balance with no UE support in parallel bars: feet apart eyes open with head turns on firm ground, feet semi tandem static and with AP weight shifts, one foot on yoga block with head turns, feet apart on foam eyes open static and with lateral weight shifts, static standing on foam semi tandem. Seated rest breaks between all bouts, all positions 4 x 30 seconds   6 Minute Walk Test   Distance (feet) 405  --    Gait Speed (meters per second) 0.34  --    Number of Rests 1  (unable to walk for full length of time, tolerates 4:10 before needing to sit down)  --    Level of Assistance 5  --    Interdisciplinary Plan of Care Collaboration   Patient Position at End of Therapy Seated;Edge of Bed;Call Light within Reach;Tray Table within Reach;Phone within Reach In Bed;Call Light within Reach;Tray Table within Reach;Phone within Reach         Assessment    Balance is limited by neuropathy and back pain. She bears weight through her heels during sit <> stand, requires cues for increased forward flexion for transfers.     Strengths: Able to follow instructions, Alert and oriented, Effective communication skills, Good insight into deficits/needs, Independent prior level of function, Making steady progress towards goals, Motivated for self care and independence, Pleasant and cooperative, Willingly participates in therapeutic activities  Barriers: Decreased endurance, Impaired activity tolerance, Impaired balance, Pain    Plan    Ambulation w/ 4WW(gait speed) v.s hurry cane  Standing balance  BLE strengthening (LLE weaker than RLE)    DME  PT DME Recommendations  Assistive Device:  (owns 4WW and hurry cane)    Passport items to be completed:  Get in/out of bed safely, in/out of a vehicle, safely use mobility device, walk or wheel around home/community, navigate up and down stairs, show how to get up/down from the ground, ensure home is accessible, demonstrate HEP,  complete caregiver training    Physical Therapy Problems (Active)       Problem: Balance       Dates: Start:  12/10/24         Goal: STG-Within one week, patient will tolerate outcome measure testing.       Dates: Start:  12/10/24               Problem: Mobility       Dates: Start:  12/10/24         Goal: STG-Within one week, patient will ambulate community distances with LRAD and supervision.       Dates: Start:  12/10/24               Problem: Mobility Transfers       Dates: Start:  12/10/24         Goal: STG-Within one week, patient will perform bed mobility independently.       Dates: Start:  12/10/24            Goal: STG-Within one week, patient will transfer bed to chair with LRAD and supervision.       Dates: Start:  12/10/24               Problem: PT-Long Term Goals       Dates: Start:  12/10/24         Goal: LTG-By discharge, patient will ambulate household and community distances with LRAD mod I.       Dates: Start:  12/10/24            Goal: LTG-By discharge, patient will transfer one surface to another with LRAD mod I.       Dates: Start:  12/10/24            Goal: LTG-By discharge, patient will transfer in/out of a car with LRAD mod I.       Dates: Start:  12/10/24

## 2024-12-11 NOTE — PROGRESS NOTES
"  Physical Medicine & Rehabilitation Progress Note    Encounter Date: 12/11/2024    Chief Complaint: Decreased mobility    Interval Events (Subjective):  Patient sitting up in room. She reports she is doing well. She reports she slept better last night. Denies SOB. No tremor    Objective:  VITAL SIGNS: /71   Pulse 70   Temp 37.1 °C (98.8 °F) (Oral)   Resp 20   Ht 1.575 m (5' 2\")   Wt 70 kg (154 lb 5.2 oz)   SpO2 93%   BMI 28.23 kg/m²   Gen: NAD  Psych: Mood and affect appropriate  CV: RRR, 0 edema  Resp: CTAB, no upper airway sounds  Abd: NTND  Neuro: AOx4, following commands  Unchanged from 12/10/24    Laboratory Values:  Recent Results (from the past 72 hours)   POCT glucose device results    Collection Time: 12/08/24  4:34 PM   Result Value Ref Range    POC Glucose, Blood 146 (H) 65 - 99 mg/dL   POCT glucose device results    Collection Time: 12/08/24  9:17 PM   Result Value Ref Range    POC Glucose, Blood 138 (H) 65 - 99 mg/dL   POCT glucose device results    Collection Time: 12/09/24  6:18 AM   Result Value Ref Range    POC Glucose, Blood 127 (H) 65 - 99 mg/dL   POCT glucose device results    Collection Time: 12/09/24  4:58 PM   Result Value Ref Range    POC Glucose, Blood 125 (H) 65 - 99 mg/dL   POCT glucose device results    Collection Time: 12/09/24  7:56 PM   Result Value Ref Range    POC Glucose, Blood 135 (H) 65 - 99 mg/dL   CBC with Differential    Collection Time: 12/10/24  5:29 AM   Result Value Ref Range    WBC 7.3 4.8 - 10.8 K/uL    RBC 3.92 (L) 4.20 - 5.40 M/uL    Hemoglobin 10.1 (L) 12.0 - 16.0 g/dL    Hematocrit 32.4 (L) 37.0 - 47.0 %    MCV 82.7 81.4 - 97.8 fL    MCH 25.8 (L) 27.0 - 33.0 pg    MCHC 31.2 (L) 32.2 - 35.5 g/dL    RDW 47.7 35.9 - 50.0 fL    Platelet Count 232 164 - 446 K/uL    MPV 10.4 9.0 - 12.9 fL    Neutrophils-Polys 72.80 (H) 44.00 - 72.00 %    Lymphocytes 18.60 (L) 22.00 - 41.00 %    Monocytes 6.10 0.00 - 13.40 %    Eosinophils 1.20 0.00 - 6.90 %    Basophils 0.30 " 0.00 - 1.80 %    Immature Granulocytes 1.00 (H) 0.00 - 0.90 %    Nucleated RBC 0.00 0.00 - 0.20 /100 WBC    Neutrophils (Absolute) 5.33 1.82 - 7.42 K/uL    Lymphs (Absolute) 1.36 1.00 - 4.80 K/uL    Monos (Absolute) 0.45 0.00 - 0.85 K/uL    Eos (Absolute) 0.09 0.00 - 0.51 K/uL    Baso (Absolute) 0.02 0.00 - 0.12 K/uL    Immature Granulocytes (abs) 0.07 0.00 - 0.11 K/uL    NRBC (Absolute) 0.00 K/uL   Comp Metabolic Panel (CMP)    Collection Time: 12/10/24  5:29 AM   Result Value Ref Range    Sodium 138 135 - 145 mmol/L    Potassium 4.6 3.6 - 5.5 mmol/L    Chloride 105 96 - 112 mmol/L    Co2 24 20 - 33 mmol/L    Anion Gap 9.0 7.0 - 16.0    Glucose 117 (H) 65 - 99 mg/dL    Bun 20 8 - 22 mg/dL    Creatinine 0.57 0.50 - 1.40 mg/dL    Calcium 8.6 8.5 - 10.5 mg/dL    Correct Calcium 8.9 8.5 - 10.5 mg/dL    AST(SGOT) 13 12 - 45 U/L    ALT(SGPT) 11 2 - 50 U/L    Alkaline Phosphatase 85 30 - 99 U/L    Total Bilirubin <0.2 0.1 - 1.5 mg/dL    Albumin 3.6 3.2 - 4.9 g/dL    Total Protein 6.0 6.0 - 8.2 g/dL    Globulin 2.4 1.9 - 3.5 g/dL    A-G Ratio 1.5 g/dL   HEMOGLOBIN A1C    Collection Time: 12/10/24  5:29 AM   Result Value Ref Range    Glycohemoglobin 6.9 (H) 4.0 - 5.6 %    Est Avg Glucose 151 mg/dL   TSH with Reflex to FT4    Collection Time: 12/10/24  5:29 AM   Result Value Ref Range    TSH 0.280 (L) 0.380 - 5.330 uIU/mL   Vitamin D, 25-hydroxy (blood)    Collection Time: 12/10/24  5:29 AM   Result Value Ref Range    25-Hydroxy   Vitamin D 25 33 30 - 100 ng/mL   ESTIMATED GFR    Collection Time: 12/10/24  5:29 AM   Result Value Ref Range    GFR (CKD-EPI) 95 >60 mL/min/1.73 m 2   FREE THYROXINE    Collection Time: 12/10/24  5:29 AM   Result Value Ref Range    Free T-4 1.44 0.93 - 1.70 ng/dL   POCT glucose device results    Collection Time: 12/10/24  7:19 AM   Result Value Ref Range    POC Glucose, Blood 100 (H) 65 - 99 mg/dL   POCT glucose device results    Collection Time: 12/10/24 10:44 AM   Result Value Ref Range    POC  Glucose, Blood 143 (H) 65 - 99 mg/dL   POCT glucose device results    Collection Time: 12/10/24  4:55 PM   Result Value Ref Range    POC Glucose, Blood 107 (H) 65 - 99 mg/dL       Medications:  Scheduled Medications   Medication Dose Frequency    amitriptyline  150 mg Nightly    metFORMIN  500 mg BID WITH MEALS    senna-docusate  2 Tablet Q EVENING    acetaminophen  1,000 mg Q6HRS    enoxaparin (LOVENOX) injection  40 mg DAILY AT 1800    escitalopram  20 mg DAILY    ezetimibe  10 mg Q EVENING    ketorolac  10 mg Q6HRS    levothyroxine  88 mcg DAILY    liothyronine  5 mcg DAILY    lidocaine  2 Patch Q24HR    metoprolol SR  25 mg DAILY    omeprazole  20 mg BID    predniSONE  4 mg Q6HRS    rosuvastatin  20 mg Q EVENING     PRN medications: hydrALAZINE, acetaminophen, senna-docusate **AND** polyethylene glycol/lytes, docusate sodium, magnesium hydroxide, carboxymethylcellulose, benzocaine-menthol, mag hydrox-al hydrox-simeth, ondansetron **OR** ondansetron, traZODone, sodium chloride, albuterol, diclofenac sodium, methocarbamol, oxyCODONE immediate-release **OR** oxyCODONE immediate-release    Diet:  Current Diet Order   Procedures    Diet Order Diet: Cardiac; Second Modifier: (optional): Consistent CHO (Diabetic)       Medical Decision Making and Plan:  Lumbar radiculopathy - Patient with new GLF found to have L1 compression fracture on MRI. Neurosurgery was consulted and recommended conservative management  -PT and OT for mobility and ADLs. Per guidelines, 15 hours per week between PT, OT and/or SLP.  -Follow-up NSG. Continue Ketorolac 10 mg q6 and prednisone. Will reduce prednisone to q8     HTN - Patient on Metoprolol 25 mg XL     HLD - Patient on Rosuvastatin 20 mg daily     Anemia - Check AM CBC - 10.1, will monitor     Obesity due to excess calories - BMI of 31.0 on admission, meets medical criteria. Dietitian to consult     GERD - patient on prilosec BID     DM2 with hyperglycemia - Previously on metformin. On  SSI on transfer. Will check A1c - 6.9, discontinue SSI. Restart Metformin 500 mg BID     Depression/Insomnia - Patient on Elavil 100 mg QHS and Lexapro 20 mg daily. Increase Elavil to 150 mg     Hypothyroidism - Patient on Levothyroxine 88 mcg and Liothyronine 5 mg daily. TSH low on admission but T4 normal     Pain - Patient on PRN Tylenol, Oxycodone, and Ketorolac. In addition on Lidocaine patches. Tylenol scheduled. Continue Toradol 10 mg q6 and Lidocaine     Skin - Patient at risk for skin breakdown due to debility in areas including sacrum, achilles, elbows and head in addition to other sites. Nursing to assess skin daily.      GI Ppx - Patient on Prilosec for GERD prophylaxis. Patient on Senna-docusate for constipation prophylaxis.      DVT Ppx - Patient Lovenox on transfer.   ____________________________________    T. Mason Lux MD/PhD  [facility] - Physical Medicine & Rehabilitation   [facility] - Brain Injury Medicine   ____________________________________

## 2024-12-12 ENCOUNTER — APPOINTMENT (OUTPATIENT)
Dept: PHYSICAL THERAPY | Facility: REHABILITATION | Age: 74
DRG: 561 | End: 2024-12-12
Attending: PHYSICAL MEDICINE & REHABILITATION
Payer: MEDICARE

## 2024-12-12 ENCOUNTER — APPOINTMENT (OUTPATIENT)
Dept: OCCUPATIONAL THERAPY | Facility: REHABILITATION | Age: 74
DRG: 561 | End: 2024-12-12
Attending: PHYSICAL MEDICINE & REHABILITATION
Payer: MEDICARE

## 2024-12-12 PROCEDURE — 770010 HCHG ROOM/CARE - REHAB SEMI PRIVAT*

## 2024-12-12 PROCEDURE — 97535 SELF CARE MNGMENT TRAINING: CPT

## 2024-12-12 PROCEDURE — 97116 GAIT TRAINING THERAPY: CPT

## 2024-12-12 PROCEDURE — 97530 THERAPEUTIC ACTIVITIES: CPT

## 2024-12-12 PROCEDURE — 700102 HCHG RX REV CODE 250 W/ 637 OVERRIDE(OP): Performed by: PHYSICAL MEDICINE & REHABILITATION

## 2024-12-12 PROCEDURE — 97112 NEUROMUSCULAR REEDUCATION: CPT

## 2024-12-12 PROCEDURE — 97110 THERAPEUTIC EXERCISES: CPT

## 2024-12-12 PROCEDURE — 99233 SBSQ HOSP IP/OBS HIGH 50: CPT | Performed by: PHYSICAL MEDICINE & REHABILITATION

## 2024-12-12 PROCEDURE — A9270 NON-COVERED ITEM OR SERVICE: HCPCS | Performed by: PHYSICAL MEDICINE & REHABILITATION

## 2024-12-12 PROCEDURE — 700111 HCHG RX REV CODE 636 W/ 250 OVERRIDE (IP): Performed by: PHYSICAL MEDICINE & REHABILITATION

## 2024-12-12 RX ADMIN — METFORMIN HYDROCHLORIDE 500 MG: 500 TABLET ORAL at 17:42

## 2024-12-12 RX ADMIN — ROSUVASTATIN CALCIUM 20 MG: 10 TABLET, FILM COATED ORAL at 19:52

## 2024-12-12 RX ADMIN — OXYCODONE HYDROCHLORIDE 15 MG: 10 TABLET ORAL at 05:55

## 2024-12-12 RX ADMIN — OXYCODONE HYDROCHLORIDE 15 MG: 10 TABLET ORAL at 19:53

## 2024-12-12 RX ADMIN — OMEPRAZOLE 20 MG: 20 CAPSULE, DELAYED RELEASE ORAL at 08:25

## 2024-12-12 RX ADMIN — KETOROLAC TROMETHAMINE 10 MG: 10 TABLET, FILM COATED ORAL at 05:55

## 2024-12-12 RX ADMIN — KETOROLAC TROMETHAMINE 10 MG: 10 TABLET, FILM COATED ORAL at 11:44

## 2024-12-12 RX ADMIN — KETOROLAC TROMETHAMINE 10 MG: 10 TABLET, FILM COATED ORAL at 00:43

## 2024-12-12 RX ADMIN — LIOTHYRONINE SODIUM 5 MCG: 5 TABLET ORAL at 08:28

## 2024-12-12 RX ADMIN — ACETAMINOPHEN 1000 MG: 500 TABLET ORAL at 00:43

## 2024-12-12 RX ADMIN — OMEPRAZOLE 20 MG: 20 CAPSULE, DELAYED RELEASE ORAL at 19:52

## 2024-12-12 RX ADMIN — OXYCODONE HYDROCHLORIDE 15 MG: 10 TABLET ORAL at 10:30

## 2024-12-12 RX ADMIN — EZETIMIBE 10 MG: 10 TABLET ORAL at 19:51

## 2024-12-12 RX ADMIN — ACETAMINOPHEN 1000 MG: 500 TABLET ORAL at 05:55

## 2024-12-12 RX ADMIN — ACETAMINOPHEN 1000 MG: 500 TABLET ORAL at 11:44

## 2024-12-12 RX ADMIN — PREDNISONE 4 MG: 1 TABLET ORAL at 08:25

## 2024-12-12 RX ADMIN — ESCITALOPRAM OXALATE 20 MG: 10 TABLET ORAL at 08:25

## 2024-12-12 RX ADMIN — PREDNISONE 4 MG: 1 TABLET ORAL at 14:20

## 2024-12-12 RX ADMIN — METOPROLOL SUCCINATE 25 MG: 25 TABLET, EXTENDED RELEASE ORAL at 05:55

## 2024-12-12 RX ADMIN — METHOCARBAMOL 500 MG: 500 TABLET ORAL at 22:32

## 2024-12-12 RX ADMIN — AMITRIPTYLINE HYDROCHLORIDE 150 MG: 50 TABLET, FILM COATED ORAL at 19:52

## 2024-12-12 RX ADMIN — TRAZODONE HYDROCHLORIDE 50 MG: 50 TABLET ORAL at 22:35

## 2024-12-12 RX ADMIN — ACETAMINOPHEN 1000 MG: 500 TABLET ORAL at 17:42

## 2024-12-12 RX ADMIN — METFORMIN HYDROCHLORIDE 500 MG: 500 TABLET ORAL at 08:25

## 2024-12-12 RX ADMIN — ENOXAPARIN SODIUM 40 MG: 100 INJECTION SUBCUTANEOUS at 17:42

## 2024-12-12 RX ADMIN — LEVOTHYROXINE SODIUM 88 MCG: 0.09 TABLET ORAL at 05:55

## 2024-12-12 RX ADMIN — PREDNISONE 4 MG: 1 TABLET ORAL at 19:51

## 2024-12-12 ASSESSMENT — ACTIVITIES OF DAILY LIVING (ADL)
BED_CHAIR_WHEELCHAIR_TRANSFER_DESCRIPTION: ADAPTIVE EQUIPMENT;SUPERVISION FOR SAFETY
TUB_SHOWER_TRANSFER_DESCRIPTION: GRAB BAR;SHOWER BENCH;SUPERVISION FOR SAFETY
TOILET_TRANSFER_DESCRIPTION: GRAB BAR;ADAPTIVE EQUIPMENT
TOILET_TRANSFER_DESCRIPTION: GRAB BAR;INCREASED TIME;SUPERVISION FOR SAFETY
BED_CHAIR_WHEELCHAIR_TRANSFER_DESCRIPTION: ADAPTIVE EQUIPMENT
TOILETING_LEVEL_OF_ASSIST_DESCRIPTION: GRAB BAR;SUPERVISION FOR SAFETY
TOILET_TRANSFER_DESCRIPTION: GRAB BAR;SUPERVISION FOR SAFETY
BED_CHAIR_WHEELCHAIR_TRANSFER_DESCRIPTION: ADAPTIVE EQUIPMENT;INCREASED TIME;SUPERVISION FOR SAFETY;VERBAL CUEING

## 2024-12-12 ASSESSMENT — GAIT ASSESSMENTS
GAIT LEVEL OF ASSIST: STANDBY ASSIST
ASSISTIVE DEVICE: 4 WHEEL WALKER
DEVIATION: ANTALGIC;BRADYKINETIC
ASSISTIVE DEVICE: 4 WHEEL WALKER
DISTANCE (FEET): 300
GAIT LEVEL OF ASSIST: STANDBY ASSIST
DISTANCE (FEET): 300

## 2024-12-12 ASSESSMENT — PAIN DESCRIPTION - PAIN TYPE
TYPE: ACUTE PAIN

## 2024-12-12 NOTE — CARE PLAN
The patient is Stable - Low risk of patient condition declining or worsening    Shift Goals  Clinical Goals: safety  Patient Goals: safety      Problem: Pain - Standard  Goal: Alleviation of pain or a reduction in pain to the patient’s comfort goal  Outcome: Progressing Patient able to verbalize pain level and verbalize an acceptable level of pain.     Problem: Fall Risk - Rehab  Goal: Patient will remain free from falls  Outcome: Progressing Pt uses call light consistently and appropriately. Waits for assistance does not attempt self transfer this shift. Able to verbalize needs.

## 2024-12-12 NOTE — DISCHARGE PLANNING
CM//Discharge :    The following has been ordered:   Home health:         [facility]              Disciplines ordered: RN, PT, OT, Aid  Status: Accepted  Not in Epic system fax number [phone], patient already established with facility

## 2024-12-12 NOTE — PROGRESS NOTES
"  Physical Medicine & Rehabilitation Progress Note    Encounter Date: 12/12/2024    Chief Complaint: Decreased mobility    Interval Events (Subjective):  Patient sitting up in room. She reports therapy is going well. She reports pain is slowly getting better. She reports pain wraps around to the front of her hip. Discussed would continue current medications. Discussed about IDT later today.     _____________________________________  Interdisciplinary Team Conference   Most recent IDT on 12/12/2024    IJess M.D./Ph.D., was present and led the interdisciplinary team conference on 12/12/2024.  I led the IDT conference and agree with the IDT conference documentation and plan of care as noted below.     Nursing:  Diet Current Diet Order   Procedures    Diet Order Diet: Cardiac; Second Modifier: (optional): Consistent CHO (Diabetic)       Eating ADL Independent      % of Last Meal  Oral Nutrition: *  * Meal *  *, Breakfast, Between 50-75% Consumed   Sleep    Bowel Last BM: 12/10/24   Bladder Continent   Barriers to Discharge Home:  Pain limited     Physical Therapy:  Bed Mobility    Transfers Supervised  Adaptive equipment, Supervision for safety (4ww)   Mobility Standby Assist   Stairs    Barriers to Discharge Home:  Fatigues easily    Occupational Therapy:  Grooming Standing, Supervision   Bathing Supervision   UB Dressing Supervision   LB Dressing Supervision   Toileting Supervision   Shower & Transfer    Barriers to Discharge Home:  Poor dynamic balance    Respiratory Therapy:  O2 (LPM): 0  O2 Delivery Device: None - Room Air    Case Management:  Continues to work on disposition and DME needs.      Discharge Date/Disposition:  12/16/24  _____________________________________        Objective:  VITAL SIGNS: /81   Pulse 75   Temp 37.1 °C (98.8 °F) (Oral)   Resp 16   Ht 1.575 m (5' 2\")   Wt 70 kg (154 lb 5.2 oz)   SpO2 94%   BMI 28.23 kg/m²   Gen: NAD  Psych: Mood and affect " appropriate  CV: RRR, 0 edema  Resp: CTAB, no upper airway sounds  Abd: NTND  Neuro: AOx4, following commands    Laboratory Values:  Recent Results (from the past 72 hours)   POCT glucose device results    Collection Time: 12/09/24  4:58 PM   Result Value Ref Range    POC Glucose, Blood 125 (H) 65 - 99 mg/dL   POCT glucose device results    Collection Time: 12/09/24  7:56 PM   Result Value Ref Range    POC Glucose, Blood 135 (H) 65 - 99 mg/dL   CBC with Differential    Collection Time: 12/10/24  5:29 AM   Result Value Ref Range    WBC 7.3 4.8 - 10.8 K/uL    RBC 3.92 (L) 4.20 - 5.40 M/uL    Hemoglobin 10.1 (L) 12.0 - 16.0 g/dL    Hematocrit 32.4 (L) 37.0 - 47.0 %    MCV 82.7 81.4 - 97.8 fL    MCH 25.8 (L) 27.0 - 33.0 pg    MCHC 31.2 (L) 32.2 - 35.5 g/dL    RDW 47.7 35.9 - 50.0 fL    Platelet Count 232 164 - 446 K/uL    MPV 10.4 9.0 - 12.9 fL    Neutrophils-Polys 72.80 (H) 44.00 - 72.00 %    Lymphocytes 18.60 (L) 22.00 - 41.00 %    Monocytes 6.10 0.00 - 13.40 %    Eosinophils 1.20 0.00 - 6.90 %    Basophils 0.30 0.00 - 1.80 %    Immature Granulocytes 1.00 (H) 0.00 - 0.90 %    Nucleated RBC 0.00 0.00 - 0.20 /100 WBC    Neutrophils (Absolute) 5.33 1.82 - 7.42 K/uL    Lymphs (Absolute) 1.36 1.00 - 4.80 K/uL    Monos (Absolute) 0.45 0.00 - 0.85 K/uL    Eos (Absolute) 0.09 0.00 - 0.51 K/uL    Baso (Absolute) 0.02 0.00 - 0.12 K/uL    Immature Granulocytes (abs) 0.07 0.00 - 0.11 K/uL    NRBC (Absolute) 0.00 K/uL   Comp Metabolic Panel (CMP)    Collection Time: 12/10/24  5:29 AM   Result Value Ref Range    Sodium 138 135 - 145 mmol/L    Potassium 4.6 3.6 - 5.5 mmol/L    Chloride 105 96 - 112 mmol/L    Co2 24 20 - 33 mmol/L    Anion Gap 9.0 7.0 - 16.0    Glucose 117 (H) 65 - 99 mg/dL    Bun 20 8 - 22 mg/dL    Creatinine 0.57 0.50 - 1.40 mg/dL    Calcium 8.6 8.5 - 10.5 mg/dL    Correct Calcium 8.9 8.5 - 10.5 mg/dL    AST(SGOT) 13 12 - 45 U/L    ALT(SGPT) 11 2 - 50 U/L    Alkaline Phosphatase 85 30 - 99 U/L    Total Bilirubin  <0.2 0.1 - 1.5 mg/dL    Albumin 3.6 3.2 - 4.9 g/dL    Total Protein 6.0 6.0 - 8.2 g/dL    Globulin 2.4 1.9 - 3.5 g/dL    A-G Ratio 1.5 g/dL   HEMOGLOBIN A1C    Collection Time: 12/10/24  5:29 AM   Result Value Ref Range    Glycohemoglobin 6.9 (H) 4.0 - 5.6 %    Est Avg Glucose 151 mg/dL   TSH with Reflex to FT4    Collection Time: 12/10/24  5:29 AM   Result Value Ref Range    TSH 0.280 (L) 0.380 - 5.330 uIU/mL   Vitamin D, 25-hydroxy (blood)    Collection Time: 12/10/24  5:29 AM   Result Value Ref Range    25-Hydroxy   Vitamin D 25 33 30 - 100 ng/mL   ESTIMATED GFR    Collection Time: 12/10/24  5:29 AM   Result Value Ref Range    GFR (CKD-EPI) 95 >60 mL/min/1.73 m 2   FREE THYROXINE    Collection Time: 12/10/24  5:29 AM   Result Value Ref Range    Free T-4 1.44 0.93 - 1.70 ng/dL   POCT glucose device results    Collection Time: 12/10/24  7:19 AM   Result Value Ref Range    POC Glucose, Blood 100 (H) 65 - 99 mg/dL   POCT glucose device results    Collection Time: 12/10/24 10:44 AM   Result Value Ref Range    POC Glucose, Blood 143 (H) 65 - 99 mg/dL   POCT glucose device results    Collection Time: 12/10/24  4:55 PM   Result Value Ref Range    POC Glucose, Blood 107 (H) 65 - 99 mg/dL       Medications:  Scheduled Medications   Medication Dose Frequency    predniSONE  4 mg TID    amitriptyline  150 mg Nightly    metFORMIN  500 mg BID WITH MEALS    senna-docusate  2 Tablet Q EVENING    acetaminophen  1,000 mg Q6HRS    enoxaparin (LOVENOX) injection  40 mg DAILY AT 1800    escitalopram  20 mg DAILY    ezetimibe  10 mg Q EVENING    ketorolac  10 mg Q6HRS    levothyroxine  88 mcg DAILY    liothyronine  5 mcg DAILY    lidocaine  2 Patch Q24HR    metoprolol SR  25 mg DAILY    omeprazole  20 mg BID    rosuvastatin  20 mg Q EVENING     PRN medications: hydrALAZINE, acetaminophen, senna-docusate **AND** polyethylene glycol/lytes, docusate sodium, magnesium hydroxide, carboxymethylcellulose, benzocaine-menthol, mag hydrox-al  hydrox-simeth, ondansetron **OR** ondansetron, traZODone, sodium chloride, albuterol, diclofenac sodium, methocarbamol, oxyCODONE immediate-release **OR** oxyCODONE immediate-release    Diet:  Current Diet Order   Procedures    Diet Order Diet: Cardiac; Second Modifier: (optional): Consistent CHO (Diabetic)       Medical Decision Making and Plan:  Lumbar radiculopathy - Patient with new GLF found to have L1 compression fracture on MRI. Neurosurgery was consulted and recommended conservative management  -PT and OT for mobility and ADLs. Per guidelines, 15 hours per week between PT, OT and/or SLP.  -Follow-up NSG. Continue Ketorolac 10 mg q6 and prednisone. Will reduce prednisone to q8     HTN - Patient on Metoprolol 25 mg XL     HLD - Patient on Rosuvastatin 20 mg daily     Anemia - Check AM CBC - 10.1, will monitor     Obesity due to excess calories - BMI of 31.0 on admission, meets medical criteria. Dietitian to consult     GERD - patient on prilosec BID     DM2 with hyperglycemia - Previously on metformin. On SSI on transfer. Will check A1c - 6.9, discontinue SSI. Restart Metformin 500 mg BID. Continue Metformin 500 mg BID     Depression/Insomnia - Patient on Elavil 100 mg QHS and Lexapro 20 mg daily. Increase Elavil to 150 mg     Hypothyroidism - Patient on Levothyroxine 88 mcg and Liothyronine 5 mg daily. TSH low on admission but T4 normal     Pain - Patient on PRN Tylenol, Oxycodone, and Ketorolac. In addition on Lidocaine patches. Tylenol scheduled. Continue Toradol 10 mg q6 and Lidocaine.   -Neuropathic pain - limited by allergies to Gabapentin and Lyrica. Continue Toradol and Oxycodone     Skin - Patient at risk for skin breakdown due to debility in areas including sacrum, achilles, elbows and head in addition to other sites. Nursing to assess skin daily.      GI Ppx - Patient on Prilosec for GERD prophylaxis. Patient on Senna-docusate for constipation prophylaxis.      DVT Ppx - Patient Lovenox on transfer.    ____________________________________    T. Mason Lux MD/PhD  [facility] - Physical Medicine & Rehabilitation   [facility] - Brain Injury Medicine   ____________________________________    Total time:  50 minutes. Time spent included pre-rounding review of vitals and tests, unit/floor time, face-to-face time with the patient including physical examination, care coordination, counseling of patient and/or family, ordering medications/procedures/tests, discussion with CM, PT, OT, SLP and/or other healthcare providers, and documentation in the electronic medical record. Topics discussed included pain control, continue current medications, continue metformin and discharge planning. Patient was discussed separately in IDT today; please see details above.

## 2024-12-12 NOTE — DISCHARGE PLANNING
Case Management/IDT follow up.   Projected dc date: 12/16/24  IDT continues to recommend IRF level of care as patient continue to make progress with all therapies.     DC needs  Home health:  PT/OT/RN/CNA  DME:[facility]ITA  Family training:    Follow up:   PCP:  Other:     I met with patient providing update from IDT and discussed plan of care.  She is in agreement w/ plan.     Plan:  Continue to follow

## 2024-12-12 NOTE — THERAPY
Occupational Therapy  Daily Treatment     Patient Name: aYmile Go  Age:  74 y.o., Sex:  female  Medical Record #: [id number]  Today's Date: 12/12/2024     Precautions  Precautions: Fall Risk, Spinal / Back Precautions , Lumbosacral Orthosis  Comments: LSO when OOB; hx R ankle/shoulder and L hip replacements, hx of multiple falls, neuropathy         Subjective    Patient was resting in bed after lunch and was agreeable to OT.  She reported not getting to nap due to a coworker stopping by for a visit.     Objective       12/12/24 1231   OT Charge Group   OT Therapy Activity (Units) 1   OT Therapeutic Exercise (Units) 1   OT Total Time Spent   OT Individual Total Time Spent (Mins) 30   Precautions   Precautions Fall Risk;Spinal / Back Precautions ;Lumbosacral Orthosis   Functional Level of Assist   Grooming Supervision;Standing  (to wash hands)   Upper Body Dressing Supervision   Upper Body Dressing Description Set-up of equipment  (to don LSO)   Toileting Supervision   Bed, Chair, Wheelchair Transfer Supervised   Bed Chair Wheelchair Transfer Description Adaptive equipment  (4ww)   Toilet Transfers Supervised   Toilet Transfer Description Grab bar;Adaptive equipment  (4ww)   Sitting Lower Body Exercises   Nustep Resistance Level 4  (x 12 minutes with LE only)   Interdisciplinary Plan of Care Collaboration   Patient Position at End of Therapy Seated;Call Light within Reach;Tray Table within Reach;Phone within Reach     Functional mobility with 4ww and supervision/SBA in room, hallways, bathroom and gym.    Assessment    Patient tolerated all activities well without any complaints.  She would like to go home next Sunday or Monday.  Strengths: Able to follow instructions, Adequate strength, Alert and oriented, Effective communication skills, Independent prior level of function, Manages pain appropriately, Motivated for self care and independence, Pleasant and cooperative, Supportive family, Willingly participates in  therapeutic activities  Barriers: Decreased endurance, Fatigue, Generalized weakness, Impaired balance, Limited mobility, Pain (spinal precautions)    Plan    ADLs, IADLs, transfers and functional mobility with FWW, standing balance, core/UB strength/endurance     Occupational Therapy Goals (Active)       Problem: OT Long Term Goals       Dates: Start:  12/10/24         Goal: LTG-By discharge, patient will complete basic self care tasks with supervision/mod I       Dates: Start:  12/10/24            Goal: LTG-By discharge, patient will perform bathroom transfers with supervision/mod I       Dates: Start:  12/10/24            Goal: LTG-By discharge, patient will complete basic home management with supervision/mod I       Dates: Start:  12/10/24

## 2024-12-12 NOTE — PROGRESS NOTES
Patient care assumed. Report received from Noc COLIN Genao. Patient is alert and calm, resting in bed. Call light and bedside table within reach. Will continue to monitor.

## 2024-12-12 NOTE — PROGRESS NOTES
NURSING DAILY NOTE    Name: Yamile Go   Date of Admission: 12/9/2024   Admitting Diagnosis: Lumbosacral radiculopathy due to degenerative joint disease of spine  Attending Physician: LILLY BOYD M.D.  Allergies: Gabapentin, Pregabalin, Amlodipine, Bee, Fentanyl, Morphine, Benzoin, and Rifampin    Safety  Patient Assist  SBA/CGA  Patient Precautions  Fall Risk, Spinal / Back Precautions , Lumbosacral Orthosis  Precaution Comments  LSO when OOB; hx R ankle/shoulder and L hip replacements, hx of multiple falls, neuropathy  Bed Transfer Status  Supervised  Toilet Transfer Status   Supervised  Assistive Devices  Rails, Walker - front wheel  Oxygen  None - Room Air  Diet/Therapeutic Dining  Current Diet Order   Procedures    Diet Order Diet: Cardiac; Second Modifier: (optional): Consistent CHO (Diabetic)     Pill Administration  whole  Agitated Behavioral Scale     ABS Level of Severity       Fall Risk  Has the patient had a fall this admission?   No  May Beard Fall Risk Scoring  13, MODERATE RISK  Fall Risk Safety Measures  bed alarm, chair alarm, and poor balance    Vitals  Temperature: 36.6 °C (97.9 °F)  Temp src: Oral  Pulse: 69  Respiration: 18  Blood Pressure : (!) 157/74 (notified RN Chadwick)  Blood Pressure MAP (Calculated): 102 MM HG  BP Location: Right, Upper Arm  Patient BP Position: Supine     Oxygen  Pulse Oximetry: 92 %  O2 (LPM): 0  O2 Delivery Device: None - Room Air    Bowel and Bladder  Last Bowel Movement  12/10/24  Stool Type  Type 1: Separate hard lumps (hard to pass)  Bowel Device  Bathroom, Other (Comment) (Bowel Meds)  Continent  Bladder: Continent void   Bowel: Continent movement  Bladder Function  Number of Times Voided: 1  Urinary Options: Yes  Urine Color: Yellow  Genitourinary Assessment   Bladder Assessment (WDL):  Within Defined Limits  Polanco Catheter: Not Applicable  Urine Color: Yellow  Bladder Device:  Bathroom  Bladder Scan: Post Void  $ Bladder Scan Results (mL): 3    Skin  Hermelindo Score   18  Sensory Interventions   Bed Types: Standard/Trauma Mattress  Skin Preventative Measures: Pillows in Use for Support / Positioning  Moisture Interventions         Pain  Pain Rating Scale  7 - Focus of attention, prevents doing daily activities  Pain Location  Hip  Pain Location Orientation  Right  Pain Interventions   Medication (see MAR)    ADLs    Bathing   Shower, Staff (OT)  Linen Change      Personal Hygiene     Chlorhexidine Bath      Oral Care     Teeth/Dentures     Shave     Nutrition Percentage Eaten  *  * Meal *  *, Lunch  Environmental Precautions  Treaded Slipper Socks on Patient, Communication Sign for Patients & Families, Mobility Assessed & Appropriate Sign Placed  Patient Turns/Positioning  Patient turns self independently side to side without assistance, to offload sacral area  Patient Turns Assistance/Tolerance     Bed Positions  Bed Controls On, Bed Locked  Head of Bed Elevated  Self regulated      Psychosocial/Neurologic Assessment  Psychosocial Assessment  Psychosocial (WDL):  Within Defined Limits  Patient Behaviors: Fatigue  Neurologic Assessment  Neuro (WDL): Within Defined Limits  Level of Consciousness: Alert  Orientation Level: Oriented X4  Cognition: Appropriate judgement  Speech: Clear  Muscle Strength Right Arm: Good Strength Against Gravity and Moderate Resistance  Muscle Strength Left Arm: Good Strength Against Gravity and Moderate Resistance  Muscle Strength Right Leg: Weak Movement but Not Against Gravity or Resistance  Muscle Strength Left Leg: Fair Strength against Gravity but No Resistance  EENT (WDL):  WDL Except    Cardio/Pulmonary Assessment  Edema      Respiratory Breath Sounds  RUL Breath Sounds: Clear  RML Breath Sounds: Clear  RLL Breath Sounds: Diminished  JOAQUÍN Breath Sounds: Clear  LLL Breath Sounds: Diminished  Cardiac Assessment   Cardiac (WDL):  Within Defined Limits

## 2024-12-12 NOTE — THERAPY
"Occupational Therapy  Daily Treatment     Patient Name: Yamile Go  Age:  74 y.o., Sex:  female  Medical Record #: [id number]  Today's Date: 12/12/2024     Precautions  Precautions: (P) Fall Risk, Spinal / Back Precautions , Lumbosacral Orthosis  Comments: LSO when OOB; hx R ankle/shoulder and L hip replacements, hx of multiple falls, neuropathy         Subjective    Patient was resting in bed while planning tomorrow's meals with dietary.  She was agreeable to shower.  Reported a poor night of sleep due to not being able to \"turn my brain off.\"     Objective       12/12/24 0931   OT Charge Group   OT Self Care / ADL (Units) 2   OT Neuromuscular Re-education / Balance (Units) 2   OT Total Time Spent   OT Individual Total Time Spent (Mins) 60   Precautions   Precautions Fall Risk;Spinal / Back Precautions ;Lumbosacral Orthosis   Pain 0 - 10 Group   Location Hip   Location Orientation Right   Therapist Pain Assessment Prior to Activity;During Activity;Nurse Notified;Post Activity   Functional Level of Assist   Grooming Standing;Supervision   Grooming Description Supervision for safety;Standing at sink  (to brush hair and teeth)   Bathing Supervision   Bathing Description Grab bar;Hand held shower;Tub bench;Set-up of equipment   Upper Body Dressing Supervision   Upper Body Dressing Description Set-up of equipment   Lower Body Dressing Supervision   Lower Body Dressing Description Set-up of equipment   Toileting Supervision   Toileting Description Grab bar;Supervision for safety   Bed, Chair, Wheelchair Transfer Supervised   Bed Chair Wheelchair Transfer Description Adaptive equipment;Supervision for safety  (4ww)   Toilet Transfers Supervised   Toilet Transfer Description Grab bar;Supervision for safety   Tub / Shower Transfers Supervised   Tub Shower Transfer Description Grab bar;Shower bench;Supervision for safety   Bed Mobility    Supine to Sit Supervised   Sit to Supine Supervised   Scooting Supervised   Rolling " Supervised   Interdisciplinary Plan of Care Collaboration   IDT Collaboration with  Nursing   Patient Position at End of Therapy In Bed;Call Light within Reach;Tray Table within Reach;Phone within Reach   Collaboration Comments RN gave pain medicine     Functional mobility with 4ww SBA in room, bathroom, hallways and gym.    Standing balance activities in parallel bars on and off airex foam pad with and without single UE support during balloon toss/catch.  Min perturbations provided by therapist to patient's UB while standing on airex pad without UE support.  Eyes closed standing on airex pad x 20 seconds without UE support.  Sidestepping left and right 7-8' x 2 without UE support and SBA.    Assessment    Patient felt better after having a shower.  She tolerated balance activities well with only small swaying movements, but no overt losses of balance.    Strengths: Able to follow instructions, Adequate strength, Alert and oriented, Effective communication skills, Independent prior level of function, Manages pain appropriately, Motivated for self care and independence, Pleasant and cooperative, Supportive family, Willingly participates in therapeutic activities  Barriers: Decreased endurance, Fatigue, Generalized weakness, Impaired balance, Limited mobility, Pain (spinal precautions)    Plan    ADLs, IADLs, transfers and functional mobility with FWW, standing balance, core/UB strength/endurance     DME  OT DME Recommendations  Bathroom Equipment:  (recommend grab bars in tub/shower and by toilets, but patient states apartment management will not put them in for her)      Occupational Therapy Goals (Active)       Problem: Dressing       Dates: Start:  12/10/24         Goal: STG-Within one week, patient will dress LB with setup and supervision       Dates: Start:  12/10/24               Problem: Functional Transfers       Dates: Start:  12/10/24         Goal: STG-Within one week, patient will transfer to toilet with  SBA       Dates: Start:  12/10/24               Problem: OT Long Term Goals       Dates: Start:  12/10/24         Goal: LTG-By discharge, patient will complete basic self care tasks with supervision/mod I       Dates: Start:  12/10/24            Goal: LTG-By discharge, patient will perform bathroom transfers with supervision/mod I       Dates: Start:  12/10/24            Goal: LTG-By discharge, patient will complete basic home management with supervision/mod I       Dates: Start:  12/10/24               Problem: Toileting       Dates: Start:  12/10/24         Goal: STG-Within one week, patient will complete toileting tasks with SBA       Dates: Start:  12/10/24

## 2024-12-12 NOTE — CARE PLAN
"The patient is Stable - Low risk of patient condition declining or worsening    Shift Goals  Clinical Goals: Rest and safety  Patient Goals: Safety    Progress made toward(s) clinical / shift goals:    Problem: Pain - Standard  Goal: Alleviation of pain or a reduction in pain to the patient’s comfort goal  Outcome: Progressing  Note: Patient states that her PRN pain medications are working to keep her pain at a tolerable level.     Problem: Fall Risk - Rehab  Goal: Patient will remain free from falls  Outcome: Progressing  Note: May Beard Fall risk Assessment Score: 13    Moderate fall risk Interventions  - Bed and strip alarm   - Yellow sign by the door   - Yellow wrist band \"Fall risk\"  - Room near to the nurse station  - Do not leave patient unattended in the bathroom  - Fall risk education provided       Patient is not progressing towards the following goals:      "

## 2024-12-12 NOTE — PROGRESS NOTES
NURSING DAILY NOTE    Name: Yamile Go   Date of Admission: 12/9/2024   Admitting Diagnosis: Lumbosacral radiculopathy due to degenerative joint disease of spine  Attending Physician: LILLY BOYD M.D.  Allergies: Gabapentin, Pregabalin, Amlodipine, Bee, Fentanyl, Morphine, Benzoin, and Rifampin    Safety  Patient Assist  SBA/CGA  Patient Precautions  Fall Risk, Spinal / Back Precautions , Lumbosacral Orthosis  Precaution Comments  LSO when OOB; hx R ankle/shoulder and L hip replacements, hx of multiple falls, neuropathy  Bed Transfer Status  Supervised  Toilet Transfer Status   Supervised  Assistive Devices  Wheelchair  Oxygen  None - Room Air  Diet/Therapeutic Dining  Current Diet Order   Procedures    Diet Order Diet: Cardiac; Second Modifier: (optional): Consistent CHO (Diabetic)     Pill Administration  whole  Agitated Behavioral Scale     ABS Level of Severity       Fall Risk  Has the patient had a fall this admission?   No  May Beard Fall Risk Scoring  15, HIGH RISK  Fall Risk Safety Measures  bed alarm    Vitals  Temperature: 37.1 °C (98.7 °F)  Temp src: Oral  Pulse: 80  Respiration: 18  Blood Pressure : (!) 152/88  Blood Pressure MAP (Calculated): 109 MM HG  BP Location: Right, Upper Arm  Patient BP Position: Valenzuela's Position     Oxygen  Pulse Oximetry: 93 %  O2 (LPM): 0  O2 Delivery Device: None - Room Air    Bowel and Bladder  Last Bowel Movement  12/10/24  Stool Type  Type 1: Separate hard lumps (hard to pass)  Bowel Device  Bathroom, Other (Comment) (Bowel Meds)  Continent  Bladder: Continent void   Bowel: Continent movement  Bladder Function  Number of Times Voided: 1  Urinary Options: Yes  Urine Color: Unable To Evaluate  Genitourinary Assessment   Bladder Assessment (WDL):  Within Defined Limits  Polanco Catheter: Not Applicable  Urine Color: Unable To Evaluate  Bladder Device: Bathroom  Bladder Scan: Post Void  $ Bladder  Scan Results (mL): 3    Skin  Hermelindo Score   19  Sensory Interventions   Bed Types: Standard/Trauma Mattress  Skin Preventative Measures: Pillows in Use for Support / Positioning  Moisture Interventions         Pain  Pain Rating Scale  7 - Focus of attention, prevents doing daily activities  Pain Location  Groin  Pain Location Orientation  Right  Pain Interventions   Medication (see MAR)    ADLs    Bathing   Shower, Staff (OT)  Linen Change      Personal Hygiene     Chlorhexidine Bath      Oral Care     Teeth/Dentures     Shave     Nutrition Percentage Eaten  *  * Meal *  *, Lunch  Environmental Precautions  Treaded Slipper Socks on Patient, Bed in Low Position  Patient Turns/Positioning  Patient turns self independently side to side without assistance, to offload sacral area  Patient Turns Assistance/Tolerance     Bed Positions  Bed Controls On, Bed Locked  Head of Bed Elevated  Self regulated      Psychosocial/Neurologic Assessment  Psychosocial Assessment  Psychosocial (WDL):  WDL Except  Patient Behaviors: Fatigue  Neurologic Assessment  Neuro (WDL): Exceptions to WDL  Level of Consciousness: Alert  Orientation Level: Oriented X4  Cognition: Follows commands  Speech: Clear  Muscle Strength Right Arm: Good Strength Against Gravity and Moderate Resistance  Muscle Strength Left Arm: Good Strength Against Gravity and Moderate Resistance  Muscle Strength Right Leg: Weak Movement but Not Against Gravity or Resistance  Muscle Strength Left Leg: Fair Strength against Gravity but No Resistance  EENT (WDL):  WDL Except    Cardio/Pulmonary Assessment  Edema      Respiratory Breath Sounds  RUL Breath Sounds: Diminished  RML Breath Sounds: Diminished  RLL Breath Sounds: Diminished  JOAQUÍN Breath Sounds: Diminished  LLL Breath Sounds: Diminished  Cardiac Assessment   Cardiac (WDL):  WDL Except (H/O HTN.)

## 2024-12-12 NOTE — CARE PLAN
Problem: Dressing  Goal: STG-Within one week, patient will dress LB with setup and supervision  Outcome: Met  Note: Setup     Problem: Toileting  Goal: STG-Within one week, patient will complete toileting tasks with SBA  Outcome: Met  Note: Supervised     Problem: Functional Transfers  Goal: STG-Within one week, patient will transfer to toilet with SBA  Outcome: Met  Note: Supervised

## 2024-12-12 NOTE — CARE PLAN
Problem: Mobility  Goal: STG-Within one week, patient will ambulate community distances with LRAD and supervision.  Outcome: Not Met     Problem: Balance  Goal: STG-Within one week, patient will tolerate outcome measure testing.  Outcome: Met     Problem: Mobility Transfers  Goal: STG-Within one week, patient will perform bed mobility independently.  Outcome: Met  Goal: STG-Within one week, patient will transfer bed to chair with LRAD and supervision.  Outcome: Met

## 2024-12-13 ENCOUNTER — APPOINTMENT (OUTPATIENT)
Dept: OCCUPATIONAL THERAPY | Facility: REHABILITATION | Age: 74
DRG: 561 | End: 2024-12-13
Attending: PHYSICAL MEDICINE & REHABILITATION
Payer: MEDICARE

## 2024-12-13 ENCOUNTER — APPOINTMENT (OUTPATIENT)
Dept: PHYSICAL THERAPY | Facility: REHABILITATION | Age: 74
DRG: 561 | End: 2024-12-13
Attending: PHYSICAL MEDICINE & REHABILITATION
Payer: MEDICARE

## 2024-12-13 PROCEDURE — 97110 THERAPEUTIC EXERCISES: CPT

## 2024-12-13 PROCEDURE — 770010 HCHG ROOM/CARE - REHAB SEMI PRIVAT*

## 2024-12-13 PROCEDURE — 700102 HCHG RX REV CODE 250 W/ 637 OVERRIDE(OP): Performed by: PHYSICAL MEDICINE & REHABILITATION

## 2024-12-13 PROCEDURE — 97530 THERAPEUTIC ACTIVITIES: CPT

## 2024-12-13 PROCEDURE — 700111 HCHG RX REV CODE 636 W/ 250 OVERRIDE (IP): Performed by: PHYSICAL MEDICINE & REHABILITATION

## 2024-12-13 PROCEDURE — 97116 GAIT TRAINING THERAPY: CPT

## 2024-12-13 PROCEDURE — A9270 NON-COVERED ITEM OR SERVICE: HCPCS | Performed by: PHYSICAL MEDICINE & REHABILITATION

## 2024-12-13 PROCEDURE — 97112 NEUROMUSCULAR REEDUCATION: CPT

## 2024-12-13 PROCEDURE — 97535 SELF CARE MNGMENT TRAINING: CPT

## 2024-12-13 PROCEDURE — 99232 SBSQ HOSP IP/OBS MODERATE 35: CPT | Performed by: PHYSICAL MEDICINE & REHABILITATION

## 2024-12-13 RX ADMIN — OMEPRAZOLE 20 MG: 20 CAPSULE, DELAYED RELEASE ORAL at 08:02

## 2024-12-13 RX ADMIN — ACETAMINOPHEN 1000 MG: 500 TABLET ORAL at 17:28

## 2024-12-13 RX ADMIN — OXYCODONE HYDROCHLORIDE 15 MG: 10 TABLET ORAL at 20:02

## 2024-12-13 RX ADMIN — OXYCODONE HYDROCHLORIDE 15 MG: 10 TABLET ORAL at 05:15

## 2024-12-13 RX ADMIN — PREDNISONE 4 MG: 1 TABLET ORAL at 20:01

## 2024-12-13 RX ADMIN — LIOTHYRONINE SODIUM 5 MCG: 5 TABLET ORAL at 08:03

## 2024-12-13 RX ADMIN — ACETAMINOPHEN 1000 MG: 500 TABLET ORAL at 05:11

## 2024-12-13 RX ADMIN — ROSUVASTATIN CALCIUM 20 MG: 10 TABLET, FILM COATED ORAL at 20:02

## 2024-12-13 RX ADMIN — OXYCODONE HYDROCHLORIDE 15 MG: 10 TABLET ORAL at 14:23

## 2024-12-13 RX ADMIN — ACETAMINOPHEN 1000 MG: 500 TABLET ORAL at 11:09

## 2024-12-13 RX ADMIN — PREDNISONE 4 MG: 1 TABLET ORAL at 08:03

## 2024-12-13 RX ADMIN — METFORMIN HYDROCHLORIDE 500 MG: 500 TABLET ORAL at 08:02

## 2024-12-13 RX ADMIN — OMEPRAZOLE 20 MG: 20 CAPSULE, DELAYED RELEASE ORAL at 20:02

## 2024-12-13 RX ADMIN — LEVOTHYROXINE SODIUM 88 MCG: 0.09 TABLET ORAL at 05:12

## 2024-12-13 RX ADMIN — AMITRIPTYLINE HYDROCHLORIDE 150 MG: 50 TABLET, FILM COATED ORAL at 20:01

## 2024-12-13 RX ADMIN — METFORMIN HYDROCHLORIDE 500 MG: 500 TABLET ORAL at 17:28

## 2024-12-13 RX ADMIN — METHOCARBAMOL 500 MG: 500 TABLET ORAL at 16:36

## 2024-12-13 RX ADMIN — ESCITALOPRAM OXALATE 20 MG: 10 TABLET ORAL at 08:02

## 2024-12-13 RX ADMIN — EZETIMIBE 10 MG: 10 TABLET ORAL at 20:01

## 2024-12-13 RX ADMIN — METOPROLOL SUCCINATE 25 MG: 25 TABLET, EXTENDED RELEASE ORAL at 05:11

## 2024-12-13 RX ADMIN — OXYCODONE HYDROCHLORIDE 15 MG: 10 TABLET ORAL at 10:37

## 2024-12-13 RX ADMIN — PREDNISONE 4 MG: 1 TABLET ORAL at 14:23

## 2024-12-13 RX ADMIN — OXYCODONE HYDROCHLORIDE 15 MG: 10 TABLET ORAL at 00:34

## 2024-12-13 ASSESSMENT — GAIT ASSESSMENTS
ASSISTIVE DEVICE: 4 WHEEL WALKER
DISTANCE (FEET): 150
ASSISTIVE DEVICE: 4 WHEEL WALKER
GAIT LEVEL OF ASSIST: STANDBY ASSIST
GAIT LEVEL OF ASSIST: STANDBY ASSIST
DISTANCE (FEET): 150
DEVIATION: ANTALGIC
DEVIATION: OTHER (COMMENT)

## 2024-12-13 ASSESSMENT — ACTIVITIES OF DAILY LIVING (ADL)
BED_CHAIR_WHEELCHAIR_TRANSFER_DESCRIPTION: ADAPTIVE EQUIPMENT;INCREASED TIME;SUPERVISION FOR SAFETY;VERBAL CUEING
TOILET_TRANSFER_DESCRIPTION: GRAB BAR
BED_CHAIR_WHEELCHAIR_TRANSFER_DESCRIPTION: ADAPTIVE EQUIPMENT
BED_CHAIR_WHEELCHAIR_TRANSFER_DESCRIPTION: ADAPTIVE EQUIPMENT;INCREASED TIME
TOILET_TRANSFER_DESCRIPTION: GRAB BAR
BED_CHAIR_WHEELCHAIR_TRANSFER_DESCRIPTION: ADAPTIVE EQUIPMENT

## 2024-12-13 ASSESSMENT — PAIN DESCRIPTION - PAIN TYPE
TYPE: CHRONIC PAIN
TYPE: ACUTE PAIN
TYPE: CHRONIC PAIN

## 2024-12-13 NOTE — THERAPY
"Physical Therapy   Daily Treatment     Patient Name: Yamile Go  Age:  74 y.o., Sex:  female  Medical Record #: [id number]  Today's Date: 12/13/2024     Precautions  Precautions: Fall Risk, Spinal / Back Precautions , Lumbosacral Orthosis  Comments: LSO when OOB; hx R ankle/shoulder and L hip replacements, hx of multiple falls, neuropathy    Subjective    \"I didn't sleep well last night.\" Pt in bed at arrival, sleeping, but agreeable to PT when awakened.      Objective       12/13/24 0931   PT Charge Group   PT Neuromuscular Re-Education / Balance (Units) 1   PT Therapeutic Activities (Units) 1   PT Total Time Spent   PT Individual Total Time Spent (Mins) 30   Gait Functional Level of Assist    Gait Level Of Assist Standby Assist   Assistive Device 4 Wheel Walker   Distance (Feet) 150   # of Times Distance was Traveled 1   Deviation Other (Comment)  (dec tristen)   Transfer Functional Level of Assist   Bed, Chair, Wheelchair Transfer Modified Independent   Bed Chair Wheelchair Transfer Description Adaptive equipment;Increased time   Neuro-Muscular Treatments   Neuro-Muscular Treatments Anterior weight shift;Postural Changes;Postural Facilitation;Verbal Cuing;Weight Shift Right;Weight Shift Left   Comments see note for balance activities   Interdisciplinary Plan of Care Collaboration   IDT Collaboration with  Occupational Therapist   Patient Position at End of Therapy Seated;Other (Comments)  (handoff to OT for next session)   Physical Therapist Assigned   Assigned PT / Treatment Time / Comments Lora     Time spent to discuss typical household activities and balance challenges associated with household tasks. Pt reports planning to spend time in kitchen baking with her aunt.     NMRE/TA: progressive dynamic balance and stepping c/ dual task in simulation of kitchen tasks  Forward/backward stepping <> counter support 1 x 10  Lateral stepping along counter moving objects R<>L in simulation of fridge <> sink, " progressed from 1 object c/ 1 UE support to 2 objects in 2 hands with incidental UE support  Dual task: moving objects R<>L by color randomly selected by PT, then geoboard activity with color and number randomly selected by dice roll for cognitive + motor dual task  Seated rest breaks as needed.   Education: importance of increasing balance confidence in household activities, role of familiar tasks in improving automatic balance responses and decreasing cognitive load of balance activities; encouraged continued practice of typical household activities during therapy sessions to prepare for d/c. Pt reports understanding and agreement.     Assessment    Gillian initially very fearful of stepping activities with decreased UE support, but improved in performance when balance activity nested within a functional dual task. Will benefit from continued balance progression to prepare for d/c.     Strengths: Able to follow instructions, Alert and oriented, Effective communication skills, Good insight into deficits/needs, Independent prior level of function, Making steady progress towards goals, Motivated for self care and independence, Pleasant and cooperative, Willingly participates in therapeutic activities  Barriers: Decreased endurance, Impaired activity tolerance, Impaired balance, Pain    Plan      Ambulation w/ 4WW(gait speed) v.s hurry cane  Standing balance, stepping, carrying, and household task simulation  BLE strengthening (LLE weaker than RLE)    DME  PT DME Recommendations  Assistive Device:  (owns 4WW and hurry cane)    Passport items to be completed:  Get in/out of bed safely, in/out of a vehicle, safely use mobility device, walk or wheel around home/community, navigate up and down stairs, show how to get up/down from the ground, ensure home is accessible, demonstrate HEP, complete caregiver training    Physical Therapy Problems (Active)       Problem: Mobility       Dates: Start:  12/10/24         Goal:  STG-Within one week, patient will ambulate community distances with LRAD and supervision.       Dates: Start:  12/10/24               Problem: PT-Long Term Goals       Dates: Start:  12/10/24         Goal: LTG-By discharge, patient will ambulate household and community distances with LRAD mod I.       Dates: Start:  12/10/24            Goal: LTG-By discharge, patient will transfer one surface to another with LRAD mod I.       Dates: Start:  12/10/24            Goal: LTG-By discharge, patient will transfer in/out of a car with LRAD mod I.       Dates: Start:  12/10/24

## 2024-12-13 NOTE — PROGRESS NOTES
Patient care assumed. Report received from [facility] COLIN Anaya.  Patient is alert and calm, resting in bed. Call light and bedside table within reach. Will continue to monitor.

## 2024-12-13 NOTE — THERAPY
Occupational Therapy  Daily Treatment     Patient Name: Yamile Go  Age:  74 y.o., Sex:  female  Medical Record #: [id number]  Today's Date: 12/13/2024     Precautions  Precautions: (P) Fall Risk, Spinal / Back Precautions , Lumbosacral Orthosis  Comments: LSO when OOB; hx R ankle/shoulder and L hip replacements, hx of multiple falls, neuropathy         Subjective    Patient was received in the gym after she completed working with PT.  She was agreeable to work on standing balance/UB strengthening exercises.       Objective       12/13/24 1001   OT Charge Group   OT Therapy Activity (Units) 1   OT Therapeutic Exercise (Units) 1   OT Total Time Spent   OT Individual Total Time Spent (Mins) 30   Precautions   Precautions Fall Risk;Spinal / Back Precautions ;Lumbosacral Orthosis   Functional Level of Assist   Grooming Independent;Standing  (to wash hands)   Upper Body Dressing Independent   Upper Body Dressing Description   (to doff LSO)   Toileting Independent   Bed, Chair, Wheelchair Transfer Modified Independent   Bed Chair Wheelchair Transfer Description Adaptive equipment  (4ww)   Toilet Transfers Modified Independent   Toilet Transfer Description Grab bar   Standing Upper Body Exercises   Standing Upper Body Exercises Yes   Chest Press 3 sets of 10;Bilateral;Weight (See Comments for lbs)   Front Arm Raise 3 sets of 10;Bilateral;Weight (See Comments for lbs)   Shoulder Press 3 sets of 10;Bilateral;Weight (See Comments for lbs)   Bicep Curls 3 sets of 10;Bilateral;Weight (See Comments for lbs)   Comments 2 lb weighted bar   Bed Mobility    Sit to Supine Modified Independent   Interdisciplinary Plan of Care Collaboration   Patient Position at End of Therapy In Bed;Call Light within Reach;Tray Table within Reach;Phone within Reach     Functional mobility with 4ww from gym to room with SBA/supervision.    Assessment    Patient with no losses of balance while completing standing UB exercises without support.  She  was limited by R shoulder weakness due to history of TSA.  Strengths: Able to follow instructions, Adequate strength, Alert and oriented, Effective communication skills, Independent prior level of function, Manages pain appropriately, Motivated for self care and independence, Pleasant and cooperative, Supportive family, Willingly participates in therapeutic activities  Barriers: Decreased endurance, Fatigue, Generalized weakness, Impaired balance, Limited mobility, Pain (spinal precautions)    Plan    D/C [facility] Sunday for d/c home Monday     DME  OT DME Recommendations  Bathroom Equipment:  (recommend grab bars in tub/shower and by toilets, but patient states apartment management will not put them in for her)      Occupational Therapy Goals (Active)       Problem: OT Long Term Goals       Dates: Start:  12/10/24         Goal: LTG-By discharge, patient will complete basic self care tasks with supervision/mod I       Dates: Start:  12/10/24            Goal: LTG-By discharge, patient will perform bathroom transfers with supervision/mod I       Dates: Start:  12/10/24            Goal: LTG-By discharge, patient will complete basic home management with supervision/mod I       Dates: Start:  12/10/24

## 2024-12-13 NOTE — THERAPY
"Occupational Therapy  Daily Treatment     Patient Name: Yamile Go  Age:  74 y.o., Sex:  female  Medical Record #: [id number]  Today's Date: 12/13/2024     Precautions  Precautions: (P) Fall Risk, Spinal / Back Precautions , Lumbosacral Orthosis  Comments: LSO when OOB; hx R ankle/shoulder and L hip replacements, hx of multiple falls, neuropathy         Subjective    Patient was asleep in bed with the tv on upon OT arrival to room.  She asked, \"why are you here so early?\" and appeared a little confused.  She was agreeable to OT.  Reported continued R hip pain.     Objective       12/13/24 0701   OT Charge Group   OT Self Care / ADL (Units) 2   OT Therapy Activity (Units) 1   OT Therapeutic Exercise (Units) 1   OT Total Time Spent   OT Individual Total Time Spent (Mins) 60   Precautions   Precautions Fall Risk;Spinal / Back Precautions ;Lumbosacral Orthosis   Pain 0 - 10 Group   Location Hip   Location Orientation Right   Functional Level of Assist   Eating Independent   Grooming Independent;Standing   Upper Body Dressing Modified Independent   Upper Body Dressing Description Assist device equipment;Application of orthotic or brace  (gathered clothing from closet and don/doff independently; LSO as well)   Lower Body Dressing Modified Independent   Lower Body Dressing Description Assistive devices  (4ww to gather)   Toileting Independent   Bed, Chair, Wheelchair Transfer Modified Independent   Bed Chair Wheelchair Transfer Description Adaptive equipment  (4ww)   Toilet Transfers Modified Independent   Toilet Transfer Description Grab bar   Sitting Upper Body Exercises   Sitting Upper Body Exercises Yes   Lat Pull Bilateral;Weight (See Comments for lbs)  (3 x 10 with 20 lbs)   Tricep Press Bilateral;Weight (See Comments for lbs)  (6 x 10 with 20 lbs on rickshaw)   IADL Treatments   IADL Treatments Meal preparation   Meal Preparation patient got a cup of coffee with setup using 4ww in dining room   Bed Mobility  "   Supine to Sit Modified Independent   Scooting Modified Independent   Interdisciplinary Plan of Care Collaboration   Patient Position at End of Therapy Edge of Bed;Call Light within Reach;Tray Table within Reach;Phone within Reach     Functional mobility with 4ww in room, bathroom, hallways and gym with SBA/supervision.  Continued to recommend grab bar placement in shower and by toilet in her apartment and discussed with her that apartments should make reasonable accommodations for their tenants. Discussed fall prevention strategies for home.    Assessment    Patient appeared more willing to ask her apartment management about grab bar placement after discussion, as she is afraid of falling at home.  She is on track for d/c home Monday.  Strengths: Able to follow instructions, Adequate strength, Alert and oriented, Effective communication skills, Independent prior level of function, Manages pain appropriately, Motivated for self care and independence, Pleasant and cooperative, Supportive family, Willingly participates in therapeutic activities  Barriers: Decreased endurance, Fatigue, Generalized weakness, Impaired balance, Limited mobility, Pain (spinal precautions)    Plan    D/C [facility] Sunday for d/c home Monday    Occupational Therapy Goals (Active)       Problem: OT Long Term Goals       Dates: Start:  12/10/24         Goal: LTG-By discharge, patient will complete basic self care tasks with supervision/mod I       Dates: Start:  12/10/24            Goal: LTG-By discharge, patient will perform bathroom transfers with supervision/mod I       Dates: Start:  12/10/24            Goal: LTG-By discharge, patient will complete basic home management with supervision/mod I       Dates: Start:  12/10/24

## 2024-12-13 NOTE — PROGRESS NOTES
NURSING DAILY NOTE    Name: Yamile Go   Date of Admission: 12/9/2024   Admitting Diagnosis: Lumbosacral radiculopathy due to degenerative joint disease of spine  Attending Physician: LILLY BOYD M.D.  Allergies: Gabapentin, Pregabalin, Amlodipine, Bee, Fentanyl, Morphine, Benzoin, and Rifampin    Safety  Patient Assist  SBA/CGA  Patient Precautions  Fall Risk, Spinal / Back Precautions , Lumbosacral Orthosis  Precaution Comments  LSO when OOB; hx R ankle/shoulder and L hip replacements, hx of multiple falls, neuropathy  Bed Transfer Status  Supervised  Toilet Transfer Status   Supervised  Assistive Devices  Rails, Walker - front wheel  Oxygen  None - Room Air  Diet/Therapeutic Dining  Current Diet Order   Procedures    Diet Order Diet: Cardiac; Second Modifier: (optional): Consistent CHO (Diabetic)     Pill Administration  whole  Agitated Behavioral Scale     ABS Level of Severity       Fall Risk  Has the patient had a fall this admission?   No  May Beard Fall Risk Scoring  14, MODERATE RISK  Fall Risk Safety Measures  bed alarm and chair alarm    Vitals  Temperature: 36.9 °C (98.5 °F)  Temp src: Oral  Pulse: 75  Respiration: 16  Blood Pressure : (!) 142/77  Blood Pressure MAP (Calculated): 99 MM HG  BP Location: Right, Upper Arm  Patient BP Position: Supine     Oxygen  Pulse Oximetry: 97 %  O2 (LPM): 0  O2 Delivery Device: None - Room Air    Bowel and Bladder  Last Bowel Movement  12/10/24  Stool Type  Type 1: Separate hard lumps (hard to pass)  Bowel Device  Bathroom, Other (Comment) (Bowel Meds)  Continent  Bladder: Continent void   Bowel: Continent movement  Bladder Function  Number of Times Voided: 1  Urinary Options: Yes  Urine Color: Yellow  Genitourinary Assessment   Bladder Assessment (WDL):  Within Defined Limits  Polanco Catheter: Not Applicable  Urine Color: Yellow  Bladder Device: Bathroom  Bladder Scan: Post Void  $ Bladder  Scan Results (mL): 3    Skin  Hermelindo Score   18  Sensory Interventions   Bed Types: Standard/Trauma Mattress  Skin Preventative Measures: Pillows in Use for Support / Positioning  Moisture Interventions         Pain  Pain Rating Scale  7 - Focus of attention, prevents doing daily activities  Pain Location  Back, Hip  Pain Location Orientation  Right  Pain Interventions   Medication (see MAR)    ADLs    Bathing   Shower, Staff (OT)  Linen Change   Complete  Personal Hygiene     Chlorhexidine Bath      Oral Care  Brushed Teeth (self)  Teeth/Dentures     Shave     Nutrition Percentage Eaten  *  * Meal *  *, Dinner, Between % Consumed  Environmental Precautions  Treaded Slipper Socks on Patient, Communication Sign for Patients & Families, Mobility Assessed & Appropriate Sign Placed  Patient Turns/Positioning  Patient turns self independently side to side without assistance, to offload sacral area  Patient Turns Assistance/Tolerance     Bed Positions  Bed Controls On, Bed Locked  Head of Bed Elevated  Self regulated      Psychosocial/Neurologic Assessment  Psychosocial Assessment  Psychosocial (WDL):  Within Defined Limits  Patient Behaviors: Fatigue  Neurologic Assessment  Neuro (WDL): Exceptions to WDL  Level of Consciousness: Alert  Orientation Level: Oriented X4  Cognition: Follows commands  Speech: Clear  Muscle Strength Right Arm: Good Strength Against Gravity and Moderate Resistance  Muscle Strength Left Arm: Good Strength Against Gravity and Moderate Resistance  Muscle Strength Right Leg: Fair Strength against Gravity but No Resistance  Muscle Strength Left Leg: Fair Strength against Gravity but No Resistance  EENT (WDL):  WDL Except    Cardio/Pulmonary Assessment  Edema      Respiratory Breath Sounds  RUL Breath Sounds: Clear  RML Breath Sounds: Clear  RLL Breath Sounds: Diminished  JOAQUÍN Breath Sounds: Clear  LLL Breath Sounds: Diminished  Cardiac Assessment   Cardiac (WDL):  WDL Except (HTN)

## 2024-12-13 NOTE — THERAPY
Physical Therapy   Daily Treatment     Patient Name: Yamile Go  Age:  74 y.o., Sex:  female  Medical Record #: [id number]  Today's Date: 12/12/2024     Precautions  Precautions: Fall Risk, Spinal / Back Precautions , Lumbosacral Orthosis  Comments: LSO when OOB; hx R ankle/shoulder and L hip replacements, hx of multiple falls, neuropathy    Subjective    Reports back and hip pain, agreeable to participate.     Objective       12/12/24 0831 12/12/24 1501   PT Charge Group   PT Gait Training (Units) 1 1   PT Therapeutic Exercise (Units)  --  1   PT Therapeutic Activities (Units) 1 2   PT Total Time Spent   PT Individual Total Time Spent (Mins) 30 60   Gait Functional Level of Assist    Gait Level Of Assist Standby Assist Standby Assist   Assistive Device 4 Wheel Walker 4 Wheel Walker   Distance (Feet) 300 300   # of Times Distance was Traveled 2 2   Deviation  --  Antalgic;Bradykinetic   Stairs Functional Level of Assist   Level of Assist with Stairs  --  Contact Guard Assist   # of Stairs Climbed  --  12   Stairs Description  --  Extra time;Hand rails;Supervision for safety;Verbal cueing   Transfer Functional Level of Assist   Bed, Chair, Wheelchair Transfer Supervised  --    Bed Chair Wheelchair Transfer Description Adaptive equipment;Increased time;Supervision for safety;Verbal cueing  --    Toilet Transfers Supervised  --    Toilet Transfer Description Grab bar;Increased time;Supervision for safety  --    Standing Lower Body Exercises   Hamstring Curl  --  2 sets of 10;Bilateral ;Weight (See Comments for lbs)  (1.5# ankle weight)   Hip Extension  --  2 sets of 10;Bilateral   (1.5# ankle weight)   Hip Abduction  --  2 sets of 10;Bilateral  (1.5# ankle weight)   Marching  --  2 sets of 10  (1.5# ankle weight)   Heel Rise  --  2 sets of 10;Bilateral  (1.5# ankle weight)   Mini Squat  --  Partial;2 sets of 10   Interdisciplinary Plan of Care Collaboration   Patient Position at End of Therapy In Bed;Call Light  within Reach;Tray Table within Reach;Phone within Reach  (hot pack for hip/ back pain) In Bed;Call Light within Reach;Tray Table within Reach;Phone within Reach     Afternoon session: seated reaching task at kitchen table with intermittent sit to stands, reaching away from base of support, unsupported sitting intermittently as well. Participated with rec therapy task as upright unsupported sitting reaching task.     Assessment    Patient is tolerating more activity. She reports minimal decreases in back pain. Participated in conjunction with rec therapy.     Strengths: Able to follow instructions, Alert and oriented, Effective communication skills, Good insight into deficits/needs, Independent prior level of function, Making steady progress towards goals, Motivated for self care and independence, Pleasant and cooperative, Willingly participates in therapeutic activities  Barriers: Decreased endurance, Impaired activity tolerance, Impaired balance, Pain    Plan    Ambulation w/ 4WW(gait speed) v.s hurry cane  Standing balance  BLE strengthening (LLE weaker than RLE)    DME  PT DME Recommendations  Assistive Device:  (owns 4WW and hurry cane)    Passport items to be completed:  Get in/out of bed safely, in/out of a vehicle, safely use mobility device, walk or wheel around home/community, navigate up and down stairs, show how to get up/down from the ground, ensure home is accessible, demonstrate HEP, complete caregiver training    Physical Therapy Problems (Active)       Problem: Mobility       Dates: Start:  12/10/24         Goal: STG-Within one week, patient will ambulate community distances with LRAD and supervision.       Dates: Start:  12/10/24               Problem: PT-Long Term Goals       Dates: Start:  12/10/24         Goal: LTG-By discharge, patient will ambulate household and community distances with LRAD mod I.       Dates: Start:  12/10/24            Goal: LTG-By discharge, patient will transfer one  surface to another with LRAD mod I.       Dates: Start:  12/10/24            Goal: LTG-By discharge, patient will transfer in/out of a car with LRAD mod I.       Dates: Start:  12/10/24

## 2024-12-13 NOTE — CARE PLAN
"The patient is Watcher - Medium risk of patient condition declining or worsening    Shift Goals  Clinical Goals: safety  Patient Goals: safety, painmanagment, sleep    Problem: Fall Risk - Rehab  Goal: Patient will remain free from falls  Outcome: Progressing  Note: May Beard Fall risk Assessment Score: 13    Moderate fall risk Interventions  - Bed and strip alarm   - Yellow sign by the door   - Yellow wrist band \"Fall risk\"  - Room near to the nurse station  - Do not leave patient unattended in the bathroom  - Fall risk education provided     Pt using call light appropriately when in need of assistance.          Problem: Pain - Standard  Goal: Alleviation of pain or a reduction in pain to the patient’s comfort goal  Outcome: Progressing     "

## 2024-12-13 NOTE — CARE PLAN
The patient is Stable - Low risk of patient condition declining or worsening    Shift Goals  Clinical Goals: safety  Patient Goals: pain management    Problem: Pain - Standard  Goal: Alleviation of pain or a reduction in pain to the patient’s comfort goal  Outcome: Progressing Patient able to verbalize pain level and verbalize an acceptable level of pain.     Problem: Fall Risk - Rehab  Goal: Patient will remain free from falls  Outcome: Progressing Pt uses call light consistently and appropriately. Waits for assistance does not attempt self transfer this shift. Able to verbalize needs.

## 2024-12-13 NOTE — PROGRESS NOTES
"  Physical Medicine & Rehabilitation Progress Note    Encounter Date: 12/13/2024    Chief Complaint: Decreased mobility    Interval Events (Subjective):  Patient sitting up in room. She reports she is doing well. She reports pain is doing better. Discussed about discharge Monday.     _____________________________________  Interdisciplinary Team Conference   Most recent IDT on 12/12/2024    Discharge Date/Disposition:  12/16/24  _____________________________________        Objective:  VITAL SIGNS: /57   Pulse 67   Temp 36.7 °C (98 °F) (Oral)   Resp 15   Ht 1.575 m (5' 2\")   Wt 70 kg (154 lb 5.2 oz)   SpO2 92%   BMI 28.23 kg/m²   Gen: NAD  Psych: Mood and affect appropriate  CV: RRR, 0 edema  Resp: CTAB, no upper airway sounds  Abd: NTND  Neuro: AOx4, following commands  Unchanged from 12/12/24    Laboratory Values:  Recent Results (from the past 72 hours)   POCT glucose device results    Collection Time: 12/10/24  4:55 PM   Result Value Ref Range    POC Glucose, Blood 107 (H) 65 - 99 mg/dL       Medications:  Scheduled Medications   Medication Dose Frequency    predniSONE  4 mg TID    amitriptyline  150 mg Nightly    metFORMIN  500 mg BID WITH MEALS    senna-docusate  2 Tablet Q EVENING    acetaminophen  1,000 mg Q6HRS    enoxaparin (LOVENOX) injection  40 mg DAILY AT 1800    escitalopram  20 mg DAILY    ezetimibe  10 mg Q EVENING    levothyroxine  88 mcg DAILY    liothyronine  5 mcg DAILY    lidocaine  2 Patch Q24HR    metoprolol SR  25 mg DAILY    omeprazole  20 mg BID    rosuvastatin  20 mg Q EVENING     PRN medications: hydrALAZINE, acetaminophen, senna-docusate **AND** polyethylene glycol/lytes, docusate sodium, magnesium hydroxide, carboxymethylcellulose, benzocaine-menthol, mag hydrox-al hydrox-simeth, ondansetron **OR** ondansetron, traZODone, sodium chloride, albuterol, diclofenac sodium, methocarbamol, oxyCODONE immediate-release **OR** oxyCODONE immediate-release    Diet:  Current Diet Order "   Procedures    Diet Order Diet: Cardiac; Second Modifier: (optional): Consistent CHO (Diabetic)       Medical Decision Making and Plan:  Lumbar radiculopathy - Patient with new GLF found to have L1 compression fracture on MRI. Neurosurgery was consulted and recommended conservative management  -PT and OT for mobility and ADLs. Per guidelines, 15 hours per week between PT, OT and/or SLP.  -Follow-up NSG. Continue Ketorolac 10 mg q6 and prednisone. Will reduce prednisone to q8     HTN - Patient on Metoprolol 25 mg XL     HLD - Patient on Rosuvastatin 20 mg daily     Anemia - Check AM CBC - 10.1, will monitor     Obesity due to excess calories - BMI of 31.0 on admission, meets medical criteria. Dietitian to consult     GERD - patient on prilosec BID     DM2 with hyperglycemia - Previously on metformin. On SSI on transfer. Will check A1c - 6.9, discontinue SSI. Restart Metformin 500 mg BID. Continue Metformin      Depression/Insomnia - Patient on Elavil 100 mg QHS and Lexapro 20 mg daily. Increase Elavil to 150 mg. Improved mood, continue Elavil 150 mg daily     Hypothyroidism - Patient on Levothyroxine 88 mcg and Liothyronine 5 mg daily. TSH low on admission but T4 normal     Pain - Patient on PRN Tylenol, Oxycodone, and Ketorolac. In addition on Lidocaine patches. Tylenol scheduled. Continue Toradol 10 mg q6 and Lidocaine.   -Neuropathic pain - limited by allergies to Gabapentin and Lyrica. Continue Toradol and Oxycodone     Skin - Patient at risk for skin breakdown due to debility in areas including sacrum, achilles, elbows and head in addition to other sites. Nursing to assess skin daily.      GI Ppx - Patient on Prilosec for GERD prophylaxis. Patient on Senna-docusate for constipation prophylaxis.      DVT Ppx - Patient Lovenox on transfer. Ambulating > 125 feet, discontinue Lovenox  ____________________________________    T. Mason Lux MD/PhD  [facility] - Physical Medicine & Rehabilitation   [facility] - Brain Injury  Medicine   ____________________________________

## 2024-12-13 NOTE — PROGRESS NOTES
NURSING DAILY NOTE    Name: Yamile Go   Date of Admission: 12/9/2024   Admitting Diagnosis: Lumbosacral radiculopathy due to degenerative joint disease of spine  Attending Physician: LILLY BOYD M.D.  Allergies: Gabapentin, Pregabalin, Amlodipine, Bee, Fentanyl, Morphine, Benzoin, and Rifampin    Safety  Patient Assist  SBA/CGA  Patient Precautions  Fall Risk, Spinal / Back Precautions , Lumbosacral Orthosis  Precaution Comments  LSO when OOB; hx R ankle/shoulder and L hip replacements, hx of multiple falls, neuropathy  Bed Transfer Status  Supervised  Toilet Transfer Status   Supervised  Assistive Devices  Rails, Walker - front wheel  Oxygen  None - Room Air  Diet/Therapeutic Dining  Current Diet Order   Procedures    Diet Order Diet: Cardiac; Second Modifier: (optional): Consistent CHO (Diabetic)     Pill Administration  whole  Agitated Behavioral Scale     ABS Level of Severity       Fall Risk  Has the patient had a fall this admission?   No  May Beard Fall Risk Scoring  13, MODERATE RISK  Fall Risk Safety Measures  bed alarm and chair alarm    Vitals  Temperature: 36.6 °C (97.9 °F)  Temp src: Oral  Pulse: 76  Respiration: 18  Blood Pressure : (!) 148/84 (notified RN Jaclyn)  Blood Pressure MAP (Calculated): 105 MM HG  BP Location: Right, Upper Arm  Patient BP Position: Supine     Oxygen  Pulse Oximetry: 96 %  O2 (LPM): 0  O2 Delivery Device: None - Room Air    Bowel and Bladder  Last Bowel Movement  12/10/24  Stool Type  Type 1: Separate hard lumps (hard to pass)  Bowel Device  Bathroom, Other (Comment) (Bowel Meds)  Continent  Bladder: Continent void   Bowel: Continent movement  Bladder Function  Number of Times Voided: 1  Urinary Options: Yes  Urine Color: Yellow  Genitourinary Assessment   Bladder Assessment (WDL):  Within Defined Limits  Polanco Catheter: Not Applicable  Urine Color: Yellow  Bladder Device: Bathroom  Bladder Scan:  Post Void  $ Bladder Scan Results (mL): 3    Skin  Hermelindo Score   19  Sensory Interventions   Bed Types: Standard/Trauma Mattress  Skin Preventative Measures: Pillows in Use for Support / Positioning  Moisture Interventions         Pain  Pain Rating Scale  8 - Awful, hard to do anything  Pain Location  Back, Hip  Pain Location Orientation  Right  Pain Interventions   Medication (see MAR)    ADLs    Bathing   Patient Refused Bathing (Patient said took shower today, notified RN Jaclyn)  Linen Change   Complete  Personal Hygiene     Chlorhexidine Bath      Oral Care  Brushed Teeth (self)  Teeth/Dentures     Shave     Nutrition Percentage Eaten  *  * Meal *  *, Dinner, Between % Consumed  Environmental Precautions  Treaded Slipper Socks on Patient, Communication Sign for Patients & Families, Mobility Assessed & Appropriate Sign Placed  Patient Turns/Positioning  Patient turns self independently side to side without assistance, to offload sacral area  Patient Turns Assistance/Tolerance     Bed Positions  Bed Controls On, Bed Locked  Head of Bed Elevated  Self regulated      Psychosocial/Neurologic Assessment  Psychosocial Assessment  Psychosocial (WDL):  Within Defined Limits  Patient Behaviors: Fatigue  Neurologic Assessment  Neuro (WDL): Exceptions to WDL  Level of Consciousness: Alert  Orientation Level: Oriented X4  Cognition: Follows commands  Speech: Clear  Muscle Strength Right Arm: Good Strength Against Gravity and Moderate Resistance  Muscle Strength Left Arm: Good Strength Against Gravity and Moderate Resistance  Muscle Strength Right Leg: Fair Strength against Gravity but No Resistance  Muscle Strength Left Leg: Fair Strength against Gravity but No Resistance  EENT (WDL):  WDL Except    Cardio/Pulmonary Assessment  Edema      Respiratory Breath Sounds  RUL Breath Sounds: Clear  RML Breath Sounds: Clear  RLL Breath Sounds: Diminished  JOAQUÍN Breath Sounds: Clear  LLL Breath Sounds: Diminished  Cardiac  Assessment   Cardiac (WDL):  WDL Except (HTN)

## 2024-12-13 NOTE — DIETARY
Nutrition Update: Follow-up for PO intake  Day 4 of admit.  Yamile Go is a 74 y.o. female with admitting DX of Closed compression fracture of body of L1 vertebra (HCC) [S32.010A].    Current Diet: cardiac, consistent CHO (diabetic) diet Recorded PO intake has improved to % of meals.     Based on RD assessment at this time, Unable to fully assess for malnutrition at this time     Problem: Nutritional:  Goal: Achieve adequate nutritional intake  Description: Patient will consume >50% of meals  Outcome: met    RD following weekly or PRN

## 2024-12-14 ENCOUNTER — APPOINTMENT (OUTPATIENT)
Dept: PHYSICAL THERAPY | Facility: REHABILITATION | Age: 74
DRG: 561 | End: 2024-12-14
Attending: PHYSICAL MEDICINE & REHABILITATION
Payer: MEDICARE

## 2024-12-14 PROCEDURE — 700102 HCHG RX REV CODE 250 W/ 637 OVERRIDE(OP): Performed by: PHYSICAL MEDICINE & REHABILITATION

## 2024-12-14 PROCEDURE — 97110 THERAPEUTIC EXERCISES: CPT

## 2024-12-14 PROCEDURE — A9270 NON-COVERED ITEM OR SERVICE: HCPCS | Performed by: PHYSICAL MEDICINE & REHABILITATION

## 2024-12-14 PROCEDURE — 770010 HCHG ROOM/CARE - REHAB SEMI PRIVAT*

## 2024-12-14 PROCEDURE — A9270 NON-COVERED ITEM OR SERVICE: HCPCS | Performed by: STUDENT IN AN ORGANIZED HEALTH CARE EDUCATION/TRAINING PROGRAM

## 2024-12-14 PROCEDURE — 700102 HCHG RX REV CODE 250 W/ 637 OVERRIDE(OP): Performed by: STUDENT IN AN ORGANIZED HEALTH CARE EDUCATION/TRAINING PROGRAM

## 2024-12-14 PROCEDURE — 97116 GAIT TRAINING THERAPY: CPT

## 2024-12-14 PROCEDURE — 700111 HCHG RX REV CODE 636 W/ 250 OVERRIDE (IP): Performed by: PHYSICAL MEDICINE & REHABILITATION

## 2024-12-14 RX ORDER — LOPERAMIDE HYDROCHLORIDE 2 MG/1
2 CAPSULE ORAL 4 TIMES DAILY PRN
Status: DISCONTINUED | OUTPATIENT
Start: 2024-12-14 | End: 2024-12-16 | Stop reason: HOSPADM

## 2024-12-14 RX ADMIN — PREDNISONE 4 MG: 1 TABLET ORAL at 20:27

## 2024-12-14 RX ADMIN — LOPERAMIDE HYDROCHLORIDE 2 MG: 2 CAPSULE ORAL at 21:41

## 2024-12-14 RX ADMIN — ACETAMINOPHEN 1000 MG: 500 TABLET ORAL at 00:15

## 2024-12-14 RX ADMIN — ACETAMINOPHEN 1000 MG: 500 TABLET ORAL at 05:13

## 2024-12-14 RX ADMIN — OMEPRAZOLE 20 MG: 20 CAPSULE, DELAYED RELEASE ORAL at 08:07

## 2024-12-14 RX ADMIN — OMEPRAZOLE 20 MG: 20 CAPSULE, DELAYED RELEASE ORAL at 20:27

## 2024-12-14 RX ADMIN — LEVOTHYROXINE SODIUM 88 MCG: 0.09 TABLET ORAL at 05:13

## 2024-12-14 RX ADMIN — ROSUVASTATIN CALCIUM 20 MG: 10 TABLET, FILM COATED ORAL at 20:26

## 2024-12-14 RX ADMIN — METOPROLOL SUCCINATE 25 MG: 25 TABLET, EXTENDED RELEASE ORAL at 05:13

## 2024-12-14 RX ADMIN — ACETAMINOPHEN 1000 MG: 500 TABLET ORAL at 17:19

## 2024-12-14 RX ADMIN — EZETIMIBE 10 MG: 10 TABLET ORAL at 20:26

## 2024-12-14 RX ADMIN — OXYCODONE HYDROCHLORIDE 15 MG: 10 TABLET ORAL at 00:15

## 2024-12-14 RX ADMIN — ESCITALOPRAM OXALATE 20 MG: 10 TABLET ORAL at 08:07

## 2024-12-14 RX ADMIN — OXYCODONE HYDROCHLORIDE 15 MG: 10 TABLET ORAL at 10:07

## 2024-12-14 RX ADMIN — METFORMIN HYDROCHLORIDE 500 MG: 500 TABLET ORAL at 17:19

## 2024-12-14 RX ADMIN — PREDNISONE 4 MG: 1 TABLET ORAL at 08:08

## 2024-12-14 RX ADMIN — ACETAMINOPHEN 1000 MG: 500 TABLET ORAL at 11:32

## 2024-12-14 RX ADMIN — PREDNISONE 4 MG: 1 TABLET ORAL at 14:52

## 2024-12-14 RX ADMIN — HYDRALAZINE HYDROCHLORIDE 25 MG: 25 TABLET ORAL at 20:37

## 2024-12-14 RX ADMIN — OXYCODONE HYDROCHLORIDE 15 MG: 10 TABLET ORAL at 20:28

## 2024-12-14 RX ADMIN — LIOTHYRONINE SODIUM 5 MCG: 5 TABLET ORAL at 08:07

## 2024-12-14 RX ADMIN — AMITRIPTYLINE HYDROCHLORIDE 150 MG: 50 TABLET, FILM COATED ORAL at 20:27

## 2024-12-14 RX ADMIN — METFORMIN HYDROCHLORIDE 500 MG: 500 TABLET ORAL at 08:07

## 2024-12-14 ASSESSMENT — GAIT ASSESSMENTS
DISTANCE (FEET): 150
GAIT LEVEL OF ASSIST: STANDBY ASSIST
DEVIATION: ANTALGIC
ASSISTIVE DEVICE: 4 WHEEL WALKER

## 2024-12-14 ASSESSMENT — PAIN DESCRIPTION - PAIN TYPE
TYPE: ACUTE PAIN

## 2024-12-14 ASSESSMENT — ACTIVITIES OF DAILY LIVING (ADL)
BED_CHAIR_WHEELCHAIR_TRANSFER_DESCRIPTION: INCREASED TIME;SUPERVISION FOR SAFETY;VERBAL CUEING
TOILET_TRANSFER_DESCRIPTION: GRAB BAR;SUPERVISION FOR SAFETY

## 2024-12-14 NOTE — PROGRESS NOTES
NURSING DAILY NOTE    Name: Yamile Go   Date of Admission: 12/9/2024   Admitting Diagnosis: Lumbosacral radiculopathy due to degenerative joint disease of spine  Attending Physician: LILLY BOYD M.D.  Allergies: Gabapentin, Pregabalin, Amlodipine, Bee, Fentanyl, Morphine, Benzoin, and Rifampin    Safety  Patient Assist  SBA/CGA  Patient Precautions  Fall Risk, Spinal / Back Precautions , Lumbosacral Orthosis  Precaution Comments  LSO when OOB; hx R ankle/shoulder and L hip replacements, hx of multiple falls, neuropathy  Bed Transfer Status  Modified Independent  Toilet Transfer Status   Modified Independent  Assistive Devices  Rails, Walker - front wheel  Oxygen  None - Room Air  Diet/Therapeutic Dining  Current Diet Order   Procedures    Diet Order Diet: Cardiac; Second Modifier: (optional): Consistent CHO (Diabetic)     Pill Administration  whole  Agitated Behavioral Scale     ABS Level of Severity       Fall Risk  Has the patient had a fall this admission?   No  May Beard Fall Risk Scoring  13, MODERATE RISK  Fall Risk Safety Measures  bed alarm and chair alarm    Vitals  Temperature: 36.7 °C (98.1 °F)  Temp src: Oral  Pulse: 68  Respiration: 14  Blood Pressure : (!) 140/65 (Let DEEJAY Correa know)  Blood Pressure MAP (Calculated): 90 MM HG  BP Location: Right, Upper Arm  Patient BP Position: Valenzuela's Position     Oxygen  Pulse Oximetry: 91 %  O2 (LPM): 0  O2 Delivery Device: None - Room Air    Bowel and Bladder  Last Bowel Movement  12/12/24  Stool Type  Patient stated  Bowel Device  Bathroom, Other (Comment) (Bowel Meds)  Continent  Bladder: Continent void   Bowel: Continent movement  Bladder Function  Number of Times Voided: 1  Urinary Options: Yes  Urine Color: Unable To Evaluate  Genitourinary Assessment   Bladder Assessment (WDL):  Within Defined Limits  Polanco Catheter: Not Applicable  Urine Color: Unable To Evaluate  Bladder  Device: Bathroom  Bladder Scan: Post Void  $ Bladder Scan Results (mL): 3    Skin  Hermelindo Score   19  Sensory Interventions   Bed Types: Standard/Trauma Mattress  Skin Preventative Measures: Pillows in Use for Support / Positioning  Moisture Interventions         Pain  Pain Rating Scale  9 - Can't bear the pain, unable to do anything  Pain Location  Back, Groin  Pain Location Orientation  Right, Posterior  Pain Interventions   Medication (see MAR)    ADLs    Bathing   Patient Refused Bathing (Patient said took shower today, notified RN Jaclyn)  Linen Change   Complete  Personal Hygiene     Chlorhexidine Bath      Oral Care  Brushed Teeth (self)  Teeth/Dentures     Shave     Nutrition Percentage Eaten  *  * Meal *  *, Lunch, Between 50-75% Consumed  Environmental Precautions  Treaded Slipper Socks on Patient, Communication Sign for Patients & Families, Mobility Assessed & Appropriate Sign Placed  Patient Turns/Positioning  Patient turns self independently side to side without assistance, to offload sacral area  Patient Turns Assistance/Tolerance     Bed Positions  Bed Controls On, Bed Locked  Head of Bed Elevated  Self regulated      Psychosocial/Neurologic Assessment  Psychosocial Assessment  Psychosocial (WDL):  WDL Except  Patient Behaviors: Fatigue  Neurologic Assessment  Neuro (WDL): Exceptions to WDL  Level of Consciousness: Alert  Orientation Level: Oriented X4  Cognition: Follows commands  Speech: Clear  Muscle Strength Right Arm: Good Strength Against Gravity and Moderate Resistance  Muscle Strength Left Arm: Good Strength Against Gravity and Moderate Resistance  Muscle Strength Right Leg: Fair Strength against Gravity but No Resistance  Muscle Strength Left Leg: Fair Strength against Gravity but No Resistance  EENT (WDL):  WDL Except    Cardio/Pulmonary Assessment  Edema      Respiratory Breath Sounds  RUL Breath Sounds: Clear  RML Breath Sounds: Clear  RLL Breath Sounds: Diminished  JOAQUÍN Breath Sounds:  Clear  LLL Breath Sounds: Diminished  Cardiac Assessment   Cardiac (WDL):  WDL Except (HTN)

## 2024-12-14 NOTE — THERAPY
Physical Therapy   Daily Treatment     Patient Name: Yamile Go  Age:  74 y.o., Sex:  female  Medical Record #: [id number]  Today's Date: 12/13/2024     Precautions  Precautions: Fall Risk, Spinal / Back Precautions , Lumbosacral Orthosis  Comments: LSO when OOB; hx R ankle/shoulder and L hip replacements, hx of multiple falls, neuropathy    Subjective    Patient found seated up on edge of bed, reports significant pain. Agreeable to participate by starting on hot pack to try to bring pain down.      Objective       12/13/24 1415   PT Charge Group   PT Gait Training (Units) 1   PT Therapeutic Exercise (Units) 2   PT Therapeutic Activities (Units) 1   PT Total Time Spent   PT Individual Total Time Spent (Mins) 60   Gait Functional Level of Assist    Gait Level Of Assist Standby Assist   Assistive Device 4 Wheel Walker   Distance (Feet) 150  (plus three bouts of 25 ft)   # of Times Distance was Traveled 2   Deviation Antalgic   Transfer Functional Level of Assist   Bed, Chair, Wheelchair Transfer Modified Independent   Bed Chair Wheelchair Transfer Description Adaptive equipment;Increased time;Supervision for safety;Verbal cueing   Supine Lower Body Exercise   Gluteal Isometrics 2 sets of 10   Other Exercises pelvic tilts 2 sets of 5   Sitting Lower Body Exercises   Nustep Resistance Level 3  (10 mins 254 steps)   Interdisciplinary Plan of Care Collaboration   IDT Collaboration with  Nursing   Patient Position at End of Therapy In Bed;Call Light within Reach;Tray Table within Reach;Phone within Reach   Collaboration Comments pain meds         Assessment    Patient tolerates walking limited household distances, nustep at lighter resistance after moist heat pack application for pain management. Initially on my arrival, she is near tears with obvious grimace, shallow breathing, guarded movement, flushed complexion. She was smiling and conversant by end of session.     Strengths: Able to follow instructions, Alert and  oriented, Effective communication skills, Good insight into deficits/needs, Independent prior level of function, Making steady progress towards goals, Motivated for self care and independence, Pleasant and cooperative, Willingly participates in therapeutic activities  Barriers: Decreased endurance, Impaired activity tolerance, Impaired balance, Pain    Plan    Ambulation w/ 4WW(gait speed) v.s hurry cane  Standing balance, stepping, carrying, and household task simulation  BLE strengthening (LLE weaker than RLE)    DME  PT DME Recommendations  Assistive Device:  (owns 4WW and hurry cane)    Passport items to be completed:  Get in/out of bed safely, in/out of a vehicle, safely use mobility device, walk or wheel around home/community, navigate up and down stairs, show how to get up/down from the ground, ensure home is accessible, demonstrate HEP, complete caregiver training    Physical Therapy Problems (Active)       Problem: Mobility       Dates: Start:  12/10/24         Goal: STG-Within one week, patient will ambulate community distances with LRAD and supervision.       Dates: Start:  12/10/24               Problem: PT-Long Term Goals       Dates: Start:  12/10/24         Goal: LTG-By discharge, patient will ambulate household and community distances with LRAD mod I.       Dates: Start:  12/10/24            Goal: LTG-By discharge, patient will transfer one surface to another with LRAD mod I.       Dates: Start:  12/10/24            Goal: LTG-By discharge, patient will transfer in/out of a car with LRAD mod I.       Dates: Start:  12/10/24

## 2024-12-14 NOTE — CARE PLAN
Problem: Mobility  Goal: STG-Within one week, patient will ambulate community distances with LRAD and supervision.  Outcome: Met     Problem: PT-Long Term Goals  Goal: LTG-By discharge, patient will ambulate household and community distances with LRAD mod I.  Outcome: Met  Goal: LTG-By discharge, patient will transfer one surface to another with LRAD mod I.  Outcome: Met  Goal: LTG-By discharge, patient will transfer in/out of a car with LRAD mod I.  Outcome: Met

## 2024-12-14 NOTE — PROGRESS NOTES
NURSING DAILY NOTE    Name: Yamile Go   Date of Admission: 12/9/2024   Admitting Diagnosis: Lumbosacral radiculopathy due to degenerative joint disease of spine  Attending Physician: LILLY BOYD M.D.  Allergies: Gabapentin, Pregabalin, Amlodipine, Bee, Fentanyl, Morphine, Benzoin, and Rifampin    Safety  Patient Assist  SBA  Patient Precautions  Fall Risk, Spinal / Back Precautions , Lumbosacral Orthosis  Precaution Comments  LSO when OOB; hx R ankle/shoulder and L hip replacements, hx of multiple falls, neuropathy  Bed Transfer Status  Modified Independent  Toilet Transfer Status   Modified Independent  Assistive Devices  Rails, Walker - front wheel  Oxygen  None - Room Air  Diet/Therapeutic Dining  Current Diet Order   Procedures    Diet Order Diet: Cardiac; Second Modifier: (optional): Consistent CHO (Diabetic)     Pill Administration  whole  Agitated Behavioral Scale     ABS Level of Severity       Fall Risk  Has the patient had a fall this admission?   No  May Beard Fall Risk Scoring  13, MODERATE RISK  Fall Risk Safety Measures  bed alarm and chair alarm    Vitals  Temperature: 36.4 °C (97.6 °F)  Temp src: Oral  Pulse: 67  Respiration: 16  Blood Pressure : 133/69  Blood Pressure MAP (Calculated): 90 MM HG  BP Location: Right, Upper Arm  Patient BP Position: Supine     Oxygen  Pulse Oximetry: 93 %  O2 (LPM): 0  O2 Delivery Device: None - Room Air    Bowel and Bladder  Last Bowel Movement  12/13/24  Stool Type  Patient stated  Bowel Device  Bathroom (Bowel Meds)  Continent  Bladder: Continent void   Bowel: Continent movement  Bladder Function  Number of Times Voided: 1  Urinary Options: Yes  Urine Color: Unable To Evaluate  Genitourinary Assessment   Bladder Assessment (WDL):  Within Defined Limits  Polanco Catheter: Not Applicable  Urine Color: Unable To Evaluate  Bladder Device: Bathroom  Bladder Scan: Post Void  $ Bladder Scan  Results (mL): 3    Skin  Hermelindo Score   19  Sensory Interventions   Bed Types: Standard/Trauma Mattress  Skin Preventative Measures: Pillows in Use for Support / Positioning  Moisture Interventions         Pain  Pain Rating Scale  2 - Notice Pain, does not interfere with activities  Pain Location  Back  Pain Location Orientation  Lower  Pain Interventions   Medication (see MAR)    ADLs    Bathing   Patient Refused Bathing (Patient said took shower today, notified RN Jaclyn)  Linen Change   Complete  Personal Hygiene     Chlorhexidine Bath      Oral Care  Brushed Teeth (self)  Teeth/Dentures     Shave     Nutrition Percentage Eaten  *  * Meal *  *, Lunch, Between 50-75% Consumed  Environmental Precautions  Treaded Slipper Socks on Patient, Communication Sign for Patients & Families, Mobility Assessed & Appropriate Sign Placed  Patient Turns/Positioning  Patient turns self independently side to side without assistance, to offload sacral area  Patient Turns Assistance/Tolerance     Bed Positions  Bed Controls On, Bed Locked  Head of Bed Elevated  Self regulated      Psychosocial/Neurologic Assessment  Psychosocial Assessment  Psychosocial (WDL):  WDL Except  Patient Behaviors: Fatigue  Neurologic Assessment  Neuro (WDL): Exceptions to WDL  Level of Consciousness: Alert  Orientation Level: Oriented X4  Cognition: Follows commands  Speech: Clear  Muscle Strength Right Arm: Good Strength Against Gravity and Moderate Resistance  Muscle Strength Left Arm: Good Strength Against Gravity and Moderate Resistance  Muscle Strength Right Leg: Fair Strength against Gravity but No Resistance  Muscle Strength Left Leg: Fair Strength against Gravity but No Resistance  EENT (WDL):  WDL Except    Cardio/Pulmonary Assessment  Edema      Respiratory Breath Sounds  RUL Breath Sounds: Clear  RML Breath Sounds: Clear  RLL Breath Sounds: Diminished  JOAQUÍN Breath Sounds: Clear  LLL Breath Sounds: Diminished  Cardiac Assessment   Cardiac (WDL):   WDL Except (HTN)      \

## 2024-12-15 ENCOUNTER — APPOINTMENT (OUTPATIENT)
Dept: OCCUPATIONAL THERAPY | Facility: REHABILITATION | Age: 74
DRG: 561 | End: 2024-12-15
Attending: PHYSICAL MEDICINE & REHABILITATION
Payer: MEDICARE

## 2024-12-15 PROCEDURE — 770010 HCHG ROOM/CARE - REHAB SEMI PRIVAT*

## 2024-12-15 PROCEDURE — 97530 THERAPEUTIC ACTIVITIES: CPT

## 2024-12-15 PROCEDURE — 700111 HCHG RX REV CODE 636 W/ 250 OVERRIDE (IP): Performed by: PHYSICAL MEDICINE & REHABILITATION

## 2024-12-15 PROCEDURE — 97535 SELF CARE MNGMENT TRAINING: CPT

## 2024-12-15 PROCEDURE — A9270 NON-COVERED ITEM OR SERVICE: HCPCS | Performed by: PHYSICAL MEDICINE & REHABILITATION

## 2024-12-15 PROCEDURE — 700102 HCHG RX REV CODE 250 W/ 637 OVERRIDE(OP): Performed by: PHYSICAL MEDICINE & REHABILITATION

## 2024-12-15 RX ADMIN — EZETIMIBE 10 MG: 10 TABLET ORAL at 20:51

## 2024-12-15 RX ADMIN — METOPROLOL SUCCINATE 25 MG: 25 TABLET, EXTENDED RELEASE ORAL at 05:31

## 2024-12-15 RX ADMIN — ACETAMINOPHEN 1000 MG: 500 TABLET ORAL at 17:03

## 2024-12-15 RX ADMIN — ACETAMINOPHEN 1000 MG: 500 TABLET ORAL at 11:33

## 2024-12-15 RX ADMIN — PREDNISONE 4 MG: 1 TABLET ORAL at 15:24

## 2024-12-15 RX ADMIN — METFORMIN HYDROCHLORIDE 500 MG: 500 TABLET ORAL at 17:04

## 2024-12-15 RX ADMIN — AMITRIPTYLINE HYDROCHLORIDE 150 MG: 50 TABLET, FILM COATED ORAL at 20:51

## 2024-12-15 RX ADMIN — OXYCODONE HYDROCHLORIDE 15 MG: 10 TABLET ORAL at 07:51

## 2024-12-15 RX ADMIN — ACETAMINOPHEN 1000 MG: 500 TABLET ORAL at 05:31

## 2024-12-15 RX ADMIN — OXYCODONE HYDROCHLORIDE 15 MG: 10 TABLET ORAL at 20:52

## 2024-12-15 RX ADMIN — OMEPRAZOLE 20 MG: 20 CAPSULE, DELAYED RELEASE ORAL at 20:52

## 2024-12-15 RX ADMIN — ROSUVASTATIN CALCIUM 20 MG: 10 TABLET, FILM COATED ORAL at 20:51

## 2024-12-15 RX ADMIN — ESCITALOPRAM OXALATE 20 MG: 10 TABLET ORAL at 09:04

## 2024-12-15 RX ADMIN — METFORMIN HYDROCHLORIDE 500 MG: 500 TABLET ORAL at 07:51

## 2024-12-15 RX ADMIN — PREDNISONE 4 MG: 1 TABLET ORAL at 20:52

## 2024-12-15 RX ADMIN — OXYCODONE HYDROCHLORIDE 15 MG: 10 TABLET ORAL at 17:02

## 2024-12-15 RX ADMIN — LIOTHYRONINE SODIUM 5 MCG: 5 TABLET ORAL at 09:04

## 2024-12-15 RX ADMIN — OMEPRAZOLE 20 MG: 20 CAPSULE, DELAYED RELEASE ORAL at 09:04

## 2024-12-15 RX ADMIN — LEVOTHYROXINE SODIUM 88 MCG: 0.09 TABLET ORAL at 05:31

## 2024-12-15 RX ADMIN — PREDNISONE 4 MG: 1 TABLET ORAL at 09:04

## 2024-12-15 ASSESSMENT — BRIEF INTERVIEW FOR MENTAL STATUS (BIMS)
WHAT DAY OF THE WEEK IS IT: CORRECT
WHAT MONTH IS IT: ACCURATE WITHIN 5 DAYS
BIMS SUMMARY SCORE: 14
ASKED TO RECALL BED: YES, NO CUE REQUIRED
ASKED TO RECALL BLUE: YES, NO CUE REQUIRED
ASKED TO RECALL SOCK: YES, AFTER CUEING (SOMETHING TO WEAR")"
INITIAL REPETITION OF BED BLUE SOCK - FIRST ATTEMPT: 3
WHAT YEAR IS IT: CORRECT

## 2024-12-15 ASSESSMENT — ACTIVITIES OF DAILY LIVING (ADL)
TOILETING_LEVEL_OF_ASSIST_DESCRIPTION: GRAB BAR;ADAPTIVE EQUIPMENT
TOILET_TRANSFER_DESCRIPTION: ADAPTIVE EQUIPMENT
BED_CHAIR_WHEELCHAIR_TRANSFER_DESCRIPTION: ADAPTIVE EQUIPMENT

## 2024-12-15 ASSESSMENT — PAIN DESCRIPTION - PAIN TYPE
TYPE: ACUTE PAIN

## 2024-12-15 NOTE — CARE PLAN
The patient is Stable - Low risk of patient condition declining or worsening    Shift Goals  Clinical Goals: safety  Patient Goals: safety, pain management, sleep    Progress made toward(s) clinical / shift goals:  2    Problem: Communication  Goal: The ability to communicate needs accurately and effectively will improve  Outcome: Progressing: Pt is oriented x4 and able to accurately and effectively communicate needs. Using call light appropriately for assist with transfers and personal needs.      Problem: Mobility  Goal: Patient's capacity to carry out activities will improve  Outcome: Progressing: OOB for bathroom, participating in therapies.

## 2024-12-15 NOTE — THERAPY
Occupational Therapy  Daily Treatment     Patient Name: Yamile Go  Age:  74 y.o., Sex:  female  Medical Record #: [id number]  Today's Date: 12/15/2024     Precautions  Precautions: Fall Risk, Spinal / Back Precautions , Lumbosacral Orthosis  Comments: LSO when OOB; hx R ankle/shoulder and L hip replacements, hx of multiple falls, neuropathy         Subjective    Found resting in bed and agreeable to OT session.     Objective     12/15/24 1331   OT Charge Group   OT Self Care / ADL (Units) 2   OT Therapy Activity (Units) 2   OT Total Time Spent   OT Individual Total Time Spent (Mins) 60   Precautions   Precautions Fall Risk;Spinal / Back Precautions ;Lumbosacral Orthosis   Comments LSO when OOB; hx R ankle/shoulder and L hip replacements, hx of multiple falls, neuropathy   Functional Level of Assist   Grooming Independent   Bathing Modified Independent   Bathing Description Grab bar;Hand held shower;Tub bench   Upper Body Dressing Independent   Lower Body Dressing Modified Independent   Lower Body Dressing Description Assistive devices   Toileting Independent   Toileting Description Grab bar;Adaptive equipment   Bed, Chair, Wheelchair Transfer Modified Independent   Bed Chair Wheelchair Transfer Description Adaptive equipment  (Rollator)   Toilet Transfers Modified Independent   Toilet Transfer Description Adaptive equipment  (Rollator)   Tub / Shower Transfers Modified Independent   Tub Shower Transfer Description Adaptive equipment;Shower bench;Grab bar  (Rollator; stand ambulate to shower stall)   Sitting Upper Body Exercises   Upper Extremity Bike Level 3 Resistance   Comments NuStep 10 mins without rest break   Balance   Sitting Balance (Static) Normal   Sitting Balance (Dynamic) Normal   Standing Balance (Static) Good   Standing Balance (Dynamic) Good   Weight Shift Sitting Good   Weight Shift Standing Good   Bed Mobility    Supine to Sit Independent   Sit to Supine Independent   Scooting Independent  "  Rolling Independent   Interdisciplinary Plan of Care Collaboration   IDT Collaboration with  Therapy Tech   Collaboration Comments Reschedulingshower per pt preference   Eating   Assistance Needed Independent   CARE Score - Eating 6   Oral Hygiene   Assistance Needed Independent   CARE Score - Oral Hygiene 6   Toileting Hygiene   Assistance Needed Independent   CARE Score - Toileting Hygiene 6   Shower/Bathe Self   Assistance Needed Independent;Adaptive equipment   CARE Score - Shower/Bathe Self 6   Upper Body Dressing   Assistance Needed Independent;Adaptive equipment   CARE Score - Upper Body Dressing 6   Lower Body Dressing   Assistance Needed Independent;Adaptive equipment   CARE Score - Lower Body Dressing 6   Putting On/Taking Off Footwear   Assistance Needed Independent;Adaptive equipment   CARE Score - Putting On/Taking Off Footwear 6   Toilet Transfer   Assistance Needed Independent;Adaptive equipment   CARE Score - Toilet Transfer 6   Cognitive Pattern Assessment   Cognitive Pattern Assessment Used BIMS   Brief Interview for Mental Status (BIMS)   Repetition of Three Words (First Attempt) 3   Temporal Orientation: Year Correct   Temporal Orientation: Month Accurate within 5 days   Temporal Orientation: Day Correct   Recall: \"Sock\" Yes, after cueing (\"something to wear\")   Recall: \"Blue\" Yes, no cue required   Recall: \"Bed\" Yes, no cue required   BIMS Summary Score 14     Participated in DC IRF Lolly and endurance exercise to support activity tolerance as described above. Demo'd good management/use of rollator and able to don/doff LSO independently throughout session.    Assessment    Good response to OT intervention with safe performance of ADLs and functional transfers as described above.     Strengths: Able to follow instructions, Adequate strength, Alert and oriented, Effective communication skills, Independent prior level of function, Manages pain appropriately, Motivated for self care and " independence, Pleasant and cooperative, Supportive family, Willingly participates in therapeutic activities    Barriers: Decreased endurance, Fatigue, Generalized weakness, Impaired balance, Limited mobility, Pain (spinal precautions)    Plan    D/C home Monday      DME  OT DME Recommendations  Bathroom Equipment:  (recommend grab bars in tub/shower and by toilets, but patient states apartment management will not put them in for her)    Occupational Therapy Goals (Active)       Problem: OT Long Term Goals       Dates: Start:  12/10/24         Goal: LTG-By discharge, patient will complete basic self care tasks with supervision/mod I       Dates: Start:  12/10/24            Goal: LTG-By discharge, patient will perform bathroom transfers with supervision/mod I       Dates: Start:  12/10/24            Goal: LTG-By discharge, patient will complete basic home management with supervision/mod I       Dates: Start:  12/10/24

## 2024-12-15 NOTE — CARE PLAN
"The patient is Stable - Low risk of patient condition declining or worsening    Shift Goals  Clinical Goals: safety  Patient Goals: safety, pain management, sleep    Problem: Fall Risk - Rehab  Goal: Patient will remain free from falls  Outcome: Progressing  Note: May Beard Fall risk Assessment Score: 13    Moderate fall risk Interventions  - Bed and strip alarm   - Yellow sign by the door   - Yellow wrist band \"Fall risk\"  - Room near to the nurse station  - Do not leave patient unattended in the bathroom  - Fall risk education provided         Pt using call light appropriately when in need of assistance.          Problem: Pain - Standard  Goal: Alleviation of pain or a reduction in pain to the patient’s comfort goal  Outcome: Progressing     "

## 2024-12-15 NOTE — PROGRESS NOTES
NURSING DAILY NOTE    Name: Yamile Go   Date of Admission: 12/9/2024   Admitting Diagnosis: Lumbosacral radiculopathy due to degenerative joint disease of spine  Attending Physician: LILLY BOYD M.D.  Allergies: Gabapentin, Pregabalin, Amlodipine, Bee, Fentanyl, Morphine, Benzoin, and Rifampin    Safety  Patient Assist  sba  Patient Precautions  Fall Risk, Spinal / Back Precautions , Lumbosacral Orthosis  Precaution Comments  LSO when OOB; hx R ankle/shoulder and L hip replacements, hx of multiple falls, neuropathy  Bed Transfer Status  Modified Independent  Toilet Transfer Status   Modified Independent  Assistive Devices  Rails, Walker - front wheel  Oxygen  None - Room Air  Diet/Therapeutic Dining  Current Diet Order   Procedures    Diet Order Diet: Cardiac; Second Modifier: (optional): Consistent CHO (Diabetic)     Pill Administration  whole  Agitated Behavioral Scale     ABS Level of Severity       Fall Risk  Has the patient had a fall this admission?   No  May Beard Fall Risk Scoring  13, MODERATE RISK  Fall Risk Safety Measures  bed alarm and chair alarm    Vitals  Temperature: 36.4 °C (97.5 °F)  Temp src: Oral  Pulse: 74  Respiration: 16  Blood Pressure : 102/58  Blood Pressure MAP (Calculated): 73 MM HG  BP Location: Left, Upper Arm  Patient BP Position: Lying Right Side     Oxygen  Pulse Oximetry: 95 %  O2 (LPM): 0  O2 Delivery Device: None - Room Air    Bowel and Bladder  Last Bowel Movement  12/14/24  Stool Type  Patient stated (Patient stated that she had x2 loose BM today)  Bowel Device  Bathroom  Continent  Bladder: Continent void   Bowel: Continent movement  Bladder Function  Urine Void (mL):  (large void)  Number of Times Voided: 1  Urinary Options: Yes  Urine Color: Yellow  Genitourinary Assessment   Bladder Assessment (WDL):  Within Defined Limits  Polanco Catheter: Not Applicable  Urine Color: Yellow  Bladder Device:  Bathroom  Time Void: Yes  Bladder Scan: Post Void  $ Bladder Scan Results (mL): 1    Skin  Hermelindo Score   19  Sensory Interventions   Bed Types: Standard/Trauma Mattress  Skin Preventative Measures: Pillows in Use for Support / Positioning  Moisture Interventions         Pain  Pain Rating Scale  1 - Hardly Notice Pain  Pain Location  Hip  Pain Location Orientation  Right  Pain Interventions   Medication (see MAR)    ADLs    Bathing   Patient Refused Bathing (Patient is going to have a shower tomorrow morning)  Linen Change   Complete  Personal Hygiene     Chlorhexidine Bath      Oral Care  Brushed Teeth (self)  Teeth/Dentures     Shave     Nutrition Percentage Eaten  *  * Meal *  *, Lunch, Between 50-75% Consumed  Environmental Precautions  Treaded Slipper Socks on Patient  Patient Turns/Positioning  Patient turns self independently side to side without assistance, to offload sacral area  Patient Turns Assistance/Tolerance     Bed Positions  Bed Controls On  Head of Bed Elevated  Self regulated      Psychosocial/Neurologic Assessment  Psychosocial Assessment  Psychosocial (WDL):  WDL Except  Patient Behaviors: Fatigue  Neurologic Assessment  Neuro (WDL): Exceptions to WDL  Level of Consciousness: Alert  Orientation Level: Oriented X4  Cognition: Follows commands  Speech: Clear  Muscle Strength Right Arm: Good Strength Against Gravity and Moderate Resistance  Muscle Strength Left Arm: Good Strength Against Gravity and Moderate Resistance  Muscle Strength Right Leg: Good Strength Against Gravity and Moderate Resistance  Muscle Strength Left Leg: Good Strength Against Gravity and Moderate Resistance  EENT (WDL):  WDL Except    Cardio/Pulmonary Assessment  Edema      Respiratory Breath Sounds  RUL Breath Sounds: Clear  RML Breath Sounds: Clear  RLL Breath Sounds: Clear  JOAQUÍN Breath Sounds: Clear  LLL Breath Sounds: Clear  Cardiac Assessment   Cardiac (WDL):  WDL Except (HTN)

## 2024-12-15 NOTE — CARE PLAN
The patient is Stable - Low risk of patient condition declining or worsening    Shift Goals  Clinical Goals: safety  Patient Goals: safety, pain management, sleep    Progress made toward(s) clinical / shift goals:  2    Problem: Knowledge Deficit - Standard  Goal: Patient and family/care givers will demonstrate understanding of plan of care, disease process/condition, diagnostic tests and medications  Outcome: Progressing: Pt verbalizes understanding of POC     Problem: Fall Risk - Rehab  Goal: Patient will remain free from falls  Outcome: Progressing; using michell light appropriately for assist with transfers and personal needs. Has not been impulsive today. Remains free from falls.

## 2024-12-15 NOTE — PROGRESS NOTES
2032 Patient complaint that she had x2 loose BM but not watery and needed some medications. Patient verbalize that she had a history of an Irritable bowel syndrome and usually take Loperamide at home for her diarrhea.  Informed Dr. Man and ordered Loperamide 2 mg PO 4 x daily as needed for diarrhea.  Carried out new order.  Given a dose tonight with help per patient when recheck around 2234.  Continue on close monitoring.

## 2024-12-15 NOTE — PROGRESS NOTES
NURSING DAILY NOTE    Name: Yamile Go   Date of Admission: 12/9/2024   Admitting Diagnosis: Lumbosacral radiculopathy due to degenerative joint disease of spine  Attending Physician: LILLY BOYD M.D.  Allergies: Gabapentin, Pregabalin, Amlodipine, Bee, Fentanyl, Morphine, Benzoin, and Rifampin    Safety  Patient Assist  sba  Patient Precautions  Fall Risk, Spinal / Back Precautions , Lumbosacral Orthosis  Precaution Comments  LSO when OOB; hx R ankle/shoulder and L hip replacements, hx of multiple falls, neuropathy  Bed Transfer Status  Modified Independent  Toilet Transfer Status   Modified Independent  Assistive Devices  Rails, Walker - front wheel  Oxygen  None - Room Air  Diet/Therapeutic Dining  Current Diet Order   Procedures    Diet Order Diet: Cardiac; Second Modifier: (optional): Consistent CHO (Diabetic)     Pill Administration  whole  Agitated Behavioral Scale     ABS Level of Severity       Fall Risk  Has the patient had a fall this admission?   No  May Beard Fall Risk Scoring  20, HIGH RISK  Fall Risk Safety Measures  bed alarm and chair alarm    Vitals  Temperature: 37 °C (98.6 °F)  Temp src: Oral  Pulse: 74  Respiration: 18  Blood Pressure : 115/60  Blood Pressure MAP (Calculated): 78 MM HG  BP Location: Right, Upper Arm  Patient BP Position: Valenzuela's Position     Oxygen  Pulse Oximetry: 95 %  O2 (LPM): 0  O2 Delivery Device: None - Room Air    Bowel and Bladder  Last Bowel Movement  12/13/24  Stool Type  Patient stated  Bowel Device  Bathroom  Continent  Bladder: Continent void   Bowel: Continent movement  Bladder Function  Number of Times Voided: 1  Urinary Options: Yes  Urine Color: Unable To Evaluate  Genitourinary Assessment   Bladder Assessment (WDL):  Within Defined Limits  Polanco Catheter: Not Applicable  Urine Color: Unable To Evaluate  Bladder Device: Bathroom  Time Void: Yes  Bladder Scan: Post Void  $ Bladder  Scan Results (mL): 1    Skin  Hermelindo Score   19  Sensory Interventions   Bed Types: Standard/Trauma Mattress  Skin Preventative Measures: Pillows in Use for Support / Positioning  Moisture Interventions         Pain  Pain Rating Scale  5 - Interrupts some activities  Pain Location  Back, Groin  Pain Location Orientation  Lower, Right  Pain Interventions   Medication (see MAR), Food, Repositioned    ADLs    Bathing   Patient Refused Bathing (Patient said took shower today, notified RN Jaclyn)  Linen Change   Complete  Personal Hygiene     Chlorhexidine Bath      Oral Care  Brushed Teeth (self)  Teeth/Dentures     Shave     Nutrition Percentage Eaten  *  * Meal *  *, Lunch, Between 50-75% Consumed  Environmental Precautions  Treaded Slipper Socks on Patient  Patient Turns/Positioning  Patient turns self independently side to side without assistance, to offload sacral area  Patient Turns Assistance/Tolerance     Bed Positions  Bed Controls On  Head of Bed Elevated  Self regulated      Psychosocial/Neurologic Assessment  Psychosocial Assessment  Psychosocial (WDL):  WDL Except  Patient Behaviors: Fatigue  Neurologic Assessment  Neuro (WDL): Exceptions to WDL  Level of Consciousness: Alert  Orientation Level: Oriented X4  Cognition: Follows commands  Speech: Clear  Muscle Strength Right Arm: Good Strength Against Gravity and Moderate Resistance  Muscle Strength Left Arm: Good Strength Against Gravity and Moderate Resistance  Muscle Strength Right Leg: Good Strength Against Gravity and Moderate Resistance  Muscle Strength Left Leg: Good Strength Against Gravity and Moderate Resistance  EENT (WDL):  WDL Except    Cardio/Pulmonary Assessment  Edema      Respiratory Breath Sounds  RUL Breath Sounds: Clear  RML Breath Sounds: Clear  RLL Breath Sounds: Clear  JOAQUÍN Breath Sounds: Clear  LLL Breath Sounds: Clear  Cardiac Assessment   Cardiac (WDL):  WDL Except (HTN)

## 2024-12-16 VITALS
SYSTOLIC BLOOD PRESSURE: 104 MMHG | BODY MASS INDEX: 28.4 KG/M2 | WEIGHT: 154.32 LBS | OXYGEN SATURATION: 94 % | HEART RATE: 73 BPM | RESPIRATION RATE: 16 BRPM | DIASTOLIC BLOOD PRESSURE: 68 MMHG | TEMPERATURE: 98.7 F | HEIGHT: 62 IN

## 2024-12-16 PROCEDURE — 700111 HCHG RX REV CODE 636 W/ 250 OVERRIDE (IP): Performed by: PHYSICAL MEDICINE & REHABILITATION

## 2024-12-16 PROCEDURE — 99239 HOSP IP/OBS DSCHRG MGMT >30: CPT | Performed by: PHYSICAL MEDICINE & REHABILITATION

## 2024-12-16 PROCEDURE — 700102 HCHG RX REV CODE 250 W/ 637 OVERRIDE(OP): Performed by: PHYSICAL MEDICINE & REHABILITATION

## 2024-12-16 PROCEDURE — A9270 NON-COVERED ITEM OR SERVICE: HCPCS | Performed by: PHYSICAL MEDICINE & REHABILITATION

## 2024-12-16 RX ORDER — ESCITALOPRAM OXALATE 20 MG/1
20 TABLET ORAL DAILY
Qty: 90 TABLET | Refills: 2 | Status: SHIPPED | OUTPATIENT
Start: 2024-12-16

## 2024-12-16 RX ORDER — METOPROLOL SUCCINATE 25 MG/1
25 TABLET, EXTENDED RELEASE ORAL DAILY
Qty: 90 TABLET | Refills: 2 | Status: SHIPPED | OUTPATIENT
Start: 2024-12-16

## 2024-12-16 RX ORDER — AMITRIPTYLINE HYDROCHLORIDE 100 MG/1
150 TABLET ORAL NIGHTLY
Qty: 90 TABLET | Refills: 2 | Status: SHIPPED | OUTPATIENT
Start: 2024-12-16

## 2024-12-16 RX ORDER — METHOCARBAMOL 500 MG/1
500 TABLET, FILM COATED ORAL 3 TIMES DAILY PRN
Qty: 30 TABLET | Refills: 2 | Status: SHIPPED | OUTPATIENT
Start: 2024-12-16

## 2024-12-16 RX ORDER — EZETIMIBE 10 MG/1
10 TABLET ORAL EVERY EVENING
Qty: 90 TABLET | Refills: 2 | Status: SHIPPED | OUTPATIENT
Start: 2024-12-16

## 2024-12-16 RX ORDER — OXYCODONE HYDROCHLORIDE 10 MG/1
10-15 TABLET ORAL EVERY 6 HOURS PRN
Qty: 42 TABLET | Refills: 0 | Status: SHIPPED | OUTPATIENT
Start: 2024-12-16 | End: 2024-12-30

## 2024-12-16 RX ORDER — PREDNISONE 1 MG/1
4 TABLET ORAL 2 TIMES DAILY
Qty: 24 TABLET | Refills: 0 | Status: SHIPPED | OUTPATIENT
Start: 2024-12-16

## 2024-12-16 RX ORDER — LEVOTHYROXINE SODIUM 88 MCG
88 TABLET ORAL DAILY
Qty: 90 TABLET | Refills: 2 | Status: SHIPPED | OUTPATIENT
Start: 2024-12-16

## 2024-12-16 RX ORDER — LIOTHYRONINE SODIUM 5 UG/1
5 TABLET ORAL DAILY
Qty: 90 TABLET | Refills: 2 | Status: SHIPPED | OUTPATIENT
Start: 2024-12-16

## 2024-12-16 RX ORDER — ALBUTEROL SULFATE 90 UG/1
1-2 INHALANT RESPIRATORY (INHALATION) EVERY 4 HOURS PRN
Qty: 8.5 G | Refills: 2 | Status: SHIPPED | OUTPATIENT
Start: 2024-12-16

## 2024-12-16 RX ORDER — ROSUVASTATIN CALCIUM 20 MG/1
20 TABLET, COATED ORAL EVERY EVENING
Qty: 90 TABLET | Refills: 2 | Status: SHIPPED | OUTPATIENT
Start: 2024-12-16

## 2024-12-16 RX ADMIN — OXYCODONE HYDROCHLORIDE 15 MG: 10 TABLET ORAL at 05:58

## 2024-12-16 RX ADMIN — OMEPRAZOLE 20 MG: 20 CAPSULE, DELAYED RELEASE ORAL at 08:03

## 2024-12-16 RX ADMIN — METFORMIN HYDROCHLORIDE 500 MG: 500 TABLET ORAL at 08:03

## 2024-12-16 RX ADMIN — LEVOTHYROXINE SODIUM 88 MCG: 0.09 TABLET ORAL at 05:11

## 2024-12-16 RX ADMIN — ESCITALOPRAM OXALATE 20 MG: 10 TABLET ORAL at 08:03

## 2024-12-16 RX ADMIN — ACETAMINOPHEN 1000 MG: 500 TABLET ORAL at 05:10

## 2024-12-16 RX ADMIN — METOPROLOL SUCCINATE 25 MG: 25 TABLET, EXTENDED RELEASE ORAL at 05:10

## 2024-12-16 RX ADMIN — PREDNISONE 4 MG: 1 TABLET ORAL at 08:03

## 2024-12-16 RX ADMIN — LIOTHYRONINE SODIUM 5 MCG: 5 TABLET ORAL at 08:03

## 2024-12-16 ASSESSMENT — PAIN DESCRIPTION - PAIN TYPE: TYPE: ACUTE PAIN

## 2024-12-16 ASSESSMENT — PATIENT HEALTH QUESTIONNAIRE - PHQ9
6. FEELING BAD ABOUT YOURSELF - OR THAT YOU ARE A FAILURE OR HAVE LET YOURSELF OR YOUR FAMILY DOWN: SEVERAL DAYS
SUM OF ALL RESPONSES TO PHQ9 QUESTIONS 1 AND 2: 2
9. THOUGHTS THAT YOU WOULD BE BETTER OFF DEAD, OR OF HURTING YOURSELF: NOT AT ALL
3. TROUBLE FALLING OR STAYING ASLEEP OR SLEEPING TOO MUCH: SEVERAL DAYS
SUM OF ALL RESPONSES TO PHQ QUESTIONS 1-9: 7
7. TROUBLE CONCENTRATING ON THINGS, SUCH AS READING THE NEWSPAPER OR WATCHING TELEVISION: NOT AT ALL
2. FEELING DOWN, DEPRESSED, IRRITABLE, OR HOPELESS: SEVERAL DAYS
5. POOR APPETITE OR OVEREATING: SEVERAL DAYS
4. FEELING TIRED OR HAVING LITTLE ENERGY: SEVERAL DAYS
8. MOVING OR SPEAKING SO SLOWLY THAT OTHER PEOPLE COULD HAVE NOTICED. OR THE OPPOSITE, BEING SO FIGETY OR RESTLESS THAT YOU HAVE BEEN MOVING AROUND A LOT MORE THAN USUAL: SEVERAL DAYS
1. LITTLE INTEREST OR PLEASURE IN DOING THINGS: SEVERAL DAYS

## 2024-12-16 NOTE — CARE PLAN
The patient is Stable - Low risk of patient condition declining or worsening    Shift Goals  Clinical Goals: safety  Patient Goals: safety,pain management, sleep    Problem: Fall Risk - Rehab  Goal: Patient will remain free from falls  Outcome: Progressing     Problem: Pain - Standard  Goal: Alleviation of pain or a reduction in pain to the patient’s comfort goal  Outcome: Progressing

## 2024-12-16 NOTE — DISCHARGE PLANNING
Case management  Reviewed signed copy of IMM and answered all questions.    Dc date /disposition: 12/16/24 Home with home health.

## 2024-12-16 NOTE — DISCHARGE SUMMARY
Physical Medicine & Rehabilitation Discharge Summary    Admission Date: 12/9/2024    Discharge Date: 12/16/2024    Attending Provider: Jess Lux MD/PhD    Admission Diagnosis:   Active Hospital Problems    Diagnosis     *Lumbosacral radiculopathy due to degenerative joint disease of spine     Closed compression fracture of body of L1 vertebra (HCC)     Compression fracture of L1 lumbar vertebra, sequela     Intractable low back pain     Acquired hypothyroidism     Depression     CAD (coronary artery disease)        Discharge Diagnosis:  Active Hospital Problems    Diagnosis     *Lumbosacral radiculopathy due to degenerative joint disease of spine     Closed compression fracture of body of L1 vertebra (HCC)     Compression fracture of L1 lumbar vertebra, sequela     Intractable low back pain     Acquired hypothyroidism     Depression     CAD (coronary artery disease)        HPI per Admission History & Physical:  Patient is a 74 y.o. female with a PMH of hypothyroidism, GERD, and lumbar stenosis who had GLF and presented to [facility] on 12/6/24. She has a history of compression fractures. She had repeat imaging and MRI showed new L1 compression fracture. NSG was consulted and recommended conservative management with prednisone and Ketorolac. She was also recommended to have LSO when OOB.     Patient was admitted to [facility] on 12/9/2024.     Hospital Course by Problem List:  Lumbar radiculopathy - Patient with new GLF found to have L1 compression fracture on MRI. Neurosurgery was consulted and recommended conservative management  -PT and OT for mobility and ADLs. Per guidelines, 15 hours per week between PT, OT and/or SLP.  -Follow-up NSG. Continue Ketorolac 10 mg q6 and prednisone. Will reduce prednisone to q8     HTN - Patient on Metoprolol 25 mg XL     HLD - Patient on Rosuvastatin 20 mg daily     Anemia - Check AM CBC - 10.1, will monitor     Obesity due to excess calories - BMI  of 31.0 on admission, meets medical criteria. Dietitian to consult     GERD - patient on prilosec BID     DM2 with hyperglycemia - Previously on metformin. On SSI on transfer. Will check A1c - 6.9, discontinue SSI. Restart Metformin 500 mg BID. Continue Metformin      Depression/Insomnia - Patient on Elavil 100 mg QHS and Lexapro 20 mg daily. Increase Elavil to 150 mg. Improved mood, continue Elavil 150 mg daily     Hypothyroidism - Patient on Levothyroxine 88 mcg and Liothyronine 5 mg daily. TSH low on admission but T4 normal     Pain - Patient on PRN Tylenol, Oxycodone, and Ketorolac. In addition on Lidocaine patches. Tylenol scheduled. Continue Toradol 10 mg q6 and Lidocaine.   -Neuropathic pain - limited by allergies to Gabapentin and Lyrica. Continue Toradol and Oxycodone     Skin - Patient at risk for skin breakdown due to debility in areas including sacrum, achilles, elbows and head in addition to other sites. Nursing to assess skin daily.      GI Ppx - Patient on Prilosec for GERD prophylaxis. Patient on Senna-docusate for constipation prophylaxis.      DVT Ppx - Patient Lovenox on transfer. Ambulating > 125 feet, discontinue Lovenox    Functional Status at Discharge  Eating:  Independent  Eating Description:     Grooming:  Independent  Grooming Description:  Supervision for safety, Standing at sink (to brush hair and teeth)  Bathing:  Modified Independent  Bathing Description:  Grab bar, Hand held shower, Tub bench  Upper Body Dressing:  Independent  Upper Body Dressing Description:   (to doff LSO)  Lower Body Dressing:  Modified Independent  Lower Body Dressing Description:  Assistive devices     Walk:  Standby Assist  Distance Walked:  150  Number of Times Distance Was Traveled:  2  Assistive Device:  4 Wheel Walker  Gait Deviation:  Antalgic  Wheelchair:     Distance Propelled:      Wheelchair Description:     Stairs Contact Guard Assist  Stairs Description Extra time, Hand rails, Supervision for safety,  Verbal cueing, Limited by fatigue  Discharge Location: Home  Patient Discharging with Assist of: No One, Patient will be Alone  Level of Supervision Required Upon Discharge: No Supervision  Recommended Equipment for Discharge: 4-Wheeled Walker;Shower Chair;Grab Bars by Toilet  Recommeded Services Upon Discharge: Home Health Physical Therapy  Criteria for Termination of Services: Maximum Function Achieved for Inpatient Rehabilitation  Comprehension:     Comprehension Description:     Expression:     Expression Description:     Social Interaction:     Social Interaction Description:     Problem Solving:     Problem Solving Description:     Memory:     Memory Description:          Jess TIDWELL M.D., personally performed a complete drug regimen review and no potential clinically significant medication issues were identified.   Discharge Medication:     Medication List        START taking these medications        Instructions   oxyCODONE immediate release 10 MG immediate release tablet  Commonly known as: Roxicodone   Take 1-1.5 Tablets by mouth every 6 hours as needed for Severe Pain for up to 14 days. Indications: Acute Pain  Dose: 10-15 mg            CHANGE how you take these medications        Instructions   amitriptyline 100 MG Tabs  What changed: how much to take  Commonly known as: Elavil   Take 1.5 Tablets by mouth every evening.  Dose: 150 mg     predniSONE 1 MG Tabs  What changed: when to take this  Commonly known as: Deltasone   Take 4 Tablets by mouth 2 times a day.  Dose: 4 mg            CONTINUE taking these medications        Instructions   albuterol 108 (90 Base) MCG/ACT Aers inhalation aerosol   Inhale 1-2 Puffs every four hours as needed for Shortness of Breath.  Dose: 1-2 Puff     diclofenac sodium 1 % Gel  Commonly known as: Voltaren   Apply 4 g topically 4 times a day as needed (back pain).  Dose: 4 g     escitalopram 20 MG tablet  Commonly known as: Lexapro   Take 1 Tablet by mouth  every day.  Dose: 20 mg     ezetimibe 10 MG Tabs  Commonly known as: Zetia   Take 1 Tablet by mouth every evening.  Dose: 10 mg     liothyronine 5 MCG Tabs  Commonly known as: Cytomel   Take 1 Tablet by mouth every day.  Dose: 5 mcg     metFORMIN 500 MG Tabs  Commonly known as: Glucophage   Take 1 Tablet by mouth 2 times a day with meals. Indications: Type 2 Diabetes  Dose: 500 mg     methocarbamol 500 MG Tabs  Commonly known as: Robaxin   Take 1 Tablet by mouth 3 times a day as needed (Hold for respiratory depression, respiratory rate <12, heart rate less than 65, lethargy,somnolence, or systolic blood pressure < 105.).  Dose: 500 mg     metoprolol SR 25 MG Tb24  Commonly known as: Toprol XL   Take 1 Tablet by mouth every day.  Dose: 25 mg     omeprazole 20 MG delayed-release capsule  Commonly known as: PriLOSEC   Take 1 Capsule by mouth 2 times a day.  Dose: 20 mg     rosuvastatin 20 MG Tabs  Commonly known as: Crestor   Take 1 Tablet by mouth every evening.  Dose: 20 mg     Synthroid 88 MCG Tabs  Generic drug: levothyroxine   Take 1 Tablet by mouth every day.  Dose: 88 mcg            STOP taking these medications      ketorolac 10 MG Tabs  Commonly known as: Toradol     lidocaine 4 % Ptch  Commonly known as: Asperflex     lisinopril 2.5 MG Tabs  Commonly known as: Prinivil     ondansetron 8 MG Tbdp  Commonly known as: Zofran ODT     sumatriptan 100 MG tablet  Commonly known as: Imitrex              Discharge Diet:  Current Diet Order   Procedures    Diet Order Diet: Cardiac; Second Modifier: (optional): Consistent CHO (Diabetic)       Discharge Activity:  As tolerated     Disposition:  Patient to discharge home with family support and community resources.    Equipment:  LSO    Follow-up & Discharge Instructions:  Follow up with your primary care provider (PCP) within 7-10 days of discharge to review your medications and take over your care.     If you develop chest pain, fever, chills, change in neurologic  function (weakness, sensation changes, vision changes), or other concerning sxs, seek immediate medical attention or call 911.      No future appointments.    Condition on Discharge:  Good    More than 32 minutes was spent on discharging this patient, including face-to-face time, prescription management, and the dictation of this note.    Jess Lux M.D.    Date of Service: 12/16/2024

## 2024-12-16 NOTE — PROGRESS NOTES
NURSING DAILY NOTE    Name: Yamile Go   Date of Admission: 12/9/2024   Admitting Diagnosis: Lumbosacral radiculopathy due to degenerative joint disease of spine  Attending Physician: LILLY BOYD M.D.  Allergies: Gabapentin, Pregabalin, Amlodipine, Bee, Fentanyl, Morphine, Benzoin, and Rifampin    Safety  Patient Assist  st by  Patient Precautions  Fall Risk, Spinal / Back Precautions , Lumbosacral Orthosis  Precaution Comments  LSO when OOB; hx R ankle/shoulder and L hip replacements, hx of multiple falls, neuropathy  Bed Transfer Status  Modified Independent  Toilet Transfer Status   Modified Independent  Assistive Devices  Walker - four wheel  Oxygen  None - Room Air  Diet/Therapeutic Dining  Current Diet Order   Procedures    Diet Order Diet: Cardiac; Second Modifier: (optional): Consistent CHO (Diabetic)     Pill Administration  whole  Agitated Behavioral Scale     ABS Level of Severity       Fall Risk  Has the patient had a fall this admission?   No  May Beard Fall Risk Scoring  13, MODERATE RISK  Fall Risk Safety Measures  bed alarm and chair alarm    Vitals  Temperature: 36.2 °C (97.1 °F)  Temp src: Temporal  Pulse: 73  Respiration: 18  Blood Pressure : 127/73  Blood Pressure MAP (Calculated): 91 MM HG  BP Location: Right, Upper Arm  Patient BP Position: Supine     Oxygen  Pulse Oximetry: 95 %  O2 (LPM): 0  O2 Delivery Device: None - Room Air    Bowel and Bladder  Last Bowel Movement  12/14/24  Stool Type  Patient stated (Patient stated that she had x2 loose BM today)  Bowel Device  Bathroom  Continent  Bladder: Continent void   Bowel: Continent movement  Bladder Function  Urine Void (mL):  (large void)  Number of Times Voided: 1  Urinary Options: Yes  Urine Color: Yellow  Genitourinary Assessment   Bladder Assessment (WDL):  Within Defined Limits  Polanco Catheter: Not Applicable  Urine Color: Yellow  Bladder Device: Bathroom  Time  Void: Yes  Bladder Scan: Post Void  $ Bladder Scan Results (mL): 1    Skin  Hermelindo Score   18  Sensory Interventions   Bed Types: Standard/Trauma Mattress  Skin Preventative Measures: Pillows in Use for Support / Positioning  Moisture Interventions         Pain  Pain Rating Scale  0 - No Pain  Pain Location  Back, Hip  Pain Location Orientation  Right, Lower  Pain Interventions   Medication (see MAR)    ADLs    Bathing   Patient Refused Bathing (Patient is going to have a shower tomorrow morning)  Linen Change   Partial  Personal Hygiene     Chlorhexidine Bath      Oral Care  Brushed Teeth (self)  Teeth/Dentures     Shave     Nutrition Percentage Eaten  Breakfast, Between % Consumed  Environmental Precautions  Treaded Slipper Socks on Patient  Patient Turns/Positioning  Patient turns self independently side to side without assistance, to offload sacral area  Patient Turns Assistance/Tolerance     Bed Positions  Bed Controls On  Head of Bed Elevated  Self regulated      Psychosocial/Neurologic Assessment  Psychosocial Assessment  Psychosocial (WDL):  Within Defined Limits  Patient Behaviors: Fatigue  Neurologic Assessment  Neuro (WDL): Exceptions to WDL  Level of Consciousness: Alert  Orientation Level: Oriented X4  Cognition: Follows commands  Speech: Clear  Muscle Strength Right Arm: Good Strength Against Gravity and Moderate Resistance  Muscle Strength Left Arm: Good Strength Against Gravity and Moderate Resistance  Muscle Strength Right Leg: Good Strength Against Gravity and Moderate Resistance  Muscle Strength Left Leg: Good Strength Against Gravity and Moderate Resistance  EENT (WDL):  WDL Except    Cardio/Pulmonary Assessment  Edema      Respiratory Breath Sounds  RUL Breath Sounds: Clear  RML Breath Sounds: Clear  RLL Breath Sounds: Clear  JOAQUÍN Breath Sounds: Clear  LLL Breath Sounds: Clear  Cardiac Assessment   Cardiac (WDL):  WDL Except (HTN)

## 2024-12-16 NOTE — PROGRESS NOTES
Patient care assumed. Report received from [facility] COLIN Anaya. Patient is alert and calm, resting in bed. Call light and bedside table within reach. Will continue to monitor.

## 2024-12-16 NOTE — PROGRESS NOTES
NURSING DAILY NOTE    Name: Yamile Go   Date of Admission: 12/9/2024   Admitting Diagnosis: Lumbosacral radiculopathy due to degenerative joint disease of spine  Attending Physician: LILLY BOYD M.D.  Allergies: Gabapentin, Pregabalin, Amlodipine, Bee, Fentanyl, Morphine, Benzoin, and Rifampin    Safety  Patient Assist  sba  Patient Precautions  Fall Risk, Spinal / Back Precautions , Lumbosacral Orthosis  Precaution Comments  LSO when OOB; hx R ankle/shoulder and L hip replacements, hx of multiple falls, neuropathy  Bed Transfer Status  Modified Independent  Toilet Transfer Status   Modified Independent  Assistive Devices  Rails, Walker - front wheel  Oxygen  None - Room Air  Diet/Therapeutic Dining  Current Diet Order   Procedures    Diet Order Diet: Cardiac; Second Modifier: (optional): Consistent CHO (Diabetic)     Pill Administration  whole  Agitated Behavioral Scale     ABS Level of Severity       Fall Risk  Has the patient had a fall this admission?   No  May Beard Fall Risk Scoring  13, MODERATE RISK  Fall Risk Safety Measures  bed alarm and chair alarm    Vitals  Temperature: 36.4 °C (97.5 °F)  Temp src: Oral  Pulse: 80  Respiration: 18  Blood Pressure : (!) 143/62  Blood Pressure MAP (Calculated): 89 MM HG  BP Location: Left, Upper Arm  Patient BP Position: Lying Right Side     Oxygen  Pulse Oximetry: 93 %  O2 (LPM): 0  O2 Delivery Device: None - Room Air    Bowel and Bladder  Last Bowel Movement  12/14/24  Stool Type  Patient stated (Patient stated that she had x2 loose BM today)  Bowel Device  Bathroom  Continent  Bladder: Continent void   Bowel: Continent movement  Bladder Function  Urine Void (mL):  (large void)  Number of Times Voided: 1  Urinary Options: Yes  Urine Color: Yellow  Genitourinary Assessment   Bladder Assessment (WDL):  Within Defined Limits  Polanco Catheter: Not Applicable  Urine Color: Yellow  Bladder Device:  Bathroom  Time Void: Yes  Bladder Scan: Post Void  $ Bladder Scan Results (mL): 1    Skin  Hermelindo Score   18  Sensory Interventions   Bed Types: Standard/Trauma Mattress  Skin Preventative Measures: Pillows in Use for Support / Positioning  Moisture Interventions         Pain  Pain Rating Scale  7 - Focus of attention, prevents doing daily activities  Pain Location  Groin  Pain Location Orientation  Right  Pain Interventions   Medication (see MAR)    ADLs    Bathing   Patient Refused Bathing (Patient is going to have a shower tomorrow morning)  Linen Change   Partial  Personal Hygiene     Chlorhexidine Bath      Oral Care  Brushed Teeth (self)  Teeth/Dentures     Shave     Nutrition Percentage Eaten  Breakfast, Between % Consumed  Environmental Precautions  Treaded Slipper Socks on Patient  Patient Turns/Positioning  Patient turns self independently side to side without assistance, to offload sacral area  Patient Turns Assistance/Tolerance     Bed Positions  Bed Controls On  Head of Bed Elevated  Self regulated      Psychosocial/Neurologic Assessment  Psychosocial Assessment  Psychosocial (WDL):  Within Defined Limits  Patient Behaviors: Fatigue  Neurologic Assessment  Neuro (WDL): Exceptions to WDL  Level of Consciousness: Alert  Orientation Level: Oriented X4  Cognition: Follows commands  Speech: Clear  Muscle Strength Right Arm: Good Strength Against Gravity and Moderate Resistance  Muscle Strength Left Arm: Good Strength Against Gravity and Moderate Resistance  Muscle Strength Right Leg: Good Strength Against Gravity and Moderate Resistance  Muscle Strength Left Leg: Good Strength Against Gravity and Moderate Resistance  EENT (WDL):  WDL Except    Cardio/Pulmonary Assessment  Edema      Respiratory Breath Sounds  RUL Breath Sounds: Clear  RML Breath Sounds: Clear  RLL Breath Sounds: Clear  JOAQUÍN Breath Sounds: Clear  LLL Breath Sounds: Clear  Cardiac Assessment   Cardiac (WDL):  WDL Except (HTN)

## 2024-12-16 NOTE — PROGRESS NOTES
Patient discharged to home per order.  Discharge instructions reviewed with patient; she verbalizes understanding and signed copies placed in chart.  Patient has all belongings; signed copy of form in chart.  Patient left facility at 1035 via ambulating accompanied by rehab staff and friend.  Have enjoyed working with this pleasant patient.

## 2024-12-16 NOTE — CARE PLAN
Problem: OT Long Term Goals  Goal: LTG-By discharge, patient will complete basic self care tasks with supervision/mod I  Outcome: Met  Goal: LTG-By discharge, patient will perform bathroom transfers with supervision/mod I  Outcome: Met  Goal: LTG-By discharge, patient will complete basic home management with supervision/mod I  Outcome: Met

## 2025-01-03 ENCOUNTER — HOSPITAL ENCOUNTER (OUTPATIENT)
Dept: RADIOLOGY | Facility: MEDICAL CENTER | Age: 75
End: 2025-01-03
Attending: PHYSICAL MEDICINE & REHABILITATION
Payer: MEDICARE

## 2025-01-03 DIAGNOSIS — M81.0 AGE-RELATED OSTEOPOROSIS WITHOUT CURRENT PATHOLOGICAL FRACTURE: ICD-10-CM

## 2025-01-03 DIAGNOSIS — M25.551 RIGHT HIP PAIN: ICD-10-CM

## 2025-01-03 PROCEDURE — 73502 X-RAY EXAM HIP UNI 2-3 VIEWS: CPT | Mod: RT

## 2025-01-03 PROCEDURE — 77080 DXA BONE DENSITY AXIAL: CPT

## 2025-01-08 ENCOUNTER — OFFICE VISIT (OUTPATIENT)
Dept: URGENT CARE | Facility: CLINIC | Age: 75
End: 2025-01-08
Payer: MEDICARE

## 2025-01-08 ENCOUNTER — HOSPITAL ENCOUNTER (OUTPATIENT)
Facility: MEDICAL CENTER | Age: 75
End: 2025-01-08
Payer: MEDICARE

## 2025-01-08 VITALS
SYSTOLIC BLOOD PRESSURE: 124 MMHG | HEART RATE: 80 BPM | WEIGHT: 147 LBS | BODY MASS INDEX: 27.05 KG/M2 | HEIGHT: 62 IN | OXYGEN SATURATION: 99 % | RESPIRATION RATE: 16 BRPM | DIASTOLIC BLOOD PRESSURE: 62 MMHG | TEMPERATURE: 98.2 F

## 2025-01-08 DIAGNOSIS — N89.8 VAGINAL ODOR: ICD-10-CM

## 2025-01-08 DIAGNOSIS — Z79.4 TYPE 2 DIABETES MELLITUS WITHOUT COMPLICATION, WITH LONG-TERM CURRENT USE OF INSULIN (HCC): ICD-10-CM

## 2025-01-08 DIAGNOSIS — E11.9 TYPE 2 DIABETES MELLITUS WITHOUT COMPLICATION, WITH LONG-TERM CURRENT USE OF INSULIN (HCC): ICD-10-CM

## 2025-01-08 DIAGNOSIS — R82.998 LEUKOCYTES IN URINE: ICD-10-CM

## 2025-01-08 DIAGNOSIS — B37.0 ORAL CANDIDIASIS: Primary | ICD-10-CM

## 2025-01-08 LAB
APPEARANCE UR: CLEAR
BILIRUB UR STRIP-MCNC: NEGATIVE MG/DL
CANDIDA DNA VAG QL PROBE+SIG AMP: NEGATIVE
COLOR UR AUTO: YELLOW
G VAGINALIS DNA VAG QL PROBE+SIG AMP: NEGATIVE
GLUCOSE BLD-MCNC: 124 MG/DL (ref 65–99)
GLUCOSE UR STRIP.AUTO-MCNC: NEGATIVE MG/DL
KETONES UR STRIP.AUTO-MCNC: NEGATIVE MG/DL
LEUKOCYTE ESTERASE UR QL STRIP.AUTO: NORMAL
NITRITE UR QL STRIP.AUTO: NEGATIVE
PH UR STRIP.AUTO: 5.5 [PH] (ref 5–8)
PROT UR QL STRIP: NEGATIVE MG/DL
RBC UR QL AUTO: NEGATIVE
SP GR UR STRIP.AUTO: 1.02
T VAGINALIS DNA VAG QL PROBE+SIG AMP: NEGATIVE
UROBILINOGEN UR STRIP-MCNC: 0.2 MG/DL

## 2025-01-08 PROCEDURE — 87510 GARDNER VAG DNA DIR PROBE: CPT

## 2025-01-08 PROCEDURE — 99214 OFFICE O/P EST MOD 30 MIN: CPT

## 2025-01-08 PROCEDURE — 3074F SYST BP LT 130 MM HG: CPT

## 2025-01-08 PROCEDURE — 81002 URINALYSIS NONAUTO W/O SCOPE: CPT

## 2025-01-08 PROCEDURE — 87077 CULTURE AEROBIC IDENTIFY: CPT

## 2025-01-08 PROCEDURE — 3078F DIAST BP <80 MM HG: CPT

## 2025-01-08 PROCEDURE — 82962 GLUCOSE BLOOD TEST: CPT

## 2025-01-08 PROCEDURE — 87660 TRICHOMONAS VAGIN DIR PROBE: CPT

## 2025-01-08 PROCEDURE — 87086 URINE CULTURE/COLONY COUNT: CPT

## 2025-01-08 PROCEDURE — 87480 CANDIDA DNA DIR PROBE: CPT

## 2025-01-08 RX ORDER — ALENDRONATE SODIUM 70 MG/1
70 TABLET ORAL
COMMUNITY
Start: 2024-12-30

## 2025-01-08 RX ORDER — FLUCONAZOLE 150 MG/1
TABLET ORAL
Qty: 1 TABLET | Refills: 0 | Status: SHIPPED | OUTPATIENT
Start: 2025-01-08

## 2025-01-08 RX ORDER — CLOTRIMAZOLE 10 MG/1
10 LOZENGE ORAL
Qty: 35 TROCHE | Refills: 0 | Status: ON HOLD | OUTPATIENT
Start: 2025-01-08 | End: 2025-01-17

## 2025-01-08 ASSESSMENT — ENCOUNTER SYMPTOMS
STRIDOR: 0
HEADACHES: 0
COUGH: 0
CHILLS: 0
FLANK PAIN: 0
EYE DISCHARGE: 0
VOMITING: 0
EYE PAIN: 0
WHEEZING: 0
DIARRHEA: 0
SHORTNESS OF BREATH: 0
PALPITATIONS: 0
EYE REDNESS: 0
SORE THROAT: 0
FEVER: 0
NAUSEA: 0
MYALGIAS: 0
SPUTUM PRODUCTION: 0
DIZZINESS: 0
ABDOMINAL PAIN: 0

## 2025-01-08 ASSESSMENT — FIBROSIS 4 INDEX: FIB4 SCORE: 1.23

## 2025-01-08 NOTE — PROGRESS NOTES
"Subjective:   Yamile Go is a 74 y.o. female who presents for Other (Vaginal odor x 2 days, \" tongue feels rah\" )          I introduced myself to the patient and informed them that I am a Family Nurse Practitioner.    HPI:Gillian is a 74 year-old female with a history of DM2, who comes in today c/o vaginal odor, and her mouth and tongue feel raw. Onset was 2 days ago.  She denies any vaginal discharge, lesions, itching, swelling, denies any dysuria, urgency or frequency, lower abdominal or flank pain or any concern for STIs.  States she is not sexually active.  States she has a history of vaginal yeast infections in the past, and chart review does show that she was diagnosed with BV in November of last year.  Patient describes symptoms as constant. They describe the pain as mild feels sore and raw. Aggravating factors include eating, drinking. Relieving factors include none. Treatments tried at home include none. They describe their symptoms as moderate.  She denies any fever, chills, nausea or vomiting.  Patient with history of DM2, states she takes metformin, does not routinely check her blood sugars.  Denies any polyuria, polydipsia, abdominal pain, fever, chills, nausea or vomiting.      Review of Systems   Constitutional:  Negative for chills, fever and malaise/fatigue.   HENT:  Negative for congestion, ear pain and sore throat.         Positive for soreness and rawness of her tongue and mouth   Eyes:  Negative for pain, discharge and redness.   Respiratory:  Negative for cough, sputum production, shortness of breath, wheezing and stridor.    Cardiovascular:  Negative for chest pain and palpitations.   Gastrointestinal:  Negative for abdominal pain, diarrhea, nausea and vomiting.   Genitourinary:  Negative for dysuria, flank pain, frequency, hematuria and urgency.        Positive for unusual vaginal odor.  Denies any vaginal discharge, itching, redness, swelling, lesions   Musculoskeletal:  Negative for " myalgias.   Skin:  Negative for rash.   Neurological:  Negative for dizziness and headaches.       Medications: albuterol Aers  alendronate Tabs  amitriptyline Tabs  diclofenac sodium Gel  escitalopram  ezetimibe Tabs  liothyronine Tabs  metFORMIN Tabs  methocarbamol Tabs  metoprolol SR Tb24  omeprazole  predniSONE Tabs  rosuvastatin Tabs  Synthroid Tabs     Allergies: Gabapentin, Pregabalin, Amlodipine, Bee, Fentanyl, Morphine, Benzoin, and Rifampin    Problem List: does not have any pertinent problems on file.    Surgical History:  Past Surgical History:   Procedure Laterality Date    PB RECONSTR TOTAL SHOULDER IMPLANT Right 4/30/2024    Procedure: RIGHT REVERSE TOTAL SHOULDER ARTHROPLASTY;  Surgeon: Manuela Sparks M.D.;  Location: SURGERY [facility];  Service: Orthopedics    INSERTION, PERIPHERAL NERVE STIMULATOR, LOWER EXTREMITY Left 12/22/2022    Procedure: Left SCIATIC PERIPHERAL NERVE STIMULATOR IMPLANT, LOWER EXTREMITY PERIPHERAL SCIATIC NERVE;  Surgeon: Tobi Kilgore M.D.;  Location: SURGERY [facility];  Service: Pain Management    ARTHROPLASTY Right 2020    ankle    LUMBAR EXPLORATION N/A 2017    COLECTOMY N/A 2007    partial for divverticulitis 2007    LUMBAR LAMINECTOMY DISKECTOMY N/A 1989    BREAST BIOPSY  1984    no cancer    ABDOMINAL HYSTERECTOMY TOTAL N/A 1978    APPENDECTOMY N/A 1976       Past Social Hx:   reports that she has never smoked. She has never used smokeless tobacco. She reports that she does not drink alcohol and does not use drugs.     Past Family Hx:   family history includes Alcohol abuse in her daughter; Cancer in her mother; Diabetes in her brother, brother, father, and maternal aunt; Drug abuse in her brother and daughter; Heart Disease in her brother, brother, brother, brother, father, and mother; Hyperlipidemia in her brother, father, and mother; Hypertension in her brother, father, maternal aunt, and maternal aunt; Other in her brother; Stroke in her father.  "    Problem list, medications, and allergies reviewed by myself today in Epic.   I have documented what I find to be significant in regards to past medical, social, family and surgical history  in my HPI or under PMH/PSH/FH review section, otherwise it is noncontributory     Objective:     /62 (BP Location: Left arm, Patient Position: Sitting, BP Cuff Size: Adult)   Pulse 80   Temp 36.8 °C (98.2 °F) (Temporal)   Resp 16   Ht 1.575 m (5' 2\")   Wt 66.7 kg (147 lb)   SpO2 99%   BMI 26.89 kg/m²     During this visit, appropriate PPE was worn, and hand hygiene was performed.    Physical Exam  Vitals reviewed.   Constitutional:       General: She is not in acute distress.     Appearance: Normal appearance. She is normal weight. She is not ill-appearing or toxic-appearing.   HENT:      Head: Normocephalic and atraumatic.      Right Ear: External ear normal.      Left Ear: External ear normal.      Nose: No congestion or rhinorrhea.      Mouth/Throat:      Mouth: Mucous membranes are moist.      Pharynx: No oropharyngeal exudate or posterior oropharyngeal erythema.      Comments: There are multiple white/yellow fluffy lesions on the tongue, posterior oropharynx and oral mucosa consistent with oral candidiasis. No tonsillar swelling, bilaterally.  No soft tissue swelling of the sublingual mucosa, no petechia or swelling of the soft or hard palate, no unilarteral oropharynx swelling, no sign of tonsillar stone, epiglottitis, or abscess.  Airway is patent and there is no stridor.  Patient is managing oral secretions appropriately.  Uvula is midline and appropriate size with no erythema or edema.    Eyes:      General: No scleral icterus.        Right eye: No discharge.         Left eye: No discharge.      Extraocular Movements: Extraocular movements intact.      Conjunctiva/sclera: Conjunctivae normal.   Cardiovascular:      Rate and Rhythm: Normal rate.   Pulmonary:      Effort: Pulmonary effort is normal. "   Abdominal:      General: There is no distension.   Genitourinary:     Comments: Deferred  Musculoskeletal:         General: No swelling or tenderness. Normal range of motion.      Right lower leg: No edema.      Left lower leg: No edema.   Skin:     General: Skin is warm and dry.   Neurological:      General: No focal deficit present.      Mental Status: She is alert and oriented to person, place, and time. Mental status is at baseline.   Psychiatric:         Mood and Affect: Mood normal.         Behavior: Behavior normal.             Lab Results/POC Test Results   Results for orders placed or performed in visit on 01/08/25   POCT Urinalysis    Collection Time: 01/08/25  3:36 PM   Result Value Ref Range    POC Color Yellow Negative    POC Appearance Clear Negative    POC Glucose Negative Negative mg/dL    POC Bilirubin Negative Negative mg/dL    POC Ketones Negative Negative mg/dL    POC Specific Gravity 1.025 <1.005 - >1.030    POC Blood Negative Negative    POC Urine PH 5.5 5.0 - 8.0    POC Protein Negative Negative mg/dL    POC Urobiligen 0.2 Negative (0.2) mg/dL    POC Nitrites Negative Negative    POC Leukocyte Esterase Trace Negative   POCT Glucose    Collection Time: 01/08/25  3:57 PM   Result Value Ref Range    Glucose - Accu-Ck 124 (A) 65 - 99 mg/dL           Assessment/Plan:     Diagnosis and associated orders:     1. Oral candidiasis  clotrimazole (MYCELEX) 10 MG Agatha agatha    fluconazole (DIFLUCAN) 150 MG tablet    POCT Glucose      2. Vaginal odor  POCT Urinalysis    VAGINAL PATHOGENS DNA PANEL    clotrimazole (MYCELEX) 10 MG Agatha agatha    fluconazole (DIFLUCAN) 150 MG tablet    URINE CULTURE(NEW)      3. Leukocytes in urine  URINE CULTURE(NEW)      4. Type 2 diabetes mellitus without complication, with long-term current use of insulin ([facility])  POCT Glucose         Comments/MDM:     1. Vaginal odor  Discussed with patient that there are vaginal pathogens that can cause her symptoms  suggest she do  a vaginal pathogen self swab, results usually come in within 12 to 24 hours, I will notify her on MyChart of the results, treat if indicated.  She states she understands and is agreeable.    - POCT Urinalysis  - VAGINAL PATHOGENS DNA PANEL; Future  - clotrimazole (MYCELEX) 10 MG Agatha agatha; Take 1 Agatha by mouth 5 Times a Day for 7 days.  Dispense: 35 Agatha; Refill: 0  - fluconazole (DIFLUCAN) 150 MG tablet; Take one tablet orally for yeast infection  Dispense: 1 Tablet; Refill: 0  - URINE CULTURE(NEW); Future    2. Oral candidiasis (Primary)  Oral lesions are consistent with oral candidiasis.  Instructed patient regarding clotrimazole atrocious, purpose, side effects, precautions.  Patient is diabetic, does not typically check her blood sugars, given her symptoms today I did get a random blood sugar to make sure she was not hyperglycemic, this is reasonably normal at 124.  I did suggest to patient she check her blood sugars daily to keep an eye on levels, she states she is agreeable  - clotrimazole (MYCELEX) 10 MG Agatha agatha; Take 1 Agatha by mouth 5 Times a Day for 7 days.  Dispense: 35 Agatha; Refill: 0  - fluconazole (DIFLUCAN) 150 MG tablet; Take one tablet orally for yeast infection  Dispense: 1 Tablet; Refill: 0  - POCT Glucose    3. Leukocytes in urine  Patient denies any urinary symptoms, no dysuria, urgency, frequency, just has vaginal odor, the leukocytes in your urine are probably due to vaginal candidiasis given her other symptoms, out of caution I will send a urine culture to rule out UTI.  Instructed patient I will let her know the results via MyChart, treat if indicated.  She states she has good understanding is agreeable with the plan of care.  - URINE CULTURE(NEW); Future    4. Type 2 diabetes mellitus without complication, with long-term current use of insulin ([facility])  - POCT Glucose         Pt is clinically stable at today's acute urgent care visit. Vital signs are normal and reassuring.   No acute distress noted. Appropriate for outpatient management at this time.        I personally reviewed prior external notes and test results pertinent to today's visit.  I have independently reviewed and interpreted all diagnostics ordered during this urgent care acute visit.        Please note that this dictation was created using voice recognition software. I have made a reasonable attempt to correct obvious errors, but I expect that there are errors of grammar and possibly content that I did not discover before finalizing the note.    This note was electronically signed by Hi BROWN, RAJWINDER, NAJMA, ROD

## 2025-01-11 LAB
BACTERIA UR CULT: NORMAL
SIGNIFICANT IND 70042: NORMAL
SITE SITE: NORMAL
SOURCE SOURCE: NORMAL

## 2025-01-12 ENCOUNTER — APPOINTMENT (OUTPATIENT)
Dept: RADIOLOGY | Facility: IMAGING CENTER | Age: 75
End: 2025-01-12
Attending: PHYSICIAN ASSISTANT
Payer: MEDICARE

## 2025-01-12 ENCOUNTER — OFFICE VISIT (OUTPATIENT)
Dept: URGENT CARE | Facility: CLINIC | Age: 75
End: 2025-01-12
Payer: MEDICARE

## 2025-01-12 VITALS
SYSTOLIC BLOOD PRESSURE: 152 MMHG | OXYGEN SATURATION: 95 % | HEART RATE: 92 BPM | HEIGHT: 61 IN | BODY MASS INDEX: 28.32 KG/M2 | WEIGHT: 150 LBS | DIASTOLIC BLOOD PRESSURE: 78 MMHG | RESPIRATION RATE: 16 BRPM | TEMPERATURE: 97 F

## 2025-01-12 DIAGNOSIS — M25.512 ACUTE PAIN OF LEFT SHOULDER: ICD-10-CM

## 2025-01-12 DIAGNOSIS — M75.32 CALCIFIC TENDINITIS OF LEFT SHOULDER: ICD-10-CM

## 2025-01-12 PROCEDURE — 20610 DRAIN/INJ JOINT/BURSA W/O US: CPT | Mod: LT | Performed by: PHYSICIAN ASSISTANT

## 2025-01-12 PROCEDURE — 99214 OFFICE O/P EST MOD 30 MIN: CPT | Mod: 25 | Performed by: PHYSICIAN ASSISTANT

## 2025-01-12 PROCEDURE — 73030 X-RAY EXAM OF SHOULDER: CPT | Mod: TC,LT | Performed by: PHYSICIAN ASSISTANT

## 2025-01-12 RX ORDER — OXYCODONE HYDROCHLORIDE 10 MG/1
TABLET ORAL
COMMUNITY
Start: 2024-12-30

## 2025-01-12 RX ORDER — TRIAMCINOLONE ACETONIDE 40 MG/ML
40 INJECTION, SUSPENSION INTRA-ARTICULAR; INTRAMUSCULAR ONCE
Status: COMPLETED | OUTPATIENT
Start: 2025-01-12 | End: 2025-01-12

## 2025-01-12 RX ORDER — METRONIDAZOLE 7.5 MG/G
GEL VAGINAL
COMMUNITY
End: 2025-01-15

## 2025-01-12 RX ADMIN — TRIAMCINOLONE ACETONIDE 40 MG: 40 INJECTION, SUSPENSION INTRA-ARTICULAR; INTRAMUSCULAR at 15:00

## 2025-01-12 ASSESSMENT — ENCOUNTER SYMPTOMS
PALPITATIONS: 0
SHORTNESS OF BREATH: 0
CHILLS: 0
COUGH: 0
FEVER: 0

## 2025-01-12 ASSESSMENT — FIBROSIS 4 INDEX: FIB4 SCORE: 1.23

## 2025-01-12 NOTE — PROGRESS NOTES
Subjective:   Yamile Go is a 74 y.o. female who presents today with   Chief Complaint   Patient presents with    Shoulder Pain     Left shoulder pain x 4 days  Pt says she has arthritis in left shoulder and this is the most severe pain      Shoulder Pain  This is a new problem. Episode onset: 4 days. The problem occurs constantly. Pertinent negatives include no chest pain, chills, coughing or fever. She has tried NSAIDs for the symptoms.     History of arthritis and also replacement to the right shoulder.  She states she typically gets steroid injection to the left shoulder when her arthritis flares up at [facility].  Patient states the last time she had a steroid injection was 4 months ago to the left shoulder.    PMH:  has a past medical history of Anemia, Anesthesia, Arthritis, Asthma, CAD (coronary artery disease), Cataract, Dental disorder, Depression, Diabetes (HCC), Disorder of thyroid, Heart burn, History of vertebral fracture, HTN (hypertension), Hyperlipidemia, Indigestion, Migraines, Obesity, Pneumonia (2023), PONV (postoperative nausea and vomiting), and Sleep apnea.    She has no past medical history of Acute nasopharyngitis, Arrhythmia, Blood clotting disorder (HCC), Bowel habit changes, Breath shortness, Bronchitis, Cancer (HCC), Carcinoma in situ of respiratory system, Congestive heart failure (HCC), Continuous ambulatory peritoneal dialysis status (HCC), Coughing blood, Dialysis patient (HCC), Glaucoma, Gynecological disorder, Heart murmur, Heart valve disease, Hemorrhagic disorder (HCC), Hepatitis A, Hepatitis B, Hepatitis C, Hiatus hernia syndrome, Infectious disease, Jaundice, Myocardial infarct (HCC), Pacemaker, Pregnant, Renal disorder, Rheumatic fever, Seizure (HCC), Snoring, Stroke (HCC), Tuberculosis, Urinary bladder disorder, or Urinary incontinence.  MEDS:   Current Outpatient Medications:     metroNIDAZOLE (METROGEL-VAGINAL) 0.75 % Gel, , Disp: , Rfl:     oxyCODONE  immediate release (ROXICODONE) 10 MG immediate release tablet, Take 1 tablet every 4 hours by oral route for 30 days., Disp: , Rfl:     alendronate (FOSAMAX) 70 MG Tab, , Disp: , Rfl:     clotrimazole (MYCELEX) 10 MG Agatha agatha, Take 1 Agatha by mouth 5 Times a Day for 7 days., Disp: 35 Agatha, Rfl: 0    fluconazole (DIFLUCAN) 150 MG tablet, Take one tablet orally for yeast infection, Disp: 1 Tablet, Rfl: 0    albuterol 108 (90 Base) MCG/ACT Aero Soln inhalation aerosol, Inhale 1-2 Puffs every four hours as needed for Shortness of Breath., Disp: 8.5 g, Rfl: 2    amitriptyline (ELAVIL) 100 MG Tab, Take 1.5 Tablets by mouth every evening., Disp: 90 Tablet, Rfl: 2    escitalopram (LEXAPRO) 20 MG tablet, Take 1 Tablet by mouth every day., Disp: 90 Tablet, Rfl: 2    ezetimibe (ZETIA) 10 MG Tab, Take 1 Tablet by mouth every evening., Disp: 90 Tablet, Rfl: 2    liothyronine (CYTOMEL) 5 MCG Tab, Take 1 Tablet by mouth every day., Disp: 90 Tablet, Rfl: 2    metFORMIN (GLUCOPHAGE) 500 MG Tab, Take 1 Tablet by mouth 2 times a day with meals. Indications: Type 2 Diabetes, Disp: 180 Tablet, Rfl: 2    methocarbamol (ROBAXIN) 500 MG Tab, Take 1 Tablet by mouth 3 times a day as needed (Hold for respiratory depression, respiratory rate <12, heart rate less than 65, lethargy,somnolence, or systolic blood pressure < 105.)., Disp: 30 Tablet, Rfl: 2    metoprolol SR (TOPROL XL) 25 MG TABLET SR 24 HR, Take 1 Tablet by mouth every day., Disp: 90 Tablet, Rfl: 2    omeprazole (PRILOSEC) 20 MG delayed-release capsule, Take 1 Capsule by mouth 2 times a day., Disp: 180 Capsule, Rfl: 2    rosuvastatin (CRESTOR) 20 MG Tab, Take 1 Tablet by mouth every evening., Disp: 90 Tablet, Rfl: 2    SYNTHROID 88 MCG Tab, Take 1 Tablet by mouth every day., Disp: 90 Tablet, Rfl: 2    diclofenac sodium (VOLTAREN) 1 % Gel, Apply 4 g topically 4 times a day as needed (back pain)., Disp: , Rfl:   ALLERGIES:   Allergies   Allergen Reactions    Gabapentin Hives  "and Swelling    Pregabalin Hives and Swelling    Amlodipine Swelling    Bee Swelling    Fentanyl Vomiting and Unspecified    Morphine Vomiting, Nausea and Unspecified    Benzoin Rash     Tincture of benzoin =blisters    blisters    Rifampin Unspecified     Pt turns yellow     SURGHX:   Past Surgical History:   Procedure Laterality Date    PB RECONSTR TOTAL SHOULDER IMPLANT Right 4/30/2024    Procedure: RIGHT REVERSE TOTAL SHOULDER ARTHROPLASTY;  Surgeon: Manuela Sparks M.D.;  Location: SURGERY [facility];  Service: Orthopedics    INSERTION, PERIPHERAL NERVE STIMULATOR, LOWER EXTREMITY Left 12/22/2022    Procedure: Left SCIATIC PERIPHERAL NERVE STIMULATOR IMPLANT, LOWER EXTREMITY PERIPHERAL SCIATIC NERVE;  Surgeon: Tobi Kilgore M.D.;  Location: SURGERY [facility];  Service: Pain Management    ARTHROPLASTY Right 2020    ankle    LUMBAR EXPLORATION N/A 2017    COLECTOMY N/A 2007    partial for divverticulitis 2007    LUMBAR LAMINECTOMY DISKECTOMY N/A 1989    BREAST BIOPSY  1984    no cancer    ABDOMINAL HYSTERECTOMY TOTAL N/A 1978    APPENDECTOMY N/A 1976     SOCHX:  reports that she has never smoked. She has never used smokeless tobacco. She reports that she does not drink alcohol and does not use drugs.  FH: Reviewed with patient, not pertinent to this visit.       Review of Systems   Constitutional:  Negative for chills and fever.   Respiratory:  Negative for cough and shortness of breath.    Cardiovascular:  Negative for chest pain and palpitations.   Musculoskeletal:         Left shoulder pain        Objective:   BP (!) 152/78 (BP Location: Left arm, Patient Position: Sitting, BP Cuff Size: Adult)   Pulse 92   Temp 36.1 °C (97 °F) (Temporal)   Resp 16   Ht 1.549 m (5' 1\")   Wt 68 kg (150 lb)   SpO2 95%   BMI 28.34 kg/m²   Physical Exam  Vitals and nursing note reviewed.   Constitutional:       General: She is not in acute distress.     Appearance: Normal appearance. She is well-developed. She is " not ill-appearing or toxic-appearing.   HENT:      Head: Normocephalic and atraumatic.      Right Ear: Hearing normal.      Left Ear: Hearing normal.   Cardiovascular:      Rate and Rhythm: Normal rate.   Pulmonary:      Effort: Pulmonary effort is normal.   Musculoskeletal:        Arms:       Comments: Tenderness to palpation to the anterior lateral aspect of the left shoulder.  Neurovascular intact distally.  Patient able to actively move the left upper extremity but does have discomfort at shoulder height.  No erythema or swelling of left shoulder.   Skin:     General: Skin is warm and dry.   Neurological:      Mental Status: She is alert.      Coordination: Coordination normal.   Psychiatric:         Mood and Affect: Mood normal.       Patient ambulating with walker at baseline.        DX SHOULDER  FINDINGS:  Clavicle is intact.  AC joint is preserved.  Visualized proximal humerus is intact and normally located.  Small calcification projects superior to the glenohumeral joint.  Mild narrowing of glenohumeral joint.  Visualized LEFT chest is unremarkable.     IMPRESSION:     1.  No fracture or dislocation of LEFT shoulder.  2.  Mild degenerative changes.  3.  Findings suggest calcific tendinopathy of the rotator cuff.        Assessment/Plan:   Assessment    1. Acute pain of left shoulder  - DX-SHOULDER 2+ LEFT; Future    2. Calcific tendinitis of left shoulder  - triamcinolone acetonide (Kenalog-40) injection 40 mg    Other orders  - metroNIDAZOLE (METROGEL-VAGINAL) 0.75 % Gel  - oxyCODONE immediate release (ROXICODONE) 10 MG immediate release tablet; Take 1 tablet every 4 hours by oral route for 30 days.    Verbal consent obtained from patient prior to injection in clinic today.  Dr. Foley performed injection of the shoulder.  Area was copiously cleaned and 22 gauge 1-1/2 inch needle was advanced to the subacromion area on the left shoulder.  Patient tolerated extremely well.  Patient will follow-up with  [facility] as needed.  Care instructions discussed with patient.    Differential diagnosis, natural history, supportive care, and indications for immediate follow-up discussed.   Patient given instructions and understanding of medications and treatment.    If not improving in 3-5 days, F/U with PCP or return to UC if symptoms worsen.    Patient agreeable to plan.      Please note that this dictation was created using voice recognition software. I have made every reasonable attempt to correct obvious errors, but I expect that there are errors of grammar and possibly content that I did not discover before finalizing the note.    Dameon Mcdonald PA-C

## 2025-01-12 NOTE — PROCEDURES
Joint Inj - LG: shoulder, L subacromial bursa on 1/12/2025 3:09 PM  Indications: pain  Details: 22 G needle, posterior approach  Medications: (Kenalog)  Procedure, treatment alternatives, risks and benefits explained, specific risks discussed. Consent was given by the patient.

## 2025-01-14 ENCOUNTER — HOSPITAL ENCOUNTER (EMERGENCY)
Facility: MEDICAL CENTER | Age: 75
End: 2025-01-14
Attending: EMERGENCY MEDICINE
Payer: MEDICARE

## 2025-01-14 VITALS
RESPIRATION RATE: 18 BRPM | DIASTOLIC BLOOD PRESSURE: 66 MMHG | OXYGEN SATURATION: 91 % | SYSTOLIC BLOOD PRESSURE: 139 MMHG | WEIGHT: 151.46 LBS | TEMPERATURE: 97.8 F | HEART RATE: 99 BPM | HEIGHT: 61 IN | BODY MASS INDEX: 28.6 KG/M2

## 2025-01-14 DIAGNOSIS — M79.602 LEFT ARM PAIN: ICD-10-CM

## 2025-01-14 DIAGNOSIS — M54.12 CERVICAL RADICULOPATHY: ICD-10-CM

## 2025-01-14 PROCEDURE — 700101 HCHG RX REV CODE 250: Performed by: EMERGENCY MEDICINE

## 2025-01-14 PROCEDURE — 99284 EMERGENCY DEPT VISIT MOD MDM: CPT

## 2025-01-14 PROCEDURE — 96372 THER/PROPH/DIAG INJ SC/IM: CPT

## 2025-01-14 PROCEDURE — 94640 AIRWAY INHALATION TREATMENT: CPT

## 2025-01-14 PROCEDURE — 700111 HCHG RX REV CODE 636 W/ 250 OVERRIDE (IP): Performed by: EMERGENCY MEDICINE

## 2025-01-14 RX ORDER — HYDROMORPHONE HYDROCHLORIDE 1 MG/ML
1 INJECTION, SOLUTION INTRAMUSCULAR; INTRAVENOUS; SUBCUTANEOUS ONCE
Status: COMPLETED | OUTPATIENT
Start: 2025-01-14 | End: 2025-01-14

## 2025-01-14 RX ORDER — METHYLPREDNISOLONE 4 MG/1
TABLET ORAL
Qty: 21 TABLET | Refills: 0 | Status: ON HOLD | OUTPATIENT
Start: 2025-01-14 | End: 2025-01-17

## 2025-01-14 RX ORDER — ALBUTEROL SULFATE 5 MG/ML
2.5 SOLUTION RESPIRATORY (INHALATION) ONCE
Status: COMPLETED | OUTPATIENT
Start: 2025-01-14 | End: 2025-01-14

## 2025-01-14 RX ADMIN — HYDROMORPHONE HYDROCHLORIDE 1 MG: 1 INJECTION, SOLUTION INTRAMUSCULAR; INTRAVENOUS; SUBCUTANEOUS at 09:02

## 2025-01-14 RX ADMIN — ALBUTEROL SULFATE 2.5 MG: 2.5 SOLUTION RESPIRATORY (INHALATION) at 10:00

## 2025-01-14 ASSESSMENT — PAIN DESCRIPTION - PAIN TYPE: TYPE: ACUTE PAIN

## 2025-01-14 ASSESSMENT — FIBROSIS 4 INDEX: FIB4 SCORE: 1.23

## 2025-01-14 NOTE — ED NOTES
ERP at bedside. Pt agrees with plan of care discussed by ERP. CIDET acknowledged with patient. Juwan in low position, side rail up for pt safety. Call light within reach. Will continue to monitor.

## 2025-01-14 NOTE — ED PROVIDER NOTES
ER Provider Note    Scribed for Nic Duarte M.D. by Theodore Le. 1/14/2025   8:25 AM    Primary Care Provider: Cole Yuen M.D.    CHIEF COMPLAINT  Chief Complaint   Patient presents with    Arm Pain     Left arm pain that is throbbing and aching.  States she went to , dx with arthritis this weekend received a steroid shot with no relief, also states she has a broken back.       EXTERNAL RECORDS REVIEWED  Outpatient Notes Patient was seen 2 days ago at urgent care for evaluation of acute pain of the left shoulder.    Inpatient notes: Patient was admitted on 12/9/24 for compression fracture of L1 and lumbosacral radiculopathy from degenerative joint disease of the spine.    HPI/ROS  LIMITATION TO HISTORY   Select: : None  OUTSIDE HISTORIAN(S):  None    Yamile Go is a 74 y.o. female with history of arthritis in the shoulder who presents to the ED for evaluation of worsening constant aching left shoulder pain that radiates down her arm to her wrist onset approximately 2 days ago. Patient reports that her symptoms began 1 week ago. She was given a steroid injection at urgent care, but her pain has increased since then. She has been using oxycodone every 4 hours or as needed, with some alleviation. Patient has tried other methods to relieve her pain, such as tylenol, ice and heat, with no alleviation. Denies numbness or tingling. Denies chest pain or headache. She has a history of hypertension and COPD.    PAST MEDICAL HISTORY  Past Medical History:   Diagnosis Date    Anemia     Anesthesia     PONV    Arthritis     degenerative    Asthma     CAD (coronary artery disease)     cardiologist, Dr. Winkler  last visit 4/2024    Cataract     Dental disorder     Missing teeth right side.  Veneers top front    Depression     anxiety    Diabetes (HCC)     Disorder of thyroid     hypothyroid    Heart burn     GERD, controlled with medications    History of vertebral fracture     T12, L1, L2    HTN  (hypertension)     Hyperlipidemia     Indigestion     Migraines     Obesity     Pneumonia 2023    COVID    PONV (postoperative nausea and vomiting)     Sleep apnea     diagnosed 9/2009 CPAP -PMA; pt states she does not use CPAP and no longer has one, unable to tolerate       SURGICAL HISTORY  Past Surgical History:   Procedure Laterality Date    PB RECONSTR TOTAL SHOULDER IMPLANT Right 4/30/2024    Procedure: RIGHT REVERSE TOTAL SHOULDER ARTHROPLASTY;  Surgeon: Manuela Sparks M.D.;  Location: SURGERY [facility];  Service: Orthopedics    INSERTION, PERIPHERAL NERVE STIMULATOR, LOWER EXTREMITY Left 12/22/2022    Procedure: Left SCIATIC PERIPHERAL NERVE STIMULATOR IMPLANT, LOWER EXTREMITY PERIPHERAL SCIATIC NERVE;  Surgeon: Tobi Kilgore M.D.;  Location: SURGERY [facility];  Service: Pain Management    ARTHROPLASTY Right 2020    ankle    LUMBAR EXPLORATION N/A 2017    COLECTOMY N/A 2007    partial for divverticulitis 2007    LUMBAR LAMINECTOMY DISKECTOMY N/A 1989    BREAST BIOPSY  1984    no cancer    ABDOMINAL HYSTERECTOMY TOTAL N/A 1978    APPENDECTOMY N/A 1976       FAMILY HISTORY  Family History   Problem Relation Age of Onset    Cancer Mother         lung    Heart Disease Mother     Hyperlipidemia Mother     Stroke Father     Heart Disease Father     Diabetes Father     Hypertension Father     Hyperlipidemia Father     Heart Disease Brother         cath and bypass    Diabetes Brother     Hypertension Brother     Hyperlipidemia Brother     Diabetes Maternal Aunt     Hypertension Maternal Aunt     Heart Disease Brother         cath and byp[ass    Drug abuse Brother     Heart Disease Brother         cath and bypass    Diabetes Brother     Heart Disease Brother     Other Brother         MVA    Hypertension Maternal Aunt     Drug abuse Daughter     Alcohol abuse Daughter        SOCIAL HISTORY   reports that she has never smoked. She has never used smokeless tobacco. She reports that she does not drink alcohol  and does not use drugs.    CURRENT MEDICATIONS  Previous Medications    ALBUTEROL 108 (90 BASE) MCG/ACT AERO SOLN INHALATION AEROSOL    Inhale 1-2 Puffs every four hours as needed for Shortness of Breath.    ALENDRONATE (FOSAMAX) 70 MG TAB        AMITRIPTYLINE (ELAVIL) 100 MG TAB    Take 1.5 Tablets by mouth every evening.    CLOTRIMAZOLE (MYCELEX) 10 MG AGATHA AGATHA    Take 1 Agatha by mouth 5 Times a Day for 7 days.    DICLOFENAC SODIUM (VOLTAREN) 1 % GEL    Apply 4 g topically 4 times a day as needed (back pain).    ESCITALOPRAM (LEXAPRO) 20 MG TABLET    Take 1 Tablet by mouth every day.    EZETIMIBE (ZETIA) 10 MG TAB    Take 1 Tablet by mouth every evening.    FLUCONAZOLE (DIFLUCAN) 150 MG TABLET    Take one tablet orally for yeast infection    LIOTHYRONINE (CYTOMEL) 5 MCG TAB    Take 1 Tablet by mouth every day.    METFORMIN (GLUCOPHAGE) 500 MG TAB    Take 1 Tablet by mouth 2 times a day with meals. Indications: Type 2 Diabetes    METHOCARBAMOL (ROBAXIN) 500 MG TAB    Take 1 Tablet by mouth 3 times a day as needed (Hold for respiratory depression, respiratory rate <12, heart rate less than 65, lethargy,somnolence, or systolic blood pressure < 105.).    METOPROLOL SR (TOPROL XL) 25 MG TABLET SR 24 HR    Take 1 Tablet by mouth every day.    METRONIDAZOLE (METROGEL-VAGINAL) 0.75 % GEL        OMEPRAZOLE (PRILOSEC) 20 MG DELAYED-RELEASE CAPSULE    Take 1 Capsule by mouth 2 times a day.    OXYCODONE IMMEDIATE RELEASE (ROXICODONE) 10 MG IMMEDIATE RELEASE TABLET    Take 1 tablet every 4 hours by oral route for 30 days.    ROSUVASTATIN (CRESTOR) 20 MG TAB    Take 1 Tablet by mouth every evening.    SYNTHROID 88 MCG TAB    Take 1 Tablet by mouth every day.       ALLERGIES  Allergies   Allergen Reactions    Gabapentin Hives and Swelling    Pregabalin Hives and Swelling    Amlodipine Swelling    Bee Swelling    Fentanyl Vomiting and Unspecified    Morphine Vomiting, Nausea and Unspecified    Benzoin Rash     Tincture of  "benzoin =blisters    blisters    Rifampin Unspecified     Pt turns yellow        PHYSICAL EXAM  /77   Pulse (!) 112   Temp 36.4 °C (97.5 °F) (Temporal)   Resp 15   Ht 1.549 m (5' 1\")   Wt 68.7 kg (151 lb 7.3 oz)   SpO2 88% Comment: PT has asthma and COPD  BMI 28.62 kg/m²      Nursing note and vitals reviewed.  Constitutional: Well-developed and well-nourished. Tearful, Moderate distress secondary to pain.  HENT: Head is normocephalic and atraumatic. Oropharynx is clear and moist without exudate or erythema.   Eyes: Pupils are equal, round, and reactive to light. Conjunctiva are normal.   Cardiovascular: Normal rate and regular rhythm. No murmur heard. Normal radial pulses.  Pulmonary/Chest: Breath sounds normal. No wheezes or rales.   Abdominal: Soft and non-tender. No distention    Musculoskeletal: Left upper extremity: pain with shrugging of shoulder, flexion and extension of elbow. Tender to palpation of shoulder and arm. Strong radial pulse  Neurological: Awake, alert and oriented to person, place, and time. No focal deficits noted. left upper extremity strength 5/5.  Skin: Skin is warm and dry. No rash.   Psychiatric: Normal mood and affect. Appropriate for clinical situation    INITIAL ASSESSMENT AND PLAN    8:25 AM - Patient was evaluated at bedside. The patient will be medicated with Dilaudid injection 1 mg for her symptoms. Patient verbalizes understanding and support with my plan of care.  Differential diagnoses include but not limited to: cervical radiculopathy    ED Observation Status? No; Patient does not meet criteria for ED Observation.      COURSE AND MEDICAL DECISION MAKING    8:46 AM - Patient was reevaluated at bedside.  History and physical examination is most consistent with cervical radiculopathy.  I do not feel this is related to osteoarthritis of her shoulder.  She received a shoulder injection.  This does not appear to be infected.  The distribution of pain including going all " the way down her arm from her neck to her fingers is most consistent with a radiculopathy.  I do the patient prescription for Medrol Dosepak.  I feel that she would be most well served to go to the [facility] where she may be able to receive a nerve block.  Discussed this with the patient.    9:39 AM - Patient was reevaluated at bedside. Discussed the plan for discharge, patient is agreeable to the plan.    DISPOSITION AND DISCUSSIONS    I have discussed management of the patient with the following physicians and ALEC's:  None    Discussion of management with other QHP or appropriate source(s): None     Barriers to care at this time, including but not limited to:  None .      The patient will return for new or worsening symptoms and is stable at the time of discharge.    The patient is referred to a primary physician for blood pressure management, diabetic screening, and for all other preventative health concerns.    DISPOSITION:  Patient will be discharged home in stable condition.    FOLLOW UP:  Cole Yuen M.D.  [address]  [address]  [place] [address]  [phone]    Schedule an appointment as soon as possible for a visit       [facility], Emergency Dept  [address]  [facility] [id number] [phone]    If symptoms worsen    [facility]  [address]  [facility] [facility] [address] [phone]    Today        OUTPATIENT MEDICATIONS:  New Prescriptions    METHYLPREDNISOLONE (MEDROL) 4 MG TAB    Take as per the package instructions.        FINAL DIAGNOSIS  1. Cervical radiculopathy    2. Left arm pain         Theodore TIDWELL), am scribing for, and in the presence of, Nic Duarte M.D..    Electronically signed by: Theodore Vega), 1/14/2025    Nic TIDWELL M.D. personally performed the services described in this documentation, as scribed by Theodore Le in my presence, and it is both accurate and  complete.      The note accurately reflects work and decisions made by me.  Nic Duarte M.D.  1/14/2025  12:58 PM

## 2025-01-14 NOTE — ED TRIAGE NOTES
"Chief Complaint   Patient presents with    Arm Pain     Left arm pain that is throbbing and aching.  States she went to UC, dx with arthritis this weekend received a steroid shot with no relief, also states she has a broken back.       /77   Pulse (!) 112   Temp 36.4 °C (97.5 °F) (Temporal)   Resp 15   Ht 1.549 m (5' 1\")   Wt 68.7 kg (151 lb 7.3 oz)   SpO2 88% Comment: PT has asthma and COPD  BMI 28.62 kg/m²        Took her home PRN Oxy med at 0200  "

## 2025-01-14 NOTE — ED NOTES
PT states she has pain all throughout her left arm. PT states it a throbbing/aching pain. PT states she was at urgent care this past weekend for her shoulder. PT states they told she has arthritis. States they gave her a steroid shot. PT states she can't be seen by her doctor until tomorrow. PT denies chest pain and shortness of breath.

## 2025-01-14 NOTE — ED NOTES
Patient given discharge instructions, verbalized understanding. Rx given for medrol. Patient instructed to follow up with [facility], PCP. Education provided to come to ER if symptoms worsen. Discharged in stable condition, able to walk out with use of personal walker.

## 2025-01-15 ENCOUNTER — APPOINTMENT (OUTPATIENT)
Dept: RADIOLOGY | Facility: MEDICAL CENTER | Age: 75
DRG: 193 | End: 2025-01-15
Attending: EMERGENCY MEDICINE
Payer: MEDICARE

## 2025-01-15 ENCOUNTER — HOSPITAL ENCOUNTER (INPATIENT)
Facility: MEDICAL CENTER | Age: 75
LOS: 2 days | DRG: 193 | End: 2025-01-17
Attending: EMERGENCY MEDICINE | Admitting: HOSPITALIST
Payer: MEDICARE

## 2025-01-15 DIAGNOSIS — R29.898 LEFT ARM WEAKNESS: ICD-10-CM

## 2025-01-15 DIAGNOSIS — J96.01 ACUTE RESPIRATORY FAILURE WITH HYPOXIA (HCC): Primary | ICD-10-CM

## 2025-01-15 DIAGNOSIS — J18.9 PNEUMONIA OF BOTH LOWER LOBES DUE TO INFECTIOUS ORGANISM: ICD-10-CM

## 2025-01-15 DIAGNOSIS — M54.12 CERVICAL RADICULOPATHY: ICD-10-CM

## 2025-01-15 LAB
ANION GAP SERPL CALC-SCNC: 13 MMOL/L (ref 7–16)
ANISOCYTOSIS BLD QL SMEAR: ABNORMAL
BASOPHILS # BLD AUTO: 0 % (ref 0–1.8)
BASOPHILS # BLD: 0 K/UL (ref 0–0.12)
BUN SERPL-MCNC: 15 MG/DL (ref 8–22)
CALCIUM SERPL-MCNC: 8.9 MG/DL (ref 8.5–10.5)
CHLORIDE SERPL-SCNC: 100 MMOL/L (ref 96–112)
CO2 SERPL-SCNC: 23 MMOL/L (ref 20–33)
COMMENT NL1176: NORMAL
CREAT SERPL-MCNC: 0.53 MG/DL (ref 0.5–1.4)
CRP SERPL HS-MCNC: 4.36 MG/DL (ref 0–0.75)
EKG IMPRESSION: NORMAL
EOSINOPHIL # BLD AUTO: 0 K/UL (ref 0–0.51)
EOSINOPHIL NFR BLD: 0 % (ref 0–6.9)
ERYTHROCYTE [DISTWIDTH] IN BLOOD BY AUTOMATED COUNT: 50.4 FL (ref 35.9–50)
ERYTHROCYTE [SEDIMENTATION RATE] IN BLOOD BY WESTERGREN METHOD: 50 MM/HOUR (ref 0–25)
FLUAV RNA SPEC QL NAA+PROBE: NEGATIVE
FLUBV RNA SPEC QL NAA+PROBE: NEGATIVE
GFR SERPLBLD CREATININE-BSD FMLA CKD-EPI: 97 ML/MIN/1.73 M 2
GLUCOSE SERPL-MCNC: 103 MG/DL (ref 65–99)
HCT VFR BLD AUTO: 35.2 % (ref 37–47)
HGB BLD-MCNC: 11.1 G/DL (ref 12–16)
LYMPHOCYTES # BLD AUTO: 0.5 K/UL (ref 1–4.8)
LYMPHOCYTES NFR BLD: 3.5 % (ref 22–41)
MANUAL DIFF BLD: NORMAL
MCH RBC QN AUTO: 26.3 PG (ref 27–33)
MCHC RBC AUTO-ENTMCNC: 31.5 G/DL (ref 32.2–35.5)
MCV RBC AUTO: 83.4 FL (ref 81.4–97.8)
MICROCYTES BLD QL SMEAR: ABNORMAL
MONOCYTES # BLD AUTO: 0.49 K/UL (ref 0–0.85)
MONOCYTES NFR BLD AUTO: 3.4 % (ref 0–13.4)
MORPHOLOGY BLD-IMP: NORMAL
NEUTROPHILS # BLD AUTO: 13.31 K/UL (ref 1.82–7.42)
NEUTROPHILS NFR BLD: 88.8 % (ref 44–72)
NEUTS BAND NFR BLD MANUAL: 4.3 % (ref 0–10)
NRBC # BLD AUTO: 0 K/UL
NRBC BLD-RTO: 0 /100 WBC (ref 0–0.2)
NT-PROBNP SERPL IA-MCNC: 273 PG/ML (ref 0–125)
PLATELET # BLD AUTO: 180 K/UL (ref 164–446)
PLATELET BLD QL SMEAR: NORMAL
PMV BLD AUTO: 9.7 FL (ref 9–12.9)
POTASSIUM SERPL-SCNC: 4.7 MMOL/L (ref 3.6–5.5)
RBC # BLD AUTO: 4.22 M/UL (ref 4.2–5.4)
RBC BLD AUTO: PRESENT
RSV RNA SPEC QL NAA+PROBE: NEGATIVE
SARS-COV-2 RNA RESP QL NAA+PROBE: NOTDETECTED
SODIUM SERPL-SCNC: 136 MMOL/L (ref 135–145)
SPECIMEN SOURCE: NORMAL
TOXIC GRANULES BLD QL SMEAR: NORMAL
TROPONIN T SERPL-MCNC: 16 NG/L (ref 6–19)
WBC # BLD AUTO: 14.3 K/UL (ref 4.8–10.8)
WBC TOXIC VACUOLES BLD QL SMEAR: NORMAL

## 2025-01-15 PROCEDURE — 99285 EMERGENCY DEPT VISIT HI MDM: CPT

## 2025-01-15 PROCEDURE — 86140 C-REACTIVE PROTEIN: CPT

## 2025-01-15 PROCEDURE — 85007 BL SMEAR W/DIFF WBC COUNT: CPT

## 2025-01-15 PROCEDURE — 96366 THER/PROPH/DIAG IV INF ADDON: CPT

## 2025-01-15 PROCEDURE — 87040 BLOOD CULTURE FOR BACTERIA: CPT

## 2025-01-15 PROCEDURE — 85652 RBC SED RATE AUTOMATED: CPT

## 2025-01-15 PROCEDURE — 700111 HCHG RX REV CODE 636 W/ 250 OVERRIDE (IP): Mod: JZ | Performed by: HOSPITALIST

## 2025-01-15 PROCEDURE — 700105 HCHG RX REV CODE 258: Performed by: EMERGENCY MEDICINE

## 2025-01-15 PROCEDURE — 700101 HCHG RX REV CODE 250: Performed by: HOSPITALIST

## 2025-01-15 PROCEDURE — 96365 THER/PROPH/DIAG IV INF INIT: CPT

## 2025-01-15 PROCEDURE — 99223 1ST HOSP IP/OBS HIGH 75: CPT | Performed by: HOSPITALIST

## 2025-01-15 PROCEDURE — 0241U HCHG SARS-COV-2 COVID-19 NFCT DS RESP RNA 4 TRGT MIC: CPT

## 2025-01-15 PROCEDURE — A9270 NON-COVERED ITEM OR SERVICE: HCPCS | Performed by: HOSPITALIST

## 2025-01-15 PROCEDURE — 700111 HCHG RX REV CODE 636 W/ 250 OVERRIDE (IP): Performed by: EMERGENCY MEDICINE

## 2025-01-15 PROCEDURE — 71045 X-RAY EXAM CHEST 1 VIEW: CPT

## 2025-01-15 PROCEDURE — 80048 BASIC METABOLIC PNL TOTAL CA: CPT

## 2025-01-15 PROCEDURE — 72125 CT NECK SPINE W/O DYE: CPT

## 2025-01-15 PROCEDURE — 700102 HCHG RX REV CODE 250 W/ 637 OVERRIDE(OP): Performed by: HOSPITALIST

## 2025-01-15 PROCEDURE — 700105 HCHG RX REV CODE 258: Performed by: HOSPITALIST

## 2025-01-15 PROCEDURE — 96375 TX/PRO/DX INJ NEW DRUG ADDON: CPT

## 2025-01-15 PROCEDURE — 700102 HCHG RX REV CODE 250 W/ 637 OVERRIDE(OP): Performed by: STUDENT IN AN ORGANIZED HEALTH CARE EDUCATION/TRAINING PROGRAM

## 2025-01-15 PROCEDURE — 36415 COLL VENOUS BLD VENIPUNCTURE: CPT

## 2025-01-15 PROCEDURE — 84484 ASSAY OF TROPONIN QUANT: CPT

## 2025-01-15 PROCEDURE — 93005 ELECTROCARDIOGRAM TRACING: CPT | Mod: TC | Performed by: EMERGENCY MEDICINE

## 2025-01-15 PROCEDURE — 83880 ASSAY OF NATRIURETIC PEPTIDE: CPT

## 2025-01-15 PROCEDURE — 770001 HCHG ROOM/CARE - MED/SURG/GYN PRIV*

## 2025-01-15 PROCEDURE — A9270 NON-COVERED ITEM OR SERVICE: HCPCS | Performed by: STUDENT IN AN ORGANIZED HEALTH CARE EDUCATION/TRAINING PROGRAM

## 2025-01-15 PROCEDURE — 85027 COMPLETE CBC AUTOMATED: CPT

## 2025-01-15 RX ORDER — KETOROLAC TROMETHAMINE 15 MG/ML
15 INJECTION, SOLUTION INTRAMUSCULAR; INTRAVENOUS EVERY 6 HOURS PRN
Status: DISCONTINUED | OUTPATIENT
Start: 2025-01-15 | End: 2025-01-17

## 2025-01-15 RX ORDER — HYDRALAZINE HYDROCHLORIDE 20 MG/ML
10 INJECTION INTRAMUSCULAR; INTRAVENOUS EVERY 4 HOURS PRN
Status: DISCONTINUED | OUTPATIENT
Start: 2025-01-15 | End: 2025-01-17 | Stop reason: HOSPADM

## 2025-01-15 RX ORDER — ONDANSETRON 2 MG/ML
4 INJECTION INTRAMUSCULAR; INTRAVENOUS EVERY 4 HOURS PRN
Status: DISCONTINUED | OUTPATIENT
Start: 2025-01-15 | End: 2025-01-17 | Stop reason: HOSPADM

## 2025-01-15 RX ORDER — HYDROMORPHONE HYDROCHLORIDE 1 MG/ML
1 INJECTION, SOLUTION INTRAMUSCULAR; INTRAVENOUS; SUBCUTANEOUS
Status: DISCONTINUED | OUTPATIENT
Start: 2025-01-15 | End: 2025-01-17 | Stop reason: HOSPADM

## 2025-01-15 RX ORDER — OXYCODONE HYDROCHLORIDE 10 MG/1
10 TABLET ORAL
Status: DISCONTINUED | OUTPATIENT
Start: 2025-01-15 | End: 2025-01-17 | Stop reason: HOSPADM

## 2025-01-15 RX ORDER — OXYCODONE HYDROCHLORIDE 10 MG/1
10 TABLET ORAL ONCE
Status: COMPLETED | OUTPATIENT
Start: 2025-01-15 | End: 2025-01-15

## 2025-01-15 RX ORDER — GUAIFENESIN/DEXTROMETHORPHAN 100-10MG/5
10 SYRUP ORAL EVERY 6 HOURS PRN
Status: DISCONTINUED | OUTPATIENT
Start: 2025-01-15 | End: 2025-01-17 | Stop reason: HOSPADM

## 2025-01-15 RX ORDER — OXYCODONE HYDROCHLORIDE 10 MG/1
10 TABLET ORAL
Status: DISCONTINUED | OUTPATIENT
Start: 2025-01-15 | End: 2025-01-15

## 2025-01-15 RX ORDER — AMOXICILLIN 250 MG
2 CAPSULE ORAL EVERY EVENING
Status: DISCONTINUED | OUTPATIENT
Start: 2025-01-15 | End: 2025-01-17 | Stop reason: HOSPADM

## 2025-01-15 RX ORDER — SODIUM CHLORIDE 9 MG/ML
INJECTION, SOLUTION INTRAVENOUS CONTINUOUS
Status: DISCONTINUED | OUTPATIENT
Start: 2025-01-15 | End: 2025-01-16

## 2025-01-15 RX ORDER — HYDROMORPHONE HYDROCHLORIDE 1 MG/ML
0.5 INJECTION, SOLUTION INTRAMUSCULAR; INTRAVENOUS; SUBCUTANEOUS
Status: DISCONTINUED | OUTPATIENT
Start: 2025-01-15 | End: 2025-01-15

## 2025-01-15 RX ORDER — ACETAMINOPHEN 325 MG/1
650 TABLET ORAL EVERY 6 HOURS PRN
Status: DISCONTINUED | OUTPATIENT
Start: 2025-01-15 | End: 2025-01-17 | Stop reason: HOSPADM

## 2025-01-15 RX ORDER — EZETIMIBE 10 MG/1
10 TABLET ORAL EVERY EVENING
Status: DISCONTINUED | OUTPATIENT
Start: 2025-01-15 | End: 2025-01-17 | Stop reason: HOSPADM

## 2025-01-15 RX ORDER — OXYCODONE HYDROCHLORIDE 5 MG/1
5 TABLET ORAL
Status: DISCONTINUED | OUTPATIENT
Start: 2025-01-15 | End: 2025-01-17 | Stop reason: HOSPADM

## 2025-01-15 RX ORDER — ONDANSETRON 4 MG/1
4 TABLET, ORALLY DISINTEGRATING ORAL EVERY 4 HOURS PRN
Status: DISCONTINUED | OUTPATIENT
Start: 2025-01-15 | End: 2025-01-17 | Stop reason: HOSPADM

## 2025-01-15 RX ORDER — AMITRIPTYLINE HYDROCHLORIDE 75 MG/1
150 TABLET ORAL NIGHTLY
Status: DISCONTINUED | OUTPATIENT
Start: 2025-01-15 | End: 2025-01-17 | Stop reason: HOSPADM

## 2025-01-15 RX ORDER — LEVOTHYROXINE SODIUM 88 UG/1
88 TABLET ORAL DAILY
Status: DISCONTINUED | OUTPATIENT
Start: 2025-01-16 | End: 2025-01-17 | Stop reason: HOSPADM

## 2025-01-15 RX ORDER — AZITHROMYCIN MONOHYDRATE 500 MG/5ML
500 INJECTION, POWDER, LYOPHILIZED, FOR SOLUTION INTRAVENOUS ONCE
Status: COMPLETED | OUTPATIENT
Start: 2025-01-15 | End: 2025-01-15

## 2025-01-15 RX ORDER — HYDROMORPHONE HYDROCHLORIDE 1 MG/ML
0.5 INJECTION, SOLUTION INTRAMUSCULAR; INTRAVENOUS; SUBCUTANEOUS ONCE
Status: COMPLETED | OUTPATIENT
Start: 2025-01-15 | End: 2025-01-15

## 2025-01-15 RX ORDER — METHOCARBAMOL 500 MG/1
500 TABLET, FILM COATED ORAL 3 TIMES DAILY PRN
Status: DISCONTINUED | OUTPATIENT
Start: 2025-01-15 | End: 2025-01-17 | Stop reason: HOSPADM

## 2025-01-15 RX ORDER — LIOTHYRONINE SODIUM 5 UG/1
5 TABLET ORAL DAILY
Status: DISCONTINUED | OUTPATIENT
Start: 2025-01-16 | End: 2025-01-17 | Stop reason: HOSPADM

## 2025-01-15 RX ORDER — LIDOCAINE 4 G/G
1 PATCH TOPICAL EVERY 24 HOURS
Status: DISCONTINUED | OUTPATIENT
Start: 2025-01-15 | End: 2025-01-17 | Stop reason: HOSPADM

## 2025-01-15 RX ORDER — OXYCODONE HYDROCHLORIDE 5 MG/1
5 TABLET ORAL
Status: DISCONTINUED | OUTPATIENT
Start: 2025-01-15 | End: 2025-01-15

## 2025-01-15 RX ORDER — ESCITALOPRAM OXALATE 10 MG/1
20 TABLET ORAL DAILY
Status: DISCONTINUED | OUTPATIENT
Start: 2025-01-15 | End: 2025-01-17 | Stop reason: HOSPADM

## 2025-01-15 RX ORDER — POLYETHYLENE GLYCOL 3350 17 G/17G
1 POWDER, FOR SOLUTION ORAL
Status: DISCONTINUED | OUTPATIENT
Start: 2025-01-15 | End: 2025-01-17 | Stop reason: HOSPADM

## 2025-01-15 RX ORDER — ROSUVASTATIN CALCIUM 5 MG/1
20 TABLET, COATED ORAL EVERY EVENING
Status: DISCONTINUED | OUTPATIENT
Start: 2025-01-15 | End: 2025-01-17 | Stop reason: HOSPADM

## 2025-01-15 RX ORDER — METOPROLOL SUCCINATE 25 MG/1
25 TABLET, EXTENDED RELEASE ORAL DAILY
Status: DISCONTINUED | OUTPATIENT
Start: 2025-01-16 | End: 2025-01-17 | Stop reason: HOSPADM

## 2025-01-15 RX ORDER — ALBUTEROL SULFATE 90 UG/1
1-2 INHALANT RESPIRATORY (INHALATION) EVERY 4 HOURS PRN
Status: DISCONTINUED | OUTPATIENT
Start: 2025-01-15 | End: 2025-01-17 | Stop reason: HOSPADM

## 2025-01-15 RX ADMIN — ROSUVASTATIN CALCIUM 20 MG: 20 TABLET, FILM COATED ORAL at 19:37

## 2025-01-15 RX ADMIN — CEFTRIAXONE SODIUM 2000 MG: 10 INJECTION, POWDER, FOR SOLUTION INTRAVENOUS at 16:23

## 2025-01-15 RX ADMIN — METHOCARBAMOL 500 MG: 500 TABLET ORAL at 19:50

## 2025-01-15 RX ADMIN — SENNOSIDES AND DOCUSATE SODIUM 2 TABLET: 50; 8.6 TABLET ORAL at 19:36

## 2025-01-15 RX ADMIN — OMEPRAZOLE 20 MG: 20 CAPSULE, DELAYED RELEASE ORAL at 19:37

## 2025-01-15 RX ADMIN — KETOROLAC TROMETHAMINE 15 MG: 15 INJECTION, SOLUTION INTRAMUSCULAR; INTRAVENOUS at 19:41

## 2025-01-15 RX ADMIN — ESCITALOPRAM OXALATE 20 MG: 10 TABLET ORAL at 19:37

## 2025-01-15 RX ADMIN — RIVAROXABAN 10 MG: 10 TABLET, FILM COATED ORAL at 19:37

## 2025-01-15 RX ADMIN — SODIUM CHLORIDE: 9 INJECTION, SOLUTION INTRAVENOUS at 19:38

## 2025-01-15 RX ADMIN — EZETIMIBE 10 MG: 10 TABLET ORAL at 19:36

## 2025-01-15 RX ADMIN — AMITRIPTYLINE HYDROCHLORIDE 150 MG: 75 TABLET, FILM COATED ORAL at 20:50

## 2025-01-15 RX ADMIN — AZITHROMYCIN DIHYDRATE 500 MG: 500 INJECTION, POWDER, LYOPHILIZED, FOR SOLUTION INTRAVENOUS at 15:33

## 2025-01-15 RX ADMIN — OXYCODONE HYDROCHLORIDE 10 MG: 10 TABLET ORAL at 17:17

## 2025-01-15 RX ADMIN — HYDROMORPHONE HYDROCHLORIDE 0.5 MG: 1 INJECTION, SOLUTION INTRAMUSCULAR; INTRAVENOUS; SUBCUTANEOUS at 16:22

## 2025-01-15 RX ADMIN — OXYCODONE HYDROCHLORIDE 10 MG: 10 TABLET ORAL at 20:49

## 2025-01-15 RX ADMIN — LIDOCAINE 1 PATCH: 4 PATCH TOPICAL at 19:35

## 2025-01-15 ASSESSMENT — SOCIAL DETERMINANTS OF HEALTH (SDOH)
WITHIN THE LAST YEAR, HAVE YOU BEEN HUMILIATED OR EMOTIONALLY ABUSED IN OTHER WAYS BY YOUR PARTNER OR EX-PARTNER?: NO
WITHIN THE PAST 12 MONTHS, YOU WORRIED THAT YOUR FOOD WOULD RUN OUT BEFORE YOU GOT THE MONEY TO BUY MORE: NEVER TRUE
WITHIN THE PAST 12 MONTHS, THE FOOD YOU BOUGHT JUST DIDN'T LAST AND YOU DIDN'T HAVE MONEY TO GET MORE: NEVER TRUE
WITHIN THE LAST YEAR, HAVE YOU BEEN KICKED, HIT, SLAPPED, OR OTHERWISE PHYSICALLY HURT BY YOUR PARTNER OR EX-PARTNER?: NO
WITHIN THE LAST YEAR, HAVE YOU BEEN AFRAID OF YOUR PARTNER OR EX-PARTNER?: NO
WITHIN THE LAST YEAR, HAVE TO BEEN RAPED OR FORCED TO HAVE ANY KIND OF SEXUAL ACTIVITY BY YOUR PARTNER OR EX-PARTNER?: NO
IN THE PAST 12 MONTHS, HAS THE ELECTRIC, GAS, OIL, OR WATER COMPANY THREATENED TO SHUT OFF SERVICE IN YOUR HOME?: NO

## 2025-01-15 ASSESSMENT — COGNITIVE AND FUNCTIONAL STATUS - GENERAL
MOVING TO AND FROM BED TO CHAIR: A LITTLE
MOBILITY SCORE: 19
DAILY ACTIVITIY SCORE: 20
DRESSING REGULAR LOWER BODY CLOTHING: A LITTLE
DRESSING REGULAR UPPER BODY CLOTHING: A LITTLE
CLIMB 3 TO 5 STEPS WITH RAILING: A LOT
SUGGESTED CMS G CODE MODIFIER MOBILITY: CK
TOILETING: A LITTLE
STANDING UP FROM CHAIR USING ARMS: A LITTLE
WALKING IN HOSPITAL ROOM: A LITTLE
HELP NEEDED FOR BATHING: A LITTLE
SUGGESTED CMS G CODE MODIFIER DAILY ACTIVITY: CJ

## 2025-01-15 ASSESSMENT — LIFESTYLE VARIABLES
HOW MANY TIMES IN THE PAST YEAR HAVE YOU HAD 5 OR MORE DRINKS IN A DAY: 0
ALCOHOL_USE: NO
HAVE YOU EVER FELT YOU SHOULD CUT DOWN ON YOUR DRINKING: NO
CONSUMPTION TOTAL: NEGATIVE
EVER FELT BAD OR GUILTY ABOUT YOUR DRINKING: NO
EVER HAD A DRINK FIRST THING IN THE MORNING TO STEADY YOUR NERVES TO GET RID OF A HANGOVER: NO
TOTAL SCORE: 0
ON A TYPICAL DAY WHEN YOU DRINK ALCOHOL HOW MANY DRINKS DO YOU HAVE: 0
TOTAL SCORE: 0
AVERAGE NUMBER OF DAYS PER WEEK YOU HAVE A DRINK CONTAINING ALCOHOL: 0
DOES PATIENT WANT TO STOP DRINKING: NO
HAVE PEOPLE ANNOYED YOU BY CRITICIZING YOUR DRINKING: NO
TOTAL SCORE: 0

## 2025-01-15 ASSESSMENT — ENCOUNTER SYMPTOMS
DEPRESSION: 0
SHORTNESS OF BREATH: 0
ABDOMINAL PAIN: 0
PALPITATIONS: 0
NECK PAIN: 1
NERVOUS/ANXIOUS: 0
FOCAL WEAKNESS: 1
NAUSEA: 0
WEIGHT LOSS: 0
MYALGIAS: 1
FEVER: 0
CHILLS: 0
WEAKNESS: 1
HEADACHES: 0
BACK PAIN: 0

## 2025-01-15 ASSESSMENT — PAIN DESCRIPTION - PAIN TYPE
TYPE: ACUTE PAIN
TYPE: ACUTE PAIN
TYPE: CHRONIC PAIN
TYPE: ACUTE PAIN
TYPE: CHRONIC PAIN
TYPE: ACUTE PAIN

## 2025-01-15 ASSESSMENT — PATIENT HEALTH QUESTIONNAIRE - PHQ9
5. POOR APPETITE OR OVEREATING: SEVERAL DAYS
7. TROUBLE CONCENTRATING ON THINGS, SUCH AS READING THE NEWSPAPER OR WATCHING TELEVISION: SEVERAL DAYS
3. TROUBLE FALLING OR STAYING ASLEEP OR SLEEPING TOO MUCH: SEVERAL DAYS
9. THOUGHTS THAT YOU WOULD BE BETTER OFF DEAD, OR OF HURTING YOURSELF: NOT AT ALL
4. FEELING TIRED OR HAVING LITTLE ENERGY: SEVERAL DAYS
SUM OF ALL RESPONSES TO PHQ QUESTIONS 1-9: 6
8. MOVING OR SPEAKING SO SLOWLY THAT OTHER PEOPLE COULD HAVE NOTICED. OR THE OPPOSITE, BEING SO FIGETY OR RESTLESS THAT YOU HAVE BEEN MOVING AROUND A LOT MORE THAN USUAL: NOT AT ALL
SUM OF ALL RESPONSES TO PHQ9 QUESTIONS 1 AND 2: 1
1. LITTLE INTEREST OR PLEASURE IN DOING THINGS: NOT AT ALL
6. FEELING BAD ABOUT YOURSELF - OR THAT YOU ARE A FAILURE OR HAVE LET YOURSELF OR YOUR FAMILY DOWN: SEVERAL DAYS
2. FEELING DOWN, DEPRESSED, IRRITABLE, OR HOPELESS: SEVERAL DAYS

## 2025-01-15 ASSESSMENT — FIBROSIS 4 INDEX
FIB4 SCORE: 1.59

## 2025-01-15 NOTE — PROGRESS NOTES
ED Observation Progress Note    Date of Service: 01/15/25    Interval History and Interventions  Received signout from Dr. Duarte.  Patient presents, hypoxic.  Was previously seen for suspected cervical radiculopathy, started on steroids which she did not .  X-ray here shows bibasilar pneumonia, does have elevated inflammatory markers and a leukocytosis.  This is concerning for community-acquired pneumonia, patient has received antibiotics for community-acquired pneumonia, pending imaging of her cervical spine for her cervical radiculopathy and anticipated admission to the hospital for acute respiratory failure with hypoxia    CT C-spine showed degenerative changes concerning for arthritis.  No obvious narrowing of the spinal canal.  Discussed case with Dr. Parsons who is agreeable to evaluate the patient, mission to the hospital for further evaluation, monitoring and management.  Patient remained on 4 L nasal cannula.    Physical Exam  BP (!) 191/82   Pulse (!) 108   Temp 37.2 °C (99 °F) (Temporal)   Resp 20   SpO2 93% .    Constitutional: Awake and alert.  Tearful, complaining of pain in left upper extremity  HENT:  Grossly normal  Eyes: Grossly normal  Neck: Normal range of motion  Cardiovascular: Normal heart rate   Thorax & Lungs: Coarse breath sounds bilaterally, no significant tachypnea or accessory muscle use  Abdomen: Nontender  Skin:  No pathologic rash.   Extremities: Well perfused  Psychiatric: Affect normal    Labs  Results for orders placed or performed during the hospital encounter of 01/15/25   CoV-2, FLU A/B, and RSV by PCR (2-4 Hours CEPHEID) : Collect NP swab in VTM    Collection Time: 01/15/25  2:54 PM    Specimen: Respirate   Result Value Ref Range    Influenza virus A RNA Negative Negative    Influenza virus B, PCR Negative Negative    RSV, PCR Negative Negative    SARS-CoV-2 by PCR NotDetected     SARS-CoV-2 Source NP Swab    EKG    Collection Time: 01/15/25  3:52 PM   Result Value  Ref Range    Report       [facility] Emergency Dept.    Test Date:  2025-01-15  Pt Name:    SIMI NELSON               Department: ER  MRN:        [id number]                      Room:       ORTHO  Gender:     Female                       Technician: [id number]  :        1950                   Requested By:SANDY SMITH  Order #:    [id number]                    Reading MD: SANDY SMITH MD    Measurements  Intervals                                Axis  Rate:       102                          P:          67  ID:         [id number]                          QRS:        26  QRSD:       76                           T:          29  QT:         316  QTc:        412    Interpretive Statements  Sinus tachycardia  Compared to ECG 10/19/2024 21:44:11  Sinus rhythm no longer present  Electronically Signed On 01- 15:52:39 PST by SANDY SMITH MD         Radiology  CT-CSPINE WITHOUT PLUS RECONS   Final Result      Degenerative changes of the cervical spine without acute fracture or malalignment.      DX-CHEST-PORTABLE (1 VIEW)   Final Result      Mild bibasilar atelectasis and/or consolidation. Underlying infection is possible.      MR-CERVICAL SPINE-W/O    (Results Pending)   MR-BRAIN-W/O    (Results Pending)       Problem List  1.  Acute respiratory failure with hypoxia  2.  Community-acquired pneumonia  3.  Cervical radiculopathy    Electronically signed by: Mina Freeman M.D., 1/15/2025 3:55 PM

## 2025-01-15 NOTE — ED PROVIDER NOTES
"  ER Provider Note    Scribed for Nic Duarte M.D. by Ganga Matias. 1/15/2025   2:20 PM    Primary Care Provider: Cole Yuen M.D.    CHIEF COMPLAINT  Chief Complaint   Patient presents with    Weakness     L arm since she woke up at 9am     EXTERNAL RECORDS REVIEWED      HPI/ROS  LIMITATION TO HISTORY   Select: : None  OUTSIDE HISTORIAN(S):  None.    Yamile Go is a 74 y.o. female who presents to the ED for evaluation of left arm weakness onset three days ago. Patient reports that her left arm has been in a lot of pain for the past three days, but she woke up this morning with weakness to the extremity. She describes her arm as \"heavy\", and has difficulty picking up the extremity. Her pain is improved but still present, and she says her primary complaint is the inability to move her arm. Patient began taking prednisone after she was seen yesterday. She has a history of asthma.     PAST MEDICAL HISTORY  Past Medical History:   Diagnosis Date    Anemia     Anesthesia     PONV    Arthritis     degenerative    Asthma     CAD (coronary artery disease)     cardiologist, Dr. Winkler  last visit 4/2024    Cataract     Dental disorder     Missing teeth right side.  Veneers top front    Depression     anxiety    Diabetes (HCC)     Disorder of thyroid     hypothyroid    Heart burn     GERD, controlled with medications    History of vertebral fracture     T12, L1, L2    HTN (hypertension)     Hyperlipidemia     Indigestion     Migraines     Obesity     Pneumonia 2023    COVID    PONV (postoperative nausea and vomiting)     Sleep apnea     diagnosed 9/2009 CPAP -PMA; pt states she does not use CPAP and no longer has one, unable to tolerate       SURGICAL HISTORY  Past Surgical History:   Procedure Laterality Date    PB RECONSTR TOTAL SHOULDER IMPLANT Right 4/30/2024    Procedure: RIGHT REVERSE TOTAL SHOULDER ARTHROPLASTY;  Surgeon: Manuela Sparks M.D.;  Location: SURGERY [facility];  Service: " Orthopedics    INSERTION, PERIPHERAL NERVE STIMULATOR, LOWER EXTREMITY Left 12/22/2022    Procedure: Left SCIATIC PERIPHERAL NERVE STIMULATOR IMPLANT, LOWER EXTREMITY PERIPHERAL SCIATIC NERVE;  Surgeon: Tobi Kilgore M.D.;  Location: SURGERY [facility];  Service: Pain Management    ARTHROPLASTY Right 2020    ankle    LUMBAR EXPLORATION N/A 2017    COLECTOMY N/A 2007    partial for divverticulitis 2007    LUMBAR LAMINECTOMY DISKECTOMY N/A 1989    BREAST BIOPSY  1984    no cancer    ABDOMINAL HYSTERECTOMY TOTAL N/A 1978    APPENDECTOMY N/A 1976       FAMILY HISTORY  Family History   Problem Relation Age of Onset    Cancer Mother         lung    Heart Disease Mother     Hyperlipidemia Mother     Stroke Father     Heart Disease Father     Diabetes Father     Hypertension Father     Hyperlipidemia Father     Heart Disease Brother         cath and bypass    Diabetes Brother     Hypertension Brother     Hyperlipidemia Brother     Diabetes Maternal Aunt     Hypertension Maternal Aunt     Heart Disease Brother         cath and byp[ass    Drug abuse Brother     Heart Disease Brother         cath and bypass    Diabetes Brother     Heart Disease Brother     Other Brother         MVA    Hypertension Maternal Aunt     Drug abuse Daughter     Alcohol abuse Daughter        SOCIAL HISTORY   reports that she has never smoked. She has never used smokeless tobacco. She reports that she does not drink alcohol and does not use drugs.    CURRENT MEDICATIONS  Previous Medications    ALBUTEROL 108 (90 BASE) MCG/ACT AERO SOLN INHALATION AEROSOL    Inhale 1-2 Puffs every four hours as needed for Shortness of Breath.    ALENDRONATE (FOSAMAX) 70 MG TAB        AMITRIPTYLINE (ELAVIL) 100 MG TAB    Take 1.5 Tablets by mouth every evening.    CLOTRIMAZOLE (MYCELEX) 10 MG AGATHA AGATHA    Take 1 Agatha by mouth 5 Times a Day for 7 days.    DICLOFENAC SODIUM (VOLTAREN) 1 % GEL    Apply 4 g topically 4 times a day as needed (back pain).     ESCITALOPRAM (LEXAPRO) 20 MG TABLET    Take 1 Tablet by mouth every day.    EZETIMIBE (ZETIA) 10 MG TAB    Take 1 Tablet by mouth every evening.    FLUCONAZOLE (DIFLUCAN) 150 MG TABLET    Take one tablet orally for yeast infection    LIOTHYRONINE (CYTOMEL) 5 MCG TAB    Take 1 Tablet by mouth every day.    METFORMIN (GLUCOPHAGE) 500 MG TAB    Take 1 Tablet by mouth 2 times a day with meals. Indications: Type 2 Diabetes    METHOCARBAMOL (ROBAXIN) 500 MG TAB    Take 1 Tablet by mouth 3 times a day as needed (Hold for respiratory depression, respiratory rate <12, heart rate less than 65, lethargy,somnolence, or systolic blood pressure < 105.).    METHYLPREDNISOLONE (MEDROL) 4 MG TAB    Take as per the package instructions.    METOPROLOL SR (TOPROL XL) 25 MG TABLET SR 24 HR    Take 1 Tablet by mouth every day.    METRONIDAZOLE (METROGEL-VAGINAL) 0.75 % GEL        OMEPRAZOLE (PRILOSEC) 20 MG DELAYED-RELEASE CAPSULE    Take 1 Capsule by mouth 2 times a day.    OXYCODONE IMMEDIATE RELEASE (ROXICODONE) 10 MG IMMEDIATE RELEASE TABLET    Take 1 tablet every 4 hours by oral route for 30 days.    ROSUVASTATIN (CRESTOR) 20 MG TAB    Take 1 Tablet by mouth every evening.    SYNTHROID 88 MCG TAB    Take 1 Tablet by mouth every day.       ALLERGIES  Allergies   Allergen Reactions    Gabapentin Hives and Swelling    Pregabalin Hives and Swelling    Amlodipine Swelling    Bee Swelling    Fentanyl Vomiting and Unspecified    Morphine Vomiting, Nausea and Unspecified    Benzoin Rash     Tincture of benzoin =blisters    blisters    Rifampin Unspecified     Pt turns yellow        PHYSICAL EXAM  BP (!) 191/82   Pulse (!) 108   Temp 37.2 °C (99 °F) (Temporal)   Resp 20   SpO2 93%    Nursing note and vitals reviewed.  Constitutional: Well-developed and well-nourished. No distress.   HENT: Head is normocephalic and atraumatic. Oropharynx is clear and moist without exudate or erythema.   Eyes: Pupils are equal, round, and reactive to  light. Conjunctiva are normal.   Cardiovascular: Normal rate and regular rhythm. No murmur heard. Normal radial pulses.  Pulmonary/Chest: Breath sounds normal. No wheezes or rales.   Abdominal: Soft and non-tender. No distention    Extremities: Patient is in much less pain than when I saw her yesterday. Able to flex and extend at the elbow, has difficulty rasing the arm. Normal strength in the right upper extremity.   Neurological: Awake, alert and oriented to person, place, and time. No focal deficits noted.  Skin: Skin is warm and dry. No rash.   Psychiatric: Normal mood and affect. Appropriate for clinical situation    DIAGNOSTIC STUDIES    Labs:   Results for orders placed or performed during the hospital encounter of 01/08/25   URINE CULTURE(NEW)    Collection Time: 01/08/25  3:55 PM    Specimen: Urine   Result Value Ref Range    Significant Indicator NEG     Source UR     Site -     Culture Result Usual urogenital alis >100,000 cfu/mL        EKG:   I have independently interpreted this EKG as detailed above.     Radiology:   This attending emergency physician has independently interpreted the diagnostic imaging associated with this visit and is awaiting the final reading from the radiologist.   Preliminary interpretation is a follows: Chest x-ray shows bilateral lower lobe pneumonia.  CT scan pending    Radiologist interpretation:   DX-CHEST-PORTABLE (1 VIEW)   Final Result      Mild bibasilar atelectasis and/or consolidation. Underlying infection is possible.      CT-CSPINE WITHOUT PLUS RECONS    (Results Pending)        ASSESSMENT AND PLAN    2:20 PM - Patient was evaluated at bedside. Patient arrives today with three days of right arm pain, and woke up this morning with weakness. She states that she is unable to move the extremity and describes it as heavy. Ordered for EKG, CRP quantitive, Sed rate, BMP, CBC with differential, BNP, Troponin, CoV, Flu A/B, and RSV by PCR, CT-C spine without plus recons, and  DX-Chest to evaluate. Patient verbalizes understanding and support with my plan of care. Differential diagnoses include but not limited to: Cervical radiculopathy, pneumonia, COPD.     2:59 PM - Patient's chest X-ray is positive for Possible pneumonia vs. Atelectasis and will treat with antibiotics as a precaution.     3:53 PM at this time the patient's laboratory studies remain pending.  Chest x-ray shows pneumonia.  She has been treated with antibiotics given her hypoxemia, tachycardia, and x-ray findings.  CT scan remains pending.  Laboratory studies are pending.  My partner Dr. Maradiaga will assume care at this time    I anticipate admission given the patient's clinical finding of hypoxemia which would represent acute respiratory failure with hypoxia.  I feel her arm weakness is most likely related to a cervical radiculopathy.  This is isolated left arm.  Had severe pain yesterday that is improved but now developing weakness.  CT scan is pending to further evaluate.  May eventually require an MRI.  I do not feel this represents a stroke.      FINAL DIAGNOSIS  1. Cervical radiculopathy    2. Left arm weakness    3. Acute respiratory failure with hypoxia (HCC)    4. Pneumonia of both lower lobes due to infectious organism         Ganga TIDWELL (Lula), am scribing for, and in the presence of, Nic Duarte M.D..    Electronically signed by: Ganga Matias (Lula), 1/15/2025    Nic TIDWELL M.D. personally performed the services described in this documentation, as scribed by Ganga Matias in my presence, and it is both accurate and complete.      The note accurately reflects work and decisions made by me.  Nic Duarte M.D.  1/15/2025  4:00 PM

## 2025-01-15 NOTE — ED TRIAGE NOTES
BIB [facility] to [facility] from home  Chief Complaint   Patient presents with    Weakness     L arm since she woke up at 9am     Pt said she was seen at [facility] yesterday for L arm pain, after received pain meds she felt better last night, but woke up today and now has weakness to her L arm. Ambulatory with assistance with [facility]. Pt is AOx4, although states she's a poor historian. Chart up for ERP.

## 2025-01-16 ENCOUNTER — APPOINTMENT (OUTPATIENT)
Dept: RADIOLOGY | Facility: MEDICAL CENTER | Age: 75
DRG: 193 | End: 2025-01-16
Payer: MEDICARE

## 2025-01-16 LAB
ANION GAP SERPL CALC-SCNC: 11 MMOL/L (ref 7–16)
B PARAP IS1001 DNA NPH QL NAA+NON-PROBE: NOT DETECTED
B PERT.PT PRMT NPH QL NAA+NON-PROBE: NOT DETECTED
BUN SERPL-MCNC: 12 MG/DL (ref 8–22)
C PNEUM DNA NPH QL NAA+NON-PROBE: NOT DETECTED
CALCIUM SERPL-MCNC: 8.1 MG/DL (ref 8.5–10.5)
CHLORIDE SERPL-SCNC: 101 MMOL/L (ref 96–112)
CO2 SERPL-SCNC: 24 MMOL/L (ref 20–33)
CREAT SERPL-MCNC: 0.54 MG/DL (ref 0.5–1.4)
ERYTHROCYTE [DISTWIDTH] IN BLOOD BY AUTOMATED COUNT: 50.3 FL (ref 35.9–50)
FLUAV RNA NPH QL NAA+NON-PROBE: NOT DETECTED
FLUBV RNA NPH QL NAA+NON-PROBE: NOT DETECTED
GFR SERPLBLD CREATININE-BSD FMLA CKD-EPI: 97 ML/MIN/1.73 M 2
GLUCOSE SERPL-MCNC: 141 MG/DL (ref 65–99)
HADV DNA NPH QL NAA+NON-PROBE: NOT DETECTED
HCOV 229E RNA NPH QL NAA+NON-PROBE: NOT DETECTED
HCOV HKU1 RNA NPH QL NAA+NON-PROBE: NOT DETECTED
HCOV NL63 RNA NPH QL NAA+NON-PROBE: NOT DETECTED
HCOV OC43 RNA NPH QL NAA+NON-PROBE: NOT DETECTED
HCT VFR BLD AUTO: 32.4 % (ref 37–47)
HGB BLD-MCNC: 10.2 G/DL (ref 12–16)
HMPV RNA NPH QL NAA+NON-PROBE: NOT DETECTED
HPIV1 RNA NPH QL NAA+NON-PROBE: NOT DETECTED
HPIV2 RNA NPH QL NAA+NON-PROBE: NOT DETECTED
HPIV3 RNA NPH QL NAA+NON-PROBE: NOT DETECTED
HPIV4 RNA NPH QL NAA+NON-PROBE: NOT DETECTED
M PNEUMO DNA NPH QL NAA+NON-PROBE: NOT DETECTED
MCH RBC QN AUTO: 26.2 PG (ref 27–33)
MCHC RBC AUTO-ENTMCNC: 31.5 G/DL (ref 32.2–35.5)
MCV RBC AUTO: 83.1 FL (ref 81.4–97.8)
PLATELET # BLD AUTO: 166 K/UL (ref 164–446)
PMV BLD AUTO: 8.7 FL (ref 9–12.9)
POTASSIUM SERPL-SCNC: 4.7 MMOL/L (ref 3.6–5.5)
PROCALCITONIN SERPL-MCNC: 0.24 NG/ML
RBC # BLD AUTO: 3.9 M/UL (ref 4.2–5.4)
RSV RNA NPH QL NAA+NON-PROBE: NOT DETECTED
RV+EV RNA NPH QL NAA+NON-PROBE: NOT DETECTED
SARS-COV-2 RNA NPH QL NAA+NON-PROBE: NOTDETECTED
SODIUM SERPL-SCNC: 136 MMOL/L (ref 135–145)
WBC # BLD AUTO: 12.5 K/UL (ref 4.8–10.8)

## 2025-01-16 PROCEDURE — 84145 PROCALCITONIN (PCT): CPT

## 2025-01-16 PROCEDURE — 700111 HCHG RX REV CODE 636 W/ 250 OVERRIDE (IP): Mod: JZ | Performed by: HOSPITALIST

## 2025-01-16 PROCEDURE — 99233 SBSQ HOSP IP/OBS HIGH 50: CPT | Performed by: INTERNAL MEDICINE

## 2025-01-16 PROCEDURE — 700102 HCHG RX REV CODE 250 W/ 637 OVERRIDE(OP): Performed by: HOSPITALIST

## 2025-01-16 PROCEDURE — 700102 HCHG RX REV CODE 250 W/ 637 OVERRIDE(OP)

## 2025-01-16 PROCEDURE — 770001 HCHG ROOM/CARE - MED/SURG/GYN PRIV*

## 2025-01-16 PROCEDURE — 0202U NFCT DS 22 TRGT SARS-COV-2: CPT

## 2025-01-16 PROCEDURE — A9270 NON-COVERED ITEM OR SERVICE: HCPCS | Performed by: INTERNAL MEDICINE

## 2025-01-16 PROCEDURE — A9270 NON-COVERED ITEM OR SERVICE: HCPCS

## 2025-01-16 PROCEDURE — 700101 HCHG RX REV CODE 250: Performed by: HOSPITALIST

## 2025-01-16 PROCEDURE — A9270 NON-COVERED ITEM OR SERVICE: HCPCS | Performed by: HOSPITALIST

## 2025-01-16 PROCEDURE — 700102 HCHG RX REV CODE 250 W/ 637 OVERRIDE(OP): Performed by: INTERNAL MEDICINE

## 2025-01-16 PROCEDURE — 36415 COLL VENOUS BLD VENIPUNCTURE: CPT

## 2025-01-16 PROCEDURE — 85027 COMPLETE CBC AUTOMATED: CPT

## 2025-01-16 PROCEDURE — 80048 BASIC METABOLIC PNL TOTAL CA: CPT

## 2025-01-16 PROCEDURE — 700105 HCHG RX REV CODE 258: Performed by: HOSPITALIST

## 2025-01-16 PROCEDURE — 76882 US LMTD JT/FCL EVL NVASC XTR: CPT | Mod: LT

## 2025-01-16 RX ORDER — DIPHENHYDRAMINE HCL 25 MG
50 TABLET ORAL ONCE
Status: COMPLETED | OUTPATIENT
Start: 2025-01-16 | End: 2025-01-16

## 2025-01-16 RX ORDER — DIPHENHYDRAMINE HCL 25 MG
50 TABLET ORAL ONCE
Status: DISCONTINUED | OUTPATIENT
Start: 2025-01-17 | End: 2025-01-17

## 2025-01-16 RX ORDER — SUMATRIPTAN SUCCINATE 25 MG/1
25 TABLET ORAL ONCE
Status: COMPLETED | OUTPATIENT
Start: 2025-01-16 | End: 2025-01-16

## 2025-01-16 RX ORDER — SUMATRIPTAN 50 MG/1
100 TABLET, FILM COATED ORAL ONCE
Status: COMPLETED | OUTPATIENT
Start: 2025-01-16 | End: 2025-01-16

## 2025-01-16 RX ORDER — SUMATRIPTAN 6 MG/.5ML
6 INJECTION, SOLUTION SUBCUTANEOUS ONCE
Status: DISCONTINUED | OUTPATIENT
Start: 2025-01-16 | End: 2025-01-16

## 2025-01-16 RX ORDER — DOXYCYCLINE 100 MG/1
100 TABLET ORAL EVERY 12 HOURS
Status: DISCONTINUED | OUTPATIENT
Start: 2025-01-16 | End: 2025-01-17 | Stop reason: HOSPADM

## 2025-01-16 RX ADMIN — AMITRIPTYLINE HYDROCHLORIDE 150 MG: 75 TABLET, FILM COATED ORAL at 23:03

## 2025-01-16 RX ADMIN — DOXYCYCLINE 100 MG: 100 TABLET, FILM COATED ORAL at 17:39

## 2025-01-16 RX ADMIN — SODIUM CHLORIDE: 9 INJECTION, SOLUTION INTRAVENOUS at 05:27

## 2025-01-16 RX ADMIN — ONDANSETRON 4 MG: 2 INJECTION INTRAMUSCULAR; INTRAVENOUS at 22:06

## 2025-01-16 RX ADMIN — ROSUVASTATIN CALCIUM 20 MG: 20 TABLET, FILM COATED ORAL at 17:24

## 2025-01-16 RX ADMIN — LIOTHYRONINE SODIUM 5 MCG: 5 TABLET ORAL at 07:33

## 2025-01-16 RX ADMIN — DIPHENHYDRAMINE HYDROCHLORIDE 50 MG: 25 TABLET ORAL at 21:36

## 2025-01-16 RX ADMIN — HYDROMORPHONE HYDROCHLORIDE 1 MG: 1 INJECTION, SOLUTION INTRAMUSCULAR; INTRAVENOUS; SUBCUTANEOUS at 05:19

## 2025-01-16 RX ADMIN — AMPICILLIN SODIUM, SULBACTAM SODIUM 3 G: 2; 1 INJECTION, POWDER, FOR SOLUTION INTRAMUSCULAR; INTRAVENOUS at 05:28

## 2025-01-16 RX ADMIN — KETOROLAC TROMETHAMINE 15 MG: 15 INJECTION, SOLUTION INTRAMUSCULAR; INTRAVENOUS at 07:40

## 2025-01-16 RX ADMIN — RIVAROXABAN 10 MG: 10 TABLET, FILM COATED ORAL at 17:26

## 2025-01-16 RX ADMIN — OXYCODONE HYDROCHLORIDE 10 MG: 10 TABLET ORAL at 16:01

## 2025-01-16 RX ADMIN — LEVOTHYROXINE SODIUM 88 MCG: 0.09 TABLET ORAL at 05:23

## 2025-01-16 RX ADMIN — AMPICILLIN SODIUM, SULBACTAM SODIUM 3 G: 2; 1 INJECTION, POWDER, FOR SOLUTION INTRAMUSCULAR; INTRAVENOUS at 12:41

## 2025-01-16 RX ADMIN — OMEPRAZOLE 20 MG: 20 CAPSULE, DELAYED RELEASE ORAL at 05:23

## 2025-01-16 RX ADMIN — HYDROMORPHONE HYDROCHLORIDE 1 MG: 1 INJECTION, SOLUTION INTRAMUSCULAR; INTRAVENOUS; SUBCUTANEOUS at 10:13

## 2025-01-16 RX ADMIN — OXYCODONE HYDROCHLORIDE 10 MG: 10 TABLET ORAL at 03:27

## 2025-01-16 RX ADMIN — SUMATRIPTAN SUCCINATE 100 MG: 50 TABLET ORAL at 19:09

## 2025-01-16 RX ADMIN — EZETIMIBE 10 MG: 10 TABLET ORAL at 17:26

## 2025-01-16 RX ADMIN — OMEPRAZOLE 20 MG: 20 CAPSULE, DELAYED RELEASE ORAL at 17:25

## 2025-01-16 RX ADMIN — LIDOCAINE 1 PATCH: 4 PATCH TOPICAL at 21:37

## 2025-01-16 RX ADMIN — METOPROLOL SUCCINATE 25 MG: 25 TABLET, EXTENDED RELEASE ORAL at 05:23

## 2025-01-16 RX ADMIN — HYDRALAZINE HYDROCHLORIDE 10 MG: 20 INJECTION, SOLUTION INTRAMUSCULAR; INTRAVENOUS at 03:46

## 2025-01-16 RX ADMIN — AMPICILLIN SODIUM, SULBACTAM SODIUM 3 G: 2; 1 INJECTION, POWDER, FOR SOLUTION INTRAMUSCULAR; INTRAVENOUS at 17:33

## 2025-01-16 RX ADMIN — OXYCODONE HYDROCHLORIDE 10 MG: 10 TABLET ORAL at 07:32

## 2025-01-16 RX ADMIN — ESCITALOPRAM OXALATE 20 MG: 10 TABLET ORAL at 17:26

## 2025-01-16 RX ADMIN — SUMATRIPTAN SUCCINATE 25 MG: 25 TABLET ORAL at 13:22

## 2025-01-16 ASSESSMENT — ENCOUNTER SYMPTOMS
PALPITATIONS: 0
FEVER: 0
CHILLS: 0
WEAKNESS: 1
BACK PAIN: 1
SHORTNESS OF BREATH: 1
SPUTUM PRODUCTION: 1
COUGH: 1

## 2025-01-16 ASSESSMENT — PAIN DESCRIPTION - PAIN TYPE
TYPE: ACUTE PAIN
TYPE: CHRONIC PAIN

## 2025-01-16 NOTE — H&P
Hospital Medicine History & Physical Note    Date of Service  1/15/2025    Primary Care Physician  Cole Yuen M.D.    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  She was admitted 1/15/2025 with findings of hypoxia with possible pneumonia and left arm weakness.      On day of admission she was at home going through her normal activities and she had acute worsening pain in her left arm and could not move it.  This scared her greatly.  This initiated around 10 AM she finally came to the hospital around 3 PM.  She denies doing anything different or having any new traumas or falls.  Range of motion worse with moving her head to the left.  She did state that she has been having worsening pain over the last few days and over the weekend had sought care at an urgent care where a Kenalog shot was given in the left trapezius muscle region.  She states that she had acute pain since that time but today has had the acute weakness.    Here in the emergency room initial CT C-spine showed degenerative change but otherwise nothing acute.  She was noted to be hypoxic and required 1 to 2 L of oxygen.  WBC: 14.3, ESR 50.  A chest x-ray was then done and showed some bibasilar consolidations.  Patient denied any recent upper respiratory infections cough fevers or chills.    I discussed the plan of care with patient and family.    Review of Systems  Review of Systems   Constitutional:  Negative for chills, fever, malaise/fatigue and weight loss.   HENT:  Negative for congestion.    Respiratory:  Negative for shortness of breath.    Cardiovascular:  Negative for chest pain, palpitations and  leg swelling.   Gastrointestinal:  Negative for abdominal pain and nausea.   Musculoskeletal:  Positive for myalgias and neck pain. Negative for back pain and joint pain.   Neurological:  Positive for focal weakness and weakness. Negative for headaches.   Psychiatric/Behavioral:  Negative for depression. The patient is not nervous/anxious.        Past Medical History   has a past medical history of Anemia, Anesthesia, Arthritis, Asthma, CAD (coronary artery disease), Cataract, Dental disorder, Depression, Diabetes (HCC), Disorder of thyroid, Heart burn, History of vertebral fracture, HTN (hypertension), Hyperlipidemia, Indigestion, Migraines, Obesity, Pneumonia (2023), PONV (postoperative nausea and vomiting), and Sleep apnea.    Surgical History   has a past surgical history that includes appendectomy (N/A, 1976); abdominal hysterectomy total (N/A, 1978); breast biopsy (1984); colectomy (N/A, 2007); lumbar laminectomy diskectomy (N/A, 1989); arthroplasty (Right, 2020); lumbar exploration (N/A, 2017); insertion, peripheral nerve stimulator, lower extremity (Left, 12/22/2022); and pr reconstr total shoulder implant (Right, 4/30/2024).     Family History  family history includes Alcohol abuse in her daughter; Cancer in her mother; Diabetes in her brother, brother, father, and maternal aunt; Drug abuse in her brother and daughter; Heart Disease in her brother, brother, brother, brother, father, and mother; Hyperlipidemia in her brother, father, and mother; Hypertension in her brother, father, maternal aunt, and maternal aunt; Other in her brother; Stroke in her father.   Family history reviewed with patient. There is no family history that is pertinent to the chief complaint.     Social History   reports that she has never smoked. She has never used smokeless tobacco. She reports that she does not drink alcohol and does not use drugs.    Allergies  Allergies   Allergen Reactions    Gabapentin Hives and Swelling     Pregabalin Hives and Swelling    Amlodipine Swelling    Bee Swelling    Fentanyl Vomiting and Unspecified    Morphine Vomiting, Nausea and Unspecified    Benzoin Rash     Tincture of benzoin =blisters    blisters    Rifampin Unspecified     Pt turns yellow       Medications  Prior to Admission Medications   Prescriptions Last Dose Informant Patient Reported? Taking?   SYNTHROID 88 MCG Tab 2025 Evening Patient No Yes   Sig: Take 1 Tablet by mouth every day.   VITAMIN D PO 1/15/2025 at  9:30 AM Patient Yes Yes   Sig: Take 1 Tablet by mouth every morning.   albuterol 108 (90 Base) MCG/ACT Aero Soln inhalation aerosol 2025 Morning Patient No Yes   Sig: Inhale 1-2 Puffs every four hours as needed for Shortness of Breath.   alendronate (FOSAMAX) 70 MG Tab 2025 Evening Patient Yes Yes   Si mg every 7 days.   amitriptyline (ELAVIL) 100 MG Tab 2025 Evening Patient No Yes   Sig: Take 1.5 Tablets by mouth every evening.   clotrimazole (MYCELEX) 10 MG Agatha agatha New Rx Patient No Yes   Sig: Take 1 Agatha by mouth 5 Times a Day for 7 days.   diclofenac sodium (VOLTAREN) 1 % Gel 2025 Evening Patient No Yes   Sig: Apply 4 g topically 4 times a day as needed (back pain).   escitalopram (LEXAPRO) 20 MG tablet 2025 Evening Patient No Yes   Sig: Take 1 Tablet by mouth every day.   ezetimibe (ZETIA) 10 MG Tab 2025 Evening Patient No Yes   Sig: Take 1 Tablet by mouth every evening.   fluconazole (DIFLUCAN) 150 MG tablet 2025 Patient No Yes   Sig: Take one tablet orally for yeast infection   liothyronine (CYTOMEL) 5 MCG Tab 1/15/2025 at  9:30 AM Patient No Yes   Sig: Take 1 Tablet by mouth every day.   metFORMIN (GLUCOPHAGE) 500 MG Tab 1/15/2025 at  9:30 AM Patient No Yes   Sig: Take 1 Tablet by mouth 2 times a day with meals. Indications: Type 2 Diabetes   methocarbamol (ROBAXIN) 500 MG Tab 2025 Evening Patient No Yes   Sig: Take 1 Tablet by mouth 3 times a day as needed (Hold for  respiratory depression, respiratory rate <12, heart rate less than 65, lethargy,somnolence, or systolic blood pressure < 105.).   methylPREDNISolone (MEDROL) 4 MG Tab New Rx Patient No Yes   Sig: Take as per the package instructions.   metoprolol SR (TOPROL XL) 25 MG TABLET SR 24 HR 1/15/2025 at  9:30 AM Patient No Yes   Sig: Take 1 Tablet by mouth every day.   omeprazole (PRILOSEC) 20 MG delayed-release capsule 1/15/2025 at  9:30 AM Patient No Yes   Sig: Take 1 Capsule by mouth 2 times a day.   oxyCODONE immediate release (ROXICODONE) 10 MG immediate release tablet 1/15/2025 at  1:00 PM Patient Yes Yes   Sig: Take 1 tablet every 4 hours by oral route for 30 days.   rosuvastatin (CRESTOR) 20 MG Tab 1/14/2025 Evening Patient No Yes   Sig: Take 1 Tablet by mouth every evening.      Facility-Administered Medications: None       Physical Exam  Temp:  [37.2 °C (99 °F)] 37.2 °C (99 °F)  Pulse:  [102-109] 104  Resp:  [18-20] 20  BP: (157-201)/(75-89) 157/75  SpO2:  [76 %-97 %] 94 %  Blood Pressure : (!) 157/75   Temperature: 37.2 °C (99 °F)   Pulse: (!) 104   Respiration: 20   Pulse Oximetry: 94 %       Physical Exam  Vitals reviewed.   Constitutional:       Appearance: Normal appearance. She is not ill-appearing.   HENT:      Head: Normocephalic and atraumatic.      Nose: Nose normal.      Mouth/Throat:      Mouth: Mucous membranes are moist.   Eyes:      Extraocular Movements: Extraocular movements intact.      Conjunctiva/sclera: Conjunctivae normal.   Neck:      Comments: Left trapezius region with point tenderness and ropiness to muscles.    Cardiovascular:      Rate and Rhythm: Normal rate and regular rhythm.      Heart sounds: No murmur heard.  Pulmonary:      Effort: Pulmonary effort is normal. No respiratory distress.      Breath sounds: Normal breath sounds.   Abdominal:      General: Bowel sounds are normal.      Palpations: Abdomen is soft.   Musculoskeletal:      Cervical back: No tenderness.      Right lower  leg: No edema.      Left lower leg: No edema.   Lymphadenopathy:      Cervical: No cervical adenopathy.   Skin:     General: Skin is warm.   Neurological:      General: No focal deficit present.      Mental Status: She is alert and oriented to person, place, and time.      Cranial Nerves: No cranial nerve deficit.      Sensory: No sensory deficit.      Motor: Weakness (left arm) present.   Psychiatric:         Mood and Affect: Mood normal.         Behavior: Behavior normal.         Thought Content: Thought content normal.         Laboratory:  Recent Labs     01/15/25  1536   WBC 14.3*   RBC 4.22   HEMOGLOBIN 11.1*   HEMATOCRIT 35.2*   MCV 83.4   MCH 26.3*   MCHC 31.5*   RDW 50.4*   PLATELETCT 180   MPV 9.7     Recent Labs     01/15/25  1536   SODIUM 136   POTASSIUM 4.7   CHLORIDE 100   CO2 23   GLUCOSE 103*   BUN 15   CREATININE 0.53   CALCIUM 8.9     Recent Labs     01/15/25  1536   GLUCOSE 103*         Recent Labs     01/15/25  1536   NTPROBNP 273*         Recent Labs     01/15/25  1536   TROPONINT 16       Imaging:  CT-CSPINE WITHOUT PLUS RECONS   Final Result      Degenerative changes of the cervical spine without acute fracture or malalignment.      DX-CHEST-PORTABLE (1 VIEW)   Final Result      Mild bibasilar atelectasis and/or consolidation. Underlying infection is possible.            Assessment/Plan:  Justification for Admission Status  I anticipate this patient will require at least two midnights for appropriate medical management, necessitating inpatient admission because acute respiratory failure with hypoxia with supplemental oxygen.  Evaluation of acute weakness and pain in left arm.    Patient will need a Med/Surg bed on MEDICAL service .  The need is secondary to treatment of hypoxia with pneumonia and evaluation of neck pain/ arm weakness/pain.    * Pneumonia due to organism- (present on admission)  Assessment & Plan  Checking procalcitonin  Unasyn  WBC 14 on admission with elevated ESR  Titrate  oxygen to keep SpO2 greater than 89%  Breathing treatments as needed    Left arm weakness  Assessment & Plan  Saturday night palsy with impingement possible  Possible myelopathy  Had a recent Kenalog shot in the area of her discomfort question if the fluid from the Kenalog impinged upon a nerve causing some temporarily paralysis and acute pain  ESR is elevated check and CPK  As needed medications with Toradol, Dilaudid, oxycodone  Scheduled lidocaine and muscle relaxants.  Check MRI of the brain and neck rule out others acute issues  CT of the neck showed degenerative changes    Mixed hyperlipidemia- (present on admission)  Assessment & Plan  Continue treatment with Zetia and rosuvastatin    Acute respiratory failure (HCC)- (present on admission)  Assessment & Plan  Acute respiratory failure with hypoxia  Continue treatment for potential community-acquired pneumonia  Checking procalcitonin  Titrate oxygen to keep SpO2 greater than 89%    Type 2 diabetes mellitus (HCC)- (present on admission)  Assessment & Plan  Monitor Accu-Cheks cover sliding scale insulin  A1c: 6.9 in past month  Diabetic diet  Hypoglycemic protocol    Acquired hypothyroidism- (present on admission)  Assessment & Plan  Synthroid 88 mcg daily  TSH 0.28 in the past month  Check free thyroxine    Depression- (present on admission)  Assessment & Plan  Continue with Lexapro 20 mg daily    CAD (coronary artery disease)- (present on admission)  Assessment & Plan  Per history  Medical management    Essential hypertension, benign- (present on admission)  Assessment & Plan  Continue with metoprolol SR 25 mg daily  Will have other pain medications on as needed basis  Monitor vitals    Dyslipidemia- (present on admission)  Assessment & Plan  Continue active management with Zetia and rosuvastatin        VTE prophylaxis: SCDs/TEDs

## 2025-01-16 NOTE — ASSESSMENT & PLAN NOTE
Patient has a history of arthritis and recently got a Triamcinolone shot in the area of her discomfort. Brachial neuritis, Myositis and Cervical Radiculopathy are in our differentials. CT-Scan Cervical showed degenerative changes but no acute abnormalities. ESR and CRP are elevated  -U/S Left upper extremity and shoulder  -MRI (Cervical spine)  -Continue as needed medications with Toradol, Dilaudid, oxycodone  -Continue Scheduled lidocaine and muscle relaxants.  -Check MRI of the brain and neck rule out others acute issues  -Possible Orthopedic consult

## 2025-01-16 NOTE — ED NOTES
Bedside report received from off going RN: Veena, assumed care of patient.  POC discussed with patient. Call light within reach, all needs addressed at this time.       Fall risk interventions in place: Move the patient closer to the nurse's station, Patient's personal possessions are with in their safe reach, Place fall risk sign on patient's door, and Keep floor surfaces clean and dry (all applicable per [facility] Fall risk assessment)   Continuous monitoring: Cardiac Leads, Pulse Ox, or Blood Pressure  IVF/IV medications: Not Applicable   Oxygen: How many liters 4L, Traced the line to wall oxygen, and No oxygen tank in room  Bedside sitter: Not Applicable   Isolation: Not Applicable

## 2025-01-16 NOTE — CARE PLAN
The patient is Stable - Low risk of patient condition declining or worsening    Shift Goals  Clinical Goals: pain management, monitor vitals, decrease oxygen needs  Patient Goals: go home, manage pain  Family Goals: dante    Progress made toward(s) clinical / shift goals:      Problem: Pain - Standard  Goal: Alleviation of pain or a reduction in pain to the patient’s comfort goal  Description: Target End Date:  Prior to discharge or change in level of care    Document on Vitals flowsheet    1.  Document pain using the appropriate pain scale per order or unit policy  2.  Educate and implement non-pharmacologic comfort measures (i.e. relaxation, distraction, massage, cold/heat therapy, etc.)  3.  Pain management medications as ordered  4.  Reassess pain after pain med administration per policy  5.  If opiods administered assess patient's response to pain medication is appropriate per POSS sedation scale  6.  Follow pain management plan developed in collaboration with patient and interdisciplinary team (including palliative care or pain specialists if applicable)  Outcome: Progressing  Note: Patient has been educated on pain scale and medications. Patient utilized oxy 10mg to reduce pain and sleep comfortably.      Problem: Respiratory  Goal: Patient will achieve/maintain optimum respiratory ventilation and gas exchange  Description: Target End Date:  Prior to discharge or change in level of care    Document on Assessment flowsheet    1.  Assess and monitor rate, rhythm, depth and effort of respiration  2.  Breath sounds assessed qshift and/or as needed  3.  Assess O2 saturation, administer/titrate oxygen as ordered  4.  Position patient for maximum ventilatory efficiency  5.  Turn, cough, and deep breath with splinting to improve effectiveness  6.  Collaborate with RT to administer medication/treatments per order  7.  Encourage use of incentive spirometer and encourage patient to cough after use and utilize splinting  techniques if applicable  8.  Airway suctioning  9.  Monitor sputum production for changes in color, consistency and frequency  10. Perform frequent oral hygiene  11. Alternate physical activity with rest periods  Outcome: Progressing  Note: Patient is between 3-4L nc during the night. Continuous pulse ox placed     Problem: Fall Risk  Goal: Patient will remain free from falls  Description: Target End Date:  Prior to discharge or change in level of care    Document interventions on the [facility]irag Fall Risk Assessment    1.  Assess for fall risk factors  2.  Implement fall precautions  Outcome: Progressing  Note: Patient has been educated on fall risk and safety. Bed is locked and in lowest position, bed alarm on, call light placed within reach, and frequent rounding done.        Patient is not progressing towards the following goals:

## 2025-01-16 NOTE — HOSPITAL COURSE
Yamile Go is a 74 year old female with past medical history of left arm pain, coronary artery disease, Diabetes Mellitus, Hypothyroidism, Hypertension, and hyperlipidemia who presented to the hospital on 1/15/2025 for acute on chronic left arm weakness and pain. She was noted to be hypoxic and required 1-2 L of oxygen. Blood work-up showed neutrophilic leukocytosis with WBC at 14.3, 88% neutrophils, elevated CRP and ESR at 4 and 50. Chest X-ray showed bibasilar consolidation. Pro calcitonin is pending and patient has been started on IV unasyn for possible pneumonia.  CT cervical spine was done which showed degenerative changes

## 2025-01-16 NOTE — ED NOTES
Patient transported to [facility] with O2 at 4LPm by transport staff. Aox4.

## 2025-01-16 NOTE — ED NOTES
Back from CT. Pt's daughter is at the bedside, pt is now crying in pain. Normally takes 10mg oxycodone, last dose was around 1300 today.

## 2025-01-16 NOTE — PROGRESS NOTES
[facility] Internal Medicine Daily Progress Note    Date of Service  1/16/2025    R Team: R IM Green Team   Attending: Kathryn Gu M.d.  Senior Resident: Dr. Ryley Rahman  Intern:  Dr. Karen Herndon  Contact Number: [phone]    Chief Complaint  Yamile Go is a 75 y.o. female admitted 1/15/2025 with Left arm weakness    Hospital Course  Yamile Go is a 74 year old female with past medical history of left arm pain, coronary artery disease, Diabetes Mellitus, Hypothyroidism, Hypertension, and hyperlipidemia who presented to the hospital on 1/15/2025 for acute on chronic left arm weakness and pain. She was noted to be hypoxic and required 1-2 L of oxygen. Blood work-up showed neutrophilic leukocytosis with WBC at 14.3, 88% neutrophils, elevated CRP and ESR at 4 and 50. Chest X-ray showed bibasilar consolidation. Pro calcitonin is pending and patient has been started on IV unasyn for possible pneumonia.  CT cervical spine was done which showed degenerative changes    Interval Problem Update  Overnight Events:  No acute overnight events.    1/16/2025:  This morning, Patient complains of 9/10 left arm pain. She states that pain medications help a little but the pain is excruciating. She does not acknowledge chest pain, dizziness, and fever/chills.    -U/S left arm/shoulder has been ordered  -Possible Orthopedic Consult after imaging     I have discussed this patient's plan of care and discharge plan at IDT rounds today with Case Management, Nursing, Nursing leadership, and other members of the IDT team.    Consultants/Specialty       Code Status  Full Code    Disposition  The patient is not medically cleared for discharge to home or a post-acute facility.      I have placed the appropriate orders for post-discharge needs.    Review of Systems  Review of Systems   Constitutional:  Positive for malaise/fatigue. Negative for chills and fever.   Respiratory:  Positive for cough, sputum production and shortness of breath.     Cardiovascular:  Negative for chest pain and palpitations.   Musculoskeletal:  Positive for back pain.   Neurological:  Positive for weakness.        Physical Exam  Temp:  [36.7 °C (98.1 °F)-37.2 °C (99 °F)] 37 °C (98.6 °F)  Pulse:  [] 95  Resp:  [16-20] 18  BP: (126-201)/() 138/64  SpO2:  [76 %-97 %] 97 %    Physical Exam  HENT:      Mouth/Throat:      Mouth: Mucous membranes are moist.   Eyes:      Pupils: Pupils are equal, round, and reactive to light.   Cardiovascular:      Rate and Rhythm: Tachycardia present.      Heart sounds: No murmur heard.  Pulmonary:      Effort: No respiratory distress.      Breath sounds: Rhonchi present. No wheezing.   Abdominal:      General: There is no distension.      Tenderness: There is no abdominal tenderness.   Musculoskeletal:         General: Tenderness present.   Neurological:      Mental Status: She is alert. Mental status is at baseline.         Fluids    Intake/Output Summary (Last 24 hours) at 1/16/2025 1136  Last data filed at 1/16/2025 0800  Gross per 24 hour   Intake 460 ml   Output 700 ml   Net -240 ml       Laboratory  Recent Labs     01/15/25  1536 01/16/25  0147   WBC 14.3* 12.5*   RBC 4.22 3.90*   HEMOGLOBIN 11.1* 10.2*   HEMATOCRIT 35.2* 32.4*   MCV 83.4 83.1   MCH 26.3* 26.2*   MCHC 31.5* 31.5*   RDW 50.4* 50.3*   PLATELETCT 180 166   MPV 9.7 8.7*     Recent Labs     01/15/25  1536 01/16/25  0147   SODIUM 136 136   POTASSIUM 4.7 4.7   CHLORIDE 100 101   CO2 23 24   GLUCOSE 103* 141*   BUN 15 12   CREATININE 0.53 0.54   CALCIUM 8.9 8.1*                   Imaging  CT-CSPINE WITHOUT PLUS RECONS   Final Result      Degenerative changes of the cervical spine without acute fracture or malalignment.      DX-CHEST-PORTABLE (1 VIEW)   Final Result      Mild bibasilar atelectasis and/or consolidation. Underlying infection is possible.      MR-CERVICAL SPINE-W/O    (Results Pending)   MR-BRAIN-W/O    (Results Pending)   US-EXTREMITY NON VASCULAR BILATERAL     (Results Pending)        Assessment/Plan  Problem Representation:    * Pneumonia due to organism- (present on admission)  Assessment & Plan  Patient presented with shortness of breath and cough. Chest X-ray shows bibasilar consolidation. ESR and CRP are elevated. Procalcitonin is negative but suspicion for community acquired pneumonia is high in the setting of imaging findings, respiratory symptoms and neutrophilic Leukocytosis on admission.  -Start patient on doxycycline  -Continue Unasyn  -Titrate oxygen to keep SpO2 greater than 89%  - Continue Breathing treatments as needed    Left arm weakness  Assessment & Plan  Patient has a history of arthritis and recently got a Triamcinolone shot in the area of her discomfort. Brachial neuritis, Myositis and Cervical Radiculopathy are in our differentials. CT-Scan Cervical showed degenerative changes but no acute abnormalities. ESR and CRP are elevated  -U/S Left upper extremity and shoulder  -MRI (Cervical spine)  -Continue as needed medications with Toradol, Dilaudid, oxycodone  -Continue Scheduled lidocaine and muscle relaxants.  -Check MRI of the brain and neck rule out others acute issues  -Possible Orthopedic consult      Mixed hyperlipidemia- (present on admission)  Assessment & Plan  Continue treatment with Zetia and rosuvastatin    Acute respiratory failure (HCC)- (present on admission)  Assessment & Plan  Patient presented with Acute respiratory failure with hypoxia and is maintaining 94% on 3L. This acute hypoxia is likely 2/2 her COPD and community acquired Pneumonia although Procalcitonin is negative but suspicion is high considering patient has respiratory symptoms and theres bibasilar consolidation on Chest X-ray.  -Continue treatment for potential community-acquired pneumonia  -Titrate oxygen to keep SpO2 greater than 89%  -RT protocol    Type 2 diabetes mellitus (HCC)- (present on admission)  Assessment & Plan  HbA1c: 6.9 in past month  -Continue  ISS  -Diabetic diet  -Continue Hypoglycemic protocol    Acquired hypothyroidism- (present on admission)  Assessment & Plan  Patient's TSH at 0.28 in the past month  - Continue Levothyroxine 88 mcg daily  - Check Free thyroxine    Depression- (present on admission)  Assessment & Plan  Continue with Lexapro 20 mg daily    CAD (coronary artery disease)- (present on admission)  Assessment & Plan  Per history  Medical management    Essential hypertension, benign- (present on admission)  Assessment & Plan  -Continue Metoprolol 25 mg Once a day    Dyslipidemia- (present on admission)  Assessment & Plan  Continue active management with Zetia and rosuvastatin         VTE prophylaxis: SCDs/TEDs and Xarelto 10 mg daily as prophylaxis    I have performed a physical exam and reviewed and updated ROS and Plan today (1/16/2025). In review of yesterday's note (1/15/2025), there are no changes except as documented above.

## 2025-01-16 NOTE — ASSESSMENT & PLAN NOTE
Patient presented with Acute respiratory failure with hypoxia and is maintaining 94% on 3L. This acute hypoxia is likely 2/2 her COPD and community acquired Pneumonia although Procalcitonin is negative but suspicion is high considering patient has respiratory symptoms and theres bibasilar consolidation on Chest X-ray.  -Continue treatment for potential community-acquired pneumonia  -Titrate oxygen to keep SpO2 greater than 89%  -RT protocol

## 2025-01-16 NOTE — DOCUMENTATION QUERY
[facility]                                                                       Query Response Note      PATIENT:               SIMI NELSON  ACCT #:                  [id number]  MRN:                     [id number]  :                      1950  ADMIT DATE:       1/15/2025 2:01 PM  DISCH DATE:          RESPONDING  PROVIDER #:        [id number]           QUERY TEXT:    The patient has a documented diagnosis of pneumonia. Please clarify the suspected type of pneumonia that is being treated:    Please add your response to this query in your progress notes if you agree, thank you.    The patient's Clinical Indicators include:  1/15 ED note: X-ray here shows bibasilar pneumonia, does have elevated inflammatory markers and a leukocytosis. This is concerning for community-acquired pneumonia.   1/15 H&P: Pneumonia due to organism.  1/15 Labs: WBC 14.3; CRP 4.36; Influenza A and B RNA by PCR negative; RSV by PCR negative    Risk factors: Elderly, DM, CAD, asthma    Treatment: Unasyn, Zithromax, Rocephin, O2    Thank you,  Yanet Head NP, CCDS   Clinical   Connect via [employer]  Options provided:   -- Bacterial pneumonia   -- Gram Negative Pneumonia   -- Gram Positive Pneumonia   -- Pneumonia Unspecified   -- Other explanation (please specify)   -- Unable to determine      Query created by: Yanet Head on 2025 9:02 AM    RESPONSE TEXT:    Pneumonia Unspecified          Electronically signed by:  REANNA SCHREIBER DO 2025 12:05 PM

## 2025-01-16 NOTE — PROGRESS NOTES
4 Eyes Skin Assessment Completed by DEEJAY Reis and DEEJAY Mckeon.    Head redness on cheeks  Ears WDL  Nose WDL  Mouth WDL  Neck WDL  Breast/Chest WDL  Shoulder Blades Redness and Blanching  Spine Redness and Blanching, scar  (R) Arm/Elbow/Hand Redness, Blanching, Bruising, and Scab  (L) Arm/Elbow/Hand Redness, Blanching, Bruising, and Scab  Abdomen WDL  Groin WDL  Scrotum/Coccyx/Buttocks Redness and Blanching  (R) Leg Scab and Bruising  (L) Leg Scab and Bruising  (R) Heel/Foot/Toe Redness, Blanching, and Scab  (L) Heel/Foot/Toe Redness and Blanching          Devices In Places Pulse Ox and Nasal Cannula      Interventions In Place Gray Ear Foams, NC W/Ear Foams, Pillows, Barrier Cream, and Heels Loaded W/Pillows    Possible Skin Injury No    Pictures Uploaded Into Epic Yes  Wound Consult Placed N/A  RN Wound Prevention Protocol Ordered No

## 2025-01-16 NOTE — ASSESSMENT & PLAN NOTE
Patient presented with shortness of breath and cough. Chest X-ray shows bibasilar consolidation. ESR and CRP are elevated. Procalcitonin is negative but suspicion for community acquired pneumonia is high in the setting of imaging findings, respiratory symptoms and neutrophilic Leukocytosis on admission.  -Start patient on doxycycline  -Continue Unasyn  -Titrate oxygen to keep SpO2 greater than 89%  - Continue Breathing treatments as needed

## 2025-01-16 NOTE — ASSESSMENT & PLAN NOTE
Patient's TSH at 0.28 in the past month  - Continue Levothyroxine 88 mcg daily  - Check Free thyroxine

## 2025-01-16 NOTE — CARE PLAN
Problem: Pain - Standard  Goal: Alleviation of pain or a reduction in pain to the patient’s comfort goal  Outcome: Progressing  Note: Will continue to manage pain per MAR and also incorporate non-pharmacological interventions such as repositioning of the arm and elevation among others.         Problem: Respiratory  Goal: Patient will achieve/maintain optimum respiratory ventilation and gas exchange  Outcome: Progressing  Note: The goal is to wean patient off the oxygen, patient is currently on 3L. Will encourage IS use and turn, papito and deep breathing.      Problem: Fall Risk  Goal: Patient will remain free from falls  Outcome: Progressing  Note: All fall precautions are in place and patient calls appropriately, will continue to round on patient hourly and as needed.                The patient is Stable - Low risk of patient condition declining or worsening    Shift Goals  Clinical Goals: Pain control, enhance mobility, wean off oxygen  Patient Goals: Pain control and comfort  Family Goals: N/A    Progress made toward(s) clinical / shift goals:      Patient is not progressing towards the following goals:

## 2025-01-16 NOTE — DISCHARGE PLANNING
Case Management Discharge Planning    Admission Date: 1/15/2025  GMLOS: 4  ALOS: 1    6-Clicks ADL Score: 20  6-Clicks Mobility Score: 19      Anticipated Discharge Dispo: Discharge Disposition: Discharged to home/self care (01)    DME Needed: No    Action(s) Taken: Updated Provider/Nurse on Discharge Plan Patient discussed during IDT rounds with medical team.     Escalations Completed: None    Medically Clear: No    Next Steps: Case Management will continue to follow for discharge planning needs.    Barriers to Discharge: Medical clearance    Is the patient up for discharge tomorrow: No    RN Case Manager met with patient at bedside and obtained the information used in this assessment. Patient verified accuracy of facesheet; patient lives in a single story apartment alone.  Prior to current hospitalization, patient was independent with ADLS/IADLS. Patient drives and is able to attend necessary MD appointments. Patient prefers to use [facility] pharmacy. Patient has no financial concerns. Patient has support from her daughter. Denies any history of substance use and denies any diagnosis of mental illness.    Care Transition Team Assessment    Information Source: Patient  Orientation Level: Oriented X4  Information Given By: Patient  Who is responsible for making decisions for patient? : Patient    Readmission Evaluation  Is this a readmission?: Yes - unplanned readmission    Elopement Risk  Legal Hold: No  Ambulatory or Self Mobile in Wheelchair: Yes  Disoriented: No  Psychiatric Symptoms: None  History of Wandering: No  Elopement this Admit: No  Vocalizing Wanting to Leave: No  Displays Behaviors, Body Language Wanting to Leave: No-Not at Risk for Elopement  Elopement Risk: Not at Risk for Elopement    Interdisciplinary Discharge Planning  Does Admitting Nurse Feel This Could be a Complex Discharge?: No  Primary Care Physician: JENNA WALKER M.D.  Lives with - Patient's Self Care Capacity: Alone and Able to Care For  Self  Patient or legal guardian wants to designate a caregiver: No  Support Systems: Friends / Neighbors, Family Member(s)  Housing / Facility: 1 Story Apartment / Condo  Able to Return to Previous ADL's: Yes  Mobility Issues: No  Prior Services: Skilled Home Health Services  Patient Prefers to be Discharged to:: Home  Assistance Needed: No  Durable Medical Equipment: Not Applicable    Discharge Preparedness  What is your plan after discharge?: Home with help  Prior Functional Level: Ambulatory, Independent with Medication Management, Independent with Activities of Daily Living  Difficulity with ADLs: None  Difficulity with IADLs: Driving    Functional Assesment  Prior Functional Level: Ambulatory, Independent with Medication Management, Independent with Activities of Daily Living    Finances  Financial Barriers to Discharge: No  Prescription Coverage: Yes    Vision / Hearing Impairment  Vision Impairment : Yes  Right Eye Vision: Impaired, Wears Glasses  Left Eye Vision: Impaired, Wears Glasses  Hearing Impairment : No    Values / Beliefs / Concerns  Values / Beliefs Concerns : No    Advance Directive  Advance Directive?: POLST    Domestic Abuse  Have you ever been the victim of abuse or violence?: No  Physical Abuse or Sexual Abuse: No  Verbal Abuse or Emotional Abuse: No  Possible Abuse/Neglect Reported to::   Possible Abuse/Neglect Reported to:: Not Applicable    Psychological Assessment  History of Substance Abuse: None  History of Psychiatric Problems: No    Discharge Risks or Barriers  Discharge risks or barriers?: No    Anticipated Discharge Information  Discharge Disposition: Discharged to home/self care (01)

## 2025-01-17 ENCOUNTER — PHARMACY VISIT (OUTPATIENT)
Dept: PHARMACY | Facility: MEDICAL CENTER | Age: 75
End: 2025-01-17
Payer: MEDICARE

## 2025-01-17 VITALS
WEIGHT: 148.15 LBS | HEART RATE: 89 BPM | DIASTOLIC BLOOD PRESSURE: 82 MMHG | TEMPERATURE: 97 F | SYSTOLIC BLOOD PRESSURE: 143 MMHG | RESPIRATION RATE: 18 BRPM | HEIGHT: 61 IN | BODY MASS INDEX: 27.97 KG/M2 | OXYGEN SATURATION: 95 %

## 2025-01-17 PROBLEM — J18.9 PNEUMONIA DUE TO ORGANISM: Status: RESOLVED | Noted: 2025-01-15 | Resolved: 2025-01-17

## 2025-01-17 PROBLEM — J96.00 ACUTE RESPIRATORY FAILURE (HCC): Status: RESOLVED | Noted: 2024-05-03 | Resolved: 2025-01-17

## 2025-01-17 LAB
ALBUMIN SERPL BCP-MCNC: 3.3 G/DL (ref 3.2–4.9)
ALBUMIN/GLOB SERPL: 1 G/DL
ALP SERPL-CCNC: 88 U/L (ref 30–99)
ALT SERPL-CCNC: 32 U/L (ref 2–50)
ANION GAP SERPL CALC-SCNC: 14 MMOL/L (ref 7–16)
AST SERPL-CCNC: 28 U/L (ref 12–45)
BILIRUB SERPL-MCNC: 0.2 MG/DL (ref 0.1–1.5)
BUN SERPL-MCNC: 9 MG/DL (ref 8–22)
CALCIUM ALBUM COR SERPL-MCNC: 9 MG/DL (ref 8.5–10.5)
CALCIUM SERPL-MCNC: 8.4 MG/DL (ref 8.5–10.5)
CHLORIDE SERPL-SCNC: 100 MMOL/L (ref 96–112)
CO2 SERPL-SCNC: 20 MMOL/L (ref 20–33)
CREAT SERPL-MCNC: 0.39 MG/DL (ref 0.5–1.4)
ERYTHROCYTE [DISTWIDTH] IN BLOOD BY AUTOMATED COUNT: 46.1 FL (ref 35.9–50)
GFR SERPLBLD CREATININE-BSD FMLA CKD-EPI: 104 ML/MIN/1.73 M 2
GLOBULIN SER CALC-MCNC: 3.2 G/DL (ref 1.9–3.5)
GLUCOSE SERPL-MCNC: 130 MG/DL (ref 65–99)
HCT VFR BLD AUTO: 35.2 % (ref 37–47)
HGB BLD-MCNC: 11.3 G/DL (ref 12–16)
MCH RBC QN AUTO: 25.5 PG (ref 27–33)
MCHC RBC AUTO-ENTMCNC: 32.1 G/DL (ref 32.2–35.5)
MCV RBC AUTO: 79.5 FL (ref 81.4–97.8)
PLATELET # BLD AUTO: 147 K/UL (ref 164–446)
PLATELET BLD QL SMEAR: NORMAL
POTASSIUM SERPL-SCNC: 4.2 MMOL/L (ref 3.6–5.5)
PROT SERPL-MCNC: 6.5 G/DL (ref 6–8.2)
RBC # BLD AUTO: 4.43 M/UL (ref 4.2–5.4)
SODIUM SERPL-SCNC: 134 MMOL/L (ref 135–145)
WBC # BLD AUTO: 10.9 K/UL (ref 4.8–10.8)

## 2025-01-17 PROCEDURE — 85027 COMPLETE CBC AUTOMATED: CPT

## 2025-01-17 PROCEDURE — 99239 HOSP IP/OBS DSCHRG MGMT >30: CPT | Performed by: INTERNAL MEDICINE

## 2025-01-17 PROCEDURE — 700102 HCHG RX REV CODE 250 W/ 637 OVERRIDE(OP): Performed by: HOSPITALIST

## 2025-01-17 PROCEDURE — A9270 NON-COVERED ITEM OR SERVICE: HCPCS | Performed by: HOSPITALIST

## 2025-01-17 PROCEDURE — 80053 COMPREHEN METABOLIC PANEL: CPT

## 2025-01-17 PROCEDURE — 97162 PT EVAL MOD COMPLEX 30 MIN: CPT

## 2025-01-17 PROCEDURE — 97166 OT EVAL MOD COMPLEX 45 MIN: CPT

## 2025-01-17 RX ORDER — AZITHROMYCIN 500 MG/1
500 TABLET, FILM COATED ORAL DAILY
Qty: 3 TABLET | Refills: 0 | Status: ACTIVE | OUTPATIENT
Start: 2025-01-17 | End: 2025-01-17

## 2025-01-17 RX ORDER — HYDROXYZINE HYDROCHLORIDE 10 MG/1
10 TABLET, FILM COATED ORAL ONCE
Status: DISCONTINUED | OUTPATIENT
Start: 2025-01-17 | End: 2025-01-17 | Stop reason: HOSPADM

## 2025-01-17 RX ORDER — CEFPODOXIME PROXETIL 200 MG/1
200 TABLET, FILM COATED ORAL 2 TIMES DAILY
Qty: 7 TABLET | Refills: 0 | Status: ACTIVE | OUTPATIENT
Start: 2025-01-17

## 2025-01-17 RX ORDER — GUAIFENESIN/DEXTROMETHORPHAN 100-10MG/5
10 SYRUP ORAL EVERY 6 HOURS PRN
Qty: 236 ML | Refills: 0 | Status: SHIPPED | OUTPATIENT
Start: 2025-01-17

## 2025-01-17 RX ORDER — AZITHROMYCIN 500 MG/1
500 TABLET, FILM COATED ORAL DAILY
Qty: 3 TABLET | Refills: 0 | Status: ACTIVE | OUTPATIENT
Start: 2025-01-17 | End: 2025-01-20

## 2025-01-17 RX ORDER — DOXYCYCLINE 100 MG/1
100 TABLET ORAL EVERY 12 HOURS
Qty: 7 TABLET | Refills: 0 | Status: ACTIVE | OUTPATIENT
Start: 2025-01-17 | End: 2025-01-17

## 2025-01-17 RX ADMIN — OMEPRAZOLE 20 MG: 20 CAPSULE, DELAYED RELEASE ORAL at 05:20

## 2025-01-17 RX ADMIN — LEVOTHYROXINE SODIUM 88 MCG: 0.09 TABLET ORAL at 05:20

## 2025-01-17 RX ADMIN — METOPROLOL SUCCINATE 25 MG: 25 TABLET, EXTENDED RELEASE ORAL at 05:20

## 2025-01-17 RX ADMIN — OXYCODONE HYDROCHLORIDE 10 MG: 10 TABLET ORAL at 01:32

## 2025-01-17 RX ADMIN — OXYCODONE HYDROCHLORIDE 10 MG: 10 TABLET ORAL at 09:50

## 2025-01-17 RX ADMIN — OXYCODONE HYDROCHLORIDE 10 MG: 10 TABLET ORAL at 05:21

## 2025-01-17 RX ADMIN — LIOTHYRONINE SODIUM 5 MCG: 5 TABLET ORAL at 05:20

## 2025-01-17 ASSESSMENT — PAIN DESCRIPTION - PAIN TYPE
TYPE: ACUTE PAIN
TYPE: CHRONIC PAIN
TYPE: ACUTE PAIN
TYPE: CHRONIC PAIN
TYPE: CHRONIC PAIN

## 2025-01-17 ASSESSMENT — COGNITIVE AND FUNCTIONAL STATUS - GENERAL
SUGGESTED CMS G CODE MODIFIER DAILY ACTIVITY: CJ
DRESSING REGULAR UPPER BODY CLOTHING: A LITTLE
STANDING UP FROM CHAIR USING ARMS: A LITTLE
SUGGESTED CMS G CODE MODIFIER MOBILITY: CJ
CLIMB 3 TO 5 STEPS WITH RAILING: A LITTLE
DRESSING REGULAR LOWER BODY CLOTHING: A LITTLE
TOILETING: A LITTLE
WALKING IN HOSPITAL ROOM: A LITTLE
HELP NEEDED FOR BATHING: A LITTLE
MOVING TO AND FROM BED TO CHAIR: A LITTLE
MOBILITY SCORE: 20
DAILY ACTIVITIY SCORE: 20

## 2025-01-17 ASSESSMENT — GAIT ASSESSMENTS
DISTANCE (FEET): 125
GAIT LEVEL OF ASSIST: STANDBY ASSIST
DEVIATION: SHUFFLED GAIT;BRADYKINETIC
ASSISTIVE DEVICE: FRONT WHEEL WALKER

## 2025-01-17 ASSESSMENT — ACTIVITIES OF DAILY LIVING (ADL): TOILETING: INDEPENDENT

## 2025-01-17 NOTE — THERAPY
"Occupational Therapy   Initial Evaluation     Patient Name: Yamile Go  Age:  75 y.o., Sex:  female  Medical Record #: [id number]  Today's Date: 1/17/2025     Precautions  Precautions: Fall Risk  Comments: L shld rotator cuff, AC jt DJD    Assessment  Patient is 75 y.o. female who presents to acute w/ SOB. PMH includes HTN, hypothyroidism, and chronic pain. Pt reports her L shoulder stopped working on 1/15. Pt uses compensatory technique during mobility and ADL participation. Pt demo'd BADLs at [facility] w/ FWW. Will continue to follow while in house as pt is below functional baseline.     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   Prior Living Situation   Prior Services Home-Independent   Housing / Facility 1 Story Apartment / Condo   Bathroom Set up Bathtub / Shower Combination;Shower Chair   Equipment Owned 4-Wheel Walker;Tub / Shower Seat   Lives with - Patient's Self Care Capacity Alone and Able to Care For Self   Comments Has one daugther in [place] and one in [facility]. Eager to return to her cat.   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[employer])   Precautions   Precautions Fall Risk   Vitals   O2 (LPM) 0   O2 Delivery Device None - Room Air   Pain 0 - 10 Group   Therapist Pain Assessment Post Activity Pain Same as Prior to Activity;Nurse Notified  (agreeable to session, c/o L shoulder pain)   Cognition    Cognition / Consciousness WDL   Level of Consciousness Alert   Comments pleasent, cooperative, and motivated   Active ROM Upper Body   Dominant Hand Right   Comments L rotator cuff appears severely impacted no AROM at shoulder, pt assisted it through passive AROM, elbow, forearm, digits WFL   Coordination Upper Body   Coordination X   Gross Motor Coordination impaired L shoulder   Balance Assessment   Sitting Balance (Static) Fair +   Sitting Balance (Dynamic) Fair   Standing Balance (Static) Fair   Standing Balance (Dynamic) Fair   Weight Shift Sitting Fair   Weight Shift Standing Fair   Comments w/ FWW   Bed Mobility    Supine to Sit Supervised   Sit to Supine   (seated EOB w/ physicians post)   Scooting Supervised   ADL Assessment   Grooming Standby Assist;Standing  (oral care)   Upper Body Dressing   (CGA- gown)   Lower Body Dressing Contact Guard Assist   Toileting Standby Assist  (voided in toilet)   How much help from another person does the patient currently need...   Putting on and taking off regular lower body clothing? 3   Bathing (including washing, rinsing, and drying)? 3   Toileting, which includes using a toilet, bedpan, or urinal? 3   Putting on and taking off regular upper body clothing? 3   Taking care of personal grooming such as brushing teeth? 4   Eating meals? 4   6 Clicks Daily Activity Score 20   Functional Mobility   Sit to Stand Standby  Assist   Toilet Transfers Standby Assist   Mobility within room and bathroom w/ FWW   Activity Tolerance   Sitting in Chair 5 min on toilet   Sitting Edge of Bed 10 min   Standing 6 min   Patient / Family Goals   Patient / Family Goal #1 To go home   Short Term Goals   Short Term Goal # 1 Pt will demo UB dressing w/ SPV   Short Term Goal # 2 Pt will demo LB dressing w/ SPV   Education Group   Role of Occupational Therapist Patient Response Patient;Acceptance;Explanation;Demonstration;Verbal Demonstration;Action Demonstration   Occupational Therapy Initial Treatment Plan    Treatment Interventions Self Care / Activities of Daily Living;Neuro Re-Education / Balance;Therapeutic Exercises;Therapeutic Activity;Family / Caregiver Training;Adaptive Equipment   Treatment Frequency 3 Times per Week   Duration Until Therapy Goals Met   Problem List   Problem List Decreased Active Daily Living Skills;Decreased Homemaking Skills;Decreased Functional Mobility;Decreased Activity Tolerance;Impaired Postural Control / Balance;Decreased Upper Extremity Strength Left;Decreased Upper Extremity AROM Left   Anticipated Discharge Equipment and Recommendations   DC Equipment Recommendations None   Discharge Recommendations Recommend home health for continued occupational therapy services   Interdisciplinary Plan of Care Collaboration   IDT Collaboration with  Nursing;Physical Therapist   Patient Position at End of Therapy Edge of Bed;Call Light within Reach;Tray Table within Reach;Phone within Reach  ([facility] at bedside, agreeable to turn bed alarm on post)   Collaboration Comments report given   Session Information   Date / Session Number  1/17, 1 (1/3, 1/23)

## 2025-01-17 NOTE — CARE PLAN
The patient is Stable - Low risk of patient condition declining or worsening    Shift Goals  Clinical Goals: wean O2, pain control, safety  Patient Goals: rest  Family Goals: JONY    Progress made toward(s) clinical / shift goals:    Problem: Respiratory  Goal: Patient will achieve/maintain optimum respiratory ventilation and gas exchange  Description: Target End Date:  Prior to discharge or change in level of care    Document on Assessment flowsheet    1.  Assess and monitor rate, rhythm, depth and effort of respiration  2.  Breath sounds assessed qshift and/or as needed  3.  Assess O2 saturation, administer/titrate oxygen as ordered  4.  Position patient for maximum ventilatory efficiency  5.  Turn, cough, and deep breath with splinting to improve effectiveness  6.  Collaborate with RT to administer medication/treatments per order  7.  Encourage use of incentive spirometer and encourage patient to cough after use and utilize splinting techniques if applicable  8.  Airway suctioning  9.  Monitor sputum production for changes in color, consistency and frequency  10. Perform frequent oral hygiene  11. Alternate physical activity with rest periods  1/17/2025 0448 by Cecile Cadet RNORM  Note: SpO2 95% on RA. No wheezing, stridor, SOB, or oral swelling from allergic reaction. Oral hives did not increase in size or numbers since pt took Benadryl.      Problem: Skin Integrity  Goal: Risk for impaired skin integrity will decrease  Outcome: Progressing  Note: Rash on left arm from allergic reaction did not increase. 2 hives within red rash on left arm did not increase in size.

## 2025-01-17 NOTE — CODE DOCUMENTATION
Rapid called for allergic reaction to unasyn IV, pt develped left arm rash. Oral benadryl 50mg and zofran given. [facility] resident at bedside, pts does not feel short of breath and does not appear to be in distress. Rapid ended, will checking into pts condition in a few hours.

## 2025-01-17 NOTE — PROGRESS NOTES
Rapid Response Summary     Rapid response called at 2200 for:  allergic reaction    VS: WDL (See Vitals Flowsheet)  Additional info: Pt has formed a rash on their left arm after being given Unasyn IV  MD Paged: [facility] resident   Interventions:    Imaging/Tests: N/A   Labs: N/A   Medications:  Benadryl    Other: N/A  Disposition: Improved with rapid response team interventions. Primary RN updated on plan of care. Transfer not indicated at this time.  and Rapid team will continue to follow the patient.

## 2025-01-17 NOTE — PROGRESS NOTES
4 Eyes Skin Assessment Completed by DEEJAY Bermudez and DEEJAY Burger.    Head WDL  Ears Redness and Blanching  Nose WDL  Mouth Ulcer(s)/hives from allergic reaction  Neck WDL  Breast/Chest WDL  Shoulder Blades WDL  Spine scar  (R) Arm/Elbow/Hand WDL  (L) Arm/Elbow/Hand Redness and Rash  Abdomen WDL  Groin WDL  Scrotum/Coccyx/Buttocks Redness and Blanching  (R) Leg Scab and Bruising  (L) Leg Scab and Bruising  (R) Heel/Foot/Toe Redness and Blanching  (L) Heel/Foot/Toe Redness and Blanching    Devices In Places Pulse Ox    Interventions In Place Sacral Mepilex, TAP System, Pillows, and Barrier Cream    Possible Skin Injury No    Pictures Uploaded Into Epic Yes  Wound Consult Placed N/A  RN Wound Prevention Protocol Ordered No

## 2025-01-17 NOTE — DISCHARGE INSTRUCTIONS
-You are admitted due to pneumonia which caused you have acute shortness of breath which need oxygen, and you have received antibiotic and you are weaned off oxygen, but you need to continue to take them for several days including cefpodoxime and azithromycin.  -Currently we do not find any clinical signs that your acute on chronic left shoulder pain related with any infection, ultrasound has no acute change, therefore you will need outpatient orthopedic doctor and MRI follow-up.   -Please return to ED if you feel fever more than 100.4 F for 3 days, chills, worsening chest pain or shortness breath, severe left shoulder or left arm pain/weakness, new tingling or numbness on your neck or shoulder or arm, new focal weakness on any part of your body

## 2025-01-17 NOTE — THERAPY
Physical Therapy   Initial Evaluation     Patient Name: Yamile Go  Age:  75 y.o., Sex:  female  Medical Record #: [id number]  Today's Date: 1/17/2025     Precautions  Precautions: Fall Risk  Comments: (P) L shld rotator cuff, AC jt DJD    Assessment  Patient is  75 y.o. female PMH hypertension, hypothyroidism, chronic pain on Norco 10 at home, depression who presented 2/6/2022 with SOB.  Patient states she was diagnosed with COVID-19 positive results on 1/29/2022 with PCR.  Patient had symptoms prior to that.  She states in the last 3 to 4 days she has not been feeling well, reports of subjective fevers and increasing cough with sputum.  Patient has felt additional weakness and has fallen onto her knees due to weakness.  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  Pt is a poor historian. Pt appears anxious, depressed. States her depression meds are not working. Pt limited by L shoulder pain. Ambulation is functional with FWW. Recommend  PT services. See below flowsheet for eval details. LK          Plan    Physical Therapy Initial Treatment Plan   Treatment Plan : (P) Bed Mobility, Gait Training, Neuro Re-Education / Balance, Therapeutic Activities, Therapeutic Exercise, Self Care / Home Evaluation  Treatment Frequency: (P) 3 Times per Week  Duration: (P) Until Therapy Goals Met    DC Equipment Recommendations: (P) Unable to determine at this time  Discharge Recommendations: (P) Recommend home health for continued physical therapy services        01/17/25 1012   Time In/Time Out   Therapy Start Time 0934   Therapy End Time 1012   Total Therapy Time 38   Charge Group   PT Evaluation PT Evaluation Mod   Total Time Spent   PT Evaluation Time Spent (Mins) 30   PT Self Care/Home Evaluation Time Spent (Mins) 8   PT Total Time Spent (Calculated) 38    Services   Is patient using  services for this encounter? No   Initial Contact Note    Initial Contact Note Order Received and Verified, Physical  Therapy Evaluation in Progress with Full Report to Follow.   Precautions   Precautions Fall Risk   Comments L shld rotator cuff, AC jt DJD   Vitals   O2 Delivery Device None - Room Air   Pain 0 - 10 Group   Location Shoulder   Location Orientation Proximal   Therapist Pain Assessment 7;Nurse Notified;Post Activity Pain Same as Prior to Activity   Prior Living Situation   Prior Services Home-Independent   Housing / Facility 1 Story Apartment / Condo   Steps Into Home 0   Steps In Home 0   Equipment Owned Front-Wheel Walker   Lives with - Patient's Self Care Capacity Alone and Able to Care For Self   Comments friends and family near by   Prior Level of Functional Mobility   Bed Mobility Independent   Transfer Status Independent   Ambulation Independent   Ambulation Distance 150  (household, little community)   Assistive Devices Used Front-Wheel Walker   Comments pt states she stays home most of the day. poor historian   History of Falls   History of Falls Yes   Date of Last Fall 09/20/24  (fell in sept/24 broke hip)   Cognition    Cognition / Consciousness X   Level of Consciousness Alert   New Learning Impaired   Comments pt focused on L shouler pain, tearful and snxious. Appears depressed. Unsettled, moving constantly trying to find a comfortable position.   Passive ROM Lower Body   Passive ROM Lower Body WDL   Active ROM Lower Body    Active ROM Lower Body  WDL   Comments functional   Strength Lower Body   Lower Body Strength  X   Comments inconsistant with MMT. functional   Sensation Lower Body   Lower Extremity Sensation   WDL   Lower Body Muscle Tone   Lower Body Muscle Tone  WDL   Neurological Concerns   Comments brain MRI pending.   Coordination Lower Body    Coordination Lower Body  WDL   Other Treatments   Other Treatments Provided deep breathing, heat applied to B shoulders. discussed relaxation and avoiding shld elevation and and forward shld. postural erick   Balance Assessment   Sitting Balance (Static)  Fair +   Sitting Balance (Dynamic) Fair   Standing Balance (Static) Fair +   Standing Balance (Dynamic) Fair   Weight Shift Sitting Fair   Weight Shift Standing Fair   Comments no overt LOB with FWW   Bed Mobility    Supine to Sit   (pt up in chair upon arrival)   Sit to Supine   (return to chair)   Scooting Supervised   Gait Analysis   Gait Level Of Assist Standby Assist   Assistive Device Front Wheel Walker   Distance (Feet) 125   # of Times Distance was Traveled 1   Deviation Shuffled Gait;Bradykinetic   Comments light hand hold LUE but functional and safe with FWW, able to manuever FWW straight   Functional Mobility   Sit to Stand Standby Assist   Bed, Chair, Wheelchair Transfer Standby Assist   6 Clicks Assessment - How much HELP from from another person do you currently need... (If the patient hasn't done an activity recently, how much help from another person do you think he/she would need if he/she tried?)   Turning from your back to your side while in a flat bed without using bedrails? 4   Moving from lying on your back to sitting on the side of a flat bed without using bedrails? 4   Moving to and from a bed to a chair (including a wheelchair)? 3   Standing up from a chair using your arms (e.g., wheelchair, or bedside chair)? 3   Walking in hospital room? 3   Climbing 3-5 steps with a railing? 3   6 clicks Mobility Score 20   Activity Tolerance   Sitting in Chair 1 hour   Standing 10 mins   Edema / Skin Assessment   Edema / Skin  X   Comments sensitive to touch B sholders, pt activated and anxious   Short Term Goals    Short Term Goal # 1 in 6 V pt will perform bed mob with mod indp flat be and no rail   Short Term Goal # 2 in 6 V pt will transfer to varouis surfaces using FWW with mod indep   Short Term Goal # 3 in 6 V pt will amb using FWW x 200 feet with mod indep   Education Group   Education Provided Role of Physical Therapist;Exercises - Supine   Role of Physical Therapist Patient Response  Patient;Acceptance;Explanation;Verbal Demonstration   Exercises - Supine Patient Response Patient;Acceptance;Explanation;Action Demonstration   Physical Therapy Initial Treatment Plan    Treatment Plan  Bed Mobility;Gait Training;Neuro Re-Education / Balance;Therapeutic Activities;Therapeutic Exercise;Self Care / Home Evaluation   Treatment Frequency 3 Times per Week   Duration Until Therapy Goals Met   Problem List    Problems Pain;Decreased Activity Tolerance;Impaired Ambulation;Impaired Transfers;Safety Awareness Deficits / Cognition   Anticipated Discharge Equipment and Recommendations   DC Equipment Recommendations Unable to determine at this time   Discharge Recommendations Recommend home health for continued physical therapy services   Interdisciplinary Plan of Care Collaboration   IDT Collaboration with  Nursing;Occupational Therapist   Patient Position at End of Therapy Seated;Chair Alarm On   Collaboration Comments re ; raúl   Session Information   Date / Session Number  1/17-1 ( 1/3, 1/23)

## 2025-01-17 NOTE — FACE TO FACE
Face to Face Supporting Documentation - Home Health    The encounter with this patient was in whole or in part the primary reason for home health admission.    Date of encounter:   Patient:                    MRN:                       YOB: 2024  Yamile Go  [id number]  1950     Home health to see patient for:  Skilled Nursing care for assessment, interventions & education, Home health aide, Physical Therapy evaluation and treatment, and Occupational therapy evaluation and treatment    Skilled need for:  Exacerbation of Chronic Disease State Chronic left should pain     Skilled nursing interventions to include:  Comment: PT/OT    Homebound status evidenced by:  Need the aid of supportive devices such as crutches, canes, wheelchairs or walkers, Require the use of special transportation, Needs the assistance of another person in order to leave the home, or Have a condition such that leaving his or her home is medically contraindicated. Leaving home requires a considerable and taxing effort. There is a normal inability to leave the home.    Community Physician to provide follow up care: Cole Yuen M.D.     Optional Interventions? No      I certify the face to face encounter for this home health care referral meets the CMS requirements and the encounter/clinical assessment with the patient was, in whole, or in part, for the medical condition(s) listed above, which is the primary reason for home health care. Based on my clinical findings: the service(s) are medically necessary, support the need for home health care, and the homebound criteria are met.  I certify that this patient has had a face to face encounter by myself.  Ryley Rahman M.D. - NPI: [id number]

## 2025-01-17 NOTE — RESPIRATORY CARE
Responded to rapid called for allergic reaction, pt accessed, no airway swelling,  breath sounds clear, no stridor noted.

## 2025-01-17 NOTE — PROGRESS NOTES
At 9:56 PM I was called by the nurse and told that a rapid was called for the patient due to their nausea and vomiting in addition to an increasing rash on their left arm after administration of one of their evening meds. I went and assessed the patient and determined her lungs were clear to auscultation bilaterally with no wheezing rales or rhonchi. The patient denied shortness of breath.The patient was not hypotensive. On physical exam she had an erythematous rash on her left arm. She was given a 50 mg dose of Benadryl as well as 4 mg of Zofran for her nausea. The patient then later developed some anxiety and she was given 10 mg of hydroxyzine. The patient's Unasyn was held.

## 2025-01-17 NOTE — NON-PROVIDER
[facility] [place]        Attending:   Kathryn Gu M.d     Resident:   Tara Potts, Student    PATIENT:   Yamile Go; [id number]; 1950    ID:   75 y.o. female with a PMH of left arm pain, CAD, arthritis, T2DM, hypothyroidism, HTN, and HLD presenting on 1/15 for left arm weakness and pain. In the ED, CT C-spine showed degenerative changes. Labs showed leukocytosis with an elevated CRP and ESR. CXR showed bibasilar consolidation, so IV ceftriaxone was started then switched to IV Unasyn with doxycycline for pneumonia.     SUBJECTIVE:   Patient has been on Unasyn and started doxycycline on 1/16. Overnight, a rapid was called due to her having N/V and presenting with a erythematous, vesicular rash on her left arm. He denied SOB, chest pain, itchy rash. She was given Benadryl 50 mg, Zofran 4 mg for nausea, and hydroxyzine 10 mg for anxiety. Unasyn and doxycycline were held.     OBJECTIVE:  Vitals:    01/16/25 2128 01/16/25 2204 01/17/25 0458 01/17/25 0926   BP: (!) 170/80 (!) 156/77 (!) 145/69 (!) 143/82   Pulse: 87 95 97 89   Resp: 18  19 18   Temp: 36.4 °C (97.5 °F)  36.6 °C (97.9 °F) 36.1 °C (97 °F)   TempSrc: Temporal  Temporal Temporal   SpO2: 97% 90% 93% 95%   Weight:       Height:           Intake/Output Summary (Last 24 hours) at 1/17/2025 0759  Last data filed at 1/17/2025 0400  Gross per 24 hour   Intake 640 ml   Output 1000 ml   Net -360 ml       PHYSICAL EXAM:  General: No acute distress, afebrile, resting comfortably.  HEENT: NC/AT. EOMI.   Cardiovascular: RRR without murmurs. Normal capillary refill.   Respiratory: CTAB.  Abdomen: soft, nontender, nondistended.   EXT:  MURILLO, no edema.  MSK: Strength in right UE 5/5. Strength in left UE 2/5. Full range of passive motion. Unable to lift arm during active motion. Able to slide it along bed side to side. Pain with palpation around left scapular spine and trapezius muscle.  Skin: No erythema/lesions. Multiple bruises.   Neuro: A&Ox4. CN2-12  "intact. Sensation in bilateral UE intact.    LABS:  Recent Labs     01/15/25  1536 01/16/25  0147 01/17/25 0148   WBC 14.3* 12.5* 10.9*   RBC 4.22 3.90* 4.43   HEMOGLOBIN 11.1* 10.2* 11.3*   HEMATOCRIT 35.2* 32.4* 35.2*   MCV 83.4 83.1 79.5*   MCH 26.3* 26.2* 25.5*   RDW 50.4* 50.3* 46.1   PLATELETCT 180 166 147*   MPV 9.7 8.7*  --    NEUTSPOLYS 88.80*  --   --    LYMPHOCYTES 3.50*  --   --    MONOCYTES 3.40  --   --    EOSINOPHILS 0.00  --   --    BASOPHILS 0.00  --   --    RBCMORPHOLO Present  --   --      Recent Labs     01/15/25  1536 01/16/25 0147 01/17/25 0148   SODIUM 136 136 134*   POTASSIUM 4.7 4.7 4.2   CHLORIDE 100 101 100   CO2 23 24 20   BUN 15 12 9   CREATININE 0.53 0.54 0.39*   CALCIUM 8.9 8.1* 8.4*   ALBUMIN  --   --  3.3     Estimated GFR/CRCL = Estimated Creatinine Clearance: 111.1 mL/min (A) (by C-G formula based on SCr of 0.39 mg/dL (L)).  Recent Labs     01/15/25  1536 01/16/25  0147 01/17/25 0148   GLUCOSE 103* 141* 130*     Recent Labs     01/17/25 0148   ASTSGOT 28   ALTSGPT 32   TBILIRUBIN 0.2   ALKPHOSPHAT 88   GLOBULIN 3.2             No results for input(s): \"INR\", \"APTT\", \"FIBRINOGEN\" in the last 72 hours.    Invalid input(s): \"DIMER\"      IMAGING:  US-EXTREMITY NON VASCULAR UNILATERAL LEFT   Final Result      Nonspecific heterogeneous echogenicity and soft tissue prominence associated with the acromial clavicular joint, probably degenerative.      Trace amount of fluid about the biceps tendon.      No focal fluid collection.      If continued concern, MRI is recommended.      CT-CSPINE WITHOUT PLUS RECONS   Final Result      Degenerative changes of the cervical spine without acute fracture or malalignment.      DX-CHEST-PORTABLE (1 VIEW)   Final Result      Mild bibasilar atelectasis and/or consolidation. Underlying infection is possible.          MEDS:  No current facility-administered medications for this encounter.     Current Outpatient Medications   Medication    guaiFENesin " dextromethorphan (ROBITUSSIN DM) 100-10 MG/5ML Syrup syrup    cefpodoxime (VANTIN) 200 MG Tab    azithromycin (ZITHROMAX) 500 MG tablet    VITAMIN D PO    oxyCODONE immediate release (ROXICODONE) 10 MG immediate release tablet    alendronate (FOSAMAX) 70 MG Tab    fluconazole (DIFLUCAN) 150 MG tablet    albuterol 108 (90 Base) MCG/ACT Aero Soln inhalation aerosol    amitriptyline (ELAVIL) 100 MG Tab    escitalopram (LEXAPRO) 20 MG tablet    ezetimibe (ZETIA) 10 MG Tab    liothyronine (CYTOMEL) 5 MCG Tab    metFORMIN (GLUCOPHAGE) 500 MG Tab    methocarbamol (ROBAXIN) 500 MG Tab    metoprolol SR (TOPROL XL) 25 MG TABLET SR 24 HR    omeprazole (PRILOSEC) 20 MG delayed-release capsule    rosuvastatin (CRESTOR) 20 MG Tab    SYNTHROID 88 MCG Tab    diclofenac sodium (VOLTAREN) 1 % Gel       ASSESSMENT/PLAN:   75 y.o. female with a PMH of left arm pain, CAD, arthritis, T2DM, hypothyroidism, HTN, and HLD presenting on 1/15 for left arm weakness and pain. In the ED, CT C-spine showed degenerative changes. Labs showed leukocytosis with an elevated CRP and ESR. CXR showed bibasilar consolidation, so IV ceftriaxone was started then switched to IV Unasyn with doxycycline for pneumonia.     Community-acquired pneumonia- (present on admission)  Assessment & Plan  Patient presented with SOB and cough. Chest X-ray showed bibasilar consolidation. ESR elevated 50, CRP elevated at 4.36, WBC elevated at 14.3 upon presentation. 1/17 showed improved leukocytosis at 10.9.. Procalcitonin negative.  - Switch Unasyn and doxycycline to oral cefpodoxime and azithromycin for another 3 days.     Left arm weakness  Assessment & Plan  Patient has a history of arthritis and recently got a Triamcinolone shot in the trapezius. Brachial neuritis, myositis, Saturday night palsy, rotator cuff tear, and cervical radiculopathy are possible differentials. CT-Scan Cervical showed degenerative changes but no acute abnormalities. ESR elevated 50, CRP elevated  at 4.36, WBC elevated at 14.3 upon presentation. U/S left upper extremity and shoulder showed degenerative changes. 1/17 showed improved leukocytosis at 10.9.  - Outpatient f/u with ortho to do MRI (Cervical spine).  - Continue as needed medications with Toradol, Dilaudid, oxycodone, lidocaine, and muscle relaxants.  - PT/OT and Home Health.     Acute respiratory failure (HCC)- (present on admission) - Resolved  Assessment & Plan  Patient presented with acute respiratory failure with hypoxia and was saturating at 94% on 3L. Most likely from her COPD and community acquired pneumonia.   - Patient no longer on O2 on 1/17.     Type 2 diabetes mellitus (HCC)- (present on admission)  Assessment & Plan  HbA1c: 6.9 in past month.  - Continue ISS.  - Diabetic diet.  - Continue Hypoglycemic protocol.     Hypothyroidism- (present on admission)  Assessment & Plan  TSH at 0.28 in the past month.  - Continue Levothyroxine 88 mcg daily.     Depression- (present on admission)  Assessment & Plan  - Continue Lexapro 20 mg daily.     Essential hypertension, benign- (present on admission)  CAD (coronary artery disease)- (present on admission)  HLD- (present on admission)  Assessment & Plan  - Continue Metoprolol 25 mg daily.  - Continue Zetia and rosuvastatin.    Core Measures:  Fluids: None  Lines: Peripheral IV for intravenous access  Abx: cefpodoxime + azithromycin   Diet: diabetic diet  PPX: SCDs/TEDs and Xarelto 10 mg daily as prophylaxis    Disposition  Patient is medically cleared for discharge.     I have personally seen and examined the patient at bedside. I discussed the plan of care with Kathryn Gu M.d .    CODE Status: Full Code      Tara Potts, MS3  R Internal Medicine

## 2025-01-18 NOTE — PROGRESS NOTES
Discharge orders received.  Patient arrived to the discharge lounge.  PIV removed.  Instructions given, medications reviewed and general discharge education provided to patient.  Follow up appointments discussed.  Patient verbalized understanding of dc instructions and prescriptions.  Patient signed discharge instructions.  Patient verbalized understanding had all belongings with her.  Patient unable to take cefpoxidime, Dr. Rahman aware. Dr. Rahman states he will send azithromycin to [facility] pharmacy at [facility] instead. Pt left with POLST and family member. Wished patient a speedy recovery.

## 2025-01-18 NOTE — DISCHARGE SUMMARY
[facility] Internal Medicine Discharge Summary    Attending: Dr.Sonila Gu  Senior Resident: Dr. Ryley Rahman  Intern:  Dr. Karen Herndon  Contact Number: [phone]    CHIEF COMPLAINT ON ADMISSION  Chief Complaint   Patient presents with    Weakness     L arm since she woke up at 9am       Reason for Admission  Left arm weakness     Admission Date  1/15/2025    CODE STATUS  Full Code    HPI & HOSPITAL COURSE  Yamile Go is a 74 year old female with past medical history of left arm pain, coronary artery disease, Diabetes Mellitus, Hypothyroidism, Hypertension, and hyperlipidemia who presented to the hospital on 1/15/2025 for acute on chronic left arm weakness and pain.  CT cervical spine was done which showed degenerative changes.  Left shoulder ultrasound also showed some degenerative change.  Patient has no other neurological change, no concern for septic joint, patient is able to work with PT who recommend home health for continuous therapy.  Recommend the patient to follow-up with orthopedic doctor outpatient and to consider left shoulder MRI if the pain has no improvement.  Meanwhile, she was noted to be hypoxic and required 1-2 L of oxygen. Blood work-up showed neutrophilic leukocytosis with WBC at 14.3, 88% neutrophils, elevated CRP and ESR at 4 and 50. Chest X-ray showed bibasilar consolidation.  Patient is started on ceftriaxone then switched to Unasyn with doxycycline to treat as a pneumonia, she is able to wean off the oxygen on the day of discharge, however, her antibiotic treatment is complicated by a suspected medication interaction with the rash and nausea/vomiting several hours after patient received both Unasyn and Doxy.  Her symptoms recovered after 1 dose of Benadryl.  There was no anaphylaxis.  Considering her improvement after IV antibiotics, she is switched to oral antibiotics with cefpodoxime and azithromycin to finish the course outpatient.     Therefore, she is discharged in good and stable condition  to home with close outpatient follow-up.    The patient met 2-midnight criteria for an inpatient stay at the time of discharge.    Discharge Date  1/17/2025    Physical Exam on Day of Discharge  Physical Exam  Constitutional:       General: She is not in acute distress.     Appearance: She is not ill-appearing.   HENT:      Head: Normocephalic.      Nose: Nose normal.      Mouth/Throat:      Mouth: Mucous membranes are moist.   Eyes:      Extraocular Movements: Extraocular movements intact.      Pupils: Pupils are equal, round, and reactive to light.   Cardiovascular:      Rate and Rhythm: Normal rate and regular rhythm.   Pulmonary:      Effort: Pulmonary effort is normal.      Breath sounds: Rhonchi present.   Abdominal:      General: There is no distension.      Tenderness: There is no abdominal tenderness.   Musculoskeletal:         General: Tenderness present. No swelling.      Cervical back: Normal range of motion.      Right lower leg: No edema.      Left lower leg: No edema.      Comments: Tenderness on the back of left shoulder   Skin:     Coloration: Skin is not jaundiced.   Neurological:      General: No focal deficit present.      Mental Status: She is alert and oriented to person, place, and time.      Motor: Weakness present.      Comments: Left arm weakness         FOLLOW UP ITEMS POST DISCHARGE  Please follow-up with orthopedic doctor to finish left shoulder MRI    DISCHARGE DIAGNOSES  Principal Problem (Resolved):    Pneumonia due to organism (POA: Yes)  Active Problems:    Dyslipidemia (POA: Yes)    Essential hypertension, benign (POA: Yes)    CAD (coronary artery disease) (POA: Yes)    Depression (POA: Yes)    Acquired hypothyroidism (POA: Yes)    Type 2 diabetes mellitus (HCC) (POA: Yes)    Mixed hyperlipidemia (POA: Yes)    Left arm weakness (POA: Unknown)  Resolved Problems:    Acute respiratory failure (HCC) (POA: Yes)      FOLLOW UP  Future Appointments   Date Time Provider Department Center    3/18/2025  7:30 AM VASCULAR LAB [facility] ONIELYAW Espinosa     [facility], Emergency Dept  [address]  [place] [id number] [phone]  Go to  If symptoms worsen    Cole Yuen M.D.  [address]  [address]  [place] [address]  [phone]    Call in 1 week(s)  Let PCP know about recent hospitalization within 1 week. They will schedule an appointment if needed.      MEDICATIONS ON DISCHARGE     Medication List        START taking these medications        Instructions   azithromycin 500 MG tablet  Commonly known as: Zithromax   Take 1 Tablet by mouth every day for 3 days.  Dose: 500 mg     cefpodoxime 200 MG Tabs  Commonly known as: Vantin   Take 1 Tablet by mouth 2 times a day.  Dose: 200 mg     guaiFENesin dextromethorphan 100-10 MG/5ML Syrp syrup  Commonly known as: Robitussin DM   Take 10 mL by mouth every 6 hours as needed for Cough.  Dose: 10 mL            CONTINUE taking these medications        Instructions   albuterol 108 (90 Base) MCG/ACT Aers inhalation aerosol   Inhale 1-2 Puffs every four hours as needed for Shortness of Breath.  Dose: 1-2 Puff     alendronate 70 MG Tabs  Commonly known as: Fosamax   70 mg every 7 days.  Dose: 70 mg     amitriptyline 100 MG Tabs  Commonly known as: Elavil   Take 1.5 Tablets by mouth every evening.  Dose: 150 mg     diclofenac sodium 1 % Gel  Commonly known as: Voltaren   Apply 4 g topically 4 times a day as needed (back pain).  Dose: 4 g     escitalopram 20 MG tablet  Commonly known as: Lexapro   Take 1 Tablet by mouth every day.  Dose: 20 mg     ezetimibe 10 MG Tabs  Commonly known as: Zetia   Take 1 Tablet by mouth every evening.  Dose: 10 mg     fluconazole 150 MG tablet  Commonly known as: Diflucan   Take one tablet orally for yeast infection     liothyronine 5 MCG Tabs  Commonly known as: Cytomel   Take 1 Tablet by mouth every day.  Dose: 5 mcg     metFORMIN 500 MG Tabs  Commonly known as: Glucophage   Take 1 Tablet by mouth 2 times  a day with meals. Indications: Type 2 Diabetes  Dose: 500 mg     methocarbamol 500 MG Tabs  Commonly known as: Robaxin   Take 1 Tablet by mouth 3 times a day as needed (Hold for respiratory depression, respiratory rate <12, heart rate less than 65, lethargy,somnolence, or systolic blood pressure < 105.).  Dose: 500 mg     metoprolol SR 25 MG Tb24  Commonly known as: Toprol XL   Take 1 Tablet by mouth every day.  Dose: 25 mg     omeprazole 20 MG delayed-release capsule  Commonly known as: PriLOSEC   Take 1 Capsule by mouth 2 times a day.  Dose: 20 mg     oxyCODONE immediate release 10 MG immediate release tablet  Commonly known as: Roxicodone   Take 1 tablet every 4 hours by oral route for 30 days.     rosuvastatin 20 MG Tabs  Commonly known as: Crestor   Take 1 Tablet by mouth every evening.  Dose: 20 mg     Synthroid 88 MCG Tabs  Generic drug: levothyroxine   Take 1 Tablet by mouth every day.  Dose: 88 mcg     VITAMIN D PO   Take 1 Tablet by mouth every morning.  Dose: 1 Tablet            STOP taking these medications      clotrimazole 10 MG Troc marito  Commonly known as: Mycelex     methylPREDNISolone 4 MG Tabs  Commonly known as: Medrol              Allergies  Allergies   Allergen Reactions    Gabapentin Hives and Swelling    Pregabalin Hives and Swelling    Amlodipine Swelling    Bee Swelling    Fentanyl Vomiting and Unspecified    Morphine Vomiting, Nausea and Unspecified    Benzoin Rash     Tincture of benzoin =blisters    blisters    Rifampin Unspecified     Pt turns yellow       DIET  Orders Placed This Encounter   Procedures    Diet Order Diet: Regular     Standing Status:   Standing     Number of Occurrences:   1     Order Specific Question:   Diet:     Answer:   Regular [1]       ACTIVITY  As tolerated.  Weight bearing as tolerated    CONSULTATIONS  NA    PROCEDURES  NA    LABORATORY  Lab Results   Component Value Date    SODIUM 134 (L) 01/17/2025    POTASSIUM 4.2 01/17/2025    CHLORIDE 100 01/17/2025     CO2 20 01/17/2025    GLUCOSE 130 (H) 01/17/2025    BUN 9 01/17/2025    CREATININE 0.39 (L) 01/17/2025    CREATININE 0.87 02/20/2012        Lab Results   Component Value Date    WBC 10.9 (H) 01/17/2025    WBC 6.9 02/20/2012    HEMOGLOBIN 11.3 (L) 01/17/2025    HEMATOCRIT 35.2 (L) 01/17/2025    PLATELETCT 147 (L) 01/17/2025        Total time of the discharge process exceeds 45 minutes.

## 2025-01-27 ENCOUNTER — HOSPITAL ENCOUNTER (EMERGENCY)
Facility: MEDICAL CENTER | Age: 75
End: 2025-01-27
Attending: EMERGENCY MEDICINE
Payer: MEDICARE

## 2025-01-27 ENCOUNTER — APPOINTMENT (OUTPATIENT)
Dept: RADIOLOGY | Facility: MEDICAL CENTER | Age: 75
End: 2025-01-27
Attending: EMERGENCY MEDICINE
Payer: MEDICARE

## 2025-01-27 VITALS
HEART RATE: 87 BPM | SYSTOLIC BLOOD PRESSURE: 148 MMHG | HEIGHT: 61 IN | DIASTOLIC BLOOD PRESSURE: 84 MMHG | WEIGHT: 147.05 LBS | RESPIRATION RATE: 18 BRPM | OXYGEN SATURATION: 94 % | BODY MASS INDEX: 27.76 KG/M2 | TEMPERATURE: 98.6 F

## 2025-01-27 DIAGNOSIS — M25.512 CHRONIC LEFT SHOULDER PAIN: ICD-10-CM

## 2025-01-27 DIAGNOSIS — G89.29 CHRONIC LEFT SHOULDER PAIN: ICD-10-CM

## 2025-01-27 PROCEDURE — 20605 DRAIN/INJ JOINT/BURSA W/O US: CPT

## 2025-01-27 PROCEDURE — 20610 DRAIN/INJ JOINT/BURSA W/O US: CPT

## 2025-01-27 PROCEDURE — 99284 EMERGENCY DEPT VISIT MOD MDM: CPT

## 2025-01-27 PROCEDURE — 700111 HCHG RX REV CODE 636 W/ 250 OVERRIDE (IP): Performed by: EMERGENCY MEDICINE

## 2025-01-27 PROCEDURE — 73030 X-RAY EXAM OF SHOULDER: CPT | Mod: LT

## 2025-01-27 RX ADMIN — LIDOCAINE HYDROCHLORIDE 10 ML: 10 INJECTION, SOLUTION INFILTRATION; PERINEURAL at 16:45

## 2025-01-27 ASSESSMENT — FIBROSIS 4 INDEX: FIB4 SCORE: 2.491709609895453181

## 2025-01-27 ASSESSMENT — PAIN DESCRIPTION - PAIN TYPE: TYPE: ACUTE PAIN

## 2025-01-27 NOTE — ED PROVIDER NOTES
"ED Provider Note    CHIEF COMPLAINT  Chief Complaint   Patient presents with    Shoulder Pain     X couple of weeks  States dx w/ Arthritis   started last night w/ \"Feel whole inside of my arm is on fire\"       EXTERNAL RECORDS REVIEWED  Inpatient Notes was admitted to the hospital for pneumonia 1/15/2025 to 1/17/2025 she has a history of chronic left arm pain with arthritis coronary artery disease diabetes hypothyroidism hypertension and high cholesterol she presented with left arm weakness and had a CT of her cervical spine which showed degenerative changes and a left shoulder ultrasound showed degenerative changes they did recommend an outpatient MRI of her shoulder.  She was hypoxic at the time and was found to have pneumonia and was admitted to hospital for antibiotics.    HPI/ROS  LIMITATION TO HISTORY   Select: : None  OUTSIDE HISTORIAN(S):  none    Yamile Go is a 75 y.o. female who presents increased pain in her left shoulder since last night.  She states she feels like her arm is on fire.  She feels like her left shoulder is warm and she is having trouble moving it worse over the last week.  She denies any fevers or chills falls or trauma.  She denies any loss of  strength.  She states that she is supposed to see Dr. Sparks about her shoulder next week.  She states that this pain is different than the pain she had in her right shoulder when it had to be repaired by Dr. Sparks.  He does have a history of arthritis.  She is also diabetic.  She has been taking Percocet for the pain but this is not really helping.    PAST MEDICAL HISTORY   has a past medical history of Anemia, Anesthesia, Arthritis, Asthma, CAD (coronary artery disease), Cataract, Dental disorder, Depression, Diabetes (HCC), Disorder of thyroid, Heart burn, History of vertebral fracture, HTN (hypertension), Hyperlipidemia, Indigestion, Migraines, Obesity, Pneumonia (2023), PONV (postoperative nausea and vomiting), and Sleep " apnea.    SURGICAL HISTORY   has a past surgical history that includes appendectomy (N/A, 1976); abdominal hysterectomy total (N/A, 1978); breast biopsy (1984); colectomy (N/A, 2007); lumbar laminectomy diskectomy (N/A, 1989); arthroplasty (Right, 2020); lumbar exploration (N/A, 2017); insertion, peripheral nerve stimulator, lower extremity (Left, 12/22/2022); and reconstr total shoulder implant (Right, 4/30/2024).    FAMILY HISTORY  Family History   Problem Relation Age of Onset    Cancer Mother         lung    Heart Disease Mother     Hyperlipidemia Mother     Stroke Father     Heart Disease Father     Diabetes Father     Hypertension Father     Hyperlipidemia Father     Heart Disease Brother         cath and bypass    Diabetes Brother     Hypertension Brother     Hyperlipidemia Brother     Diabetes Maternal Aunt     Hypertension Maternal Aunt     Heart Disease Brother         cath and byp[ass    Drug abuse Brother     Heart Disease Brother         cath and bypass    Diabetes Brother     Heart Disease Brother     Other Brother         MVA    Hypertension Maternal Aunt     Drug abuse Daughter     Alcohol abuse Daughter        SOCIAL HISTORY  Social History     Tobacco Use    Smoking status: Never    Smokeless tobacco: Never   Vaping Use    Vaping status: Never Used   Substance and Sexual Activity    Alcohol use: No    Drug use: No    Sexual activity: Yes     Partners: Male     Birth control/protection: Post-Menopausal       CURRENT MEDICATIONS  Home Medications    **Home medications have not yet been reviewed for this encounter**       Audit from Redirected Encounters    **Home medications have not yet been reviewed for this encounter**         ALLERGIES  Allergies   Allergen Reactions    Gabapentin Hives and Swelling    Pregabalin Hives and Swelling    Amlodipine Swelling    Bee Swelling    Fentanyl Vomiting and Unspecified    Morphine Vomiting, Nausea and Unspecified    Benzoin Rash     Tincture of benzoin  "=blisters    blisters    Rifampin Unspecified     Pt turns yellow       PHYSICAL EXAM  VITAL SIGNS: BP (!) 148/84   Pulse (!) 108   Temp 37 °C (98.6 °F) (Temporal)   Resp 14   Ht 1.549 m (5' 1\")   Wt 66.7 kg (147 lb 0.8 oz)   SpO2 92%   BMI 27.78 kg/m²      Constitutional: No distress  Skin: Scattered bruises to her bilateral arms  Musculoskeletal: Tenderness to the left shoulder no anterior fullness or AC drop-off no bony step-offs or instability patient refuses to move the shoulder due to pain she has normal  strength distally and no tenderness in her elbow.  Vascular: warm to touch good capillary refill   Neurologic: distally neurovascularly intact  Psychiatric: Affect normal      EKG/LABS  none  I have independently interpreted this EKG    RADIOLOGY/PROCEDURES   I have independently interpreted the diagnostic imaging associated with this visit and am waiting the final reading from the radiologist.   My preliminary interpretation is as follows: X-ray left shoulder arthritic changes no fracture or dislocation    Radiologist interpretation:  DX-SHOULDER 2+ LEFT   Final Result      1.  No evidence of acute fracture or dislocation.      2.  Mild degenerative changes glenohumeral joint.      3.  Calcium hydroxyapatite deposition involving the superior glenoid labrum.          COURSE & MEDICAL DECISION MAKING    ASSESSMENT, COURSE AND PLAN  Care Narrative:   Yamile Go is a 75 y.o. female who presents increased pain in her left shoulder since last night.  She states she feels like her arm is on fire.  She feels like her left shoulder is warm and she is having trouble moving it worse over the last week.  She denies any fevers or chills falls or trauma.  She denies any loss of  strength.  She states that she is supposed to see Dr. Sparks about her shoulder next week.  She states that this pain is different than the pain she had in her right shoulder when it had to be repaired by Dr. Sparks.  He " does have a history of arthritis.  She is also diabetic.  She has been taking Percocet for the pain but this is not really helping.  On physical exam she is alert awake she is uncomfortable she has tenderness to palpation of her left shoulder and refuses to move it it is not warm she does have normal  strength distally.  She has some scattered bruises on her extremities bilaterally              ADDITIONAL PROBLEMS MANAGED        \  DISPOSITION AND DISCUSSIONS    Arthrocentesis Procedure Note    Indication: Joint pain    Consent: The patient was counseled regarding the procedure, it's indications, risks, potential complications and alternatives and any questions were answered. Consent was obtained.    Procedure: The left shoulder was positioned appropriately and the landmarks were identified.  Local anesthesia was obtained by infiltration using 1% Lidocaine without epinephrine.  The area was then prepped and draped in the usual sterile fashion.  A needle was then introduced into the joint space at which point No fluid was able to be aspirated.  A joint injection was performed using 1.5 cc of 1% lidocaine without epinephrine.  No labs were ordered as no fluid was obtained..  A sterile dressing was then applied to the site.    The patient tolerated the procedure well.    Complications: None    Patient's shoulder arthrocentesis did not produce any fluid so she cannot have an infection in the joint.  I think her pain is from arthritis and explained this to her.  I offered her steroids and she states that she has a steroid pack at home that she only took 2 days of and she could take that for the pain.  I will place her in a sling but I advised her to ice her shoulder and take it out of the sling and perform gentle range of motion on her shoulder 3 times daily.  She has an appoint with Dr. Sparks this Wednesday and I advised her to keep that appointment.  Patient we discharged home in stable condition.  I have  discussed management of the patient with the following physicians and ALEC's:  none    Discussion of management with other Q or appropriate source(s): None     Escalation of care considered, and ultimately not performed:Laboratory analysis     Barriers to care at this time, including but not limited to:  none.     Decision tools and prescription drugs considered including, but not limited to:  none.  \      The patient will return for new or worsening symptoms and is stable at the time of discharge.    The patient is referred to a primary physician for blood pressure management, diabetic screening, and for all other preventative health concerns.        DISPOSITION:  Patient will be discharged home in stable condition.    FOLLOW UP:  Manuela Sparks M.D.  [address] [place] [address] [address]  [facility] [address]  [phone]      keep appointment Wednesday      OUTPATIENT MEDICATIONS:  New Prescriptions    No medications on file         FINAL DIAGNOSIS  1. Chronic left shoulder pain         Electronically signed by: Dayami Zamora M.D., 1/27/2025 2:45 PM

## 2025-01-27 NOTE — ED TRIAGE NOTES
"Chief Complaint   Patient presents with    Shoulder Pain     X couple of weeks  States dx w/ Arthritis   started last night w/ \"Feel whole inside of my arm is on fire\"     BP (!) 148/84   Pulse (!) 108   Temp 37 °C (98.6 °F) (Temporal)   Resp 14   Ht 1.549 m (5' 1\")   Wt 66.7 kg (147 lb 0.8 oz)   SpO2 92%   BMI 27.78 kg/m²     Pt ambulated to ED using walker for c/o Left shoulder pain ongoing w/ recent hx of Arthritis, states developed burning sensation in LA last night.  Pt using Right Arm to manipulate LA out of coat and w/ using walker.       "

## 2025-02-02 ENCOUNTER — HOSPITAL ENCOUNTER (OUTPATIENT)
Dept: RADIOLOGY | Facility: MEDICAL CENTER | Age: 75
End: 2025-02-02
Attending: ORTHOPAEDIC SURGERY
Payer: MEDICARE

## 2025-02-02 ENCOUNTER — HOSPITAL ENCOUNTER (OUTPATIENT)
Dept: RADIOLOGY | Facility: MEDICAL CENTER | Age: 75
End: 2025-02-02
Attending: PHYSICAL MEDICINE & REHABILITATION
Payer: MEDICARE

## 2025-02-02 DIAGNOSIS — M25.512 PAIN IN JOINT OF LEFT SHOULDER: ICD-10-CM

## 2025-02-02 DIAGNOSIS — G54.0 BRACHIAL PLEXUS LESIONS: ICD-10-CM

## 2025-02-04 ENCOUNTER — HOSPITAL ENCOUNTER (OUTPATIENT)
Facility: MEDICAL CENTER | Age: 75
End: 2025-02-06
Attending: EMERGENCY MEDICINE | Admitting: HOSPITALIST
Payer: MEDICARE

## 2025-02-04 DIAGNOSIS — E03.9 ACQUIRED HYPOTHYROIDISM: ICD-10-CM

## 2025-02-04 DIAGNOSIS — M19.012 LOCALIZED OSTEOARTHRITIS OF LEFT SHOULDER: ICD-10-CM

## 2025-02-04 DIAGNOSIS — S32.020A CLOSED COMPRESSION FRACTURE OF L2 LUMBAR VERTEBRA, INITIAL ENCOUNTER (HCC): ICD-10-CM

## 2025-02-04 DIAGNOSIS — R07.89 OTHER CHEST PAIN: ICD-10-CM

## 2025-02-04 DIAGNOSIS — M25.512 LEFT SHOULDER PAIN, UNSPECIFIED CHRONICITY: ICD-10-CM

## 2025-02-04 DIAGNOSIS — Z79.4 TYPE 2 DIABETES MELLITUS WITHOUT COMPLICATION, WITH LONG-TERM CURRENT USE OF INSULIN (HCC): ICD-10-CM

## 2025-02-04 DIAGNOSIS — Z78.9 FAILURE OF OUTPATIENT TREATMENT: ICD-10-CM

## 2025-02-04 DIAGNOSIS — M53.2X6 LUMBAR SPINE INSTABILITY: ICD-10-CM

## 2025-02-04 DIAGNOSIS — M75.112 NONTRAUMATIC INCOMPLETE TEAR OF LEFT ROTATOR CUFF: ICD-10-CM

## 2025-02-04 DIAGNOSIS — R26.2 INABILITY TO AMBULATE DUE TO MULTIPLE JOINTS: ICD-10-CM

## 2025-02-04 DIAGNOSIS — M79.2 INTRACTABLE NEUROPATHIC PAIN OF UPPER EXTREMITY: ICD-10-CM

## 2025-02-04 DIAGNOSIS — E11.9 TYPE 2 DIABETES MELLITUS WITHOUT COMPLICATION, WITH LONG-TERM CURRENT USE OF INSULIN (HCC): ICD-10-CM

## 2025-02-04 PROBLEM — D64.9 NORMOCYTIC ANEMIA: Status: ACTIVE | Noted: 2025-02-04

## 2025-02-04 PROBLEM — R52 INTRACTABLE PAIN: Status: ACTIVE | Noted: 2025-02-04

## 2025-02-04 LAB
ALBUMIN SERPL BCP-MCNC: 3.8 G/DL (ref 3.2–4.9)
ALBUMIN/GLOB SERPL: 1.5 G/DL
ALP SERPL-CCNC: 79 U/L (ref 30–99)
ALT SERPL-CCNC: 25 U/L (ref 2–50)
ANION GAP SERPL CALC-SCNC: 12 MMOL/L (ref 7–16)
AST SERPL-CCNC: 20 U/L (ref 12–45)
BASOPHILS # BLD AUTO: 0.3 % (ref 0–1.8)
BASOPHILS # BLD: 0.02 K/UL (ref 0–0.12)
BILIRUB SERPL-MCNC: 0.2 MG/DL (ref 0.1–1.5)
BUN SERPL-MCNC: 14 MG/DL (ref 8–22)
CALCIUM ALBUM COR SERPL-MCNC: 9.1 MG/DL (ref 8.5–10.5)
CALCIUM SERPL-MCNC: 8.9 MG/DL (ref 8.4–10.2)
CHLORIDE SERPL-SCNC: 109 MMOL/L (ref 96–112)
CO2 SERPL-SCNC: 19 MMOL/L (ref 20–33)
CREAT SERPL-MCNC: 0.52 MG/DL (ref 0.5–1.4)
CRP SERPL HS-MCNC: <0.3 MG/DL (ref 0–0.75)
EOSINOPHIL # BLD AUTO: 0.03 K/UL (ref 0–0.51)
EOSINOPHIL NFR BLD: 0.4 % (ref 0–6.9)
ERYTHROCYTE [DISTWIDTH] IN BLOOD BY AUTOMATED COUNT: 55.3 FL (ref 35.9–50)
ERYTHROCYTE [SEDIMENTATION RATE] IN BLOOD BY WESTERGREN METHOD: 11 MM/HOUR (ref 0–25)
GFR SERPLBLD CREATININE-BSD FMLA CKD-EPI: 97 ML/MIN/1.73 M 2
GLOBULIN SER CALC-MCNC: 2.5 G/DL (ref 1.9–3.5)
GLUCOSE SERPL-MCNC: 134 MG/DL (ref 65–99)
HCT VFR BLD AUTO: 35.8 % (ref 37–47)
HGB BLD-MCNC: 10.9 G/DL (ref 12–16)
IMM GRANULOCYTES # BLD AUTO: 0.04 K/UL (ref 0–0.11)
IMM GRANULOCYTES NFR BLD AUTO: 0.6 % (ref 0–0.9)
LACTATE SERPL-SCNC: 1.1 MMOL/L (ref 0.5–2)
LYMPHOCYTES # BLD AUTO: 1.43 K/UL (ref 1–4.8)
LYMPHOCYTES NFR BLD: 20.5 % (ref 22–41)
MCH RBC QN AUTO: 26.3 PG (ref 27–33)
MCHC RBC AUTO-ENTMCNC: 30.4 G/DL (ref 32.2–35.5)
MCV RBC AUTO: 86.5 FL (ref 81.4–97.8)
MONOCYTES # BLD AUTO: 0.52 K/UL (ref 0–0.85)
MONOCYTES NFR BLD AUTO: 7.5 % (ref 0–13.4)
NEUTROPHILS # BLD AUTO: 4.92 K/UL (ref 1.82–7.42)
NEUTROPHILS NFR BLD: 70.7 % (ref 44–72)
NRBC # BLD AUTO: 0 K/UL
NRBC BLD-RTO: 0 /100 WBC (ref 0–0.2)
PLATELET # BLD AUTO: 197 K/UL (ref 164–446)
PMV BLD AUTO: 9.1 FL (ref 9–12.9)
POTASSIUM SERPL-SCNC: 3.8 MMOL/L (ref 3.6–5.5)
PROT SERPL-MCNC: 6.3 G/DL (ref 6–8.2)
RBC # BLD AUTO: 4.14 M/UL (ref 4.2–5.4)
SODIUM SERPL-SCNC: 140 MMOL/L (ref 135–145)
WBC # BLD AUTO: 7 K/UL (ref 4.8–10.8)

## 2025-02-04 PROCEDURE — 700102 HCHG RX REV CODE 250 W/ 637 OVERRIDE(OP): Performed by: HOSPITALIST

## 2025-02-04 PROCEDURE — 99285 EMERGENCY DEPT VISIT HI MDM: CPT

## 2025-02-04 PROCEDURE — 85025 COMPLETE CBC W/AUTO DIFF WBC: CPT

## 2025-02-04 PROCEDURE — 99223 1ST HOSP IP/OBS HIGH 75: CPT | Mod: AI | Performed by: HOSPITALIST

## 2025-02-04 PROCEDURE — 96376 TX/PRO/DX INJ SAME DRUG ADON: CPT

## 2025-02-04 PROCEDURE — 96375 TX/PRO/DX INJ NEW DRUG ADDON: CPT

## 2025-02-04 PROCEDURE — 700111 HCHG RX REV CODE 636 W/ 250 OVERRIDE (IP): Mod: JZ | Performed by: EMERGENCY MEDICINE

## 2025-02-04 PROCEDURE — 83605 ASSAY OF LACTIC ACID: CPT

## 2025-02-04 PROCEDURE — 85652 RBC SED RATE AUTOMATED: CPT

## 2025-02-04 PROCEDURE — 700111 HCHG RX REV CODE 636 W/ 250 OVERRIDE (IP): Mod: JZ | Performed by: HOSPITALIST

## 2025-02-04 PROCEDURE — 36415 COLL VENOUS BLD VENIPUNCTURE: CPT

## 2025-02-04 PROCEDURE — A9270 NON-COVERED ITEM OR SERVICE: HCPCS | Performed by: HOSPITALIST

## 2025-02-04 PROCEDURE — 86140 C-REACTIVE PROTEIN: CPT

## 2025-02-04 PROCEDURE — 96374 THER/PROPH/DIAG INJ IV PUSH: CPT

## 2025-02-04 PROCEDURE — 80053 COMPREHEN METABOLIC PANEL: CPT

## 2025-02-04 PROCEDURE — G0378 HOSPITAL OBSERVATION PER HR: HCPCS

## 2025-02-04 RX ORDER — HYDROMORPHONE HYDROCHLORIDE 1 MG/ML
0.5 INJECTION, SOLUTION INTRAMUSCULAR; INTRAVENOUS; SUBCUTANEOUS ONCE
Status: COMPLETED | OUTPATIENT
Start: 2025-02-04 | End: 2025-02-04

## 2025-02-04 RX ORDER — METOPROLOL SUCCINATE 25 MG/1
25 TABLET, EXTENDED RELEASE ORAL DAILY
Status: DISCONTINUED | OUTPATIENT
Start: 2025-02-05 | End: 2025-02-06 | Stop reason: HOSPADM

## 2025-02-04 RX ORDER — ONDANSETRON 2 MG/ML
4 INJECTION INTRAMUSCULAR; INTRAVENOUS ONCE
Status: COMPLETED | OUTPATIENT
Start: 2025-02-04 | End: 2025-02-04

## 2025-02-04 RX ORDER — ACETAMINOPHEN 325 MG/1
650 TABLET ORAL EVERY 6 HOURS PRN
Status: DISCONTINUED | OUTPATIENT
Start: 2025-02-04 | End: 2025-02-06 | Stop reason: HOSPADM

## 2025-02-04 RX ORDER — INSULIN LISPRO 100 [IU]/ML
1-6 INJECTION, SOLUTION INTRAVENOUS; SUBCUTANEOUS
Status: DISCONTINUED | OUTPATIENT
Start: 2025-02-05 | End: 2025-02-06 | Stop reason: HOSPADM

## 2025-02-04 RX ORDER — AMOXICILLIN 250 MG
2 CAPSULE ORAL EVERY EVENING
Status: DISCONTINUED | OUTPATIENT
Start: 2025-02-04 | End: 2025-02-06 | Stop reason: HOSPADM

## 2025-02-04 RX ORDER — ESCITALOPRAM OXALATE 10 MG/1
20 TABLET ORAL DAILY
Status: DISCONTINUED | OUTPATIENT
Start: 2025-02-05 | End: 2025-02-06 | Stop reason: HOSPADM

## 2025-02-04 RX ORDER — ONDANSETRON 4 MG/1
4 TABLET, ORALLY DISINTEGRATING ORAL EVERY 4 HOURS PRN
Status: DISCONTINUED | OUTPATIENT
Start: 2025-02-04 | End: 2025-02-06 | Stop reason: HOSPADM

## 2025-02-04 RX ORDER — METHOCARBAMOL 500 MG/1
500 TABLET, FILM COATED ORAL 3 TIMES DAILY PRN
Status: DISCONTINUED | OUTPATIENT
Start: 2025-02-04 | End: 2025-02-06 | Stop reason: HOSPADM

## 2025-02-04 RX ORDER — DEXTROSE MONOHYDRATE 25 G/50ML
25 INJECTION, SOLUTION INTRAVENOUS
Status: DISCONTINUED | OUTPATIENT
Start: 2025-02-04 | End: 2025-02-06 | Stop reason: HOSPADM

## 2025-02-04 RX ORDER — OMEPRAZOLE 20 MG/1
20 CAPSULE, DELAYED RELEASE ORAL 2 TIMES DAILY
Status: DISCONTINUED | OUTPATIENT
Start: 2025-02-04 | End: 2025-02-06 | Stop reason: HOSPADM

## 2025-02-04 RX ORDER — HYDROMORPHONE HYDROCHLORIDE 1 MG/ML
1 INJECTION, SOLUTION INTRAMUSCULAR; INTRAVENOUS; SUBCUTANEOUS
Status: DISCONTINUED | OUTPATIENT
Start: 2025-02-04 | End: 2025-02-06 | Stop reason: HOSPADM

## 2025-02-04 RX ORDER — POLYETHYLENE GLYCOL 3350 17 G/17G
1 POWDER, FOR SOLUTION ORAL
Status: DISCONTINUED | OUTPATIENT
Start: 2025-02-04 | End: 2025-02-06 | Stop reason: HOSPADM

## 2025-02-04 RX ORDER — LIOTHYRONINE SODIUM 5 UG/1
5 TABLET ORAL DAILY
Status: DISCONTINUED | OUTPATIENT
Start: 2025-02-05 | End: 2025-02-06 | Stop reason: HOSPADM

## 2025-02-04 RX ORDER — LEVOTHYROXINE SODIUM 88 UG/1
88 TABLET ORAL DAILY
Status: DISCONTINUED | OUTPATIENT
Start: 2025-02-05 | End: 2025-02-06 | Stop reason: HOSPADM

## 2025-02-04 RX ORDER — ONDANSETRON 2 MG/ML
4 INJECTION INTRAMUSCULAR; INTRAVENOUS EVERY 4 HOURS PRN
Status: DISCONTINUED | OUTPATIENT
Start: 2025-02-04 | End: 2025-02-06 | Stop reason: HOSPADM

## 2025-02-04 RX ORDER — OXYCODONE HYDROCHLORIDE 10 MG/1
10 TABLET ORAL EVERY 4 HOURS PRN
Status: DISCONTINUED | OUTPATIENT
Start: 2025-02-04 | End: 2025-02-06 | Stop reason: HOSPADM

## 2025-02-04 RX ORDER — EZETIMIBE 10 MG/1
10 TABLET ORAL EVERY EVENING
Status: DISCONTINUED | OUTPATIENT
Start: 2025-02-04 | End: 2025-02-06 | Stop reason: HOSPADM

## 2025-02-04 RX ORDER — ROSUVASTATIN CALCIUM 10 MG/1
20 TABLET, COATED ORAL EVERY EVENING
Status: DISCONTINUED | OUTPATIENT
Start: 2025-02-04 | End: 2025-02-06 | Stop reason: HOSPADM

## 2025-02-04 RX ORDER — AMITRIPTYLINE HYDROCHLORIDE 50 MG/1
150 TABLET ORAL NIGHTLY
Status: DISCONTINUED | OUTPATIENT
Start: 2025-02-04 | End: 2025-02-06 | Stop reason: HOSPADM

## 2025-02-04 RX ADMIN — HYDROMORPHONE HYDROCHLORIDE 0.5 MG: 1 INJECTION, SOLUTION INTRAMUSCULAR; INTRAVENOUS; SUBCUTANEOUS at 19:12

## 2025-02-04 RX ADMIN — HYDROMORPHONE HYDROCHLORIDE 0.5 MG: 1 INJECTION, SOLUTION INTRAMUSCULAR; INTRAVENOUS; SUBCUTANEOUS at 19:46

## 2025-02-04 RX ADMIN — OXYCODONE HYDROCHLORIDE 10 MG: 10 TABLET ORAL at 23:06

## 2025-02-04 RX ADMIN — EZETIMIBE 10 MG: 10 TABLET ORAL at 22:08

## 2025-02-04 RX ADMIN — OMEPRAZOLE 20 MG: 20 CAPSULE, DELAYED RELEASE ORAL at 22:08

## 2025-02-04 RX ADMIN — ONDANSETRON 4 MG: 2 INJECTION INTRAMUSCULAR; INTRAVENOUS at 19:46

## 2025-02-04 RX ADMIN — METHOCARBAMOL 500 MG: 500 TABLET ORAL at 22:08

## 2025-02-04 RX ADMIN — HYDROMORPHONE HYDROCHLORIDE 0.5 MG: 1 INJECTION, SOLUTION INTRAMUSCULAR; INTRAVENOUS; SUBCUTANEOUS at 21:07

## 2025-02-04 RX ADMIN — ONDANSETRON 4 MG: 2 INJECTION INTRAMUSCULAR; INTRAVENOUS at 19:11

## 2025-02-04 RX ADMIN — HYDROMORPHONE HYDROCHLORIDE 1 MG: 1 INJECTION, SOLUTION INTRAMUSCULAR; INTRAVENOUS; SUBCUTANEOUS at 22:08

## 2025-02-04 RX ADMIN — AMITRIPTYLINE HYDROCHLORIDE 150 MG: 50 TABLET, FILM COATED ORAL at 22:08

## 2025-02-04 RX ADMIN — ROSUVASTATIN CALCIUM 20 MG: 10 TABLET, FILM COATED ORAL at 22:08

## 2025-02-04 ASSESSMENT — ENCOUNTER SYMPTOMS
SHORTNESS OF BREATH: 0
DEPRESSION: 0
MYALGIAS: 0
COUGH: 0
BRUISES/BLEEDS EASILY: 0
WEAKNESS: 0
NAUSEA: 0
DOUBLE VISION: 0
SORE THROAT: 0
DIZZINESS: 0
FEVER: 0
HEADACHES: 0
INSOMNIA: 0
VOMITING: 0
PALPITATIONS: 0
NECK PAIN: 0
BLURRED VISION: 0

## 2025-02-04 ASSESSMENT — FIBROSIS 4 INDEX: FIB4 SCORE: 2.491709609895453181

## 2025-02-04 ASSESSMENT — LIFESTYLE VARIABLES
TOTAL SCORE: 0
CONSUMPTION TOTAL: NEGATIVE
EVER HAD A DRINK FIRST THING IN THE MORNING TO STEADY YOUR NERVES TO GET RID OF A HANGOVER: NO
ALCOHOL_USE: NO
HAVE YOU EVER FELT YOU SHOULD CUT DOWN ON YOUR DRINKING: NO
ON A TYPICAL DAY WHEN YOU DRINK ALCOHOL HOW MANY DRINKS DO YOU HAVE: 0
EVER FELT BAD OR GUILTY ABOUT YOUR DRINKING: NO
HOW MANY TIMES IN THE PAST YEAR HAVE YOU HAD 5 OR MORE DRINKS IN A DAY: 0
AVERAGE NUMBER OF DAYS PER WEEK YOU HAVE A DRINK CONTAINING ALCOHOL: 0
HAVE PEOPLE ANNOYED YOU BY CRITICIZING YOUR DRINKING: NO

## 2025-02-04 ASSESSMENT — PATIENT HEALTH QUESTIONNAIRE - PHQ9
2. FEELING DOWN, DEPRESSED, IRRITABLE, OR HOPELESS: SEVERAL DAYS
4. FEELING TIRED OR HAVING LITTLE ENERGY: SEVERAL DAYS
8. MOVING OR SPEAKING SO SLOWLY THAT OTHER PEOPLE COULD HAVE NOTICED. OR THE OPPOSITE, BEING SO FIGETY OR RESTLESS THAT YOU HAVE BEEN MOVING AROUND A LOT MORE THAN USUAL: NOT AT ALL
6. FEELING BAD ABOUT YOURSELF - OR THAT YOU ARE A FAILURE OR HAVE LET YOURSELF OR YOUR FAMILY DOWN: SEVERAL DAYS
1. LITTLE INTEREST OR PLEASURE IN DOING THINGS: NOT AT ALL
SUM OF ALL RESPONSES TO PHQ9 QUESTIONS 1 AND 2: 1
5. POOR APPETITE OR OVEREATING: NOT AT ALL
7. TROUBLE CONCENTRATING ON THINGS, SUCH AS READING THE NEWSPAPER OR WATCHING TELEVISION: SEVERAL DAYS
SUM OF ALL RESPONSES TO PHQ QUESTIONS 1-9: 5
3. TROUBLE FALLING OR STAYING ASLEEP OR SLEEPING TOO MUCH: SEVERAL DAYS
9. THOUGHTS THAT YOU WOULD BE BETTER OFF DEAD, OR OF HURTING YOURSELF: NOT AT ALL

## 2025-02-04 ASSESSMENT — PAIN DESCRIPTION - PAIN TYPE
TYPE: ACUTE PAIN
TYPE: ACUTE PAIN;CHRONIC PAIN
TYPE: ACUTE PAIN

## 2025-02-05 ENCOUNTER — APPOINTMENT (OUTPATIENT)
Dept: RADIOLOGY | Facility: MEDICAL CENTER | Age: 75
End: 2025-02-05
Attending: INTERNAL MEDICINE
Payer: MEDICARE

## 2025-02-05 LAB
ANION GAP SERPL CALC-SCNC: 11 MMOL/L (ref 7–16)
BUN SERPL-MCNC: 12 MG/DL (ref 8–22)
CALCIUM SERPL-MCNC: 8.6 MG/DL (ref 8.4–10.2)
CHLORIDE SERPL-SCNC: 107 MMOL/L (ref 96–112)
CO2 SERPL-SCNC: 22 MMOL/L (ref 20–33)
CREAT SERPL-MCNC: 0.46 MG/DL (ref 0.5–1.4)
ERYTHROCYTE [DISTWIDTH] IN BLOOD BY AUTOMATED COUNT: 54.1 FL (ref 35.9–50)
GFR SERPLBLD CREATININE-BSD FMLA CKD-EPI: 100 ML/MIN/1.73 M 2
GLUCOSE BLD STRIP.AUTO-MCNC: 105 MG/DL (ref 65–99)
GLUCOSE BLD STRIP.AUTO-MCNC: 90 MG/DL (ref 65–99)
GLUCOSE BLD STRIP.AUTO-MCNC: 93 MG/DL (ref 65–99)
GLUCOSE BLD STRIP.AUTO-MCNC: 98 MG/DL (ref 65–99)
GLUCOSE SERPL-MCNC: 113 MG/DL (ref 65–99)
HCT VFR BLD AUTO: 33.7 % (ref 37–47)
HGB BLD-MCNC: 10.5 G/DL (ref 12–16)
MCH RBC QN AUTO: 26.3 PG (ref 27–33)
MCHC RBC AUTO-ENTMCNC: 31.2 G/DL (ref 32.2–35.5)
MCV RBC AUTO: 84.5 FL (ref 81.4–97.8)
PLATELET # BLD AUTO: 182 K/UL (ref 164–446)
PMV BLD AUTO: 9.1 FL (ref 9–12.9)
POTASSIUM SERPL-SCNC: 4.2 MMOL/L (ref 3.6–5.5)
RBC # BLD AUTO: 3.99 M/UL (ref 4.2–5.4)
SODIUM SERPL-SCNC: 140 MMOL/L (ref 135–145)
WBC # BLD AUTO: 6.3 K/UL (ref 4.8–10.8)

## 2025-02-05 PROCEDURE — 96376 TX/PRO/DX INJ SAME DRUG ADON: CPT

## 2025-02-05 PROCEDURE — 36415 COLL VENOUS BLD VENIPUNCTURE: CPT

## 2025-02-05 PROCEDURE — 99233 SBSQ HOSP IP/OBS HIGH 50: CPT | Performed by: INTERNAL MEDICINE

## 2025-02-05 PROCEDURE — 700111 HCHG RX REV CODE 636 W/ 250 OVERRIDE (IP): Mod: JZ | Performed by: INTERNAL MEDICINE

## 2025-02-05 PROCEDURE — 700111 HCHG RX REV CODE 636 W/ 250 OVERRIDE (IP): Mod: JZ | Performed by: HOSPITALIST

## 2025-02-05 PROCEDURE — 73200 CT UPPER EXTREMITY W/O DYE: CPT | Mod: LT

## 2025-02-05 PROCEDURE — 700102 HCHG RX REV CODE 250 W/ 637 OVERRIDE(OP): Performed by: HOSPITALIST

## 2025-02-05 PROCEDURE — 96375 TX/PRO/DX INJ NEW DRUG ADDON: CPT

## 2025-02-05 PROCEDURE — 85027 COMPLETE CBC AUTOMATED: CPT

## 2025-02-05 PROCEDURE — 94760 N-INVAS EAR/PLS OXIMETRY 1: CPT

## 2025-02-05 PROCEDURE — 80048 BASIC METABOLIC PNL TOTAL CA: CPT

## 2025-02-05 PROCEDURE — A9270 NON-COVERED ITEM OR SERVICE: HCPCS | Performed by: HOSPITALIST

## 2025-02-05 PROCEDURE — G0378 HOSPITAL OBSERVATION PER HR: HCPCS

## 2025-02-05 PROCEDURE — 82962 GLUCOSE BLOOD TEST: CPT

## 2025-02-05 RX ORDER — HYDRALAZINE HYDROCHLORIDE 20 MG/ML
10 INJECTION INTRAMUSCULAR; INTRAVENOUS ONCE
Status: COMPLETED | OUTPATIENT
Start: 2025-02-05 | End: 2025-02-05

## 2025-02-05 RX ORDER — KETOROLAC TROMETHAMINE 15 MG/ML
15 INJECTION, SOLUTION INTRAMUSCULAR; INTRAVENOUS ONCE
Status: COMPLETED | OUTPATIENT
Start: 2025-02-05 | End: 2025-02-05

## 2025-02-05 RX ADMIN — KETOROLAC TROMETHAMINE 15 MG: 15 INJECTION, SOLUTION INTRAMUSCULAR; INTRAVENOUS at 15:31

## 2025-02-05 RX ADMIN — HYDRALAZINE HYDROCHLORIDE 10 MG: 20 INJECTION INTRAMUSCULAR; INTRAVENOUS at 03:10

## 2025-02-05 RX ADMIN — HYDROMORPHONE HYDROCHLORIDE 1 MG: 1 INJECTION, SOLUTION INTRAMUSCULAR; INTRAVENOUS; SUBCUTANEOUS at 08:09

## 2025-02-05 RX ADMIN — METHOCARBAMOL 500 MG: 500 TABLET ORAL at 02:41

## 2025-02-05 RX ADMIN — LIOTHYRONINE SODIUM 5 MCG: 5 TABLET ORAL at 05:09

## 2025-02-05 RX ADMIN — OMEPRAZOLE 20 MG: 20 CAPSULE, DELAYED RELEASE ORAL at 16:41

## 2025-02-05 RX ADMIN — ROSUVASTATIN CALCIUM 20 MG: 10 TABLET, FILM COATED ORAL at 16:41

## 2025-02-05 RX ADMIN — METOPROLOL SUCCINATE 25 MG: 25 TABLET, FILM COATED, EXTENDED RELEASE ORAL at 05:13

## 2025-02-05 RX ADMIN — OXYCODONE HYDROCHLORIDE 10 MG: 10 TABLET ORAL at 08:08

## 2025-02-05 RX ADMIN — OXYCODONE HYDROCHLORIDE 10 MG: 10 TABLET ORAL at 20:47

## 2025-02-05 RX ADMIN — OXYCODONE HYDROCHLORIDE 10 MG: 10 TABLET ORAL at 03:06

## 2025-02-05 RX ADMIN — HYDROMORPHONE HYDROCHLORIDE 1 MG: 1 INJECTION, SOLUTION INTRAMUSCULAR; INTRAVENOUS; SUBCUTANEOUS at 17:52

## 2025-02-05 RX ADMIN — ESCITALOPRAM OXALATE 20 MG: 10 TABLET ORAL at 05:09

## 2025-02-05 RX ADMIN — HYDROMORPHONE HYDROCHLORIDE 1 MG: 1 INJECTION, SOLUTION INTRAMUSCULAR; INTRAVENOUS; SUBCUTANEOUS at 22:35

## 2025-02-05 RX ADMIN — HYDROMORPHONE HYDROCHLORIDE 1 MG: 1 INJECTION, SOLUTION INTRAMUSCULAR; INTRAVENOUS; SUBCUTANEOUS at 04:04

## 2025-02-05 RX ADMIN — HYDROMORPHONE HYDROCHLORIDE 1 MG: 1 INJECTION, SOLUTION INTRAMUSCULAR; INTRAVENOUS; SUBCUTANEOUS at 13:59

## 2025-02-05 RX ADMIN — AMITRIPTYLINE HYDROCHLORIDE 150 MG: 50 TABLET, FILM COATED ORAL at 20:19

## 2025-02-05 RX ADMIN — OMEPRAZOLE 20 MG: 20 CAPSULE, DELAYED RELEASE ORAL at 05:09

## 2025-02-05 RX ADMIN — OXYCODONE HYDROCHLORIDE 10 MG: 10 TABLET ORAL at 12:50

## 2025-02-05 RX ADMIN — EZETIMIBE 10 MG: 10 TABLET ORAL at 16:41

## 2025-02-05 RX ADMIN — METHOCARBAMOL 500 MG: 500 TABLET ORAL at 20:16

## 2025-02-05 RX ADMIN — LEVOTHYROXINE SODIUM 88 MCG: 0.09 TABLET ORAL at 05:09

## 2025-02-05 RX ADMIN — OXYCODONE HYDROCHLORIDE 10 MG: 10 TABLET ORAL at 16:41

## 2025-02-05 ASSESSMENT — PAIN DESCRIPTION - PAIN TYPE
TYPE: ACUTE PAIN
TYPE: ACUTE PAIN;CHRONIC PAIN
TYPE: ACUTE PAIN;CHRONIC PAIN
TYPE: CHRONIC PAIN
TYPE: ACUTE PAIN;CHRONIC PAIN
TYPE: ACUTE PAIN;CHRONIC PAIN
TYPE: ACUTE PAIN
TYPE: ACUTE PAIN
TYPE: ACUTE PAIN;CHRONIC PAIN
TYPE: ACUTE PAIN
TYPE: CHRONIC PAIN;ACUTE PAIN
TYPE: ACUTE PAIN;CHRONIC PAIN
TYPE: ACUTE PAIN
TYPE: CHRONIC PAIN

## 2025-02-05 ASSESSMENT — COGNITIVE AND FUNCTIONAL STATUS - GENERAL
HELP NEEDED FOR BATHING: A LITTLE
CLIMB 3 TO 5 STEPS WITH RAILING: A LITTLE
PERSONAL GROOMING: A LITTLE
MOVING FROM LYING ON BACK TO SITTING ON SIDE OF FLAT BED: A LITTLE
MOBILITY SCORE: 18
SUGGESTED CMS G CODE MODIFIER MOBILITY: CK
DRESSING REGULAR UPPER BODY CLOTHING: A LITTLE
DRESSING REGULAR LOWER BODY CLOTHING: A LITTLE
WALKING IN HOSPITAL ROOM: A LITTLE
EATING MEALS: A LITTLE
TOILETING: A LITTLE
MOVING TO AND FROM BED TO CHAIR: A LITTLE
STANDING UP FROM CHAIR USING ARMS: A LITTLE
DAILY ACTIVITIY SCORE: 18
TURNING FROM BACK TO SIDE WHILE IN FLAT BAD: A LITTLE
SUGGESTED CMS G CODE MODIFIER DAILY ACTIVITY: CK

## 2025-02-05 ASSESSMENT — PATIENT HEALTH QUESTIONNAIRE - PHQ9
5. POOR APPETITE OR OVEREATING: NOT AT ALL
4. FEELING TIRED OR HAVING LITTLE ENERGY: SEVERAL DAYS
SUM OF ALL RESPONSES TO PHQ QUESTIONS 1-9: 5
7. TROUBLE CONCENTRATING ON THINGS, SUCH AS READING THE NEWSPAPER OR WATCHING TELEVISION: SEVERAL DAYS
2. FEELING DOWN, DEPRESSED, IRRITABLE, OR HOPELESS: SEVERAL DAYS
1. LITTLE INTEREST OR PLEASURE IN DOING THINGS: NOT AT ALL
3. TROUBLE FALLING OR STAYING ASLEEP OR SLEEPING TOO MUCH: SEVERAL DAYS
8. MOVING OR SPEAKING SO SLOWLY THAT OTHER PEOPLE COULD HAVE NOTICED. OR THE OPPOSITE, BEING SO FIGETY OR RESTLESS THAT YOU HAVE BEEN MOVING AROUND A LOT MORE THAN USUAL: NOT AT ALL
6. FEELING BAD ABOUT YOURSELF - OR THAT YOU ARE A FAILURE OR HAVE LET YOURSELF OR YOUR FAMILY DOWN: SEVERAL DAYS
9. THOUGHTS THAT YOU WOULD BE BETTER OFF DEAD, OR OF HURTING YOURSELF: NOT AT ALL
SUM OF ALL RESPONSES TO PHQ9 QUESTIONS 1 AND 2: 1

## 2025-02-05 ASSESSMENT — ENCOUNTER SYMPTOMS
PALPITATIONS: 0
CHILLS: 0
SHORTNESS OF BREATH: 0
FEVER: 0
NAUSEA: 0
DIARRHEA: 0
HEADACHES: 0
CONSTIPATION: 0
BACK PAIN: 0
DIZZINESS: 0
COUGH: 0
ABDOMINAL PAIN: 0
VOMITING: 0

## 2025-02-05 ASSESSMENT — FIBROSIS 4 INDEX: FIB4 SCORE: 1.63

## 2025-02-05 ASSESSMENT — SOCIAL DETERMINANTS OF HEALTH (SDOH)
WITHIN THE LAST YEAR, HAVE YOU BEEN AFRAID OF YOUR PARTNER OR EX-PARTNER?: NO
WITHIN THE PAST 12 MONTHS, YOU WORRIED THAT YOUR FOOD WOULD RUN OUT BEFORE YOU GOT THE MONEY TO BUY MORE: NEVER TRUE
IN THE PAST 12 MONTHS, HAS THE ELECTRIC, GAS, OIL, OR WATER COMPANY THREATENED TO SHUT OFF SERVICE IN YOUR HOME?: NO
WITHIN THE LAST YEAR, HAVE YOU BEEN KICKED, HIT, SLAPPED, OR OTHERWISE PHYSICALLY HURT BY YOUR PARTNER OR EX-PARTNER?: NO
WITHIN THE LAST YEAR, HAVE TO BEEN RAPED OR FORCED TO HAVE ANY KIND OF SEXUAL ACTIVITY BY YOUR PARTNER OR EX-PARTNER?: NO
WITHIN THE PAST 12 MONTHS, THE FOOD YOU BOUGHT JUST DIDN'T LAST AND YOU DIDN'T HAVE MONEY TO GET MORE: NEVER TRUE
WITHIN THE LAST YEAR, HAVE YOU BEEN HUMILIATED OR EMOTIONALLY ABUSED IN OTHER WAYS BY YOUR PARTNER OR EX-PARTNER?: NO

## 2025-02-05 NOTE — H&P
"Hospital Medicine History & Physical Note    Date of Service  2/4/2025    Primary Care Physician  Malaika Girard, A.PVISHNU.    Consultants  None    Code Status  Full Code    Chief Complaint  Chief Complaint   Patient presents with    Shoulder Pain     Left   Has been seen x 3 for c/o same  States is not able to \"move arm at all\"  Pending MRI next Tuesday w/ follow up w. Dr. Sparks for shoulder surgery       History of Presenting Illness  Yamile Go is a 75 y.o. female, with h/o HTN, HLD, Type II DM, Hypothyroidism, Depression and GERD who over the last few weeks is having worsening left Shoulder pain. She was seen at the [facility] clinic last week and MRI was ordered (scheduled for next week) and has a follow up appointment to discuss results on 2/26. She presented to the ED on 2/4/2025 with worsening severe pain. Denies trauma. Pain not improving with Oxycodone, rated 10/10 in intensity and exacerbated with minimal movement. She required 3 doses of IV Dilaudid in the ED and still in pain, therefore Hospitalist was called for admission. Denies chest pain, no SOB.    I discussed the plan of care with patient and ERP Dr. Quiroz .    Review of Systems  Review of Systems   Constitutional:  Negative for fever.   HENT:  Negative for congestion and sore throat.    Eyes:  Negative for blurred vision and double vision.   Respiratory:  Negative for cough and shortness of breath.    Cardiovascular:  Negative for chest pain and palpitations.   Gastrointestinal:  Negative for nausea and vomiting.   Genitourinary:  Negative for dysuria and urgency.   Musculoskeletal:  Positive for joint pain. Negative for myalgias and neck pain.   Skin:  Negative for itching and rash.   Neurological:  Negative for dizziness, weakness and headaches.   Endo/Heme/Allergies:  Does not bruise/bleed easily.   Psychiatric/Behavioral:  Negative for depression. The patient does not have insomnia.        Past Medical History   has a past medical " history of Anemia, Anesthesia, Arthritis, Asthma, CAD (coronary artery disease), Cataract, Dental disorder, Depression, Diabetes (HCC), Disorder of thyroid, Heart burn, History of vertebral fracture, HTN (hypertension), Hyperlipidemia, Indigestion, Migraines, Obesity, Pneumonia (2023), PONV (postoperative nausea and vomiting), and Sleep apnea.    Surgical History   has a past surgical history that includes appendectomy (N/A, 1976); abdominal hysterectomy total (N/A, 1978); breast biopsy (1984); colectomy (N/A, 2007); lumbar laminectomy diskectomy (N/A, 1989); arthroplasty (Right, 2020); lumbar exploration (N/A, 2017); insertion, peripheral nerve stimulator, lower extremity (Left, 12/22/2022); and pr reconstr total shoulder implant (Right, 4/30/2024).     Family History  family history includes Alcohol abuse in her daughter; Cancer in her mother; Diabetes in her brother, brother, father, and maternal aunt; Drug abuse in her brother and daughter; Heart Disease in her brother, brother, brother, brother, father, and mother; Hyperlipidemia in her brother, father, and mother; Hypertension in her brother, father, maternal aunt, and maternal aunt; Other in her brother; Stroke in her father.   Family history reviewed with patient. There is no family history that is pertinent to the chief complaint.     Social History   reports that she has never smoked. She has never used smokeless tobacco. She reports that she does not drink alcohol and does not use drugs.    Allergies  Allergies   Allergen Reactions    Gabapentin Hives and Swelling    Pregabalin Hives and Swelling    Amlodipine Swelling    Bee Swelling    Fentanyl Vomiting and Unspecified    Morphine Vomiting, Nausea and Unspecified    Benzoin Rash     Tincture of benzoin =blisters    blisters    Rifampin Unspecified     Pt turns yellow       Medications  Prior to Admission Medications   Prescriptions Last Dose Informant Patient Reported? Taking?   SYNTHROID 88 MCG Tab   Patient No No   Sig: Take 1 Tablet by mouth every day.   VITAMIN D PO  Patient Yes No   Sig: Take 1 Tablet by mouth every morning.   albuterol 108 (90 Base) MCG/ACT Aero Soln inhalation aerosol  Patient No No   Sig: Inhale 1-2 Puffs every four hours as needed for Shortness of Breath.   alendronate (FOSAMAX) 70 MG Tab  Patient Yes No   Si mg every 7 days.   amitriptyline (ELAVIL) 100 MG Tab  Patient No No   Sig: Take 1.5 Tablets by mouth every evening.   cefpodoxime (VANTIN) 200 MG Tab   No No   Sig: Take 1 Tablet by mouth 2 times a day.   diclofenac sodium (VOLTAREN) 1 % Gel  Patient No No   Sig: Apply 4 g topically 4 times a day as needed (back pain).   escitalopram (LEXAPRO) 20 MG tablet  Patient No No   Sig: Take 1 Tablet by mouth every day.   ezetimibe (ZETIA) 10 MG Tab  Patient No No   Sig: Take 1 Tablet by mouth every evening.   fluconazole (DIFLUCAN) 150 MG tablet  Patient No No   Sig: Take one tablet orally for yeast infection   guaiFENesin dextromethorphan (ROBITUSSIN DM) 100-10 MG/5ML Syrup syrup   No No   Sig: Take 10 mL by mouth every 6 hours as needed for Cough.   liothyronine (CYTOMEL) 5 MCG Tab  Patient No No   Sig: Take 1 Tablet by mouth every day.   metFORMIN (GLUCOPHAGE) 500 MG Tab  Patient No No   Sig: Take 1 Tablet by mouth 2 times a day with meals. Indications: Type 2 Diabetes   methocarbamol (ROBAXIN) 500 MG Tab  Patient No No   Sig: Take 1 Tablet by mouth 3 times a day as needed (Hold for respiratory depression, respiratory rate <12, heart rate less than 65, lethargy,somnolence, or systolic blood pressure < 105.).   metoprolol SR (TOPROL XL) 25 MG TABLET SR 24 HR  Patient No No   Sig: Take 1 Tablet by mouth every day.   omeprazole (PRILOSEC) 20 MG delayed-release capsule  Patient No No   Sig: Take 1 Capsule by mouth 2 times a day.   oxyCODONE immediate release (ROXICODONE) 10 MG immediate release tablet  Patient Yes No   Sig: Take 1 tablet every 4 hours by oral route for 30 days.    rosuvastatin (CRESTOR) 20 MG Tab  Patient No No   Sig: Take 1 Tablet by mouth every evening.      Facility-Administered Medications: None       Physical Exam  Temp:  [36.8 °C (98.3 °F)-37 °C (98.6 °F)] 37 °C (98.6 °F)  Pulse:  [102-103] 102  Resp:  [15-18] 18  BP: (177-190)/(90-99) 190/90  SpO2:  [91 %-92 %] 91 %  Blood Pressure : (!) 190/90   Temperature: 37 °C (98.6 °F)   Pulse: (!) 102   Respiration: 18   Pulse Oximetry: 91 %       Physical Exam  Constitutional:       Appearance: Normal appearance.   HENT:      Head: Normocephalic and atraumatic.      Mouth/Throat:      Mouth: Mucous membranes are moist.      Pharynx: Oropharynx is clear.   Eyes:      Extraocular Movements: Extraocular movements intact.      Pupils: Pupils are equal, round, and reactive to light.   Cardiovascular:      Rate and Rhythm: Normal rate and regular rhythm.      Heart sounds: Normal heart sounds.   Pulmonary:      Effort: Pulmonary effort is normal.      Breath sounds: Normal breath sounds.   Abdominal:      General: Abdomen is flat. Bowel sounds are normal.      Palpations: Abdomen is soft.   Musculoskeletal:      Cervical back: Normal range of motion and neck supple.      Comments: Left Shoulder: Decrease ROM and pain with ROM   Skin:     General: Skin is warm and dry.   Neurological:      General: No focal deficit present.      Mental Status: She is alert and oriented to person, place, and time.   Psychiatric:         Mood and Affect: Mood normal.         Behavior: Behavior normal.         Laboratory:  Recent Labs     02/04/25  1859   WBC 7.0   RBC 4.14*   HEMOGLOBIN 10.9*   HEMATOCRIT 35.8*   MCV 86.5   MCH 26.3*   MCHC 30.4*   RDW 55.3*   PLATELETCT 197   MPV 9.1     Recent Labs     02/04/25  1859   SODIUM 140   POTASSIUM 3.8   CHLORIDE 109   CO2 19*   GLUCOSE 134*   BUN 14   CREATININE 0.52   CALCIUM 8.9     Recent Labs     02/04/25  1859   ALTSGPT 25   ASTSGOT 20   ALKPHOSPHAT 79   TBILIRUBIN 0.2   GLUCOSE 134*             no  X-Ray or EKG requiring interpretation    Assessment/Plan:  Justification for Admission Status  I anticipate this patient is appropriate for observation status at this time because 74 yo F, undergoing outpatient workup for worsening left shoulder pain, now with atraumatic intractable pain requiring IV narcotics        * Intractable pain- (present on admission)  Assessment & Plan  -Observation status, medical floor.  -She was seen at the [facility] clinic last week and MRI was ordered (scheduled for next week) and has a follow up appointment to discuss results on 2/26.  -Pain in spite of Oxycodone, currently requiring IV Narcotics for pain control in the ED  -Continue Oxycodone with IV Dilaudid for breakthrough pain  -Suspect Rotator cuff. Ordered MRI of left shoulder      Normocytic anemia  Assessment & Plan  -Hemoglobin is 10.9 on admission. No bleeding. Monitor CBC in am    Mixed hyperlipidemia- (present on admission)  Assessment & Plan  -Continue Zetia and Rosuvastatin    Type 2 diabetes mellitus (HCC)- (present on admission)  Assessment & Plan  -Hold metformin and start RISS    Acquired hypothyroidism- (present on admission)  Assessment & Plan  -Continue Levothyroxine and cytomel    Depression- (present on admission)  Assessment & Plan  -Stable on Escitalopram and Amitriptyline    CAD (coronary artery disease)- (present on admission)  Assessment & Plan  -No chest pain or SOB. Following up outpatient    Essential hypertension, benign- (present on admission)  Assessment & Plan  -Continue Metoprolol        VTE prophylaxis: SCDs/TEDs

## 2025-02-05 NOTE — PROGRESS NOTES
4 Eyes Skin Assessment Completed by DEEJAY Self and DEEJAY Holloway.    Head WDL  Ears WDL  Nose WDL  Mouth WDL  Neck WDL  Breast/Chest Bruising  Shoulder Blades Bruising Left side  Spine WDL  (R) Arm/Elbow/Hand Bruising  (L) Arm/Elbow/Hand Bruising  Abdomen WDL  Groin WDL  Scrotum/Coccyx/Buttocks Redness and Blanching  (R) Leg Bruising and Edema  (L) Leg WDL  (R) Heel/Foot/Toe Edema, Bruising  (L) Heel/Foot/Toe WDL          Devices In Places Blood Pressure Cuff and Pulse Ox      Interventions In Place Pressure Redistribution Mattress    Possible Skin Injury No    Pictures Uploaded Into Epic N/A  Wound Consult Placed N/A  RN Wound Prevention Protocol Ordered No

## 2025-02-05 NOTE — ED TRIAGE NOTES
"Chief Complaint   Patient presents with    Shoulder Pain     Left   Has been seen x 3 for c/o same  States is not able to \"move arm at all\"  Pending MRI next Tuesday w/ follow up w. Dr. Sparks for shoulder surgery     BP (!) 177/99   Pulse (!) 103   Temp 36.8 °C (98.3 °F) (Temporal)   Resp 15   Ht 1.549 m (5' 1\")   Wt 66.7 kg (147 lb 0.8 oz)   SpO2 92%   BMI 27.78 kg/m²     Pt ambulated to ED using walker w/ visitor for c/o increasing Left Shoulder pain w/ hx of arthritis and possible pending shoulder surgery w/ Dr. Sparks.    "

## 2025-02-05 NOTE — PROGRESS NOTES
Hospital Medicine Daily Progress Note    Date of Service  2/5/2025    Chief Complaint  Yamile Go is a 75 y.o. female admitted 2/4/2025 with left shoulder pain    Hospital Course  No notes on file    Interval Problem Update  Patient states she has very severe left shoulder pain. She feels she cannot feel her arm (proprioception). She knows her arm is to her side and hanging, but can't feel where it moves.  Possible rotator cuff tears, dislocation, bone involvement such as Avascular necrosis.  Pending MRI.  Patient was not truthful, she has neurostimulator that is no longer active.  She does not have the control for it.  We are unable to perform any MRI at this time for patient's safety.  I ordered a CT without contrast left shoulder.  I ordered a one time IV Toradol for inflammation, monitoring given hx of GIB  I ordered a left arm sling  Will likely need to increase pain control prn meds    I have discussed this patient's plan of care and discharge plan at IDT rounds today with Case Management, Nursing, Nursing leadership, and other members of the IDT team.    Consultants/Specialty  None    Code Status  Full Code    Disposition  The patient is not medically cleared for discharge to home or a post-acute facility.      I have placed the appropriate orders for post-discharge needs.    Review of Systems  Review of Systems   Constitutional:  Negative for chills, fever and malaise/fatigue.   Respiratory:  Negative for cough and shortness of breath.    Cardiovascular:  Negative for chest pain and palpitations.   Gastrointestinal:  Negative for abdominal pain, constipation, diarrhea, nausea and vomiting.   Musculoskeletal:  Positive for joint pain (Left shoulder). Negative for back pain.   Neurological:  Negative for dizziness and headaches.   All other systems reviewed and are negative.       Physical Exam  Temp:  [35.8 °C (96.5 °F)-37 °C (98.6 °F)] 36.9 °C (98.4 °F)  Pulse:  [] 91  Resp:  [15-20] 19  BP:  (106-206)/(53-97) 133/76  SpO2:  [91 %-98 %] 93 %    Physical Exam  Vitals and nursing note reviewed.   Constitutional:       General: She is not in acute distress.     Appearance: Normal appearance. She is not ill-appearing.   HENT:      Head: Normocephalic and atraumatic.   Eyes:      General: No scleral icterus.     Extraocular Movements: Extraocular movements intact.   Cardiovascular:      Rate and Rhythm: Normal rate.      Pulses: Normal pulses.      Heart sounds: Normal heart sounds. No murmur heard.  Pulmonary:      Effort: Pulmonary effort is normal. No respiratory distress.      Breath sounds: Normal breath sounds.   Abdominal:      General: Abdomen is flat. Bowel sounds are normal. There is no distension.      Palpations: Abdomen is soft.   Musculoskeletal:      Cervical back: Normal range of motion and neck supple.      Comments: Left shoulder tenderness, limited ROM   Skin:     General: Skin is warm.      Capillary Refill: Capillary refill takes less than 2 seconds.      Coloration: Skin is not jaundiced.      Findings: No erythema.   Neurological:      General: No focal deficit present.      Mental Status: She is alert and oriented to person, place, and time. Mental status is at baseline.      Sensory: No sensory deficit (No loss of sensation to hands).      Motor: No weakness.   Psychiatric:         Mood and Affect: Mood normal.         Behavior: Behavior normal.         Thought Content: Thought content normal.         Judgment: Judgment normal.         Fluids    Intake/Output Summary (Last 24 hours) at 2/5/2025 1809  Last data filed at 2/5/2025 0500  Gross per 24 hour   Intake 240 ml   Output --   Net 240 ml        Laboratory  Recent Labs     02/04/25 1859 02/05/25  0152   WBC 7.0 6.3   RBC 4.14* 3.99*   HEMOGLOBIN 10.9* 10.5*   HEMATOCRIT 35.8* 33.7*   MCV 86.5 84.5   MCH 26.3* 26.3*   MCHC 30.4* 31.2*   RDW 55.3* 54.1*   PLATELETCT 197 182   MPV 9.1 9.1     Recent Labs     02/04/25 1859  "02/05/25  0152   SODIUM 140 140   POTASSIUM 3.8 4.2   CHLORIDE 109 107   CO2 19* 22   GLUCOSE 134* 113*   BUN 14 12   CREATININE 0.52 0.46*   CALCIUM 8.9 8.6                   Imaging  CT-SHOULDER W/O LEFT   Final Result      1.  No evidence for acute fracture dislocation left shoulder.      2.  Moderate osteoarthritic changes of the glenohumeral joint and left AC joint..      3.  Mild cephalad subluxation of the humeral head suspicious for rotator cuff pathology.      4.  Several small air bubbles adjacent to the left teres minor/infraspinatus musculature posteriorly which may be posttraumatic or postprocedural. Early gas-forming infection is another consideration.      MR-SHOULDER-WITH LEFT    (Results Pending)        Assessment/Plan  * Intractable pain- (present on admission)  Assessment & Plan  Possible rotator cuff tears, dislocation, bone involvement such as Avascular necrosis.  Pending MRI.  Patient was not truthful, she has neurostimulator that is no longer active.  She does not have the control for it.  We are unable to perform any MRI at this time for patient's safety.  I ordered a one time IV Toradol for inflammation, monitoring given hx of GIB  I ordered a left arm sling    Pt has limited very ROM unable Adduct past 30 degrees, point tenderness to areas of the rotator cuff.   I ordered a CT without contrast left shoulder.   \"IMPRESSION:  1.  No evidence for acute fracture dislocation left shoulder.  2.  Moderate osteoarthritic changes of the glenohumeral joint and left AC joint..  3.  Mild cephalad subluxation of the humeral head suspicious for rotator cuff pathology.  4.  Several small air bubbles adjacent to the left teres minor/infraspinatus musculature posteriorly which may be posttraumatic or postprocedural. Early gas-forming infection is another consideration.\"    I called [facility] orthopedic surgery for consultation.  Patient has had surgery with Dr. Sparks, and will be following with " Dr. Jb Park for spinal concerns.    Addendum:  I discussed with Dr. Sparks, patient's current condition is an outpatient workup.  At this time we are not suspecting any infection as she has been afebrile, no leukocytosis, no warmth or redness to the left shoulder.  It was recommended that she follows up in the clinic as any reverse shoulder arthroplasty requires extensive follow-up and planning for surgery to reduce risk of infection.  Patient will need a sling and [facility] will call patient for a follow-up appointment in the next few weeks.  She will need pain control and unfortunately will need to await for a clinic visit with Dr. Sparks.    Normocytic anemia- (present on admission)  Assessment & Plan  -Hemoglobin is 10.9 on admission. No bleeding. Monitor CBC in am    Mixed hyperlipidemia- (present on admission)  Assessment & Plan  Continue rosuvastatin and Zetia    S/P shoulder surgery- (present on admission)  Assessment & Plan  Patient has had a right reverse total shoulder arthroplasty on 4/30/2024 with Dr. Manuela Sparks    Type 2 diabetes mellitus (HCC)- (present on admission)  Assessment & Plan  Hold home metformin    Acquired hypothyroidism- (present on admission)  Assessment & Plan  Continue Synthroid and liothyronine    Depression- (present on admission)  Assessment & Plan  Continue Lexapro and amitriptyline    CAD (coronary artery disease)- (present on admission)  Assessment & Plan  -No chest pain or SOB. Following up outpatient    Essential hypertension, benign- (present on admission)  Assessment & Plan  Continue metoprolol         VTE prophylaxis:   SCDs/TEDs   pharmacologic prophylaxis contraindicated due to unclear if she will need surgery      I have performed a physical exam and reviewed and updated ROS and Plan today (2/5/2025). In review of yesterday's note (2/4/2025), there are no changes except as documented above.      Total time spent 51 minutes. I spent greater than  50% of the time for patient care, including unit/floor time, multiple face-to-face encounters with the patient, counseling on treatment plan and discussion with bedside RN.  Further, I spent time on my own review of patient's overnight events, RN notes, imaging and lab analysis, and developing my assessment and plan above.  In addition, working with , social workers, and Charge RN on case coordination on this date.    This note was generated using voice recognition software which has a chance of producing errors of grammar and content.  I have made every reasonable attempt to find and correct any errors, but it should be expected that some may not be found prior to finalization of this note.

## 2025-02-05 NOTE — PROGRESS NOTES
1150 - Patient arrived on floor from ED, A&Ox4, on 2L via NC, reviewed orders, and medication requisition and admission profile done. Oriented Patient to the floor. Plan of care discussed with Patient and answered questions asked. Safety precautions in place and hourly rounding established with CNA.

## 2025-02-05 NOTE — PROGRESS NOTES
Received report from Lawrence VILLALBA.  Pt alert and oriented, crying and moaning. Pt states pain at 7/10 and just received Dilaudid IV as per MAR. Vital signs checked. Reviewed plan of care within the shift to the pt. Encouraged relaxation technique. Offloading on L shoulder done.   Needs attended well. Fall precautions in place. Bed in lowest position. Call light and belongings within reach. Encouraged to call for assistance.   Hourly rounding in progress.   2156 Pt seen by Dr. Acuna at this time.   0241 /97,  and pain at shoulder at 5/10. Notified Dr. Acuna of concern with order placed.  0310 BP rechecked at 171/81; hydralazine given as per MAR.

## 2025-02-05 NOTE — ED NOTES
Medication reconciliation process completed by interviewing the patient at bedside.   The patient had a 2-day course of Unasyn/Doxycycline 1/15-1/16 followed by a 3-day course of azithromycin.  Anticoagulants (rivaroxaban, apixaban, edoxaban, dabigatran, enoxaparin) taken in the last 14 days? no  Allergies have been verified.    Shaye, PharmD, BCEMP, BCPS

## 2025-02-05 NOTE — ED PROVIDER NOTES
"ED Provider Note    CHIEF COMPLAINT  Chief Complaint   Patient presents with    Shoulder Pain     Left   Has been seen x 3 for c/o same  States is not able to \"move arm at all\"  Pending MRI next Tuesday w/ follow up w. Dr. Sparks for shoulder surgery       EXTERNAL RECORDS REVIEWED  Inpatient Notes reviewed discharge summary dated January 17, 2025 by Dr. Rahman.  Patient seen for discomfort to the left arm, found to be hypoxic requiring supplemental oxygen.  Evidence of bibasal consolidation on x-ray.  Patient initially treated with doxycycline and Unasyn and then transitioned to oral antibiotics.  Oxygenation improved and able to be discharged January 17.  Plan for follow-up with orthopedics with regard to her discomfort to left arm., Outpatient Notes reviewed urgent care progress note by KIRBY Xiao seen for left shoulder pain for 4 days.  History of arthritis, no improvement with NSAIDs.  Treated with Kenalog 40 mg injection to shoulder., and Outpatient labs & studies reviewed left shoulder x-ray dated January 27, 2025.    HPI/ROS  LIMITATION TO HISTORY   Select: : None  OUTSIDE HISTORIAN(S):  None immediately available    Yamile Go is a 75 y.o. female who presents for evaluation of severe pain to her left shoulder.  Patient relates she has been diagnosed with a rotator cuff tear.  She follows with Dr. Sparks of [facility] orthopedics.  Relates she has had pain in her shoulders since the beginning of January without particular injury.  She is undergone joint injections, course to steroid, and is taken oral pain medications.  She relates currently she is taking Percocet 10 mg every 4 hours at home without improvement.  Pain has been getting generally worse since she was discharged from the hospital for incidental pneumonia with hypoxia January 17.  She notes allergies to gabapentin and pregabalin.  No fever, no redness or draining from the joint.  The pain is sharp, severe, localized to the left shoulder with " radiation down the left hand.  She notes weakness to the left arm as well but no numbness and is still able to .  She ambulates with a walker at baseline and is having severe difficulty doing so given the severe pain and weakness to her left arm.  She is obviously in significant pain and given difficulty with regard to getting around her home despite the above interventions she came to be assessed.  No right-sided symptoms, is right-hand dominant.    PAST MEDICAL HISTORY   has a past medical history of Anemia, Anesthesia, Arthritis, Asthma, CAD (coronary artery disease), Cataract, Dental disorder, Depression, Diabetes (HCC), Disorder of thyroid, Heart burn, History of vertebral fracture, HTN (hypertension), Hyperlipidemia, Indigestion, Migraines, Obesity, Pneumonia (2023), PONV (postoperative nausea and vomiting), and Sleep apnea.    SURGICAL HISTORY   has a past surgical history that includes appendectomy (N/A, 1976); abdominal hysterectomy total (N/A, 1978); breast biopsy (1984); colectomy (N/A, 2007); lumbar laminectomy diskectomy (N/A, 1989); arthroplasty (Right, 2020); lumbar exploration (N/A, 2017); insertion, peripheral nerve stimulator, lower extremity (Left, 12/22/2022); and reconstr total shoulder implant (Right, 4/30/2024).    FAMILY HISTORY  Family History   Problem Relation Age of Onset    Cancer Mother         lung    Heart Disease Mother     Hyperlipidemia Mother     Stroke Father     Heart Disease Father     Diabetes Father     Hypertension Father     Hyperlipidemia Father     Heart Disease Brother         cath and bypass    Diabetes Brother     Hypertension Brother     Hyperlipidemia Brother     Diabetes Maternal Aunt     Hypertension Maternal Aunt     Heart Disease Brother         cath and byp[ass    Drug abuse Brother     Heart Disease Brother         cath and bypass    Diabetes Brother     Heart Disease Brother     Other Brother         MVA    Hypertension Maternal Aunt     Drug abuse  Daughter     Alcohol abuse Daughter        SOCIAL HISTORY  Social History     Tobacco Use    Smoking status: Never    Smokeless tobacco: Never   Vaping Use    Vaping status: Never Used   Substance and Sexual Activity    Alcohol use: No    Drug use: No    Sexual activity: Yes     Partners: Male     Birth control/protection: Post-Menopausal       CURRENT MEDICATIONS  Home Medications       Reviewed by Mary Anne HernnadezD (Pharmacist) on 02/04/25 at 2142  Med List Status: Complete     Medication Last Dose Status   albuterol 108 (90 Base) MCG/ACT Aero Soln inhalation aerosol  Active   alendronate (FOSAMAX) 70 MG Tab 2/3/2025 Active   amitriptyline (ELAVIL) 100 MG Tab 2/3/2025 Active   diclofenac sodium (VOLTAREN) 1 % Gel  Active   escitalopram (LEXAPRO) 20 MG tablet 2/4/2025 Active   ezetimibe (ZETIA) 10 MG Tab 2/3/2025 Active   liothyronine (CYTOMEL) 5 MCG Tab 2/4/2025 Active   metFORMIN (GLUCOPHAGE) 500 MG Tab 2/4/2025 Active   methocarbamol (ROBAXIN) 500 MG Tab 2/4/2025 Active   metoprolol SR (TOPROL XL) 25 MG TABLET SR 24 HR 2/4/2025 Active   omeprazole (PRILOSEC) 20 MG delayed-release capsule 2/4/2025 Active   oxyCODONE immediate release (ROXICODONE) 10 MG immediate release tablet 2/4/2025 Active   rosuvastatin (CRESTOR) 20 MG Tab 2/3/2025 Active   SYNTHROID 88 MCG Tab 2/4/2025 Active   VITAMIN D PO 2/4/2025 Active                    ALLERGIES  Allergies   Allergen Reactions    Gabapentin Hives and Swelling    Pregabalin Hives and Swelling    Unasyn [Ampicillin-Sulbactam Sodium] Hives     With oral lesions     Amlodipine Swelling    Bee Swelling    Fentanyl Vomiting and Unspecified    Morphine Vomiting, Nausea and Unspecified    Doxycycline      Possible hives (took with Unasyn)     Benzoin Rash     Tincture of benzoin =blisters    blisters    Rifampin Unspecified     Pt turns yellow       PHYSICAL EXAM  VITAL SIGNS: BP (!) 167/74   Pulse 90   Temp 37 °C (98.6 °F) (Temporal)   Resp 19   Ht 1.549 m (5'  "1\")   Wt 66.7 kg (147 lb 0.8 oz)   SpO2 91%   BMI 27.78 kg/m²    General: Alert, moderate acute distress, appears significantly uncomfortable  Skin: Warm, dry, normal for ethnicity  Head: Normocephalic, atraumatic  Neck: Trachea midline, no tenderness  Eye: PERRL, normal conjunctiva  ENMT: Oral mucosa moist, no pharyngeal erythema or exudate  Cardiovascular: S1, S2, mildly tachycardic, otherwise regular rate and rhythm, No murmur, Normal peripheral perfusion  Respiratory: Lungs faintly coarse on expiration, no Rales, no rhonchi, no stridor, respirations are non-labored, breath sounds are equal  Gastrointestinal: non distended  Musculoskeletal: No swelling, no deformity.  No erythema nor warmth on evaluation of the left shoulder.  There is no deformity.  Range of motion with regard to flexion and extension as well as abduction is severely restricted secondary to pain.  Distal function grossly intact with regard to flexion extension of the digits of the left hand but she is unable to significantly range the left arm at the shoulder.  Sensation with regard to light touch is grossly intact throughout the left upper extremity.  Neurological: Alert and oriented to person, place, time, and situation.  As documented above.  Lymphatics: No streaking lymphangitis with regard to left upper extremity  Psychiatric: Cooperative, appropriate mood & affect     EKG/LABS  Results for orders placed or performed during the hospital encounter of 02/04/25   CBC WITH DIFFERENTIAL    Collection Time: 02/04/25  6:59 PM   Result Value Ref Range    WBC 7.0 4.8 - 10.8 K/uL    RBC 4.14 (L) 4.20 - 5.40 M/uL    Hemoglobin 10.9 (L) 12.0 - 16.0 g/dL    Hematocrit 35.8 (L) 37.0 - 47.0 %    MCV 86.5 81.4 - 97.8 fL    MCH 26.3 (L) 27.0 - 33.0 pg    MCHC 30.4 (L) 32.2 - 35.5 g/dL    RDW 55.3 (H) 35.9 - 50.0 fL    Platelet Count 197 164 - 446 K/uL    MPV 9.1 9.0 - 12.9 fL    Neutrophils-Polys 70.70 44.00 - 72.00 %    Lymphocytes 20.50 (L) 22.00 - " 41.00 %    Monocytes 7.50 0.00 - 13.40 %    Eosinophils 0.40 0.00 - 6.90 %    Basophils 0.30 0.00 - 1.80 %    Immature Granulocytes 0.60 0.00 - 0.90 %    Nucleated RBC 0.00 0.00 - 0.20 /100 WBC    Neutrophils (Absolute) 4.92 1.82 - 7.42 K/uL    Lymphs (Absolute) 1.43 1.00 - 4.80 K/uL    Monos (Absolute) 0.52 0.00 - 0.85 K/uL    Eos (Absolute) 0.03 0.00 - 0.51 K/uL    Baso (Absolute) 0.02 0.00 - 0.12 K/uL    Immature Granulocytes (abs) 0.04 0.00 - 0.11 K/uL    NRBC (Absolute) 0.00 K/uL   COMP METABOLIC PANEL    Collection Time: 02/04/25  6:59 PM   Result Value Ref Range    Sodium 140 135 - 145 mmol/L    Potassium 3.8 3.6 - 5.5 mmol/L    Chloride 109 96 - 112 mmol/L    Co2 19 (L) 20 - 33 mmol/L    Anion Gap 12.0 7.0 - 16.0    Glucose 134 (H) 65 - 99 mg/dL    Bun 14 8 - 22 mg/dL    Creatinine 0.52 0.50 - 1.40 mg/dL    Calcium 8.9 8.4 - 10.2 mg/dL    Correct Calcium 9.1 8.5 - 10.5 mg/dL    AST(SGOT) 20 12 - 45 U/L    ALT(SGPT) 25 2 - 50 U/L    Alkaline Phosphatase 79 30 - 99 U/L    Total Bilirubin 0.2 0.1 - 1.5 mg/dL    Albumin 3.8 3.2 - 4.9 g/dL    Total Protein 6.3 6.0 - 8.2 g/dL    Globulin 2.5 1.9 - 3.5 g/dL    A-G Ratio 1.5 g/dL   CRP QUANTITIVE (NON-CARDIAC)    Collection Time: 02/04/25  6:59 PM   Result Value Ref Range    Stat C-Reactive Protein <0.30 0.00 - 0.75 mg/dL   LACTIC ACID    Collection Time: 02/04/25  6:59 PM   Result Value Ref Range    Lactic Acid 1.1 0.5 - 2.0 mmol/L   ESTIMATED GFR    Collection Time: 02/04/25  6:59 PM   Result Value Ref Range    GFR (CKD-EPI) 97 >60 mL/min/1.73 m 2   Sed Rate    Collection Time: 02/04/25  7:32 PM   Result Value Ref Range    Sed Rate Westergren 11 0 - 25 mm/hour      I have independently interpreted these laboratory analysis      COURSE & MEDICAL DECISION MAKING    ASSESSMENT, COURSE AND PLAN  Care Narrative: Very pleasant 75-year-old presents for evaluation of acute severe pain to the left shoulder.  She has been seen multiple times for this and relates has been  diagnosed with rupture of the rotator cuff, follows with an orthopedist for this.  Patient has undergone multiple interventions including injections of the joint, steroids, analgesics, and notes an allergy to gabapentin.   Nonetheless she is in severe pain today despite the Percocet that she has been written by her pain management physician.  She is at her neurovascular baseline but has severe weakness nonetheless to the left upper extremity, this has been present since symptom onset and clinically I am concerned for perhaps of brachial plexus injury as well though this but not explain the severe pain.  No indication for emergent orthopedic nor neurosurgical intervention at this point.  Clear indication for inpatient management for patient's intractable pain especially considering she is having difficulty ambulating with her walker given the severe pain to the arm.  Normal inflammatory markers, no evidence of infectious process by exam either.  This would not be consistent with septic arthritis.    ED OBS: Yes; I am placing the patient in to an observation status due to a diagnostic uncertainty as well as therapeutic intensity. Patient placed in observation status at 6:34 PM, 2/4/2025.     Observation plan is as follows: Patient medicated with hydromorphone 0.5 mg IV, Zofran 4 mg IV.  Metabolic workup and inflammatory markers will be obtained.  Differential diagnosis includes but is not restricted to rotator cuff rupture, brachial plexus injury, osteoarthritis, calcific tendinitis    1942: Patient still very uncomfortable despite above interventions, have ordered a second dose of IV narcotic, hydromorphone 0.5 mg IV with 4 mg Zofran given for nausea.    2038: Patient unfortunately severe pain again despite the above interventions.  Have ordered third dose now hydromorphone 0.5 mg IV.  At this point this is consistent with intractable pain, will page hospital medicine for admission.    2051: I spoke with the  "hospitalist Dr. Golden who concurs with plan of care thus far and accepts patient to his service for admission.    Upon Reevaluation, the patient's condition has: not improved; and will be escalated to hospitalization.    Patient discharged from ED Observation status at 2052 (Time) 2/4/25 (Date).       Patient Vitals for the past 24 hrs:   BP Temp Temp src Pulse Resp SpO2 Height Weight   02/04/25 2140 (!) 167/74 -- -- 90 19 -- -- --   02/04/25 2119 (!) 190/90 37 °C (98.6 °F) Temporal (!) 102 18 91 % -- --   02/04/25 1737 -- -- -- -- -- -- 1.549 m (5' 1\") 66.7 kg (147 lb 0.8 oz)   02/04/25 1735 (!) 177/99 36.8 °C (98.3 °F) Temporal (!) 103 15 92 % -- --        ADDITIONAL PROBLEMS MANAGED  Intractable left shoulder pain radiating down the left arm    DISPOSITION AND DISCUSSIONS  I have discussed management of the patient with the following physicians and ALEC's:  Dr. Golden (hospital medicine)    Discussion of management with other [facility] [place] or appropriate source(s): None     Escalation of care considered, and ultimately not performed:NA    Barriers to care at this time, including but not limited to:  NA .     Decision tools and prescription drugs considered including, but not limited to:  NA .    FINAL DIAGNOSIS  1. Left shoulder pain, unspecified chronicity    2. Intractable neuropathic pain of upper extremity    3. Inability to ambulate due to multiple joints    4. Failure of outpatient treatment         Electronically signed by: Darrel Quiroz M.D., 2/4/2025 6:27 PM      "

## 2025-02-05 NOTE — ED NOTES
ERP at bedside. Pt agrees with plan of care discussed by ERP. Juwan in low position, side rail up for pt safety. Call light within reach. Plan of care on-going

## 2025-02-05 NOTE — ASSESSMENT & PLAN NOTE
"Possible rotator cuff tears, dislocation, bone involvement such as Avascular necrosis.  Pending MRI.  Patient was not truthful, she has neurostimulator that is no longer active.  She does not have the control for it.  We are unable to perform any MRI at this time for patient's safety.  I ordered a one time IV Toradol for inflammation, monitoring given hx of GIB  I ordered a left arm sling    Pt has limited very ROM unable Adduct past 30 degrees, point tenderness to areas of the rotator cuff.   I ordered a CT without contrast left shoulder.   \"IMPRESSION:  1.  No evidence for acute fracture dislocation left shoulder.  2.  Moderate osteoarthritic changes of the glenohumeral joint and left AC joint..  3.  Mild cephalad subluxation of the humeral head suspicious for rotator cuff pathology.  4.  Several small air bubbles adjacent to the left teres minor/infraspinatus musculature posteriorly which may be posttraumatic or postprocedural. Early gas-forming infection is another consideration.\"    I called [facility] orthopedic surgery for consultation.  Patient has had surgery with Dr. Sparks, and will be following with Dr. Jb Park for spinal concerns.    Addendum:  I discussed with Dr. Sparks, patient's current condition is an outpatient workup.  At this time we are not suspecting any infection as she has been afebrile, no leukocytosis, no warmth or redness to the left shoulder.  It was recommended that she follows up in the clinic as any reverse shoulder arthroplasty requires extensive follow-up and planning for surgery to reduce risk of infection.  Patient will need a sling and [facility] will call patient for a follow-up appointment in the next few weeks.  She will need pain control and unfortunately will need to await for a clinic visit with Dr. Sparks.  "

## 2025-02-05 NOTE — CARE PLAN
The patient is Stable - Low risk of patient condition declining or worsening    Shift Goals  Clinical Goals: Manage pt's pain to tolerable level at 4/10 or less.  Patient Goals: pain control, rest  Family Goals: n/a    Progress made toward(s) clinical / shift goals:  Pain is managed with prn pain medications given as per MAR. Pt seen resting and sleeping comfortably on bed in between rounds; even and unlabored breathing noted.     Patient is not progressing towards the following goals: n/a

## 2025-02-06 VITALS
OXYGEN SATURATION: 93 % | DIASTOLIC BLOOD PRESSURE: 69 MMHG | HEIGHT: 61 IN | SYSTOLIC BLOOD PRESSURE: 110 MMHG | HEART RATE: 82 BPM | BODY MASS INDEX: 27.6 KG/M2 | TEMPERATURE: 97.6 F | RESPIRATION RATE: 18 BRPM | WEIGHT: 146.16 LBS

## 2025-02-06 PROBLEM — Z96.82 NEUROSTIMULATOR DEVICE IN SITU: Status: ACTIVE | Noted: 2025-02-06

## 2025-02-06 PROBLEM — M75.112 INCOMPLETE ROTATOR CUFF TEAR OR RUPTURE OF LEFT SHOULDER, NOT SPECIFIED AS TRAUMATIC: Status: ACTIVE | Noted: 2025-02-06

## 2025-02-06 PROBLEM — M19.012 LOCALIZED OSTEOARTHRITIS OF LEFT SHOULDER: Status: ACTIVE | Noted: 2025-02-06

## 2025-02-06 LAB
ALBUMIN SERPL BCP-MCNC: 3.3 G/DL (ref 3.2–4.9)
APTT PPP: 24 SEC (ref 24.7–36)
BUN SERPL-MCNC: 13 MG/DL (ref 8–22)
CALCIUM ALBUM COR SERPL-MCNC: 9 MG/DL (ref 8.5–10.5)
CALCIUM SERPL-MCNC: 8.4 MG/DL (ref 8.4–10.2)
CHLORIDE SERPL-SCNC: 106 MMOL/L (ref 96–112)
CO2 SERPL-SCNC: 23 MMOL/L (ref 20–33)
CREAT SERPL-MCNC: 0.63 MG/DL (ref 0.5–1.4)
ERYTHROCYTE [DISTWIDTH] IN BLOOD BY AUTOMATED COUNT: 54.3 FL (ref 35.9–50)
GFR SERPLBLD CREATININE-BSD FMLA CKD-EPI: 93 ML/MIN/1.73 M 2
GLUCOSE BLD STRIP.AUTO-MCNC: 104 MG/DL (ref 65–99)
GLUCOSE BLD STRIP.AUTO-MCNC: 90 MG/DL (ref 65–99)
GLUCOSE SERPL-MCNC: 119 MG/DL (ref 65–99)
HCT VFR BLD AUTO: 32.3 % (ref 37–47)
HGB BLD-MCNC: 10 G/DL (ref 12–16)
INR PPP: 0.9 (ref 0.87–1.13)
MAGNESIUM SERPL-MCNC: 2.1 MG/DL (ref 1.5–2.5)
MCH RBC QN AUTO: 26.2 PG (ref 27–33)
MCHC RBC AUTO-ENTMCNC: 31 G/DL (ref 32.2–35.5)
MCV RBC AUTO: 84.8 FL (ref 81.4–97.8)
PHOSPHATE SERPL-MCNC: 3.6 MG/DL (ref 2.5–4.5)
PLATELET # BLD AUTO: 182 K/UL (ref 164–446)
PMV BLD AUTO: 9.1 FL (ref 9–12.9)
POTASSIUM SERPL-SCNC: 4.2 MMOL/L (ref 3.6–5.5)
PROTHROMBIN TIME: 12.5 SEC (ref 12–14.6)
RBC # BLD AUTO: 3.81 M/UL (ref 4.2–5.4)
SODIUM SERPL-SCNC: 139 MMOL/L (ref 135–145)
WBC # BLD AUTO: 5.9 K/UL (ref 4.8–10.8)

## 2025-02-06 PROCEDURE — 700111 HCHG RX REV CODE 636 W/ 250 OVERRIDE (IP): Mod: JZ | Performed by: HOSPITALIST

## 2025-02-06 PROCEDURE — 80069 RENAL FUNCTION PANEL: CPT

## 2025-02-06 PROCEDURE — 85610 PROTHROMBIN TIME: CPT

## 2025-02-06 PROCEDURE — 700102 HCHG RX REV CODE 250 W/ 637 OVERRIDE(OP): Performed by: HOSPITALIST

## 2025-02-06 PROCEDURE — 85027 COMPLETE CBC AUTOMATED: CPT

## 2025-02-06 PROCEDURE — A9270 NON-COVERED ITEM OR SERVICE: HCPCS | Performed by: HOSPITALIST

## 2025-02-06 PROCEDURE — 83735 ASSAY OF MAGNESIUM: CPT

## 2025-02-06 PROCEDURE — 82962 GLUCOSE BLOOD TEST: CPT | Mod: 91

## 2025-02-06 PROCEDURE — 85730 THROMBOPLASTIN TIME PARTIAL: CPT

## 2025-02-06 PROCEDURE — 99239 HOSP IP/OBS DSCHRG MGMT >30: CPT | Performed by: INTERNAL MEDICINE

## 2025-02-06 PROCEDURE — 96376 TX/PRO/DX INJ SAME DRUG ADON: CPT

## 2025-02-06 PROCEDURE — 36415 COLL VENOUS BLD VENIPUNCTURE: CPT

## 2025-02-06 PROCEDURE — G0378 HOSPITAL OBSERVATION PER HR: HCPCS

## 2025-02-06 RX ADMIN — LEVOTHYROXINE SODIUM 88 MCG: 0.09 TABLET ORAL at 04:30

## 2025-02-06 RX ADMIN — METOPROLOL SUCCINATE 25 MG: 25 TABLET, FILM COATED, EXTENDED RELEASE ORAL at 04:30

## 2025-02-06 RX ADMIN — HYDROMORPHONE HYDROCHLORIDE 1 MG: 1 INJECTION, SOLUTION INTRAMUSCULAR; INTRAVENOUS; SUBCUTANEOUS at 04:28

## 2025-02-06 RX ADMIN — ESCITALOPRAM OXALATE 20 MG: 10 TABLET ORAL at 04:30

## 2025-02-06 RX ADMIN — OXYCODONE HYDROCHLORIDE 10 MG: 10 TABLET ORAL at 07:58

## 2025-02-06 RX ADMIN — OXYCODONE HYDROCHLORIDE 10 MG: 10 TABLET ORAL at 02:24

## 2025-02-06 RX ADMIN — LIOTHYRONINE SODIUM 5 MCG: 5 TABLET ORAL at 04:30

## 2025-02-06 RX ADMIN — HYDROMORPHONE HYDROCHLORIDE 1 MG: 1 INJECTION, SOLUTION INTRAMUSCULAR; INTRAVENOUS; SUBCUTANEOUS at 10:02

## 2025-02-06 RX ADMIN — OMEPRAZOLE 20 MG: 20 CAPSULE, DELAYED RELEASE ORAL at 04:30

## 2025-02-06 ASSESSMENT — PAIN DESCRIPTION - PAIN TYPE
TYPE: ACUTE PAIN;CHRONIC PAIN
TYPE: ACUTE PAIN
TYPE: ACUTE PAIN
TYPE: ACUTE PAIN;CHRONIC PAIN

## 2025-02-06 NOTE — CARE PLAN
The patient is Stable - Low risk of patient condition declining or worsening    Shift Goals  Clinical Goals: manage pain, free from falls this shift  Patient Goals: rest and update on POC  Family Goals: n/a    Progress made toward(s) clinical / shift goals:  pain requiring PRNs, Calls appropriately for assistance out of bed.    Patient is not progressing towards the following goals: n/a

## 2025-02-06 NOTE — PROGRESS NOTES
0700 - Bedside report received from DEEJAY Herrera. Alert and oriented X4, on RA in no acute respiratory distress. Daily plan of care discussed. Patient complains of some pain. No other needs at this time. Call light and personal belongings within reach. Hourly rounding in place. Chart reviewed for recent labs, notes, and orders. Patient not using sling, refuses to wear as advised.    1000 - patient seen in the room, sitting at edge of bed, expressing pain. Upon reentering room, patient was seen bearing weight on L shoulder to reposition herself in the bed. Patient is able to move arm but with pain.

## 2025-02-06 NOTE — CARE PLAN
Problem: Pain - Standard  Goal: Alleviation of pain or a reduction in pain to the patient’s comfort goal  Outcome: Progressing     Problem: Fall Risk  Goal: Patient will remain free from falls  Outcome: Progressing   The patient is Stable - Low risk of patient condition declining or worsening    Shift Goals  Clinical Goals: pain control,remain free from falls or injuries,  Patient Goals: rest,pain control  Family Goals: n/a    Progress made toward(s) clinical / shift goals:  pain alleviated with PRN medications and nursing interventions.    Patient is not progressing towards the following goals:

## 2025-02-06 NOTE — DISCHARGE PLANNING
"Case Management Discharge Planning    Admission Date: 2/4/2025  GMLOS:    ALOS: 0    6-Clicks ADL Score: 18  6-Clicks Mobility Score: 18      Anticipated Discharge Dispo: Discharge Disposition: D/T to home under HHA care in anticipation of covered skilled care (06)  Discharge Address: [address]  [place]    DME Needed: No    Action(s) Taken: Updated Provider/Nurse on Discharge Plan, DC Assessment Complete (See below), Choice obtained, and Referral(s) sent    Pt discussed in 0815 rounds. Pt is medically cleared to discharge home. LSW notified MD during pt's previous admission, she was ordered HH but it was never set up. MD acknowledged, will order HH.     1030-  Pt discussed in IDT rounds. Requested HH order from MD.     1100-  Met with pt at bedside to provide HH choice. Pt reported she wanted to use \"[facility]\". LSW clarified name of company, stating it was not one LSW had heard of before. Pt confirmed it was [facility] that she wanted to use.     LSW was unable to find any information for a [facility] company. Message sent to RN to see if pt had any contact information.     Received phone number from RN provided by pt for Joseph with [facility]  [phone].   Left voicemail.     7669-  Received call from Nohemi with [facility]. Nohemi reported she had gotten a voicemail from [facility] about a referral. Nohemi reported pt had been on service with them previously in January. Nohemi provided fax number to send referral [phone].     Pending HH orders.     1245-  HH orders received, requested for DPA to fax referral.     1500-  Requested for DPA to follow up with [facility] on referral.     DPA reported [facility] has accepted pt.     RN reported pt was needing transport home. Taxi voucher provided.     Escalations Completed: None    Medically Clear: Yes    Next Steps: LSW to follow    Barriers to Discharge: None    Is the patient up for discharge tomorrow: No          Care Transition Team Assessment    LSW met with " pt at bedside to complete discharge planning assessment.     Pt reported she lives alone in her apartment. Reported being independent prior to admission, only stating having transportation issues.   Pt reported having no help at home, however confirmed emergency contact as her daughter, Francisca.   Pt confirmed insurance on file, PCP is Malaika Girard.    Information Source  Orientation Level: Oriented X4  Information Given By: Patient  Who is responsible for making decisions for patient? : Patient    Readmission Evaluation  Is this a readmission?: Yes - unplanned readmission    Elopement Risk  Legal Hold: No  Ambulatory or Self Mobile in Wheelchair: Yes  Disoriented: No  Psychiatric Symptoms: None  History of Wandering: No  Elopement this Admit: No  Vocalizing Wanting to Leave: No  Displays Behaviors, Body Language Wanting to Leave: No-Not at Risk for Elopement  Elopement Risk: Not at Risk for Elopement    Interdisciplinary Discharge Planning  Lives with - Patient's Self Care Capacity: Alone and Able to Care For Self  Patient or legal guardian wants to designate a caregiver: No  Support Systems: Friends / Neighbors, Family Member(s)  Housing / Facility: 1 Story Apartment / Condo  Durable Medical Equipment: Not Applicable    Discharge Preparedness  What is your plan after discharge?: Home health care  What are your discharge supports?: Child  Prior Functional Level: Ambulatory, Independent with Activities of Daily Living  Difficulity with ADLs: None  Difficulity with IADLs: None    Functional Assesment  Prior Functional Level: Ambulatory, Independent with Activities of Daily Living    Finances  Financial Barriers to Discharge: No  Prescription Coverage: Yes    Vision / Hearing Impairment  Vision Impairment : Yes  Right Eye Vision: Impaired, Wears Glasses  Left Eye Vision: Impaired, Wears Glasses  Hearing Impairment: Both Ears, Hearing Device Not Available  Does Pt Need Special Equipment for the Hearing Impaired?:  No    Advance Directive  Advance Directive?: POLST    Domestic Abuse  Have you ever been the victim of abuse or violence?: No  Is this happening now?: No  Has the violence increased in frequency and severity?: No  Are you afraid to go home today?: No  Did you have pets at the time of Abuse?: No  Do you know Where to get Help?: No  Physical Abuse or Sexual Abuse: No  Verbal Abuse or Emotional Abuse: No  Possible Abuse/Neglect Reported to::   Possible Abuse/Neglect Reported to:: Not Applicable    Psychological Assessment  History of Substance Abuse: None  History of Psychiatric Problems: No  Non-compliant with Treatment: No  Newly Diagnosed Illness: No    Discharge Risks or Barriers  Discharge risks or barriers?: No    Anticipated Discharge Information  Discharge Disposition: D/T to home under A care in anticipation of covered skilled care (06)  Discharge Address: [address] [place] [address] [address]  [place]

## 2025-02-06 NOTE — DISCHARGE SUMMARY
"Discharge Summary    CHIEF COMPLAINT ON ADMISSION  Chief Complaint   Patient presents with    Shoulder Pain     Left   Has been seen x 3 for c/o same  States is not able to \"move arm at all\"  Pending MRI next Tuesday w/ follow up w. Dr. Sparks for shoulder surgery       Reason for Admission  L arm pain     Admission Date  2/4/2025    CODE STATUS  Full Code    HPI & HOSPITAL COURSE  This is \"Yamile Go is a 75 y.o. female, with h/o HTN, HLD, Type II DM, Hypothyroidism, Depression and GERD who over the last few weeks is having worsening left Shoulder pain. She was seen at the [facility] clinic last week and MRI was ordered (scheduled for next week) and has a follow up appointment to discuss results on 2/26. She presented to the ED on 2/4/2025 with worsening severe pain. Denies trauma. Pain not improving with Oxycodone, rated 10/10 in intensity and exacerbated with minimal movement. She required 3 doses of IV Dilaudid in the ED and still in pain, therefore Hospitalist was called for admission. Denies chest pain, no SOB. \" (As per admitting physician Dr Acuna on H&P).      Patient was not truthful, she has neurostimulator in the lateral left leg that is no longer active. She does not have the control for it. We are unable to perform any MRI at this time for patient's safety. We cannot proceed with appropriate imaging for patient's concerns. It appears she may have been trying to get an MRI being admitted for intractable pain as she could not get the MRI at an outside imaging center.   I have recommended for her to return to Dr. Kilgore and remove the device to benefit her in the future.    Pt has limited very ROM unable Adduct past 30 degrees, point tenderness to areas of the rotator cuff.   I ordered a CT without contrast left shoulder.   \"IMPRESSION:  1.  No evidence for acute fracture dislocation left shoulder.  2.  Moderate osteoarthritic changes of the glenohumeral joint and left AC joint..  3.  Mild cephalad " "subluxation of the humeral head suspicious for rotator cuff pathology.  4.  Several small air bubbles adjacent to the left teres minor/infraspinatus musculature posteriorly which may be posttraumatic or postprocedural. Early gas-forming infection is another consideration.\"    I discussed with Dr. Sparks, patient's current condition is an outpatient workup.  At this time we are not suspecting any infection as she has been afebrile, no leukocytosis, no warmth or redness to the left shoulder.  It was recommended that she follows up in the clinic as any reverse shoulder arthroplasty requires extensive follow-up and planning for surgery to reduce risk of infection.  Patient will need a sling and [facility] will call patient for a follow-up appointment in the next few weeks.  She will need pain control and unfortunately will need to await for a clinic visit with Dr. Sparks.     Patient has severe allergies to Pregabalin and Gabapentin, unable to use neuropathic medications.    She has an active prescription filled on 1/25/25 with Dr. Kilgore, her pain specialist for Oxycodone 10mg, valid for 30 days. I could not prescribe any more opioids.    Therefore, she is discharged in good and stable condition to home with organized home healthcare and close outpatient follow-up. [facility].    The patient recovered much more quickly than anticipated on admission.    Discharge Date  2/6/25    FOLLOW UP ITEMS POST DISCHARGE  With PCP  With [facility]    DISCHARGE DIAGNOSES  Principal Problem:    Intractable pain (POA: Yes)  Active Problems:    Neurostimulator device in situ left lateral leg (POA: Yes)      Overview: Placed by Jer Kilgore pain specialist.      Not functional, patient does not have her control. Unable to perform MRIs       2/6/25.       Recommended to remove device outpatient with Dr. Kilgore.    Essential hypertension, benign (POA: Yes)    CAD (coronary artery disease) (POA: Yes)    Depression (POA: Yes)    " Acquired hypothyroidism (POA: Yes)    Type 2 diabetes mellitus (HCC) (POA: Yes)    S/P shoulder surgery (POA: Yes)    Mixed hyperlipidemia (POA: Yes)    Normocytic anemia (POA: Yes)    Incomplete rotator cuff tear or rupture of left shoulder, not specified as traumatic (POA: Yes)    Localized osteoarthritis of left shoulder (POA: Yes)  Resolved Problems:    * No resolved hospital problems. *      FOLLOW UP  Future Appointments   Date Time Provider Department Center   2/7/2025  2:00 PM VASCULAR LAB [facility] JACQUE Kilgore M.D.  [address]  [address]  [place] [address]  [phone]    Schedule an appointment as soon as possible for a visit  - please discuss removal of left leg neurostimulator as it is not functional and cannot perform MRIs  - please continue pain management    Manuela Sparks M.D.  [address]  [address]  [place] [address] [phone]    Go to  Please call to ensure Randle has set up an appointment with Dr. Sparks for your left shoulder pain, possible rotator cuff tear, osteoarthritis as cause of your pain.  - We performed a CT left shoulder in the hospital. You may need a CT left shoulder arthrogram by your orthopedic surgeon.      MEDICATIONS ON DISCHARGE     Medication List        CONTINUE taking these medications        Instructions   albuterol 108 (90 Base) MCG/ACT Aers inhalation aerosol   Inhale 1-2 Puffs every four hours as needed for Shortness of Breath.  Dose: 1-2 Puff     alendronate 70 MG Tabs  Commonly known as: Fosamax   70 mg every 7 days.  Dose: 70 mg     amitriptyline 100 MG Tabs  Commonly known as: Elavil   Take 1.5 Tablets by mouth every evening.  Dose: 150 mg     diclofenac sodium 1 % Gel  Commonly known as: Voltaren   Apply 4 g topically 4 times a day as needed (back pain).  Dose: 4 g     escitalopram 20 MG tablet  Commonly known as: Lexapro   Take 1 Tablet by mouth every day.  Dose: 20 mg     ezetimibe 10 MG Tabs  Commonly known as:  Zetia   Take 1 Tablet by mouth every evening.  Dose: 10 mg     liothyronine 5 MCG Tabs  Commonly known as: Cytomel   Take 1 Tablet by mouth every day.  Dose: 5 mcg     metFORMIN 500 MG Tabs  Commonly known as: Glucophage   Take 1 Tablet by mouth 2 times a day with meals. Indications: Type 2 Diabetes  Dose: 500 mg     methocarbamol 500 MG Tabs  Commonly known as: Robaxin   Take 1 Tablet by mouth 3 times a day as needed (Hold for respiratory depression, respiratory rate <12, heart rate less than 65, lethargy,somnolence, or systolic blood pressure < 105.).  Dose: 500 mg     metoprolol SR 25 MG Tb24  Commonly known as: Toprol XL   Take 1 Tablet by mouth every day.  Dose: 25 mg     omeprazole 20 MG delayed-release capsule  Commonly known as: PriLOSEC   Take 1 Capsule by mouth 2 times a day.  Dose: 20 mg     oxyCODONE immediate release 10 MG immediate release tablet  Commonly known as: Roxicodone   Take 1 tablet every 4 hours by oral route for 30 days.     rosuvastatin 20 MG Tabs  Commonly known as: Crestor   Take 1 Tablet by mouth every evening.  Dose: 20 mg     Synthroid 88 MCG Tabs  Generic drug: levothyroxine   Take 1 Tablet by mouth every day.  Dose: 88 mcg     VITAMIN D PO   Take 5,000 Units by mouth every morning.  Dose: 5,000 Units              Allergies  Allergies   Allergen Reactions    Gabapentin Hives and Swelling    Pregabalin Hives and Swelling    Unasyn [Ampicillin-Sulbactam Sodium] Hives     With oral lesions     Amlodipine Swelling    Bee Swelling    Fentanyl Vomiting and Unspecified    Morphine Vomiting, Nausea and Unspecified    Doxycycline      Possible hives (took with Unasyn)     Benzoin Rash     Tincture of benzoin =blisters    blisters    Rifampin Unspecified     Pt turns yellow       DIET  Orders Placed This Encounter   Procedures    Diet Order Diet: Cardiac     Standing Status:   Standing     Number of Occurrences:   1     Order Specific Question:   Diet:     Answer:   Cardiac [6]        ACTIVITY  As tolerated.  Weight bearing as tolerated. Slight to left arm, non-weight bearing.    CONSULTATIONS  none    PROCEDURES  none    LABORATORY  Lab Results   Component Value Date    SODIUM 139 02/06/2025    POTASSIUM 4.2 02/06/2025    CHLORIDE 106 02/06/2025    CO2 23 02/06/2025    GLUCOSE 119 (H) 02/06/2025    BUN 13 02/06/2025    CREATININE 0.63 02/06/2025    CREATININE 0.87 02/20/2012        Lab Results   Component Value Date    WBC 5.9 02/06/2025    WBC 6.9 02/20/2012    HEMOGLOBIN 10.0 (L) 02/06/2025    HEMATOCRIT 32.3 (L) 02/06/2025    PLATELETCT 182 02/06/2025      CT-SHOULDER W/O LEFT   Final Result      1.  No evidence for acute fracture dislocation left shoulder.      2.  Moderate osteoarthritic changes of the glenohumeral joint and left AC joint..      3.  Mild cephalad subluxation of the humeral head suspicious for rotator cuff pathology.      4.  Several small air bubbles adjacent to the left teres minor/infraspinatus musculature posteriorly which may be posttraumatic or postprocedural. Early gas-forming infection is another consideration.      MR-SHOULDER-WITH LEFT    (Results Pending)       Total time of the discharge process exceeds 38 minutes.

## 2025-02-06 NOTE — DISCHARGE PLANNING
JOHANNA sent manual fax for HH referral to [facility] [phone].     DPA called to inquire if HH was approved? Spoke with Nohemi and they have accepted Pt for services.   HERMANN BARBOSA notified

## 2025-02-07 ENCOUNTER — HOSPITAL ENCOUNTER (OUTPATIENT)
Facility: MEDICAL CENTER | Age: 75
End: 2025-02-10
Attending: EMERGENCY MEDICINE | Admitting: HOSPITALIST
Payer: MEDICARE

## 2025-02-07 ENCOUNTER — APPOINTMENT (OUTPATIENT)
Dept: RADIOLOGY | Facility: MEDICAL CENTER | Age: 75
End: 2025-02-07
Attending: EMERGENCY MEDICINE
Payer: MEDICARE

## 2025-02-07 DIAGNOSIS — M25.512 CHRONIC LEFT SHOULDER PAIN: ICD-10-CM

## 2025-02-07 DIAGNOSIS — R65.10 SIRS (SYSTEMIC INFLAMMATORY RESPONSE SYNDROME) (HCC): ICD-10-CM

## 2025-02-07 DIAGNOSIS — J10.1 INFLUENZA A: ICD-10-CM

## 2025-02-07 DIAGNOSIS — R41.82 ALTERED MENTAL STATUS, UNSPECIFIED ALTERED MENTAL STATUS TYPE: ICD-10-CM

## 2025-02-07 DIAGNOSIS — G89.29 CHRONIC LEFT SHOULDER PAIN: ICD-10-CM

## 2025-02-07 PROBLEM — G93.41 ACUTE METABOLIC ENCEPHALOPATHY: Status: ACTIVE | Noted: 2025-02-07

## 2025-02-07 LAB
ALBUMIN SERPL BCP-MCNC: 3.7 G/DL (ref 3.2–4.9)
ALBUMIN/GLOB SERPL: 1.3 G/DL
ALP SERPL-CCNC: 85 U/L (ref 30–99)
ALT SERPL-CCNC: 22 U/L (ref 2–50)
ANION GAP SERPL CALC-SCNC: 14 MMOL/L (ref 7–16)
APPEARANCE UR: CLEAR
AST SERPL-CCNC: 20 U/L (ref 12–45)
BASOPHILS # BLD AUTO: 0.2 % (ref 0–1.8)
BASOPHILS # BLD: 0.01 K/UL (ref 0–0.12)
BILIRUB SERPL-MCNC: 0.3 MG/DL (ref 0.1–1.5)
BILIRUB UR QL STRIP.AUTO: NEGATIVE
BUN SERPL-MCNC: 14 MG/DL (ref 8–22)
CALCIUM ALBUM COR SERPL-MCNC: 9 MG/DL (ref 8.5–10.5)
CALCIUM SERPL-MCNC: 8.8 MG/DL (ref 8.4–10.2)
CHLORIDE SERPL-SCNC: 100 MMOL/L (ref 96–112)
CK SERPL-CCNC: 62 U/L (ref 0–154)
CO2 SERPL-SCNC: 19 MMOL/L (ref 20–33)
COLOR UR: YELLOW
CREAT SERPL-MCNC: 0.7 MG/DL (ref 0.5–1.4)
EKG IMPRESSION: NORMAL
EOSINOPHIL # BLD AUTO: 0.02 K/UL (ref 0–0.51)
EOSINOPHIL NFR BLD: 0.3 % (ref 0–6.9)
ERYTHROCYTE [DISTWIDTH] IN BLOOD BY AUTOMATED COUNT: 53.1 FL (ref 35.9–50)
ETHANOL BLD-MCNC: <10.1 MG/DL
FLUAV RNA SPEC QL NAA+PROBE: POSITIVE
FLUBV RNA SPEC QL NAA+PROBE: NEGATIVE
GFR SERPLBLD CREATININE-BSD FMLA CKD-EPI: 91 ML/MIN/1.73 M 2
GLOBULIN SER CALC-MCNC: 2.8 G/DL (ref 1.9–3.5)
GLUCOSE SERPL-MCNC: 95 MG/DL (ref 65–99)
GLUCOSE UR STRIP.AUTO-MCNC: NEGATIVE MG/DL
HCT VFR BLD AUTO: 34.8 % (ref 37–47)
HGB BLD-MCNC: 10.9 G/DL (ref 12–16)
IMM GRANULOCYTES # BLD AUTO: 0.06 K/UL (ref 0–0.11)
IMM GRANULOCYTES NFR BLD AUTO: 0.9 % (ref 0–0.9)
KETONES UR STRIP.AUTO-MCNC: 40 MG/DL
LACTATE SERPL-SCNC: 1.8 MMOL/L (ref 0.5–2)
LEUKOCYTE ESTERASE UR QL STRIP.AUTO: NEGATIVE
LYMPHOCYTES # BLD AUTO: 0.31 K/UL (ref 1–4.8)
LYMPHOCYTES NFR BLD: 4.8 % (ref 22–41)
MAGNESIUM SERPL-MCNC: 1.8 MG/DL (ref 1.5–2.5)
MCH RBC QN AUTO: 26.2 PG (ref 27–33)
MCHC RBC AUTO-ENTMCNC: 31.3 G/DL (ref 32.2–35.5)
MCV RBC AUTO: 83.7 FL (ref 81.4–97.8)
MICRO URNS: ABNORMAL
MONOCYTES # BLD AUTO: 0.32 K/UL (ref 0–0.85)
MONOCYTES NFR BLD AUTO: 5 % (ref 0–13.4)
NEUTROPHILS # BLD AUTO: 5.69 K/UL (ref 1.82–7.42)
NEUTROPHILS NFR BLD: 88.8 % (ref 44–72)
NITRITE UR QL STRIP.AUTO: NEGATIVE
NRBC # BLD AUTO: 0 K/UL
NRBC BLD-RTO: 0 /100 WBC (ref 0–0.2)
PH UR STRIP.AUTO: 6.5 [PH] (ref 5–8)
PHOSPHATE SERPL-MCNC: 2.6 MG/DL (ref 2.5–4.5)
PLATELET # BLD AUTO: 170 K/UL (ref 164–446)
PMV BLD AUTO: 9.3 FL (ref 9–12.9)
POTASSIUM SERPL-SCNC: 3.9 MMOL/L (ref 3.6–5.5)
PROCALCITONIN SERPL-MCNC: 0.16 NG/ML
PROT SERPL-MCNC: 6.5 G/DL (ref 6–8.2)
PROT UR QL STRIP: NEGATIVE MG/DL
RBC # BLD AUTO: 4.16 M/UL (ref 4.2–5.4)
RBC UR QL AUTO: NEGATIVE
RSV RNA SPEC QL NAA+PROBE: NEGATIVE
SARS-COV-2 RNA RESP QL NAA+PROBE: NOTDETECTED
SODIUM SERPL-SCNC: 133 MMOL/L (ref 135–145)
SP GR UR STRIP.AUTO: 1.02
SPECIMEN SOURCE: ABNORMAL
TSH SERPL DL<=0.005 MIU/L-ACNC: 1.42 UIU/ML (ref 0.38–5.33)
UROBILINOGEN UR STRIP.AUTO-MCNC: 1 EU/DL
WBC # BLD AUTO: 6.4 K/UL (ref 4.8–10.8)

## 2025-02-07 PROCEDURE — 700105 HCHG RX REV CODE 258: Performed by: EMERGENCY MEDICINE

## 2025-02-07 PROCEDURE — 83605 ASSAY OF LACTIC ACID: CPT

## 2025-02-07 PROCEDURE — 36415 COLL VENOUS BLD VENIPUNCTURE: CPT

## 2025-02-07 PROCEDURE — 84484 ASSAY OF TROPONIN QUANT: CPT

## 2025-02-07 PROCEDURE — 80307 DRUG TEST PRSMV CHEM ANLYZR: CPT

## 2025-02-07 PROCEDURE — 84100 ASSAY OF PHOSPHORUS: CPT

## 2025-02-07 PROCEDURE — 93005 ELECTROCARDIOGRAM TRACING: CPT | Mod: TC | Performed by: EMERGENCY MEDICINE

## 2025-02-07 PROCEDURE — 81003 URINALYSIS AUTO W/O SCOPE: CPT | Mod: XU

## 2025-02-07 PROCEDURE — 84443 ASSAY THYROID STIM HORMONE: CPT

## 2025-02-07 PROCEDURE — 700111 HCHG RX REV CODE 636 W/ 250 OVERRIDE (IP): Mod: JZ | Performed by: EMERGENCY MEDICINE

## 2025-02-07 PROCEDURE — 83735 ASSAY OF MAGNESIUM: CPT

## 2025-02-07 PROCEDURE — 99285 EMERGENCY DEPT VISIT HI MDM: CPT

## 2025-02-07 PROCEDURE — 85025 COMPLETE CBC W/AUTO DIFF WBC: CPT

## 2025-02-07 PROCEDURE — G0378 HOSPITAL OBSERVATION PER HR: HCPCS

## 2025-02-07 PROCEDURE — 80053 COMPREHEN METABOLIC PANEL: CPT

## 2025-02-07 PROCEDURE — 87040 BLOOD CULTURE FOR BACTERIA: CPT

## 2025-02-07 PROCEDURE — 96374 THER/PROPH/DIAG INJ IV PUSH: CPT

## 2025-02-07 PROCEDURE — 85610 PROTHROMBIN TIME: CPT

## 2025-02-07 PROCEDURE — 84145 PROCALCITONIN (PCT): CPT

## 2025-02-07 PROCEDURE — 87086 URINE CULTURE/COLONY COUNT: CPT

## 2025-02-07 PROCEDURE — 71045 X-RAY EXAM CHEST 1 VIEW: CPT

## 2025-02-07 PROCEDURE — 82077 ASSAY SPEC XCP UR&BREATH IA: CPT

## 2025-02-07 PROCEDURE — 0241U HCHG SARS-COV-2 COVID-19 NFCT DS RESP RNA 4 TRGT MIC: CPT

## 2025-02-07 PROCEDURE — 82550 ASSAY OF CK (CPK): CPT

## 2025-02-07 PROCEDURE — 70450 CT HEAD/BRAIN W/O DYE: CPT

## 2025-02-07 RX ORDER — EZETIMIBE 10 MG/1
10 TABLET ORAL EVERY EVENING
Status: DISCONTINUED | OUTPATIENT
Start: 2025-02-08 | End: 2025-02-10 | Stop reason: HOSPADM

## 2025-02-07 RX ORDER — ONDANSETRON 2 MG/ML
4 INJECTION INTRAMUSCULAR; INTRAVENOUS EVERY 4 HOURS PRN
Status: DISCONTINUED | OUTPATIENT
Start: 2025-02-07 | End: 2025-02-10 | Stop reason: HOSPADM

## 2025-02-07 RX ORDER — ENOXAPARIN SODIUM 100 MG/ML
30 INJECTION SUBCUTANEOUS EVERY 12 HOURS
Status: DISCONTINUED | OUTPATIENT
Start: 2025-02-08 | End: 2025-02-10 | Stop reason: HOSPADM

## 2025-02-07 RX ORDER — HALOPERIDOL 5 MG/ML
2.5 INJECTION INTRAMUSCULAR ONCE
Status: COMPLETED | OUTPATIENT
Start: 2025-02-07 | End: 2025-02-07

## 2025-02-07 RX ORDER — ONDANSETRON 4 MG/1
4 TABLET, ORALLY DISINTEGRATING ORAL EVERY 4 HOURS PRN
Status: DISCONTINUED | OUTPATIENT
Start: 2025-02-07 | End: 2025-02-10 | Stop reason: HOSPADM

## 2025-02-07 RX ORDER — LEVOTHYROXINE SODIUM 88 UG/1
88 TABLET ORAL DAILY
Status: DISCONTINUED | OUTPATIENT
Start: 2025-02-08 | End: 2025-02-10 | Stop reason: HOSPADM

## 2025-02-07 RX ORDER — LIOTHYRONINE SODIUM 5 UG/1
5 TABLET ORAL DAILY
Status: DISCONTINUED | OUTPATIENT
Start: 2025-02-08 | End: 2025-02-10 | Stop reason: HOSPADM

## 2025-02-07 RX ORDER — ESCITALOPRAM OXALATE 10 MG/1
20 TABLET ORAL DAILY
Status: DISCONTINUED | OUTPATIENT
Start: 2025-02-08 | End: 2025-02-10 | Stop reason: HOSPADM

## 2025-02-07 RX ORDER — ACETAMINOPHEN 325 MG/1
650 TABLET ORAL EVERY 6 HOURS PRN
Status: DISCONTINUED | OUTPATIENT
Start: 2025-02-07 | End: 2025-02-10 | Stop reason: HOSPADM

## 2025-02-07 RX ORDER — METOPROLOL SUCCINATE 25 MG/1
25 TABLET, EXTENDED RELEASE ORAL DAILY
Status: DISCONTINUED | OUTPATIENT
Start: 2025-02-08 | End: 2025-02-10 | Stop reason: HOSPADM

## 2025-02-07 RX ORDER — SODIUM CHLORIDE 9 MG/ML
1000 INJECTION, SOLUTION INTRAVENOUS ONCE
Status: COMPLETED | OUTPATIENT
Start: 2025-02-07 | End: 2025-02-07

## 2025-02-07 RX ORDER — OXYCODONE HYDROCHLORIDE 10 MG/1
10 TABLET ORAL EVERY 6 HOURS PRN
Status: DISCONTINUED | OUTPATIENT
Start: 2025-02-07 | End: 2025-02-09

## 2025-02-07 RX ORDER — AMOXICILLIN 250 MG
2 CAPSULE ORAL EVERY EVENING
Status: DISCONTINUED | OUTPATIENT
Start: 2025-02-08 | End: 2025-02-10 | Stop reason: HOSPADM

## 2025-02-07 RX ORDER — POLYETHYLENE GLYCOL 3350 17 G/17G
1 POWDER, FOR SOLUTION ORAL
Status: DISCONTINUED | OUTPATIENT
Start: 2025-02-07 | End: 2025-02-10 | Stop reason: HOSPADM

## 2025-02-07 RX ORDER — OSELTAMIVIR PHOSPHATE 75 MG/1
75 CAPSULE ORAL 2 TIMES DAILY
Status: DISCONTINUED | OUTPATIENT
Start: 2025-02-08 | End: 2025-02-10 | Stop reason: HOSPADM

## 2025-02-07 RX ORDER — ACETAMINOPHEN 10 MG/ML
1000 INJECTION, SOLUTION INTRAVENOUS ONCE
Status: COMPLETED | OUTPATIENT
Start: 2025-02-07 | End: 2025-02-07

## 2025-02-07 RX ORDER — SODIUM CHLORIDE, SODIUM LACTATE, POTASSIUM CHLORIDE, AND CALCIUM CHLORIDE .6; .31; .03; .02 G/100ML; G/100ML; G/100ML; G/100ML
500 INJECTION, SOLUTION INTRAVENOUS
Status: DISCONTINUED | OUTPATIENT
Start: 2025-02-07 | End: 2025-02-10 | Stop reason: HOSPADM

## 2025-02-07 RX ORDER — ROSUVASTATIN CALCIUM 10 MG/1
20 TABLET, COATED ORAL EVERY EVENING
Status: DISCONTINUED | OUTPATIENT
Start: 2025-02-08 | End: 2025-02-10 | Stop reason: HOSPADM

## 2025-02-07 RX ORDER — CEFTRIAXONE 2 G/1
2000 INJECTION, POWDER, FOR SOLUTION INTRAMUSCULAR; INTRAVENOUS ONCE
Status: COMPLETED | OUTPATIENT
Start: 2025-02-07 | End: 2025-02-07

## 2025-02-07 RX ORDER — OMEPRAZOLE 20 MG/1
20 CAPSULE, DELAYED RELEASE ORAL 2 TIMES DAILY
Status: DISCONTINUED | OUTPATIENT
Start: 2025-02-08 | End: 2025-02-10 | Stop reason: HOSPADM

## 2025-02-07 RX ADMIN — HALOPERIDOL LACTATE 2.5 MG: 5 INJECTION, SOLUTION INTRAMUSCULAR at 23:09

## 2025-02-07 RX ADMIN — CEFTRIAXONE SODIUM 2000 MG: 2 INJECTION, POWDER, FOR SOLUTION INTRAMUSCULAR; INTRAVENOUS at 22:06

## 2025-02-07 RX ADMIN — SODIUM CHLORIDE 1000 ML: 9 INJECTION, SOLUTION INTRAVENOUS at 21:58

## 2025-02-07 RX ADMIN — ACETAMINOPHEN 1000 MG: 10 INJECTION INTRAVENOUS at 22:27

## 2025-02-07 ASSESSMENT — FIBROSIS 4 INDEX: FIB4 SCORE: 1.63

## 2025-02-08 PROBLEM — M25.519 SHOULDER PAIN: Status: ACTIVE | Noted: 2025-02-08

## 2025-02-08 PROBLEM — J10.1 INFLUENZA A: Status: ACTIVE | Noted: 2025-02-08

## 2025-02-08 LAB
AMPHET UR QL SCN: NEGATIVE
ANION GAP SERPL CALC-SCNC: 11 MMOL/L (ref 7–16)
BARBITURATES UR QL SCN: NEGATIVE
BENZODIAZ UR QL SCN: NEGATIVE
BUN SERPL-MCNC: 12 MG/DL (ref 8–22)
BZE UR QL SCN: NEGATIVE
CALCIUM SERPL-MCNC: 7.7 MG/DL (ref 8.4–10.2)
CANNABINOIDS UR QL SCN: NEGATIVE
CHLORIDE SERPL-SCNC: 104 MMOL/L (ref 96–112)
CO2 SERPL-SCNC: 18 MMOL/L (ref 20–33)
CREAT SERPL-MCNC: 0.57 MG/DL (ref 0.5–1.4)
ERYTHROCYTE [DISTWIDTH] IN BLOOD BY AUTOMATED COUNT: 53.1 FL (ref 35.9–50)
FENTANYL UR QL: NEGATIVE
GFR SERPLBLD CREATININE-BSD FMLA CKD-EPI: 95 ML/MIN/1.73 M 2
GLUCOSE BLD STRIP.AUTO-MCNC: 118 MG/DL (ref 65–99)
GLUCOSE BLD STRIP.AUTO-MCNC: 65 MG/DL (ref 65–99)
GLUCOSE BLD STRIP.AUTO-MCNC: 85 MG/DL (ref 65–99)
GLUCOSE BLD STRIP.AUTO-MCNC: 89 MG/DL (ref 65–99)
GLUCOSE SERPL-MCNC: 100 MG/DL (ref 65–99)
HCT VFR BLD AUTO: 31.2 % (ref 37–47)
HGB BLD-MCNC: 9.9 G/DL (ref 12–16)
INR PPP: 0.93 (ref 0.87–1.13)
MCH RBC QN AUTO: 26.2 PG (ref 27–33)
MCHC RBC AUTO-ENTMCNC: 31.7 G/DL (ref 32.2–35.5)
MCV RBC AUTO: 82.5 FL (ref 81.4–97.8)
METHADONE UR QL SCN: NEGATIVE
OPIATES UR QL SCN: NEGATIVE
OXYCODONE UR QL SCN: POSITIVE
PCP UR QL SCN: NEGATIVE
PLATELET # BLD AUTO: 126 K/UL (ref 164–446)
PMV BLD AUTO: 10.4 FL (ref 9–12.9)
POTASSIUM SERPL-SCNC: 3.5 MMOL/L (ref 3.6–5.5)
PROPOXYPH UR QL SCN: NEGATIVE
PROTHROMBIN TIME: 12.7 SEC (ref 12–14.6)
RBC # BLD AUTO: 3.78 M/UL (ref 4.2–5.4)
SODIUM SERPL-SCNC: 133 MMOL/L (ref 135–145)
TROPONIN T SERPL-MCNC: 27 NG/L (ref 6–19)
WBC # BLD AUTO: 5.4 K/UL (ref 4.8–10.8)

## 2025-02-08 PROCEDURE — 80048 BASIC METABOLIC PNL TOTAL CA: CPT

## 2025-02-08 PROCEDURE — 85027 COMPLETE CBC AUTOMATED: CPT

## 2025-02-08 PROCEDURE — 36415 COLL VENOUS BLD VENIPUNCTURE: CPT

## 2025-02-08 PROCEDURE — 99222 1ST HOSP IP/OBS MODERATE 55: CPT | Performed by: HOSPITALIST

## 2025-02-08 PROCEDURE — 700105 HCHG RX REV CODE 258: Performed by: INTERNAL MEDICINE

## 2025-02-08 PROCEDURE — 94760 N-INVAS EAR/PLS OXIMETRY 1: CPT

## 2025-02-08 PROCEDURE — 700102 HCHG RX REV CODE 250 W/ 637 OVERRIDE(OP): Performed by: HOSPITALIST

## 2025-02-08 PROCEDURE — 82962 GLUCOSE BLOOD TEST: CPT | Mod: 91

## 2025-02-08 PROCEDURE — G0378 HOSPITAL OBSERVATION PER HR: HCPCS

## 2025-02-08 PROCEDURE — 96372 THER/PROPH/DIAG INJ SC/IM: CPT

## 2025-02-08 PROCEDURE — 700111 HCHG RX REV CODE 636 W/ 250 OVERRIDE (IP): Performed by: HOSPITALIST

## 2025-02-08 PROCEDURE — A9270 NON-COVERED ITEM OR SERVICE: HCPCS | Performed by: HOSPITALIST

## 2025-02-08 RX ORDER — DOXYCYCLINE 100 MG/1
100 CAPSULE ORAL 2 TIMES DAILY
COMMUNITY
End: 2025-02-28

## 2025-02-08 RX ORDER — AMITRIPTYLINE HYDROCHLORIDE 50 MG/1
100 TABLET ORAL EVERY EVENING
Status: DISCONTINUED | OUTPATIENT
Start: 2025-02-08 | End: 2025-02-10 | Stop reason: HOSPADM

## 2025-02-08 RX ORDER — INSULIN LISPRO 100 [IU]/ML
1-6 INJECTION, SOLUTION INTRAVENOUS; SUBCUTANEOUS
Status: DISCONTINUED | OUTPATIENT
Start: 2025-02-08 | End: 2025-02-10 | Stop reason: HOSPADM

## 2025-02-08 RX ORDER — METHYLPREDNISOLONE 4 MG/1
4 TABLET ORAL DAILY
COMMUNITY
End: 2025-02-28

## 2025-02-08 RX ORDER — BUTYROSPERMUM PARKII(SHEA BUTTER), SIMMONDSIA CHINENSIS (JOJOBA) SEED OIL, ALOE BARBADENSIS LEAF EXTRACT .01; 1; 3.5 G/100G; G/100G; G/100G
5000 LIQUID TOPICAL DAILY
Status: ON HOLD | COMMUNITY

## 2025-02-08 RX ORDER — AZITHROMYCIN 500 MG/1
500 TABLET, FILM COATED ORAL DAILY
COMMUNITY
End: 2025-02-28

## 2025-02-08 RX ORDER — DEXTROSE MONOHYDRATE 25 G/50ML
25 INJECTION, SOLUTION INTRAVENOUS
Status: DISCONTINUED | OUTPATIENT
Start: 2025-02-08 | End: 2025-02-10 | Stop reason: HOSPADM

## 2025-02-08 RX ORDER — SODIUM CHLORIDE 9 MG/ML
1000 INJECTION, SOLUTION INTRAVENOUS CONTINUOUS
Status: DISCONTINUED | OUTPATIENT
Start: 2025-02-08 | End: 2025-02-10

## 2025-02-08 RX ADMIN — ENOXAPARIN SODIUM 30 MG: 100 INJECTION SUBCUTANEOUS at 05:42

## 2025-02-08 RX ADMIN — OSELTAMIVIR PHOSPHATE 75 MG: 75 CAPSULE ORAL at 12:41

## 2025-02-08 RX ADMIN — SODIUM CHLORIDE 1000 ML: 9 INJECTION, SOLUTION INTRAVENOUS at 09:25

## 2025-02-08 RX ADMIN — OXYCODONE HYDROCHLORIDE 10 MG: 10 TABLET ORAL at 12:40

## 2025-02-08 RX ADMIN — METOPROLOL SUCCINATE 25 MG: 25 TABLET, FILM COATED, EXTENDED RELEASE ORAL at 05:34

## 2025-02-08 RX ADMIN — OMEPRAZOLE 20 MG: 20 CAPSULE, DELAYED RELEASE ORAL at 12:41

## 2025-02-08 RX ADMIN — OMEPRAZOLE 20 MG: 20 CAPSULE, DELAYED RELEASE ORAL at 23:48

## 2025-02-08 RX ADMIN — OSELTAMIVIR PHOSPHATE 75 MG: 75 CAPSULE ORAL at 02:07

## 2025-02-08 RX ADMIN — OXYCODONE HYDROCHLORIDE 10 MG: 10 TABLET ORAL at 20:16

## 2025-02-08 RX ADMIN — OSELTAMIVIR PHOSPHATE 75 MG: 75 CAPSULE ORAL at 23:48

## 2025-02-08 RX ADMIN — OXYCODONE HYDROCHLORIDE 10 MG: 10 TABLET ORAL at 05:40

## 2025-02-08 RX ADMIN — ESCITALOPRAM OXALATE 20 MG: 10 TABLET ORAL at 05:35

## 2025-02-08 RX ADMIN — ROSUVASTATIN CALCIUM 20 MG: 10 TABLET, FILM COATED ORAL at 18:40

## 2025-02-08 RX ADMIN — AMITRIPTYLINE HYDROCHLORIDE 100 MG: 50 TABLET, FILM COATED ORAL at 23:48

## 2025-02-08 RX ADMIN — OMEPRAZOLE 20 MG: 20 CAPSULE, DELAYED RELEASE ORAL at 02:08

## 2025-02-08 RX ADMIN — ACETAMINOPHEN 650 MG: 325 TABLET ORAL at 12:41

## 2025-02-08 RX ADMIN — ENOXAPARIN SODIUM 30 MG: 100 INJECTION SUBCUTANEOUS at 18:40

## 2025-02-08 RX ADMIN — LIOTHYRONINE SODIUM 5 MCG: 5 TABLET ORAL at 05:34

## 2025-02-08 RX ADMIN — SODIUM CHLORIDE 1000 ML: 9 INJECTION, SOLUTION INTRAVENOUS at 20:38

## 2025-02-08 RX ADMIN — LEVOTHYROXINE SODIUM 88 MCG: 0.09 TABLET ORAL at 05:34

## 2025-02-08 RX ADMIN — EZETIMIBE 10 MG: 10 TABLET ORAL at 18:40

## 2025-02-08 ASSESSMENT — ENCOUNTER SYMPTOMS
DOUBLE VISION: 0
COUGH: 0
BLURRED VISION: 0
WEAKNESS: 0
INSOMNIA: 0
SHORTNESS OF BREATH: 0
MYALGIAS: 0
HEADACHES: 0
DEPRESSION: 0
SORE THROAT: 0
NAUSEA: 0
FEVER: 0
VOMITING: 0
PALPITATIONS: 0
DIZZINESS: 0
NECK PAIN: 0
BRUISES/BLEEDS EASILY: 0

## 2025-02-08 ASSESSMENT — PATIENT HEALTH QUESTIONNAIRE - PHQ9
SUM OF ALL RESPONSES TO PHQ9 QUESTIONS 1 AND 2: 0
2. FEELING DOWN, DEPRESSED, IRRITABLE, OR HOPELESS: NOT AT ALL
6. FEELING BAD ABOUT YOURSELF - OR THAT YOU ARE A FAILURE OR HAVE LET YOURSELF OR YOUR FAMILY DOWN: SEVERAL DAYS
SUM OF ALL RESPONSES TO PHQ QUESTIONS 1-9: 4
3. TROUBLE FALLING OR STAYING ASLEEP OR SLEEPING TOO MUCH: SEVERAL DAYS
8. MOVING OR SPEAKING SO SLOWLY THAT OTHER PEOPLE COULD HAVE NOTICED. OR THE OPPOSITE, BEING SO FIGETY OR RESTLESS THAT YOU HAVE BEEN MOVING AROUND A LOT MORE THAN USUAL: NOT AT ALL
7. TROUBLE CONCENTRATING ON THINGS, SUCH AS READING THE NEWSPAPER OR WATCHING TELEVISION: SEVERAL DAYS
1. LITTLE INTEREST OR PLEASURE IN DOING THINGS: NOT AT ALL
1. LITTLE INTEREST OR PLEASURE IN DOING THINGS: NOT AT ALL
9. THOUGHTS THAT YOU WOULD BE BETTER OFF DEAD, OR OF HURTING YOURSELF: NOT AT ALL
SUM OF ALL RESPONSES TO PHQ9 QUESTIONS 1 AND 2: 0
2. FEELING DOWN, DEPRESSED, IRRITABLE, OR HOPELESS: NOT AT ALL
5. POOR APPETITE OR OVEREATING: NOT AT ALL
4. FEELING TIRED OR HAVING LITTLE ENERGY: SEVERAL DAYS

## 2025-02-08 ASSESSMENT — LIFESTYLE VARIABLES
CONSUMPTION TOTAL: NEGATIVE
EVER HAD A DRINK FIRST THING IN THE MORNING TO STEADY YOUR NERVES TO GET RID OF A HANGOVER: NO
TOTAL SCORE: 0
HAVE YOU EVER FELT YOU SHOULD CUT DOWN ON YOUR DRINKING: NO
TOTAL SCORE: 0
HOW MANY TIMES IN THE PAST YEAR HAVE YOU HAD 5 OR MORE DRINKS IN A DAY: 0
ON A TYPICAL DAY WHEN YOU DRINK ALCOHOL HOW MANY DRINKS DO YOU HAVE: 0
HAVE PEOPLE ANNOYED YOU BY CRITICIZING YOUR DRINKING: NO
DOES PATIENT WANT TO STOP DRINKING: NO
EVER FELT BAD OR GUILTY ABOUT YOUR DRINKING: NO
TOTAL SCORE: 0
ALCOHOL_USE: NO
AVERAGE NUMBER OF DAYS PER WEEK YOU HAVE A DRINK CONTAINING ALCOHOL: 0

## 2025-02-08 ASSESSMENT — COGNITIVE AND FUNCTIONAL STATUS - GENERAL
TOILETING: A LOT
DRESSING REGULAR LOWER BODY CLOTHING: A LITTLE
MOVING FROM LYING ON BACK TO SITTING ON SIDE OF FLAT BED: A LITTLE
MOVING TO AND FROM BED TO CHAIR: A LITTLE
STANDING UP FROM CHAIR USING ARMS: A LITTLE
HELP NEEDED FOR BATHING: A LOT
PERSONAL GROOMING: A LITTLE
SUGGESTED CMS G CODE MODIFIER DAILY ACTIVITY: CK
DAILY ACTIVITIY SCORE: 16
WALKING IN HOSPITAL ROOM: A LOT
DRESSING REGULAR UPPER BODY CLOTHING: A LITTLE
TURNING FROM BACK TO SIDE WHILE IN FLAT BAD: A LITTLE
MOBILITY SCORE: 16
EATING MEALS: A LITTLE
SUGGESTED CMS G CODE MODIFIER MOBILITY: CK
CLIMB 3 TO 5 STEPS WITH RAILING: A LOT

## 2025-02-08 ASSESSMENT — SOCIAL DETERMINANTS OF HEALTH (SDOH)
WITHIN THE LAST YEAR, HAVE YOU BEEN AFRAID OF YOUR PARTNER OR EX-PARTNER?: NO
WITHIN THE LAST YEAR, HAVE YOU BEEN KICKED, HIT, SLAPPED, OR OTHERWISE PHYSICALLY HURT BY YOUR PARTNER OR EX-PARTNER?: NO
WITHIN THE LAST YEAR, HAVE YOU BEEN HUMILIATED OR EMOTIONALLY ABUSED IN OTHER WAYS BY YOUR PARTNER OR EX-PARTNER?: NO
WITHIN THE LAST YEAR, HAVE TO BEEN RAPED OR FORCED TO HAVE ANY KIND OF SEXUAL ACTIVITY BY YOUR PARTNER OR EX-PARTNER?: NO

## 2025-02-08 ASSESSMENT — PAIN DESCRIPTION - PAIN TYPE
TYPE: ACUTE PAIN

## 2025-02-08 ASSESSMENT — FIBROSIS 4 INDEX: FIB4 SCORE: 1.86

## 2025-02-08 NOTE — PROGRESS NOTES
4 Eyes Skin Assessment Completed by DEEJAY Graves and DEEJAY Baez.    Head WDL  Ears WDL  Nose WDL  Mouth WDL  Neck WDL  Breast/Chest WDL  Shoulder Blades WDL  Spine WDL  (R) Arm/Elbow/Hand Bruising    (L) Arm/Elbow/Hand Bruising and Scab    Abdomen Scar  Groin WDL  Scrotum/Coccyx/Buttocks Redness and Blanching  (R) Leg Bruising    (L) Leg Bruising, Swelling, and Edema  (R) Heel/Foot/Toe dry and flaky    (L) Heel/Foot/Toe dry and flaky, discoloration, swelling, bruising          Devices In Places Pulse Ox      Interventions In Place Pillows and Low Air Loss Mattress    Possible Skin Injury Yes    Pictures Uploaded Into Epic Yes  Wound Consult Placed N/A  RN Wound Prevention Protocol Ordered Yes

## 2025-02-08 NOTE — ED PROVIDER NOTES
ED Provider Note    CHIEF COMPLAINT  Chief Complaint   Patient presents with    Cough    Headache    Fever     Pt arrives by EMS confused oriented to self only with fever , nausea  neck pain and HA.. Pt found by physical therapist who called EMS.        EXTERNAL RECORDS REVIEWED  Recently discharged yesterday after she was admitted for intractable pain in her left shoulder.  Discharged with recommendations to follow-up with her pain specialist and her orthopedic surgeon to keep working on getting outpatient MRI and consider what ever surgical intervention.    HPI/ROS  LIMITATION TO HISTORY   Select: Altered mental status / Confusion  OUTSIDE HISTORIAN(S):  None    Yamile Go is a 75 y.o. female who presents to the ED with EMS for concern of confusion and fever.  A home physical therapist went over to her house today and found her on the ground altered and called EMS.  She has been tachycardic and febrile for EMS.  No interventions done.  Blood pressures have been slightly hypertensive.  Patient does not have any family members who she lives with    PAST MEDICAL HISTORY   has a past medical history of Anemia, Anesthesia, Arthritis, Asthma, CAD (coronary artery disease), Cataract, Dental disorder, Depression, Diabetes (HCC), Disorder of thyroid, Heart burn, History of vertebral fracture, HTN (hypertension), Hyperlipidemia, Indigestion, Migraines, Obesity, Pneumonia (2023), PONV (postoperative nausea and vomiting), and Sleep apnea.    SURGICAL HISTORY   has a past surgical history that includes appendectomy (N/A, 1976); abdominal hysterectomy total (N/A, 1978); breast biopsy (1984); colectomy (N/A, 2007); lumbar laminectomy diskectomy (N/A, 1989); arthroplasty (Right, 2020); lumbar exploration (N/A, 2017); insertion, peripheral nerve stimulator, lower extremity (Left, 12/22/2022); and reconstr total shoulder implant (Right, 4/30/2024).    FAMILY HISTORY  Family History   Problem Relation Age of Onset    Cancer  "Mother         lung    Heart Disease Mother     Hyperlipidemia Mother     Stroke Father     Heart Disease Father     Diabetes Father     Hypertension Father     Hyperlipidemia Father     Heart Disease Brother         cath and bypass    Diabetes Brother     Hypertension Brother     Hyperlipidemia Brother     Diabetes Maternal Aunt     Hypertension Maternal Aunt     Heart Disease Brother         cath and byp[ass    Drug abuse Brother     Heart Disease Brother         cath and bypass    Diabetes Brother     Heart Disease Brother     Other Brother         MVA    Hypertension Maternal Aunt     Drug abuse Daughter     Alcohol abuse Daughter        SOCIAL HISTORY  Social History     Tobacco Use    Smoking status: Never    Smokeless tobacco: Never   Vaping Use    Vaping status: Never Used   Substance and Sexual Activity    Alcohol use: No    Drug use: No    Sexual activity: Yes     Partners: Male     Birth control/protection: Post-Menopausal       CURRENT MEDICATIONS  Home Medications    **Home medications have not yet been reviewed for this encounter**       Audit from Redirected Encounters    **Home medications have not yet been reviewed for this encounter**         ALLERGIES  Allergies   Allergen Reactions    Gabapentin Hives and Swelling    Pregabalin Hives and Swelling    Unasyn [Ampicillin-Sulbactam Sodium] Hives     With oral lesions     Amlodipine Swelling    Bee Swelling    Fentanyl Vomiting and Unspecified    Morphine Vomiting, Nausea and Unspecified    Doxycycline      Possible hives (took with Unasyn)     Benzoin Rash     Tincture of benzoin =blisters    blisters    Rifampin Unspecified     Pt turns yellow       PHYSICAL EXAM  VITAL SIGNS: /73   Pulse (!) 106   Temp 37.5 °C (99.5 °F) (Oral)   Resp 20   Ht 1.575 m (5' 2\")   Wt 66.2 kg (146 lb)   SpO2 96%   BMI 26.70 kg/m²    General: Lying on the stretcher, altered  Head: NCAT  Eyes: Pupils equal and reactive, normal sclera   CV: Tachycardic, regular " rhythm,2+ radial pulses  Pulmonary: Slightly tachypneic, clear lungs throughout  Abdomen: Soft nondistended does not appear to be tender  Extremities: No deformities swelling of the arms or legs  Neuro: Alert and oriented x 1 (self).  Moves all extremities symmetrically      EKG/LABS  Viral swab positive for influenza A.  CBC with no leukocytosis, mild anemia hemoglobin 10.9.  Procalcitonin normal 0.16.  Lactic acid normal 1.8.  Mild hyponatremia 133 other electrolytes including mag and Phos normal.  Normal renal function and hepatobiliary labs.  TSH normal 1.42.  Ethanol undetectable.  EKG at 2343 sinus tachycardia rate 103 normal axis QTc 460 ms, somewhat diffuse T wave flattening, no STEMI I have independently interpreted this EKG    RADIOLOGY/PROCEDURES   I have independently interpreted the diagnostic imaging associated with this visit and am waiting the final reading from the radiologist.   My preliminary interpretation is as follows: Clear lungs no consolidations, possible mild bibasilar atelectasis.  CT head without obvious space-occupying lesion no obvious bleed.    Radiologist interpretation:  CT-HEAD W/O   Final Result         1.  No acute intracranial abnormality is identified, there are nonspecific white matter changes, commonly associated with small vessel ischemic disease.  Associated mild cerebral atrophy is noted.   2.  Atherosclerosis.               DX-CHEST-PORTABLE (1 VIEW)   Final Result         1.  Bilateral basilar atelectasis, no focal infiltrate          COURSE & MEDICAL DECISION MAKING    ASSESSMENT, COURSE AND PLAN  Care Narrative: Differential includes sepsis, pneumonia, UTI, intracranial bleed, electrolyte abnormality, thyroid storm, bacteremia, drug overdose, septic joint    Sepsis: Infection was suspected 2130 (Time). Sepsis pathway was initiated. Less than 30cc/kg because of concern for volume overload 1000 mL of crystalloid was ordered. Antibiotics were given per protocol.  Ceftriaxone.     On arrival patient is febrile and tachycardic and also tachypneic.  She is altered alert and oriented x 1 which does appear to be quite off how she was upon discharge yesterday.  Abdomen appears to be benign, lungs seem to be clear.  Her left shoulder does not have any swelling or redness, does not appear to be tender.  No other joint or extremity abnormalities.  Differential above is considered.  Sepsis protocol initiated she was given 1 L IV fluids to start as she is not hypotensive and concern for fluid overload.  Lactic acid was also 1.9.  Ceftriaxone given.  Broad workup including labs, urine, CT head and chest x-ray ordered.    Chest x-ray does not show any obvious consolidation to suggest pneumonia.  CBC with no leukocytosis and procalcitonin is normal, lowering suspicion of acute bacterial cause of her sepsis.  Her viral swab positive for influenza A which does explain fever, tachycardia and cough, could potentially contribute some to altered mental status.  Reassuring lactic acid at 1.8.  With fluids heart rate started to come down closer to 100, fever resolved with IV Tylenol.  Electrolytes were all normal other than mild hyponatremia 133, normal renal function and liver labs.  Ethanol is undetectable.  TSH normal unlikely thyroid storm.  At this time patient is still pending results of the UA, UDS, CK and troponin.  EKG was borderline sinus tach, diffuse T wave flattening but no territorial ischemic changes.  Still remains altered and did require 2.5 mg IV haloperidol to facilitate straight catheter urinalysis.    After the above interventions, fluids, antipyretics and haloperidol she was much more calm and following commands, still only oriented to self and unable to safely be discharged back to her residence alone so I spoke with the hospitalist who accepted the patient for admission for further observation and management of her altered mental status likely secondary to influenza  A/SIRS.      DISPOSITION AND DISCUSSIONS  I have discussed management of the patient with the following physicians and ALEC's: Hospitalist Dr. Acuna     Escalation of care considered, and ultimately not performed: N/A    Barriers to care at this time, including but not limited to: Patient lives alone.     FINAL DIAGNOSIS  1. Influenza A    2. SIRS (systemic inflammatory response syndrome) (HCC)    3. Altered mental status, unspecified altered mental status type         Electronically signed by: Duong Kelly M.D., 2/7/2025 9:25 PM

## 2025-02-08 NOTE — PROGRESS NOTES
Patient seen and examined and admitted earlier today.  Patient is a 75-year-old female who comes in after recent discharge where she was instructed to follow-up with her orthopedic surgeon and pain management doctor.  She was found on the floor by her home health nurse where she was febrile nauseated and can fused.  Patient found to have influenza A and admitted to the hospital for supportive care.  She was started on Tamiflu and I started her on IV hydration given her tachycardia and poor oral intake.

## 2025-02-08 NOTE — ED NOTES
Chief Complaint   Patient presents with    Cough    Headache    Fever     Pt arrives by EMS confused oriented to self only with fever , nausea  neck pain and HA.. Pt found by physical therapist who called EMS.

## 2025-02-08 NOTE — ASSESSMENT & PLAN NOTE
Tamiflu, continue IV fluids for an additional 24 hours as she is showing improvement  Currently on room air  Patient markedly improved

## 2025-02-08 NOTE — CARE PLAN
The patient is Stable - Low risk of patient condition declining or worsening    Shift Goals  Clinical Goals: pain control, free from falls,   Patient Goals: rest and sleep    Progress made toward(s) clinical / shift goals:  pain is controlled, no falls    Patient is A&O3, mentation improving. Patient is complaining of her room not warming up despite couple adjustments with the thermostat. Maintenance order has been placed. Charge RR aware.     Patient is not progressing towards the following goals: ambulation

## 2025-02-08 NOTE — ASSESSMENT & PLAN NOTE
-Continue Oxycodone. Follow up with Orthopedic (Dr. Sparks)  Patient states that the rep from her nerve stimulator is supposed to be coming by tomorrow to clarify if this is MRI compatible  Continue with pain management related to the shoulder, change from every 6 to every 4h coincide with what she is taking at home

## 2025-02-08 NOTE — PROGRESS NOTES
Pt arrived via gurney, admitted to room 218 from ED at 0130H. Pt is A&Ox3, not oriented of the situation.  pain reported at 6/10 on a scale of 0-10. Oriented to room call light and smoking policy. Reviewed plan of care with the patient and the family. Fall precaution in place. Bed locked. Bet at lowest position. Call light within reach. Encouraged pt the importance to call for assistance. Continue to monitor. Hourly rounding in progress

## 2025-02-08 NOTE — PROGRESS NOTES
Medication history reviewed per historical history on 2/4/2025. Med rec is complete.  Allergies reviewed, per historical history    Pt is not able to tell me what medications she is taking.    Not sure if pt finished her MEDROL Dose pack that she picked up on 1/14/2025.  Not sure if pt had to take her FLUCONAZOLE 150MG and CLOTRIMAZOLE 10MG KARLA that was picked up on 1/8/2025.    The patient had a 2-day course of Unasyn/Doxycycline 1/15-1/16 followed by a 3-day course of azithromycin.     Pt is not on any anticoagulants

## 2025-02-08 NOTE — PROGRESS NOTES
Report received from Charlotte VILLALBA, assumed care of pt at 0715.   POC and medications reviewed with pt. Pt verbalized understanding.   AOx4  C/o nothing at this time.  Denies pain, SOB, or dizziness at this time.   Safety measures in place.  Hourly rounding in place.

## 2025-02-08 NOTE — ASSESSMENT & PLAN NOTE
-Initial hemoglobin is 10.9 which is patient's baseline.  There is no bleeding.  Monitor CBC in the morning.

## 2025-02-08 NOTE — ED TRIAGE NOTES
"Chief Complaint   Patient presents with    Cough    Headache    Fever     Pt arrives by EMS confused oriented to self only with fever , nausea  neck pain and HA.. Pt found by physical therapist who called EMS.      BP (!) 148/88   Pulse (!) 128   Temp 38 °C (100.4 °F) (Oral)   Resp (!) 28   Ht 1.575 m (5' 2\")   Wt 66.2 kg (146 lb)   SpO2 95%   BMI 26.70 kg/m²     "

## 2025-02-08 NOTE — H&P
Hospital Medicine History & Physical Note    Date of Service  2/8/2025    Primary Care Physician  CHRISTINA Bartholomew.    Consultants  None    Code Status  Full Code    Chief Complaint  Chief Complaint   Patient presents with    Cough    Headache    Fever     Pt arrives by EMS confused oriented to self only with fever , nausea  neck pain and HA.. Pt found by physical therapist who called EMS.        History of Presenting Illness  Yamile Go is a 75 y.o. female, h/o HTN, HLD, Type II DM, Hypothyroidism, Depression and GERD.  She has recent admission to [facility] for intractable left shoulder pain from 2/4 and discharged yesterday with outpatient follow-up with Dr. Sparks.  She had home PT arranged and when physical therapist came to her house, found her confused only oriented to self with fever, nausea and feeling unwell therefore sent to the ED via EMS on 2/7/2025.    On arrival, temperature was 100.4, tachycardic to 120 bpm and intermittently tachypneic.  Tested positive for influenza A.    I discussed the plan of care with patient and ERP   .    Review of Systems  Review of Systems   Constitutional:  Negative for fever.   HENT:  Negative for congestion and sore throat.    Eyes:  Negative for blurred vision and double vision.   Respiratory:  Negative for cough and shortness of breath.    Cardiovascular:  Negative for chest pain and palpitations.   Gastrointestinal:  Negative for nausea and vomiting.   Genitourinary:  Negative for dysuria and urgency.   Musculoskeletal:  Negative for myalgias and neck pain.   Skin:  Negative for itching and rash.   Neurological:  Negative for dizziness, weakness and headaches.   Endo/Heme/Allergies:  Does not bruise/bleed easily.   Psychiatric/Behavioral:  Negative for depression. The patient does not have insomnia.        Past Medical History   has a past medical history of Anemia, Anesthesia, Arthritis, Asthma, CAD (coronary artery disease), Cataract,  Dental disorder, Depression, Diabetes (HCC), Disorder of thyroid, Heart burn, History of vertebral fracture, HTN (hypertension), Hyperlipidemia, Indigestion, Migraines, Obesity, Pneumonia (2023), PONV (postoperative nausea and vomiting), and Sleep apnea.    Surgical History   has a past surgical history that includes appendectomy (N/A, 1976); abdominal hysterectomy total (N/A, 1978); breast biopsy (1984); colectomy (N/A, 2007); lumbar laminectomy diskectomy (N/A, 1989); arthroplasty (Right, 2020); lumbar exploration (N/A, 2017); insertion, peripheral nerve stimulator, lower extremity (Left, 12/22/2022); and pr reconstr total shoulder implant (Right, 4/30/2024).     Family History  family history includes Alcohol abuse in her daughter; Cancer in her mother; Diabetes in her brother, brother, father, and maternal aunt; Drug abuse in her brother and daughter; Heart Disease in her brother, brother, brother, brother, father, and mother; Hyperlipidemia in her brother, father, and mother; Hypertension in her brother, father, maternal aunt, and maternal aunt; Other in her brother; Stroke in her father.   Family history reviewed with patient. There is no family history that is pertinent to the chief complaint.     Social History   reports that she has never smoked. She has never used smokeless tobacco. She reports that she does not drink alcohol and does not use drugs.    Allergies  Allergies   Allergen Reactions    Gabapentin Hives and Swelling    Pregabalin Hives and Swelling    Unasyn [Ampicillin-Sulbactam Sodium] Hives     With oral lesions     Amlodipine Swelling    Bee Swelling    Fentanyl Vomiting and Unspecified    Morphine Vomiting, Nausea and Unspecified    Doxycycline      Possible hives (took with Unasyn)     Benzoin Rash     Tincture of benzoin =blisters    blisters    Rifampin Unspecified     Pt turns yellow       Medications  Prior to Admission Medications   Prescriptions Last Dose Informant Patient Reported?  Taking?   SYNTHROID 88 MCG Tab  Patient No No   Sig: Take 1 Tablet by mouth every day.   VITAMIN D PO  Patient Yes No   Sig: Take 5,000 Units by mouth every morning.   albuterol 108 (90 Base) MCG/ACT Aero Soln inhalation aerosol  Patient No No   Sig: Inhale 1-2 Puffs every four hours as needed for Shortness of Breath.   alendronate (FOSAMAX) 70 MG Tab  Patient Yes No   Si mg every 7 days.   amitriptyline (ELAVIL) 100 MG Tab  Patient No No   Sig: Take 1.5 Tablets by mouth every evening.   diclofenac sodium (VOLTAREN) 1 % Gel  Patient No No   Sig: Apply 4 g topically 4 times a day as needed (back pain).   escitalopram (LEXAPRO) 20 MG tablet  Patient No No   Sig: Take 1 Tablet by mouth every day.   ezetimibe (ZETIA) 10 MG Tab  Patient No No   Sig: Take 1 Tablet by mouth every evening.   liothyronine (CYTOMEL) 5 MCG Tab  Patient No No   Sig: Take 1 Tablet by mouth every day.   metFORMIN (GLUCOPHAGE) 500 MG Tab  Patient No No   Sig: Take 1 Tablet by mouth 2 times a day with meals. Indications: Type 2 Diabetes   methocarbamol (ROBAXIN) 500 MG Tab  Patient No No   Sig: Take 1 Tablet by mouth 3 times a day as needed (Hold for respiratory depression, respiratory rate <12, heart rate less than 65, lethargy,somnolence, or systolic blood pressure < 105.).   metoprolol SR (TOPROL XL) 25 MG TABLET SR 24 HR  Patient No No   Sig: Take 1 Tablet by mouth every day.   omeprazole (PRILOSEC) 20 MG delayed-release capsule  Patient No No   Sig: Take 1 Capsule by mouth 2 times a day.   oxyCODONE immediate release (ROXICODONE) 10 MG immediate release tablet  Patient Yes No   Sig: Take 1 tablet every 4 hours by oral route for 30 days.   rosuvastatin (CRESTOR) 20 MG Tab  Patient No No   Sig: Take 1 Tablet by mouth every evening.      Facility-Administered Medications: None       Physical Exam  Temp:  [37.5 °C (99.5 °F)-38 °C (100.4 °F)] 37.5 °C (99.5 °F)  Pulse:  [106-128] 106  Resp:  [20-28] 20  BP: (106-149)/(65-88) 106/73  SpO2:  [95  %-96 %] 96 %  Blood Pressure : 106/73   Temperature: 37.5 °C (99.5 °F)   Pulse: (!) 106   Respiration: 20   Pulse Oximetry: 96 %       Physical Exam    Laboratory:  Recent Labs     02/05/25  0152 02/06/25 0211 02/07/25  2130   WBC 6.3 5.9 6.4   RBC 3.99* 3.81* 4.16*   HEMOGLOBIN 10.5* 10.0* 10.9*   HEMATOCRIT 33.7* 32.3* 34.8*   MCV 84.5 84.8 83.7   MCH 26.3* 26.2* 26.2*   MCHC 31.2* 31.0* 31.3*   RDW 54.1* 54.3* 53.1*   PLATELETCT 182 182 170   MPV 9.1 9.1 9.3     Recent Labs     02/05/25  0152 02/06/25 0211 02/07/25  2130   SODIUM 140 139 133*   POTASSIUM 4.2 4.2 3.9   CHLORIDE 107 106 100   CO2 22 23 19*   GLUCOSE 113* 119* 95   BUN 12 13 14   CREATININE 0.46* 0.63 0.70   CALCIUM 8.6 8.4 8.8     Recent Labs     02/05/25  0152 02/06/25  0211 02/07/25  2130   ALTSGPT  --   --  22   ASTSGOT  --   --  20   ALKPHOSPHAT  --   --  85   TBILIRUBIN  --   --  0.3   GLUCOSE 113* 119* 95     Recent Labs     02/06/25 0211   APTT 24.0*   INR 0.90         Imaging:  CT-HEAD W/O   Final Result         1.  No acute intracranial abnormality is identified, there are nonspecific white matter changes, commonly associated with small vessel ischemic disease.  Associated mild cerebral atrophy is noted.   2.  Atherosclerosis.               DX-CHEST-PORTABLE (1 VIEW)   Final Result         1.  Bilateral basilar atelectasis, no focal infiltrate          X-Ray:  I have personally reviewed the images and compared with prior images. and My impression is: CT of the head was negative for acute intracranial abnormality.  Chest x-ray showed bilateral atelectasis but no focal infiltrate.    Assessment/Plan:  Justification for Admission Status  I anticipate this patient is appropriate for observation status at this time because 75-year-old female, influenza A and confusion        * Acute metabolic encephalopathy- (present on admission)  Assessment & Plan  -Observation status, medical floor.  -Patient was confused and febrile at home.  She is  "tested positive now for influenza.  -Patient is unable to provide history she says \"I do not remember anything \".  -She has no focal neurological findings.  CT of her head is normal.  -Patient had low fever with SIRS.  She is not requiring oxygen but given her confusion, will need monitoring.    Normocytic anemia- (present on admission)  Assessment & Plan  -Initial hemoglobin is 10.9 which is patient's baseline.  There is no bleeding.  Monitor CBC in the morning.    Influenza A  Assessment & Plan  -Patient tested positive for influenza A.  She had a low-grade fever, was tachycardic and intermittently tachypneic but no elevated white count. Not requiring supplemental oxygen.  -She received 1 L bolus normal saline, she was also given Haldol and 1 dose of Rocephin.  Her vital signs improved.  -I am starting her on Tamiflu.  -She is not septic on admission.  Confusion also improving.  -Her Procalcitonin is negative and I am holding off adding antibiotics    Shoulder pain  Assessment & Plan  -Continue Oxycodone. Follow up with Orthopedic (Dr. Sparks)    Mixed hyperlipidemia- (present on admission)  Assessment & Plan  -Continue Zetia and rosuvastatin.    Type 2 diabetes mellitus (HCC)- (present on admission)  Assessment & Plan  -Regular insulin sliding scale.  Hold metformin.    Acquired hypothyroidism- (present on admission)  Assessment & Plan  -Continue levothyroxine and Cytomel.    Dyslipidemia- (present on admission)  Assessment & Plan  -Continue Zetia and rosuvastatin.        VTE prophylaxis: SCDs/TEDs  "

## 2025-02-08 NOTE — ED NOTES
Pt medicated per orders POC explained pt becoming more lucid at this time. Hr decreasing from 120's to 105

## 2025-02-08 NOTE — ASSESSMENT & PLAN NOTE
Patient's mentation is back to baseline she is able to give me her medical history now  Suspect decline was related to dehydration related to influenza A

## 2025-02-08 NOTE — ED NOTES
Pt strait cathed per orders EKG done blood drawn and sent pt oriented x2-3 at this time. Pt believes her daughter brought her in  when she actually came by EMS. Vss hr remains st in 100-105 range at this time pt resting more comfortably at this moment

## 2025-02-09 ENCOUNTER — APPOINTMENT (OUTPATIENT)
Dept: RADIOLOGY | Facility: MEDICAL CENTER | Age: 75
End: 2025-02-09
Attending: INTERNAL MEDICINE
Payer: MEDICARE

## 2025-02-09 LAB
ANION GAP SERPL CALC-SCNC: 12 MMOL/L (ref 7–16)
BUN SERPL-MCNC: 8 MG/DL (ref 8–22)
CALCIUM SERPL-MCNC: 7.4 MG/DL (ref 8.4–10.2)
CHLORIDE SERPL-SCNC: 102 MMOL/L (ref 96–112)
CO2 SERPL-SCNC: 18 MMOL/L (ref 20–33)
CREAT SERPL-MCNC: 0.46 MG/DL (ref 0.5–1.4)
ERYTHROCYTE [DISTWIDTH] IN BLOOD BY AUTOMATED COUNT: 52.6 FL (ref 35.9–50)
GFR SERPLBLD CREATININE-BSD FMLA CKD-EPI: 100 ML/MIN/1.73 M 2
GLUCOSE BLD STRIP.AUTO-MCNC: 109 MG/DL (ref 65–99)
GLUCOSE BLD STRIP.AUTO-MCNC: 119 MG/DL (ref 65–99)
GLUCOSE BLD STRIP.AUTO-MCNC: 131 MG/DL (ref 65–99)
GLUCOSE BLD STRIP.AUTO-MCNC: 71 MG/DL (ref 65–99)
GLUCOSE BLD STRIP.AUTO-MCNC: 72 MG/DL (ref 65–99)
GLUCOSE SERPL-MCNC: 78 MG/DL (ref 65–99)
HCT VFR BLD AUTO: 29.2 % (ref 37–47)
HGB BLD-MCNC: 9.4 G/DL (ref 12–16)
MCH RBC QN AUTO: 26.3 PG (ref 27–33)
MCHC RBC AUTO-ENTMCNC: 32.2 G/DL (ref 32.2–35.5)
MCV RBC AUTO: 81.6 FL (ref 81.4–97.8)
PLATELET # BLD AUTO: 130 K/UL (ref 164–446)
PMV BLD AUTO: 8.9 FL (ref 9–12.9)
POTASSIUM SERPL-SCNC: 3.5 MMOL/L (ref 3.6–5.5)
RBC # BLD AUTO: 3.58 M/UL (ref 4.2–5.4)
SODIUM SERPL-SCNC: 132 MMOL/L (ref 135–145)
WBC # BLD AUTO: 3.2 K/UL (ref 4.8–10.8)

## 2025-02-09 PROCEDURE — A9270 NON-COVERED ITEM OR SERVICE: HCPCS | Performed by: HOSPITALIST

## 2025-02-09 PROCEDURE — A9270 NON-COVERED ITEM OR SERVICE: HCPCS | Performed by: INTERNAL MEDICINE

## 2025-02-09 PROCEDURE — 700102 HCHG RX REV CODE 250 W/ 637 OVERRIDE(OP): Performed by: INTERNAL MEDICINE

## 2025-02-09 PROCEDURE — 700102 HCHG RX REV CODE 250 W/ 637 OVERRIDE(OP): Performed by: HOSPITALIST

## 2025-02-09 PROCEDURE — 36415 COLL VENOUS BLD VENIPUNCTURE: CPT

## 2025-02-09 PROCEDURE — 85027 COMPLETE CBC AUTOMATED: CPT

## 2025-02-09 PROCEDURE — 94760 N-INVAS EAR/PLS OXIMETRY 1: CPT

## 2025-02-09 PROCEDURE — 700111 HCHG RX REV CODE 636 W/ 250 OVERRIDE (IP): Performed by: HOSPITALIST

## 2025-02-09 PROCEDURE — 700105 HCHG RX REV CODE 258: Performed by: INTERNAL MEDICINE

## 2025-02-09 PROCEDURE — 96372 THER/PROPH/DIAG INJ SC/IM: CPT

## 2025-02-09 PROCEDURE — 99233 SBSQ HOSP IP/OBS HIGH 50: CPT | Performed by: INTERNAL MEDICINE

## 2025-02-09 PROCEDURE — G0378 HOSPITAL OBSERVATION PER HR: HCPCS

## 2025-02-09 PROCEDURE — 82962 GLUCOSE BLOOD TEST: CPT

## 2025-02-09 PROCEDURE — 80048 BASIC METABOLIC PNL TOTAL CA: CPT

## 2025-02-09 RX ORDER — OXYCODONE HYDROCHLORIDE 10 MG/1
10 TABLET ORAL EVERY 4 HOURS PRN
Status: DISCONTINUED | OUTPATIENT
Start: 2025-02-09 | End: 2025-02-10 | Stop reason: HOSPADM

## 2025-02-09 RX ADMIN — SODIUM CHLORIDE 1000 ML: 9 INJECTION, SOLUTION INTRAVENOUS at 05:34

## 2025-02-09 RX ADMIN — OSELTAMIVIR PHOSPHATE 75 MG: 75 CAPSULE ORAL at 13:10

## 2025-02-09 RX ADMIN — OXYCODONE HYDROCHLORIDE 10 MG: 10 TABLET ORAL at 13:41

## 2025-02-09 RX ADMIN — AMITRIPTYLINE HYDROCHLORIDE 100 MG: 50 TABLET, FILM COATED ORAL at 19:22

## 2025-02-09 RX ADMIN — ESCITALOPRAM OXALATE 20 MG: 10 TABLET ORAL at 05:21

## 2025-02-09 RX ADMIN — LEVOTHYROXINE SODIUM 88 MCG: 0.09 TABLET ORAL at 05:21

## 2025-02-09 RX ADMIN — OMEPRAZOLE 20 MG: 20 CAPSULE, DELAYED RELEASE ORAL at 23:56

## 2025-02-09 RX ADMIN — OXYCODONE HYDROCHLORIDE 10 MG: 10 TABLET ORAL at 19:21

## 2025-02-09 RX ADMIN — ENOXAPARIN SODIUM 30 MG: 100 INJECTION SUBCUTANEOUS at 05:21

## 2025-02-09 RX ADMIN — OXYCODONE HYDROCHLORIDE 10 MG: 10 TABLET ORAL at 09:29

## 2025-02-09 RX ADMIN — LIOTHYRONINE SODIUM 5 MCG: 5 TABLET ORAL at 05:21

## 2025-02-09 RX ADMIN — ENOXAPARIN SODIUM 30 MG: 100 INJECTION SUBCUTANEOUS at 19:22

## 2025-02-09 RX ADMIN — OSELTAMIVIR PHOSPHATE 75 MG: 75 CAPSULE ORAL at 23:56

## 2025-02-09 RX ADMIN — EZETIMIBE 10 MG: 10 TABLET ORAL at 19:22

## 2025-02-09 RX ADMIN — OXYCODONE HYDROCHLORIDE 10 MG: 10 TABLET ORAL at 23:56

## 2025-02-09 RX ADMIN — METOPROLOL SUCCINATE 25 MG: 25 TABLET, FILM COATED, EXTENDED RELEASE ORAL at 05:21

## 2025-02-09 RX ADMIN — ROSUVASTATIN CALCIUM 20 MG: 10 TABLET, FILM COATED ORAL at 19:21

## 2025-02-09 RX ADMIN — OXYCODONE HYDROCHLORIDE 10 MG: 10 TABLET ORAL at 02:18

## 2025-02-09 RX ADMIN — OMEPRAZOLE 20 MG: 20 CAPSULE, DELAYED RELEASE ORAL at 13:10

## 2025-02-09 ASSESSMENT — ENCOUNTER SYMPTOMS
CONSTIPATION: 0
CHILLS: 0
WEAKNESS: 0
FEVER: 0
SHORTNESS OF BREATH: 0
PHOTOPHOBIA: 0
SENSORY CHANGE: 0
CLAUDICATION: 0
NERVOUS/ANXIOUS: 0
ABDOMINAL PAIN: 0
HEARTBURN: 0
DIZZINESS: 0
VOMITING: 0
INSOMNIA: 0
DEPRESSION: 0
HEADACHES: 0
COUGH: 0
BLURRED VISION: 0
DIARRHEA: 0
MYALGIAS: 0
SPEECH CHANGE: 0

## 2025-02-09 ASSESSMENT — PAIN DESCRIPTION - PAIN TYPE
TYPE: ACUTE PAIN;CHRONIC PAIN
TYPE: ACUTE PAIN
TYPE: ACUTE PAIN;CHRONIC PAIN

## 2025-02-09 ASSESSMENT — SOCIAL DETERMINANTS OF HEALTH (SDOH)
WITHIN THE PAST 12 MONTHS, YOU WORRIED THAT YOUR FOOD WOULD RUN OUT BEFORE YOU GOT THE MONEY TO BUY MORE: NEVER TRUE
IN THE PAST 12 MONTHS, HAS THE ELECTRIC, GAS, OIL, OR WATER COMPANY THREATENED TO SHUT OFF SERVICE IN YOUR HOME?: NO
WITHIN THE PAST 12 MONTHS, THE FOOD YOU BOUGHT JUST DIDN'T LAST AND YOU DIDN'T HAVE MONEY TO GET MORE: NEVER TRUE

## 2025-02-09 NOTE — CARE PLAN
The patient is Stable - Low risk of patient condition declining or worsening    Shift Goals  Clinical Goals: Patient pain will be managed at a tolerable level of 3/10 or less throughout night, Patient will remain free from fall or injury. Monitor orientation level.  Patient Goals: Rest comfortably, pain management  Family Goals: n/a    Progress made toward(s) clinical / shift goals:  Patient's pain maintained at a tolerable level of 3/10 or less throughout shift with medication per MAR and rest. Pt did not sustain a fall or injury during shift. Able to rest comfortably throughout shift. Patient remained alert and oriented during shift.     Patient is not progressing towards the following goals:

## 2025-02-09 NOTE — PROGRESS NOTES
4 Eyes Skin Assessment Completed by DEEJAY Cisse and DEEJAY Fuentes.    Head WDL  Ears WDL  Nose WDL  Mouth WDL  Neck WDL  Breast/Chest WDL  Shoulder Blades WDL  Spine WDL  (R) Arm/Elbow/Hand Redness, Blanching, and Bruising, Fragile  (L) Arm/Elbow/Hand Redness, Blanching, Bruising, and Scab, Fragile  Abdomen Scar  Groin WDL  Scrotum/Coccyx/Buttocks Redness and Blanching  (R) Leg Bruising  (L) Leg Bruising and Swelling  (R) Heel/Foot/Toe Dry and Flaky  (L) Heel/Foot/Toe Discoloration, Bruising, and Swelling, Dry and Flaky          Devices In Places Pulse Ox and SCD's      Interventions In Place Pillows, Low Air Loss Mattress, Heels Loaded W/Pillows, and Pressure Redistribution Mattress    Possible Skin Injury Yes    Pictures Uploaded Into Epic Yes  Wound Consult Placed N/A  RN Wound Prevention Protocol Ordered Yes        L leg         R leg      L foot    R foot      R arm/elbow      L arm

## 2025-02-09 NOTE — CARE PLAN
The patient is Stable - Low risk of patient condition declining or worsening    Shift Goals  Clinical Goals: Pt without falls and injury; Pain reported <4/10 after ordered interventions; Pt up to restroom.  Patient Goals: Rest comfortably, pain management  Family Goals: n/a    Progress made toward(s) clinical / shift goals:  Pt without falls and injury; Pain reported <4/10 after ordered interventions; Pt up to restroom and chair    Patient is not progressing towards the following goals:

## 2025-02-09 NOTE — PROGRESS NOTES
Received report from night shift nurse, Tanna. Assumed pt care at 0715. Pt is A&Ox4, resting comfortably in bed. Pt on RA. No signs of SOB/respiratory distress. Labs, VS, medications reviewed.  Fall precautions in place. Bed at lowest position. Call light and personal belongings within reach. Plan of care discussed, no further concerns at this time.

## 2025-02-09 NOTE — CARE PLAN
The patient is Stable - Low risk of patient condition declining or worsening    Shift Goals  Clinical Goals: Pain control. monitor alterd mental status.  Patient Goals: sleep comfortably    Progress made toward(s) clinical / shift goals:  Pain controlled post prn meds per MAR.    Patient is not progressing towards the following goals:

## 2025-02-09 NOTE — PROGRESS NOTES
Received report from day shift RN. Assumed patient care at 1900. Patient is A&O4, on RA, no SOB noted. No signs of distress or discomfort. Patient complains of 8/10 pain in head and shoulder at this time, medicated per MAR. Plan of care for the night discussed with patient. Patient verbalized understanding. Patient resting in bed comfortably. Safety precautions in place. All patient belongings and call light within reach. All needs addressed at this time. Patient will call with any needs. Pure wick in place. Continuous IV fluids infusing per MAR.

## 2025-02-10 ENCOUNTER — PHARMACY VISIT (OUTPATIENT)
Dept: PHARMACY | Facility: MEDICAL CENTER | Age: 75
End: 2025-02-10
Payer: MEDICARE

## 2025-02-10 VITALS
TEMPERATURE: 97 F | WEIGHT: 148.59 LBS | DIASTOLIC BLOOD PRESSURE: 66 MMHG | SYSTOLIC BLOOD PRESSURE: 120 MMHG | BODY MASS INDEX: 27.34 KG/M2 | OXYGEN SATURATION: 94 % | HEART RATE: 78 BPM | RESPIRATION RATE: 18 BRPM | HEIGHT: 62 IN

## 2025-02-10 LAB
ANION GAP SERPL CALC-SCNC: 12 MMOL/L (ref 7–16)
BACTERIA UR CULT: NORMAL
BUN SERPL-MCNC: 8 MG/DL (ref 8–22)
CALCIUM SERPL-MCNC: 8 MG/DL (ref 8.4–10.2)
CHLORIDE SERPL-SCNC: 108 MMOL/L (ref 96–112)
CO2 SERPL-SCNC: 19 MMOL/L (ref 20–33)
CREAT SERPL-MCNC: 0.56 MG/DL (ref 0.5–1.4)
ERYTHROCYTE [DISTWIDTH] IN BLOOD BY AUTOMATED COUNT: 53.7 FL (ref 35.9–50)
GFR SERPLBLD CREATININE-BSD FMLA CKD-EPI: 96 ML/MIN/1.73 M 2
GLUCOSE BLD STRIP.AUTO-MCNC: 109 MG/DL (ref 65–99)
GLUCOSE BLD STRIP.AUTO-MCNC: 121 MG/DL (ref 65–99)
GLUCOSE SERPL-MCNC: 149 MG/DL (ref 65–99)
HCT VFR BLD AUTO: 32.6 % (ref 37–47)
HGB BLD-MCNC: 10.4 G/DL (ref 12–16)
MCH RBC QN AUTO: 26.3 PG (ref 27–33)
MCHC RBC AUTO-ENTMCNC: 31.9 G/DL (ref 32.2–35.5)
MCV RBC AUTO: 82.3 FL (ref 81.4–97.8)
PLATELET # BLD AUTO: 140 K/UL (ref 164–446)
PMV BLD AUTO: 10.5 FL (ref 9–12.9)
POTASSIUM SERPL-SCNC: 3.9 MMOL/L (ref 3.6–5.5)
RBC # BLD AUTO: 3.96 M/UL (ref 4.2–5.4)
SIGNIFICANT IND 70042: NORMAL
SITE SITE: NORMAL
SODIUM SERPL-SCNC: 139 MMOL/L (ref 135–145)
SOURCE SOURCE: NORMAL
WBC # BLD AUTO: 1.8 K/UL (ref 4.8–10.8)

## 2025-02-10 PROCEDURE — 700102 HCHG RX REV CODE 250 W/ 637 OVERRIDE(OP): Performed by: INTERNAL MEDICINE

## 2025-02-10 PROCEDURE — A9270 NON-COVERED ITEM OR SERVICE: HCPCS | Performed by: INTERNAL MEDICINE

## 2025-02-10 PROCEDURE — 700102 HCHG RX REV CODE 250 W/ 637 OVERRIDE(OP): Performed by: HOSPITALIST

## 2025-02-10 PROCEDURE — 96372 THER/PROPH/DIAG INJ SC/IM: CPT

## 2025-02-10 PROCEDURE — 700105 HCHG RX REV CODE 258: Performed by: INTERNAL MEDICINE

## 2025-02-10 PROCEDURE — 82962 GLUCOSE BLOOD TEST: CPT

## 2025-02-10 PROCEDURE — 97163 PT EVAL HIGH COMPLEX 45 MIN: CPT

## 2025-02-10 PROCEDURE — 99239 HOSP IP/OBS DSCHRG MGMT >30: CPT | Performed by: INTERNAL MEDICINE

## 2025-02-10 PROCEDURE — 85027 COMPLETE CBC AUTOMATED: CPT

## 2025-02-10 PROCEDURE — 80048 BASIC METABOLIC PNL TOTAL CA: CPT

## 2025-02-10 PROCEDURE — G0378 HOSPITAL OBSERVATION PER HR: HCPCS

## 2025-02-10 PROCEDURE — 36415 COLL VENOUS BLD VENIPUNCTURE: CPT

## 2025-02-10 PROCEDURE — RXMED WILLOW AMBULATORY MEDICATION CHARGE: Performed by: INTERNAL MEDICINE

## 2025-02-10 PROCEDURE — A9270 NON-COVERED ITEM OR SERVICE: HCPCS | Performed by: HOSPITALIST

## 2025-02-10 PROCEDURE — 94760 N-INVAS EAR/PLS OXIMETRY 1: CPT

## 2025-02-10 PROCEDURE — 700111 HCHG RX REV CODE 636 W/ 250 OVERRIDE (IP): Performed by: HOSPITALIST

## 2025-02-10 RX ORDER — OSELTAMIVIR PHOSPHATE 75 MG/1
75 CAPSULE ORAL 2 TIMES DAILY
Qty: 6 CAPSULE | Refills: 0 | Status: ACTIVE | OUTPATIENT
Start: 2025-02-10 | End: 2025-02-13

## 2025-02-10 RX ADMIN — LEVOTHYROXINE SODIUM 88 MCG: 0.09 TABLET ORAL at 05:36

## 2025-02-10 RX ADMIN — OMEPRAZOLE 20 MG: 20 CAPSULE, DELAYED RELEASE ORAL at 12:15

## 2025-02-10 RX ADMIN — OSELTAMIVIR PHOSPHATE 75 MG: 75 CAPSULE ORAL at 12:14

## 2025-02-10 RX ADMIN — OXYCODONE HYDROCHLORIDE 10 MG: 10 TABLET ORAL at 07:20

## 2025-02-10 RX ADMIN — ESCITALOPRAM OXALATE 20 MG: 10 TABLET ORAL at 05:36

## 2025-02-10 RX ADMIN — SODIUM CHLORIDE 1000 ML: 9 INJECTION, SOLUTION INTRAVENOUS at 03:25

## 2025-02-10 RX ADMIN — ENOXAPARIN SODIUM 30 MG: 100 INJECTION SUBCUTANEOUS at 05:37

## 2025-02-10 RX ADMIN — OXYCODONE HYDROCHLORIDE 10 MG: 10 TABLET ORAL at 12:15

## 2025-02-10 RX ADMIN — LIOTHYRONINE SODIUM 5 MCG: 5 TABLET ORAL at 05:36

## 2025-02-10 ASSESSMENT — GAIT ASSESSMENTS
DISTANCE (FEET): 60
DEVIATION: SHUFFLED GAIT;BRADYKINETIC
GAIT LEVEL OF ASSIST: CONTACT GUARD ASSIST
ASSISTIVE DEVICE: FRONT WHEEL WALKER

## 2025-02-10 ASSESSMENT — COGNITIVE AND FUNCTIONAL STATUS - GENERAL
TURNING FROM BACK TO SIDE WHILE IN FLAT BAD: A LOT
WALKING IN HOSPITAL ROOM: A LITTLE
SUGGESTED CMS G CODE MODIFIER MOBILITY: CK
MOVING TO AND FROM BED TO CHAIR: A LITTLE
STANDING UP FROM CHAIR USING ARMS: A LITTLE
MOBILITY SCORE: 16
CLIMB 3 TO 5 STEPS WITH RAILING: A LITTLE
MOVING FROM LYING ON BACK TO SITTING ON SIDE OF FLAT BED: A LOT

## 2025-02-10 ASSESSMENT — PAIN DESCRIPTION - PAIN TYPE
TYPE: CHRONIC PAIN
TYPE: ACUTE PAIN
TYPE: CHRONIC PAIN

## 2025-02-10 NOTE — PROGRESS NOTES
Patient discharged per MD order via wheelchair with personal belongings. Pt is in stable condition. Home medications have arrived to bedside. Discharge instructions have been reviewed and signed; with copy provided to patient. Any questions have been answered and patient verbalizes understanding of education and instructions provided.

## 2025-02-10 NOTE — PROGRESS NOTES
Received report from DEEJAY PILLAI. Assumed patient care at 2110. Patient is A&O4, on RA, no SOB noted. No signs of distress or discomfort. Pt complains of 7/10 pain, no medication available at this time per MAR. Educated patient about PRN medication and other methods of pain relief. Relieved with rest and repositioning. Plan of care for the rest of night discussed with patient. Patient verbalized understanding. Patient resting in bed comfortably. Safety precautions in place. All patient belongings and call light within reach. All needs addressed at this time. Patient will call with any needs. Continuous IV fluids infusing per MAR.

## 2025-02-10 NOTE — THERAPY
Physical Therapy   Initial Evaluation     Patient Name: Yamile Go  Age:  75 y.o., Sex:  female  Medical Record #: [id number]  Today's Date: 2/10/2025     Precautions  Precautions: Fall Risk  Comments: unable to use L shuolder due toinjuryprior toadmit    Assessment  Patient is 75 y.o. female with a diagnosis of +Influenza A, acute metabolic encephalopathy admitted 2/7/2025 with altered mental status, fever.  Pt is presenting with L shoulder pain and weakness that has been worsening over the past few weeks per pt report. Currently pt is not really able to use L arm functionally and is resting in a inferior subluxed position- pt reports has a  sling at home if needed but also uses a FWW recently and needs to use L UE to  walker. Pt is also presenting with generalized weakness and impaired balance. Given the above, pts overall function and safety is impacted and is not demonstrating ability to care for herself. Recommend SNF for further PT.    Plan    Physical Therapy Initial Treatment Plan   Treatment Plan : Bed Mobility, Gait Training, Neuro Re-Education / Balance, Self Care / Home Evaluation, Therapeutic Activities, Therapeutic Exercise  Treatment Frequency: 5 Times per Week  Duration: Until Therapy Goals Met    DC Equipment Recommendations: None  Discharge Recommendations: Recommend post-acute placement for additional physical therapy services prior to discharge home (pt is currently not demonstrating ability to care for herself, primarily due to L shoulder injury but also impaired balance and weakness)        02/10/25 1037   Prior Living Situation   Housing / Facility 1 Story Apartment / Condo   Steps Into Home 0   Steps In Home 0   Equipment Owned Front-Wheel Walker   Lives with - Patient's Self Care Capacity Alone and Unable to Care For Self   Prior Level of Functional Mobility   Bed Mobility Independent   Transfer Status Independent   Ambulation Independent   Assistive Devices Used Front-Wheel Walker    Cognition    Level of Consciousness Alert   Comments Pt is oriented x4 but is forgetful and demonstrates decreased safety and overall insight into current deficits   Passive ROM Upper Body   Comments noted L humeral head sublux inferiorly   Strength Upper Body   Comments shoulder 0/5, biceps 0/5, triceps 2/5, wrist and  3/5   Active ROM Lower Body    Active ROM Lower Body  WDL   Strength Lower Body   Lower Body Strength  X   Comments generalized weakness equal bilterally   Balance Assessment   Sitting Balance (Static) Fair +   Sitting Balance (Dynamic) Fair   Standing Balance (Static) Fair -   Standing Balance (Dynamic) Fair -   Weight Shift Sitting Fair   Weight Shift Standing Fair   Bed Mobility    Supine to Sit Moderate Assist   Gait Analysis   Gait Level Of Assist Contact Guard Assist   Assistive Device Front Wheel Walker   Distance (Feet) 60   # of Times Distance was Traveled 1   Deviation Shuffled Gait;Bradykinetic  (can  FWW with L hand but difficulty steering walker)   Functional Mobility   Sit to Stand Contact Guard Assist   Bed, Chair, Wheelchair Transfer Contact Guard Assist   Short Term Goals    Short Term Goal # 1 Pt will be able to perform bed mobility and sup < >sit Indep in 6 visits.   Short Term Goal # 2 Pt will be able to perform sit <> stand and transfer with FWW Jani in 6 visits.   Short Term Goal # 3 Pt will be able toambulate 150 ft with FWW Jani in 6 visits.

## 2025-02-10 NOTE — DISCHARGE SUMMARY
Discharge Summary    CHIEF COMPLAINT ON ADMISSION  Chief Complaint   Patient presents with    Cough    Headache    Fever     Pt arrives by EMS confused oriented to self only with fever , nausea  neck pain and HA.. Pt found by physical therapist who called EMS.        Reason for Admission  EMS     Admission Date  2/7/2025    CODE STATUS  Full Code    HPI & HOSPITAL COURSE  Patient is a 75-year-old female who was recently here for uncontrolled left shoulder pain.  She was discharged home with instructions to follow-up with orthopedic surgery and pain management.  Home health was coordinated and when home health went to visit her at her house they found her confused with fever nausea and feeling unwell.  Patient was sent to the emergency room via ambulance found to have a fever of 104 and tachycardic to 120.  Patient found to be influenza A positive and started on Tamiflu and IV fluids secondary to the significant tachycardia.  After 24 hours of supportive care her mentation has dramatically improved and she is able to convey her medical history.  She states she reached out to pain management and the [employer] rep is supposed to be coming by on Monday to help delineate the safety of MRI related to the implanted device.    Patient no longer is requiring IV fluids and her tachycardia has resolved.  Her mental status is back to her baseline and she is requesting discharge home today.  Physical therapy is recommending possible placement however patient's hospitalizations are observation status only.  Patient unfortunately does not qualify for skilled nursing and at this time she is not wanting and is wanting to go home with home health.  Patient has outpatient MRI tomorrow.  Will complete course of Tamiflu for her influenza A and follow-up with orthopedic surgery as well as pain management.  Patient is also leukopenic but I believe this is related to her acute influenza A and not a separate process.    Therefore, she is  discharged in good and stable condition to home with organized home healthcare and close outpatient follow-up.    The patient met 2-midnight criteria for an inpatient stay at the time of discharge.    Discharge Date  2/10/25    FOLLOW UP ITEMS POST DISCHARGE  PCP and orthopedics, pain management    DISCHARGE DIAGNOSES  Principal Problem:    Acute metabolic encephalopathy (POA: Yes)  Active Problems:    Dyslipidemia (POA: Yes)    Acquired hypothyroidism (POA: Yes)    Type 2 diabetes mellitus (HCC) (POA: Yes)    Mixed hyperlipidemia (POA: Yes)    Normocytic anemia (POA: Yes)    Influenza A (POA: Unknown)    Shoulder pain (POA: Unknown)  Resolved Problems:    * No resolved hospital problems. *      FOLLOW UP  No future appointments.  CHRISTINA Bartholomew.  [address] [facility] [address]  [facility] [address]  [phone]    Call in 2 day(s)        MEDICATIONS ON DISCHARGE     Medication List        START taking these medications        Instructions   oseltamivir 75 MG Caps  Commonly known as: Tamiflu   Take 1 Capsule by mouth 2 times a day for 3 days.  Dose: 75 mg            CONTINUE taking these medications        Instructions   albuterol 108 (90 Base) MCG/ACT Aers inhalation aerosol   Inhale 1-2 Puffs every four hours as needed for Shortness of Breath.  Dose: 1-2 Puff     alendronate 70 MG Tabs  Commonly known as: Fosamax   70 mg every 7 days. On Monday  Dose: 70 mg     amitriptyline 100 MG Tabs  Commonly known as: Elavil   Take 1.5 Tablets by mouth every evening.  Dose: 150 mg     azithromycin 500 MG tablet  Commonly known as: Zithromax   Take 500 mg by mouth every day. Pt started on 1/17/2025 for 3 day course   PRESCRIPTION COURSE WAS COMPLETED  Dose: 500 mg     D3 5000 125 MCG (5000 UT) Caps  Generic drug: cholecalciferol   Take 5,000 Units by mouth every day.  Dose: 5,000 Units     diclofenac sodium 1 % Gel  Commonly known as: Voltaren   Apply 4 g topically 4 times a day as needed (back pain).  Dose: 4 g     doxycycline  100 MG capsule  Commonly known as: Monodox   Take 100 mg by mouth 2 times a day. Pt started on 1/15/2025 until 1/16/2025  Dose: 100 mg     escitalopram 20 MG tablet  Commonly known as: Lexapro   Take 1 Tablet by mouth every day.  Dose: 20 mg     ezetimibe 10 MG Tabs  Commonly known as: Zetia   Take 1 Tablet by mouth every evening.  Dose: 10 mg     liothyronine 5 MCG Tabs  Commonly known as: Cytomel   Take 1 Tablet by mouth every day.  Dose: 5 mcg     metFORMIN 500 MG Tabs  Commonly known as: Glucophage   Take 1 Tablet by mouth 2 times a day with meals. Indications: Type 2 Diabetes  Dose: 500 mg     methocarbamol 500 MG Tabs  Commonly known as: Robaxin   Take 1 Tablet by mouth 3 times a day as needed (Hold for respiratory depression, respiratory rate <12, heart rate less than 65, lethargy,somnolence, or systolic blood pressure < 105.).  Dose: 500 mg     methylPREDNISolone 4 MG Tbpk  Commonly known as: Medrol Dosepak   Take 4 mg by mouth every day. Follow schedule on package instructions.  Pt picked up on 1/14/2025 for 6 day course  Dose: 4 mg     metoprolol SR 25 MG Tb24  Commonly known as: Toprol XL   Take 1 Tablet by mouth every day.  Dose: 25 mg     omeprazole 20 MG delayed-release capsule  Commonly known as: PriLOSEC   Take 1 Capsule by mouth 2 times a day.  Dose: 20 mg     oxyCODONE immediate release 10 MG immediate release tablet  Commonly known as: Roxicodone   Take 10 mg by mouth every four hours as needed for Severe Pain.  Dose: 10 mg     rosuvastatin 20 MG Tabs  Commonly known as: Crestor   Take 1 Tablet by mouth every evening.  Dose: 20 mg     Synthroid 88 MCG Tabs  Generic drug: levothyroxine   Take 1 Tablet by mouth every day.  Dose: 88 mcg              Allergies  Allergies   Allergen Reactions    Gabapentin Hives and Swelling    Pregabalin Hives and Swelling    Unasyn [Ampicillin-Sulbactam Sodium] Hives     With oral lesions     Amlodipine Swelling    Bee Swelling    Fentanyl Vomiting and Unspecified     Morphine Vomiting, Nausea and Unspecified    Doxycycline      Possible hives (took with Unasyn)     Benzoin Rash     Tincture of benzoin =blisters    blisters    Rifampin Unspecified     Pt turns yellow       DIET  Orders Placed This Encounter   Procedures    Diet Order Diet: Cardiac     Standing Status:   Standing     Number of Occurrences:   1     Order Specific Question:   Diet:     Answer:   Cardiac [6]       ACTIVITY  As tolerated.  Weight bearing as tolerated    CONSULTATIONS  None    PROCEDURES  None    LABORATORY  Lab Results   Component Value Date    SODIUM 139 02/10/2025    POTASSIUM 3.9 02/10/2025    CHLORIDE 108 02/10/2025    CO2 19 (L) 02/10/2025    GLUCOSE 149 (H) 02/10/2025    BUN 8 02/10/2025    CREATININE 0.56 02/10/2025    CREATININE 0.87 02/20/2012        Lab Results   Component Value Date    WBC 1.8 (LL) 02/10/2025    WBC 6.9 02/20/2012    HEMOGLOBIN 10.4 (L) 02/10/2025    HEMATOCRIT 32.6 (L) 02/10/2025    PLATELETCT 140 (L) 02/10/2025        Total time of the discharge process exceeds 38 minutes.

## 2025-02-10 NOTE — DISCHARGE PLANNING
Case Management Discharge Planning    Admission Date: 2/7/2025  GMLOS:    ALOS: 0    6-Clicks ADL Score: 16  6-Clicks Mobility Score: 16  PT and/or OT Eval ordered: Yes  Post-acute Referrals Ordered: NA  Post-acute Choice Obtained: NA  Has referral(s) been sent to post-acute provider:  NA      Anticipated Discharge Dispo: Discharge Disposition: D/T to home under HHA care in anticipation of covered skilled care (06)  Discharge Address: [address]  [place]    DME Needed: No    Action(s) Taken: Updated Provider/Nurse on Discharge Plan, DC Assessment Complete (See below), and Referral(s) sent    Pt discussed in 0815 rounds. Pt is medically cleared to discharge home. LSW notified MD of pt previously having HH, requested resumption HH orders.     1030-  Discussed pt in IDT rounds. Per MD therapy is leaning toward placement recommendation. Pt is on observation under Medicare, was on observation during last admission as well. Pt does not qualify for SNF, pt is wanting to go home.     Received HH orders, requested for referral to be sent to [facility] per previous choice obtained.     1513-  Attempted to call pt's daughter Francisca per RN request. Phone went straight to voicemail, left a message.     Attempted to call second number provided by RN ([phone]). Phone connected to a , no answer. Left a voicemail.     Escalations Completed: None    Medically Clear: Yes    Next Steps: LSW to follow    Barriers to Discharge: None    Is the patient up for discharge tomorrow: No          Care Transition Team Assessment    LSW familiar with pt. Pt is a readmission, previously discharged home with HH on 2/6, readmitted the next day.   Pt lives alone in apartment, is independent at baseline, no DME. Pt's only identified barrier is transportation.   Pt has no help at home.     Information Source  Orientation Level: Oriented X4, Appropriate for developmental age  Information Given By: Patient  Who is responsible for  making decisions for patient? : Patient    Readmission Evaluation  Is this a readmission?: Yes - unplanned readmission    Elopement Risk  Legal Hold: No  Ambulatory or Self Mobile in Wheelchair: No-Not an Elopement Risk  Disoriented: No  Psychiatric Symptoms: None  History of Wandering: No  Elopement this Admit: No  Vocalizing Wanting to Leave: No  Displays Behaviors, Body Language Wanting to Leave: No-Not at Risk for Elopement  Elopement Risk: Not at Risk for Elopement    Interdisciplinary Discharge Planning  Lives with - Patient's Self Care Capacity: Alone and Unable to Care For Self  Patient or legal guardian wants to designate a caregiver: No  Support Systems: Family Member(s), Friends / Neighbors  Housing / Facility: 1 Story Apartment / Condo  Prior Services: Skilled Home Health Services ([facility])  Durable Medical Equipment: Not Applicable    Discharge Preparedness  What is your plan after discharge?: Home health care  What are your discharge supports?: Child  Prior Functional Level: Ambulatory, Independent with Activities of Daily Living  Difficulity with ADLs: None  Difficulity with IADLs: Driving    Functional Assesment  Prior Functional Level: Ambulatory, Independent with Activities of Daily Living    Finances  Financial Barriers to Discharge: No  Prescription Coverage: Yes    Vision / Hearing Impairment  Right Eye Vision: Impaired, Wears Glasses  Left Eye Vision: Impaired, Wears Glasses  Hearing Impairment: Both Ears, Hearing Device Not Available    Advance Directive  Advance Directive?: POLST    Domestic Abuse  Have you ever been the victim of abuse or violence?: No  Possible Abuse/Neglect Reported to:: Not Applicable    Psychological Assessment  History of Substance Abuse: None  History of Psychiatric Problems: No  Non-compliant with Treatment: No  Newly Diagnosed Illness: No    Discharge Risks or Barriers  Discharge risks or barriers?: Lives alone, no community support  Patient risk factors:  Readmission, Vulnerable adult    Anticipated Discharge Information  Discharge Disposition: D/T to home under A care in anticipation of covered skilled care (06)  Discharge Address: [address] [place]

## 2025-02-10 NOTE — PROGRESS NOTES
Called and left message with phone number provided for daughter Francisca to connect and answer any questions. No answer, left message on voice mail.

## 2025-02-10 NOTE — CARE PLAN
The patient is Stable - Low risk of patient condition declining or worsening    Shift Goals  Clinical Goals: Pt without falls and injury; Pain reported <4/10 after ordered interventions; Pt up to bathroom and chair for meals; Pt tolerates food and fluid intake; DC home.  Patient Goals: Pain relief; go home.  Family Goals: n/a    Progress made toward(s) clinical / shift goals:  Pt without falls and injury; Pain reported <4/10 after ordered interventions; Pt up to bathroom and chair for meals; Pt tolerates food and fluid intake; DC home.    Patient is not progressing towards the following goals:

## 2025-02-10 NOTE — PROGRESS NOTES
0340 Yeric from Lab called with critical result of WBC 1.8. Critical lab result read back to Brii.  Armand GLOVER notified of critical lab result at 0347.   Primary RN aware.

## 2025-02-10 NOTE — HOSPITAL COURSE
Patient is a 75-year-old female who was recently here for uncontrolled left shoulder pain.  She was discharged home with instructions to follow-up with orthopedic surgery and pain management.  Home health was coordinated and when home health went to visit her at her house they found her confused with fever nausea and feeling unwell.  Patient was sent to the emergency room via ambulance found to have a fever of 104 and tachycardic to 120.  Patient found to be influenza A positive and started on Tamiflu and IV fluids secondary to the significant tachycardia.  After 24 hours of supportive care her mentation has dramatically improved and she is able to convey her medical history.  She states she reached out to pain management and the [employer] rep is supposed to be coming by on Monday to help delineate the safety of MRI related to the implanted device.

## 2025-02-10 NOTE — CARE PLAN
The patient is Stable - Low risk of patient condition declining or worsening    Shift Goals  Clinical Goals: Manage pain to tolerable level at 3/10 or less. Pt will remain free from falls or injury throughout the shift.  Patient Goals: pain control, rest  Family Goals: n/a    Progress made toward(s) clinical / shift goals:  Patient's pain maintained at a tolerable level of 3/10 or less throughout shift with medication per MAR and rest. Pt did not sustain a fall or injury during shift. Able to rest comfortably throughout shift.     Patient is not progressing towards the following goals:

## 2025-02-10 NOTE — PROGRESS NOTES
Received bedside report from day shift nurse, Marcela VILLALBA. Assumed pt care at 1915.  Pt is awake and alert, resting comfortably in bed. Pt on room air; no signs of SOB/respiratory distress. Labs noted, VSS.   Iv flushed and patent. IV of NS at 100 ml/hr restarted  and infusing well.   Pt states pain at 7/10; educated that pt just received her prn pain medication and pt states understanding. Pt encouraged relaxation techniques. Needs attended well. Fall precautions in place. Bed at lowest position. Call light and personal belongings within reach. Bed alarm on. Encouraged to call for assistance. Plan of care on going, no further concerns as of present.   Hourly rounding in progress.     2118 Handoff report given to Tanna VILLALBA.

## 2025-02-10 NOTE — PROGRESS NOTES
Hospital Medicine Daily Progress Note    Date of Service  2/9/2025    Chief Complaint  Yamile Go is a 75 y.o. female admitted 2/7/2025 with altered mental status, fever    Hospital Course  Patient is a 75-year-old female who was recently here for uncontrolled left shoulder pain.  She was discharged home with instructions to follow-up with orthopedic surgery and pain management.  Home health was coordinated and when home health went to visit her at her house they found her confused with fever nausea and feeling unwell.  Patient was sent to the emergency room via ambulance found to have a fever of 104 and tachycardic to 120.  Patient found to be influenza A positive and started on Tamiflu and IV fluids secondary to the significant tachycardia.  After 24 hours of supportive care her mentation has dramatically improved and she is able to convey her medical history.  She states she reached out to pain management and the [employer] rep is supposed to be coming by on Monday to help delineate the safety of MRI related to the implanted device.    Interval Problem Update  2/9: His mental status markedly improved compared to yesterday she is ANO x 4 able to recount her medical history.  She states that her Elavil had been missed until the nocturnist was notified and was reordered and then she also request that she takes her pain medication every 4 hours that that coincide with her orders here.  I see Elavil on the chart and I changed her medication to every 4 hours as needed for pain.  Continue IV fluids for an additional 24 hours as she is finally not tachycardic and she remains afebrile.  If everything continues to show improvement she may be ready for discharge with home health tomorrow.    I have discussed this patient's plan of care and discharge plan at IDT rounds today with Case Management, Nursing, Nursing leadership, and other members of the IDT team.    Consultants/Specialty  none    Code Status  Full  Code    Disposition  The patient is not medically cleared for discharge to home or a post-acute facility.  Anticipate discharge to: home with organized home healthcare and close outpatient follow-up    I have placed the appropriate orders for post-discharge needs.    Review of Systems  Review of Systems   Constitutional:  Negative for chills and fever.   HENT:  Negative for congestion.    Eyes:  Negative for blurred vision and photophobia.   Respiratory:  Negative for cough and shortness of breath.    Cardiovascular:  Negative for chest pain, claudication and leg swelling.   Gastrointestinal:  Negative for abdominal pain, constipation, diarrhea, heartburn and vomiting.   Genitourinary:  Negative for dysuria and hematuria.   Musculoskeletal:  Positive for joint pain (Severe left shoulder pain). Negative for myalgias.   Skin:  Negative for itching and rash.   Neurological:  Negative for dizziness, sensory change, speech change, weakness and headaches.   Psychiatric/Behavioral:  Negative for depression. The patient is not nervous/anxious and does not have insomnia.         Physical Exam  Temp:  [36.1 °C (96.9 °F)-36.4 °C (97.5 °F)] 36.1 °C (96.9 °F)  Pulse:  [80-92] 80  Resp:  [17-18] 17  BP: (106-136)/() 118/63  SpO2:  [90 %-94 %] 94 %    Physical Exam  Vitals and nursing note reviewed.   Constitutional:       General: She is not in acute distress.     Appearance: Normal appearance. She is not ill-appearing.   HENT:      Head: Normocephalic and atraumatic.      Nose: Nose normal.   Cardiovascular:      Rate and Rhythm: Normal rate and regular rhythm.      Heart sounds: Normal heart sounds. No murmur heard.  Pulmonary:      Effort: Pulmonary effort is normal.      Breath sounds: Normal breath sounds.   Abdominal:      General: Bowel sounds are normal. There is no distension.      Palpations: Abdomen is soft.   Musculoskeletal:         General: No swelling or tenderness.      Cervical back: Neck supple.       Comments: Unable to move left shoulder secondary to pain, bruising on the right arm as well as left arm.  Patient's IV in the left arm appears to be infiltrated.  RN notified   Skin:     General: Skin is warm and dry.   Neurological:      General: No focal deficit present.      Mental Status: She is alert and oriented to person, place, and time.   Psychiatric:         Mood and Affect: Mood normal.         Fluids    Intake/Output Summary (Last 24 hours) at 2/9/2025 1604  Last data filed at 2/9/2025 1300  Gross per 24 hour   Intake 2304.97 ml   Output 1300 ml   Net 1004.97 ml        Laboratory  Recent Labs     02/07/25 2130 02/08/25 0149 02/09/25  0127   WBC 6.4 5.4 3.2*   RBC 4.16* 3.78* 3.58*   HEMOGLOBIN 10.9* 9.9* 9.4*   HEMATOCRIT 34.8* 31.2* 29.2*   MCV 83.7 82.5 81.6   MCH 26.2* 26.2* 26.3*   MCHC 31.3* 31.7* 32.2   RDW 53.1* 53.1* 52.6*   PLATELETCT 170 126* 130*   MPV 9.3 10.4 8.9*     Recent Labs     02/07/25 2130 02/08/25 0149 02/09/25  0127   SODIUM 133* 133* 132*   POTASSIUM 3.9 3.5* 3.5*   CHLORIDE 100 104 102   CO2 19* 18* 18*   GLUCOSE 95 100* 78   BUN 14 12 8   CREATININE 0.70 0.57 0.46*   CALCIUM 8.8 7.7* 7.4*     Recent Labs     02/07/25 2130   INR 0.93               Imaging  CT-HEAD W/O   Final Result         1.  No acute intracranial abnormality is identified, there are nonspecific white matter changes, commonly associated with small vessel ischemic disease.  Associated mild cerebral atrophy is noted.   2.  Atherosclerosis.               DX-CHEST-PORTABLE (1 VIEW)   Final Result         1.  Bilateral basilar atelectasis, no focal infiltrate      IR-US GUIDED PIV    (Results Pending)        Assessment/Plan  * Acute metabolic encephalopathy- (present on admission)  Assessment & Plan  Patient's mentation is back to baseline she is able to give me her medical history now  Suspect decline was related to dehydration related to influenza A    Shoulder pain  Assessment & Plan  -Continue Oxycodone.  Follow up with Orthopedic (Dr. Sparks)  Patient states that the rep from her nerve stimulator is supposed to be coming by tomorrow to clarify if this is MRI compatible  Continue with pain management related to the shoulder, change from every 6 to every 4h coincide with what she is taking at home    Influenza A  Assessment & Plan  Tamiflu, continue IV fluids for an additional 24 hours as she is showing improvement  Currently on room air  Patient markedly improved    Normocytic anemia- (present on admission)  Assessment & Plan  -Initial hemoglobin is 10.9 which is patient's baseline.  There is no bleeding.  Monitor CBC in the morning.    Mixed hyperlipidemia- (present on admission)  Assessment & Plan  -Continue Zetia and rosuvastatin.    Type 2 diabetes mellitus (HCC)- (present on admission)  Assessment & Plan  -Regular insulin sliding scale.  Hold metformin.  Diabetic diet as tolerated    Acquired hypothyroidism- (present on admission)  Assessment & Plan  -Continue levothyroxine and Cytomel.    Dyslipidemia- (present on admission)  Assessment & Plan  -Continue Zetia and rosuvastatin.         VTE prophylaxis:   SCDs/TEDs      I have performed a physical exam and reviewed and updated ROS and Plan today (2/9/2025). In review of yesterday's note (2/8/2025), there are no changes except as documented above.    My total time spent caring for the patient on the day of the encounter was 57 minutes.   This does not include time spent on separately billable procedures/tests.'

## 2025-02-12 LAB
BACTERIA BLD CULT: NORMAL
SIGNIFICANT IND 70042: NORMAL
SITE SITE: NORMAL
SOURCE SOURCE: NORMAL

## 2025-02-13 LAB
BACTERIA BLD CULT: NORMAL
SIGNIFICANT IND 70042: NORMAL
SITE SITE: NORMAL
SOURCE SOURCE: NORMAL

## 2025-02-28 ENCOUNTER — APPOINTMENT (OUTPATIENT)
Dept: RADIOLOGY | Facility: MEDICAL CENTER | Age: 75
DRG: 299 | End: 2025-02-28
Attending: EMERGENCY MEDICINE
Payer: MEDICARE

## 2025-02-28 ENCOUNTER — APPOINTMENT (OUTPATIENT)
Dept: RADIOLOGY | Facility: MEDICAL CENTER | Age: 75
DRG: 299 | End: 2025-02-28
Payer: MEDICARE

## 2025-02-28 ENCOUNTER — HOSPITAL ENCOUNTER (INPATIENT)
Facility: MEDICAL CENTER | Age: 75
LOS: 3 days | DRG: 299 | End: 2025-03-03
Attending: EMERGENCY MEDICINE | Admitting: HOSPITALIST
Payer: MEDICARE

## 2025-02-28 ENCOUNTER — APPOINTMENT (OUTPATIENT)
Dept: RADIOLOGY | Facility: MEDICAL CENTER | Age: 75
End: 2025-02-28
Attending: EMERGENCY MEDICINE
Payer: MEDICARE

## 2025-02-28 DIAGNOSIS — E87.6 HYPOKALEMIA: ICD-10-CM

## 2025-02-28 DIAGNOSIS — I82.4Y2 ACUTE DEEP VEIN THROMBOSIS (DVT) OF PROXIMAL VEIN OF LEFT LOWER EXTREMITY (HCC): ICD-10-CM

## 2025-02-28 DIAGNOSIS — R52 INTRACTABLE PAIN: ICD-10-CM

## 2025-02-28 DIAGNOSIS — R06.02 SHORTNESS OF BREATH: ICD-10-CM

## 2025-02-28 DIAGNOSIS — R79.89 ELEVATED TROPONIN: ICD-10-CM

## 2025-02-28 DIAGNOSIS — R52 PAIN: ICD-10-CM

## 2025-02-28 DIAGNOSIS — M54.59 INTRACTABLE LOW BACK PAIN: ICD-10-CM

## 2025-02-28 DIAGNOSIS — R29.898 LEFT ARM WEAKNESS: ICD-10-CM

## 2025-02-28 DIAGNOSIS — M25.512 CHRONIC LEFT SHOULDER PAIN: ICD-10-CM

## 2025-02-28 DIAGNOSIS — M19.012 LOCALIZED OSTEOARTHRITIS OF LEFT SHOULDER: ICD-10-CM

## 2025-02-28 DIAGNOSIS — G89.29 CHRONIC LEFT SHOULDER PAIN: ICD-10-CM

## 2025-02-28 PROBLEM — I82.402 DEEP VEIN THROMBOSIS (DVT) OF LEFT LOWER EXTREMITY (HCC): Status: ACTIVE | Noted: 2025-02-28

## 2025-02-28 PROBLEM — J96.01 ACUTE HYPOXEMIC RESPIRATORY FAILURE (HCC): Status: ACTIVE | Noted: 2025-02-28

## 2025-02-28 PROBLEM — I82.502 CHRONIC DEEP VEIN THROMBOSIS (DVT) OF LEFT LOWER EXTREMITY (HCC): Status: ACTIVE | Noted: 2025-02-28

## 2025-02-28 LAB
ALBUMIN SERPL BCP-MCNC: 3.5 G/DL (ref 3.2–4.9)
ALBUMIN/GLOB SERPL: 1.6 G/DL
ALP SERPL-CCNC: 63 U/L (ref 30–99)
ALT SERPL-CCNC: 28 U/L (ref 2–50)
ANION GAP SERPL CALC-SCNC: 10 MMOL/L (ref 7–16)
ANION GAP SERPL CALC-SCNC: 14 MMOL/L (ref 7–16)
APPEARANCE UR: CLEAR
APTT PPP: 23.4 SEC (ref 24.7–36)
AST SERPL-CCNC: 35 U/L (ref 12–45)
BACTERIA #/AREA URNS HPF: ABNORMAL /HPF
BASOPHILS # BLD AUTO: 0.1 % (ref 0–1.8)
BASOPHILS # BLD: 0.01 K/UL (ref 0–0.12)
BILIRUB SERPL-MCNC: 0.5 MG/DL (ref 0.1–1.5)
BILIRUB UR QL STRIP.AUTO: NEGATIVE
BUN SERPL-MCNC: 11 MG/DL (ref 8–22)
BUN SERPL-MCNC: 8 MG/DL (ref 8–22)
CALCIUM ALBUM COR SERPL-MCNC: 8.8 MG/DL (ref 8.5–10.5)
CALCIUM SERPL-MCNC: 7.5 MG/DL (ref 8.4–10.2)
CALCIUM SERPL-MCNC: 8.4 MG/DL (ref 8.4–10.2)
CASTS URNS QL MICRO: ABNORMAL /LPF (ref 0–2)
CHLORIDE SERPL-SCNC: 103 MMOL/L (ref 96–112)
CHLORIDE SERPL-SCNC: 108 MMOL/L (ref 96–112)
CO2 SERPL-SCNC: 20 MMOL/L (ref 20–33)
CO2 SERPL-SCNC: 23 MMOL/L (ref 20–33)
COLOR UR: YELLOW
CREAT SERPL-MCNC: 0.72 MG/DL (ref 0.5–1.4)
CREAT SERPL-MCNC: 0.77 MG/DL (ref 0.5–1.4)
D DIMER PPP IA.FEU-MCNC: 2.86 UG/ML (FEU) (ref 0–0.5)
EKG IMPRESSION: NORMAL
EOSINOPHIL # BLD AUTO: 0.01 K/UL (ref 0–0.51)
EOSINOPHIL NFR BLD: 0.1 % (ref 0–6.9)
EPITHELIAL CELLS 1715: ABNORMAL /HPF (ref 0–5)
ERYTHROCYTE [DISTWIDTH] IN BLOOD BY AUTOMATED COUNT: 50.5 FL (ref 35.9–50)
GFR SERPLBLD CREATININE-BSD FMLA CKD-EPI: 81 ML/MIN/1.73 M 2
GFR SERPLBLD CREATININE-BSD FMLA CKD-EPI: 88 ML/MIN/1.73 M 2
GLOBULIN SER CALC-MCNC: 2.2 G/DL (ref 1.9–3.5)
GLUCOSE BLD STRIP.AUTO-MCNC: 143 MG/DL (ref 65–99)
GLUCOSE SERPL-MCNC: 139 MG/DL (ref 65–99)
GLUCOSE SERPL-MCNC: 95 MG/DL (ref 65–99)
GLUCOSE UR STRIP.AUTO-MCNC: NEGATIVE MG/DL
HCT VFR BLD AUTO: 36.8 % (ref 37–47)
HGB BLD-MCNC: 11.4 G/DL (ref 12–16)
HYALINE CAST   1831: PRESENT /LPF
IMM GRANULOCYTES # BLD AUTO: 0.05 K/UL (ref 0–0.11)
IMM GRANULOCYTES NFR BLD AUTO: 0.5 % (ref 0–0.9)
INR PPP: 0.94 (ref 0.87–1.13)
KETONES UR STRIP.AUTO-MCNC: ABNORMAL MG/DL
LACTATE SERPL-SCNC: 1.2 MMOL/L (ref 0.5–2)
LEUKOCYTE ESTERASE UR QL STRIP.AUTO: NEGATIVE
LYMPHOCYTES # BLD AUTO: 1.17 K/UL (ref 1–4.8)
LYMPHOCYTES NFR BLD: 11.6 % (ref 22–41)
MCH RBC QN AUTO: 25.9 PG (ref 27–33)
MCHC RBC AUTO-ENTMCNC: 31 G/DL (ref 32.2–35.5)
MCV RBC AUTO: 83.4 FL (ref 81.4–97.8)
MICRO URNS: ABNORMAL
MONOCYTES # BLD AUTO: 0.88 K/UL (ref 0–0.85)
MONOCYTES NFR BLD AUTO: 8.7 % (ref 0–13.4)
NEUTROPHILS # BLD AUTO: 7.99 K/UL (ref 1.82–7.42)
NEUTROPHILS NFR BLD: 79 % (ref 44–72)
NITRITE UR QL STRIP.AUTO: NEGATIVE
NRBC # BLD AUTO: 0 K/UL
NRBC BLD-RTO: 0 /100 WBC (ref 0–0.2)
NT-PROBNP SERPL IA-MCNC: 363 PG/ML (ref 0–125)
PH UR STRIP.AUTO: 6.5 [PH] (ref 5–8)
PLATELET # BLD AUTO: 158 K/UL (ref 164–446)
PMV BLD AUTO: 10.3 FL (ref 9–12.9)
POTASSIUM SERPL-SCNC: 2.4 MMOL/L (ref 3.6–5.5)
POTASSIUM SERPL-SCNC: 3.1 MMOL/L (ref 3.6–5.5)
PROT SERPL-MCNC: 5.7 G/DL (ref 6–8.2)
PROT UR QL STRIP: 100 MG/DL
PROTHROMBIN TIME: 12.9 SEC (ref 12–14.6)
RBC # BLD AUTO: 4.41 M/UL (ref 4.2–5.4)
RBC # URNS HPF: ABNORMAL /HPF
RBC UR QL AUTO: ABNORMAL
SODIUM SERPL-SCNC: 138 MMOL/L (ref 135–145)
SODIUM SERPL-SCNC: 140 MMOL/L (ref 135–145)
SP GR UR STRIP.AUTO: 1.01
TROPONIN T SERPL-MCNC: 48 NG/L (ref 6–19)
UFH PPP CHRO-ACNC: <0.1 IU/ML
UFH PPP CHRO-ACNC: >1.1 IU/ML
UROBILINOGEN UR STRIP.AUTO-MCNC: 1 EU/DL
WBC # BLD AUTO: 10.1 K/UL (ref 4.8–10.8)
WBC #/AREA URNS HPF: ABNORMAL /HPF

## 2025-02-28 PROCEDURE — 700105 HCHG RX REV CODE 258: Performed by: EMERGENCY MEDICINE

## 2025-02-28 PROCEDURE — 81001 URINALYSIS AUTO W/SCOPE: CPT

## 2025-02-28 PROCEDURE — 73610 X-RAY EXAM OF ANKLE: CPT | Mod: LT

## 2025-02-28 PROCEDURE — 85379 FIBRIN DEGRADATION QUANT: CPT

## 2025-02-28 PROCEDURE — 99285 EMERGENCY DEPT VISIT HI MDM: CPT

## 2025-02-28 PROCEDURE — 87086 URINE CULTURE/COLONY COUNT: CPT

## 2025-02-28 PROCEDURE — C1751 CATH, INF, PER/CENT/MIDLINE: HCPCS

## 2025-02-28 PROCEDURE — 71045 X-RAY EXAM CHEST 1 VIEW: CPT

## 2025-02-28 PROCEDURE — 87040 BLOOD CULTURE FOR BACTERIA: CPT

## 2025-02-28 PROCEDURE — 80048 BASIC METABOLIC PNL TOTAL CA: CPT

## 2025-02-28 PROCEDURE — 36556 INSERT NON-TUNNEL CV CATH: CPT

## 2025-02-28 PROCEDURE — 73562 X-RAY EXAM OF KNEE 3: CPT | Mod: LT

## 2025-02-28 PROCEDURE — 85025 COMPLETE CBC W/AUTO DIFF WBC: CPT

## 2025-02-28 PROCEDURE — 85520 HEPARIN ASSAY: CPT

## 2025-02-28 PROCEDURE — 96375 TX/PRO/DX INJ NEW DRUG ADDON: CPT

## 2025-02-28 PROCEDURE — 700102 HCHG RX REV CODE 250 W/ 637 OVERRIDE(OP): Mod: JZ | Performed by: EMERGENCY MEDICINE

## 2025-02-28 PROCEDURE — A9270 NON-COVERED ITEM OR SERVICE: HCPCS | Mod: JZ | Performed by: EMERGENCY MEDICINE

## 2025-02-28 PROCEDURE — 83880 ASSAY OF NATRIURETIC PEPTIDE: CPT

## 2025-02-28 PROCEDURE — 93005 ELECTROCARDIOGRAM TRACING: CPT | Mod: TC | Performed by: EMERGENCY MEDICINE

## 2025-02-28 PROCEDURE — 96368 THER/DIAG CONCURRENT INF: CPT

## 2025-02-28 PROCEDURE — 93970 EXTREMITY STUDY: CPT | Mod: 26 | Performed by: INTERNAL MEDICINE

## 2025-02-28 PROCEDURE — 83605 ASSAY OF LACTIC ACID: CPT

## 2025-02-28 PROCEDURE — 99223 1ST HOSP IP/OBS HIGH 75: CPT | Mod: 25,AI | Performed by: HOSPITALIST

## 2025-02-28 PROCEDURE — 36415 COLL VENOUS BLD VENIPUNCTURE: CPT

## 2025-02-28 PROCEDURE — 94760 N-INVAS EAR/PLS OXIMETRY 1: CPT

## 2025-02-28 PROCEDURE — 700117 HCHG RX CONTRAST REV CODE 255: Performed by: EMERGENCY MEDICINE

## 2025-02-28 PROCEDURE — 80053 COMPREHEN METABOLIC PANEL: CPT

## 2025-02-28 PROCEDURE — 72170 X-RAY EXAM OF PELVIS: CPT

## 2025-02-28 PROCEDURE — 73080 X-RAY EXAM OF ELBOW: CPT | Mod: LT

## 2025-02-28 PROCEDURE — 73610 X-RAY EXAM OF ANKLE: CPT | Mod: RT

## 2025-02-28 PROCEDURE — 84484 ASSAY OF TROPONIN QUANT: CPT

## 2025-02-28 PROCEDURE — 85730 THROMBOPLASTIN TIME PARTIAL: CPT

## 2025-02-28 PROCEDURE — A9270 NON-COVERED ITEM OR SERVICE: HCPCS | Mod: JZ | Performed by: HOSPITALIST

## 2025-02-28 PROCEDURE — 770020 HCHG ROOM/CARE - TELE (206)

## 2025-02-28 PROCEDURE — 96376 TX/PRO/DX INJ SAME DRUG ADON: CPT

## 2025-02-28 PROCEDURE — 700111 HCHG RX REV CODE 636 W/ 250 OVERRIDE (IP): Mod: JZ | Performed by: HOSPITALIST

## 2025-02-28 PROCEDURE — 71275 CT ANGIOGRAPHY CHEST: CPT

## 2025-02-28 PROCEDURE — 96367 TX/PROPH/DG ADDL SEQ IV INF: CPT

## 2025-02-28 PROCEDURE — 93970 EXTREMITY STUDY: CPT

## 2025-02-28 PROCEDURE — 99497 ADVNCD CARE PLAN 30 MIN: CPT | Performed by: HOSPITALIST

## 2025-02-28 PROCEDURE — 96366 THER/PROPH/DIAG IV INF ADDON: CPT

## 2025-02-28 PROCEDURE — 02H633Z INSERTION OF INFUSION DEVICE INTO RIGHT ATRIUM, PERCUTANEOUS APPROACH: ICD-10-PCS | Performed by: EMERGENCY MEDICINE

## 2025-02-28 PROCEDURE — 700111 HCHG RX REV CODE 636 W/ 250 OVERRIDE (IP): Mod: JZ | Performed by: EMERGENCY MEDICINE

## 2025-02-28 PROCEDURE — 700102 HCHG RX REV CODE 250 W/ 637 OVERRIDE(OP): Mod: JZ | Performed by: HOSPITALIST

## 2025-02-28 PROCEDURE — 82962 GLUCOSE BLOOD TEST: CPT

## 2025-02-28 PROCEDURE — 73030 X-RAY EXAM OF SHOULDER: CPT | Mod: LT

## 2025-02-28 PROCEDURE — 85610 PROTHROMBIN TIME: CPT

## 2025-02-28 PROCEDURE — 96365 THER/PROPH/DIAG IV INF INIT: CPT

## 2025-02-28 PROCEDURE — 73562 X-RAY EXAM OF KNEE 3: CPT | Mod: RT

## 2025-02-28 RX ORDER — OXYCODONE HYDROCHLORIDE 5 MG/1
5 TABLET ORAL
Refills: 0 | Status: DISCONTINUED | OUTPATIENT
Start: 2025-02-28 | End: 2025-03-01

## 2025-02-28 RX ORDER — AMITRIPTYLINE HYDROCHLORIDE 50 MG/1
150 TABLET ORAL NIGHTLY
Status: DISCONTINUED | OUTPATIENT
Start: 2025-02-28 | End: 2025-03-03 | Stop reason: HOSPADM

## 2025-02-28 RX ORDER — ROSUVASTATIN CALCIUM 10 MG/1
20 TABLET, COATED ORAL EVERY EVENING
Status: DISCONTINUED | OUTPATIENT
Start: 2025-02-28 | End: 2025-03-03 | Stop reason: HOSPADM

## 2025-02-28 RX ORDER — LIOTHYRONINE SODIUM 5 UG/1
5 TABLET ORAL DAILY
Status: DISCONTINUED | OUTPATIENT
Start: 2025-02-28 | End: 2025-03-03 | Stop reason: HOSPADM

## 2025-02-28 RX ORDER — ACETAMINOPHEN 325 MG/1
650 TABLET ORAL EVERY 6 HOURS PRN
Status: DISCONTINUED | OUTPATIENT
Start: 2025-02-28 | End: 2025-03-02

## 2025-02-28 RX ORDER — HYDROMORPHONE HYDROCHLORIDE 1 MG/ML
0.25 INJECTION, SOLUTION INTRAMUSCULAR; INTRAVENOUS; SUBCUTANEOUS
Status: DISCONTINUED | OUTPATIENT
Start: 2025-02-28 | End: 2025-03-01

## 2025-02-28 RX ORDER — LEVOTHYROXINE SODIUM 88 UG/1
88 TABLET ORAL DAILY
Status: DISCONTINUED | OUTPATIENT
Start: 2025-02-28 | End: 2025-03-03 | Stop reason: HOSPADM

## 2025-02-28 RX ORDER — AMOXICILLIN 250 MG
2 CAPSULE ORAL EVERY EVENING
Status: DISCONTINUED | OUTPATIENT
Start: 2025-02-28 | End: 2025-03-02

## 2025-02-28 RX ORDER — SODIUM CHLORIDE 9 MG/ML
30 INJECTION, SOLUTION INTRAVENOUS ONCE
Status: COMPLETED | OUTPATIENT
Start: 2025-02-28 | End: 2025-02-28

## 2025-02-28 RX ORDER — DEXTROSE MONOHYDRATE 25 G/50ML
25 INJECTION, SOLUTION INTRAVENOUS
Status: DISCONTINUED | OUTPATIENT
Start: 2025-02-28 | End: 2025-03-03

## 2025-02-28 RX ORDER — POLYETHYLENE GLYCOL 3350 17 G/17G
1 POWDER, FOR SOLUTION ORAL
Status: DISCONTINUED | OUTPATIENT
Start: 2025-02-28 | End: 2025-03-02

## 2025-02-28 RX ORDER — HEPARIN SODIUM 5000 [USP'U]/100ML
0-30 INJECTION, SOLUTION INTRAVENOUS CONTINUOUS
Status: DISCONTINUED | OUTPATIENT
Start: 2025-02-28 | End: 2025-03-01

## 2025-02-28 RX ORDER — OXYCODONE HYDROCHLORIDE 5 MG/1
2.5 TABLET ORAL
Refills: 0 | Status: DISCONTINUED | OUTPATIENT
Start: 2025-02-28 | End: 2025-03-01

## 2025-02-28 RX ORDER — HEPARIN SODIUM 1000 [USP'U]/ML
80 INJECTION, SOLUTION INTRAVENOUS; SUBCUTANEOUS ONCE
Status: COMPLETED | OUTPATIENT
Start: 2025-02-28 | End: 2025-02-28

## 2025-02-28 RX ORDER — ONDANSETRON 2 MG/ML
4 INJECTION INTRAMUSCULAR; INTRAVENOUS ONCE
Status: COMPLETED | OUTPATIENT
Start: 2025-02-28 | End: 2025-02-28

## 2025-02-28 RX ORDER — OMEPRAZOLE 20 MG/1
20 CAPSULE, DELAYED RELEASE ORAL 2 TIMES DAILY
Status: DISCONTINUED | OUTPATIENT
Start: 2025-02-28 | End: 2025-03-03 | Stop reason: HOSPADM

## 2025-02-28 RX ORDER — ALBUTEROL SULFATE 90 UG/1
1-2 INHALANT RESPIRATORY (INHALATION)
Status: DISCONTINUED | OUTPATIENT
Start: 2025-02-28 | End: 2025-03-03 | Stop reason: HOSPADM

## 2025-02-28 RX ORDER — POTASSIUM CHLORIDE 1500 MG/1
40 TABLET, EXTENDED RELEASE ORAL ONCE
Status: COMPLETED | OUTPATIENT
Start: 2025-02-28 | End: 2025-02-28

## 2025-02-28 RX ORDER — POTASSIUM CHLORIDE 7.45 MG/ML
10 INJECTION INTRAVENOUS ONCE
Status: COMPLETED | OUTPATIENT
Start: 2025-02-28 | End: 2025-02-28

## 2025-02-28 RX ORDER — ESCITALOPRAM OXALATE 10 MG/1
20 TABLET ORAL DAILY
Status: DISCONTINUED | OUTPATIENT
Start: 2025-02-28 | End: 2025-03-03 | Stop reason: HOSPADM

## 2025-02-28 RX ORDER — INSULIN LISPRO 100 [IU]/ML
1-6 INJECTION, SOLUTION INTRAVENOUS; SUBCUTANEOUS
Status: DISCONTINUED | OUTPATIENT
Start: 2025-02-28 | End: 2025-03-03

## 2025-02-28 RX ORDER — ONDANSETRON 4 MG/1
4 TABLET, ORALLY DISINTEGRATING ORAL EVERY 4 HOURS PRN
Status: DISCONTINUED | OUTPATIENT
Start: 2025-02-28 | End: 2025-03-03 | Stop reason: HOSPADM

## 2025-02-28 RX ORDER — METOPROLOL SUCCINATE 25 MG/1
25 TABLET, EXTENDED RELEASE ORAL DAILY
Status: DISCONTINUED | OUTPATIENT
Start: 2025-02-28 | End: 2025-03-03 | Stop reason: HOSPADM

## 2025-02-28 RX ORDER — METHOCARBAMOL 500 MG/1
500 TABLET, FILM COATED ORAL 3 TIMES DAILY PRN
Status: DISCONTINUED | OUTPATIENT
Start: 2025-02-28 | End: 2025-03-02

## 2025-02-28 RX ORDER — EZETIMIBE 10 MG/1
10 TABLET ORAL EVERY EVENING
Status: DISCONTINUED | OUTPATIENT
Start: 2025-02-28 | End: 2025-03-03 | Stop reason: HOSPADM

## 2025-02-28 RX ORDER — OXYCODONE HYDROCHLORIDE 10 MG/1
10 TABLET ORAL EVERY 4 HOURS PRN
Refills: 0 | Status: DISCONTINUED | OUTPATIENT
Start: 2025-02-28 | End: 2025-02-28

## 2025-02-28 RX ORDER — HEPARIN SODIUM 1000 [USP'U]/ML
40 INJECTION, SOLUTION INTRAVENOUS; SUBCUTANEOUS PRN
Status: DISCONTINUED | OUTPATIENT
Start: 2025-02-28 | End: 2025-03-01

## 2025-02-28 RX ORDER — POTASSIUM CHLORIDE 1500 MG/1
40 TABLET, EXTENDED RELEASE ORAL 2 TIMES DAILY
Status: COMPLETED | OUTPATIENT
Start: 2025-02-28 | End: 2025-03-01

## 2025-02-28 RX ORDER — MAGNESIUM SULFATE 1 G/100ML
1 INJECTION INTRAVENOUS ONCE
Status: COMPLETED | OUTPATIENT
Start: 2025-02-28 | End: 2025-02-28

## 2025-02-28 RX ORDER — ONDANSETRON 2 MG/ML
4 INJECTION INTRAMUSCULAR; INTRAVENOUS EVERY 4 HOURS PRN
Status: DISCONTINUED | OUTPATIENT
Start: 2025-02-28 | End: 2025-03-03 | Stop reason: HOSPADM

## 2025-02-28 RX ADMIN — POTASSIUM CHLORIDE 40 MEQ: 1500 TABLET, EXTENDED RELEASE ORAL at 15:22

## 2025-02-28 RX ADMIN — SODIUM CHLORIDE 1905 ML: 9 INJECTION, SOLUTION INTRAVENOUS at 11:03

## 2025-02-28 RX ADMIN — LIOTHYRONINE SODIUM 5 MCG: 5 TABLET ORAL at 16:19

## 2025-02-28 RX ADMIN — IOHEXOL 50 ML: 350 INJECTION, SOLUTION INTRAVENOUS at 14:47

## 2025-02-28 RX ADMIN — POTASSIUM CHLORIDE 40 MEQ: 1500 TABLET, EXTENDED RELEASE ORAL at 20:35

## 2025-02-28 RX ADMIN — ROSUVASTATIN CALCIUM 20 MG: 10 TABLET, FILM COATED ORAL at 18:20

## 2025-02-28 RX ADMIN — MAGNESIUM SULFATE IN DEXTROSE 1 G: 10 INJECTION, SOLUTION INTRAVENOUS at 15:26

## 2025-02-28 RX ADMIN — POTASSIUM CHLORIDE 10 MEQ: 7.46 INJECTION, SOLUTION INTRAVENOUS at 15:23

## 2025-02-28 RX ADMIN — OXYCODONE 5 MG: 5 TABLET ORAL at 23:42

## 2025-02-28 RX ADMIN — HYDROMORPHONE HYDROCHLORIDE 0.25 MG: 1 INJECTION, SOLUTION INTRAMUSCULAR; INTRAVENOUS; SUBCUTANEOUS at 16:27

## 2025-02-28 RX ADMIN — OMEPRAZOLE 20 MG: 20 CAPSULE, DELAYED RELEASE ORAL at 18:20

## 2025-02-28 RX ADMIN — SENNOSIDES AND DOCUSATE SODIUM 2 TABLET: 50; 8.6 TABLET ORAL at 18:20

## 2025-02-28 RX ADMIN — FENTANYL CITRATE 50 MCG: 50 INJECTION, SOLUTION INTRAMUSCULAR; INTRAVENOUS at 13:59

## 2025-02-28 RX ADMIN — FENTANYL CITRATE 50 MCG: 50 INJECTION, SOLUTION INTRAMUSCULAR; INTRAVENOUS at 11:03

## 2025-02-28 RX ADMIN — HEPARIN SODIUM 5100 UNITS: 1000 INJECTION, SOLUTION INTRAVENOUS; SUBCUTANEOUS at 15:27

## 2025-02-28 RX ADMIN — ESCITALOPRAM OXALATE 20 MG: 10 TABLET ORAL at 20:35

## 2025-02-28 RX ADMIN — ONDANSETRON 4 MG: 2 INJECTION INTRAMUSCULAR; INTRAVENOUS at 11:03

## 2025-02-28 RX ADMIN — OXYCODONE 5 MG: 5 TABLET ORAL at 20:35

## 2025-02-28 RX ADMIN — HEPARIN SODIUM 18 UNITS/KG/HR: 5000 INJECTION, SOLUTION INTRAVENOUS at 15:29

## 2025-02-28 RX ADMIN — AMITRIPTYLINE HYDROCHLORIDE 150 MG: 50 TABLET, FILM COATED ORAL at 20:35

## 2025-02-28 RX ADMIN — METHOCARBAMOL 500 MG: 500 TABLET ORAL at 22:47

## 2025-02-28 RX ADMIN — FENTANYL CITRATE 50 MCG: 50 INJECTION, SOLUTION INTRAMUSCULAR; INTRAVENOUS at 12:58

## 2025-02-28 RX ADMIN — EZETIMIBE 10 MG: 10 TABLET ORAL at 18:20

## 2025-02-28 RX ADMIN — LEVOTHYROXINE SODIUM 88 MCG: 0.09 TABLET ORAL at 16:19

## 2025-02-28 RX ADMIN — METOPROLOL SUCCINATE 25 MG: 25 TABLET, FILM COATED, EXTENDED RELEASE ORAL at 16:19

## 2025-02-28 ASSESSMENT — COGNITIVE AND FUNCTIONAL STATUS - GENERAL
MOBILITY SCORE: 18
SUGGESTED CMS G CODE MODIFIER DAILY ACTIVITY: CJ
STANDING UP FROM CHAIR USING ARMS: A LITTLE
TOILETING: A LITTLE
CLIMB 3 TO 5 STEPS WITH RAILING: A LITTLE
TURNING FROM BACK TO SIDE WHILE IN FLAT BAD: A LITTLE
DAILY ACTIVITIY SCORE: 20
MOVING TO AND FROM BED TO CHAIR: A LITTLE
HELP NEEDED FOR BATHING: A LITTLE
MOVING FROM LYING ON BACK TO SITTING ON SIDE OF FLAT BED: A LITTLE
WALKING IN HOSPITAL ROOM: A LITTLE
SUGGESTED CMS G CODE MODIFIER MOBILITY: CK
DRESSING REGULAR UPPER BODY CLOTHING: A LITTLE
DRESSING REGULAR LOWER BODY CLOTHING: A LITTLE

## 2025-02-28 ASSESSMENT — FIBROSIS 4 INDEX
FIB4 SCORE: 3.1
FIB4 SCORE: 2.25
FIB4 SCORE: 3.1

## 2025-02-28 ASSESSMENT — ENCOUNTER SYMPTOMS
BRUISES/BLEEDS EASILY: 0
MYALGIAS: 0
FLANK PAIN: 0
FOCAL WEAKNESS: 0
CHILLS: 0
VOMITING: 0
FEVER: 1
COUGH: 0
ABDOMINAL PAIN: 0
STRIDOR: 0
EYE DISCHARGE: 0
NERVOUS/ANXIOUS: 0
SHORTNESS OF BREATH: 0
EYE REDNESS: 0

## 2025-02-28 ASSESSMENT — LIFESTYLE VARIABLES
HAVE PEOPLE ANNOYED YOU BY CRITICIZING YOUR DRINKING: NO
ON A TYPICAL DAY WHEN YOU DRINK ALCOHOL HOW MANY DRINKS DO YOU HAVE: 0
HAVE YOU EVER FELT YOU SHOULD CUT DOWN ON YOUR DRINKING: NO
HOW MANY TIMES IN THE PAST YEAR HAVE YOU HAD 5 OR MORE DRINKS IN A DAY: 0
EVER HAD A DRINK FIRST THING IN THE MORNING TO STEADY YOUR NERVES TO GET RID OF A HANGOVER: NO
TOTAL SCORE: 0
EVER FELT BAD OR GUILTY ABOUT YOUR DRINKING: NO
TOTAL SCORE: 0
CONSUMPTION TOTAL: NEGATIVE
ALCOHOL_USE: NO
AVERAGE NUMBER OF DAYS PER WEEK YOU HAVE A DRINK CONTAINING ALCOHOL: 0
TOTAL SCORE: 0

## 2025-02-28 ASSESSMENT — SOCIAL DETERMINANTS OF HEALTH (SDOH)
WITHIN THE LAST YEAR, HAVE TO BEEN RAPED OR FORCED TO HAVE ANY KIND OF SEXUAL ACTIVITY BY YOUR PARTNER OR EX-PARTNER?: NO
WITHIN THE LAST YEAR, HAVE YOU BEEN KICKED, HIT, SLAPPED, OR OTHERWISE PHYSICALLY HURT BY YOUR PARTNER OR EX-PARTNER?: NO
WITHIN THE LAST YEAR, HAVE YOU BEEN HUMILIATED OR EMOTIONALLY ABUSED IN OTHER WAYS BY YOUR PARTNER OR EX-PARTNER?: NO
WITHIN THE LAST YEAR, HAVE YOU BEEN AFRAID OF YOUR PARTNER OR EX-PARTNER?: NO

## 2025-02-28 ASSESSMENT — PAIN DESCRIPTION - PAIN TYPE
TYPE: CHRONIC PAIN

## 2025-02-28 ASSESSMENT — PATIENT HEALTH QUESTIONNAIRE - PHQ9
8. MOVING OR SPEAKING SO SLOWLY THAT OTHER PEOPLE COULD HAVE NOTICED. OR THE OPPOSITE, BEING SO FIGETY OR RESTLESS THAT YOU HAVE BEEN MOVING AROUND A LOT MORE THAN USUAL: NOT AT ALL
1. LITTLE INTEREST OR PLEASURE IN DOING THINGS: NOT AT ALL
5. POOR APPETITE OR OVEREATING: NOT AT ALL
4. FEELING TIRED OR HAVING LITTLE ENERGY: SEVERAL DAYS
9. THOUGHTS THAT YOU WOULD BE BETTER OFF DEAD, OR OF HURTING YOURSELF: NOT AT ALL
2. FEELING DOWN, DEPRESSED, IRRITABLE, OR HOPELESS: NOT AT ALL
SUM OF ALL RESPONSES TO PHQ QUESTIONS 1-9: 2
7. TROUBLE CONCENTRATING ON THINGS, SUCH AS READING THE NEWSPAPER OR WATCHING TELEVISION: NOT AT ALL
3. TROUBLE FALLING OR STAYING ASLEEP OR SLEEPING TOO MUCH: SEVERAL DAYS
6. FEELING BAD ABOUT YOURSELF - OR THAT YOU ARE A FAILURE OR HAVE LET YOURSELF OR YOUR FAMILY DOWN: NOT AL ALL
SUM OF ALL RESPONSES TO PHQ9 QUESTIONS 1 AND 2: 0

## 2025-02-28 NOTE — H&P
Hospital Medicine History & Physical Note    Date of Service  2/28/2025    Primary Care Physician  CHRISTINA Bartholomew.    Consultants  None     Code Status  Full Code    Chief Complaint  Chief Complaint   Patient presents with    Leg Swelling    Leg Pain     Pt c/o BLE swelling, redness and pain. Pt states both legs are weeping but worse on the L. Pt states temp last night was 101     History of Presenting Illness  Yamile Go is a 75 y.o. female with a past medical history of diabetes, hypertension, gastroesophageal flux disease and hypothyroidism who presented 2/28/2025 with generalized weakness, shortness of breath and left lower extremity pain redness, and swelling.  Patient reports that she has not been feeling well over the past 3 days.  She has worsening left lower extremity pain, redness and weeping.  The patient also reports having a fever of 101 last night.  She does not have cough or sputum production.  In the emergency room she was found to be tachycardic with a heart rate of 119.  She was noticed to be hypoxic saturating 86% on room air.      I discussed the plan of care with emergency physician, the patient    Review of Systems  Review of Systems   Constitutional:  Positive for fever and malaise/fatigue. Negative for chills.   Eyes:  Negative for discharge and redness.   Respiratory:  Negative for cough, shortness of breath and stridor.    Cardiovascular:  Positive for leg swelling. Negative for chest pain.   Gastrointestinal:  Negative for abdominal pain and vomiting.   Genitourinary:  Negative for flank pain.   Musculoskeletal:  Negative for myalgias.        Erythema and swelling of the left leg   Skin:         Erythema and swelling of the left lower extremity   Neurological:  Negative for focal weakness.   Endo/Heme/Allergies:  Does not bruise/bleed easily.   Psychiatric/Behavioral:  The patient is not nervous/anxious.      Past Medical History   has a past medical history of Anemia,  Anesthesia, Arthritis, Asthma, CAD (coronary artery disease), Cataract, Dental disorder, Depression, Diabetes (HCC), Disorder of thyroid, Heart burn, History of vertebral fracture, HTN (hypertension), Hyperlipidemia, Indigestion, Migraines, Obesity, Pneumonia (2023), PONV (postoperative nausea and vomiting), and Sleep apnea.    Surgical History   has a past surgical history that includes appendectomy (N/A, 1976); abdominal hysterectomy total (N/A, 1978); breast biopsy (1984); colectomy (N/A, 2007); lumbar laminectomy diskectomy (N/A, 1989); arthroplasty (Right, 2020); lumbar exploration (N/A, 2017); insertion, peripheral nerve stimulator, lower extremity (Left, 12/22/2022); and pr reconstr total shoulder implant (Right, 4/30/2024).     Family History  family history includes Alcohol abuse in her daughter; Cancer in her mother; Diabetes in her brother, brother, father, and maternal aunt; Drug abuse in her brother and daughter; Heart Disease in her brother, brother, brother, brother, father, and mother; Hyperlipidemia in her brother, father, and mother; Hypertension in her brother, father, maternal aunt, and maternal aunt; Other in her brother; Stroke in her father.     Social History   reports that she has never smoked. She has never used smokeless tobacco. She reports that she does not drink alcohol and does not use drugs.    Allergies  Allergies   Allergen Reactions    Ampicillin-Sulbactam Sodium Hives     With oral lesions     Doxycycline Hives     Possible hives (took with Unasyn)     Gabapentin Hives and Swelling    Pregabalin Hives and Swelling    Amlodipine Swelling    Bee Swelling    Fentanyl Vomiting and Unspecified    Morphine Vomiting, Nausea and Unspecified    Benzoin Rash     Tincture of benzoin =blisters    blisters    Rifampin Unspecified     Pt turns yellow     Medications  Prior to Admission Medications   Prescriptions Last Dose Informant Patient Reported? Taking?   SYNTHROID 88 MCG Tab  Historical  No No   Sig: Take 1 Tablet by mouth every day.   albuterol 108 (90 Base) MCG/ACT Aero Soln inhalation aerosol  Historical No No   Sig: Inhale 1-2 Puffs every four hours as needed for Shortness of Breath.   alendronate (FOSAMAX) 70 MG Tab  Historical Yes No   Si mg every 7 days. On Monday   amitriptyline (ELAVIL) 100 MG Tab  Historical No No   Sig: Take 1.5 Tablets by mouth every evening.   cholecalciferol (D3 5000) 5000 UNIT Cap  Historical Yes No   Sig: Take 5,000 Units by mouth every day.   diclofenac sodium (VOLTAREN) 1 % Gel  Historical No No   Sig: Apply 4 g topically 4 times a day as needed (back pain).   escitalopram (LEXAPRO) 20 MG tablet  Historical No No   Sig: Take 1 Tablet by mouth every day.   ezetimibe (ZETIA) 10 MG Tab  Historical No No   Sig: Take 1 Tablet by mouth every evening.   liothyronine (CYTOMEL) 5 MCG Tab  Historical No No   Sig: Take 1 Tablet by mouth every day.   metFORMIN (GLUCOPHAGE) 500 MG Tab  Historical No No   Sig: Take 1 Tablet by mouth 2 times a day with meals. Indications: Type 2 Diabetes   methocarbamol (ROBAXIN) 500 MG Tab  Historical No No   Sig: Take 1 Tablet by mouth 3 times a day as needed (Hold for respiratory depression, respiratory rate <12, heart rate less than 65, lethargy,somnolence, or systolic blood pressure < 105.).   metoprolol SR (TOPROL XL) 25 MG TABLET SR 24 HR  Historical No No   Sig: Take 1 Tablet by mouth every day.   omeprazole (PRILOSEC) 20 MG delayed-release capsule  Historical No No   Sig: Take 1 Capsule by mouth 2 times a day.   oxyCODONE immediate release (ROXICODONE) 10 MG immediate release tablet  Historical Yes No   Sig: Take 10 mg by mouth every four hours as needed for Severe Pain.   rosuvastatin (CRESTOR) 20 MG Tab  Historical No No   Sig: Take 1 Tablet by mouth every evening.      Facility-Administered Medications: None     Physical Exam  Temp:  [37.4 °C (99.4 °F)] 37.4 °C (99.4 °F)  Pulse:  [110-119] 115  Resp:  [13-20] 16  BP:  (124-180)/(76-81) 172/76  SpO2:  [86 %-95 %] 95 %  Blood Pressure : (!) 172/76   Temperature: 37.4 °C (99.4 °F)   Pulse: (!) 115   Respiration: 16   Pulse Oximetry: 95 %     Physical Exam  Constitutional:       General: She is not in acute distress.  HENT:      Head: Normocephalic and atraumatic.      Right Ear: External ear normal.      Left Ear: External ear normal.      Nose: No congestion or rhinorrhea.      Mouth/Throat:      Mouth: Mucous membranes are dry.      Pharynx: No oropharyngeal exudate or posterior oropharyngeal erythema.   Eyes:      General: No scleral icterus.        Right eye: No discharge.         Left eye: No discharge.      Conjunctiva/sclera: Conjunctivae normal.      Pupils: Pupils are equal, round, and reactive to light.   Cardiovascular:      Rate and Rhythm: Tachycardia present.      Heart sounds:      No friction rub. No gallop.   Pulmonary:      Comments: Requiring 2 L of oxygen to achieve adequate saturation.  Is able to speak in full sentences.  Reduced air entry bilaterally basally  Abdominal:      General: Abdomen is flat. There is no distension.      Tenderness: There is no guarding.   Musculoskeletal:         General: Swelling and tenderness present.      Cervical back: Neck supple. No rigidity. No muscular tenderness.      Left lower leg: Edema present.      Comments: Erythema, edema and tenderness of the left lower extremity   Skin:     Capillary Refill: Capillary refill takes 2 to 3 seconds.      Coloration: Skin is not jaundiced or pale.      Findings: No bruising or erythema.   Neurological:      Mental Status: She is alert and oriented to person, place, and time.   Psychiatric:         Mood and Affect: Mood normal.         Judgment: Judgment normal.       Laboratory:  Recent Labs     02/28/25  1055   WBC 10.1   RBC 4.41   HEMOGLOBIN 11.4*   HEMATOCRIT 36.8*   MCV 83.4   MCH 25.9*   MCHC 31.0*   RDW 50.5*   PLATELETCT 158*   MPV 10.3     Recent Labs     02/28/25  1245    SODIUM 140   POTASSIUM 2.4*   CHLORIDE 103   CO2 23   GLUCOSE 95   BUN 11   CREATININE 0.77   CALCIUM 8.4     Recent Labs     02/28/25  1245   ALTSGPT 28   ASTSGOT 35   ALKPHOSPHAT 63   TBILIRUBIN 0.5   GLUCOSE 95     Recent Labs     02/28/25  1055   APTT 23.4*   INR 0.94     Recent Labs     02/28/25  1245   NTPROBNP 363*         Recent Labs     02/28/25  1245   TROPONINT 48*     Imaging:  US-EXTREMITY VENOUS LOWER BILAT   Final Result      DX-ANKLE 3+ VIEWS RIGHT   Final Result      Postsurgical change consistent with right tibiotalar arthroplasty. No evidence of fracture or dislocation.      DX-ANKLE 3+ VIEWS LEFT   Final Result      No evidence of acute fracture or dislocation.      DX-KNEE 3 VIEWS RIGHT   Final Result      No evidence of acute fracture or dislocation.      DX-KNEE 3 VIEWS LEFT   Final Result      No evidence of acute fracture or dislocation.      DX-PELVIS-1 OR 2 VIEWS   Final Result      1.  No evidence of fracture or dislocation.      2.  Prior left hip arthroplasty.      DX-ELBOW-COMPLETE 3+ LEFT   Final Result      No evidence of acute fracture or dislocation.      DX-SHOULDER 2+ LEFT   Final Result      1.  No evidence of acute fracture or dislocation.      2.  Mild degenerative change of the acromioclavicular joint.      DX-CHEST-PORTABLE (1 VIEW)   Final Result      1.  Mild cardiomegaly.      2.  Linear bibasilar atelectasis.      CT-CTA CHEST PULMONARY ARTERY W/ RECONS    (Results Pending)   IR-PICC LINE PLACEMENT W/ GUIDANCE > AGE 5    (Results Pending)   DX-CHEST-FOR LINE PLACEMENT Perform procedure in: Patient's Room    (Results Pending)     I patient reviewed patient EKG shows sinus tachycardia with a rate of 114.  There is flattening of T waves in all leads consistent with hypokalemia.  There is ST depressions in lead I to and leads V4-V6.  QTc is 408.     Assessment/Plan:  Justification for Admission Status  I anticipate this patient will require at least two midnights for  appropriate medical management, necessitating inpatient admission because patient has multiple acute medical problems including hypoxemic respiratory failure, deep vein thrombosis, severe hypokalemia.  She will require further workup for her respiratory failure, continuous cardiac monitoring and correction of hypokalemia.    Patient will need a Telemetry bed on MEDICAL service.      * Acute hypoxemic respiratory failure (HCC)- (present on admission)  Assessment & Plan  Patient was saturating 86% on room air.  The patient has a deep vein thrombosis.  Will start therapeutic anticoagulation  Will check CT angiography of the chest to rule out pulm embolism.  I will check an echocardiography    Deep vein thrombosis (DVT) of left lower extremity (HCC)- (present on admission)  Assessment & Plan  Ultrasound showed evidence for chronic, partially occlusive thrombus in the common femoral vein extending to the popliteal vein in the left lower extremity.  Will start therapeutic anticoagulation.  Given her tachycardia and shortness of breath will check CT angiography of chest to rule out associated pulmonary embolism    Hypokalemia- (present on admission)  Assessment & Plan  Marked hypokalemia  I will check an EKG  Replacing, checking magnesium    Continue to monitor replace as needed   EKG shows sinus tachycardia with a rate of 114.  There is flattening of T waves in all leads consistent with hypokalemia.  There is ST depressions in lead I to and leads V4-V6.  QTc is 408.    I will monitor on telemetry.  I will order follow-up potassium levels    Primary hypertension- (present on admission)  Assessment & Plan  I will resume home metoprolol with hold parameters    Type 2 diabetes mellitus (HCC)- (present on admission)  Assessment & Plan  With no significant hyperglycemia  Last glycated hemoglobin was 6.9%  I will start short acting insulin for now  I will order Accu-Checks, hypoglycemia protocol  Adjust according to blood sugars  trend     ACP (advance care planning)- (present on admission)  Assessment & Plan  I had a discussion with the patient regarding goals of care, diagnoses, prognosis, and CODE STATUS. We discussed her prognosis and comorbidities.  Patient has advanced age of 75 years.  She has a number of chronic medical problems including diabetes, hypertension and hyperlipidemia.  She is presenting with multiple acute medical problems including left lower extremity edema pain redness found to have deep vein thrombosis in addition she has hypoxia concerning for possible pulmonary embolism.  At this point the patient wants to maintain a full code.  She is open to all forms of invasive and noninvasive diagnostic and therapeutic interventions including mechanical thrombectomy / EKOS procedure if needed.     Acquired hypothyroidism- (present on admission)  Assessment & Plan  I will start levothyroxine    Depression- (present on admission)  Assessment & Plan  I will resume home escitalopram and amitriptyline    Dyslipidemia- (present on admission)  Assessment & Plan  Cardiac diet.  I resume home rosuvastatin      VTE prophylaxis: SCDs/TEDs and therapeutic anticoagulation with heparin drip    I had a discussion with the patient regarding goals of care, diagnoses, prognosis, and CODE STATUS. We discussed her prognosis and comorbidities.  Patient has advanced age of 75 years.  She has a number of chronic medical problems including diabetes, hypertension and hyperlipidemia.  She is presenting with multiple acute medical problems including left lower extremity edema pain redness found to have deep vein thrombosis in addition she has hypoxia concerning for possible pulmonary embolism.  At this point the patient wants to maintain a full code.  She is open to all forms of invasive and noninvasive diagnostic and therapeutic interventions including mechanical thrombectomy / EKOS procedure if needed.  I spent 16 minutes on advanced care planning.

## 2025-02-28 NOTE — ASSESSMENT & PLAN NOTE
Ultrasound showed evidence for chronic, partially occlusive thrombus in the common femoral vein extending to the popliteal vein in the left lower extremity.  Will start therapeutic anticoagulation.  Given her tachycardia and shortness of breath will check CT angiography of chest to rule out associated pulmonary embolism

## 2025-02-28 NOTE — ED PROVIDER NOTES
ED Provider Note    Primary care provider: ALEXANDER Bartholomew  Means of arrival: private vehicle  History obtained from: patient  History limited by: none    CHIEF COMPLAINT  Chief Complaint   Patient presents with    Leg Swelling    Leg Pain     Pt c/o BLE swelling, redness and pain. Pt states both legs are weeping but worse on the L. Pt states temp last night was 101       HPI/ROS  Yamile Go is a 75 y.o. female who presents to the Emergency Department for evaluation of leg pain and swelling.  Patient reports that for the past few days she has noticed leg pain and swelling bilaterally left greater than right.  She also feels that her legs are red and she notes a fever of 101 last night.  Patient is an overall poor historian, cannot definitively describe when the leg pain started.  She notes that she has been walking with a walker which is chronic for her.  She also has chronic left shoulder pain but notes that she fell a couple of days ago after tripping and landed on her left side so her left shoulder pain is worse.  She also notes that she hit her leg during this as she has substantial bruising to her left upper extremity and left lower extremity.  She is not sure if the bruising and swelling are related to the fall.  Despite this she has still been able to ambulate with her walker at her baseline.  Denies numbness or tingling.  Patient also complaining of cough and shortness of breath, noted to have a wet cough on exam.    EXTERNAL RECORDS REVIEWED  Patient was last hospitalized 2/7/2025 for sepsis secondary to influenza A.    Patient also hospitalized on 2/4/2025 for left shoulder pain which is chronic for her.  She had a CT of her left shoulder on 2/5/2025 which showed no acute abnormalities, chronic arthritic changes.  MRI unable to be obtained due to chronic nerve stimulator that is not compatible.    LIMITATION TO HISTORY   none      PAST MEDICAL HISTORY   has a past medical history of Anemia,  Anesthesia, Arthritis, Asthma, CAD (coronary artery disease), Cataract, Dental disorder, Depression, Diabetes (HCC), Disorder of thyroid, Heart burn, History of vertebral fracture, HTN (hypertension), Hyperlipidemia, Indigestion, Migraines, Obesity, Pneumonia (2023), PONV (postoperative nausea and vomiting), and Sleep apnea.    SURGICAL HISTORY   has a past surgical history that includes appendectomy (N/A, 1976); abdominal hysterectomy total (N/A, 1978); breast biopsy (1984); colectomy (N/A, 2007); lumbar laminectomy diskectomy (N/A, 1989); arthroplasty (Right, 2020); lumbar exploration (N/A, 2017); insertion, peripheral nerve stimulator, lower extremity (Left, 12/22/2022); and reconstr total shoulder implant (Right, 4/30/2024).    SOCIAL HISTORY  Social History     Tobacco Use    Smoking status: Never    Smokeless tobacco: Never   Vaping Use    Vaping status: Never Used   Substance Use Topics    Alcohol use: No    Drug use: No      Social History     Substance and Sexual Activity   Drug Use No       FAMILY HISTORY  Family History   Problem Relation Age of Onset    Cancer Mother         lung    Heart Disease Mother     Hyperlipidemia Mother     Stroke Father     Heart Disease Father     Diabetes Father     Hypertension Father     Hyperlipidemia Father     Heart Disease Brother         cath and bypass    Diabetes Brother     Hypertension Brother     Hyperlipidemia Brother     Diabetes Maternal Aunt     Hypertension Maternal Aunt     Heart Disease Brother         cath and byp[ass    Drug abuse Brother     Heart Disease Brother         cath and bypass    Diabetes Brother     Heart Disease Brother     Other Brother         MVA    Hypertension Maternal Aunt     Drug abuse Daughter     Alcohol abuse Daughter        CURRENT MEDICATIONS  Home Medications    **Home medications have not yet been reviewed for this encounter**       Audit from Redirected Encounters    **Home medications have not yet been reviewed for this  encounter**         ALLERGIES  Allergies   Allergen Reactions    Gabapentin Hives and Swelling    Pregabalin Hives and Swelling    Unasyn [Ampicillin-Sulbactam Sodium] Hives     With oral lesions     Amlodipine Swelling    Bee Swelling    Fentanyl Vomiting and Unspecified    Morphine Vomiting, Nausea and Unspecified    Doxycycline      Possible hives (took with Unasyn)     Benzoin Rash     Tincture of benzoin =blisters    blisters    Rifampin Unspecified     Pt turns yellow       PHYSICAL EXAM  VITAL SIGNS: BP (!) 172/76   Pulse (!) 112   Temp 37.4 °C (99.4 °F) (Temporal)   Resp 19   Wt 63.5 kg (140 lb)   SpO2 94%   BMI 25.60 kg/m²   Vitals reviewed by myself.  Physical Exam  Nursing note and vitals reviewed.  Constitutional: Well-developed and well-nourished.  Patient appears uncomfortable  HENT: Head is normocephalic and atraumatic.  Eyes: extra-ocular movements intact  Cardiovascular: tachycardic rate and regular rhythm.  2+ bilateral radial pulses and 2+ bilateral distal pedal pulses  Pulmonary/Chest: Breath sounds normal. No wheezes or rales.   Abdominal: Soft and non-tender. No distention.  No palpable masses  Musculoskeletal: Patient has mild swelling to the right lower extremity, pitting edema in the left lower extremity.  No point tenderness or deformities noted.  No obvious erythema or warmth to the legs.  Patient is able to fully range her legs.  She does have bruises in different stages of healing.  Patient also has bruising to her left upper extremity, decreased range of motion of her left shoulder and left elbow she reports secondary to pain.  She notes that the left shoulder pain and decreased range of motion is chronic, left elbow pain seems new or from the acute fall  Neurological: Awake and alert  Skin: Skin is warm and dry. No rash.         DIAGNOSTIC STUDIES:  LABS  Labs Reviewed   CBC WITH DIFFERENTIAL - Abnormal; Notable for the following components:       Result Value    Hemoglobin 11.4  (*)     Hematocrit 36.8 (*)     MCH 25.9 (*)     MCHC 31.0 (*)     RDW 50.5 (*)     Platelet Count 158 (*)     Neutrophils-Polys 79.00 (*)     Lymphocytes 11.60 (*)     Neutrophils (Absolute) 7.99 (*)     Monos (Absolute) 0.88 (*)     All other components within normal limits   D-DIMER - Abnormal; Notable for the following components:    D-Dimer 2.86 (*)     All other components within normal limits   COMP METABOLIC PANEL - Abnormal; Notable for the following components:    Potassium 2.4 (*)     Total Protein 5.7 (*)     All other components within normal limits   PROBRAIN NATRIURETIC PEPTIDE, NT - Abnormal; Notable for the following components:    NT-proBNP 363 (*)     All other components within normal limits   TROPONIN - Abnormal; Notable for the following components:    Troponin T 48 (*)     All other components within normal limits   LACTIC ACID   URINALYSIS   ESTIMATED GFR   URINE CULTURE(NEW)   BLOOD CULTURE   BLOOD CULTURE   APTT   PROTHROMBIN TIME   HEPARIN XA (UNFRACTIONATED)   URINE MICROSCOPIC (W/UA)       All labs reviewed and independently interpreted by myself    EKG Interpretation:    12 Lead EKG independently interpreted by myself to show:  EKG at 12 PM: Sinus tachycardia at a rate of 114, normal axis, normal intervals, , QRS 77, QTc 408, no acute ST-T segment changes, no evidence of acute arrhythmia or ischemia per my independent interpretation    RADIOLOGY  Images independently interpreted by myself prior to radiologist review:  -Chest x-ray demonstrates good line positioning    Final interpretation by radiology demonstrates:    DX-CHEST-FOR LINE PLACEMENT Perform procedure in: Patient's Room   Final Result      1.  Right IJ central and present with the tip overlying the right atrium. No evidence of pneumothorax.      2.  Bibasal atelectasis.      3.  Cardiomegaly.      US-EXTREMITY VENOUS LOWER BILAT   Final Result      DX-ANKLE 3+ VIEWS RIGHT   Final Result      Postsurgical change  consistent with right tibiotalar arthroplasty. No evidence of fracture or dislocation.      DX-ANKLE 3+ VIEWS LEFT   Final Result      No evidence of acute fracture or dislocation.      DX-KNEE 3 VIEWS RIGHT   Final Result      No evidence of acute fracture or dislocation.      DX-KNEE 3 VIEWS LEFT   Final Result      No evidence of acute fracture or dislocation.      DX-PELVIS-1 OR 2 VIEWS   Final Result      1.  No evidence of fracture or dislocation.      2.  Prior left hip arthroplasty.      DX-ELBOW-COMPLETE 3+ LEFT   Final Result      No evidence of acute fracture or dislocation.      DX-SHOULDER 2+ LEFT   Final Result      1.  No evidence of acute fracture or dislocation.      2.  Mild degenerative change of the acromioclavicular joint.      DX-CHEST-PORTABLE (1 VIEW)   Final Result      1.  Mild cardiomegaly.      2.  Linear bibasilar atelectasis.      CT-CTA CHEST PULMONARY ARTERY W/ RECONS    (Results Pending)   IR-PICC LINE PLACEMENT W/ GUIDANCE > AGE 5    (Results Pending)     The radiologist's interpretation of all radiological studies have been reviewed by me.      PROCEDURES  Point of Care Ultrasound    Indication: vascular access, poor peripheral access, and centrally administered medications    Consent: The patient provided verbal consent for this procedure.    Procedure: The patient was positioned appropriately and the skin over the right internal jugular vein was prepped with betadine and draped in a sterile fashion. Local anesthesia was obtained by infiltration using 1% Lidocaine without epinephrine.  A large bore needle was used to identify the vein under dynamic Ultrasound guidance.  A guide wire was then inserted into the vein through the needle. A triple lumen catheter was then inserted into the vessel over the guide wire using the Seldinger technique.  All ports showed good, free flowing blood return and were flushed with saline solution.  The catheter was then securely fastened to the skin  with sutures and covered with a sterile dressing.  A post procedure X-ray was ordered and showed good line position.    *Bedside Ultrasound utilized for Limited Diagnostic Exam: Venipuncture requiring physician skill with ultrasound guidance and image retained as below.    The patient tolerated the procedure well.    Complications: None    Image retained through [facility] as seen below:       This study is a limited ultrasound examination performed and interpreted to evaluate for limited conditions as outlined above. There may be other clinically important information contained in the images that is outside this scope. When clinically warranted, a comprehensive ultrasound through the appropriate department is considered.        COURSE & MEDICAL DECISION MAKING    Hydration: Based on the patient's presentation of Tachycardia the patient was given IV fluids. IV Hydration was used because oral hydration was not adequate alone. Upon recheck following hydration, the patient was improved.    INITIAL ASSESSMENT, ED COURSE AND PLAN    Patient is a 75-year-old female who presents for evaluation of leg pain and swelling.  Differential diagnosis includes traumatic injury, venous stasis, DVT, cellulitis, electrolyte derangement.  Diagnostic workup includes x-rays of the left upper and bilateral lower extremities, DVT study and labs    Patient was initially hypoxic in triage had just taken oxycodone at home prior to arrival for pain.  With repositioning and deep breaths hypoxia resolved without intervention.  Also noted to be tachycardic, will start IV fluid resuscitation.  EKG returns and demonstrates no evidence of acute arrhythmia or ischemia.  Unfortunately patient was a difficult IV stick and after multiple ultrasound IVs labs were finally obtained however they were delayed due to this.  Labs returned are notable for an elevated troponin and D-dimer.  DVT ultrasound demonstrates left lower extremity DVT.  Given her tachycardia  and intermittent hypoxia and elevated troponin I suspect she likely also has a pulmonary embolism and therefore PE study is ordered.  Ultimately I had to obtain a central line, see procedure note above for details for further IV access.  Patient is also hypokalemic with potassium of 2.4 and therefore IV potassium and magnesium ordered for further evaluation.  Patient started on heparin drip for DVT and presumed PE.  Case discussed with Dr. Zapata who will hospitalize patient for ongoing management.  Patient hospitalized in guarded condition.    Of note multiple x-rays of the left upper extremity and bilateral lower extremities were obtained given her pain from recent fall.  She does have chronic left upper extremity pain.  X-rays were negative for acute traumatic injury.         DISPOSITION AND DISCUSSIONS  I have discussed management of the patient with the following physicians and ALEC's:  Dr. Zapata    Discussion of management with other [facility] or appropriate source(s): none     Escalation of care considered, and ultimately not performed:see above    Barriers to care at this time, including but not limited to: none.     Decision tools and prescription drugs considered including, but not limited to: see above.        FINAL IMPRESSION  1. Acute deep vein thrombosis (DVT) of proximal vein of left lower extremity (HCC)    2. Pain    3. Shortness of breath    4. Elevated troponin    5. Hypokalemia

## 2025-02-28 NOTE — ED TRIAGE NOTES
Chief Complaint   Patient presents with    Leg Swelling    Leg Pain     Pt c/o BLE swelling, redness and pain. Pt states both legs are weeping but worse on the L. Pt states temp last night was 101     /77   Pulse (!) 119   Temp 37.4 °C (99.4 °F) (Temporal)   Resp 20   Wt 63.5 kg (140 lb)   SpO2 (!) 86%   BMI 25.60 kg/m²     Pt RA sat in triage is 86%, placed pt on 4L O2. Pt states took oxy pta, does not wear O2 at home.

## 2025-02-28 NOTE — ED NOTES
Sheron from Lab called with critical result of K+ 2.4 at 1326. Critical lab result read back to Sheron.   Dr. Dugan notified of critical lab result at 1326.  Critical lab result read back by Dr. Dugan.      Primary RN updated

## 2025-02-28 NOTE — ASSESSMENT & PLAN NOTE
With no significant hyperglycemia  Last glycated hemoglobin was 6.9%  I will start short acting insulin for now  I will order Accu-Checks, hypoglycemia protocol  Adjust according to blood sugars trend

## 2025-02-28 NOTE — ED NOTES
Pharmacy Medication Reconciliation      ~Medication reconciliation updated and complete per patient at bedside   ~Allergies have been verified and updated   ~No oral ABX within the last 30 days  ~Is dispense history available: yes    ~Patient home pharmacy :  [facility]       ~Anticoagulants (rivaroxaban, apixaban, edoxaban, dabigatran, warfarin, enoxaparin) taken in the last 14 days? No

## 2025-02-28 NOTE — ASSESSMENT & PLAN NOTE
I had a discussion with the patient regarding goals of care, diagnoses, prognosis, and CODE STATUS. We discussed her prognosis and comorbidities.  Patient has advanced age of 75 years.  She has a number of chronic medical problems including diabetes, hypertension and hyperlipidemia.  She is presenting with multiple acute medical problems including left lower extremity edema pain redness found to have deep vein thrombosis in addition she has hypoxia concerning for possible pulmonary embolism.  At this point the patient wants to maintain a full code.  She is open to all forms of invasive and noninvasive diagnostic and therapeutic interventions including mechanical thrombectomy / EKOS procedure if needed.

## 2025-03-01 ENCOUNTER — APPOINTMENT (OUTPATIENT)
Dept: CARDIOLOGY | Facility: MEDICAL CENTER | Age: 75
DRG: 299 | End: 2025-03-01
Attending: HOSPITALIST
Payer: MEDICARE

## 2025-03-01 LAB
ALBUMIN SERPL BCP-MCNC: 2.8 G/DL (ref 3.2–4.9)
ALBUMIN/GLOB SERPL: 1.4 G/DL
ALP SERPL-CCNC: 72 U/L (ref 30–99)
ALT SERPL-CCNC: 23 U/L (ref 2–50)
ANION GAP SERPL CALC-SCNC: 10 MMOL/L (ref 7–16)
ANION GAP SERPL CALC-SCNC: 9 MMOL/L (ref 7–16)
AST SERPL-CCNC: 27 U/L (ref 12–45)
BILIRUB SERPL-MCNC: 0.3 MG/DL (ref 0.1–1.5)
BUN SERPL-MCNC: 7 MG/DL (ref 8–22)
BUN SERPL-MCNC: 8 MG/DL (ref 8–22)
CALCIUM ALBUM COR SERPL-MCNC: 8.6 MG/DL (ref 8.5–10.5)
CALCIUM SERPL-MCNC: 7.6 MG/DL (ref 8.4–10.2)
CALCIUM SERPL-MCNC: 7.6 MG/DL (ref 8.4–10.2)
CHLORIDE SERPL-SCNC: 108 MMOL/L (ref 96–112)
CHLORIDE SERPL-SCNC: 110 MMOL/L (ref 96–112)
CO2 SERPL-SCNC: 18 MMOL/L (ref 20–33)
CO2 SERPL-SCNC: 20 MMOL/L (ref 20–33)
CREAT SERPL-MCNC: 0.62 MG/DL (ref 0.5–1.4)
CREAT SERPL-MCNC: 0.71 MG/DL (ref 0.5–1.4)
ERYTHROCYTE [DISTWIDTH] IN BLOOD BY AUTOMATED COUNT: 54.6 FL (ref 35.9–50)
FERRITIN SERPL-MCNC: 120 NG/ML (ref 10–291)
GFR SERPLBLD CREATININE-BSD FMLA CKD-EPI: 89 ML/MIN/1.73 M 2
GFR SERPLBLD CREATININE-BSD FMLA CKD-EPI: 93 ML/MIN/1.73 M 2
GLOBULIN SER CALC-MCNC: 2 G/DL (ref 1.9–3.5)
GLUCOSE BLD STRIP.AUTO-MCNC: 102 MG/DL (ref 65–99)
GLUCOSE BLD STRIP.AUTO-MCNC: 82 MG/DL (ref 65–99)
GLUCOSE BLD STRIP.AUTO-MCNC: 83 MG/DL (ref 65–99)
GLUCOSE SERPL-MCNC: 106 MG/DL (ref 65–99)
GLUCOSE SERPL-MCNC: 114 MG/DL (ref 65–99)
HCT VFR BLD AUTO: 32.5 % (ref 37–47)
HGB BLD-MCNC: 9.8 G/DL (ref 12–16)
HGB RETIC QN AUTO: 31.2 PG/CELL (ref 29–35)
IMM RETICS NFR: 24.1 % (ref 2.6–16.1)
IRON SATN MFR SERPL: 6 % (ref 15–55)
IRON SERPL-MCNC: 13 UG/DL (ref 40–170)
LV EJECT FRACT  99904: 62
LV EJECT FRACT MOD 2C 99903: 55.42
LV EJECT FRACT MOD 4C 99902: 67.62
LV EJECT FRACT MOD BP 99901: 61.71
MAGNESIUM SERPL-MCNC: 1.7 MG/DL (ref 1.5–2.5)
MCH RBC QN AUTO: 26.2 PG (ref 27–33)
MCHC RBC AUTO-ENTMCNC: 30.2 G/DL (ref 32.2–35.5)
MCV RBC AUTO: 86.9 FL (ref 81.4–97.8)
PLATELET # BLD AUTO: 121 K/UL (ref 164–446)
PMV BLD AUTO: 9.8 FL (ref 9–12.9)
POTASSIUM SERPL-SCNC: 3.5 MMOL/L (ref 3.6–5.5)
POTASSIUM SERPL-SCNC: 3.8 MMOL/L (ref 3.6–5.5)
PROT SERPL-MCNC: 4.8 G/DL (ref 6–8.2)
RBC # BLD AUTO: 3.74 M/UL (ref 4.2–5.4)
RETICS # AUTO: 0.06 M/UL (ref 0.04–0.12)
RETICS/RBC NFR: 1.6 % (ref 0.8–2.6)
SODIUM SERPL-SCNC: 137 MMOL/L (ref 135–145)
SODIUM SERPL-SCNC: 138 MMOL/L (ref 135–145)
TIBC SERPL-MCNC: 211 UG/DL (ref 250–450)
UFH PPP CHRO-ACNC: 0.87 IU/ML
UIBC SERPL-MCNC: 198 UG/DL (ref 110–370)
WBC # BLD AUTO: 7.4 K/UL (ref 4.8–10.8)

## 2025-03-01 PROCEDURE — A9270 NON-COVERED ITEM OR SERVICE: HCPCS | Mod: JZ | Performed by: HOSPITALIST

## 2025-03-01 PROCEDURE — 85027 COMPLETE CBC AUTOMATED: CPT

## 2025-03-01 PROCEDURE — 85520 HEPARIN ASSAY: CPT

## 2025-03-01 PROCEDURE — 83735 ASSAY OF MAGNESIUM: CPT

## 2025-03-01 PROCEDURE — 82962 GLUCOSE BLOOD TEST: CPT | Mod: 91

## 2025-03-01 PROCEDURE — 36415 COLL VENOUS BLD VENIPUNCTURE: CPT

## 2025-03-01 PROCEDURE — 82728 ASSAY OF FERRITIN: CPT

## 2025-03-01 PROCEDURE — 770020 HCHG ROOM/CARE - TELE (206)

## 2025-03-01 PROCEDURE — 93306 TTE W/DOPPLER COMPLETE: CPT

## 2025-03-01 PROCEDURE — 93306 TTE W/DOPPLER COMPLETE: CPT | Mod: 26 | Performed by: INTERNAL MEDICINE

## 2025-03-01 PROCEDURE — A9270 NON-COVERED ITEM OR SERVICE: HCPCS | Mod: JZ | Performed by: STUDENT IN AN ORGANIZED HEALTH CARE EDUCATION/TRAINING PROGRAM

## 2025-03-01 PROCEDURE — 83550 IRON BINDING TEST: CPT

## 2025-03-01 PROCEDURE — 700102 HCHG RX REV CODE 250 W/ 637 OVERRIDE(OP): Performed by: INTERNAL MEDICINE

## 2025-03-01 PROCEDURE — 99497 ADVNCD CARE PLAN 30 MIN: CPT | Performed by: STUDENT IN AN ORGANIZED HEALTH CARE EDUCATION/TRAINING PROGRAM

## 2025-03-01 PROCEDURE — 80053 COMPREHEN METABOLIC PANEL: CPT

## 2025-03-01 PROCEDURE — 700102 HCHG RX REV CODE 250 W/ 637 OVERRIDE(OP): Mod: JZ | Performed by: STUDENT IN AN ORGANIZED HEALTH CARE EDUCATION/TRAINING PROGRAM

## 2025-03-01 PROCEDURE — 85046 RETICYTE/HGB CONCENTRATE: CPT

## 2025-03-01 PROCEDURE — 94760 N-INVAS EAR/PLS OXIMETRY 1: CPT

## 2025-03-01 PROCEDURE — 83540 ASSAY OF IRON: CPT

## 2025-03-01 PROCEDURE — 80048 BASIC METABOLIC PNL TOTAL CA: CPT

## 2025-03-01 PROCEDURE — A9270 NON-COVERED ITEM OR SERVICE: HCPCS | Performed by: INTERNAL MEDICINE

## 2025-03-01 PROCEDURE — 99232 SBSQ HOSP IP/OBS MODERATE 35: CPT | Mod: 25 | Performed by: STUDENT IN AN ORGANIZED HEALTH CARE EDUCATION/TRAINING PROGRAM

## 2025-03-01 PROCEDURE — 700102 HCHG RX REV CODE 250 W/ 637 OVERRIDE(OP): Mod: JZ | Performed by: HOSPITALIST

## 2025-03-01 RX ORDER — POTASSIUM CHLORIDE 1500 MG/1
40 TABLET, EXTENDED RELEASE ORAL ONCE
Status: COMPLETED | OUTPATIENT
Start: 2025-03-01 | End: 2025-03-01

## 2025-03-01 RX ORDER — OXYCODONE HYDROCHLORIDE 10 MG/1
10 TABLET ORAL EVERY 4 HOURS PRN
Refills: 0 | Status: DISCONTINUED | OUTPATIENT
Start: 2025-03-01 | End: 2025-03-02

## 2025-03-01 RX ADMIN — OXYCODONE 5 MG: 5 TABLET ORAL at 19:57

## 2025-03-01 RX ADMIN — POTASSIUM CHLORIDE 40 MEQ: 1500 TABLET, EXTENDED RELEASE ORAL at 14:54

## 2025-03-01 RX ADMIN — ROSUVASTATIN CALCIUM 20 MG: 10 TABLET, FILM COATED ORAL at 17:03

## 2025-03-01 RX ADMIN — OXYCODONE HYDROCHLORIDE 10 MG: 10 TABLET ORAL at 23:36

## 2025-03-01 RX ADMIN — METOPROLOL SUCCINATE 25 MG: 25 TABLET, FILM COATED, EXTENDED RELEASE ORAL at 05:08

## 2025-03-01 RX ADMIN — LIOTHYRONINE SODIUM 5 MCG: 5 TABLET ORAL at 05:08

## 2025-03-01 RX ADMIN — EZETIMIBE 10 MG: 10 TABLET ORAL at 17:03

## 2025-03-01 RX ADMIN — AMITRIPTYLINE HYDROCHLORIDE 150 MG: 50 TABLET, FILM COATED ORAL at 20:10

## 2025-03-01 RX ADMIN — OXYCODONE 5 MG: 5 TABLET ORAL at 14:53

## 2025-03-01 RX ADMIN — METHOCARBAMOL 500 MG: 500 TABLET ORAL at 13:11

## 2025-03-01 RX ADMIN — OMEPRAZOLE 20 MG: 20 CAPSULE, DELAYED RELEASE ORAL at 17:03

## 2025-03-01 RX ADMIN — POTASSIUM CHLORIDE 40 MEQ: 1500 TABLET, EXTENDED RELEASE ORAL at 07:51

## 2025-03-01 RX ADMIN — LEVOTHYROXINE SODIUM 88 MCG: 0.09 TABLET ORAL at 05:08

## 2025-03-01 RX ADMIN — APIXABAN 10 MG: 5 TABLET, FILM COATED ORAL at 14:53

## 2025-03-01 RX ADMIN — OMEPRAZOLE 20 MG: 20 CAPSULE, DELAYED RELEASE ORAL at 05:08

## 2025-03-01 ASSESSMENT — PATIENT HEALTH QUESTIONNAIRE - PHQ9
8. MOVING OR SPEAKING SO SLOWLY THAT OTHER PEOPLE COULD HAVE NOTICED. OR THE OPPOSITE, BEING SO FIGETY OR RESTLESS THAT YOU HAVE BEEN MOVING AROUND A LOT MORE THAN USUAL: NOT AT ALL
5. POOR APPETITE OR OVEREATING: NOT AT ALL
SUM OF ALL RESPONSES TO PHQ QUESTIONS 1-9: 2
6. FEELING BAD ABOUT YOURSELF - OR THAT YOU ARE A FAILURE OR HAVE LET YOURSELF OR YOUR FAMILY DOWN: NOT AL ALL
4. FEELING TIRED OR HAVING LITTLE ENERGY: SEVERAL DAYS
7. TROUBLE CONCENTRATING ON THINGS, SUCH AS READING THE NEWSPAPER OR WATCHING TELEVISION: NOT AT ALL
3. TROUBLE FALLING OR STAYING ASLEEP OR SLEEPING TOO MUCH: SEVERAL DAYS
SUM OF ALL RESPONSES TO PHQ9 QUESTIONS 1 AND 2: 0
2. FEELING DOWN, DEPRESSED, IRRITABLE, OR HOPELESS: NOT AT ALL
9. THOUGHTS THAT YOU WOULD BE BETTER OFF DEAD, OR OF HURTING YOURSELF: NOT AT ALL
1. LITTLE INTEREST OR PLEASURE IN DOING THINGS: NOT AT ALL

## 2025-03-01 ASSESSMENT — PAIN DESCRIPTION - PAIN TYPE
TYPE: CHRONIC PAIN
TYPE: ACUTE PAIN;CHRONIC PAIN
TYPE: ACUTE PAIN;CHRONIC PAIN

## 2025-03-01 NOTE — PROGRESS NOTES
2 RN Skin Assessment Completed by Brie RN and Mario RN.    Head: WDL  Ears: WDL  Nose: WDL  Mouth: WDL  Neck: WDL  Breasts/Chest: excoriation  Shoulder Blades: WDL - brusing  Spine: bruising  (R) Arm/Elbow/hand: bruising  (L) Arm/Elbow/hand: bruising  Abdomen:bruising  Groin: bruising  Sacrum/Coccyx/Buttocks: redness and blanching bruising     (R) Leg: bruising  (L) Leg: bruising, swelling, and edema  (R) Heel/Foot/Toe: bruising  (L) Heel/Foot/Toe: bruising          Devices in place: Tele Box, BP Cuff, and Pulse Ox    Interventions in place: Gray ear foams, Pillows, and Q2 turns    Possible skin injury found: No    Pictures uploaded into Epic: Yes  Wound Consult Placed: N/A

## 2025-03-01 NOTE — PROGRESS NOTES
Telemetry summary    Rhythm SR  Rate 67-92    Ectopy rPVC  Measurements 0.16/0.08/0.36    Normal Values  Rhythm SR  HR Range   Measurements 0.12-0.20/0.06-0.10/0.30-0.52

## 2025-03-01 NOTE — PROGRESS NOTES
[facility] Medicine Daily Progress Note    Date of Service  3/1/2025    Chief Complaint  Yamile Go is a 75 y.o. female admitted 2/28/2025 with leg swelling    Hospital Course  75 y.o. female with a past medical history of diabetes, hypertension, gastroesophageal flux disease and hypothyroidism who presented 2/28/2025 with generalized weakness, shortness of breath and left lower extremity pain redness, and swelling.  Patient reports that she has not been feeling well over the past 3 days.  She has worsening left lower extremity pain, redness and weeping.  The patient also reports having a fever of 101 last night.  She does not have cough or sputum production.  In the emergency room she was found to be tachycardic with a heart rate of 119.  She was noticed to be hypoxic saturating 86% on room air.     Patient was admitted to the telemetry floor for acute hypoxic respiratory failure and left lower extremity DVT.  She was started on heparin drip.  Follow-up chest CTA without PE but noted for dense consolidation of volume loss within the left lower lobe.     3/1 Echocardiogram showing EF 62%, normal diastolic function, dilated inferior vena cava without inspiratory collapse.    Interval Problem Update  Evaluate bedside  Reporting improvement with left lower extremity swelling  Stable vitals  Room air  CBC with hemoglobin at 9.8  Chest CTA without PE  Discontinue heparin drip  Start therapeutic Eliquis  Labs on AM    I have discussed this patient's plan of care and discharge plan at IDT rounds today with Case Management, Nursing, Nursing leadership, and other members of the IDT team.    Consultants/Specialty  None    Code Status  Full Code    Disposition  The patient is not medically cleared for discharge to home or a post-acute facility.      I have placed the appropriate orders for post-discharge needs.    Review of Systems  ROS     Physical Exam  Temp:  [36.2 °C (97.1 °F)-37.1 °C (98.7 °F)] 36.9 °C (98.4  °F)  Pulse:  [] 86  Resp:  [18-20] 19  BP: (115-154)/(47-79) 148/62  SpO2:  [93 %-100 %] 98 %    Physical Exam    Fluids    Intake/Output Summary (Last 24 hours) at 3/1/2025 1442  Last data filed at 3/1/2025 0900  Gross per 24 hour   Intake 4902.3 ml   Output 600 ml   Net 4302.3 ml        Laboratory  Recent Labs     02/28/25  1055 03/01/25  0322   WBC 10.1 7.4   RBC 4.41 3.74*   HEMOGLOBIN 11.4* 9.8*   HEMATOCRIT 36.8* 32.5*   MCV 83.4 86.9   MCH 25.9* 26.2*   MCHC 31.0* 30.2*   RDW 50.5* 54.6*   PLATELETCT 158* 121*   MPV 10.3 9.8     Recent Labs     02/28/25  2118 03/01/25  0052 03/01/25  0322   SODIUM 138 138 137   POTASSIUM 3.1* 3.5* 3.8   CHLORIDE 108 108 110   CO2 20 20 18*   GLUCOSE 139* 114* 106*   BUN 8 7* 8   CREATININE 0.72 0.62 0.71   CALCIUM 7.5* 7.6* 7.6*     Recent Labs     02/28/25  1055   APTT 23.4*   INR 0.94               Imaging  EC-ECHOCARDIOGRAM COMPLETE W/O CONT   Final Result      CT-CTA CHEST PULMONARY ARTERY W/ RECONS   Final Result      1.  No evidence of pulmonary embolus.      2.  Dense consolidation of volume loss within the left lower lobe.      3.  Atherosclerotic change of the aorta and coronary arteries.      DX-CHEST-FOR LINE PLACEMENT Perform procedure in: Patient's Room   Final Result      1.  Right IJ central and present with the tip overlying the right atrium. No evidence of pneumothorax.      2.  Bibasal atelectasis.      3.  Cardiomegaly.      US-EXTREMITY VENOUS LOWER BILAT   Final Result      DX-ANKLE 3+ VIEWS RIGHT   Final Result      Postsurgical change consistent with right tibiotalar arthroplasty. No evidence of fracture or dislocation.      DX-ANKLE 3+ VIEWS LEFT   Final Result      No evidence of acute fracture or dislocation.      DX-KNEE 3 VIEWS RIGHT   Final Result      No evidence of acute fracture or dislocation.      DX-KNEE 3 VIEWS LEFT   Final Result      No evidence of acute fracture or dislocation.      DX-PELVIS-1 OR 2 VIEWS   Final Result      1.  No  evidence of fracture or dislocation.      2.  Prior left hip arthroplasty.      DX-ELBOW-COMPLETE 3+ LEFT   Final Result      No evidence of acute fracture or dislocation.      DX-SHOULDER 2+ LEFT   Final Result      1.  No evidence of acute fracture or dislocation.      2.  Mild degenerative change of the acromioclavicular joint.      DX-CHEST-PORTABLE (1 VIEW)   Final Result      1.  Mild cardiomegaly.      2.  Linear bibasilar atelectasis.           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  Will start therapeutic anticoagulation.  Given her tachycardia and shortness of breath will check CT angiography of chest to rule out associated pulmonary embolism    Normocytic anemia- (present on admission)  Assessment & Plan  Hemoglobin trending down  No signs of bleeding  Iron studies  Labs on a.m.    Acute hypoxemic respiratory failure (HCC)- (present on admission)  Assessment & Plan  Likely secondary to left lower lobe infiltrates  No PE on CTA  Resolved    ACP (advance care planning)- (present on admission)  Assessment & Plan  I had a discussion with the patient regarding goals of care, diagnoses, prognosis, and CODE STATUS. We discussed her prognosis and comorbidities.  Patient has advanced age of 75 years.  She has a number of chronic medical problems including diabetes, hypertension and hyperlipidemia.  She is presenting with multiple acute medical problems including left lower extremity edema pain redness found to have deep vein thrombosis in addition she has hypoxia concerning for possible pulmonary embolism.  At this point the patient wants to maintain a full code.  She is open to all forms of invasive and noninvasive diagnostic and therapeutic interventions including mechanical  thrombectomy / EKOS procedure if needed.     Primary hypertension- (present on admission)  Assessment & Plan  I will resume home metoprolol with hold parameters    Hypokalemia- (present on admission)  Assessment & Plan  Resolved    Type 2 diabetes mellitus (HCC)- (present on admission)  Assessment & Plan  With no significant hyperglycemia  Last glycated hemoglobin was 6.9%  I will start short acting insulin for now  I will order Accu-Checks, hypoglycemia protocol  Adjust according to blood sugars trend     Acquired hypothyroidism- (present on admission)  Assessment & Plan  I will start levothyroxine    Depression- (present on admission)  Assessment & Plan  I will resume home escitalopram and amitriptyline    Dyslipidemia- (present on admission)  Assessment & Plan  Cardiac diet.  I resume home rosuvastatin       VTE prophylaxis: Therapeutic Eliquis    I have performed a physical exam and reviewed and updated ROS and Plan today (3/1/2025). In review of yesterday's note (2/28/2025), there are no changes except as documented above.

## 2025-03-01 NOTE — CARE PLAN
The patient is Stable - Low risk of patient condition declining or worsening    Shift Goals  Clinical Goals: Patient safety, Heparin drip  Patient Goals: Pain management, rest  Family Goals: dante    Progress made toward(s) clinical / shift goals:  Patient updated on POC during shift with all questions and concerns addressed. Patient able to state alleviation of shoulder pain with current prns. Patient supplemental oxygen weaned from 4L to 1L during shift with O2>90%. Heparin Xa monitored during shift and heparin adjusted per protocol.     Problem: Pain - Standard  Goal: Alleviation of pain or a reduction in pain to the patient’s comfort goal  Outcome: Progressing     Problem: Knowledge Deficit - Standard  Goal: Patient and family/care givers will demonstrate understanding of plan of care, disease process/condition, diagnostic tests and medications  Outcome: Progressing     Problem: Fall Risk  Goal: Patient will remain free from falls  Outcome: Progressing     Problem: Skin Integrity  Goal: Skin integrity is maintained or improved  Outcome: Progressing       Patient is not progressing towards the following goals:

## 2025-03-02 LAB
ANION GAP SERPL CALC-SCNC: 4 MMOL/L (ref 7–16)
BACTERIA UR CULT: NORMAL
BUN SERPL-MCNC: 8 MG/DL (ref 8–22)
CALCIUM SERPL-MCNC: 7.8 MG/DL (ref 8.4–10.2)
CHLORIDE SERPL-SCNC: 111 MMOL/L (ref 96–112)
CO2 SERPL-SCNC: 23 MMOL/L (ref 20–33)
CREAT SERPL-MCNC: 0.74 MG/DL (ref 0.5–1.4)
ERYTHROCYTE [DISTWIDTH] IN BLOOD BY AUTOMATED COUNT: 56.2 FL (ref 35.9–50)
GFR SERPLBLD CREATININE-BSD FMLA CKD-EPI: 85 ML/MIN/1.73 M 2
GLUCOSE BLD STRIP.AUTO-MCNC: 102 MG/DL (ref 65–99)
GLUCOSE BLD STRIP.AUTO-MCNC: 156 MG/DL (ref 65–99)
GLUCOSE BLD STRIP.AUTO-MCNC: 186 MG/DL (ref 65–99)
GLUCOSE BLD STRIP.AUTO-MCNC: 98 MG/DL (ref 65–99)
GLUCOSE SERPL-MCNC: 121 MG/DL (ref 65–99)
HCT VFR BLD AUTO: 28.8 % (ref 37–47)
HGB BLD-MCNC: 8.8 G/DL (ref 12–16)
HGB BLD-MCNC: 9.1 G/DL (ref 12–16)
MAGNESIUM SERPL-MCNC: 1.9 MG/DL (ref 1.5–2.5)
MCH RBC QN AUTO: 26.2 PG (ref 27–33)
MCHC RBC AUTO-ENTMCNC: 30.6 G/DL (ref 32.2–35.5)
MCV RBC AUTO: 85.7 FL (ref 81.4–97.8)
PLATELET # BLD AUTO: 110 K/UL (ref 164–446)
PMV BLD AUTO: 12.2 FL (ref 9–12.9)
POTASSIUM SERPL-SCNC: 4.7 MMOL/L (ref 3.6–5.5)
RBC # BLD AUTO: 3.36 M/UL (ref 4.2–5.4)
SIGNIFICANT IND 70042: NORMAL
SITE SITE: NORMAL
SODIUM SERPL-SCNC: 138 MMOL/L (ref 135–145)
SOURCE SOURCE: NORMAL
WBC # BLD AUTO: 6.9 K/UL (ref 4.8–10.8)

## 2025-03-02 PROCEDURE — 85018 HEMOGLOBIN: CPT

## 2025-03-02 PROCEDURE — 700102 HCHG RX REV CODE 250 W/ 637 OVERRIDE(OP): Performed by: INTERNAL MEDICINE

## 2025-03-02 PROCEDURE — 82962 GLUCOSE BLOOD TEST: CPT

## 2025-03-02 PROCEDURE — 97163 PT EVAL HIGH COMPLEX 45 MIN: CPT

## 2025-03-02 PROCEDURE — A9270 NON-COVERED ITEM OR SERVICE: HCPCS | Performed by: HOSPITALIST

## 2025-03-02 PROCEDURE — A9270 NON-COVERED ITEM OR SERVICE: HCPCS | Performed by: INTERNAL MEDICINE

## 2025-03-02 PROCEDURE — 97535 SELF CARE MNGMENT TRAINING: CPT

## 2025-03-02 PROCEDURE — 700102 HCHG RX REV CODE 250 W/ 637 OVERRIDE(OP): Performed by: HOSPITALIST

## 2025-03-02 PROCEDURE — 94760 N-INVAS EAR/PLS OXIMETRY 1: CPT

## 2025-03-02 PROCEDURE — A9270 NON-COVERED ITEM OR SERVICE: HCPCS | Performed by: STUDENT IN AN ORGANIZED HEALTH CARE EDUCATION/TRAINING PROGRAM

## 2025-03-02 PROCEDURE — 80048 BASIC METABOLIC PNL TOTAL CA: CPT

## 2025-03-02 PROCEDURE — 83735 ASSAY OF MAGNESIUM: CPT

## 2025-03-02 PROCEDURE — 770020 HCHG ROOM/CARE - TELE (206)

## 2025-03-02 PROCEDURE — 99232 SBSQ HOSP IP/OBS MODERATE 35: CPT | Performed by: STUDENT IN AN ORGANIZED HEALTH CARE EDUCATION/TRAINING PROGRAM

## 2025-03-02 PROCEDURE — 700102 HCHG RX REV CODE 250 W/ 637 OVERRIDE(OP): Performed by: STUDENT IN AN ORGANIZED HEALTH CARE EDUCATION/TRAINING PROGRAM

## 2025-03-02 PROCEDURE — 36415 COLL VENOUS BLD VENIPUNCTURE: CPT

## 2025-03-02 PROCEDURE — 85027 COMPLETE CBC AUTOMATED: CPT

## 2025-03-02 RX ORDER — ACETAMINOPHEN 500 MG
1000 TABLET ORAL 3 TIMES DAILY
Status: DISCONTINUED | OUTPATIENT
Start: 2025-03-02 | End: 2025-03-03 | Stop reason: HOSPADM

## 2025-03-02 RX ORDER — OXYCODONE HYDROCHLORIDE 5 MG/1
5 TABLET ORAL EVERY 6 HOURS PRN
Refills: 0 | Status: DISCONTINUED | OUTPATIENT
Start: 2025-03-02 | End: 2025-03-03

## 2025-03-02 RX ORDER — POLYETHYLENE GLYCOL 3350 17 G/17G
1 POWDER, FOR SOLUTION ORAL DAILY
Status: DISCONTINUED | OUTPATIENT
Start: 2025-03-02 | End: 2025-03-03 | Stop reason: HOSPADM

## 2025-03-02 RX ADMIN — OXYCODONE 5 MG: 5 TABLET ORAL at 15:55

## 2025-03-02 RX ADMIN — APIXABAN 10 MG: 5 TABLET, FILM COATED ORAL at 04:24

## 2025-03-02 RX ADMIN — OMEPRAZOLE 20 MG: 20 CAPSULE, DELAYED RELEASE ORAL at 04:23

## 2025-03-02 RX ADMIN — LIOTHYRONINE SODIUM 5 MCG: 5 TABLET ORAL at 04:23

## 2025-03-02 RX ADMIN — INSULIN LISPRO 1 UNITS: 100 INJECTION, SOLUTION INTRAVENOUS; SUBCUTANEOUS at 06:08

## 2025-03-02 RX ADMIN — ESCITALOPRAM OXALATE 20 MG: 10 TABLET ORAL at 04:23

## 2025-03-02 RX ADMIN — ROSUVASTATIN CALCIUM 20 MG: 10 TABLET, FILM COATED ORAL at 17:03

## 2025-03-02 RX ADMIN — INSULIN LISPRO 1 UNITS: 100 INJECTION, SOLUTION INTRAVENOUS; SUBCUTANEOUS at 21:23

## 2025-03-02 RX ADMIN — OMEPRAZOLE 20 MG: 20 CAPSULE, DELAYED RELEASE ORAL at 17:03

## 2025-03-02 RX ADMIN — OXYCODONE 5 MG: 5 TABLET ORAL at 22:10

## 2025-03-02 RX ADMIN — LEVOTHYROXINE SODIUM 88 MCG: 0.09 TABLET ORAL at 04:23

## 2025-03-02 RX ADMIN — APIXABAN 10 MG: 5 TABLET, FILM COATED ORAL at 17:03

## 2025-03-02 RX ADMIN — OXYCODONE HYDROCHLORIDE 10 MG: 10 TABLET ORAL at 04:23

## 2025-03-02 RX ADMIN — METOPROLOL SUCCINATE 25 MG: 25 TABLET, FILM COATED, EXTENDED RELEASE ORAL at 04:23

## 2025-03-02 RX ADMIN — EZETIMIBE 10 MG: 10 TABLET ORAL at 17:03

## 2025-03-02 ASSESSMENT — PAIN DESCRIPTION - PAIN TYPE
TYPE: ACUTE PAIN

## 2025-03-02 ASSESSMENT — COGNITIVE AND FUNCTIONAL STATUS - GENERAL
CLIMB 3 TO 5 STEPS WITH RAILING: A LOT
MOBILITY SCORE: 15
WALKING IN HOSPITAL ROOM: A LOT
MOVING FROM LYING ON BACK TO SITTING ON SIDE OF FLAT BED: A LITTLE
STANDING UP FROM CHAIR USING ARMS: A LOT
MOVING TO AND FROM BED TO CHAIR: A LITTLE
TURNING FROM BACK TO SIDE WHILE IN FLAT BAD: A LITTLE
SUGGESTED CMS G CODE MODIFIER MOBILITY: CK

## 2025-03-02 ASSESSMENT — GAIT ASSESSMENTS
DEVIATION: SHUFFLED GAIT;BRADYKINETIC
DISTANCE (FEET): 5
GAIT LEVEL OF ASSIST: MINIMAL ASSIST
ASSISTIVE DEVICE: FRONT WHEEL WALKER

## 2025-03-02 ASSESSMENT — FIBROSIS 4 INDEX: FIB4 SCORE: 3.79

## 2025-03-02 ASSESSMENT — ENCOUNTER SYMPTOMS: WEAKNESS: 1

## 2025-03-02 NOTE — PROGRESS NOTES
[facility] Medicine Daily Progress Note    Date of Service  3/2/2025    Chief Complaint  Yamile Go is a 75 y.o. female admitted 2/28/2025 with leg swelling    Hospital Course  75 y.o. female with a past medical history of diabetes, hypertension, gastroesophageal flux disease and hypothyroidism who presented 2/28/2025 with generalized weakness, shortness of breath and left lower extremity pain redness, and swelling.  Patient reports that she has not been feeling well over the past 3 days.  She has worsening left lower extremity pain, redness and weeping.  The patient also reports having a fever of 101 last night.  She does not have cough or sputum production.  In the emergency room she was found to be tachycardic with a heart rate of 119.  She was noticed to be hypoxic saturating 86% on room air.     Patient was admitted to the telemetry floor for acute hypoxic respiratory failure and left lower extremity DVT.  She was started on heparin drip.  Follow-up chest CTA without PE but noted for dense consolidation of volume loss within the left lower lobe.     3/1 Echocardiogram showing EF 62%, normal diastolic function, dilated inferior vena cava without inspiratory collapse. Chest CTA without PE    Interval Problem Update  Evaluate bedside  Reporting improvement with left lower extremity swelling  Stable vitals  Room air  CBC with hemoglobin at 8-9  Continue Eliquis  PT rec post acute  SNF and PMR referrals placed    I have discussed this patient's plan of care and discharge plan at IDT rounds today with Case Management, Nursing, Nursing leadership, and other members of the IDT team.    Consultants/Specialty  None    Code Status  Full Code    Disposition  The patient is not medically cleared for discharge to home or a post-acute facility.     I have placed the appropriate orders for post-discharge needs.    Review of Systems  Review of Systems   Constitutional:  Positive for malaise/fatigue.   Cardiovascular:   Positive for leg swelling.   Neurological:  Positive for weakness.        Physical Exam  Temp:  [36.5 °C (97.7 °F)-36.8 °C (98.3 °F)] 36.8 °C (98.3 °F)  Pulse:  [76-89] 79  Resp:  [16-18] 17  BP: (102-137)/(45-62) 107/59  SpO2:  [92 %-98 %] 93 %    Physical Exam  Constitutional:       Appearance: Normal appearance.   HENT:      Head: Normocephalic and atraumatic.      Mouth/Throat:      Mouth: Mucous membranes are moist.   Eyes:      Extraocular Movements: Extraocular movements intact.      Pupils: Pupils are equal, round, and reactive to light.   Cardiovascular:      Rate and Rhythm: Normal rate and regular rhythm.      Pulses: Normal pulses.      Heart sounds: Normal heart sounds.   Pulmonary:      Effort: Pulmonary effort is normal.      Breath sounds: Normal breath sounds.   Abdominal:      General: Bowel sounds are normal.      Palpations: Abdomen is soft.   Musculoskeletal:         General: No swelling. Normal range of motion.      Cervical back: Normal range of motion and neck supple.      Left lower leg: Edema present.   Skin:     General: Skin is warm.      Coloration: Skin is not jaundiced.   Neurological:      General: No focal deficit present.      Mental Status: She is alert and oriented to person, place, and time. Mental status is at baseline.      Cranial Nerves: No cranial nerve deficit.   Psychiatric:         Mood and Affect: Mood normal.         Behavior: Behavior normal.         Thought Content: Thought content normal.         Judgment: Judgment normal.         Fluids    Intake/Output Summary (Last 24 hours) at 3/2/2025 1522  Last data filed at 3/2/2025 1300  Gross per 24 hour   Intake 120 ml   Output 900 ml   Net -780 ml        Laboratory  Recent Labs     02/28/25  1055 03/01/25  0322 03/02/25  0130 03/02/25  1014   WBC 10.1 7.4 6.9  --    RBC 4.41 3.74* 3.36*  --    HEMOGLOBIN 11.4* 9.8* 8.8* 9.1*   HEMATOCRIT 36.8* 32.5* 28.8*  --    MCV 83.4 86.9 85.7  --    MCH 25.9* 26.2* 26.2*  --    MCHC  31.0* 30.2* 30.6*  --    RDW 50.5* 54.6* 56.2*  --    PLATELETCT 158* 121* 110*  --    MPV 10.3 9.8 12.2  --      Recent Labs     03/01/25  0052 03/01/25  0322 03/02/25  0130   SODIUM 138 137 138   POTASSIUM 3.5* 3.8 4.7   CHLORIDE 108 110 111   CO2 20 18* 23   GLUCOSE 114* 106* 121*   BUN 7* 8 8   CREATININE 0.62 0.71 0.74   CALCIUM 7.6* 7.6* 7.8*     Recent Labs     02/28/25  1055   APTT 23.4*   INR 0.94               Imaging  EC-ECHOCARDIOGRAM COMPLETE W/O CONT   Final Result      CT-CTA CHEST PULMONARY ARTERY W/ RECONS   Final Result      1.  No evidence of pulmonary embolus.      2.  Dense consolidation of volume loss within the left lower lobe.      3.  Atherosclerotic change of the aorta and coronary arteries.      DX-CHEST-FOR LINE PLACEMENT Perform procedure in: Patient's Room   Final Result      1.  Right IJ central and present with the tip overlying the right atrium. No evidence of pneumothorax.      2.  Bibasal atelectasis.      3.  Cardiomegaly.      US-EXTREMITY VENOUS LOWER BILAT   Final Result      DX-ANKLE 3+ VIEWS RIGHT   Final Result      Postsurgical change consistent with right tibiotalar arthroplasty. No evidence of fracture or dislocation.      DX-ANKLE 3+ VIEWS LEFT   Final Result      No evidence of acute fracture or dislocation.      DX-KNEE 3 VIEWS RIGHT   Final Result      No evidence of acute fracture or dislocation.      DX-KNEE 3 VIEWS LEFT   Final Result      No evidence of acute fracture or dislocation.      DX-PELVIS-1 OR 2 VIEWS   Final Result      1.  No evidence of fracture or dislocation.      2.  Prior left hip arthroplasty.      DX-ELBOW-COMPLETE 3+ LEFT   Final Result      No evidence of acute fracture or dislocation.      DX-SHOULDER 2+ LEFT   Final Result      1.  No evidence of acute fracture or dislocation.      2.  Mild degenerative change of the acromioclavicular joint.      DX-CHEST-PORTABLE (1 VIEW)   Final Result      1.  Mild cardiomegaly.      2.  Linear bibasilar  atelectasis.           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  Will start therapeutic anticoagulation.  Given her tachycardia and shortness of breath will check CT angiography of chest to rule out associated pulmonary embolism    Normocytic anemia- (present on admission)  Assessment & Plan  Hemoglobin trending down  No signs of bleeding  Iron studies  Labs on a.m.    Acute hypoxemic respiratory failure (HCC)- (present on admission)  Assessment & Plan  Likely secondary to left lower lobe infiltrates  No PE on CTA  Resolved    ACP (advance care planning)- (present on admission)  Assessment & Plan  I had a discussion with the patient regarding goals of care, diagnoses, prognosis, and CODE STATUS. We discussed her prognosis and comorbidities.  Patient has advanced age of 75 years.  She has a number of chronic medical problems including diabetes, hypertension and hyperlipidemia.  She is presenting with multiple acute medical problems including left lower extremity edema pain redness found to have deep vein thrombosis in addition she has hypoxia concerning for possible pulmonary embolism.  At this point the patient wants to maintain a full code.  She is open to all forms of invasive and noninvasive diagnostic and therapeutic interventions including mechanical thrombectomy / EKOS procedure if needed.     Primary hypertension- (present on admission)  Assessment & Plan  I will resume home metoprolol with hold parameters    Hypokalemia- (present on admission)  Assessment & Plan  Resolved    Type 2 diabetes mellitus (HCC)- (present on admission)  Assessment & Plan  With no significant hyperglycemia  Last glycated hemoglobin was 6.9%  I will start short acting insulin for now  I will order  Accu-Checks, hypoglycemia protocol  Adjust according to blood sugars trend     Acquired hypothyroidism- (present on admission)  Assessment & Plan  I will start levothyroxine    Depression- (present on admission)  Assessment & Plan  I will resume home escitalopram and amitriptyline    Dyslipidemia- (present on admission)  Assessment & Plan  Cardiac diet.  I resume home rosuvastatin       VTE prophylaxis: Therapeutic Eliquis    I have performed a physical exam and reviewed and updated ROS and Plan today (3/2/2025). In review of yesterday's note (3/1/2025), there are no changes except as documented above.

## 2025-03-02 NOTE — CARE PLAN
Problem: Pain - Standard  Goal: Alleviation of pain or a reduction in pain to the patient’s comfort goal  Outcome: Progressing     Problem: Fall Risk  Goal: Patient will remain free from falls  Outcome: Progressing   The patient is Stable - Low risk of patient condition declining or worsening    Shift Goals  Clinical Goals: Pain < 9/10; sleep comfortably  Patient Goals: get regular home pain meds ordered.  Family Goals: JONY    Progress made toward(s) clinical / shift goals:  Pain at tolerable level.remained free from falls.    Patient is not progressing towards the following goals:

## 2025-03-02 NOTE — CARE PLAN
The patient is Stable - Low risk of patient condition declining or worsening    Shift Goals  Clinical Goals: Patient safety, Heparin drip  Patient Goals: Pain management, rest  Family Goals: dante    Progress made toward(s) clinical / shift goals:    Problem: Pain - Standard  Goal: Alleviation of pain or a reduction in pain to the patient’s comfort goal  Outcome: Progressing       Patient is not progressing towards the following goals:

## 2025-03-02 NOTE — PROGRESS NOTES
Telemetry Shift Summary     Rhythm: SR  HR: 79-83  Ectopy: none    Measurements: 0.18/0.08/0.34    Normal Values  Rhythm: SR  HR:   Measurements: 0.12-0.20/0.08-0.10/0.30-0.52

## 2025-03-02 NOTE — THERAPY
"Physical Therapy   Initial Evaluation     Patient Name: Yamile Go  Age:  75 y.o., Sex:  female  Medical Record #: [id number]  Today's Date: 3/2/2025     Precautions  Precautions: (P) Fall Risk  Comments: (P) pt reports L side progressively getting weak. has penidng appointment with neurologist    Assessment  Patient is 75 y.o. female admit 2/28 with leg swelling. Fever hypoxic on RA. Ultrasound showed evidence for chronic, partially occlusive thrombus in the common femoral vein extending to the popliteal vein in the left lower extremity. PMH hypertension, hypothyroidism, chronic pain on Norco 10 at home, depression . COVID-19 positive 1/29/2022 with PCR.   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   Pt demo's decreased strength and balance affecting safe transfers and gait. Pt has an unrealistic understanding of her deficits and poor safety judgement. 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 Portillo from neurology. Pt is  \" open to acute rehab\" and will refuse SNF. Pt is failure to thrive at home alone, does not have enough physical support from family and is a high fall risk.  Will see pt during acute care. See below flowsheet for eval details. LK         Plan    Physical Therapy Initial Treatment Plan   Treatment Plan : (P) Bed Mobility, Gait Training, Neuro Re-Education / Balance, Self Care / Home Evaluation, Therapeutic Activities, Therapeutic Exercise  Treatment Frequency: (P) 4 Times per Week  Duration: (P) Until Therapy Goals Met       Discharge Recommendations: (P) Recommend post-acute placement for additional physical therapy services prior to discharge home          03/02/25 1449   Time In/Time Out   Therapy Start Time 1409   Therapy End Time 1449   Total Therapy Time 40   Charge Group   PT Evaluation PT Evaluation High   PT Self Care / Home Evaluation (Units) 1   Total Time Spent   PT Evaluation Time Spent (Mins) 30   PT Self Care/Home " Evaluation Time Spent (Mins) 10    Services   Is patient using  services for this encounter? No   Initial Contact Note    Initial Contact Note Order Received and Verified, Physical Therapy Evaluation in Progress with Full Report to Follow.   Precautions   Precautions Fall Risk   Comments pt reports L side progressively getting weak. has penidng appointment with neurologist   Vitals   O2 Delivery Device None - Room Air   Pain 0 - 10 Group   Therapist Pain Assessment Nurse Notified;Post Activity Pain Same as Prior to Activity   Prior Living Situation   Prior Services Home-Independent   Housing / Facility 1 Story Apartment / Condo   Steps Into Home 0   Steps In Home 0   Equipment Owned Front-Wheel Walker;4-Wheel Walker   Lives with - Patient's Self Care Capacity Alone and Unable to Care For Self   Comments has dtr in [facility] who stops by in evenings 2 x week. has a 4 y/o cat.   Prior Level of Functional Mobility   Bed Mobility Independent   Transfer Status Independent   Ambulation Independent   Assistive Devices Used Front-Wheel Walker   Comments pt reports very little ambulation at home. some days she does very little. poor historian   History of Falls   History of Falls Yes   Date of Last Fall   (pt reports several falls. calls 911 or friend to assist off floor)   Cognition    Cognition / Consciousness X   Level of Consciousness Alert   New Learning Impaired   Comments flat affect, appears confused. refuses SNF or group home. wants either acute rehab or home with HH   Passive ROM Lower Body   Passive ROM Lower Body WDL   Active ROM Lower Body    Active ROM Lower Body  WDL   Strength Lower Body   Lower Body Strength  X   Comments inconsistant MMT. decreased wt shirt and weight acceptance LLE   Sensation Lower Body   Lower Extremity Sensation   Not Tested   Lower Body Muscle Tone   Lower Body Muscle Tone  WDL   Coordination Lower Body    Coordination Lower Body  WDL   Balance Assessment   Sitting  Balance (Static) Fair +   Sitting Balance (Dynamic) Fair   Standing Balance (Static) Fair -   Standing Balance (Dynamic) Poor +   Weight Shift Sitting Fair   Weight Shift Standing Poor   Comments with FWW   Bed Mobility    Supine to Sit   (up in chair)   Sit to Supine   (return to chair)   Scooting Standby Assist   Gait Analysis   Gait Level Of Assist Minimal Assist   Assistive Device Front Wheel Walker   Distance (Feet) 5   # of Times Distance was Traveled 1   Deviation Shuffled Gait;Bradykinetic   Comments pt requesting chair to sit, very  wobbling and unsteady on feet. pt too fatiqued to amb further.   Functional Mobility   Sit to Stand Minimal Assist   Bed, Chair, Wheelchair Transfer Minimal Assist   6 Clicks Assessment - How much HELP from from another person do you currently need... (If the patient hasn't done an activity recently, how much help from another person do you think he/she would need if he/she tried?)   Turning from your back to your side while in a flat bed without using bedrails? 3   Moving from lying on your back to sitting on the side of a flat bed without using bedrails? 3   Moving to and from a bed to a chair (including a wheelchair)? 3   Standing up from a chair using your arms (e.g., wheelchair, or bedside chair)? 2   Walking in hospital room? 2   Climbing 3-5 steps with a railing? 2   6 clicks Mobility Score 15   Activity Tolerance   Sitting in Chair post   Sitting Edge of Bed 5 edge of chair   Standing 3 min x 2   Short Term Goals    Short Term Goal # 1 in 6 V pt will perform bed mob with mod indep flat bed and no rail   Short Term Goal # 2 in 6 V pt will transfer to various surfaces using FWW with mod indep   Short Term Goal # 3 in 6 V pt will amb using FWW x 200 feet with mod indep   Education Group   Role of Physical Therapist Patient Response Patient;Acceptance;Explanation;Verbal Demonstration   Exercises - Supine Patient Response Patient;Acceptance;Explanation;Action Demonstration    Physical Therapy Initial Treatment Plan    Treatment Plan  Bed Mobility;Gait Training;Neuro Re-Education / Balance;Self Care / Home Evaluation;Therapeutic Activities;Therapeutic Exercise   Treatment Frequency 4 Times per Week   Duration Until Therapy Goals Met   Problem List    Problems Impaired Bed Mobility;Impaired Transfers;Impaired Ambulation;Functional Strength Deficit;Impaired Balance;Decreased Activity Tolerance;Safety Awareness Deficits / Cognition   Anticipated Discharge Equipment and Recommendations   Discharge Recommendations Recommend post-acute placement for additional physical therapy services prior to discharge home   Interdisciplinary Plan of Care Collaboration   IDT Collaboration with  Nursing   Patient Position at End of Therapy Chair Alarm On;Seated;Phone within Reach;Tray Table within Reach;Call Light within Reach   Collaboration Comments re; eval   Session Information   Date / Session Number  3/2/25-1 ( 1/4, 3/8)

## 2025-03-03 VITALS
SYSTOLIC BLOOD PRESSURE: 156 MMHG | HEART RATE: 84 BPM | TEMPERATURE: 98.1 F | RESPIRATION RATE: 14 BRPM | OXYGEN SATURATION: 97 % | BODY MASS INDEX: 25.92 KG/M2 | HEIGHT: 62 IN | WEIGHT: 140.87 LBS | DIASTOLIC BLOOD PRESSURE: 71 MMHG

## 2025-03-03 LAB
ANION GAP SERPL CALC-SCNC: 10 MMOL/L (ref 7–16)
BUN SERPL-MCNC: 8 MG/DL (ref 8–22)
CALCIUM SERPL-MCNC: 8.6 MG/DL (ref 8.4–10.2)
CHLORIDE SERPL-SCNC: 106 MMOL/L (ref 96–112)
CO2 SERPL-SCNC: 20 MMOL/L (ref 20–33)
CREAT SERPL-MCNC: 0.76 MG/DL (ref 0.5–1.4)
ERYTHROCYTE [DISTWIDTH] IN BLOOD BY AUTOMATED COUNT: 54.7 FL (ref 35.9–50)
GFR SERPLBLD CREATININE-BSD FMLA CKD-EPI: 82 ML/MIN/1.73 M 2
GLUCOSE BLD STRIP.AUTO-MCNC: 119 MG/DL (ref 65–99)
GLUCOSE SERPL-MCNC: 133 MG/DL (ref 65–99)
HCT VFR BLD AUTO: 31.6 % (ref 37–47)
HGB BLD-MCNC: 9.8 G/DL (ref 12–16)
MCH RBC QN AUTO: 26.3 PG (ref 27–33)
MCHC RBC AUTO-ENTMCNC: 31 G/DL (ref 32.2–35.5)
MCV RBC AUTO: 84.9 FL (ref 81.4–97.8)
PLATELET # BLD AUTO: 185 K/UL (ref 164–446)
PMV BLD AUTO: 9.6 FL (ref 9–12.9)
POTASSIUM SERPL-SCNC: 4.3 MMOL/L (ref 3.6–5.5)
RBC # BLD AUTO: 3.72 M/UL (ref 4.2–5.4)
SODIUM SERPL-SCNC: 136 MMOL/L (ref 135–145)
WBC # BLD AUTO: 7.4 K/UL (ref 4.8–10.8)

## 2025-03-03 PROCEDURE — A9270 NON-COVERED ITEM OR SERVICE: HCPCS | Performed by: STUDENT IN AN ORGANIZED HEALTH CARE EDUCATION/TRAINING PROGRAM

## 2025-03-03 PROCEDURE — A9270 NON-COVERED ITEM OR SERVICE: HCPCS | Performed by: HOSPITALIST

## 2025-03-03 PROCEDURE — 700102 HCHG RX REV CODE 250 W/ 637 OVERRIDE(OP): Performed by: STUDENT IN AN ORGANIZED HEALTH CARE EDUCATION/TRAINING PROGRAM

## 2025-03-03 PROCEDURE — 80048 BASIC METABOLIC PNL TOTAL CA: CPT

## 2025-03-03 PROCEDURE — 82962 GLUCOSE BLOOD TEST: CPT

## 2025-03-03 PROCEDURE — 700102 HCHG RX REV CODE 250 W/ 637 OVERRIDE(OP): Performed by: HOSPITALIST

## 2025-03-03 PROCEDURE — 99239 HOSP IP/OBS DSCHRG MGMT >30: CPT | Performed by: STUDENT IN AN ORGANIZED HEALTH CARE EDUCATION/TRAINING PROGRAM

## 2025-03-03 PROCEDURE — 85027 COMPLETE CBC AUTOMATED: CPT

## 2025-03-03 RX ORDER — TRAMADOL HYDROCHLORIDE 50 MG/1
50 TABLET ORAL EVERY 4 HOURS PRN
Status: DISCONTINUED | OUTPATIENT
Start: 2025-03-03 | End: 2025-03-03

## 2025-03-03 RX ORDER — HYDRALAZINE HYDROCHLORIDE 20 MG/ML
20 INJECTION INTRAMUSCULAR; INTRAVENOUS EVERY 6 HOURS PRN
Status: DISCONTINUED | OUTPATIENT
Start: 2025-03-03 | End: 2025-03-03 | Stop reason: HOSPADM

## 2025-03-03 RX ORDER — OXYCODONE HYDROCHLORIDE 10 MG/1
10 TABLET ORAL EVERY 6 HOURS PRN
Refills: 0 | Status: DISCONTINUED | OUTPATIENT
Start: 2025-03-03 | End: 2025-03-03 | Stop reason: HOSPADM

## 2025-03-03 RX ORDER — ACETAMINOPHEN 500 MG
1000 TABLET ORAL 3 TIMES DAILY
Status: SHIPPED
Start: 2025-03-03

## 2025-03-03 RX ADMIN — ESCITALOPRAM OXALATE 20 MG: 10 TABLET ORAL at 04:55

## 2025-03-03 RX ADMIN — OXYCODONE HYDROCHLORIDE 10 MG: 10 TABLET ORAL at 15:03

## 2025-03-03 RX ADMIN — LEVOTHYROXINE SODIUM 88 MCG: 0.09 TABLET ORAL at 04:55

## 2025-03-03 RX ADMIN — OMEPRAZOLE 20 MG: 20 CAPSULE, DELAYED RELEASE ORAL at 04:54

## 2025-03-03 RX ADMIN — LIOTHYRONINE SODIUM 5 MCG: 5 TABLET ORAL at 04:55

## 2025-03-03 RX ADMIN — OXYCODONE HYDROCHLORIDE 10 MG: 10 TABLET ORAL at 09:36

## 2025-03-03 RX ADMIN — METOPROLOL SUCCINATE 25 MG: 25 TABLET, FILM COATED, EXTENDED RELEASE ORAL at 04:54

## 2025-03-03 RX ADMIN — OXYCODONE 5 MG: 5 TABLET ORAL at 04:45

## 2025-03-03 RX ADMIN — APIXABAN 10 MG: 5 TABLET, FILM COATED ORAL at 04:54

## 2025-03-03 ASSESSMENT — PAIN DESCRIPTION - PAIN TYPE
TYPE: CHRONIC PAIN
TYPE: ACUTE PAIN
TYPE: ACUTE PAIN

## 2025-03-03 NOTE — DISCHARGE PLANNING
Care Transition Team Assessment    Information Source  Orientation Level: Oriented X4  Information Given By: Patient  Who is responsible for making decisions for patient? : Patient    Readmission Evaluation  Is this a readmission?: No    Elopement Risk  Legal Hold: No  Ambulatory or Self Mobile in Wheelchair: No-Not an Elopement Risk  Disoriented: No  Psychiatric Symptoms: None  History of Wandering: No  Elopement this Admit: No  Vocalizing Wanting to Leave: No  Displays Behaviors, Body Language Wanting to Leave: No-Not at Risk for Elopement  Elopement Risk: Not at Risk for Elopement    Interdisciplinary Discharge Planning  Does Admitting Nurse Feel This Could be a Complex Discharge?: No  Lives with - Patient's Self Care Capacity: Alone and Able to Care For Self  Patient or legal guardian wants to designate a caregiver: No  Support Systems: Children  Housing / Facility: 1 Story Apartment / Condo  Do You Take your Prescribed Medications Regularly: Yes  Able to Return to Previous ADL's: Future Time w/Therapy  Mobility Issues: No  Prior Services: Intermittent Physical Support for ADL Per Service  Patient Prefers to be Discharged to:: SNF  Assistance Needed: Yes    Discharge Preparedness  What is your plan after discharge?: Skilled nursing facility  What are your discharge supports?: Child  Prior Functional Level: Ambulatory, Drives Self, Independent with Activities of Daily Living, Independent with Medication Management  Difficulity with ADLs: Bathing, Dressing, Walking, Toileting  Difficulity with IADLs: Cooking, Driving, Laundry, Shopping    Functional Assesment  Prior Functional Level: Ambulatory, Drives Self, Independent with Activities of Daily Living, Independent with Medication Management    Finances  Financial Barriers to Discharge: No  Prescription Coverage: Yes    Vision / Hearing Impairment  Vision Impairment : Yes  Right Eye Vision: Wears Glasses  Left Eye Vision: Wears Glasses  Hearing Impairment :  Yes  Hearing Impairment: Both Ears, Hearing Device(s) Available  Does Pt Need Special Equipment for the Hearing Impaired?: No    Values / Beliefs / Concerns  Values / Beliefs Concerns : No    Advance Directive  Advance Directive?: None    Domestic Abuse  Have you ever been the victim of abuse or violence?: No  Possible Abuse/Neglect Reported to:: Not Applicable         Discharge Risks or Barriers  Patient risk factors: Cognitive / sensory / physical deficit    Anticipated Discharge Information  Discharge Disposition: D/T to SNF with Medicare cert in anticipation of skilled care (03)

## 2025-03-03 NOTE — DISCHARGE SUMMARY
Discharge Summary    CHIEF COMPLAINT ON ADMISSION  Chief Complaint   Patient presents with    Leg Swelling    Leg Pain     Pt c/o BLE swelling, redness and pain. Pt states both legs are weeping but worse on the L. Pt states temp last night was 101       Reason for Admission  Leg Swelling; Redness; Drainage    Admission Date  2/28/2025     CODE STATUS  Full Code    HPI & HOSPITAL COURSE  75 y.o. female with a past medical history of diabetes, hypertension, gastroesophageal flux disease, hypothyroidism and chronic right upper extremity weakness for which she is undergoing EMG workup with pain management and neurology in the outpatient settin presented 2/28/2025 with generalized weakness, shortness of breath and left lower extremity pain redness, and swelling.  Patient reports that she has not been feeling well over the past 3 days.  She has worsening left lower extremity pain, redness and weeping.  The patient also reports having a fever of 101 last night.  She does not have cough or sputum production.  In the emergency room she was found to be tachycardic with a heart rate of 119.  She was noticed to be hypoxic saturating 86% on room air.      Patient was admitted to the telemetry floor for acute hypoxic respiratory failure and left lower extremity DVT.  She was started on heparin drip.  Follow-up chest CTA without PE but noted for dense consolidation of volume loss within the left lower lobe.   3/1 Echocardiogram showing EF 62%, normal diastolic function, dilated inferior vena cava without inspiratory collapse.  Hypoxia resolved shortly after admission.  She was transition to an Eliquis anticoagulation regimen which she is to continue in the outpatient setting.  She was noted for anemia of chronic disease per iron studies.  Patient was eval by physical therapy who recommended continuation of therapy at postacute care.  Stable patient within chronic conditions to be discharged to skilled nursing facility for  continued therapy and care.    Therefore, she is discharged in good and stable condition to skilled nursing facility.    The patient met 2-midnight criteria for an inpatient stay at the time of discharge.      FOLLOW UP ITEMS POST DISCHARGE  SNF to follow posthospital discharge care    DISCHARGE DIAGNOSES  Principal Problem:    Deep vein thrombosis (DVT) of left lower extremity (HCC) (POA: Yes)  Active Problems:    Normocytic anemia (POA: Yes)    Dyslipidemia (POA: Yes)    Depression (POA: Yes)    Acquired hypothyroidism (POA: Yes)    Type 2 diabetes mellitus (HCC) (POA: Yes)    Hypokalemia (POA: Yes)    Primary hypertension (POA: Yes)    ACP (advance care planning) (POA: Yes)    Acute hypoxemic respiratory failure (HCC) (POA: Yes)  Resolved Problems:    * No resolved hospital problems. *      FOLLOW UP  No future appointments.  No follow-up provider specified.    MEDICATIONS ON DISCHARGE     Medication List        START taking these medications        Instructions   acetaminophen 500 MG Tabs  Commonly known as: Tylenol   Take 2 Tablets by mouth 3 times a day.  Dose: 1,000 mg     * apixaban 5mg Tabs  Commonly known as: Eliquis   Take 2 Tablets by mouth 2 times a day for 5 days.  Dose: 10 mg     * apixaban 5mg Tabs  Start taking on: March 8, 2025  Commonly known as: Eliquis   Take 1 Tablet by mouth 2 times a day.  Dose: 5 mg           * This list has 2 medication(s) that are the same as other medications prescribed for you. Read the directions carefully, and ask your doctor or other care provider to review them with you.                CONTINUE taking these medications        Instructions   albuterol 108 (90 Base) MCG/ACT Aers inhalation aerosol   Inhale 1-2 Puffs every four hours as needed for Shortness of Breath.  Dose: 1-2 Puff     alendronate 70 MG Tabs  Commonly known as: Fosamax   70 mg every 7 days. On Monday  Dose: 70 mg     amitriptyline 100 MG Tabs  Commonly known as: Elavil   Take 1.5 Tablets by mouth  every evening.  Dose: 150 mg     D3 5000 125 MCG (5000 UT) Caps  Generic drug: cholecalciferol   Take 5,000 Units by mouth every day.  Dose: 5,000 Units     diclofenac sodium 1 % Gel  Commonly known as: Voltaren   Apply 4 g topically 4 times a day as needed (back pain).  Dose: 4 g     escitalopram 20 MG tablet  Commonly known as: Lexapro   Take 1 Tablet by mouth every day.  Dose: 20 mg     ezetimibe 10 MG Tabs  Commonly known as: Zetia   Take 1 Tablet by mouth every evening.  Dose: 10 mg     liothyronine 5 MCG Tabs  Commonly known as: Cytomel   Take 1 Tablet by mouth every day.  Dose: 5 mcg     metFORMIN 500 MG Tabs  Commonly known as: Glucophage   Take 1 Tablet by mouth 2 times a day with meals. Indications: Type 2 Diabetes  Dose: 500 mg     methocarbamol 500 MG Tabs  Commonly known as: Robaxin   Take 1 Tablet by mouth 3 times a day as needed (Hold for respiratory depression, respiratory rate <12, heart rate less than 65, lethargy,somnolence, or systolic blood pressure < 105.).  Dose: 500 mg     metoprolol SR 25 MG Tb24  Commonly known as: Toprol XL   Take 1 Tablet by mouth every day.  Dose: 25 mg     omeprazole 20 MG delayed-release capsule  Commonly known as: PriLOSEC   Take 1 Capsule by mouth 2 times a day.  Dose: 20 mg     oxyCODONE immediate release 10 MG immediate release tablet  Commonly known as: Roxicodone   Take 10 mg by mouth every four hours as needed for Severe Pain.  Dose: 10 mg     rosuvastatin 20 MG Tabs  Commonly known as: Crestor   Take 1 Tablet by mouth every evening.  Dose: 20 mg     Synthroid 88 MCG Tabs  Generic drug: levothyroxine   Take 1 Tablet by mouth every day.  Dose: 88 mcg              Allergies  Allergies   Allergen Reactions    Ampicillin-Sulbactam Sodium Hives     With oral lesions     Doxycycline Hives     Possible hives (took with Unasyn)     Gabapentin Hives and Swelling    Pregabalin Hives and Swelling    Amlodipine Swelling    Bee Swelling    Fentanyl Vomiting and Unspecified     Morphine Vomiting, Nausea and Unspecified    Benzoin Rash     Tincture of benzoin =blisters    blisters    Rifampin Unspecified     Pt turns yellow       DIET  Orders Placed This Encounter   Procedures    Diet Order Diet: Cardiac; Second Modifier: (optional): Consistent CHO (Diabetic)     Standing Status:   Standing     Number of Occurrences:   1     Diet::   Cardiac [6]     Second Modifier: (optional):   Consistent CHO (Diabetic) [4]       ACTIVITY  As tolerated.  Weight bearing as tolerated    LINES, DRAINS, AND WOUNDS  This is an automated list. Peripheral IVs will be removed prior to discharge.  Peripheral IV 03/02/25 22 G Anterior;Right Forearm (Active)   Site Assessment Clean;Dry;Intact 03/02/25 2137   Dressing Type Transparent 03/02/25 2137   Line Status Saline locked 03/02/25 2137   Dressing Status Clean;Dry;Intact 03/02/25 2137   Dressing Intervention Initial dressing 03/02/25 2137   NEXT Primary Tubing Change  03/04/25 03/02/25 2137   Infiltration Grading ([facility]) 0 03/02/25 2137   Phlebitis Scale ([facility] Only) 0 03/02/25 2137       CVC Triple Lumen 02/28/25 Right Internal jugular (Active)   Site Assessment Clean;Dry;Intact 03/02/25 2140   Lumen 1 Status Scrubbed the hub prior to access;Flushed;Blood return noted;Normal saline locked;Capped 03/02/25 2140   Lumen 3 Status Scrubbed the hub prior to access;Flushed;Blood return noted;Normal saline locked;Capped 03/02/25 2140   Lumen 2 Status Scrubbed the hub prior to access;Flushed;Blood return noted;Normal saline locked;Capped 03/02/25 2140   Dressing Type Transparent Film;Biopatch 03/02/25 2140   Dressing Status Clean;Dry;Intact 03/02/25 2140   Dressing Intervention N/A 03/01/25 2200   Dressing Change Due 03/04/25 03/01/25 2200   Date Primary Tubing Changed 03/01/25 03/01/25 2200   NEXT Primary Tubing Change  03/04/25 03/01/25 2200   NEXT Secondary Tubing Change  03/02/25 03/01/25 2200   NEXT IV Connector(s) Change 03/11/25 03/01/25 2200   Waveform  Not Applicable 03/01/25 2200   Line Calibrated Not Applicable 03/01/25 2200   Line Necessity Assessed Lack of Peripheral Access 03/02/25 2140     External Urinary Catheter (Female Wick) (Active)   Collection Container Suction container 03/02/25 1600   Suction Level (Female Wick Catheter) 600 mmHg 03/02/25 1600   Output (mL) 300 mL 03/02/25 1600      Wound 02/28/25 Breast Lower Right excoration (Active)   Site Assessment [address] 03/02/25 0800   Periwound Assessment Clean;Dry;Intact 03/02/25 0800   Margins Undefined edges 03/02/25 0800   Closure Open to air 03/02/25 0800   Drainage Amount None 03/02/25 0800   Wound Cleansing Soap and Water 03/02/25 0800   Dressing Status Open to Air 03/02/25 0800     CVC Triple Lumen 02/28/25 Right Internal jugular (Active)   Site Assessment Clean;Dry;Intact 03/02/25 2140   Lumen 1 Status Scrubbed the hub prior to access;Flushed;Blood return noted;Normal saline locked;Capped 03/02/25 2140   Lumen 3 Status Scrubbed the hub prior to access;Flushed;Blood return noted;Normal saline locked;Capped 03/02/25 2140   Lumen 2 Status Scrubbed the hub prior to access;Flushed;Blood return noted;Normal saline locked;Capped 03/02/25 2140   Dressing Type Transparent Film;Biopatch 03/02/25 2140   Dressing Status Clean;Dry;Intact 03/02/25 2140   Dressing Intervention N/A 03/01/25 2200   Dressing Change Due 03/04/25 03/01/25 2200   Date Primary Tubing Changed 03/01/25 03/01/25 2200   NEXT Primary Tubing Change  03/04/25 03/01/25 2200   NEXT Secondary Tubing Change  03/02/25 03/01/25 2200   NEXT IV Connector(s) Change 03/11/25 03/01/25 2200   Waveform Not Applicable 03/01/25 2200   Line Calibrated Not Applicable 03/01/25 2200   Line Necessity Assessed Lack of Peripheral Access 03/02/25 2140      Peripheral IV 03/02/25 22 G Anterior;Right Forearm (Active)   Site Assessment Clean;Dry;Intact 03/02/25 2137   Dressing Type Transparent 03/02/25 2137   Line Status Saline locked 03/02/25 2137   Dressing Status  Clean;Dry;Intact 03/02/25 2137   Dressing Intervention Initial dressing 03/02/25 2137   NEXT Primary Tubing Change  03/04/25 03/02/25 2137   Infiltration Grading ([facility]) 0 03/02/25 2137   Phlebitis Scale ([facility] Only) 0 03/02/25 2137               MENTAL STATUS ON TRANSFER  At baseline    CONSULTATIONS  None    PROCEDURES  None    LABORATORY  Lab Results   Component Value Date    SODIUM 138 03/02/2025    POTASSIUM 4.7 03/02/2025    CHLORIDE 111 03/02/2025    CO2 23 03/02/2025    GLUCOSE 121 (H) 03/02/2025    BUN 8 03/02/2025    CREATININE 0.74 03/02/2025    CREATININE 0.87 02/20/2012        Lab Results   Component Value Date    WBC 7.4 03/03/2025    WBC 6.9 02/20/2012    HEMOGLOBIN 9.8 (L) 03/03/2025    HEMATOCRIT 31.6 (L) 03/03/2025    PLATELETCT 185 03/03/2025        Total time of the discharge process exceeds 33 minutes.

## 2025-03-03 NOTE — CARE PLAN
The patient is Stable - Low risk of patient condition declining or worsening    Shift Goals  Clinical Goals: eliquis, monitor swelling  Patient Goals: sleep  Family Goals: JONY    Progress made toward(s) clinical / shift goals:      Patient verbalizes understanding of a DVT and Eliquis. Bilateral lower extremity swelling decreased. Patient's pain ranged between moderate to severe; pain appropriately treated via medication and heat packs. Heat packs were placed in pillow cases to prevent burns and maintain skin integrity.        Problem: Pain - Standard  Goal: Alleviation of pain or a reduction in pain to the patient’s comfort goal  Outcome: Progressing     Problem: Knowledge Deficit - Standard  Goal: Patient and family/care givers will demonstrate understanding of plan of care, disease process/condition, diagnostic tests and medications  Outcome: Progressing     Problem: Skin Integrity  Goal: Skin integrity is maintained or improved  Outcome: Progressing

## 2025-03-03 NOTE — DISCHARGE PLANNING
Case Management Discharge Planning    Admission Date: 2/28/2025  GMLOS:    ALOS: 3    6-Clicks ADL Score: 20  6-Clicks Mobility Score: 15  PT and/or OT Eval ordered: Yes  Post-acute Referrals Ordered: Yes  Post-acute Choice Obtained: Yes  Has referral(s) been sent to post-acute provider:  Yes      Anticipated Discharge Dispo: Discharge Disposition: D/T to SNF with Medicare cert in anticipation of skilled care (03)Accepted by [facility]    DME Needed: none    Action(s) Taken:   Larissa at [facility] confirmed acceptance.     CM met with and explained that we have a bed at [facility] . Explained to pt that this is an SNF and they have private rooms. Pt is agreeable. She signed choice and IMM. Pt asked how long she will be there . Explained that this is based on medical necessity but Medicare will pay up to 100 days. Pt stated that she does not think she needs to be there for 100 days.     Assesment completed     MD confirmed medical clearance.     Larissa confirmed that they can  at 330pm.    Escalations Completed: n/a    Medically Clear: yes    Next Steps: n/a    Barriers to Discharge: none    Is the patient up for discharge tomorrow: today.

## 2025-03-03 NOTE — DISCHARGE PLANNING
[facility] Transitional Care Coordination    Referral from: Dr. Josefina Palacio    Insurance Provider on Facesheet: MCR/AETNA    Potential Rehab Diagnosis: Debility    Chart review indicates patient may have on going medical management and may have therapy needs to possibly meet inpatient rehab facility criteria with the goal of returning to community.    D/C support will need to be verified: Daughter    Physiatry consultation pended per protocol.  OT pending.     Thank you for the referral.

## 2025-05-05 ENCOUNTER — HOSPITAL ENCOUNTER (INPATIENT)
Facility: MEDICAL CENTER | Age: 75
LOS: 9 days | DRG: 184 | End: 2025-05-14
Attending: EMERGENCY MEDICINE | Admitting: STUDENT IN AN ORGANIZED HEALTH CARE EDUCATION/TRAINING PROGRAM
Payer: MEDICARE

## 2025-05-05 ENCOUNTER — APPOINTMENT (OUTPATIENT)
Dept: RADIOLOGY | Facility: MEDICAL CENTER | Age: 75
End: 2025-05-05
Attending: EMERGENCY MEDICINE
Payer: MEDICARE

## 2025-05-05 ENCOUNTER — HOSPITAL ENCOUNTER (EMERGENCY)
Facility: MEDICAL CENTER | Age: 75
End: 2025-05-05
Attending: EMERGENCY MEDICINE
Payer: MEDICARE

## 2025-05-05 ENCOUNTER — APPOINTMENT (OUTPATIENT)
Dept: RADIOLOGY | Facility: MEDICAL CENTER | Age: 75
DRG: 184 | End: 2025-05-05
Attending: EMERGENCY MEDICINE
Payer: MEDICARE

## 2025-05-05 ENCOUNTER — APPOINTMENT (OUTPATIENT)
Dept: RADIOLOGY | Facility: MEDICAL CENTER | Age: 75
DRG: 184 | End: 2025-05-05
Payer: MEDICARE

## 2025-05-05 VITALS
WEIGHT: 140 LBS | BODY MASS INDEX: 25.76 KG/M2 | SYSTOLIC BLOOD PRESSURE: 107 MMHG | HEART RATE: 100 BPM | TEMPERATURE: 98.2 F | HEIGHT: 62 IN | RESPIRATION RATE: 19 BRPM | DIASTOLIC BLOOD PRESSURE: 57 MMHG | OXYGEN SATURATION: 91 %
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
## 2025-05-05 DIAGNOSIS — S22.43XA CLOSED FRACTURE OF MULTIPLE RIBS OF BOTH SIDES, INITIAL ENCOUNTER: ICD-10-CM

## 2025-05-05 DIAGNOSIS — I10 PRIMARY HYPERTENSION: ICD-10-CM

## 2025-05-05 DIAGNOSIS — S22.42XA CLOSED FRACTURE OF MULTIPLE RIBS OF LEFT SIDE, INITIAL ENCOUNTER: ICD-10-CM

## 2025-05-05 DIAGNOSIS — F39 MOOD DISORDER (HCC): ICD-10-CM

## 2025-05-05 DIAGNOSIS — S22.42XA: ICD-10-CM

## 2025-05-05 DIAGNOSIS — S09.90XA CLOSED HEAD INJURY, INITIAL ENCOUNTER: ICD-10-CM

## 2025-05-05 DIAGNOSIS — I10 ESSENTIAL HYPERTENSION, BENIGN: Primary | ICD-10-CM

## 2025-05-05 DIAGNOSIS — R74.8 INCREASED CREATINE KINASE LEVEL: ICD-10-CM

## 2025-05-05 DIAGNOSIS — I82.4Y2 DEEP VEIN THROMBOSIS (DVT) OF PROXIMAL VEIN OF LEFT LOWER EXTREMITY, UNSPECIFIED CHRONICITY (HCC): ICD-10-CM

## 2025-05-05 DIAGNOSIS — T14.90XA TRAUMA: ICD-10-CM

## 2025-05-05 DIAGNOSIS — J10.1 INFLUENZA A: ICD-10-CM

## 2025-05-05 DIAGNOSIS — R09.02 HYPOXIA: ICD-10-CM

## 2025-05-05 DIAGNOSIS — S32.010A COMPRESSION FRACTURE OF L1 VERTEBRA, INITIAL ENCOUNTER (HCC): ICD-10-CM

## 2025-05-05 PROBLEM — Z79.01 ANTICOAGULATED ON ELIQUIS: Status: ACTIVE | Noted: 2025-05-05

## 2025-05-05 PROBLEM — K21.9 GERD (GASTROESOPHAGEAL REFLUX DISEASE): Status: ACTIVE | Noted: 2025-05-05

## 2025-05-05 LAB
ABO GROUP BLD: NORMAL
ALBUMIN SERPL BCP-MCNC: 3.6 G/DL (ref 3.2–4.9)
ALBUMIN SERPL BCP-MCNC: 3.9 G/DL (ref 3.2–4.9)
ALBUMIN/GLOB SERPL: 1.4 G/DL
ALBUMIN/GLOB SERPL: 1.4 G/DL
ALP SERPL-CCNC: 69 U/L (ref 30–99)
ALP SERPL-CCNC: 73 U/L (ref 30–99)
ALT SERPL-CCNC: 10 U/L (ref 2–50)
ALT SERPL-CCNC: 13 U/L (ref 2–50)
ANION GAP SERPL CALC-SCNC: 13 MMOL/L (ref 7–16)
ANION GAP SERPL CALC-SCNC: 9 MMOL/L (ref 7–16)
APTT PPP: 25.7 SEC (ref 24.7–36)
APTT PPP: 28.4 SEC (ref 24.7–36)
AST SERPL-CCNC: 19 U/L (ref 12–45)
AST SERPL-CCNC: 21 U/L (ref 12–45)
BASOPHILS # BLD AUTO: 0.4 % (ref 0–1.8)
BASOPHILS # BLD: 0.04 K/UL (ref 0–0.12)
BILIRUB SERPL-MCNC: 0.3 MG/DL (ref 0.1–1.5)
BILIRUB SERPL-MCNC: 0.3 MG/DL (ref 0.1–1.5)
BLD GP AB SCN SERPL QL: NORMAL
BUN SERPL-MCNC: 19 MG/DL (ref 8–22)
BUN SERPL-MCNC: 21 MG/DL (ref 8–22)
CALCIUM ALBUM COR SERPL-MCNC: 9.3 MG/DL (ref 8.5–10.5)
CALCIUM ALBUM COR SERPL-MCNC: 9.4 MG/DL (ref 8.5–10.5)
CALCIUM SERPL-MCNC: 9 MG/DL (ref 8.5–10.5)
CALCIUM SERPL-MCNC: 9.3 MG/DL (ref 8.5–10.5)
CFT BLD TEG: 4.7 MIN (ref 4.6–9.1)
CFT P HPASE BLD TEG: 4.7 MIN (ref 4.3–8.3)
CHLORIDE SERPL-SCNC: 102 MMOL/L (ref 96–112)
CHLORIDE SERPL-SCNC: 103 MMOL/L (ref 96–112)
CLOT ANGLE BLD TEG: 77.2 DEGREES (ref 63–78)
CO2 SERPL-SCNC: 22 MMOL/L (ref 20–33)
CO2 SERPL-SCNC: 24 MMOL/L (ref 20–33)
CREAT SERPL-MCNC: 1.34 MG/DL (ref 0.5–1.4)
CREAT SERPL-MCNC: 1.38 MG/DL (ref 0.5–1.4)
CT.EXTRINSIC BLD ROTEM: 0.9 MIN (ref 0.8–2.1)
EOSINOPHIL # BLD AUTO: 0.14 K/UL (ref 0–0.51)
EOSINOPHIL NFR BLD: 1.5 % (ref 0–6.9)
ERYTHROCYTE [DISTWIDTH] IN BLOOD BY AUTOMATED COUNT: 50 FL (ref 35.9–50)
ERYTHROCYTE [DISTWIDTH] IN BLOOD BY AUTOMATED COUNT: 51.8 FL (ref 35.9–50)
ETHANOL BLD-MCNC: <10.1 MG/DL
GFR SERPLBLD CREATININE-BSD FMLA CKD-EPI: 40 ML/MIN/1.73 M 2
GFR SERPLBLD CREATININE-BSD FMLA CKD-EPI: 42 ML/MIN/1.73 M 2
GLOBULIN SER CALC-MCNC: 2.6 G/DL (ref 1.9–3.5)
GLOBULIN SER CALC-MCNC: 2.7 G/DL (ref 1.9–3.5)
GLUCOSE BLD STRIP.AUTO-MCNC: 87 MG/DL (ref 65–99)
GLUCOSE BLD STRIP.AUTO-MCNC: 94 MG/DL (ref 65–99)
GLUCOSE SERPL-MCNC: 86 MG/DL (ref 65–99)
GLUCOSE SERPL-MCNC: 92 MG/DL (ref 65–99)
HCT VFR BLD AUTO: 32.1 % (ref 37–47)
HCT VFR BLD AUTO: 33.8 % (ref 37–47)
HGB BLD-MCNC: 10.1 G/DL (ref 12–16)
HGB BLD-MCNC: 10.3 G/DL (ref 12–16)
IMM GRANULOCYTES # BLD AUTO: 0.07 K/UL (ref 0–0.11)
IMM GRANULOCYTES NFR BLD AUTO: 0.7 % (ref 0–0.9)
INR PPP: 0.93 (ref 0.87–1.13)
INR PPP: 1.07 (ref 0.87–1.13)
LYMPHOCYTES # BLD AUTO: 1.58 K/UL (ref 1–4.8)
LYMPHOCYTES NFR BLD: 16.4 % (ref 22–41)
MCF BLD TEG: 66.8 MM (ref 52–69)
MCF.PLATELET INHIB BLD ROTEM: 31.2 MM (ref 15–32)
MCH RBC QN AUTO: 26.3 PG (ref 27–33)
MCH RBC QN AUTO: 26.3 PG (ref 27–33)
MCHC RBC AUTO-ENTMCNC: 30.5 G/DL (ref 32.2–35.5)
MCHC RBC AUTO-ENTMCNC: 31.5 G/DL (ref 32.2–35.5)
MCV RBC AUTO: 83.6 FL (ref 81.4–97.8)
MCV RBC AUTO: 86.2 FL (ref 81.4–97.8)
MONOCYTES # BLD AUTO: 1.01 K/UL (ref 0–0.85)
MONOCYTES NFR BLD AUTO: 10.5 % (ref 0–13.4)
NEUTROPHILS # BLD AUTO: 6.8 K/UL (ref 1.82–7.42)
NEUTROPHILS NFR BLD: 70.5 % (ref 44–72)
NRBC # BLD AUTO: 0 K/UL
NRBC BLD-RTO: 0 /100 WBC (ref 0–0.2)
PA AA BLD-ACNC: 83.9 % (ref 0–11)
PA ADP BLD-ACNC: 21.6 % (ref 0–17)
PLATELET # BLD AUTO: 249 K/UL (ref 164–446)
PLATELET # BLD AUTO: 269 K/UL (ref 164–446)
PMV BLD AUTO: 9.1 FL (ref 9–12.9)
PMV BLD AUTO: 9.6 FL (ref 9–12.9)
POTASSIUM SERPL-SCNC: 3.6 MMOL/L (ref 3.6–5.5)
POTASSIUM SERPL-SCNC: 3.8 MMOL/L (ref 3.6–5.5)
PROT SERPL-MCNC: 6.2 G/DL (ref 6–8.2)
PROT SERPL-MCNC: 6.6 G/DL (ref 6–8.2)
PROTHROMBIN TIME: 12.7 SEC (ref 12–14.6)
PROTHROMBIN TIME: 14 SEC (ref 12–14.6)
RBC # BLD AUTO: 3.84 M/UL (ref 4.2–5.4)
RBC # BLD AUTO: 3.92 M/UL (ref 4.2–5.4)
RH BLD: NORMAL
SODIUM SERPL-SCNC: 136 MMOL/L (ref 135–145)
SODIUM SERPL-SCNC: 137 MMOL/L (ref 135–145)
TEG ALGORITHM TGALG: ABNORMAL
WBC # BLD AUTO: 7.3 K/UL (ref 4.8–10.8)
WBC # BLD AUTO: 9.6 K/UL (ref 4.8–10.8)

## 2025-05-05 PROCEDURE — 86850 RBC ANTIBODY SCREEN: CPT

## 2025-05-05 PROCEDURE — 82962 GLUCOSE BLOOD TEST: CPT | Mod: 91

## 2025-05-05 PROCEDURE — G0390 TRAUMA RESPONS W/HOSP CRITI: HCPCS

## 2025-05-05 PROCEDURE — 96375 TX/PRO/DX INJ NEW DRUG ADDON: CPT

## 2025-05-05 PROCEDURE — 86900 BLOOD TYPING SEROLOGIC ABO: CPT

## 2025-05-05 PROCEDURE — 71045 X-RAY EXAM CHEST 1 VIEW: CPT

## 2025-05-05 PROCEDURE — 85576 BLOOD PLATELET AGGREGATION: CPT | Mod: 91

## 2025-05-05 PROCEDURE — 99285 EMERGENCY DEPT VISIT HI MDM: CPT

## 2025-05-05 PROCEDURE — 700111 HCHG RX REV CODE 636 W/ 250 OVERRIDE (IP): Mod: JZ | Performed by: EMERGENCY MEDICINE

## 2025-05-05 PROCEDURE — 85610 PROTHROMBIN TIME: CPT

## 2025-05-05 PROCEDURE — 700101 HCHG RX REV CODE 250

## 2025-05-05 PROCEDURE — 700102 HCHG RX REV CODE 250 W/ 637 OVERRIDE(OP)

## 2025-05-05 PROCEDURE — 80053 COMPREHEN METABOLIC PANEL: CPT

## 2025-05-05 PROCEDURE — 36415 COLL VENOUS BLD VENIPUNCTURE: CPT

## 2025-05-05 PROCEDURE — 82077 ASSAY SPEC XCP UR&BREATH IA: CPT

## 2025-05-05 PROCEDURE — 700105 HCHG RX REV CODE 258

## 2025-05-05 PROCEDURE — 700117 HCHG RX CONTRAST REV CODE 255: Performed by: EMERGENCY MEDICINE

## 2025-05-05 PROCEDURE — 80053 COMPREHEN METABOLIC PANEL: CPT | Mod: 91

## 2025-05-05 PROCEDURE — 72125 CT NECK SPINE W/O DYE: CPT

## 2025-05-05 PROCEDURE — 72131 CT LUMBAR SPINE W/O DYE: CPT

## 2025-05-05 PROCEDURE — 86901 BLOOD TYPING SEROLOGIC RH(D): CPT

## 2025-05-05 PROCEDURE — 71260 CT THORAX DX C+: CPT

## 2025-05-05 PROCEDURE — 85384 FIBRINOGEN ACTIVITY: CPT | Mod: 91

## 2025-05-05 PROCEDURE — 700111 HCHG RX REV CODE 636 W/ 250 OVERRIDE (IP): Mod: JZ

## 2025-05-05 PROCEDURE — 85730 THROMBOPLASTIN TIME PARTIAL: CPT | Mod: 91

## 2025-05-05 PROCEDURE — 85027 COMPLETE CBC AUTOMATED: CPT

## 2025-05-05 PROCEDURE — 85025 COMPLETE CBC W/AUTO DIFF WBC: CPT

## 2025-05-05 PROCEDURE — 70450 CT HEAD/BRAIN W/O DYE: CPT

## 2025-05-05 PROCEDURE — 99222 1ST HOSP IP/OBS MODERATE 55: CPT | Mod: AI | Performed by: STUDENT IN AN ORGANIZED HEALTH CARE EDUCATION/TRAINING PROGRAM

## 2025-05-05 PROCEDURE — A9270 NON-COVERED ITEM OR SERVICE: HCPCS

## 2025-05-05 PROCEDURE — 73030 X-RAY EXAM OF SHOULDER: CPT | Mod: LT

## 2025-05-05 PROCEDURE — 99291 CRITICAL CARE FIRST HOUR: CPT

## 2025-05-05 PROCEDURE — 85730 THROMBOPLASTIN TIME PARTIAL: CPT

## 2025-05-05 PROCEDURE — 72128 CT CHEST SPINE W/O DYE: CPT

## 2025-05-05 PROCEDURE — 96374 THER/PROPH/DIAG INJ IV PUSH: CPT

## 2025-05-05 PROCEDURE — 770022 HCHG ROOM/CARE - ICU (200)

## 2025-05-05 PROCEDURE — 85347 COAGULATION TIME ACTIVATED: CPT

## 2025-05-05 PROCEDURE — 85610 PROTHROMBIN TIME: CPT | Mod: 91

## 2025-05-05 RX ORDER — DULOXETIN HYDROCHLORIDE 30 MG/1
30 CAPSULE, DELAYED RELEASE ORAL EVERY EVENING
Status: ON HOLD | COMMUNITY
End: 2025-05-12

## 2025-05-05 RX ORDER — ONDANSETRON 2 MG/ML
4 INJECTION INTRAMUSCULAR; INTRAVENOUS EVERY 4 HOURS PRN
Status: DISCONTINUED | OUTPATIENT
Start: 2025-05-05 | End: 2025-05-14 | Stop reason: HOSPADM

## 2025-05-05 RX ORDER — INSULIN LISPRO 100 [IU]/ML
1-6 INJECTION, SOLUTION INTRAVENOUS; SUBCUTANEOUS
Status: DISCONTINUED | OUTPATIENT
Start: 2025-05-05 | End: 2025-05-08

## 2025-05-05 RX ORDER — BISACODYL 10 MG
10 SUPPOSITORY, RECTAL RECTAL
Status: DISCONTINUED | OUTPATIENT
Start: 2025-05-05 | End: 2025-05-14 | Stop reason: HOSPADM

## 2025-05-05 RX ORDER — HYDROMORPHONE HYDROCHLORIDE 1 MG/ML
0.5 INJECTION, SOLUTION INTRAMUSCULAR; INTRAVENOUS; SUBCUTANEOUS ONCE
Status: COMPLETED | OUTPATIENT
Start: 2025-05-05 | End: 2025-05-05

## 2025-05-05 RX ORDER — SODIUM PHOSPHATE,MONO-DIBASIC 19G-7G/118
1 ENEMA (ML) RECTAL
Status: DISCONTINUED | OUTPATIENT
Start: 2025-05-05 | End: 2025-05-14 | Stop reason: HOSPADM

## 2025-05-05 RX ORDER — ESCITALOPRAM OXALATE 10 MG/1
20 TABLET ORAL DAILY
Status: DISCONTINUED | OUTPATIENT
Start: 2025-05-06 | End: 2025-05-14 | Stop reason: HOSPADM

## 2025-05-05 RX ORDER — HYDROMORPHONE HYDROCHLORIDE 1 MG/ML
0.5 INJECTION, SOLUTION INTRAMUSCULAR; INTRAVENOUS; SUBCUTANEOUS
Status: DISCONTINUED | OUTPATIENT
Start: 2025-05-05 | End: 2025-05-06

## 2025-05-05 RX ORDER — LIDOCAINE 4 G/G
1 PATCH TOPICAL EVERY 24 HOURS
Status: DISCONTINUED | OUTPATIENT
Start: 2025-05-05 | End: 2025-05-14 | Stop reason: HOSPADM

## 2025-05-05 RX ORDER — OXYCODONE HYDROCHLORIDE 5 MG/1
5 TABLET ORAL
Refills: 0 | Status: DISCONTINUED | OUTPATIENT
Start: 2025-05-05 | End: 2025-05-06

## 2025-05-05 RX ORDER — LEVOTHYROXINE SODIUM 88 UG/1
88 TABLET ORAL DAILY
Status: DISCONTINUED | OUTPATIENT
Start: 2025-05-06 | End: 2025-05-14 | Stop reason: HOSPADM

## 2025-05-05 RX ORDER — OXYCODONE HYDROCHLORIDE 10 MG/1
10 TABLET ORAL
Refills: 0 | Status: DISCONTINUED | OUTPATIENT
Start: 2025-05-05 | End: 2025-05-06

## 2025-05-05 RX ORDER — ONDANSETRON 4 MG/1
4 TABLET, ORALLY DISINTEGRATING ORAL EVERY 4 HOURS PRN
Status: DISCONTINUED | OUTPATIENT
Start: 2025-05-05 | End: 2025-05-14 | Stop reason: HOSPADM

## 2025-05-05 RX ORDER — ROSUVASTATIN CALCIUM 20 MG/1
20 TABLET, COATED ORAL EVERY EVENING
Status: DISCONTINUED | OUTPATIENT
Start: 2025-05-05 | End: 2025-05-14 | Stop reason: HOSPADM

## 2025-05-05 RX ORDER — AMOXICILLIN 250 MG
1 CAPSULE ORAL
Status: DISCONTINUED | OUTPATIENT
Start: 2025-05-05 | End: 2025-05-14 | Stop reason: HOSPADM

## 2025-05-05 RX ORDER — DULOXETIN HYDROCHLORIDE 30 MG/1
30 CAPSULE, DELAYED RELEASE ORAL EVERY EVENING
Status: DISCONTINUED | OUTPATIENT
Start: 2025-05-05 | End: 2025-05-14 | Stop reason: HOSPADM

## 2025-05-05 RX ORDER — AMOXICILLIN 250 MG
1 CAPSULE ORAL NIGHTLY
Status: DISCONTINUED | OUTPATIENT
Start: 2025-05-05 | End: 2025-05-14 | Stop reason: HOSPADM

## 2025-05-05 RX ORDER — ACETAMINOPHEN 500 MG
1000 TABLET ORAL EVERY 6 HOURS
Status: DISPENSED | OUTPATIENT
Start: 2025-05-05 | End: 2025-05-10

## 2025-05-05 RX ORDER — ACETAMINOPHEN 500 MG
1000 TABLET ORAL EVERY 6 HOURS PRN
Status: DISCONTINUED | OUTPATIENT
Start: 2025-05-10 | End: 2025-05-14 | Stop reason: HOSPADM

## 2025-05-05 RX ORDER — LIOTHYRONINE SODIUM 5 UG/1
5 TABLET ORAL DAILY
Status: DISCONTINUED | OUTPATIENT
Start: 2025-05-06 | End: 2025-05-14 | Stop reason: HOSPADM

## 2025-05-05 RX ORDER — POLYETHYLENE GLYCOL 3350 17 G/17G
1 POWDER, FOR SOLUTION ORAL 2 TIMES DAILY
Status: DISCONTINUED | OUTPATIENT
Start: 2025-05-05 | End: 2025-05-14 | Stop reason: HOSPADM

## 2025-05-05 RX ORDER — DEXTROSE MONOHYDRATE 25 G/50ML
25 INJECTION, SOLUTION INTRAVENOUS
Status: DISCONTINUED | OUTPATIENT
Start: 2025-05-05 | End: 2025-05-08

## 2025-05-05 RX ORDER — ACETAMINOPHEN 500 MG
1000 TABLET ORAL EVERY 6 HOURS PRN
Status: ON HOLD | COMMUNITY
End: 2025-05-12

## 2025-05-05 RX ORDER — KETOROLAC TROMETHAMINE 15 MG/ML
15 INJECTION, SOLUTION INTRAMUSCULAR; INTRAVENOUS ONCE
Status: COMPLETED | OUTPATIENT
Start: 2025-05-05 | End: 2025-05-05

## 2025-05-05 RX ORDER — METOPROLOL SUCCINATE 25 MG/1
25 TABLET, EXTENDED RELEASE ORAL DAILY
Status: DISCONTINUED | OUTPATIENT
Start: 2025-05-06 | End: 2025-05-13

## 2025-05-05 RX ORDER — EZETIMIBE 10 MG/1
10 TABLET ORAL EVERY EVENING
Status: DISCONTINUED | OUTPATIENT
Start: 2025-05-05 | End: 2025-05-14 | Stop reason: HOSPADM

## 2025-05-05 RX ORDER — ALBUTEROL SULFATE 90 UG/1
1-2 INHALANT RESPIRATORY (INHALATION) EVERY 4 HOURS PRN
Status: DISCONTINUED | OUTPATIENT
Start: 2025-05-05 | End: 2025-05-14 | Stop reason: HOSPADM

## 2025-05-05 RX ORDER — LISINOPRIL 10 MG/1
10 TABLET ORAL DAILY
Status: DISCONTINUED | OUTPATIENT
Start: 2025-05-06 | End: 2025-05-12

## 2025-05-05 RX ORDER — OMEPRAZOLE 20 MG/1
20 CAPSULE, DELAYED RELEASE ORAL 2 TIMES DAILY
Status: DISCONTINUED | OUTPATIENT
Start: 2025-05-05 | End: 2025-05-14 | Stop reason: HOSPADM

## 2025-05-05 RX ORDER — ENOXAPARIN SODIUM 100 MG/ML
30 INJECTION SUBCUTANEOUS EVERY 12 HOURS
Status: DISCONTINUED | OUTPATIENT
Start: 2025-05-05 | End: 2025-05-06

## 2025-05-05 RX ORDER — DOCUSATE SODIUM 100 MG/1
100 CAPSULE, LIQUID FILLED ORAL 2 TIMES DAILY
Status: DISCONTINUED | OUTPATIENT
Start: 2025-05-05 | End: 2025-05-14 | Stop reason: HOSPADM

## 2025-05-05 RX ORDER — HYDRALAZINE HYDROCHLORIDE 20 MG/ML
10 INJECTION INTRAMUSCULAR; INTRAVENOUS EVERY 4 HOURS PRN
Status: DISCONTINUED | OUTPATIENT
Start: 2025-05-05 | End: 2025-05-14 | Stop reason: HOSPADM

## 2025-05-05 RX ORDER — METAXALONE 800 MG/1
800 TABLET ORAL 3 TIMES DAILY
Status: DISCONTINUED | OUTPATIENT
Start: 2025-05-05 | End: 2025-05-06

## 2025-05-05 RX ORDER — LISINOPRIL 10 MG/1
10 TABLET ORAL DAILY
Status: ON HOLD | COMMUNITY
End: 2025-05-12

## 2025-05-05 RX ORDER — SODIUM CHLORIDE, SODIUM LACTATE, POTASSIUM CHLORIDE, AND CALCIUM CHLORIDE .6; .31; .03; .02 G/100ML; G/100ML; G/100ML; G/100ML
500 INJECTION, SOLUTION INTRAVENOUS ONCE
Status: COMPLETED | OUTPATIENT
Start: 2025-05-05 | End: 2025-05-05

## 2025-05-05 RX ADMIN — KETOROLAC TROMETHAMINE 15 MG: 15 INJECTION, SOLUTION INTRAMUSCULAR; INTRAVENOUS at 10:45

## 2025-05-05 RX ADMIN — IOHEXOL 80 ML: 350 INJECTION, SOLUTION INTRAVENOUS at 12:55

## 2025-05-05 RX ADMIN — EZETIMIBE 10 MG: 10 TABLET ORAL at 21:27

## 2025-05-05 RX ADMIN — ROSUVASTATIN CALCIUM 20 MG: 20 TABLET, FILM COATED ORAL at 17:31

## 2025-05-05 RX ADMIN — OMEPRAZOLE 20 MG: 20 CAPSULE, DELAYED RELEASE ORAL at 21:27

## 2025-05-05 RX ADMIN — DULOXETINE 30 MG: 30 CAPSULE, DELAYED RELEASE ORAL at 17:31

## 2025-05-05 RX ADMIN — ACETAMINOPHEN 1000 MG: 500 TABLET ORAL at 17:31

## 2025-05-05 RX ADMIN — AMITRIPTYLINE HYDROCHLORIDE 150 MG: 25 TABLET, FILM COATED ORAL at 21:27

## 2025-05-05 RX ADMIN — METAXALONE 800 MG: 800 TABLET ORAL at 17:31

## 2025-05-05 RX ADMIN — LIDOCAINE 1 PATCH: 4 PATCH TOPICAL at 17:30

## 2025-05-05 RX ADMIN — OXYCODONE 5 MG: 5 TABLET ORAL at 17:39

## 2025-05-05 RX ADMIN — HYDROMORPHONE HYDROCHLORIDE 0.5 MG: 1 INJECTION, SOLUTION INTRAMUSCULAR; INTRAVENOUS; SUBCUTANEOUS at 13:05

## 2025-05-05 RX ADMIN — HYDROMORPHONE HYDROCHLORIDE 0.5 MG: 1 INJECTION, SOLUTION INTRAMUSCULAR; INTRAVENOUS; SUBCUTANEOUS at 22:44

## 2025-05-05 RX ADMIN — ENOXAPARIN SODIUM 30 MG: 100 INJECTION SUBCUTANEOUS at 18:28

## 2025-05-05 RX ADMIN — OXYCODONE HYDROCHLORIDE 10 MG: 10 TABLET ORAL at 21:27

## 2025-05-05 RX ADMIN — SODIUM CHLORIDE, POTASSIUM CHLORIDE, SODIUM LACTATE AND CALCIUM CHLORIDE 500 ML: 600; 310; 30; 20 INJECTION, SOLUTION INTRAVENOUS at 20:27

## 2025-05-05 RX ADMIN — ACETAMINOPHEN 1000 MG: 500 TABLET ORAL at 23:12

## 2025-05-05 ASSESSMENT — COPD QUESTIONNAIRES
HAVE YOU SMOKED AT LEAST 100 CIGARETTES IN YOUR ENTIRE LIFE: NO/DON'T KNOW
DO YOU EVER COUGH UP ANY MUCUS OR PHLEGM?: YES, EVERY DAY
DURING THE PAST 4 WEEKS HOW MUCH DID YOU FEEL SHORT OF BREATH: NONE/LITTLE OF THE TIME
COPD SCREENING SCORE: 4

## 2025-05-05 ASSESSMENT — PAIN DESCRIPTION - PAIN TYPE
TYPE: ACUTE PAIN

## 2025-05-05 ASSESSMENT — FIBROSIS 4 INDEX
FIB4 SCORE: 2.25
FIB4 SCORE: 1.73

## 2025-05-05 NOTE — ED PROVIDER NOTES
ED Provider Note    CHIEF COMPLAINT  Chief Complaint   Patient presents with    GLF     Mechanical GLF at home. Head strike per [facility] report.     Shoulder Injury     Left shoulder injury. Called [facility] for this.       EXTERNAL RECORDS REVIEWED  PDMP patient received 180, 10 mg oxycodone tablets per month    HPI/ROS  LIMITATION TO HISTORY   Select: : None  OUTSIDE HISTORIAN(S):  none    Yamile Go is a 75 y.o. female who presents stating that she tripped over a pillow and fell on her left side.  She is complaining of left shoulder and left sided rib pain.  She denies head injury or loss of consciousness.  She denies vomiting or new focal neurologic deficits.  She denies hip pain.  Is given fentanyl IV en route.  The patient reports she has chronic left upper extremity weakness.  She reports she is at her baseline from a neurologic standpoint.  She reports traditionally she had been on Eliquis but has not taken Eliquis for 1 week.    PAST MEDICAL HISTORY   has a past medical history of Anemia, Anesthesia, Arthritis, Asthma, CAD (coronary artery disease), Cataract, Dental disorder, Depression, Diabetes (HCC), Disorder of thyroid, Heart burn, History of vertebral fracture, HTN (hypertension), Hyperlipidemia, Indigestion, Migraines, Obesity, Pneumonia (2023), PONV (postoperative nausea and vomiting), and Sleep apnea.    SURGICAL HISTORY   has a past surgical history that includes appendectomy (N/A, 1976); abdominal hysterectomy total (N/A, 1978); breast biopsy (1984); colectomy (N/A, 2007); lumbar laminectomy diskectomy (N/A, 1989); arthroplasty (Right, 2020); lumbar exploration (N/A, 2017); insertion, peripheral nerve stimulator, lower extremity (Left, 12/22/2022); and reconstr total shoulder implant (Right, 4/30/2024).    FAMILY HISTORY  Family History   Problem Relation Age of Onset    Cancer Mother         lung    Heart Disease Mother     Hyperlipidemia Mother     Stroke Father     Heart Disease Father      Diabetes Father     Hypertension Father     Hyperlipidemia Father     Heart Disease Brother         cath and bypass    Diabetes Brother     Hypertension Brother     Hyperlipidemia Brother     Diabetes Maternal Aunt     Hypertension Maternal Aunt     Heart Disease Brother         cath and byp[ass    Drug abuse Brother     Heart Disease Brother         cath and bypass    Diabetes Brother     Heart Disease Brother     Other Brother         MVA    Hypertension Maternal Aunt     Drug abuse Daughter     Alcohol abuse Daughter        SOCIAL HISTORY  Social History     Tobacco Use    Smoking status: Never    Smokeless tobacco: Never   Vaping Use    Vaping status: Never Used   Substance and Sexual Activity    Alcohol use: No    Drug use: No    Sexual activity: Yes     Partners: Male     Birth control/protection: Post-Menopausal       CURRENT MEDICATIONS  Home Medications       Reviewed by Jaime Davalos R.N. (Registered Nurse) on 05/05/25 at 1033  Med List Status: Not Addressed     Medication Last Dose Status   acetaminophen (TYLENOL) 500 MG Tab  Active   albuterol 108 (90 Base) MCG/ACT Aero Soln inhalation aerosol  Active   alendronate (FOSAMAX) 70 MG Tab  Active   amitriptyline (ELAVIL) 100 MG Tab  Active   apixaban (ELIQUIS) 5mg Tab  Active   cholecalciferol (D3 5000) 5000 UNIT Cap  Active   diclofenac sodium (VOLTAREN) 1 % Gel  Active   escitalopram (LEXAPRO) 20 MG tablet  Active   ezetimibe (ZETIA) 10 MG Tab  Active   liothyronine (CYTOMEL) 5 MCG Tab  Active   metFORMIN (GLUCOPHAGE) 500 MG Tab  Active   methocarbamol (ROBAXIN) 500 MG Tab  Active   metoprolol SR (TOPROL XL) 25 MG TABLET SR 24 HR  Active   omeprazole (PRILOSEC) 20 MG delayed-release capsule  Active   oxyCODONE immediate release (ROXICODONE) 10 MG immediate release tablet  Active   rosuvastatin (CRESTOR) 20 MG Tab  Active   SYNTHROID 88 MCG Tab  Active                         ALLERGIES  Allergies   Allergen Reactions    Ampicillin-Sulbactam Sodium Hives  "    With oral lesions     Doxycycline Hives     Possible hives (took with Unasyn)     Gabapentin Hives and Swelling    Pregabalin Hives and Swelling    Amlodipine Swelling    Bee Swelling    Fentanyl Vomiting and Unspecified    Morphine Vomiting, Nausea and Unspecified    Benzoin Rash     Tincture of benzoin =blisters    blisters    Rifampin Unspecified     Pt turns yellow       PHYSICAL EXAM  VITAL SIGNS: /57   Pulse 100   Temp 36.8 °C (98.2 °F) (Temporal)   Resp 19   Ht 1.575 m (5' 2\")   Wt 63.5 kg (140 lb)   SpO2 91%   BMI 25.61 kg/m²      Constitutional: Alert.  HENT: No signs of trauma, Bilateral external ears normal, Nose normal.   Eyes: Pupils are equal and reactive, Conjunctiva normal, Non-icteric.   Neck: Normal range of motion, No tenderness, Supple, No stridor.   Lymphatic: No lymphadenopathy noted.   Cardiovascular: Regular rate and rhythm, no murmurs.   Thorax & Lungs: Left-sided rib tenderness with no air or bony crepitus.  Abdomen: Bowel sounds normal, Soft, No tenderness, No peritoneal signs, No masses, No pulsatile masses.   Skin: Warm, Dry, No erythema, No rash.   Back: No bony tenderness, No CVA tenderness.   Extremities: Intact distal pulses, No edema, No tenderness, No cyanosis.  Musculoskeletal: Good range of motion in all major joints. No tenderness to palpation or major deformities noted.   Neurologic: Alert , Normal motor function, Normal sensory function, No focal deficits noted.   Psychiatric: Affect normal, Judgment normal, Mood normal.       LABS        Labs Reviewed   CBC WITH DIFFERENTIAL - Abnormal; Notable for the following components:       Result Value    RBC 3.92 (*)     Hemoglobin 10.3 (*)     Hematocrit 33.8 (*)     MCH 26.3 (*)     MCHC 30.5 (*)     RDW 51.8 (*)     Lymphocytes 16.40 (*)     Monos (Absolute) 1.01 (*)     All other components within normal limits   ESTIMATED GFR - Abnormal; Notable for the following components:    GFR (CKD-EPI) 40 (*)     All other " components within normal limits   PROTHROMBIN TIME   APTT   COMP METABOLIC PANEL         RADIOLOGY/PROCEDURES   I have independently interpreted the diagnostic imaging associated with this visit and am waiting the final reading from the radiologist.   My preliminary interpretation is as follows: Rib fractures on chest x-ray    Radiologist interpretation:  CT-CHEST,ABDOMEN,PELVIS WITH   Final Result      1.  Atherosclerosis including coronary artery disease.   2.  Multiple left-sided acute and subacute rib fractures.   3.  Interval improvement in left lower lobe consolidation.   4.  No evidence for acute traumatic injury to the abdomen or pelvis.   5.  The spine is reported separately.      CT-LSPINE W/O PLUS RECONS   Final Result      1.  Severe multilevel degenerative disease.   2.  Compression fracture of L1 with further compression when compared with the prior exam.      CT-TSPINE W/O PLUS RECONS   Final Result      1.  Dextroconvex scoliotic curvature of the thoracic spine with multilevel degenerative disease.   2.  Stable compression fractures of T4, T5, and T12.   3.  No acute fracture or malalignment.      DX-SHOULDER 2+ LEFT   Final Result      1.  Osteopenia and osteoarthrosis.   2.  Possible left-sided rib fractures.      DX-CHEST-PORTABLE (1 VIEW)   Final Result      1.  Left basilar atelectasis versus infiltrate.   2.  Left-sided rib fractures.          COURSE & MEDICAL DECISION MAKING    ASSESSMENT, COURSE AND PLAN  Care Narrative:     The patient presents with trauma to the left shoulder and left chest wall.  Portable chest x-ray was ordered, left shoulder x-ray was ordered.  The patient was given Toradol 15 mg IV.    11:36 AM patient's left ribs have possible fracture on x-ray, she is very osteopenic.  CT scan was ordered to evaluate.      1:57 PM the patient's CT scan is significant for 5 acute rib fractures, multiple old rib fractures and a slightly worse L1 compression fracture.  The patient  initially had denied head injury or loss of consciousness but the [facility] note now says that she did have a head injury.  Patient reports she has not been on her Eliquis for 1 week and that she has chronic left upper extremity weakness with no new neurologic deficits.  The patient did receive IV contrast for her CT chest abdomen pelvis, so patient will be observed for new neurologic symptoms which she does not have now until either head CT can be ordered or it is thought it is unneeded after observation after the contrast clears from her CT chest abdomen pelvis.  Patient is hypoxic and therefore will need to be admitted at [facility].      ADDITIONAL PROBLEMS MANAGED  Hypoxia, multiple rib fractures, slight worsening of L1 compression fracture     DISPOSITION AND DISCUSSIONS  I have discussed management of the patient with the following physicians and ALEC's: Spoke with Dr. Eduardo Amezquita at [facility] who has accepted the patient in transfer.  I spoke with the trauma surgeon Dr. Miguel who recommends the patient be transferred to [facility].    Discussion of management with other [facility] or appropriate source(s): None     Escalation of care considered, and ultimately not performed: Patient CT scan does not demonstrate pneumothorax, she does not need emergent tube thoracostomy.    Barriers to care at this time, including but not limited to:  None .     Decision tools and prescription drugs considered including, but not limited to:  Transfer after filling out COBRA form .    CRITICAL CARE  The very real possibilty of a deterioration of this patient's condition required the highest level of my preparedness for sudden, emergent intervention.  I provided critical care services, which included medication orders, frequent reevaluations of the patient's condition and response to treatment, ordering and reviewing test results, and discussing the case with various consultants.  The critical care time associated with the care of  the patient was 40 minutes. Review chart for interventions. This time is exclusive of any other billable procedures.       FINAL DIAGNOSIS  1. Closed fracture of multiple ribs of both sides, initial encounter    2. Compression fracture of L1 vertebra, initial encounter ([facility])    3. Hypoxia         Critical Care time 40 minutes as outlined above    Electronically signed by: Evans Wilcox M.D., 5/5/2025 10:40 AM

## 2025-05-05 NOTE — ED NOTES
Pt BIB [facility] from [facility] as a Trauma Green, pt was at home she tripped over a pillow sustaining ground level fall, + head strike, no LOC. Pt does take Eliquis. After fall pt has 5 broken ribs on right side, L1 compression fx, hx of Left arm weakness that has gotten worse pt unable to lift left arm. PTA pt was given 100 mcg of Fentanyl and 4 mg Zofran, placed on 3 L oxygen via NC pt does not where oxygen at home, Pt c/o left shoulder pain upon arrival. No bruising or deformity noted. Pt is A/O x 4, GCS 15

## 2025-05-05 NOTE — ASSESSMENT & PLAN NOTE
CT imaging with fractures of the posterior left fourth and fifth rib fractures, lateral left sixth and seventh rib, and the left ninth rib posterolaterally. There are subacute anterolateral left seventh and eighth rib fractures.   Fracture pattern is not amenable to operative fixation.  Aggressive multimodal pain management and pulmonary hygiene. Serial chest radiographs.

## 2025-05-05 NOTE — ASSESSMENT & PLAN NOTE
Chronic condition treated with lisinopril, metoprolol.  Resumed maintenance medication on admission.  PRN antihypertensives   Lisinopril held due to increased creatinine   5/9 Resumed lisinopril. Creatinine improved 0.89  5/12 Lisinopril stopped secondary to orthostatic hypotension.   5/13 Metoprolol decreased to 12.5mg BID due to orthostatic hypotension.

## 2025-05-05 NOTE — ASSESSMENT & PLAN NOTE
Chronic condition treated with liothyronine, levothyroxine.  Resumed maintenance medication on admission.

## 2025-05-05 NOTE — ASSESSMENT & PLAN NOTE
Admit creat 1.38 (baseline 0.76).  5/7 Renal indices corrected  Continue gentle IVF, Lisinopril held, Avoid nephrotoxins.  Trend.

## 2025-05-05 NOTE — ASSESSMENT & PLAN NOTE
Chronic condition treated with albuterol.  Resumed maintenance medication on admission. RT protocol.

## 2025-05-05 NOTE — ASSESSMENT & PLAN NOTE
Compression fracture of L1 with further compression when compared with the prior exam.   History of compression fracture, patient has chronic pain to this area that is unchanged; no pain on initial exam.  Tertiary exam with lumbar spine pain  Non-operative management.   Off-the-shelf TLSO bracing. Outpatient follow up.  Jb Park MD. Neurosurgeon. [facility].

## 2025-05-05 NOTE — ASSESSMENT & PLAN NOTE
GLF tripped over pillow.  Trauma Green Transfer Activation from [facility] in [place], [place].  Surgeon List: Emilie Miguel MD. Trauma Surgery.

## 2025-05-05 NOTE — ED TRIAGE NOTES
"Patient presents to the ER with the following complaints:    Chief Complaint   Patient presents with    GLF     Mechanical GLF at home. Head strike per [facility] report.     Shoulder Injury     Left shoulder injury. Called [facility] for this.       /74   Pulse 99   Temp 36.8 °C (98.2 °F) (Temporal)   Resp 18   Ht 1.575 m (5' 2\")   Wt 63.5 kg (140 lb)   SpO2 94%   BMI 25.61 kg/m²     Patient received 4 mg of Zofran and 50 mcg of fentanyl prior to arrival.  "

## 2025-05-05 NOTE — ASSESSMENT & PLAN NOTE
For treatment of acute LLE DVT, started on 2/28/25  Reports she paused the medication 7 days ago on recommendations from her neurosurgeon ahead of potential cervical spine surgery  TEG with AA 83.9% inhibition, ADP 21.6% inhibition   - Eliquis reinitiated

## 2025-05-05 NOTE — ASSESSMENT & PLAN NOTE
Chronic condition treated with rosuvastatin, ezetimibe.  Resumed maintenance medication on admission.

## 2025-05-05 NOTE — ED PROVIDER NOTES
ER Provider Note    Scribed for Ashish Amezquita M.D. by Salma Dixon. 5/5/2025   3:02 PM    Primary Care Provider: ALEXANDER Bartholomew    CHIEF COMPLAINT  Chief Complaint   Patient presents with    Trauma Green     EXTERNAL RECORDS REVIEWED  Patient was seen at [facility] today and transferred here as she has left sided acute and subacute rib fractures. L1 acute compression fracture.    HPI/ROS  LIMITATION TO HISTORY   Select: : None  OUTSIDE HISTORIAN(S):  EMS [facility] who escorted the patient in.    Yamile Go is a 75 y.o. female who presents to the ED as a trauma green transfer from [facility] for multiple rib fractures, compression fracture of L1 vertebra, and hypoxia. The patient reports that she tripped over a pillow today, landing on the left side of her body. She was unable to get up secondary to pain, causing her to present to [facility] ED. She was given fentanyl en route. She is normally on Eliquis, however has not taken Eliquis for one week. She has chronic left arm weakness secondary to pinched nerve, which reportedly feels worse after the fall. She is chronically on narcotics. She denies any chest pain at this time.     PAST MEDICAL HISTORY  Past Medical History:   Diagnosis Date    Anemia     Anesthesia     PONV    Arthritis     degenerative    Asthma     CAD (coronary artery disease)     cardiologist, Dr. Winkler  last visit 4/2024    Cataract     Dental disorder     Missing teeth right side.  Veneers top front    Depression     anxiety    Diabetes (HCC)     Disorder of thyroid     hypothyroid    Heart burn     GERD, controlled with medications    History of vertebral fracture     T12, L1, L2    HTN (hypertension)     Hyperlipidemia     Indigestion     Migraines     Obesity     Pneumonia 2023    COVID    PONV (postoperative nausea and vomiting)     Sleep apnea     diagnosed 9/2009 CPAP -PMA; pt states she does not use CPAP and no longer has one, unable to tolerate        SURGICAL HISTORY  Past Surgical History:   Procedure Laterality Date    PB RECONSTR TOTAL SHOULDER IMPLANT Right 4/30/2024    Procedure: RIGHT REVERSE TOTAL SHOULDER ARTHROPLASTY;  Surgeon: Manuela Sparks M.D.;  Location: SURGERY [facility];  Service: Orthopedics    INSERTION, PERIPHERAL NERVE STIMULATOR, LOWER EXTREMITY Left 12/22/2022    Procedure: Left SCIATIC PERIPHERAL NERVE STIMULATOR IMPLANT, LOWER EXTREMITY PERIPHERAL SCIATIC NERVE;  Surgeon: Tobi Kilgore M.D.;  Location: SURGERY [facility];  Service: Pain Management    ARTHROPLASTY Right 2020    ankle    LUMBAR EXPLORATION N/A 2017    COLECTOMY N/A 2007    partial for divverticulitis 2007    LUMBAR LAMINECTOMY DISKECTOMY N/A 1989    BREAST BIOPSY  1984    no cancer    ABDOMINAL HYSTERECTOMY TOTAL N/A 1978    APPENDECTOMY N/A 1976       FAMILY HISTORY  Family History   Problem Relation Age of Onset    Cancer Mother         lung    Heart Disease Mother     Hyperlipidemia Mother     Stroke Father     Heart Disease Father     Diabetes Father     Hypertension Father     Hyperlipidemia Father     Heart Disease Brother         cath and bypass    Diabetes Brother     Hypertension Brother     Hyperlipidemia Brother     Diabetes Maternal Aunt     Hypertension Maternal Aunt     Heart Disease Brother         cath and byp[ass    Drug abuse Brother     Heart Disease Brother         cath and bypass    Diabetes Brother     Heart Disease Brother     Other Brother         MVA    Hypertension Maternal Aunt     Drug abuse Daughter     Alcohol abuse Daughter        SOCIAL HISTORY   reports that she has never smoked. She has never used smokeless tobacco. She reports that she does not drink alcohol and does not use drugs.    CURRENT MEDICATIONS  Current Discharge Medication List        CONTINUE these medications which have NOT CHANGED    Details   lisinopril (PRINIVIL) 10 MG Tab Take 10 mg by mouth every day.      DULoxetine (CYMBALTA) 30 MG Cap DR Particles Take  30 mg by mouth every evening.      cholecalciferol (D3 5000) 5000 UNIT Cap Take 5,000 Units by mouth every day.      oxyCODONE immediate release (ROXICODONE) 10 MG immediate release tablet Take 10 mg by mouth every four hours as needed for Severe Pain.      amitriptyline (ELAVIL) 100 MG Tab Take 1.5 Tablets by mouth every evening.  Qty: 90 Tablet, Refills: 2    Associated Diagnoses: Closed compression fracture of body of L1 vertebra (HCC)      escitalopram (LEXAPRO) 20 MG tablet Take 1 Tablet by mouth every day.  Qty: 90 Tablet, Refills: 2    Associated Diagnoses: Depression, unspecified depression type      ezetimibe (ZETIA) 10 MG Tab Take 1 Tablet by mouth every evening.  Qty: 90 Tablet, Refills: 2    Associated Diagnoses: Closed compression fracture of body of L1 vertebra (HCC)      liothyronine (CYTOMEL) 5 MCG Tab Take 1 Tablet by mouth every day.  Qty: 90 Tablet, Refills: 2    Associated Diagnoses: Closed compression fracture of body of L1 vertebra (HCC)      metFORMIN (GLUCOPHAGE) 500 MG Tab Take 1 Tablet by mouth 2 times a day with meals. Indications: Type 2 Diabetes  Qty: 180 Tablet, Refills: 2    Associated Diagnoses: Closed compression fracture of body of L1 vertebra (HCC)      metoprolol SR (TOPROL XL) 25 MG TABLET SR 24 HR Take 1 Tablet by mouth every day.  Qty: 90 Tablet, Refills: 2    Associated Diagnoses: Primary hypertension      omeprazole (PRILOSEC) 20 MG delayed-release capsule Take 1 Capsule by mouth 2 times a day.  Qty: 180 Capsule, Refills: 2    Associated Diagnoses: Closed compression fracture of body of L1 vertebra (HCC)      rosuvastatin (CRESTOR) 20 MG Tab Take 1 Tablet by mouth every evening.  Qty: 90 Tablet, Refills: 2    Associated Diagnoses: Hyperlipidemia, unspecified hyperlipidemia type      SYNTHROID 88 MCG Tab Take 1 Tablet by mouth every day.  Qty: 90 Tablet, Refills: 2    Associated Diagnoses: Hypothyroidism, unspecified type; Dyslipidemia; Other chest pain; Essential  hypertension, benign; CAD (coronary artery disease)      acetaminophen (TYLENOL) 500 MG Tab Take 1,000 mg by mouth every 6 hours as needed. Indications: Pain      apixaban (ELIQUIS) 5mg Tab Take 1 Tablet by mouth 2 times a day.    Associated Diagnoses: Acute deep vein thrombosis (DVT) of proximal vein of left lower extremity (HCC)      alendronate (FOSAMAX) 70 MG Tab Take 70 mg by mouth every 7 days. On Monday      albuterol 108 (90 Base) MCG/ACT Aero Soln inhalation aerosol Inhale 1-2 Puffs every four hours as needed for Shortness of Breath.  Qty: 8.5 g, Refills: 2    Associated Diagnoses: Closed compression fracture of body of L1 vertebra (HCC)      methocarbamol (ROBAXIN) 500 MG Tab Take 1 Tablet by mouth 3 times a day as needed (Hold for respiratory depression, respiratory rate <12, heart rate less than 65, lethargy,somnolence, or systolic blood pressure < 105.).  Qty: 30 Tablet, Refills: 2    Associated Diagnoses: Closed compression fracture of L2 lumbar vertebra, sequela; Closed compression fracture of L1 lumbar vertebra, sequela; Intractable low back pain; Lumbosacral radiculopathy due to degenerative joint disease of spine; Lumbar spine instability      diclofenac sodium (VOLTAREN) 1 % Gel Apply 4 g topically 4 times a day as needed (back pain).    Associated Diagnoses: Closed compression fracture of L2 lumbar vertebra, sequela; Closed compression fracture of L1 lumbar vertebra, sequela; Intractable low back pain; Lumbosacral radiculopathy due to degenerative joint disease of spine; Lumbar spine instability             ALLERGIES  Allergies   Allergen Reactions    Ampicillin-Sulbactam Sodium Hives     With oral lesions     Doxycycline Hives     Possible hives (took with Unasyn)     Gabapentin Hives and Swelling    Pregabalin Hives and Swelling    Amlodipine Swelling    Bee Swelling    Fentanyl Vomiting and Unspecified    Morphine Vomiting, Nausea and Unspecified    Benzoin Rash     Tincture of benzoin  "=blisters    blisters    Rifampin Unspecified     Pt turns yellow        PHYSICAL EXAM  BP 98/63   Pulse 100   Temp 36.6 °C (97.9 °F) (Temporal)   Resp 18   Ht 1.549 m (5' 1\")   Wt 59 kg (130 lb)   SpO2 93%   BMI 24.56 kg/m²    Constitutional: Well developed, Well nourished, mild to moderate distress  HENT: Normocephalic, dry mucus membranes  Eyes: PERRLA, EOMI, Conjunctiva normal, No discharge.   Neck: No midline C-spine tenderness.   Cardiovascular: Chest wall tenderness to palpation. Normal heart rate, Normal rhythm.    Thorax & Lungs: Clear to auscultation bilaterally, No respiratory distress.   Back: No midline T or L-spine tenderness palpation  Abdomen: Soft, No tenderness, No masses.   Skin: Warm, Dry, No rash.    Musculoskeletal: Tenderness to left trapezial area and pain with left shoulder.   Neurologic: Awake, alert. Moves all extremities spontaneously.  Psychiatric: Affect normal, Judgment normal, Mood normal.       DIAGNOSTIC STUDIES    Labs:   Results for orders placed or performed during the hospital encounter of 05/05/25   PLATELET MAPPING WITH BASIC TEG    Collection Time: 05/05/25  2:57 PM   Result Value Ref Range    Reaction Time Initial-R 4.7 4.6 - 9.1 min    React Time Initial Hep 4.7 4.3 - 8.3 min    Clot Kinetics-K 0.9 0.8 - 2.1 min    Clot Angle-Angle 77.2 63.0 - 78.0 degrees    Maximum Clot Strength-MA 66.8 52.0 - 69.0 mm    TEG Functional Fibrinogen(MA) 31.2 15.0 - 32.0 mm    % Inhibition ADP 21.6 (H) 0.0 - 17.0 %    % Inhibition AA 83.9 (H) 0.0 - 11.0 %    TEG Algorithm Link Algorithm    DIAGNOSTIC ALCOHOL    Collection Time: 05/05/25  2:57 PM   Result Value Ref Range    Diagnostic Alcohol <10.1 <10.1 mg/dL   Comp Metabolic Panel    Collection Time: 05/05/25  2:57 PM   Result Value Ref Range    Sodium 136 135 - 145 mmol/L    Potassium 3.8 3.6 - 5.5 mmol/L    Chloride 103 96 - 112 mmol/L    Co2 24 20 - 33 mmol/L    Anion Gap 9.0 7.0 - 16.0    Glucose 92 65 - 99 mg/dL    Bun 21 8 - 22 " mg/dL    Creatinine 1.34 0.50 - 1.40 mg/dL    Calcium 9.0 8.5 - 10.5 mg/dL    Correct Calcium 9.3 8.5 - 10.5 mg/dL    AST(SGOT) 19 12 - 45 U/L    ALT(SGPT) 10 2 - 50 U/L    Alkaline Phosphatase 69 30 - 99 U/L    Total Bilirubin 0.3 0.1 - 1.5 mg/dL    Albumin 3.6 3.2 - 4.9 g/dL    Total Protein 6.2 6.0 - 8.2 g/dL    Globulin 2.6 1.9 - 3.5 g/dL    A-G Ratio 1.4 g/dL   CBC WITHOUT DIFFERENTIAL    Collection Time: 05/05/25  2:57 PM   Result Value Ref Range    WBC 7.3 4.8 - 10.8 K/uL    RBC 3.84 (L) 4.20 - 5.40 M/uL    Hemoglobin 10.1 (L) 12.0 - 16.0 g/dL    Hematocrit 32.1 (L) 37.0 - 47.0 %    MCV 83.6 81.4 - 97.8 fL    MCH 26.3 (L) 27.0 - 33.0 pg    MCHC 31.5 (L) 32.2 - 35.5 g/dL    RDW 50.0 35.9 - 50.0 fL    Platelet Count 249 164 - 446 K/uL    MPV 9.6 9.0 - 12.9 fL   Prothrombin Time    Collection Time: 05/05/25  2:57 PM   Result Value Ref Range    PT 14.0 12.0 - 14.6 sec    INR 1.07 0.87 - 1.13   APTT    Collection Time: 05/05/25  2:57 PM   Result Value Ref Range    APTT 25.7 24.7 - 36.0 sec   COD - Adult (Type and Screen)    Collection Time: 05/05/25  2:57 PM   Result Value Ref Range    ABO Grouping Only A     Rh Grouping Only POS     Antibody Screen-Cod NEG    ESTIMATED GFR    Collection Time: 05/05/25  2:57 PM   Result Value Ref Range    GFR (CKD-EPI) 42 (A) >60 mL/min/1.73 m 2   POCT glucose device results    Collection Time: 05/05/25  5:38 PM   Result Value Ref Range    POC Glucose, Blood 87 65 - 99 mg/dL       Radiology:   This attending emergency physician has independently interpreted the diagnostic imaging associated with this visit and is awaiting the final reading from the radiologist.   Preliminary interpretation is a follows: No bleed  Radiologist interpretation:   CT-HEAD W/O   Final Result      No CT evidence of acute infarct, hemorrhage or mass.               CT-CSPINE WITHOUT PLUS RECONS   Final Result      No acute fracture or traumatic listhesis in the cervical spine.      DX-CHEST-LIMITED (1  VIEW)   Final Result      Left basilar atelectasis/scarring. No focal consolidation. No effusions.      US-TRAUMA VEIN SCREEN LOWER BILAT EXTREMITY    (Results Pending)          COURSE & MEDICAL DECISION MAKING     INITIAL ASSESSMENT, COURSE AND PLAN      Care Narrative: Trauma green from [facility], the patient got a CT scan of the chest abdomen pelvis also T and L-spine with an L1 fracture, multiple rib fractures acute and subacute.  CT scan of the head and C-spine are negative.  Patient will be admitted to the hospital, discussed case with trauma who is at the bedside upon the patient's arrival.  They will admit the patient to the hospital.    3:02 PM - Patient was evaluated in trauma bay as trauma green transfer from [facility]. The patient tripped over a pillow today, landing on the posterior region of her head. After imaging was completed, she was found to have 5 acute rib fractures, multiple old rib fractures, and L1 compression fracture. Patient has been off Eliquis for one week. Trauma Surgery present at bedside and reports that she would like the patient to be admitted to ICU due to desaturation levels. The patient had the opportunity to ask any questions. The plan for hospitalization was discussed with the patient given their current presentation and diagnostic study results. Patient is understanding and agreeable to the plan for hospitalization.    DISPOSITION AND DISCUSSIONS    I have discussed management of the patient with the following physicians and ALEC's:  Dr. Miguel (ICU)     Discussion of management with other [facility] or appropriate source(s): None     DISPOSITION:  Patient will be hospitalized by Dr. Miguel in critical condition.    FINAL DIAGNOSIS  1. Fall, initial encounter    2. Closed head injury, initial encounter    3. Closed fracture of multiple ribs of left side, initial encounter         I, Salma Dxion (Scribe), am scribing for, and in the presence of, Ashish Amezquita,  M.D..    Electronically signed by: Salma Dixon (Scribe), 5/5/2025    IAshish M.D. personally performed the services described in this documentation, as scribed by Salma Dixon in my presence, and it is both accurate and complete.      The note accurately reflects work and decisions made by me.  Ashish Amezquita M.D.  5/5/2025  7:45 PM

## 2025-05-05 NOTE — ASSESSMENT & PLAN NOTE
Chronic condition treated with metformin.  Holding maintenance metformin for 48 hours following intravenous contrast administration.  Insulin sliding scale coverage.  Adequate glycemic control off of maintenance mediation.

## 2025-05-05 NOTE — ED NOTES
Med rec complete per med rec tech at [facility] prior to transfer to [facility]    ED Note by med tech at [facility]:     Medication history reviewed with pt. Med rec is complete.  Allergies reviewed, per pt     Pt reports that she stopped taking LEXAPRO 20MG about 2 nights ago.     Patient has not had any outpatient antibiotics in the last 30 days.     Pt takes ELIQUIS 5MG, pt reports last time she took this medication was about a week ago.     Dispense history available in EPIC? YES

## 2025-05-05 NOTE — ASSESSMENT & PLAN NOTE
Chronic condition treated with Roxicodone 10mg, robaxin.  Resumed robaxin on admission. Multimodals and PRN analgesics.

## 2025-05-05 NOTE — H&P
CHIEF COMPLAINT: Ground level fall.     HISTORY OF PRESENT ILLNESS: The patient is a 68 year-old White elderly woman who was injured in a fall. The patient suffered a mechanical ground-level fall. She had no loss of consciousness. The patient is chronically anticoagulated with apixaban (Eliquis). Her last dose of medication was administered one week ago for unclear reasons.  She complains of pain of the left back and neck, as well as weakness of the left upper extremity which she states is chronic and secondary to c spine disease.    The patient was initially evaluated at [facility], where she was found to have multiple left-sided acute and subacute rib fractures, L1 compression fracture with further compression compared to prior scan, and was hypoxemic with saturations in the 80s.  She was transferred to [facility] for further evaluation.    TRIAGE CATEGORY: The patient was triaged as a Trauma Green Transfer Activation. The patient was initially evaluated at [facility] in [place], [place] where CT imaging demonstrated rib fractures. She was transported to [facility] in [place], [place] for a Trauma Surgery and higher level of care trauma evaluation. An expeditious primary and secondary survey with required adjuncts was conducted. See Trauma Narrator for full details.    PAST MEDICAL HISTORY:  has a past medical history of Anemia, Anesthesia, Arthritis, Asthma, CAD (coronary artery disease), Cataract, Dental disorder, Depression, Diabetes ([facility]), Disorder of thyroid, Heart burn, History of vertebral fracture, HTN (hypertension), Hyperlipidemia, Indigestion, Migraines, Obesity, Pneumonia (2023), PONV (postoperative nausea and vomiting), and Sleep apnea.    She has no past medical history of Acute nasopharyngitis, Arrhythmia, Blood clotting disorder ([facility]), Bowel habit changes, Breath shortness, Bronchitis, Cancer (HCC), Carcinoma in situ of respiratory system, Congestive  heart failure (HCC), Continuous ambulatory peritoneal dialysis status (HCC), Coughing blood, Dialysis patient (HCC), Glaucoma, Gynecological disorder, Heart murmur, Heart valve disease, Hemorrhagic disorder (HCC), Hepatitis A, Hepatitis B, Hepatitis C, Hiatus hernia syndrome, Infectious disease, Jaundice, Myocardial infarct (HCC), Pacemaker, Pregnant, Renal disorder, Rheumatic fever, Seizure (HCC), Snoring, Stroke (HCC), Tuberculosis, Urinary bladder disorder, or Urinary incontinence.    PAST SURGICAL HISTORY:  has a past surgical history that includes appendectomy (N/A, 1976); abdominal hysterectomy total (N/A, 1978); breast biopsy (1984); colectomy (N/A, 2007); lumbar laminectomy diskectomy (N/A, 1989); arthroplasty (Right, 2020); lumbar exploration (N/A, 2017); insertion, peripheral nerve stimulator, lower extremity (Left, 12/22/2022); and pr reconstr total shoulder implant (Right, 4/30/2024).    ALLERGIES:   Allergies   Allergen Reactions    Ampicillin-Sulbactam Sodium Hives     With oral lesions     Doxycycline Hives     Possible hives (took with Unasyn)     Gabapentin Hives and Swelling    Pregabalin Hives and Swelling    Amlodipine Swelling    Bee Swelling    Fentanyl Vomiting and Unspecified    Morphine Vomiting, Nausea and Unspecified    Benzoin Rash     Tincture of benzoin =blisters    blisters    Rifampin Unspecified     Pt turns yellow       CURRENT MEDICATIONS:   Home Medications    **Home medications have not yet been reviewed for this encounter**       Audit from Redirected Encounters    **Home medications have not yet been reviewed for this encounter**       FAMILY HISTORY: family history includes Alcohol abuse in her daughter; Cancer in her mother; Diabetes in her brother, brother, father, and maternal aunt; Drug abuse in her brother and daughter; Heart Disease in her brother, brother, brother, brother, father, and mother; Hyperlipidemia in her brother, father, and mother; Hypertension in her  brother, father, maternal aunt, and maternal aunt; Other in her brother; Stroke in her father.    SOCIAL HISTORY:  reports that she has never smoked. She has never used smokeless tobacco. She reports that she does not drink alcohol and does not use drugs.    REVIEW OF SYSTEMS: Comprehensive review of systems is negative with the exception of the aforementioned HPI, PMH, and PSH bullets in accordance with CMS guidelines.    PHYSICAL EXAMINATION:      Vital Signs: /55   Pulse (!) 101   Temp 36.6 °C (97.9 °F) (Temporal)   Resp 16   SpO2 93%   CONSTITUTIONAL: Alert, awake, oriented x3. Well-developed, well-nourished.  HEENT:  Head: Normocephalic, atraumatic.  Mouth/Throat: Oropharynx clear, moist.  Nose: Nares clear, no nasal septal hematoma.  Ears: External ear normal.  Face: Midface stable.  Eyes: PERRL. Conjunctivae normal, EOM intact.   NECK: Midline C-spine nontender to palpation, no step-offs.  CARDIOVASCULAR: Normal rate, regular rhythm.  PULMONARY/CHEST: Respiratory effort, breath sounds normal. No respiratory distress. No wheezes. Chest wall stable, non-tender, normal excursion.  ABDOMEN: Soft, non-distended, non-tender.  MUSCULOSKELETAL: No deformities. Pelvis stable, non-tender. T/L spine non-tender to palpation, no stepoffs. Left upper extremity weakness.  NEUROLOGICAL: GCS 15.  SKIN: Warm and dry. No abrasions, lacerations. Old ecchymoses to the forearms.  PSYCHIATRIC: Behavior appropriate for situation.      LABORATORY VALUES:   Recent Labs     05/05/25  1153   WBC 9.6   RBC 3.92*   HEMOGLOBIN 10.3*   HEMATOCRIT 33.8*   MCV 86.2   MCH 26.3*   MCHC 30.5*   RDW 51.8*   PLATELETCT 269   MPV 9.1     Recent Labs     05/05/25  1153   SODIUM 137   POTASSIUM 3.6   CHLORIDE 102   CO2 22   GLUCOSE 86   BUN 19   CREATININE 1.38   CALCIUM 9.3     Recent Labs     05/05/25  1153   ASTSGOT 21   ALTSGPT 13   TBILIRUBIN 0.3   ALKPHOSPHAT 73   GLOBULIN 2.7   INR 0.93     Recent Labs     05/05/25  1153   APTT 28.4    INR 0.93        IMAGING:   DX-CHEST-LIMITED (1 VIEW)    (Results Pending)       ASSESSMENT AND PLAN:   This is a 75-year-old female admitted on 5/5/2025 following a mechanical ground-level fall earlier in the morning with left-sided rib fractures, both acute and subacute, as well as an L1 compression fracture and chronic compression fractures of T4, T5, T12.    Trauma  GLF tripped over pillow.  Trauma Green Transfer Activation from [facility] in [place], [place].  Surgeon List: Emilie Miguel MD. Trauma Surgery.    Closed fracture of five ribs of left side  CT imaging with fractures of the posterior left fourth and fifth rib fractures, lateral left sixth and seventh rib, and the left ninth rib posterolaterally. There are subacute anterolateral left seventh and eighth rib fractures.   Fracture pattern is not amenable to operative fixation.  Aggressive multimodal pain management and pulmonary hygiene. Serial chest radiographs.    Normocytic anemia  Admit hgb 10.8  Trend    Closed compression fracture of body of L1 vertebra (HCC)  Compression fracture of L1 with further compression when compared with the prior exam.   Nonoperative   Analgesia     Increased creatine kinase level  Admit creat 1.38 (baseline 0.76)  Avoid nephrotoxins  Trend    Anticoagulated on Eliquis  Reports she has not been taking for the last 7 days  TEG pending       DISPOSITION: Trauma ICU.  Interval Trauma tertiary survey.    CRITICAL CARE TIME: My full attention was spent providing medically necessary critical care to the patient, exclusive of time spent on any procedures, for 30 minutes, with details documented in my note.  The patient has acute impairment of one or more vital organ systems and a high probability of imminent or life-threatening deterioration in condition. Provided high complexity decision making to assess, manipulate, and support vital system functions to treat vital organ system failure and/or to prevent  further life-threatening deterioration of the patient's condition. Requires continued ICU and hospital admission.    Critical care interventions include: integration of multiple data points and associated complex medical decision making and management of rib fractures.         ____________________________________     Emilie Miguel M.D.    DD: 5/5/2025  3:09 PM

## 2025-05-05 NOTE — ED NOTES
Medication history reviewed with pt. Med rec is complete.  Allergies reviewed, per pt    Pt reports that she stopped taking LEXAPRO 20MG about 2 nights ago.    Patient has not had any outpatient antibiotics in the last 30 days.    Pt takes ELIQUIS 5MG, pt reports last time she took this medication was about a week ago.    Dispense history available in EPIC? YES

## 2025-05-05 NOTE — ASSESSMENT & PLAN NOTE
Chronic condition treated with duloxetine, amitriptyline.  Resumed maintenance medication on admission.

## 2025-05-06 ENCOUNTER — APPOINTMENT (OUTPATIENT)
Dept: RADIOLOGY | Facility: MEDICAL CENTER | Age: 75
DRG: 184 | End: 2025-05-06
Payer: MEDICARE

## 2025-05-06 PROBLEM — Z78.9 NO CONTRAINDICATION TO DEEP VEIN THROMBOSIS (DVT) PROPHYLAXIS: Status: ACTIVE | Noted: 2025-05-06

## 2025-05-06 PROBLEM — R33.9 URINARY RETENTION: Status: ACTIVE | Noted: 2025-05-06

## 2025-05-06 PROBLEM — S22.089A CLOSED T12 FRACTURE (HCC): Status: ACTIVE | Noted: 2025-05-06

## 2025-05-06 LAB
ALBUMIN SERPL BCP-MCNC: 3.1 G/DL (ref 3.2–4.9)
ALBUMIN/GLOB SERPL: 1.3 G/DL
ALP SERPL-CCNC: 63 U/L (ref 30–99)
ALT SERPL-CCNC: 10 U/L (ref 2–50)
ANION GAP SERPL CALC-SCNC: 11 MMOL/L (ref 7–16)
ARTERIAL PATENCY WRIST A: ABNORMAL
AST SERPL-CCNC: 21 U/L (ref 12–45)
BASE EXCESS BLDA CALC-SCNC: -2 MMOL/L (ref -4–3)
BASOPHILS # BLD AUTO: 0.4 % (ref 0–1.8)
BASOPHILS # BLD: 0.02 K/UL (ref 0–0.12)
BILIRUB SERPL-MCNC: 0.2 MG/DL (ref 0.1–1.5)
BODY TEMPERATURE: ABNORMAL DEGREES
BUN SERPL-MCNC: 22 MG/DL (ref 8–22)
CALCIUM ALBUM COR SERPL-MCNC: 9.1 MG/DL (ref 8.5–10.5)
CALCIUM SERPL-MCNC: 8.4 MG/DL (ref 8.5–10.5)
CHLORIDE SERPL-SCNC: 106 MMOL/L (ref 96–112)
CO2 BLDA-SCNC: 24 MMOL/L (ref 32–48)
CO2 SERPL-SCNC: 20 MMOL/L (ref 20–33)
CORTIS SERPL-MCNC: 0.3 UG/DL (ref 0–23)
CREAT SERPL-MCNC: 1.53 MG/DL (ref 0.5–1.4)
DELSYS IDSYS: ABNORMAL
EOSINOPHIL # BLD AUTO: 0.2 K/UL (ref 0–0.51)
EOSINOPHIL NFR BLD: 3.7 % (ref 0–6.9)
ERYTHROCYTE [DISTWIDTH] IN BLOOD BY AUTOMATED COUNT: 52.1 FL (ref 35.9–50)
GFR SERPLBLD CREATININE-BSD FMLA CKD-EPI: 35 ML/MIN/1.73 M 2
GLOBULIN SER CALC-MCNC: 2.4 G/DL (ref 1.9–3.5)
GLUCOSE BLD STRIP.AUTO-MCNC: 75 MG/DL (ref 65–99)
GLUCOSE BLD STRIP.AUTO-MCNC: 88 MG/DL (ref 65–99)
GLUCOSE BLD STRIP.AUTO-MCNC: 89 MG/DL (ref 65–99)
GLUCOSE BLD STRIP.AUTO-MCNC: 90 MG/DL (ref 65–99)
GLUCOSE BLD STRIP.AUTO-MCNC: 98 MG/DL (ref 65–99)
GLUCOSE SERPL-MCNC: 89 MG/DL (ref 65–99)
HCO3 BLDA-SCNC: 23.1 MMOL/L (ref 21–28)
HCT VFR BLD AUTO: 28.7 % (ref 37–47)
HGB BLD-MCNC: 8.8 G/DL (ref 12–16)
IMM GRANULOCYTES # BLD AUTO: 0.02 K/UL (ref 0–0.11)
IMM GRANULOCYTES NFR BLD AUTO: 0.4 % (ref 0–0.9)
LACTATE BLD-SCNC: 0.6 MMOL/L (ref 0.5–2)
LPM ILPM: 2 LPM
LYMPHOCYTES # BLD AUTO: 1.24 K/UL (ref 1–4.8)
LYMPHOCYTES NFR BLD: 22.8 % (ref 22–41)
MAGNESIUM SERPL-MCNC: 1.9 MG/DL (ref 1.5–2.5)
MCH RBC QN AUTO: 26.3 PG (ref 27–33)
MCHC RBC AUTO-ENTMCNC: 30.7 G/DL (ref 32.2–35.5)
MCV RBC AUTO: 85.9 FL (ref 81.4–97.8)
MONOCYTES # BLD AUTO: 0.68 K/UL (ref 0–0.85)
MONOCYTES NFR BLD AUTO: 12.5 % (ref 0–13.4)
NEUTROPHILS # BLD AUTO: 3.29 K/UL (ref 1.82–7.42)
NEUTROPHILS NFR BLD: 60.2 % (ref 44–72)
NRBC # BLD AUTO: 0 K/UL
NRBC BLD-RTO: 0 /100 WBC (ref 0–0.2)
PCO2 BLDA: 40.6 MMHG (ref 32–48)
PCO2 TEMP ADJ BLDA: 39 MMHG (ref 32–48)
PH BLDA: 7.36 [PH] (ref 7.35–7.45)
PH TEMP ADJ BLDA: 7.38 [PH] (ref 7.35–7.45)
PHOSPHATE SERPL-MCNC: 4.2 MG/DL (ref 2.5–4.5)
PLATELET # BLD AUTO: 210 K/UL (ref 164–446)
PMV BLD AUTO: 9.3 FL (ref 9–12.9)
PO2 BLDA: 71 MMHG (ref 83–108)
PO2 TEMP ADJ BLDA: 66 MMHG (ref 83–108)
POTASSIUM SERPL-SCNC: 4.7 MMOL/L (ref 3.6–5.5)
PROT SERPL-MCNC: 5.5 G/DL (ref 6–8.2)
RBC # BLD AUTO: 3.34 M/UL (ref 4.2–5.4)
SAO2 % BLDA: 93 % (ref 93–99)
SODIUM SERPL-SCNC: 137 MMOL/L (ref 135–145)
SPECIMEN DRAWN FROM PATIENT: ABNORMAL
TSH SERPL DL<=0.005 MIU/L-ACNC: 0.66 UIU/ML (ref 0.38–5.33)
WBC # BLD AUTO: 5.5 K/UL (ref 4.8–10.8)

## 2025-05-06 PROCEDURE — 700105 HCHG RX REV CODE 258

## 2025-05-06 PROCEDURE — 770001 HCHG ROOM/CARE - MED/SURG/GYN PRIV*

## 2025-05-06 PROCEDURE — 97602 WOUND(S) CARE NON-SELECTIVE: CPT

## 2025-05-06 PROCEDURE — 36600 WITHDRAWAL OF ARTERIAL BLOOD: CPT

## 2025-05-06 PROCEDURE — A9270 NON-COVERED ITEM OR SERVICE: HCPCS

## 2025-05-06 PROCEDURE — 700111 HCHG RX REV CODE 636 W/ 250 OVERRIDE (IP)

## 2025-05-06 PROCEDURE — 36415 COLL VENOUS BLD VENIPUNCTURE: CPT

## 2025-05-06 PROCEDURE — 700102 HCHG RX REV CODE 250 W/ 637 OVERRIDE(OP)

## 2025-05-06 PROCEDURE — 84100 ASSAY OF PHOSPHORUS: CPT

## 2025-05-06 PROCEDURE — 84443 ASSAY THYROID STIM HORMONE: CPT

## 2025-05-06 PROCEDURE — 93970 EXTREMITY STUDY: CPT | Mod: 26 | Performed by: INTERNAL MEDICINE

## 2025-05-06 PROCEDURE — 83605 ASSAY OF LACTIC ACID: CPT

## 2025-05-06 PROCEDURE — 82803 BLOOD GASES ANY COMBINATION: CPT

## 2025-05-06 PROCEDURE — 85025 COMPLETE CBC W/AUTO DIFF WBC: CPT

## 2025-05-06 PROCEDURE — 93970 EXTREMITY STUDY: CPT

## 2025-05-06 PROCEDURE — 94669 MECHANICAL CHEST WALL OSCILL: CPT

## 2025-05-06 PROCEDURE — 700102 HCHG RX REV CODE 250 W/ 637 OVERRIDE(OP): Performed by: STUDENT IN AN ORGANIZED HEALTH CARE EDUCATION/TRAINING PROGRAM

## 2025-05-06 PROCEDURE — 71045 X-RAY EXAM CHEST 1 VIEW: CPT

## 2025-05-06 PROCEDURE — 80053 COMPREHEN METABOLIC PANEL: CPT

## 2025-05-06 PROCEDURE — 82533 TOTAL CORTISOL: CPT

## 2025-05-06 PROCEDURE — A9270 NON-COVERED ITEM OR SERVICE: HCPCS | Performed by: STUDENT IN AN ORGANIZED HEALTH CARE EDUCATION/TRAINING PROGRAM

## 2025-05-06 PROCEDURE — 700111 HCHG RX REV CODE 636 W/ 250 OVERRIDE (IP): Performed by: STUDENT IN AN ORGANIZED HEALTH CARE EDUCATION/TRAINING PROGRAM

## 2025-05-06 PROCEDURE — 99232 SBSQ HOSP IP/OBS MODERATE 35: CPT

## 2025-05-06 PROCEDURE — 82962 GLUCOSE BLOOD TEST: CPT | Mod: 91

## 2025-05-06 PROCEDURE — 700101 HCHG RX REV CODE 250

## 2025-05-06 PROCEDURE — 83735 ASSAY OF MAGNESIUM: CPT

## 2025-05-06 PROCEDURE — 51798 US URINE CAPACITY MEASURE: CPT

## 2025-05-06 RX ORDER — SODIUM CHLORIDE 9 MG/ML
INJECTION, SOLUTION INTRAVENOUS CONTINUOUS
Status: DISCONTINUED | OUTPATIENT
Start: 2025-05-06 | End: 2025-05-08

## 2025-05-06 RX ORDER — OXYCODONE HYDROCHLORIDE 10 MG/1
10 TABLET ORAL
Refills: 0 | Status: DISCONTINUED | OUTPATIENT
Start: 2025-05-06 | End: 2025-05-06

## 2025-05-06 RX ORDER — METAXALONE 800 MG/1
800 TABLET ORAL EVERY 8 HOURS
Status: DISCONTINUED | OUTPATIENT
Start: 2025-05-06 | End: 2025-05-14 | Stop reason: HOSPADM

## 2025-05-06 RX ORDER — HEPARIN SODIUM 5000 [USP'U]/ML
5000 INJECTION, SOLUTION INTRAVENOUS; SUBCUTANEOUS EVERY 8 HOURS
Status: DISCONTINUED | OUTPATIENT
Start: 2025-05-06 | End: 2025-05-06

## 2025-05-06 RX ORDER — SODIUM CHLORIDE, SODIUM LACTATE, POTASSIUM CHLORIDE, AND CALCIUM CHLORIDE .6; .31; .03; .02 G/100ML; G/100ML; G/100ML; G/100ML
500 INJECTION, SOLUTION INTRAVENOUS ONCE
Status: COMPLETED | OUTPATIENT
Start: 2025-05-06 | End: 2025-05-06

## 2025-05-06 RX ORDER — OXYCODONE HYDROCHLORIDE 5 MG/1
5 TABLET ORAL
Refills: 0 | Status: DISCONTINUED | OUTPATIENT
Start: 2025-05-06 | End: 2025-05-06

## 2025-05-06 RX ORDER — MAGNESIUM SULFATE HEPTAHYDRATE 40 MG/ML
2 INJECTION, SOLUTION INTRAVENOUS ONCE
Status: COMPLETED | OUTPATIENT
Start: 2025-05-06 | End: 2025-05-06

## 2025-05-06 RX ORDER — TAMSULOSIN HYDROCHLORIDE 0.4 MG/1
0.4 CAPSULE ORAL
Status: DISCONTINUED | OUTPATIENT
Start: 2025-05-06 | End: 2025-05-14 | Stop reason: HOSPADM

## 2025-05-06 RX ORDER — OXYCODONE HYDROCHLORIDE 5 MG/1
5 TABLET ORAL EVERY 4 HOURS PRN
Refills: 0 | Status: DISCONTINUED | OUTPATIENT
Start: 2025-05-06 | End: 2025-05-07

## 2025-05-06 RX ORDER — OXYCODONE HYDROCHLORIDE 5 MG/1
2.5 TABLET ORAL EVERY 4 HOURS PRN
Refills: 0 | Status: DISCONTINUED | OUTPATIENT
Start: 2025-05-06 | End: 2025-05-07

## 2025-05-06 RX ORDER — HYDROMORPHONE HYDROCHLORIDE 1 MG/ML
0.25 INJECTION, SOLUTION INTRAMUSCULAR; INTRAVENOUS; SUBCUTANEOUS
Status: DISCONTINUED | OUTPATIENT
Start: 2025-05-06 | End: 2025-05-06

## 2025-05-06 RX ADMIN — ENOXAPARIN SODIUM 30 MG: 100 INJECTION SUBCUTANEOUS at 06:23

## 2025-05-06 RX ADMIN — LIDOCAINE 1 PATCH: 4 PATCH TOPICAL at 16:19

## 2025-05-06 RX ADMIN — ROSUVASTATIN CALCIUM 20 MG: 20 TABLET, FILM COATED ORAL at 16:19

## 2025-05-06 RX ADMIN — METAXALONE 800 MG: 800 TABLET ORAL at 14:52

## 2025-05-06 RX ADMIN — APIXABAN 5 MG: 5 TABLET, FILM COATED ORAL at 18:23

## 2025-05-06 RX ADMIN — OMEPRAZOLE 20 MG: 20 CAPSULE, DELAYED RELEASE ORAL at 20:49

## 2025-05-06 RX ADMIN — DULOXETINE 30 MG: 30 CAPSULE, DELAYED RELEASE ORAL at 16:19

## 2025-05-06 RX ADMIN — AMITRIPTYLINE HYDROCHLORIDE 150 MG: 25 TABLET, FILM COATED ORAL at 20:49

## 2025-05-06 RX ADMIN — TAMSULOSIN HYDROCHLORIDE 0.4 MG: 0.4 CAPSULE ORAL at 16:19

## 2025-05-06 RX ADMIN — ACETAMINOPHEN 1000 MG: 500 TABLET ORAL at 23:27

## 2025-05-06 RX ADMIN — EZETIMIBE 10 MG: 10 TABLET ORAL at 16:18

## 2025-05-06 RX ADMIN — OXYCODONE 5 MG: 5 TABLET ORAL at 16:19

## 2025-05-06 RX ADMIN — METAXALONE 800 MG: 800 TABLET ORAL at 22:45

## 2025-05-06 RX ADMIN — MAGNESIUM SULFATE HEPTAHYDRATE 2 G: 2 INJECTION, SOLUTION INTRAVENOUS at 11:49

## 2025-05-06 RX ADMIN — OMEPRAZOLE 20 MG: 20 CAPSULE, DELAYED RELEASE ORAL at 09:30

## 2025-05-06 RX ADMIN — OXYCODONE 5 MG: 5 TABLET ORAL at 20:49

## 2025-05-06 RX ADMIN — ACETAMINOPHEN 1000 MG: 500 TABLET ORAL at 16:19

## 2025-05-06 RX ADMIN — SODIUM CHLORIDE: 9 INJECTION, SOLUTION INTRAVENOUS at 14:48

## 2025-05-06 RX ADMIN — ACETAMINOPHEN 1000 MG: 500 TABLET ORAL at 11:45

## 2025-05-06 RX ADMIN — SODIUM CHLORIDE, POTASSIUM CHLORIDE, SODIUM LACTATE AND CALCIUM CHLORIDE 500 ML: 600; 310; 30; 20 INJECTION, SOLUTION INTRAVENOUS at 03:22

## 2025-05-06 ASSESSMENT — PATIENT HEALTH QUESTIONNAIRE - PHQ9
9. THOUGHTS THAT YOU WOULD BE BETTER OFF DEAD, OR OF HURTING YOURSELF: NOT AT ALL
8. MOVING OR SPEAKING SO SLOWLY THAT OTHER PEOPLE COULD HAVE NOTICED. OR THE OPPOSITE, BEING SO FIGETY OR RESTLESS THAT YOU HAVE BEEN MOVING AROUND A LOT MORE THAN USUAL: SEVERAL DAYS
2. FEELING DOWN, DEPRESSED, IRRITABLE, OR HOPELESS: SEVERAL DAYS
1. LITTLE INTEREST OR PLEASURE IN DOING THINGS: SEVERAL DAYS
6. FEELING BAD ABOUT YOURSELF - OR THAT YOU ARE A FAILURE OR HAVE LET YOURSELF OR YOUR FAMILY DOWN: NOT AL ALL
3. TROUBLE FALLING OR STAYING ASLEEP OR SLEEPING TOO MUCH: NOT AT ALL
SUM OF ALL RESPONSES TO PHQ QUESTIONS 1-9: 4
4. FEELING TIRED OR HAVING LITTLE ENERGY: SEVERAL DAYS
5. POOR APPETITE OR OVEREATING: NOT AT ALL
SUM OF ALL RESPONSES TO PHQ9 QUESTIONS 1 AND 2: 2
7. TROUBLE CONCENTRATING ON THINGS, SUCH AS READING THE NEWSPAPER OR WATCHING TELEVISION: NOT AT ALL

## 2025-05-06 ASSESSMENT — SOCIAL DETERMINANTS OF HEALTH (SDOH)
WITHIN THE PAST 12 MONTHS, THE FOOD YOU BOUGHT JUST DIDN'T LAST AND YOU DIDN'T HAVE MONEY TO GET MORE: NEVER TRUE
WITHIN THE LAST YEAR, HAVE YOU BEEN HUMILIATED OR EMOTIONALLY ABUSED IN OTHER WAYS BY YOUR PARTNER OR EX-PARTNER?: NO
WITHIN THE PAST 12 MONTHS, YOU WORRIED THAT YOUR FOOD WOULD RUN OUT BEFORE YOU GOT THE MONEY TO BUY MORE: NEVER TRUE
WITHIN THE LAST YEAR, HAVE YOU BEEN KICKED, HIT, SLAPPED, OR OTHERWISE PHYSICALLY HURT BY YOUR PARTNER OR EX-PARTNER?: NO
IN THE PAST 12 MONTHS, HAS THE ELECTRIC, GAS, OIL, OR WATER COMPANY THREATENED TO SHUT OFF SERVICE IN YOUR HOME?: NO
WITHIN THE LAST YEAR, HAVE YOU BEEN AFRAID OF YOUR PARTNER OR EX-PARTNER?: NO
WITHIN THE LAST YEAR, HAVE TO BEEN RAPED OR FORCED TO HAVE ANY KIND OF SEXUAL ACTIVITY BY YOUR PARTNER OR EX-PARTNER?: NO

## 2025-05-06 ASSESSMENT — COGNITIVE AND FUNCTIONAL STATUS - GENERAL
HELP NEEDED FOR BATHING: A LOT
STANDING UP FROM CHAIR USING ARMS: A LOT
TOILETING: A LOT
MOVING TO AND FROM BED TO CHAIR: A LOT
PERSONAL GROOMING: A LOT
MOBILITY SCORE: 14
WALKING IN HOSPITAL ROOM: A LITTLE
EATING MEALS: A LOT
CLIMB 3 TO 5 STEPS WITH RAILING: A LOT
DRESSING REGULAR LOWER BODY CLOTHING: A LOT
SUGGESTED CMS G CODE MODIFIER MOBILITY: CL
TURNING FROM BACK TO SIDE WHILE IN FLAT BAD: A LITTLE
DAILY ACTIVITIY SCORE: 12
DRESSING REGULAR UPPER BODY CLOTHING: A LOT
SUGGESTED CMS G CODE MODIFIER DAILY ACTIVITY: CL
MOVING FROM LYING ON BACK TO SITTING ON SIDE OF FLAT BED: A LOT

## 2025-05-06 ASSESSMENT — ENCOUNTER SYMPTOMS
FEVER: 0
MYALGIAS: 1
NECK PAIN: 0
SENSORY CHANGE: 0
SPEECH CHANGE: 0
FOCAL WEAKNESS: 1
VOMITING: 0
BACK PAIN: 1
CHILLS: 0
NAUSEA: 0
BLURRED VISION: 0
NERVOUS/ANXIOUS: 0
ABDOMINAL PAIN: 0

## 2025-05-06 ASSESSMENT — LIFESTYLE VARIABLES
HAVE PEOPLE ANNOYED YOU BY CRITICIZING YOUR DRINKING: NO
HOW MANY TIMES IN THE PAST YEAR HAVE YOU HAD 5 OR MORE DRINKS IN A DAY: 0
ALCOHOL_USE: NO
HAVE PEOPLE ANNOYED YOU BY CRITICIZING YOUR DRINKING: NO
HAVE YOU EVER FELT YOU SHOULD CUT DOWN ON YOUR DRINKING: NO
AVERAGE NUMBER OF DAYS PER WEEK YOU HAVE A DRINK CONTAINING ALCOHOL: 0
TOTAL SCORE: 0
EVER HAD A DRINK FIRST THING IN THE MORNING TO STEADY YOUR NERVES TO GET RID OF A HANGOVER: NO
CONSUMPTION TOTAL: NEGATIVE
ALCOHOL_USE: NO
ON A TYPICAL DAY WHEN YOU DRINK ALCOHOL HOW MANY DRINKS DO YOU HAVE: 0
EVER FELT BAD OR GUILTY ABOUT YOUR DRINKING: NO
EVER HAD A DRINK FIRST THING IN THE MORNING TO STEADY YOUR NERVES TO GET RID OF A HANGOVER: NO
EVER FELT BAD OR GUILTY ABOUT YOUR DRINKING: NO
DOES PATIENT WANT TO STOP DRINKING: NO
TOTAL SCORE: 0
TOTAL SCORE: 0

## 2025-05-06 ASSESSMENT — PAIN DESCRIPTION - PAIN TYPE
TYPE: ACUTE PAIN

## 2025-05-06 ASSESSMENT — FIBROSIS 4 INDEX: FIB4 SCORE: 2.34

## 2025-05-06 NOTE — PROGRESS NOTES
Updated trauma APRN regarding patient continued hypotension and lethargy. Patient will still wake to voice and answer questions, but will drift off to sleep during conversation. Asked about narcan administration, and aprn came to bedside to assess patient. Decision was made to not administer narcan at this time, but will continue to monitor respiratory rate and level of consciousness.

## 2025-05-06 NOTE — CONSULTS
Consult inpatient for 75-year-old suffering from a ground-level fall on Eliquis.  With new rib fractures, existing L1 compression and T12 fracture, admitted for hypoxemia with saturations in the 80s.  Transferred to [facility].    In comparing the CT scan from today with prior MRIs in December 2024 there appears to be interval healing with some consolidation of the fracture at L1, and no interval worsening of retropulsion or evidence of worsening instability kyphosis.  I would recommend continue nonoperative management, trial of TLSO if she has persistent back pain in the thoracolumbar region with ambulation, and outpatient evaluation with pain management for possible kyphoplasty if she still has axial back pain.  I will examine the patient shortly.      12/7/2024 MRI [facility]               Today

## 2025-05-06 NOTE — PROGRESS NOTES
Clarified with Rashida APRN that patient does not require spinal precautions and can mobilize as able.   Patient hypotensive in 83/50 asymptomatic, orders for 500cc LR bolus received.

## 2025-05-06 NOTE — PROGRESS NOTES
Patient continues to be hypotensive. She is drowsy but arousable. Orders for 500cc LR bolus received.

## 2025-05-06 NOTE — ASSESSMENT & PLAN NOTE
Polanco placed 5/6 for retention  - flomax initiated   5/8 trial Polanco removal  5/9 Voiding   >130

## 2025-05-06 NOTE — CARE PLAN
The patient is Stable - Low risk of patient condition declining or worsening    Shift Goals  Clinical Goals: pulmonary hygiene, pain control  Patient Goals: pain management  Family Goals: updates    Progress made toward(s) clinical / shift goals:    Problem: Pain - Standard  Goal: Alleviation of pain or a reduction in pain to the patient’s comfort goal  Outcome: Progressing     Problem: Knowledge Deficit - Standard  Goal: Patient and family/care givers will demonstrate understanding of plan of care, disease process/condition, diagnostic tests and medications  Outcome: Progressing     Problem: Skin Integrity  Goal: Skin integrity is maintained or improved  Outcome: Progressing     Problem: Fall Risk  Goal: Patient will remain free from falls  Outcome: Progressing       Patient is not progressing towards the following goals:

## 2025-05-06 NOTE — PROGRESS NOTES
1702 Pt arrived to ICU T919     Vitals:   HR: 92  BP: 119/57   RR: 18  SaO2: 90% on 2L NC  Wt: 60.2kg  Temp 97.1F temporal     Gtts currently infusing: none  _____________________________________________________________     4 Eyes Skin Assessment Completed by DEEJAY Benjamin and DEEJAY Joseph.    Head WDL  Ears blanchable redness to bilateral posterior ears  Nose WDL  Mouth dry lips  Neck WDL  Breast/Chest WDL  Shoulder Blades WDL  Spine WDL  (R) Arm/Elbow/Hand bruising to elbow, wrist, hand and forearm  (L) Arm/Elbow/Hand abrasion to pinky knuckle on hand, bruising from elbow to hand  Abdomen old midline incision  Groin WDL  Scrotum/Coccyx/Buttocks red, slow to darrell  (R) Leg Bruising knee and shin  (L) Leg Bruising inner thigh, knee, shin  (R) Heel/Foot/Toe scab to great toe  (L) Heel/Foot/Toe scab to pinky toe, second toe and great toe          Devices In Places ECG, Blood Pressure Cuff, Pulse Ox, SCD's, and Nasal Cannula      Interventions In Place Gray Ear Foams, NC W/Ear Foams, Sacral Mepilex, TAP System, Pillows, Q2 Turns, Heels Loaded W/Pillows, and Pressure Redistribution Mattress    Possible Skin Injury Yes    Pictures Uploaded Into Epic Yes  Wound Consult Placed Yes  RN Wound Prevention Protocol Ordered Yes    ______________________________________________________________     Personal belongings: consent obtained  Glasses, grey sweats, grey shirt, purse with wallet, cellphone

## 2025-05-06 NOTE — PROGRESS NOTES
Patient hypotensive after pain medications given. Able to arouse and answer orientation questions, but remains drowsy. Mary BROWN notified.

## 2025-05-06 NOTE — PROGRESS NOTES
Pt brought up from [facility] with Tech and ACLS RN to room 919. Pt is on monitor, VSS and neurologically intact. Sating at 97 on 3L NC.

## 2025-05-06 NOTE — ASSESSMENT & PLAN NOTE
Prophylactic dose heparin 5000 u q 8 hr initiated upon admission.  Subsequent US showing chronic DVT, eliquis reinitiated, heparin discontinued

## 2025-05-06 NOTE — CARE PLAN
Problem: Pain - Standard  Goal: Alleviation of pain or a reduction in pain to the patient’s comfort goal  Outcome: Progressing     Problem: Knowledge Deficit - Standard  Goal: Patient and family/care givers will demonstrate understanding of plan of care, disease process/condition, diagnostic tests and medications  Outcome: Progressing     Problem: Skin Integrity  Goal: Skin integrity is maintained or improved  Outcome: Progressing     Problem: Skin Integrity  Goal: Skin integrity is maintained or improved  Outcome: Progressing     Problem: Fall Risk  Goal: Patient will remain free from falls  Outcome: Progressing   The patient is Watcher - Medium risk of patient condition declining or worsening    Shift Goals  Clinical Goals: hemodynamic stability; pain control; pulmonary hygiene  Patient Goals: pain management  Family Goals: JONY    Progress made toward(s) clinical / shift goals:  yes        Patient is not progressing towards the following goals:

## 2025-05-06 NOTE — ASSESSMENT & PLAN NOTE
CT imaging with Stable compression fractures of T4, T5, and T12   T12 fracture is acute per spine surgeon review  Non-operative management.   Off-the-shelf TLSO bracing. Outpatient follow up.  Romeo Castaneda MD. Spine Surgeon. [facility].

## 2025-05-06 NOTE — PROGRESS NOTES
Trauma / Surgical Daily Progress Note    Date of Service  5/6/2025    Chief Complaint  75 y.o. female admitted 5/5/2025 with rib fractures, worsened chronic L1 compression fracture following GLF    Interval Events  New admit  More confused overnight   - resolved since early this morning, likely due to narcotic given overnight  - dosing adjusted  Creat 1.53 (1.34)  - gentle IVF  Polanco placed for retention  - flomax  Tertiary survey complete    - now with L spine tenderness on exam, spine consult placed   - AM BMP  - DVT study pending, possible eliquis resumption if DVT still present; pending spine recs  - aggressive pulmonary hygiene   - therapies pending  - stable for transfer to [facility]    Review of Systems  Review of Systems   Constitutional:  Positive for malaise/fatigue. Negative for chills and fever.   HENT:  Negative for hearing loss.    Eyes:  Negative for blurred vision.   Gastrointestinal:  Negative for abdominal pain, nausea and vomiting.   Genitourinary:  Negative for dysuria.   Musculoskeletal:  Positive for back pain and myalgias. Negative for joint pain and neck pain.   Neurological:  Positive for focal weakness (chronic left leg arm weakness). Negative for sensory change and speech change.   Psychiatric/Behavioral:  The patient is not nervous/anxious.         Vital Signs  Temp:  [35.9 °C (96.7 °F)-36.4 °C (97.6 °F)] 36.3 °C (97.3 °F)  Pulse:  [69-93] 81  Resp:  [9-35] 31  BP: ()/(35-72) 126/60  SpO2:  [85 %-100 %] 97 %    Physical Exam  Physical Exam  Vitals and nursing note reviewed.   Constitutional:       Appearance: Normal appearance.   HENT:      Head: Normocephalic and atraumatic.      Right Ear: External ear normal.      Left Ear: External ear normal.      Nose: Nose normal.      Mouth/Throat:      Mouth: Mucous membranes are moist.   Eyes:      General:         Right eye: No discharge.         Left eye: No discharge.      Pupils: Pupils are equal, round, and reactive to light.    Cardiovascular:      Rate and Rhythm: Normal rate and regular rhythm.   Pulmonary:      Effort: Pulmonary effort is normal. No respiratory distress.   Chest:      Chest wall: Tenderness (left lateral, posterior) present.   Abdominal:      General: Abdomen is flat. There is no distension.      Palpations: Abdomen is soft.      Tenderness: There is no abdominal tenderness. There is no guarding.   Musculoskeletal:         General: No swelling or tenderness. Normal range of motion.      Cervical back: Normal range of motion. No rigidity.      Comments: Now with L spine tenderness. No CT spine tenderness. No pelvic or extremity tenderness.    Skin:     General: Skin is warm and dry.      Capillary Refill: Capillary refill takes less than 2 seconds.   Neurological:      General: No focal deficit present.      Mental Status: She is alert and oriented to person, place, and time.      Comments: 2/5 strength in LUE chronic, 5/5 strength in other extremities    Psychiatric:         Mood and Affect: Mood normal.         Behavior: Behavior normal.         Laboratory  Recent Results (from the past 24 hours)   POCT glucose device results    Collection Time: 05/05/25  5:38 PM   Result Value Ref Range    POC Glucose, Blood 87 65 - 99 mg/dL   POCT glucose device results    Collection Time: 05/05/25  8:24 PM   Result Value Ref Range    POC Glucose, Blood 94 65 - 99 mg/dL   POCT glucose device results    Collection Time: 05/06/25  3:11 AM   Result Value Ref Range    POC Glucose, Blood 89 65 - 99 mg/dL   Comp Metabolic Panel (CMP): Tomorrow AM    Collection Time: 05/06/25  3:20 AM   Result Value Ref Range    Sodium 137 135 - 145 mmol/L    Potassium 4.7 3.6 - 5.5 mmol/L    Chloride 106 96 - 112 mmol/L    Co2 20 20 - 33 mmol/L    Anion Gap 11.0 7.0 - 16.0    Glucose 89 65 - 99 mg/dL    Bun 22 8 - 22 mg/dL    Creatinine 1.53 (H) 0.50 - 1.40 mg/dL    Calcium 8.4 (L) 8.5 - 10.5 mg/dL    Correct Calcium 9.1 8.5 - 10.5 mg/dL    AST(SGOT) 21  12 - 45 U/L    ALT(SGPT) 10 2 - 50 U/L    Alkaline Phosphatase 63 30 - 99 U/L    Total Bilirubin 0.2 0.1 - 1.5 mg/dL    Albumin 3.1 (L) 3.2 - 4.9 g/dL    Total Protein 5.5 (L) 6.0 - 8.2 g/dL    Globulin 2.4 1.9 - 3.5 g/dL    A-G Ratio 1.3 g/dL   ESTIMATED GFR    Collection Time: 05/06/25  3:20 AM   Result Value Ref Range    GFR (CKD-EPI) 35 (A) >60 mL/min/1.73 m 2   CBC WITH DIFFERENTIAL    Collection Time: 05/06/25  4:55 AM   Result Value Ref Range    WBC 5.5 4.8 - 10.8 K/uL    RBC 3.34 (L) 4.20 - 5.40 M/uL    Hemoglobin 8.8 (L) 12.0 - 16.0 g/dL    Hematocrit 28.7 (L) 37.0 - 47.0 %    MCV 85.9 81.4 - 97.8 fL    MCH 26.3 (L) 27.0 - 33.0 pg    MCHC 30.7 (L) 32.2 - 35.5 g/dL    RDW 52.1 (H) 35.9 - 50.0 fL    Platelet Count 210 164 - 446 K/uL    MPV 9.3 9.0 - 12.9 fL    Neutrophils-Polys 60.20 44.00 - 72.00 %    Lymphocytes 22.80 22.00 - 41.00 %    Monocytes 12.50 0.00 - 13.40 %    Eosinophils 3.70 0.00 - 6.90 %    Basophils 0.40 0.00 - 1.80 %    Immature Granulocytes 0.40 0.00 - 0.90 %    Nucleated RBC 0.00 0.00 - 0.20 /100 WBC    Neutrophils (Absolute) 3.29 1.82 - 7.42 K/uL    Lymphs (Absolute) 1.24 1.00 - 4.80 K/uL    Monos (Absolute) 0.68 0.00 - 0.85 K/uL    Eos (Absolute) 0.20 0.00 - 0.51 K/uL    Baso (Absolute) 0.02 0.00 - 0.12 K/uL    Immature Granulocytes (abs) 0.02 0.00 - 0.11 K/uL    NRBC (Absolute) 0.00 K/uL   CORTISOL    Collection Time: 05/06/25  4:55 AM   Result Value Ref Range    Cortisol 0.3 0.0 - 23.0 ug/dL   MAGNESIUM    Collection Time: 05/06/25  4:55 AM   Result Value Ref Range    Magnesium 1.9 1.5 - 2.5 mg/dL   PHOSPHORUS    Collection Time: 05/06/25  4:55 AM   Result Value Ref Range    Phosphorus 4.2 2.5 - 4.5 mg/dL   TSH WITH REFLEX TO FT4    Collection Time: 05/06/25  4:55 AM   Result Value Ref Range    TSH 0.664 0.380 - 5.330 uIU/mL   POCT glucose device results    Collection Time: 05/06/25  6:17 AM   Result Value Ref Range    POC Glucose, Blood 88 65 - 99 mg/dL   POCT glucose device results     Collection Time: 05/06/25  7:19 AM   Result Value Ref Range    POC Glucose, Blood 75 65 - 99 mg/dL   POCT arterial blood gas device results    Collection Time: 05/06/25 10:36 AM   Result Value Ref Range    Ph 7.364 7.350 - 7.450    Pco2 40.6 32.0 - 48.0 mmHg    Po2 71 (L) 83 - 108 mmHg    Tco2 24 (L) 32 - 48 mmol/L    S02 93 93 - 99 %    Hco3 23.1 21.0 - 28.0 mmol/L    BE -2 -4 - 3 mmol/L    Body Temp 97.0 F degrees    Ph Temp Yoana 7.377 7.350 - 7.450    Pco2 Temp Co 39.0 32.0 - 48.0 mmHg    Po2 Temp Cor 66 (L) 83 - 108 mmHg    Specimen Arterial     Vinay Test N/A     DelSys Other     LPM 2 lpm   POCT lactate device results    Collection Time: 05/06/25 10:36 AM   Result Value Ref Range    iStat Lactate 0.6 0.5 - 2.0 mmol/L   POCT glucose device results    Collection Time: 05/06/25 11:44 AM   Result Value Ref Range    POC Glucose, Blood 90 65 - 99 mg/dL       Fluids    Intake/Output Summary (Last 24 hours) at 5/6/2025 1523  Last data filed at 5/6/2025 1448  Gross per 24 hour   Intake 1645.02 ml   Output 675 ml   Net 970.02 ml       Core Measures & Quality Metrics  Labs reviewed and Medications reviewed  Polanco catheter: Urinary Tract Retention or Urinary Tract Obstruction      DVT Prophylaxis: Enoxaparin (Lovenox)  DVT prophylaxis - mechanical: SCDs  Ulcer prophylaxis: Yes    Assessed for rehab: Patient was assess for and/or received rehabilitation services during this hospitalization    RAP Score Total: 7    CAGE Results: negative Blood Alcohol>0.08: not completed       Assessment/Plan  * Trauma- (present on admission)  Assessment & Plan  GLF tripped over pillow.  Trauma Green Transfer Activation from [facility] in [place], [place].  Surgeon List: Emilie Miguel MD. Trauma Surgery.    Anticoagulated on Eliquis- (present on admission)  Assessment & Plan  For treatment of acute LLE DVT, started on 2/28/25  Reports she paused the medication 7 days ago on recommendations from her neurosurgeon ahead of  potential cervical spine surgery  TEG with AA 83.9% inhibition, ADP 21.6% inhibition   - no concern for active bleeding, no intervention indicated.  - repeat US pending     Closed fracture of five ribs of left side- (present on admission)  Assessment & Plan  CT imaging with fractures of the posterior left fourth and fifth rib fractures, lateral left sixth and seventh rib, and the left ninth rib posterolaterally. There are subacute anterolateral left seventh and eighth rib fractures.   Fracture pattern is not amenable to operative fixation.  Aggressive multimodal pain management and pulmonary hygiene. Serial chest radiographs.    Deep vein thrombosis (DVT) of left lower extremity (HCC)- (present on admission)  Assessment & Plan  Diagnosed 2/28, treated with eliquis  DVT study pending     Closed compression fracture of body of L1 vertebra (HCC)- (present on admission)  Assessment & Plan  Compression fracture of L1 with further compression when compared with the prior exam.   History of compression fracture, patient has chronic pain to this area that is unchanged; no pain on initial exam.  Tertiary exam with lumbar spine pain  Definitive plan pending.   Bracing recommendations pending.  Jb Park MD. Neurosurgeon. [facility].    Urinary retention  Assessment & Plan  Polanco placed 5/6 for retention  - flomax initiated     Increased creatine kinase level- (present on admission)  Assessment & Plan  Admit creat 1.38 (baseline 0.76).  5/9 1.58  - gentle IVF  Lisinopril held.   Avoid nephrotoxins.  Trend.    No contraindication to deep vein thrombosis (DVT) prophylaxis- (present on admission)  Assessment & Plan  Prophylactic dose heparin 5000 u q 8 hr initiated upon admission.    GERD (gastroesophageal reflux disease)- (present on admission)  Assessment & Plan  Chronic condition treated with omeprazole.  Resumed maintenance medication on admission.    Normocytic anemia- (present on admission)  Assessment &  Plan  Admit hgb 10.8  Trend    Primary hypertension- (present on admission)  Assessment & Plan  Chronic condition treated with lisinopril, metoprolol.  Resumed maintenance medication on admission.  PRN antihypertensives   Lisinopril held due to increased creatinine     Type 2 diabetes mellitus (HCC)- (present on admission)  Assessment & Plan  Chronic condition treated with metformin.  Holding maintenance metformin for 48 hours following intravenous contrast administration.  Insulin sliding scale coverage.    Asthma- (present on admission)  Assessment & Plan  Chronic condition treated with albuterol.  Resumed maintenance medication on admission. RT protocol.     Chronic pain syndrome on chronic opioids- (present on admission)  Assessment & Plan  Chronic condition treated with Roxicodone 10mg, robaxin.  Resumed robaxin on admission. Multimodals and PRN analgesics.    Acquired hypothyroidism- (present on admission)  Assessment & Plan  Chronic condition treated with liothyronine, levothyroxine.  Resumed maintenance medication on admission.    Mood disorder (HCC)- (present on admission)  Assessment & Plan  Chronic condition treated with duloxetine, amitriptyline.  Resumed maintenance medication on admission.    Dyslipidemia- (present on admission)  Assessment & Plan  Chronic condition treated with rosuvastatin, ezetimibe.  Resumed maintenance medication on admission.    Mental status adequate for full examination?: Yes    Spine cleared (radiologically and/or clinically): Yes    All current laboratory studies/radiology exams reviewed: No, US in process    Medications reconciliation has been reviewed: Yes    Completed Consultations:  None     Pending Consultations:  Spine    Newly identified diagnoses, injuries and/or co-morbidities:  L spine tenderness, nontender on initial exam 5/5, now with tenderness     PDI not completed at time of tertiary         Discussed patient condition with Family, RN, Patient, and Trauma  Surgery Dr Chambers  .

## 2025-05-06 NOTE — WOUND TEAM
[facility]  Inpatient Services  Wound and Skin Care Brief Evaluation    Admission Date: 5/5/2025     Last order of IP CONSULT TO WOUND CARE was found on 5/5/2025 from Hospital Encounter on 5/5/2025     HPI, PMH, SH: Reviewed    Chief Complaint   Patient presents with    Trauma Green     Diagnosis: Trauma [T14.90XA]    Unit where seen by Wound Team: T919-1     Wound consult placed regarding sacrum. Chart and images reviewed. This discussed with bedside RN. This clinician in to assess patient. Patient pleasant and agreeable. Patient needed assistance to turn due to injuries. Patient turned to left side, patient sacrum intact, some freckles noted, all other areas are intact and blanching, some redness to left buttock but all blanching.  Non-selectively debrided with Moist warm washcloth. Sacral offloading dressing reapplied.   Bilateral heels intact and blanching.     No pressure injuries or advanced wound care needs identified. Wound consult completed. No further follow up unless indicated and consulted.                    PREVENTATIVE INTERVENTIONS:    Q shift Hermelindo - performed per nursing policy  Q shift pressure point assessments - performed per nursing policy    Surface/Positioning  ICU Low Airloss - Currently in Place  Reposition q 2 hours with pillows - Currently in Place    Offloading/Redistribution  Sacral offloading dressing (Silicone dressing) - Currently in Place  Float Heels off Bed with Pillows - Newly Applied this Visit           Respiratory  Silicone O2 tubing - Currently in Place    Containment/Moisture Prevention    Dri-boris pad - Currently in Place  Polanco Catheter - Currently in Place

## 2025-05-06 NOTE — PROGRESS NOTES
Report called to Kimberly RN 1530.  Transport took patient at 1535 with a purse, grey wallet, and 3 cell phones, pt confirms all personnel belongings accounted for.

## 2025-05-06 NOTE — ASSESSMENT & PLAN NOTE
Diagnosed 2/28, treated with eliquis  US with cubacute to chronic, partially occlusive thrombus in the common femoral vein, extending into the femoral vein and popliteal vein.  - eliquis reinitiated; no creatinine dosing indicated given patient age and BMI per pharmacy

## 2025-05-06 NOTE — CARE PLAN
Problem: Hyperinflation  Goal: Prevent or improve atelectasis  Description: Target End Date:  3 to 4 days1. Instruct incentive spirometry usage2.  Perform hyperinflation therapy as indicated  Outcome: Progressing  Flowsheets (Taken 5/5/2025 1715)  Hyperinflation Protocol Goals/Outcome: Increase and/or stable inspiratory capacity for 24 hours  Hyperinflation Protocol Inclusions: Chest Trauma (Blunt, Penetrative, or Surgical)   PEP QID

## 2025-05-07 ENCOUNTER — APPOINTMENT (OUTPATIENT)
Dept: RADIOLOGY | Facility: MEDICAL CENTER | Age: 75
DRG: 184 | End: 2025-05-07
Payer: MEDICARE

## 2025-05-07 LAB
ANION GAP SERPL CALC-SCNC: 9 MMOL/L (ref 7–16)
BUN SERPL-MCNC: 16 MG/DL (ref 8–22)
CALCIUM SERPL-MCNC: 8.2 MG/DL (ref 8.5–10.5)
CHLORIDE SERPL-SCNC: 108 MMOL/L (ref 96–112)
CO2 SERPL-SCNC: 22 MMOL/L (ref 20–33)
CREAT SERPL-MCNC: 1.14 MG/DL (ref 0.5–1.4)
ERYTHROCYTE [DISTWIDTH] IN BLOOD BY AUTOMATED COUNT: 49.8 FL (ref 35.9–50)
GFR SERPLBLD CREATININE-BSD FMLA CKD-EPI: 50 ML/MIN/1.73 M 2
GLUCOSE BLD STRIP.AUTO-MCNC: 100 MG/DL (ref 65–99)
GLUCOSE BLD STRIP.AUTO-MCNC: 97 MG/DL (ref 65–99)
GLUCOSE SERPL-MCNC: 105 MG/DL (ref 65–99)
HCT VFR BLD AUTO: 29.1 % (ref 37–47)
HGB BLD-MCNC: 9 G/DL (ref 12–16)
MCH RBC QN AUTO: 26.1 PG (ref 27–33)
MCHC RBC AUTO-ENTMCNC: 30.9 G/DL (ref 32.2–35.5)
MCV RBC AUTO: 84.3 FL (ref 81.4–97.8)
PLATELET # BLD AUTO: 183 K/UL (ref 164–446)
PMV BLD AUTO: 11.9 FL (ref 9–12.9)
POTASSIUM SERPL-SCNC: 4.1 MMOL/L (ref 3.6–5.5)
RBC # BLD AUTO: 3.45 M/UL (ref 4.2–5.4)
SODIUM SERPL-SCNC: 139 MMOL/L (ref 135–145)
WBC # BLD AUTO: 5.2 K/UL (ref 4.8–10.8)

## 2025-05-07 PROCEDURE — 700101 HCHG RX REV CODE 250

## 2025-05-07 PROCEDURE — 82962 GLUCOSE BLOOD TEST: CPT

## 2025-05-07 PROCEDURE — 700102 HCHG RX REV CODE 250 W/ 637 OVERRIDE(OP): Performed by: NURSE PRACTITIONER

## 2025-05-07 PROCEDURE — A9270 NON-COVERED ITEM OR SERVICE: HCPCS | Performed by: NURSE PRACTITIONER

## 2025-05-07 PROCEDURE — 700102 HCHG RX REV CODE 250 W/ 637 OVERRIDE(OP): Performed by: STUDENT IN AN ORGANIZED HEALTH CARE EDUCATION/TRAINING PROGRAM

## 2025-05-07 PROCEDURE — 97535 SELF CARE MNGMENT TRAINING: CPT

## 2025-05-07 PROCEDURE — 99233 SBSQ HOSP IP/OBS HIGH 50: CPT | Performed by: NURSE PRACTITIONER

## 2025-05-07 PROCEDURE — 770006 HCHG ROOM/CARE - MED/SURG/GYN SEMI*

## 2025-05-07 PROCEDURE — 36415 COLL VENOUS BLD VENIPUNCTURE: CPT

## 2025-05-07 PROCEDURE — 700102 HCHG RX REV CODE 250 W/ 637 OVERRIDE(OP)

## 2025-05-07 PROCEDURE — A9270 NON-COVERED ITEM OR SERVICE: HCPCS | Performed by: STUDENT IN AN ORGANIZED HEALTH CARE EDUCATION/TRAINING PROGRAM

## 2025-05-07 PROCEDURE — 94669 MECHANICAL CHEST WALL OSCILL: CPT

## 2025-05-07 PROCEDURE — 97167 OT EVAL HIGH COMPLEX 60 MIN: CPT

## 2025-05-07 PROCEDURE — 80048 BASIC METABOLIC PNL TOTAL CA: CPT

## 2025-05-07 PROCEDURE — 85027 COMPLETE CBC AUTOMATED: CPT

## 2025-05-07 PROCEDURE — 22310 CLOSED TX VERT FX W/O MANJ: CPT | Performed by: STUDENT IN AN ORGANIZED HEALTH CARE EDUCATION/TRAINING PROGRAM

## 2025-05-07 PROCEDURE — 71045 X-RAY EXAM CHEST 1 VIEW: CPT

## 2025-05-07 PROCEDURE — 700105 HCHG RX REV CODE 258

## 2025-05-07 PROCEDURE — 99222 1ST HOSP IP/OBS MODERATE 55: CPT | Mod: 57 | Performed by: STUDENT IN AN ORGANIZED HEALTH CARE EDUCATION/TRAINING PROGRAM

## 2025-05-07 PROCEDURE — A9270 NON-COVERED ITEM OR SERVICE: HCPCS

## 2025-05-07 PROCEDURE — 97162 PT EVAL MOD COMPLEX 30 MIN: CPT

## 2025-05-07 RX ORDER — OXYCODONE HYDROCHLORIDE 10 MG/1
10 TABLET ORAL EVERY 4 HOURS PRN
Refills: 0 | Status: DISCONTINUED | OUTPATIENT
Start: 2025-05-07 | End: 2025-05-14 | Stop reason: HOSPADM

## 2025-05-07 RX ORDER — OXYCODONE HYDROCHLORIDE 5 MG/1
5 TABLET ORAL EVERY 4 HOURS PRN
Refills: 0 | Status: DISCONTINUED | OUTPATIENT
Start: 2025-05-07 | End: 2025-05-14 | Stop reason: HOSPADM

## 2025-05-07 RX ADMIN — ROSUVASTATIN CALCIUM 20 MG: 20 TABLET, FILM COATED ORAL at 16:06

## 2025-05-07 RX ADMIN — METAXALONE 800 MG: 800 TABLET ORAL at 05:28

## 2025-05-07 RX ADMIN — DULOXETINE 30 MG: 30 CAPSULE, DELAYED RELEASE ORAL at 16:06

## 2025-05-07 RX ADMIN — ESCITALOPRAM OXALATE 20 MG: 10 TABLET ORAL at 05:28

## 2025-05-07 RX ADMIN — TAMSULOSIN HYDROCHLORIDE 0.4 MG: 0.4 CAPSULE ORAL at 09:10

## 2025-05-07 RX ADMIN — SODIUM CHLORIDE: 9 INJECTION, SOLUTION INTRAVENOUS at 03:04

## 2025-05-07 RX ADMIN — OXYCODONE 5 MG: 5 TABLET ORAL at 00:57

## 2025-05-07 RX ADMIN — ACETAMINOPHEN 1000 MG: 500 TABLET ORAL at 05:27

## 2025-05-07 RX ADMIN — AMITRIPTYLINE HYDROCHLORIDE 150 MG: 25 TABLET, FILM COATED ORAL at 20:25

## 2025-05-07 RX ADMIN — OXYCODONE 5 MG: 5 TABLET ORAL at 10:13

## 2025-05-07 RX ADMIN — ACETAMINOPHEN 1000 MG: 500 TABLET ORAL at 16:06

## 2025-05-07 RX ADMIN — OMEPRAZOLE 20 MG: 20 CAPSULE, DELAYED RELEASE ORAL at 20:25

## 2025-05-07 RX ADMIN — LEVOTHYROXINE SODIUM 88 MCG: 0.09 TABLET ORAL at 05:28

## 2025-05-07 RX ADMIN — OXYCODONE HYDROCHLORIDE 10 MG: 10 TABLET ORAL at 13:23

## 2025-05-07 RX ADMIN — METAXALONE 800 MG: 800 TABLET ORAL at 13:25

## 2025-05-07 RX ADMIN — EZETIMIBE 10 MG: 10 TABLET ORAL at 16:06

## 2025-05-07 RX ADMIN — SODIUM CHLORIDE: 9 INJECTION, SOLUTION INTRAVENOUS at 18:08

## 2025-05-07 RX ADMIN — APIXABAN 5 MG: 5 TABLET, FILM COATED ORAL at 16:06

## 2025-05-07 RX ADMIN — APIXABAN 5 MG: 5 TABLET, FILM COATED ORAL at 05:28

## 2025-05-07 RX ADMIN — LIOTHYRONINE SODIUM 5 MCG: 5 TABLET ORAL at 05:28

## 2025-05-07 RX ADMIN — OMEPRAZOLE 20 MG: 20 CAPSULE, DELAYED RELEASE ORAL at 09:10

## 2025-05-07 RX ADMIN — OXYCODONE HYDROCHLORIDE 10 MG: 10 TABLET ORAL at 20:25

## 2025-05-07 RX ADMIN — ACETAMINOPHEN 1000 MG: 500 TABLET ORAL at 11:37

## 2025-05-07 RX ADMIN — LIDOCAINE 1 PATCH: 4 PATCH TOPICAL at 16:06

## 2025-05-07 ASSESSMENT — COGNITIVE AND FUNCTIONAL STATUS - GENERAL
DRESSING REGULAR LOWER BODY CLOTHING: A LOT
SUGGESTED CMS G CODE MODIFIER DAILY ACTIVITY: CK
STANDING UP FROM CHAIR USING ARMS: A LITTLE
WALKING IN HOSPITAL ROOM: A LITTLE
HELP NEEDED FOR BATHING: A LOT
MOVING FROM LYING ON BACK TO SITTING ON SIDE OF FLAT BED: A LOT
DRESSING REGULAR UPPER BODY CLOTHING: A LOT
MOVING FROM LYING ON BACK TO SITTING ON SIDE OF FLAT BED: A LITTLE
DAILY ACTIVITIY SCORE: 14
TURNING FROM BACK TO SIDE WHILE IN FLAT BAD: A LITTLE
TURNING FROM BACK TO SIDE WHILE IN FLAT BAD: A LITTLE
SUGGESTED CMS G CODE MODIFIER DAILY ACTIVITY: CL
MOBILITY SCORE: 17
PERSONAL GROOMING: A LOT
DRESSING REGULAR UPPER BODY CLOTHING: A LOT
WALKING IN HOSPITAL ROOM: A LOT
SUGGESTED CMS G CODE MODIFIER MOBILITY: CK
MOVING TO AND FROM BED TO CHAIR: A LOT
CLIMB 3 TO 5 STEPS WITH RAILING: A LOT
SUGGESTED CMS G CODE MODIFIER MOBILITY: CL
PERSONAL GROOMING: A LITTLE
DAILY ACTIVITIY SCORE: 13
MOBILITY SCORE: 13
EATING MEALS: A LITTLE
STANDING UP FROM CHAIR USING ARMS: A LOT
MOVING TO AND FROM BED TO CHAIR: A LITTLE
CLIMB 3 TO 5 STEPS WITH RAILING: A LOT
TOILETING: A LOT
DRESSING REGULAR LOWER BODY CLOTHING: A LOT
EATING MEALS: A LITTLE
TOILETING: A LOT
HELP NEEDED FOR BATHING: A LOT

## 2025-05-07 ASSESSMENT — PAIN DESCRIPTION - PAIN TYPE
TYPE: ACUTE PAIN
TYPE: ACUTE PAIN;SURGICAL PAIN
TYPE: ACUTE PAIN

## 2025-05-07 ASSESSMENT — GAIT ASSESSMENTS
GAIT LEVEL OF ASSIST: CONTACT GUARD ASSIST
ASSISTIVE DEVICE: 4 WHEEL WALKER

## 2025-05-07 ASSESSMENT — ENCOUNTER SYMPTOMS
NERVOUS/ANXIOUS: 0
SPEECH CHANGE: 0
BACK PAIN: 1
ABDOMINAL PAIN: 0
VOMITING: 0
FEVER: 0
MYALGIAS: 1
FOCAL WEAKNESS: 1
NECK PAIN: 0
BLURRED VISION: 0
NAUSEA: 0
SENSORY CHANGE: 0
CHILLS: 0

## 2025-05-07 ASSESSMENT — ACTIVITIES OF DAILY LIVING (ADL): TOILETING: INDEPENDENT

## 2025-05-07 NOTE — CARE PLAN
The patient is Stable - Low risk of patient condition declining or worsening    Shift Goals  Clinical Goals: pain control, Q2 turns, safety  Patient Goals: pain control, rest, comfort  Family Goals: not present    Progress made toward(s) clinical / shift goals:    Problem: Pain - Standard  Goal: Alleviation of pain or a reduction in pain to the patient’s comfort goal  Outcome: Progressing  Flowsheets (Taken 5/7/2025 5336)  Non Verbal Scale:   Sleeping   Unlabored Breathing     Problem: Skin Integrity  Goal: Skin integrity is maintained or improved  Outcome: Progressing     Problem: Fall Risk  Goal: Patient will remain free from falls  Outcome: Progressing

## 2025-05-07 NOTE — PROGRESS NOTES
4 Eyes Skin Assessment Completed by DEEJAY Belcher and DEEJAY Kumari.    Head WDL  Ears WDL  Nose WDL  Mouth WDL  Neck WDL  Breast/Chest WDL  Shoulder Blades WDL  Spine WDL  (R) Arm/Elbow/Hand Redness and Bruising  (L) Arm/Elbow/Hand Redness and Bruising  Abdomen WDL  Groin WDL Polanco catheter in place  Scrotum/Coccyx/Buttocks Redness and Blanching  (R) Leg Redness and Bruising  (L) Leg Redness and Bruising  (R) Heel/Foot/Toe Redness and Blanching  (L) Heel/Foot/Toe Redness and Blanching          Devices In Places Blood Pressure Cuff, Pulse Ox, Polanco, SCD's, and Nasal Cannula      Interventions In Place NC W/Ear Foams, Sacral Mepilex, TAP System, Pillows, Q2 Turns, Barrier Cream, and Heels Loaded W/Pillows    Possible Skin Injury No    Pictures Uploaded Into Epic N/A  Wound Consult Placed N/A  RN Wound Prevention Protocol Ordered No

## 2025-05-07 NOTE — PROGRESS NOTES
1540 Patient arrived to unit via hospital bed, call light placed within reach, bed at lowest position, VSS stable on 0.5 liters of O2, all questions answered.

## 2025-05-07 NOTE — PROGRESS NOTES
Trauma / Surgical Daily Progress Note    Date of Service  5/7/2025    Chief Complaint  75 y.o. female admitted 5/5/2025 with rib fractures, worsened chronic L1 compression fracture following GLF.    Interval Events    Transferred to [facility].  Orthopedic/spine note reviewed.  Renal indices corrected.  Resume outpatient narcotic dosing.  Eliquis resumed for chronic DVT.    -Continue to hold pre-Hospital metformin.  Resume tomorrow if renal indices remain normal.  Anticipate the need for inpatient postacute services.  Skilled/nursing referrals.    Review of Systems  Review of Systems   Constitutional:  Positive for malaise/fatigue. Negative for chills and fever.   HENT:  Negative for hearing loss.    Eyes:  Negative for blurred vision.   Gastrointestinal:  Negative for abdominal pain, nausea and vomiting.   Genitourinary:  Negative for dysuria.   Musculoskeletal:  Positive for back pain and myalgias. Negative for joint pain and neck pain.   Neurological:  Positive for focal weakness (chronic left leg arm weakness). Negative for sensory change and speech change.   Psychiatric/Behavioral:  The patient is not nervous/anxious.         Vital Signs  Temp:  [36.3 °C (97.3 °F)-36.4 °C (97.5 °F)] 36.3 °C (97.3 °F)  Pulse:  [80-87] 85  Resp:  [15-31] 15  BP: (103-137)/(57-76) 129/76  SpO2:  [90 %-97 %] 92 %    Physical Exam  Physical Exam  Vitals and nursing note reviewed.   Constitutional:       Appearance: Normal appearance.   HENT:      Head: Normocephalic and atraumatic.      Right Ear: External ear normal.      Left Ear: External ear normal.      Nose: Nose normal.      Mouth/Throat:      Mouth: Mucous membranes are moist.   Eyes:      General:         Right eye: No discharge.         Left eye: No discharge.      Pupils: Pupils are equal, round, and reactive to light.   Cardiovascular:      Rate and Rhythm: Normal rate and regular rhythm.   Pulmonary:      Effort: Pulmonary effort is normal. No respiratory distress.   Chest:       Chest wall: Tenderness (left lateral, posterior) present.   Abdominal:      General: Abdomen is flat. There is no distension.      Palpations: Abdomen is soft.      Tenderness: There is no abdominal tenderness. There is no guarding.   Musculoskeletal:         General: No swelling. Normal range of motion.      Cervical back: Normal range of motion. No tenderness.      Thoracic back: No tenderness.      Lumbar back: Tenderness present.   Skin:     General: Skin is warm and dry.      Capillary Refill: Capillary refill takes less than 2 seconds.   Neurological:      General: No focal deficit present.      Mental Status: She is alert and oriented to person, place, and time.      Comments: 2/5 strength in LUE chronic, 5/5 strength in other extremities    Psychiatric:         Mood and Affect: Mood normal.         Behavior: Behavior normal.         Laboratory  Recent Results (from the past 24 hours)   POCT glucose device results    Collection Time: 05/06/25  4:28 PM   Result Value Ref Range    POC Glucose, Blood 98 65 - 99 mg/dL   Basic Metabolic Panel    Collection Time: 05/07/25  2:40 AM   Result Value Ref Range    Sodium 139 135 - 145 mmol/L    Potassium 4.1 3.6 - 5.5 mmol/L    Chloride 108 96 - 112 mmol/L    Co2 22 20 - 33 mmol/L    Glucose 105 (H) 65 - 99 mg/dL    Bun 16 8 - 22 mg/dL    Creatinine 1.14 0.50 - 1.40 mg/dL    Calcium 8.2 (L) 8.5 - 10.5 mg/dL    Anion Gap 9.0 7.0 - 16.0   CBC WITHOUT DIFFERENTIAL    Collection Time: 05/07/25  2:40 AM   Result Value Ref Range    WBC 5.2 4.8 - 10.8 K/uL    RBC 3.45 (L) 4.20 - 5.40 M/uL    Hemoglobin 9.0 (L) 12.0 - 16.0 g/dL    Hematocrit 29.1 (L) 37.0 - 47.0 %    MCV 84.3 81.4 - 97.8 fL    MCH 26.1 (L) 27.0 - 33.0 pg    MCHC 30.9 (L) 32.2 - 35.5 g/dL    RDW 49.8 35.9 - 50.0 fL    Platelet Count 183 164 - 446 K/uL    MPV 11.9 9.0 - 12.9 fL   ESTIMATED GFR    Collection Time: 05/07/25  2:40 AM   Result Value Ref Range    GFR (CKD-EPI) 50 (A) >60 mL/min/1.73 m 2   POCT  glucose device results    Collection Time: 05/07/25 11:39 AM   Result Value Ref Range    POC Glucose, Blood 100 (H) 65 - 99 mg/dL       Fluids    Intake/Output Summary (Last 24 hours) at 5/7/2025 1318  Last data filed at 5/7/2025 0523  Gross per 24 hour   Intake 80 ml   Output 1605 ml   Net -1525 ml       Core Measures & Quality Metrics  Labs reviewed and Medications reviewed  Polanco catheter: Urinary Tract Retention or Urinary Tract Obstruction      DVT: Eliquis.  DVT prophylaxis - mechanical: SCDs  Ulcer prophylaxis: Yes    Assessed for rehab: Patient was assess for and/or received rehabilitation services during this hospitalization    RAP Score Total: 7    CAGE Results: negative Blood Alcohol>0.08: not completed       Assessment/Plan  * Trauma- (present on admission)  Assessment & Plan  GLF tripped over pillow.  Trauma Green Transfer Activation from [facility] in [place], [place].  Surgeon List: Emilie Miguel MD. Trauma Surgery.    Closed T12 fracture (HCC)- (present on admission)  Assessment & Plan  CT imaging with Stable compression fractures of T4, T5, and T12   T12 fracture is acute per spine surgeon review  Non-operative management.   Off-the-shelf TLSO bracing. Outpatient follow up.  Romeo Castaneda MD. Spine Surgeon. [facility].    Anticoagulated on Eliquis- (present on admission)  Assessment & Plan  For treatment of acute LLE DVT, started on 2/28/25  Reports she paused the medication 7 days ago on recommendations from her neurosurgeon ahead of potential cervical spine surgery  TEG with AA 83.9% inhibition, ADP 21.6% inhibition   - Eliquis reinitiated     Closed fracture of five ribs of left side- (present on admission)  Assessment & Plan  CT imaging with fractures of the posterior left fourth and fifth rib fractures, lateral left sixth and seventh rib, and the left ninth rib posterolaterally. There are subacute anterolateral left seventh and eighth rib fractures.   Fracture  pattern is not amenable to operative fixation.  Aggressive multimodal pain management and pulmonary hygiene. Serial chest radiographs.    Deep vein thrombosis (DVT) of left lower extremity (HCC)- (present on admission)  Assessment & Plan  Diagnosed 2/28, treated with eliquis  US with cubacute to chronic, partially occlusive thrombus in the common femoral vein, extending into the femoral vein and popliteal vein.  - eliquis reinitiated; no creatinine dosing indicated given patient age and BMI per pharmacy      Urinary retention  Assessment & Plan  Polanco placed 5/6 for retention  - flomax initiated     Increased creatine kinase level- (present on admission)  Assessment & Plan  Admit creat 1.38 (baseline 0.76).  5/7 Renal indices corrected  Continue gentle IVF, Lisinopril held, Avoid nephrotoxins.  Trend.    Closed compression fracture of body of L1 vertebra (HCC)- (present on admission)  Assessment & Plan  Compression fracture of L1 with further compression when compared with the prior exam.   History of compression fracture, patient has chronic pain to this area that is unchanged; no pain on initial exam.  Tertiary exam with lumbar spine pain  Non-operative management.   Off-the-shelf TLSO bracing. Outpatient follow up.  Jb Park MD. Neurosurgeon. [facility].    No contraindication to deep vein thrombosis (DVT) prophylaxis- (present on admission)  Assessment & Plan  Prophylactic dose heparin 5000 u q 8 hr initiated upon admission.  Subsequent US showing chronic DVT, eliquis reinitiated, heparin discontinued     GERD (gastroesophageal reflux disease)- (present on admission)  Assessment & Plan  Chronic condition treated with omeprazole.  Resumed maintenance medication on admission.    Normocytic anemia- (present on admission)  Assessment & Plan  Admit hgb 10.8  Trend    Primary hypertension- (present on admission)  Assessment & Plan  Chronic condition treated with lisinopril, metoprolol.  Resumed  maintenance medication on admission.  PRN antihypertensives   Lisinopril held due to increased creatinine     Type 2 diabetes mellitus (HCC)- (present on admission)  Assessment & Plan  Chronic condition treated with metformin.  Holding maintenance metformin for 48 hours following intravenous contrast administration.  Insulin sliding scale coverage.    Asthma- (present on admission)  Assessment & Plan  Chronic condition treated with albuterol.  Resumed maintenance medication on admission. RT protocol.     Chronic pain syndrome on chronic opioids- (present on admission)  Assessment & Plan  Chronic condition treated with Roxicodone 10mg, robaxin.  Resumed robaxin on admission. Multimodals and PRN analgesics.    Acquired hypothyroidism- (present on admission)  Assessment & Plan  Chronic condition treated with liothyronine, levothyroxine.  Resumed maintenance medication on admission.    Mood disorder (HCC)- (present on admission)  Assessment & Plan  Chronic condition treated with duloxetine, amitriptyline.  Resumed maintenance medication on admission.    Dyslipidemia- (present on admission)  Assessment & Plan  Chronic condition treated with rosuvastatin, ezetimibe.  Resumed maintenance medication on admission.      Discussed patient condition with Patient and trauma surgery, Dr. Ashish Shoemaker.

## 2025-05-07 NOTE — THERAPY
Physical Therapy   Initial Evaluation     Patient Name: Yamile Go  Age:  75 y.o., Sex:  female  Medical Record #: [id number]  Today's Date: 5/7/2025     Precautions  Precautions: Fall Risk;TLSO (Thoracolumbosacral orthosis)  Comments: TLSO for comfort; may benefit from spinal precautions for comfort, multiple L sided rib fractures    Assessment    75 y.o. female was admitted s/p fall with multiple L sided rib fractures, L1 compression fx with further compression compared to prior scan.  PMHx includes chronic LUE weakness, asthma, CAD, DM, HTN, and migraines.  She lives alone in a 1SH, no GEORGE and was mod I for mobility with a rollator.  On eval, she presents obtunded, with pain, weakness, decreased balance, and decreased activity tolerance, and decreased knowledge of her TLSO brace impairing her mobility.  She will benefit from continued acute PT services to address the above deficits in order to return home safely.  Recommend post acute PT services.    Plan    Physical Therapy Initial Treatment Plan   Treatment Plan : Bed Mobility, Equipment, Family / Caregiver Training, Gait Training, Manual Therapy, Neuro Re-Education / Balance, Self Care / Home Evaluation, Therapeutic Activities, Therapeutic Exercise  Treatment Frequency: 4 Times per Week  Duration: Until Therapy Goals Met    DC Equipment Recommendations: None (Pt has a rollator)  Discharge Recommendations: Recommend post-acute placement for additional physical therapy services prior to discharge home            Objective       05/07/25 1422   Initial Contact Note    Initial Contact Note Order Received and Verified, Physical Therapy Evaluation in Progress with Full Report to Follow.   Precautions   Precautions Fall Risk;TLSO (Thoracolumbosacral orthosis)   Comments TLSO for comfort, spinal precautions for comfort, multiple L sided rib fractures   Vitals   Pulse Oximetry (!) 87 %  (on 0.5L with mobility, increased to 1L with O2 >90%)   O2 (LPM) 1   O2 Delivery  Device Silicone Nasal Cannula   Pain 0 - 10 Group   Therapist Pain Assessment During Activity;10  (with TLSO, pre-medicated)   Prior Living Situation   Housing / Facility 1 Story House   Steps Into Home 0   Bathroom Set up Bathtub / Shower Combination;Shower Chair   Equipment Owned 4-Wheel Walker;Front-Wheel Walker  (tripod cane)   Lives with - Patient's Self Care Capacity Alone and Able to Care For Self  (has a cat-doesn't walk between the legs)   Prior Level of Functional Mobility   Bed Mobility Independent   Transfer Status Independent   Ambulation Independent   Ambulation Distance community   Assistive Devices Used 4-Wheel Walker   Comments doesn't drive- takes a cab   History of Falls   History of Falls Yes   Date of Last Fall 05/05/25  (reason for admission.  Pt reports 2 falls x1 year- dizzy, legs give out)   Cognition    Cognition / Consciousness X   Speech/ Communication Delayed Responses   Level of Consciousness   (lethargic, difficulty keeping eyes open)   Active ROM Upper Body   Comments See OT eval   Strength Upper Body   Comments See OT eval   Active ROM Lower Body    Active ROM Lower Body  WDL   Strength Lower Body   Lower Body Strength  X   Gross Strength Generalized Weakness, Equal Bilaterally   Comments BLEs grossly 4-/5   Sensation Lower Body   Lower Extremity Sensation   WDL   Balance Assessment   Sitting Balance (Static) Fair -   Sitting Balance (Dynamic) Fair -   Standing Balance (Static) Poor +   Standing Balance (Dynamic) Poor +   Weight Shift Sitting Poor   Weight Shift Standing Poor   Comments with FWW   Bed Mobility    Supine to Sit Minimal Assist   Sit to Supine Minimal Assist  (x2 person)   Scooting Minimal Assist   Rolling Contact Guard Assist   Comments HOB partially elevated, rail, edu log rolling   Gait Analysis   Gait Level Of Assist Contact Guard Assist   Assistive Device 4 Wheel Walker   Distance (Feet)   (marching x6 steps)   # of Times Distance was Traveled 1   Deviation   (fair  B foot clearance, mod sway)   Weight Bearing Status FWB BLEs   Functional Mobility   Sit to Stand Minimal Assist  (uses LUE to put RUE on the rollator)   Mobility with rollator   Activity Tolerance   Standing 3-4 min with posterior lean, mod sway, knees buckling   Edema / Skin Assessment   Edema / Skin  Not Assessed   Short Term Goals    Short Term Goal # 1 Pt will perform supine < > sit SPV with bed flat, no rail in 6 visits in order to set up for upright mobility   Short Term Goal # 2 Pt will perform sit < > stand SPV in 6 visits in order to prepare for ambulation   Short Term Goal # 3 Pt will ambulate 150' SPV /c rollator in 6 visits in order to return home safely   Education Group   Education Provided Role of Physical Therapist;Gait Training;Transfer Status   Role of Physical Therapist Patient Response Patient;Acceptance;Explanation;Action Demonstration   Gait Training Patient Response Patient;Acceptance;Explanation;Action Demonstration;Reinforcement Needed   Transfer Status Patient Response Patient;Acceptance;Explanation;Action Demonstration;Reinforcement Needed   Physical Therapy Initial Treatment Plan    Treatment Plan  Bed Mobility;Equipment;Family / Caregiver Training;Gait Training;Manual Therapy;Neuro Re-Education / Balance;Self Care / Home Evaluation;Therapeutic Activities;Therapeutic Exercise   Treatment Frequency 4 Times per Week   Duration Until Therapy Goals Met   Problem List    Problems Pain;Impaired Bed Mobility;Impaired Transfers;Impaired Ambulation;Functional Strength Deficit;Impaired Balance;Decreased Activity Tolerance;Limited Knowledge of Post-Op Precautions   Anticipated Discharge Equipment and Recommendations   DC Equipment Recommendations None  (Pt has a rollator)   Discharge Recommendations Recommend post-acute placement for additional physical therapy services prior to discharge home   Interdisciplinary Plan of Care Collaboration   IDT Collaboration with  Nursing;Occupational Therapist    Patient Position at End of Therapy In Bed;Bed Alarm On;Call Light within Reach;Tray Table within Reach   Collaboration Comments RN updated, co-eval with OT   Session Information   Date / Session Number  5/7 -1 (1/4, 5/13)     Patient seen for team evaluation with Occupational Therapist for the following reason(s):  Patient required 2 person assistance for safety and to provide effective interventions. Each discipline assisted patient with appropriate and separate goals. Physical Therapist facilitated mobility while Occupational Therapist simultaneously treated pt according to POC.

## 2025-05-07 NOTE — CONSULTS
Spine Inpatient Consult    Chief Complaint: back pain      HPI  5/7/2025  Gillian Go is a 75 y.o. female presenting with new midline back  pain after fall tripping over a pillow, she is on Eliquis.  Was found to have known L1.  Pression fracture T12 compression fracture and was hypoxemic to the 80s and transferred for respiratory monitoring.    She reports to me she has had chronic back pain which initially resolved after a fall in December, she had followed up at [facility] and discussion about kyphoplasty was initiated.  She also saw Dr. Sparks for left shoulder issues, and has an appointment with  for evaluation of her neck and low back.    She has had chronic left arm weakness, thought to be attributable to her cervical spine.  She is somewhat of a poor historian, cannot tell me where she got her imaging done, but that all her workup was done at [facility].    She denies any recent DEXA scan or bone health workup      Denies bowel bladder changes    Significant exam findings (complete exam below) -   No weakness or neurological abnormalities.    PAST MEDICAL HISTORY:   Past Medical History:   Diagnosis Date    Anemia     Anesthesia     PONV    Arthritis     degenerative    Asthma     CAD (coronary artery disease)     cardiologist, Dr. Winkler  last visit 4/2024    Cataract     Dental disorder     Missing teeth right side.  Veneers top front    Depression     anxiety    Diabetes (HCC)     Disorder of thyroid     hypothyroid    Heart burn     GERD, controlled with medications    History of vertebral fracture     T12, L1, L2    HTN (hypertension)     Hyperlipidemia     Indigestion     Migraines     Obesity     Pneumonia 2023    COVID    PONV (postoperative nausea and vomiting)     Sleep apnea     diagnosed 9/2009 CPAP -PMA; pt states she does not use CPAP and no longer has one, unable to tolerate    Urinary retention 5/6/2025        PAST SURGICAL HISTORY:   Past Surgical History:   Procedure  Laterality Date    PB RECONSTR TOTAL SHOULDER IMPLANT Right 4/30/2024    Procedure: RIGHT REVERSE TOTAL SHOULDER ARTHROPLASTY;  Surgeon: Manuela Sparks M.D.;  Location: SURGERY [facility];  Service: Orthopedics    INSERTION, PERIPHERAL NERVE STIMULATOR, LOWER EXTREMITY Left 12/22/2022    Procedure: Left SCIATIC PERIPHERAL NERVE STIMULATOR IMPLANT, LOWER EXTREMITY PERIPHERAL SCIATIC NERVE;  Surgeon: Tobi Kilgore M.D.;  Location: SURGERY [facility];  Service: Pain Management    ARTHROPLASTY Right 2020    ankle    LUMBAR EXPLORATION N/A 2017    COLECTOMY N/A 2007    partial for divverticulitis 2007    LUMBAR LAMINECTOMY DISKECTOMY N/A 1989    BREAST BIOPSY  1984    no cancer    ABDOMINAL HYSTERECTOMY TOTAL N/A 1978    APPENDECTOMY N/A 1976        ALLERGIES:   Allergies   Allergen Reactions    Ampicillin-Sulbactam Sodium Hives     With oral lesions     Doxycycline Hives     Possible hives (took with Unasyn)     Gabapentin Hives and Swelling    Pregabalin Hives and Swelling    Amlodipine Swelling    Bee Swelling    Fentanyl Vomiting and Unspecified    Morphine Vomiting, Nausea and Unspecified    Benzoin Rash     Tincture of benzoin =blisters    blisters    Rifampin Unspecified     Pt turns yellow        FAMILY HISTORY:   Family History   Problem Relation Age of Onset    Cancer Mother         lung    Heart Disease Mother     Hyperlipidemia Mother     Stroke Father     Heart Disease Father     Diabetes Father     Hypertension Father     Hyperlipidemia Father     Heart Disease Brother         cath and bypass    Diabetes Brother     Hypertension Brother     Hyperlipidemia Brother     Diabetes Maternal Aunt     Hypertension Maternal Aunt     Heart Disease Brother         cath and byp[ass    Drug abuse Brother     Heart Disease Brother         cath and bypass    Diabetes Brother     Heart Disease Brother     Other Brother         MVA    Hypertension Maternal Aunt     Drug abuse Daughter     Alcohol abuse Daughter          SOCIAL HISTORY:   Social History     Tobacco Use   Smoking Status Never   Smokeless Tobacco Never       CURRENT MEDICATIONS:   Current Facility-Administered Medications:     metaxalone (Skelaxin) tablet 800 mg, 800 mg, Oral, Q8HRS, Favian Chambers M.D., 800 mg at 05/07/25 0528    oxyCODONE immediate-release (Roxicodone) tablet 5 mg, 5 mg, Oral, Q4HRS PRN, 5 mg at 05/07/25 1013 **OR** [DISCONTINUED] oxyCODONE immediate release (Roxicodone) tablet 10 mg, 10 mg, Oral, Q3HRS PRN **OR** [DISCONTINUED] HYDROmorphone (Dilaudid) injection 0.25 mg, 0.25 mg, Intravenous, Q3HRS PRN, Linsey M Wegener, A.P.R.N.    oxyCODONE immediate-release (Roxicodone) tablet 2.5 mg, 2.5 mg, Oral, Q4HRS PRN, Favian Chambers M.D.    NS infusion, , Intravenous, Continuous, Lashanda De Paz, P.A.-C., Last Rate: 83 mL/hr at 05/07/25 0304, New Bag at 05/07/25 0304    tamsulosin (Flomax) capsule 0.4 mg, 0.4 mg, Oral, AFTER BREAKFAST, Lashanda De Paz, P.A.-C., 0.4 mg at 05/07/25 0910    apixaban (Eliquis) tablet 5 mg, 5 mg, Oral, BID, Lashanda De Paz, P.A.-C., 5 mg at 05/07/25 0528    albuterol inhaler 1-2 Puff, 1-2 Puff, Inhalation, Q4HRS PRN, Lashanda De Paz, P.A.-C.    amitriptyline (Elavil) tablet 150 mg, 150 mg, Oral, Nightly, Lashanda De Paz, P.A.-C., 150 mg at 05/06/25 2049    DULoxetine (Cymbalta) capsule 30 mg, 30 mg, Oral, Q EVENING, Lashanda De Paz, P.A.-C., 30 mg at 05/06/25 1619    escitalopram (Lexapro) tablet 20 mg, 20 mg, Oral, DAILY, Lashanda De Paz P.A.-C., 20 mg at 05/07/25 0528    ezetimibe (Zetia) tablet 10 mg, 10 mg, Oral, Q EVENING, Lashanda De Paz P.A.-C., 10 mg at 05/06/25 1618    Respiratory Therapy Consult, , Nebulization, Continuous RT, Lashanda De Paz P.A.-C.    Pharmacy Consult Request ...Pain Management Review 1 Each, 1 Each, Other, PHARMACY TO DOSE, Lashanda De Paz P.A.-C.    ondansetron (Zofran) syringe/vial injection 4 mg, 4 mg, Intravenous, Q4HRS PRN **OR** ondansetron (Zofran ODT) dispertab 4 mg, 4 mg, Oral, Q4HRS PRN, Lashanda De Paz,  P.A.-C.    docusate sodium (Colace) capsule 100 mg, 100 mg, Oral, BID, Lashanda De Paz P.A.-C.    senna-docusate (Pericolace Or Senokot S) 8.6-50 MG per tablet 1 Tablet, 1 Tablet, Oral, Nightly, Lashanda De Paz P.A.-C.    senna-docusate (Pericolace Or Senokot S) 8.6-50 MG per tablet 1 Tablet, 1 Tablet, Oral, Q24HRS PRN, Lashanda De Paz P.A.-C.    polyethylene glycol/lytes (Miralax) Packet 1 Packet, 1 Packet, Oral, BID, Lashanda De Paz P.A.-C.    magnesium hydroxide (Milk Of Magnesia) suspension 30 mL, 30 mL, Oral, DAILY AT 1800, Lashanda De Paz P.A.-C.    bisacodyl (Dulcolax) suppository 10 mg, 10 mg, Rectal, Q24HRS PRN, Lashanda De Paz P.A.-C.    sodium phosphate enema 1 Enema, 1 Enema, Rectal, Once PRN, Lashanda De Paz P.A.-C.    acetaminophen (Tylenol) tablet 1,000 mg, 1,000 mg, Oral, Q6HRS, 1,000 mg at 05/07/25 0527 **FOLLOWED BY** [START ON 5/10/2025] acetaminophen (Tylenol) tablet 1,000 mg, 1,000 mg, Oral, Q6HRS PRN, Lashanda De Paz P.A.-C.    lidocaine (Asperflex) 4 % patch 1 Patch, 1 Patch, Transdermal, Q24HR, Lashanda De Paz P.A.-C., 1 Patch at 05/06/25 1619    hydrALAZINE (Apresoline) injection 10 mg, 10 mg, Intravenous, Q4HRS PRN, Lashanda De Paz P.A.-C.    insulin lispro (HumaLOG,AdmeLOG) subcutaneous injection, 1-6 Units, Subcutaneous, 4X/DAY ACHS **AND** POC blood glucose manual result, , , Q AC AND BEDTIME(S) **AND** NOTIFY MD and PharmD, , , Once **AND** Administer 20 grams of glucose (approximately 8 ounces of fruit juice) every 15 minutes PRN FSBG less than 70 mg/dL, , , PRN **AND** dextrose 50 % (D50W) injection 25 g, 25 g, Intravenous, Q15 MIN PRN, Lashanda De Paz P.A.-C.    liothyronine (Cytomel) tablet 5 mcg, 5 mcg, Oral, DAILY, Lashanda De Paz P.A.-C., 5 mcg at 05/07/25 0528    [Held by provider] lisinopril (Prinivil) tablet 10 mg, 10 mg, Oral, DAILY, Lashanda De Paz P.A.-C.    metoprolol SR (Toprol XL) tablet 25 mg, 25 mg, Oral, DAILY, Lashanda De Paz P.A.-C.    omeprazole (PriLOSEC) capsule 20 mg, 20 mg, Oral, BID,  "Vineet De Paz P.A.-C., 20 mg at 05/07/25 0910    rosuvastatin (Crestor) tablet 20 mg, 20 mg, Oral, Q EVENING, Vineet HARSH Perry.A.-C., 20 mg at 05/06/25 1619    levothyroxine (Synthroid) tablet 88 mcg, 88 mcg, Oral, DAILY, Vineet PEÑA Zandra P.A.-C., 88 mcg at 05/07/25 0528    5/7/2025  Body mass index is 26.91 kg/m².  Height & Weight    05/05/25 1501 05/06/25 0400   Weight: 59 kg (130 lb) 64.6 kg (142 lb 6.7 oz)   Height: 1.549 m (5' 1\")          Pain with palpation over the low back.     Upper extremity exam with right arm full strength, left arm with normal sensation, but weakness with  strength external rotation internal rotation and severely limited range of motion due to shoulder pain.    MOTOR     R  L   L2 (Hip Flexor) 5 5   L3 (Quad) 5 5   L4 (TA) 5 5   L5 (EHL) 5 5   S1 (Gastroc/Soleus) 5 5     SENSORY  Normal sensation throughout all distributions     Negative Nathan's bilaterally    Last Imaging Result(s):     Dx-Chest-Portable (1 View)  5/7/2025 6:03 AM    HISTORY/REASON FOR EXAM: Abnormal finding in lung field    TECHNIQUE/EXAM DESCRIPTION:  Single AP view of the chest.    COMPARISON: Yesterday    FINDINGS:    Overlying cardiac leads are present. Cardiomegaly is observed.    Atherosclerotic calcification of the aorta is noted.  The central pulmonary vasculature appears normal.    Bilateral lung volumes are diminished.  Hazy left infrahilar density is seen.    No significant pleural effusions are identified.    The bony structures appear age-appropriate.      Us-Trauma Vein Screen Extremity Lower Bilat            Vascular Laboratory  CONCLUSIONS  Right lower extremity.   No deep venous thrombosis.     Left lower extremity.   Deep venous thrombosis in the femoral vein    These findings were communicated to the ordering provider.    SIMI NELSON    Exam Date:     05/06/2025 14:47    Room #:     Inpatient    Priority:     Stat    Ht (in):             Wt (lb):    Ordering Physician:        VINEET DE PAZ" CASEY    Referring Physician:       [id number], PIPER    Sonographer:               Eleazar CARBAJAL    Study Type:                Complete Bilateral    Technical Quality:         Adequate    Age:    75    Gender:     F    MRN:    [id number]    :    1950      BSA:    Indications:     Personal history of thrombophlebitis    CPT Codes:       30916    ICD Codes:       Z86.72    History:         Prior study 2025.    Limitations:    PROCEDURES:  Bilateral lower extremity venous duplex imaging.   The following venous structures were evaluated: common femoral, deep   femoral, proximal great saphenous, femoral, popliteal, peroneal, and   posterior tibial veins.   Serial compression, color, and spectral Doppler flow evaluations were   performed.    FINDINGS:  Right lower extremity.   All veins demonstrate complete color filling and compressibility with   normal venous flow dynamics including spontaneous flow and respiratory   phasicity.   No deep venous thrombosis.       Left lower extremity.   Subacute to chronic, partially occlusive thrombus in the common femoral   vein, extending into the femoral vein and popliteal vein.  All other veins demonstrate complete color filling and compressibility with   normal venous flow dynamics including spontaneous flow and respiratory   phasicity.    Hayden Lala MD  (Electronically Signed)  Final Date:      06 May 2025 16:45      I independently reviewed and assessed the imaging. I also reviewed all imaging reports.         In comparing the CT scan from today with prior MRIs in 2024 there appears to be interval healing with some consolidation of the fracture at L1, and no interval worsening of retropulsion or evidence of worsening instability kyphosis.  I would recommend continue nonoperative management, trial of TLSO if she has persistent back pain in the thoracolumbar region with ambulation, and outpatient evaluation with pain management for possible kyphoplasty if  she still has axial back pain.  I will examine the patient shortly.        12/7/2024 MRI [facility]                   Today              ASSESSMENT/PLAN      IMPRESSION:    Known T12, L1 compression deformities with possible slight interval collapse versus remodeling since last imaged in December, no significant canal compromise or neurodeficits in the legs    Chronic left arm weakness, possibly related to cervical stenosis versus left shoulder pathology, stable over the past 2 to 3 months    DISCUSSION:  She may have had an acute exacerbation of her chronic low back pain, she would be an excellent candidate for kyphoplasty if her symptoms persist.  She has not trialed a TLSO brace before, this may be an option if she is interested in bracing.    She is requesting evaluation and discussion with Dr. Park, who she requested to see in clinic, and is scheduled to see tomorrow.    PLAN:    1.  Recommend trial of nonoperative management - toradol, flexeril, tylenol, heat/ice, physical therapy  2. TLSO brace may be applied for comfort  3. No acute surgical intervention planned  4. Closed treatment of lumbar compression fracture   5.  I discussed that I could refer her to [place] for consideration of kyphoplasty, however she is interested in following up with [facility] as she had already discussed possible kyphoplasty in the past with them.  I told her I be happy to help set up any further follow-up if she is unable to reach anyone at [facility], I informed the primary team of her requests.    MDM  Patient presents with acute injury, and over the counter medications were discussed.      ____________________________________         Romeo Castaneda MD  Orthopedic Spine Surgeon    The EPIC system uses [employer] voice transcription software. This is not formal transcription service but is partially electronic. Whilst I have checked the document for errors and corrected them to the best of my ability, there may be grammatical and  typographical errors as well as incorrect words placed due to the transcription software that I did not detect and correct.They no clinical impact on the intent of the note and treatment of the patient. I will attempt to correct the errors as the visit(s) progresses however there will be missed attempts and I do reserve the right to correct the errors at any time in the future    Cc: CHRISTINA Bartholomew.

## 2025-05-07 NOTE — THERAPY
Occupational Therapy   Initial Evaluation     Patient Name: Yamile Go  Age:  75 y.o., Sex:  female  Medical Record #: [id number]  Today's Date: 5/7/2025     Precautions  Precautions: Fall Risk, TLSO (Thoracolumbosacral orthosis)  Comments: TLSO for comfort; may benefit from spinal precautions for comfort    Assessment  Patient is 75 y.o. female admitted after GLF found to have worsened chronic L1 compression fx and L rib fxs (4-7 and 9), DVT, and urinary retention. Lethargic and grossly delayed throughout; unclear if related to medication. PMHx of chronic L1, T12, T4, and T5 fxs, subacute L 7-8 rib fxs, DMII, HTN, and asthma. Reports lives alone w/her cat and doesn't have any social support. Reports she takes cabs to appointments. Pt not forthcoming with information concerning IADLs and daily activities. Currently limited by decreased functional mobility, activity tolerance, cognition, sensation, strength, AROM, coordination, balance, vision, adherence to precautions, and pain which are currently affecting pt's ability to complete ADLs/txfs/IADLs at baseline. Will continue to follow.     Plan    Occupational Therapy Initial Treatment Plan   Treatment Interventions: Self Care / Activities of Daily Living, Adaptive Equipment, Neuro Re-Education / Balance, Therapeutic Exercises, Therapeutic Activity, Family / Caregiver Training, Manual Therapy Techniques  Treatment Frequency: 4 Times per Week  Duration: Until Therapy Goals Met    DC Equipment Recommendations: Unable to determine at this time  Discharge Recommendations: Recommend post-acute placement for additional occupational therapy services prior to discharge home      Objective     05/07/25 1423   Prior Living Situation   Prior Services Home-Independent   Housing / Facility 1 Story House   Steps Into Home 0   Bathroom Set up Bathtub / Shower Combination;Tub Transfer Bench   Equipment Owned 4-Wheel Walker;Front-Wheel Walker;Other (Comments);Tub Transfer    (3 point cane)   Lives with - Patient's Self Care Capacity Alone and Unable to Care For Self   Comments Reports lives alone w/her cat and doesn't have any social support. Reports she takes cabs to appointments. Pt not forthcoming with information concerning IADLs and daily activities.   Prior Level of ADL Function   Self Feeding Independent   Grooming / Hygiene Independent   Bathing Independent   Dressing Independent   Toileting Independent   Prior Level of IADL Function   Medication Management Unable To Determine At This Time   Laundry Unable To Determine At This Time   Kitchen Mobility Unable To Determine At This Time   Finances Unable To Determine At This Time   Home Management Unable To Determine At This Time   Shopping Unable To Determine At This Time   Prior Level Of Mobility Independent With Device in Home   Driving / Transportation Utilizes [employer] for Transportation   History of Falls   History of Falls Yes   Date of Last Fall 05/05/25  (reports x2 falls in the last year; reason for admit)   Precautions   Precautions Fall Risk;TLSO (Thoracolumbosacral orthosis)   Comments TLSO for comfort; may benefit from spinal precautions for comfort   Vitals   O2 (LPM) 1   O2 Delivery Device Silicone Nasal Cannula   Vitals Comments initially on 0.5L pt desaturated to 87%; able to recover once increased O2 to 1L   Pain 0 - 10 Group   Location Back   Therapist Pain Assessment During Activity;Nurse Notified;10  (reported 10/10 pain, but c/o dizziness and lethargic having difficulty keeping eyes open w/ gross delay for all responses)   Cognition    Cognition / Consciousness X   Speech/ Communication Delayed Responses   Level of Consciousness   (lethargic)   Ability To Follow Commands 1 Step  (very delayed and req repeated v/cs)   Safety Awareness Impaired   New Learning Impaired   Attention Impaired   Sequencing Impaired   Initiation Impaired   Comments cooperative, but lethargic; kept eyes closed through most  "of session, trying to following instructions w/ eyes closed. delayed processing and responses, and sometimes didn't respond at all despite repeated question/instruction. reports she didn't sleep, but appears may be overmedicated   Passive ROM Upper Body   Passive ROM Upper Body X   Comments limited by pain at ribs   Active ROM Upper Body   Active ROM Upper Body  X   Dominant Hand Right   Comments Reports her LUE \"hasn't worked\" since January. reports shoulder and wrist/hand \"work\", but from wrist to mid-humerus reports \"can't move it\" and has diminished sensation. unclear if related to nerve compression or rib fxs   Strength Upper Body   Upper Body Strength  X   Comments Reports \"can't move\" anything between wrist and mid humerus. shoulder w/minimal strength and wrist hand impaired; 3/5   Sensation Upper Body   Upper Extremity Sensation  X   Comments reports wrist to mid-humerus \"can't move it\" and has diminished sensation, but hypersensitive to pressure. appears may have diminished sensation on R hand   Neurological Concerns   Neurological Concerns Yes   Lt Upper Extremity Functional Use Impaired   Coordination Upper Body   Coordination X   Comments FM and GM impaired on LUE since Jan and RUE impaired 2/2 to lethargy and difficulty keeping eyes closed, kept reaching for things w/o opening eyes   Balance Assessment   Sitting Balance (Static) Fair -   Sitting Balance (Dynamic) Poor +   Standing Balance (Static) Poor   Standing Balance (Dynamic) Poor   Weight Shift Sitting Fair   Weight Shift Standing Poor   Comments w/4ww; B knee buckling in standing   Bed Mobility    Supine to Sit Moderate Assist   Sit to Supine Moderate Assist   Scooting Moderate Assist   Rolling Moderate Assist to Rt.;Moderate Assist to Lt.   Comments HOB elevated; v/cs for logroll   ADL Assessment   Eating Minimal Assist  (opening packages)   Grooming Moderate Assist;Seated   Upper Body Dressing Maximal Assist  (TLSO)   Lower Body Dressing " Maximal Assist   Toileting Maximal Assist  (catheter and bedpan)   Comments Educated on TLSO don/doff, home safety, pathology of bedrest, and importance of continued OOB activity. Pt not receptive and poor retention; recommend reinforcement   Functional Mobility   Sit to Stand Minimal Assist   Mobility EOB and STS x2 w/4ww   Edema / Skin Assessment   Edema / Skin  Not Assessed   Activity Tolerance   Comments limited by lethargy, cognition, pain, and fatigue   Patient / Family Goals   Patient / Family Goal #1 to not be in pain   Short Term Goals   Short Term Goal # 1 don/doff TLSO w/SPV   Short Term Goal # 2 BSC txf w/min A   Short Term Goal # 3 toileting w/min A   Short Term Goal # 4 standing g/h w/SPV taking RBs PRN   Education Group   Education Provided Role of Occupational Therapist;Pathology of bedrest   Role of Occupational Therapist Patient Response Patient;Acceptance;Explanation;No Learning Evidence;Reinforcement Needed   Pathology of Bedrest Patient Response Patient;Acceptance;Explanation;Reinforcement Needed;No Learning Evidence   Occupational Therapy Initial Treatment Plan    Treatment Interventions Self Care / Activities of Daily Living;Adaptive Equipment;Neuro Re-Education / Balance;Therapeutic Exercises;Therapeutic Activity;Family / Caregiver Training;Manual Therapy Techniques   Treatment Frequency 4 Times per Week   Duration Until Therapy Goals Met   Problem List   Problem List Decreased Active Daily Living Skills;Decreased Homemaking Skills;Decreased Upper Extremity Strength Right;Decreased Upper Extremity Strength Left;Decreased Upper Extremity AROM Left;Decreased Upper Extremity PROM Left;Decreased Functional Mobility;Decreased Activity Tolerance;Safety Awareness Deficits / Cognition;Impaired Posture / Trunk Alignment;Impaired Coordination Left Upper Extremity;Impaired Coordination Right Upper Extremity;Impaired Sensation Left Upper Extremity;Impaired Cognitive Function;Impaired Postural Control /  Balance;Limited Knowledge of Post Op Precautions     Patient seen for team evaluation with Physical Therapist for the following reason(s): Patient required 2 skilled practitioner assistance for safety, poor pt cognition, monitoring vitals, and to provide effective interventions. Each discipline assisted patient with appropriate and separate goals. Occupational Therapist evaluated UB function/strength/coordination, cognition, dynamic balance during ADLs, and ability to complete ADL/txfs while Physical Therapist simultaneously evaluated pt according to POC.

## 2025-05-07 NOTE — DISCHARGE PLANNING
(0) No drift; limb holds 90 (or 45) degrees for full 10 secs [facility] Transitional [facility] Coordination     Referral from: JULIAN Toney  Insurance Provider on Facesheet: Medicare/[employer]  Potential Rehab Diagnosis: Trauma     Chart review indicates patient may have on going medical management and may have therapy needs to possibly meet inpatient rehab facility criteria with the goal of returning to community.     D/C support: TBD     Physiatry consultation pended per protocol. Awaiting therapy evals, TCC will follow.     Thank you for the referral.

## 2025-05-08 ENCOUNTER — APPOINTMENT (OUTPATIENT)
Dept: RADIOLOGY | Facility: MEDICAL CENTER | Age: 75
DRG: 184 | End: 2025-05-08
Payer: MEDICARE

## 2025-05-08 PROBLEM — R29.898 WEAKNESS OF LEFT ARM: Status: ACTIVE | Noted: 2025-05-08

## 2025-05-08 LAB
ANION GAP SERPL CALC-SCNC: 9 MMOL/L (ref 7–16)
BUN SERPL-MCNC: 11 MG/DL (ref 8–22)
CALCIUM SERPL-MCNC: 8.5 MG/DL (ref 8.5–10.5)
CHLORIDE SERPL-SCNC: 109 MMOL/L (ref 96–112)
CO2 SERPL-SCNC: 20 MMOL/L (ref 20–33)
CREAT SERPL-MCNC: 0.89 MG/DL (ref 0.5–1.4)
ERYTHROCYTE [DISTWIDTH] IN BLOOD BY AUTOMATED COUNT: 49.1 FL (ref 35.9–50)
GFR SERPLBLD CREATININE-BSD FMLA CKD-EPI: 68 ML/MIN/1.73 M 2
GLUCOSE BLD STRIP.AUTO-MCNC: 68 MG/DL (ref 65–99)
GLUCOSE BLD STRIP.AUTO-MCNC: 79 MG/DL (ref 65–99)
GLUCOSE SERPL-MCNC: 72 MG/DL (ref 65–99)
HCT VFR BLD AUTO: 31.5 % (ref 37–47)
HGB BLD-MCNC: 9.6 G/DL (ref 12–16)
MAGNESIUM SERPL-MCNC: 1.7 MG/DL (ref 1.5–2.5)
MCH RBC QN AUTO: 26.2 PG (ref 27–33)
MCHC RBC AUTO-ENTMCNC: 30.5 G/DL (ref 32.2–35.5)
MCV RBC AUTO: 85.8 FL (ref 81.4–97.8)
PHOSPHATE SERPL-MCNC: 2.4 MG/DL (ref 2.5–4.5)
PLATELET # BLD AUTO: 229 K/UL (ref 164–446)
PMV BLD AUTO: 9.2 FL (ref 9–12.9)
POTASSIUM SERPL-SCNC: 3.8 MMOL/L (ref 3.6–5.5)
RBC # BLD AUTO: 3.67 M/UL (ref 4.2–5.4)
SODIUM SERPL-SCNC: 138 MMOL/L (ref 135–145)
WBC # BLD AUTO: 5.6 K/UL (ref 4.8–10.8)

## 2025-05-08 PROCEDURE — A9270 NON-COVERED ITEM OR SERVICE: HCPCS

## 2025-05-08 PROCEDURE — 700111 HCHG RX REV CODE 636 W/ 250 OVERRIDE (IP): Mod: JZ

## 2025-05-08 PROCEDURE — 700102 HCHG RX REV CODE 250 W/ 637 OVERRIDE(OP): Performed by: STUDENT IN AN ORGANIZED HEALTH CARE EDUCATION/TRAINING PROGRAM

## 2025-05-08 PROCEDURE — 700102 HCHG RX REV CODE 250 W/ 637 OVERRIDE(OP): Performed by: NURSE PRACTITIONER

## 2025-05-08 PROCEDURE — 83735 ASSAY OF MAGNESIUM: CPT

## 2025-05-08 PROCEDURE — 82962 GLUCOSE BLOOD TEST: CPT | Mod: 91

## 2025-05-08 PROCEDURE — 84100 ASSAY OF PHOSPHORUS: CPT

## 2025-05-08 PROCEDURE — 80048 BASIC METABOLIC PNL TOTAL CA: CPT

## 2025-05-08 PROCEDURE — 71045 X-RAY EXAM CHEST 1 VIEW: CPT

## 2025-05-08 PROCEDURE — A9270 NON-COVERED ITEM OR SERVICE: HCPCS | Performed by: STUDENT IN AN ORGANIZED HEALTH CARE EDUCATION/TRAINING PROGRAM

## 2025-05-08 PROCEDURE — 94669 MECHANICAL CHEST WALL OSCILL: CPT

## 2025-05-08 PROCEDURE — A9270 NON-COVERED ITEM OR SERVICE: HCPCS | Performed by: NURSE PRACTITIONER

## 2025-05-08 PROCEDURE — 85027 COMPLETE CBC AUTOMATED: CPT

## 2025-05-08 PROCEDURE — 700105 HCHG RX REV CODE 258

## 2025-05-08 PROCEDURE — 700102 HCHG RX REV CODE 250 W/ 637 OVERRIDE(OP)

## 2025-05-08 PROCEDURE — 770006 HCHG ROOM/CARE - MED/SURG/GYN SEMI*

## 2025-05-08 PROCEDURE — 99232 SBSQ HOSP IP/OBS MODERATE 35: CPT

## 2025-05-08 PROCEDURE — 36415 COLL VENOUS BLD VENIPUNCTURE: CPT

## 2025-05-08 PROCEDURE — 99223 1ST HOSP IP/OBS HIGH 75: CPT | Performed by: PHYSICAL MEDICINE & REHABILITATION

## 2025-05-08 RX ORDER — HYDROXYZINE HYDROCHLORIDE 10 MG/1
10 TABLET, FILM COATED ORAL
Status: COMPLETED | OUTPATIENT
Start: 2025-05-08 | End: 2025-05-08

## 2025-05-08 RX ORDER — BISACODYL 10 MG
10 SUPPOSITORY, RECTAL RECTAL ONCE
Status: ACTIVE | OUTPATIENT
Start: 2025-05-08 | End: 2025-05-09

## 2025-05-08 RX ORDER — HYDROMORPHONE HYDROCHLORIDE 1 MG/ML
0.5 INJECTION, SOLUTION INTRAMUSCULAR; INTRAVENOUS; SUBCUTANEOUS
Status: DISCONTINUED | OUTPATIENT
Start: 2025-05-08 | End: 2025-05-09

## 2025-05-08 RX ADMIN — OMEPRAZOLE 20 MG: 20 CAPSULE, DELAYED RELEASE ORAL at 09:21

## 2025-05-08 RX ADMIN — DIBASIC SODIUM PHOSPHATE, MONOBASIC POTASSIUM PHOSPHATE AND MONOBASIC SODIUM PHOSPHATE 250 MG: 852; 155; 130 TABLET ORAL at 11:55

## 2025-05-08 RX ADMIN — OXYCODONE 5 MG: 5 TABLET ORAL at 10:44

## 2025-05-08 RX ADMIN — APIXABAN 5 MG: 5 TABLET, FILM COATED ORAL at 04:58

## 2025-05-08 RX ADMIN — HYDROXYZINE HYDROCHLORIDE 10 MG: 10 TABLET ORAL at 23:47

## 2025-05-08 RX ADMIN — SODIUM CHLORIDE: 9 INJECTION, SOLUTION INTRAVENOUS at 06:07

## 2025-05-08 RX ADMIN — ACETAMINOPHEN 1000 MG: 500 TABLET ORAL at 04:58

## 2025-05-08 RX ADMIN — ESCITALOPRAM OXALATE 20 MG: 10 TABLET ORAL at 04:59

## 2025-05-08 RX ADMIN — OXYCODONE 5 MG: 5 TABLET ORAL at 06:09

## 2025-05-08 RX ADMIN — METAXALONE 800 MG: 800 TABLET ORAL at 15:05

## 2025-05-08 RX ADMIN — ACETAMINOPHEN 1000 MG: 500 TABLET ORAL at 11:55

## 2025-05-08 RX ADMIN — METAXALONE 800 MG: 800 TABLET ORAL at 21:09

## 2025-05-08 RX ADMIN — ONDANSETRON 4 MG: 2 INJECTION INTRAMUSCULAR; INTRAVENOUS at 21:09

## 2025-05-08 RX ADMIN — LEVOTHYROXINE SODIUM 88 MCG: 0.09 TABLET ORAL at 04:59

## 2025-05-08 RX ADMIN — OXYCODONE 5 MG: 5 TABLET ORAL at 21:51

## 2025-05-08 RX ADMIN — APIXABAN 5 MG: 5 TABLET, FILM COATED ORAL at 16:55

## 2025-05-08 RX ADMIN — ROSUVASTATIN CALCIUM 20 MG: 20 TABLET, FILM COATED ORAL at 16:55

## 2025-05-08 RX ADMIN — AMITRIPTYLINE HYDROCHLORIDE 150 MG: 25 TABLET, FILM COATED ORAL at 21:09

## 2025-05-08 RX ADMIN — METOPROLOL SUCCINATE 25 MG: 25 TABLET, EXTENDED RELEASE ORAL at 04:58

## 2025-05-08 RX ADMIN — TAMSULOSIN HYDROCHLORIDE 0.4 MG: 0.4 CAPSULE ORAL at 09:21

## 2025-05-08 RX ADMIN — OXYCODONE 5 MG: 5 TABLET ORAL at 16:53

## 2025-05-08 RX ADMIN — LIOTHYRONINE SODIUM 5 MCG: 5 TABLET ORAL at 04:59

## 2025-05-08 RX ADMIN — DULOXETINE 30 MG: 30 CAPSULE, DELAYED RELEASE ORAL at 16:54

## 2025-05-08 RX ADMIN — ACETAMINOPHEN 1000 MG: 500 TABLET ORAL at 16:54

## 2025-05-08 RX ADMIN — OMEPRAZOLE 20 MG: 20 CAPSULE, DELAYED RELEASE ORAL at 21:09

## 2025-05-08 RX ADMIN — DIBASIC SODIUM PHOSPHATE, MONOBASIC POTASSIUM PHOSPHATE AND MONOBASIC SODIUM PHOSPHATE 250 MG: 852; 155; 130 TABLET ORAL at 16:55

## 2025-05-08 RX ADMIN — METAXALONE 800 MG: 800 TABLET ORAL at 04:58

## 2025-05-08 RX ADMIN — EZETIMIBE 10 MG: 10 TABLET ORAL at 16:55

## 2025-05-08 ASSESSMENT — PAIN DESCRIPTION - PAIN TYPE
TYPE: ACUTE PAIN
TYPE: ACUTE PAIN;SURGICAL PAIN

## 2025-05-08 ASSESSMENT — ENCOUNTER SYMPTOMS
SPEECH CHANGE: 0
ABDOMINAL PAIN: 0
VOMITING: 0
FEVER: 0
NAUSEA: 0
SENSORY CHANGE: 0
MYALGIAS: 1
FOCAL WEAKNESS: 1
BACK PAIN: 1
CHILLS: 0

## 2025-05-08 NOTE — PROGRESS NOTES
Trauma / Surgical Daily Progress Note    Date of Service  5/8/2025    Chief Complaint  75 y.o. female admitted 5/5/2025 with rib fractures, worsened chronic L1 compression fracture following GLF.     Interval Events  No acute events overnight.  Upset that no one has moved her to the chair today.  Hypophosphatemia  Hemoglobin stable after resuming Eliquis  Supplemental oxygen 1 L NC. -1000    - Trial Polanco removal today  - Aggressive pulmonary hygiene with goal to wean oxygen  - Mobilize patient out of the bed for all meals.    Disposition: Medically cleared to transfer to SNF.    Review of Systems  Review of Systems   Constitutional:  Negative for chills, fever and malaise/fatigue.   Gastrointestinal:  Negative for abdominal pain, nausea and vomiting.   Genitourinary:  Negative for dysuria.   Musculoskeletal:  Positive for back pain and myalgias.   Neurological:  Positive for focal weakness (chronic left leg/arm weakness - seeing Dr. Delatorre outpatient). Negative for sensory change and speech change.   All other systems reviewed and are negative.       Vital Signs  Temp:  [36.2 °C (97.2 °F)-36.4 °C (97.5 °F)] 36.2 °C (97.2 °F)  Pulse:  [] 84  Resp:  [12-18] 15  BP: (100-160)/(56-79) 112/66  SpO2:  [87 %-98 %] 94 %    Physical Exam  Physical Exam  Vitals and nursing note reviewed.   Constitutional:       Appearance: Normal appearance.      Interventions: Nasal cannula in place.   HENT:      Mouth/Throat:      Mouth: Mucous membranes are moist.   Eyes:      Pupils: Pupils are equal, round, and reactive to light.   Cardiovascular:      Rate and Rhythm: Normal rate and regular rhythm.   Pulmonary:      Effort: Pulmonary effort is normal. No respiratory distress.   Chest:      Chest wall: Tenderness (left lateral, posterior) present.   Abdominal:      General: There is no distension.      Palpations: Abdomen is soft.      Tenderness: There is no abdominal tenderness.   Musculoskeletal:         General:  Normal range of motion.      Cervical back: Normal range of motion.      Thoracic back: No tenderness.      Lumbar back: Tenderness present.   Skin:     General: Skin is warm and dry.      Capillary Refill: Capillary refill takes less than 2 seconds.   Neurological:      General: No focal deficit present.      Mental Status: She is alert and oriented to person, place, and time.      Comments: 2/5 strength in LUE chronic, 5/5 strength in other extremities    Psychiatric:         Mood and Affect: Mood normal.         Behavior: Behavior normal.         Laboratory  Recent Results (from the past 24 hours)   POCT glucose device results    Collection Time: 05/07/25  4:11 PM   Result Value Ref Range    POC Glucose, Blood 97 65 - 99 mg/dL   Basic Metabolic Panel    Collection Time: 05/08/25  5:40 AM   Result Value Ref Range    Sodium 138 135 - 145 mmol/L    Potassium 3.8 3.6 - 5.5 mmol/L    Chloride 109 96 - 112 mmol/L    Co2 20 20 - 33 mmol/L    Glucose 72 65 - 99 mg/dL    Bun 11 8 - 22 mg/dL    Creatinine 0.89 0.50 - 1.40 mg/dL    Calcium 8.5 8.5 - 10.5 mg/dL    Anion Gap 9.0 7.0 - 16.0   CBC WITHOUT DIFFERENTIAL    Collection Time: 05/08/25  5:40 AM   Result Value Ref Range    WBC 5.6 4.8 - 10.8 K/uL    RBC 3.67 (L) 4.20 - 5.40 M/uL    Hemoglobin 9.6 (L) 12.0 - 16.0 g/dL    Hematocrit 31.5 (L) 37.0 - 47.0 %    MCV 85.8 81.4 - 97.8 fL    MCH 26.2 (L) 27.0 - 33.0 pg    MCHC 30.5 (L) 32.2 - 35.5 g/dL    RDW 49.1 35.9 - 50.0 fL    Platelet Count 229 164 - 446 K/uL    MPV 9.2 9.0 - 12.9 fL   MAGNESIUM    Collection Time: 05/08/25  5:40 AM   Result Value Ref Range    Magnesium 1.7 1.5 - 2.5 mg/dL   PHOSPHORUS    Collection Time: 05/08/25  5:40 AM   Result Value Ref Range    Phosphorus 2.4 (L) 2.5 - 4.5 mg/dL   ESTIMATED GFR    Collection Time: 05/08/25  5:40 AM   Result Value Ref Range    GFR (CKD-EPI) 68 >60 mL/min/1.73 m 2   POCT glucose device results    Collection Time: 05/08/25  9:24 AM   Result Value Ref Range    POC  Glucose, Blood 68 65 - 99 mg/dL       Fluids    Intake/Output Summary (Last 24 hours) at 5/8/2025 1306  Last data filed at 5/8/2025 0456  Gross per 24 hour   Intake --   Output 1300 ml   Net -1300 ml       Core Measures & Quality Metrics  Labs reviewed and Medications reviewed  Mckeon catheter: Urinary Tract Retention or Urinary Tract Obstruction (trial mckeon removal)      DVT: Eliquis.  DVT prophylaxis - mechanical: SCDs  Ulcer prophylaxis: Yes    Assessed for rehab: Patient was assess for and/or received rehabilitation services during this hospitalization    RAP Score Total: 7    CAGE Results: negative Blood Alcohol>0.08: not completed       Assessment/Plan  * Trauma- (present on admission)  Assessment & Plan  GLF tripped over pillow.  Trauma Green Transfer Activation from [facility] in [place], [place].  Surgeon List: Emilie Miguel MD. Trauma Surgery.    Closed T12 fracture (HCC)- (present on admission)  Assessment & Plan  CT imaging with Stable compression fractures of T4, T5, and T12   T12 fracture is acute per spine surgeon review  Non-operative management.   Off-the-shelf TLSO bracing. Outpatient follow up.  Romeo Castaneda MD. Spine Surgeon. [facility].    Anticoagulated on Eliquis- (present on admission)  Assessment & Plan  For treatment of acute LLE DVT, started on 2/28/25  Reports she paused the medication 7 days ago on recommendations from her neurosurgeon ahead of potential cervical spine surgery  TEG with AA 83.9% inhibition, ADP 21.6% inhibition   - Eliquis reinitiated     Closed fracture of five ribs of left side- (present on admission)  Assessment & Plan  CT imaging with fractures of the posterior left fourth and fifth rib fractures, lateral left sixth and seventh rib, and the left ninth rib posterolaterally. There are subacute anterolateral left seventh and eighth rib fractures.   Fracture pattern is not amenable to operative fixation.  Aggressive multimodal pain  management and pulmonary hygiene. Serial chest radiographs.    Deep vein thrombosis (DVT) of left lower extremity (HCC)- (present on admission)  Assessment & Plan  Diagnosed 2/28, treated with eliquis  US with cubacute to chronic, partially occlusive thrombus in the common femoral vein, extending into the femoral vein and popliteal vein.  - eliquis reinitiated; no creatinine dosing indicated given patient age and BMI per pharmacy      Urinary retention  Assessment & Plan  Polanco placed 5/6 for retention  - flomax initiated   5/8 trial Polanco removal    Increased creatine kinase level- (present on admission)  Assessment & Plan  Admit creat 1.38 (baseline 0.76).  5/7 Renal indices corrected  Continue gentle IVF, Lisinopril held, Avoid nephrotoxins.  Trend.    Closed compression fracture of body of L1 vertebra (HCC)- (present on admission)  Assessment & Plan  Compression fracture of L1 with further compression when compared with the prior exam.   History of compression fracture, patient has chronic pain to this area that is unchanged; no pain on initial exam.  Tertiary exam with lumbar spine pain  Non-operative management.   Off-the-shelf TLSO bracing. Outpatient follow up.  Jb Park MD. Neurosurgeon. [facility].    Weakness of left arm- (present on admission)  Assessment & Plan  Prior to arrival.  Patient states that she has been seeing Dr. Delatorre for this issue.  Related to chronic neck pain.    No contraindication to deep vein thrombosis (DVT) prophylaxis- (present on admission)  Assessment & Plan  Prophylactic dose heparin 5000 u q 8 hr initiated upon admission.  Subsequent US showing chronic DVT, eliquis reinitiated, heparin discontinued     GERD (gastroesophageal reflux disease)- (present on admission)  Assessment & Plan  Chronic condition treated with omeprazole.  Resumed maintenance medication on admission.    Normocytic anemia- (present on admission)  Assessment & Plan  Admit hgb  10.8  Trend    Primary hypertension- (present on admission)  Assessment & Plan  Chronic condition treated with lisinopril, metoprolol.  Resumed maintenance medication on admission.  PRN antihypertensives   Lisinopril held due to increased creatinine     Type 2 diabetes mellitus (HCC)- (present on admission)  Assessment & Plan  Chronic condition treated with metformin.  Holding maintenance metformin for 48 hours following intravenous contrast administration.  Insulin sliding scale coverage.    Asthma- (present on admission)  Assessment & Plan  Chronic condition treated with albuterol.  Resumed maintenance medication on admission. RT protocol.     Chronic pain syndrome on chronic opioids- (present on admission)  Assessment & Plan  Chronic condition treated with Roxicodone 10mg, robaxin.  Resumed robaxin on admission. Multimodals and PRN analgesics.    Acquired hypothyroidism- (present on admission)  Assessment & Plan  Chronic condition treated with liothyronine, levothyroxine.  Resumed maintenance medication on admission.    Mood disorder (HCC)- (present on admission)  Assessment & Plan  Chronic condition treated with duloxetine, amitriptyline.  Resumed maintenance medication on admission.    Dyslipidemia- (present on admission)  Assessment & Plan  Chronic condition treated with rosuvastatin, ezetimibe.  Resumed maintenance medication on admission.        Discussed patient condition with RN, , Charge nurse / hot rounds, Patient, and trauma surgery, Dr. Shoemaker.

## 2025-05-08 NOTE — FLOWSHEET NOTE
05/08/25 0645   Incentive Spirometry Treatment   Incentive Spirometer Volume 1000 mL   Chest Physiotherapy Treatment   $ PEP/CPT Performed PEP / Flutter

## 2025-05-08 NOTE — CARE PLAN
Problem: Hyperinflation  Goal: Prevent or improve atelectasis  Description: Target End Date:  3 to 4 days1. Instruct incentive spirometry usage2.  Perform hyperinflation therapy as indicated  Outcome: Not Met   PEP

## 2025-05-08 NOTE — CONSULTS
Physical Medicine and Rehabilitation Consultation              Date of initial consultation: 5/8/2025  Requesting provider: ordered by ALEXANDER Pimentel at 05/08/25 0808    Consulting provider: Franchesca Lucia D.O.  Reason for consultation: assess for acute inpatient rehab appropriateness  LOS: 3 Day(s)    Chief complaint: Back pain after ground-level fall    HPI: The patient is a 75 y.o.  female with a past medical history of DVT on Eliquis, hypertension, diabetes, hyperlipidemia, hypothyroidism and depression;  who presented on 5/5/2025  2:50 PM with back pain after ground-level fall.  Per documentation, patient fell and had subsequent left-sided rib pain and back pain.  Upon evaluation at [facility] images showed left sided acute and subacute rib fractures and an L1 compression fracture.  Patient was transferred to [facility] for higher level of care.  Orthospine was consulted, and use of TLSO for comfort.  Patient's hospital course has been notable for hypotension.    Patient seen and examined at bedside, patient reports her back pain is fine. Patient's major concern during my visit is her chronic LUE weakness tells me her LUE has been weak since January. Reports she was seen by a neurosurgeon for the weakness, reports she missed her follow up appointment with neurosurg because she's in the hospital. Denies weakness in her LUE.   Denies LE weakness    Social Hx:  Patient lives in a  1 story house with no GEORGE , lives alone has no social support; per patient she has a daughter but she lives far away and can't help   0 GEORGE  At prior level of function mod I with a 4 wheeled walker      Tobacco: Denied  Alcohol: Denied  Drugs: Denied    THERAPY:  Restrictions: Fall Risk, TLSO for comfort   PT: Functional mobility   5/7 contact-guard assist and FWW x 1 foot, was able to march 6 steps in place, min assist sit to stand    OT: ADLs  5/7 Max assist upper body dressing, max assist lower body dressing, min  assist sit to stand    SLP:   None     IMAGING:  DX-CHEST-PORTABLE (1 VIEW)  Narrative:   5/8/2025 5:59 AM    HISTORY/REASON FOR EXAM: Abnormal finding in lung field    TECHNIQUE/EXAM DESCRIPTION:  Single AP view of the chest.    COMPARISON: Yesterday    FINDINGS:    The cardiac silhouette appears within normal limits.    Atherosclerotic calcification of the aorta is noted.  The central pulmonary vasculature appears normal.    The lungs appear well expanded bilaterally.  Hazy linear density in the left lung base is observed.    Blunting of the left costophrenic angle is seen suggesting trace left effusion.    The bony structures appear age-appropriate.  Impression: 1.  Bibasilar atelectasis and/or subtle infiltrates, similar to prior study.  2.  Trace left pleural effusion  3.  Atherosclerosis        PROCEDURES:  None     PMH:  Past Medical History:   Diagnosis Date    Anemia     Anesthesia     PONV    Arthritis     degenerative    Asthma     CAD (coronary artery disease)     cardiologist, Dr. Winkler  last visit 4/2024    Cataract     Dental disorder     Missing teeth right side.  Veneers top front    Depression     anxiety    Diabetes (HCC)     Disorder of thyroid     hypothyroid    Heart burn     GERD, controlled with medications    History of vertebral fracture     T12, L1, L2    HTN (hypertension)     Hyperlipidemia     Indigestion     Migraines     Obesity     Pneumonia 2023    COVID    PONV (postoperative nausea and vomiting)     Sleep apnea     diagnosed 9/2009 CPAP -PMA; pt states she does not use CPAP and no longer has one, unable to tolerate    Urinary retention 5/6/2025       PSH:  Past Surgical History:   Procedure Laterality Date    PB RECONSTR TOTAL SHOULDER IMPLANT Right 4/30/2024    Procedure: RIGHT REVERSE TOTAL SHOULDER ARTHROPLASTY;  Surgeon: Manuela Sparks M.D.;  Location: SURGERY [facility];  Service: Orthopedics    INSERTION, PERIPHERAL NERVE STIMULATOR, LOWER EXTREMITY Left 12/22/2022     Procedure: Left SCIATIC PERIPHERAL NERVE STIMULATOR IMPLANT, LOWER EXTREMITY PERIPHERAL SCIATIC NERVE;  Surgeon: Tobi Kilgore M.D.;  Location: SURGERY [facility];  Service: Pain Management    ARTHROPLASTY Right 2020    ankle    LUMBAR EXPLORATION N/A 2017    COLECTOMY N/A 2007    partial for divverticulitis 2007    LUMBAR LAMINECTOMY DISKECTOMY N/A 1989    BREAST BIOPSY  1984    no cancer    ABDOMINAL HYSTERECTOMY TOTAL N/A 1978    APPENDECTOMY N/A 1976       FHX:  Family History   Problem Relation Age of Onset    Cancer Mother         lung    Heart Disease Mother     Hyperlipidemia Mother     Stroke Father     Heart Disease Father     Diabetes Father     Hypertension Father     Hyperlipidemia Father     Heart Disease Brother         cath and bypass    Diabetes Brother     Hypertension Brother     Hyperlipidemia Brother     Diabetes Maternal Aunt     Hypertension Maternal Aunt     Heart Disease Brother         cath and byp[ass    Drug abuse Brother     Heart Disease Brother         cath and bypass    Diabetes Brother     Heart Disease Brother     Other Brother         MVA    Hypertension Maternal Aunt     Drug abuse Daughter     Alcohol abuse Daughter        Medications:  Current Facility-Administered Medications   Medication Dose    oxyCODONE immediate-release (Roxicodone) tablet 5 mg  5 mg    oxyCODONE immediate release (Roxicodone) tablet 10 mg  10 mg    metaxalone (Skelaxin) tablet 800 mg  800 mg    NS infusion      tamsulosin (Flomax) capsule 0.4 mg  0.4 mg    apixaban (Eliquis) tablet 5 mg  5 mg    albuterol inhaler 1-2 Puff  1-2 Puff    amitriptyline (Elavil) tablet 150 mg  150 mg    DULoxetine (Cymbalta) capsule 30 mg  30 mg    escitalopram (Lexapro) tablet 20 mg  20 mg    ezetimibe (Zetia) tablet 10 mg  10 mg    Respiratory Therapy Consult      Pharmacy Consult Request ...Pain Management Review 1 Each  1 Each    ondansetron (Zofran) syringe/vial injection 4 mg  4 mg    Or    ondansetron (Zofran ODT)  "dispertab 4 mg  4 mg    docusate sodium (Colace) capsule 100 mg  100 mg    senna-docusate (Pericolace Or Senokot S) 8.6-50 MG per tablet 1 Tablet  1 Tablet    senna-docusate (Pericolace Or Senokot S) 8.6-50 MG per tablet 1 Tablet  1 Tablet    polyethylene glycol/lytes (Miralax) Packet 1 Packet  1 Packet    magnesium hydroxide (Milk Of Magnesia) suspension 30 mL  30 mL    bisacodyl (Dulcolax) suppository 10 mg  10 mg    sodium phosphate enema 1 Enema  1 Enema    acetaminophen (Tylenol) tablet 1,000 mg  1,000 mg    Followed by    [START ON 5/10/2025] acetaminophen (Tylenol) tablet 1,000 mg  1,000 mg    lidocaine (Asperflex) 4 % patch 1 Patch  1 Patch    hydrALAZINE (Apresoline) injection 10 mg  10 mg    insulin lispro (HumaLOG,AdmeLOG) subcutaneous injection  1-6 Units    And    dextrose 50 % (D50W) injection 25 g  25 g    liothyronine (Cytomel) tablet 5 mcg  5 mcg    [Held by provider] lisinopril (Prinivil) tablet 10 mg  10 mg    metoprolol SR (Toprol XL) tablet 25 mg  25 mg    omeprazole (PriLOSEC) capsule 20 mg  20 mg    rosuvastatin (Crestor) tablet 20 mg  20 mg    levothyroxine (Synthroid) tablet 88 mcg  88 mcg       Allergies:  Allergies   Allergen Reactions    Ampicillin-Sulbactam Sodium Hives     With oral lesions     Doxycycline Hives     Possible hives (took with Unasyn)     Gabapentin Hives and Swelling    Pregabalin Hives and Swelling    Amlodipine Swelling    Bee Swelling    Fentanyl Vomiting and Unspecified    Morphine Vomiting, Nausea and Unspecified    Benzoin Rash     Tincture of benzoin =blisters    blisters    Rifampin Unspecified     Pt turns yellow         Physical Exam:  Vitals: /66   Pulse 79   Temp 36.2 °C (97.2 °F) (Temporal)   Resp 16   Ht 1.549 m (5' 1\")   Wt 64.6 kg (142 lb 6.7 oz)   SpO2 92%   Gen: NAD, laying comfortably in bed   Head:  NC/AT  Eyes/ Nose/ Mouth: PERRLA, moist mucous membranes  Cardio: RRR, good distal perfusion, warm extremities  Pulm: normal respiratory " effort, no cyanosis   Abd: Soft NTND, negative borborygmi   Ext: No peripheral edema. No calf tenderness. No clubbing.    Mental status:  A&Ox4 (person, place, date, situation) answers questions appropriately follows commands  Speech: fluent, no aphasia or dysarthria    CRANIAL NERVES:  2,3: visual acuity grossly intact, PERRL  3,4,6: EOMI bilaterally, no nystagmus or diplopia  5: sensation intact to light touch bilaterally and symmetric  7: no facial asymmetry  8: hearing grossly intact      Motor:      Upper Extremity  Myotome R L   Shoulder flexion C5 5/5 2/5   Elbow flexion C5 5/5 3/5   Wrist extension C6 5/5 4/5   Elbow extension C7 5/5 2/5   Finger flexion C8 5/5 5/5   Finger abduction T1 5/5 5/5     Lower Extremity Myotome R L   Hip flexion L2 5/5 5/5   Knee extension L3 5/5 5/5   Ankle dorsiflexion L4 5/5 5/5   Toe extension L5 5/5 5/5   Ankle plantarflexion S1 5/5 5/5       Sensory:   intact to light touch through out all limbs     No clonus at bilateral ankles  Negative Mercado b/l     Tone: no spasticity noted    Coordination:   intact fine motor with fingers bilaterally      Labs: Reviewed and significant for   Recent Labs     05/05/25  1153 05/05/25  1457 05/06/25  0455 05/07/25  0240 05/08/25  0540   RBC 3.92* 3.84* 3.34* 3.45* 3.67*   HEMOGLOBIN 10.3* 10.1* 8.8* 9.0* 9.6*   HEMATOCRIT 33.8* 32.1* 28.7* 29.1* 31.5*   PLATELETCT 269 249 210 183 229   PROTHROMBTM 12.7 14.0  --   --   --    APTT 28.4 25.7  --   --   --    INR 0.93 1.07  --   --   --      Recent Labs     05/06/25  0320 05/07/25  0240 05/08/25  0540   SODIUM 137 139 138   POTASSIUM 4.7 4.1 3.8   CHLORIDE 106 108 109   CO2 20 22 20   GLUCOSE 89 105* 72   BUN 22 16 11   CREATININE 1.53* 1.14 0.89   CALCIUM 8.4* 8.2* 8.5     Recent Results (from the past 24 hours)   POCT glucose device results    Collection Time: 05/07/25 11:39 AM   Result Value Ref Range    POC Glucose, Blood 100 (H) 65 - 99 mg/dL   POCT glucose device results    Collection  Time: 05/07/25  4:11 PM   Result Value Ref Range    POC Glucose, Blood 97 65 - 99 mg/dL   Basic Metabolic Panel    Collection Time: 05/08/25  5:40 AM   Result Value Ref Range    Sodium 138 135 - 145 mmol/L    Potassium 3.8 3.6 - 5.5 mmol/L    Chloride 109 96 - 112 mmol/L    Co2 20 20 - 33 mmol/L    Glucose 72 65 - 99 mg/dL    Bun 11 8 - 22 mg/dL    Creatinine 0.89 0.50 - 1.40 mg/dL    Calcium 8.5 8.5 - 10.5 mg/dL    Anion Gap 9.0 7.0 - 16.0   CBC WITHOUT DIFFERENTIAL    Collection Time: 05/08/25  5:40 AM   Result Value Ref Range    WBC 5.6 4.8 - 10.8 K/uL    RBC 3.67 (L) 4.20 - 5.40 M/uL    Hemoglobin 9.6 (L) 12.0 - 16.0 g/dL    Hematocrit 31.5 (L) 37.0 - 47.0 %    MCV 85.8 81.4 - 97.8 fL    MCH 26.2 (L) 27.0 - 33.0 pg    MCHC 30.5 (L) 32.2 - 35.5 g/dL    RDW 49.1 35.9 - 50.0 fL    Platelet Count 229 164 - 446 K/uL    MPV 9.2 9.0 - 12.9 fL   MAGNESIUM    Collection Time: 05/08/25  5:40 AM   Result Value Ref Range    Magnesium 1.7 1.5 - 2.5 mg/dL   PHOSPHORUS    Collection Time: 05/08/25  5:40 AM   Result Value Ref Range    Phosphorus 2.4 (L) 2.5 - 4.5 mg/dL   ESTIMATED GFR    Collection Time: 05/08/25  5:40 AM   Result Value Ref Range    GFR (CKD-EPI) 68 >60 mL/min/1.73 m 2         ASSESSMENT:  Patient is a 75 y.o. female admitted with L1 compression fracture    C Code / Diagnosis to Support: 0008.9 - Orthopaedic Disorders: Other Orthopaedic  See DISPO details below for recommendations on appropriate level of rehab for this diagnosis.    Barriers to transfer include: Insurance authorization, TCCs to verify disposition, medical clearance and bed availability     Assessment and Plan:  L1 compression fracture  - Sustained ground-level fall  - Images showed evidence of 1 compression fracture without significant canal compromise  - Orthospine consulted  - Recommended nonoperative management  - TLSO  - Continues to require PT/OT  - Has been willing to work with therapy but lacks discharge support anticipate that this  patient will need SNF prior to discharge home    Hypotension  -Noted to have hypotension time of admission, home dose lisinopril has been held  - On home dose metoprolol    LUE weakness  - present since January   - reports was seen by neurosurgery for arm weakness   - no MRI brain to review  - considering LUE weakness present since January without improvement, and poor motor control of LUE, would consider MRI brain to confirm weakness is not due to prior infarct, updated primary team     History of DVT  - On Eliquis    Depression  - On home dose Cymbalta and Lexapro    Urinary retention  - started on Flomax    Bowel  Last BM:  (PTA)  - On scheduled bowel meds but has been refusing    DISPO:  - patient is currently functioning below their level of baseline, recommend post acute rehab  - Not a candidate for IRF level theraapy due to lack of DC support   - recommends SNF for prolonged access to therapy prior to DC home alone   - PM&R will sign off       Medical Complexity:  L1 compression fracture  Hypotension  Rib fractures  Pain  Urinary retention  Impaired mobility and ADLs    DVT PPX: Eliquis      Thank you for allowing us to participate in the care of this patient.     Patient was seen for >80 minutes on unit/floor of which > 50% of time was spent on counseling and coordination of care regarding the above, including prognosis, risk reduction, benefits of treatment, and options for next stage of care.    Franchesca Mcbride, DO   Physical Medicine and Rehabilitation     Please note that this dictation was created using voice recognition software. I have made every reasonable attempt to correct obvious errors, but there may be errors of grammar and possibly content that I did not discover before finalizing the note.

## 2025-05-08 NOTE — DISCHARGE PLANNING
0808: PMR to consult, TCC will follow.    0913: Per PMR patient is not a candidate for IPR d/t lack of discharge support, recommending SNF. TCC will no longer follow.

## 2025-05-08 NOTE — PROGRESS NOTES
4 Eyes Skin Assessment Completed by Guerda CHANDLER RN and Madai CHANDLER RN.    Head WDL  Ears WDL  Nose WDL  Mouth WDL  Neck WDL  Breast/Chest WDL  Shoulder Blades WDL  Spine WDL  (R) Arm/Elbow/Hand Redness and Bruising  (L) Arm/Elbow/Hand Redness, Bruising, and Scab  Abdomen WDL  Groin WDL  Scrotum/Coccyx/Buttocks Redness and Blanching  (R) Leg Bruising  (L) Leg Bruising  (R) Heel/Foot/Toe Redness and Blanching, Dry  (L) Heel/Foot/Toe Redness and Blanching, Dry    Devices In Places Pulse Ox and Polanco, Nasal Canula       Interventions In Place Gray Ear Foams, Sacral Mepilex, TAP System, Pillows, Q2 Turns, and Low Air Loss Mattress    Possible Skin Injury No    Pictures Uploaded Into Epic Yes  Wound Consult Placed N/A  RN Wound Prevention Protocol Ordered Yes

## 2025-05-08 NOTE — PROGRESS NOTES
"Pt declines suppository' education provided.     1726: Pt refuses FSBS states \"I haven't been needing anything they need to be discontinued\". Messaged MD of pt request to discontinue FSBS  "

## 2025-05-08 NOTE — ASSESSMENT & PLAN NOTE
Prior to arrival.  Patient states that she has been seeing Dr. Delatorre for this issue.  Related to chronic neck pain.

## 2025-05-09 PROBLEM — Z75.8 DISCHARGE PLANNING ISSUES: Status: ACTIVE | Noted: 2025-05-09

## 2025-05-09 PROBLEM — R74.8 INCREASED CREATINE KINASE LEVEL: Status: RESOLVED | Noted: 2025-05-05 | Resolved: 2025-05-09

## 2025-05-09 LAB — COMPONENT CELLULAR 8504CLL: NORMAL

## 2025-05-09 PROCEDURE — 700102 HCHG RX REV CODE 250 W/ 637 OVERRIDE(OP): Performed by: STUDENT IN AN ORGANIZED HEALTH CARE EDUCATION/TRAINING PROGRAM

## 2025-05-09 PROCEDURE — 700102 HCHG RX REV CODE 250 W/ 637 OVERRIDE(OP)

## 2025-05-09 PROCEDURE — 770006 HCHG ROOM/CARE - MED/SURG/GYN SEMI*

## 2025-05-09 PROCEDURE — 700102 HCHG RX REV CODE 250 W/ 637 OVERRIDE(OP): Performed by: NURSE PRACTITIONER

## 2025-05-09 PROCEDURE — A9270 NON-COVERED ITEM OR SERVICE: HCPCS

## 2025-05-09 PROCEDURE — A9270 NON-COVERED ITEM OR SERVICE: HCPCS | Performed by: STUDENT IN AN ORGANIZED HEALTH CARE EDUCATION/TRAINING PROGRAM

## 2025-05-09 PROCEDURE — A9270 NON-COVERED ITEM OR SERVICE: HCPCS | Performed by: NURSE PRACTITIONER

## 2025-05-09 RX ADMIN — OXYCODONE 5 MG: 5 TABLET ORAL at 22:22

## 2025-05-09 RX ADMIN — METAXALONE 800 MG: 800 TABLET ORAL at 05:27

## 2025-05-09 RX ADMIN — METAXALONE 800 MG: 800 TABLET ORAL at 20:29

## 2025-05-09 RX ADMIN — ACETAMINOPHEN 1000 MG: 500 TABLET ORAL at 15:01

## 2025-05-09 RX ADMIN — APIXABAN 5 MG: 5 TABLET, FILM COATED ORAL at 05:27

## 2025-05-09 RX ADMIN — ESCITALOPRAM OXALATE 20 MG: 10 TABLET ORAL at 05:27

## 2025-05-09 RX ADMIN — OXYCODONE 5 MG: 5 TABLET ORAL at 02:12

## 2025-05-09 RX ADMIN — OMEPRAZOLE 20 MG: 20 CAPSULE, DELAYED RELEASE ORAL at 10:11

## 2025-05-09 RX ADMIN — APIXABAN 5 MG: 5 TABLET, FILM COATED ORAL at 16:28

## 2025-05-09 RX ADMIN — ACETAMINOPHEN 1000 MG: 500 TABLET ORAL at 05:27

## 2025-05-09 RX ADMIN — LEVOTHYROXINE SODIUM 88 MCG: 0.09 TABLET ORAL at 05:27

## 2025-05-09 RX ADMIN — OXYCODONE 5 MG: 5 TABLET ORAL at 15:00

## 2025-05-09 RX ADMIN — LIOTHYRONINE SODIUM 5 MCG: 5 TABLET ORAL at 05:27

## 2025-05-09 RX ADMIN — ACETAMINOPHEN 1000 MG: 500 TABLET ORAL at 00:40

## 2025-05-09 RX ADMIN — DIBASIC SODIUM PHOSPHATE, MONOBASIC POTASSIUM PHOSPHATE AND MONOBASIC SODIUM PHOSPHATE 250 MG: 852; 155; 130 TABLET ORAL at 00:41

## 2025-05-09 RX ADMIN — METOPROLOL SUCCINATE 25 MG: 25 TABLET, EXTENDED RELEASE ORAL at 05:27

## 2025-05-09 RX ADMIN — ROSUVASTATIN CALCIUM 20 MG: 20 TABLET, FILM COATED ORAL at 16:28

## 2025-05-09 RX ADMIN — EZETIMIBE 10 MG: 10 TABLET ORAL at 16:28

## 2025-05-09 RX ADMIN — DIBASIC SODIUM PHOSPHATE, MONOBASIC POTASSIUM PHOSPHATE AND MONOBASIC SODIUM PHOSPHATE 250 MG: 852; 155; 130 TABLET ORAL at 05:27

## 2025-05-09 RX ADMIN — DULOXETINE 30 MG: 30 CAPSULE, DELAYED RELEASE ORAL at 16:28

## 2025-05-09 RX ADMIN — OMEPRAZOLE 20 MG: 20 CAPSULE, DELAYED RELEASE ORAL at 20:29

## 2025-05-09 RX ADMIN — OXYCODONE 5 MG: 5 TABLET ORAL at 06:22

## 2025-05-09 RX ADMIN — AMITRIPTYLINE HYDROCHLORIDE 150 MG: 25 TABLET, FILM COATED ORAL at 20:29

## 2025-05-09 RX ADMIN — TAMSULOSIN HYDROCHLORIDE 0.4 MG: 0.4 CAPSULE ORAL at 10:11

## 2025-05-09 ASSESSMENT — ENCOUNTER SYMPTOMS
FOCAL WEAKNESS: 1
FEVER: 0
NAUSEA: 0
SPEECH CHANGE: 0
ABDOMINAL PAIN: 0
VOMITING: 0
SENSORY CHANGE: 0
MYALGIAS: 1
BACK PAIN: 1
CHILLS: 0

## 2025-05-09 ASSESSMENT — PAIN DESCRIPTION - PAIN TYPE
TYPE: ACUTE PAIN
TYPE: SURGICAL PAIN;ACUTE PAIN
TYPE: ACUTE PAIN
TYPE: ACUTE PAIN
TYPE: ACUTE PAIN;SURGICAL PAIN
TYPE: ACUTE PAIN

## 2025-05-09 NOTE — CARE PLAN
The patient is Stable - Low risk of patient condition declining or worsening    Shift Goals  Clinical Goals: pain control, safety  Patient Goals: comfort  Family Goals: not present    Progress made toward(s) clinical / shift goals:    Problem: Pain - Standard  Goal: Alleviation of pain or a reduction in pain to the patient’s comfort goal  Outcome: Progressing     Problem: Skin Integrity  Goal: Skin integrity is maintained or improved  Outcome: Progressing     Problem: Fall Risk  Goal: Patient will remain free from falls  Outcome: Progressing

## 2025-05-09 NOTE — ASSESSMENT & PLAN NOTE
Date of admission: 5/5/2025.  5/8  Rehab referral 5/9 SNF referral.  5/12 Discharged deferred secondary to orthostatic hypotension  5/14 Medically clear for discharge to [facility].

## 2025-05-09 NOTE — CARE PLAN
The patient is Stable - Low risk of patient condition declining or worsening    Shift Goals  Clinical Goals: pain control, safety, d/c mckeon  Patient Goals: pain control, comfort  Family Goals: not present    Progress made toward(s) clinical / shift goals:    Problem: Pain - Standard  Goal: Alleviation of pain or a reduction in pain to the patient’s comfort goal  Outcome: Progressing     Problem: Knowledge Deficit - Standard  Goal: Patient and family/care givers will demonstrate understanding of plan of care, disease process/condition, diagnostic tests and medications  Outcome: Progressing     Problem: Fall Risk  Goal: Patient will remain free from falls  Outcome: Progressing

## 2025-05-09 NOTE — DISCHARGE PLANNING
"Case Management Discharge Planning    Admission Date: 5/5/2025  GMLOS: 3  ALOS: 4    6-Clicks ADL Score: 13  6-Clicks Mobility Score: 17  PT and/or OT Eval ordered: Yes  Post-acute Referrals Ordered: Yes  Post-acute Choice Obtained: No  Has referral(s) been sent to post-acute provider:  Yes      Anticipated Discharge Dispo: Discharge Disposition: D/T to SNF with Medicare cert in anticipation of skilled care (03)    DME Needed: No    Action(s) Taken:    1257  RNCM met with patient at the bedside to obtain SNF choice.  Per patient, \"she will not discharge to a skilled nursing facility.\"  Patient requested an update on [facility] rehab.  RNCM called [facility] rehab and spoke to Susu.  Per Susu, she will review the referral and give this RNCM a call back with an update after she reviews.      1557  [facility] rehab declined.  RNCM met with patient at the bedside to update.  Patient stated she would only go to [facility].  Patient signed choice form.  RNCM asked patient to give an alternative choice for SNF if [facility] is full.  Patient declined and said that is the only SNF she will go to.  RNCM explained that once the provider medically clears her, and if [facility] does not have a bed, she may need to appeal her DC.      Escalations Completed: None    Medically Clear: Yes    Next Steps: RN CM to continue to follow for DC planning      Barriers to Discharge: Pending Placement    Care Transition Team Assessment    RNCM met with patient at the bedside.  Patient A&Ox4, but drowsy.  Patient needed prompting to stay awake and complete assessment.  Patient stated she lives in an apartment on the first floor, with no steps to enter.  Pt stated she lives alone.  Pt doesn't drive, but wasn't clear on her usual mode of transportation.  Pt reported her daughter lives in [place], [place]. Pt has medicare and [employer] for medical coverage.  Pt denies DME use or home oxygen.  Per therapy notes, patient was using a 4WW at times.  Pt denies mental " "health concerns.  Patient denies ETOH, drug use or smoking.  RNCM discussed DC plan with patient--that post acute placement is recommended.  Pt was deemed not a good candidate for IPR due to lack of DC support.  RNCM provided patient with a list of SNFs.  Patient would like to look the list over.  RNCM asked if patient's daughter could be contacted to discuss DC plan, but patient stated \"No, not at this time.\"        Information Source  Orientation Level: Oriented X4  Information Given By: Patient  Who is responsible for making decisions for patient? : Patient    Readmission Evaluation  Is this a readmission?: No    Elopement Risk  Legal Hold: No  Ambulatory or Self Mobile in Wheelchair: Yes  Disoriented: No  Psychiatric Symptoms: None  History of Wandering: No  Elopement this Admit: No  Vocalizing Wanting to Leave: No  Displays Behaviors, Body Language Wanting to Leave: No-Not at Risk for Elopement  Elopement Risk: Not at Risk for Elopement    Interdisciplinary Discharge Planning  Lives with - Patient's Self Care Capacity: Alone and Unable to Care For Self  Patient or legal guardian wants to designate a caregiver: No  Support Systems: Children  Housing / Facility: [address] [facility]  Prior Services: Home-Independent    Discharge Preparedness  What is your plan after discharge?: Uncertain - pending medical team collaboration  What are your discharge supports?: Child  Prior Functional Level: Ambulatory, Independent with Activities of Daily Living  Difficulity with ADLs: None  Difficulity with IADLs: None    Functional Assesment  Prior Functional Level: Ambulatory, Independent with Activities of Daily Living    Finances  Financial Barriers to Discharge: No  Prescription Coverage: Yes    Vision / Hearing Impairment  Right Eye Vision: Wears Glasses, Impaired  Left Eye Vision: Wears Glasses, Impaired         Advance Directive  [facility] Directive?: POLST    Domestic Abuse  Have you ever been the victim of abuse or violence?: " No  Possible Abuse/Neglect Reported to:: Not Applicable    Psychological Assessment  History of Substance Abuse: None  History of Psychiatric Problems: No  Non-compliant with Treatment: No  Newly Diagnosed Illness: Yes    Discharge Risks or Barriers  Discharge risks or barriers?: Complex medical needs  Patient risk factors: Complex medical needs, Cognitive / sensory / physical deficit    Anticipated Discharge Information  Discharge Disposition: D/T to SNF with Medicare cert in anticipation of skilled care (03)

## 2025-05-09 NOTE — PROGRESS NOTES
Trauma / Surgical Daily Progress Note    Date of Service  5/9/2025    Chief Complaint  75 y.o. female admitted 5/5/2025 with rib fractures, worsened chronic L1 compression fracture following GLF.     Interval Events  No acute events overnight  Room air.  IS 1000  Voiding post Polanco removal    -Aggressive pulmonary hygiene.  -Mobilize out of bed and into chair for all meals.  Patient should be ambulating in the hallways    Referral sent to [facility] rehab and SNF referral sent.  Patient stating she likely does not want to go to SNF.  Family lives locally.    Disposition: Medically cleared to transfer to postacute placement    Review of Systems  Review of Systems   Constitutional:  Negative for chills, fever and malaise/fatigue.   Gastrointestinal:  Negative for abdominal pain, nausea and vomiting.   Genitourinary:  Negative for dysuria.   Musculoskeletal:  Positive for back pain and myalgias.   Neurological:  Positive for focal weakness (chronic left leg/arm weakness - seeing Dr. Delatorre outpatient). Negative for sensory change and speech change.   All other systems reviewed and are negative.       Vital Signs  Temp:  [36.2 °C (97.2 °F)-36.6 °C (97.9 °F)] 36.6 °C (97.9 °F)  Pulse:  [68-91] 80  Resp:  [14-18] 14  BP: (121-157)/(72-93) 121/90  SpO2:  [81 %-95 %] 93 %    Physical Exam  Physical Exam  Vitals and nursing note reviewed.   Constitutional:       Appearance: Normal appearance.      Interventions: Nasal cannula in place.   HENT:      Mouth/Throat:      Mouth: Mucous membranes are moist.   Eyes:      Pupils: Pupils are equal, round, and reactive to light.   Cardiovascular:      Rate and Rhythm: Normal rate and regular rhythm.   Pulmonary:      Effort: Pulmonary effort is normal. No respiratory distress.   Chest:      Chest wall: Tenderness (left lateral, posterior) present.   Abdominal:      General: There is no distension.      Palpations: Abdomen is soft.      Tenderness: There is no abdominal tenderness.    Musculoskeletal:         General: Normal range of motion.      Cervical back: Normal range of motion.      Thoracic back: No tenderness.      Lumbar back: Tenderness present.   Skin:     General: Skin is warm and dry.      Capillary Refill: Capillary refill takes less than 2 seconds.   Neurological:      General: No focal deficit present.      Mental Status: She is alert and oriented to person, place, and time.      Comments: 2/5 strength in LUE chronic, 5/5 strength in other extremities    Psychiatric:         Mood and Affect: Mood normal.         Behavior: Behavior normal.       Laboratory  No results found for this or any previous visit (from the past 24 hours).    Fluids    Intake/Output Summary (Last 24 hours) at 5/9/2025 1257  Last data filed at 5/8/2025 1945  Gross per 24 hour   Intake --   Output 100 ml   Net -100 ml       Core Measures & Quality Metrics  Labs reviewed and Medications reviewed  Polanco catheter: No Polanco      DVT: Eliquis.  DVT prophylaxis - mechanical: SCDs  Ulcer prophylaxis: Yes    Assessed for rehab: Patient was assess for and/or received rehabilitation services during this hospitalization    RAP Score Total: 7    CAGE Results: negative Blood Alcohol>0.08: not completed     Assessment/Plan  * Trauma- (present on admission)  Assessment & Plan  GLF tripped over pillow.  Trauma Green Transfer Activation from [facility] in [place], [place].  Surgeon List: Emilie Miguel MD. Trauma Surgery.    Discharge planning issues- (present on admission)  Assessment & Plan  Date of admission: 5/5/2025.  5/8  Rehab referral 5/9 SNF referral.  Cleared for discharge: Yes - Date: 5/8 .  Discharge delayed: Yes - Reason: Non-availability of Transfer Facility.  Discharge date: tbd.    Closed T12 fracture (HCC)- (present on admission)  Assessment & Plan  CT imaging with Stable compression fractures of T4, T5, and T12   T12 fracture is acute per spine surgeon review  Non-operative management.    Off-the-shelf TLSO bracing. Outpatient follow up.  Romeo Castaneda MD. Spine Surgeon. [facility].    Anticoagulated on Eliquis- (present on admission)  Assessment & Plan  For treatment of acute LLE DVT, started on 2/28/25  Reports she paused the medication 7 days ago on recommendations from her neurosurgeon ahead of potential cervical spine surgery  TEG with AA 83.9% inhibition, ADP 21.6% inhibition   - Eliquis reinitiated     Closed fracture of five ribs of left side- (present on admission)  Assessment & Plan  CT imaging with fractures of the posterior left fourth and fifth rib fractures, lateral left sixth and seventh rib, and the left ninth rib posterolaterally. There are subacute anterolateral left seventh and eighth rib fractures.   Fracture pattern is not amenable to operative fixation.  Aggressive multimodal pain management and pulmonary hygiene. Serial chest radiographs.    Deep vein thrombosis (DVT) of left lower extremity (HCC)- (present on admission)  Assessment & Plan  Diagnosed 2/28, treated with eliquis  US with cubacute to chronic, partially occlusive thrombus in the common femoral vein, extending into the femoral vein and popliteal vein.  - eliquis reinitiated; no creatinine dosing indicated given patient age and BMI per pharmacy      Urinary retention  Assessment & Plan  Polanco placed 5/6 for retention  - flomax initiated   5/8 trial Polanco removal  5/9 Voiding    Increased creatine kinase level- (present on admission)  Assessment & Plan  Admit creat 1.38 (baseline 0.76).  5/7 Renal indices corrected  Continue gentle IVF, Lisinopril held, Avoid nephrotoxins.  Trend.    Closed compression fracture of body of L1 vertebra (HCC)- (present on admission)  Assessment & Plan  Compression fracture of L1 with further compression when compared with the prior exam.   History of compression fracture, patient has chronic pain to this area that is unchanged; no pain on initial exam.  Tertiary exam with  lumbar spine pain  Non-operative management.   Off-the-shelf TLSO bracing. Outpatient follow up.  Jb Park MD. Neurosurgeon. [facility].    Weakness of left arm- (present on admission)  Assessment & Plan  Prior to arrival.  Patient states that she has been seeing Dr. Delatorre for this issue.  Related to chronic neck pain.    No contraindication to deep vein thrombosis (DVT) prophylaxis- (present on admission)  Assessment & Plan  Prophylactic dose heparin 5000 u q 8 hr initiated upon admission.  Subsequent US showing chronic DVT, eliquis reinitiated, heparin discontinued     GERD (gastroesophageal reflux disease)- (present on admission)  Assessment & Plan  Chronic condition treated with omeprazole.  Resumed maintenance medication on admission.    Normocytic anemia- (present on admission)  Assessment & Plan  Admit hgb 10.8  Trend    Primary hypertension- (present on admission)  Assessment & Plan  Chronic condition treated with lisinopril, metoprolol.  Resumed maintenance medication on admission.  PRN antihypertensives   Lisinopril held due to increased creatinine     Type 2 diabetes mellitus (HCC)- (present on admission)  Assessment & Plan  Chronic condition treated with metformin.  Holding maintenance metformin for 48 hours following intravenous contrast administration.  Insulin sliding scale coverage.    Asthma- (present on admission)  Assessment & Plan  Chronic condition treated with albuterol.  Resumed maintenance medication on admission. RT protocol.     Chronic pain syndrome on chronic opioids- (present on admission)  Assessment & Plan  Chronic condition treated with Roxicodone 10mg, robaxin.  Resumed robaxin on admission. Multimodals and PRN analgesics.    Acquired hypothyroidism- (present on admission)  Assessment & Plan  Chronic condition treated with liothyronine, levothyroxine.  Resumed maintenance medication on admission.    Mood disorder (HCC)- (present on admission)  Assessment &  Plan  Chronic condition treated with duloxetine, amitriptyline.  Resumed maintenance medication on admission.    Dyslipidemia- (present on admission)  Assessment & Plan  Chronic condition treated with rosuvastatin, ezetimibe.  Resumed maintenance medication on admission.        Discussed patient condition with RN, , Charge nurse / hot rounds, Patient, and trauma surgery, Dr. May.

## 2025-05-09 NOTE — RESPIRATORY CARE
COPD EDUCATION by COPD CLINICAL EDUCATOR  5/9/2025 at 7:50 AM by Sandra Mclean RRT     Patient reviewed by COPD education team. Patient does not have a history or diagnosis of COPD and is a non-smoker.  Therefore, patient does not qualify for the COPD program.

## 2025-05-10 PROCEDURE — 700102 HCHG RX REV CODE 250 W/ 637 OVERRIDE(OP)

## 2025-05-10 PROCEDURE — 99232 SBSQ HOSP IP/OBS MODERATE 35: CPT | Performed by: NURSE PRACTITIONER

## 2025-05-10 PROCEDURE — 770006 HCHG ROOM/CARE - MED/SURG/GYN SEMI*

## 2025-05-10 PROCEDURE — 700102 HCHG RX REV CODE 250 W/ 637 OVERRIDE(OP): Performed by: STUDENT IN AN ORGANIZED HEALTH CARE EDUCATION/TRAINING PROGRAM

## 2025-05-10 PROCEDURE — A9270 NON-COVERED ITEM OR SERVICE: HCPCS | Performed by: STUDENT IN AN ORGANIZED HEALTH CARE EDUCATION/TRAINING PROGRAM

## 2025-05-10 PROCEDURE — 700102 HCHG RX REV CODE 250 W/ 637 OVERRIDE(OP): Performed by: NURSE PRACTITIONER

## 2025-05-10 PROCEDURE — A9270 NON-COVERED ITEM OR SERVICE: HCPCS | Performed by: NURSE PRACTITIONER

## 2025-05-10 PROCEDURE — A9270 NON-COVERED ITEM OR SERVICE: HCPCS

## 2025-05-10 RX ADMIN — OXYCODONE 5 MG: 5 TABLET ORAL at 07:46

## 2025-05-10 RX ADMIN — METAXALONE 800 MG: 800 TABLET ORAL at 20:56

## 2025-05-10 RX ADMIN — OXYCODONE 5 MG: 5 TABLET ORAL at 15:48

## 2025-05-10 RX ADMIN — OMEPRAZOLE 20 MG: 20 CAPSULE, DELAYED RELEASE ORAL at 20:56

## 2025-05-10 RX ADMIN — METAXALONE 800 MG: 800 TABLET ORAL at 13:29

## 2025-05-10 RX ADMIN — ROSUVASTATIN CALCIUM 20 MG: 20 TABLET, FILM COATED ORAL at 17:53

## 2025-05-10 RX ADMIN — DULOXETINE 30 MG: 30 CAPSULE, DELAYED RELEASE ORAL at 17:53

## 2025-05-10 RX ADMIN — METOPROLOL SUCCINATE 25 MG: 25 TABLET, EXTENDED RELEASE ORAL at 06:17

## 2025-05-10 RX ADMIN — ESCITALOPRAM OXALATE 20 MG: 10 TABLET ORAL at 06:17

## 2025-05-10 RX ADMIN — LIOTHYRONINE SODIUM 5 MCG: 5 TABLET ORAL at 06:17

## 2025-05-10 RX ADMIN — OMEPRAZOLE 20 MG: 20 CAPSULE, DELAYED RELEASE ORAL at 09:45

## 2025-05-10 RX ADMIN — AMITRIPTYLINE HYDROCHLORIDE 150 MG: 25 TABLET, FILM COATED ORAL at 20:56

## 2025-05-10 RX ADMIN — LISINOPRIL 10 MG: 10 TABLET ORAL at 06:17

## 2025-05-10 RX ADMIN — ACETAMINOPHEN 1000 MG: 500 TABLET ORAL at 06:17

## 2025-05-10 RX ADMIN — APIXABAN 5 MG: 5 TABLET, FILM COATED ORAL at 17:53

## 2025-05-10 RX ADMIN — EZETIMIBE 10 MG: 10 TABLET ORAL at 17:54

## 2025-05-10 RX ADMIN — METAXALONE 800 MG: 800 TABLET ORAL at 06:17

## 2025-05-10 RX ADMIN — OXYCODONE 5 MG: 5 TABLET ORAL at 21:02

## 2025-05-10 RX ADMIN — LEVOTHYROXINE SODIUM 88 MCG: 0.09 TABLET ORAL at 06:17

## 2025-05-10 RX ADMIN — APIXABAN 5 MG: 5 TABLET, FILM COATED ORAL at 06:17

## 2025-05-10 RX ADMIN — ACETAMINOPHEN 1000 MG: 500 TABLET ORAL at 13:29

## 2025-05-10 RX ADMIN — TAMSULOSIN HYDROCHLORIDE 0.4 MG: 0.4 CAPSULE ORAL at 09:45

## 2025-05-10 ASSESSMENT — ENCOUNTER SYMPTOMS
SPEECH CHANGE: 0
SENSORY CHANGE: 0
ABDOMINAL PAIN: 0
FEVER: 0
VOMITING: 0
NAUSEA: 0
CHILLS: 0
BACK PAIN: 1
MYALGIAS: 1
FOCAL WEAKNESS: 1

## 2025-05-10 ASSESSMENT — PAIN DESCRIPTION - PAIN TYPE
TYPE: ACUTE PAIN

## 2025-05-10 NOTE — CARE PLAN
The patient is Stable - Low risk of patient condition declining or worsening    Shift Goals  Clinical Goals: mobility; dc plan  Patient Goals: comfort; rest  Family Goals: not present    Progress made toward(s) clinical / shift goals:    Problem: Pain - Standard  Goal: Alleviation of pain or a reduction in pain to the patient’s comfort goal  Outcome: Progressing     Problem: Knowledge Deficit - Standard  Goal: Patient and family/care givers will demonstrate understanding of plan of care, disease process/condition, diagnostic tests and medications  Outcome: Progressing     Problem: Skin Integrity  Goal: Skin integrity is maintained or improved  Outcome: Progressing     Problem: Fall Risk  Goal: Patient will remain free from falls  Outcome: Progressing       Patient is not progressing towards the following goals:

## 2025-05-10 NOTE — DISCHARGE PLANNING
Case Management Discharge Planning    Admission Date: 5/5/2025  GMLOS: 3  ALOS: 5    6-Clicks ADL Score: 13  6-Clicks Mobility Score: 17  PT and/or OT Eval ordered: Yes  Post-acute Referrals Ordered: Yes  Post-acute Choice Obtained: Yes  Has referral(s) been sent to post-acute provider:  Yes      Anticipated Discharge Dispo: Discharge Disposition: D/T to SNF with Medicare cert in anticipation of skilled care (03)    DME Needed: No    Action(s) Taken: Discussed in IDT rounds, pt is medically cleared for SNF placement. Pt has been declined from rehab due to lack of dc support, pt is only agreeable to [facility], informed pt she has other accepting SNFs and she stated she only wanted [facility]. Discussed IMM and appeal process, pt stated she is not medically cleared due to feeling dizzy and faint like when she ambulates, will clarify with MD if pt is medically cleared.     Will follow up with [facility] tomorrow.     Escalations Completed: None    Medically Clear: No    Next Steps: Pending medical clearance and placement.     Barriers to Discharge: Pending Placement    Is the patient up for discharge tomorrow: No

## 2025-05-10 NOTE — DISCHARGE PLANNING
Agency/Facility Name: [facility]  Spoke To: LVM for Larissa  Outcome: DPA left a VM regarding admissions acceptance

## 2025-05-10 NOTE — DISCHARGE SUMMARY
Trauma Discharge Summary    DATE OF ADMISSION: 5/5/2025    DATE OF DISCHARGE: 5/12/2025    LENGTH OF STAY: 7 days    ATTENDING PHYSICIAN: Emilie Miguel M.D.    CONSULTING PHYSICIAN:   1.  Romeo Castaneda MD, orthopedic spine  2.  Franchesca Lucia DO, physical medicine and rehabilitation    DISCHARGE DIAGNOSIS:  Principal Problem:    Trauma  Active Problems:    Deep vein thrombosis (DVT) of left lower extremity (HCC)    Closed fracture of five ribs of left side    Anticoagulated on Eliquis    Closed T12 fracture (HCC)    Discharge planning issues    Hypokalemia    Closed compression fracture of body of L1 vertebra (HCC)    Urinary retention    Dyslipidemia    Mood disorder (HCC)    Acquired hypothyroidism    Chronic pain syndrome on chronic opioids (Chronic)    Asthma    Type 2 diabetes mellitus (HCC)    Primary hypertension    Normocytic anemia    GERD (gastroesophageal reflux disease)    No contraindication to deep vein thrombosis (DVT) prophylaxis    Weakness of left arm  Resolved Problems:    Increased creatine kinase level      PROCEDURES:  1.  None    HISTORY OF PRESENT ILLNESS: The patient is a 75 y.o. female who was reportedly injured in a mechanical ground-level fall.  She denied loss of consciousness.  Patient is chronically anticoagulated with apixaban with her last dose of medication administered 1 week prior to hospital admission.  She was transferred to [facility] [place] in [place], [place].    HOSPITAL COURSE: The patient was triaged as a trauma green transfer activation. The patient was transported to trauma intensive care unit.    CT imaging demonstrated posterior left 4th and 5th rib fractures, lateral left 6th and 7th rib and the left ninth rib posterolaterally.  There was also subacute anterolateral left 7th and 8th rib fractures.  These fractures were not amendable to operative fixation.  They were managed with aggressive multimodal pain management, pulmonary hygiene and serial chest  radiographs.  Patient was requiring oxygen during her hospital stay but has since been weaned off and is currently on room air.    Imaging also demonstrated stable compression fractures of  the T4, T5 and T12 vertebrae.  Per surgeon review, the T12 fracture was acute.  Dr. Diamond was consulted and recommended non-operative management with an off-the-shelf TLSO bracing.  Additionally, she had a history of previous compression fracture of L1 vertebrae with associated chronic pain. On imaging there wa further compression when compared with her prior exam.  She follows up outpatient with Dr. Delatorre at [facility].    Patient was recently diagnosed with a deep vein thrombosis of the left lower extremity in February 2025 and was placed on Eliquis.  Duplex ultrasound with subacute to chronic partially occlusive thrombus in the common femoral vein extending into the femoral vein and popliteal vein.  We resumed her Eliquis.Therapies evaluated the patient and recommended postacute placement.  Unfortunately[facility] patient secondary to having poor discharge support.  The patient was accepted to skilled nursing on 5/12/2025.      HOSPITAL PROBLEM LIST:  * Trauma- (present on admission)  Assessment & Plan  GLF tripped over pillow.  Trauma Green Transfer Activation from [facility] in [place], [place].  Surgeon List: Emilie Miguel MD. Trauma Surgery.    Discharge planning issues- (present on admission)  Assessment & Plan  Date of admission: 5/5/2025.  5/8  Rehab referral 5/9 SNF referral.  Cleared for discharge: Yes - Date: 5/8 .  Discharge delayed: Yes - Reason: Non-availability of Transfer Facility.  Discharge date: tbd.    Closed T12 fracture (HCC)- (present on admission)  Assessment & Plan  CT imaging with Stable compression fractures of T4, T5, and T12   T12 fracture is acute per spine surgeon review  Non-operative management.   Off-the-shelf TLSO bracing. Outpatient follow  up.  Romeo Castaneda MD. Spine Surgeon. [facility].    Anticoagulated on Eliquis- (present on admission)  Assessment & Plan  For treatment of acute LLE DVT, started on 2/28/25  Reports she paused the medication 7 days ago on recommendations from her neurosurgeon ahead of potential cervical spine surgery  TEG with AA 83.9% inhibition, ADP 21.6% inhibition   - Eliquis reinitiated     Closed fracture of five ribs of left side- (present on admission)  Assessment & Plan  CT imaging with fractures of the posterior left fourth and fifth rib fractures, lateral left sixth and seventh rib, and the left ninth rib posterolaterally. There are subacute anterolateral left seventh and eighth rib fractures.   Fracture pattern is not amenable to operative fixation.  Aggressive multimodal pain management and pulmonary hygiene. Serial chest radiographs.    Deep vein thrombosis (DVT) of left lower extremity (HCC)- (present on admission)  Assessment & Plan  Diagnosed 2/28, treated with eliquis  US with cubacute to chronic, partially occlusive thrombus in the common femoral vein, extending into the femoral vein and popliteal vein.  - eliquis reinitiated; no creatinine dosing indicated given patient age and BMI per pharmacy      Urinary retention  Assessment & Plan  Polanco placed 5/6 for retention  - flomax initiated   5/8 trial Polanco removal  5/9 Voiding    Closed compression fracture of body of L1 vertebra (HCC)- (present on admission)  Assessment & Plan  Compression fracture of L1 with further compression when compared with the prior exam.   History of compression fracture, patient has chronic pain to this area that is unchanged; no pain on initial exam.  Tertiary exam with lumbar spine pain  Non-operative management.   Off-the-shelf TLSO bracing. Outpatient follow up.  Jb Park MD. Neurosurgeon. [facility].    Hypokalemia- (present on admission)  Assessment & Plan  5/11 Supplement and trend.    Weakness of left  arm- (present on admission)  Assessment & Plan  Prior to arrival.  Patient states that she has been seeing Dr. Delatorre for this issue.  Related to chronic neck pain.    No contraindication to deep vein thrombosis (DVT) prophylaxis- (present on admission)  Assessment & Plan  Prophylactic dose heparin 5000 u q 8 hr initiated upon admission.  Subsequent US showing chronic DVT, eliquis reinitiated, heparin discontinued     GERD (gastroesophageal reflux disease)- (present on admission)  Assessment & Plan  Chronic condition treated with omeprazole.  Resumed maintenance medication on admission.    Normocytic anemia- (present on admission)  Assessment & Plan  Admit hgb 10.8  Trend    Primary hypertension- (present on admission)  Assessment & Plan  Chronic condition treated with lisinopril, metoprolol.  Resumed maintenance medication on admission.  PRN antihypertensives   Lisinopril held due to increased creatinine   5/9 Resumed lisinopril. Creatinine improved 0.89    Type 2 diabetes mellitus (HCC)- (present on admission)  Assessment & Plan  Chronic condition treated with metformin.  Holding maintenance metformin for 48 hours following intravenous contrast administration.  Insulin sliding scale coverage.  Adequate glycemic control off of maintenance mediation.    Asthma- (present on admission)  Assessment & Plan  Chronic condition treated with albuterol.  Resumed maintenance medication on admission. RT protocol.     Chronic pain syndrome on chronic opioids- (present on admission)  Assessment & Plan  Chronic condition treated with Roxicodone 10mg, robaxin.  Resumed robaxin on admission. Multimodals and PRN analgesics.    Acquired hypothyroidism- (present on admission)  Assessment & Plan  Chronic condition treated with liothyronine, levothyroxine.  Resumed maintenance medication on admission.    Mood disorder (HCC)- (present on admission)  Assessment & Plan  Chronic condition treated with duloxetine, amitriptyline.  Resumed  maintenance medication on admission.    Dyslipidemia- (present on admission)  Assessment & Plan  Chronic condition treated with rosuvastatin, ezetimibe.  Resumed maintenance medication on admission.    Increased creatine kinase level-resolved as of 5/9/2025, (present on admission)  Assessment & Plan  Admit creat 1.38 (baseline 0.76).  5/7 Renal indices corrected  Continue gentle IVF, Lisinopril held, Avoid nephrotoxins.  Trend.          DISPOSITION: Discharged on 5/12/2025. The patient was  counseled and questions were answered. Specifically, signs and symptoms of infection, respiratory decompensation, neurological decompensation and persistent or worsening pain were discussed and the patient agrees to seek medical attention if any of these develop.    DISCHARGE MEDICATIONS:  The patients controlled substance history was reviewed and a controlled substance use informed consent (if applicable) was provided by [facility] and the patient has been prescribed.     Medication List        START taking these medications        Instructions   docusate sodium 100 MG Caps   Take 100 mg by mouth 2 times a day.  Dose: 100 mg     lidocaine 4 % Ptch  Commonly known as: Asperflex   Place 1 Patch on the skin every 24 hours.  Dose: 1 Patch     metaxalone 800 MG Tabs  Commonly known as: Skelaxin   Take 1 Tablet by mouth every 8 hours.  Dose: 800 mg     ondansetron 4 MG Tbdp  Commonly known as: Zofran ODT   Take 1 Tablet by mouth every four hours as needed for Nausea/Vomiting (give PO if IV route is unavailable.).  Dose: 4 mg     polyethylene glycol/lytes 17 g Pack  Commonly known as: Miralax   Take 1 Packet by mouth 2 times a day.  Dose: 17 g     tamsulosin 0.4 MG capsule  Start taking on: May 13, 2025  Commonly known as: Flomax   Take 1 Capsule by mouth 1/2 hour after breakfast.  Dose: 0.4 mg            CHANGE how you take these medications        Instructions   acetaminophen 500 MG Tabs  What changed: reasons to  take this  Commonly known as: Tylenol   Take 2 Tablets by mouth every 6 hours as needed for Fever or Mild Pain.  Dose: 1,000 mg     amitriptyline 150 MG Tabs  What changed: medication strength  Commonly known as: Elavil   Take 1 Tablet by mouth every evening.  Dose: 150 mg            CONTINUE taking these medications        Instructions   albuterol 108 (90 Base) MCG/ACT Aers inhalation aerosol   Inhale 1-2 Puffs every four hours as needed for Shortness of Breath.  Dose: 1-2 Puff     alendronate 70 MG Tabs  Commonly known as: Fosamax   Take 70 mg by mouth every 7 days. On Monday  Dose: 70 mg     DULoxetine 30 MG Cpep  Commonly known as: Cymbalta   Take 1 Capsule by mouth every evening.  Dose: 30 mg     ezetimibe 10 MG Tabs  Commonly known as: Zetia   Take 1 Tablet by mouth every evening.  Dose: 10 mg     liothyronine 5 MCG Tabs  Commonly known as: Cytomel   Take 1 Tablet by mouth every day.  Dose: 5 mcg     lisinopril 10 MG Tabs  Start taking on: May 13, 2025  Commonly known as: Prinivil   Take 1 Tablet by mouth every day.  Dose: 10 mg     metoprolol SR 25 MG Tb24  Start taking on: May 13, 2025  Commonly known as: Toprol XL   Take 1 Tablet by mouth every day.  Dose: 25 mg     omeprazole 20 MG delayed-release capsule  Commonly known as: PriLOSEC   Take 1 Capsule by mouth 2 times a day.  Dose: 20 mg     oxyCODONE immediate release 10 MG immediate release tablet  Commonly known as: Roxicodone   Take 1 Tablet by mouth every four hours as needed for Severe Pain for up to 3 days.  Dose: 10 mg     rosuvastatin 20 MG Tabs  Commonly known as: Crestor   Take 1 Tablet by mouth every evening.  Dose: 20 mg     Synthroid 88 MCG Tabs  Generic drug: levothyroxine   Take 1 Tablet by mouth every day.  Dose: 88 mcg            STOP taking these medications      apixaban 5mg Tabs  Commonly known as: Eliquis     D3 5000 125 MCG (5000 UT) Caps  Generic drug: cholecalciferol     escitalopram 20 MG tablet  Commonly known as: Lexapro      metFORMIN 500 MG Tabs  Commonly known as: Glucophage     methocarbamol 500 MG Tabs  Commonly known as: Robaxin            ASK your doctor about these medications        Instructions   diclofenac sodium 1 % Gel  Commonly known as: Voltaren   Apply 4 g topically 4 times a day as needed (back pain).  Dose: 4 g              ACTIVITY:    Off-the-shelf TLSO bracing for comfort    DIET:  Orders Placed This Encounter   Procedures    Diet Order Diet: Consistent CHO (Diabetic); Nutrient modifications: (optional): High Fiber     Standing Status:   Standing     Number of Occurrences:   1     Diet::   Consistent CHO (Diabetic) [4]     Nutrient modifications: (optional):   High Fiber [8]       FOLLOW UP:  LEONID Bartholomew.P.KHLOE  [address]  [facility] [id number] [phone]    Follow up in 2 week(s)  If symptoms worsen, As needed, follow up    Jb Park M.D.  [address]  [address]  [facility] [id number] [phone]    Follow up  as soon as possible upon discharge    [facility]  [address] [facility] [address]  [facility] [id number] [phone]  Follow up  As needed, If symptoms worsen      TIME SPENT ON DISCHARGE: 46 minutes    ____________________________________________  ALEXANDER Pimentel    DD: 5/9/2025 6:17 PM

## 2025-05-11 LAB
ANION GAP SERPL CALC-SCNC: 12 MMOL/L (ref 7–16)
BUN SERPL-MCNC: 10 MG/DL (ref 8–22)
CALCIUM SERPL-MCNC: 8.6 MG/DL (ref 8.5–10.5)
CHLORIDE SERPL-SCNC: 104 MMOL/L (ref 96–112)
CO2 SERPL-SCNC: 21 MMOL/L (ref 20–33)
CREAT SERPL-MCNC: 0.8 MG/DL (ref 0.5–1.4)
ERYTHROCYTE [DISTWIDTH] IN BLOOD BY AUTOMATED COUNT: 46.5 FL (ref 35.9–50)
GFR SERPLBLD CREATININE-BSD FMLA CKD-EPI: 77 ML/MIN/1.73 M 2
GLUCOSE SERPL-MCNC: 93 MG/DL (ref 65–99)
HCT VFR BLD AUTO: 32.1 % (ref 37–47)
HGB BLD-MCNC: 10 G/DL (ref 12–16)
MCH RBC QN AUTO: 25.4 PG (ref 27–33)
MCHC RBC AUTO-ENTMCNC: 31.2 G/DL (ref 32.2–35.5)
MCV RBC AUTO: 81.7 FL (ref 81.4–97.8)
PLATELET # BLD AUTO: 263 K/UL (ref 164–446)
PMV BLD AUTO: 9.7 FL (ref 9–12.9)
POTASSIUM SERPL-SCNC: 3.2 MMOL/L (ref 3.6–5.5)
RBC # BLD AUTO: 3.93 M/UL (ref 4.2–5.4)
SODIUM SERPL-SCNC: 137 MMOL/L (ref 135–145)
WBC # BLD AUTO: 7.7 K/UL (ref 4.8–10.8)

## 2025-05-11 PROCEDURE — A9270 NON-COVERED ITEM OR SERVICE: HCPCS

## 2025-05-11 PROCEDURE — 85027 COMPLETE CBC AUTOMATED: CPT

## 2025-05-11 PROCEDURE — 700105 HCHG RX REV CODE 258: Performed by: NURSE PRACTITIONER

## 2025-05-11 PROCEDURE — A9270 NON-COVERED ITEM OR SERVICE: HCPCS | Performed by: NURSE PRACTITIONER

## 2025-05-11 PROCEDURE — 97530 THERAPEUTIC ACTIVITIES: CPT | Mod: CQ

## 2025-05-11 PROCEDURE — 99232 SBSQ HOSP IP/OBS MODERATE 35: CPT | Performed by: NURSE PRACTITIONER

## 2025-05-11 PROCEDURE — 700102 HCHG RX REV CODE 250 W/ 637 OVERRIDE(OP): Performed by: NURSE PRACTITIONER

## 2025-05-11 PROCEDURE — 700102 HCHG RX REV CODE 250 W/ 637 OVERRIDE(OP): Performed by: STUDENT IN AN ORGANIZED HEALTH CARE EDUCATION/TRAINING PROGRAM

## 2025-05-11 PROCEDURE — 36415 COLL VENOUS BLD VENIPUNCTURE: CPT

## 2025-05-11 PROCEDURE — 80048 BASIC METABOLIC PNL TOTAL CA: CPT

## 2025-05-11 PROCEDURE — 770006 HCHG ROOM/CARE - MED/SURG/GYN SEMI*

## 2025-05-11 PROCEDURE — A9270 NON-COVERED ITEM OR SERVICE: HCPCS | Performed by: STUDENT IN AN ORGANIZED HEALTH CARE EDUCATION/TRAINING PROGRAM

## 2025-05-11 PROCEDURE — 700102 HCHG RX REV CODE 250 W/ 637 OVERRIDE(OP)

## 2025-05-11 RX ORDER — POTASSIUM CHLORIDE 1500 MG/1
20 TABLET, EXTENDED RELEASE ORAL DAILY
Status: COMPLETED | OUTPATIENT
Start: 2025-05-11 | End: 2025-05-12

## 2025-05-11 RX ORDER — SODIUM CHLORIDE, SODIUM LACTATE, POTASSIUM CHLORIDE, AND CALCIUM CHLORIDE .6; .31; .03; .02 G/100ML; G/100ML; G/100ML; G/100ML
500 INJECTION, SOLUTION INTRAVENOUS ONCE
Status: COMPLETED | OUTPATIENT
Start: 2025-05-11 | End: 2025-05-11

## 2025-05-11 RX ADMIN — OXYCODONE HYDROCHLORIDE 10 MG: 10 TABLET ORAL at 01:10

## 2025-05-11 RX ADMIN — METAXALONE 800 MG: 800 TABLET ORAL at 05:16

## 2025-05-11 RX ADMIN — DULOXETINE 30 MG: 30 CAPSULE, DELAYED RELEASE ORAL at 18:09

## 2025-05-11 RX ADMIN — OMEPRAZOLE 20 MG: 20 CAPSULE, DELAYED RELEASE ORAL at 09:40

## 2025-05-11 RX ADMIN — POTASSIUM CHLORIDE 20 MEQ: 1500 TABLET, EXTENDED RELEASE ORAL at 09:40

## 2025-05-11 RX ADMIN — SODIUM CHLORIDE, POTASSIUM CHLORIDE, SODIUM LACTATE AND CALCIUM CHLORIDE 500 ML: 600; 310; 30; 20 INJECTION, SOLUTION INTRAVENOUS at 12:57

## 2025-05-11 RX ADMIN — ESCITALOPRAM OXALATE 20 MG: 10 TABLET ORAL at 05:17

## 2025-05-11 RX ADMIN — OXYCODONE 5 MG: 5 TABLET ORAL at 14:49

## 2025-05-11 RX ADMIN — OXYCODONE 5 MG: 5 TABLET ORAL at 20:21

## 2025-05-11 RX ADMIN — EZETIMIBE 10 MG: 10 TABLET ORAL at 18:09

## 2025-05-11 RX ADMIN — AMITRIPTYLINE HYDROCHLORIDE 150 MG: 25 TABLET, FILM COATED ORAL at 20:21

## 2025-05-11 RX ADMIN — LEVOTHYROXINE SODIUM 88 MCG: 0.09 TABLET ORAL at 05:17

## 2025-05-11 RX ADMIN — LIOTHYRONINE SODIUM 5 MCG: 5 TABLET ORAL at 05:17

## 2025-05-11 RX ADMIN — METAXALONE 800 MG: 800 TABLET ORAL at 14:08

## 2025-05-11 RX ADMIN — APIXABAN 5 MG: 5 TABLET, FILM COATED ORAL at 05:16

## 2025-05-11 RX ADMIN — APIXABAN 5 MG: 5 TABLET, FILM COATED ORAL at 18:09

## 2025-05-11 RX ADMIN — METAXALONE 800 MG: 800 TABLET ORAL at 20:21

## 2025-05-11 RX ADMIN — OMEPRAZOLE 20 MG: 20 CAPSULE, DELAYED RELEASE ORAL at 20:21

## 2025-05-11 RX ADMIN — ROSUVASTATIN CALCIUM 20 MG: 20 TABLET, FILM COATED ORAL at 18:09

## 2025-05-11 RX ADMIN — OXYCODONE HYDROCHLORIDE 10 MG: 10 TABLET ORAL at 05:18

## 2025-05-11 ASSESSMENT — ENCOUNTER SYMPTOMS
CHILLS: 0
VOMITING: 0
ABDOMINAL PAIN: 0
BACK PAIN: 1
NAUSEA: 0
SPEECH CHANGE: 0
SENSORY CHANGE: 0
FOCAL WEAKNESS: 1
MYALGIAS: 1
FEVER: 0

## 2025-05-11 ASSESSMENT — GAIT ASSESSMENTS: GAIT LEVEL OF ASSIST: UNABLE TO PARTICIPATE

## 2025-05-11 ASSESSMENT — PAIN DESCRIPTION - PAIN TYPE
TYPE: ACUTE PAIN
TYPE: ACUTE PAIN;CHRONIC PAIN
TYPE: ACUTE PAIN

## 2025-05-11 ASSESSMENT — COGNITIVE AND FUNCTIONAL STATUS - GENERAL
MOVING TO AND FROM BED TO CHAIR: A LOT
CLIMB 3 TO 5 STEPS WITH RAILING: A LOT
TURNING FROM BACK TO SIDE WHILE IN FLAT BAD: A LOT
STANDING UP FROM CHAIR USING ARMS: A LOT
WALKING IN HOSPITAL ROOM: A LOT
SUGGESTED CMS G CODE MODIFIER MOBILITY: CL
MOVING FROM LYING ON BACK TO SITTING ON SIDE OF FLAT BED: A LOT
MOBILITY SCORE: 12

## 2025-05-11 NOTE — PROGRESS NOTES
Trauma / Surgical Daily Progress Note    Date of Service  5/11/2025    Chief Complaint  75 y.o. female admitted 5/5/2025 with rib fractures, worsened chronic L1 compression fracture following GLF.     Interval Events    Clinically stable.  Hypokalemia.    - Electrolyte supplementation.    - Medically cleared for inpatient post acute services.  [facility]greeable to [facility].     Review of Systems  Review of Systems   Constitutional:  Negative for chills, fever and malaise/fatigue.   Gastrointestinal:  Negative for abdominal pain, nausea and vomiting.        5/10 (+) BM   Genitourinary:  Negative for dysuria.   Musculoskeletal:  Positive for back pain and myalgias.   Neurological:  Positive for focal weakness (chronic left leg/arm weakness - seeing Dr. Delatorre outpatient). Negative for sensory change and speech change.   All other systems reviewed and are negative.       Vital Signs  Temp:  [36.3 °C (97.3 °F)-36.4 °C (97.5 °F)] 36.3 °C (97.3 °F)  Pulse:  [79-89] 83  Resp:  [16-18] 16  BP: ()/(49-75) 79/49  SpO2:  [90 %-92 %] 90 %    Physical Exam  Physical Exam  Vitals and nursing note reviewed.   Constitutional:       Appearance: Normal appearance.      Interventions: Nasal cannula in place.   HENT:      Mouth/Throat:      Mouth: Mucous membranes are moist.   Eyes:      Pupils: Pupils are equal, round, and reactive to light.   Cardiovascular:      Rate and Rhythm: Normal rate and regular rhythm.   Pulmonary:      Effort: Pulmonary effort is normal. No respiratory distress.   Chest:      Chest wall: Tenderness (left lateral, posterior) present.   Abdominal:      General: There is no distension.      Palpations: Abdomen is soft.      Tenderness: There is no abdominal tenderness.   Musculoskeletal:         General: Normal range of motion.      Cervical back: Normal range of motion.      Thoracic back: No tenderness.      Lumbar back: Tenderness present.   Skin:     General: Skin is warm and dry.      Capillary  Refill: Capillary refill takes less than 2 seconds.   Neurological:      General: No focal deficit present.      Mental Status: She is alert and oriented to person, place, and time.      Comments: 2/5 strength in LUE chronic, 5/5 strength in other extremities    Psychiatric:         Mood and Affect: Mood normal.         Behavior: Behavior normal.         Laboratory  Recent Results (from the past 24 hours)   CBC WITHOUT DIFFERENTIAL    Collection Time: 05/11/25  4:13 AM   Result Value Ref Range    WBC 7.7 4.8 - 10.8 K/uL    RBC 3.93 (L) 4.20 - 5.40 M/uL    Hemoglobin 10.0 (L) 12.0 - 16.0 g/dL    Hematocrit 32.1 (L) 37.0 - 47.0 %    MCV 81.7 81.4 - 97.8 fL    MCH 25.4 (L) 27.0 - 33.0 pg    MCHC 31.2 (L) 32.2 - 35.5 g/dL    RDW 46.5 35.9 - 50.0 fL    Platelet Count 263 164 - 446 K/uL    MPV 9.7 9.0 - 12.9 fL   Basic Metabolic Panel    Collection Time: 05/11/25  4:13 AM   Result Value Ref Range    Sodium 137 135 - 145 mmol/L    Potassium 3.2 (L) 3.6 - 5.5 mmol/L    Chloride 104 96 - 112 mmol/L    Co2 21 20 - 33 mmol/L    Glucose 93 65 - 99 mg/dL    Bun 10 8 - 22 mg/dL    Creatinine 0.80 0.50 - 1.40 mg/dL    Calcium 8.6 8.5 - 10.5 mg/dL    Anion Gap 12.0 7.0 - 16.0   ESTIMATED GFR    Collection Time: 05/11/25  4:13 AM   Result Value Ref Range    GFR (CKD-EPI) 77 >60 mL/min/1.73 m 2       Fluids    Intake/Output Summary (Last 24 hours) at 5/11/2025 0759  Last data filed at 5/11/2025 0510  Gross per 24 hour   Intake 120 ml   Output 1100 ml   Net -980 ml       Core Measures & Quality Metrics  Labs reviewed and Medications reviewed  Polanco catheter: No Polanco      DVT: Eliquis.  DVT prophylaxis - mechanical: SCDs  Ulcer prophylaxis: Yes    Assessed for rehab: Patient was assess for and/or received rehabilitation services during this hospitalization    RAP Score Total: 7    CAGE Results: negative Blood Alcohol>0.08: not completed       Assessment/Plan  * Trauma- (present on admission)  Assessment & Plan  GLF tripped over  lucy.  Trauma Green Transfer Activation from [facility] in [place], [place].  Surgeon List: Emilie Miguel MD. Trauma Surgery.    Discharge planning issues- (present on admission)  Assessment & Plan  Date of admission: 5/5/2025.  5/8  Rehab referral 5/9 SNF referral.  Cleared for discharge: Yes - Date: 5/8 .  Discharge delayed: Yes - Reason: Non-availability of Transfer Facility.  Discharge date: tbd.    Closed T12 fracture (HCC)- (present on admission)  Assessment & Plan  CT imaging with Stable compression fractures of T4, T5, and T12   T12 fracture is acute per spine surgeon review  Non-operative management.   Off-the-shelf TLSO bracing. Outpatient follow up.  Romeo Castaneda MD. Spine Surgeon. [facility].    Anticoagulated on Eliquis- (present on admission)  Assessment & Plan  For treatment of acute LLE DVT, started on 2/28/25  Reports she paused the medication 7 days ago on recommendations from her neurosurgeon ahead of potential cervical spine surgery  TEG with AA 83.9% inhibition, ADP 21.6% inhibition   - Eliquis reinitiated     Closed fracture of five ribs of left side- (present on admission)  Assessment & Plan  CT imaging with fractures of the posterior left fourth and fifth rib fractures, lateral left sixth and seventh rib, and the left ninth rib posterolaterally. There are subacute anterolateral left seventh and eighth rib fractures.   Fracture pattern is not amenable to operative fixation.  Aggressive multimodal pain management and pulmonary hygiene. Serial chest radiographs.    Deep vein thrombosis (DVT) of left lower extremity (HCC)- (present on admission)  Assessment & Plan  Diagnosed 2/28, treated with eliquis  US with cubacute to chronic, partially occlusive thrombus in the common femoral vein, extending into the femoral vein and popliteal vein.  - eliquis reinitiated; no creatinine dosing indicated given patient age and BMI per pharmacy      Urinary retention  Assessment  & Plan  Polanco placed 5/6 for retention  - flomax initiated   5/8 trial Polanco removal  5/9 Voiding    Closed compression fracture of body of L1 vertebra (HCC)- (present on admission)  Assessment & Plan  Compression fracture of L1 with further compression when compared with the prior exam.   History of compression fracture, patient has chronic pain to this area that is unchanged; no pain on initial exam.  Tertiary exam with lumbar spine pain  Non-operative management.   Off-the-shelf TLSO bracing. Outpatient follow up.  Jb Park MD. Neurosurgeon. [facility].    Hypokalemia- (present on admission)  Assessment & Plan  5/11 Supplement and trend.    Weakness of left arm- (present on admission)  Assessment & Plan  Prior to arrival.  Patient states that she has been seeing Dr. Delatorre for this issue.  Related to chronic neck pain.    No contraindication to deep vein thrombosis (DVT) prophylaxis- (present on admission)  Assessment & Plan  Prophylactic dose heparin 5000 u q 8 hr initiated upon admission.  Subsequent US showing chronic DVT, eliquis reinitiated, heparin discontinued     GERD (gastroesophageal reflux disease)- (present on admission)  Assessment & Plan  Chronic condition treated with omeprazole.  Resumed maintenance medication on admission.    Normocytic anemia- (present on admission)  Assessment & Plan  Admit hgb 10.8  Trend    Primary hypertension- (present on admission)  Assessment & Plan  Chronic condition treated with lisinopril, metoprolol.  Resumed maintenance medication on admission.  PRN antihypertensives   Lisinopril held due to increased creatinine   5/9 Resumed lisinopril. Creatinine improved 0.89    Type 2 diabetes mellitus (HCC)- (present on admission)  Assessment & Plan  Chronic condition treated with metformin.  Holding maintenance metformin for 48 hours following intravenous contrast administration.  Insulin sliding scale coverage.  Adequate glycemic control off of maintenance  mediation.    Asthma- (present on admission)  Assessment & Plan  Chronic condition treated with albuterol.  Resumed maintenance medication on admission. RT protocol.     Chronic pain syndrome on chronic opioids- (present on admission)  Assessment & Plan  Chronic condition treated with Roxicodone 10mg, robaxin.  Resumed robaxin on admission. Multimodals and PRN analgesics.    Acquired hypothyroidism- (present on admission)  Assessment & Plan  Chronic condition treated with liothyronine, levothyroxine.  Resumed maintenance medication on admission.    Mood disorder (HCC)- (present on admission)  Assessment & Plan  Chronic condition treated with duloxetine, amitriptyline.  Resumed maintenance medication on admission.    Dyslipidemia- (present on admission)  Assessment & Plan  Chronic condition treated with rosuvastatin, ezetimibe.  Resumed maintenance medication on admission.      Discussed patient condition with Patient and trauma surgery, Dr. Dom Hassan.

## 2025-05-11 NOTE — CARE PLAN
The patient is Stable - Low risk of patient condition declining or worsening    Shift Goals  Clinical Goals: safety; mobility  Patient Goals: paiin control; comfort  Family Goals: not present    Progress made toward(s) clinical / shift goals:    Problem: Pain - Standard  Goal: Alleviation of pain or a reduction in pain to the patient’s comfort goal  Outcome: Progressing     Problem: Knowledge Deficit - Standard  Goal: Patient and family/care givers will demonstrate understanding of plan of care, disease process/condition, diagnostic tests and medications  Outcome: Progressing     Problem: Skin Integrity  Goal: Skin integrity is maintained or improved  Outcome: Progressing     Problem: Fall Risk  Goal: Patient will remain free from falls  Outcome: Progressing       Patient is not progressing towards the following goals:

## 2025-05-11 NOTE — THERAPY
Physical Therapy   Daily Treatment     Patient Name: Yamile Go  Age:  75 y.o., Sex:  female  Medical Record #: [id number]  Today's Date: 5/11/2025     Precautions  Precautions: Fall Risk;TLSO (Thoracolumbosacral orthosis)  Comments: (P) TLSO for comfort. Lft UE weakness PTA w/neurosx follow up.+Lft rib fx's    Assessment    The pt has been slow to mobilize  2* Lft rib pain and LBP. The pt does have Lft UE weakness was due to follow up w/Neurosx PTA. Overall mobility is limited by c/o Lft rib pain and hypotension. The pt unable to stand long enough d/t her BP to initiate ambulation, she is symptomatic w/her hypotension.    PT will cont to follow    Plan    Treatment Plan Status: (P) Continue Current Treatment Plan  Type of Treatment: Bed Mobility, Equipment, Family / Caregiver Training, Gait Training, Manual Therapy, Neuro Re-Education / Balance, Self Care / Home Evaluation, Therapeutic Activities, Therapeutic Exercise  Treatment Frequency: 4 Times per Week  Treatment Duration: Until Therapy Goals Met    DC Equipment Recommendations: (P) None  Discharge Recommendations: (P) Recommend post-acute placement for additional physical therapy services prior to discharge home     Objective       05/11/25 1227   Time In/Time Out   Therapy Start Time 1201   Therapy End Time 1227   Total Therapy Time 26   Charge Group   PT Therapeutic Activities (Units) 2   Total Time Spent   PT Therapeutic Activities Time Spent (Mins) 26   PT Total Time Spent (Calculated) 26   Precautions   Precautions Fall Risk;TLSO (Thoracolumbosacral orthosis)   Comments TLSO for comfort. Lft UE weakness PTA w/neurosx follow up.+Lft rib fx's   Vitals   Vitals Comments BP during PT session: Supine 103/60, EOB 98/66 and following transfer to chair 71/54   Pain 0 - 10 Group   Location Rib Cage   Location Orientation Left   Pain Rating Scale (NPRS) 7   Therapist Pain Assessment During Activity;Nurse Notified   Other Treatments   Other Treatments Provided  Time needed w/mobility d/t Lft rib pain.   Balance   Sitting Balance (Static) Fair -   Sitting Balance (Dynamic) Poor +   Standing Balance (Static) Poor   Standing Balance (Dynamic) Poor -   Weight Shift Sitting Poor   Weight Shift Standing Poor   Skilled Intervention Verbal Cuing   Comments standing w/HHA   Bed Mobility    Supine to Sit Minimal Assist  (w/HOB fully elevated from the Lft side)   Sit to Supine   (the pt left reclined in the chair)   Scooting Minimal Assist  (seated)   Rolling Minimum Assist to Lt.   Skilled Intervention Verbal Cuing;Compensatory Strategies   Comments The pt struggles to log roll to the Lft d/t her Lft rib pain. Talked about going out the Rt side of the bed next tx session.   Gait Analysis   Gait Level Of Assist Unable to Participate   Comments Pt's BP not allowing for ambulation.   Functional Mobility   Sit to Stand Minimal Assist  (from EOB)   Bed, Chair, Wheelchair Transfer Moderate Assist  (bed->recliner chair)   Transfer Method Stand Step   Skilled Intervention Verbal Cuing;Postural Facilitation   Comments Pt transferred to the recliner chair demonstrating stand step transfer.   6 Clicks Assessment - How much HELP from from another person do you currently need... (If the patient hasn't done an activity recently, how much help from another person do you think he/she would need if he/she tried?)   Turning from your back to your side while in a flat bed without using bedrails? 2   Moving from lying on your back to sitting on the side of a flat bed without using bedrails? 2   Moving to and from a bed to a chair (including a wheelchair)? 2   Standing up from a chair using your arms (e.g., wheelchair, or bedside chair)? 2   Walking in hospital room? 2   Climbing 3-5 steps with a railing? 2   6 clicks Mobility Score 12   Short Term Goals    Short Term Goal # 1 Pt will perform supine < > sit SPV with bed flat, no rail in 6 visits in order to set up for upright mobility   Goal Outcome # 1  goal not met   Short Term Goal # 2 Pt will perform sit < > stand SPV in 6 visits in order to prepare for ambulation   Goal Outcome # 2 Goal not met   Short Term Goal # 3 Pt will ambulate 150' SPV /c rollator in 6 visits in order to return home safely   Goal Outcome # 3 Goal not met   Education Group   Role of Physical Therapist Patient Response Patient;Acceptance;Explanation;Action Demonstration   Physical Therapy Treatment Plan   Physical Therapy Treatment Plan Continue Current Treatment Plan   Anticipated Discharge Equipment and Recommendations   DC Equipment Recommendations None   Discharge Recommendations Recommend post-acute placement for additional physical therapy services prior to discharge home   Interdisciplinary Plan of Care Collaboration   IDT Collaboration with  Nursing   Patient Position at End of Therapy Seated;Call Light within Reach;Tray Table within Reach;Phone within Reach   Collaboration Comments Nrsg notified of pt's BP response during PT   Session Information   Date / Session Number  5/11--2 (2/4, 5/13) PTA/1   Supervising Physical Therapist (PTA Treatments Only)   Supervising Physical Therapist Anne Chavez

## 2025-05-11 NOTE — CARE PLAN
The patient is Stable - Low risk of patient condition declining or worsening    Shift Goals  Clinical Goals: Pain control, MObility  Patient Goals: Rest  Family Goals: Not present    Progress made toward(s) clinical / shift goals:    Problem: Pain - Standard  Goal: Alleviation of pain or a reduction in pain to the patient’s comfort goal  Outcome: Progressing  Note: Patient educated on pain scale and to notify RN of pain. Medicated per MAR and repositioned        Problem: Knowledge Deficit - Standard  Goal: Patient and family/care givers will demonstrate understanding of plan of care, disease process/condition, diagnostic tests and medications  Outcome: Progressing  Note: Plan of care discussed, verbalized understanding. Questions answered        Problem: Skin Integrity  Goal: Skin integrity is maintained or improved  Outcome: Progressing     Problem: Fall Risk  Goal: Patient will remain free from falls  Outcome: Progressing       Patient is not progressing towards the following goals:

## 2025-05-12 ENCOUNTER — APPOINTMENT (OUTPATIENT)
Dept: RADIOLOGY | Facility: MEDICAL CENTER | Age: 75
DRG: 184 | End: 2025-05-12
Attending: STUDENT IN AN ORGANIZED HEALTH CARE EDUCATION/TRAINING PROGRAM
Payer: MEDICARE

## 2025-05-12 ENCOUNTER — PATIENT OUTREACH (OUTPATIENT)
Dept: SCHEDULING | Facility: IMAGING CENTER | Age: 75
End: 2025-05-12
Payer: MEDICARE

## 2025-05-12 PROCEDURE — 700102 HCHG RX REV CODE 250 W/ 637 OVERRIDE(OP)

## 2025-05-12 PROCEDURE — A9270 NON-COVERED ITEM OR SERVICE: HCPCS

## 2025-05-12 PROCEDURE — 700105 HCHG RX REV CODE 258: Performed by: NURSE PRACTITIONER

## 2025-05-12 PROCEDURE — 700102 HCHG RX REV CODE 250 W/ 637 OVERRIDE(OP): Performed by: NURSE PRACTITIONER

## 2025-05-12 PROCEDURE — A9270 NON-COVERED ITEM OR SERVICE: HCPCS | Performed by: STUDENT IN AN ORGANIZED HEALTH CARE EDUCATION/TRAINING PROGRAM

## 2025-05-12 PROCEDURE — A9270 NON-COVERED ITEM OR SERVICE: HCPCS | Performed by: NURSE PRACTITIONER

## 2025-05-12 PROCEDURE — 700102 HCHG RX REV CODE 250 W/ 637 OVERRIDE(OP): Performed by: STUDENT IN AN ORGANIZED HEALTH CARE EDUCATION/TRAINING PROGRAM

## 2025-05-12 PROCEDURE — 770006 HCHG ROOM/CARE - MED/SURG/GYN SEMI*

## 2025-05-12 RX ORDER — OXYCODONE HYDROCHLORIDE 10 MG/1
10 TABLET ORAL EVERY 4 HOURS PRN
Qty: 18 TABLET | Refills: 0 | Status: SHIPPED | OUTPATIENT
Start: 2025-05-12 | End: 2025-05-15

## 2025-05-12 RX ORDER — TAMSULOSIN HYDROCHLORIDE 0.4 MG/1
0.4 CAPSULE ORAL
Status: SHIPPED
Start: 2025-05-13

## 2025-05-12 RX ORDER — LISINOPRIL 10 MG/1
10 TABLET ORAL DAILY
Status: SHIPPED
Start: 2025-05-13 | End: 2026-06-17

## 2025-05-12 RX ORDER — AMITRIPTYLINE HYDROCHLORIDE 150 MG/1
150 TABLET ORAL NIGHTLY
Status: SHIPPED
Start: 2025-05-12

## 2025-05-12 RX ORDER — METAXALONE 800 MG/1
800 TABLET ORAL EVERY 8 HOURS
Status: SHIPPED
Start: 2025-05-12

## 2025-05-12 RX ORDER — LIDOCAINE 4 G/G
1 PATCH TOPICAL EVERY 24 HOURS
Status: SHIPPED
Start: 2025-05-12

## 2025-05-12 RX ORDER — DULOXETIN HYDROCHLORIDE 30 MG/1
30 CAPSULE, DELAYED RELEASE ORAL EVERY EVENING
Status: SHIPPED
Start: 2025-05-12

## 2025-05-12 RX ORDER — OMEPRAZOLE 20 MG/1
20 CAPSULE, DELAYED RELEASE ORAL 2 TIMES DAILY
Status: SHIPPED
Start: 2025-05-12

## 2025-05-12 RX ORDER — PSEUDOEPHEDRINE HCL 30 MG
100 TABLET ORAL 2 TIMES DAILY
Status: SHIPPED
Start: 2025-05-12

## 2025-05-12 RX ORDER — SODIUM CHLORIDE, SODIUM LACTATE, POTASSIUM CHLORIDE, CALCIUM CHLORIDE 600; 310; 30; 20 MG/100ML; MG/100ML; MG/100ML; MG/100ML
INJECTION, SOLUTION INTRAVENOUS CONTINUOUS
Status: DISCONTINUED | OUTPATIENT
Start: 2025-05-12 | End: 2025-05-13

## 2025-05-12 RX ORDER — METOPROLOL SUCCINATE 25 MG/1
25 TABLET, EXTENDED RELEASE ORAL DAILY
Status: SHIPPED
Start: 2025-05-13 | End: 2025-05-14

## 2025-05-12 RX ORDER — ACETAMINOPHEN 500 MG
1000 TABLET ORAL EVERY 6 HOURS PRN
Status: SHIPPED
Start: 2025-05-12

## 2025-05-12 RX ORDER — POLYETHYLENE GLYCOL 3350 17 G/17G
17 POWDER, FOR SOLUTION ORAL 2 TIMES DAILY
Status: SHIPPED
Start: 2025-05-12

## 2025-05-12 RX ORDER — ONDANSETRON 4 MG/1
4 TABLET, ORALLY DISINTEGRATING ORAL EVERY 4 HOURS PRN
Status: SHIPPED
Start: 2025-05-12

## 2025-05-12 RX ADMIN — EZETIMIBE 10 MG: 10 TABLET ORAL at 18:11

## 2025-05-12 RX ADMIN — POTASSIUM CHLORIDE 20 MEQ: 1500 TABLET, EXTENDED RELEASE ORAL at 05:10

## 2025-05-12 RX ADMIN — OXYCODONE HYDROCHLORIDE 10 MG: 10 TABLET ORAL at 13:40

## 2025-05-12 RX ADMIN — APIXABAN 5 MG: 5 TABLET, FILM COATED ORAL at 05:11

## 2025-05-12 RX ADMIN — ROSUVASTATIN CALCIUM 20 MG: 20 TABLET, FILM COATED ORAL at 18:11

## 2025-05-12 RX ADMIN — LIOTHYRONINE SODIUM 5 MCG: 5 TABLET ORAL at 05:11

## 2025-05-12 RX ADMIN — METAXALONE 800 MG: 800 TABLET ORAL at 05:11

## 2025-05-12 RX ADMIN — SODIUM CHLORIDE, POTASSIUM CHLORIDE, SODIUM LACTATE AND CALCIUM CHLORIDE 800 ML: 600; 310; 30; 20 INJECTION, SOLUTION INTRAVENOUS at 15:21

## 2025-05-12 RX ADMIN — OMEPRAZOLE 20 MG: 20 CAPSULE, DELAYED RELEASE ORAL at 20:02

## 2025-05-12 RX ADMIN — OXYCODONE HYDROCHLORIDE 10 MG: 10 TABLET ORAL at 20:02

## 2025-05-12 RX ADMIN — ESCITALOPRAM OXALATE 20 MG: 10 TABLET ORAL at 05:11

## 2025-05-12 RX ADMIN — AMITRIPTYLINE HYDROCHLORIDE 150 MG: 25 TABLET, FILM COATED ORAL at 20:02

## 2025-05-12 RX ADMIN — OMEPRAZOLE 20 MG: 20 CAPSULE, DELAYED RELEASE ORAL at 09:24

## 2025-05-12 RX ADMIN — APIXABAN 5 MG: 5 TABLET, FILM COATED ORAL at 18:12

## 2025-05-12 RX ADMIN — OXYCODONE HYDROCHLORIDE 10 MG: 10 TABLET ORAL at 00:14

## 2025-05-12 RX ADMIN — OXYCODONE HYDROCHLORIDE 10 MG: 10 TABLET ORAL at 05:12

## 2025-05-12 RX ADMIN — TAMSULOSIN HYDROCHLORIDE 0.4 MG: 0.4 CAPSULE ORAL at 09:24

## 2025-05-12 RX ADMIN — LEVOTHYROXINE SODIUM 88 MCG: 0.09 TABLET ORAL at 05:11

## 2025-05-12 RX ADMIN — LISINOPRIL 10 MG: 10 TABLET ORAL at 05:11

## 2025-05-12 RX ADMIN — METAXALONE 800 MG: 800 TABLET ORAL at 15:16

## 2025-05-12 RX ADMIN — OXYCODONE 5 MG: 5 TABLET ORAL at 09:23

## 2025-05-12 RX ADMIN — METOPROLOL SUCCINATE 25 MG: 25 TABLET, EXTENDED RELEASE ORAL at 05:11

## 2025-05-12 RX ADMIN — DULOXETINE 30 MG: 30 CAPSULE, DELAYED RELEASE ORAL at 18:12

## 2025-05-12 RX ADMIN — METAXALONE 800 MG: 800 TABLET ORAL at 23:07

## 2025-05-12 ASSESSMENT — PAIN DESCRIPTION - PAIN TYPE
TYPE: ACUTE PAIN;CHRONIC PAIN
TYPE: ACUTE PAIN;SURGICAL PAIN
TYPE: ACUTE PAIN;CHRONIC PAIN
TYPE: ACUTE PAIN

## 2025-05-12 NOTE — RESPIRATORY CARE
COPD EDUCATION by COPD CLINICAL EDUCATOR  5/12/2025 at 6:00 AM by Sandra Mclean RRT     Patient reviewed by COPD education team. Patient does not have a history or diagnosis of COPD and is a non-smoker.  Therefore, patient does not qualify for the COPD program.

## 2025-05-12 NOTE — PROGRESS NOTES
Virtual Nursing  Reviewed After Visit Summary with patient.  All questions answered at this time. Bedside RN notified that discharge paperwork is complete from Virtual Nursing standpoint.

## 2025-05-12 NOTE — DISCHARGE PLANNING
Case Management Discharge Planning    Admission Date: 5/5/2025  GMLOS: 3  ALOS: 7    6-Clicks ADL Score: 13  6-Clicks Mobility Score: 12  PT and/or OT Eval ordered: Yes  Post-acute Referrals Ordered: Yes  Post-acute Choice Obtained: Yes  Has referral(s) been sent to post-acute provider:  Yes      Anticipated Discharge Dispo: Discharge Disposition: D/T to SNF with Medicare cert in anticipation of skilled care (03)    DME Needed: No    Action(s) Taken: Patient was discussed in morning trauma rounds.  Per trauma APRN, patient is medically cleared to transfer to SNF.  Per DPA--[facility] accept the patient today at 1300.  RNCM updated charge RN, bedside RN, and APRN.      1030  RNCM met with patient at the bedside to update.  Patient agreeable to transfer to [facility] today at 1300.  Cobra signed.  IMM signed.  Transport request sent to [facility].      1250  Transport confirmed for 1330 going to [facility] via [facility].  C[facility]ra packet with face sheets x2, chart notes, DC summary and hard rx for pain medication provided to bedside RN.     1330  Per bedside RN--patient had hypotension while getting on the gurney to transport.  Trauma APRN was notified and DC was held for today.  RNCM asked DPA to notify [facility].          Escalations Completed: None    Medically Clear: Yes    Next Steps: RN CM to continue to follow for DC planning      Barriers to Discharge: None

## 2025-05-12 NOTE — DISCHARGE PLANNING
DC Transport Scheduled    Transport Company Scheduled:  [employer]  Spoke with Leeann at [facility] to schedule transport.    Scheduled Date: 5/12/2025  Scheduled Time: 1330    Transport Type: [facility]  Destination:   [facility]   Destination address: [place] [address]    Notified care team of scheduled transport via [employer].     If there are any changes needed to the DC transportation scheduled, please contact [facility] at ext. [phone] between the hours of 4993-9640. If outside those hours, contact the ED Case Manager at ext. [phone].

## 2025-05-12 NOTE — DISCHARGE PLANNING
0913  Agency/Facility Name: [facility]  Spoke To: Larissa  Outcome: DPA called regarding bed availability. There is a bed available and requested a 1300 transportation time.     1192  Agency/Facility Name: [facility]  Spoke To: Larissa   Outcome: DPA notified facility that pt is no longer medically cleared.

## 2025-05-12 NOTE — PROGRESS NOTES
A/P: Orthostatic hypotension with associated dizziness.  DC lisinopril  Continue Metoprolol. Discharge deferred.

## 2025-05-12 NOTE — CARE PLAN
The patient is Stable - Low risk of patient condition declining or worsening    Shift Goals  Clinical Goals: Pain management, mobiltiy, safety, rest, comfort  Patient Goals: Pain management, rest, comfrot  Family Goals: NA    Progress made toward(s) clinical / shift goals:    Problem: Pain - Standard  Goal: Alleviation of pain or a reduction in pain to the patient’s comfort goal  Outcome: Met     Problem: Knowledge Deficit - Standard  Goal: Patient and family/care givers will demonstrate understanding of plan of care, disease process/condition, diagnostic tests and medications  Outcome: Met     Problem: Skin Integrity  Goal: Skin integrity is maintained or improved  Outcome: Met     Problem: Fall Risk  Goal: Patient will remain free from falls  Outcome: Met     Problem: Mobility  Goal: Risk for activity intolerance will decrease  Outcome: Met       Patient is not progressing towards the following goals:

## 2025-05-12 NOTE — CARE PLAN
The patient is Stable - Low risk of patient condition declining or worsening    Shift Goals  Clinical Goals: safety,mobility, pain control  Patient Goals: pain control, comfort  Family Goals: NA    Progress made toward(s) clinical / shift goals:  yes  Problem: Pain - Standard  Goal: Alleviation of pain or a reduction in pain to the patient’s comfort goal  5/12/2025 0051 by Brendan Gipson R.N.  Outcome: Progressing  5/12/2025 0051 by Brendan Gipson R.N.  Outcome: Progressing     Problem: Fall Risk  Goal: Patient will remain free from falls  5/12/2025 0051 by Brendan Gipson R.N.  Outcome: Progressing  5/12/2025 0051 by Brendan Gipson R.N.  Outcome: Progressing     Problem: Mobility  Goal: Risk for activity intolerance will decrease  Outcome: Progressing       Patient is not progressing towards the following goals:

## 2025-05-13 ENCOUNTER — APPOINTMENT (OUTPATIENT)
Dept: RADIOLOGY | Facility: MEDICAL CENTER | Age: 75
DRG: 184 | End: 2025-05-13
Attending: NURSE PRACTITIONER
Payer: MEDICARE

## 2025-05-13 PROBLEM — I95.1 ORTHOSTATIC HYPOTENSION: Status: ACTIVE | Noted: 2023-12-19

## 2025-05-13 LAB
ANION GAP SERPL CALC-SCNC: 8 MMOL/L (ref 7–16)
BUN SERPL-MCNC: 14 MG/DL (ref 8–22)
CALCIUM SERPL-MCNC: 8.6 MG/DL (ref 8.5–10.5)
CHLORIDE SERPL-SCNC: 107 MMOL/L (ref 96–112)
CO2 SERPL-SCNC: 21 MMOL/L (ref 20–33)
CREAT SERPL-MCNC: 0.83 MG/DL (ref 0.5–1.4)
ERYTHROCYTE [DISTWIDTH] IN BLOOD BY AUTOMATED COUNT: 49.1 FL (ref 35.9–50)
GFR SERPLBLD CREATININE-BSD FMLA CKD-EPI: 74 ML/MIN/1.73 M 2
GLUCOSE SERPL-MCNC: 113 MG/DL (ref 65–99)
HCT VFR BLD AUTO: 31.4 % (ref 37–47)
HGB BLD-MCNC: 10.1 G/DL (ref 12–16)
MCH RBC QN AUTO: 26.4 PG (ref 27–33)
MCHC RBC AUTO-ENTMCNC: 32.2 G/DL (ref 32.2–35.5)
MCV RBC AUTO: 82.2 FL (ref 81.4–97.8)
PLATELET # BLD AUTO: 287 K/UL (ref 164–446)
PMV BLD AUTO: 9.3 FL (ref 9–12.9)
POTASSIUM SERPL-SCNC: 3.7 MMOL/L (ref 3.6–5.5)
RBC # BLD AUTO: 3.82 M/UL (ref 4.2–5.4)
SODIUM SERPL-SCNC: 136 MMOL/L (ref 135–145)
WBC # BLD AUTO: 5.8 K/UL (ref 4.8–10.8)

## 2025-05-13 PROCEDURE — 36415 COLL VENOUS BLD VENIPUNCTURE: CPT

## 2025-05-13 PROCEDURE — 700105 HCHG RX REV CODE 258: Performed by: NURSE PRACTITIONER

## 2025-05-13 PROCEDURE — 700102 HCHG RX REV CODE 250 W/ 637 OVERRIDE(OP)

## 2025-05-13 PROCEDURE — A9270 NON-COVERED ITEM OR SERVICE: HCPCS

## 2025-05-13 PROCEDURE — 85027 COMPLETE CBC AUTOMATED: CPT

## 2025-05-13 PROCEDURE — A9270 NON-COVERED ITEM OR SERVICE: HCPCS | Performed by: NURSE PRACTITIONER

## 2025-05-13 PROCEDURE — A9270 NON-COVERED ITEM OR SERVICE: HCPCS | Performed by: STUDENT IN AN ORGANIZED HEALTH CARE EDUCATION/TRAINING PROGRAM

## 2025-05-13 PROCEDURE — 700102 HCHG RX REV CODE 250 W/ 637 OVERRIDE(OP): Performed by: STUDENT IN AN ORGANIZED HEALTH CARE EDUCATION/TRAINING PROGRAM

## 2025-05-13 PROCEDURE — 700102 HCHG RX REV CODE 250 W/ 637 OVERRIDE(OP): Performed by: NURSE PRACTITIONER

## 2025-05-13 PROCEDURE — 770006 HCHG ROOM/CARE - MED/SURG/GYN SEMI*

## 2025-05-13 PROCEDURE — 80048 BASIC METABOLIC PNL TOTAL CA: CPT

## 2025-05-13 RX ORDER — METOPROLOL SUCCINATE 25 MG/1
12.5 TABLET, EXTENDED RELEASE ORAL DAILY
Status: DISCONTINUED | OUTPATIENT
Start: 2025-05-14 | End: 2025-05-14 | Stop reason: HOSPADM

## 2025-05-13 RX ORDER — SODIUM CHLORIDE, SODIUM LACTATE, POTASSIUM CHLORIDE, CALCIUM CHLORIDE 600; 310; 30; 20 MG/100ML; MG/100ML; MG/100ML; MG/100ML
INJECTION, SOLUTION INTRAVENOUS CONTINUOUS
Status: DISCONTINUED | OUTPATIENT
Start: 2025-05-13 | End: 2025-05-14 | Stop reason: HOSPADM

## 2025-05-13 RX ADMIN — ESCITALOPRAM OXALATE 20 MG: 10 TABLET ORAL at 05:48

## 2025-05-13 RX ADMIN — ROSUVASTATIN CALCIUM 20 MG: 20 TABLET, FILM COATED ORAL at 16:18

## 2025-05-13 RX ADMIN — LIOTHYRONINE SODIUM 5 MCG: 5 TABLET ORAL at 05:48

## 2025-05-13 RX ADMIN — OMEPRAZOLE 20 MG: 20 CAPSULE, DELAYED RELEASE ORAL at 20:31

## 2025-05-13 RX ADMIN — OXYCODONE 5 MG: 5 TABLET ORAL at 12:50

## 2025-05-13 RX ADMIN — APIXABAN 5 MG: 5 TABLET, FILM COATED ORAL at 05:48

## 2025-05-13 RX ADMIN — LEVOTHYROXINE SODIUM 88 MCG: 0.09 TABLET ORAL at 05:48

## 2025-05-13 RX ADMIN — METAXALONE 800 MG: 800 TABLET ORAL at 16:19

## 2025-05-13 RX ADMIN — EZETIMIBE 10 MG: 10 TABLET ORAL at 16:20

## 2025-05-13 RX ADMIN — OXYCODONE 5 MG: 5 TABLET ORAL at 00:55

## 2025-05-13 RX ADMIN — METOPROLOL SUCCINATE 25 MG: 25 TABLET, EXTENDED RELEASE ORAL at 05:48

## 2025-05-13 RX ADMIN — SODIUM CHLORIDE, POTASSIUM CHLORIDE, SODIUM LACTATE AND CALCIUM CHLORIDE: 600; 310; 30; 20 INJECTION, SOLUTION INTRAVENOUS at 00:53

## 2025-05-13 RX ADMIN — SODIUM CHLORIDE, POTASSIUM CHLORIDE, SODIUM LACTATE AND CALCIUM CHLORIDE 900 ML: 600; 310; 30; 20 INJECTION, SOLUTION INTRAVENOUS at 18:24

## 2025-05-13 RX ADMIN — OMEPRAZOLE 20 MG: 20 CAPSULE, DELAYED RELEASE ORAL at 09:46

## 2025-05-13 RX ADMIN — SODIUM CHLORIDE, POTASSIUM CHLORIDE, SODIUM LACTATE AND CALCIUM CHLORIDE 500 ML: 600; 310; 30; 20 INJECTION, SOLUTION INTRAVENOUS at 11:22

## 2025-05-13 RX ADMIN — AMITRIPTYLINE HYDROCHLORIDE 150 MG: 25 TABLET, FILM COATED ORAL at 20:31

## 2025-05-13 RX ADMIN — OXYCODONE HYDROCHLORIDE 10 MG: 10 TABLET ORAL at 20:31

## 2025-05-13 RX ADMIN — APIXABAN 5 MG: 5 TABLET, FILM COATED ORAL at 16:20

## 2025-05-13 RX ADMIN — METAXALONE 800 MG: 800 TABLET ORAL at 05:48

## 2025-05-13 RX ADMIN — METAXALONE 800 MG: 800 TABLET ORAL at 22:52

## 2025-05-13 RX ADMIN — DULOXETINE 30 MG: 30 CAPSULE, DELAYED RELEASE ORAL at 16:18

## 2025-05-13 RX ADMIN — TAMSULOSIN HYDROCHLORIDE 0.4 MG: 0.4 CAPSULE ORAL at 09:46

## 2025-05-13 ASSESSMENT — ENCOUNTER SYMPTOMS
SPEECH CHANGE: 0
NAUSEA: 0
ABDOMINAL PAIN: 0
FEVER: 0
VOMITING: 0
FOCAL WEAKNESS: 1
MYALGIAS: 1
SENSORY CHANGE: 0
BACK PAIN: 1
CHILLS: 0

## 2025-05-13 ASSESSMENT — PAIN DESCRIPTION - PAIN TYPE
TYPE: ACUTE PAIN
TYPE: ACUTE PAIN;SURGICAL PAIN

## 2025-05-13 NOTE — CARE PLAN
The patient is Stable - Low risk of patient condition declining or worsening    Shift Goals  Clinical Goals: Pain control, mobiltiy, monitor BP, rest, comfort  Patient Goals: Pain control, mobiltiy, rest  Family Goals: not present    Progress made toward(s) clinical / shift goals:    Problem: Pain - Standard  Goal: Alleviation of pain or a reduction in pain to the patient’s comfort goal  Outcome: Progressing     Problem: Knowledge Deficit - Standard  Goal: Patient and family/care givers will demonstrate understanding of plan of care, disease process/condition, diagnostic tests and medications  Outcome: Progressing       Patient is not progressing towards the following goals:

## 2025-05-13 NOTE — PROGRESS NOTES
Trauma / Surgical Daily Progress Note    Date of Service  5/13/2025    Chief Complaint  75 y.o. female admitted 5/5/2025 with rib fractures, worsened chronic L1 compression fracture following GLF.     Interval Events    Discharged to [facility] deferred on 5/12/2025 secondary to orthostatic hypotension.  Lisinopril was discontinued and IV fluids were initiated at this time.  She had another episode of orthostatic hypotension this a.m.  Laboratory studies are reassuring.  Decrease metoprolol to 12.5 mg twice daily.  Transfer to [facility] when medically cleared.    Review of Systems  Review of Systems   Constitutional:  Negative for chills, fever and malaise/fatigue.   Gastrointestinal:  Negative for abdominal pain, nausea and vomiting.        5/11 (+) BM   Genitourinary:  Negative for dysuria.   Musculoskeletal:  Positive for back pain and myalgias.   Neurological:  Positive for focal weakness (chronic left leg/arm weakness - seeing Dr. Delatorre outpatient). Negative for sensory change and speech change.   All other systems reviewed and are negative.     Vital Signs  Temp:  [36.2 °C (97.2 °F)-36.3 °C (97.4 °F)] 36.3 °C (97.4 °F)  Pulse:  [79-87] 79  Resp:  [14-16] 15  BP: ()/(59-82) 137/82  SpO2:  [90 %-91 %] 91 %    Physical Exam  Physical Exam  Vitals and nursing note reviewed.   Constitutional:       Appearance: Normal appearance.      Interventions: Nasal cannula in place.   HENT:      Mouth/Throat:      Mouth: Mucous membranes are moist.   Eyes:      Pupils: Pupils are equal, round, and reactive to light.   Cardiovascular:      Rate and Rhythm: Normal rate and regular rhythm.   Pulmonary:      Effort: Pulmonary effort is normal. No respiratory distress.   Chest:      Chest wall: Tenderness (left lateral, posterior) present.   Abdominal:      General: There is no distension.      Palpations: Abdomen is soft.      Tenderness: There is no abdominal tenderness.    Musculoskeletal:         General: Normal range of motion.      Cervical back: Normal range of motion.      Thoracic back: No tenderness.      Lumbar back: Tenderness present.   Skin:     General: Skin is warm and dry.      Capillary Refill: Capillary refill takes less than 2 seconds.   Neurological:      General: No focal deficit present.      Mental Status: She is alert and oriented to person, place, and time.      Comments: 2/5 strength in LUE chronic, 5/5 strength in other extremities    Psychiatric:         Mood and Affect: Mood normal.         Behavior: Behavior normal.       Laboratory  Recent Results (from the past 24 hours)   CBC WITHOUT DIFFERENTIAL    Collection Time: 05/13/25  1:22 AM   Result Value Ref Range    WBC 5.8 4.8 - 10.8 K/uL    RBC 3.82 (L) 4.20 - 5.40 M/uL    Hemoglobin 10.1 (L) 12.0 - 16.0 g/dL    Hematocrit 31.4 (L) 37.0 - 47.0 %    MCV 82.2 81.4 - 97.8 fL    MCH 26.4 (L) 27.0 - 33.0 pg    MCHC 32.2 32.2 - 35.5 g/dL    RDW 49.1 35.9 - 50.0 fL    Platelet Count 287 164 - 446 K/uL    MPV 9.3 9.0 - 12.9 fL   Basic Metabolic Panel    Collection Time: 05/13/25  1:22 AM   Result Value Ref Range    Sodium 136 135 - 145 mmol/L    Potassium 3.7 3.6 - 5.5 mmol/L    Chloride 107 96 - 112 mmol/L    Co2 21 20 - 33 mmol/L    Glucose 113 (H) 65 - 99 mg/dL    Bun 14 8 - 22 mg/dL    Creatinine 0.83 0.50 - 1.40 mg/dL    Calcium 8.6 8.5 - 10.5 mg/dL    Anion Gap 8.0 7.0 - 16.0   ESTIMATED GFR    Collection Time: 05/13/25  1:22 AM   Result Value Ref Range    GFR (CKD-EPI) 74 >60 mL/min/1.73 m 2       Fluids  No intake or output data in the 24 hours ending 05/13/25 1316    Core Measures & Quality Metrics  Labs reviewed and Medications reviewed  Polanco catheter: No Polanco      DVT: Eliquis.  DVT prophylaxis - mechanical: SCDs  Ulcer prophylaxis: Yes    Assessed for rehab: Patient was assess for and/or received rehabilitation services during this hospitalization    RAP Score Total: 7    CAGE Results: negative  Blood Alcohol>0.08: not completed     Assessment/Plan  * Trauma- (present on admission)  Assessment & Plan  GLF tripped over pillow.  Trauma Green Transfer Activation from [facility] in [place], [place].  Surgeon List: Emilie Miguel MD. Trauma Surgery.    Discharge planning issues- (present on admission)  Assessment & Plan  Date of admission: 5/5/2025.  5/8  Rehab referral 5/9 SNF referral.  5/12 Discharged deferred secondary to orthostatic hypotension  Accepted to [facility] when medically cleared.  Discharge date: tbd.    Anticoagulated on Eliquis- (present on admission)  Assessment & Plan  For treatment of acute LLE DVT, started on 2/28/25  Reports she paused the medication 7 days ago on recommendations from her neurosurgeon ahead of potential cervical spine surgery  TEG with AA 83.9% inhibition, ADP 21.6% inhibition   - Eliquis reinitiated     Closed fracture of five ribs of left side- (present on admission)  Assessment & Plan  CT imaging with fractures of the posterior left fourth and fifth rib fractures, lateral left sixth and seventh rib, and the left ninth rib posterolaterally. There are subacute anterolateral left seventh and eighth rib fractures.   Fracture pattern is not amenable to operative fixation.  Aggressive multimodal pain management and pulmonary hygiene. Serial chest radiographs.    Orthostatic hypotension- (present on admission)  Assessment & Plan  5/12 Orthostatic hypotension.  Lisinopril discontinued.  IV hydration.  5/13 Episode of orthostatic hypotension this AM.  Decrease metoprolol to 12.5 mg BID.    Closed T12 fracture (HCC)- (present on admission)  Assessment & Plan  CT imaging with Stable compression fractures of T4, T5, and T12   T12 fracture is acute per spine surgeon review  Non-operative management.   Off-the-shelf TLSO bracing. Outpatient follow up.  Romeo Castaneda MD. Spine Surgeon. [facility].    Urinary retention  Assessment & Plan  Polanco placed 5/6  for retention  - flomax initiated   5/8 trial Polanco removal  5/9 Voiding    Deep vein thrombosis (DVT) of left lower extremity (HCC)- (present on admission)  Assessment & Plan  Diagnosed 2/28, treated with eliquis  US with cubacute to chronic, partially occlusive thrombus in the common femoral vein, extending into the femoral vein and popliteal vein.  - eliquis reinitiated; no creatinine dosing indicated given patient age and BMI per pharmacy      Closed compression fracture of body of L1 vertebra (HCC)- (present on admission)  Assessment & Plan  Compression fracture of L1 with further compression when compared with the prior exam.   History of compression fracture, patient has chronic pain to this area that is unchanged; no pain on initial exam.  Tertiary exam with lumbar spine pain  Non-operative management.   Off-the-shelf TLSO bracing. Outpatient follow up.  Jb Park MD. Neurosurgeon. [facility].    Hypokalemia- (present on admission)  Assessment & Plan  5/11 Supplement and trend.    Weakness of left arm- (present on admission)  Assessment & Plan  Prior to arrival.  Patient states that she has been seeing Dr. Delatorre for this issue.  Related to chronic neck pain.    No contraindication to deep vein thrombosis (DVT) prophylaxis- (present on admission)  Assessment & Plan  Prophylactic dose heparin 5000 u q 8 hr initiated upon admission.  Subsequent US showing chronic DVT, eliquis reinitiated, heparin discontinued     GERD (gastroesophageal reflux disease)- (present on admission)  Assessment & Plan  Chronic condition treated with omeprazole.  Resumed maintenance medication on admission.    Normocytic anemia- (present on admission)  Assessment & Plan  Admit hgb 10.8  Trend    Primary hypertension- (present on admission)  Assessment & Plan  Chronic condition treated with lisinopril, metoprolol.  Resumed maintenance medication on admission.  PRN antihypertensives   Lisinopril held due to increased  creatinine   5/9 Resumed lisinopril. Creatinine improved 0.89  5/12 Lisinopril stopped secondary to orthostatic hypotension.   5/13 Metoprolol decreased to 12.5mg BID    Type 2 diabetes mellitus (HCC)- (present on admission)  Assessment & Plan  Chronic condition treated with metformin.  Holding maintenance metformin for 48 hours following intravenous contrast administration.  Insulin sliding scale coverage.  Adequate glycemic control off of maintenance mediation.    Asthma- (present on admission)  Assessment & Plan  Chronic condition treated with albuterol.  Resumed maintenance medication on admission. RT protocol.     Chronic pain syndrome on chronic opioids- (present on admission)  Assessment & Plan  Chronic condition treated with Roxicodone 10mg, robaxin.  Resumed robaxin on admission. Multimodals and PRN analgesics.    Acquired hypothyroidism- (present on admission)  Assessment & Plan  Chronic condition treated with liothyronine, levothyroxine.  Resumed maintenance medication on admission.    Mood disorder (HCC)- (present on admission)  Assessment & Plan  Chronic condition treated with duloxetine, amitriptyline.  Resumed maintenance medication on admission.    Dyslipidemia- (present on admission)  Assessment & Plan  Chronic condition treated with rosuvastatin, ezetimibe.  Resumed maintenance medication on admission.      Discussed patient condition with Patient and trauma surgery, Dr. Donaldo Palomo.

## 2025-05-13 NOTE — DISCHARGE PLANNING
Case Management Discharge Planning    Admission Date: 5/5/2025  GMLOS: 3  ALOS: 8    6-Clicks ADL Score: 13  6-Clicks Mobility Score: 12  PT and/or OT Eval ordered: Yes  Post-acute Referrals Ordered: Yes  Post-acute Choice Obtained: Yes  Has referral(s) been sent to post-acute provider:  Yes      Anticipated Discharge Dispo: Discharge Disposition: D/T to SNF with Medicare cert in anticipation of skilled care (03)    DME Needed: No    Action(s) Taken: Patient was discussed in morning trauma rounds.  Per trauma APRN, BP medications were adjusted and nursing to continue to assess orthostatics today.  Anticipated medical clearance tomorrow for [facility].  RNCM asked DPA to update [facility].      Escalations Completed: None    Medically Clear: No    Next Steps: RN CM to continue to follow for DC planning      Barriers to Discharge: Medical clearance

## 2025-05-13 NOTE — CARE PLAN
The patient is Stable - Low risk of patient condition declining or worsening    Shift Goals  Clinical Goals: pain control, mobility, stable vitals, safety  Patient Goals: pain control, rest, discharge  Family Goals: not present    Problem: Pain - Standard  Goal: Alleviation of pain or a reduction in pain to the patient’s comfort goal  Outcome: Progressing     Problem: Knowledge Deficit - Standard  Goal: Patient and family/care givers will demonstrate understanding of plan of care, disease process/condition, diagnostic tests and medications  Outcome: Progressing

## 2025-05-13 NOTE — ASSESSMENT & PLAN NOTE
5/12 Orthostatic hypotension.  Lisinopril discontinued.  IV hydration.  5/13 Episode of orthostatic hypotension this AM.  Decrease metoprolol to 12.5 mg BID.

## 2025-05-13 NOTE — DISCHARGE PLANNING
0926  Agency[facility]Facility Name: [facility]  Spoke To: Larissa  Outcome: DPA called to notify facility that we are expecting medical clearance tomorrow.

## 2025-05-14 VITALS
TEMPERATURE: 97.5 F | HEIGHT: 61 IN | RESPIRATION RATE: 16 BRPM | OXYGEN SATURATION: 97 % | HEART RATE: 90 BPM | DIASTOLIC BLOOD PRESSURE: 98 MMHG | SYSTOLIC BLOOD PRESSURE: 133 MMHG | WEIGHT: 142.42 LBS | BODY MASS INDEX: 26.89 KG/M2

## 2025-05-14 PROBLEM — E87.6 HYPOKALEMIA: Status: RESOLVED | Noted: 2023-05-03 | Resolved: 2025-05-14

## 2025-05-14 PROBLEM — R33.9 URINARY RETENTION: Status: RESOLVED | Noted: 2025-05-06 | Resolved: 2025-05-14

## 2025-05-14 PROCEDURE — 700102 HCHG RX REV CODE 250 W/ 637 OVERRIDE(OP)

## 2025-05-14 PROCEDURE — A9270 NON-COVERED ITEM OR SERVICE: HCPCS

## 2025-05-14 PROCEDURE — A9270 NON-COVERED ITEM OR SERVICE: HCPCS | Performed by: NURSE PRACTITIONER

## 2025-05-14 PROCEDURE — 700102 HCHG RX REV CODE 250 W/ 637 OVERRIDE(OP): Performed by: NURSE PRACTITIONER

## 2025-05-14 PROCEDURE — 97535 SELF CARE MNGMENT TRAINING: CPT

## 2025-05-14 PROCEDURE — 700102 HCHG RX REV CODE 250 W/ 637 OVERRIDE(OP): Performed by: STUDENT IN AN ORGANIZED HEALTH CARE EDUCATION/TRAINING PROGRAM

## 2025-05-14 PROCEDURE — A9270 NON-COVERED ITEM OR SERVICE: HCPCS | Performed by: STUDENT IN AN ORGANIZED HEALTH CARE EDUCATION/TRAINING PROGRAM

## 2025-05-14 PROCEDURE — 700105 HCHG RX REV CODE 258: Performed by: NURSE PRACTITIONER

## 2025-05-14 RX ORDER — METOPROLOL SUCCINATE 25 MG/1
12.5 TABLET, EXTENDED RELEASE ORAL DAILY
Status: SHIPPED
Start: 2025-05-15

## 2025-05-14 RX ADMIN — TAMSULOSIN HYDROCHLORIDE 0.4 MG: 0.4 CAPSULE ORAL at 08:07

## 2025-05-14 RX ADMIN — ESCITALOPRAM OXALATE 20 MG: 10 TABLET ORAL at 05:06

## 2025-05-14 RX ADMIN — LIOTHYRONINE SODIUM 5 MCG: 5 TABLET ORAL at 05:06

## 2025-05-14 RX ADMIN — METAXALONE 800 MG: 800 TABLET ORAL at 05:07

## 2025-05-14 RX ADMIN — OXYCODONE HYDROCHLORIDE 10 MG: 10 TABLET ORAL at 14:46

## 2025-05-14 RX ADMIN — LEVOTHYROXINE SODIUM 88 MCG: 0.09 TABLET ORAL at 05:07

## 2025-05-14 RX ADMIN — OXYCODONE HYDROCHLORIDE 10 MG: 10 TABLET ORAL at 07:19

## 2025-05-14 RX ADMIN — APIXABAN 5 MG: 5 TABLET, FILM COATED ORAL at 05:06

## 2025-05-14 RX ADMIN — OMEPRAZOLE 20 MG: 20 CAPSULE, DELAYED RELEASE ORAL at 08:07

## 2025-05-14 RX ADMIN — SODIUM CHLORIDE, POTASSIUM CHLORIDE, SODIUM LACTATE AND CALCIUM CHLORIDE: 600; 310; 30; 20 INJECTION, SOLUTION INTRAVENOUS at 08:11

## 2025-05-14 ASSESSMENT — ENCOUNTER SYMPTOMS
SENSORY CHANGE: 0
CARDIOVASCULAR NEGATIVE: 1
DIZZINESS: 0
HEADACHES: 0
GASTROINTESTINAL NEGATIVE: 1
TINGLING: 0
SPEECH CHANGE: 0
FEVER: 0
FOCAL WEAKNESS: 1
RESPIRATORY NEGATIVE: 1
MYALGIAS: 1
CHILLS: 0
BACK PAIN: 1

## 2025-05-14 ASSESSMENT — COGNITIVE AND FUNCTIONAL STATUS - GENERAL
HELP NEEDED FOR BATHING: A LOT
DRESSING REGULAR LOWER BODY CLOTHING: A LOT
DRESSING REGULAR UPPER BODY CLOTHING: A LITTLE
PERSONAL GROOMING: A LOT
TOILETING: A LOT
SUGGESTED CMS G CODE MODIFIER DAILY ACTIVITY: CK
DAILY ACTIVITIY SCORE: 15

## 2025-05-14 ASSESSMENT — PAIN DESCRIPTION - PAIN TYPE
TYPE: ACUTE PAIN

## 2025-05-14 NOTE — PROGRESS NOTES
Trauma / Surgical Daily Progress Note    Date of Service  5/14/2025    Chief Complaint  75 y.o. female admitted 5/5/2025 with rib fractures, worsened chronic L1 compression fracture following GLF.     Interval Events  Resolved orthostatic hypotension.   Medically [facility].    Review of Systems  Review of Systems   Constitutional:  Negative for chills, fever and malaise/fatigue.   Respiratory: Negative.     Cardiovascular: Negative.    Gastrointestinal: Negative.    Genitourinary: Negative.         Voiding   Musculoskeletal:  Positive for back pain and myalgias.   Neurological:  Positive for focal weakness (baseline to left arm). Negative for dizziness, tingling, sensory change, speech change and headaches.      Vital Signs  Temp:  [36.2 °C (97.2 °F)-36.5 °C (97.7 °F)] 36.5 °C (97.7 °F)  Pulse:  [68-91] 91  Resp:  [15-17] 16  BP: ()/(59-88) 159/86  SpO2:  [90 %-93 %] 93 %    Physical Exam  Physical Exam  Vitals reviewed.   Constitutional:       General: She is not in acute distress.     Appearance: She is not ill-appearing.   Cardiovascular:      Rate and Rhythm: Normal rate.   Pulmonary:      Effort: Pulmonary effort is normal. No respiratory distress.      Breath sounds: Normal breath sounds.   Chest:      Chest wall: Tenderness (left chest wall) present.   Abdominal:      General: There is no distension.      Palpations: Abdomen is soft.      Tenderness: There is no abdominal tenderness.   Musculoskeletal:      Right lower leg: No edema.      Left lower leg: No edema.   Skin:     General: Skin is warm and dry.   Neurological:      Mental Status: She is alert and oriented to person, place, and time.      GCS: GCS eye subscore is 4. GCS verbal subscore is 5. GCS motor subscore is 6.   Psychiatric:         Behavior: Behavior is cooperative.       Laboratory  No results found for this or any previous visit (from the past 24 hours).      Fluids    Intake/Output Summary (Last 24 hours)  at 5/14/2025 0848  Last data filed at 5/14/2025 0515  Gross per 24 hour   Intake --   Output 1200 ml   Net -1200 ml       Core Measures & Quality Metrics  Labs reviewed and Medications reviewed  Polanco catheter: No Polanco      DVT: Eliquis.  DVT prophylaxis - mechanical: SCDs  Ulcer prophylaxis: Yes    Assessed for rehab: Patient was assess for and/or received rehabilitation services during this hospitalization    RAP Score Total: 7    CAGE Results: negative Blood Alcohol>0.08: not completed     Assessment/Plan  * Trauma- (present on admission)  Assessment & Plan  GLF tripped over pillow.  Trauma Green Transfer Activation from [facility] in [place], [place].  Surgeon List: Emilie Miguel MD. Trauma Surgery.    Discharge planning issues- (present on admission)  Assessment & Plan  Date of admission: 5/5/2025.  5/8  Rehab referral 5/9 SNF referral.  5/12 Discharged deferred secondary to orthostatic hypotension  [facility] when medically cleared.  Discharge date: tbd.    Anticoagulated on Eliquis- (present on admission)  Assessment & Plan  For treatment of acute LLE DVT, started on 2/28/25  Reports she paused the medication 7 days ago on recommendations from her neurosurgeon ahead of potential cervical spine surgery  TEG with AA 83.9% inhibition, ADP 21.6% inhibition   - Eliquis reinitiated     Closed fracture of five ribs of left side- (present on admission)  Assessment & Plan  CT imaging with fractures of the posterior left fourth and fifth rib fractures, lateral left sixth and seventh rib, and the left ninth rib posterolaterally. There are subacute anterolateral left seventh and eighth rib fractures.   Fracture pattern is not amenable to operative fixation.  Aggressive multimodal pain management and pulmonary hygiene. Serial chest radiographs.    Orthostatic hypotension- (present on admission)  Assessment & Plan  5/12 Orthostatic hypotension.  Lisinopril discontinued.  IV hydration.  5/13  Episode of orthostatic hypotension this AM.  Decrease metoprolol to 12.5 mg BID.    Closed T12 fracture (HCC)- (present on admission)  Assessment & Plan  CT imaging with Stable compression fractures of T4, T5, and T12   T12 fracture is acute per spine surgeon review  Non-operative management.   Off-the-shelf TLSO bracing. Outpatient follow up.  Romeo Castaneda MD. Spine Surgeon. [facility].    Urinary retention  Assessment & Plan  Polanco placed 5/6 for retention  - flomax initiated   5/8 trial Polanco removal  5/9 Voiding    Deep vein thrombosis (DVT) of left lower extremity (HCC)- (present on admission)  Assessment & Plan  Diagnosed 2/28, treated with eliquis  US with cubacute to chronic, partially occlusive thrombus in the common femoral vein, extending into the femoral vein and popliteal vein.  - eliquis reinitiated; no creatinine dosing indicated given patient age and BMI per pharmacy      Closed compression fracture of body of L1 vertebra (HCC)- (present on admission)  Assessment & Plan  Compression fracture of L1 with further compression when compared with the prior exam.   History of compression fracture, patient has chronic pain to this area that is unchanged; no pain on initial exam.  Tertiary exam with lumbar spine pain  Non-operative management.   Off-the-shelf TLSO bracing. Outpatient follow up.  Jb Park MD. Neurosurgeon. [facility].    Hypokalemia- (present on admission)  Assessment & Plan  5/11 Supplement and trend.    Weakness of left arm- (present on admission)  Assessment & Plan  Prior to arrival.  Patient states that she has been seeing Dr. Delatorre for this issue.  Related to chronic neck pain.    No contraindication to deep vein thrombosis (DVT) prophylaxis- (present on admission)  Assessment & Plan  Prophylactic dose heparin 5000 u q 8 hr initiated upon admission.  Subsequent US showing chronic DVT, eliquis reinitiated, heparin discontinued     GERD (gastroesophageal reflux  disease)- (present on admission)  Assessment & Plan  Chronic condition treated with omeprazole.  Resumed maintenance medication on admission.    Normocytic anemia- (present on admission)  Assessment & Plan  Admit hgb 10.8  Trend    Primary hypertension- (present on admission)  Assessment & Plan  Chronic condition treated with lisinopril, metoprolol.  Resumed maintenance medication on admission.  PRN antihypertensives   Lisinopril held due to increased creatinine   5/9 Resumed lisinopril. Creatinine improved 0.89  5/12 Lisinopril stopped secondary to orthostatic hypotension.   5/13 Metoprolol decreased to 12.5mg BID    Type 2 diabetes mellitus (HCC)- (present on admission)  Assessment & Plan  Chronic condition treated with metformin.  Holding maintenance metformin for 48 hours following intravenous contrast administration.  Insulin sliding scale coverage.  Adequate glycemic control off of maintenance mediation.    Asthma- (present on admission)  Assessment & Plan  Chronic condition treated with albuterol.  Resumed maintenance medication on admission. RT protocol.     Chronic pain syndrome on chronic opioids- (present on admission)  Assessment & Plan  Chronic condition treated with Roxicodone 10mg, robaxin.  Resumed robaxin on admission. Multimodals and PRN analgesics.    Acquired hypothyroidism- (present on admission)  Assessment & Plan  Chronic condition treated with liothyronine, levothyroxine.  Resumed maintenance medication on admission.    Mood disorder (HCC)- (present on admission)  Assessment & Plan  Chronic condition treated with duloxetine, amitriptyline.  Resumed maintenance medication on admission.    Dyslipidemia- (present on admission)  Assessment & Plan  Chronic condition treated with rosuvastatin, ezetimibe.  Resumed maintenance medication on admission.      Discussed patient condition with Patient and trauma surgery, Dr. Donaldo Palomo.

## 2025-05-14 NOTE — CARE PLAN
The patient is Stable - Low risk of patient condition declining or worsening    Shift Goals  Clinical Goals: pain control, mobility, stable vitals  Patient Goals: pain control, rest, discharge  Family Goals: not present    Problem: Pain - Standard  Goal: Alleviation of pain or a reduction in pain to the patient’s comfort goal  Outcome: Progressing     Problem: Knowledge Deficit - Standard  Goal: Patient and family/care givers will demonstrate understanding of plan of care, disease process/condition, diagnostic tests and medications  Outcome: Progressing

## 2025-05-14 NOTE — DISCHARGE SUMMARY
Trauma Discharge Summary    DATE OF ADMISSION: 5/5/2025    DATE OF DISCHARGE: 5/14/2025    LENGTH OF STAY: 9 days    ATTENDING PHYSICIAN: Emilie Miguel M.D.    CONSULTING PHYSICIAN:   1. Dr. Romeo Castaneda MD, Orthopedic Surgery  2. Dr. Franchesca Lucia DO, Physical Medicine & Rehab    DISCHARGE DIAGNOSIS:  Principal Problem:    Trauma  Active Problems:    Orthostatic hypotension    Closed fracture of five ribs of left side    Anticoagulated on Eliquis    Discharge planning issues    Closed compression fracture of body of L1 vertebra (HCC)    Deep vein thrombosis (DVT) of left lower extremity (HCC)    Closed T12 fracture (HCC)    Dyslipidemia    Mood disorder (HCC)    Acquired hypothyroidism    Chronic pain syndrome on chronic opioids (Chronic)    Asthma    Type 2 diabetes mellitus (HCC)    Primary hypertension    Normocytic anemia    GERD (gastroesophageal reflux disease)    No contraindication to deep vein thrombosis (DVT) prophylaxis    Weakness of left arm  Resolved Problems:    Hypokalemia    Urinary retention    Increased creatine kinase level    PROCEDURES: None    HISTORY OF PRESENT ILLNESS: The patient is a 75 y.o. female who was reportedly injured after sustaining a mechanical ground level fall.  She was initially evaluated at [facility].  Referring facility referring facility imaging demonstrated multiple left rib fractures, an L1 compression fracture, and T12 compression fracture.  She was transferred to [facility] in [place], [place] for further trauma workup and evaluation.    HOSPITAL COURSE: The patient was triaged as a consult transfer activation.  Referring facility imaging was reviewed and demonstrated multiple left rib fractures, a T12 compression fracture, and an L1 compression fracture.  The patient was transported to the trauma intensive care unit where she received aggressive pulmonary hygiene, multimodal pain control, serial chest imaging for blunt  chest trauma.  Spine surgery was consulted and recommended nonoperative management of her spine fractures with an off-the-shelf TLSO brace.  The patient was subsequently transferred to the trauma hernandez where she was evaluated by physical therapy and Occupational Therapy.  Therapies recommended postacute placement at discharge.  Discharge arrangements were confirmed at [facility].  The patient's discharge was delayed due to orthostatic hypotension that resolved with IV hydration and decreasing her home antihypertensives.    On day of discharge, the patient was tolerating room air and a regular diet.  She had adequate pain control and was utilizing her incentive spirometer to 1000.  She had no signs and symptoms of infection.  She remained neurologically intact.  She was discharged to [facility] with outpatient follow-up as discussed below.    HOSPITAL PROBLEM LIST:  * Trauma- (present on admission)  Assessment & Plan  GLF tripped over pillow.  Trauma Green Transfer Activation from [facility] in [place], [place].  Surgeon List: Emilie Miguel MD. Trauma Surgery.    Discharge planning issues- (present on admission)  Assessment & Plan  Date of admission: 5/5/2025.  5/8  Rehab referral 5/9 SNF referral.  5/12 Discharged deferred secondary to orthostatic hypotension  5/14 Medically clear for [facility] to [facility].    Anticoagulated on Eliquis- (present on admission)  Assessment & Plan  For treatment of acute LLE DVT, started on 2/28/25  Reports she paused the medication 7 days ago on recommendations from her neurosurgeon ahead of potential cervical spine surgery  TEG with AA 83.9% inhibition, ADP 21.6% inhibition   - Eliquis reinitiated     Closed fracture of five ribs of left side- (present on admission)  Assessment & Plan  CT imaging with fractures of the posterior left fourth and fifth rib fractures, lateral left sixth and seventh rib, and the left ninth rib  posterolaterally. There are subacute anterolateral left seventh and eighth rib fractures.   Fracture pattern is not amenable to operative fixation.  Aggressive multimodal pain management and pulmonary hygiene. Serial chest radiographs.    Orthostatic hypotension- (present on admission)  Assessment & Plan  5/12 Orthostatic hypotension.  Lisinopril discontinued.  IV hydration.  5/13 Episode of orthostatic hypotension this AM.  Decrease metoprolol to 12.5 mg BID.    Closed T12 fracture (HCC)- (present on admission)  Assessment & Plan  CT imaging with Stable compression fractures of T4, T5, and T12   T12 fracture is acute per spine surgeon review  Non-operative management.   Off-the-shelf TLSO bracing. Outpatient follow up.  Romeo Castaneda MD. Spine Surgeon. [facility].    Deep vein thrombosis (DVT) of left lower extremity (HCC)- (present on admission)  Assessment & Plan  Diagnosed 2/28, treated with eliquis  US with cubacute to chronic, partially occlusive thrombus in the common femoral vein, extending into the femoral vein and popliteal vein.  - eliquis reinitiated; no creatinine dosing indicated given patient age and BMI per pharmacy      Closed compression fracture of body of L1 vertebra (HCC)- (present on admission)  Assessment & Plan  Compression fracture of L1 with further compression when compared with the prior exam.   History of compression fracture, patient has chronic pain to this area that is unchanged; no pain on initial exam.  Tertiary exam with lumbar spine pain  Non-operative management.   Off-the-shelf TLSO bracing. Outpatient follow up.  Jb Park MD. Neurosurgeon. [facility].    Urinary retention-resolved as of 5/14/2025  Assessment & Plan  Polanco placed 5/6 for retention  - flomax initiated   5/8 trial Polanco removal  5/9 Voiding    Hypokalemia-resolved as of 5/14/2025, (present on admission)  Assessment & Plan  5/11 Supplement and trend.    Weakness of left arm- (present on  admission)  Assessment & Plan  Prior to arrival.  Patient states that she has been seeing Dr. Delatorre for this issue.  Related to chronic neck pain.    No contraindication to deep vein thrombosis (DVT) prophylaxis- (present on admission)  Assessment & Plan  Prophylactic dose heparin 5000 u q 8 hr initiated upon admission.  Subsequent US showing chronic DVT, eliquis reinitiated, heparin discontinued     GERD (gastroesophageal reflux disease)- (present on admission)  Assessment & Plan  Chronic condition treated with omeprazole.  Resumed maintenance medication on admission.    Normocytic anemia- (present on admission)  Assessment & Plan  Admit hgb 10.8  Trend    Primary hypertension- (present on admission)  Assessment & Plan  Chronic condition treated with lisinopril, metoprolol.  Resumed maintenance medication on admission.  PRN antihypertensives   Lisinopril held due to increased creatinine   5/9 Resumed lisinopril. Creatinine improved 0.89  5/12 Lisinopril stopped secondary to orthostatic hypotension.   5/13 Metoprolol decreased to 12.5mg BID due to orthostatic hypotension.    Type 2 diabetes mellitus (HCC)- (present on admission)  Assessment & Plan  Chronic condition treated with metformin.  Holding maintenance metformin for 48 hours following intravenous contrast administration.  Insulin sliding scale coverage.  Adequate glycemic control off of maintenance mediation.    Asthma- (present on admission)  Assessment & Plan  Chronic condition treated with albuterol.  Resumed maintenance medication on admission. RT protocol.     Chronic pain syndrome on chronic opioids- (present on admission)  Assessment & Plan  Chronic condition treated with Roxicodone 10mg, robaxin.  Resumed robaxin on admission. Multimodals and PRN analgesics.    Acquired hypothyroidism- (present on admission)  Assessment & Plan  Chronic condition treated with liothyronine, levothyroxine.  Resumed maintenance medication on admission.    Mood  disorder (HCC)- (present on admission)  Assessment & Plan  Chronic condition treated with duloxetine, amitriptyline.  Resumed maintenance medication on admission.    Dyslipidemia- (present on admission)  Assessment & Plan  Chronic condition treated with rosuvastatin, ezetimibe.  Resumed maintenance medication on admission.    Increased creatine kinase level-resolved as of 5/9/2025, (present on admission)  Assessment & Plan  Admit creat 1.38 (baseline 0.76).  5/7 Renal indices corrected  Continue gentle IVF, Lisinopril held, Avoid nephrotoxins.  Trend.      DISPOSITION: Discharged [facility] on 5/14/25.     DISCHARGE MEDICATIONS:  The patients controlled substance history was reviewed and a controlled substance use informed consent (if applicable) was provided by [facility] and the patient has been prescribed.     Medication List        START taking these medications        Instructions   docusate sodium 100 MG Caps   Take 100 mg by mouth 2 times a day.  Dose: 100 mg     lidocaine 4 % Ptch  Commonly known as: Asperflex   Place 1 Patch on the skin every 24 hours.  Dose: 1 Patch     metaxalone 800 MG Tabs  Commonly known as: Skelaxin   Take 1 Tablet by mouth every 8 hours.  Dose: 800 mg     ondansetron 4 MG Tbdp  Commonly known as: Zofran ODT   Take 1 Tablet by mouth every four hours as needed for Nausea/Vomiting (give PO if IV route is unavailable.).  Dose: 4 mg     polyethylene glycol/lytes 17 g Pack  Commonly known as: Miralax   Take 1 Packet by mouth 2 times a day.  Dose: 17 g     tamsulosin 0.4 MG capsule  Commonly known as: Flomax   Take 1 Capsule by mouth 1/2 hour after breakfast.  Dose: 0.4 mg            CHANGE how you take these medications        Instructions   acetaminophen 500 MG Tabs  What changed: reasons to take this  Commonly known as: Tylenol   Take 2 Tablets by mouth every 6 hours as needed for Fever or Mild Pain.  Dose: 1,000 mg     amitriptyline 150 MG  Tabs  What changed: medication strength  Commonly known as: Elavil   Take 1 Tablet by mouth every evening.  Dose: 150 mg     metoprolol SR 25 MG Tb24  Start taking on: May 15, 2025  What changed: how much to take  Commonly known as: Toprol XL   Take 0.5 Tablets by mouth every day.  Dose: 12.5 mg            CONTINUE taking these medications        Instructions   albuterol 108 (90 Base) MCG/ACT Aers inhalation aerosol   Inhale 1-2 Puffs every four hours as needed for Shortness of Breath.  Dose: 1-2 Puff     alendronate 70 MG Tabs  Commonly known as: Fosamax   Take 70 mg by mouth every 7 days. On Monday  Dose: 70 mg     DULoxetine 30 MG Cpep  Commonly known as: Cymbalta   Take 1 Capsule by mouth every evening.  Dose: 30 mg     ezetimibe 10 MG Tabs  Commonly known as: Zetia   Take 1 Tablet by mouth every evening.  Dose: 10 mg     liothyronine 5 MCG Tabs  Commonly known as: Cytomel   Take 1 Tablet by mouth every day.  Dose: 5 mcg     lisinopril 10 MG Tabs  Commonly known as: Prinivil   Take 1 Tablet by mouth every day.  Dose: 10 mg     omeprazole 20 MG delayed-release capsule  Commonly known as: PriLOSEC   Take 1 Capsule by mouth 2 times a day.  Dose: 20 mg     oxyCODONE immediate release 10 MG immediate release tablet  Commonly known as: Roxicodone   Take 1 Tablet by mouth every four hours as needed for Severe Pain for up to 3 days.  Dose: 10 mg     rosuvastatin 20 MG Tabs  Commonly known as: Crestor   Take 1 Tablet by mouth every evening.  Dose: 20 mg     Synthroid 88 MCG Tabs  Generic drug: levothyroxine   Take 1 Tablet by mouth every day.  Dose: 88 mcg            STOP taking these medications      apixaban 5mg Tabs  Commonly known as: Eliquis     D3 5000 125 MCG (5000 UT) Caps  Generic drug: cholecalciferol     escitalopram 20 MG tablet  Commonly known as: Lexapro     metFORMIN 500 MG Tabs  Commonly known as: Glucophage     methocarbamol 500 MG Tabs  Commonly known as: Robaxin            ASK your doctor about these  medications        Instructions   diclofenac sodium 1 % Gel  Commonly known as: Voltaren   Apply 4 g topically 4 times a day as needed (back pain).  Dose: 4 g              ACTIVITY: As tolerated with off-the-shelf TLSO bracing    DIET:  Orders Placed This Encounter   Procedures    Diet Order Diet: Consistent CHO (Diabetic); Nutrient modifications: (optional): High Fiber     Standing Status:   Standing     Number of Occurrences:   1     Diet::   Consistent CHO (Diabetic) [4]     Nutrient modifications: (optional):   High Fiber [8]       FOLLOW UP:  ALEXANDER Barthol[facility]  [address]  [place] [address]  [phone]    Follow up in 2 week(s)  Please call your primary care provider to schedule a hospital follow up.     Thank you    Jb Park M.D.  [address]  [address]  [place] [id number] [phone]    Follow up  as soon as possible upon discharge    [facility]  [address]  [facility] [address]  [phone]  Follow up  As needed, If symptoms worsen    Romeo Castaneda M.D.  [address] [facility] [id number] [phone]    Schedule an appointment as soon as possible for a visit in 2 week(s)  If unable or do not want to follow up with Dr. Jb Park      TIME SPENT ON DISCHARGE: 36 minutes    ____________________________________________  CADEN Evans    DD: 5/14/2025 12:26 PM

## 2025-05-14 NOTE — DISCHARGE PLANNING
Case Management Discharge Planning    Admission Date: 5/5/2025  GMLOS: 3  ALOS: 9    6-Clicks ADL Score: 13  6-Clicks Mobility Score: 12  PT and/or OT Eval ordered: Yes  Post-acute Referrals Ordered: Yes  Post-acute Choice Obtained: Yes  Has referral(s) been sent to post-acute provider:  Yes      Anticipated Discharge Dispo: Discharge Disposition: D/T to SNF with Medicare cert in anticipation of skilled care (03)    DME Needed: No    Action(s) Taken: Patient was discussed in morning trauma rounds.  Per provider, patient is medically cleared to transfer to [facility].  Last BM 5/11/25.  RNCM called Larissa at [facility] and [facility] requesting a call back regarding bed availability for today and updating on last BM.  Awaiting call back from [facility].      1302  Per DPA--[facility] does not have any beds available.  RNCM met with patient at the bedside to discuss.  Per patient, she stated she had a BM yesterday during her shower with the CNA.  Patient does not wish to appeal her DC.  IMM signed.  Patient gave choice for [facility].  Choice form faxed to [facility].      1336  Per DPA---[facility] can accept the patient today at 1530.  RNCM notified bedside RN and provider, and requested updated DC summary.  RNCM sent order to [facility]i for transportation to [facility] at 1530.      1351  [facility] confirmed transportation w/ [facility] at 1530 going to [facility].  [facility]ra packet with face sheets x2, chart notes, DC summary and hard rx for pain medication given to beside RN.      Escalations Completed: None    Medically Clear: Yes    Next Steps: RN CM to continue to follow for DC planning      Barriers to Discharge: Awaiting call back from [facility]

## 2025-05-14 NOTE — PROGRESS NOTES
1500 Report given to Jojo JAIME at [facility].     1530 Pt being discharged to [facility] with no needs. Prescriptions filled/given via [facility]. IV dc'd. Discharge instructions discussed. All questions answered.  Patient agreeable to discharge plan. Patient has all belongings at time of dc. [facility] to transport.

## 2025-05-14 NOTE — CARE PLAN
The patient is Stable - Low risk of patient condition declining or worsening    Shift Goals  Clinical Goals: pain, mobility, dc planning  Patient Goals: dc plan  Family Goals: na    Progress made toward(s) clinical / shift goals:    Problem: Pain - Standard  Goal: Alleviation of pain or a reduction in pain to the patient’s comfort goal  Outcome: Progressing  Note: Patient educated on 0-10 pain scale, available pain medications, and non-pharmacological methods of pain management. Verbalizes understanding. See MAR.        Problem: Knowledge Deficit - Standard  Goal: Patient and family/care givers will demonstrate understanding of plan of care, disease process/condition, diagnostic tests and medications  Outcome: Progressing  Note: Patient updated on POC, including potential discharge to [facility] and necessity for bowel movement. Patient refused bowel medications and protocol. This RN educated importance of bowel movement despite it being 3 days ago. Patient continues to decline. No further concerns at this time. Personal belongings and call light within reach.          Patient is not progressing towards the following goals:

## 2025-05-14 NOTE — DISCHARGE PLANNING
1014  Agency/Facility Name: [facility]  Outcome: DPA called regarding bed availability. Left VM with name and callback number.     1040  Agency/Facility Name: [facility]  Outcome: DPA called regarding bed availability. Left VM with name and callback number.     1300  Agency/Facility Name: [facility]  Spoke To: Larissa  Outcome: DPA received VM stating there are no beds available today but potentially tomorrow.

## 2025-05-14 NOTE — DISCHARGE PLANNING
DC Transport Scheduled    Transport Company Scheduled:  [employer]  Spoke with Lee Ann at [facility] to schedule transport.    Scheduled Date: 5/14/2025  Scheduled Time: 1530    Transport Type: [facility]  Destination:   [facility]   Destination address: [place] [address]    Notified care team of scheduled transport via [employer].     If there are any changes needed to the DC transportation scheduled, please contact [facility] at ext. [phone] between the hours of 0599-2613. If outside those hours, contact the ED Case Manager at ext. [phone].

## 2025-05-14 NOTE — THERAPY
Occupational Therapy  Daily Treatment     Patient Name: Yamile Go  Age:  75 y.o., Sex:  female  Medical Record #: [id number]  Today's Date: 5/14/2025     Precautions  Precautions: Fall Risk, TLSO (Thoracolumbosacral orthosis)  Comments: TLSO for comfort; LUE weakness prior to admit w/rib fxs    Assessment    Pt seen for OT session. Progressing with functional mobility and activity tolerance. No c/o dizziness this session. Continues to be limited by decreased functional mobility, activity tolerance, cognition, sensation, strength, AROM, coordination, balance, and pain which are currently affecting pt's ability to complete ADLs/txfs/IADLs at baseline. Will continue to follow.     Plan    Treatment Plan Status: Continue Current Treatment Plan  Type of Treatment: Self Care / Activities of Daily Living, Adaptive Equipment, Neuro Re-Education / Balance, Therapeutic Exercises, Therapeutic Activity, Family / Caregiver Training, Manual Therapy Techniques  Treatment Frequency: 4 Times per Week  Treatment Duration: Until Therapy Goals Met    DC Equipment Recommendations: Unable to determine at this time  Discharge Recommendations: Recommend post-acute placement for additional occupational therapy services prior to discharge home     Objective       05/14/25 0926   Precautions   Precautions Fall Risk;TLSO (Thoracolumbosacral orthosis)   Comments TLSO for comfort; LUE weakness prior to admit w/rib fxs   Vitals   O2 Delivery Device None - Room Air   Pain 0 - 10 Group   Location Flank   Location Orientation Left   Therapist Pain Assessment Post Activity;During Activity;Nurse Notified  (not quantified)   Cognition    Cognition / Consciousness X   Speech/ Communication Delayed Responses   Level of Consciousness Alert   Safety Awareness Impaired   New Learning Impaired   Attention Impaired   Sequencing Impaired   Comments cooperative with encouragement. limited volition. grossly delayed; minimal receptivity/retention of education    Passive ROM Upper Body   Passive ROM Upper Body X   Comments limited by pain at ribs   Active ROM Upper Body   Active ROM Upper Body  X   Dominant Hand Right   Comments minimal AROM LUE at baseline 2/2 to neurological issue as well as pain from ribs   Strength Upper Body   Upper Body Strength  X   Comments LUE limited at baseline   Sensation Upper Body   Upper Extremity Sensation  X   Comments LUE w/ diminished sensation, but hypersensitive to pressure. appears may have diminished sensation on R hand   Sitting Upper Body Exercises   Comments educated on finger ladder exercises using wall and RUE for AAROM   Other Treatments   Other Treatments Provided Educated on pathology of bedrest especially risk of PE 2/2 rib fxs and little mobility, as well as finger ladder and AAROM exercises (in sitting/supine), and importance of continued OOB activity instead of use of purewick.   Balance   Sitting Balance (Static) Fair   Sitting Balance (Dynamic) Fair -   Standing Balance (Static) Fair -   Standing Balance (Dynamic) Poor +   Weight Shift Sitting Fair   Weight Shift Standing Poor   Skilled Intervention Verbal Cuing;Compensatory Strategies;Facilitation;Tactile Cuing;Sequencing;Postural Facilitation   Comments w/fww   Bed Mobility    Supine to Sit Contact Guard Assist   Sit to Supine Minimal Assist   Scooting Standby Assist   Rolling Contact Guard Assist   Skilled Intervention Verbal Cuing;Compensatory Strategies;Tactile Cuing;Sequencing   Comments HOB elevated; w/v/cs for logroll   Activities of Daily Living   Eating Supervision   Grooming   (refused)   Lower Body Dressing Moderate Assist   Toileting Moderate Assist   Skilled Intervention Verbal Cuing;Tactile Cuing;Sequencing;Facilitation;Compensatory Strategies   Functional Mobility   Sit to Stand Contact Guard Assist   Bed, Chair, Wheelchair Transfer Minimal Assist   Toilet Transfers Minimal Assist   Transfer Method Stand Step   Mobility w/FWW; bed<>toilet   Skilled  Intervention Compensatory Strategies;Facilitation;Verbal Cuing;Tactile Cuing;Sequencing;Postural Facilitation   Comments Pt observed taking full seated shower w/CNA yesterday   Activity Tolerance   Comments limited by cognition, pain, fatigue, and volition   Patient / Family Goals   Patient / Family Goal #1 to not be in pain   Short Term Goals   Short Term Goal # 1 don/doff TLSO w/SPV   Goal Outcome # 1 Goal not met   Short Term Goal # 2 BSC txf w/min A   Goal Outcome # 2 Goal met, new goal added   Short Term Goal # 2 B  toilet txf with SPV   Short Term Goal # 3 toileting w/min A   Goal Outcome # 3 Progressing as expected   Short Term Goal # 4 standing g/h w/SPV taking RBs PRN   Goal Outcome # 4 Goal not met   Education Group   Education Provided Pathology of bedrest   Pathology of Bedrest Patient Response Patient;Acceptance;Explanation;Reinforcement Needed;No Learning Evidence

## 2025-05-20 ENCOUNTER — HOSPITAL ENCOUNTER (OUTPATIENT)
Facility: MEDICAL CENTER | Age: 75
End: 2025-05-20
Attending: INTERNAL MEDICINE
Payer: COMMERCIAL

## 2025-05-21 LAB
ALBUMIN SERPL BCP-MCNC: 3.2 G/DL (ref 3.2–4.9)
ALBUMIN/GLOB SERPL: 1.1 G/DL
ALP SERPL-CCNC: 83 U/L (ref 30–99)
ALT SERPL-CCNC: 18 U/L (ref 2–50)
ANION GAP SERPL CALC-SCNC: 10 MMOL/L (ref 7–16)
ANISOCYTOSIS BLD QL SMEAR: ABNORMAL
AST SERPL-CCNC: 46 U/L (ref 12–45)
BASOPHILS # BLD AUTO: 1 % (ref 0–1.8)
BASOPHILS # BLD: 0.06 K/UL (ref 0–0.12)
BILIRUB SERPL-MCNC: <0.2 MG/DL (ref 0.1–1.5)
BUN SERPL-MCNC: 11 MG/DL (ref 8–22)
BURR CELLS BLD QL SMEAR: NORMAL
CALCIUM ALBUM COR SERPL-MCNC: 9.7 MG/DL (ref 8.5–10.5)
CALCIUM SERPL-MCNC: 9.1 MG/DL (ref 8.5–10.5)
CHLORIDE SERPL-SCNC: 107 MMOL/L (ref 96–112)
CO2 SERPL-SCNC: 20 MMOL/L (ref 20–33)
COMMENT 1642: NORMAL
CREAT SERPL-MCNC: 0.74 MG/DL (ref 0.5–1.4)
EOSINOPHIL # BLD AUTO: 0.2 K/UL (ref 0–0.51)
EOSINOPHIL NFR BLD: 3.3 % (ref 0–6.9)
ERYTHROCYTE [DISTWIDTH] IN BLOOD BY AUTOMATED COUNT: 55.9 FL (ref 35.9–50)
GFR SERPLBLD CREATININE-BSD FMLA CKD-EPI: 85 ML/MIN/1.73 M 2
GLOBULIN SER CALC-MCNC: 2.9 G/DL (ref 1.9–3.5)
GLUCOSE SERPL-MCNC: 102 MG/DL (ref 65–99)
HCT VFR BLD AUTO: 35.5 % (ref 37–47)
HGB BLD-MCNC: 10 G/DL (ref 12–16)
IMM GRANULOCYTES # BLD AUTO: 0.02 K/UL (ref 0–0.11)
IMM GRANULOCYTES NFR BLD AUTO: 0.3 % (ref 0–0.9)
LYMPHOCYTES # BLD AUTO: 1.86 K/UL (ref 1–4.8)
LYMPHOCYTES NFR BLD: 30.5 % (ref 22–41)
MCH RBC QN AUTO: 25.5 PG (ref 27–33)
MCHC RBC AUTO-ENTMCNC: 28.2 G/DL (ref 32.2–35.5)
MCV RBC AUTO: 90.6 FL (ref 81.4–97.8)
MICROCYTES BLD QL SMEAR: ABNORMAL
MONOCYTES # BLD AUTO: 0.55 K/UL (ref 0–0.85)
MONOCYTES NFR BLD AUTO: 9 % (ref 0–13.4)
MORPHOLOGY BLD-IMP: NORMAL
NEUTROPHILS # BLD AUTO: 3.4 K/UL (ref 1.82–7.42)
NEUTROPHILS NFR BLD: 55.9 % (ref 44–72)
NRBC # BLD AUTO: 0 K/UL
NRBC BLD-RTO: 0 /100 WBC (ref 0–0.2)
OVALOCYTES BLD QL SMEAR: NORMAL
PLATELET # BLD AUTO: 305 K/UL (ref 164–446)
PLATELET BLD QL SMEAR: NORMAL
PMV BLD AUTO: 10.2 FL (ref 9–12.9)
POIKILOCYTOSIS BLD QL SMEAR: NORMAL
POTASSIUM SERPL-SCNC: 4 MMOL/L (ref 3.6–5.5)
PROT SERPL-MCNC: 6.1 G/DL (ref 6–8.2)
RBC # BLD AUTO: 3.92 M/UL (ref 4.2–5.4)
RBC BLD AUTO: PRESENT
SODIUM SERPL-SCNC: 137 MMOL/L (ref 135–145)
WBC # BLD AUTO: 6.1 K/UL (ref 4.8–10.8)

## 2025-05-22 ENCOUNTER — HOSPITAL ENCOUNTER (OUTPATIENT)
Facility: MEDICAL CENTER | Age: 75
End: 2025-05-22
Attending: INTERNAL MEDICINE
Payer: COMMERCIAL

## 2025-05-26 LAB
BACTERIA UR CULT: NORMAL
SIGNIFICANT IND 70042: NORMAL
SITE SITE: NORMAL
SOURCE SOURCE: NORMAL

## 2025-07-21 ENCOUNTER — APPOINTMENT (OUTPATIENT)
Dept: ADMISSIONS | Facility: MEDICAL CENTER | Age: 75
End: 2025-07-21
Attending: NEUROLOGICAL SURGERY
Payer: MEDICARE

## 2025-07-24 ENCOUNTER — PRE-ADMISSION TESTING (OUTPATIENT)
Dept: ADMISSIONS | Facility: MEDICAL CENTER | Age: 75
End: 2025-07-24
Attending: NEUROLOGICAL SURGERY
Payer: MEDICARE

## 2025-07-24 NOTE — PREADMIT AVS NOTE
Current Medications   Medication Instructions    ELIQUIS 5 MG Tab Follow instructions from surgeon or specialist.    metFORMIN (GLUCOPHAGE) 500 MG Tab Hold medication day of procedure    oxyCODONE immediate release (ROXICODONE) 10 MG immediate release tablet As needed medication, may take if needed, including morning of procedure     metoprolol SR (TOPROL XL) 25 MG TABLET SR 24 HR Continue taking as prescribed.    amitriptyline (ELAVIL) 150 MG Tab Continue taking as prescribed.    DULoxetine (CYMBALTA) 30 MG Cap DR Particles Continue taking as prescribed.    omeprazole (PRILOSEC) 20 MG delayed-release capsule As needed medication, may take if needed, including morning of procedure     lidocaine (ASPERFLEX) 4 % Patch Hold medication day of procedure    ondansetron (ZOFRAN ODT) 4 MG TABLET DISPERSIBLE As needed medication, may take if needed, including morning of procedure     tamsulosin (FLOMAX) 0.4 MG capsule Continue taking as prescribed.    alendronate (FOSAMAX) 70 MG Tab Hold medication day of procedure    albuterol 108 (90 Base) MCG/ACT Aero Soln inhalation aerosol As needed medication, may take if needed, including morning of procedure     ezetimibe (ZETIA) 10 MG Tab Stop 24 hours before surgery    liothyronine (CYTOMEL) 5 MCG Tab Continue taking medication as prescribed, including morning of procedure     rosuvastatin (CRESTOR) 20 MG Tab Continue taking as prescribed.    SYNTHROID 88 MCG Tab Continue taking medication as prescribed, including morning of procedure      Please contact Dr. Pisano's office to confirm instructions on your Eliquis.    Your Preadmit testing appointment, is on 7/29/25 @ 0810.You will go to Surgical Preadmit/[facility] for your Preadmit Testing appointment. See attached map in [facility] for directions.

## 2025-07-24 NOTE — OR NURSING
Preadmit: During preadmit appointment for patient's scheduled surgery with Dr. Pisano on 7/31/25, she stated she was on Eliquis. When RN asked if she received instructions from the physician on when to stop the medication for surgery, patient stated that she knew she would have to hold it so she stopped medication on her own. Patient was instructed to call Dr. Pisano for specific instructions as different blood thinning medication may have different guidance on the amount of days to stop prior to procedure. Patient stated she will call and follow up with Dr. Pisano for specific instructions.

## 2025-07-28 ENCOUNTER — PRE-ADMISSION TESTING (OUTPATIENT)
Dept: ADMISSIONS | Facility: MEDICAL CENTER | Age: 75
End: 2025-07-28
Attending: NEUROLOGICAL SURGERY
Payer: MEDICARE

## 2025-07-28 ENCOUNTER — HOSPITAL ENCOUNTER (OUTPATIENT)
Dept: RADIOLOGY | Facility: MEDICAL CENTER | Age: 75
End: 2025-07-28
Attending: NEUROLOGICAL SURGERY | Admitting: NEUROLOGICAL SURGERY
Payer: MEDICARE

## 2025-07-28 PROCEDURE — 71045 X-RAY EXAM CHEST 1 VIEW: CPT

## 2025-07-29 ENCOUNTER — APPOINTMENT (OUTPATIENT)
Dept: ADMISSIONS | Facility: MEDICAL CENTER | Age: 75
DRG: 472 | End: 2025-07-29
Attending: NEUROLOGICAL SURGERY
Payer: MEDICARE

## 2025-07-29 DIAGNOSIS — Z01.810 PRE-OPERATIVE CARDIOVASCULAR EXAMINATION: ICD-10-CM

## 2025-07-29 DIAGNOSIS — Z01.811 PRE-OPERATIVE RESPIRATORY EXAMINATION: ICD-10-CM

## 2025-07-29 DIAGNOSIS — Z01.812 PRE-OPERATIVE LABORATORY EXAMINATION: Primary | ICD-10-CM

## 2025-07-30 ENCOUNTER — ANESTHESIA EVENT (OUTPATIENT)
Dept: SURGERY | Facility: MEDICAL CENTER | Age: 75
End: 2025-07-30
Payer: MEDICARE

## 2025-07-31 ENCOUNTER — APPOINTMENT (OUTPATIENT)
Dept: RADIOLOGY | Facility: MEDICAL CENTER | Age: 75
DRG: 472 | End: 2025-07-31
Attending: NEUROLOGICAL SURGERY
Payer: MEDICARE

## 2025-07-31 ENCOUNTER — ANESTHESIA (OUTPATIENT)
Dept: SURGERY | Facility: MEDICAL CENTER | Age: 75
End: 2025-07-31
Payer: MEDICARE

## 2025-07-31 ENCOUNTER — HOSPITAL ENCOUNTER (INPATIENT)
Facility: MEDICAL CENTER | Age: 75
LOS: 2 days | DRG: 472 | End: 2025-08-02
Attending: NEUROLOGICAL SURGERY | Admitting: NEUROLOGICAL SURGERY
Payer: MEDICARE

## 2025-07-31 DIAGNOSIS — G89.18 ACUTE POSTOPERATIVE PAIN: ICD-10-CM

## 2025-07-31 DIAGNOSIS — G99.2 STENOSIS OF CERVICAL SPINE WITH MYELOPATHY (HCC): Primary | ICD-10-CM

## 2025-07-31 DIAGNOSIS — M48.02 STENOSIS OF CERVICAL SPINE WITH MYELOPATHY (HCC): Primary | ICD-10-CM

## 2025-07-31 LAB
GLUCOSE BLD STRIP.AUTO-MCNC: 102 MG/DL (ref 65–99)
PATHOLOGY CONSULT NOTE: NORMAL

## 2025-07-31 PROCEDURE — 700111 HCHG RX REV CODE 636 W/ 250 OVERRIDE (IP): Mod: JZ | Performed by: ANESTHESIOLOGY

## 2025-07-31 PROCEDURE — 110454 HCHG SHELL REV 250: Performed by: NEUROLOGICAL SURGERY

## 2025-07-31 PROCEDURE — 110371 HCHG SHELL REV 272: Performed by: NEUROLOGICAL SURGERY

## 2025-07-31 PROCEDURE — 82962 GLUCOSE BLOOD TEST: CPT | Performed by: NEUROLOGICAL SURGERY

## 2025-07-31 PROCEDURE — 72040 X-RAY EXAM NECK SPINE 2-3 VW: CPT

## 2025-07-31 PROCEDURE — A9270 NON-COVERED ITEM OR SERVICE: HCPCS | Performed by: ANESTHESIOLOGY

## 2025-07-31 PROCEDURE — 95940 IONM IN OPERATNG ROOM 15 MIN: CPT | Performed by: NEUROLOGICAL SURGERY

## 2025-07-31 PROCEDURE — 700111 HCHG RX REV CODE 636 W/ 250 OVERRIDE (IP): Performed by: NEUROLOGICAL SURGERY

## 2025-07-31 PROCEDURE — 160041 HCHG SURGERY MINUTES - EA ADDL 1 MIN LEVEL 4: Performed by: NEUROLOGICAL SURGERY

## 2025-07-31 PROCEDURE — 700105 HCHG RX REV CODE 258: Performed by: STUDENT IN AN ORGANIZED HEALTH CARE EDUCATION/TRAINING PROGRAM

## 2025-07-31 PROCEDURE — 502240 HCHG MISC OR SUPPLY RC 0272: Performed by: NEUROLOGICAL SURGERY

## 2025-07-31 PROCEDURE — 88304 TISSUE EXAM BY PATHOLOGIST: CPT | Performed by: PATHOLOGY

## 2025-07-31 PROCEDURE — 700111 HCHG RX REV CODE 636 W/ 250 OVERRIDE (IP): Performed by: STUDENT IN AN ORGANIZED HEALTH CARE EDUCATION/TRAINING PROGRAM

## 2025-07-31 PROCEDURE — 95955 EEG DURING SURGERY: CPT | Performed by: NEUROLOGICAL SURGERY

## 2025-07-31 PROCEDURE — 160015 HCHG STAT PREOP MINUTES: Performed by: NEUROLOGICAL SURGERY

## 2025-07-31 PROCEDURE — 88304 TISSUE EXAM BY PATHOLOGIST: CPT | Mod: 26 | Performed by: PATHOLOGY

## 2025-07-31 PROCEDURE — 700101 HCHG RX REV CODE 250: Performed by: STUDENT IN AN ORGANIZED HEALTH CARE EDUCATION/TRAINING PROGRAM

## 2025-07-31 PROCEDURE — A9270 NON-COVERED ITEM OR SERVICE: HCPCS | Performed by: STUDENT IN AN ORGANIZED HEALTH CARE EDUCATION/TRAINING PROGRAM

## 2025-07-31 PROCEDURE — 770001 HCHG ROOM/CARE - MED/SURG/GYN PRIV*

## 2025-07-31 PROCEDURE — 0RG20A0 FUSION OF 2 OR MORE CERVICAL VERTEBRAL JOINTS WITH INTERBODY FUSION DEVICE, ANTERIOR APPROACH, ANTERIOR COLUMN, OPEN APPROACH: ICD-10-PCS | Performed by: NEUROLOGICAL SURGERY

## 2025-07-31 PROCEDURE — 700102 HCHG RX REV CODE 250 W/ 637 OVERRIDE(OP): Performed by: PHYSICIAN ASSISTANT

## 2025-07-31 PROCEDURE — 160029 HCHG SURGERY MINUTES - 1ST 30 MINS LEVEL 4: Performed by: NEUROLOGICAL SURGERY

## 2025-07-31 PROCEDURE — 95868 NDL EMG CRANIAL NRV MUSC BI: CPT | Performed by: NEUROLOGICAL SURGERY

## 2025-07-31 PROCEDURE — 95861 NEEDLE EMG 2 EXTREMITIES: CPT | Performed by: NEUROLOGICAL SURGERY

## 2025-07-31 PROCEDURE — 160002 HCHG RECOVERY MINUTES (STAT): Performed by: NEUROLOGICAL SURGERY

## 2025-07-31 PROCEDURE — 700105 HCHG RX REV CODE 258: Performed by: NEUROLOGICAL SURGERY

## 2025-07-31 PROCEDURE — 95939 C MOTOR EVOKED UPR&LWR LIMBS: CPT | Performed by: NEUROLOGICAL SURGERY

## 2025-07-31 PROCEDURE — 0RB30ZZ EXCISION OF CERVICAL VERTEBRAL DISC, OPEN APPROACH: ICD-10-PCS | Performed by: NEUROLOGICAL SURGERY

## 2025-07-31 PROCEDURE — 160192 HCHG ANESTHESIA COMPLEX: Performed by: NEUROLOGICAL SURGERY

## 2025-07-31 PROCEDURE — 160193 HCHG PACU STANDARD - 1ST 60 MINS: Performed by: NEUROLOGICAL SURGERY

## 2025-07-31 PROCEDURE — 700101 HCHG RX REV CODE 250: Performed by: PHYSICIAN ASSISTANT

## 2025-07-31 PROCEDURE — 160048 HCHG OR STATISTICAL LEVEL 1-5: Performed by: NEUROLOGICAL SURGERY

## 2025-07-31 PROCEDURE — 700111 HCHG RX REV CODE 636 W/ 250 OVERRIDE (IP): Performed by: PHYSICIAN ASSISTANT

## 2025-07-31 PROCEDURE — 95938 SOMATOSENSORY TESTING: CPT | Performed by: NEUROLOGICAL SURGERY

## 2025-07-31 PROCEDURE — A9270 NON-COVERED ITEM OR SERVICE: HCPCS | Performed by: PHYSICIAN ASSISTANT

## 2025-07-31 PROCEDURE — 95937 NEUROMUSCULAR JUNCTION TEST: CPT | Performed by: NEUROLOGICAL SURGERY

## 2025-07-31 PROCEDURE — C1713 ANCHOR/SCREW BN/BN,TIS/BN: HCPCS | Performed by: NEUROLOGICAL SURGERY

## 2025-07-31 PROCEDURE — 700102 HCHG RX REV CODE 250 W/ 637 OVERRIDE(OP): Performed by: STUDENT IN AN ORGANIZED HEALTH CARE EDUCATION/TRAINING PROGRAM

## 2025-07-31 PROCEDURE — 700101 HCHG RX REV CODE 250: Performed by: NEUROLOGICAL SURGERY

## 2025-07-31 PROCEDURE — 700102 HCHG RX REV CODE 250 W/ 637 OVERRIDE(OP): Performed by: ANESTHESIOLOGY

## 2025-07-31 PROCEDURE — 700105 HCHG RX REV CODE 258: Performed by: PHYSICIAN ASSISTANT

## 2025-07-31 DEVICE — IMPLANTABLE DEVICE: Type: IMPLANTABLE DEVICE | Site: NECK | Status: FUNCTIONAL

## 2025-07-31 DEVICE — PIN TEMPORARY FIXATION (1EA): Type: IMPLANTABLE DEVICE | Site: NECK | Status: FUNCTIONAL

## 2025-07-31 RX ORDER — ALBUTEROL SULFATE 90 UG/1
1-2 INHALANT RESPIRATORY (INHALATION)
Status: DISCONTINUED | OUTPATIENT
Start: 2025-07-31 | End: 2025-08-02 | Stop reason: HOSPADM

## 2025-07-31 RX ORDER — SODIUM CHLORIDE AND POTASSIUM CHLORIDE 150; 900 MG/100ML; MG/100ML
INJECTION, SOLUTION INTRAVENOUS CONTINUOUS
Status: DISCONTINUED | OUTPATIENT
Start: 2025-07-31 | End: 2025-08-02 | Stop reason: HOSPADM

## 2025-07-31 RX ORDER — CEFAZOLIN SODIUM 1 G/3ML
INJECTION, POWDER, FOR SOLUTION INTRAMUSCULAR; INTRAVENOUS PRN
Status: DISCONTINUED | OUTPATIENT
Start: 2025-07-31 | End: 2025-07-31 | Stop reason: SURG

## 2025-07-31 RX ORDER — OXYCODONE HYDROCHLORIDE 15 MG/1
15 TABLET ORAL EVERY 4 HOURS PRN
Status: DISCONTINUED | OUTPATIENT
Start: 2025-07-31 | End: 2025-08-02 | Stop reason: HOSPADM

## 2025-07-31 RX ORDER — OXYCODONE HCL 5 MG/5 ML
10 SOLUTION, ORAL ORAL
Status: COMPLETED | OUTPATIENT
Start: 2025-07-31 | End: 2025-07-31

## 2025-07-31 RX ORDER — CELECOXIB 200 MG/1
200 CAPSULE ORAL ONCE
Status: DISCONTINUED | OUTPATIENT
Start: 2025-07-31 | End: 2025-07-31 | Stop reason: HOSPADM

## 2025-07-31 RX ORDER — HYDROMORPHONE HYDROCHLORIDE 2 MG/ML
INJECTION, SOLUTION INTRAMUSCULAR; INTRAVENOUS; SUBCUTANEOUS PRN
Status: DISCONTINUED | OUTPATIENT
Start: 2025-07-31 | End: 2025-07-31 | Stop reason: SURG

## 2025-07-31 RX ORDER — ALBUTEROL SULFATE 90 UG/1
INHALANT RESPIRATORY (INHALATION) PRN
Status: DISCONTINUED | OUTPATIENT
Start: 2025-07-31 | End: 2025-07-31 | Stop reason: SURG

## 2025-07-31 RX ORDER — HALOPERIDOL 5 MG/ML
INJECTION INTRAMUSCULAR PRN
Status: DISCONTINUED | OUTPATIENT
Start: 2025-07-31 | End: 2025-07-31 | Stop reason: SURG

## 2025-07-31 RX ORDER — CALCIUM CARBONATE 500 MG/1
500 TABLET, CHEWABLE ORAL 2 TIMES DAILY
Status: DISCONTINUED | OUTPATIENT
Start: 2025-07-31 | End: 2025-08-02 | Stop reason: HOSPADM

## 2025-07-31 RX ORDER — TAMSULOSIN HYDROCHLORIDE 0.4 MG/1
0.4 CAPSULE ORAL
Status: DISCONTINUED | OUTPATIENT
Start: 2025-07-31 | End: 2025-08-02 | Stop reason: HOSPADM

## 2025-07-31 RX ORDER — DEXAMETHASONE SODIUM PHOSPHATE 4 MG/ML
4 INJECTION, SOLUTION INTRA-ARTICULAR; INTRALESIONAL; INTRAMUSCULAR; INTRAVENOUS; SOFT TISSUE EVERY 6 HOURS
Status: DISPENSED | OUTPATIENT
Start: 2025-07-31 | End: 2025-08-01

## 2025-07-31 RX ORDER — LIOTHYRONINE SODIUM 5 UG/1
5 TABLET ORAL DAILY
Status: DISCONTINUED | OUTPATIENT
Start: 2025-07-31 | End: 2025-08-02 | Stop reason: HOSPADM

## 2025-07-31 RX ORDER — HALOPERIDOL 5 MG/ML
1 INJECTION INTRAMUSCULAR
Status: DISCONTINUED | OUTPATIENT
Start: 2025-07-31 | End: 2025-07-31 | Stop reason: HOSPADM

## 2025-07-31 RX ORDER — AMOXICILLIN 250 MG
1 CAPSULE ORAL NIGHTLY
Status: DISCONTINUED | OUTPATIENT
Start: 2025-07-31 | End: 2025-08-02 | Stop reason: HOSPADM

## 2025-07-31 RX ORDER — DIPHENHYDRAMINE HYDROCHLORIDE 50 MG/ML
25 INJECTION, SOLUTION INTRAMUSCULAR; INTRAVENOUS EVERY 6 HOURS PRN
Status: DISCONTINUED | OUTPATIENT
Start: 2025-07-31 | End: 2025-08-02 | Stop reason: HOSPADM

## 2025-07-31 RX ORDER — ONDANSETRON 2 MG/ML
INJECTION INTRAMUSCULAR; INTRAVENOUS PRN
Status: DISCONTINUED | OUTPATIENT
Start: 2025-07-31 | End: 2025-07-31 | Stop reason: SURG

## 2025-07-31 RX ORDER — OXYCODONE HCL 5 MG/5 ML
5 SOLUTION, ORAL ORAL
Status: COMPLETED | OUTPATIENT
Start: 2025-07-31 | End: 2025-07-31

## 2025-07-31 RX ORDER — SODIUM CHLORIDE, SODIUM LACTATE, POTASSIUM CHLORIDE, CALCIUM CHLORIDE 600; 310; 30; 20 MG/100ML; MG/100ML; MG/100ML; MG/100ML
INJECTION, SOLUTION INTRAVENOUS
Status: DISCONTINUED | OUTPATIENT
Start: 2025-07-31 | End: 2025-07-31 | Stop reason: SURG

## 2025-07-31 RX ORDER — SODIUM CHLORIDE, SODIUM LACTATE, POTASSIUM CHLORIDE, CALCIUM CHLORIDE 600; 310; 30; 20 MG/100ML; MG/100ML; MG/100ML; MG/100ML
INJECTION, SOLUTION INTRAVENOUS CONTINUOUS
Status: ACTIVE | OUTPATIENT
Start: 2025-07-31 | End: 2025-07-31

## 2025-07-31 RX ORDER — HYDROMORPHONE HYDROCHLORIDE 1 MG/ML
0.2 INJECTION, SOLUTION INTRAMUSCULAR; INTRAVENOUS; SUBCUTANEOUS
Status: DISCONTINUED | OUTPATIENT
Start: 2025-07-31 | End: 2025-07-31 | Stop reason: HOSPADM

## 2025-07-31 RX ORDER — HYDROMORPHONE HYDROCHLORIDE 1 MG/ML
0.5 INJECTION, SOLUTION INTRAMUSCULAR; INTRAVENOUS; SUBCUTANEOUS
Status: DISCONTINUED | OUTPATIENT
Start: 2025-07-31 | End: 2025-08-02 | Stop reason: HOSPADM

## 2025-07-31 RX ORDER — LEVOTHYROXINE SODIUM 88 UG/1
88 TABLET ORAL DAILY
Status: DISCONTINUED | OUTPATIENT
Start: 2025-07-31 | End: 2025-08-02 | Stop reason: HOSPADM

## 2025-07-31 RX ORDER — HYDRALAZINE HYDROCHLORIDE 20 MG/ML
5 INJECTION INTRAMUSCULAR; INTRAVENOUS
Status: DISCONTINUED | OUTPATIENT
Start: 2025-07-31 | End: 2025-07-31 | Stop reason: HOSPADM

## 2025-07-31 RX ORDER — PHENYLEPHRINE HYDROCHLORIDE 10 MG/ML
INJECTION, SOLUTION INTRAMUSCULAR; INTRAVENOUS; SUBCUTANEOUS PRN
Status: DISCONTINUED | OUTPATIENT
Start: 2025-07-31 | End: 2025-07-31 | Stop reason: SURG

## 2025-07-31 RX ORDER — MIDAZOLAM HYDROCHLORIDE 1 MG/ML
INJECTION INTRAMUSCULAR; INTRAVENOUS PRN
Status: DISCONTINUED | OUTPATIENT
Start: 2025-07-31 | End: 2025-07-31 | Stop reason: SURG

## 2025-07-31 RX ORDER — BACLOFEN 10 MG/1
10 TABLET ORAL 3 TIMES DAILY PRN
Status: DISCONTINUED | OUTPATIENT
Start: 2025-07-31 | End: 2025-08-02 | Stop reason: HOSPADM

## 2025-07-31 RX ORDER — OXYCODONE HYDROCHLORIDE 10 MG/1
10 TABLET ORAL EVERY 4 HOURS PRN
Status: DISCONTINUED | OUTPATIENT
Start: 2025-07-31 | End: 2025-08-02 | Stop reason: HOSPADM

## 2025-07-31 RX ORDER — MEPERIDINE HYDROCHLORIDE 50 MG/ML
12.5 INJECTION INTRAMUSCULAR; INTRAVENOUS; SUBCUTANEOUS
Status: DISCONTINUED | OUTPATIENT
Start: 2025-07-31 | End: 2025-07-31 | Stop reason: HOSPADM

## 2025-07-31 RX ORDER — TEMAZEPAM 7.5 MG/1
15 CAPSULE ORAL
Status: DISCONTINUED | OUTPATIENT
Start: 2025-08-01 | End: 2025-08-02 | Stop reason: HOSPADM

## 2025-07-31 RX ORDER — CIPROFLOXACIN 500 MG/1
250 TABLET, FILM COATED ORAL 2 TIMES DAILY
Status: DISCONTINUED | OUTPATIENT
Start: 2025-07-31 | End: 2025-08-02 | Stop reason: HOSPADM

## 2025-07-31 RX ORDER — EZETIMIBE 10 MG/1
10 TABLET ORAL EVERY EVENING
Status: DISCONTINUED | OUTPATIENT
Start: 2025-07-31 | End: 2025-08-02 | Stop reason: HOSPADM

## 2025-07-31 RX ORDER — BUPIVACAINE HYDROCHLORIDE AND EPINEPHRINE 5; 5 MG/ML; UG/ML
INJECTION, SOLUTION EPIDURAL; INTRACAUDAL; PERINEURAL
Status: DISCONTINUED | OUTPATIENT
Start: 2025-07-31 | End: 2025-07-31 | Stop reason: HOSPADM

## 2025-07-31 RX ORDER — CIPROFLOXACIN 500 MG/1
500 TABLET, FILM COATED ORAL 2 TIMES DAILY
Status: ON HOLD | COMMUNITY
End: 2025-08-19

## 2025-07-31 RX ORDER — LIDOCAINE HYDROCHLORIDE 20 MG/ML
INJECTION, SOLUTION EPIDURAL; INFILTRATION; INTRACAUDAL; PERINEURAL PRN
Status: DISCONTINUED | OUTPATIENT
Start: 2025-07-31 | End: 2025-07-31 | Stop reason: SURG

## 2025-07-31 RX ORDER — LABETALOL HYDROCHLORIDE 5 MG/ML
5 INJECTION, SOLUTION INTRAVENOUS
Status: DISCONTINUED | OUTPATIENT
Start: 2025-07-31 | End: 2025-07-31 | Stop reason: HOSPADM

## 2025-07-31 RX ORDER — LABETALOL HYDROCHLORIDE 5 MG/ML
10 INJECTION, SOLUTION INTRAVENOUS
Status: DISCONTINUED | OUTPATIENT
Start: 2025-07-31 | End: 2025-08-02 | Stop reason: HOSPADM

## 2025-07-31 RX ORDER — DEXAMETHASONE SODIUM PHOSPHATE 4 MG/ML
INJECTION, SOLUTION INTRA-ARTICULAR; INTRALESIONAL; INTRAMUSCULAR; INTRAVENOUS; SOFT TISSUE PRN
Status: DISCONTINUED | OUTPATIENT
Start: 2025-07-31 | End: 2025-07-31 | Stop reason: SURG

## 2025-07-31 RX ORDER — SODIUM CHLORIDE, SODIUM LACTATE, POTASSIUM CHLORIDE, CALCIUM CHLORIDE 600; 310; 30; 20 MG/100ML; MG/100ML; MG/100ML; MG/100ML
INJECTION, SOLUTION INTRAVENOUS CONTINUOUS
Status: DISCONTINUED | OUTPATIENT
Start: 2025-07-31 | End: 2025-07-31 | Stop reason: HOSPADM

## 2025-07-31 RX ORDER — EPHEDRINE SULFATE 50 MG/ML
INJECTION, SOLUTION INTRAVENOUS PRN
Status: DISCONTINUED | OUTPATIENT
Start: 2025-07-31 | End: 2025-07-31 | Stop reason: SURG

## 2025-07-31 RX ORDER — REMIFENTANIL HYDROCHLORIDE 1 MG/ML
INJECTION, POWDER, LYOPHILIZED, FOR SOLUTION INTRAVENOUS
Status: DISCONTINUED | OUTPATIENT
Start: 2025-07-31 | End: 2025-07-31 | Stop reason: SURG

## 2025-07-31 RX ORDER — OMEPRAZOLE 20 MG/1
20 CAPSULE, DELAYED RELEASE ORAL 2 TIMES DAILY
Status: DISCONTINUED | OUTPATIENT
Start: 2025-07-31 | End: 2025-08-02 | Stop reason: HOSPADM

## 2025-07-31 RX ORDER — ONDANSETRON 4 MG/1
4 TABLET, ORALLY DISINTEGRATING ORAL EVERY 4 HOURS PRN
Status: DISCONTINUED | OUTPATIENT
Start: 2025-07-31 | End: 2025-08-02 | Stop reason: HOSPADM

## 2025-07-31 RX ORDER — LIDOCAINE 4 G/G
1 PATCH TOPICAL EVERY 24 HOURS
Status: DISCONTINUED | OUTPATIENT
Start: 2025-07-31 | End: 2025-08-02 | Stop reason: HOSPADM

## 2025-07-31 RX ORDER — ACETAMINOPHEN 500 MG
1000 TABLET ORAL ONCE
Status: COMPLETED | OUTPATIENT
Start: 2025-07-31 | End: 2025-07-31

## 2025-07-31 RX ORDER — SODIUM PHOSPHATE,MONO-DIBASIC 19G-7G/118
1 ENEMA (ML) RECTAL
Status: DISCONTINUED | OUTPATIENT
Start: 2025-07-31 | End: 2025-08-02 | Stop reason: HOSPADM

## 2025-07-31 RX ORDER — ONDANSETRON 2 MG/ML
4 INJECTION INTRAMUSCULAR; INTRAVENOUS
Status: DISCONTINUED | OUTPATIENT
Start: 2025-07-31 | End: 2025-07-31 | Stop reason: HOSPADM

## 2025-07-31 RX ORDER — METOPROLOL SUCCINATE 25 MG/1
12.5 TABLET, EXTENDED RELEASE ORAL DAILY
Status: DISCONTINUED | OUTPATIENT
Start: 2025-07-31 | End: 2025-08-02 | Stop reason: HOSPADM

## 2025-07-31 RX ORDER — MIDAZOLAM HYDROCHLORIDE 1 MG/ML
1 INJECTION INTRAMUSCULAR; INTRAVENOUS
Status: DISCONTINUED | OUTPATIENT
Start: 2025-07-31 | End: 2025-07-31 | Stop reason: HOSPADM

## 2025-07-31 RX ORDER — DOCUSATE SODIUM 100 MG/1
100 CAPSULE, LIQUID FILLED ORAL 2 TIMES DAILY
Status: DISCONTINUED | OUTPATIENT
Start: 2025-07-31 | End: 2025-08-02 | Stop reason: HOSPADM

## 2025-07-31 RX ORDER — ROSUVASTATIN CALCIUM 20 MG/1
20 TABLET, COATED ORAL EVERY EVENING
Status: DISCONTINUED | OUTPATIENT
Start: 2025-07-31 | End: 2025-08-02 | Stop reason: HOSPADM

## 2025-07-31 RX ORDER — METHOCARBAMOL 100 MG/ML
1000 INJECTION, SOLUTION INTRAMUSCULAR; INTRAVENOUS EVERY 12 HOURS
Status: COMPLETED | OUTPATIENT
Start: 2025-07-31 | End: 2025-08-02

## 2025-07-31 RX ORDER — ACETAMINOPHEN 500 MG
1000 TABLET ORAL EVERY 6 HOURS
Status: DISCONTINUED | OUTPATIENT
Start: 2025-07-31 | End: 2025-08-02 | Stop reason: HOSPADM

## 2025-07-31 RX ORDER — EPHEDRINE SULFATE 50 MG/ML
5 INJECTION, SOLUTION INTRAVENOUS
Status: DISCONTINUED | OUTPATIENT
Start: 2025-07-31 | End: 2025-07-31 | Stop reason: HOSPADM

## 2025-07-31 RX ORDER — VITAMIN B COMPLEX
5000 TABLET ORAL DAILY
Status: DISCONTINUED | OUTPATIENT
Start: 2025-07-31 | End: 2025-08-02 | Stop reason: HOSPADM

## 2025-07-31 RX ORDER — DULOXETIN HYDROCHLORIDE 30 MG/1
30 CAPSULE, DELAYED RELEASE ORAL EVERY EVENING
Status: DISCONTINUED | OUTPATIENT
Start: 2025-07-31 | End: 2025-08-02 | Stop reason: HOSPADM

## 2025-07-31 RX ORDER — LISINOPRIL 10 MG/1
10 TABLET ORAL DAILY
Status: DISCONTINUED | OUTPATIENT
Start: 2025-07-31 | End: 2025-07-31

## 2025-07-31 RX ORDER — ALBUTEROL SULFATE 5 MG/ML
2.5 SOLUTION RESPIRATORY (INHALATION)
Status: DISCONTINUED | OUTPATIENT
Start: 2025-07-31 | End: 2025-07-31 | Stop reason: HOSPADM

## 2025-07-31 RX ORDER — HYDROMORPHONE HYDROCHLORIDE 1 MG/ML
0.4 INJECTION, SOLUTION INTRAMUSCULAR; INTRAVENOUS; SUBCUTANEOUS
Status: DISCONTINUED | OUTPATIENT
Start: 2025-07-31 | End: 2025-07-31 | Stop reason: HOSPADM

## 2025-07-31 RX ORDER — ENOXAPARIN SODIUM 100 MG/ML
40 INJECTION SUBCUTANEOUS DAILY
Status: DISCONTINUED | OUTPATIENT
Start: 2025-08-01 | End: 2025-08-02 | Stop reason: HOSPADM

## 2025-07-31 RX ORDER — HYDROMORPHONE HYDROCHLORIDE 1 MG/ML
0.1 INJECTION, SOLUTION INTRAMUSCULAR; INTRAVENOUS; SUBCUTANEOUS
Status: DISCONTINUED | OUTPATIENT
Start: 2025-07-31 | End: 2025-07-31 | Stop reason: HOSPADM

## 2025-07-31 RX ORDER — BISACODYL 10 MG
10 SUPPOSITORY, RECTAL RECTAL
Status: DISCONTINUED | OUTPATIENT
Start: 2025-07-31 | End: 2025-08-02 | Stop reason: HOSPADM

## 2025-07-31 RX ORDER — ONDANSETRON 2 MG/ML
4 INJECTION INTRAMUSCULAR; INTRAVENOUS EVERY 4 HOURS PRN
Status: DISCONTINUED | OUTPATIENT
Start: 2025-07-31 | End: 2025-08-02 | Stop reason: HOSPADM

## 2025-07-31 RX ORDER — ACETAMINOPHEN 500 MG
1000 TABLET ORAL EVERY 6 HOURS PRN
Status: DISCONTINUED | OUTPATIENT
Start: 2025-08-05 | End: 2025-08-02 | Stop reason: HOSPADM

## 2025-07-31 RX ORDER — HYDRALAZINE HYDROCHLORIDE 20 MG/ML
10 INJECTION INTRAMUSCULAR; INTRAVENOUS
Status: DISCONTINUED | OUTPATIENT
Start: 2025-07-31 | End: 2025-08-02 | Stop reason: HOSPADM

## 2025-07-31 RX ORDER — DIPHENHYDRAMINE HCL 25 MG
25 TABLET ORAL EVERY 6 HOURS PRN
Status: DISCONTINUED | OUTPATIENT
Start: 2025-07-31 | End: 2025-08-02 | Stop reason: HOSPADM

## 2025-07-31 RX ORDER — CEFAZOLIN SODIUM 1 G/3ML
INJECTION, POWDER, FOR SOLUTION INTRAMUSCULAR; INTRAVENOUS
Status: DISCONTINUED | OUTPATIENT
Start: 2025-07-31 | End: 2025-07-31 | Stop reason: HOSPADM

## 2025-07-31 RX ORDER — VASOPRESSIN 20 [USP'U]/ML
INJECTION, SOLUTION INTRAVENOUS PRN
Status: DISCONTINUED | OUTPATIENT
Start: 2025-07-31 | End: 2025-07-31 | Stop reason: SURG

## 2025-07-31 RX ORDER — POLYETHYLENE GLYCOL 3350 17 G/17G
1 POWDER, FOR SOLUTION ORAL 2 TIMES DAILY PRN
Status: DISCONTINUED | OUTPATIENT
Start: 2025-07-31 | End: 2025-08-02 | Stop reason: HOSPADM

## 2025-07-31 RX ORDER — DIPHENHYDRAMINE HYDROCHLORIDE 50 MG/ML
12.5 INJECTION, SOLUTION INTRAMUSCULAR; INTRAVENOUS
Status: DISCONTINUED | OUTPATIENT
Start: 2025-07-31 | End: 2025-07-31 | Stop reason: HOSPADM

## 2025-07-31 RX ORDER — AMOXICILLIN 250 MG
1 CAPSULE ORAL
Status: DISCONTINUED | OUTPATIENT
Start: 2025-07-31 | End: 2025-08-02 | Stop reason: HOSPADM

## 2025-07-31 RX ADMIN — DEXAMETHASONE SODIUM PHOSPHATE 4 MG: 4 INJECTION INTRA-ARTICULAR; INTRALESIONAL; INTRAMUSCULAR; INTRAVENOUS; SOFT TISSUE at 10:50

## 2025-07-31 RX ADMIN — OMEPRAZOLE 20 MG: 20 CAPSULE, DELAYED RELEASE ORAL at 17:55

## 2025-07-31 RX ADMIN — DOCUSATE SODIUM 100 MG: 100 CAPSULE, LIQUID FILLED ORAL at 18:10

## 2025-07-31 RX ADMIN — PROPOFOL 50 MG: 10 INJECTION, EMULSION INTRAVENOUS at 10:27

## 2025-07-31 RX ADMIN — ONDANSETRON 4 MG: 2 INJECTION INTRAMUSCULAR; INTRAVENOUS at 12:10

## 2025-07-31 RX ADMIN — CEFAZOLIN 1.5 G: 1 INJECTION, POWDER, FOR SOLUTION INTRAMUSCULAR; INTRAVENOUS at 10:55

## 2025-07-31 RX ADMIN — DULOXETINE 30 MG: 30 CAPSULE, DELAYED RELEASE ORAL at 17:59

## 2025-07-31 RX ADMIN — TAMSULOSIN HYDROCHLORIDE 0.4 MG: 0.4 CAPSULE ORAL at 18:11

## 2025-07-31 RX ADMIN — METFORMIN HYDROCHLORIDE 500 MG: 500 TABLET ORAL at 18:10

## 2025-07-31 RX ADMIN — HYDROMORPHONE HYDROCHLORIDE 0.3 MG: 2 INJECTION INTRAMUSCULAR; INTRAVENOUS; SUBCUTANEOUS at 12:15

## 2025-07-31 RX ADMIN — METOPROLOL SUCCINATE 12.5 MG: 25 TABLET, EXTENDED RELEASE ORAL at 17:58

## 2025-07-31 RX ADMIN — LEVOTHYROXINE SODIUM 88 MCG: 0.09 TABLET ORAL at 17:56

## 2025-07-31 RX ADMIN — OXYCODONE HYDROCHLORIDE 10 MG: 5 SOLUTION ORAL at 13:08

## 2025-07-31 RX ADMIN — Medication 5 MG: at 12:12

## 2025-07-31 RX ADMIN — CIPROFLOXACIN 250 MG: 500 TABLET ORAL at 17:55

## 2025-07-31 RX ADMIN — MIDAZOLAM HYDROCHLORIDE 1 MG: 1 INJECTION, SOLUTION INTRAMUSCULAR; INTRAVENOUS at 10:16

## 2025-07-31 RX ADMIN — VASOPRESSIN 1 UNITS: 20 INJECTION INTRAVENOUS at 10:58

## 2025-07-31 RX ADMIN — PROPOFOL 150 MG: 10 INJECTION, EMULSION INTRAVENOUS at 10:19

## 2025-07-31 RX ADMIN — Medication 80 MG: at 10:19

## 2025-07-31 RX ADMIN — ACETAMINOPHEN 1000 MG: 500 TABLET ORAL at 17:59

## 2025-07-31 RX ADMIN — ALBUTEROL SULFATE 4 PUFF: 90 AEROSOL, METERED RESPIRATORY (INHALATION) at 10:17

## 2025-07-31 RX ADMIN — PHENYLEPHRINE HYDROCHLORIDE 150 MCG: 10 INJECTION INTRAVENOUS at 11:43

## 2025-07-31 RX ADMIN — LIDOCAINE HYDROCHLORIDE 0.5 ML: 10 INJECTION, SOLUTION EPIDURAL; INFILTRATION; INTRACAUDAL; PERINEURAL at 08:40

## 2025-07-31 RX ADMIN — PHENYLEPHRINE HYDROCHLORIDE 100 MCG: 10 INJECTION INTRAVENOUS at 11:54

## 2025-07-31 RX ADMIN — PHENYLEPHRINE HYDROCHLORIDE 100 MCG: 10 INJECTION INTRAVENOUS at 11:36

## 2025-07-31 RX ADMIN — Medication 5000 UNITS: at 18:10

## 2025-07-31 RX ADMIN — CEFAZOLIN 2 G: 2 INJECTION, POWDER, FOR SOLUTION INTRAVENOUS at 22:14

## 2025-07-31 RX ADMIN — EPHEDRINE SULFATE 15 MG: 50 INJECTION, SOLUTION INTRAVENOUS at 10:26

## 2025-07-31 RX ADMIN — OXYCODONE HYDROCHLORIDE 15 MG: 15 TABLET ORAL at 22:53

## 2025-07-31 RX ADMIN — EPHEDRINE SULFATE 15 MG: 50 INJECTION, SOLUTION INTRAVENOUS at 10:53

## 2025-07-31 RX ADMIN — DOCUSATE SODIUM 50 MG AND SENNOSIDES 8.6 MG 1 TABLET: 8.6; 5 TABLET, FILM COATED ORAL at 20:17

## 2025-07-31 RX ADMIN — HYDROMORPHONE HYDROCHLORIDE 0.4 MG: 1 INJECTION, SOLUTION INTRAMUSCULAR; INTRAVENOUS; SUBCUTANEOUS at 13:20

## 2025-07-31 RX ADMIN — ROSUVASTATIN CALCIUM 20 MG: 20 TABLET, FILM COATED ORAL at 17:55

## 2025-07-31 RX ADMIN — HALOPERIDOL LACTATE 1 MG: 5 INJECTION, SOLUTION INTRAMUSCULAR at 12:11

## 2025-07-31 RX ADMIN — PHENYLEPHRINE HYDROCHLORIDE 100 MCG: 10 INJECTION INTRAVENOUS at 12:07

## 2025-07-31 RX ADMIN — PHENYLEPHRINE HYDROCHLORIDE 100 MCG: 10 INJECTION INTRAVENOUS at 10:26

## 2025-07-31 RX ADMIN — EPHEDRINE SULFATE 10 MG: 50 INJECTION, SOLUTION INTRAVENOUS at 11:02

## 2025-07-31 RX ADMIN — EZETIMIBE 10 MG: 10 TABLET ORAL at 17:56

## 2025-07-31 RX ADMIN — PHENYLEPHRINE HYDROCHLORIDE 100 MCG: 10 INJECTION INTRAVENOUS at 10:30

## 2025-07-31 RX ADMIN — SODIUM CHLORIDE, POTASSIUM CHLORIDE, SODIUM LACTATE AND CALCIUM CHLORIDE: 600; 310; 30; 20 INJECTION, SOLUTION INTRAVENOUS at 08:40

## 2025-07-31 RX ADMIN — GLYCOPYRROLATE 0.4 MG: 0.2 INJECTION INTRAMUSCULAR; INTRAVENOUS at 11:02

## 2025-07-31 RX ADMIN — Medication 10 MG: at 11:42

## 2025-07-31 RX ADMIN — ACETAMINOPHEN 1000 MG: 500 TABLET ORAL at 08:41

## 2025-07-31 RX ADMIN — LIDOCAINE HYDROCHLORIDE 30 MG: 20 INJECTION, SOLUTION EPIDURAL; INFILTRATION; INTRACAUDAL; PERINEURAL at 10:17

## 2025-07-31 RX ADMIN — LIOTHYRONINE SODIUM 5 MCG: 5 TABLET ORAL at 18:11

## 2025-07-31 RX ADMIN — EPHEDRINE SULFATE 10 MG: 50 INJECTION, SOLUTION INTRAVENOUS at 10:55

## 2025-07-31 RX ADMIN — FENTANYL CITRATE 50 MCG: 50 INJECTION INTRAMUSCULAR; INTRAVENOUS at 13:12

## 2025-07-31 RX ADMIN — PHENYLEPHRINE HYDROCHLORIDE 200 MCG: 10 INJECTION INTRAVENOUS at 10:53

## 2025-07-31 RX ADMIN — SODIUM CHLORIDE, POTASSIUM CHLORIDE, SODIUM LACTATE AND CALCIUM CHLORIDE: 600; 310; 30; 20 INJECTION, SOLUTION INTRAVENOUS at 11:53

## 2025-07-31 RX ADMIN — FENTANYL CITRATE 100 MCG: 50 INJECTION, SOLUTION INTRAMUSCULAR; INTRAVENOUS at 10:17

## 2025-07-31 RX ADMIN — REMIFENTANIL HYDROCHLORIDE 0.2 MCG/KG/MIN: 1 INJECTION, POWDER, LYOPHILIZED, FOR SOLUTION INTRAVENOUS at 10:36

## 2025-07-31 RX ADMIN — ONDANSETRON 4 MG: 4 TABLET, ORALLY DISINTEGRATING ORAL at 18:25

## 2025-07-31 RX ADMIN — VASOPRESSIN 1 UNITS: 20 INJECTION INTRAVENOUS at 10:53

## 2025-07-31 RX ADMIN — AMITRIPTYLINE HYDROCHLORIDE 150 MG: 100 TABLET, FILM COATED ORAL at 20:17

## 2025-07-31 RX ADMIN — SODIUM CHLORIDE, POTASSIUM CHLORIDE, SODIUM LACTATE AND CALCIUM CHLORIDE: 600; 310; 30; 20 INJECTION, SOLUTION INTRAVENOUS at 10:13

## 2025-07-31 RX ADMIN — VASOPRESSIN 1 UNITS: 20 INJECTION INTRAVENOUS at 12:06

## 2025-07-31 RX ADMIN — METHOCARBAMOL 1000 MG: 100 INJECTION INTRAMUSCULAR; INTRAVENOUS at 15:26

## 2025-07-31 RX ADMIN — LIDOCAINE 1 PATCH: 4 PATCH TOPICAL at 17:55

## 2025-07-31 RX ADMIN — PROPOFOL 100 MCG/KG/MIN: 10 INJECTION, EMULSION INTRAVENOUS at 10:36

## 2025-07-31 RX ADMIN — FENTANYL CITRATE 50 MCG: 50 INJECTION, SOLUTION INTRAMUSCULAR; INTRAVENOUS at 12:25

## 2025-07-31 ASSESSMENT — LIFESTYLE VARIABLES
HAVE PEOPLE ANNOYED YOU BY CRITICIZING YOUR DRINKING: NO
EVER FELT BAD OR GUILTY ABOUT YOUR DRINKING: NO
TOTAL SCORE: 0
TOTAL SCORE: 0
DOES PATIENT WANT TO STOP DRINKING: NO
HOW MANY TIMES IN THE PAST YEAR HAVE YOU HAD 5 OR MORE DRINKS IN A DAY: 0
HAVE YOU EVER FELT YOU SHOULD CUT DOWN ON YOUR DRINKING: NO
ALCOHOL_USE: NO
ON A TYPICAL DAY WHEN YOU DRINK ALCOHOL HOW MANY DRINKS DO YOU HAVE: 0
HAVE YOU EVER FELT YOU SHOULD CUT DOWN ON YOUR DRINKING: NO
AVERAGE NUMBER OF DAYS PER WEEK YOU HAVE A DRINK CONTAINING ALCOHOL: 0
ON A TYPICAL DAY WHEN YOU DRINK ALCOHOL HOW MANY DRINKS DO YOU HAVE: 0
AVERAGE NUMBER OF DAYS PER WEEK YOU HAVE A DRINK CONTAINING ALCOHOL: 0
ALCOHOL_USE: NO
EVER FELT BAD OR GUILTY ABOUT YOUR DRINKING: NO
CONSUMPTION TOTAL: NEGATIVE
HOW MANY TIMES IN THE PAST YEAR HAVE YOU HAD 5 OR MORE DRINKS IN A DAY: 0
HAVE PEOPLE ANNOYED YOU BY CRITICIZING YOUR DRINKING: NO
DOES PATIENT WANT TO STOP DRINKING: NO
CONSUMPTION TOTAL: INCOMPLETE
EVER HAD A DRINK FIRST THING IN THE MORNING TO STEADY YOUR NERVES TO GET RID OF A HANGOVER: NO
TOTAL SCORE: 0

## 2025-07-31 ASSESSMENT — PAIN DESCRIPTION - PAIN TYPE
TYPE: ACUTE PAIN

## 2025-07-31 ASSESSMENT — SOCIAL DETERMINANTS OF HEALTH (SDOH)
WITHIN THE PAST 12 MONTHS, THE FOOD YOU BOUGHT JUST DIDN'T LAST AND YOU DIDN'T HAVE MONEY TO GET MORE: NEVER TRUE
IN THE PAST 12 MONTHS, HAS THE ELECTRIC, GAS, OIL, OR WATER COMPANY THREATENED TO SHUT OFF SERVICE IN YOUR HOME?: NO
WITHIN THE PAST 12 MONTHS, YOU WORRIED THAT YOUR FOOD WOULD RUN OUT BEFORE YOU GOT THE MONEY TO BUY MORE: NEVER TRUE

## 2025-07-31 ASSESSMENT — PATIENT HEALTH QUESTIONNAIRE - PHQ9
SUM OF ALL RESPONSES TO PHQ9 QUESTIONS 1 AND 2: 6
SUM OF ALL RESPONSES TO PHQ QUESTIONS 1-9: 24
3. TROUBLE FALLING OR STAYING ASLEEP OR SLEEPING TOO MUCH: NEARLY EVERY DAY
6. FEELING BAD ABOUT YOURSELF - OR THAT YOU ARE A FAILURE OR HAVE LET YOURSELF OR YOUR FAMILY DOWN: NEARLY EVERY DAY
2. FEELING DOWN, DEPRESSED, IRRITABLE, OR HOPELESS: NEARLY EVERY DAY
8. MOVING OR SPEAKING SO SLOWLY THAT OTHER PEOPLE COULD HAVE NOTICED. OR THE OPPOSITE, BEING SO FIGETY OR RESTLESS THAT YOU HAVE BEEN MOVING AROUND A LOT MORE THAN USUAL: NEARLY EVERY DAY
5. POOR APPETITE OR OVEREATING: NEARLY EVERY DAY
9. THOUGHTS THAT YOU WOULD BE BETTER OFF DEAD, OR OF HURTING YOURSELF: NOT AT ALL
1. LITTLE INTEREST OR PLEASURE IN DOING THINGS: NEARLY EVERY DAY
4. FEELING TIRED OR HAVING LITTLE ENERGY: NEARLY EVERY DAY
7. TROUBLE CONCENTRATING ON THINGS, SUCH AS READING THE NEWSPAPER OR WATCHING TELEVISION: NEARLY EVERY DAY

## 2025-07-31 ASSESSMENT — FIBROSIS 4 INDEX
FIB4 SCORE: 3.05
FIB4 SCORE: 3.05

## 2025-07-31 ASSESSMENT — PAIN SCALES - GENERAL: PAIN_LEVEL: 4

## 2025-07-31 NOTE — ANESTHESIA PREPROCEDURE EVALUATION
Case: [id number] Date/Time: 07/31/25 0915    Procedure: CERVICAL 3-6 ANTERIOR CERVICAL DISCECTOMY AND FUSION    Pre-op diagnosis: CERVICAL STENOSIS RADICULOPATHY    [facility]    Surgeons: Harry Pisano M.D.            Relevant Problems   PULMONARY   (positive) Acute asthma exacerbation   (positive) Asthma   (positive) Community acquired pneumonia      CARDIAC   (positive) CAD (coronary artery disease)   (positive) Deep vein thrombosis (DVT) of left lower extremity (HCC)   (positive) Essential hypertension, benign   (positive) Primary hypertension      GI   (positive) GERD (gastroesophageal reflux disease)      ENDO   (positive) Acquired hypothyroidism   (positive) Type 2 diabetes mellitus (HCC)      Other   (positive) Localized osteoarthritis of left shoulder       Physical Exam    Airway   Mallampati: I  TM distance: <3 FB  Neck ROM: full       Cardiovascular   Rhythm: regular  Rate: normal     Dental - normal exam           Pulmonary (+) decreased breath sounds     Abdominal    Neurological                Anesthesia Plan    ASA 3   ASA physical status 3 criteria: COPD - poorly controlled    Plan - general       Airway plan will be ETT        Plan Factors:   Patient did not smoke on day of procedure.      Induction: intravenous          Informed Consent:    Anesthetic plan and risks discussed with patient.        76 y/o F PMHx PONV, CAD, Asthma (inadherent with maintenance therapy, uses albuterol 2x/week, HTN, GERD, T2DM. Last eliquis use one week ago (for unprovoked DVT).

## 2025-07-31 NOTE — ANESTHESIA TIME REPORT
Anesthesia Start and Stop Event Times       Date Time Event    7/31/2025 1005 Ready for Procedure     1013 Anesthesia Start          Responsible Staff  07/31/25      Name Role Begin End    Med Arthur M.D. Anesth 1013           Overtime Reason:  no overtime (within assigned shift)    Comments:

## 2025-07-31 NOTE — ANESTHESIA POSTPROCEDURE EVALUATION
Patient: Yamile Go    Procedure Summary       Date: 07/31/25 Room / Location: [facility] 07 / SURGERY [facility]    Anesthesia Start: 1013 Anesthesia Stop:     Procedure: CERVICAL 3-6 ANTERIOR CERVICAL DISCECTOMY AND FUSION (Neck) Diagnosis: (CERVICAL STENOSIS RADICULOPATHY)    Surgeons: Harry Pisano M.D. Responsible Provider: Med Arthur M.D.    Anesthesia Type: general ASA Status: 3            Final Anesthesia Type: general  Last vitals  BP   Blood Pressure : 118/58    Temp   36 °C (96.8 °F)    Pulse   84   Resp   18    SpO2   95 %      Anesthesia Post Evaluation    Patient location during evaluation: PACU  Patient participation: complete - patient participated  Level of consciousness: sleepy but conscious  Pain score: 4    Airway patency: patent  Anesthetic complications: no  Cardiovascular status: adequate and hemodynamically stable  Respiratory status: acceptable, face mask and spontaneous ventilation  Hydration status: euvolemic    PONV: none          There were no known notable events for this encounter.     Nurse Pain Score: 3 (NPRS)

## 2025-07-31 NOTE — ANESTHESIA PROCEDURE NOTES
Airway    Date/Time: 7/31/2025 10:20 AM    Performed by: Med Arthur M.D.  Authorized by: Med Arthur M.D.    Location:  OR  Urgency:  Elective  Indications for Airway Management:  Anesthesia      Spontaneous Ventilation: absent    Sedation Level:  Deep  Preoxygenated: Yes    Patient Position:  Sniffing  Final Airway Type:  Endotracheal airway  Final Endotracheal Airway:  ETT  Cuffed: Yes    Technique Used for Successful ETT Placement:  Video laryngoscopy    Insertion Site:  Oral  Blade Type:  Glide  Laryngoscope Blade/Videolaryngoscope Blade Size:  3  ETT Size (mm):  7.0  Measured from:  Teeth  ETT to Teeth (cm):  20  Placement Verified by: auscultation and capnometry    Cormack-Lehane Classification:  Grade I - full view of glottis  Number of Attempts at Approach:  1  Ventilation Between Attempts:  BVM  Number of Other Approaches Attempted:  0   NIMs tube

## 2025-08-01 LAB
ANION GAP SERPL CALC-SCNC: 12 MMOL/L (ref 7–16)
BUN SERPL-MCNC: 13 MG/DL (ref 8–22)
CALCIUM SERPL-MCNC: 8.6 MG/DL (ref 8.5–10.5)
CHLORIDE SERPL-SCNC: 106 MMOL/L (ref 96–112)
CO2 SERPL-SCNC: 19 MMOL/L (ref 20–33)
CREAT SERPL-MCNC: 0.86 MG/DL (ref 0.5–1.4)
ERYTHROCYTE [DISTWIDTH] IN BLOOD BY AUTOMATED COUNT: 50.9 FL (ref 35.9–50)
GFR SERPLBLD CREATININE-BSD FMLA CKD-EPI: 71 ML/MIN/1.73 M 2
GLUCOSE SERPL-MCNC: 134 MG/DL (ref 65–99)
HCT VFR BLD AUTO: 33.9 % (ref 37–47)
HGB BLD-MCNC: 10.6 G/DL (ref 12–16)
MCH RBC QN AUTO: 25.7 PG (ref 27–33)
MCHC RBC AUTO-ENTMCNC: 31.3 G/DL (ref 32.2–35.5)
MCV RBC AUTO: 82.3 FL (ref 81.4–97.8)
PLATELET # BLD AUTO: 246 K/UL (ref 164–446)
PMV BLD AUTO: 9.6 FL (ref 9–12.9)
POTASSIUM SERPL-SCNC: 4.4 MMOL/L (ref 3.6–5.5)
RBC # BLD AUTO: 4.12 M/UL (ref 4.2–5.4)
SODIUM SERPL-SCNC: 137 MMOL/L (ref 135–145)
WBC # BLD AUTO: 7 K/UL (ref 4.8–10.8)

## 2025-08-01 PROCEDURE — 770001 HCHG ROOM/CARE - MED/SURG/GYN PRIV*

## 2025-08-01 PROCEDURE — 700111 HCHG RX REV CODE 636 W/ 250 OVERRIDE (IP): Mod: JZ,TB | Performed by: PHYSICIAN ASSISTANT

## 2025-08-01 PROCEDURE — 700102 HCHG RX REV CODE 250 W/ 637 OVERRIDE(OP): Performed by: PHYSICIAN ASSISTANT

## 2025-08-01 PROCEDURE — 700111 HCHG RX REV CODE 636 W/ 250 OVERRIDE (IP): Mod: JZ

## 2025-08-01 PROCEDURE — 97535 SELF CARE MNGMENT TRAINING: CPT

## 2025-08-01 PROCEDURE — 99223 1ST HOSP IP/OBS HIGH 75: CPT | Performed by: PHYSICAL MEDICINE & REHABILITATION

## 2025-08-01 PROCEDURE — 85027 COMPLETE CBC AUTOMATED: CPT

## 2025-08-01 PROCEDURE — 36415 COLL VENOUS BLD VENIPUNCTURE: CPT

## 2025-08-01 PROCEDURE — 700101 HCHG RX REV CODE 250: Performed by: PHYSICIAN ASSISTANT

## 2025-08-01 PROCEDURE — 80048 BASIC METABOLIC PNL TOTAL CA: CPT

## 2025-08-01 PROCEDURE — 97163 PT EVAL HIGH COMPLEX 45 MIN: CPT

## 2025-08-01 PROCEDURE — A9270 NON-COVERED ITEM OR SERVICE: HCPCS | Performed by: PHYSICIAN ASSISTANT

## 2025-08-01 PROCEDURE — 700105 HCHG RX REV CODE 258: Performed by: PHYSICIAN ASSISTANT

## 2025-08-01 PROCEDURE — 97166 OT EVAL MOD COMPLEX 45 MIN: CPT

## 2025-08-01 RX ORDER — KETOROLAC TROMETHAMINE 15 MG/ML
15 INJECTION, SOLUTION INTRAMUSCULAR; INTRAVENOUS EVERY 6 HOURS
Status: DISCONTINUED | OUTPATIENT
Start: 2025-08-01 | End: 2025-08-02 | Stop reason: HOSPADM

## 2025-08-01 RX ADMIN — OXYCODONE HYDROCHLORIDE 10 MG: 10 TABLET ORAL at 20:06

## 2025-08-01 RX ADMIN — LIDOCAINE 1 PATCH: 4 PATCH TOPICAL at 05:28

## 2025-08-01 RX ADMIN — ANTACID TABLETS 500 MG: 500 TABLET, CHEWABLE ORAL at 16:50

## 2025-08-01 RX ADMIN — METOPROLOL SUCCINATE 12.5 MG: 25 TABLET, EXTENDED RELEASE ORAL at 05:29

## 2025-08-01 RX ADMIN — KETOROLAC TROMETHAMINE 15 MG: 15 INJECTION, SOLUTION INTRAMUSCULAR; INTRAVENOUS at 16:49

## 2025-08-01 RX ADMIN — DOCUSATE SODIUM 100 MG: 100 CAPSULE, LIQUID FILLED ORAL at 16:50

## 2025-08-01 RX ADMIN — CIPROFLOXACIN 250 MG: 500 TABLET ORAL at 05:29

## 2025-08-01 RX ADMIN — DEXAMETHASONE SODIUM PHOSPHATE 4 MG: 4 INJECTION INTRA-ARTICULAR; INTRALESIONAL; INTRAMUSCULAR; INTRAVENOUS; SOFT TISSUE at 00:06

## 2025-08-01 RX ADMIN — DEXAMETHASONE SODIUM PHOSPHATE 4 MG: 4 INJECTION INTRA-ARTICULAR; INTRALESIONAL; INTRAMUSCULAR; INTRAVENOUS; SOFT TISSUE at 05:31

## 2025-08-01 RX ADMIN — CEFAZOLIN 2 G: 2 INJECTION, POWDER, FOR SOLUTION INTRAVENOUS at 06:20

## 2025-08-01 RX ADMIN — BACLOFEN 10 MG: 10 TABLET ORAL at 13:35

## 2025-08-01 RX ADMIN — OMEPRAZOLE 20 MG: 20 CAPSULE, DELAYED RELEASE ORAL at 16:51

## 2025-08-01 RX ADMIN — ANTACID TABLETS 500 MG: 500 TABLET, CHEWABLE ORAL at 05:29

## 2025-08-01 RX ADMIN — EZETIMIBE 10 MG: 10 TABLET ORAL at 16:51

## 2025-08-01 RX ADMIN — METHOCARBAMOL 1000 MG: 100 INJECTION INTRAMUSCULAR; INTRAVENOUS at 05:31

## 2025-08-01 RX ADMIN — TAMSULOSIN HYDROCHLORIDE 0.4 MG: 0.4 CAPSULE ORAL at 10:07

## 2025-08-01 RX ADMIN — AMITRIPTYLINE HYDROCHLORIDE 150 MG: 100 TABLET, FILM COATED ORAL at 20:06

## 2025-08-01 RX ADMIN — OXYCODONE HYDROCHLORIDE 10 MG: 10 TABLET ORAL at 10:06

## 2025-08-01 RX ADMIN — ENOXAPARIN SODIUM 40 MG: 100 INJECTION SUBCUTANEOUS at 17:13

## 2025-08-01 RX ADMIN — ACETAMINOPHEN 1000 MG: 500 TABLET ORAL at 05:30

## 2025-08-01 RX ADMIN — ROSUVASTATIN CALCIUM 20 MG: 20 TABLET, FILM COATED ORAL at 16:50

## 2025-08-01 RX ADMIN — ACETAMINOPHEN 1000 MG: 500 TABLET ORAL at 00:06

## 2025-08-01 RX ADMIN — ONDANSETRON 4 MG: 2 INJECTION INTRAMUSCULAR; INTRAVENOUS at 19:13

## 2025-08-01 RX ADMIN — CIPROFLOXACIN 250 MG: 500 TABLET ORAL at 16:51

## 2025-08-01 RX ADMIN — Medication 5000 UNITS: at 05:29

## 2025-08-01 RX ADMIN — KETOROLAC TROMETHAMINE 15 MG: 15 INJECTION, SOLUTION INTRAMUSCULAR; INTRAVENOUS at 11:49

## 2025-08-01 RX ADMIN — DOCUSATE SODIUM 50 MG AND SENNOSIDES 8.6 MG 1 TABLET: 8.6; 5 TABLET, FILM COATED ORAL at 20:05

## 2025-08-01 RX ADMIN — DOCUSATE SODIUM 100 MG: 100 CAPSULE, LIQUID FILLED ORAL at 05:30

## 2025-08-01 RX ADMIN — OMEPRAZOLE 20 MG: 20 CAPSULE, DELAYED RELEASE ORAL at 05:30

## 2025-08-01 RX ADMIN — METFORMIN HYDROCHLORIDE 500 MG: 500 TABLET ORAL at 05:29

## 2025-08-01 RX ADMIN — LEVOTHYROXINE SODIUM 88 MCG: 0.09 TABLET ORAL at 05:30

## 2025-08-01 RX ADMIN — METHOCARBAMOL 1000 MG: 100 INJECTION INTRAMUSCULAR; INTRAVENOUS at 16:49

## 2025-08-01 RX ADMIN — ACETAMINOPHEN 1000 MG: 500 TABLET ORAL at 11:48

## 2025-08-01 ASSESSMENT — GAIT ASSESSMENTS
DEVIATION: ANTALGIC;BRADYKINETIC;SHUFFLED GAIT;DECREASED HEEL STRIKE;DECREASED TOE OFF
GAIT LEVEL OF ASSIST: CONTACT GUARD ASSIST
ASSISTIVE DEVICE: FRONT WHEEL WALKER
DISTANCE (FEET): 75

## 2025-08-01 ASSESSMENT — COGNITIVE AND FUNCTIONAL STATUS - GENERAL
DRESSING REGULAR UPPER BODY CLOTHING: A LOT
DAILY ACTIVITIY SCORE: 15
EATING MEALS: A LITTLE
TOILETING: A LITTLE
MOVING TO AND FROM BED TO CHAIR: A LITTLE
CLIMB 3 TO 5 STEPS WITH RAILING: A LOT
STANDING UP FROM CHAIR USING ARMS: A LITTLE
SUGGESTED CMS G CODE MODIFIER MOBILITY: CK
PERSONAL GROOMING: A LITTLE
SUGGESTED CMS G CODE MODIFIER DAILY ACTIVITY: CK
DRESSING REGULAR LOWER BODY CLOTHING: A LOT
WALKING IN HOSPITAL ROOM: A LITTLE
TURNING FROM BACK TO SIDE WHILE IN FLAT BAD: A LITTLE
MOBILITY SCORE: 17
HELP NEEDED FOR BATHING: A LOT
MOVING FROM LYING ON BACK TO SITTING ON SIDE OF FLAT BED: A LITTLE

## 2025-08-01 ASSESSMENT — PAIN DESCRIPTION - PAIN TYPE
TYPE: ACUTE PAIN;SURGICAL PAIN
TYPE: ACUTE PAIN
TYPE: ACUTE PAIN
TYPE: ACUTE PAIN;SURGICAL PAIN
TYPE: ACUTE PAIN
TYPE: ACUTE PAIN
TYPE: ACUTE PAIN;SURGICAL PAIN

## 2025-08-01 ASSESSMENT — ACTIVITIES OF DAILY LIVING (ADL): TOILETING: INDEPENDENT

## 2025-08-02 ENCOUNTER — APPOINTMENT (OUTPATIENT)
Dept: RADIOLOGY | Facility: MEDICAL CENTER | Age: 75
DRG: 472 | End: 2025-08-02
Attending: PSYCHIATRY & NEUROLOGY
Payer: MEDICARE

## 2025-08-02 ENCOUNTER — HOSPITAL ENCOUNTER (INPATIENT)
Facility: REHABILITATION | Age: 75
LOS: 18 days | DRG: 092 | End: 2025-08-20
Attending: PHYSICAL MEDICINE & REHABILITATION | Admitting: PHYSICAL MEDICINE & REHABILITATION
Payer: MEDICARE

## 2025-08-02 VITALS
HEART RATE: 95 BPM | OXYGEN SATURATION: 96 % | WEIGHT: 122.8 LBS | DIASTOLIC BLOOD PRESSURE: 68 MMHG | SYSTOLIC BLOOD PRESSURE: 104 MMHG | BODY MASS INDEX: 22.6 KG/M2 | TEMPERATURE: 97.8 F | RESPIRATION RATE: 17 BRPM | HEIGHT: 62 IN

## 2025-08-02 DIAGNOSIS — R07.89 OTHER CHEST PAIN: ICD-10-CM

## 2025-08-02 DIAGNOSIS — E78.5 HYPERLIPIDEMIA, UNSPECIFIED HYPERLIPIDEMIA TYPE: ICD-10-CM

## 2025-08-02 DIAGNOSIS — I25.10 CAD (CORONARY ARTERY DISEASE): ICD-10-CM

## 2025-08-02 DIAGNOSIS — T14.90XA TRAUMA: ICD-10-CM

## 2025-08-02 DIAGNOSIS — I10 ESSENTIAL HYPERTENSION, BENIGN: ICD-10-CM

## 2025-08-02 DIAGNOSIS — E78.5 DYSLIPIDEMIA: ICD-10-CM

## 2025-08-02 DIAGNOSIS — E03.9 HYPOTHYROIDISM, UNSPECIFIED TYPE: ICD-10-CM

## 2025-08-02 DIAGNOSIS — S32.010A CLOSED COMPRESSION FRACTURE OF BODY OF L1 VERTEBRA (HCC): ICD-10-CM

## 2025-08-02 DIAGNOSIS — G89.18 ACUTE POSTOPERATIVE PAIN: ICD-10-CM

## 2025-08-02 DIAGNOSIS — J45.901 ASTHMA WITH ACUTE EXACERBATION, UNSPECIFIED ASTHMA SEVERITY, UNSPECIFIED WHETHER PERSISTENT: ICD-10-CM

## 2025-08-02 DIAGNOSIS — J96.01 ACUTE HYPOXEMIC RESPIRATORY FAILURE (HCC): Primary | ICD-10-CM

## 2025-08-02 DIAGNOSIS — L03.90 CELLULITIS, UNSPECIFIED CELLULITIS SITE: ICD-10-CM

## 2025-08-02 DIAGNOSIS — F39 MOOD DISORDER (HCC): ICD-10-CM

## 2025-08-02 PROBLEM — G95.9 CERVICAL MYELOPATHY (HCC): Status: ACTIVE | Noted: 2025-08-02

## 2025-08-02 LAB
ANION GAP SERPL CALC-SCNC: 9 MMOL/L (ref 7–16)
BUN SERPL-MCNC: 21 MG/DL (ref 8–22)
CALCIUM SERPL-MCNC: 8.5 MG/DL (ref 8.5–10.5)
CHLORIDE SERPL-SCNC: 111 MMOL/L (ref 96–112)
CO2 SERPL-SCNC: 20 MMOL/L (ref 20–33)
CREAT SERPL-MCNC: 0.87 MG/DL (ref 0.5–1.4)
ERYTHROCYTE [DISTWIDTH] IN BLOOD BY AUTOMATED COUNT: 52.1 FL (ref 35.9–50)
GFR SERPLBLD CREATININE-BSD FMLA CKD-EPI: 70 ML/MIN/1.73 M 2
GLUCOSE BLD STRIP.AUTO-MCNC: 111 MG/DL (ref 65–99)
GLUCOSE SERPL-MCNC: 124 MG/DL (ref 65–99)
HCT VFR BLD AUTO: 30.6 % (ref 37–47)
HGB BLD-MCNC: 9.6 G/DL (ref 12–16)
MCH RBC QN AUTO: 25.8 PG (ref 27–33)
MCHC RBC AUTO-ENTMCNC: 31.4 G/DL (ref 32.2–35.5)
MCV RBC AUTO: 82.3 FL (ref 81.4–97.8)
PLATELET # BLD AUTO: 239 K/UL (ref 164–446)
PMV BLD AUTO: 9.2 FL (ref 9–12.9)
POTASSIUM SERPL-SCNC: 4.8 MMOL/L (ref 3.6–5.5)
RBC # BLD AUTO: 3.72 M/UL (ref 4.2–5.4)
SODIUM SERPL-SCNC: 140 MMOL/L (ref 135–145)
WBC # BLD AUTO: 7.5 K/UL (ref 4.8–10.8)

## 2025-08-02 PROCEDURE — 94760 N-INVAS EAR/PLS OXIMETRY 1: CPT

## 2025-08-02 PROCEDURE — 700102 HCHG RX REV CODE 250 W/ 637 OVERRIDE(OP): Performed by: PHYSICIAN ASSISTANT

## 2025-08-02 PROCEDURE — A9270 NON-COVERED ITEM OR SERVICE: HCPCS | Performed by: PHYSICAL MEDICINE & REHABILITATION

## 2025-08-02 PROCEDURE — 700111 HCHG RX REV CODE 636 W/ 250 OVERRIDE (IP): Performed by: PHYSICIAN ASSISTANT

## 2025-08-02 PROCEDURE — 85027 COMPLETE CBC AUTOMATED: CPT

## 2025-08-02 PROCEDURE — 99223 1ST HOSP IP/OBS HIGH 75: CPT | Performed by: PHYSICAL MEDICINE & REHABILITATION

## 2025-08-02 PROCEDURE — 700117 HCHG RX CONTRAST REV CODE 255: Performed by: PSYCHIATRY & NEUROLOGY

## 2025-08-02 PROCEDURE — 36415 COLL VENOUS BLD VENIPUNCTURE: CPT

## 2025-08-02 PROCEDURE — A9270 NON-COVERED ITEM OR SERVICE: HCPCS | Performed by: PHYSICIAN ASSISTANT

## 2025-08-02 PROCEDURE — 0042T CT-CEREBRAL PERFUSION ANALYSIS: CPT

## 2025-08-02 PROCEDURE — 80048 BASIC METABOLIC PNL TOTAL CA: CPT

## 2025-08-02 PROCEDURE — 700101 HCHG RX REV CODE 250: Performed by: PHYSICAL MEDICINE & REHABILITATION

## 2025-08-02 PROCEDURE — 70450 CT HEAD/BRAIN W/O DYE: CPT

## 2025-08-02 PROCEDURE — 70498 CT ANGIOGRAPHY NECK: CPT

## 2025-08-02 PROCEDURE — 770010 HCHG ROOM/CARE - REHAB SEMI PRIVAT*

## 2025-08-02 PROCEDURE — 70496 CT ANGIOGRAPHY HEAD: CPT

## 2025-08-02 PROCEDURE — 700102 HCHG RX REV CODE 250 W/ 637 OVERRIDE(OP): Performed by: PHYSICAL MEDICINE & REHABILITATION

## 2025-08-02 PROCEDURE — 82962 GLUCOSE BLOOD TEST: CPT | Performed by: NEUROLOGICAL SURGERY

## 2025-08-02 PROCEDURE — 700111 HCHG RX REV CODE 636 W/ 250 OVERRIDE (IP): Performed by: PHYSICAL MEDICINE & REHABILITATION

## 2025-08-02 RX ORDER — CIPROFLOXACIN 500 MG/1
500 TABLET, FILM COATED ORAL EVERY 12 HOURS
Status: DISCONTINUED | OUTPATIENT
Start: 2025-08-02 | End: 2025-08-02

## 2025-08-02 RX ORDER — ONDANSETRON 4 MG/1
4 TABLET, ORALLY DISINTEGRATING ORAL EVERY 4 HOURS PRN
Status: DISCONTINUED | OUTPATIENT
Start: 2025-08-02 | End: 2025-08-20 | Stop reason: HOSPADM

## 2025-08-02 RX ORDER — DULOXETIN HYDROCHLORIDE 30 MG/1
30 CAPSULE, DELAYED RELEASE ORAL EVERY EVENING
Status: DISCONTINUED | OUTPATIENT
Start: 2025-08-02 | End: 2025-08-20 | Stop reason: HOSPADM

## 2025-08-02 RX ORDER — LIOTHYRONINE SODIUM 5 UG/1
5 TABLET ORAL
Status: DISCONTINUED | OUTPATIENT
Start: 2025-08-03 | End: 2025-08-17

## 2025-08-02 RX ORDER — ACETAMINOPHEN 500 MG
1000 TABLET ORAL EVERY 6 HOURS
Status: DISPENSED | OUTPATIENT
Start: 2025-08-03 | End: 2025-08-07

## 2025-08-02 RX ORDER — EZETIMIBE 10 MG/1
10 TABLET ORAL EVERY EVENING
Status: DISCONTINUED | OUTPATIENT
Start: 2025-08-02 | End: 2025-08-20 | Stop reason: HOSPADM

## 2025-08-02 RX ORDER — CALCIUM CARBONATE 500 MG/1
500 TABLET, CHEWABLE ORAL 2 TIMES DAILY
Status: DISCONTINUED | OUTPATIENT
Start: 2025-08-02 | End: 2025-08-20 | Stop reason: HOSPADM

## 2025-08-02 RX ORDER — LISINOPRIL 5 MG/1
10 TABLET ORAL DAILY
Status: DISCONTINUED | OUTPATIENT
Start: 2025-08-02 | End: 2025-08-02

## 2025-08-02 RX ORDER — ROSUVASTATIN CALCIUM 10 MG/1
20 TABLET, COATED ORAL EVERY EVENING
Status: DISCONTINUED | OUTPATIENT
Start: 2025-08-02 | End: 2025-08-20 | Stop reason: HOSPADM

## 2025-08-02 RX ORDER — AMITRIPTYLINE HYDROCHLORIDE 50 MG/1
150 TABLET ORAL NIGHTLY
Status: DISCONTINUED | OUTPATIENT
Start: 2025-08-02 | End: 2025-08-20 | Stop reason: HOSPADM

## 2025-08-02 RX ORDER — CALCIUM CARBONATE 500 MG/1
500 TABLET, CHEWABLE ORAL 2 TIMES DAILY
Status: SHIPPED
Start: 2025-08-02

## 2025-08-02 RX ORDER — DIPHENHYDRAMINE HCL 25 MG
25 TABLET ORAL EVERY 6 HOURS PRN
Qty: 30 TABLET | Refills: 0 | Status: ON HOLD | OUTPATIENT
Start: 2025-08-02 | End: 2025-08-19

## 2025-08-02 RX ORDER — CIPROFLOXACIN 500 MG/1
250 TABLET, FILM COATED ORAL 2 TIMES DAILY
Status: COMPLETED | OUTPATIENT
Start: 2025-08-02 | End: 2025-08-02

## 2025-08-02 RX ORDER — ACETAMINOPHEN 500 MG
1000 TABLET ORAL EVERY 6 HOURS PRN
Status: DISCONTINUED | OUTPATIENT
Start: 2025-08-07 | End: 2025-08-20 | Stop reason: HOSPADM

## 2025-08-02 RX ORDER — ACETAMINOPHEN 500 MG
1000 TABLET ORAL EVERY 6 HOURS
Status: DISCONTINUED | OUTPATIENT
Start: 2025-08-02 | End: 2025-08-02

## 2025-08-02 RX ORDER — OXYCODONE HYDROCHLORIDE 10 MG/1
10 TABLET ORAL EVERY 4 HOURS PRN
Status: ON HOLD
Start: 2025-08-02 | End: 2025-08-02

## 2025-08-02 RX ORDER — SODIUM PHOSPHATE,MONO-DIBASIC 19G-7G/118
1 ENEMA (ML) RECTAL
Status: ON HOLD
Start: 2025-08-02 | End: 2025-08-02

## 2025-08-02 RX ORDER — ACETAMINOPHEN 500 MG
1000 TABLET ORAL EVERY 6 HOURS PRN
Status: DISCONTINUED | OUTPATIENT
Start: 2025-08-07 | End: 2025-08-02

## 2025-08-02 RX ORDER — OMEPRAZOLE 20 MG/1
20 CAPSULE, DELAYED RELEASE ORAL DAILY
Status: DISCONTINUED | OUTPATIENT
Start: 2025-08-02 | End: 2025-08-02

## 2025-08-02 RX ORDER — CHOLECALCIFEROL (VITAMIN D3) 25 MCG
5000 TABLET ORAL DAILY
Status: ON HOLD
Start: 2025-08-03 | End: 2025-08-02

## 2025-08-02 RX ORDER — OMEPRAZOLE 20 MG/1
20 CAPSULE, DELAYED RELEASE ORAL 2 TIMES DAILY
Status: DISCONTINUED | OUTPATIENT
Start: 2025-08-03 | End: 2025-08-20 | Stop reason: HOSPADM

## 2025-08-02 RX ORDER — POLYETHYLENE GLYCOL 3350 17 G/17G
17 POWDER, FOR SOLUTION ORAL 2 TIMES DAILY PRN
Status: ON HOLD
Start: 2025-08-02 | End: 2025-08-19

## 2025-08-02 RX ORDER — DIPHENHYDRAMINE HCL 25 MG
25 TABLET ORAL EVERY 6 HOURS PRN
Status: DISCONTINUED | OUTPATIENT
Start: 2025-08-02 | End: 2025-08-20 | Stop reason: HOSPADM

## 2025-08-02 RX ORDER — ENOXAPARIN SODIUM 100 MG/ML
40 INJECTION SUBCUTANEOUS DAILY
Status: DISCONTINUED | OUTPATIENT
Start: 2025-08-02 | End: 2025-08-19

## 2025-08-02 RX ORDER — ACETAMINOPHEN 500 MG
TABLET ORAL
Status: ON HOLD
Start: 2025-08-02 | End: 2025-08-19

## 2025-08-02 RX ORDER — AMOXICILLIN 250 MG
1 CAPSULE ORAL NIGHTLY
Qty: 30 TABLET | Refills: 0 | Status: ON HOLD | OUTPATIENT
Start: 2025-08-02 | End: 2025-08-02

## 2025-08-02 RX ORDER — BISACODYL 10 MG
10 SUPPOSITORY, RECTAL RECTAL
Status: ON HOLD
Start: 2025-08-02 | End: 2025-08-19

## 2025-08-02 RX ORDER — OXYCODONE HYDROCHLORIDE 10 MG/1
10 TABLET ORAL EVERY 4 HOURS PRN
Refills: 0 | Status: DISCONTINUED | OUTPATIENT
Start: 2025-08-02 | End: 2025-08-17

## 2025-08-02 RX ORDER — BACLOFEN 10 MG/1
10 TABLET ORAL 3 TIMES DAILY PRN
Status: DISCONTINUED | OUTPATIENT
Start: 2025-08-02 | End: 2025-08-20 | Stop reason: HOSPADM

## 2025-08-02 RX ORDER — METOPROLOL SUCCINATE 25 MG/1
12.5 TABLET, EXTENDED RELEASE ORAL DAILY
Status: DISCONTINUED | OUTPATIENT
Start: 2025-08-02 | End: 2025-08-12

## 2025-08-02 RX ORDER — LEVOTHYROXINE SODIUM 88 UG/1
88 TABLET ORAL
Status: DISCONTINUED | OUTPATIENT
Start: 2025-08-02 | End: 2025-08-20 | Stop reason: HOSPADM

## 2025-08-02 RX ORDER — TAMSULOSIN HYDROCHLORIDE 0.4 MG/1
0.4 CAPSULE ORAL
Status: DISCONTINUED | OUTPATIENT
Start: 2025-08-03 | End: 2025-08-11

## 2025-08-02 RX ORDER — DOCUSATE SODIUM 100 MG/1
100 CAPSULE, LIQUID FILLED ORAL 2 TIMES DAILY
Status: DISCONTINUED | OUTPATIENT
Start: 2025-08-02 | End: 2025-08-20 | Stop reason: HOSPADM

## 2025-08-02 RX ORDER — PSEUDOEPHEDRINE HCL 30 MG
100 TABLET ORAL 2 TIMES DAILY
Status: ON HOLD
Start: 2025-08-02 | End: 2025-08-19

## 2025-08-02 RX ORDER — ALENDRONATE SODIUM 70 MG/1
70 TABLET ORAL
Status: DISCONTINUED | OUTPATIENT
Start: 2025-08-07 | End: 2025-08-20 | Stop reason: HOSPADM

## 2025-08-02 RX ORDER — POLYETHYLENE GLYCOL 3350 17 G/17G
17 POWDER, FOR SOLUTION ORAL 2 TIMES DAILY PRN
Status: DISCONTINUED | OUTPATIENT
Start: 2025-08-02 | End: 2025-08-20 | Stop reason: HOSPADM

## 2025-08-02 RX ORDER — ALBUTEROL SULFATE 90 UG/1
1-2 INHALANT RESPIRATORY (INHALATION) EVERY 4 HOURS PRN
Status: DISCONTINUED | OUTPATIENT
Start: 2025-08-02 | End: 2025-08-20 | Stop reason: HOSPADM

## 2025-08-02 RX ORDER — LABETALOL HYDROCHLORIDE 5 MG/ML
10 INJECTION, SOLUTION INTRAVENOUS
Status: ON HOLD
Start: 2025-08-02 | End: 2025-08-02

## 2025-08-02 RX ORDER — BACLOFEN 10 MG/1
10 TABLET ORAL 3 TIMES DAILY PRN
Status: ON HOLD
Start: 2025-08-02 | End: 2025-08-19

## 2025-08-02 RX ORDER — BISACODYL 10 MG
10 SUPPOSITORY, RECTAL RECTAL
Status: DISCONTINUED | OUTPATIENT
Start: 2025-08-02 | End: 2025-08-20 | Stop reason: HOSPADM

## 2025-08-02 RX ADMIN — METOPROLOL SUCCINATE 12.5 MG: 25 TABLET, EXTENDED RELEASE ORAL at 14:34

## 2025-08-02 RX ADMIN — DOCUSATE SODIUM 100 MG: 100 CAPSULE, LIQUID FILLED ORAL at 20:38

## 2025-08-02 RX ADMIN — LIDOCAINE 1 PATCH: 4 PATCH TOPICAL at 04:57

## 2025-08-02 RX ADMIN — ACETAMINOPHEN 1000 MG: 500 TABLET ORAL at 00:26

## 2025-08-02 RX ADMIN — TAMSULOSIN HYDROCHLORIDE 0.4 MG: 0.4 CAPSULE ORAL at 09:44

## 2025-08-02 RX ADMIN — EZETIMIBE 10 MG: 10 TABLET ORAL at 20:38

## 2025-08-02 RX ADMIN — OXYCODONE HYDROCHLORIDE 10 MG: 10 TABLET ORAL at 14:35

## 2025-08-02 RX ADMIN — ROSUVASTATIN CALCIUM 20 MG: 10 TABLET, FILM COATED ORAL at 20:38

## 2025-08-02 RX ADMIN — METHOCARBAMOL 1000 MG: 100 INJECTION INTRAMUSCULAR; INTRAVENOUS at 04:57

## 2025-08-02 RX ADMIN — AMITRIPTYLINE HYDROCHLORIDE 150 MG: 50 TABLET, FILM COATED ORAL at 20:38

## 2025-08-02 RX ADMIN — OXYCODONE HYDROCHLORIDE 10 MG: 10 TABLET ORAL at 00:26

## 2025-08-02 RX ADMIN — BACLOFEN 10 MG: 10 TABLET ORAL at 11:00

## 2025-08-02 RX ADMIN — OXYCODONE HYDROCHLORIDE 10 MG: 10 TABLET ORAL at 20:46

## 2025-08-02 RX ADMIN — CALCIUM CARBONATE (ANTACID) CHEW TAB 500 MG 500 MG: 500 CHEW TAB at 18:34

## 2025-08-02 RX ADMIN — ACETAMINOPHEN 1000 MG: 500 TABLET ORAL at 13:28

## 2025-08-02 RX ADMIN — ACETAMINOPHEN 1000 MG: 500 TABLET ORAL at 11:00

## 2025-08-02 RX ADMIN — CIPROFLOXACIN 250 MG: 500 TABLET ORAL at 14:33

## 2025-08-02 RX ADMIN — OMEPRAZOLE 20 MG: 20 CAPSULE, DELAYED RELEASE ORAL at 13:28

## 2025-08-02 RX ADMIN — ENOXAPARIN SODIUM 40 MG: 100 INJECTION SUBCUTANEOUS at 18:34

## 2025-08-02 RX ADMIN — DULOXETINE 30 MG: 30 CAPSULE, DELAYED RELEASE ORAL at 20:38

## 2025-08-02 RX ADMIN — CIPROFLOXACIN 250 MG: 500 TABLET ORAL at 21:54

## 2025-08-02 RX ADMIN — LEVOTHYROXINE SODIUM 88 MCG: 0.09 TABLET ORAL at 14:34

## 2025-08-02 RX ADMIN — ONDANSETRON 4 MG: 4 TABLET, ORALLY DISINTEGRATING ORAL at 17:41

## 2025-08-02 RX ADMIN — IOHEXOL 120 ML: 350 INJECTION, SOLUTION INTRAVENOUS at 07:00

## 2025-08-02 ASSESSMENT — PATIENT HEALTH QUESTIONNAIRE - PHQ9
8. MOVING OR SPEAKING SO SLOWLY THAT OTHER PEOPLE COULD HAVE NOTICED. OR THE OPPOSITE, BEING SO FIGETY OR RESTLESS THAT YOU HAVE BEEN MOVING AROUND A LOT MORE THAN USUAL: NOT AT ALL
SUM OF ALL RESPONSES TO PHQ9 QUESTIONS 1 AND 2: 1
1. LITTLE INTEREST OR PLEASURE IN DOING THINGS: NOT AT ALL
9. THOUGHTS THAT YOU WOULD BE BETTER OFF DEAD, OR OF HURTING YOURSELF: NOT AT ALL
5. POOR APPETITE OR OVEREATING: SEVERAL DAYS
6. FEELING BAD ABOUT YOURSELF - OR THAT YOU ARE A FAILURE OR HAVE LET YOURSELF OR YOUR FAMILY DOWN: NOT AL ALL
3. TROUBLE FALLING OR STAYING ASLEEP OR SLEEPING TOO MUCH: SEVERAL DAYS
4. FEELING TIRED OR HAVING LITTLE ENERGY: NOT AT ALL
SUM OF ALL RESPONSES TO PHQ9 QUESTIONS 1 AND 2: 1
8. MOVING OR SPEAKING SO SLOWLY THAT OTHER PEOPLE COULD HAVE NOTICED. OR THE OPPOSITE, BEING SO FIGETY OR RESTLESS THAT YOU HAVE BEEN MOVING AROUND A LOT MORE THAN USUAL: NOT AT ALL
3. TROUBLE FALLING OR STAYING ASLEEP OR SLEEPING TOO MUCH: SEVERAL DAYS
SUM OF ALL RESPONSES TO PHQ QUESTIONS 1-9: 3
4. FEELING TIRED OR HAVING LITTLE ENERGY: NOT AT ALL
9. THOUGHTS THAT YOU WOULD BE BETTER OFF DEAD, OR OF HURTING YOURSELF: NOT AT ALL
2. FEELING DOWN, DEPRESSED, IRRITABLE, OR HOPELESS: SEVERAL DAYS
2. FEELING DOWN, DEPRESSED, IRRITABLE, OR HOPELESS: SEVERAL DAYS
5. POOR APPETITE OR OVEREATING: SEVERAL DAYS
6. FEELING BAD ABOUT YOURSELF - OR THAT YOU ARE A FAILURE OR HAVE LET YOURSELF OR YOUR FAMILY DOWN: NOT AL ALL
SUM OF ALL RESPONSES TO PHQ QUESTIONS 1-9: 3
7. TROUBLE CONCENTRATING ON THINGS, SUCH AS READING THE NEWSPAPER OR WATCHING TELEVISION: NOT AT ALL
7. TROUBLE CONCENTRATING ON THINGS, SUCH AS READING THE NEWSPAPER OR WATCHING TELEVISION: NOT AT ALL
1. LITTLE INTEREST OR PLEASURE IN DOING THINGS: NOT AT ALL

## 2025-08-02 ASSESSMENT — PAIN DESCRIPTION - PAIN TYPE
TYPE: ACUTE PAIN

## 2025-08-02 ASSESSMENT — FIBROSIS 4 INDEX: FIB4 SCORE: 3.36

## 2025-08-02 ASSESSMENT — LIFESTYLE VARIABLES: EVER_SMOKED: NEVER

## 2025-08-03 ENCOUNTER — APPOINTMENT (OUTPATIENT)
Dept: PHYSICAL THERAPY | Facility: REHABILITATION | Age: 75
DRG: 092 | End: 2025-08-03
Attending: PHYSICAL MEDICINE & REHABILITATION
Payer: MEDICARE

## 2025-08-03 ENCOUNTER — APPOINTMENT (OUTPATIENT)
Dept: OCCUPATIONAL THERAPY | Facility: REHABILITATION | Age: 75
DRG: 092 | End: 2025-08-03
Attending: PHYSICAL MEDICINE & REHABILITATION
Payer: MEDICARE

## 2025-08-03 ENCOUNTER — APPOINTMENT (OUTPATIENT)
Dept: SPEECH THERAPY | Facility: REHABILITATION | Age: 75
DRG: 092 | End: 2025-08-03
Attending: PHYSICAL MEDICINE & REHABILITATION
Payer: MEDICARE

## 2025-08-03 LAB
25(OH)D3 SERPL-MCNC: 31 NG/ML (ref 30–100)
ALBUMIN SERPL BCP-MCNC: 3.2 G/DL (ref 3.2–4.9)
ALBUMIN/GLOB SERPL: 1.3 G/DL
ALP SERPL-CCNC: 59 U/L (ref 30–99)
ALT SERPL-CCNC: <5 U/L (ref 2–50)
ANION GAP SERPL CALC-SCNC: 10 MMOL/L (ref 7–16)
AST SERPL-CCNC: 22 U/L (ref 12–45)
BASOPHILS # BLD AUTO: 0.4 % (ref 0–1.8)
BASOPHILS # BLD: 0.03 K/UL (ref 0–0.12)
BILIRUB SERPL-MCNC: <0.2 MG/DL (ref 0.1–1.5)
BUN SERPL-MCNC: 14 MG/DL (ref 8–22)
CALCIUM ALBUM COR SERPL-MCNC: 9 MG/DL (ref 8.5–10.5)
CALCIUM SERPL-MCNC: 8.4 MG/DL (ref 8.5–10.5)
CHLORIDE SERPL-SCNC: 110 MMOL/L (ref 96–112)
CO2 SERPL-SCNC: 19 MMOL/L (ref 20–33)
CREAT SERPL-MCNC: 0.66 MG/DL (ref 0.5–1.4)
EOSINOPHIL # BLD AUTO: 0.05 K/UL (ref 0–0.51)
EOSINOPHIL NFR BLD: 0.6 % (ref 0–6.9)
ERYTHROCYTE [DISTWIDTH] IN BLOOD BY AUTOMATED COUNT: 54.5 FL (ref 35.9–50)
GFR SERPLBLD CREATININE-BSD FMLA CKD-EPI: 92 ML/MIN/1.73 M 2
GLOBULIN SER CALC-MCNC: 2.5 G/DL (ref 1.9–3.5)
GLUCOSE SERPL-MCNC: 78 MG/DL (ref 65–99)
HCT VFR BLD AUTO: 30.6 % (ref 37–47)
HGB BLD-MCNC: 9.5 G/DL (ref 12–16)
IMM GRANULOCYTES # BLD AUTO: 0.04 K/UL (ref 0–0.11)
IMM GRANULOCYTES NFR BLD AUTO: 0.5 % (ref 0–0.9)
LYMPHOCYTES # BLD AUTO: 2.22 K/UL (ref 1–4.8)
LYMPHOCYTES NFR BLD: 27.3 % (ref 22–41)
MCH RBC QN AUTO: 25.8 PG (ref 27–33)
MCHC RBC AUTO-ENTMCNC: 31 G/DL (ref 32.2–35.5)
MCV RBC AUTO: 83.2 FL (ref 81.4–97.8)
MONOCYTES # BLD AUTO: 0.78 K/UL (ref 0–0.85)
MONOCYTES NFR BLD AUTO: 9.6 % (ref 0–13.4)
NEUTROPHILS # BLD AUTO: 5.01 K/UL (ref 1.82–7.42)
NEUTROPHILS NFR BLD: 61.6 % (ref 44–72)
NRBC # BLD AUTO: 0 K/UL
NRBC BLD-RTO: 0 /100 WBC (ref 0–0.2)
PLATELET # BLD AUTO: 227 K/UL (ref 164–446)
PMV BLD AUTO: 9.5 FL (ref 9–12.9)
POTASSIUM SERPL-SCNC: 3.9 MMOL/L (ref 3.6–5.5)
PROT SERPL-MCNC: 5.7 G/DL (ref 6–8.2)
RBC # BLD AUTO: 3.68 M/UL (ref 4.2–5.4)
SODIUM SERPL-SCNC: 139 MMOL/L (ref 135–145)
WBC # BLD AUTO: 8.1 K/UL (ref 4.8–10.8)

## 2025-08-03 PROCEDURE — 99232 SBSQ HOSP IP/OBS MODERATE 35: CPT | Performed by: PHYSICAL MEDICINE & REHABILITATION

## 2025-08-03 PROCEDURE — 97166 OT EVAL MOD COMPLEX 45 MIN: CPT

## 2025-08-03 PROCEDURE — A9270 NON-COVERED ITEM OR SERVICE: HCPCS | Performed by: PHYSICAL MEDICINE & REHABILITATION

## 2025-08-03 PROCEDURE — 80053 COMPREHEN METABOLIC PANEL: CPT

## 2025-08-03 PROCEDURE — 36415 COLL VENOUS BLD VENIPUNCTURE: CPT

## 2025-08-03 PROCEDURE — 85025 COMPLETE CBC W/AUTO DIFF WBC: CPT

## 2025-08-03 PROCEDURE — 97535 SELF CARE MNGMENT TRAINING: CPT

## 2025-08-03 PROCEDURE — 92610 EVALUATE SWALLOWING FUNCTION: CPT

## 2025-08-03 PROCEDURE — 700102 HCHG RX REV CODE 250 W/ 637 OVERRIDE(OP): Performed by: PHYSICAL MEDICINE & REHABILITATION

## 2025-08-03 PROCEDURE — 82306 VITAMIN D 25 HYDROXY: CPT

## 2025-08-03 PROCEDURE — 770010 HCHG ROOM/CARE - REHAB SEMI PRIVAT*

## 2025-08-03 PROCEDURE — 97162 PT EVAL MOD COMPLEX 30 MIN: CPT

## 2025-08-03 PROCEDURE — 700111 HCHG RX REV CODE 636 W/ 250 OVERRIDE (IP): Performed by: PHYSICAL MEDICINE & REHABILITATION

## 2025-08-03 PROCEDURE — 97530 THERAPEUTIC ACTIVITIES: CPT

## 2025-08-03 RX ADMIN — OMEPRAZOLE 20 MG: 20 CAPSULE, DELAYED RELEASE ORAL at 20:52

## 2025-08-03 RX ADMIN — CALCIUM CARBONATE (ANTACID) CHEW TAB 500 MG 500 MG: 500 CHEW TAB at 20:53

## 2025-08-03 RX ADMIN — DOCUSATE SODIUM 100 MG: 100 CAPSULE, LIQUID FILLED ORAL at 08:44

## 2025-08-03 RX ADMIN — ACETAMINOPHEN 1000 MG: 500 TABLET ORAL at 17:35

## 2025-08-03 RX ADMIN — METFORMIN HYDROCHLORIDE 500 MG: 500 TABLET ORAL at 08:44

## 2025-08-03 RX ADMIN — CALCIUM CARBONATE (ANTACID) CHEW TAB 500 MG 500 MG: 500 CHEW TAB at 08:44

## 2025-08-03 RX ADMIN — ACETAMINOPHEN 1000 MG: 500 TABLET ORAL at 11:58

## 2025-08-03 RX ADMIN — METOPROLOL SUCCINATE 12.5 MG: 25 TABLET, EXTENDED RELEASE ORAL at 08:44

## 2025-08-03 RX ADMIN — AMITRIPTYLINE HYDROCHLORIDE 150 MG: 50 TABLET, FILM COATED ORAL at 20:52

## 2025-08-03 RX ADMIN — OXYCODONE HYDROCHLORIDE 10 MG: 10 TABLET ORAL at 09:51

## 2025-08-03 RX ADMIN — ENOXAPARIN SODIUM 40 MG: 100 INJECTION SUBCUTANEOUS at 17:30

## 2025-08-03 RX ADMIN — TAMSULOSIN HYDROCHLORIDE 0.4 MG: 0.4 CAPSULE ORAL at 08:44

## 2025-08-03 RX ADMIN — DULOXETINE 30 MG: 30 CAPSULE, DELAYED RELEASE ORAL at 20:52

## 2025-08-03 RX ADMIN — ROSUVASTATIN CALCIUM 20 MG: 10 TABLET, FILM COATED ORAL at 20:53

## 2025-08-03 RX ADMIN — LIOTHYRONINE SODIUM 5 MCG: 5 TABLET ORAL at 05:12

## 2025-08-03 RX ADMIN — DOCUSATE SODIUM 100 MG: 100 CAPSULE, LIQUID FILLED ORAL at 20:52

## 2025-08-03 RX ADMIN — EZETIMIBE 10 MG: 10 TABLET ORAL at 20:52

## 2025-08-03 RX ADMIN — LEVOTHYROXINE SODIUM 88 MCG: 0.09 TABLET ORAL at 05:12

## 2025-08-03 RX ADMIN — OMEPRAZOLE 20 MG: 20 CAPSULE, DELAYED RELEASE ORAL at 08:44

## 2025-08-03 RX ADMIN — OXYCODONE HYDROCHLORIDE 10 MG: 10 TABLET ORAL at 20:53

## 2025-08-03 RX ADMIN — ONDANSETRON 4 MG: 4 TABLET, ORALLY DISINTEGRATING ORAL at 05:18

## 2025-08-03 RX ADMIN — OXYCODONE HYDROCHLORIDE 10 MG: 10 TABLET ORAL at 06:08

## 2025-08-03 RX ADMIN — ACETAMINOPHEN 1000 MG: 500 TABLET ORAL at 05:12

## 2025-08-03 ASSESSMENT — PATIENT HEALTH QUESTIONNAIRE - PHQ9
8. MOVING OR SPEAKING SO SLOWLY THAT OTHER PEOPLE COULD HAVE NOTICED. OR THE OPPOSITE, BEING SO FIGETY OR RESTLESS THAT YOU HAVE BEEN MOVING AROUND A LOT MORE THAN USUAL: NOT AT ALL
5. POOR APPETITE OR OVEREATING: SEVERAL DAYS
1. LITTLE INTEREST OR PLEASURE IN DOING THINGS: NOT AT ALL
9. THOUGHTS THAT YOU WOULD BE BETTER OFF DEAD, OR OF HURTING YOURSELF: NOT AT ALL
7. TROUBLE CONCENTRATING ON THINGS, SUCH AS READING THE NEWSPAPER OR WATCHING TELEVISION: NOT AT ALL
8. MOVING OR SPEAKING SO SLOWLY THAT OTHER PEOPLE COULD HAVE NOTICED. OR THE OPPOSITE, BEING SO FIGETY OR RESTLESS THAT YOU HAVE BEEN MOVING AROUND A LOT MORE THAN USUAL: NOT AT ALL
SUM OF ALL RESPONSES TO PHQ QUESTIONS 1-9: 3
SUM OF ALL RESPONSES TO PHQ QUESTIONS 1-9: 3
6. FEELING BAD ABOUT YOURSELF - OR THAT YOU ARE A FAILURE OR HAVE LET YOURSELF OR YOUR FAMILY DOWN: NOT AL ALL
SUM OF ALL RESPONSES TO PHQ9 QUESTIONS 1 AND 2: 1
2. FEELING DOWN, DEPRESSED, IRRITABLE, OR HOPELESS: SEVERAL DAYS
3. TROUBLE FALLING OR STAYING ASLEEP OR SLEEPING TOO MUCH: SEVERAL DAYS
SUM OF ALL RESPONSES TO PHQ9 QUESTIONS 1 AND 2: 1
6. FEELING BAD ABOUT YOURSELF - OR THAT YOU ARE A FAILURE OR HAVE LET YOURSELF OR YOUR FAMILY DOWN: NOT AL ALL
5. POOR APPETITE OR OVEREATING: SEVERAL DAYS
9. THOUGHTS THAT YOU WOULD BE BETTER OFF DEAD, OR OF HURTING YOURSELF: NOT AT ALL
3. TROUBLE FALLING OR STAYING ASLEEP OR SLEEPING TOO MUCH: SEVERAL DAYS
2. FEELING DOWN, DEPRESSED, IRRITABLE, OR HOPELESS: SEVERAL DAYS
4. FEELING TIRED OR HAVING LITTLE ENERGY: NOT AT ALL
7. TROUBLE CONCENTRATING ON THINGS, SUCH AS READING THE NEWSPAPER OR WATCHING TELEVISION: NOT AT ALL
1. LITTLE INTEREST OR PLEASURE IN DOING THINGS: NOT AT ALL
4. FEELING TIRED OR HAVING LITTLE ENERGY: NOT AT ALL

## 2025-08-03 ASSESSMENT — PAIN DESCRIPTION - PAIN TYPE
TYPE: SURGICAL PAIN
TYPE: ACUTE PAIN
TYPE: SURGICAL PAIN
TYPE: SURGICAL PAIN

## 2025-08-03 ASSESSMENT — BRIEF INTERVIEW FOR MENTAL STATUS (BIMS)
WHAT MONTH IS IT: ACCURATE WITHIN 5 DAYS
ASKED TO RECALL BED: YES, NO CUE REQUIRED
WHAT YEAR IS IT: CORRECT
WHAT DAY OF THE WEEK IS IT: CORRECT
BIMS SUMMARY SCORE: 14
ASKED TO RECALL BLUE: YES, NO CUE REQUIRED
INITIAL REPETITION OF BED BLUE SOCK - FIRST ATTEMPT: 3
ASKED TO RECALL SOCK: YES, AFTER CUEING (SOMETHING TO WEAR")"

## 2025-08-03 ASSESSMENT — FIBROSIS 4 INDEX: FIB4 SCORE: 3.38

## 2025-08-03 ASSESSMENT — ACTIVITIES OF DAILY LIVING (ADL): TOILETING: INDEPENDENT

## 2025-08-04 ENCOUNTER — APPOINTMENT (OUTPATIENT)
Dept: OCCUPATIONAL THERAPY | Facility: REHABILITATION | Age: 75
DRG: 092 | End: 2025-08-04
Attending: PHYSICAL MEDICINE & REHABILITATION
Payer: MEDICARE

## 2025-08-04 ENCOUNTER — APPOINTMENT (OUTPATIENT)
Dept: PHYSICAL THERAPY | Facility: REHABILITATION | Age: 75
DRG: 092 | End: 2025-08-04
Attending: PHYSICAL MEDICINE & REHABILITATION
Payer: MEDICARE

## 2025-08-04 ENCOUNTER — APPOINTMENT (OUTPATIENT)
Dept: SPEECH THERAPY | Facility: REHABILITATION | Age: 75
DRG: 092 | End: 2025-08-04
Attending: PHYSICAL MEDICINE & REHABILITATION
Payer: MEDICARE

## 2025-08-04 PROCEDURE — 97112 NEUROMUSCULAR REEDUCATION: CPT

## 2025-08-04 PROCEDURE — 97530 THERAPEUTIC ACTIVITIES: CPT

## 2025-08-04 PROCEDURE — 770010 HCHG ROOM/CARE - REHAB SEMI PRIVAT*

## 2025-08-04 PROCEDURE — 700102 HCHG RX REV CODE 250 W/ 637 OVERRIDE(OP): Performed by: PHYSICAL MEDICINE & REHABILITATION

## 2025-08-04 PROCEDURE — 92526 ORAL FUNCTION THERAPY: CPT

## 2025-08-04 PROCEDURE — A9270 NON-COVERED ITEM OR SERVICE: HCPCS | Performed by: PHYSICAL MEDICINE & REHABILITATION

## 2025-08-04 PROCEDURE — 99233 SBSQ HOSP IP/OBS HIGH 50: CPT | Performed by: PHYSICAL MEDICINE & REHABILITATION

## 2025-08-04 PROCEDURE — 97116 GAIT TRAINING THERAPY: CPT

## 2025-08-04 PROCEDURE — 94760 N-INVAS EAR/PLS OXIMETRY 1: CPT

## 2025-08-04 PROCEDURE — 700111 HCHG RX REV CODE 636 W/ 250 OVERRIDE (IP): Mod: JZ | Performed by: PHYSICAL MEDICINE & REHABILITATION

## 2025-08-04 RX ADMIN — OXYCODONE HYDROCHLORIDE 10 MG: 10 TABLET ORAL at 21:14

## 2025-08-04 RX ADMIN — OXYCODONE HYDROCHLORIDE 10 MG: 10 TABLET ORAL at 14:13

## 2025-08-04 RX ADMIN — AMITRIPTYLINE HYDROCHLORIDE 150 MG: 50 TABLET, FILM COATED ORAL at 21:14

## 2025-08-04 RX ADMIN — DULOXETINE 30 MG: 30 CAPSULE, DELAYED RELEASE ORAL at 21:14

## 2025-08-04 RX ADMIN — OMEPRAZOLE 20 MG: 20 CAPSULE, DELAYED RELEASE ORAL at 21:14

## 2025-08-04 RX ADMIN — ENOXAPARIN SODIUM 40 MG: 100 INJECTION SUBCUTANEOUS at 17:59

## 2025-08-04 RX ADMIN — ROSUVASTATIN CALCIUM 20 MG: 10 TABLET, FILM COATED ORAL at 21:14

## 2025-08-04 RX ADMIN — EZETIMIBE 10 MG: 10 TABLET ORAL at 21:14

## 2025-08-04 RX ADMIN — LEVOTHYROXINE SODIUM 88 MCG: 0.09 TABLET ORAL at 05:31

## 2025-08-04 RX ADMIN — TAMSULOSIN HYDROCHLORIDE 0.4 MG: 0.4 CAPSULE ORAL at 08:44

## 2025-08-04 RX ADMIN — DOCUSATE SODIUM 100 MG: 100 CAPSULE, LIQUID FILLED ORAL at 08:44

## 2025-08-04 RX ADMIN — METFORMIN HYDROCHLORIDE 500 MG: 500 TABLET ORAL at 08:44

## 2025-08-04 RX ADMIN — CALCIUM CARBONATE (ANTACID) CHEW TAB 500 MG 500 MG: 500 CHEW TAB at 08:44

## 2025-08-04 RX ADMIN — METOPROLOL SUCCINATE 12.5 MG: 25 TABLET, EXTENDED RELEASE ORAL at 08:43

## 2025-08-04 RX ADMIN — OXYCODONE HYDROCHLORIDE 10 MG: 10 TABLET ORAL at 05:33

## 2025-08-04 RX ADMIN — ACETAMINOPHEN 1000 MG: 500 TABLET ORAL at 05:31

## 2025-08-04 RX ADMIN — LIOTHYRONINE SODIUM 5 MCG: 5 TABLET ORAL at 05:31

## 2025-08-04 RX ADMIN — OMEPRAZOLE 20 MG: 20 CAPSULE, DELAYED RELEASE ORAL at 08:44

## 2025-08-04 ASSESSMENT — PATIENT HEALTH QUESTIONNAIRE - PHQ9
SUM OF ALL RESPONSES TO PHQ9 QUESTIONS 1 AND 2: 1
2. FEELING DOWN, DEPRESSED, IRRITABLE, OR HOPELESS: SEVERAL DAYS
1. LITTLE INTEREST OR PLEASURE IN DOING THINGS: NOT AT ALL

## 2025-08-04 ASSESSMENT — PAIN DESCRIPTION - PAIN TYPE
TYPE: ACUTE PAIN

## 2025-08-05 ENCOUNTER — APPOINTMENT (OUTPATIENT)
Dept: SPEECH THERAPY | Facility: REHABILITATION | Age: 75
DRG: 092 | End: 2025-08-05
Attending: PHYSICAL MEDICINE & REHABILITATION
Payer: MEDICARE

## 2025-08-05 ENCOUNTER — APPOINTMENT (OUTPATIENT)
Dept: OCCUPATIONAL THERAPY | Facility: REHABILITATION | Age: 75
DRG: 092 | End: 2025-08-05
Attending: PHYSICAL MEDICINE & REHABILITATION
Payer: MEDICARE

## 2025-08-05 ENCOUNTER — APPOINTMENT (OUTPATIENT)
Dept: PHYSICAL THERAPY | Facility: REHABILITATION | Age: 75
DRG: 092 | End: 2025-08-05
Attending: PHYSICAL MEDICINE & REHABILITATION
Payer: MEDICARE

## 2025-08-05 ENCOUNTER — APPOINTMENT (OUTPATIENT)
Dept: RADIOLOGY | Facility: REHABILITATION | Age: 75
DRG: 092 | End: 2025-08-05
Attending: PHYSICAL MEDICINE & REHABILITATION
Payer: MEDICARE

## 2025-08-05 PROCEDURE — 97530 THERAPEUTIC ACTIVITIES: CPT | Mod: CO

## 2025-08-05 PROCEDURE — 97112 NEUROMUSCULAR REEDUCATION: CPT

## 2025-08-05 PROCEDURE — 770010 HCHG ROOM/CARE - REHAB SEMI PRIVAT*

## 2025-08-05 PROCEDURE — 700102 HCHG RX REV CODE 250 W/ 637 OVERRIDE(OP): Performed by: PHYSICAL MEDICINE & REHABILITATION

## 2025-08-05 PROCEDURE — 92611 MOTION FLUOROSCOPY/SWALLOW: CPT

## 2025-08-05 PROCEDURE — A9270 NON-COVERED ITEM OR SERVICE: HCPCS | Performed by: PHYSICAL MEDICINE & REHABILITATION

## 2025-08-05 PROCEDURE — 97110 THERAPEUTIC EXERCISES: CPT | Mod: CO

## 2025-08-05 PROCEDURE — 99232 SBSQ HOSP IP/OBS MODERATE 35: CPT | Performed by: PHYSICAL MEDICINE & REHABILITATION

## 2025-08-05 PROCEDURE — 700111 HCHG RX REV CODE 636 W/ 250 OVERRIDE (IP): Mod: JZ | Performed by: PHYSICAL MEDICINE & REHABILITATION

## 2025-08-05 PROCEDURE — 74230 X-RAY XM SWLNG FUNCJ C+: CPT

## 2025-08-05 PROCEDURE — 97116 GAIT TRAINING THERAPY: CPT

## 2025-08-05 RX ADMIN — ROSUVASTATIN CALCIUM 20 MG: 10 TABLET, FILM COATED ORAL at 20:48

## 2025-08-05 RX ADMIN — CALCIUM CARBONATE (ANTACID) CHEW TAB 500 MG 500 MG: 500 CHEW TAB at 08:53

## 2025-08-05 RX ADMIN — DULOXETINE 30 MG: 30 CAPSULE, DELAYED RELEASE ORAL at 20:47

## 2025-08-05 RX ADMIN — OMEPRAZOLE 20 MG: 20 CAPSULE, DELAYED RELEASE ORAL at 20:48

## 2025-08-05 RX ADMIN — LIOTHYRONINE SODIUM 5 MCG: 5 TABLET ORAL at 05:18

## 2025-08-05 RX ADMIN — BENZOCAINE AND MENTHOL 1 LOZENGE: 15; 3.6 LOZENGE ORAL at 21:03

## 2025-08-05 RX ADMIN — AMITRIPTYLINE HYDROCHLORIDE 150 MG: 50 TABLET, FILM COATED ORAL at 20:47

## 2025-08-05 RX ADMIN — OXYCODONE HYDROCHLORIDE 10 MG: 10 TABLET ORAL at 05:18

## 2025-08-05 RX ADMIN — METOPROLOL SUCCINATE 12.5 MG: 25 TABLET, EXTENDED RELEASE ORAL at 08:53

## 2025-08-05 RX ADMIN — OXYCODONE HYDROCHLORIDE 10 MG: 10 TABLET ORAL at 21:07

## 2025-08-05 RX ADMIN — EZETIMIBE 10 MG: 10 TABLET ORAL at 20:48

## 2025-08-05 RX ADMIN — OXYCODONE HYDROCHLORIDE 10 MG: 10 TABLET ORAL at 15:58

## 2025-08-05 RX ADMIN — CALCIUM CARBONATE (ANTACID) CHEW TAB 500 MG 500 MG: 500 CHEW TAB at 20:48

## 2025-08-05 RX ADMIN — ENOXAPARIN SODIUM 40 MG: 100 INJECTION SUBCUTANEOUS at 17:40

## 2025-08-05 RX ADMIN — LEVOTHYROXINE SODIUM 88 MCG: 0.09 TABLET ORAL at 05:18

## 2025-08-05 RX ADMIN — METFORMIN HYDROCHLORIDE 500 MG: 500 TABLET ORAL at 08:54

## 2025-08-05 RX ADMIN — ACETAMINOPHEN 1000 MG: 500 TABLET ORAL at 13:48

## 2025-08-05 RX ADMIN — OMEPRAZOLE 20 MG: 20 CAPSULE, DELAYED RELEASE ORAL at 08:53

## 2025-08-05 ASSESSMENT — PAIN DESCRIPTION - PAIN TYPE
TYPE: ACUTE PAIN

## 2025-08-06 ENCOUNTER — APPOINTMENT (OUTPATIENT)
Dept: OCCUPATIONAL THERAPY | Facility: REHABILITATION | Age: 75
DRG: 092 | End: 2025-08-06
Attending: PHYSICAL MEDICINE & REHABILITATION
Payer: MEDICARE

## 2025-08-06 ENCOUNTER — APPOINTMENT (OUTPATIENT)
Dept: SPEECH THERAPY | Facility: REHABILITATION | Age: 75
DRG: 092 | End: 2025-08-06
Attending: PHYSICAL MEDICINE & REHABILITATION
Payer: MEDICARE

## 2025-08-06 ENCOUNTER — APPOINTMENT (OUTPATIENT)
Dept: PHYSICAL THERAPY | Facility: REHABILITATION | Age: 75
DRG: 092 | End: 2025-08-06
Attending: PHYSICAL MEDICINE & REHABILITATION
Payer: MEDICARE

## 2025-08-06 PROCEDURE — 97112 NEUROMUSCULAR REEDUCATION: CPT

## 2025-08-06 PROCEDURE — 770010 HCHG ROOM/CARE - REHAB SEMI PRIVAT*

## 2025-08-06 PROCEDURE — 97116 GAIT TRAINING THERAPY: CPT

## 2025-08-06 PROCEDURE — 92526 ORAL FUNCTION THERAPY: CPT

## 2025-08-06 PROCEDURE — A9270 NON-COVERED ITEM OR SERVICE: HCPCS | Performed by: PHYSICAL MEDICINE & REHABILITATION

## 2025-08-06 PROCEDURE — 700111 HCHG RX REV CODE 636 W/ 250 OVERRIDE (IP): Mod: JZ | Performed by: PHYSICAL MEDICINE & REHABILITATION

## 2025-08-06 PROCEDURE — 97110 THERAPEUTIC EXERCISES: CPT

## 2025-08-06 PROCEDURE — 700102 HCHG RX REV CODE 250 W/ 637 OVERRIDE(OP): Performed by: PHYSICAL MEDICINE & REHABILITATION

## 2025-08-06 PROCEDURE — 97535 SELF CARE MNGMENT TRAINING: CPT

## 2025-08-06 PROCEDURE — 97110 THERAPEUTIC EXERCISES: CPT | Mod: CO

## 2025-08-06 PROCEDURE — 97530 THERAPEUTIC ACTIVITIES: CPT | Mod: CO

## 2025-08-06 PROCEDURE — 97112 NEUROMUSCULAR REEDUCATION: CPT | Mod: CO

## 2025-08-06 PROCEDURE — 99232 SBSQ HOSP IP/OBS MODERATE 35: CPT | Performed by: PHYSICAL MEDICINE & REHABILITATION

## 2025-08-06 RX ADMIN — TAMSULOSIN HYDROCHLORIDE 0.4 MG: 0.4 CAPSULE ORAL at 08:22

## 2025-08-06 RX ADMIN — OMEPRAZOLE 20 MG: 20 CAPSULE, DELAYED RELEASE ORAL at 20:15

## 2025-08-06 RX ADMIN — OMEPRAZOLE 20 MG: 20 CAPSULE, DELAYED RELEASE ORAL at 08:22

## 2025-08-06 RX ADMIN — ENOXAPARIN SODIUM 40 MG: 100 INJECTION SUBCUTANEOUS at 17:52

## 2025-08-06 RX ADMIN — EZETIMIBE 10 MG: 10 TABLET ORAL at 20:16

## 2025-08-06 RX ADMIN — OXYCODONE HYDROCHLORIDE 10 MG: 10 TABLET ORAL at 21:27

## 2025-08-06 RX ADMIN — BACLOFEN 10 MG: 10 TABLET ORAL at 20:15

## 2025-08-06 RX ADMIN — CALCIUM CARBONATE (ANTACID) CHEW TAB 500 MG 500 MG: 500 CHEW TAB at 08:22

## 2025-08-06 RX ADMIN — ROSUVASTATIN CALCIUM 20 MG: 10 TABLET, FILM COATED ORAL at 20:16

## 2025-08-06 RX ADMIN — OXYCODONE HYDROCHLORIDE 10 MG: 10 TABLET ORAL at 05:02

## 2025-08-06 RX ADMIN — DULOXETINE 30 MG: 30 CAPSULE, DELAYED RELEASE ORAL at 20:16

## 2025-08-06 RX ADMIN — LEVOTHYROXINE SODIUM 88 MCG: 0.09 TABLET ORAL at 05:02

## 2025-08-06 RX ADMIN — LIOTHYRONINE SODIUM 5 MCG: 5 TABLET ORAL at 05:02

## 2025-08-06 RX ADMIN — OXYCODONE HYDROCHLORIDE 10 MG: 10 TABLET ORAL at 15:21

## 2025-08-06 RX ADMIN — AMITRIPTYLINE HYDROCHLORIDE 150 MG: 50 TABLET, FILM COATED ORAL at 20:15

## 2025-08-06 RX ADMIN — METFORMIN HYDROCHLORIDE 500 MG: 500 TABLET ORAL at 08:21

## 2025-08-06 RX ADMIN — ACETAMINOPHEN 1000 MG: 500 TABLET ORAL at 05:01

## 2025-08-06 ASSESSMENT — PATIENT HEALTH QUESTIONNAIRE - PHQ9
2. FEELING DOWN, DEPRESSED, IRRITABLE, OR HOPELESS: SEVERAL DAYS
SUM OF ALL RESPONSES TO PHQ9 QUESTIONS 1 AND 2: 1
1. LITTLE INTEREST OR PLEASURE IN DOING THINGS: NOT AT ALL

## 2025-08-06 ASSESSMENT — PAIN DESCRIPTION - PAIN TYPE
TYPE: ACUTE PAIN

## 2025-08-06 ASSESSMENT — BALANCE ASSESSMENTS
PICK UP OBJECT FROM THE FLOOR FROM A STANDING POSITION: 0
TURN 360 DEGREES: 2
SITTING UNSUPPORTED: 4
STANDING TO SITTING: 3
STANDING UNSUPPORTED ONE FOOT IN FRONT: 2
LONG VERSION TOTAL SCORE (MAX 56): 28
STANDING UNSUPPORTED WITH FEET TOGETHER: 3
MEDICARE IMPAIRMENT PERCENTAGE: 50
TRANSFERS: 3
LONG VERSION TOTAL SCORE (MAX 56): 28
PLACE ALTERNATE FOOT ON STEP OR STOOL WHILE STANDING UNSUPPORTED: 1
STANDING ON ONE LEG: 1
STANDING UNSUPPORTED: 3
LOOK OVER LEFT AND RIGHT SHOULDERS WHILE STANDING: 0
STANDING UNSUPPORTED WITH EYES CLOSED: 3
REACHING FORWARD WITH OUTSTRETCHED ARM WHILE STANDING: 0
SITTING TO STANDING: 3

## 2025-08-06 ASSESSMENT — PAIN SCALES - WONG BAKER: WONGBAKER_NUMERICALRESPONSE: DOESN'T HURT AT ALL

## 2025-08-07 ENCOUNTER — APPOINTMENT (OUTPATIENT)
Dept: OCCUPATIONAL THERAPY | Facility: REHABILITATION | Age: 75
DRG: 092 | End: 2025-08-07
Attending: PHYSICAL MEDICINE & REHABILITATION
Payer: MEDICARE

## 2025-08-07 ENCOUNTER — APPOINTMENT (OUTPATIENT)
Dept: SPEECH THERAPY | Facility: REHABILITATION | Age: 75
DRG: 092 | End: 2025-08-07
Attending: PHYSICAL MEDICINE & REHABILITATION
Payer: MEDICARE

## 2025-08-07 ENCOUNTER — APPOINTMENT (OUTPATIENT)
Dept: PHYSICAL THERAPY | Facility: REHABILITATION | Age: 75
DRG: 092 | End: 2025-08-07
Attending: PHYSICAL MEDICINE & REHABILITATION
Payer: MEDICARE

## 2025-08-07 LAB
APPEARANCE UR: ABNORMAL
BACTERIA #/AREA URNS HPF: ABNORMAL /HPF
BILIRUB UR QL STRIP.AUTO: NEGATIVE
CASTS URNS QL MICRO: ABNORMAL /LPF (ref 0–2)
COLOR UR: YELLOW
EPITHELIAL CELLS 1715: ABNORMAL /HPF (ref 0–5)
GLUCOSE UR STRIP.AUTO-MCNC: NEGATIVE MG/DL
KETONES UR STRIP.AUTO-MCNC: NEGATIVE MG/DL
LEUKOCYTE ESTERASE UR QL STRIP.AUTO: ABNORMAL
MICRO URNS: ABNORMAL
NITRITE UR QL STRIP.AUTO: NEGATIVE
PH UR STRIP.AUTO: 6 [PH] (ref 5–8)
PROT UR QL STRIP: 30 MG/DL
RBC # URNS HPF: ABNORMAL /HPF (ref 0–2)
RBC UR QL AUTO: NEGATIVE
SP GR UR STRIP.AUTO: 1.02
UROBILINOGEN UR STRIP.AUTO-MCNC: 0.2 EU/DL
WBC #/AREA URNS HPF: >100 /HPF

## 2025-08-07 PROCEDURE — 87186 SC STD MICRODIL/AGAR DIL: CPT

## 2025-08-07 PROCEDURE — 700102 HCHG RX REV CODE 250 W/ 637 OVERRIDE(OP): Performed by: PHYSICAL MEDICINE & REHABILITATION

## 2025-08-07 PROCEDURE — 770010 HCHG ROOM/CARE - REHAB SEMI PRIVAT*

## 2025-08-07 PROCEDURE — 97116 GAIT TRAINING THERAPY: CPT

## 2025-08-07 PROCEDURE — 97535 SELF CARE MNGMENT TRAINING: CPT

## 2025-08-07 PROCEDURE — A9270 NON-COVERED ITEM OR SERVICE: HCPCS | Performed by: PHYSICAL MEDICINE & REHABILITATION

## 2025-08-07 PROCEDURE — 99233 SBSQ HOSP IP/OBS HIGH 50: CPT | Performed by: PHYSICAL MEDICINE & REHABILITATION

## 2025-08-07 PROCEDURE — 97530 THERAPEUTIC ACTIVITIES: CPT

## 2025-08-07 PROCEDURE — 97110 THERAPEUTIC EXERCISES: CPT

## 2025-08-07 PROCEDURE — 87086 URINE CULTURE/COLONY COUNT: CPT

## 2025-08-07 PROCEDURE — 87077 CULTURE AEROBIC IDENTIFY: CPT

## 2025-08-07 PROCEDURE — 700111 HCHG RX REV CODE 636 W/ 250 OVERRIDE (IP): Mod: JZ | Performed by: PHYSICAL MEDICINE & REHABILITATION

## 2025-08-07 PROCEDURE — 92526 ORAL FUNCTION THERAPY: CPT

## 2025-08-07 PROCEDURE — 81001 URINALYSIS AUTO W/SCOPE: CPT

## 2025-08-07 RX ORDER — SUMATRIPTAN SUCCINATE 25 MG/1
25 TABLET ORAL
Status: DISCONTINUED | OUTPATIENT
Start: 2025-08-07 | End: 2025-08-20 | Stop reason: HOSPADM

## 2025-08-07 RX ORDER — OXYCODONE HYDROCHLORIDE 5 MG/1
5 TABLET ORAL EVERY 4 HOURS PRN
Refills: 0 | Status: DISCONTINUED | OUTPATIENT
Start: 2025-08-07 | End: 2025-08-17

## 2025-08-07 RX ADMIN — DULOXETINE 30 MG: 30 CAPSULE, DELAYED RELEASE ORAL at 21:50

## 2025-08-07 RX ADMIN — ACETAMINOPHEN 1000 MG: 500 TABLET ORAL at 11:29

## 2025-08-07 RX ADMIN — OXYCODONE 5 MG: 5 TABLET ORAL at 22:06

## 2025-08-07 RX ADMIN — SUMATRIPTAN SUCCINATE 25 MG: 25 TABLET ORAL at 11:29

## 2025-08-07 RX ADMIN — OMEPRAZOLE 20 MG: 20 CAPSULE, DELAYED RELEASE ORAL at 09:17

## 2025-08-07 RX ADMIN — OXYCODONE 5 MG: 5 TABLET ORAL at 16:18

## 2025-08-07 RX ADMIN — OMEPRAZOLE 20 MG: 20 CAPSULE, DELAYED RELEASE ORAL at 21:50

## 2025-08-07 RX ADMIN — AMITRIPTYLINE HYDROCHLORIDE 150 MG: 50 TABLET, FILM COATED ORAL at 21:49

## 2025-08-07 RX ADMIN — ROSUVASTATIN CALCIUM 20 MG: 10 TABLET, FILM COATED ORAL at 21:50

## 2025-08-07 RX ADMIN — LEVOTHYROXINE SODIUM 88 MCG: 0.09 TABLET ORAL at 05:14

## 2025-08-07 RX ADMIN — METFORMIN HYDROCHLORIDE 500 MG: 500 TABLET ORAL at 09:17

## 2025-08-07 RX ADMIN — CALCIUM CARBONATE (ANTACID) CHEW TAB 500 MG 500 MG: 500 CHEW TAB at 21:50

## 2025-08-07 RX ADMIN — TAMSULOSIN HYDROCHLORIDE 0.4 MG: 0.4 CAPSULE ORAL at 09:17

## 2025-08-07 RX ADMIN — ALENDRONATE SODIUM 70 MG: 70 TABLET ORAL at 05:14

## 2025-08-07 RX ADMIN — LIOTHYRONINE SODIUM 5 MCG: 5 TABLET ORAL at 05:14

## 2025-08-07 RX ADMIN — OXYCODONE HYDROCHLORIDE 10 MG: 10 TABLET ORAL at 01:07

## 2025-08-07 RX ADMIN — EZETIMIBE 10 MG: 10 TABLET ORAL at 21:50

## 2025-08-07 RX ADMIN — ENOXAPARIN SODIUM 40 MG: 100 INJECTION SUBCUTANEOUS at 18:09

## 2025-08-07 ASSESSMENT — PAIN DESCRIPTION - PAIN TYPE
TYPE: ACUTE PAIN

## 2025-08-08 ENCOUNTER — APPOINTMENT (OUTPATIENT)
Dept: OCCUPATIONAL THERAPY | Facility: REHABILITATION | Age: 75
DRG: 092 | End: 2025-08-08
Attending: PHYSICAL MEDICINE & REHABILITATION
Payer: MEDICARE

## 2025-08-08 ENCOUNTER — APPOINTMENT (OUTPATIENT)
Dept: SPEECH THERAPY | Facility: REHABILITATION | Age: 75
DRG: 092 | End: 2025-08-08
Attending: PHYSICAL MEDICINE & REHABILITATION
Payer: MEDICARE

## 2025-08-08 PROCEDURE — 700111 HCHG RX REV CODE 636 W/ 250 OVERRIDE (IP): Mod: JZ | Performed by: PHYSICAL MEDICINE & REHABILITATION

## 2025-08-08 PROCEDURE — 99232 SBSQ HOSP IP/OBS MODERATE 35: CPT | Performed by: PHYSICAL MEDICINE & REHABILITATION

## 2025-08-08 PROCEDURE — 700102 HCHG RX REV CODE 250 W/ 637 OVERRIDE(OP): Performed by: PHYSICAL MEDICINE & REHABILITATION

## 2025-08-08 PROCEDURE — 97530 THERAPEUTIC ACTIVITIES: CPT

## 2025-08-08 PROCEDURE — 92526 ORAL FUNCTION THERAPY: CPT

## 2025-08-08 PROCEDURE — 97535 SELF CARE MNGMENT TRAINING: CPT

## 2025-08-08 PROCEDURE — A9270 NON-COVERED ITEM OR SERVICE: HCPCS | Performed by: PHYSICAL MEDICINE & REHABILITATION

## 2025-08-08 PROCEDURE — 770010 HCHG ROOM/CARE - REHAB SEMI PRIVAT*

## 2025-08-08 RX ORDER — SULFAMETHOXAZOLE AND TRIMETHOPRIM 800; 160 MG/1; MG/1
1 TABLET ORAL EVERY 12 HOURS
Status: DISCONTINUED | OUTPATIENT
Start: 2025-08-08 | End: 2025-08-12

## 2025-08-08 RX ADMIN — LIOTHYRONINE SODIUM 5 MCG: 5 TABLET ORAL at 06:04

## 2025-08-08 RX ADMIN — ROSUVASTATIN CALCIUM 20 MG: 10 TABLET, FILM COATED ORAL at 21:43

## 2025-08-08 RX ADMIN — ENOXAPARIN SODIUM 40 MG: 100 INJECTION SUBCUTANEOUS at 17:19

## 2025-08-08 RX ADMIN — OMEPRAZOLE 20 MG: 20 CAPSULE, DELAYED RELEASE ORAL at 21:42

## 2025-08-08 RX ADMIN — LEVOTHYROXINE SODIUM 88 MCG: 0.09 TABLET ORAL at 06:04

## 2025-08-08 RX ADMIN — AMITRIPTYLINE HYDROCHLORIDE 150 MG: 50 TABLET, FILM COATED ORAL at 21:42

## 2025-08-08 RX ADMIN — METOPROLOL SUCCINATE 12.5 MG: 25 TABLET, EXTENDED RELEASE ORAL at 07:49

## 2025-08-08 RX ADMIN — SULFAMETHOXAZOLE AND TRIMETHOPRIM 1 TABLET: 800; 160 TABLET ORAL at 11:15

## 2025-08-08 RX ADMIN — CALCIUM CARBONATE (ANTACID) CHEW TAB 500 MG 500 MG: 500 CHEW TAB at 21:42

## 2025-08-08 RX ADMIN — OXYCODONE 5 MG: 5 TABLET ORAL at 21:47

## 2025-08-08 RX ADMIN — EZETIMIBE 10 MG: 10 TABLET ORAL at 21:42

## 2025-08-08 RX ADMIN — TAMSULOSIN HYDROCHLORIDE 0.4 MG: 0.4 CAPSULE ORAL at 07:49

## 2025-08-08 RX ADMIN — OMEPRAZOLE 20 MG: 20 CAPSULE, DELAYED RELEASE ORAL at 07:49

## 2025-08-08 RX ADMIN — METFORMIN HYDROCHLORIDE 500 MG: 500 TABLET ORAL at 07:49

## 2025-08-08 RX ADMIN — SULFAMETHOXAZOLE AND TRIMETHOPRIM 1 TABLET: 800; 160 TABLET ORAL at 21:42

## 2025-08-08 RX ADMIN — ACETAMINOPHEN 1000 MG: 500 TABLET ORAL at 13:32

## 2025-08-08 RX ADMIN — DULOXETINE 30 MG: 30 CAPSULE, DELAYED RELEASE ORAL at 21:42

## 2025-08-08 RX ADMIN — OXYCODONE 5 MG: 5 TABLET ORAL at 11:15

## 2025-08-08 ASSESSMENT — PAIN DESCRIPTION - PAIN TYPE
TYPE: ACUTE PAIN

## 2025-08-09 PROCEDURE — A9270 NON-COVERED ITEM OR SERVICE: HCPCS | Performed by: PHYSICAL MEDICINE & REHABILITATION

## 2025-08-09 PROCEDURE — 700102 HCHG RX REV CODE 250 W/ 637 OVERRIDE(OP): Performed by: PHYSICAL MEDICINE & REHABILITATION

## 2025-08-09 PROCEDURE — 770010 HCHG ROOM/CARE - REHAB SEMI PRIVAT*

## 2025-08-09 PROCEDURE — 700111 HCHG RX REV CODE 636 W/ 250 OVERRIDE (IP): Mod: JZ | Performed by: PHYSICAL MEDICINE & REHABILITATION

## 2025-08-09 RX ORDER — BISACODYL 5 MG
10 TABLET, DELAYED RELEASE (ENTERIC COATED) ORAL ONCE
Status: DISPENSED | OUTPATIENT
Start: 2025-08-09 | End: 2025-08-10

## 2025-08-09 RX ADMIN — EZETIMIBE 10 MG: 10 TABLET ORAL at 20:42

## 2025-08-09 RX ADMIN — LEVOTHYROXINE SODIUM 88 MCG: 0.09 TABLET ORAL at 05:33

## 2025-08-09 RX ADMIN — OXYCODONE 5 MG: 5 TABLET ORAL at 23:46

## 2025-08-09 RX ADMIN — OMEPRAZOLE 20 MG: 20 CAPSULE, DELAYED RELEASE ORAL at 08:36

## 2025-08-09 RX ADMIN — CALCIUM CARBONATE (ANTACID) CHEW TAB 500 MG 500 MG: 500 CHEW TAB at 08:35

## 2025-08-09 RX ADMIN — ONDANSETRON 4 MG: 4 TABLET, ORALLY DISINTEGRATING ORAL at 02:12

## 2025-08-09 RX ADMIN — CALCIUM CARBONATE (ANTACID) CHEW TAB 500 MG 500 MG: 500 CHEW TAB at 20:42

## 2025-08-09 RX ADMIN — TAMSULOSIN HYDROCHLORIDE 0.4 MG: 0.4 CAPSULE ORAL at 08:35

## 2025-08-09 RX ADMIN — METFORMIN HYDROCHLORIDE 500 MG: 500 TABLET ORAL at 08:35

## 2025-08-09 RX ADMIN — ENOXAPARIN SODIUM 40 MG: 100 INJECTION SUBCUTANEOUS at 17:15

## 2025-08-09 RX ADMIN — ACETAMINOPHEN 1000 MG: 500 TABLET ORAL at 01:16

## 2025-08-09 RX ADMIN — SULFAMETHOXAZOLE AND TRIMETHOPRIM 1 TABLET: 800; 160 TABLET ORAL at 20:43

## 2025-08-09 RX ADMIN — OXYCODONE 5 MG: 5 TABLET ORAL at 14:18

## 2025-08-09 RX ADMIN — OMEPRAZOLE 20 MG: 20 CAPSULE, DELAYED RELEASE ORAL at 20:42

## 2025-08-09 RX ADMIN — OXYCODONE 5 MG: 5 TABLET ORAL at 19:19

## 2025-08-09 RX ADMIN — METOPROLOL SUCCINATE 12.5 MG: 25 TABLET, EXTENDED RELEASE ORAL at 08:36

## 2025-08-09 RX ADMIN — SULFAMETHOXAZOLE AND TRIMETHOPRIM 1 TABLET: 800; 160 TABLET ORAL at 08:35

## 2025-08-09 RX ADMIN — OXYCODONE 5 MG: 5 TABLET ORAL at 01:48

## 2025-08-09 RX ADMIN — AMITRIPTYLINE HYDROCHLORIDE 150 MG: 50 TABLET, FILM COATED ORAL at 20:42

## 2025-08-09 RX ADMIN — ROSUVASTATIN CALCIUM 20 MG: 10 TABLET, FILM COATED ORAL at 20:43

## 2025-08-09 RX ADMIN — LIOTHYRONINE SODIUM 5 MCG: 5 TABLET ORAL at 05:32

## 2025-08-09 RX ADMIN — DULOXETINE 30 MG: 30 CAPSULE, DELAYED RELEASE ORAL at 20:43

## 2025-08-09 ASSESSMENT — PATIENT HEALTH QUESTIONNAIRE - PHQ9
8. MOVING OR SPEAKING SO SLOWLY THAT OTHER PEOPLE COULD HAVE NOTICED. OR THE OPPOSITE, BEING SO FIGETY OR RESTLESS THAT YOU HAVE BEEN MOVING AROUND A LOT MORE THAN USUAL: NOT AT ALL
2. FEELING DOWN, DEPRESSED, IRRITABLE, OR HOPELESS: SEVERAL DAYS
SUM OF ALL RESPONSES TO PHQ QUESTIONS 1-9: 3
1. LITTLE INTEREST OR PLEASURE IN DOING THINGS: NOT AT ALL
7. TROUBLE CONCENTRATING ON THINGS, SUCH AS READING THE NEWSPAPER OR WATCHING TELEVISION: NOT AT ALL
SUM OF ALL RESPONSES TO PHQ9 QUESTIONS 1 AND 2: 1
9. THOUGHTS THAT YOU WOULD BE BETTER OFF DEAD, OR OF HURTING YOURSELF: NOT AT ALL
3. TROUBLE FALLING OR STAYING ASLEEP OR SLEEPING TOO MUCH: SEVERAL DAYS
5. POOR APPETITE OR OVEREATING: NOT AT ALL
6. FEELING BAD ABOUT YOURSELF - OR THAT YOU ARE A FAILURE OR HAVE LET YOURSELF OR YOUR FAMILY DOWN: NOT AL ALL
4. FEELING TIRED OR HAVING LITTLE ENERGY: SEVERAL DAYS

## 2025-08-09 ASSESSMENT — PAIN SCALES - WONG BAKER: WONGBAKER_NUMERICALRESPONSE: DOESN'T HURT AT ALL

## 2025-08-09 ASSESSMENT — PAIN DESCRIPTION - PAIN TYPE
TYPE: ACUTE PAIN
TYPE: SURGICAL PAIN
TYPE: ACUTE PAIN
TYPE: SURGICAL PAIN
TYPE: SURGICAL PAIN
TYPE: ACUTE PAIN

## 2025-08-10 ENCOUNTER — APPOINTMENT (OUTPATIENT)
Dept: OCCUPATIONAL THERAPY | Facility: REHABILITATION | Age: 75
DRG: 092 | End: 2025-08-10
Attending: PHYSICAL MEDICINE & REHABILITATION
Payer: MEDICARE

## 2025-08-10 LAB
BACTERIA UR CULT: ABNORMAL
SIGNIFICANT IND 70042: ABNORMAL
SITE SITE: ABNORMAL
SOURCE SOURCE: ABNORMAL

## 2025-08-10 PROCEDURE — 700105 HCHG RX REV CODE 258: Performed by: STUDENT IN AN ORGANIZED HEALTH CARE EDUCATION/TRAINING PROGRAM

## 2025-08-10 PROCEDURE — A9270 NON-COVERED ITEM OR SERVICE: HCPCS | Performed by: PHYSICAL MEDICINE & REHABILITATION

## 2025-08-10 PROCEDURE — 700111 HCHG RX REV CODE 636 W/ 250 OVERRIDE (IP): Mod: JZ | Performed by: PHYSICAL MEDICINE & REHABILITATION

## 2025-08-10 PROCEDURE — 700102 HCHG RX REV CODE 250 W/ 637 OVERRIDE(OP): Performed by: PHYSICAL MEDICINE & REHABILITATION

## 2025-08-10 PROCEDURE — 770010 HCHG ROOM/CARE - REHAB SEMI PRIVAT*

## 2025-08-10 PROCEDURE — 97530 THERAPEUTIC ACTIVITIES: CPT

## 2025-08-10 RX ORDER — SODIUM CHLORIDE, SODIUM LACTATE, POTASSIUM CHLORIDE, CALCIUM CHLORIDE 600; 310; 30; 20 MG/100ML; MG/100ML; MG/100ML; MG/100ML
1000 INJECTION, SOLUTION INTRAVENOUS ONCE
Status: COMPLETED | OUTPATIENT
Start: 2025-08-10 | End: 2025-08-11

## 2025-08-10 RX ADMIN — METOPROLOL SUCCINATE 12.5 MG: 25 TABLET, EXTENDED RELEASE ORAL at 08:53

## 2025-08-10 RX ADMIN — TAMSULOSIN HYDROCHLORIDE 0.4 MG: 0.4 CAPSULE ORAL at 08:53

## 2025-08-10 RX ADMIN — SODIUM CHLORIDE, POTASSIUM CHLORIDE, SODIUM LACTATE AND CALCIUM CHLORIDE 1000 ML: 600; 310; 30; 20 INJECTION, SOLUTION INTRAVENOUS at 17:36

## 2025-08-10 RX ADMIN — EZETIMIBE 10 MG: 10 TABLET ORAL at 21:36

## 2025-08-10 RX ADMIN — DULOXETINE 30 MG: 30 CAPSULE, DELAYED RELEASE ORAL at 21:36

## 2025-08-10 RX ADMIN — LIOTHYRONINE SODIUM 5 MCG: 5 TABLET ORAL at 05:28

## 2025-08-10 RX ADMIN — SULFAMETHOXAZOLE AND TRIMETHOPRIM 1 TABLET: 800; 160 TABLET ORAL at 08:53

## 2025-08-10 RX ADMIN — DOCUSATE SODIUM 100 MG: 100 CAPSULE, LIQUID FILLED ORAL at 21:36

## 2025-08-10 RX ADMIN — OMEPRAZOLE 20 MG: 20 CAPSULE, DELAYED RELEASE ORAL at 21:36

## 2025-08-10 RX ADMIN — SULFAMETHOXAZOLE AND TRIMETHOPRIM 1 TABLET: 800; 160 TABLET ORAL at 21:37

## 2025-08-10 RX ADMIN — ROSUVASTATIN CALCIUM 20 MG: 10 TABLET, FILM COATED ORAL at 21:36

## 2025-08-10 RX ADMIN — OXYCODONE 5 MG: 5 TABLET ORAL at 22:50

## 2025-08-10 RX ADMIN — OMEPRAZOLE 20 MG: 20 CAPSULE, DELAYED RELEASE ORAL at 08:53

## 2025-08-10 RX ADMIN — LEVOTHYROXINE SODIUM 88 MCG: 0.09 TABLET ORAL at 05:28

## 2025-08-10 RX ADMIN — ENOXAPARIN SODIUM 40 MG: 100 INJECTION SUBCUTANEOUS at 17:36

## 2025-08-10 RX ADMIN — METFORMIN HYDROCHLORIDE 500 MG: 500 TABLET ORAL at 08:53

## 2025-08-10 RX ADMIN — OXYCODONE 5 MG: 5 TABLET ORAL at 05:29

## 2025-08-10 RX ADMIN — AMITRIPTYLINE HYDROCHLORIDE 150 MG: 50 TABLET, FILM COATED ORAL at 21:36

## 2025-08-10 RX ADMIN — CALCIUM CARBONATE (ANTACID) CHEW TAB 500 MG 500 MG: 500 CHEW TAB at 08:53

## 2025-08-10 RX ADMIN — OXYCODONE 5 MG: 5 TABLET ORAL at 12:24

## 2025-08-10 ASSESSMENT — PAIN DESCRIPTION - PAIN TYPE
TYPE: ACUTE PAIN
TYPE: SURGICAL PAIN

## 2025-08-10 ASSESSMENT — FIBROSIS 4 INDEX: FIB4 SCORE: 3.38

## 2025-08-11 ENCOUNTER — APPOINTMENT (OUTPATIENT)
Dept: PHYSICAL THERAPY | Facility: REHABILITATION | Age: 75
DRG: 092 | End: 2025-08-11
Attending: PHYSICAL MEDICINE & REHABILITATION
Payer: MEDICARE

## 2025-08-11 ENCOUNTER — APPOINTMENT (OUTPATIENT)
Dept: OCCUPATIONAL THERAPY | Facility: REHABILITATION | Age: 75
DRG: 092 | End: 2025-08-11
Attending: PHYSICAL MEDICINE & REHABILITATION
Payer: MEDICARE

## 2025-08-11 PROCEDURE — 97116 GAIT TRAINING THERAPY: CPT

## 2025-08-11 PROCEDURE — 700102 HCHG RX REV CODE 250 W/ 637 OVERRIDE(OP): Performed by: PHYSICAL MEDICINE & REHABILITATION

## 2025-08-11 PROCEDURE — A9270 NON-COVERED ITEM OR SERVICE: HCPCS | Performed by: PHYSICAL MEDICINE & REHABILITATION

## 2025-08-11 PROCEDURE — 97110 THERAPEUTIC EXERCISES: CPT

## 2025-08-11 PROCEDURE — 97112 NEUROMUSCULAR REEDUCATION: CPT | Mod: CO

## 2025-08-11 PROCEDURE — 97110 THERAPEUTIC EXERCISES: CPT | Mod: CO

## 2025-08-11 PROCEDURE — 97530 THERAPEUTIC ACTIVITIES: CPT

## 2025-08-11 PROCEDURE — 770010 HCHG ROOM/CARE - REHAB SEMI PRIVAT*

## 2025-08-11 PROCEDURE — 700111 HCHG RX REV CODE 636 W/ 250 OVERRIDE (IP): Mod: JZ | Performed by: PHYSICAL MEDICINE & REHABILITATION

## 2025-08-11 PROCEDURE — 97535 SELF CARE MNGMENT TRAINING: CPT | Mod: CO

## 2025-08-11 PROCEDURE — 99232 SBSQ HOSP IP/OBS MODERATE 35: CPT | Performed by: PHYSICAL MEDICINE & REHABILITATION

## 2025-08-11 RX ADMIN — METFORMIN HYDROCHLORIDE 500 MG: 500 TABLET ORAL at 08:20

## 2025-08-11 RX ADMIN — ENOXAPARIN SODIUM 40 MG: 100 INJECTION SUBCUTANEOUS at 17:12

## 2025-08-11 RX ADMIN — SULFAMETHOXAZOLE AND TRIMETHOPRIM 1 TABLET: 800; 160 TABLET ORAL at 08:20

## 2025-08-11 RX ADMIN — CALCIUM CARBONATE (ANTACID) CHEW TAB 500 MG 500 MG: 500 CHEW TAB at 19:44

## 2025-08-11 RX ADMIN — TAMSULOSIN HYDROCHLORIDE 0.4 MG: 0.4 CAPSULE ORAL at 08:20

## 2025-08-11 RX ADMIN — EZETIMIBE 10 MG: 10 TABLET ORAL at 19:45

## 2025-08-11 RX ADMIN — SULFAMETHOXAZOLE AND TRIMETHOPRIM 1 TABLET: 800; 160 TABLET ORAL at 19:45

## 2025-08-11 RX ADMIN — OXYCODONE 5 MG: 5 TABLET ORAL at 22:52

## 2025-08-11 RX ADMIN — OMEPRAZOLE 20 MG: 20 CAPSULE, DELAYED RELEASE ORAL at 19:45

## 2025-08-11 RX ADMIN — AMITRIPTYLINE HYDROCHLORIDE 150 MG: 50 TABLET, FILM COATED ORAL at 19:45

## 2025-08-11 RX ADMIN — LEVOTHYROXINE SODIUM 88 MCG: 0.09 TABLET ORAL at 05:30

## 2025-08-11 RX ADMIN — LIOTHYRONINE SODIUM 5 MCG: 5 TABLET ORAL at 05:29

## 2025-08-11 RX ADMIN — OXYCODONE 5 MG: 5 TABLET ORAL at 16:13

## 2025-08-11 RX ADMIN — DULOXETINE 30 MG: 30 CAPSULE, DELAYED RELEASE ORAL at 19:45

## 2025-08-11 RX ADMIN — ROSUVASTATIN CALCIUM 20 MG: 10 TABLET, FILM COATED ORAL at 19:45

## 2025-08-11 RX ADMIN — OMEPRAZOLE 20 MG: 20 CAPSULE, DELAYED RELEASE ORAL at 08:20

## 2025-08-11 RX ADMIN — METOPROLOL SUCCINATE 12.5 MG: 25 TABLET, EXTENDED RELEASE ORAL at 08:20

## 2025-08-11 RX ADMIN — DOCUSATE SODIUM 100 MG: 100 CAPSULE, LIQUID FILLED ORAL at 08:21

## 2025-08-11 RX ADMIN — CALCIUM CARBONATE (ANTACID) CHEW TAB 500 MG 500 MG: 500 CHEW TAB at 08:20

## 2025-08-11 RX ADMIN — OXYCODONE 5 MG: 5 TABLET ORAL at 09:55

## 2025-08-11 RX ADMIN — DOCUSATE SODIUM 100 MG: 100 CAPSULE, LIQUID FILLED ORAL at 19:45

## 2025-08-11 ASSESSMENT — PAIN DESCRIPTION - PAIN TYPE
TYPE: ACUTE PAIN

## 2025-08-12 ENCOUNTER — APPOINTMENT (OUTPATIENT)
Dept: PHYSICAL THERAPY | Facility: REHABILITATION | Age: 75
DRG: 092 | End: 2025-08-12
Attending: PHYSICAL MEDICINE & REHABILITATION
Payer: MEDICARE

## 2025-08-12 ENCOUNTER — APPOINTMENT (OUTPATIENT)
Dept: OCCUPATIONAL THERAPY | Facility: REHABILITATION | Age: 75
DRG: 092 | End: 2025-08-12
Attending: PHYSICAL MEDICINE & REHABILITATION
Payer: MEDICARE

## 2025-08-12 PROBLEM — M81.0 OSTEOPOROSIS: Status: ACTIVE | Noted: 2025-08-12

## 2025-08-12 PROBLEM — N39.0 UTI (URINARY TRACT INFECTION): Status: ACTIVE | Noted: 2025-08-12

## 2025-08-12 LAB
ANION GAP SERPL CALC-SCNC: 13 MMOL/L (ref 7–16)
BUN SERPL-MCNC: 12 MG/DL (ref 8–22)
CALCIUM SERPL-MCNC: 9.3 MG/DL (ref 8.5–10.5)
CHLORIDE SERPL-SCNC: 103 MMOL/L (ref 96–112)
CO2 SERPL-SCNC: 19 MMOL/L (ref 20–33)
CREAT SERPL-MCNC: 0.67 MG/DL (ref 0.5–1.4)
GFR SERPLBLD CREATININE-BSD FMLA CKD-EPI: 91 ML/MIN/1.73 M 2
GLUCOSE SERPL-MCNC: 94 MG/DL (ref 65–99)
POTASSIUM SERPL-SCNC: 4 MMOL/L (ref 3.6–5.5)
SODIUM SERPL-SCNC: 135 MMOL/L (ref 135–145)

## 2025-08-12 PROCEDURE — 700102 HCHG RX REV CODE 250 W/ 637 OVERRIDE(OP): Performed by: HOSPITALIST

## 2025-08-12 PROCEDURE — 97116 GAIT TRAINING THERAPY: CPT

## 2025-08-12 PROCEDURE — 770010 HCHG ROOM/CARE - REHAB SEMI PRIVAT*

## 2025-08-12 PROCEDURE — A9270 NON-COVERED ITEM OR SERVICE: HCPCS | Performed by: PHYSICAL MEDICINE & REHABILITATION

## 2025-08-12 PROCEDURE — 97110 THERAPEUTIC EXERCISES: CPT

## 2025-08-12 PROCEDURE — 97110 THERAPEUTIC EXERCISES: CPT | Mod: CO

## 2025-08-12 PROCEDURE — 97530 THERAPEUTIC ACTIVITIES: CPT | Mod: CO

## 2025-08-12 PROCEDURE — 97535 SELF CARE MNGMENT TRAINING: CPT | Mod: CO

## 2025-08-12 PROCEDURE — 700102 HCHG RX REV CODE 250 W/ 637 OVERRIDE(OP): Performed by: PHYSICAL MEDICINE & REHABILITATION

## 2025-08-12 PROCEDURE — 80048 BASIC METABOLIC PNL TOTAL CA: CPT

## 2025-08-12 PROCEDURE — 700111 HCHG RX REV CODE 636 W/ 250 OVERRIDE (IP): Mod: JZ | Performed by: PHYSICAL MEDICINE & REHABILITATION

## 2025-08-12 PROCEDURE — 97530 THERAPEUTIC ACTIVITIES: CPT

## 2025-08-12 PROCEDURE — 97112 NEUROMUSCULAR REEDUCATION: CPT

## 2025-08-12 PROCEDURE — 99222 1ST HOSP IP/OBS MODERATE 55: CPT | Performed by: HOSPITALIST

## 2025-08-12 PROCEDURE — A9270 NON-COVERED ITEM OR SERVICE: HCPCS | Performed by: HOSPITALIST

## 2025-08-12 PROCEDURE — 99233 SBSQ HOSP IP/OBS HIGH 50: CPT | Performed by: PHYSICAL MEDICINE & REHABILITATION

## 2025-08-12 PROCEDURE — 36415 COLL VENOUS BLD VENIPUNCTURE: CPT

## 2025-08-12 RX ORDER — LINEZOLID 600 MG/1
600 TABLET, FILM COATED ORAL EVERY 12 HOURS
Status: COMPLETED | OUTPATIENT
Start: 2025-08-12 | End: 2025-08-16

## 2025-08-12 RX ORDER — MIDODRINE HYDROCHLORIDE 2.5 MG/1
5 TABLET ORAL
Status: DISCONTINUED | OUTPATIENT
Start: 2025-08-12 | End: 2025-08-15

## 2025-08-12 RX ADMIN — ENOXAPARIN SODIUM 40 MG: 100 INJECTION SUBCUTANEOUS at 17:21

## 2025-08-12 RX ADMIN — CALCIUM CARBONATE (ANTACID) CHEW TAB 500 MG 500 MG: 500 CHEW TAB at 20:43

## 2025-08-12 RX ADMIN — LEVOTHYROXINE SODIUM 88 MCG: 0.09 TABLET ORAL at 05:18

## 2025-08-12 RX ADMIN — AMITRIPTYLINE HYDROCHLORIDE 150 MG: 50 TABLET, FILM COATED ORAL at 20:44

## 2025-08-12 RX ADMIN — OMEPRAZOLE 20 MG: 20 CAPSULE, DELAYED RELEASE ORAL at 08:05

## 2025-08-12 RX ADMIN — LINEZOLID 600 MG: 600 TABLET, FILM COATED ORAL at 14:20

## 2025-08-12 RX ADMIN — MIDODRINE HYDROCHLORIDE 5 MG: 2.5 TABLET ORAL at 17:21

## 2025-08-12 RX ADMIN — OXYCODONE HYDROCHLORIDE 10 MG: 10 TABLET ORAL at 11:08

## 2025-08-12 RX ADMIN — ONDANSETRON 4 MG: 4 TABLET, ORALLY DISINTEGRATING ORAL at 17:21

## 2025-08-12 RX ADMIN — OXYCODONE HYDROCHLORIDE 10 MG: 10 TABLET ORAL at 17:21

## 2025-08-12 RX ADMIN — EZETIMIBE 10 MG: 10 TABLET ORAL at 20:43

## 2025-08-12 RX ADMIN — METFORMIN HYDROCHLORIDE 500 MG: 500 TABLET ORAL at 08:05

## 2025-08-12 RX ADMIN — MIDODRINE HYDROCHLORIDE 5 MG: 2.5 TABLET ORAL at 11:08

## 2025-08-12 RX ADMIN — DOCUSATE SODIUM 100 MG: 100 CAPSULE, LIQUID FILLED ORAL at 08:05

## 2025-08-12 RX ADMIN — LIOTHYRONINE SODIUM 5 MCG: 5 TABLET ORAL at 05:18

## 2025-08-12 RX ADMIN — DOCUSATE SODIUM 100 MG: 100 CAPSULE, LIQUID FILLED ORAL at 20:43

## 2025-08-12 RX ADMIN — LINEZOLID 600 MG: 600 TABLET, FILM COATED ORAL at 20:43

## 2025-08-12 RX ADMIN — DULOXETINE 30 MG: 30 CAPSULE, DELAYED RELEASE ORAL at 20:43

## 2025-08-12 RX ADMIN — OMEPRAZOLE 20 MG: 20 CAPSULE, DELAYED RELEASE ORAL at 20:44

## 2025-08-12 RX ADMIN — SULFAMETHOXAZOLE AND TRIMETHOPRIM 1 TABLET: 800; 160 TABLET ORAL at 08:05

## 2025-08-12 RX ADMIN — OXYCODONE 5 MG: 5 TABLET ORAL at 23:57

## 2025-08-12 RX ADMIN — ROSUVASTATIN CALCIUM 20 MG: 10 TABLET, FILM COATED ORAL at 20:44

## 2025-08-12 ASSESSMENT — ENCOUNTER SYMPTOMS
NAUSEA: 0
FOCAL WEAKNESS: 1
POLYDIPSIA: 0
FEVER: 0
COUGH: 0
SENSORY CHANGE: 1
EYES NEGATIVE: 1
SHORTNESS OF BREATH: 0
CHILLS: 0
ABDOMINAL PAIN: 0
BRUISES/BLEEDS EASILY: 0
VOMITING: 0
PALPITATIONS: 0
NECK PAIN: 1

## 2025-08-12 ASSESSMENT — PAIN DESCRIPTION - PAIN TYPE
TYPE: ACUTE PAIN

## 2025-08-13 ENCOUNTER — APPOINTMENT (OUTPATIENT)
Dept: OCCUPATIONAL THERAPY | Facility: REHABILITATION | Age: 75
DRG: 092 | End: 2025-08-13
Attending: PHYSICAL MEDICINE & REHABILITATION
Payer: MEDICARE

## 2025-08-13 ENCOUNTER — APPOINTMENT (OUTPATIENT)
Dept: RADIOLOGY | Facility: REHABILITATION | Age: 75
DRG: 092 | End: 2025-08-13
Attending: HOSPITALIST
Payer: MEDICARE

## 2025-08-13 ENCOUNTER — APPOINTMENT (OUTPATIENT)
Dept: PHYSICAL THERAPY | Facility: REHABILITATION | Age: 75
DRG: 092 | End: 2025-08-13
Attending: PHYSICAL MEDICINE & REHABILITATION
Payer: MEDICARE

## 2025-08-13 LAB
BASOPHILS # BLD AUTO: 1 % (ref 0–1.8)
BASOPHILS # BLD: 0.06 K/UL (ref 0–0.12)
EOSINOPHIL # BLD AUTO: 0.42 K/UL (ref 0–0.51)
EOSINOPHIL NFR BLD: 6.9 % (ref 0–6.9)
ERYTHROCYTE [DISTWIDTH] IN BLOOD BY AUTOMATED COUNT: 53 FL (ref 35.9–50)
FERRITIN SERPL-MCNC: 71.8 NG/ML (ref 10–291)
FLUAV RNA SPEC QL NAA+PROBE: NEGATIVE
FLUBV RNA SPEC QL NAA+PROBE: NEGATIVE
HCT VFR BLD AUTO: 31.3 % (ref 37–47)
HGB BLD-MCNC: 9.4 G/DL (ref 12–16)
IMM GRANULOCYTES # BLD AUTO: 0.01 K/UL (ref 0–0.11)
IMM GRANULOCYTES NFR BLD AUTO: 0.2 % (ref 0–0.9)
IRON SATN MFR SERPL: 8 % (ref 15–55)
IRON SERPL-MCNC: 26 UG/DL (ref 40–170)
LYMPHOCYTES # BLD AUTO: 1.72 K/UL (ref 1–4.8)
LYMPHOCYTES NFR BLD: 28.4 % (ref 22–41)
MCH RBC QN AUTO: 24.9 PG (ref 27–33)
MCHC RBC AUTO-ENTMCNC: 30 G/DL (ref 32.2–35.5)
MCV RBC AUTO: 83 FL (ref 81.4–97.8)
MONOCYTES # BLD AUTO: 0.65 K/UL (ref 0–0.85)
MONOCYTES NFR BLD AUTO: 10.7 % (ref 0–13.4)
NEUTROPHILS # BLD AUTO: 3.19 K/UL (ref 1.82–7.42)
NEUTROPHILS NFR BLD: 52.8 % (ref 44–72)
NRBC # BLD AUTO: 0 K/UL
NRBC BLD-RTO: 0 /100 WBC (ref 0–0.2)
PLATELET # BLD AUTO: 240 K/UL (ref 164–446)
PMV BLD AUTO: 10.7 FL (ref 9–12.9)
RBC # BLD AUTO: 3.77 M/UL (ref 4.2–5.4)
RSV RNA SPEC QL NAA+PROBE: NEGATIVE
SARS-COV-2 RNA RESP QL NAA+PROBE: NEGATIVE
T4 FREE SERPL-MCNC: 1.31 NG/DL (ref 0.93–1.7)
TIBC SERPL-MCNC: 312 UG/DL (ref 250–450)
TSH SERPL DL<=0.005 MIU/L-ACNC: 0.26 UIU/ML (ref 0.38–5.33)
UIBC SERPL-MCNC: 286 UG/DL (ref 110–370)
WBC # BLD AUTO: 6.1 K/UL (ref 4.8–10.8)

## 2025-08-13 PROCEDURE — A9270 NON-COVERED ITEM OR SERVICE: HCPCS | Performed by: PHYSICAL MEDICINE & REHABILITATION

## 2025-08-13 PROCEDURE — 99232 SBSQ HOSP IP/OBS MODERATE 35: CPT | Performed by: HOSPITALIST

## 2025-08-13 PROCEDURE — 700102 HCHG RX REV CODE 250 W/ 637 OVERRIDE(OP): Performed by: PHYSICAL MEDICINE & REHABILITATION

## 2025-08-13 PROCEDURE — 99232 SBSQ HOSP IP/OBS MODERATE 35: CPT | Performed by: PHYSICAL MEDICINE & REHABILITATION

## 2025-08-13 PROCEDURE — 700102 HCHG RX REV CODE 250 W/ 637 OVERRIDE(OP): Performed by: HOSPITALIST

## 2025-08-13 PROCEDURE — 700111 HCHG RX REV CODE 636 W/ 250 OVERRIDE (IP): Mod: JZ | Performed by: PHYSICAL MEDICINE & REHABILITATION

## 2025-08-13 PROCEDURE — 83550 IRON BINDING TEST: CPT

## 2025-08-13 PROCEDURE — A9270 NON-COVERED ITEM OR SERVICE: HCPCS | Performed by: HOSPITALIST

## 2025-08-13 PROCEDURE — 97530 THERAPEUTIC ACTIVITIES: CPT

## 2025-08-13 PROCEDURE — 97116 GAIT TRAINING THERAPY: CPT

## 2025-08-13 PROCEDURE — 84439 ASSAY OF FREE THYROXINE: CPT

## 2025-08-13 PROCEDURE — 0241U POC COV-2, FLU A/B, RSV BY PCR: CPT | Performed by: PHYSICAL MEDICINE & REHABILITATION

## 2025-08-13 PROCEDURE — 71045 X-RAY EXAM CHEST 1 VIEW: CPT

## 2025-08-13 PROCEDURE — 770010 HCHG ROOM/CARE - REHAB SEMI PRIVAT*

## 2025-08-13 PROCEDURE — 84443 ASSAY THYROID STIM HORMONE: CPT

## 2025-08-13 PROCEDURE — 85025 COMPLETE CBC W/AUTO DIFF WBC: CPT

## 2025-08-13 PROCEDURE — 97112 NEUROMUSCULAR REEDUCATION: CPT

## 2025-08-13 PROCEDURE — 82728 ASSAY OF FERRITIN: CPT

## 2025-08-13 PROCEDURE — 83540 ASSAY OF IRON: CPT

## 2025-08-13 PROCEDURE — 97535 SELF CARE MNGMENT TRAINING: CPT

## 2025-08-13 PROCEDURE — 36415 COLL VENOUS BLD VENIPUNCTURE: CPT

## 2025-08-13 RX ADMIN — MIDODRINE HYDROCHLORIDE 5 MG: 2.5 TABLET ORAL at 07:34

## 2025-08-13 RX ADMIN — MIDODRINE HYDROCHLORIDE 5 MG: 2.5 TABLET ORAL at 17:33

## 2025-08-13 RX ADMIN — DOCUSATE SODIUM 100 MG: 100 CAPSULE, LIQUID FILLED ORAL at 07:34

## 2025-08-13 RX ADMIN — LINEZOLID 600 MG: 600 TABLET, FILM COATED ORAL at 20:50

## 2025-08-13 RX ADMIN — ENOXAPARIN SODIUM 40 MG: 100 INJECTION SUBCUTANEOUS at 17:33

## 2025-08-13 RX ADMIN — AMITRIPTYLINE HYDROCHLORIDE 150 MG: 50 TABLET, FILM COATED ORAL at 20:50

## 2025-08-13 RX ADMIN — ONDANSETRON 4 MG: 4 TABLET, ORALLY DISINTEGRATING ORAL at 10:43

## 2025-08-13 RX ADMIN — METFORMIN HYDROCHLORIDE 500 MG: 500 TABLET ORAL at 07:34

## 2025-08-13 RX ADMIN — DULOXETINE 30 MG: 30 CAPSULE, DELAYED RELEASE ORAL at 20:50

## 2025-08-13 RX ADMIN — LIOTHYRONINE SODIUM 5 MCG: 5 TABLET ORAL at 05:57

## 2025-08-13 RX ADMIN — OMEPRAZOLE 20 MG: 20 CAPSULE, DELAYED RELEASE ORAL at 20:50

## 2025-08-13 RX ADMIN — OMEPRAZOLE 20 MG: 20 CAPSULE, DELAYED RELEASE ORAL at 07:34

## 2025-08-13 RX ADMIN — CALCIUM CARBONATE (ANTACID) CHEW TAB 500 MG 500 MG: 500 CHEW TAB at 20:50

## 2025-08-13 RX ADMIN — EZETIMIBE 10 MG: 10 TABLET ORAL at 20:50

## 2025-08-13 RX ADMIN — MIDODRINE HYDROCHLORIDE 5 MG: 2.5 TABLET ORAL at 10:43

## 2025-08-13 RX ADMIN — LINEZOLID 600 MG: 600 TABLET, FILM COATED ORAL at 07:34

## 2025-08-13 RX ADMIN — DOCUSATE SODIUM 100 MG: 100 CAPSULE, LIQUID FILLED ORAL at 20:50

## 2025-08-13 RX ADMIN — ROSUVASTATIN CALCIUM 20 MG: 10 TABLET, FILM COATED ORAL at 20:50

## 2025-08-13 RX ADMIN — LEVOTHYROXINE SODIUM 88 MCG: 0.09 TABLET ORAL at 05:57

## 2025-08-13 RX ADMIN — OXYCODONE HYDROCHLORIDE 10 MG: 10 TABLET ORAL at 20:49

## 2025-08-13 ASSESSMENT — ENCOUNTER SYMPTOMS
NAUSEA: 0
FEVER: 0
VOMITING: 0
CHILLS: 0
SHORTNESS OF BREATH: 0
FOCAL WEAKNESS: 1
BRUISES/BLEEDS EASILY: 0
POLYDIPSIA: 0
PALPITATIONS: 0
NECK PAIN: 1
EYES NEGATIVE: 1
ABDOMINAL PAIN: 0
COUGH: 1
SENSORY CHANGE: 1

## 2025-08-13 ASSESSMENT — PAIN DESCRIPTION - PAIN TYPE
TYPE: ACUTE PAIN
TYPE: ACUTE PAIN

## 2025-08-14 ENCOUNTER — APPOINTMENT (OUTPATIENT)
Dept: OCCUPATIONAL THERAPY | Facility: REHABILITATION | Age: 75
DRG: 092 | End: 2025-08-14
Attending: PHYSICAL MEDICINE & REHABILITATION
Payer: MEDICARE

## 2025-08-14 ENCOUNTER — APPOINTMENT (OUTPATIENT)
Dept: PHYSICAL THERAPY | Facility: REHABILITATION | Age: 75
DRG: 092 | End: 2025-08-14
Attending: PHYSICAL MEDICINE & REHABILITATION
Payer: MEDICARE

## 2025-08-14 PROCEDURE — 97530 THERAPEUTIC ACTIVITIES: CPT

## 2025-08-14 PROCEDURE — 97535 SELF CARE MNGMENT TRAINING: CPT

## 2025-08-14 PROCEDURE — 770010 HCHG ROOM/CARE - REHAB SEMI PRIVAT*

## 2025-08-14 PROCEDURE — 99232 SBSQ HOSP IP/OBS MODERATE 35: CPT | Performed by: HOSPITALIST

## 2025-08-14 PROCEDURE — 700111 HCHG RX REV CODE 636 W/ 250 OVERRIDE (IP): Mod: JZ | Performed by: PHYSICAL MEDICINE & REHABILITATION

## 2025-08-14 PROCEDURE — A9270 NON-COVERED ITEM OR SERVICE: HCPCS | Performed by: PHYSICAL MEDICINE & REHABILITATION

## 2025-08-14 PROCEDURE — 97110 THERAPEUTIC EXERCISES: CPT | Mod: CO

## 2025-08-14 PROCEDURE — 97116 GAIT TRAINING THERAPY: CPT

## 2025-08-14 PROCEDURE — 97110 THERAPEUTIC EXERCISES: CPT

## 2025-08-14 PROCEDURE — 700102 HCHG RX REV CODE 250 W/ 637 OVERRIDE(OP): Performed by: PHYSICAL MEDICINE & REHABILITATION

## 2025-08-14 PROCEDURE — 700102 HCHG RX REV CODE 250 W/ 637 OVERRIDE(OP): Performed by: HOSPITALIST

## 2025-08-14 PROCEDURE — 99232 SBSQ HOSP IP/OBS MODERATE 35: CPT | Performed by: PHYSICAL MEDICINE & REHABILITATION

## 2025-08-14 PROCEDURE — A9270 NON-COVERED ITEM OR SERVICE: HCPCS | Performed by: HOSPITALIST

## 2025-08-14 PROCEDURE — 97112 NEUROMUSCULAR REEDUCATION: CPT | Mod: CO

## 2025-08-14 RX ORDER — BENZONATATE 100 MG/1
100 CAPSULE ORAL 3 TIMES DAILY
Status: DISCONTINUED | OUTPATIENT
Start: 2025-08-14 | End: 2025-08-20 | Stop reason: HOSPADM

## 2025-08-14 RX ORDER — GUAIFENESIN/DEXTROMETHORPHAN 100-10MG/5
5 SYRUP ORAL
Status: DISCONTINUED | OUTPATIENT
Start: 2025-08-14 | End: 2025-08-14

## 2025-08-14 RX ADMIN — MIDODRINE HYDROCHLORIDE 5 MG: 2.5 TABLET ORAL at 11:44

## 2025-08-14 RX ADMIN — DULOXETINE 30 MG: 30 CAPSULE, DELAYED RELEASE ORAL at 22:07

## 2025-08-14 RX ADMIN — LINEZOLID 600 MG: 600 TABLET, FILM COATED ORAL at 08:10

## 2025-08-14 RX ADMIN — METFORMIN HYDROCHLORIDE 500 MG: 500 TABLET ORAL at 08:10

## 2025-08-14 RX ADMIN — BENZONATATE 100 MG: 100 CAPSULE ORAL at 11:44

## 2025-08-14 RX ADMIN — OMEPRAZOLE 20 MG: 20 CAPSULE, DELAYED RELEASE ORAL at 22:07

## 2025-08-14 RX ADMIN — AMITRIPTYLINE HYDROCHLORIDE 150 MG: 50 TABLET, FILM COATED ORAL at 22:06

## 2025-08-14 RX ADMIN — LINEZOLID 600 MG: 600 TABLET, FILM COATED ORAL at 22:07

## 2025-08-14 RX ADMIN — SUMATRIPTAN SUCCINATE 25 MG: 25 TABLET ORAL at 23:57

## 2025-08-14 RX ADMIN — ALENDRONATE SODIUM 70 MG: 70 TABLET ORAL at 06:08

## 2025-08-14 RX ADMIN — OXYCODONE 5 MG: 5 TABLET ORAL at 12:38

## 2025-08-14 RX ADMIN — EZETIMIBE 10 MG: 10 TABLET ORAL at 22:07

## 2025-08-14 RX ADMIN — ROSUVASTATIN CALCIUM 20 MG: 10 TABLET, FILM COATED ORAL at 22:06

## 2025-08-14 RX ADMIN — OMEPRAZOLE 20 MG: 20 CAPSULE, DELAYED RELEASE ORAL at 08:10

## 2025-08-14 RX ADMIN — OXYCODONE HYDROCHLORIDE 10 MG: 10 TABLET ORAL at 22:08

## 2025-08-14 RX ADMIN — DOCUSATE SODIUM 100 MG: 100 CAPSULE, LIQUID FILLED ORAL at 08:10

## 2025-08-14 RX ADMIN — OXYCODONE 5 MG: 5 TABLET ORAL at 17:19

## 2025-08-14 RX ADMIN — SUMATRIPTAN SUCCINATE 25 MG: 25 TABLET ORAL at 06:42

## 2025-08-14 RX ADMIN — BENZONATATE 100 MG: 100 CAPSULE ORAL at 22:07

## 2025-08-14 RX ADMIN — MIDODRINE HYDROCHLORIDE 5 MG: 2.5 TABLET ORAL at 08:10

## 2025-08-14 RX ADMIN — MIDODRINE HYDROCHLORIDE 5 MG: 2.5 TABLET ORAL at 17:13

## 2025-08-14 RX ADMIN — ENOXAPARIN SODIUM 40 MG: 100 INJECTION SUBCUTANEOUS at 17:13

## 2025-08-14 RX ADMIN — LEVOTHYROXINE SODIUM 88 MCG: 0.09 TABLET ORAL at 06:08

## 2025-08-14 RX ADMIN — CALCIUM CARBONATE (ANTACID) CHEW TAB 500 MG 500 MG: 500 CHEW TAB at 22:07

## 2025-08-14 RX ADMIN — BENZONATATE 100 MG: 100 CAPSULE ORAL at 14:44

## 2025-08-14 RX ADMIN — LIOTHYRONINE SODIUM 5 MCG: 5 TABLET ORAL at 06:08

## 2025-08-14 ASSESSMENT — ENCOUNTER SYMPTOMS
VOMITING: 0
SHORTNESS OF BREATH: 0
ABDOMINAL PAIN: 0
CHILLS: 0
POLYDIPSIA: 0
PALPITATIONS: 0
NAUSEA: 0
FOCAL WEAKNESS: 1
NECK PAIN: 1
COUGH: 1
SENSORY CHANGE: 1
FEVER: 0
BRUISES/BLEEDS EASILY: 0
EYES NEGATIVE: 1

## 2025-08-14 ASSESSMENT — PAIN DESCRIPTION - PAIN TYPE
TYPE: ACUTE PAIN
TYPE: SURGICAL PAIN
TYPE: ACUTE PAIN
TYPE: SURGICAL PAIN

## 2025-08-14 ASSESSMENT — PATIENT HEALTH QUESTIONNAIRE - PHQ9
SUM OF ALL RESPONSES TO PHQ9 QUESTIONS 1 AND 2: 0
5. POOR APPETITE OR OVEREATING: NOT AT ALL
4. FEELING TIRED OR HAVING LITTLE ENERGY: NOT AT ALL
3. TROUBLE FALLING OR STAYING ASLEEP OR SLEEPING TOO MUCH: NOT AT ALL
SUM OF ALL RESPONSES TO PHQ QUESTIONS 1-9: 0
2. FEELING DOWN, DEPRESSED, IRRITABLE, OR HOPELESS: NOT AT ALL
9. THOUGHTS THAT YOU WOULD BE BETTER OFF DEAD, OR OF HURTING YOURSELF: NOT AT ALL
7. TROUBLE CONCENTRATING ON THINGS, SUCH AS READING THE NEWSPAPER OR WATCHING TELEVISION: NOT AT ALL
6. FEELING BAD ABOUT YOURSELF - OR THAT YOU ARE A FAILURE OR HAVE LET YOURSELF OR YOUR FAMILY DOWN: NOT AL ALL
1. LITTLE INTEREST OR PLEASURE IN DOING THINGS: NOT AT ALL
8. MOVING OR SPEAKING SO SLOWLY THAT OTHER PEOPLE COULD HAVE NOTICED. OR THE OPPOSITE, BEING SO FIGETY OR RESTLESS THAT YOU HAVE BEEN MOVING AROUND A LOT MORE THAN USUAL: NOT AT ALL

## 2025-08-14 ASSESSMENT — PAIN SCALES - WONG BAKER
WONGBAKER_NUMERICALRESPONSE: HURTS A WHOLE LOT
WONGBAKER_NUMERICALRESPONSE: HURTS JUST A LITTLE BIT

## 2025-08-15 ENCOUNTER — APPOINTMENT (OUTPATIENT)
Dept: OCCUPATIONAL THERAPY | Facility: REHABILITATION | Age: 75
DRG: 092 | End: 2025-08-15
Attending: PHYSICAL MEDICINE & REHABILITATION
Payer: MEDICARE

## 2025-08-15 ENCOUNTER — APPOINTMENT (OUTPATIENT)
Dept: PHYSICAL THERAPY | Facility: REHABILITATION | Age: 75
DRG: 092 | End: 2025-08-15
Attending: PHYSICAL MEDICINE & REHABILITATION
Payer: MEDICARE

## 2025-08-15 LAB — EKG IMPRESSION: NORMAL

## 2025-08-15 PROCEDURE — 93010 ELECTROCARDIOGRAM REPORT: CPT | Performed by: INTERNAL MEDICINE

## 2025-08-15 PROCEDURE — 99232 SBSQ HOSP IP/OBS MODERATE 35: CPT | Performed by: HOSPITALIST

## 2025-08-15 PROCEDURE — 97116 GAIT TRAINING THERAPY: CPT

## 2025-08-15 PROCEDURE — 700102 HCHG RX REV CODE 250 W/ 637 OVERRIDE(OP): Performed by: PHYSICAL MEDICINE & REHABILITATION

## 2025-08-15 PROCEDURE — A9270 NON-COVERED ITEM OR SERVICE: HCPCS | Performed by: PHYSICAL MEDICINE & REHABILITATION

## 2025-08-15 PROCEDURE — 97112 NEUROMUSCULAR REEDUCATION: CPT

## 2025-08-15 PROCEDURE — 97110 THERAPEUTIC EXERCISES: CPT

## 2025-08-15 PROCEDURE — A9270 NON-COVERED ITEM OR SERVICE: HCPCS | Performed by: HOSPITALIST

## 2025-08-15 PROCEDURE — 770010 HCHG ROOM/CARE - REHAB SEMI PRIVAT*

## 2025-08-15 PROCEDURE — 97530 THERAPEUTIC ACTIVITIES: CPT

## 2025-08-15 PROCEDURE — 99232 SBSQ HOSP IP/OBS MODERATE 35: CPT | Performed by: PHYSICAL MEDICINE & REHABILITATION

## 2025-08-15 PROCEDURE — 97535 SELF CARE MNGMENT TRAINING: CPT

## 2025-08-15 PROCEDURE — 93005 ELECTROCARDIOGRAM TRACING: CPT | Mod: TC | Performed by: HOSPITALIST

## 2025-08-15 PROCEDURE — 700111 HCHG RX REV CODE 636 W/ 250 OVERRIDE (IP): Performed by: PHYSICAL MEDICINE & REHABILITATION

## 2025-08-15 PROCEDURE — 700102 HCHG RX REV CODE 250 W/ 637 OVERRIDE(OP): Performed by: HOSPITALIST

## 2025-08-15 RX ORDER — MIDODRINE HYDROCHLORIDE 2.5 MG/1
5 TABLET ORAL 2 TIMES DAILY WITH MEALS
Status: DISCONTINUED | OUTPATIENT
Start: 2025-08-15 | End: 2025-08-18

## 2025-08-15 RX ADMIN — ROSUVASTATIN CALCIUM 20 MG: 10 TABLET, FILM COATED ORAL at 20:33

## 2025-08-15 RX ADMIN — OMEPRAZOLE 20 MG: 20 CAPSULE, DELAYED RELEASE ORAL at 08:19

## 2025-08-15 RX ADMIN — MIDODRINE HYDROCHLORIDE 5 MG: 2.5 TABLET ORAL at 11:32

## 2025-08-15 RX ADMIN — CALCIUM CARBONATE (ANTACID) CHEW TAB 500 MG 500 MG: 500 CHEW TAB at 08:19

## 2025-08-15 RX ADMIN — MIDODRINE HYDROCHLORIDE 5 MG: 2.5 TABLET ORAL at 17:09

## 2025-08-15 RX ADMIN — OMEPRAZOLE 20 MG: 20 CAPSULE, DELAYED RELEASE ORAL at 20:33

## 2025-08-15 RX ADMIN — BENZONATATE 100 MG: 100 CAPSULE ORAL at 15:55

## 2025-08-15 RX ADMIN — LINEZOLID 600 MG: 600 TABLET, FILM COATED ORAL at 08:19

## 2025-08-15 RX ADMIN — OXYCODONE HYDROCHLORIDE 10 MG: 10 TABLET ORAL at 20:34

## 2025-08-15 RX ADMIN — OXYCODONE HYDROCHLORIDE 10 MG: 10 TABLET ORAL at 15:55

## 2025-08-15 RX ADMIN — LIOTHYRONINE SODIUM 5 MCG: 5 TABLET ORAL at 05:18

## 2025-08-15 RX ADMIN — SUMATRIPTAN SUCCINATE 25 MG: 25 TABLET ORAL at 13:14

## 2025-08-15 RX ADMIN — ENOXAPARIN SODIUM 40 MG: 100 INJECTION SUBCUTANEOUS at 17:09

## 2025-08-15 RX ADMIN — DOCUSATE SODIUM 100 MG: 100 CAPSULE, LIQUID FILLED ORAL at 20:33

## 2025-08-15 RX ADMIN — OXYCODONE 5 MG: 5 TABLET ORAL at 05:18

## 2025-08-15 RX ADMIN — LINEZOLID 600 MG: 600 TABLET, FILM COATED ORAL at 20:33

## 2025-08-15 RX ADMIN — DULOXETINE 30 MG: 30 CAPSULE, DELAYED RELEASE ORAL at 20:33

## 2025-08-15 RX ADMIN — LEVOTHYROXINE SODIUM 88 MCG: 0.09 TABLET ORAL at 05:18

## 2025-08-15 RX ADMIN — EZETIMIBE 10 MG: 10 TABLET ORAL at 20:33

## 2025-08-15 RX ADMIN — AMITRIPTYLINE HYDROCHLORIDE 150 MG: 50 TABLET, FILM COATED ORAL at 20:33

## 2025-08-15 RX ADMIN — CALCIUM CARBONATE (ANTACID) CHEW TAB 500 MG 500 MG: 500 CHEW TAB at 20:34

## 2025-08-15 RX ADMIN — MIDODRINE HYDROCHLORIDE 5 MG: 2.5 TABLET ORAL at 08:19

## 2025-08-15 RX ADMIN — OXYCODONE 5 MG: 5 TABLET ORAL at 01:57

## 2025-08-15 RX ADMIN — BENZONATATE 100 MG: 100 CAPSULE ORAL at 08:19

## 2025-08-15 RX ADMIN — METFORMIN HYDROCHLORIDE 500 MG: 500 TABLET ORAL at 08:19

## 2025-08-15 RX ADMIN — BENZONATATE 100 MG: 100 CAPSULE ORAL at 20:34

## 2025-08-15 RX ADMIN — ONDANSETRON 4 MG: 4 TABLET, ORALLY DISINTEGRATING ORAL at 13:14

## 2025-08-15 ASSESSMENT — PAIN DESCRIPTION - PAIN TYPE
TYPE: SURGICAL PAIN
TYPE: ACUTE PAIN

## 2025-08-15 ASSESSMENT — ENCOUNTER SYMPTOMS
FOCAL WEAKNESS: 1
ABDOMINAL PAIN: 0
BRUISES/BLEEDS EASILY: 0
NECK PAIN: 1
PALPITATIONS: 0
POLYDIPSIA: 0
EYES NEGATIVE: 1
COUGH: 1
CHILLS: 0
FEVER: 0
SHORTNESS OF BREATH: 0
NAUSEA: 0
VOMITING: 0
SENSORY CHANGE: 1

## 2025-08-15 ASSESSMENT — PATIENT HEALTH QUESTIONNAIRE - PHQ9
SUM OF ALL RESPONSES TO PHQ QUESTIONS 1-9: 2
9. THOUGHTS THAT YOU WOULD BE BETTER OFF DEAD, OR OF HURTING YOURSELF: NOT AT ALL
3. TROUBLE FALLING OR STAYING ASLEEP OR SLEEPING TOO MUCH: SEVERAL DAYS
2. FEELING DOWN, DEPRESSED, IRRITABLE, OR HOPELESS: NOT AT ALL
1. LITTLE INTEREST OR PLEASURE IN DOING THINGS: NOT AT ALL
8. MOVING OR SPEAKING SO SLOWLY THAT OTHER PEOPLE COULD HAVE NOTICED. OR THE OPPOSITE, BEING SO FIGETY OR RESTLESS THAT YOU HAVE BEEN MOVING AROUND A LOT MORE THAN USUAL: NOT AT ALL
4. FEELING TIRED OR HAVING LITTLE ENERGY: SEVERAL DAYS
5. POOR APPETITE OR OVEREATING: NOT AT ALL
SUM OF ALL RESPONSES TO PHQ9 QUESTIONS 1 AND 2: 0
6. FEELING BAD ABOUT YOURSELF - OR THAT YOU ARE A FAILURE OR HAVE LET YOURSELF OR YOUR FAMILY DOWN: NOT AL ALL
7. TROUBLE CONCENTRATING ON THINGS, SUCH AS READING THE NEWSPAPER OR WATCHING TELEVISION: NOT AT ALL

## 2025-08-15 ASSESSMENT — PAIN SCALES - WONG BAKER: WONGBAKER_NUMERICALRESPONSE: DOESN'T HURT AT ALL

## 2025-08-16 LAB
ANION GAP SERPL CALC-SCNC: 11 MMOL/L (ref 7–16)
BUN SERPL-MCNC: 15 MG/DL (ref 8–22)
CALCIUM SERPL-MCNC: 9.1 MG/DL (ref 8.5–10.5)
CHLORIDE SERPL-SCNC: 103 MMOL/L (ref 96–112)
CO2 SERPL-SCNC: 22 MMOL/L (ref 20–33)
CREAT SERPL-MCNC: 0.69 MG/DL (ref 0.5–1.4)
ERYTHROCYTE [DISTWIDTH] IN BLOOD BY AUTOMATED COUNT: 51 FL (ref 35.9–50)
GFR SERPLBLD CREATININE-BSD FMLA CKD-EPI: 91 ML/MIN/1.73 M 2
GLUCOSE BLD STRIP.AUTO-MCNC: 105 MG/DL (ref 65–99)
GLUCOSE SERPL-MCNC: 88 MG/DL (ref 65–99)
HCT VFR BLD AUTO: 33.5 % (ref 37–47)
HGB BLD-MCNC: 10.4 G/DL (ref 12–16)
MAGNESIUM SERPL-MCNC: 1.9 MG/DL (ref 1.5–2.5)
MCH RBC QN AUTO: 25.8 PG (ref 27–33)
MCHC RBC AUTO-ENTMCNC: 31 G/DL (ref 32.2–35.5)
MCV RBC AUTO: 83.1 FL (ref 81.4–97.8)
PHOSPHATE SERPL-MCNC: 4.4 MG/DL (ref 2.5–4.5)
PLATELET # BLD AUTO: 251 K/UL (ref 164–446)
PMV BLD AUTO: 10.7 FL (ref 9–12.9)
POTASSIUM SERPL-SCNC: 4 MMOL/L (ref 3.6–5.5)
RBC # BLD AUTO: 4.03 M/UL (ref 4.2–5.4)
SODIUM SERPL-SCNC: 136 MMOL/L (ref 135–145)
WBC # BLD AUTO: 5.8 K/UL (ref 4.8–10.8)

## 2025-08-16 PROCEDURE — 84100 ASSAY OF PHOSPHORUS: CPT

## 2025-08-16 PROCEDURE — 85027 COMPLETE CBC AUTOMATED: CPT

## 2025-08-16 PROCEDURE — A9270 NON-COVERED ITEM OR SERVICE: HCPCS | Performed by: PHYSICAL MEDICINE & REHABILITATION

## 2025-08-16 PROCEDURE — A9270 NON-COVERED ITEM OR SERVICE: HCPCS | Performed by: HOSPITALIST

## 2025-08-16 PROCEDURE — 700102 HCHG RX REV CODE 250 W/ 637 OVERRIDE(OP): Performed by: PHYSICAL MEDICINE & REHABILITATION

## 2025-08-16 PROCEDURE — 82962 GLUCOSE BLOOD TEST: CPT | Performed by: PHYSICAL MEDICINE & REHABILITATION

## 2025-08-16 PROCEDURE — 36415 COLL VENOUS BLD VENIPUNCTURE: CPT

## 2025-08-16 PROCEDURE — 700102 HCHG RX REV CODE 250 W/ 637 OVERRIDE(OP): Performed by: HOSPITALIST

## 2025-08-16 PROCEDURE — 99232 SBSQ HOSP IP/OBS MODERATE 35: CPT | Performed by: HOSPITALIST

## 2025-08-16 PROCEDURE — 83735 ASSAY OF MAGNESIUM: CPT

## 2025-08-16 PROCEDURE — 770010 HCHG ROOM/CARE - REHAB SEMI PRIVAT*

## 2025-08-16 PROCEDURE — 80048 BASIC METABOLIC PNL TOTAL CA: CPT

## 2025-08-16 PROCEDURE — 700111 HCHG RX REV CODE 636 W/ 250 OVERRIDE (IP): Performed by: PHYSICAL MEDICINE & REHABILITATION

## 2025-08-16 RX ORDER — FERROUS GLUCONATE 324(38)MG
324 TABLET ORAL
Status: DISCONTINUED | OUTPATIENT
Start: 2025-08-16 | End: 2025-08-20 | Stop reason: HOSPADM

## 2025-08-16 RX ORDER — ASCORBIC ACID 500 MG
500 TABLET ORAL
Status: DISCONTINUED | OUTPATIENT
Start: 2025-08-16 | End: 2025-08-20 | Stop reason: HOSPADM

## 2025-08-16 RX ORDER — BENZOCAINE/MENTHOL 6 MG-10 MG
LOZENGE MUCOUS MEMBRANE 3 TIMES DAILY PRN
Status: DISCONTINUED | OUTPATIENT
Start: 2025-08-16 | End: 2025-08-16

## 2025-08-16 RX ORDER — HYDROCORTISONE 25 MG/G
CREAM TOPICAL 3 TIMES DAILY
Status: DISCONTINUED | OUTPATIENT
Start: 2025-08-16 | End: 2025-08-20 | Stop reason: HOSPADM

## 2025-08-16 RX ADMIN — SUMATRIPTAN SUCCINATE 25 MG: 25 TABLET ORAL at 10:14

## 2025-08-16 RX ADMIN — ROSUVASTATIN CALCIUM 20 MG: 10 TABLET, FILM COATED ORAL at 19:57

## 2025-08-16 RX ADMIN — OMEPRAZOLE 20 MG: 20 CAPSULE, DELAYED RELEASE ORAL at 08:32

## 2025-08-16 RX ADMIN — BENZONATATE 100 MG: 100 CAPSULE ORAL at 08:32

## 2025-08-16 RX ADMIN — OMEPRAZOLE 20 MG: 20 CAPSULE, DELAYED RELEASE ORAL at 19:57

## 2025-08-16 RX ADMIN — BENZONATATE 100 MG: 100 CAPSULE ORAL at 19:57

## 2025-08-16 RX ADMIN — LINEZOLID 600 MG: 600 TABLET, FILM COATED ORAL at 08:32

## 2025-08-16 RX ADMIN — CALCIUM CARBONATE (ANTACID) CHEW TAB 500 MG 500 MG: 500 CHEW TAB at 19:57

## 2025-08-16 RX ADMIN — BENZONATATE 100 MG: 100 CAPSULE ORAL at 15:06

## 2025-08-16 RX ADMIN — METFORMIN HYDROCHLORIDE 500 MG: 500 TABLET ORAL at 08:32

## 2025-08-16 RX ADMIN — OXYCODONE HYDROCHLORIDE AND ACETAMINOPHEN 500 MG: 500 TABLET ORAL at 11:39

## 2025-08-16 RX ADMIN — BENZOCAINE AND MENTHOL 1 LOZENGE: 15; 3.6 LOZENGE ORAL at 19:57

## 2025-08-16 RX ADMIN — LINEZOLID 600 MG: 600 TABLET, FILM COATED ORAL at 19:57

## 2025-08-16 RX ADMIN — HYDROCORTISONE: 25 CREAM TOPICAL at 15:07

## 2025-08-16 RX ADMIN — ENOXAPARIN SODIUM 40 MG: 100 INJECTION SUBCUTANEOUS at 17:14

## 2025-08-16 RX ADMIN — OXYCODONE 5 MG: 5 TABLET ORAL at 02:26

## 2025-08-16 RX ADMIN — FERROUS GLUCONATE 324 MG: 324 TABLET ORAL at 11:39

## 2025-08-16 RX ADMIN — DOCUSATE SODIUM 100 MG: 100 CAPSULE, LIQUID FILLED ORAL at 08:32

## 2025-08-16 RX ADMIN — HYDROCORTISONE: 25 CREAM TOPICAL at 20:00

## 2025-08-16 RX ADMIN — HYDROCORTISONE: 1 CREAM TOPICAL at 10:15

## 2025-08-16 RX ADMIN — MIDODRINE HYDROCHLORIDE 5 MG: 2.5 TABLET ORAL at 08:32

## 2025-08-16 RX ADMIN — EZETIMIBE 10 MG: 10 TABLET ORAL at 19:57

## 2025-08-16 RX ADMIN — DOCUSATE SODIUM 100 MG: 100 CAPSULE, LIQUID FILLED ORAL at 19:57

## 2025-08-16 RX ADMIN — AMITRIPTYLINE HYDROCHLORIDE 150 MG: 50 TABLET, FILM COATED ORAL at 19:57

## 2025-08-16 RX ADMIN — OXYCODONE 5 MG: 5 TABLET ORAL at 05:52

## 2025-08-16 RX ADMIN — LEVOTHYROXINE SODIUM 88 MCG: 0.09 TABLET ORAL at 05:52

## 2025-08-16 RX ADMIN — LIOTHYRONINE SODIUM 5 MCG: 5 TABLET ORAL at 05:52

## 2025-08-16 RX ADMIN — DULOXETINE 30 MG: 30 CAPSULE, DELAYED RELEASE ORAL at 19:57

## 2025-08-16 RX ADMIN — MIDODRINE HYDROCHLORIDE 5 MG: 2.5 TABLET ORAL at 17:14

## 2025-08-16 RX ADMIN — ONDANSETRON 4 MG: 4 TABLET, ORALLY DISINTEGRATING ORAL at 10:14

## 2025-08-16 RX ADMIN — CALCIUM CARBONATE (ANTACID) CHEW TAB 500 MG 500 MG: 500 CHEW TAB at 08:32

## 2025-08-16 ASSESSMENT — PATIENT HEALTH QUESTIONNAIRE - PHQ9
5. POOR APPETITE OR OVEREATING: NOT AT ALL
1. LITTLE INTEREST OR PLEASURE IN DOING THINGS: NOT AT ALL
6. FEELING BAD ABOUT YOURSELF - OR THAT YOU ARE A FAILURE OR HAVE LET YOURSELF OR YOUR FAMILY DOWN: NOT AL ALL
SUM OF ALL RESPONSES TO PHQ9 QUESTIONS 1 AND 2: 0
9. THOUGHTS THAT YOU WOULD BE BETTER OFF DEAD, OR OF HURTING YOURSELF: NOT AT ALL
8. MOVING OR SPEAKING SO SLOWLY THAT OTHER PEOPLE COULD HAVE NOTICED. OR THE OPPOSITE, BEING SO FIGETY OR RESTLESS THAT YOU HAVE BEEN MOVING AROUND A LOT MORE THAN USUAL: NOT AT ALL
2. FEELING DOWN, DEPRESSED, IRRITABLE, OR HOPELESS: NOT AT ALL
3. TROUBLE FALLING OR STAYING ASLEEP OR SLEEPING TOO MUCH: SEVERAL DAYS
7. TROUBLE CONCENTRATING ON THINGS, SUCH AS READING THE NEWSPAPER OR WATCHING TELEVISION: NOT AT ALL
SUM OF ALL RESPONSES TO PHQ QUESTIONS 1-9: 2
4. FEELING TIRED OR HAVING LITTLE ENERGY: SEVERAL DAYS

## 2025-08-16 ASSESSMENT — PAIN DESCRIPTION - PAIN TYPE
TYPE: SURGICAL PAIN
TYPE: ACUTE PAIN
TYPE: SURGICAL PAIN
TYPE: ACUTE PAIN

## 2025-08-16 ASSESSMENT — ENCOUNTER SYMPTOMS
CHILLS: 0
FEVER: 0
EYES NEGATIVE: 1
PALPITATIONS: 0
POLYDIPSIA: 0
SHORTNESS OF BREATH: 0
BRUISES/BLEEDS EASILY: 0
NAUSEA: 0
FOCAL WEAKNESS: 1
VOMITING: 0
ABDOMINAL PAIN: 0
SENSORY CHANGE: 1
NECK PAIN: 1
COUGH: 1

## 2025-08-16 ASSESSMENT — PAIN SCALES - WONG BAKER
WONGBAKER_NUMERICALRESPONSE: DOESN'T HURT AT ALL
WONGBAKER_NUMERICALRESPONSE: DOESN'T HURT AT ALL

## 2025-08-17 ENCOUNTER — APPOINTMENT (OUTPATIENT)
Dept: PHYSICAL THERAPY | Facility: REHABILITATION | Age: 75
DRG: 092 | End: 2025-08-17
Attending: PHYSICAL MEDICINE & REHABILITATION
Payer: MEDICARE

## 2025-08-17 PROCEDURE — 97116 GAIT TRAINING THERAPY: CPT

## 2025-08-17 PROCEDURE — 700102 HCHG RX REV CODE 250 W/ 637 OVERRIDE(OP): Performed by: PHYSICAL MEDICINE & REHABILITATION

## 2025-08-17 PROCEDURE — A9270 NON-COVERED ITEM OR SERVICE: HCPCS | Performed by: PHYSICAL MEDICINE & REHABILITATION

## 2025-08-17 PROCEDURE — 770010 HCHG ROOM/CARE - REHAB SEMI PRIVAT*

## 2025-08-17 PROCEDURE — 700102 HCHG RX REV CODE 250 W/ 637 OVERRIDE(OP): Performed by: HOSPITALIST

## 2025-08-17 PROCEDURE — A9270 NON-COVERED ITEM OR SERVICE: HCPCS | Performed by: HOSPITALIST

## 2025-08-17 PROCEDURE — 99232 SBSQ HOSP IP/OBS MODERATE 35: CPT | Performed by: HOSPITALIST

## 2025-08-17 PROCEDURE — 97530 THERAPEUTIC ACTIVITIES: CPT

## 2025-08-17 PROCEDURE — 97110 THERAPEUTIC EXERCISES: CPT

## 2025-08-17 PROCEDURE — 700111 HCHG RX REV CODE 636 W/ 250 OVERRIDE (IP): Mod: JZ | Performed by: PHYSICAL MEDICINE & REHABILITATION

## 2025-08-17 PROCEDURE — 99232 SBSQ HOSP IP/OBS MODERATE 35: CPT | Performed by: PHYSICAL MEDICINE & REHABILITATION

## 2025-08-17 RX ORDER — OXYCODONE HYDROCHLORIDE 5 MG/1
2.5 TABLET ORAL EVERY 4 HOURS PRN
Refills: 0 | Status: DISCONTINUED | OUTPATIENT
Start: 2025-08-17 | End: 2025-08-20 | Stop reason: HOSPADM

## 2025-08-17 RX ORDER — LIOTHYRONINE SODIUM 5 UG/1
2.5 TABLET ORAL
Status: DISCONTINUED | OUTPATIENT
Start: 2025-08-18 | End: 2025-08-20 | Stop reason: HOSPADM

## 2025-08-17 RX ORDER — OXYCODONE HYDROCHLORIDE 5 MG/1
5 TABLET ORAL EVERY 4 HOURS PRN
Refills: 0 | Status: DISCONTINUED | OUTPATIENT
Start: 2025-08-17 | End: 2025-08-20 | Stop reason: HOSPADM

## 2025-08-17 RX ADMIN — OMEPRAZOLE 20 MG: 20 CAPSULE, DELAYED RELEASE ORAL at 21:33

## 2025-08-17 RX ADMIN — BENZONATATE 100 MG: 100 CAPSULE ORAL at 08:16

## 2025-08-17 RX ADMIN — HYDROCORTISONE: 25 CREAM TOPICAL at 15:51

## 2025-08-17 RX ADMIN — ROSUVASTATIN CALCIUM 20 MG: 10 TABLET, FILM COATED ORAL at 21:33

## 2025-08-17 RX ADMIN — ONDANSETRON 4 MG: 4 TABLET, ORALLY DISINTEGRATING ORAL at 01:00

## 2025-08-17 RX ADMIN — FERROUS GLUCONATE 324 MG: 324 TABLET ORAL at 08:16

## 2025-08-17 RX ADMIN — ENOXAPARIN SODIUM 40 MG: 100 INJECTION SUBCUTANEOUS at 17:13

## 2025-08-17 RX ADMIN — MIDODRINE HYDROCHLORIDE 5 MG: 2.5 TABLET ORAL at 08:16

## 2025-08-17 RX ADMIN — DULOXETINE 30 MG: 30 CAPSULE, DELAYED RELEASE ORAL at 21:33

## 2025-08-17 RX ADMIN — OXYCODONE 2.5 MG: 5 TABLET ORAL at 12:20

## 2025-08-17 RX ADMIN — BENZONATATE 100 MG: 100 CAPSULE ORAL at 15:51

## 2025-08-17 RX ADMIN — OXYCODONE 5 MG: 5 TABLET ORAL at 00:18

## 2025-08-17 RX ADMIN — BENZONATATE 100 MG: 100 CAPSULE ORAL at 21:34

## 2025-08-17 RX ADMIN — OXYCODONE 2.5 MG: 5 TABLET ORAL at 21:34

## 2025-08-17 RX ADMIN — LEVOTHYROXINE SODIUM 88 MCG: 0.09 TABLET ORAL at 05:10

## 2025-08-17 RX ADMIN — DOCUSATE SODIUM 100 MG: 100 CAPSULE, LIQUID FILLED ORAL at 21:34

## 2025-08-17 RX ADMIN — LIOTHYRONINE SODIUM 5 MCG: 5 TABLET ORAL at 05:10

## 2025-08-17 RX ADMIN — AMITRIPTYLINE HYDROCHLORIDE 150 MG: 50 TABLET, FILM COATED ORAL at 21:34

## 2025-08-17 RX ADMIN — ONDANSETRON 4 MG: 4 TABLET, ORALLY DISINTEGRATING ORAL at 21:41

## 2025-08-17 RX ADMIN — DOCUSATE SODIUM 100 MG: 100 CAPSULE, LIQUID FILLED ORAL at 08:16

## 2025-08-17 RX ADMIN — METFORMIN HYDROCHLORIDE 500 MG: 500 TABLET ORAL at 08:16

## 2025-08-17 RX ADMIN — BENZOCAINE AND MENTHOL 1 LOZENGE: 15; 3.6 LOZENGE ORAL at 12:19

## 2025-08-17 RX ADMIN — CALCIUM CARBONATE (ANTACID) CHEW TAB 500 MG 500 MG: 500 CHEW TAB at 21:34

## 2025-08-17 RX ADMIN — OXYCODONE HYDROCHLORIDE AND ACETAMINOPHEN 500 MG: 500 TABLET ORAL at 08:16

## 2025-08-17 RX ADMIN — CALCIUM CARBONATE (ANTACID) CHEW TAB 500 MG 500 MG: 500 CHEW TAB at 08:16

## 2025-08-17 RX ADMIN — HYDROCORTISONE: 25 CREAM TOPICAL at 08:16

## 2025-08-17 RX ADMIN — EZETIMIBE 10 MG: 10 TABLET ORAL at 21:33

## 2025-08-17 RX ADMIN — MIDODRINE HYDROCHLORIDE 5 MG: 2.5 TABLET ORAL at 17:13

## 2025-08-17 RX ADMIN — OMEPRAZOLE 20 MG: 20 CAPSULE, DELAYED RELEASE ORAL at 08:16

## 2025-08-17 ASSESSMENT — ENCOUNTER SYMPTOMS
PALPITATIONS: 0
COUGH: 1
SHORTNESS OF BREATH: 0
EYES NEGATIVE: 1
ABDOMINAL PAIN: 0
FEVER: 0
VOMITING: 0
BRUISES/BLEEDS EASILY: 0
SENSORY CHANGE: 1
CHILLS: 0
POLYDIPSIA: 0
NECK PAIN: 1
NAUSEA: 0
FOCAL WEAKNESS: 1

## 2025-08-17 ASSESSMENT — PAIN SCALES - WONG BAKER
WONGBAKER_NUMERICALRESPONSE: HURTS JUST A LITTLE BIT
WONGBAKER_NUMERICALRESPONSE: HURTS A LITTLE MORE
WONGBAKER_NUMERICALRESPONSE: HURTS JUST A LITTLE BIT
WONGBAKER_NUMERICALRESPONSE: DOESN'T HURT AT ALL
WONGBAKER_NUMERICALRESPONSE: DOESN'T HURT AT ALL

## 2025-08-17 ASSESSMENT — PATIENT HEALTH QUESTIONNAIRE - PHQ9
4. FEELING TIRED OR HAVING LITTLE ENERGY: MORE THAN HALF THE DAYS
7. TROUBLE CONCENTRATING ON THINGS, SUCH AS READING THE NEWSPAPER OR WATCHING TELEVISION: NOT AT ALL
SUM OF ALL RESPONSES TO PHQ9 QUESTIONS 1 AND 2: 2
9. THOUGHTS THAT YOU WOULD BE BETTER OFF DEAD, OR OF HURTING YOURSELF: NOT AT ALL
3. TROUBLE FALLING OR STAYING ASLEEP OR SLEEPING TOO MUCH: MORE THAN HALF THE DAYS
5. POOR APPETITE OR OVEREATING: MORE THAN HALF THE DAYS
1. LITTLE INTEREST OR PLEASURE IN DOING THINGS: NOT AT ALL
SUM OF ALL RESPONSES TO PHQ QUESTIONS 1-9: 8
6. FEELING BAD ABOUT YOURSELF - OR THAT YOU ARE A FAILURE OR HAVE LET YOURSELF OR YOUR FAMILY DOWN: NOT AL ALL
8. MOVING OR SPEAKING SO SLOWLY THAT OTHER PEOPLE COULD HAVE NOTICED. OR THE OPPOSITE, BEING SO FIGETY OR RESTLESS THAT YOU HAVE BEEN MOVING AROUND A LOT MORE THAN USUAL: NOT AT ALL
2. FEELING DOWN, DEPRESSED, IRRITABLE, OR HOPELESS: MORE THAN HALF THE DAYS

## 2025-08-17 ASSESSMENT — FIBROSIS 4 INDEX: FIB4 SCORE: 3.06

## 2025-08-17 ASSESSMENT — 6 MINUTE WALK TEST (6MWT)
TOTAL DISTANCE WALKED (FT): 40
LEVEL OF ASSISTANCE: MINIMUM ASSISTANCE
GAIT SPEED - METERS PER SECOND: 0.03

## 2025-08-17 ASSESSMENT — PAIN DESCRIPTION - PAIN TYPE
TYPE: SURGICAL PAIN
TYPE: ACUTE PAIN

## 2025-08-18 ENCOUNTER — APPOINTMENT (OUTPATIENT)
Dept: RADIOLOGY | Facility: MEDICAL CENTER | Age: 75
End: 2025-08-18
Attending: EMERGENCY MEDICINE
Payer: COMMERCIAL

## 2025-08-18 ENCOUNTER — HOSPITAL ENCOUNTER (INPATIENT)
Facility: REHABILITATION | Age: 75
DRG: 092 | End: 2025-08-18
Attending: PHYSICAL MEDICINE & REHABILITATION | Admitting: PHYSICAL MEDICINE & REHABILITATION
Payer: MEDICARE

## 2025-08-18 ENCOUNTER — HOSPITAL ENCOUNTER (EMERGENCY)
Facility: MEDICAL CENTER | Age: 75
End: 2025-08-18
Attending: EMERGENCY MEDICINE
Payer: COMMERCIAL

## 2025-08-18 ENCOUNTER — PHARMACY VISIT (OUTPATIENT)
Dept: PHARMACY | Facility: MEDICAL CENTER | Age: 75
End: 2025-08-18
Payer: MEDICARE

## 2025-08-18 VITALS
DIASTOLIC BLOOD PRESSURE: 84 MMHG | HEIGHT: 62 IN | RESPIRATION RATE: 18 BRPM | WEIGHT: 115 LBS | BODY MASS INDEX: 21.16 KG/M2 | SYSTOLIC BLOOD PRESSURE: 173 MMHG | HEART RATE: 119 BPM | TEMPERATURE: 97.5 F | OXYGEN SATURATION: 95 %

## 2025-08-18 DIAGNOSIS — L03.90 CELLULITIS, UNSPECIFIED CELLULITIS SITE: Primary | ICD-10-CM

## 2025-08-18 DIAGNOSIS — R00.0 TACHYCARDIA: ICD-10-CM

## 2025-08-18 LAB
ALBUMIN SERPL BCP-MCNC: 3.8 G/DL (ref 3.2–4.9)
ALBUMIN/GLOB SERPL: 1.2 G/DL
ALP SERPL-CCNC: 83 U/L (ref 30–99)
ALT SERPL-CCNC: 11 U/L (ref 2–50)
ANION GAP SERPL CALC-SCNC: 12 MMOL/L (ref 7–16)
APPEARANCE UR: CLEAR
AST SERPL-CCNC: 18 U/L (ref 12–45)
BACTERIA #/AREA URNS HPF: ABNORMAL /HPF
BASOPHILS # BLD AUTO: 1 % (ref 0–1.8)
BASOPHILS # BLD: 0.05 K/UL (ref 0–0.12)
BILIRUB SERPL-MCNC: 0.2 MG/DL (ref 0.1–1.5)
BILIRUB UR QL STRIP.AUTO: NEGATIVE
BUN SERPL-MCNC: 11 MG/DL (ref 8–22)
CALCIUM ALBUM COR SERPL-MCNC: 9.5 MG/DL (ref 8.5–10.5)
CALCIUM SERPL-MCNC: 9.3 MG/DL (ref 8.5–10.5)
CASTS URNS QL MICRO: ABNORMAL /LPF (ref 0–2)
CHLORIDE SERPL-SCNC: 101 MMOL/L (ref 96–112)
CO2 SERPL-SCNC: 23 MMOL/L (ref 20–33)
COLOR UR: YELLOW
CREAT SERPL-MCNC: 0.68 MG/DL (ref 0.5–1.4)
D DIMER PPP IA.FEU-MCNC: 1.13 UG/ML (FEU) (ref 0–0.5)
EKG IMPRESSION: NORMAL
EOSINOPHIL # BLD AUTO: 0.33 K/UL (ref 0–0.51)
EOSINOPHIL NFR BLD: 6.4 % (ref 0–6.9)
EPITHELIAL CELLS 1715: ABNORMAL /HPF (ref 0–5)
ERYTHROCYTE [DISTWIDTH] IN BLOOD BY AUTOMATED COUNT: 47.2 FL (ref 35.9–50)
GFR SERPLBLD CREATININE-BSD FMLA CKD-EPI: 91 ML/MIN/1.73 M 2
GLOBULIN SER CALC-MCNC: 3.1 G/DL (ref 1.9–3.5)
GLUCOSE SERPL-MCNC: 89 MG/DL (ref 65–99)
GLUCOSE UR STRIP.AUTO-MCNC: NEGATIVE MG/DL
HCT VFR BLD AUTO: 29.8 % (ref 37–47)
HGB BLD-MCNC: 9.6 G/DL (ref 12–16)
IMM GRANULOCYTES # BLD AUTO: 0.02 K/UL (ref 0–0.11)
IMM GRANULOCYTES NFR BLD AUTO: 0.4 % (ref 0–0.9)
KETONES UR STRIP.AUTO-MCNC: NEGATIVE MG/DL
LACTATE SERPL-SCNC: 1.1 MMOL/L (ref 0.5–2)
LEUKOCYTE ESTERASE UR QL STRIP.AUTO: ABNORMAL
LYMPHOCYTES # BLD AUTO: 1.61 K/UL (ref 1–4.8)
LYMPHOCYTES NFR BLD: 31.1 % (ref 22–41)
MCH RBC QN AUTO: 25.7 PG (ref 27–33)
MCHC RBC AUTO-ENTMCNC: 32.2 G/DL (ref 32.2–35.5)
MCV RBC AUTO: 79.7 FL (ref 81.4–97.8)
MICRO URNS: ABNORMAL
MONOCYTES # BLD AUTO: 0.42 K/UL (ref 0–0.85)
MONOCYTES NFR BLD AUTO: 8.1 % (ref 0–13.4)
NEUTROPHILS # BLD AUTO: 2.75 K/UL (ref 1.82–7.42)
NEUTROPHILS NFR BLD: 53 % (ref 44–72)
NITRITE UR QL STRIP.AUTO: NEGATIVE
NRBC # BLD AUTO: 0 K/UL
NRBC BLD-RTO: 0 /100 WBC (ref 0–0.2)
PH UR STRIP.AUTO: 5.5 [PH] (ref 5–8)
PLATELET # BLD AUTO: 212 K/UL (ref 164–446)
PMV BLD AUTO: 9.4 FL (ref 9–12.9)
POTASSIUM SERPL-SCNC: 3.7 MMOL/L (ref 3.6–5.5)
PROT SERPL-MCNC: 6.9 G/DL (ref 6–8.2)
PROT UR QL STRIP: NEGATIVE MG/DL
RBC # BLD AUTO: 3.74 M/UL (ref 4.2–5.4)
RBC # URNS HPF: ABNORMAL /HPF (ref 0–2)
RBC UR QL AUTO: NEGATIVE
SODIUM SERPL-SCNC: 136 MMOL/L (ref 135–145)
SP GR UR STRIP.AUTO: 1.01
T4 FREE SERPL-MCNC: 1.53 NG/DL (ref 0.93–1.7)
TSH SERPL DL<=0.005 MIU/L-ACNC: 0.09 UIU/ML (ref 0.38–5.33)
UROBILINOGEN UR STRIP.AUTO-MCNC: 0.2 EU/DL
WBC # BLD AUTO: 5.2 K/UL (ref 4.8–10.8)
WBC #/AREA URNS HPF: ABNORMAL /HPF

## 2025-08-18 PROCEDURE — A9270 NON-COVERED ITEM OR SERVICE: HCPCS | Performed by: PHYSICAL MEDICINE & REHABILITATION

## 2025-08-18 PROCEDURE — 87086 URINE CULTURE/COLONY COUNT: CPT

## 2025-08-18 PROCEDURE — 80053 COMPREHEN METABOLIC PANEL: CPT

## 2025-08-18 PROCEDURE — A9270 NON-COVERED ITEM OR SERVICE: HCPCS | Performed by: HOSPITALIST

## 2025-08-18 PROCEDURE — 85379 FIBRIN DEGRADATION QUANT: CPT

## 2025-08-18 PROCEDURE — 700111 HCHG RX REV CODE 636 W/ 250 OVERRIDE (IP): Performed by: PHYSICAL MEDICINE & REHABILITATION

## 2025-08-18 PROCEDURE — 83605 ASSAY OF LACTIC ACID: CPT

## 2025-08-18 PROCEDURE — 99232 SBSQ HOSP IP/OBS MODERATE 35: CPT | Performed by: HOSPITALIST

## 2025-08-18 PROCEDURE — 93005 ELECTROCARDIOGRAM TRACING: CPT | Mod: TC | Performed by: EMERGENCY MEDICINE

## 2025-08-18 PROCEDURE — 87186 SC STD MICRODIL/AGAR DIL: CPT

## 2025-08-18 PROCEDURE — 700102 HCHG RX REV CODE 250 W/ 637 OVERRIDE(OP): Performed by: HOSPITALIST

## 2025-08-18 PROCEDURE — 700117 HCHG RX CONTRAST REV CODE 255: Performed by: EMERGENCY MEDICINE

## 2025-08-18 PROCEDURE — 87040 BLOOD CULTURE FOR BACTERIA: CPT

## 2025-08-18 PROCEDURE — 71275 CT ANGIOGRAPHY CHEST: CPT

## 2025-08-18 PROCEDURE — 770010 HCHG ROOM/CARE - REHAB SEMI PRIVAT*

## 2025-08-18 PROCEDURE — 87077 CULTURE AEROBIC IDENTIFY: CPT

## 2025-08-18 PROCEDURE — 84439 ASSAY OF FREE THYROXINE: CPT

## 2025-08-18 PROCEDURE — 700102 HCHG RX REV CODE 250 W/ 637 OVERRIDE(OP): Performed by: PHYSICAL MEDICINE & REHABILITATION

## 2025-08-18 PROCEDURE — 700105 HCHG RX REV CODE 258: Performed by: EMERGENCY MEDICINE

## 2025-08-18 PROCEDURE — 700102 HCHG RX REV CODE 250 W/ 637 OVERRIDE(OP): Performed by: EMERGENCY MEDICINE

## 2025-08-18 PROCEDURE — 94760 N-INVAS EAR/PLS OXIMETRY 1: CPT

## 2025-08-18 PROCEDURE — 36415 COLL VENOUS BLD VENIPUNCTURE: CPT

## 2025-08-18 PROCEDURE — 99285 EMERGENCY DEPT VISIT HI MDM: CPT

## 2025-08-18 PROCEDURE — RXMED WILLOW AMBULATORY MEDICATION CHARGE: Performed by: EMERGENCY MEDICINE

## 2025-08-18 PROCEDURE — 70491 CT SOFT TISSUE NECK W/DYE: CPT

## 2025-08-18 PROCEDURE — 84443 ASSAY THYROID STIM HORMONE: CPT

## 2025-08-18 PROCEDURE — A9270 NON-COVERED ITEM OR SERVICE: HCPCS | Performed by: EMERGENCY MEDICINE

## 2025-08-18 PROCEDURE — 81001 URINALYSIS AUTO W/SCOPE: CPT

## 2025-08-18 PROCEDURE — 71045 X-RAY EXAM CHEST 1 VIEW: CPT

## 2025-08-18 PROCEDURE — 85025 COMPLETE CBC W/AUTO DIFF WBC: CPT

## 2025-08-18 PROCEDURE — 99232 SBSQ HOSP IP/OBS MODERATE 35: CPT | Performed by: PHYSICAL MEDICINE & REHABILITATION

## 2025-08-18 RX ORDER — CEPHALEXIN 500 MG/1
500 CAPSULE ORAL ONCE
Status: COMPLETED | OUTPATIENT
Start: 2025-08-18 | End: 2025-08-18

## 2025-08-18 RX ORDER — DILTIAZEM HYDROCHLORIDE 30 MG/1
30 TABLET, FILM COATED ORAL EVERY 6 HOURS
Status: DISCONTINUED | OUTPATIENT
Start: 2025-08-18 | End: 2025-08-20 | Stop reason: HOSPADM

## 2025-08-18 RX ORDER — SODIUM CHLORIDE 9 MG/ML
1000 INJECTION, SOLUTION INTRAVENOUS ONCE
Status: COMPLETED | OUTPATIENT
Start: 2025-08-18 | End: 2025-08-18

## 2025-08-18 RX ORDER — CEPHALEXIN 500 MG/1
500 CAPSULE ORAL 4 TIMES DAILY
Qty: 28 CAPSULE | Refills: 0 | Status: ACTIVE | OUTPATIENT
Start: 2025-08-18 | End: 2025-08-19

## 2025-08-18 RX ADMIN — OXYCODONE 2.5 MG: 5 TABLET ORAL at 23:17

## 2025-08-18 RX ADMIN — DILTIAZEM HYDROCHLORIDE 30 MG: 30 TABLET, FILM COATED ORAL at 23:15

## 2025-08-18 RX ADMIN — OMEPRAZOLE 20 MG: 20 CAPSULE, DELAYED RELEASE ORAL at 19:40

## 2025-08-18 RX ADMIN — CEPHALEXIN 500 MG: 500 CAPSULE ORAL at 10:47

## 2025-08-18 RX ADMIN — BENZONATATE 100 MG: 100 CAPSULE ORAL at 14:30

## 2025-08-18 RX ADMIN — CEPHALEXIN 500 MG: 250 CAPSULE ORAL at 17:14

## 2025-08-18 RX ADMIN — OXYCODONE 2.5 MG: 5 TABLET ORAL at 19:45

## 2025-08-18 RX ADMIN — CEPHALEXIN 500 MG: 250 CAPSULE ORAL at 23:15

## 2025-08-18 RX ADMIN — IOHEXOL 80 ML: 350 INJECTION, SOLUTION INTRAVENOUS at 10:30

## 2025-08-18 RX ADMIN — DULOXETINE 30 MG: 30 CAPSULE, DELAYED RELEASE ORAL at 19:40

## 2025-08-18 RX ADMIN — DOCUSATE SODIUM 100 MG: 100 CAPSULE, LIQUID FILLED ORAL at 19:40

## 2025-08-18 RX ADMIN — AMITRIPTYLINE HYDROCHLORIDE 150 MG: 50 TABLET, FILM COATED ORAL at 19:40

## 2025-08-18 RX ADMIN — SODIUM CHLORIDE 1000 ML: 9 INJECTION, SOLUTION INTRAVENOUS at 05:01

## 2025-08-18 RX ADMIN — BENZONATATE 100 MG: 100 CAPSULE ORAL at 19:40

## 2025-08-18 RX ADMIN — ENOXAPARIN SODIUM 40 MG: 100 INJECTION SUBCUTANEOUS at 17:14

## 2025-08-18 RX ADMIN — BENZOCAINE AND MENTHOL 1 LOZENGE: 15; 3.6 LOZENGE ORAL at 02:22

## 2025-08-18 RX ADMIN — EZETIMIBE 10 MG: 10 TABLET ORAL at 19:40

## 2025-08-18 RX ADMIN — SUMATRIPTAN SUCCINATE 25 MG: 25 TABLET ORAL at 20:28

## 2025-08-18 RX ADMIN — CALCIUM CARBONATE (ANTACID) CHEW TAB 500 MG 500 MG: 500 CHEW TAB at 19:40

## 2025-08-18 RX ADMIN — OXYCODONE 2.5 MG: 5 TABLET ORAL at 15:54

## 2025-08-18 RX ADMIN — DILTIAZEM HYDROCHLORIDE 30 MG: 30 TABLET, FILM COATED ORAL at 15:15

## 2025-08-18 RX ADMIN — OXYCODONE 5 MG: 5 TABLET ORAL at 02:33

## 2025-08-18 RX ADMIN — ROSUVASTATIN CALCIUM 20 MG: 10 TABLET, FILM COATED ORAL at 19:39

## 2025-08-18 RX ADMIN — ONDANSETRON 4 MG: 4 TABLET, ORALLY DISINTEGRATING ORAL at 20:30

## 2025-08-18 RX ADMIN — CEPHALEXIN 250 MG: 250 CAPSULE ORAL at 14:30

## 2025-08-18 ASSESSMENT — ENCOUNTER SYMPTOMS
FOCAL WEAKNESS: 1
NAUSEA: 0
VOMITING: 0
FEVER: 0
POLYDIPSIA: 0
CHILLS: 0
ABDOMINAL PAIN: 0
PALPITATIONS: 0
NECK PAIN: 1
EYES NEGATIVE: 1
COUGH: 1
SENSORY CHANGE: 1
BRUISES/BLEEDS EASILY: 0
SHORTNESS OF BREATH: 0

## 2025-08-18 ASSESSMENT — PAIN DESCRIPTION - PAIN TYPE
TYPE: SURGICAL PAIN
TYPE: ACUTE PAIN

## 2025-08-18 ASSESSMENT — FIBROSIS 4 INDEX: FIB4 SCORE: 3.06

## 2025-08-19 ENCOUNTER — APPOINTMENT (OUTPATIENT)
Dept: PHYSICAL THERAPY | Facility: REHABILITATION | Age: 75
DRG: 092 | End: 2025-08-19
Attending: PHYSICAL MEDICINE & REHABILITATION
Payer: MEDICARE

## 2025-08-19 ENCOUNTER — APPOINTMENT (OUTPATIENT)
Dept: OCCUPATIONAL THERAPY | Facility: REHABILITATION | Age: 75
DRG: 092 | End: 2025-08-19
Attending: PHYSICAL MEDICINE & REHABILITATION
Payer: MEDICARE

## 2025-08-19 ENCOUNTER — PHARMACY VISIT (OUTPATIENT)
Dept: PHARMACY | Facility: MEDICAL CENTER | Age: 75
End: 2025-08-19
Payer: MEDICARE

## 2025-08-19 LAB
ANION GAP SERPL CALC-SCNC: 10 MMOL/L (ref 7–16)
BASOPHILS # BLD AUTO: 0.8 % (ref 0–1.8)
BASOPHILS # BLD: 0.03 K/UL (ref 0–0.12)
BUN SERPL-MCNC: 7 MG/DL (ref 8–22)
CALCIUM SERPL-MCNC: 8.9 MG/DL (ref 8.5–10.5)
CHLORIDE SERPL-SCNC: 104 MMOL/L (ref 96–112)
CO2 SERPL-SCNC: 21 MMOL/L (ref 20–33)
CREAT SERPL-MCNC: 0.57 MG/DL (ref 0.5–1.4)
EOSINOPHIL # BLD AUTO: 0.31 K/UL (ref 0–0.51)
EOSINOPHIL NFR BLD: 8.2 % (ref 0–6.9)
ERYTHROCYTE [DISTWIDTH] IN BLOOD BY AUTOMATED COUNT: 47.7 FL (ref 35.9–50)
GFR SERPLBLD CREATININE-BSD FMLA CKD-EPI: 95 ML/MIN/1.73 M 2
GLUCOSE SERPL-MCNC: 87 MG/DL (ref 65–99)
HCT VFR BLD AUTO: 30.8 % (ref 37–47)
HGB BLD-MCNC: 9.7 G/DL (ref 12–16)
IMM GRANULOCYTES # BLD AUTO: 0.02 K/UL (ref 0–0.11)
IMM GRANULOCYTES NFR BLD AUTO: 0.5 % (ref 0–0.9)
LYMPHOCYTES # BLD AUTO: 1.47 K/UL (ref 1–4.8)
LYMPHOCYTES NFR BLD: 38.9 % (ref 22–41)
MAGNESIUM SERPL-MCNC: 1.8 MG/DL (ref 1.5–2.5)
MCH RBC QN AUTO: 25.3 PG (ref 27–33)
MCHC RBC AUTO-ENTMCNC: 31.5 G/DL (ref 32.2–35.5)
MCV RBC AUTO: 80.4 FL (ref 81.4–97.8)
MONOCYTES # BLD AUTO: 0.36 K/UL (ref 0–0.85)
MONOCYTES NFR BLD AUTO: 9.5 % (ref 0–13.4)
NEUTROPHILS # BLD AUTO: 1.59 K/UL (ref 1.82–7.42)
NEUTROPHILS NFR BLD: 42.1 % (ref 44–72)
NRBC # BLD AUTO: 0 K/UL
NRBC BLD-RTO: 0 /100 WBC (ref 0–0.2)
PLATELET # BLD AUTO: 190 K/UL (ref 164–446)
PMV BLD AUTO: 9.7 FL (ref 9–12.9)
POTASSIUM SERPL-SCNC: 3.7 MMOL/L (ref 3.6–5.5)
RBC # BLD AUTO: 3.83 M/UL (ref 4.2–5.4)
SODIUM SERPL-SCNC: 135 MMOL/L (ref 135–145)
WBC # BLD AUTO: 3.8 K/UL (ref 4.8–10.8)

## 2025-08-19 PROCEDURE — A9270 NON-COVERED ITEM OR SERVICE: HCPCS | Performed by: HOSPITALIST

## 2025-08-19 PROCEDURE — 97535 SELF CARE MNGMENT TRAINING: CPT

## 2025-08-19 PROCEDURE — A9270 NON-COVERED ITEM OR SERVICE: HCPCS | Performed by: PHYSICAL MEDICINE & REHABILITATION

## 2025-08-19 PROCEDURE — 83735 ASSAY OF MAGNESIUM: CPT

## 2025-08-19 PROCEDURE — 80048 BASIC METABOLIC PNL TOTAL CA: CPT

## 2025-08-19 PROCEDURE — 97110 THERAPEUTIC EXERCISES: CPT

## 2025-08-19 PROCEDURE — 97530 THERAPEUTIC ACTIVITIES: CPT

## 2025-08-19 PROCEDURE — 700102 HCHG RX REV CODE 250 W/ 637 OVERRIDE(OP): Performed by: PHYSICAL MEDICINE & REHABILITATION

## 2025-08-19 PROCEDURE — 770010 HCHG ROOM/CARE - REHAB SEMI PRIVAT*

## 2025-08-19 PROCEDURE — 85025 COMPLETE CBC W/AUTO DIFF WBC: CPT

## 2025-08-19 PROCEDURE — 97116 GAIT TRAINING THERAPY: CPT

## 2025-08-19 PROCEDURE — 97112 NEUROMUSCULAR REEDUCATION: CPT

## 2025-08-19 PROCEDURE — 99232 SBSQ HOSP IP/OBS MODERATE 35: CPT | Performed by: PHYSICAL MEDICINE & REHABILITATION

## 2025-08-19 PROCEDURE — RXMED WILLOW AMBULATORY MEDICATION CHARGE: Performed by: PHYSICAL MEDICINE & REHABILITATION

## 2025-08-19 PROCEDURE — 700102 HCHG RX REV CODE 250 W/ 637 OVERRIDE(OP): Performed by: HOSPITALIST

## 2025-08-19 PROCEDURE — 36415 COLL VENOUS BLD VENIPUNCTURE: CPT

## 2025-08-19 PROCEDURE — 99232 SBSQ HOSP IP/OBS MODERATE 35: CPT | Performed by: HOSPITALIST

## 2025-08-19 RX ORDER — OXYCODONE HYDROCHLORIDE 5 MG/1
5 TABLET ORAL EVERY 4 HOURS PRN
Qty: 30 TABLET | Refills: 0 | Status: SHIPPED | OUTPATIENT
Start: 2025-08-19 | End: 2025-08-26

## 2025-08-19 RX ORDER — BACLOFEN 10 MG/1
10 TABLET ORAL 3 TIMES DAILY PRN
Qty: 90 TABLET | Refills: 2 | Status: SHIPPED | OUTPATIENT
Start: 2025-08-19

## 2025-08-19 RX ORDER — AMITRIPTYLINE HYDROCHLORIDE 150 MG/1
150 TABLET ORAL NIGHTLY
Qty: 90 TABLET | Refills: 2 | Status: SHIPPED | OUTPATIENT
Start: 2025-08-19

## 2025-08-19 RX ORDER — ALBUTEROL SULFATE 90 UG/1
1-2 INHALANT RESPIRATORY (INHALATION) EVERY 4 HOURS PRN
Qty: 8.5 G | Refills: 2 | Status: SHIPPED | OUTPATIENT
Start: 2025-08-19

## 2025-08-19 RX ORDER — LIOTHYRONINE SODIUM 5 UG/1
2.5 TABLET ORAL DAILY
Qty: 90 TABLET | Refills: 2 | Status: SHIPPED | OUTPATIENT
Start: 2025-08-19

## 2025-08-19 RX ORDER — ALENDRONATE SODIUM 70 MG/1
70 TABLET ORAL
Qty: 4 TABLET | Refills: 2 | Status: SHIPPED | OUTPATIENT
Start: 2025-08-19

## 2025-08-19 RX ORDER — OMEPRAZOLE 20 MG/1
20 CAPSULE, DELAYED RELEASE ORAL 2 TIMES DAILY
Qty: 60 CAPSULE | Refills: 2 | Status: SHIPPED | OUTPATIENT
Start: 2025-08-19

## 2025-08-19 RX ORDER — CEPHALEXIN 500 MG/1
500 CAPSULE ORAL 4 TIMES DAILY
Qty: 20 CAPSULE | Refills: 0 | Status: ACTIVE | OUTPATIENT
Start: 2025-08-19 | End: 2025-08-24

## 2025-08-19 RX ORDER — APIXABAN 5 MG/1
5 TABLET, FILM COATED ORAL 2 TIMES DAILY
Qty: 60 TABLET | Refills: 2 | Status: ACTIVE | OUTPATIENT
Start: 2025-08-19

## 2025-08-19 RX ORDER — SUMATRIPTAN SUCCINATE 25 MG/1
25 TABLET ORAL
Qty: 10 TABLET | Refills: 3 | Status: SHIPPED | OUTPATIENT
Start: 2025-08-19

## 2025-08-19 RX ORDER — ROSUVASTATIN CALCIUM 20 MG/1
20 TABLET, COATED ORAL EVERY EVENING
Qty: 90 TABLET | Refills: 2 | Status: SHIPPED | OUTPATIENT
Start: 2025-08-19

## 2025-08-19 RX ORDER — DULOXETIN HYDROCHLORIDE 30 MG/1
30 CAPSULE, DELAYED RELEASE ORAL EVERY EVENING
Qty: 30 CAPSULE | Refills: 2 | Status: SHIPPED | OUTPATIENT
Start: 2025-08-19

## 2025-08-19 RX ORDER — LEVOTHYROXINE SODIUM 88 MCG
88 TABLET ORAL DAILY
Qty: 90 TABLET | Refills: 2 | Status: SHIPPED | OUTPATIENT
Start: 2025-08-19

## 2025-08-19 RX ORDER — DILTIAZEM HYDROCHLORIDE 30 MG/1
30 TABLET, FILM COATED ORAL EVERY 6 HOURS
Qty: 120 TABLET | Refills: 2 | Status: SHIPPED | OUTPATIENT
Start: 2025-08-19

## 2025-08-19 RX ORDER — EZETIMIBE 10 MG/1
10 TABLET ORAL EVERY EVENING
Qty: 90 TABLET | Refills: 2 | Status: SHIPPED | OUTPATIENT
Start: 2025-08-19

## 2025-08-19 RX ADMIN — DOCUSATE SODIUM 100 MG: 100 CAPSULE, LIQUID FILLED ORAL at 21:31

## 2025-08-19 RX ADMIN — OXYCODONE 5 MG: 5 TABLET ORAL at 15:47

## 2025-08-19 RX ADMIN — OMEPRAZOLE 20 MG: 20 CAPSULE, DELAYED RELEASE ORAL at 08:30

## 2025-08-19 RX ADMIN — OXYCODONE 5 MG: 5 TABLET ORAL at 21:32

## 2025-08-19 RX ADMIN — AMITRIPTYLINE HYDROCHLORIDE 100 MG: 50 TABLET, FILM COATED ORAL at 21:33

## 2025-08-19 RX ADMIN — CEPHALEXIN 500 MG: 250 CAPSULE ORAL at 05:42

## 2025-08-19 RX ADMIN — BENZONATATE 100 MG: 100 CAPSULE ORAL at 21:32

## 2025-08-19 RX ADMIN — METFORMIN HYDROCHLORIDE 500 MG: 500 TABLET ORAL at 08:30

## 2025-08-19 RX ADMIN — BENZONATATE 100 MG: 100 CAPSULE ORAL at 08:30

## 2025-08-19 RX ADMIN — FERROUS GLUCONATE 324 MG: 324 TABLET ORAL at 08:30

## 2025-08-19 RX ADMIN — BENZONATATE 100 MG: 100 CAPSULE ORAL at 14:08

## 2025-08-19 RX ADMIN — OXYCODONE 2.5 MG: 5 TABLET ORAL at 10:59

## 2025-08-19 RX ADMIN — OXYCODONE HYDROCHLORIDE AND ACETAMINOPHEN 500 MG: 500 TABLET ORAL at 08:29

## 2025-08-19 RX ADMIN — DOCUSATE SODIUM 100 MG: 100 CAPSULE, LIQUID FILLED ORAL at 08:30

## 2025-08-19 RX ADMIN — DILTIAZEM HYDROCHLORIDE 30 MG: 30 TABLET, FILM COATED ORAL at 11:45

## 2025-08-19 RX ADMIN — EZETIMIBE 10 MG: 10 TABLET ORAL at 21:33

## 2025-08-19 RX ADMIN — ROSUVASTATIN CALCIUM 20 MG: 10 TABLET, FILM COATED ORAL at 21:32

## 2025-08-19 RX ADMIN — LIOTHYRONINE SODIUM 2.5 MCG: 5 TABLET ORAL at 05:43

## 2025-08-19 RX ADMIN — CEPHALEXIN 500 MG: 250 CAPSULE ORAL at 11:45

## 2025-08-19 RX ADMIN — OMEPRAZOLE 20 MG: 20 CAPSULE, DELAYED RELEASE ORAL at 21:32

## 2025-08-19 RX ADMIN — DILTIAZEM HYDROCHLORIDE 30 MG: 30 TABLET, FILM COATED ORAL at 17:14

## 2025-08-19 RX ADMIN — CALCIUM CARBONATE (ANTACID) CHEW TAB 500 MG 500 MG: 500 CHEW TAB at 21:32

## 2025-08-19 RX ADMIN — DULOXETINE 30 MG: 30 CAPSULE, DELAYED RELEASE ORAL at 21:31

## 2025-08-19 RX ADMIN — LEVOTHYROXINE SODIUM 88 MCG: 0.09 TABLET ORAL at 05:43

## 2025-08-19 RX ADMIN — CEPHALEXIN 500 MG: 250 CAPSULE ORAL at 17:14

## 2025-08-19 RX ADMIN — SUMATRIPTAN SUCCINATE 25 MG: 25 TABLET ORAL at 18:19

## 2025-08-19 ASSESSMENT — BRIEF INTERVIEW FOR MENTAL STATUS (BIMS)
WHAT MONTH IS IT: ACCURATE WITHIN 5 DAYS
WHAT DAY OF THE WEEK IS IT: CORRECT
ASKED TO RECALL SOCK: NO, COULD NOT RECALL
INITIAL REPETITION OF BED BLUE SOCK - FIRST ATTEMPT: 3
BIMS SUMMARY SCORE: 13
ASKED TO RECALL BLUE: YES, NO CUE REQUIRED
WHAT YEAR IS IT: CORRECT
ASKED TO RECALL BED: YES, NO CUE REQUIRED

## 2025-08-19 ASSESSMENT — PAIN DESCRIPTION - PAIN TYPE
TYPE: ACUTE PAIN

## 2025-08-19 ASSESSMENT — ENCOUNTER SYMPTOMS
FEVER: 0
SHORTNESS OF BREATH: 0
COUGH: 1
DIARRHEA: 0
CHILLS: 0
NAUSEA: 0
ABDOMINAL PAIN: 0
NERVOUS/ANXIOUS: 0
VOMITING: 0

## 2025-08-19 ASSESSMENT — ACTIVITIES OF DAILY LIVING (ADL)
TOILET_TRANSFER_LEVEL_OF_ASSIST: REQUIRES SUPERVISION WITH TOILET TRANSFER
TOILETING_LEVEL_OF_ASSIST: REQUIRES SUPERVISION WITH TOILETING
SHOWER_TRANSFER_LEVEL_OF_ASSIST: REQUIRES SUPERVISION WITH SHOWER TRANSFER

## 2025-08-20 VITALS
HEIGHT: 62 IN | BODY MASS INDEX: 21.23 KG/M2 | HEART RATE: 78 BPM | TEMPERATURE: 98.6 F | OXYGEN SATURATION: 95 % | RESPIRATION RATE: 18 BRPM | WEIGHT: 115.4 LBS | SYSTOLIC BLOOD PRESSURE: 139 MMHG | DIASTOLIC BLOOD PRESSURE: 56 MMHG

## 2025-08-20 LAB
ALBUMIN SERPL BCP-MCNC: 3.2 G/DL (ref 3.2–4.9)
ALBUMIN/GLOB SERPL: 1.1 G/DL
ALP SERPL-CCNC: 69 U/L (ref 30–99)
ALT SERPL-CCNC: 7 U/L (ref 2–50)
ANION GAP SERPL CALC-SCNC: 12 MMOL/L (ref 7–16)
AST SERPL-CCNC: 23 U/L (ref 12–45)
BACTERIA UR CULT: ABNORMAL
BACTERIA UR CULT: ABNORMAL
BASOPHILS # BLD AUTO: 0.5 % (ref 0–1.8)
BASOPHILS # BLD: 0.02 K/UL (ref 0–0.12)
BILIRUB SERPL-MCNC: <0.2 MG/DL (ref 0.1–1.5)
BUN SERPL-MCNC: 8 MG/DL (ref 8–22)
CALCIUM ALBUM COR SERPL-MCNC: 9.6 MG/DL (ref 8.5–10.5)
CALCIUM SERPL-MCNC: 9 MG/DL (ref 8.5–10.5)
CHLORIDE SERPL-SCNC: 106 MMOL/L (ref 96–112)
CO2 SERPL-SCNC: 19 MMOL/L (ref 20–33)
CREAT SERPL-MCNC: 0.54 MG/DL (ref 0.5–1.4)
EOSINOPHIL # BLD AUTO: 0.33 K/UL (ref 0–0.51)
EOSINOPHIL NFR BLD: 8.5 % (ref 0–6.9)
ERYTHROCYTE [DISTWIDTH] IN BLOOD BY AUTOMATED COUNT: 47.7 FL (ref 35.9–50)
FLUAV RNA SPEC QL NAA+PROBE: NEGATIVE
FLUBV RNA SPEC QL NAA+PROBE: NEGATIVE
GFR SERPLBLD CREATININE-BSD FMLA CKD-EPI: 96 ML/MIN/1.73 M 2
GLOBULIN SER CALC-MCNC: 2.9 G/DL (ref 1.9–3.5)
GLUCOSE SERPL-MCNC: 101 MG/DL (ref 65–99)
HCT VFR BLD AUTO: 29.9 % (ref 37–47)
HGB BLD-MCNC: 9.7 G/DL (ref 12–16)
IMM GRANULOCYTES # BLD AUTO: 0.01 K/UL (ref 0–0.11)
IMM GRANULOCYTES NFR BLD AUTO: 0.3 % (ref 0–0.9)
LYMPHOCYTES # BLD AUTO: 1.62 K/UL (ref 1–4.8)
LYMPHOCYTES NFR BLD: 41.5 % (ref 22–41)
MCH RBC QN AUTO: 25.9 PG (ref 27–33)
MCHC RBC AUTO-ENTMCNC: 32.4 G/DL (ref 32.2–35.5)
MCV RBC AUTO: 79.9 FL (ref 81.4–97.8)
MONOCYTES # BLD AUTO: 0.4 K/UL (ref 0–0.85)
MONOCYTES NFR BLD AUTO: 10.3 % (ref 0–13.4)
NEUTROPHILS # BLD AUTO: 1.52 K/UL (ref 1.82–7.42)
NEUTROPHILS NFR BLD: 38.9 % (ref 44–72)
NRBC # BLD AUTO: 0 K/UL
NRBC BLD-RTO: 0 /100 WBC (ref 0–0.2)
PLATELET # BLD AUTO: 173 K/UL (ref 164–446)
PMV BLD AUTO: 9.7 FL (ref 9–12.9)
POTASSIUM SERPL-SCNC: 4.1 MMOL/L (ref 3.6–5.5)
PROT SERPL-MCNC: 6.1 G/DL (ref 6–8.2)
RBC # BLD AUTO: 3.74 M/UL (ref 4.2–5.4)
RSV RNA SPEC QL NAA+PROBE: NEGATIVE
SARS-COV-2 RNA RESP QL NAA+PROBE: NEGATIVE
SIGNIFICANT IND 70042: ABNORMAL
SITE SITE: ABNORMAL
SODIUM SERPL-SCNC: 137 MMOL/L (ref 135–145)
SOURCE SOURCE: ABNORMAL
WBC # BLD AUTO: 3.9 K/UL (ref 4.8–10.8)

## 2025-08-20 PROCEDURE — A9270 NON-COVERED ITEM OR SERVICE: HCPCS | Performed by: HOSPITALIST

## 2025-08-20 PROCEDURE — 85025 COMPLETE CBC W/AUTO DIFF WBC: CPT

## 2025-08-20 PROCEDURE — 87637 SARSCOV2&INF A&B&RSV AMP PRB: CPT

## 2025-08-20 PROCEDURE — 80053 COMPREHEN METABOLIC PANEL: CPT

## 2025-08-20 PROCEDURE — A9270 NON-COVERED ITEM OR SERVICE: HCPCS | Performed by: PHYSICAL MEDICINE & REHABILITATION

## 2025-08-20 PROCEDURE — 700102 HCHG RX REV CODE 250 W/ 637 OVERRIDE(OP): Performed by: HOSPITALIST

## 2025-08-20 PROCEDURE — 700111 HCHG RX REV CODE 636 W/ 250 OVERRIDE (IP): Performed by: PHYSICAL MEDICINE & REHABILITATION

## 2025-08-20 PROCEDURE — 99231 SBSQ HOSP IP/OBS SF/LOW 25: CPT | Performed by: HOSPITALIST

## 2025-08-20 PROCEDURE — 700102 HCHG RX REV CODE 250 W/ 637 OVERRIDE(OP): Performed by: PHYSICAL MEDICINE & REHABILITATION

## 2025-08-20 PROCEDURE — 36415 COLL VENOUS BLD VENIPUNCTURE: CPT

## 2025-08-20 PROCEDURE — 99239 HOSP IP/OBS DSCHRG MGMT >30: CPT | Performed by: PHYSICAL MEDICINE & REHABILITATION

## 2025-08-20 RX ADMIN — FERROUS GLUCONATE 324 MG: 324 TABLET ORAL at 08:53

## 2025-08-20 RX ADMIN — METFORMIN HYDROCHLORIDE 500 MG: 500 TABLET ORAL at 08:53

## 2025-08-20 RX ADMIN — LIOTHYRONINE SODIUM 2.5 MCG: 5 TABLET ORAL at 05:44

## 2025-08-20 RX ADMIN — DILTIAZEM HYDROCHLORIDE 30 MG: 30 TABLET, FILM COATED ORAL at 05:44

## 2025-08-20 RX ADMIN — DOCUSATE SODIUM 100 MG: 100 CAPSULE, LIQUID FILLED ORAL at 08:53

## 2025-08-20 RX ADMIN — OXYCODONE HYDROCHLORIDE AND ACETAMINOPHEN 500 MG: 500 TABLET ORAL at 08:53

## 2025-08-20 RX ADMIN — CEPHALEXIN 500 MG: 250 CAPSULE ORAL at 00:12

## 2025-08-20 RX ADMIN — BENZONATATE 100 MG: 100 CAPSULE ORAL at 08:53

## 2025-08-20 RX ADMIN — CEPHALEXIN 500 MG: 250 CAPSULE ORAL at 05:44

## 2025-08-20 RX ADMIN — HYDROCORTISONE: 25 CREAM TOPICAL at 08:53

## 2025-08-20 RX ADMIN — OXYCODONE 5 MG: 5 TABLET ORAL at 05:44

## 2025-08-20 RX ADMIN — CALCIUM CARBONATE (ANTACID) CHEW TAB 500 MG 500 MG: 500 CHEW TAB at 08:53

## 2025-08-20 RX ADMIN — OXYCODONE 5 MG: 5 TABLET ORAL at 00:07

## 2025-08-20 RX ADMIN — ONDANSETRON 4 MG: 4 TABLET, ORALLY DISINTEGRATING ORAL at 10:50

## 2025-08-20 RX ADMIN — LEVOTHYROXINE SODIUM 88 MCG: 0.09 TABLET ORAL at 05:44

## 2025-08-20 RX ADMIN — OMEPRAZOLE 20 MG: 20 CAPSULE, DELAYED RELEASE ORAL at 08:53

## 2025-08-20 ASSESSMENT — ENCOUNTER SYMPTOMS
PALPITATIONS: 0
HEADACHES: 0
FEVER: 0
BLURRED VISION: 0
DIZZINESS: 0
NAUSEA: 0
SHORTNESS OF BREATH: 0
HALLUCINATIONS: 0
VOMITING: 0

## 2025-08-20 ASSESSMENT — PATIENT HEALTH QUESTIONNAIRE - PHQ9
SUM OF ALL RESPONSES TO PHQ9 QUESTIONS 1 AND 2: 2
1. LITTLE INTEREST OR PLEASURE IN DOING THINGS: NOT AT ALL
2. FEELING DOWN, DEPRESSED, IRRITABLE, OR HOPELESS: MORE THAN HALF THE DAYS

## 2025-08-20 ASSESSMENT — PAIN DESCRIPTION - PAIN TYPE
TYPE: ACUTE PAIN

## 2025-08-23 LAB
BACTERIA BLD CULT: NORMAL
SIGNIFICANT IND 70042: NORMAL
SITE SITE: NORMAL
SOURCE SOURCE: NORMAL

## (undated) DEVICE — LACTATED RINGERS INJ 1000 ML - (14EA/CA 60CA/PF)

## (undated) DEVICE — TOOL MR8 14CM CYLINDER 5MM DIAMETER (1/EA)

## (undated) DEVICE — SENSOR OXIMETER ADULT SPO2 RD SET (20EA/BX)

## (undated) DEVICE — GLOVE BIOGEL INDICATOR SZ 8 SURGICAL PF LTX - (50/BX 4BX/CA)

## (undated) DEVICE — DEVICE MONOPOLAR RF PEAK PLASMABLADE 3.0S

## (undated) DEVICE — CLIP INTERNAL LIGATE TITANIUM MEDIUM WECK HORIZON (6EA/PK 30PK/BX)

## (undated) DEVICE — SUTURE GENERAL

## (undated) DEVICE — COVER LIGHT HANDLE FLEXIBLE - SOFT (2EA/PK 80PK/CA)

## (undated) DEVICE — SYRINGE SAFETY 5 ML 18 GA X 1-1/2 BLUNT LL (100/BX 4BX/CA)

## (undated) DEVICE — NEEDLE SPINAL NON-SAFETY 18 GA X 3 IN (25EA/BX)

## (undated) DEVICE — SYRINGE ASEPTO - (50EA/CA

## (undated) DEVICE — LACTATED RINGERS INJ. 500 ML - (24EA/CA)

## (undated) DEVICE — DRAPE SURG STERI-DRAPE 7X11OD - (40EA/CA)

## (undated) DEVICE — ADHESIVE MASTISOL - (48/BX)

## (undated) DEVICE — Device

## (undated) DEVICE — SYRINGE 10 ML CONTROL LL (25EA/BX 4BX/CA)

## (undated) DEVICE — GLOVE PROTEXIS W/NEU-THERA SZ 8.5 (50PR/BX)

## (undated) DEVICE — SUTURE 0 VICRYL PLUS CT-1 - 8 X 18 INCH (12/BX)

## (undated) DEVICE — CORDS BIPOLAR COAGULATION - 12FT STERILE DISP. (10EA/BX)

## (undated) DEVICE — DRAPE LAPAROTOMY T SHEET - (12EA/CA)

## (undated) DEVICE — CLOSURE SKIN STRIP 1/2 X 4 IN - (STERI STRIP) (50/BX 4BX/CA)

## (undated) DEVICE — ELECTRODE DUAL RETURN W/ CORD - (50/PK)

## (undated) DEVICE — GLOVE SURGICAL PROTEXIS PI 8.0 LF - (50PR/BX)

## (undated) DEVICE — TUBING C&T SET FLYING LEADS DRAIN TUBING (10EA/BX)

## (undated) DEVICE — BLADE SURGICAL #15 - (50/BX 3BX/CA)

## (undated) DEVICE — PACK NEURO - (3EA/CA)

## (undated) DEVICE — SUCTION INSTRUMENT YANKAUER BULBOUS TIP W/O VENT (50EA/CA)

## (undated) DEVICE — SODIUM CHL IRRIGATION 0.9% 1000ML (12EA/CA)

## (undated) DEVICE — SUTURE 4-0 30CM STRATAFIX SPIRAL PS-2 (12EA/BX)

## (undated) DEVICE — DRAPETIBURON SHOULDER W/POUCH - (5EA/CA)

## (undated) DEVICE — STOCKINETTE, TUBULAR 6

## (undated) DEVICE — BONE PRESS SPINAL EDITION HENSLER (10EA/CA)

## (undated) DEVICE — STERI STRIP COMPOUND BENZOIN - TINCTURE 0.6ML WITH APPLICATOR (40EA/BX)

## (undated) DEVICE — CLIP SM INTNL HRZN TI ESCP LGT - (24EA/PK 25PK/BX)

## (undated) DEVICE — SPIDER SHOULDER HOLDER (12EA/BX)

## (undated) DEVICE — GLOVE BIOGEL SZ 8.5 SURGICAL PF LTX - (50PR/BX 4BX/CA)

## (undated) DEVICE — TOOL MR8 14CM MATCH HD SYM-TRI 3MM DIAMETER (1/EA)

## (undated) DEVICE — GOWN WARMING STANDARD FLEX - (30/CA)

## (undated) DEVICE — BOVIE BLADE COATED &INSULATED - 25/PK

## (undated) DEVICE — WATER IRRIGATION STERILE 1000ML (12EA/CA)

## (undated) DEVICE — CHLORAPREP 26 ML APPLICATOR - ORANGE TINT(25/CA)

## (undated) DEVICE — SYRINGE 12 CC LUER TIP - (80/BX) OBSOLETE ITEM

## (undated) DEVICE — BAG SPONGE COUNT 10.25 X 32 - BLUE (250/CA)

## (undated) DEVICE — GLOVE BIOGEL SZ 8 SURGICAL PF LTX - (50PR/BX 4BX/CA)

## (undated) DEVICE — NEEDLE NON SAFETY HYPO 22 GA X 1 1/2 IN (100/BX)

## (undated) DEVICE — PACK TOTAL HIP - (1/CA)

## (undated) DEVICE — DRESSING TRANSPARENT FILM TEGADERM 4 X 4.75" (50EA/BX)"

## (undated) DEVICE — SHEET PEDIATRIC LAPAROTOMY - (10/CA)

## (undated) DEVICE — COVER MAYO STAND X-LG - (22EA/CA)

## (undated) DEVICE — RESTRAINTS LIMB DISP. - (12/BX 4BX/CA)

## (undated) DEVICE — NEEDLE SPINAL NON-SAFETY 22 GA X 3 (25EA/BX)"

## (undated) DEVICE — KIT SURGIFLO W/OUT THROMBIN - (6EA/BX)

## (undated) DEVICE — SOLUTION PREP PVP IODINE 3/4 OZ POUCH PACKET CONTAINER STERILE LATEX FREE

## (undated) DEVICE — REMOTE CONTROL

## (undated) DEVICE — SUTURE 3-0 VICRYL PLUS RB-1 - 8 X 18 INCH (12/BX)

## (undated) DEVICE — DRAPE C-ARM LARGE 41IN X 74 IN - (10/BX 2BX/CA)

## (undated) DEVICE — SODIUM CHL. IRRIGATION 0.9% 3000ML (4EA/CA 65CA/PF)

## (undated) DEVICE — SCREW DISTRACTION 14MM YELLOW - STERILE (10EA/BX) (5TX4=20)

## (undated) DEVICE — FORCEPS ELECTROSURGICAL DISPOSABLE CODMAN 8IN 1.5MM

## (undated) DEVICE — SUTURE 3-0 MONOCRYL PLUS PS-2 - (12/BX)

## (undated) DEVICE — NEEDLE W/FACET TIP DULL VERSION W/STIMULATION CABLE SONOPLEX 21G X 4 (10/EA)"

## (undated) DEVICE — DRAPE SURG STERI-DRAPE 7X11OD - (10EA/BX, 40EA/CA)

## (undated) DEVICE — INTRAOP NEURO IN OR 1:1 PER 15 MIN

## (undated) DEVICE — NEEDLE SPINAL NON-SAFETY 25GA X 3 1/2 (25/BX)

## (undated) DEVICE — COVER PROBE STERILE CONE (12EA/CA)

## (undated) DEVICE — HANDPIECE 10FT INTPLS SCT PLS IRRIGATION HAND CONTROL SET (6/PK)

## (undated) DEVICE — SPONGE GAUZESTER 4 X 4 4PLY - (128PK/CA)

## (undated) DEVICE — TOWEL STOP TIMEOUT SAFETY FLAG (40EA/CA)

## (undated) DEVICE — BLADE SAGITTAL SAW DUAL CUT 75.0 X 25.0MM (1/EA)

## (undated) DEVICE — DRAPE LARGE 3 QUARTER - (20/CA)

## (undated) DEVICE — COVER LIGHT HANDLE ALC PLUS DISP (18EA/BX)

## (undated) DEVICE — KIT EVACUATER 3 SPRING PVC LF 1/8 DRAIN SIZE (10EA/CA)"

## (undated) DEVICE — DRAPE U ORTHOPEDIC - (10/BX)

## (undated) DEVICE — CANISTER SUCTION RIGID RED 1500CC (40EA/CA)

## (undated) DEVICE — SET EXTENSION WITH 2 PORTS (48EA/CA) ***PART #2C8610 IS A SUBSTITUTE*****

## (undated) DEVICE — SUTURE 2-0 MONOCRYL PLUS UNDYED CT-1 1 X 36 (36EA/BX)"

## (undated) DEVICE — PACK MINOR BASIN - (2EA/CA)

## (undated) DEVICE — TUBE CONNECTING SUCTION - CLEAR PLASTIC STERILE 72 IN (50EA/CA)

## (undated) DEVICE — TUBE E-T HI-LO CUFF 7.0MM (10EA/PK)

## (undated) DEVICE — SUTURE 3-0 VICRYL PLUS SH - 8X 18 INCH (12/BX)

## (undated) DEVICE — SYRINGE NON SAFETY 10 CC 21 GA X 1-1/2 IN (100/BX 4BX/CA)

## (undated) DEVICE — SUTURE 2-0 VICRYL PLUS CT-1 36 (36PK/BX)"

## (undated) DEVICE — LIGHT SOURCE MIS 12FT

## (undated) DEVICE — DRILL BIT

## (undated) DEVICE — AQUACEL 4X4

## (undated) DEVICE — MIDAS LUBRICATOR DIFFUSER PACK (4EA/CA)

## (undated) DEVICE — DRAPE STRLE REG TOWEL 18X24 - (10/BX 4BX/CA)"

## (undated) DEVICE — TUBE EMG NIM TRIVANTAGE 7MM (3EA/PK)

## (undated) DEVICE — GLOVE BIOGEL PI INDICATOR SZ 8.0 SURGICAL PF LF -(50/BX 4BX/CA)

## (undated) DEVICE — GLOVE BIOGEL SZ 7 SURGICAL PF LTX - (50PR/BX 4BX/CA)

## (undated) DEVICE — CANISTER SUCTION 3000ML MECHANICAL FILTER AUTO SHUTOFF MEDI-VAC NONSTERILE LF DISP (40EA/CA)

## (undated) DEVICE — GOWN SURGEONS X-LARGE - DISP. (30/CA)

## (undated) DEVICE — SET LEADWIRE 5 LEAD BEDSIDE DISPOSABLE ECG (1SET OF 5/EA)

## (undated) DEVICE — SLEEVE VASO DVT COMPRESSION CALF MED - (10PR/CA)

## (undated) DEVICE — GLOVE 7.0 LF PF PROTEXIS (50PR/BX)

## (undated) DEVICE — BLADE SURGICAL CLIPPER - (50EA/CA)

## (undated) DEVICE — TUBING CLEARLINK DUO-VENT - C-FLO (48EA/CA)

## (undated) DEVICE — SUTURE  0 ETHIBOND CT-1 30 IN (36PK/BX)

## (undated) DEVICE — NEEDLE NON SAFETY 25 GA X 1 1/2 IN HYPO (100EA/BX)

## (undated) DEVICE — DRAPE MICROSCOPE ARMATEC 120IN X 46IN (10EA/CA)

## (undated) DEVICE — DRAPE 36X28IN RAD CARM BND BG - (25/CA)

## (undated) DEVICE — HUMID-VENT HEAT AND MOISTURE EXCHANGE- (50/BX)

## (undated) DEVICE — GLOVE SIZE 6.5 SURGEON ACCELERATOR FREE GREEN (50PR/BX)

## (undated) DEVICE — SUTURE NONABSORBABLE XBRAID #2 26MM (12EA/BX)

## (undated) DEVICE — NEEDLE MAYO CATGUT TPR POINT - (2EA/PK20PK/BX)

## (undated) DEVICE — TIP INTPLS HFLO ML ORFC BTRY - (12/CS)  FOR SURGILAV